# Patient Record
Sex: FEMALE | Race: WHITE | NOT HISPANIC OR LATINO | Employment: FULL TIME | URBAN - METROPOLITAN AREA
[De-identification: names, ages, dates, MRNs, and addresses within clinical notes are randomized per-mention and may not be internally consistent; named-entity substitution may affect disease eponyms.]

---

## 2017-01-04 DIAGNOSIS — J30.9 AR (ALLERGIC RHINITIS): Primary | ICD-10-CM

## 2017-01-04 RX ORDER — FEXOFENADINE HCL 180 MG/1
180 TABLET ORAL DAILY
Qty: 90 TABLET | Refills: 2 | Status: SHIPPED | OUTPATIENT
Start: 2017-01-04

## 2017-01-04 NOTE — TELEPHONE ENCOUNTER
Fexofenadine      Last Written Prescription Date: 9/29/15  Last Fill Quantity: 90,  # refills: 3   Last Office Visit with [facility] or [facility] prescribing provider: 10/14/16                                         Next 5 appointments (look out 90 days)     Feb 02, 2017  7:40 AM   Return Visit with Freddy Guerra MD   [facility]    [address] [address]   [phone]                  Prescription approved per FM Refill Protocol.  Jackelin Price, Pharm D  [facility]  [phone]

## 2017-01-15 ENCOUNTER — HOSPITAL ENCOUNTER (OUTPATIENT)
Facility: CLINIC | Age: 62
Setting detail: OBSERVATION
Discharge: HOME | End: 2017-01-17
Attending: EMERGENCY MEDICINE | Admitting: INTERNAL MEDICINE
Payer: COMMERCIAL

## 2017-01-15 DIAGNOSIS — R19.7 NAUSEA VOMITING AND DIARRHEA: ICD-10-CM

## 2017-01-15 DIAGNOSIS — R11.2 NAUSEA VOMITING AND DIARRHEA: ICD-10-CM

## 2017-01-15 DIAGNOSIS — E86.0 DEHYDRATION: ICD-10-CM

## 2017-01-15 PROBLEM — K52.9 GASTROENTERITIS: Status: ACTIVE | Noted: 2017-01-15

## 2017-01-15 LAB
ALBUMIN SERPL-MCNC: 3.4 G/DL (ref 3.4–5)
ALP SERPL-CCNC: 82 U/L (ref 40–150)
ALT SERPL W P-5'-P-CCNC: 37 U/L (ref 0–50)
ANION GAP SERPL CALCULATED.3IONS-SCNC: 8 MMOL/L (ref 3–14)
AST SERPL W P-5'-P-CCNC: 35 U/L (ref 0–45)
BASOPHILS # BLD AUTO: 0 10E9/L (ref 0–0.2)
BASOPHILS NFR BLD AUTO: 0.1 %
BILIRUB SERPL-MCNC: 0.6 MG/DL (ref 0.2–1.3)
BUN SERPL-MCNC: 18 MG/DL (ref 7–30)
CALCIUM SERPL-MCNC: 8.5 MG/DL (ref 8.5–10.1)
CHLORIDE SERPL-SCNC: 107 MMOL/L (ref 94–109)
CO2 SERPL-SCNC: 24 MMOL/L (ref 20–32)
CREAT SERPL-MCNC: 0.83 MG/DL (ref 0.52–1.04)
CRP SERPL-MCNC: 11 MG/L (ref 0–8)
DIFFERENTIAL METHOD BLD: ABNORMAL
EOSINOPHIL # BLD AUTO: 0 10E9/L (ref 0–0.7)
EOSINOPHIL NFR BLD AUTO: 0.1 %
ERYTHROCYTE [DISTWIDTH] IN BLOOD BY AUTOMATED COUNT: 14.8 % (ref 10–15)
ERYTHROCYTE [SEDIMENTATION RATE] IN BLOOD BY WESTERGREN METHOD: 15 MM/H (ref 0–30)
GFR SERPL CREATININE-BSD FRML MDRD: 70 ML/MIN/1.7M2
GLUCOSE SERPL-MCNC: 104 MG/DL (ref 70–99)
HCT VFR BLD AUTO: 38.6 % (ref 35–47)
HGB BLD-MCNC: 12.7 G/DL (ref 11.7–15.7)
IMM GRANULOCYTES # BLD: 0 10E9/L (ref 0–0.4)
IMM GRANULOCYTES NFR BLD: 0.1 %
LACTATE BLD-SCNC: 1.5 MMOL/L (ref 0.7–2.1)
LYMPHOCYTES # BLD AUTO: 0.3 10E9/L (ref 0.8–5.3)
LYMPHOCYTES NFR BLD AUTO: 3.7 %
MCH RBC QN AUTO: 30 PG (ref 26.5–33)
MCHC RBC AUTO-ENTMCNC: 32.9 G/DL (ref 31.5–36.5)
MCV RBC AUTO: 91 FL (ref 78–100)
MONOCYTES # BLD AUTO: 0.3 10E9/L (ref 0–1.3)
MONOCYTES NFR BLD AUTO: 3.6 %
NEUTROPHILS # BLD AUTO: 7.8 10E9/L (ref 1.6–8.3)
NEUTROPHILS NFR BLD AUTO: 92.4 %
NRBC # BLD AUTO: 0 10*3/UL
NRBC BLD AUTO-RTO: 0 /100
PLATELET # BLD AUTO: 310 10E9/L (ref 150–450)
POTASSIUM SERPL-SCNC: 4 MMOL/L (ref 3.4–5.3)
PROT SERPL-MCNC: 7.3 G/DL (ref 6.8–8.8)
RBC # BLD AUTO: 4.23 10E12/L (ref 3.8–5.2)
SODIUM SERPL-SCNC: 139 MMOL/L (ref 133–144)
WBC # BLD AUTO: 8.4 10E9/L (ref 4–11)

## 2017-01-15 PROCEDURE — 96374 THER/PROPH/DIAG INJ IV PUSH: CPT | Performed by: EMERGENCY MEDICINE

## 2017-01-15 PROCEDURE — 25000128 H RX IP 250 OP 636: Performed by: EMERGENCY MEDICINE

## 2017-01-15 PROCEDURE — 96375 TX/PRO/DX INJ NEW DRUG ADDON: CPT | Performed by: EMERGENCY MEDICINE

## 2017-01-15 PROCEDURE — 83605 ASSAY OF LACTIC ACID: CPT | Performed by: EMERGENCY MEDICINE

## 2017-01-15 PROCEDURE — 96361 HYDRATE IV INFUSION ADD-ON: CPT | Performed by: EMERGENCY MEDICINE

## 2017-01-15 PROCEDURE — 85025 COMPLETE CBC W/AUTO DIFF WBC: CPT | Performed by: EMERGENCY MEDICINE

## 2017-01-15 PROCEDURE — 99285 EMERGENCY DEPT VISIT HI MDM: CPT | Mod: 25 | Performed by: EMERGENCY MEDICINE

## 2017-01-15 PROCEDURE — 99285 EMERGENCY DEPT VISIT HI MDM: CPT | Mod: Z6 | Performed by: EMERGENCY MEDICINE

## 2017-01-15 PROCEDURE — 86140 C-REACTIVE PROTEIN: CPT | Performed by: EMERGENCY MEDICINE

## 2017-01-15 PROCEDURE — 87040 BLOOD CULTURE FOR BACTERIA: CPT | Performed by: EMERGENCY MEDICINE

## 2017-01-15 PROCEDURE — 80053 COMPREHEN METABOLIC PANEL: CPT | Performed by: EMERGENCY MEDICINE

## 2017-01-15 PROCEDURE — 85652 RBC SED RATE AUTOMATED: CPT | Performed by: EMERGENCY MEDICINE

## 2017-01-15 PROCEDURE — G0378 HOSPITAL OBSERVATION PER HR: HCPCS

## 2017-01-15 PROCEDURE — 96376 TX/PRO/DX INJ SAME DRUG ADON: CPT | Performed by: EMERGENCY MEDICINE

## 2017-01-15 PROCEDURE — 25000125 ZZHC RX 250: Performed by: EMERGENCY MEDICINE

## 2017-01-15 PROCEDURE — 96361 HYDRATE IV INFUSION ADD-ON: CPT

## 2017-01-15 PROCEDURE — 99220 ZZC INITIAL OBSERVATION CARE,LEVL III: CPT | Performed by: INTERNAL MEDICINE

## 2017-01-15 RX ORDER — PROCHLORPERAZINE 25 MG
25 SUPPOSITORY, RECTAL RECTAL EVERY 12 HOURS PRN
Status: DISCONTINUED | OUTPATIENT
Start: 2017-01-15 | End: 2017-01-15

## 2017-01-15 RX ORDER — CYCLOSPORINE 0.5 MG/ML
1 EMULSION OPHTHALMIC 2 TIMES DAILY
Status: DISCONTINUED | OUTPATIENT
Start: 2017-01-15 | End: 2017-01-17 | Stop reason: HOSPADM

## 2017-01-15 RX ORDER — KETOROLAC TROMETHAMINE 30 MG/ML
30 INJECTION, SOLUTION INTRAMUSCULAR; INTRAVENOUS ONCE
Status: COMPLETED | OUTPATIENT
Start: 2017-01-15 | End: 2017-01-15

## 2017-01-15 RX ORDER — NALOXONE HYDROCHLORIDE 0.4 MG/ML
.1-.4 INJECTION, SOLUTION INTRAMUSCULAR; INTRAVENOUS; SUBCUTANEOUS
Status: DISCONTINUED | OUTPATIENT
Start: 2017-01-15 | End: 2017-01-17 | Stop reason: HOSPADM

## 2017-01-15 RX ORDER — ACETAMINOPHEN 500 MG
500-1000 TABLET ORAL EVERY 6 HOURS PRN
Status: DISCONTINUED | OUTPATIENT
Start: 2017-01-15 | End: 2017-01-17 | Stop reason: HOSPADM

## 2017-01-15 RX ORDER — PROMETHAZINE HYDROCHLORIDE 25 MG/ML
12.5 INJECTION, SOLUTION INTRAMUSCULAR; INTRAVENOUS ONCE
Status: COMPLETED | OUTPATIENT
Start: 2017-01-15 | End: 2017-01-15

## 2017-01-15 RX ORDER — SODIUM CHLORIDE, SODIUM LACTATE, POTASSIUM CHLORIDE, CALCIUM CHLORIDE 600; 310; 30; 20 MG/100ML; MG/100ML; MG/100ML; MG/100ML
INJECTION, SOLUTION INTRAVENOUS CONTINUOUS
Status: DISCONTINUED | OUTPATIENT
Start: 2017-01-15 | End: 2017-01-17 | Stop reason: HOSPADM

## 2017-01-15 RX ORDER — FOLIC ACID 1 MG/1
1 TABLET ORAL DAILY
Status: DISCONTINUED | OUTPATIENT
Start: 2017-01-16 | End: 2017-01-15

## 2017-01-15 RX ORDER — PROCHLORPERAZINE MALEATE 5 MG
5-10 TABLET ORAL EVERY 6 HOURS PRN
Status: DISCONTINUED | OUTPATIENT
Start: 2017-01-15 | End: 2017-01-15

## 2017-01-15 RX ORDER — FEXOFENADINE HCL 60 MG/1
180 TABLET, FILM COATED ORAL DAILY
Status: DISCONTINUED | OUTPATIENT
Start: 2017-01-16 | End: 2017-01-15

## 2017-01-15 RX ORDER — ONDANSETRON 2 MG/ML
4 INJECTION INTRAMUSCULAR; INTRAVENOUS EVERY 6 HOURS PRN
Status: DISCONTINUED | OUTPATIENT
Start: 2017-01-15 | End: 2017-01-17 | Stop reason: HOSPADM

## 2017-01-15 RX ORDER — ONDANSETRON 4 MG/1
4 TABLET, ORALLY DISINTEGRATING ORAL EVERY 6 HOURS PRN
Status: DISCONTINUED | OUTPATIENT
Start: 2017-01-15 | End: 2017-01-15

## 2017-01-15 RX ORDER — ONDANSETRON 2 MG/ML
8 INJECTION INTRAMUSCULAR; INTRAVENOUS ONCE
Status: COMPLETED | OUTPATIENT
Start: 2017-01-15 | End: 2017-01-15

## 2017-01-15 RX ORDER — MORPHINE SULFATE 4 MG/ML
4 INJECTION, SOLUTION INTRAMUSCULAR; INTRAVENOUS
Status: DISCONTINUED | OUTPATIENT
Start: 2017-01-15 | End: 2017-01-15

## 2017-01-15 RX ORDER — LIDOCAINE 40 MG/G
CREAM TOPICAL
Status: DISCONTINUED | OUTPATIENT
Start: 2017-01-15 | End: 2017-01-15

## 2017-01-15 RX ORDER — TOPIRAMATE 50 MG/1
50 TABLET, FILM COATED ORAL AT BEDTIME
Status: DISCONTINUED | OUTPATIENT
Start: 2017-01-15 | End: 2017-01-15

## 2017-01-15 RX ORDER — MORPHINE SULFATE 2 MG/ML
2-4 INJECTION, SOLUTION INTRAMUSCULAR; INTRAVENOUS
Status: DISCONTINUED | OUTPATIENT
Start: 2017-01-15 | End: 2017-01-17 | Stop reason: HOSPADM

## 2017-01-15 RX ORDER — SODIUM CHLORIDE 9 MG/ML
1000 INJECTION, SOLUTION INTRAVENOUS CONTINUOUS
Status: DISCONTINUED | OUTPATIENT
Start: 2017-01-15 | End: 2017-01-17 | Stop reason: HOSPADM

## 2017-01-15 RX ADMIN — SODIUM CHLORIDE 1000 ML: 9 INJECTION, SOLUTION INTRAVENOUS at 18:55

## 2017-01-15 RX ADMIN — ONDANSETRON 8 MG: 2 INJECTION INTRAMUSCULAR; INTRAVENOUS at 18:59

## 2017-01-15 RX ADMIN — SODIUM CHLORIDE 1000 ML: 9 INJECTION, SOLUTION INTRAVENOUS at 20:39

## 2017-01-15 RX ADMIN — MORPHINE SULFATE 4 MG: 4 INJECTION, SOLUTION INTRAMUSCULAR; INTRAVENOUS at 21:20

## 2017-01-15 RX ADMIN — PROMETHAZINE HYDROCHLORIDE 12.5 MG: 25 INJECTION INTRAMUSCULAR; INTRAVENOUS at 19:26

## 2017-01-15 RX ADMIN — MORPHINE SULFATE 4 MG: 4 INJECTION, SOLUTION INTRAMUSCULAR; INTRAVENOUS at 19:26

## 2017-01-15 RX ADMIN — SODIUM CHLORIDE 1000 ML: 9 INJECTION, SOLUTION INTRAVENOUS at 19:52

## 2017-01-15 RX ADMIN — MORPHINE SULFATE 4 MG: 4 INJECTION, SOLUTION INTRAMUSCULAR; INTRAVENOUS at 19:59

## 2017-01-15 RX ADMIN — KETOROLAC TROMETHAMINE 30 MG: 30 INJECTION, SOLUTION INTRAMUSCULAR at 19:02

## 2017-01-15 ASSESSMENT — ENCOUNTER SYMPTOMS
VOMITING: 1
NAUSEA: 1
DIARRHEA: 1
SHORTNESS OF BREATH: 0
BLOOD IN STOOL: 0
FEVER: 0
ABDOMINAL PAIN: 1

## 2017-01-15 NOTE — IP AVS SNAPSHOT
MRN:[id number]                      After Visit Summary   1/15/2017    Sharon Fung    MRN: [id number]           Thank you!     Thank you for choosing [facility] for your care. Our goal is always to provide you with excellent care. Hearing back from our patients is one way we can continue to improve our services. Please take a few minutes to complete the written survey that you may receive in the mail after you visit with us. Thank you!        Patient Information     Date Of Birth          1955        About your hospital stay     You were admitted on:  January 15, 2017 You last received care in the:  [facility] 8[facility]    You were discharged on:  January 17, 2017       Who to Call     For medical emergencies, please call 911.  For non-urgent questions about your medical care, please call your primary care provider or clinic, [phone]          Attending Provider     Provider    Danny Deluca MD    [facility], MD Tona Zapata Saurabh, MD       Primary Care Provider Office Phone # Fax #    Reynaldo Saavedra [phone]       [address] [address]        Your next 10 appointments already scheduled     Feb 02, 2017  7:40 AM   Return Visit with Freddy Guerra MD   [facility] ([facility])    [address] [place] [id number] [phone]              Pending Results     Date and Time Order Name Status Description    1/16/2017 1116 Throat Culture Aerobic Bacterial Preliminary     1/15/2017 1840 Blood culture Preliminary             Statement of Approval     Ordered          01/17/17 1223  I have reviewed and agree with all the recommendations and orders detailed in this document.   EFFECTIVE NOW     Approved and electronically signed by:  Shaq Omer MD           01/17/17 1214  I have reviewed and agree with all the recommendations and orders detailed in this document.    EFFECTIVE NOW     Approved and electronically signed by:  Shaq Omer MD             Admission Information        Provider Department Dept Phone    1/15/2017 Tu Carbone MD, MD [facility] [phone]      Your Vitals Were     Blood Pressure Pulse Temperature Respirations Pulse Oximetry       157/93 mmHg 81 99.4  F (37.4  C) (Oral) 17 93%       MyChart Information     [employer] gives you secure access to your electronic health record. If you see a primary care provider, you can also send messages to your care team and make appointments. If you have questions, please call your primary care clinic.  If you do not have a primary care provider, please call [phone] and they will assist you.        Care EveryWhere ID     This is your Care EveryWhere ID. This could be used by other organizations to access your [place] medical records  [id number]           Review of your medicines      START taking        Dose / Directions    ondansetron 4 MG ODT tab   Commonly known as:  ZOFRAN ODT   Used for:  Nausea vomiting and diarrhea        Dose:  4 mg   Take 1 tablet (4 mg) by mouth every 6 hours as needed for nausea   Quantity:  12 tablet   Refills:  1         CONTINUE these medicines which have NOT CHANGED        Dose / Directions    acetaminophen 500 MG tablet   Commonly known as:  TYLENOL        Dose:  500-1000 mg   Take 500-1,000 mg by mouth every 6 hours as needed for mild pain   Refills:  0       cycloSPORINE 0.05 % ophthalmic emulsion   Commonly known as:  RESTASIS   Used for:  Dry eye syndrome, bilateral        Dose:  1 drop   Place 1 drop into both eyes 2 times daily   Quantity:  2 Box   Refills:  11       fexofenadine 180 MG tablet   Commonly known as:  ALLEGRA   Used for:  AR (allergic rhinitis)        Dose:  180 mg   Take 1 tablet (180 mg) by mouth daily   Quantity:  90 tablet   Refills:  2       FLONASE 50 MCG/ACT spray   Generic drug:  fluticasone        Dose:  2 spray   Spray 2 sprays into both nostrils  "as needed   Refills:  0       FLUoxetine 20 MG capsule   Commonly known as:  PROzac   Used for:  Major depressive disorder, recurrent episode, mild (H)        Dose:  20 mg   Take 1 capsule (20 mg) by mouth daily   Quantity:  90 capsule   Refills:  1       folic acid 1 MG tablet   Commonly known as:  FOLVITE   Used for:  Seropositive rheumatoid arthritis (H), High risk medication use        Dose:  1 mg   Take 1 tablet (1 mg) by mouth daily   Quantity:  100 tablet   Refills:  3       Insulin Syringe-Needle U-100 27G X 1/2\" 1 ML Misc   Commonly known as:  BD insulin syringe   Used for:  Seropositive rheumatoid arthritis (H), High risk medication use        Dose:  1 Syringe   1 Syringe every 7 days , to be used for methotrexate administration.   Quantity:  10 each   Refills:  5       methotrexate 50 MG/2ML injection CHEMO   Used for:  Seropositive rheumatoid arthritis (H)        Dose:  20 mg   Inject 0.8 mLs (20 mg) Subcutaneous every 7 days   Quantity:  2 vial   Refills:  3       omeprazole 20 MG tablet   Used for:  Gastroesophageal reflux disease without esophagitis        Dose:  20 mg   Take 1 tablet (20 mg) by mouth daily Take 30-60 minutes before a meal.   Quantity:  30 tablet   Refills:  2       order for DME        Use your CPAP device as directed by your provider.   Refills:  0       tofacitinib 11 MG 24 hr tablet   Commonly known as:  XELJANZ XR   Used for:  Seropositive rheumatoid arthritis (H), High risk medications (not anticoagulants) long-term use        Dose:  11 mg   Take 1 tablet (11 mg) by mouth daily   Quantity:  30 tablet   Refills:  6       topiramate 50 MG tablet   Commonly known as:  TOPAMAX   Used for:  Morbid drug-induced obesity        Dose:  50 mg   Take 1 tablet (50 mg) by mouth At Bedtime   Quantity:  30 tablet   Refills:  3            Where to get your medicines      These medications were sent to [facility] [place], [place] - [address]  [address], " "[facility] [address]     Phone:  [phone]    - ondansetron 4 MG ODT tab             Protect others around you: Learn how to safely use, store and throw away your medicines at www.disposemymeds.org.             Medication List: This is a list of all your medications and when to take them. Check marks below indicate your daily home schedule. Keep this list as a reference.      Medications           Morning Afternoon Evening Bedtime As Needed    acetaminophen 500 MG tablet   Commonly known as:  TYLENOL   Take 500-1,000 mg by mouth every 6 hours as needed for mild pain   Last time this was given:  500 mg on 1/16/2017  7:51 AM                                cycloSPORINE 0.05 % ophthalmic emulsion   Commonly known as:  RESTASIS   Place 1 drop into both eyes 2 times daily                                fexofenadine 180 MG tablet   Commonly known as:  ALLEGRA   Take 1 tablet (180 mg) by mouth daily                                FLONASE 50 MCG/ACT spray   Spray 2 sprays into both nostrils as needed   Generic drug:  fluticasone                                FLUoxetine 20 MG capsule   Commonly known as:  PROzac   Take 1 capsule (20 mg) by mouth daily                                folic acid 1 MG tablet   Commonly known as:  FOLVITE   Take 1 tablet (1 mg) by mouth daily                                Insulin Syringe-Needle U-100 27G X 1/2\" 1 ML Misc   Commonly known as:  BD insulin syringe   1 Syringe every 7 days , to be used for methotrexate administration.                                methotrexate 50 MG/2ML injection CHEMO   Inject 0.8 mLs (20 mg) Subcutaneous every 7 days                                omeprazole 20 MG tablet   Take 1 tablet (20 mg) by mouth daily Take 30-60 minutes before a meal.                                ondansetron 4 MG ODT tab   Commonly known as:  ZOFRAN ODT   Take 1 tablet (4 mg) by mouth every 6 hours as needed for nausea                                order for DME   Use your CPAP " device as directed by your provider.                                tofacitinib 11 MG 24 hr tablet   Commonly known as:  XELJANZ XR   Take 1 tablet (11 mg) by mouth daily                                topiramate 50 MG tablet   Commonly known as:  TOPAMAX   Take 1 tablet (50 mg) by mouth At Bedtime

## 2017-01-15 NOTE — LETTER
Transition Communication Hand-off for Care Transitions to Next Level of Care Provider    Name: Sharon Fung  MRN #: [id number]  Primary Care Provider: Reynaldo Saavedra     Primary Clinic: [address] [address]     Reason for Hospitalization:  Dehydration [E86.0]  Nausea vomiting and diarrhea [R11.2, R19.7]  Admit Date/Time: 1/15/2017  6:31 PM  Discharge Date: TBD  Payor Source: Payor: [employer] / Plan: [place] [facility] / Product Type: HMO /     Reason for Communication Hand-off Referral: Avoidable readmission within 30 days    Follow-up specialty is recommended: Yes    Follow-up plan:  Future Appointments  Date Time Provider Department Center   2/2/2017 7:40 AM Freddy Guerra MD [facility] Heidi A. Bahe RN, BSN  Care Coordinator, 8A  Phone [phone]  Pager [phone]    AVS/Discharge Summary is the source of truth; this is a helpful guide for improved communication of patient story

## 2017-01-15 NOTE — IP AVS SNAPSHOT
[facility]    [address]    [place] [address]    Phone:  [phone]                                       After Visit Summary   1/15/2017    Sharon Fung    MRN: [id number]           After Visit Summary Signature Page     I have received my discharge instructions, and my questions have been answered. I have discussed any challenges I see with this plan with the nurse or doctor.    ..........................................................................................................................................  Patient/Patient Representative Signature      ..........................................................................................................................................  Patient Representative Print Name and Relationship to Patient    ..................................................               ................................................  Date                                            Time    ..........................................................................................................................................  Reviewed by Signature/Title    ...................................................              ..............................................  Date                                                            Time

## 2017-01-16 PROCEDURE — 99225 ZZC SUBSEQUENT OBSERVATION CARE,LEVEL II: CPT | Performed by: INTERNAL MEDICINE

## 2017-01-16 PROCEDURE — G0378 HOSPITAL OBSERVATION PER HR: HCPCS

## 2017-01-16 PROCEDURE — 96376 TX/PRO/DX INJ SAME DRUG ADON: CPT

## 2017-01-16 PROCEDURE — 25000125 ZZHC RX 250: Performed by: INTERNAL MEDICINE

## 2017-01-16 PROCEDURE — 87070 CULTURE OTHR SPECIMN AEROBIC: CPT | Performed by: INTERNAL MEDICINE

## 2017-01-16 PROCEDURE — 96361 HYDRATE IV INFUSION ADD-ON: CPT

## 2017-01-16 PROCEDURE — 96375 TX/PRO/DX INJ NEW DRUG ADDON: CPT

## 2017-01-16 PROCEDURE — 25000132 ZZH RX MED GY IP 250 OP 250 PS 637: Performed by: INTERNAL MEDICINE

## 2017-01-16 PROCEDURE — 25000128 H RX IP 250 OP 636: Performed by: INTERNAL MEDICINE

## 2017-01-16 PROCEDURE — 25800025 ZZH RX 258: Performed by: INTERNAL MEDICINE

## 2017-01-16 RX ORDER — KETOROLAC TROMETHAMINE 30 MG/ML
30 INJECTION, SOLUTION INTRAMUSCULAR; INTRAVENOUS EVERY 6 HOURS PRN
Status: DISCONTINUED | OUTPATIENT
Start: 2017-01-16 | End: 2017-01-17 | Stop reason: HOSPADM

## 2017-01-16 RX ADMIN — KETOROLAC TROMETHAMINE 30 MG: 30 INJECTION, SOLUTION INTRAMUSCULAR at 23:07

## 2017-01-16 RX ADMIN — SODIUM CHLORIDE, POTASSIUM CHLORIDE, SODIUM LACTATE AND CALCIUM CHLORIDE: 600; 310; 30; 20 INJECTION, SOLUTION INTRAVENOUS at 18:01

## 2017-01-16 RX ADMIN — PANTOPRAZOLE SODIUM 40 MG: 40 INJECTION, POWDER, FOR SOLUTION INTRAVENOUS at 00:19

## 2017-01-16 RX ADMIN — KETOROLAC TROMETHAMINE 30 MG: 30 INJECTION, SOLUTION INTRAMUSCULAR at 15:19

## 2017-01-16 RX ADMIN — SODIUM CHLORIDE 500 ML: 9 INJECTION, SOLUTION INTRAVENOUS at 13:10

## 2017-01-16 RX ADMIN — PANTOPRAZOLE SODIUM 40 MG: 40 INJECTION, POWDER, FOR SOLUTION INTRAVENOUS at 07:51

## 2017-01-16 RX ADMIN — SODIUM CHLORIDE, POTASSIUM CHLORIDE, SODIUM LACTATE AND CALCIUM CHLORIDE: 600; 310; 30; 20 INJECTION, SOLUTION INTRAVENOUS at 00:19

## 2017-01-16 RX ADMIN — ONDANSETRON 4 MG: 2 INJECTION INTRAMUSCULAR; INTRAVENOUS at 07:51

## 2017-01-16 RX ADMIN — ONDANSETRON 4 MG: 2 INJECTION INTRAMUSCULAR; INTRAVENOUS at 15:18

## 2017-01-16 RX ADMIN — MORPHINE SULFATE 4 MG: 2 INJECTION, SOLUTION INTRAMUSCULAR; INTRAVENOUS at 00:18

## 2017-01-16 RX ADMIN — KETOROLAC TROMETHAMINE 30 MG: 30 INJECTION, SOLUTION INTRAMUSCULAR at 09:09

## 2017-01-16 RX ADMIN — ONDANSETRON 4 MG: 2 INJECTION INTRAMUSCULAR; INTRAVENOUS at 23:01

## 2017-01-16 RX ADMIN — ONDANSETRON 4 MG: 2 INJECTION INTRAMUSCULAR; INTRAVENOUS at 00:19

## 2017-01-16 RX ADMIN — MORPHINE SULFATE 4 MG: 2 INJECTION, SOLUTION INTRAMUSCULAR; INTRAVENOUS at 03:41

## 2017-01-16 RX ADMIN — ACETAMINOPHEN 500 MG: 500 TABLET ORAL at 07:51

## 2017-01-16 NOTE — PLAN OF CARE
"Problem: Goal Outcome Summary  Goal: Goal Outcome Summary  Outcome: Improving  5663-9235: Pt c/o uncomfortable muscle/body aches \"all over\", Denies abdominal pain. Requested PRN morphine 4 mg IV. C/o nausea, prn zofran IV given. Low grade temp 100.1, refusing tylenol d/t nausea  8763-7992: \"Pain is improving\" Able to sip on ginger ale. Receiving continuous IV fluids - LR @ 125 ml/hr  3686-2430: Struggling to sleep. Still feeling achy. Prn morphine IV administered with good relief. Still low grade temp of 100.2  2730-3141: Seems more comfortable. No further c/o nausea, drinking soda. Up to BR independently. Denies nausea and diarrhea.     Pt is a/o x4. Low grade temp during the night, did not want tylenol, \"I don't think it will stay down\". General body aches well managed by IV MS, given twice during shift. Up to BR independently. No emesis or diarrhea during night. Unable to tolerate food but sipping ginger ale throughout the night. PIV in right hand is patent, drsg CDI - running LR @ 125 ml/hr. Calls appropriately and makes needs known. Educated regarding 'Observation' status and handout given, had no questions.         "

## 2017-01-16 NOTE — H&P
History and Physical     Sharon Fung MRN# [id number]   YOB: 1955 Age: 61 year old      Date of Admission:  1/15/2017      CC:   nausea, vomiting, diarrhea since 0400 am.   Gen body aches.       HPI: Obtained from the patient, chart review.    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 diffuse pain abdomen.   Reports approximately 8 episodes of watery (non bloody) diarrhea and she throws up every time she eats or drinks anything. Unable to tolerate po.  Patient states that she has had no fevers, chills. Diffuse abd pain and Gen body aches.      No sick contact, no recent travel. No concern for food poisoning.     RA: difficult to control . On Mtx and tofacitinib.   H/O Status post 2010 laparoscopic gastric banding.10/31/15 laparoscopic removal of gastric band and drainage of intra-abdominal abscess.    In ED,     Given NSS bolus  Iv Morphine 4 mg  Iv ketorolac 30 mg  Iv Zofran 8  Iv Phenergan 12.5    Past Medical History:  Past Medical History   Diagnosis Date     AR (allergic rhinitis)  as teen     Mild major depression (H) 3 years ago     GERD (gastroesophageal reflux disease) 4-07     Morbid obesity (H) teens     Rheumatoid arthritis, adult (H)      Arthritis 12/14/2015     BRETT (obstructive sleep apnea)      uses CPAP     Depressive disorder 3 years ago       Past Surgical History:  Past Surgical History   Procedure Laterality Date     C appendectomy  1977     Tubal ligation  1988     Breast biopsy, rt/lt  1-94     Benign     Laparoscopic gastric adjustable banding  11/09/10     Lap band procedure     Esophagoscopy, gastroscopy, duodenoscopy (egd), combined N/A 10/29/2015     Procedure:  "COMBINED ESOPHAGOSCOPY, GASTROSCOPY, DUODENOSCOPY (EGD);  Surgeon: Shaq Mims MD;  Location: [facility]     Laparoscopic removal gastric adjustable band N/A 10/31/2015     Procedure: LAPAROSCOPIC REMOVAL GASTRIC ADJUSTABLE BAND;  Surgeon: Shaq Mims MD;  Location: [facility] OR     Colonoscopy       Blepharoplasty bilateral Bilateral 8/5/2016     Procedure: BLEPHAROPLASTY BILATERAL;  Surgeon: Cortez Robin MD;  Location: [facility]       Allergies:   No Known Allergies    Medications:    No current facility-administered medications on file prior to encounter.  Current Outpatient Prescriptions on File Prior to Encounter:  fexofenadine (ALLEGRA) 180 MG tablet Take 1 tablet (180 mg) by mouth daily   tofacitinib (XELJANZ XR) 11 MG XR tablet Take 1 tablet (11 mg) by mouth daily   methotrexate 50 MG/2ML injection Inject 0.8 mLs (20 mg) Subcutaneous every 7 days   Insulin Syringe-Needle U-100 (BD INSULIN SYRINGE) 27G X 1/2\" 1 ML MISC 1 Syringe every 7 days , to be used for methotrexate administration.   topiramate (TOPAMAX) 50 MG tablet Take 1 tablet (50 mg) by mouth At Bedtime   folic acid (FOLVITE) 1 MG tablet Take 1 tablet (1 mg) by mouth daily   FLUoxetine (PROZAC) 20 MG capsule Take 1 capsule (20 mg) by mouth daily   cycloSPORINE (RESTASIS) 0.05 % ophthalmic emulsion Place 1 drop into both eyes 2 times daily   acetaminophen (TYLENOL) 500 MG tablet Take 500-1,000 mg by mouth every 6 hours as needed for mild pain   omeprazole 20 MG tablet Take 1 tablet (20 mg) by mouth daily Take 30-60 minutes before a meal.   ORDER FOR DME Use your CPAP device as directed by your provider.   fluticasone (FLONASE) 50 MCG/ACT nasal spray Spray 2 sprays into both nostrils as needed       Social History:  Social History     Social History     Marital Status:      Spouse Name: N/A     Number of Children: 2     Years of Education: 18     Occupational History     [employer]     Social History Main Topics     " Smoking status: Never Smoker      Smokeless tobacco: Never Used     Alcohol Use: Yes      Comment: very rare     Drug Use: No     Sexual Activity: No     Other Topics Concern     Parent/Sibling W/ Cabg, Mi Or Angioplasty Before 65f 55m? No     Social History Narrative       Family History:   Family History   Problem Relation Age of Onset     HEART DISEASE Father      MI     Neurologic Disorder Father 79     Parkinson's     CANCER Maternal Grandmother      uterine     Neurologic Disorder Sister 16     migraine     Neurologic Disorder Mother 20     migraine     Neurologic Disorder Son 7     migraine     DIABETES Father      Hypertension Father      Depression Sister      Coronary Artery Disease Father      Depression Sister      Substance Abuse Son          ROS:  The remainder of the complete ROS was negative unless noted in the HPI.      Exam:    BP 92/41 mmHg  Pulse 100  Temp(Src) 100.4  F (38  C) (Oral)  Resp 18  SpO2 98%  Breastfeeding? No    Temp (24hrs), Av.4  F (37.4  C), Min:98.4  F (36.9  C), Max:100.4  F (38  C)      Wt Readings from Last 5 Encounters:   16 107.956 kg (238 lb)   16 107.502 kg (237 lb)   10/14/16 106.142 kg (234 lb)   16 109.181 kg (240 lb 11.2 oz)   16 109.374 kg (241 lb 2 oz)       General: Alert, interactive, NAD  HEENT: AT/NC, sclera anicteric, PERRL, moist MM  Neck: Supple, no JVD or LNpathy  Resp/chest: clear to auscultation bilaterally, no crackles or wheezes  Cardiac/Heart: s1s2 regular rate and rhythm, no murmur  GI/Abdomen: Soft, mild diffuse tenderness, nondistended. +BS.  No rebound or guarding.  MSK/Extremities: No LE edema, distal pulses 2+  Skin: Warm and dry, no jaundice or rash on exposed areas.   Neuro: Alert & oriented x 3, Cns 2-12 intact,      Labs:    BMP  Recent Labs  Lab 01/15/17  1844      POTASSIUM 4.0   CHLORIDE 107   ISH 8.5   CO2 24   BUN 18   CR 0.83   *     CBC  Recent Labs  Lab 01/15/17  1844   WBC 8.4   RBC 4.23    HGB 12.7   HCT 38.6   MCV 91   MCH 30.0   MCHC 32.9   RDW 14.8        INRNo lab results found in last 7 days.  LFTs  Recent Labs  Lab 01/15/17  1844   ALKPHOS 82   AST 35   ALT 37   BILITOTAL 0.6   PROTTOTAL 7.3   ALBUMIN 3.4      PANCNo lab results found in last 7 days.    Imaging:  No results found for this or any previous visit (from the past 24 hour(s)).      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  gen body aches since today am.     # Acute gastroenteritis: suspect Viral.   Patient with low grade T max 100.4. Vitals stable. CRP 11. Sed rate: 15. Wbc: 8.4. Lac; 1.5.   - register to observation.   - Continue IVF  cc/hour.   - iv Protonix 40 mg now & daily.   - iv Antiemetics protocol (zofran, compazine)  - iv Morphine 2-4 mg prn for pain.   - ADAT to clears.   - Monitor vitals. I/O.     *Patient has h/o intra abd abscess 2015. Clinically appears unlikely at current. However continue to monitor closely. Consider CT abd/pelvis with contrast if patient worsens eg high fever, worsening pain abdomen etc.     # RA : on Mtx and tofacitinib. Follows rheumatology as outpatient. Per patient, not really controlled at baseline.   - unlikely acute flare.   - holding her oral medicines given NV & also observation status.   - may resume her home oral medications once NV improves. Patient may take her home medications after pharm D reviews.     FEN: Adat to clears. Ivf. Fu lytes.   Prophylaxis: mechanical    Full code.     D/w HOWARD Mar MD   Hospitalist (Internal Medicine)  1/15/2017

## 2017-01-16 NOTE — PROGRESS NOTES
Pt seen, case reviewed with Dr Mar    Pt continues to feel poorly with generalized myalgias, nausea, malaise.  No emesis or diarrhea since last night  Has developed a sore throat overnight.  Denies cough, HA, dysuria    T101.1 VSS  Fatigued appearing, in NAD  Oral mucosa moist, no pharynx lesions  Lungs clear  CV rrr no M  Abd soft, non-tender  No LE edema  No rash    BC neg      Assessment    Fever, myalgias, sore throat, nausea with emesis, diarrhea.  Picture remains most c/w viral illness, less likely bacterial infection.    GERD, on IV PPI    RA on immunosuppressant    Plan  Supportive tx  IV fluids  IV toradol  Throat culture  Stool culture for enteric pathogens if recurrent diarrhea  ambulate

## 2017-01-16 NOTE — PLAN OF CARE
Problem: Goal Outcome Summary  Goal: Goal Outcome Summary  Outcome: Improving  Pt arrived from ED around 2145.  Orientated pt to room, pt dosing off while she was up clear, pt appears quite tired. Patient A/Ox4. VSS, pt has been running a slight fever.  Dr. Mar arrived to floor around 2225 to assess pt; he has been entering/reviewing orders prior to that as well. Not tolerating a diet per ED, pt tried ginger ale and vomited downstairs.  No emesis noted on 8A. Activity level has been low, pt was able to get out of cart and transfer to bed but laid down quickly. IV infusing fluids to rehydrate pt. Pain rated as tolerable thus far. Patient has demonstrated ability to call appropriately. Patient is resting with call light within reach. Will continue to monitor.

## 2017-01-16 NOTE — ED PROVIDER NOTES
[facility] [place] ([facility])    January 15, 2017    ED 19   History     Chief Complaint   Patient presents with     Nausea, Vomiting, & Diarrhea     nausea, vomiting, diarrhea that started at 0400.      Generalized Body Aches     Pt having generalized body pain. Even blankets and causing pain     The history is provided by the patient and medical records.     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  Patient states there are no other family illnesses going on and she states she s had approximately 8 episodes of watery diarrhea as well as 8 episodes of vomiting since 4 AM.  Patient states that she has had no fevers, no bloody diarrhea, but states that she has diffuse myalgias.  Patient denies any recent traveling and presents with family members to the ER for evaluation.  Patient does have a history of rheumatoid arthritis for which she takes methotrexate and she states that she normally gets nauseated when taking the methotrexate.  Patient s additional history is significant for previous bariatric surgery status post lap band with which she has had complications in the past as well.    This part of the document was transcribed by Annika Blanchard, Medical Scribe.      I have reviewed the Medications, Allergies, Past Medical and Surgical History, and Social History in the [employer] system.  PAST MEDICAL HISTORY:   Past Medical History   Diagnosis Date     AR (allergic rhinitis)  as teen     Mild major depression (H) 3 years ago     GERD (gastroesophageal reflux disease) 4-07     Morbid obesity (H) teens     Rheumatoid arthritis, adult (H)      Arthritis 12/14/2015     BRETT (obstructive sleep apnea)      uses CPAP     Depressive disorder 3 years ago       PAST SURGICAL HISTORY:   Past Surgical  History   Procedure Laterality Date     C appendectomy  1977     Tubal ligation  1988     Breast biopsy, rt/lt  1-94     Benign     Laparoscopic gastric adjustable banding  11/09/10     Lap band procedure     Esophagoscopy, gastroscopy, duodenoscopy (egd), combined N/A 10/29/2015     Procedure: COMBINED ESOPHAGOSCOPY, GASTROSCOPY, DUODENOSCOPY (EGD);  Surgeon: Shaq Mims MD;  Location: [facility]     Laparoscopic removal gastric adjustable band N/A 10/31/2015     Procedure: LAPAROSCOPIC REMOVAL GASTRIC ADJUSTABLE BAND;  Surgeon: Shaq Mims MD;  Location: [facility] OR     Colonoscopy       Blepharoplasty bilateral Bilateral 8/5/2016     Procedure: BLEPHAROPLASTY BILATERAL;  Surgeon: Cortez Robin MD;  Location: [facility]       FAMILY HISTORY:   Family History   Problem Relation Age of Onset     HEART DISEASE Father      MI     Neurologic Disorder Father 79     Parkinson's     CANCER Maternal Grandmother      uterine     Neurologic Disorder Sister 16     migraine     Neurologic Disorder Mother 20     migraine     Neurologic Disorder Son 7     migraine     DIABETES Father      Hypertension Father      Depression Sister      Coronary Artery Disease Father      Depression Sister      Substance Abuse Son        SOCIAL HISTORY:   Social History   Substance Use Topics     Smoking status: Never Smoker      Smokeless tobacco: Never Used     Alcohol Use: Yes      Comment: very rare       Previous Medications    ACETAMINOPHEN (TYLENOL) 500 MG TABLET    Take 500-1,000 mg by mouth every 6 hours as needed for mild pain    CYCLOSPORINE (RESTASIS) 0.05 % OPHTHALMIC EMULSION    Place 1 drop into both eyes 2 times daily    FEXOFENADINE (ALLEGRA) 180 MG TABLET    Take 1 tablet (180 mg) by mouth daily    FLUOXETINE (PROZAC) 20 MG CAPSULE    Take 1 capsule (20 mg) by mouth daily    FLUTICASONE (FLONASE) 50 MCG/ACT NASAL SPRAY    Spray 2 sprays into both nostrils as needed    FOLIC ACID (FOLVITE) 1 MG TABLET    Take 1 tablet (1  "mg) by mouth daily    INSULIN SYRINGE-NEEDLE U-100 (BD INSULIN SYRINGE) 27G X 1/2\" 1 ML MISC    1 Syringe every 7 days , to be used for methotrexate administration.    METHOTREXATE 50 MG/2ML INJECTION    Inject 0.8 mLs (20 mg) Subcutaneous every 7 days    OMEPRAZOLE 20 MG TABLET    Take 1 tablet (20 mg) by mouth daily Take 30-60 minutes before a meal.    ORDER FOR DME    Use your CPAP device as directed by your provider.    TOFACITINIB (XELJANZ XR) 11 MG XR TABLET    Take 1 tablet (11 mg) by mouth daily    TOPIRAMATE (TOPAMAX) 50 MG TABLET    Take 1 tablet (50 mg) by mouth At Bedtime        No Known Allergies     Review of Systems   Constitutional: Negative for fever.   Respiratory: Negative for shortness of breath.    Cardiovascular: Negative for chest pain.   Gastrointestinal: Positive for nausea, vomiting, abdominal pain and diarrhea. Negative for blood in stool.   All other systems reviewed and are negative.      Physical Exam   BP: 121/60 mmHg  Heart Rate: 102  Temp: 98.4  F (36.9  C)  Resp: 18  SpO2: 97 %  Physical Exam   Constitutional: She is oriented to person, place, and time. She appears distressed.   Alert Conversant and in some moderate distress secondary to her nausea and diffuse myalgias   HENT:   Head: Atraumatic.   Eyes: EOM are normal. Pupils are equal, round, and reactive to light.   Neck: Neck supple.   Cardiovascular: Normal heart sounds.    Pulmonary/Chest: Breath sounds normal.   Abdominal: Soft. There is no rebound and no guarding.   Obese but nontender   Musculoskeletal: She exhibits no edema or tenderness.   Neurological: She is alert and oriented to person, place, and time. No cranial nerve deficit.   Grossly intact and symmetric   Skin: Skin is warm.   Psychiatric:   Congruent with clinical presentation       ED Course   Procedures        Results for orders placed or performed during the hospital encounter of 01/15/17   CBC with platelets differential   Result Value Ref Range    WBC 8.4 " 4.0 - 11.0 10e9/L    RBC Count 4.23 3.8 - 5.2 10e12/L    Hemoglobin 12.7 11.7 - 15.7 g/dL    Hematocrit 38.6 35.0 - 47.0 %    MCV 91 78 - 100 fl    MCH 30.0 26.5 - 33.0 pg    MCHC 32.9 31.5 - 36.5 g/dL    RDW 14.8 10.0 - 15.0 %    Platelet Count 310 150 - 450 10e9/L    Diff Method Automated Method     % Neutrophils 92.4 %    % Lymphocytes 3.7 %    % Monocytes 3.6 %    % Eosinophils 0.1 %    % Basophils 0.1 %    % Immature Granulocytes 0.1 %    Nucleated RBCs 0 0 /100    Absolute Neutrophil 7.8 1.6 - 8.3 10e9/L    Absolute Lymphocytes 0.3 (L) 0.8 - 5.3 10e9/L    Absolute Monocytes 0.3 0.0 - 1.3 10e9/L    Absolute Eosinophils 0.0 0.0 - 0.7 10e9/L    Absolute Basophils 0.0 0.0 - 0.2 10e9/L    Abs Immature Granulocytes 0.0 0 - 0.4 10e9/L    Absolute Nucleated RBC 0.0    Comprehensive metabolic panel   Result Value Ref Range    Sodium 139 133 - 144 mmol/L    Potassium 4.0 3.4 - 5.3 mmol/L    Chloride 107 94 - 109 mmol/L    Carbon Dioxide 24 20 - 32 mmol/L    Anion Gap 8 3 - 14 mmol/L    Glucose 104 (H) 70 - 99 mg/dL    Urea Nitrogen 18 7 - 30 mg/dL    Creatinine 0.83 0.52 - 1.04 mg/dL    GFR Estimate 70 >60 mL/min/1.7m2    GFR Estimate If Black 85 >60 mL/min/1.7m2    Calcium 8.5 8.5 - 10.1 mg/dL    Bilirubin Total 0.6 0.2 - 1.3 mg/dL    Albumin 3.4 3.4 - 5.0 g/dL    Protein Total 7.3 6.8 - 8.8 g/dL    Alkaline Phosphatase 82 40 - 150 U/L    ALT 37 0 - 50 U/L    AST 35 0 - 45 U/L   Lactic acid whole blood   Result Value Ref Range    Lactic Acid 1.5 0.7 - 2.1 mmol/L   Erythrocyte sedimentation rate auto   Result Value Ref Range    Sed Rate 15 0 - 30 mm/h   Blood culture   Result Value Ref Range    Specimen Description Blood Left Hand     Special Requests Aerobic and anaerobic bottles received     Culture Micro Pending     Micro Report Status Pending        Labs Ordered and Resulted from Time of ED Arrival Up to the Time of Departure from the ED   CBC WITH PLATELETS DIFFERENTIAL - Abnormal; Notable for the following:      Absolute Lymphocytes 0.3 (*)     All other components within normal limits   COMPREHENSIVE METABOLIC PANEL - Abnormal; Notable for the following:     Glucose 104 (*)     All other components within normal limits   LACTIC ACID WHOLE BLOOD   ERYTHROCYTE SEDIMENTATION RATE AUTO   PERIPHERAL IV CATHETER   BLOOD CULTURE   ENTERIC BACTERIA AND VIRUS PANEL BY OMI STOOL       Assessments & Plan (with Medical Decision Making)     I have reviewed the nursing notes.    Medications   sodium chloride (PF) 0.9% PF flush 3 mL (not administered)   sodium chloride (PF) 0.9% PF flush 3 mL (not administered)   0.9% sodium chloride BOLUS (0 mLs Intravenous Stopped 1/15/17 2040)     Followed by   0.9% sodium chloride BOLUS (0 mLs Intravenous Stopped 1/15/17 1950)     Followed by   0.9% sodium chloride infusion (1,000 mLs Intravenous New Bag 1/15/17 2039)   morphine (PF) injection 4 mg (4 mg Intravenous Given 1/15/17 1959)   ondansetron (ZOFRAN) injection 8 mg (8 mg Intravenous Given 1/15/17 1859)   ketorolac (TORADOL) injection 30 mg (30 mg Intravenous Given 1/15/17 1902)   promethazine (PHENERGAN) IV injection 12.5 mg (12.5 mg Intravenous Given 1/15/17 1926)       Patient was given 2 L of normal saline along with Zofran and Toradol and then Phenergan and morphine in order to control her symptoms.  She did start to feel better but was still unable to take oral fluids and at this time a bed will be arranged for her upstairs for overnight rehydration under the care of the hospitalist service.  At this time no stool has been sent for culture as of yet but this is most likely a viral gastroenteritis with exacerbation of her rheumatoid arthritis rather than a bacterial enteritis.    I have reviewed the findings, diagnosis, and plan with the patient.    Final diagnoses:   Nausea vomiting and diarrhea   Dehydration     Danny Deluca MD    1/15/2017   [facility], EMERGENCY DEPARTMENT      Danny Deluca MD  01/15/17  [id number]

## 2017-01-16 NOTE — PLAN OF CARE
"Problem: Infection, Risk/Actual (Adult)  Goal: Identify Related Risk Factors and Signs and Symptoms  Related risk factors and signs and symptoms are identified upon initiation of Human Response Clinical Practice Guideline (CPG)   Outcome: Improving  1291-4332: Pt reports generalized pain and weakness. Nausea reported and Zofran given with partial relief. Tylenol given for fever   6574-1841: Nausea is moderately controlled. Toradol given for generalized pain. Temp decreased to 99.2  9271-5136: Pt reports dizziness when out of bed and 500ml bolus given, Pt resting comfortably   5896-1676: Resting comfortably reports nausea \"still there\" although more well controlled.     Pt A&OX4, denies SOB or difficulty breathing, Adequate pain control on PRN Toradol, Voiding small amounts in bathroom and urine is concentrated, BSAx4, Intermittent levels of nausea throughout the day, Pt has no appetite and is on clear liquid diet. Continuous fluids infusing and 500ml bolus given d/t dizziness.  Patient resting comfortably between cares. Will continue to monitor and offer pain medication as needed.                   "

## 2017-01-17 VITALS
DIASTOLIC BLOOD PRESSURE: 93 MMHG | TEMPERATURE: 99.4 F | SYSTOLIC BLOOD PRESSURE: 157 MMHG | OXYGEN SATURATION: 93 % | HEART RATE: 81 BPM | RESPIRATION RATE: 17 BRPM

## 2017-01-17 LAB
ANION GAP SERPL CALCULATED.3IONS-SCNC: 4 MMOL/L (ref 3–14)
BUN SERPL-MCNC: 9 MG/DL (ref 7–30)
CALCIUM SERPL-MCNC: 8.2 MG/DL (ref 8.5–10.1)
CHLORIDE SERPL-SCNC: 112 MMOL/L (ref 94–109)
CO2 SERPL-SCNC: 29 MMOL/L (ref 20–32)
CREAT SERPL-MCNC: 0.93 MG/DL (ref 0.52–1.04)
GFR SERPL CREATININE-BSD FRML MDRD: 61 ML/MIN/1.7M2
GLUCOSE SERPL-MCNC: 108 MG/DL (ref 70–99)
POTASSIUM SERPL-SCNC: 3.5 MMOL/L (ref 3.4–5.3)
SODIUM SERPL-SCNC: 145 MMOL/L (ref 133–144)

## 2017-01-17 PROCEDURE — 25000125 ZZHC RX 250: Performed by: INTERNAL MEDICINE

## 2017-01-17 PROCEDURE — 36415 COLL VENOUS BLD VENIPUNCTURE: CPT | Performed by: INTERNAL MEDICINE

## 2017-01-17 PROCEDURE — 99225 ZZC SUBSEQUENT OBSERVATION CARE,LEVEL II: CPT | Performed by: INTERNAL MEDICINE

## 2017-01-17 PROCEDURE — 96376 TX/PRO/DX INJ SAME DRUG ADON: CPT

## 2017-01-17 PROCEDURE — 96361 HYDRATE IV INFUSION ADD-ON: CPT

## 2017-01-17 PROCEDURE — G0378 HOSPITAL OBSERVATION PER HR: HCPCS

## 2017-01-17 PROCEDURE — 25800025 ZZH RX 258: Performed by: INTERNAL MEDICINE

## 2017-01-17 PROCEDURE — 80048 BASIC METABOLIC PNL TOTAL CA: CPT | Performed by: INTERNAL MEDICINE

## 2017-01-17 RX ORDER — ONDANSETRON 4 MG/1
4 TABLET, ORALLY DISINTEGRATING ORAL EVERY 6 HOURS PRN
Qty: 12 TABLET | Refills: 1 | Status: SHIPPED | OUTPATIENT
Start: 2017-01-17 | End: 2017-11-08

## 2017-01-17 RX ADMIN — KETOROLAC TROMETHAMINE 30 MG: 30 INJECTION, SOLUTION INTRAMUSCULAR at 06:22

## 2017-01-17 RX ADMIN — ONDANSETRON 4 MG: 2 INJECTION INTRAMUSCULAR; INTRAVENOUS at 06:22

## 2017-01-17 RX ADMIN — KETOROLAC TROMETHAMINE 30 MG: 30 INJECTION, SOLUTION INTRAMUSCULAR at 11:55

## 2017-01-17 RX ADMIN — ONDANSETRON 4 MG: 2 INJECTION INTRAMUSCULAR; INTRAVENOUS at 11:55

## 2017-01-17 RX ADMIN — SODIUM CHLORIDE, POTASSIUM CHLORIDE, SODIUM LACTATE AND CALCIUM CHLORIDE: 600; 310; 30; 20 INJECTION, SOLUTION INTRAVENOUS at 01:22

## 2017-01-17 RX ADMIN — PANTOPRAZOLE SODIUM 40 MG: 40 INJECTION, POWDER, FOR SOLUTION INTRAVENOUS at 08:33

## 2017-01-17 ASSESSMENT — ACTIVITIES OF DAILY LIVING (ADL)
DRESS: 0-->INDEPENDENT
RETIRED_COMMUNICATION: 0-->UNDERSTANDS/COMMUNICATES WITHOUT DIFFICULTY
TRANSFERRING: 0-->INDEPENDENT
COGNITION: 0 - NO COGNITION ISSUES REPORTED
BATHING: 0-->INDEPENDENT
RETIRED_EATING: 0-->INDEPENDENT
AMBULATION: 0-->INDEPENDENT
SWALLOWING: 0-->SWALLOWS FOODS/LIQUIDS WITHOUT DIFFICULTY
FALL_HISTORY_WITHIN_LAST_SIX_MONTHS: NO
TOILETING: 0-->INDEPENDENT

## 2017-01-17 NOTE — DISCHARGE SUMMARY
Pt. discharged at 1500 to home. Pt. was accompanied by her friend, and left with personal belongings. Pt. received complete discharge paperwork and medications as filled by discharge pharmacy. Pt. was given times of last dose for all discharge medications in writing on discharge medication sheets. Discharge teaching included Zofran medication, pain management, activity restrictions, dressing changes, and signs and symptoms of infection. Pt. to follow up with PCP in 2 weeks. Pt was instructed to come back to hospital ED if symptoms do not improve or worsen. Pt. had no further questions at the time of discharge and no unmet needs were identified.

## 2017-01-17 NOTE — PROGRESS NOTES
Pt feels improved today  Still feels tired, has mild nausea with poor intake  Taking in some clear liquids  No emesis or diarrhea since admission  Noted sharp L lower chest pain this am    VSS  Low grade T  Alert, fully oriented, appears more comfortable this am  Lungs clear  CV rrr  + tenderness with palp L anterolateral chest wall  Abd soft, non-distended, non-tender      BMP nl  BC NG  Throat culture, pending      Assessment    Acute GI illness with nausea, emesis and diarrhea, fever, likely secondary to viral illness, improved    L lateral chest pain, likely muscular, from retching prior to admission.    Plan  Continue supportive tx  Advance diet as tolerated  D/c IV when po intake improved  Ambulate  Possible d/c later today to home

## 2017-01-17 NOTE — PLAN OF CARE
Problem: Goal Outcome Summary  Goal: Goal Outcome Summary  Outcome: Improving  1/17/17 7962-6299 shift:    0700 - Pt received anti-emetic and pain medication before arrival. Resting quietly. VSS. Dr Omer saw pt.    0900 - Takes self to BR, voiding adequately. Resting quietly. Sipping on juice and fruit ice. Pt feeling a bit improved. No emesis since Sunday.    1100 - Pt receives anti-emetic and pain medication at 1200, resting currently. No stool available for Cdiff collect, pt has not had BM since Sunday. +gas.    1300 - Pt resting, plan to d/c when friend is able to transport ~likely around 1500. Dr. Omer ordered anti-emetic for d/c.

## 2017-01-17 NOTE — PLAN OF CARE
Problem: Individualization  Goal: Patient Preferences  1/16 15:30-23:30  15:30-17:30  Pain decreased after IV toradol dose @ 15:15, nausea decreased after 15:19 dose of zofran.   Resting quietly.  17:30-19:30  Resting quietly.  Sipping on diet abelino on ice.  19:30-21:30  Nausea and pain assessed, declined medication intervention.  Feeling chilled, warm blankets requested and provided.  Temp 99.3.  21:30-23:30  Pt reported headache. Temp 100.3 /56.  Up to bathroom, voided 75 cc.  Nausea and pain increasing after up to bathroom.  IV zofran and IV toradol given.  Cool cloth applied to forehead.  Continues to have sore throat.

## 2017-01-17 NOTE — PLAN OF CARE
Problem: Goal Outcome Summary  Goal: Goal Outcome Summary  Outcome: Improving  1385-4253: Toradol and zofran prn given shortly before NOC shift. Still feeling nauseas but has slightly improved. IV toradol has given moderate relief to body aches. Low grade temp of 100.3.  Also c/o sore throat, refusing tylenol d/t nausea.   8797-5687: Pt is up ambulating to BR independently without issue  1751-7596: Pt is sleeping on and off for a couple hours at a time.   5557-4927: PRN zofran and toradol given. Fever is no longer present - 98.9.     A/Ox4. Low grade temp dissipated through the night. IV toradol is managing body aches with some relief. IV zofran given for nausea. No emesis or diarrhea during the night. Sipping on clear liquids and soda. Ambulating independently to BR and in room without issue. Pt feels like this may be a RA flare up. This morning pt pointed out that her hands are slightly swollen. Pt calls appropriately and makes needs known

## 2017-01-18 ENCOUNTER — TELEPHONE (OUTPATIENT)
Dept: FAMILY MEDICINE | Facility: CLINIC | Age: 62
End: 2017-01-18

## 2017-01-18 LAB
BACTERIA SPEC CULT: NORMAL
Lab: NORMAL
MICRO REPORT STATUS: NORMAL
SPECIMEN SOURCE: NORMAL

## 2017-01-18 NOTE — TELEPHONE ENCOUNTER
This patient was discharged from [facility] on 1-17-17.    Discharge Diagnosis:   Nausea vomiting and diarrhea  R11.2, R19.7      Dehydration  E86.0     Follow-up instructions: See note    A follow-up visit has not been scheduled.  no    Please follow-up with patient.

## 2017-01-18 NOTE — DISCHARGE SUMMARY
DATE OF ADMISSION:  01/15/2017.      DATE OF DISCHARGE:  01/17/2017.       HISTORY OF PRESENT ILLNESS:  Sharon Fung is a 61-year-old female who was admitted through the emergency room to the hospital on observation status for the evaluation of presumed gastroenteritis.  The patient had noted a several-hour history of generalized myalgias, abdominal pain, multiple episodes of nausea, vomiting and diarrhea prior to admission.  There has been no recent travel and no contacts with any similarly ill acquaintances.  Laboratory evaluation in the ER was remarkable for normal comprehensive metabolic panel and CBC.  The patient was given intravenous fluids and was admitted given her severe discomfort and persistent nausea and inability to keep any food or fluid down.      HOSPITAL COURSE:  The patient was admitted to observation status.  She was treated symptomatically with Toradol, was given antiemetics and IV fluids.  Her symptoms did gradually improve over the next 24 hours.  She actually had no further diarrhea after being admitted to the hospital.  Her nausea gradually improved.  She had no further emesis.  She did become essentially afebrile on the day of discharge.  On the day of discharge, she felt considerably improved, though still had mild nausea and generalized malaise.  She was tolerating clear liquids, had no emesis and no diarrhea and felt comfortable with plans for discharge.  She will discharge to home.  She will be given a small supply of Zofran to be used p.r.n.        The patient did have blood cultures obtained on admission, which were no growth.  She did complain of a sore throat on the day after admission.  A throat culture was negative for strep.  Her throat symptoms were improving at the time of discharge as well.      Sharon was discharged to home.  She was instructed regarding a gradual liberalization of her diet.  She will report back to the ER should she develop worsening abdominal pain, fevers  or bloody stools.      DISCHARGE DIAGNOSES:   1.  Nausea, vomiting, abdominal pain, diarrhea consistent with viral gastroenteritis, resolving with symptomatic treatment.   2.  Gastroesophageal reflux disease.   3.  Rheumatoid arthritis.   4.  Obstructive sleep apnea.      DISCHARGE MEDICATIONS:  The same as on admission and include:   1.  Tylenol on a p.r.n. basis.   2.  Cyclosporine eyedrops twice daily.   3.  Allegra 180 mg daily.   4.  Flonase 2 sniffs each nostril daily as needed.   5.  Fluoxetine 20 mg daily.   6.  Folic acid 1 mg daily.   7.  Methotrexate 20 mg subcu every 7 days.   8.  Omeprazole 20 mg daily.   9.  Tofacitinib 11 mg p.o. daily.   10.  Topamax 50 mg.   11.  The only new medication is Zofran 4 mg ODT #12, take 1 every 6 hours p.r.n. nausea.         LUDMILA MORAES MD             D: 2017 16:00   T: 2017 22:32   MT:       Name:     ALONZO HOLMAN   MRN:      [id number]        Account:        [id number]   :      1955           Admit Date:     [id number]                                  Discharge Date: 2017      Document: [id number]

## 2017-01-18 NOTE — TELEPHONE ENCOUNTER
I don't see a written follow up plan from ED visit, see note regarding events:    Patient was given 2 L of normal saline along with Zofran and Toradol and then Phenergan and morphine in order to control her symptoms.  She did start to feel better but was still unable to take oral fluids and at this time a bed will be arranged for her upstairs for overnight rehydration under the care of the hospitalist service.  At this time no stool has been sent for culture as of yet but this is most likely a viral gastroenteritis with exacerbation of her rheumatoid arthritis rather than a bacterial enteritis.      Patient admitted 1/15-1/17.    Plan to call for hosp follow up after 9 am.  Rosangela Joseph RN  [facility]

## 2017-01-18 NOTE — TELEPHONE ENCOUNTER
"Last seen by PCP 10/14/16, see note that visit, due for physical:    She appears to have some ongoing muscular back discomfort  I recommended heat, stretching exercises, and a muscle relaxer as needed/desired       I warned of sedation from a muscle relaxer  She'll continue her same baseline meds for now  We will have her return soon for a complete physical including further discussion about her depression and other baseline health conditions    Reynaldo Saavedra MD  [facility]      I called patient    ED for acute condition Discharge Protocol    \"Hi, my name is Rosangela Joseph, a registered nurse, and I am calling from [facility].  I am calling to follow up and see how things are going for you after your recent emergency visit.\"    Tell me how you are doing now that you are home?\" better, did spike temp of 101 last evening but is down today. No more vomiting or diarrhea, is eating bland diet, urinating well, still having body aches      Discharge Instructions    \"Let's review your discharge instructions.  What is/are the follow-up recommendations?  Pt. Response: unsure, she plans to follow up PRN, is aware she needs physical and will call to schedule when she is back to work and can check her work schedule    \"Has an appointment with your primary care provider been scheduled?\"  No (not needed)    Medications    \"Tell me what changed about your medicines when you discharged?\"    PRN zofran    \"What questions do you have about your medications?\"   None        Call Summary    \"What questions or concerns do you have about your recent visit and your follow-up care?\"     none    \"If you have questions or things don't continue to improve, we encourage you contact us through the main clinic number (give number).  Even if the clinic is not open, triage nurses are available 24/7 to help you.     We would like you to know that our clinic has extended hours (provide information).  We also have " "urgent care (provide details on closest location and hours/contact info)\"    \"Thank you for your time and take care!\"    Rosangela Joseph RN  [facility]                    "

## 2017-01-19 DIAGNOSIS — F33.0 MAJOR DEPRESSIVE DISORDER, RECURRENT EPISODE, MILD (H): Primary | ICD-10-CM

## 2017-01-19 NOTE — TELEPHONE ENCOUNTER
Fluoxetine 20 mg QD  Last Written Prescription Date: 07/01/16  Last Fill Quantity: 90, # refills: 1  Last Office Visit with [facility] primary care provider:  11/22/16   Next 5 appointments (look out 90 days)     Feb 02, 2017  7:40 AM   [facility]    [address] [id number] [phone]                   Last PHQ-9 score on record=   PHQ-9 SCORE 7/1/2016   Total Score -   Total Score 4           Evie Wilks CPhT      [facility]  [address] [place] [address]    Phone [phone]  Fax [phone]

## 2017-01-20 NOTE — TELEPHONE ENCOUNTER
See note from last visit which was actually 10/14/16.    She'll continue her same baseline meds for now  We will have her return soon for a complete physical including further discussion about her depression and other baseline health conditions    Reynaldo Saavedra MD  [facility]      Medication is being filled for 1 time refill only due to:  Patient needs to be seen because advised to follow for physical at last visit.    Rosangela Joseph RN  [facility]

## 2017-01-21 LAB
BACTERIA SPEC CULT: NO GROWTH
Lab: NORMAL
MICRO REPORT STATUS: NORMAL
SPECIMEN SOURCE: NORMAL

## 2017-01-31 NOTE — PROGRESS NOTES
SUBJECTIVE:     CC: Sharon Fung is an 61 year old woman who presents for a preventive health visit and follow-up on some baseline health conditions.     Healthy Habits:    Do you get at least three servings of calcium containing foods daily (dairy, green leafy vegetables, etc.)? yes    Amount of exercise or daily activities, outside of work: None    Problems taking medications regularly No    Medication side effects: Yes Methotrexate    Have you had an eye exam in the past two years? yes    Do you see a dentist twice per year? yes    Do you have sleep apnea, excessive snoring or daytime drowsiness?yes      Other concerns:  Weight. Wonders about seeing a dietitian.    Today's PHQ-2 Score:   PHQ-2 ( 1999 [employer]) 2/2/2017 3/24/2015   Q1: Little interest or pleasure in doing things 0 0   Q2: Feeling down, depressed or hopeless 0 0   PHQ-2 Score 0 0   Little interest or pleasure in doing things - -   Feeling down, depressed or hopeless - -   PHQ-2 Score - -       Abuse: Current or Past(Physical, Sexual or Emotional)- No  Do you feel safe in your environment - Yes    Social History   Substance Use Topics     Smoking status: Never Smoker      Smokeless tobacco: Never Used     Alcohol Use: Yes      Comment: very rare     None    Recent Labs   Lab Test  12/27/16   0836  02/20/15   0750  10/20/14   0821   CHOL  184  157  194   HDL  56  63  63   LDL  97  78  92   TRIG  154*  79  193*   CHOLHDLRATIO   --   2.5  3.1   [facility]  128   --    --        Reviewed orders with patient.  Reviewed health maintenance and updated orders accordingly - Yes    Mammo Decision Support:  Patient over age 50, mutual decision to screen reflected in health maintenance.    Pertinent mammograms are reviewed under the imaging tab.  History of abnormal Pap smear: NO - age 30-65 PAP every 5 years with negative HPV co-testing recommended  All Histories reviewed and updated in Epic.      ROS:  C: NEGATIVE for fever, chills, change in weight  I:  NEGATIVE for worrisome rashes, moles or lesions  E: NEGATIVE for vision changes or irritation  ENT: NEGATIVE for ear, mouth and throat problems  R: NEGATIVE for significant cough or SOB  B: NEGATIVE for masses, tenderness or discharge  CV: NEGATIVE for chest pain, palpitations or peripheral edema  GI: had GI symptoms recently for which she was seen in the ER -- they're better now  : NEGATIVE for unusual urinary or vaginal symptoms. No vaginal bleeding.  MUSCULOSKELETAL:some arthralgias and fatigue; she saw her rheumatologist earlier today  N: NEGATIVE for weakness, dizziness or paresthesias  P: NEGATIVE for changes in mood or affect; she is on fluoxetine for depression. She doesn't feel overly depressed.    Patient Active Problem List   Diagnosis     AR (allergic rhinitis)     GERD (gastroesophageal reflux disease)     Status post bariatric surgery     Mild major depression (H)     Advanced directives, counseling/discussion     High risk medication use     Bilateral leg weakness     Left leg pain     Obstructive sleep apnea     Obesity, Class III, BMI 40-49.9 (morbid obesity) (H)     Anterior basement membrane dystrophy     Seropositive rheumatoid arthritis (H)     LORNA positive     Past Surgical History   Procedure Laterality Date     C appendectomy  1977     Tubal ligation  1988     Breast biopsy, rt/lt  1-94     Benign     Laparoscopic gastric adjustable banding  11/09/10     Lap band procedure     Esophagoscopy, gastroscopy, duodenoscopy (egd), combined N/A 10/29/2015     Procedure: COMBINED ESOPHAGOSCOPY, GASTROSCOPY, DUODENOSCOPY (EGD);  Surgeon: Shaq Mims MD;  Location: [facility]     Laparoscopic removal gastric adjustable band N/A 10/31/2015     Procedure: LAPAROSCOPIC REMOVAL GASTRIC ADJUSTABLE BAND;  Surgeon: Shaq Mims MD;  Location: [facility]     Colonoscopy       Blepharoplasty bilateral Bilateral 8/5/2016     Procedure: BLEPHAROPLASTY BILATERAL;  Surgeon: Cortez Robin MD;   "[facility]       Social History   Substance Use Topics     Smoking status: Never Smoker      Smokeless tobacco: Never Used     Alcohol Use: Yes      Comment: very rare     Family History   Problem Relation Age of Onset     HEART DISEASE Father      MI     Neurologic Disorder Father 79     Parkinson's     CANCER Maternal Grandmother      uterine     Neurologic Disorder Sister 16     migraine     Neurologic Disorder Mother 20     migraine     Neurologic Disorder Son 7     migraine     DIABETES Father      Hypertension Father      Coronary Artery Disease Father      Depression Sister      Substance Abuse Son      Depression Sister      Substance Abuse Son          Current Outpatient Prescriptions   Medication Sig Dispense Refill     methotrexate sodium 50 MG/2ML SOLN Inject 0.8 mLs (20 mg) as directed every 7 days 2 vial 3     FLUoxetine (PROZAC) 20 MG capsule Take 1 capsule (20 mg) by mouth daily 90 capsule 1     ondansetron (ZOFRAN ODT) 4 MG ODT tab Take 1 tablet (4 mg) by mouth every 6 hours as needed for nausea 12 tablet 1     fexofenadine (ALLEGRA) 180 MG tablet Take 1 tablet (180 mg) by mouth daily 90 tablet 2     tofacitinib (XELJANZ XR) 11 MG XR tablet Take 1 tablet (11 mg) by mouth daily 30 tablet 6     Insulin Syringe-Needle U-100 (BD INSULIN SYRINGE) 27G X 1/2\" 1 ML MISC 1 Syringe every 7 days , to be used for methotrexate administration. 10 each 5     topiramate (TOPAMAX) 50 MG tablet Take 1 tablet (50 mg) by mouth At Bedtime 30 tablet 3     folic acid (FOLVITE) 1 MG tablet Take 1 tablet (1 mg) by mouth daily 100 tablet 3     cycloSPORINE (RESTASIS) 0.05 % ophthalmic emulsion Place 1 drop into both eyes 2 times daily 2 Box 11     acetaminophen (TYLENOL) 500 MG tablet Take 500-1,000 mg by mouth every 6 hours as needed for mild pain       ORDER FOR DME Use your CPAP device as directed by your provider.       fluticasone (FLONASE) 50 MCG/ACT nasal spray Spray 2 sprays into both nostrils as needed       " "[DISCONTINUED] FLUoxetine (PROZAC) 20 MG capsule Take 1 capsule (20 mg) by mouth daily 1/20/17: due for annual physical 30 capsule 0     [DISCONTINUED] methotrexate 50 MG/2ML injection Inject 0.8 mLs (20 mg) Subcutaneous every 7 days 2 vial 3     No Known Allergies  OBJECTIVE:     /78 mmHg  Pulse 82  Temp(Src) 97.9  F (36.6  C) (Oral)  Ht 5' 3\" (1.6 m)  Wt 236 lb 12 oz (107.389 kg)  BMI 41.95 kg/m2  SpO2 95%  EXAM:  GENERAL: alert, no distress and obese  EYES: Eyes grossly normal to inspection, PERRL and conjunctivae and sclerae normal  HENT: grossly normal  NECK: no adenopathy, no asymmetry, masses, or scars and thyroid normal to palpation  RESP: lungs clear to auscultation - no rales, rhonchi or wheezes  CV: regular rate and rhythm, normal S1 S2, no S3 or S4, no murmur, click or rub, no peripheral edema   ABDOMEN: soft, nontender, no hepatosplenomegaly, no masses    (female): normal female external genitalia, normal urethral meatus, vaginal mucosa pink, moist, well rugated, and normal cervix/adnexa/uterus without masses or discharge  MS: no gross musculoskeletal defects noted, no edema  SKIN: no suspicious lesions or rashes  NEURO: Normal strength and tone, mentation intact and speech normal  PSYCH: mentation appears normal, affect normal/bright. She scored a 6 on her PHQ-9 and a 5 on the JUANPABLO 7    ASSESSMENT/PLAN:         ICD-10-CM    1. Routine general medical examination at a health care facility Z00.00    2. Major depressive disorder, recurrent episode, mild (H) F33.0 FLUoxetine (PROZAC) 20 MG capsule   3. Seropositive rheumatoid arthritis (H) M05.9    4. Obesity, Class III, BMI 40-49.9 (morbid obesity) (H) E66.01 NUTRITION REFERRAL   5. Gastroesophageal reflux disease, esophagitis presence not specified K21.9    6. Screening for malignant neoplasm of cervix Z12.4 Pap imaged thin layer screen with HPV - recommended age 30 - 65 years (select HPV order below)   7. Colon cancer screening Z12.11 " "GASTROENTEROLOGY ADULT REF PROCEDURE ONLY     Blood pressure and other vital signs look fine, except for her weight  Discussed diet and exercise treatment for weight loss and I will refer her to our dietitian for their input as well  Continue same fluoxetine and other baseline medications  Schedule screening mammogram in the next few months  She is also due for another screening colonoscopy this year, so a referral was made to have that done in the upcoming months  She'll continue ongoing rheumatology care and lab work with Dr. Guerra  Discussed using over-the-counter antacids and/or Zofran for nausea and acid peptic symptoms  I advised to recheck with me again in about 6 months    COUNSELING:   Reviewed preventive health counseling, as reflected in patient instructions       Regular exercise       Healthy diet/nutrition         reports that she has never smoked. She has never used smokeless tobacco.    Estimated body mass index is 41.95 kg/(m^2) as calculated from the following:    Height as of this encounter: 5' 3\" (1.6 m).    Weight as of this encounter: 236 lb 12 oz (107.389 kg).   Weight management plan: Discussed healthy diet and exercise guidelines and patient will follow up in 6 months in clinic to re-evaluate.    Counseling Resources:  ATP IV Guidelines  Pooled Cohorts Equation Calculator  Breast Cancer Risk Calculator  FRAX Risk Assessment  ICSI Preventive Guidelines  Dietary Guidelines for Americans, 2010  USDA's MyPlate  ASA Prophylaxis  Lung CA Screening    Reynaldo Saavedra MD  [facility]  "

## 2017-02-02 ENCOUNTER — OFFICE VISIT (OUTPATIENT)
Dept: RHEUMATOLOGY | Facility: CLINIC | Age: 62
End: 2017-02-02
Payer: COMMERCIAL

## 2017-02-02 ENCOUNTER — OFFICE VISIT (OUTPATIENT)
Dept: FAMILY MEDICINE | Facility: CLINIC | Age: 62
End: 2017-02-02
Payer: COMMERCIAL

## 2017-02-02 VITALS
WEIGHT: 239 LBS | SYSTOLIC BLOOD PRESSURE: 132 MMHG | OXYGEN SATURATION: 96 % | HEIGHT: 63 IN | TEMPERATURE: 98 F | HEART RATE: 88 BPM | BODY MASS INDEX: 42.35 KG/M2 | DIASTOLIC BLOOD PRESSURE: 82 MMHG

## 2017-02-02 VITALS
TEMPERATURE: 97.9 F | HEART RATE: 82 BPM | HEIGHT: 63 IN | WEIGHT: 236.75 LBS | SYSTOLIC BLOOD PRESSURE: 130 MMHG | BODY MASS INDEX: 41.95 KG/M2 | OXYGEN SATURATION: 95 % | DIASTOLIC BLOOD PRESSURE: 78 MMHG

## 2017-02-02 DIAGNOSIS — M05.9 SEROPOSITIVE RHEUMATOID ARTHRITIS (H): ICD-10-CM

## 2017-02-02 DIAGNOSIS — F33.0 MAJOR DEPRESSIVE DISORDER, RECURRENT EPISODE, MILD (H): ICD-10-CM

## 2017-02-02 DIAGNOSIS — Z12.11 COLON CANCER SCREENING: ICD-10-CM

## 2017-02-02 DIAGNOSIS — Z12.4 SCREENING FOR MALIGNANT NEOPLASM OF CERVIX: ICD-10-CM

## 2017-02-02 DIAGNOSIS — Z00.00 ROUTINE GENERAL MEDICAL EXAMINATION AT A HEALTH CARE FACILITY: Primary | ICD-10-CM

## 2017-02-02 DIAGNOSIS — K21.9 GASTROESOPHAGEAL REFLUX DISEASE, ESOPHAGITIS PRESENCE NOT SPECIFIED: ICD-10-CM

## 2017-02-02 DIAGNOSIS — Z79.899 HIGH RISK MEDICATIONS (NOT ANTICOAGULANTS) LONG-TERM USE: ICD-10-CM

## 2017-02-02 DIAGNOSIS — M05.9 SEROPOSITIVE RHEUMATOID ARTHRITIS (H): Primary | ICD-10-CM

## 2017-02-02 DIAGNOSIS — E66.01 OBESITY, CLASS III, BMI 40-49.9 (MORBID OBESITY) (H): ICD-10-CM

## 2017-02-02 PROBLEM — K52.9 GASTROENTERITIS: Status: RESOLVED | Noted: 2017-01-15 | Resolved: 2017-02-02

## 2017-02-02 LAB
BASOPHILS # BLD AUTO: 0 10E9/L (ref 0–0.2)
BASOPHILS NFR BLD AUTO: 0.5 %
DIFFERENTIAL METHOD BLD: NORMAL
EOSINOPHIL # BLD AUTO: 0.2 10E9/L (ref 0–0.7)
EOSINOPHIL NFR BLD AUTO: 3.1 %
ERYTHROCYTE [DISTWIDTH] IN BLOOD BY AUTOMATED COUNT: 14.9 % (ref 10–15)
HCT VFR BLD AUTO: 40.2 % (ref 35–47)
HGB BLD-MCNC: 12.9 G/DL (ref 11.7–15.7)
LYMPHOCYTES # BLD AUTO: 2.4 10E9/L (ref 0.8–5.3)
LYMPHOCYTES NFR BLD AUTO: 40.2 %
MCH RBC QN AUTO: 29.7 PG (ref 26.5–33)
MCHC RBC AUTO-ENTMCNC: 32.1 G/DL (ref 31.5–36.5)
MCV RBC AUTO: 92 FL (ref 78–100)
MONOCYTES # BLD AUTO: 0.5 10E9/L (ref 0–1.3)
MONOCYTES NFR BLD AUTO: 8.1 %
NEUTROPHILS # BLD AUTO: 2.8 10E9/L (ref 1.6–8.3)
NEUTROPHILS NFR BLD AUTO: 48.1 %
PLATELET # BLD AUTO: 373 10E9/L (ref 150–450)
RBC # BLD AUTO: 4.35 10E12/L (ref 3.8–5.2)
WBC # BLD AUTO: 5.9 10E9/L (ref 4–11)

## 2017-02-02 PROCEDURE — 87624 HPV HI-RISK TYP POOLED RSLT: CPT | Performed by: FAMILY MEDICINE

## 2017-02-02 PROCEDURE — G0145 SCR C/V CYTO,THINLAYER,RESCR: HCPCS | Performed by: FAMILY MEDICINE

## 2017-02-02 PROCEDURE — 86480 TB TEST CELL IMMUN MEASURE: CPT | Performed by: INTERNAL MEDICINE

## 2017-02-02 PROCEDURE — 36415 COLL VENOUS BLD VENIPUNCTURE: CPT | Performed by: INTERNAL MEDICINE

## 2017-02-02 PROCEDURE — 99213 OFFICE O/P EST LOW 20 MIN: CPT | Mod: 25 | Performed by: FAMILY MEDICINE

## 2017-02-02 PROCEDURE — 99396 PREV VISIT EST AGE 40-64: CPT | Performed by: FAMILY MEDICINE

## 2017-02-02 PROCEDURE — 99213 OFFICE O/P EST LOW 20 MIN: CPT | Performed by: INTERNAL MEDICINE

## 2017-02-02 PROCEDURE — 85025 COMPLETE CBC W/AUTO DIFF WBC: CPT | Performed by: INTERNAL MEDICINE

## 2017-02-02 RX ORDER — METHOTREXATE 25 MG/ML
20 INJECTION, SOLUTION INTRA-ARTERIAL; INTRAMUSCULAR; INTRAVENOUS
Qty: 2 VIAL | Refills: 3 | Status: CANCELLED | OUTPATIENT
Start: 2017-02-02

## 2017-02-02 ASSESSMENT — ANXIETY QUESTIONNAIRES
3. WORRYING TOO MUCH ABOUT DIFFERENT THINGS: SEVERAL DAYS
7. FEELING AFRAID AS IF SOMETHING AWFUL MIGHT HAPPEN: SEVERAL DAYS
GAD7 TOTAL SCORE: 5
1. FEELING NERVOUS, ANXIOUS, OR ON EDGE: SEVERAL DAYS
2. NOT BEING ABLE TO STOP OR CONTROL WORRYING: SEVERAL DAYS
IF YOU CHECKED OFF ANY PROBLEMS ON THIS QUESTIONNAIRE, HOW DIFFICULT HAVE THESE PROBLEMS MADE IT FOR YOU TO DO YOUR WORK, TAKE CARE OF THINGS AT HOME, OR GET ALONG WITH OTHER PEOPLE: SOMEWHAT DIFFICULT
5. BEING SO RESTLESS THAT IT IS HARD TO SIT STILL: NOT AT ALL
6. BECOMING EASILY ANNOYED OR IRRITABLE: SEVERAL DAYS

## 2017-02-02 ASSESSMENT — PAIN SCALES - GENERAL: PAINLEVEL: MILD PAIN (2)

## 2017-02-02 ASSESSMENT — PATIENT HEALTH QUESTIONNAIRE - PHQ9: 5. POOR APPETITE OR OVEREATING: NOT AT ALL

## 2017-02-02 NOTE — MR AVS SNAPSHOT
After Visit Summary   2/2/2017    Sharon Fung    MRN: [id number]           Patient Information     Date Of Birth          1955        Visit Information        Provider Department      2/2/2017 7:40 AM Freddy Guerra MD [facility]        Today's Diagnoses     Seropositive rheumatoid arthritis (H)    -  1     High risk medications (not anticoagulants) long-term use            Follow-ups after your visit        Your next 10 appointments already scheduled     Feb 02, 2017  2:40 PM   MyChart Physical Adult with Reynaldo [facility]ra MD   [facility]    [address] [address]   [phone]            May 04, 2017  7:20 AM   Return Visit with Freddy Guerra MD   [facility] ([facility])    [address] [place] [address]   [phone]              Future tests that were ordered for you today     Open Future Orders        Priority Expected Expires Ordered    CBC with platelets differential Routine 4/29/2017 5/18/2017 2/2/2017    Creatinine Routine 4/29/2017 5/18/2017 2/2/2017    CRP inflammation Routine 4/29/2017 5/18/2017 2/2/2017    Erythrocyte sedimentation rate auto Routine 4/29/2017 5/18/2017 2/2/2017    Hepatic panel Routine 4/29/2017 5/18/2017 2/2/2017    Lipid Profile Routine 4/29/2017 5/18/2017 2/2/2017            Who to contact     If you have questions or need follow up information about today's clinic visit or your schedule please contact [facility] directly at [phone].  Normal or non-critical lab and imaging results will be communicated to you by MyChart, letter or phone within 4 business days after the clinic has received the results. If you do not hear from us within 7 days, please contact the clinic through MyChart or phone. If you have a critical or abnormal lab result, we will notify you by phone as soon as  "possible.  Submit refill requests through Handshake or call your pharmacy and they will forward the refill request to us. Please allow 3 business days for your refill to be completed.          Additional Information About Your Visit        Handshake Information     Handshake gives you secure access to your electronic health record. If you see a primary care provider, you can also send messages to your care team and make appointments. If you have questions, please call your primary care clinic.  If you do not have a primary care provider, please call [phone] and they will assist you.        Care EveryWhere ID     This is your Care EveryWhere ID. This could be used by other organizations to access your [facility] medical records  [id number]        Your Vitals Were     Pulse Temperature Height BMI (Body Mass Index) Pulse Oximetry       88 98  F (36.7  C) (Oral) 5' 3.35\" (1.609 m) 41.88 kg/m2 96%        Blood Pressure from Last 3 Encounters:   02/02/17 147/81   01/17/17 157/93   11/22/16 121/56    Weight from Last 3 Encounters:   02/02/17 239 lb (108.41 kg)   11/22/16 238 lb (107.956 kg)   11/04/16 237 lb (107.502 kg)              We Performed the Following     CBC with platelets differential     M Tuberculosis by Quantiferon          Today's Medication Changes          These changes are accurate as of: 2/2/17  8:16 AM.  If you have any questions, ask your nurse or doctor.               These medicines have changed or have updated prescriptions.        Dose/Directions    methotrexate sodium 50 MG/2ML Soln   This may have changed:    - medication strength  - how to take this   Used for:  Seropositive rheumatoid arthritis (H), High risk medications (not anticoagulants) long-term use   Changed by:  Freddy Guerra MD        Dose:  20 mg   Inject 0.8 mLs (20 mg) as directed every 7 days   Quantity:  2 vial   Refills:  3         Stop taking these medicines if you haven't already. Please contact your care team if you have " "questions.     omeprazole 20 MG tablet   Stopped by:  Freddy Guerra MD                Where to get your medicines      These medications were sent to [facility] [place], [place] - [address]  [address], [facility] [address]     Phone:  [phone]    - methotrexate sodium 50 MG/2ML Soln             Primary Care Provider Office Phone # Fax #    Reynaldo Saavedra [phone]       [facility] [address]  [facility] [address]        Thank you!     Thank you for choosing [facility]  for your care. Our goal is always to provide you with excellent care. Hearing back from our patients is one way we can continue to improve our services. Please take a few minutes to complete the written survey that you may receive in the mail after your visit with us. Thank you!             Your Updated Medication List - Protect others around you: Learn how to safely use, store and throw away your medicines at www.disposemymeds.org.          This list is accurate as of: 2/2/17  8:16 AM.  Always use your most recent med list.                   Brand Name Dispense Instructions for use    acetaminophen 500 MG tablet    TYLENOL     Take 500-1,000 mg by mouth every 6 hours as needed for mild pain       cycloSPORINE 0.05 % ophthalmic emulsion    RESTASIS    2 Box    Place 1 drop into both eyes 2 times daily       fexofenadine 180 MG tablet    ALLEGRA    90 tablet    Take 1 tablet (180 mg) by mouth daily       FLONASE 50 MCG/ACT spray   Generic drug:  fluticasone      Spray 2 sprays into both nostrils as needed       FLUoxetine 20 MG capsule    PROzac    30 capsule    Take 1 capsule (20 mg) by mouth daily 1/20/17: due for annual physical       folic acid 1 MG tablet    FOLVITE    100 tablet    Take 1 tablet (1 mg) by mouth daily       Insulin Syringe-Needle U-100 27G X 1/2\" 1 ML Misc    BD insulin syringe    10 each    1 Syringe every 7 days , to be used " for methotrexate administration.       methotrexate sodium 50 MG/2ML Soln     2 vial    Inject 0.8 mLs (20 mg) as directed every 7 days       ondansetron 4 MG ODT tab    ZOFRAN ODT    12 tablet    Take 1 tablet (4 mg) by mouth every 6 hours as needed for nausea       order for DME      Use your CPAP device as directed by your provider.       tofacitinib 11 MG 24 hr tablet    XELJANZ XR    30 tablet    Take 1 tablet (11 mg) by mouth daily       topiramate 50 MG tablet    TOPAMAX    30 tablet    Take 1 tablet (50 mg) by mouth At Bedtime

## 2017-02-02 NOTE — PROGRESS NOTES
"Quick Note:    food.de message sent:  \"Ms. Fung,    Your lymphocyte count is normal, so please continue Xeljanz.     Sincerely,  Freddy Guerra MD  2/2/2017 3:37 PM\"  ______  "

## 2017-02-02 NOTE — MR AVS SNAPSHOT
After Visit Summary   2/2/2017    Sharon Fung    MRN: [id number]           Patient Information     Date Of Birth          1955        Visit Information        Provider Department      2/2/2017 2:40 PM Reynaldo Saavedra MD [facility]        Today's Diagnoses     Routine general medical examination at a health care facility    -  1     Major depressive disorder, recurrent episode, mild (H)         Seropositive rheumatoid arthritis (H)         Obesity, Class III, BMI 40-49.9 (morbid obesity) (H)         Gastroesophageal reflux disease, esophagitis presence not specified         Screening for malignant neoplasm of cervix         Colon cancer screening           Care Instructions      Preventive Health Recommendations  Female Ages 50 - 64    Yearly exam: See your health care provider every year in order to  o Review health changes.   o Discuss preventive care.    o Review your medicines if your doctor has prescribed any.      Get a Pap test every three years (unless you have an abnormal result and your provider advises testing more often).    If you get Pap tests with HPV test, you only need to test every 5 years, unless you have an abnormal result.     You do not need a Pap test if your uterus was removed (hysterectomy) and you have not had cancer.    You should be tested each year for STDs (sexually transmitted diseases) if you're at risk.     Have a mammogram every 1 to 2 years.    Have a colonoscopy at age 50, or have a yearly FIT test (stool test). These exams screen for colon cancer.      Have a cholesterol test every 5 years, or more often if advised.    Have a diabetes test (fasting glucose) every three years. If you are at risk for diabetes, you should have this test more often.     If you are at risk for osteoporosis (brittle bone disease), think about having a bone density scan (DEXA).    Shots: Get a flu shot each year. Get a tetanus shot every 10  years.    Nutrition:     Eat at least 5 servings of fruits and vegetables each day.    Eat whole-grain bread, whole-wheat pasta and brown rice instead of white grains and rice.    Talk to your provider about Calcium and Vitamin D.     Lifestyle    Exercise at least 150 minutes a week (30 minutes a day, 5 days a week). This will help you control your weight and prevent disease.    Limit alcohol to one drink per day.    No smoking.     Wear sunscreen to prevent skin cancer.     See your dentist every six months for an exam and cleaning.    See your eye doctor every 1 to 2 years.            Follow-ups after your visit        Additional Services     GASTROENTEROLOGY ADULT REF PROCEDURE ONLY       Last Lab Result: CREATININE (mg/dL)       Date                     Value                 01/17/2017               0.93             ----------  Body mass index is 41.95 kg/(m^2).     Needed:  No  Language:  English    Patient will be contacted to schedule procedure.     Please be aware that coverage of these services is subject to the terms and limitations of your health insurance plan.  Call member services at your health plan with any benefit or coverage questions.  Any procedures must be performed at a [facility] facility OR coordinated by your clinic's referral office.    Please bring the following with you to your appointment:    (1) Any X-Rays, CTs or MRIs which have been performed.  Contact the facility where they were done to arrange for  prior to your scheduled appointment.    (2) List of current medications   (3) This referral request   (4) Any documents/labs given to you for this referral            NUTRITION REFERRAL       Your provider has referred you to: [facility]: [facility] [phone]   [email or web address]    Please be aware that coverage of these services is subject to the terms and limitations of your health insurance plan.   Call member services at your health plan with any benefit or coverage questions.      Please bring the following with you to your appointment:    (1) This referral request  (2) Any documents given to you regarding this referral  (3) Any specific questions you have about diet and/or food choices                  Follow-up notes from your care team     Return in about 6 months (around 8/2/2017).      Your next 10 appointments already scheduled     May 04, 2017  7:20 AM   Return Visit with Freddy Guerra MD   [facility]    [address]  [place] [address]   [phone]              Future tests that were ordered for you today     Open Future Orders        Priority Expected Expires Ordered    CBC with platelets differential Routine 4/29/2017 5/18/2017 2/2/2017    Creatinine Routine 4/29/2017 5/18/2017 2/2/2017    CRP inflammation Routine 4/29/2017 5/18/2017 2/2/2017    Erythrocyte sedimentation rate auto Routine 4/29/2017 5/18/2017 2/2/2017    Hepatic panel Routine 4/29/2017 5/18/2017 2/2/2017    Lipid Profile Routine 4/29/2017 5/18/2017 2/2/2017            Who to contact     If you have questions or need follow up information about today's clinic visit or your schedule please contact [facility] directly at [phone].  Normal or non-critical lab and imaging results will be communicated to you by MyChart, letter or phone within 4 business days after the clinic has received the results. If you do not hear from us within 7 days, please contact the clinic through [employer] or phone. If you have a critical or abnormal lab result, we will notify you by phone as soon as possible.  Submit refill requests through [employer] or call your pharmacy and they will forward the refill request to us. Please allow 3 business days for your refill to be completed.          Additional Information About Your Visit        [employer] Information     [employer] gives you  "secure access to your electronic health record. If you see a primary care provider, you can also send messages to your care team and make appointments. If you have questions, please call your primary care clinic.  If you do not have a primary care provider, please call [phone] and they will assist you.        Care EveryWhere ID     This is your Care EveryWhere ID. This could be used by other organizations to access your [place] medical records  [id number]        Your Vitals Were     Pulse Temperature Height BMI (Body Mass Index) Pulse Oximetry       82 97.9  F (36.6  C) (Oral) 5' 3\" (1.6 m) 41.95 kg/m2 95%        Blood Pressure from Last 3 Encounters:   02/02/17 130/78   02/02/17 132/82   01/17/17 157/93    Weight from Last 3 Encounters:   02/02/17 236 lb 12 oz (107.389 kg)   02/02/17 239 lb (108.41 kg)   11/22/16 238 lb (107.956 kg)              We Performed the Following     GASTROENTEROLOGY ADULT REF PROCEDURE ONLY     NUTRITION REFERRAL     Pap imaged thin layer screen with HPV - recommended age 30 - 65 years (select HPV order below)          Today's Medication Changes          These changes are accurate as of: 2/2/17  3:35 PM.  If you have any questions, ask your nurse or doctor.               These medicines have changed or have updated prescriptions.        Dose/Directions    FLUoxetine 20 MG capsule   Commonly known as:  PROzac   This may have changed:  additional instructions   Used for:  Major depressive disorder, recurrent episode, mild (H)   Changed by:  Reynaldo Saavedra MD        Dose:  20 mg   Take 1 capsule (20 mg) by mouth daily   Quantity:  90 capsule   Refills:  1       methotrexate sodium 50 MG/2ML Soln   This may have changed:    - medication strength  - how to take this   Used for:  Seropositive rheumatoid arthritis (H), High risk medications (not anticoagulants) long-term use   Changed by:  Freddy Guerra MD        Dose:  20 mg   Inject 0.8 mLs (20 mg) as directed every 7 days "   Quantity:  2 vial   Refills:  3         Stop taking these medicines if you haven't already. Please contact your care team if you have questions.     omeprazole 20 MG tablet   Stopped by:  Freddy Guerra MD                Where to get your medicines      These medications were sent to [facility] - [place], [place] - [address]  [address], [facility] [address]     Phone:  [phone]    - FLUoxetine 20 MG capsule  - methotrexate sodium 50 MG/2ML Soln             Primary Care Provider Office Phone # Fax #    Reynaldo Saavedra [phone]       [facility] [address]  [facility] [address]        Thank you!     Thank you for choosing [facility]  for your care. Our goal is always to provide you with excellent care. Hearing back from our patients is one way we can continue to improve our services. Please take a few minutes to complete the written survey that you may receive in the mail after your visit with us. Thank you!             Your Updated Medication List - Protect others around you: Learn how to safely use, store and throw away your medicines at www.disposemymeds.org.          This list is accurate as of: 2/2/17  3:35 PM.  Always use your most recent med list.                   Brand Name Dispense Instructions for use    acetaminophen 500 MG tablet    TYLENOL     Take 500-1,000 mg by mouth every 6 hours as needed for mild pain       cycloSPORINE 0.05 % ophthalmic emulsion    RESTASIS    2 Box    Place 1 drop into both eyes 2 times daily       fexofenadine 180 MG tablet    ALLEGRA    90 tablet    Take 1 tablet (180 mg) by mouth daily       FLONASE 50 MCG/ACT spray   Generic drug:  fluticasone      Spray 2 sprays into both nostrils as needed       FLUoxetine 20 MG capsule    PROzac    90 capsule    Take 1 capsule (20 mg) by mouth daily       folic acid 1 MG tablet    FOLVITE    100 tablet    Take 1 tablet (1 mg) by  "mouth daily       Insulin Syringe-Needle U-100 27G X 1/2\" 1 ML Misc    BD insulin syringe    10 each    1 Syringe every 7 days , to be used for methotrexate administration.       methotrexate sodium 50 MG/2ML Soln     2 vial    Inject 0.8 mLs (20 mg) as directed every 7 days       ondansetron 4 MG ODT tab    ZOFRAN ODT    12 tablet    Take 1 tablet (4 mg) by mouth every 6 hours as needed for nausea       order for DME      Use your CPAP device as directed by your provider.       tofacitinib 11 MG 24 hr tablet    XELJANZ XR    30 tablet    Take 1 tablet (11 mg) by mouth daily       topiramate 50 MG tablet    TOPAMAX    30 tablet    Take 1 tablet (50 mg) by mouth At Bedtime         "

## 2017-02-02 NOTE — NURSING NOTE
"Chief Complaint   Patient presents with     Physical     Health Maintenance     Pap, Phq9       Initial /78 mmHg  Pulse 82  Temp(Src) 97.9  F (36.6  C) (Oral)  Ht 5' 3\" (1.6 m)  Wt 236 lb 12 oz (107.389 kg)  BMI 41.95 kg/m2  SpO2 95% Estimated body mass index is 41.95 kg/(m^2) as calculated from the following:    Height as of this encounter: 5' 3\" (1.6 m).    Weight as of this encounter: 236 lb 12 oz (107.389 kg).  BP completed using cuff size: regular long  Phq9 was given to the patient to be completed.    Whitney Renee CMA       "

## 2017-02-02 NOTE — PROGRESS NOTES
Rheumatology Clinic Visit      Sharon Fung MRN# [id number]   YOB: 1955 Age: 61 year old      Date of visit: 2/02/17   PCP: Dr. Reynaldo Saavedra    Chief Complaint   Patient presents with:  Arthritis: recheck RA-hand pain and stiffiness, numbness of left hand finger tip      Assessment and Plan     1. Seropositive nonerosive rheumatoid arthritis (RF negative, CCP low positive): Previously on Humira (lost efficacy), Cimzia (ineffective), Orencia (ineffective), leflunomide (ineffective), and hydroxychloroquine (ineffective). Currently on MTX 20mg SQ (GI upset with PO) with no benefit and Xeljanz XR 11 mg daily. Continue this medication regimen for the next 3 months. She is not better in 3 months, then will consider changing therapy, possibly to rituximab. Note that her lymphocyte count was low recently; recheck today was normal; the lower lymphocyte count may have been related to her gastrointestinal illness.  - Continue methotrexate 20mg SQ every 7 days  - Continue folic acid 1mg daily  - Continue Xeljanz XR 11mg daily  - Labs today and 2-3 days prior to the next rheumatology clinic visit: CBC, Creatinine, Hepatic Panel, ESR, CRP  - Additional lab today: quantiferon TB              Rapid 3, cumulative scores                      2/2/2017: 8 (MTX 20mg SQ wkly, Xeljanz XR 11mg daily)                      11/4/2016: 13 (MTX 20mg SQ weekly)                      7/15/2016: 10.8    # Rituximab (Rituxan) Risks and Benefits: The risks and benefits of rituximab were discussed in detail and the patient verbalized understanding.  The risks discussed include, but are not limited to, the risk for hypersensitivity, anaphylaxis, anaphylactoid reactions, fatal infusion reactions, an increased risk for serious infections leading to hospitalization or death, severe mucocutaneous reactions, reactivation of hepatitis B, and development of progressive multifocal leukoencephalopathy (PML) resulting in death.  The most  common adverse reactions are infections, nasopharyngitis, urinary tract infections, nausea, diarrhea, headache, muscle spasms, and anemia.  It was discussed that the medication would need to be discontinued if a serious infection develops.  It was discussed that live vaccinations should not be received while using rituximab or within 30 days prior to starting rituximab.  I encouraged reviewing the package insert and asking any questions about the medication.      2. Positive LORNA and dsDNA: Repeat dsDNA have been negative. No symptoms currently to suggest an LORNA-associated autoimmune disease except for arthritis.     3. Vaccinations: Vaccinations reviewed with Ms. Fung.  Risks and benefits of vaccinations were discussed.  - Influenza: up to date  - [id number]: up to date  - [id number]: up to date  - Zostavax: refused by patient previously, now contraindicated    Ms. Fung verbalized agreement with and understanding of the rational for the diagnosis and treatment plan.  All questions were answered to best of my ability and the patient's satisfaction. Ms. Fung was advised to contact the clinic with any questions that may arise after the clinic visit.      Thank you for involving me in the care of the patient    Return to clinic: 3 months      HPI   Sharon Fung is a 61 year old female with medical history significant for GERD, allergic rhinitis, obstructive sleep apnea, obesity, and rheumatoid arthritis who presents for follow-up of rheumatoid arthritis.    Today, Ms. Fung reports that methotrexate has been ineffective but Xeljanz is working. Morning stiffness lasts all day but is worse in the first 1-2 hours. She continues to have pain and stiffness primarily in her hands, wrists, shoulders, ankles, and feet. Pain and stiffness improves with activity and worsened with inactivity. Positive gelling phenomenon. Overall, she believes that her medication regimen is working for her but she is not well  controlled at this point.    Recent gastrointestinal illness required fluid resuscitation in the emergency department, at which point some labs were drawn with one showing a lower lymphocyte count.    Denies fevers, chills, nausea, vomiting, constipation, diarrhea. No chest pain/pressure, palpitations, or shortness of breath. No oral or nasal sores. No neck pain. No rash.      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 No oral or nasal ulcers.  CV: No chest pain, pressure, palpitations, or dyspnea on exertion.  PULM: No SOB, wheeze, cough.  GI: No nausea, vomiting, constipation, diarrhea. No blood in stool. No abdominal pain.  : No blood in urine.  MSK: See HPI.  NEURO: No numbness, tingling, or weakness.  ENDO: No heat/cold intolerance.  EXT: No LE swelling  PSYCH: Negative    Active Problem List     Patient Active Problem List   Diagnosis     AR (allergic rhinitis)     GERD (gastroesophageal reflux disease)     CARDIOVASCULAR SCREENING; LDL GOAL LESS THAN 160     Status post bariatric surgery     Mild major depression (H)     Advanced directives, counseling/discussion     High risk medication use     Bilateral leg weakness     Left leg pain     Rheumatoid arthritis (H)     Obstructive sleep apnea     Obesity, Class III, BMI 40-49.9 (morbid obesity) (H)     Anterior basement membrane dystrophy     Seropositive rheumatoid arthritis (H)     LORNA positive     Gastroenteritis     Past Medical History     Past Medical History   Diagnosis Date     AR (allergic rhinitis)  as teen     Mild major depression (H) 3 years ago     GERD (gastroesophageal reflux disease) 4-07     Morbid obesity (H) teens     Rheumatoid arthritis, adult (H)      Arthritis 12/14/2015     BRETT (obstructive sleep apnea)      uses CPAP     Depressive disorder 3 years ago     Past  "Surgical History     Past Surgical History   Procedure Laterality Date     C appendectomy  1977     Tubal ligation  1988     Breast biopsy, rt/lt  1-94     Benign     Laparoscopic gastric adjustable banding  11/09/10     Lap band procedure     Esophagoscopy, gastroscopy, duodenoscopy (egd), combined N/A 10/29/2015     Procedure: COMBINED ESOPHAGOSCOPY, GASTROSCOPY, DUODENOSCOPY (EGD);  Surgeon: Shaq Mims MD;  Location: [facility]     Laparoscopic removal gastric adjustable band N/A 10/31/2015     Procedure: LAPAROSCOPIC REMOVAL GASTRIC ADJUSTABLE BAND;  Surgeon: Shaq Mims MD;  Location: [facility]     Colonoscopy       Blepharoplasty bilateral Bilateral 8/5/2016     Procedure: BLEPHAROPLASTY BILATERAL;  Surgeon: Cortez Robin MD;  Location:  [facility]     Allergy   No Known Allergies  Current Medication List     Current Outpatient Prescriptions   Medication Sig     FLUoxetine (PROZAC) 20 MG capsule Take 1 capsule (20 mg) by mouth daily 1/20/17: due for annual physical     fexofenadine (ALLEGRA) 180 MG tablet Take 1 tablet (180 mg) by mouth daily     tofacitinib (XELJANZ XR) 11 MG XR tablet Take 1 tablet (11 mg) by mouth daily     methotrexate 50 MG/2ML injection Inject 0.8 mLs (20 mg) Subcutaneous every 7 days     Insulin Syringe-Needle U-100 (BD INSULIN SYRINGE) 27G X 1/2\" 1 ML MISC 1 Syringe every 7 days , to be used for methotrexate administration.     topiramate (TOPAMAX) 50 MG tablet Take 1 tablet (50 mg) by mouth At Bedtime     folic acid (FOLVITE) 1 MG tablet Take 1 tablet (1 mg) by mouth daily     cycloSPORINE (RESTASIS) 0.05 % ophthalmic emulsion Place 1 drop into both eyes 2 times daily     acetaminophen (TYLENOL) 500 MG tablet Take 500-1,000 mg by mouth every 6 hours as needed for mild pain     ORDER FOR DME Use your CPAP device as directed by your provider.     fluticasone (FLONASE) 50 MCG/ACT nasal spray Spray 2 sprays into both nostrils as needed     ondansetron (ZOFRAN ODT) 4 MG ODT tab " "Take 1 tablet (4 mg) by mouth every 6 hours as needed for nausea     omeprazole 20 MG tablet Take 1 tablet (20 mg) by mouth daily Take 30-60 minutes before a meal.     No current facility-administered medications for this visit.         Social History   See HPI    Family History     Family History   Problem Relation Age of Onset     HEART DISEASE Father      MI     Neurologic Disorder Father 79     Parkinson's     CANCER Maternal Grandmother      uterine     Neurologic Disorder Sister 16     migraine     Neurologic Disorder Mother 20     migraine     Neurologic Disorder Son 7     migraine     DIABETES Father      Hypertension Father      Depression Sister      Coronary Artery Disease Father      Depression Sister      Substance Abuse Son        Physical Exam     Temp Readings from Last 3 Encounters:   02/02/17 98  F (36.7  C) Oral   01/17/17 99.4  F (37.4  C) Oral   11/04/16 98.6  F (37  C) Oral     BP Readings from Last 5 Encounters:   02/02/17 147/81   01/17/17 157/93   11/22/16 121/56   11/04/16 126/77   10/14/16 114/73     Pulse Readings from Last 1 Encounters:   02/02/17 88     Resp Readings from Last 1 Encounters:   01/17/17 17     Estimated body mass index is 41.88 kg/(m^2) as calculated from the following:    Height as of this encounter: 1.609 m (5' 3.35\").    Weight as of this encounter: 108.41 kg (239 lb).    GEN: NAD, overweight  HEENT: MMM. No oral lesions. Anicteric, noninjected sclera  CV: S1, S2. RRR. No m/r/g.  PULM: CTA bilaterally. No w/c.  MSK: Synovial swelling and tenderness to palpation of the bilateral second-fourth MCPs and second-third PIPs. Wrists mildly tender to palpation but without synovial swelling. Elbows nontender to palpation and without swelling. Shoulders diffusely tender to palpation but without swelling. Hips nontender to direct palpation. Knees nontender palpation and without swelling. Ankles and MTPs mildly tender to palpation but without swelling.  SKIN: No rash. Tattoos on " the left lower extremity  EXT: No LE edema  PSYCH: Alert. Appropriate.    Labs / Imaging (select studies)   RF/CCP  Recent Labs   Lab Test  11/19/12   0900   CCPABY  53*   RHF  <7     LORNA/RNP/Sm/SSA/SSB/dsDNA  Recent Labs   Lab Test  02/03/15   1141  06/02/14   1216  04/07/14   1001  04/30/13   1316  11/19/12   0900   09/13/12   1756   NADEGE   --    --    --    --    --    --   1.7*   RNPIGG   --   <0.2  Negative     --    --    --    --    --    SMIGG   --   <0.2  Negative     --    --    --    --    --    ENASM   --    --    --    --   6   --    --    SSAIGG   --   <0.2  Negative     --    --    --    --    --    ENASSA   --    --    --    --   6   --    --    SSBIGG   --   0.3   --    --    --    --    --    ENASSB   --    --    --    --   4   --    --    ENARMP   --    --    --    --   0   --    --    SCLIGG   --   <0.2  Negative     --    --    --    --    --    M1IAGQT  162  142   --   120  140   < >   --    D4IVIVN  29  23   --   26  22   < >   --    JOIGG   --    --   <0.2 Negative   --    --    --    --     < > = values in this interval not displayed.     Recent Labs   Lab Test  02/03/15   1141  06/02/14   1216  04/30/13   1316  09/19/12   0905   DNA  <15  Interpretation:  Negative    <15  Interpretation:  Negative    29  55*     Antiphospholipid Antibodies  Recent Labs   Lab Test  11/19/12   0900   B2IGG  5   B2IGM  3   CARG  <15.0 Interpretation:  Negative   ALEX  <12.5 Interpretation:  Negative   LUPINT  Negative (Note) COMMENTS: The INR is normal. APTT is prolonged, but mixing study shows complete correction. DRVVT Screen is normal. Thrombin time is normal. NEGATIVE TEST; A LUPUS ANTICOAGULANT WAS NOT DETECTED IN THIS SPECIMEN WITHIN THE LIMITS OF THE TESTING REPERTOIRE. If the clinical picture is strongly suggestive of an antiphospholipid syndrome, recommend anticardiolipin and beta-2-glycoprotein (IgG and IgM) antibody tests. APTT mixing study is indicative of factor deficiencies. Recommend factors 8,  9, 11 and 12 (factors VIII, IX, XI, and XII) levels if clinically indicated. Ara Plasencia M.D.  [phone] 11-.  NINR =  0.89 N = 0.86-1.14  (No additional charge) NTT = 17.3 N = 13.0-19.0 sec  (No additional charge)  APTT'S:    Seconds Reagent =  Stago LS Normal  =  36 Patient  =  44 1:2 Mix  =  38 Reference =  31-43   DILUTE EDGARDO VIPER VENOM TEST: DRVVT Screen Ratio = 0.93 Normal = <1.28      ANCA  Recent Labs   Lab Test  11/19/12   0900   MPOAB  1     CBC  Recent Labs   Lab Test  01/15/17   1844  12/27/16   0836  11/04/16   0751   WBC  8.4  6.4  7.4   RBC  4.23  4.30  4.71   HGB  12.7  13.2  13.3   HCT  38.6  42.2  41.7   MCV  91  98  89   RDW  14.8  14.8  15.6*   PLT  310  334  346   MCH  30.0  30.7  28.2   MCHC  32.9  31.3*  31.9   NEUTROPHIL  92.4  52.4  53.6   LYMPH  3.7  37.6  29.6   MONOCYTE  3.6  5.0  9.5   EOSINOPHIL  0.1  3.9  6.6   BASOPHIL  0.1  0.6  0.7   ANEU  7.8  3.4  4.0   ALYM  0.3*  2.4  2.2   KIMBERLY  0.3  0.3  0.7   AEOS  0.0  0.3  0.5   ABAS  0.0  0.0  0.1     CMP  Recent Labs   Lab Test  01/17/17   0642  01/15/17   1844  12/27/16   0836  11/04/16   0751   NA  145*  139  139   --    POTASSIUM  3.5  4.0  4.7   --    CHLORIDE  112*  107  110*   --    CO2  29  24  21   --    ANIONGAP  4  8  8   --    GLC  108*  104*  95   --    BUN  9  18  22   --    CR  0.93  0.83  0.75  0.88   GFRESTIMATED  61  70  79  66   GFRESTBLACK  74  85  >90   GFR Calc    80   ISH  8.2*  8.5  8.8   --    BILITOTAL   --   0.6  0.4  0.3   ALBUMIN   --   3.4  3.6  3.8   PROTTOTAL   --   7.3  7.4  7.3   ALKPHOS   --   82  92  92   AST   --   35  21  20   ALT   --   37  33  30     HgA1c  Recent Labs   Lab Test  02/20/15   0750  10/20/14   0821   A1C  5.6  5.1     Iron Studies  Recent Labs   Lab Test  02/20/15   0750  02/11/11   0735   ABRAHAM  76  89   IRON   --   63     Calcium/VitaminD  Recent Labs   Lab Test  01/17/17   0642  01/15/17   1844  12/27/16   0836   02/20/15   0750   02/11/11   0735   ISH   8.2*  8.5  8.8   < >  9.0   < >  9.3   D3VIT   --    --    --    --    --    --   42   VITDT   --    --    --    --   39   --    --     < > = values in this interval not displayed.     ESR/CRP  Recent Labs   Lab Test  01/15/17   1844  11/04/16   0751  07/15/16   0800   SED  15  15  16   CRP  11.0*  6.6  10.6*     CK/Aldolase  Recent Labs   Lab Test  04/07/14   1001   CKT  77     TSH/T4  Recent Labs   Lab Test  09/29/15   0832  08/28/12   1525   TSH  1.20  1.48     Lipid Panel  Recent Labs   Lab Test  12/27/16   0836  02/20/15   0750  10/20/14   0821  11/22/13   0843   CHOL  184  157  194  167   TRIG  154*  79  193*  140   HDL  56  63  63  60   LDL  97  78  92  79   VLDL   --   16  39*  28   CHOLHDLRATIO   --   2.5  3.1  2.8   NHDL  128   --    --    --      Hepatitis B  Recent Labs   Lab Test  12/08/15   0855  03/06/15   1650   HBCAB  Nonreactive   --    HEPBANG  Nonreactive  Nonreactive     Hepatitis C  Recent Labs   Lab Test  12/08/15   0855  03/06/15   1650  11/19/12   0900   HCVAB  Nonreactive   Assay performance characteristics have not been established for newborns,   infants, and children    Nonreactive   Assay performance characteristics have not been established for newborns,   infants, and children    Negative     Tuberculosis Screening  Recent Labs   Lab Test  12/08/15   0855  03/06/15   1651   TBRSLT  Negative  Negative   TBAGN  0.01  0.04     UA  Recent Labs   Lab Test  07/15/16   0800  02/03/15   1144  06/02/14   1225   11/19/12   0901   COLOR  Yellow  Light Yellow  Straw   < >  Yellow   APPEARANCE  Clear  Clear  Clear   < >  Clear   URINEGLC  Negative  Negative  Negative   < >  Negative   URINEBILI  Negative  Negative  Negative   < >  Negative   SG  1.015  1.004  1.004   < >  1.020   URINEPH  5.5  5.0  5.0   < >  6.0   PROTEIN  Negative  Negative  Negative   < >  Negative   UROBILINOGEN  0.2   --    --    --   0.2   NITRITE  Negative  Negative  Negative   < >  Negative   UBLD  Negative  Negative   Negative   < >  Trace*   LEUKEST  Negative  Negative  Negative   < >  Negative   WBCU  O - 2  1  1   < >  O - 2   RBCU  O - 2  <1  <1   < >  O - 2   SQUAMOUSEPI  Few   --    --    --   Few   BACTERIA   --    --   Few*   --    --    MUCOUS   --   Present*   --    --    --     < > = values in this interval not displayed.     Urine Microscopic  Recent Labs   Lab Test  07/15/16   0800  02/03/15   1144  06/02/14   1225   11/19/12   0901   WBCU  O - 2  1  1   < >  O - 2   RBCU  O - 2  <1  <1   < >  O - 2   SQUAMOUSEPI  Few   --    --    --   Few   BACTERIA   --    --   Few*   --    --    MUCOUS   --   Present*   --    --    --     < > = values in this interval not displayed.     Urine Protein  Recent Labs   Lab Test  07/15/16   0800  11/19/12   0901   UTP  <0.05  <0.05   UTPG  Unable to calculate due to low value  <0.05   UCRR   --   111      Immunization History     Immunization History   Administered Date(s) Administered     Influenza Vaccine IM 3yrs+ 4 Valent IIV4 10/15/2013, 09/15/2014, 09/28/2015, 09/28/2016     Pneumococcal (PCV 13) 04/15/2016     Pneumococcal 23 valent 07/15/2016     TDAP (ADACEL AGES 11-64) 02/09/2011          Chart documentation done in part with Dragon Voice recognition Software. Although reviewed after completion, some word and grammatical error may remain.    Freddy Guerra MD

## 2017-02-02 NOTE — NURSING NOTE
"Chief Complaint   Patient presents with     Arthritis     recheck RA-hand pain and stiffiness, numbness of left hand finger tip       Initial /81 mmHg  Pulse 88  Temp(Src) 98  F (36.7  C) (Oral)  Ht 5' 3.35\" (1.609 m)  Wt 239 lb (108.41 kg)  BMI 41.88 kg/m2  SpO2 96% Estimated body mass index is 41.88 kg/(m^2) as calculated from the following:    Height as of this encounter: 5' 3.35\" (1.609 m).    Weight as of this encounter: 239 lb (108.41 kg).  BP completed using cuff size: large         RAPID3 (0-30) Cumulative Score  8.0          RAPID3 Weighted Score (divide #4 by 3 and that is the weighted score)  2.7             "

## 2017-02-03 ASSESSMENT — ANXIETY QUESTIONNAIRES: GAD7 TOTAL SCORE: 5

## 2017-02-03 ASSESSMENT — PATIENT HEALTH QUESTIONNAIRE - PHQ9: SUM OF ALL RESPONSES TO PHQ QUESTIONS 1-9: 6

## 2017-02-06 LAB
COPATH REPORT: NORMAL
M TB TUBERC IFN-G BLD QL: NEGATIVE
M TB TUBERC IFN-G/MITOGEN IGNF BLD: 0 IU/ML
PAP: NORMAL

## 2017-02-07 LAB
FINAL DIAGNOSIS: NORMAL
HPV HR 12 DNA CVX QL NAA+PROBE: NEGATIVE
HPV16 DNA SPEC QL NAA+PROBE: NEGATIVE
HPV18 DNA SPEC QL NAA+PROBE: NEGATIVE
SPECIMEN DESCRIPTION: NORMAL

## 2017-02-17 ENCOUNTER — PRE VISIT (OUTPATIENT)
Dept: ORTHOPEDICS | Facility: CLINIC | Age: 62
End: 2017-02-17

## 2017-02-17 NOTE — TELEPHONE ENCOUNTER
1.  Date/reason for appt: 4/6/17 -- bilat hand carpal tunnel, trigger thumb  2.  Referring provider: Freddy Guerra  3.  Call to patient (Yes / No - short description): no, referred  4.  Previous care at / records requested from: [facility] -- records and imaging in epic/pacs

## 2017-03-02 DIAGNOSIS — Z79.899 HIGH RISK MEDICATIONS (NOT ANTICOAGULANTS) LONG-TERM USE: ICD-10-CM

## 2017-03-02 DIAGNOSIS — M05.9 SEROPOSITIVE RHEUMATOID ARTHRITIS (H): ICD-10-CM

## 2017-03-02 NOTE — TELEPHONE ENCOUNTER
methotrexate sodium 50 MG/2ML SOLN      Last Written Prescription Date:  2/2/17  Last Fill Quantity: 2VIAL,   # refills: 3  Last Office Visit with [facility], [facility] or [facility] prescribing provider: 2/2/17  Future Office visit:    Next 5 appointments (look out 90 days)     May 04, 2017  7:20 AM CDT   Return Visit with Freddy Guerra MD   [facility] ([facility]    [address]  [place] [address]   [phone]                   Routing refill request to provider for review/approval because:  Drug not on the [facility], [facility] or [facility] refill protocol or controlled substance

## 2017-03-03 NOTE — TELEPHONE ENCOUNTER
Rheumatology team: Methotrexate refill request was received and refused. Please check with the patient's pharmacy and the patient; she should have a sufficient supply of methotrexate already based on the last prescription was given on 2/2/2017.      Freddy Guerra MD  3/3/2017 1:56 PM

## 2017-03-30 ENCOUNTER — SURGERY (OUTPATIENT)
Age: 62
End: 2017-03-30

## 2017-03-30 ENCOUNTER — HOSPITAL ENCOUNTER (OUTPATIENT)
Facility: AMBULATORY SURGERY CENTER | Age: 62
Discharge: HOME | End: 2017-03-30
Attending: SURGERY | Admitting: SURGERY
Payer: COMMERCIAL

## 2017-03-30 VITALS
TEMPERATURE: 97 F | WEIGHT: 225 LBS | SYSTOLIC BLOOD PRESSURE: 130 MMHG | RESPIRATION RATE: 16 BRPM | DIASTOLIC BLOOD PRESSURE: 68 MMHG | HEIGHT: 64 IN | BODY MASS INDEX: 38.41 KG/M2 | OXYGEN SATURATION: 95 %

## 2017-03-30 LAB — COLONOSCOPY: NORMAL

## 2017-03-30 PROCEDURE — G8918 PT W/O PREOP ORDER IV AB PRO: HCPCS

## 2017-03-30 PROCEDURE — G0121 COLON CA SCRN NOT HI RSK IND: HCPCS | Performed by: SURGERY

## 2017-03-30 PROCEDURE — G8907 PT DOC NO EVENTS ON DISCHARG: HCPCS

## 2017-03-30 PROCEDURE — 45378 DIAGNOSTIC COLONOSCOPY: CPT

## 2017-03-30 RX ORDER — LIDOCAINE 40 MG/G
CREAM TOPICAL
Status: DISCONTINUED | OUTPATIENT
Start: 2017-03-30 | End: 2017-03-31 | Stop reason: HOSPADM

## 2017-03-30 RX ORDER — DIPHENHYDRAMINE HYDROCHLORIDE 50 MG/ML
INJECTION INTRAMUSCULAR; INTRAVENOUS PRN
Status: DISCONTINUED | OUTPATIENT
Start: 2017-03-30 | End: 2017-03-30 | Stop reason: HOSPADM

## 2017-03-30 RX ORDER — FENTANYL CITRATE 50 UG/ML
INJECTION, SOLUTION INTRAMUSCULAR; INTRAVENOUS PRN
Status: DISCONTINUED | OUTPATIENT
Start: 2017-03-30 | End: 2017-03-30 | Stop reason: HOSPADM

## 2017-03-30 RX ADMIN — FENTANYL CITRATE 50 MCG: 50 INJECTION, SOLUTION INTRAMUSCULAR; INTRAVENOUS at 09:04

## 2017-03-30 RX ADMIN — FENTANYL CITRATE 50 MCG: 50 INJECTION, SOLUTION INTRAMUSCULAR; INTRAVENOUS at 09:08

## 2017-03-30 RX ADMIN — FENTANYL CITRATE 50 MCG: 50 INJECTION, SOLUTION INTRAMUSCULAR; INTRAVENOUS at 09:01

## 2017-03-30 RX ADMIN — FENTANYL CITRATE 50 MCG: 50 INJECTION, SOLUTION INTRAMUSCULAR; INTRAVENOUS at 09:02

## 2017-03-30 RX ADMIN — DIPHENHYDRAMINE HYDROCHLORIDE 12.5 MG: 50 INJECTION INTRAMUSCULAR; INTRAVENOUS at 09:01

## 2017-04-03 ASSESSMENT — ENCOUNTER SYMPTOMS
MUSCLE WEAKNESS: 0
STIFFNESS: 1
MUSCLE CRAMPS: 1
BACK PAIN: 0
NECK PAIN: 0
MYALGIAS: 1
ARTHRALGIAS: 1
JOINT SWELLING: 1

## 2017-04-06 ENCOUNTER — OFFICE VISIT (OUTPATIENT)
Dept: ORTHOPEDICS | Facility: CLINIC | Age: 62
End: 2017-04-06

## 2017-04-06 VITALS — WEIGHT: 245.5 LBS | HEIGHT: 64 IN | BODY MASS INDEX: 41.91 KG/M2

## 2017-04-06 DIAGNOSIS — M65.30 TRIGGER FINGER, ACQUIRED: Primary | ICD-10-CM

## 2017-04-06 DIAGNOSIS — G56.02 CARPAL TUNNEL SYNDROME OF LEFT WRIST: ICD-10-CM

## 2017-04-06 NOTE — PROGRESS NOTES
REFERRING PROVIDER:  Freddy Guerra MD      HISTORY OF PRESENT ILLNESS:  Ms. Fung is a pleasant, 61-year-old, right hand-dominant female who presents for evaluation of bilateral numbness and tingling in her hands and multiple trigger digits.  She states that her symptoms have been going on for several years.  She underwent nerve conduction studies in 2012 which showed moderate to severe carpal tunnel syndrome bilaterally.  She was initially treated with night splints which she felt improved her symptoms.  She is currently dependent on her night splints, as if she does not wear them she is awoken at night.  Over the past several years her symptoms have become progressive in both hands.  The left is more affected than the right.  She has numbness and tingling in her index through small finger the majority of the day.  She notes weakness when she is opening jars or doing fine manipulation.  She has not tried any injections or therapy.  On the right side she has some numbness and tingling in her index through small fingers at night, but this goes away for the most part during the day.  She also notes triggering of her left thumb and right index finger.  This occurs multiple times a day and often requires manual extension by her contralateral hand.  She also has a history of rheumatoid arthritis with a long history of stiffness in her bilateral hands.  She denies neck pain.  She is currently on methotrexate and tofacitinib for her rheumatoid arthritis.      PAST MEDICAL HISTORY:   1.  Rheumatoid arthritis.   2.  Depression.   3.  No history of diabetes or thyroid disorders.      PAST SURGICAL HISTORY:   1.  Eye surgery.   2.  Gastric band which became infected and was subsequently removed.      SOCIAL HISTORY:  She does not smoke.  She does not drink.  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       FAMILY HISTORY:  No bleeding or clotting problems.  No bad reaction to anesthesia.       PHYSICAL EXAMINATION:     GENERAL:  Pleasant, age-appropriate female in no acute distress.  BMI of 41.9.   RESPIRATORY:  Nonlabored.   CARDIOVASCULAR:  Regular rate and rhythm.     EXTREMITIES:  Warm and well-perfused.   NECK:  Nontender to palpation throughout with full motion and negative Spurling's bilaterally.   RIGHT UPPER EXTREMITY:  She has 2+ radial pulse.  Two-point discrimination is 5 mm except for index finger of 6 mm.  No thenar atrophy.  Negative Froment.  She has 5/5 palmar abduction.  She has 5/5 first dorsal interosseous.  Positive compression and Tinel's at the carpal tunnel with symptoms in the index through small finger.  Positive compression and Tinel's at cubital tunnel symptoms in thumb through small finger.  Tender nodule over ring finger A1 pulley with catching, but no locking.    LEFT UPPER EXTREMITY:  She has 2+ radial pulse.  She fires first dorsal interosseous 5/5.  Palmar abduction of the thumb 4+/5.  Trace thenar atrophy.  Two-point discrimination is 5 mm except for thumb 7 mm.  Positive carpal compression and Tinel's at carpal tunnel with symptoms in index through small finger.  Positive Tinel's and compression at cubital tunnel with symptoms in thumb through small finger.  Tender nodule over thumb A1 pulley with catching, but no locking.    PINCH AND :  Key pinch - right 6 pounds, left 8 pounds.    force - right pinch 6 pounds, right  40, left pinch 5, left  30, QuickDASH 40.9.      ASSESSMENT AND PLAN:     1.  Bilateral carpal tunnel syndrome.    2.  Right trigger digit ring finger.    3.  Left trigger digit thumb.       Bilateral carpal tunnel syndrome was diagnosed on EMG in 2012 with absent sensory responses and denervation of the abductor pollicis on the left side.  At that time, the ulnar nerve was spared.  We did discussed that her distribution of symptoms is a little bit atypical, but at this time based on her previous EMG it would be reasonable to pursue  carpal tunnel release without a repeat EMG with the understanding that if she does not have complete relief of her symptoms, another EMG or evaluation of the cubital tunnel may be warranted.  We also discussed options for trigger digits which include injection versus surgical release.  At this time, the patient wishes to pursue corticosteroid injections of her bilateral trigger digits.  The risks, benefits and alternatives of this procedure were discussed.  If she does not have relief on the left side, she may consider release of this at the same time she has her carpal tunnel release.  The patient was interested in having her left carpal tunnel release, as this is more symptomatic.  The risks, benefits and alternatives were discussed with the patient.  This will be scheduled.       PROCEDURE:  Written consent for injection of the right ring and left thumb A1 pulleys was obtained.  Under sterile prep, both sites were injected with 1 mL of 1% lidocaine and 40 mg of Kenalog into the A1 pulley with the fingers passively extended to maximally distribute the steroid.  The procedure was well-tolerated.  There were no complications.        The patient was seen and examined with Dr. Middleton.       Dictated by Nhan Gandhi MD, Resident     I have personally examined this patient and have reviewed the clinical presentation and progress note with the resident. I agree with the treatment plan as outlined. The plan was formulated with the resident on the day of the resident's dictation.

## 2017-04-06 NOTE — NURSING NOTE
[facility]  [address] [place] [id number]  Dept: [phone]  ______________________________________________________________________________    Patient: Sharon Fung   : 1955   MRN: [id number]   2017    INVASIVE PROCEDURE SAFETY CHECKLIST    Date: 2017   Procedure: Left thumb and right 4th finger trigger injections.   Patient Name: Sharon Fung  MRN: [id number]  YOB: 1955    Action: Complete sections as appropriate. Any discrepancy results in a HARD COPY until resolved.     PRE PROCEDURE:  Patient ID verified with 2 identifiers (name and  or MRN): Yes  Procedure and site verified with patient/designee (when able): Yes  Accurate consent documentation in medical record: Yes  H&P (or appropriate assessment) documented in medical record: Yes  H&P must be up to 30 days prior to procedure and updates within 24 hours of procedure as applicable: NA  Relevant diagnostic and radiology test results appropriately labeled and displayed as applicable: Yes  Blood products, implants, devices, and or special equipment available for the procedure as applicable: Yes   Procedure site(s) marked with provider initials: NA  Marking not required: No    TIMEOUT:  Time-Out performed immediately prior to starting procedure, including verbal and active participation of all team members addressing the following:Yes  * Correct patient identify  * Confirmed that the correct side and site are marked  * An accurate procedure consent form  * Agreement on the procedure to be done  * Correct patient position  * Relevant images and results are properly labeled and appropriately displayed  * The need to administer antibiotics or fluids for irrigation purposes during the procedure as applicable   * Safety precautions based on patient history or medication use    DURING PROCEDURE: Verification of correct person, site, and procedures any time the responsibility  for care of the patient is transferred to another member of the care team.

## 2017-04-06 NOTE — MR AVS SNAPSHOT
After Visit Summary   4/6/2017    Sharon Fung    MRN: [id number]           Patient Information     Date Of Birth          1955        Visit Information        Provider Department      4/6/2017 8:20 AM Ciara Middleton MD [facility]        Today's Diagnoses     Trigger finger, acquired    -  1    Carpal tunnel syndrome of left wrist           Follow-ups after your visit        Your next 10 appointments already scheduled     Apr 27, 2017   Procedure with Ciara Middleton MD   [facility] ([facility])    [address] [place] [address]  [address] [address]   [phone]           Located in the Clinics and Surgery Center at [address] [place] [address].   parking is very convenient and highly recommended.  is a $6 flat rate fee.  Both  and self parkers should enter the main arrival plaza from [facility]; parking attendants will direct you based on your parking preference.            May 04, 2017  7:20 AM CDT   Return Visit with Freddy Guerra MD   [facility]    [address] [place] [id number]   [phone]            May 05, 2017  7:30 AM CDT   (Arrive by 7:15 AM)   Post-Op with JOSE MARQUEZ ORTHO HAND POP   [facility] ([facility])    [address] [place] [address]   [phone]            May 05, 2017  8:00 AM CDT   (Arrive by 7:45 AM)   AZEB Hand with Purnima Mcguire OT   [facility] ([facility])    [address] [place] [address]   [phone]            Jun 01, 2017 11:50 AM CDT   (Arrive by 11:35 AM)   POST-OP HAND with Ciara Middleton MD   [facility] ([facility])    [address] [place] [address]   [phone]  "             Who to contact     Please call your clinic at [phone] to:    Ask questions about your health    Make or cancel appointments    Discuss your medicines    Learn about your test results    Speak to your doctor   If you have compliments or concerns about an experience at your clinic, or if you wish to file a complaint, please contact [facility] Patient Relations at [phone] or email us at [email or web address]         Additional Information About Your Visit        [employer] Information     [employer] gives you secure access to your electronic health record. If you see a primary care provider, you can also send messages to your care team and make appointments. If you have questions, please call your primary care clinic.  If you do not have a primary care provider, please call [phone] and they will assist you.      [employer] is an electronic gateway that provides easy, online access to your medical records. With [employer], you can request a clinic appointment, read your test results, renew a prescription or communicate with your care team.     To access your existing account, please contact your [facility] or call [phone] for assistance.        Care EveryWhere ID     This is your Care EveryWhere ID. This could be used by other organizations to access your [facility] medical records  [id number]        Your Vitals Were     Height BMI (Body Mass Index)                1.63 m (5' 4.17\") 41.91 kg/m2           Blood Pressure from Last 3 Encounters:   03/30/17 130/68   02/02/17 130/78   02/02/17 132/82    Weight from Last 3 Encounters:   04/06/17 111.4 kg (245 lb 8 oz)   03/23/17 102.1 kg (225 lb)   02/02/17 107.4 kg (236 lb 12 oz)              We Performed the Following     Rossy-Operative Worksheet (Hand)     Small Joint/Bursa injection and/or drainage (Finger) [20600]          Today's Medication Changes          These changes are accurate " as of: 4/6/17 11:59 PM.  If you have any questions, ask your nurse or doctor.               Start taking these medicines.        Dose/Directions    lidocaine 1 % injection   Used for:  Trigger finger, acquired, Carpal tunnel syndrome of left wrist   Started by:  Ciara Middleton MD        Dose:  2 mL   2 mLs by INTRA-ARTICULAR route once for 1 dose   Quantity:  2 mL   Refills:  0       triamcinolone acetonide 40 MG/ML injection   Commonly known as:  KENALOG   Used for:  Trigger finger, acquired, Carpal tunnel syndrome of left wrist   Started by:  Ciara Middleton MD        Dose:  40 mg   1 mL (40 mg) by INTRA-ARTICULAR route once for 1 dose   Quantity:  2 mL   Refills:  0            Where to get your medicines      Some of these will need a paper prescription and others can be bought over the counter.  Ask your nurse if you have questions.     You don't need a prescription for these medications     lidocaine 1 % injection    triamcinolone acetonide 40 MG/ML injection                Primary Care Provider Office Phone # Fax #    Reynaldo Saavedra [phone]       [address] [address]        Thank you!     Thank you for choosing [facility]  for your care. Our goal is always to provide you with excellent care. Hearing back from our patients is one way we can continue to improve our services. Please take a few minutes to complete the written survey that you may receive in the mail after your visit with us. Thank you!             Your Updated Medication List - Protect others around you: Learn how to safely use, store and throw away your medicines at www.disposemymeds.org.          This list is accurate as of: 4/6/17 11:59 PM.  Always use your most recent med list.                   Brand Name Dispense Instructions for use    acetaminophen 500 MG tablet    TYLENOL     Take 500-1,000 mg by mouth every 6 hours as needed for  "mild pain       cycloSPORINE 0.05 % ophthalmic emulsion    RESTASIS    2 Box    Place 1 drop into both eyes 2 times daily       fexofenadine 180 MG tablet    ALLEGRA    90 tablet    Take 1 tablet (180 mg) by mouth daily       FLONASE 50 MCG/ACT spray   Generic drug:  fluticasone      Spray 2 sprays into both nostrils as needed       FLUoxetine 20 MG capsule    PROzac    90 capsule    Take 1 capsule (20 mg) by mouth daily       folic acid 1 MG tablet    FOLVITE    100 tablet    Take 1 tablet (1 mg) by mouth daily       Insulin Syringe-Needle U-100 27G X 1/2\" 1 ML Misc    BD insulin syringe    10 each    1 Syringe every 7 days , to be used for methotrexate administration.       lidocaine 1 % injection     2 mL    2 mLs by INTRA-ARTICULAR route once for 1 dose       methotrexate sodium 50 MG/2ML Soln     2 vial    Inject 0.8 mLs (20 mg) as directed every 7 days       ondansetron 4 MG ODT tab    ZOFRAN ODT    12 tablet    Take 1 tablet (4 mg) by mouth every 6 hours as needed for nausea       order for DME      Use your CPAP device as directed by your provider.       tofacitinib 11 MG 24 hr tablet    XELJANZ XR    30 tablet    Take 1 tablet (11 mg) by mouth daily       topiramate 50 MG tablet    TOPAMAX    30 tablet    Take 1 tablet (50 mg) by mouth At Bedtime       triamcinolone acetonide 40 MG/ML injection    KENALOG    2 mL    1 mL (40 mg) by INTRA-ARTICULAR route once for 1 dose         "

## 2017-04-06 NOTE — NURSING NOTE
Teaching Flowsheet   Relevant Diagnosis: Left carpal tunnel syndrome   Teaching Topic: Pre-op left open carpal tunnel release      Person(s) involved in teaching:   Patient     Motivation Level:  Asks Questions: Yes  Eager to Learn: Yes  Cooperative: Yes  Receptive (willing/able to accept information): Yes  Any cultural factors/Jewish beliefs that may influence understanding or compliance? No       Patient demonstrates understanding of the following:  Reason for the appointment, diagnosis and treatment plan: Yes  Knowledge of proper use of medications and conditions for which they are ordered (with special attention to potential side effects or drug interactions): Yes  Which situations necessitate calling provider and whom to contact: Yes       Teaching Concerns Addressed:   Comments: negative significant medical health history      Proper use and care of  (medical equip, care aids, etc.): NA  Nutritional needs and diet plan: Yes  Pain management techniques: Yes  Wound Care: Yes  How and/when to access community resources: NA     Instructional Materials Used/Given: surgery packet, antiseptic soap      Time spent with patient: 15 minutes.

## 2017-04-06 NOTE — NURSING NOTE
"Reason For Visit:   Chief Complaint   Patient presents with     Consult     Bilateral carpal tunnel. Trigger finger, Left thumb and right ring finger.        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   Currently working: Yes, Pt works full time.     ?  No      Ht 1.63 m (5' 4.17\")  Wt 111.4 kg (245 lb 8 oz)  BMI 41.91 kg/m2      Pain Assessment  Patient Currently in Pain: Yes  Primary Pain Location: Hand  Pain Orientation: Right, Left  Other Pain Locations: Fingertips in left hand are always numb.   Pain Descriptors: Constant, Sharp (Stiffness)  Alleviating Factors:  (methotrexate, ibuprofen, RA meds. )    Hand Dominance Evaluation  Hand Dominance: Right    Pinch force  R hand key force: 2.722 kg (6 lb)  L hand key force: 3.629 kg (8 lb)     force  R hand pincher force: 2.722 kg (6 lb)  R hand  level 2 force: 18.1 kg (40 lb)    L hand pincher force: 2.268 kg (5 lb)  L hand  level  2 force: 13.6 kg (30 lb)    QuickDASH Assessment  QuickDASH Main 4/3/2017   1.Open a tight or new jar. Moderate difficulty   2. Do heavy household chores (e.g., wash walls, floors) Moderate difficulty   3. Carry a shopping bag or briefcase. Mild difficulty   4. Wash your back. Mild difficulty   5. Use a knife to cut food. Mild difficulty   6. Recreational activities in which you take some force or impact through your arm, shoulder or hand (e.g., golf, hammering, tennis, etc.). Moderate difficulty   7. During the past week, to what extent has your arm, shoulder or hand problem interfered with your normal social activities with family, friends, neighbours or groups? Moderately   8. During the past week, were you limited in your work or other regular daily activities as a result of your arm, shoulder or hand problem? Slightly limited   9. Arm, shoulder or hand pain. Moderate   10.Tingling (pins and needles) in your arm,shoulder or hand. " "Moderate   11. During the past week, how much difficulty have you had sleeping because of the pain in your arm, shoulder or hand? ([place] number) Moderate difficulty   Quickdash Ability Score 40.9          Current Outpatient Prescriptions   Medication Sig Dispense Refill     methotrexate sodium 50 MG/2ML SOLN Inject 0.8 mLs (20 mg) as directed every 7 days 2 vial 3     FLUoxetine (PROZAC) 20 MG capsule Take 1 capsule (20 mg) by mouth daily 90 capsule 1     ondansetron (ZOFRAN ODT) 4 MG ODT tab Take 1 tablet (4 mg) by mouth every 6 hours as needed for nausea 12 tablet 1     fexofenadine (ALLEGRA) 180 MG tablet Take 1 tablet (180 mg) by mouth daily 90 tablet 2     tofacitinib (XELJANZ XR) 11 MG XR tablet Take 1 tablet (11 mg) by mouth daily 30 tablet 6     Insulin Syringe-Needle U-100 (BD INSULIN SYRINGE) 27G X 1/2\" 1 ML MISC 1 Syringe every 7 days , to be used for methotrexate administration. 10 each 5     topiramate (TOPAMAX) 50 MG tablet Take 1 tablet (50 mg) by mouth At Bedtime 30 tablet 3     folic acid (FOLVITE) 1 MG tablet Take 1 tablet (1 mg) by mouth daily 100 tablet 3     cycloSPORINE (RESTASIS) 0.05 % ophthalmic emulsion Place 1 drop into both eyes 2 times daily 2 Box 11     acetaminophen (TYLENOL) 500 MG tablet Take 500-1,000 mg by mouth every 6 hours as needed for mild pain       ORDER FOR DME Use your CPAP device as directed by your provider.       fluticasone (FLONASE) 50 MCG/ACT nasal spray Spray 2 sprays into both nostrils as needed         No Known Allergies    "

## 2017-04-06 NOTE — LETTER
4/6/2017       RE: Sharon Fung  [address] [place] [place] [address]     Dear Colleague,    Thank you for referring your patient, Sharon Fung, to the [facility] at [facility]. Please see a copy of my visit note below.    REFERRING PROVIDER:  Freddy Guerra MD      HISTORY OF PRESENT ILLNESS:  Ms. Fung is a pleasant, 61-year-old, right hand-dominant female who presents for evaluation of bilateral numbness and tingling in her hands and multiple trigger digits.  She states that her symptoms have been going on for several years.  She underwent nerve conduction studies in 2012 which showed moderate to severe carpal tunnel syndrome bilaterally.  She was initially treated with night splints which she felt improved her symptoms.  She is currently dependent on her night splints, as if she does not wear them she is awoken at night.  Over the past several years her symptoms have become progressive in both hands.  The left is more affected than the right.  She has numbness and tingling in her index through small finger the majority of the day.  She notes weakness when she is opening jars or doing fine manipulation.  She has not tried any injections or therapy.  On the right side she has some numbness and tingling in her index through small fingers at night, but this goes away for the most part during the day.  She also notes triggering of her left thumb and right index finger.  This occurs multiple times a day and often requires manual extension by her contralateral hand.  She also has a history of rheumatoid arthritis with a long history of stiffness in her bilateral hands.  She denies neck pain.  She is currently on methotrexate and tofacitinib for her rheumatoid arthritis.      PAST MEDICAL HISTORY:   1.  Rheumatoid arthritis.   2.  Depression.   3.  No history of diabetes or thyroid disorders.      PAST SURGICAL HISTORY:   1.  Eye surgery.   2.   Gastric band which became infected and was subsequently removed.      SOCIAL HISTORY:  She does not smoke.  She does not drink.  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       FAMILY HISTORY:  No bleeding or clotting problems.  No bad reaction to anesthesia.      PHYSICAL EXAMINATION:     GENERAL:  Pleasant, age-appropriate female in no acute distress.  BMI of 41.9.   RESPIRATORY:  Nonlabored.   CARDIOVASCULAR:  Regular rate and rhythm.     EXTREMITIES:  Warm and well-perfused.   NECK:  Nontender to palpation throughout with full motion and negative Spurling's bilaterally.   RIGHT UPPER EXTREMITY:  She has 2+ radial pulse.  Two-point discrimination is 5 mm except for index finger of 6 mm.  No thenar atrophy.  Negative Froment.  She has 5/5 palmar abduction.  She has 5/5 first dorsal interosseous.  Positive compression and Tinel's at the carpal tunnel with symptoms in the index through small finger.  Positive compression and Tinel's at cubital tunnel symptoms in thumb through small finger.  Tender nodule over ring finger A1 pulley with catching, but no locking.    LEFT UPPER EXTREMITY:  She has 2+ radial pulse.  She fires first dorsal interosseous 5/5.  Palmar abduction of the thumb 4+/5.  Trace thenar atrophy.  Two-point discrimination is 5 mm except for thumb 7 mm.  Positive carpal compression and Tinel's at carpal tunnel with symptoms in index through small finger.  Positive Tinel's and compression at cubital tunnel with symptoms in thumb through small finger.  Tender nodule over thumb A1 pulley with catching, but no locking.    PINCH AND :  Key pinch - right 6 pounds, left 8 pounds.    force - right pinch 6 pounds, right  40, left pinch 5, left  30, QuickDASH 40.9.      ASSESSMENT AND PLAN:     1.  Bilateral carpal tunnel syndrome.    2.  Right trigger digit ring finger.    3.  Left trigger digit thumb.       Bilateral carpal tunnel syndrome was diagnosed on EMG in  2012 with absent sensory responses and denervation of the abductor pollicis on the left side.  At that time, the ulnar nerve was spared.  We did discussed that her distribution of symptoms is a little bit atypical, but at this time based on her previous EMG it would be reasonable to pursue carpal tunnel release without a repeat EMG with the understanding that if she does not have complete relief of her symptoms, another EMG or evaluation of the cubital tunnel may be warranted.  We also discussed options for trigger digits which include injection versus surgical release.  At this time, the patient wishes to pursue corticosteroid injections of her bilateral trigger digits.  The risks, benefits and alternatives of this procedure were discussed.  If she does not have relief on the left side, she may consider release of this at the same time she has her carpal tunnel release.  The patient was interested in having her left carpal tunnel release, as this is more symptomatic.  The risks, benefits and alternatives were discussed with the patient.  This will be scheduled.       PROCEDURE:  Written consent for injection of the right ring and left thumb A1 pulleys was obtained.  Under sterile prep, both sites were injected with 1 mL of 1% lidocaine and 40 mg of Kenalog into the A1 pulley with the fingers passively extended to maximally distribute the steroid.  The procedure was well-tolerated.  There were no complications.        The patient was seen and examined with Dr. Middleton.       Dictated by Nhan Gandhi MD, Resident     I have personally examined this patient and have reviewed the clinical presentation and progress note with the resident. I agree with the treatment plan as outlined. The plan was formulated with the resident on the day of the resident's dictation.       Again, thank you for allowing me to participate in the care of your patient.      Sincerely,    Ciara Middleton MD

## 2017-04-09 RX ORDER — TRIAMCINOLONE ACETONIDE 40 MG/ML
40 INJECTION, SUSPENSION INTRA-ARTICULAR; INTRAMUSCULAR ONCE
Qty: 2 ML | Refills: 0 | OUTPATIENT
Start: 2017-04-09 | End: 2017-04-09

## 2017-04-09 RX ORDER — LIDOCAINE HYDROCHLORIDE 10 MG/ML
2 INJECTION, SOLUTION INFILTRATION; PERINEURAL ONCE
Qty: 2 ML | Refills: 0 | OUTPATIENT
Start: 2017-04-09 | End: 2017-04-09

## 2017-04-27 ENCOUNTER — HOSPITAL ENCOUNTER (OUTPATIENT)
Facility: AMBULATORY SURGERY CENTER | Age: 62
End: 2017-04-27
Attending: ORTHOPAEDIC SURGERY

## 2017-04-27 VITALS
RESPIRATION RATE: 16 BRPM | OXYGEN SATURATION: 96 % | WEIGHT: 245.5 LBS | DIASTOLIC BLOOD PRESSURE: 73 MMHG | TEMPERATURE: 97.4 F | HEIGHT: 64 IN | SYSTOLIC BLOOD PRESSURE: 123 MMHG | BODY MASS INDEX: 41.91 KG/M2

## 2017-04-27 DIAGNOSIS — G56.00 CARPAL TUNNEL SYNDROME, UNSPECIFIED LATERALITY: Primary | ICD-10-CM

## 2017-04-27 DIAGNOSIS — Z79.899 HIGH RISK MEDICATIONS (NOT ANTICOAGULANTS) LONG-TERM USE: ICD-10-CM

## 2017-04-27 DIAGNOSIS — M05.9 SEROPOSITIVE RHEUMATOID ARTHRITIS (H): ICD-10-CM

## 2017-04-27 LAB
ALBUMIN SERPL-MCNC: 3.8 G/DL (ref 3.4–5)
ALP SERPL-CCNC: 99 U/L (ref 40–150)
ALT SERPL W P-5'-P-CCNC: 35 U/L (ref 0–50)
AST SERPL W P-5'-P-CCNC: 18 U/L (ref 0–45)
BASOPHILS # BLD AUTO: 0 10E9/L (ref 0–0.2)
BASOPHILS NFR BLD AUTO: 0.4 %
BILIRUB DIRECT SERPL-MCNC: <0.1 MG/DL (ref 0–0.2)
BILIRUB SERPL-MCNC: 0.5 MG/DL (ref 0.2–1.3)
CHOLEST SERPL-MCNC: 204 MG/DL
CREAT SERPL-MCNC: 0.81 MG/DL (ref 0.52–1.04)
CRP SERPL-MCNC: 4.9 MG/L (ref 0–8)
DIFFERENTIAL METHOD BLD: NORMAL
EOSINOPHIL # BLD AUTO: 0.1 10E9/L (ref 0–0.7)
EOSINOPHIL NFR BLD AUTO: 1.8 %
ERYTHROCYTE [DISTWIDTH] IN BLOOD BY AUTOMATED COUNT: 14.3 % (ref 10–15)
ERYTHROCYTE [SEDIMENTATION RATE] IN BLOOD BY WESTERGREN METHOD: 18 MM/H (ref 0–30)
GFR SERPL CREATININE-BSD FRML MDRD: 72 ML/MIN/1.7M2
HCT VFR BLD AUTO: 41.3 % (ref 35–47)
HDLC SERPL-MCNC: 82 MG/DL
HGB BLD-MCNC: 13.6 G/DL (ref 11.7–15.7)
IMM GRANULOCYTES # BLD: 0 10E9/L (ref 0–0.4)
IMM GRANULOCYTES NFR BLD: 0.4 %
LDLC SERPL CALC-MCNC: 92 MG/DL
LYMPHOCYTES # BLD AUTO: 1.8 10E9/L (ref 0.8–5.3)
LYMPHOCYTES NFR BLD AUTO: 26.1 %
MCH RBC QN AUTO: 31.3 PG (ref 26.5–33)
MCHC RBC AUTO-ENTMCNC: 32.9 G/DL (ref 31.5–36.5)
MCV RBC AUTO: 95 FL (ref 78–100)
MONOCYTES # BLD AUTO: 0.4 10E9/L (ref 0–1.3)
MONOCYTES NFR BLD AUTO: 5.9 %
NEUTROPHILS # BLD AUTO: 4.4 10E9/L (ref 1.6–8.3)
NEUTROPHILS NFR BLD AUTO: 65.4 %
NONHDLC SERPL-MCNC: 122 MG/DL
NRBC # BLD AUTO: 0 10*3/UL
NRBC BLD AUTO-RTO: 0 /100
PLATELET # BLD AUTO: 329 10E9/L (ref 150–450)
PROT SERPL-MCNC: 7.8 G/DL (ref 6.8–8.8)
RBC # BLD AUTO: 4.34 10E12/L (ref 3.8–5.2)
TRIGL SERPL-MCNC: 148 MG/DL
WBC # BLD AUTO: 6.8 10E9/L (ref 4–11)

## 2017-04-27 PROCEDURE — 85652 RBC SED RATE AUTOMATED: CPT | Performed by: INTERNAL MEDICINE

## 2017-04-27 PROCEDURE — 82565 ASSAY OF CREATININE: CPT | Performed by: INTERNAL MEDICINE

## 2017-04-27 PROCEDURE — 80061 LIPID PANEL: CPT | Performed by: INTERNAL MEDICINE

## 2017-04-27 PROCEDURE — 80076 HEPATIC FUNCTION PANEL: CPT | Performed by: INTERNAL MEDICINE

## 2017-04-27 PROCEDURE — 85025 COMPLETE CBC W/AUTO DIFF WBC: CPT | Performed by: INTERNAL MEDICINE

## 2017-04-27 PROCEDURE — 86140 C-REACTIVE PROTEIN: CPT | Performed by: INTERNAL MEDICINE

## 2017-04-27 PROCEDURE — 36415 COLL VENOUS BLD VENIPUNCTURE: CPT | Performed by: INTERNAL MEDICINE

## 2017-04-27 RX ORDER — ACETAMINOPHEN 325 MG/1
650 TABLET ORAL
Status: DISCONTINUED | OUTPATIENT
Start: 2017-04-27 | End: 2017-04-28 | Stop reason: HOSPADM

## 2017-04-27 RX ORDER — AMOXICILLIN 250 MG
1-2 CAPSULE ORAL 2 TIMES DAILY
Qty: 30 TABLET | Refills: 0 | Status: SHIPPED | OUTPATIENT
Start: 2017-04-27 | End: 2017-06-01

## 2017-04-27 RX ORDER — HYDROCODONE BITARTRATE AND ACETAMINOPHEN 5; 325 MG/1; MG/1
1-2 TABLET ORAL EVERY 4 HOURS PRN
Qty: 20 TABLET | Refills: 0 | Status: SHIPPED | OUTPATIENT
Start: 2017-04-27 | End: 2017-06-01

## 2017-04-27 RX ORDER — HYDROCODONE BITARTRATE AND ACETAMINOPHEN 5; 325 MG/1; MG/1
1-2 TABLET ORAL
Status: DISCONTINUED | OUTPATIENT
Start: 2017-04-27 | End: 2017-04-28 | Stop reason: HOSPADM

## 2017-04-27 NOTE — BRIEF OP NOTE
[facility] Brief Operative Note    Pre-operative diagnosis: Left Carpal Tunnel Syndrome   Post-operative diagnosis * No post-op diagnosis entered *   Procedure: Procedure(s):  Left Open Carpal Tunnel Release - Wound Class: I-Clean   Surgeon: Louie Sheldon   Assistants(s): Hiram   Estimated blood loss: minimal    Specimens: None   Findings: See operative report

## 2017-04-27 NOTE — IP AVS SNAPSHOT
MRN:[id number]                      After Visit Summary   4/27/2017    Sharon Fung    MRN: [id number]           Thank you!     Thank you for choosing [facility] for your care. Our goal is always to provide you with excellent care. Hearing back from our patients is one way we can continue to improve our services. Please take a few minutes to complete the written survey that you may receive in the mail after you visit with us. Thank you!        Patient Information     Date Of Birth          1955        About your hospital stay     You were admitted on:  April 27, 2017 [facility]    You were discharged on:  April 27, 2017       Who to Call     For medical emergencies, please call 911.  For non-urgent questions about your medical care, please call your primary care provider or clinic, [phone]  For questions related to your surgery, please call your surgery clinic        Attending Provider     Provider Specialty    Ciara Middleton MD Orthopedics       Primary Care Provider Office Phone # Fax #    Reynaldo Saavedra [phone]       [address] [address]        After Care Instructions     Discharge Instructions       Review outpatient procedure discharge instructions with patient as directed by Provider            Ice to affected area       Ice pack to surgical site every 15 minutes per hour for 24 hours            No Dressing Change       No dressing change until follow up appointment. Keep clean and dry.            Notify Provider       For signs and symptoms of infection: Fever greater than 101, redness, swelling, heat at site, drainage, pus.                  Follow-up Appointments     Follow Up ([facility])       Follow up in 1 week for wound check                  Your next 10 appointments already scheduled     May 04, 2017  7:20 AM CDT   [facility]  Freddy [facility]erra MD   [facility]    [address]  [place] [address]   [phone]            May 05, 2017  7:30 AM CDT   (Arrive by 7:15 AM)   Post-Op with JOSE U ORTHO HAND POP   [facility] ([facility] [place])    [address] [place] [id number] [phone]            May 05, 2017  8:00 AM CDT   (Arrive by 7:45 AM)   AZEB Hand with Purnima Mcguire OT   [facility] ([facility] [place] [facility])    [address] [place] [id number] [phone]            Jun 01, 2017 11:50 AM CDT   (Arrive by 11:35 AM)   POST-OP HAND with Ciara Middleton MD   [facility] ([facility] [place] [facility])    [address] [place] [id number] [phone]              Further instructions from your care team       [facility]  Home Care Following Your Procedure  Call a doctor if you have signs of infection (fever, growing tenderness at the surgery site, a large amount of drainage or bleeding, severe pain, foul-smelling drainage, redness, swelling).  Your doctor is:  Dr. Ciara Middleton, Orthopaedics: [phone]                                    Or dial [phone] and ask for the resident on call for:  Orthopaedics  For emergency care, call the:  [facility]: [phone] (TTY for hearing impaired: [phone])    Discharge Instructions: Following Surgery on your Arm or Hand    Comfort:    Keep the affected arm or hand elevated to reduce pain and swelling. Use pillows at night and a sling (if ordered) during the day.    Take the pain medication as ordered.     Care:    Keep the dressing clean, dry and intact.    Watch for drainage    Check color, motion, and sensation of the fingers/hand on a regular basis.     Activity:    Spend a quiet afternoon and evening at home on the day of  "your surgery.    No tub baths or showers until your dressing/cast is removed by your doctor.     Report to your doctor at once if:    Your fingers below the dressing/cast are numb, difficult or painful to move, and this is not relieved after elevation.    There is a change in the color of your fingers below the dressing/cast that does not go away when elevated.     The affected arm/hand is much cooler or warmer than the other side.    Your temperature is elevated.    There is an odor or unusual drainage on the dressing/cast.    Your dressing/cast is uncomfortably snug or tight.     Follow up:    Call your doctor s office to schedule a follow-up appointment               Pending Results     No orders found from 4/25/2017 to 4/28/2017.            Admission Information     Date & Time Provider Department Dept. Phone    4/27/2017 Ciara Middleton MD [facility] [phone]      Your Vitals Were     Blood Pressure Temperature Respirations Height Weight Pulse Oximetry    177/92 98.4  F (36.9  C) (Oral) 17 1.63 m (5' 4.17\") 111.4 kg (245 lb 8 oz) 97%    BMI (Body Mass Index)                   41.91 kg/m2           [employer] Information     [employer] gives you secure access to your electronic health record. If you see a primary care provider, you can also send messages to your care team and make appointments. If you have questions, please call your primary care clinic.  If you do not have a primary care provider, please call [phone] and they will assist you.      [employer] is an electronic gateway that provides easy, online access to your medical records. With [employer], you can request a clinic appointment, read your test results, renew a prescription or communicate with your care team.     To access your existing account, please contact your [facility] or call [phone] for assistance.        Care EveryWhere ID     This is your Care EveryWhere ID. This " could be used by other organizations to access your [facility] medical records  [id number]           Review of your medicines      START taking        Dose / Directions    HYDROcodone-acetaminophen 5-325 MG per tablet   Commonly known as:  NORCO   Used for:  Carpal tunnel syndrome, unspecified laterality        Dose:  1-2 tablet   Take 1-2 tablets by mouth every 4 hours as needed for other (Moderate to Severe Pain)   Quantity:  20 tablet   Refills:  0       senna-docusate 8.6-50 MG per tablet   Commonly known as:  SENOKOT-S;PERICOLACE   Used for:  Carpal tunnel syndrome, unspecified laterality        Dose:  1-2 tablet   Take 1-2 tablets by mouth 2 times daily Take while on oral narcotics to prevent or treat constipation.   Quantity:  30 tablet   Refills:  0         CONTINUE these medicines which have NOT CHANGED        Dose / Directions    acetaminophen 500 MG tablet   Commonly known as:  TYLENOL        Dose:  500-1000 mg   Take 500-1,000 mg by mouth every 6 hours as needed for mild pain   Refills:  0       cycloSPORINE 0.05 % ophthalmic emulsion   Commonly known as:  RESTASIS   Used for:  Dry eye syndrome, bilateral        Dose:  1 drop   Place 1 drop into both eyes 2 times daily   Quantity:  2 Box   Refills:  11       fexofenadine 180 MG tablet   Commonly known as:  ALLEGRA   Used for:  AR (allergic rhinitis)        Dose:  180 mg   Take 1 tablet (180 mg) by mouth daily   Quantity:  90 tablet   Refills:  2       FLONASE 50 MCG/ACT spray   Generic drug:  fluticasone        Dose:  2 spray   Spray 2 sprays into both nostrils as needed   Refills:  0       FLUoxetine 20 MG capsule   Commonly known as:  PROzac   Used for:  Major depressive disorder, recurrent episode, mild (H)        Dose:  20 mg   Take 1 capsule (20 mg) by mouth daily   Quantity:  90 capsule   Refills:  1       folic acid 1 MG tablet   Commonly known as:  FOLVITE   Used for:  Seropositive rheumatoid arthritis (H), High risk medication use        Dose:   "1 mg   Take 1 tablet (1 mg) by mouth daily   Quantity:  100 tablet   Refills:  3       Insulin Syringe-Needle U-100 27G X 1/2\" 1 ML Misc   Commonly known as:  BD insulin syringe   Used for:  Seropositive rheumatoid arthritis (H), High risk medication use        Dose:  1 Syringe   1 Syringe every 7 days , to be used for methotrexate administration.   Quantity:  10 each   Refills:  5       methotrexate sodium 50 MG/2ML Soln   Used for:  Seropositive rheumatoid arthritis (H), High risk medications (not anticoagulants) long-term use        Dose:  20 mg   Inject 0.8 mLs (20 mg) as directed every 7 days   Quantity:  2 vial   Refills:  3       ondansetron 4 MG ODT tab   Commonly known as:  ZOFRAN ODT   Used for:  Nausea vomiting and diarrhea        Dose:  4 mg   Take 1 tablet (4 mg) by mouth every 6 hours as needed for nausea   Quantity:  12 tablet   Refills:  1       order for DME        Use your CPAP device as directed by your provider.   Refills:  0       tofacitinib 11 MG 24 hr tablet   Commonly known as:  XELJANZ XR   Used for:  Seropositive rheumatoid arthritis (H), High risk medications (not anticoagulants) long-term use        Dose:  11 mg   Take 1 tablet (11 mg) by mouth daily   Quantity:  30 tablet   Refills:  6       topiramate 50 MG tablet   Commonly known as:  TOPAMAX   Used for:  Morbid drug-induced obesity        Dose:  50 mg   Take 1 tablet (50 mg) by mouth At Bedtime   Quantity:  30 tablet   Refills:  3            Where to get your medicines      These medications were sent to [address] [place] [address] [address] [address]    Hours:  TRANSPLANT PHONE NUMBER [phone] Phone:  [phone]     senna-docusate 8.6-50 MG per tablet         Some of these will need a paper prescription and others can be bought over the counter. Ask your nurse if you have questions.     Bring a paper prescription for each of these medications " "    HYDROcodone-acetaminophen 5-325 MG per tablet                Protect others around you: Learn how to safely use, store and throw away your medicines at www.disposemymeds.org.             Medication List: This is a list of all your medications and when to take them. Check marks below indicate your daily home schedule. Keep this list as a reference.      Medications           Morning Afternoon Evening Bedtime As Needed    acetaminophen 500 MG tablet   Commonly known as:  TYLENOL   Take 500-1,000 mg by mouth every 6 hours as needed for mild pain                                cycloSPORINE 0.05 % ophthalmic emulsion   Commonly known as:  RESTASIS   Place 1 drop into both eyes 2 times daily                                fexofenadine 180 MG tablet   Commonly known as:  ALLEGRA   Take 1 tablet (180 mg) by mouth daily                                FLONASE 50 MCG/ACT spray   Spray 2 sprays into both nostrils as needed   Generic drug:  fluticasone                                FLUoxetine 20 MG capsule   Commonly known as:  PROzac   Take 1 capsule (20 mg) by mouth daily                                folic acid 1 MG tablet   Commonly known as:  FOLVITE   Take 1 tablet (1 mg) by mouth daily                                HYDROcodone-acetaminophen 5-325 MG per tablet   Commonly known as:  NORCO   Take 1-2 tablets by mouth every 4 hours as needed for other (Moderate to Severe Pain)                                Insulin Syringe-Needle U-100 27G X 1/2\" 1 ML Misc   Commonly known as:  BD insulin syringe   1 Syringe every 7 days , to be used for methotrexate administration.                                methotrexate sodium 50 MG/2ML Soln   Inject 0.8 mLs (20 mg) as directed every 7 days                                ondansetron 4 MG ODT tab   Commonly known as:  ZOFRAN ODT   Take 1 tablet (4 mg) by mouth every 6 hours as needed for nausea                                order for DME   Use your CPAP device as directed by " your provider.                                senna-docusate 8.6-50 MG per tablet   Commonly known as:  SENOKOT-S;PERICOLACE   Take 1-2 tablets by mouth 2 times daily Take while on oral narcotics to prevent or treat constipation.                                tofacitinib 11 MG 24 hr tablet   Commonly known as:  XELJANZ XR   Take 1 tablet (11 mg) by mouth daily                                topiramate 50 MG tablet   Commonly known as:  TOPAMAX   Take 1 tablet (50 mg) by mouth At Bedtime

## 2017-04-27 NOTE — DISCHARGE INSTRUCTIONS
[facility]  Home Care Following Your Procedure  Call a doctor if you have signs of infection (fever, growing tenderness at the surgery site, a large amount of drainage or bleeding, severe pain, foul-smelling drainage, redness, swelling).  Your doctor is:  Dr. Ciara Middleton, Orthopaedics: [phone]                                    Or dial [phone] and ask for the resident on call for:  Orthopaedics  For emergency care, call the:  [facility]: [phone] (TTY for hearing impaired: [phone])    Discharge Instructions: Following Surgery on your Arm or Hand    Comfort:    Keep the affected arm or hand elevated to reduce pain and swelling. Use pillows at night and a sling (if ordered) during the day.    Take the pain medication as ordered.     Care:    Keep the dressing clean, dry and intact.    Watch for drainage    Check color, motion, and sensation of the fingers/hand on a regular basis.     Activity:    Spend a quiet afternoon and evening at home on the day of your surgery.    No tub baths or showers until your dressing/cast is removed by your doctor.     Report to your doctor at once if:    Your fingers below the dressing/cast are numb, difficult or painful to move, and this is not relieved after elevation.    There is a change in the color of your fingers below the dressing/cast that does not go away when elevated.     The affected arm/hand is much cooler or warmer than the other side.    Your temperature is elevated.    There is an odor or unusual drainage on the dressing/cast.    Your dressing/cast is uncomfortably snug or tight.     Follow up:    Call your doctor s office to schedule a follow-up appointment

## 2017-04-27 NOTE — IP AVS SNAPSHOT
[facility]    [address] [place] [address]    Phone:  [phone]    Fax:  [phone]                                       After Visit Summary   4/27/2017    Sharon Fung    MRN: [id number]           After Visit Summary Signature Page     I have received my discharge instructions, and my questions have been answered. I have discussed any challenges I see with this plan with the nurse or doctor.    ..........................................................................................................................................  Patient/Patient Representative Signature      ..........................................................................................................................................  Patient Representative Print Name and Relationship to Patient    ..................................................               ................................................  Date                                            Time    ..........................................................................................................................................  Reviewed by Signature/Title    ...................................................              ..............................................  Date                                                            Time

## 2017-04-28 NOTE — OP NOTE
PREOPERATIVE DIAGNOSIS:  Left carpal tunnel syndrome.      POSTOPERATIVE DIAGNOSIS:  Left carpal tunnel syndrome.      PROCEDURE PERFORMED:  Open release of the left carpal tunnel.      ESTIMATED BLOOD LOSS:  1 mL      TOURNIQUET:  7 minutes at 200 mmHg      COMPLICATIONS:  None.      FINDINGS:  Compression of the median nerve through the carpal tunnel.      INDICATIONS FOR PROCEDURE:  A pleasant, 61-year-old female, right-hand dominant.  History of rheumatoid arthritis.  Bilateral carpal tunnel syndrome.  Left worse than right.  Previously affirmed on neurodiagnostic studies.  She was indicated for open carpal tunnel release, to which she agreed.      DESCRIPTION OF PROCEDURE:  The patient was met in the preoperative holding area, where the operative extremity was marked.  Written consent for the above-stated procedure was obtained.  Then 10 mL of local anesthetic was injected over the incision site in the preoperative holding area after a multidisciplinary time-out.  The patient was then transported to the operating theater.  She was positioned with the arm on the OR table.  The arm was prepped and draped in the usual sterile fashion, and a tourniquet was applied and inflated for the duration of the procedure.  A multidisciplinary time-out was conducted in accordance with hospital policy.  A 4 cm incision was made over the transverse carpal ligament.  This was carried sharply through skin and subcutaneous tissue.  The palmar fascia was dissected off the transverse carpal ligament, which was incised.  The palmar fat was noted distally with opening of the transverse carpal ligament and decompression of the median nerve.  Release of the transverse carpal ligament was extended proximally.  A [place] tenotomy was used to release the antebrachial fascia and palmar fascia proximally.  There was complete decompression of the median nerve, which was probed and found to be free of adhesions at the sites of compression.  The  tourniquet was deflated.  The wound was copiously irrigated with normal saline.  Hemostasis was obtained with bipolar cautery.  The wound was then closed with 4-0 nylon suture, and a sterile dressing was applied.  The patient was transported to the Postoperative Care Unit in stable condition.  Follow up in 1 week for wound check.  Follow up with Dr. Middleton in 6 weeks for clinical recheck and to discuss surgery on the contralateral side.      Dr. Middleton was present and scrubbed for the entirety of the procedure.      Dictated by Nhan Gandhi MD   Resident         ODALIS MIDDLETON MD       As dictated by NHAN GANDHI MD       Physician Attestation   I was present for the entire procedure between opening and closing.    Odalis Middleton  Date of Service (when I saw the patient): 2017       D: 2017 13:52   T: 2017 08:29   MT: mateo      Name:     ALONZO HOLMAN   MRN:      [id number]        Account:        [id number]   :      1955           Procedure Date: 2017      Document: [id number]

## 2017-05-04 ENCOUNTER — OFFICE VISIT (OUTPATIENT)
Dept: RHEUMATOLOGY | Facility: CLINIC | Age: 62
End: 2017-05-04
Payer: COMMERCIAL

## 2017-05-04 VITALS
HEART RATE: 88 BPM | TEMPERATURE: 98 F | SYSTOLIC BLOOD PRESSURE: 127 MMHG | DIASTOLIC BLOOD PRESSURE: 79 MMHG | HEIGHT: 64 IN | BODY MASS INDEX: 41.59 KG/M2 | OXYGEN SATURATION: 96 % | WEIGHT: 243.6 LBS

## 2017-05-04 DIAGNOSIS — Z79.899 HIGH RISK MEDICATIONS (NOT ANTICOAGULANTS) LONG-TERM USE: ICD-10-CM

## 2017-05-04 DIAGNOSIS — G56.02 LEFT CARPAL TUNNEL SYNDROME: Primary | ICD-10-CM

## 2017-05-04 DIAGNOSIS — M05.9 SEROPOSITIVE RHEUMATOID ARTHRITIS (H): Primary | ICD-10-CM

## 2017-05-04 PROCEDURE — 99213 OFFICE O/P EST LOW 20 MIN: CPT | Performed by: INTERNAL MEDICINE

## 2017-05-04 RX ORDER — FOLIC ACID 1 MG/1
1 TABLET ORAL DAILY
Qty: 100 TABLET | Refills: 3 | Status: SHIPPED | OUTPATIENT
Start: 2017-05-04 | End: 2018-01-31

## 2017-05-04 NOTE — MR AVS SNAPSHOT
After Visit Summary   2017    Sharon Fung    MRN: [id number]           Patient Information     Date Of Birth          1955        Visit Information        Provider Department      2017 7:20 AM Freddy Guerra MD [facility] [place]        Today's Diagnoses     Seropositive rheumatoid arthritis (H)    -  1    High risk medications (not anticoagulants) long-term use          Care Instructions      Dr. Guerra s Rheumatology Clinics  Locations Clinic Hours Telephone Number   [facility] [place]  [address]  [place] [address]     Wednesday: 7:20AM - 4:00PM  Thursday:     7:20AM - 4:00PM     Friday:          7:20AM - 11:00AM       To schedule an appointment with  Dr. Guerra,  please contact  Specialty [phone]       [facility] [place]  [address]  [place] [address]       Monday:       7:20AM - 4:00PM      [facility] [place]  [address]  [place] [address]       Tuesday:      7:20AM - 4:00PM          Thank you!    Evie Rosas CMA            Follow-ups after your visit        Your next 10 appointments already scheduled     May 05, 2017  7:30 AM CDT   (Arrive by 7:15 AM)   Post-Op with JOSE MARQUEZ ORTHO HAND POP   [facility] ([facility])    [address] [place] [id number] [phone]            May 05, 2017  8:00 AM CDT   (Arrive by 7:45 AM)   AZEB Hand with Purnima Mcguire OT   [facility] ([facility] [place])    [address] [place] [id number]   [phone]            2017 11:50 AM CDT   (Arrive by 11:35 AM)   POST-OP HAND with Ciara Middleton MD   [facility] ([facility] [place])    [address] [place] [id number] [phone]            Aug 02, 2017  7:20 AM CDT   Return Visit with Freddy Guerra MD   [facility]  [place] ([facility])    [address]  [place] [address]   [phone]              Future tests that were ordered for you today     Open Future Orders        Priority Expected Expires Ordered    CBC with platelets differential Routine 7/29/2017 8/17/2017 5/4/2017    Creatinine Routine 7/29/2017 8/17/2017 5/4/2017    CRP inflammation Routine 7/29/2017 8/17/2017 5/4/2017    Erythrocyte sedimentation rate auto Routine 7/29/2017 8/17/2017 5/4/2017    Hepatic panel Routine 7/29/2017 8/17/2017 5/4/2017            Who to contact     If you have questions or need follow up information about today's clinic visit or your schedule please contact [facility] directly at [phone].  Normal or non-critical lab and imaging results will be communicated to you by MyChart, letter or phone within 4 business days after the clinic has received the results. If you do not hear from us within 7 days, please contact the clinic through [employer] or phone. If you have a critical or abnormal lab result, we will notify you by phone as soon as possible.  Submit refill requests through [employer] or call your pharmacy and they will forward the refill request to us. Please allow 3 business days for your refill to be completed.          Additional Information About Your Visit        [employer] Information     [employer] gives you secure access to your electronic health record. If you see a primary care provider, you can also send messages to your care team and make appointments. If you have questions, please call your primary care clinic.  If you do not have a primary care provider, please call [phone] and they will assist you.        Care EveryWhere ID     This is your Care EveryWhere ID. This could be used by other organizations to access your [place] medical records  [id number]        Your Vitals Were     Pulse Temperature Height Pulse Oximetry BMI (Body Mass Index)       88 98  F (36.7  C) (Oral) 1.63 m  "(5' 4.17\") 96% 41.59 kg/m2        Blood Pressure from Last 3 Encounters:   05/04/17 127/79   04/27/17 123/73   03/30/17 130/68    Weight from Last 3 Encounters:   05/04/17 110.5 kg (243 lb 9.6 oz)   04/27/17 111.4 kg (245 lb 8 oz)   04/06/17 111.4 kg (245 lb 8 oz)                 Where to get your medicines      These medications were sent to [facility] - [place], [place] - [address]  [address] [address]    Hours:  Mon-Fri 8:30am-5:00pm Toll Free [phone] Phone:  [phone]     tofacitinib 11 MG 24 hr tablet         These medications were sent to [facility] [place], [place] - [address]  [address] [place] [address] [address]     Phone:  [phone]     folic acid 1 MG tablet    methotrexate sodium 50 MG/2ML Sol          Primary Care Provider Office Phone # Fax #    Reynaldo Saavedra [phone]       [facility] [address] [facility] [address]        Thank you!     Thank you for choosing [facility]  for your care. Our goal is always to provide you with excellent care. Hearing back from our patients is one way we can continue to improve our services. Please take a few minutes to complete the written survey that you may receive in the mail after your visit with us. Thank you!             Your Updated Medication List - Protect others around you: Learn how to safely use, store and throw away your medicines at www.disposemymeds.org.          This list is accurate as of: 5/4/17  7:46 AM.  Always use your most recent med list.                   Brand Name Dispense Instructions for use    acetaminophen 500 MG tablet    TYLENOL     Take 500-1,000 mg by mouth every 6 hours as needed for mild pain       cycloSPORINE 0.05 % ophthalmic emulsion    RESTASIS    2 Box    Place 1 drop into both eyes 2 times daily       fexofenadine 180 MG tablet    ALLEGRA    90 tablet    Take 1 " "tablet (180 mg) by mouth daily       FLONASE 50 MCG/ACT spray   Generic drug:  fluticasone      Spray 2 sprays into both nostrils as needed       FLUoxetine 20 MG capsule    PROzac    90 capsule    Take 1 capsule (20 mg) by mouth daily       folic acid 1 MG tablet    FOLVITE    100 tablet    Take 1 tablet (1 mg) by mouth daily       HYDROcodone-acetaminophen 5-325 MG per tablet    NORCO    20 tablet    Take 1-2 tablets by mouth every 4 hours as needed for other (Moderate to Severe Pain)       Insulin Syringe-Needle U-100 27G X 1/2\" 1 ML Misc    BD insulin syringe    10 each    1 Syringe every 7 days , to be used for methotrexate administration.       methotrexate sodium 50 MG/2ML Soln     2 vial    Inject 0.8 mLs (20 mg) as directed every 7 days       ondansetron 4 MG ODT tab    ZOFRAN ODT    12 tablet    Take 1 tablet (4 mg) by mouth every 6 hours as needed for nausea       order for DME      Use your CPAP device as directed by your provider.       senna-docusate 8.6-50 MG per tablet    SENOKOT-S;PERICOLACE    30 tablet    Take 1-2 tablets by mouth 2 times daily Take while on oral narcotics to prevent or treat constipation.       tofacitinib 11 MG 24 hr tablet    XELJANZ XR    30 tablet    Take 1 tablet (11 mg) by mouth daily       topiramate 50 MG tablet    TOPAMAX    30 tablet    Take 1 tablet (50 mg) by mouth At Bedtime         "

## 2017-05-04 NOTE — NURSING NOTE
"Chief Complaint   Patient presents with     Arthritis     RA, patient states she is feeling good       Initial /79 (BP Location: Left arm, Patient Position: Chair, Cuff Size: Adult Large)  Pulse 88  Temp 98  F (36.7  C) (Oral)  Ht 1.63 m (5' 4.17\")  Wt 110.5 kg (243 lb 9.6 oz)  SpO2 96%  BMI 41.59 kg/m2 Estimated body mass index is 41.59 kg/(m^2) as calculated from the following:    Height as of this encounter: 1.63 m (5' 4.17\").    Weight as of this encounter: 110.5 kg (243 lb 9.6 oz).  BP completed using cuff size: large         RAPID3 (0-30) Cumulative Score  7.0          RAPID3 Weighted Score (divide #4 by 3 and that is the weighted score)  2.33         "

## 2017-05-04 NOTE — PROGRESS NOTES
Rheumatology Clinic Visit      Sharon Fung MRN# [id number]   YOB: 1955 Age: 61 year old      Date of visit: 5/04/17   PCP: Dr. Reynaldo Saavedra    Chief Complaint   Patient presents with:  Arthritis: RA, patient states she is feeling good      Assessment and Plan     1. Seropositive nonerosive rheumatoid arthritis (RF negative, CCP low positive): Previously on Humira (lost efficacy), Cimzia (ineffective), Orencia (ineffective), leflunomide (ineffective), and hydroxychloroquine (ineffective). Currently on MTX 20mg SQ (GI upset with PO) and Xeljanz XR 11 mg daily. Doing well currently and will continue the current regimen. Depending on how she is doing at f/u, may consider reducing MTX.  - Continue methotrexate 20mg SQ every 7 days  - Continue folic acid 1mg daily  - Continue Xeljanz XR 11mg daily  - Labs 2-3 days prior to the next rheumatology clinic visit: CBC, Creatinine, Hepatic Panel, ESR, CRP              Rapid 3, cumulative scores                      05/04/2017: 7      (MTX 20mg SQ wkly, Xeljanz XR 11mg daily)  Says that she is doing well, score doesn't correlate                      02/02/2017: 8      (MTX 20mg SQ wkly, Xeljanz XR 11mg daily)                      11/04/2016: 13    (MTX 20mg SQ weekly)                      07/15/2016: 10.8    2. Positive LORNA and dsDNA: Repeat dsDNA have been negative. No symptoms currently to suggest an LORNA-associated autoimmune disease except for arthritis.     3. Recently s/p successful left carpal tunnel syndrome and trigger finger injections by hand surgery    4. Vaccinations:  - Influenza: encouraged yearly vaccination  - [id number]: up to date  - [id number]: up to date  - Zostavax: refused by patient previously, now contraindicated    Ms. Fung verbalized agreement with and understanding of the rational for the diagnosis and treatment plan.  All questions were answered to best of my ability and the patient's satisfaction. Ms. Fung was advised to  contact the clinic with any questions that may arise after the clinic visit.      Thank you for involving me in the care of the patient    Return to clinic: 3 months      HPI   Sharon Fung is a 61 year old female with medical history significant for GERD, allergic rhinitis, obstructive sleep apnea, obesity, and rheumatoid arthritis who presents for follow-up of rheumatoid arthritis.    Today, Ms. Fung reports that her current med regimen is doing well.  Morning stiffness for <1 hour now; previously reported 1-2 hours.  No joint pain. Positive gelling phenomenon that resolves quickly. Recently had trigger finger injections and left carpal tunnel release surgery that were both successfully.    Denies fevers, chills, nausea, vomiting, constipation, diarrhea. No chest pain/pressure, palpitations, or shortness of breath. No oral or nasal sores. No neck pain. No rash.      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 No oral or nasal ulcers.  CV: No chest pain, pressure, palpitations, or dyspnea on exertion.  PULM: No SOB, wheeze, cough.  GI: No nausea, vomiting, constipation, diarrhea. No blood in stool. No abdominal pain.  : No blood in urine.  MSK: See HPI.  NEURO: No numbness, tingling, or weakness.  ENDO: No heat/cold intolerance.  EXT: No LE swelling  PSYCH: Negative    Active Problem List     Patient Active Problem List   Diagnosis     AR (allergic rhinitis)     GERD (gastroesophageal reflux disease)     Status post bariatric surgery     Mild major depression (H)     Advanced directives, counseling/discussion     High risk medication use     Bilateral leg weakness     Left leg pain     Obstructive sleep apnea     Obesity, Class III, BMI 40-49.9 (morbid obesity) (H)     Anterior basement membrane dystrophy     Seropositive rheumatoid  arthritis (H)     LORNA positive     Past Medical History     Past Medical History:   Diagnosis Date     AR (allergic rhinitis)  as teen     Arthritis 12/14/2015     Depressive disorder 3 years ago     GERD (gastroesophageal reflux disease) 4-07     Mild major depression (H) 3 years ago     Morbid obesity (H) teens     BRETT (obstructive sleep apnea)     uses CPAP     Rheumatoid arthritis, adult (H)      Past Surgical History     Past Surgical History:   Procedure Laterality Date     BLEPHAROPLASTY BILATERAL Bilateral 8/5/2016    Procedure: BLEPHAROPLASTY BILATERAL;  Surgeon: Cortez Robin MD;  Location: [facility]     BREAST BIOPSY, RT/LT  1-94    Benign     C APPENDECTOMY  1977     COLONOSCOPY       COLONOSCOPY WITH CO2 INSUFFLATION N/A 3/30/2017    Procedure: COLONOSCOPY WITH CO2 INSUFFLATION;  Surgeon: Issa Weeks MD;  Location: [facility]     ESOPHAGOSCOPY, GASTROSCOPY, DUODENOSCOPY (EGD), COMBINED N/A 10/29/2015    Procedure: COMBINED ESOPHAGOSCOPY, GASTROSCOPY, DUODENOSCOPY (EGD);  Surgeon: Shaq Mims MD;  Location: [facility]     LAPAROSCOPIC GASTRIC ADJUSTABLE BANDING  11/09/10    Lap band procedure     LAPAROSCOPIC REMOVAL GASTRIC ADJUSTABLE BAND N/A 10/31/2015    Procedure: LAPAROSCOPIC REMOVAL GASTRIC ADJUSTABLE BAND;  Surgeon: Shaq Mims MD;  Location: [facility]     RELEASE CARPAL TUNNEL Left 4/27/2017    Procedure: RELEASE CARPAL TUNNEL;  Left Open Carpal Tunnel Release;  Surgeon: Ciara Middleton MD;  Location: [facility]     TUBAL LIGATION  1988     Allergy   No Known Allergies  Current Medication List     Current Outpatient Prescriptions   Medication Sig     methotrexate sodium 50 MG/2ML SOLN Inject 0.8 mLs (20 mg) as directed every 7 days     FLUoxetine (PROZAC) 20 MG capsule Take 1 capsule (20 mg) by mouth daily     ondansetron (ZOFRAN ODT) 4 MG ODT tab Take 1 tablet (4 mg) by mouth every 6 hours as needed for nausea     fexofenadine (ALLEGRA) 180 MG tablet Take 1 tablet (180  "mg) by mouth daily     tofacitinib (XELJANZ XR) 11 MG XR tablet Take 1 tablet (11 mg) by mouth daily     folic acid (FOLVITE) 1 MG tablet Take 1 tablet (1 mg) by mouth daily     cycloSPORINE (RESTASIS) 0.05 % ophthalmic emulsion Place 1 drop into both eyes 2 times daily     acetaminophen (TYLENOL) 500 MG tablet Take 500-1,000 mg by mouth every 6 hours as needed for mild pain     fluticasone (FLONASE) 50 MCG/ACT nasal spray Spray 2 sprays into both nostrils as needed     HYDROcodone-acetaminophen (NORCO) 5-325 MG per tablet Take 1-2 tablets by mouth every 4 hours as needed for other (Moderate to Severe Pain) (Patient not taking: Reported on 5/4/2017)     senna-docusate (SENOKOT-S;PERICOLACE) 8.6-50 MG per tablet Take 1-2 tablets by mouth 2 times daily Take while on oral narcotics to prevent or treat constipation. (Patient not taking: Reported on 5/4/2017)     Insulin Syringe-Needle U-100 (BD INSULIN SYRINGE) 27G X 1/2\" 1 ML MISC 1 Syringe every 7 days , to be used for methotrexate administration.     topiramate (TOPAMAX) 50 MG tablet Take 1 tablet (50 mg) by mouth At Bedtime (Patient not taking: Reported on 5/4/2017)     ORDER FOR DME Use your CPAP device as directed by your provider.     No current facility-administered medications for this visit.          Social History   See HPI    Family History     Family History   Problem Relation Age of Onset     HEART DISEASE Father      MI     Neurologic Disorder Father 79     Parkinson's     DIABETES Father      Hypertension Father      Coronary Artery Disease Father      CANCER Maternal Grandmother      uterine     Neurologic Disorder Sister 16     migraine     Depression Sister      Neurologic Disorder Mother 20     migraine     Neurologic Disorder Son 7     migraine     Substance Abuse Son        Physical Exam     Temp Readings from Last 3 Encounters:   05/04/17 98  F (36.7  C) (Oral)   04/27/17 97.4  F (36.3  C) (Temporal)   03/30/17 97  F (36.1  C) (Temporal)     BP " "Readings from Last 5 Encounters:   05/04/17 127/79   04/27/17 123/73   03/30/17 130/68   02/02/17 130/78   02/02/17 132/82     Pulse Readings from Last 1 Encounters:   05/04/17 88     Resp Readings from Last 1 Encounters:   04/27/17 16     Estimated body mass index is 41.59 kg/(m^2) as calculated from the following:    Height as of this encounter: 1.63 m (5' 4.17\").    Weight as of this encounter: 110.5 kg (243 lb 9.6 oz).    GEN: NAD, overweight  HEENT: MMM. No oral lesions. Anicteric, noninjected sclera  CV: S1, S2. RRR. No m/r/g.  PULM: CTA bilaterally. No w/c.  MSK: MCPs, PIPs, DIPs, elbows, and shoulders without swelling or tenderness to palpation. Right wrist without swelling or tenderness to palpation. Left wrist with bandage over it and therefore not examined; recently had carpal tunnel surgery. Hips nontender to direct palpation. Knees, ankles, and feet without swelling or tenderness to palpation.   SKIN: No rash. Tattoos on the left lower extremity  EXT: No LE edema  PSYCH: Alert. Appropriate.    Labs / Imaging (select studies)   RF/CCP  Recent Labs   Lab Test  11/19/12   0900   CCPABY  53*   RHF  <7     LORNA/RNP/Sm/SSA/SSB/dsDNA  Recent Labs   Lab Test  02/03/15   1141  06/02/14   1216  04/07/14   1001  04/30/13   1316  11/19/12   0900   09/13/12   1756   NADEGE   --    --    --    --    --    --   1.7*   RNPIGG   --   <0.2  Negative     --    --    --    --    --    SMIGG   --   <0.2  Negative     --    --    --    --    --    ENASM   --    --    --    --   6   --    --    SSAIGG   --   <0.2  Negative     --    --    --    --    --    ENASSA   --    --    --    --   6   --    --    SSBIGG   --   0.3   --    --    --    --    --    ENASSB   --    --    --    --   4   --    --    ENARMP   --    --    --    --   0   --    --    SCLIGG   --   <0.2  Negative     --    --    --    --    --    H4PFURK  162  142   --   120  140   < >   --    [id number]  29  23   --   26  22   < >   --    JOIGG   --    --   <0.2 " Negative   --    --    --    --     < > = values in this interval not displayed.     Recent Labs   Lab Test  02/03/15   1141  06/02/14   1216  04/30/13   1316  09/19/12   0905   DNA  <15  Interpretation:  Negative    <15  Interpretation:  Negative    29  55*     Antiphospholipid Antibodies  Recent Labs   Lab Test  11/19/12   0900   B2IGG  5   B2IGM  3   CARG  <15.0 Interpretation:  Negative   ALEX  <12.5 Interpretation:  Negative   LUPINT  Negative (Note) COMMENTS: The INR is normal. APTT is prolonged, but mixing study shows complete correction. DRVVT Screen is normal. Thrombin time is normal. NEGATIVE TEST; A LUPUS ANTICOAGULANT WAS NOT DETECTED IN THIS SPECIMEN WITHIN THE LIMITS OF THE TESTING REPERTOIRE. If the clinical picture is strongly suggestive of an antiphospholipid syndrome, recommend anticardiolipin and beta-2-glycoprotein (IgG and IgM) antibody tests. APTT mixing study is indicative of factor deficiencies. Recommend factors 8, 9, 11 and 12 (factors VIII, IX, XI, and XII) levels if clinically indicated. Ara Plasencia M.D.  [phone] 11-.  NINR =  0.89 N = 0.86-1.14  (No additional charge) NTT = 17.3 N = 13.0-19.0 sec  (No additional charge)  APTT'S:    Seconds Reagent =  Stago LS Normal  =  36 Patient  =  44 1:2 Mix  =  38 Reference =  31-43   DILUTE EDGARDO VIPER VENOM TEST: DRVVT Screen Ratio = 0.93 Normal = <1.28      ANCA  Recent Labs   Lab Test  11/19/12   0900   MPOAB  1     CBC  Recent Labs   Lab Test  04/27/17   0732  02/02/17   0821  01/15/17   1844   WBC  6.8  5.9  8.4   RBC  4.34  4.35  4.23   HGB  13.6  12.9  12.7   HCT  41.3  40.2  38.6   MCV  95  92  91   RDW  14.3  14.9  14.8   PLT  329  373  310   MCH  31.3  29.7  30.0   MCHC  32.9  32.1  32.9   NEUTROPHIL  65.4  48.1  92.4   LYMPH  26.1  40.2  3.7   MONOCYTE  5.9  8.1  3.6   EOSINOPHIL  1.8  3.1  0.1   BASOPHIL  0.4  0.5  0.1   ANEU  4.4  2.8  7.8   ALYM  1.8  2.4  0.3*   KIMBERLY  0.4  0.5  0.3   AEOS  0.1  0.2  0.0   ABAS  0.0   0.0  0.0     CMP  Recent Labs   Lab Test  04/27/17   0732  01/17/17   0642  01/15/17   1844  12/27/16   0836   NA   --   145*  139  139   POTASSIUM   --   3.5  4.0  4.7   CHLORIDE   --   112*  107  110*   CO2   --   29  24  21   ANIONGAP   --   4  8  8   GLC   --   108*  104*  95   BUN   --   9  18  22   CR  0.81  0.93  0.83  0.75   GFRESTIMATED  72  61  70  79   GFRESTBLACK  87  74  85  >90   GFR Calc     ISH   --   8.2*  8.5  8.8   BILITOTAL  0.5   --   0.6  0.4   ALBUMIN  3.8   --   3.4  3.6   PROTTOTAL  7.8   --   7.3  7.4   ALKPHOS  99   --   82  92   AST  18   --   35  21   ALT  35   --   37  33     HgA1c  Recent Labs   Lab Test  02/20/15   0750  10/20/14   0821   A1C  5.6  5.1     Iron Studies  Recent Labs   Lab Test  02/20/15   0750  02/11/11   0735   ABRAHAM  76  89   IRON   --   63     Calcium/VitaminD  Recent Labs   Lab Test  01/17/17   0642  01/15/17   1844  12/27/16   0836   02/20/15   0750   02/11/11   0735   ISH  8.2*  8.5  8.8   < >  9.0   < >  9.3   D3VIT   --    --    --    --    --    --   42   VITDT   --    --    --    --   39   --    --     < > = values in this interval not displayed.     ESR/CRP  Recent Labs   Lab Test  04/27/17   0732  01/15/17   1844  11/04/16   0751   SED  18  15  15   CRP  4.9  11.0*  6.6     CK/Aldolase  Recent Labs   Lab Test  04/07/14   1001   CKT  77     TSH/T4  Recent Labs   Lab Test  09/29/15   0832  08/28/12   1525   TSH  1.20  1.48     Lipid Panel  Recent Labs   Lab Test  04/27/17   0732  12/27/16   0836  02/20/15   0750  10/20/14   0821  11/22/13   0843   CHOL  204*  184  157  194  167   TRIG  148  154*  79  193*  140   HDL  82  56  63  63  60   LDL  92  97  78  92  79   VLDL   --    --   16  39*  28   CHOLHDLRATIO   --    --   2.5  3.1  2.8   NHDL  122  128   --    --    --      Hepatitis B  Recent Labs   Lab Test  12/08/15   0855  03/06/15   1650   HBCAB  Nonreactive   --    HEPBANG  Nonreactive  Nonreactive     Hepatitis C  Recent Labs   Lab Test   12/08/15   0855  03/06/15   1650  11/19/12   0900   HCVAB  Nonreactive   Assay performance characteristics have not been established for newborns,   infants, and children    Nonreactive   Assay performance characteristics have not been established for newborns,   infants, and children    Negative     Tuberculosis Screening  Recent Labs   Lab Test  02/02/17   0822  12/08/15   0855  03/06/15   1651   TBRSLT  Negative  Negative  Negative   TBAGN  0.00  0.01  0.04     Immunization History     Immunization History   Administered Date(s) Administered     Influenza Vaccine IM 3yrs+ 4 Valent IIV4 10/15/2013, 09/15/2014, 09/28/2015, 09/28/2016     Pneumococcal (PCV 13) 04/15/2016     Pneumococcal 23 valent 07/15/2016     TDAP Vaccine (Adacel) 02/09/2011          Chart documentation done in part with Dragon Voice recognition Software. Although reviewed after completion, some word and grammatical error may remain.    Freddy Guerra MD

## 2017-05-04 NOTE — PATIENT INSTRUCTIONS
Dr. Guerra [facility]  Locations Clinic Hours Telephone Number   [facility] [place]  [address]  [place] [address]     Wednesday: 7:20AM - 4:00PM  Thursday:     7:20AM - 4:00PM     Friday:          7:20AM - 11:00AM       To schedule an appointment with  Dr. Guerra,  please contact  Specialty [phone]       [facility] [place]  [address]  [place] [address]       Monday:       7:20AM - 4:00PM      [facility]  [address]  [place] [address]       Tuesday:      7:20AM - 4:00PM          Thank you!    Evie Rosas CMA

## 2017-05-05 ENCOUNTER — OFFICE VISIT (OUTPATIENT)
Dept: ORTHOPEDICS | Facility: CLINIC | Age: 62
End: 2017-05-05

## 2017-05-05 ENCOUNTER — THERAPY VISIT (OUTPATIENT)
Dept: OCCUPATIONAL THERAPY | Facility: CLINIC | Age: 62
End: 2017-05-05
Payer: COMMERCIAL

## 2017-05-05 DIAGNOSIS — G89.18 ACUTE POST-OPERATIVE PAIN: Primary | ICD-10-CM

## 2017-05-05 DIAGNOSIS — Z47.89 AFTERCARE FOLLOWING SURGERY OF THE MUSCULOSKELETAL SYSTEM: Primary | ICD-10-CM

## 2017-05-05 DIAGNOSIS — G56.02 CARPAL TUNNEL SYNDROME OF LEFT WRIST: ICD-10-CM

## 2017-05-05 PROCEDURE — 29125 APPL SHORT ARM SPLINT STATIC: CPT | Mod: GO | Performed by: OCCUPATIONAL THERAPIST

## 2017-05-05 PROCEDURE — 97165 OT EVAL LOW COMPLEX 30 MIN: CPT | Mod: GO | Performed by: OCCUPATIONAL THERAPIST

## 2017-05-05 PROCEDURE — 97110 THERAPEUTIC EXERCISES: CPT | Mod: GO | Performed by: OCCUPATIONAL THERAPIST

## 2017-05-05 NOTE — PROGRESS NOTES
Hand Therapy Initial Evaluation    Current Date:  5/5/2017    Diagnosis: L CTR  DOS: 04/27/17  Procedure:  CTR  Post:  1w 1d    Precautions: NA    Subjective:  Sharon Fung is a 61 year old right hand dominant female.    Patient reports symptoms of pain, weakness/loss of strength, edema, numbness and tingling  of the left wrist which occurred due to CTR. Since onset symptoms are Gradually getting better.  Special tests:  EMG.  Previous treatment: surgery, post-op dressing.    General health as reported by patient is good.  Pertinent medical history includes:rheumatoid arthritis, overweight, depression , sleep disorder/apnea, numbness/tingling  Medical allergies:none.  Surgical history: orthopedic: current.  Medication history: anti-inflammatory, anti-depressants.    Current occupation is RN   Currently working in normal job with restrictions  Job Tasks: prolonged sitting, computer work  Barriers include:none  Prior functional level:  no limitations    Additional Occupational Profile Information (patterns of daily living, interests, values and needs): NA    Functional Outcome Measure:  Upper Extremity Functional Index Score:  SCORE:   Column Totals: /80: 67   (A lower score indicates greater disability.)    Objective:  Pain Level Report  VAS(0-10) 5/5/2017   At Rest: 0/10   With Use: 0/10     ROM:  Pain Report:  - none    + mild    ++ moderate    +++ severe   Wrist 5/5/2017   AROM (PROM) left   Extension 75   Flexion 32   RD 12   UD 28   Supination 70   Pronation 90       Strength:   Contraindicated    Edema  Circumference:  (Measured in cm)  [facility] 5/5/2017   Right 16.8   Left 17.2     Scar:  Sensitivity: none Quality:  Steri-strips on    Sensation:  Slightly decreased median nerve.    Assessment:  Patient presents with symptoms consistent with diagnosis of Carpal Tunnel Syndrome, with surgical  intervention, DOS:  4/27/17.     Patient's limitations or Problem List includes:  Decreased ROM/motion, Increased edema,  Weakness and Sensory disturbance of the left wrist which interferes with the patient's ability to perform Self Care Tasks (dressing, eating, bathing, hygiene/toileting), Work Tasks, Sleep Patterns, Recreational Activities, Household Chores and Driving  as compared to previous level of function.    Rehab Potential:  Excellent - Return to full activity, no limitations    Patient will benefit from skilled Occupational Therapy to increase ROM, overall strength and sensation and decrease pain, edema and adherence of scarring to return to previous activity level and resume normal daily tasks and to reach their rehab potential.    Barriers to Learning:  No barrier    Communication Issues:  Patient appears to be able to clearly communicate and understand verbal and written communication and follow directions correctly.    Assessment of Occupational Performance:  5 or more Performance Deficits  Identified Performance Deficits: bathing/showering, toileting, dressing, feeding and work      Clinical Decision Making (Complexity): Low complexity    Treatment Explanation:  The following has been discussed with the patient:    RX ordered/plan of care  Anticipated outcomes  Possible risks and side effects    Plan:  Frequency:  2 X a month, once daily  Duration:  for 2 months    Treatment Plan:  Modalities:  US, Fluidotherapy or Paraffin  Therapeutic Exercise:  AROM, Tendon Gliding and /Pinch Strengthening  Neuromuscular re-education:  Nerve Gliding, Desensitization and Kinesiotaping  Manual Techniques:  Scar mobilization  Splinting:  Static forearm based wrist orthosis  Self Care: Diagnostic education    Avoid activities that exacerbate median nerve symptoms    Avoid prolonged flexion or extension of the wrist    Discharge Plan:    Achieve all LTG.  Independent in home treatment program.  Reach maximal therapeutic benefit.    Home Exercise Program:  AROM fingers and wrist  Tendon gliding:  3 fists and FDS  Distal median nerve  gliding  Proximal median nerve glide  Scar mobs. with lotion after steri-strips come off  Wrist orthosis for sleeping    Next Visit:  PRN

## 2017-05-05 NOTE — PROGRESS NOTES
Reason for visit:    Sharon Fung came in to the clinic for a one week post op check.    Her surgery was done 4/27/2017 by Dr Palafox.  She had left open carpal tunnel release.     Assessment:    Sharon came into the clinic, dressing clean, dry and intact.    The Surgical wounds were exposed and found to be well-healed and sutures were removed.  Steri strips applied.  Patient is currently only taking ibuprofen as needed which she normally does for her arthritis pain.    Plan:  She has a hand therapy appointment following this appointment.  She will follow up with Dr. Middleton on 6/1/2017.      She has our phone number for further questions or concerns.

## 2017-05-05 NOTE — MR AVS SNAPSHOT
After Visit Summary   5/5/2017    Sharon Fung    MRN: [id number]           Patient Information     Date Of Birth          1955        Visit Information        Provider Department      5/5/2017 8:00 AM Purnima Mcguire OT [facility]        Today's Diagnoses     Aftercare following surgery of the musculoskeletal system    -  1    Carpal tunnel syndrome of left wrist           Follow-ups after your visit        Your next 10 appointments already scheduled     Jun 01, 2017 11:50 AM CDT   (Arrive by 11:35 AM)   POST-OP HAND with Ciara Middleton MD   [facility] ([facility])    [address]  4th Floor  [facility] [address]   [phone]            Aug 02, 2017  7:20 AM CDT   Return Visit with Freddy Guerra MD   [facility] ([facility])    [address] [place] [address]   [phone]              Future tests that were ordered for you today     Open Future Orders        Priority Expected Expires Ordered    CBC with platelets differential Routine 7/29/2017 8/17/2017 5/4/2017    Creatinine Routine 7/29/2017 8/17/2017 5/4/2017    CRP inflammation Routine 7/29/2017 8/17/2017 5/4/2017    Erythrocyte sedimentation rate auto Routine 7/29/2017 8/17/2017 5/4/2017    Hepatic panel Routine 7/29/2017 8/17/2017 5/4/2017            Who to contact     If you have questions or need follow up information about today's clinic visit or your schedule please contact [facility] directly at [phone].  Normal or non-critical lab and imaging results will be communicated to you by MyChart, letter or phone within 4 business days after the clinic has received the results. If you do not hear from us within 7 days, please contact the clinic through MyChart or phone. If you have a critical or abnormal lab result, we will notify you by phone as soon as possible.  Submit refill requests through  [employer] or call your pharmacy and they will forward the refill request to us. Please allow 3 business days for your refill to be completed.          Additional Information About Your Visit        rumrhart Information     [employer] gives you secure access to your electronic health record. If you see a primary care provider, you can also send messages to your care team and make appointments. If you have questions, please call your primary care clinic.  If you do not have a primary care provider, please call [phone] and they will assist you.        Care EveryWhere ID     This is your Care EveryWhere ID. This could be used by other organizations to access your [place] medical records  [id number]         Blood Pressure from Last 3 Encounters:   05/04/17 127/79   04/27/17 123/73   03/30/17 130/68    Weight from Last 3 Encounters:   05/04/17 110.5 kg (243 lb 9.6 oz)   04/27/17 111.4 kg (245 lb 8 oz)   04/06/17 111.4 kg (245 lb 8 oz)              We Performed the Following     APPLY SHORT ARM SPLINT STATIC     HC OT EVAL, LOW COMPLEXITY     THERAPEUTIC EXERCISES        Primary Care Provider Office Phone # Fax #    Reynaldo Saavedra [phone]       [address] [address]        Thank you!     Thank you for choosing [facility]  for your care. Our goal is always to provide you with excellent care. Hearing back from our patients is one way we can continue to improve our services. Please take a few minutes to complete the written survey that you may receive in the mail after your visit with us. Thank you!             Your Updated Medication List - Protect others around you: Learn how to safely use, store and throw away your medicines at www.disposemymeds.org.          This list is accurate as of: 5/5/17  8:44 AM.  Always use your most recent med list.                   Brand Name Dispense Instructions for use    acetaminophen 500 MG tablet     "TYLENOL     Take 500-1,000 mg by mouth every 6 hours as needed for mild pain       cycloSPORINE 0.05 % ophthalmic emulsion    RESTASIS    2 Box    Place 1 drop into both eyes 2 times daily       fexofenadine 180 MG tablet    ALLEGRA    90 tablet    Take 1 tablet (180 mg) by mouth daily       FLONASE 50 MCG/ACT spray   Generic drug:  fluticasone      Spray 2 sprays into both nostrils as needed       FLUoxetine 20 MG capsule    PROzac    90 capsule    Take 1 capsule (20 mg) by mouth daily       folic acid 1 MG tablet    FOLVITE    100 tablet    Take 1 tablet (1 mg) by mouth daily       HYDROcodone-acetaminophen 5-325 MG per tablet    NORCO    20 tablet    Take 1-2 tablets by mouth every 4 hours as needed for other (Moderate to Severe Pain)       Insulin Syringe-Needle U-100 27G X 1/2\" 1 ML Misc    BD insulin syringe    10 each    1 Syringe every 7 days , to be used for methotrexate administration.       methotrexate sodium 50 MG/2ML Soln     2 vial    Inject 0.8 mLs (20 mg) as directed every 7 days       ondansetron 4 MG ODT tab    ZOFRAN ODT    12 tablet    Take 1 tablet (4 mg) by mouth every 6 hours as needed for nausea       order for DME      Use your CPAP device as directed by your provider.       senna-docusate 8.6-50 MG per tablet    SENOKOT-S;PERICOLACE    30 tablet    Take 1-2 tablets by mouth 2 times daily Take while on oral narcotics to prevent or treat constipation.       tofacitinib 11 MG 24 hr tablet    XELJANZ XR    30 tablet    Take 1 tablet (11 mg) by mouth daily       topiramate 50 MG tablet    TOPAMAX    30 tablet    Take 1 tablet (50 mg) by mouth At Bedtime         "

## 2017-05-05 NOTE — MR AVS SNAPSHOT
After Visit Summary   5/5/2017    Sharon Fung    MRN: [id number]           Patient Information     Date Of Birth          1955        Visit Information        Provider Department      5/5/2017 7:30 AM [facility] HAND [facility]        Today's Diagnoses     Acute post-operative pain    -  1       Follow-ups after your visit        Your next 10 appointments already scheduled     Jun 01, 2017 11:50 AM CDT   (Arrive by 11:35 AM)   POST-OP HAND with Ciara Middleton MD   [facility] ([facility])    [address]  [address] [facility] [address] [phone]            Aug 02, 2017  7:20 AM CDT   Return Visit with Freddy Guerra MD   [facility] ([address] [id number] [phone]              Future tests that were ordered for you today     Open Future Orders        Priority Expected Expires Ordered    CBC with platelets differential Routine 7/29/2017 8/17/2017 5/4/2017    Creatinine Routine 7/29/2017 8/17/2017 5/4/2017    CRP inflammation Routine 7/29/2017 8/17/2017 5/4/2017    Erythrocyte sedimentation rate auto Routine 7/29/2017 8/17/2017 5/4/2017    Hepatic panel Routine 7/29/2017 8/17/2017 5/4/2017            Who to contact     Please call your clinic at [phone] to:    Ask questions about your health    Make or cancel appointments    Discuss your medicines    Learn about your test results    Speak to your doctor   If you have compliments or concerns about an experience at your clinic, or if you wish to file a complaint, please contact [facility] Patient Relations at [phone] or email us at [email or web address]         Additional Information About Your Visit        MyChart Information     MyChart gives you secure access to your electronic health record. If you see a primary care provider, you can  also send messages to your care team and make appointments. If you have questions, please call your primary care clinic.  If you do not have a primary care provider, please call [phone] and they will assist you.      [employer] is an electronic gateway that provides easy, online access to your medical records. With [employer], you can request a clinic appointment, read your test results, renew a prescription or communicate with your care team.     To access your existing account, please contact your [facility] or call [phone] for assistance.        Care EveryWhere ID     This is your Care EveryWhere ID. This could be used by other organizations to access your [place] medical records  [id number]         Blood Pressure from Last 3 Encounters:   05/04/17 127/79   04/27/17 123/73   03/30/17 130/68    Weight from Last 3 Encounters:   05/04/17 110.5 kg (243 lb 9.6 oz)   04/27/17 111.4 kg (245 lb 8 oz)   04/06/17 111.4 kg (245 lb 8 oz)              Today, you had the following     No orders found for display       Primary Care Provider Office Phone # Fax #    Reynaldo Saavedra [phone]       [address] [address]        Thank you!     Thank you for choosing [facility]  for your care. Our goal is always to provide you with excellent care. Hearing back from our patients is one way we can continue to improve our services. Please take a few minutes to complete the written survey that you may receive in the mail after your visit with us. Thank you!             Your Updated Medication List - Protect others around you: Learn how to safely use, store and throw away your medicines at www.disposemymeds.org.          This list is accurate as of: 5/5/17  8:48 AM.  Always use your most recent med list.                   Brand Name Dispense Instructions for use    acetaminophen 500 MG tablet    TYLENOL     Take  "500-1,000 mg by mouth every 6 hours as needed for mild pain       cycloSPORINE 0.05 % ophthalmic emulsion    RESTASIS    2 Box    Place 1 drop into both eyes 2 times daily       fexofenadine 180 MG tablet    ALLEGRA    90 tablet    Take 1 tablet (180 mg) by mouth daily       FLONASE 50 MCG/ACT spray   Generic drug:  fluticasone      Spray 2 sprays into both nostrils as needed       FLUoxetine 20 MG capsule    PROzac    90 capsule    Take 1 capsule (20 mg) by mouth daily       folic acid 1 MG tablet    FOLVITE    100 tablet    Take 1 tablet (1 mg) by mouth daily       HYDROcodone-acetaminophen 5-325 MG per tablet    NORCO    20 tablet    Take 1-2 tablets by mouth every 4 hours as needed for other (Moderate to Severe Pain)       Insulin Syringe-Needle U-100 27G X 1/2\" 1 ML Misc    BD insulin syringe    10 each    1 Syringe every 7 days , to be used for methotrexate administration.       methotrexate sodium 50 MG/2ML Soln     2 vial    Inject 0.8 mLs (20 mg) as directed every 7 days       ondansetron 4 MG ODT tab    ZOFRAN ODT    12 tablet    Take 1 tablet (4 mg) by mouth every 6 hours as needed for nausea       order for DME      Use your CPAP device as directed by your provider.       senna-docusate 8.6-50 MG per tablet    SENOKOT-S;PERICOLACE    30 tablet    Take 1-2 tablets by mouth 2 times daily Take while on oral narcotics to prevent or treat constipation.       tofacitinib 11 MG 24 hr tablet    XELJANZ XR    30 tablet    Take 1 tablet (11 mg) by mouth daily       topiramate 50 MG tablet    TOPAMAX    30 tablet    Take 1 tablet (50 mg) by mouth At Bedtime         "

## 2017-05-09 PROBLEM — G56.02 CARPAL TUNNEL SYNDROME OF LEFT WRIST: Status: RESOLVED | Noted: 2017-05-05 | Resolved: 2017-05-09

## 2017-05-09 PROBLEM — Z47.89 AFTERCARE FOLLOWING SURGERY OF THE MUSCULOSKELETAL SYSTEM: Status: RESOLVED | Noted: 2017-05-05 | Resolved: 2017-05-09

## 2017-06-01 ENCOUNTER — OFFICE VISIT (OUTPATIENT)
Dept: ORTHOPEDICS | Facility: CLINIC | Age: 62
End: 2017-06-01

## 2017-06-01 DIAGNOSIS — G56.01 CARPAL TUNNEL SYNDROME OF RIGHT WRIST: Primary | ICD-10-CM

## 2017-06-01 NOTE — MR AVS SNAPSHOT
After Visit Summary   6/1/2017    Sharon Fung    MRN: [id number]           Patient Information     Date Of Birth          1955        Visit Information        Provider Department      6/1/2017 11:50 AM Ciara Middleton MD [facility]        Today's Diagnoses     Carpal tunnel syndrome of right wrist    -  1       Follow-ups after your visit        Your next 10 appointments already scheduled     Marcus 15, 2017   Procedure with Ciara Middleton MD   [facility] ([facility])    [address] [place] [address]  [address]th [facility] [address]   [phone]           Located in the Clinics and Surgery Center at [address] [place] [address].   parking is very convenient and highly recommended.  is a $6 flat rate fee.  Both  and self parkers should enter the main arrival plaza from [facility]; parking attendants will direct you based on your parking preference.            Jun 22, 2017  2:30 PM CDT   (Arrive by 2:15 PM)   Post-Op with JOSE MARQUEZ ORTHO HAND POP   [facility] ([facility])    [address] [place] [address]  [address] [facility] [address]   [phone]            Jun 22, 2017  3:00 PM CDT   (Arrive by 2:45 PM)   AZEB Hand with Purnima Mcguire OT   [facility] ([facility])    [address] [place] [address]   [phone]            Jul 20, 2017 11:50 AM CDT   (Arrive by 11:35 AM)   POST-OP HAND with Ciara Middleton MD   [facility] ([facility])    [address] [place] [address]   [phone]            Aug 02, 2017  7:20 AM CDT   Return Visit with Freddy Guerra MD   [facility]    [address] [place] [address]  [place] [address] [phone]              Who to contact      Please call your clinic at [phone] to:    Ask questions about your health    Make or cancel appointments    Discuss your medicines    Learn about your test results    Speak to your doctor   If you have compliments or concerns about an experience at your clinic, or if you wish to file a complaint, please contact [facility] Patient Relations at [phone] or email us at [email or web address]         Additional Information About Your Visit        MyChart Information     [employer] gives you secure access to your electronic health record. If you see a primary care provider, you can also send messages to your care team and make appointments. If you have questions, please call your primary care clinic.  If you do not have a primary care provider, please call [phone] and they will assist you.      [employer] is an electronic gateway that provides easy, online access to your medical records. With [employer], you can request a clinic appointment, read your test results, renew a prescription or communicate with your care team.     To access your existing account, please contact your [facility] or call [phone] for assistance.        Care EveryWhere ID     This is your Care EveryWhere ID. This could be used by other organizations to access your [place] medical records  [id number]         Blood Pressure from Last 3 Encounters:   05/04/17 127/79   04/27/17 123/73   03/30/17 130/68    Weight from Last 3 Encounters:   05/04/17 110.5 kg (243 lb 9.6 oz)   04/27/17 111.4 kg (245 lb 8 oz)   04/06/17 111.4 kg (245 lb 8 oz)              We Performed the Following     Rossy-Operative Worksheet (Hand)        Primary Care Provider Office Phone # Fax #    Reynaldo Saavedra [phone]       [address] [facility] [address]        Thank you!     Thank you for choosing [facility]  for your care.  "Our goal is always to provide you with excellent care. Hearing back from our patients is one way we can continue to improve our services. Please take a few minutes to complete the written survey that you may receive in the mail after your visit with us. Thank you!             Your Updated Medication List - Protect others around you: Learn how to safely use, store and throw away your medicines at www.disposemymeds.org.          This list is accurate as of: 6/1/17 11:59 PM.  Always use your most recent med list.                   Brand Name Dispense Instructions for use    acetaminophen 500 MG tablet    TYLENOL     Take 500-1,000 mg by mouth every 6 hours as needed for mild pain       cycloSPORINE 0.05 % ophthalmic emulsion    RESTASIS    2 Box    Place 1 drop into both eyes 2 times daily       fexofenadine 180 MG tablet    ALLEGRA    90 tablet    Take 1 tablet (180 mg) by mouth daily       FLONASE 50 MCG/ACT spray   Generic drug:  fluticasone      Spray 2 sprays into both nostrils as needed       FLUoxetine 20 MG capsule    PROzac    90 capsule    Take 1 capsule (20 mg) by mouth daily       folic acid 1 MG tablet    FOLVITE    100 tablet    Take 1 tablet (1 mg) by mouth daily       Insulin Syringe-Needle U-100 27G X 1/2\" 1 ML Misc    BD insulin syringe    10 each    1 Syringe every 7 days , to be used for methotrexate administration.       methotrexate sodium 50 MG/2ML Soln     2 vial    Inject 0.8 mLs (20 mg) as directed every 7 days       ondansetron 4 MG ODT tab    ZOFRAN ODT    12 tablet    Take 1 tablet (4 mg) by mouth every 6 hours as needed for nausea       order for DME      Use your CPAP device as directed by your provider.       tofacitinib 11 MG 24 hr tablet    XELJANZ XR    30 tablet    Take 1 tablet (11 mg) by mouth daily       topiramate 50 MG tablet    TOPAMAX    30 tablet    Take 1 tablet (50 mg) by mouth At Bedtime         "

## 2017-06-01 NOTE — NURSING NOTE
Reason For Visit:   Chief Complaint   Patient presents with     Surgical Followup     Post-op, DOS 4/27/17, left open CTR     Age: 62 year old    Date of surgery: 4/27/17    Pain Assessment  Patient Currently in Pain: Yes  Primary Pain Location: Wrist  Pain Orientation: Left  Pain Descriptors: Aching  Alleviating Factors: Pain medication, Ice  Aggravating Factors: Bending (Bumping)    Wearing splint at nighttime

## 2017-06-01 NOTE — NURSING NOTE
Teaching Flowsheet   Relevant Diagnosis: Right carpal tunnel syndrome  Teaching Topic: Pre-op Right carpal tunnel release open      Person(s) involved in teaching:   Patient     Motivation Level:  Asks Questions: Yes  Eager to Learn: Yes  Cooperative: Yes  Receptive (willing/able to accept information): Yes  Any cultural factors/Christianity beliefs that may influence understanding or compliance? No       Patient demonstrates understanding of the following:  Reason for the appointment, diagnosis and treatment plan: Yes  Knowledge of proper use of medications and conditions for which they are ordered (with special attention to potential side effects or drug interactions): Yes  Which situations necessitate calling provider and whom to contact: Yes       Teaching Concerns Addressed:   Comments: local only. Negative significant medical health history      Proper use and care of  (medical equip, care aids, etc.): NA  Nutritional needs and diet plan: Yes  Pain management techniques: Yes  Wound Care: Yes  How and/when to access community resources: NA     Instructional Materials Used/Given: surgery packet, antiseptic soap      Time spent with patient: 10 minutes.

## 2017-06-01 NOTE — LETTER
6/1/2017       RE: Sharon Fung  [address] [place] [address]     Dear Colleague,    Thank you for referring your patient, Sharon Fung, to the [facility] at [facility]. Please see a copy of my visit note below.    CHIEF COMPLAINT:  Scheduled followup from left open carpal tunnel release on 04/27/2017.      SUMMARY OF CLINICAL ENCOUNTER:  Sharon returns following the above procedure.  In the interim, she has had significant improvement in the numbness and pain in her radial 3 digits.  She no longer has nocturnal symptoms and only wears her nighttime brace every now and then.  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  Her only question today pertains to when she can schedule her right carpal tunnel release.      PHYSICAL EXAMINATION:  Left upper extremity shows a well-healed surgical scar on the palm, nontender.  Sensation is intact to light touch.  Immediate cap refill.  100% active fist.  Supple wrist.      ASSESSMENT:     1.  Bilateral carpal tunnel syndrome status post left open carpal tunnel release on 04/27/2017, doing very well.   2.  Right carpal tunnel syndrome.  She will talk with our surgery scheduler to find a mutually convenient time to schedule right CTR.      Dictated by Bernardo Aguilera MD, Fellow     I have personally examined this patient and have reviewed the clinical presentation and progress note with the resident. I agree with the treatment plan as outlined. The plan was formulated with the resident on the day of the resident's dictation.     Ciara Middleton MD

## 2017-06-15 ENCOUNTER — SURGERY (OUTPATIENT)
Age: 62
End: 2017-06-15

## 2017-06-15 ENCOUNTER — HOSPITAL ENCOUNTER (OUTPATIENT)
Facility: AMBULATORY SURGERY CENTER | Age: 62
End: 2017-06-15
Attending: ORTHOPAEDIC SURGERY

## 2017-06-15 VITALS
DIASTOLIC BLOOD PRESSURE: 70 MMHG | HEART RATE: 82 BPM | TEMPERATURE: 97.5 F | RESPIRATION RATE: 16 BRPM | SYSTOLIC BLOOD PRESSURE: 130 MMHG | OXYGEN SATURATION: 96 % | WEIGHT: 245 LBS | BODY MASS INDEX: 41.83 KG/M2 | HEIGHT: 64 IN

## 2017-06-15 DIAGNOSIS — G56.01 CARPAL TUNNEL SYNDROME OF RIGHT WRIST: Primary | ICD-10-CM

## 2017-06-15 RX ORDER — ACETAMINOPHEN 325 MG/1
650 TABLET ORAL
Status: DISCONTINUED | OUTPATIENT
Start: 2017-06-15 | End: 2017-06-16 | Stop reason: HOSPADM

## 2017-06-15 RX ORDER — HYDROCODONE BITARTRATE AND ACETAMINOPHEN 5; 325 MG/1; MG/1
1-2 TABLET ORAL EVERY 4 HOURS PRN
Qty: 10 TABLET | Refills: 0 | Status: ON HOLD | OUTPATIENT
Start: 2017-06-15 | End: 2017-07-11

## 2017-06-15 RX ORDER — OXYCODONE HYDROCHLORIDE 5 MG/1
5-10 TABLET ORAL
Status: DISCONTINUED | OUTPATIENT
Start: 2017-06-15 | End: 2017-06-16 | Stop reason: HOSPADM

## 2017-06-15 NOTE — IP AVS SNAPSHOT
[facility]    [address] [place] [address]    Phone:  [phone]    Fax:  [phone]                                       After Visit Summary   6/15/2017    Sharon Fung    MRN: [id number]           After Visit Summary Signature Page     I have received my discharge instructions, and my questions have been answered. I have discussed any challenges I see with this plan with the nurse or doctor.    ..........................................................................................................................................  Patient/Patient Representative Signature      ..........................................................................................................................................  Patient Representative Print Name and Relationship to Patient    ..................................................               ................................................  Date                                            Time    ..........................................................................................................................................  Reviewed by Signature/Title    ...................................................              ..............................................  Date                                                            Time

## 2017-06-15 NOTE — IP AVS SNAPSHOT
MRN:[id number]                      After Visit Summary   6/15/2017    Sharon Fung    MRN: [id number]           Thank you!     Thank you for choosing [place] for your care. Our goal is always to provide you with excellent care. Hearing back from our patients is one way we can continue to improve our services. Please take a few minutes to complete the written survey that you may receive in the mail after you visit with us. Thank you!        Patient Information     Date Of Birth          1955        About your hospital stay     You were admitted on:  Magdalena 15, 2017 [facility]    You were discharged on:  Magdalena 15, 2017       Who to Call     For medical emergencies, please call 911.  For non-urgent questions about your medical care, please call your primary care provider or clinic, [phone]  For questions related to your surgery, please call your surgery clinic        Attending Provider     Provider Specialty    Ciara Middleton MD Orthopedics       Primary Care Provider Office Phone # Fax #    Reynaldo Saavedra [phone]      After Care Instructions     Discharge Instructions       For 24 hours after surgery  1. Get plenty of rest. A responsible adult must stay with you for at least 24 hours after you leave the hospital.  2. Do not drive or use heavy equipment. If you have weakness or tingling, don't drive or use heavy equipment until this feeling goes away.  3. Do not drink alcohol.  4. Avoid strnuois or risky activities. Ask for help when climbing stairs.  5. You may feel lightheaded. If so, sit for a few minutes before standing. Have someone help you get up.   6. If you have nausea (feel sick to your stomach), drink only clear liquids such as apple juice, ginger ale, broth or 7-Up. Rest may also help. Be sure to drink enough fluids. Move to a regular diet as you feel able.   7. You may have a slight  fever. Call your doctor if your fever is over 100 F (37.7 C) (taken under the tongue) or lasts longer than 24 hours.   8. You may have a dry mouth, a sore throat, muscle aches or trouble sleeping. These should go away after 24 hours.   9. Do not make important or or legal decisions.     Call your doctor for any of the followin. Signs of infection such as fever, growing tenderness at the surgery site, a large amount of drainage or bleeding, severe pain, foul-smelling drainage, redness, swelling.  2. It has been over 8 hours to 10 hours since surgery, and you are still not able to urinate (or pass water).   3. Headache for over 24 hours.   4. Numbness, tingling or weakness the day after surgery (if you had spinal anesthesia).   To contact your doctor call [phone] and ask for resident on call for Orthopedics or call Emergency department [facility] [phone] [facility] [phone].            Discharge Instructions - Lifting Limit (specify)       Lifting limit  5 pounds until seen at Post-op follow up appointment.            Dressing Change        IF leaking, remove and redress. Wash hands before and after and use gloves.            Ice to affected area       Ice pack to surgical site every 15 minutes per hour for 24 hours            No Alcohol       For 24 hours post procedure            No driving or operating machinery        until the day after procedure            Notify Provider       For signs and symptoms of infection: Fever greater than 101, redness, swelling, heat at site, drainage, pus.            Remove dressing - at 72 hours            Return to clinic       Return to clinic in 2 weeks            Shower        Cover dressing if dressing is not going to be changed today            Wound care       Do not immerse wound in water until sutures removed                  Your next 10 appointments already scheduled     2017  2:30 PM CDT   (Arrive by 2:15 PM)   Post-Op with JOSE MARQUEZ  ORTHO HAND POP   [facility] ([facility] [place])    [address] [address]   [phone]            Jun 22, 2017  3:00 PM CDT   (Arrive by 2:45 PM)   AZEB Hand with Purnima Mcguire OT   [facility] ([facility] [place])    [address] [place] [address]   [phone]            Jul 20, 2017 11:50 AM CDT   (Arrive by 11:35 AM)   POST-OP HAND with Ciara Middleton MD   [facility] ([facility] [place])    [address] [address]   [phone]            Aug 02, 2017  7:20 AM CDT   Return Visit with Freddy Guerra MD   [facility]    [address] [place] [id number] [phone]              Further instructions from your care team       [facility]  Home Care Following Your Procedure  Call a doctor if you have signs of infection (fever, growing tenderness at the surgery site, a large amount of drainage or bleeding, severe pain, foul-smelling drainage, redness, swelling).  Your doctor is:  Dr. Ciara Middleton, Orthopaedics: [phone]                                    Or dial [phone] and ask for the resident on call for:  Orthopaedics  For emergency care, call the:  [facility]: [phone] (TTY for hearing impaired: [phone])    Same-Day Surgery Discharge Instructions - Adult  Dr Middleton      Keep the dressing clean and dry.    Elevate your hand at the level of your for 48 hours.    Move and use fingers for light activity. Try to make a full fist. Do not push against resistance.    Resume all pre-op medications.    Use analgesic medication only as prescribed.    Use sling as needed.    Schedule an appointment to see Dr Middleton in 7- 10 days.    Call if you have any questions.                      Pending Results   "   No orders found from 6/13/2017 to 6/16/2017.            Admission Information     Date & Time Provider Department Dept. Phone    6/15/2017 Ciara Middleton MD [facility] [phone]      Your Vitals Were     Blood Pressure Pulse Temperature Respirations Height Weight    136/71 82 98.3  F (36.8  C) (Oral) 16 1.626 m (5' 4\") 111.1 kg (245 lb)    Pulse Oximetry BMI (Body Mass Index)                94% 42.05 kg/m2          Pangohar[employer]Call Information     [facility] gives you secure access to your electronic health record. If you see a primary care provider, you can also send messages to your care team and make appointments. If you have questions, please call your primary care clinic.  If you do not have a primary care provider, please call [phone] and they will assist you.      [facility] is an electronic gateway that provides easy, online access to your medical records. With [facility], you can request a clinic appointment, read your test results, renew a prescription or communicate with your care team.     To access your existing account, please contact your [facility] or call [phone] for assistance.        Care EveryWhere ID     This is your Care EveryWhere ID. This could be used by other organizations to access your [place] medical records  [id number]           Review of your medicines      START taking        Dose / Directions    HYDROcodone-acetaminophen 5-325 MG per tablet   Commonly known as:  NORCO   Used for:  Carpal tunnel syndrome of right wrist        Dose:  1-2 tablet   Take 1-2 tablets by mouth every 4 hours as needed for other (Moderate to Severe Pain)   Quantity:  10 tablet   Refills:  0         CONTINUE these medicines which have NOT CHANGED        Dose / Directions    acetaminophen 500 MG tablet   Commonly known as:  TYLENOL        Dose:  500-1000 mg   Take 500-1,000 mg by mouth every 6 hours as needed for mild pain   Refills:  0       " "cycloSPORINE 0.05 % ophthalmic emulsion   Commonly known as:  RESTASIS   Used for:  Dry eye syndrome, bilateral        Dose:  1 drop   Place 1 drop into both eyes 2 times daily   Quantity:  2 Box   Refills:  11       fexofenadine 180 MG tablet   Commonly known as:  ALLEGRA   Used for:  AR (allergic rhinitis)        Dose:  180 mg   Take 1 tablet (180 mg) by mouth daily   Quantity:  90 tablet   Refills:  2       FLONASE 50 MCG/ACT spray   Generic drug:  fluticasone        Dose:  2 spray   Spray 2 sprays into both nostrils as needed   Refills:  0       FLUoxetine 20 MG capsule   Commonly known as:  PROzac   Used for:  Major depressive disorder, recurrent episode, mild (H)        Dose:  20 mg   Take 1 capsule (20 mg) by mouth daily   Quantity:  90 capsule   Refills:  1       folic acid 1 MG tablet   Commonly known as:  FOLVITE   Used for:  Seropositive rheumatoid arthritis (H)        Dose:  1 mg   Take 1 tablet (1 mg) by mouth daily   Quantity:  100 tablet   Refills:  3       Insulin Syringe-Needle U-100 27G X 1/2\" 1 ML Misc   Commonly known as:  BD insulin syringe   Used for:  Seropositive rheumatoid arthritis (H), High risk medication use        Dose:  1 Syringe   1 Syringe every 7 days , to be used for methotrexate administration.   Quantity:  10 each   Refills:  5       methotrexate sodium 50 MG/2ML Soln   Used for:  Seropositive rheumatoid arthritis (H)        Dose:  20 mg   Inject 0.8 mLs (20 mg) as directed every 7 days   Quantity:  2 vial   Refills:  3       ondansetron 4 MG ODT tab   Commonly known as:  ZOFRAN ODT   Used for:  Nausea vomiting and diarrhea        Dose:  4 mg   Take 1 tablet (4 mg) by mouth every 6 hours as needed for nausea   Quantity:  12 tablet   Refills:  1       order for DME        Use your CPAP device as directed by your provider.   Refills:  0       tofacitinib 11 MG 24 hr tablet   Commonly known as:  XELJANZ XR   Used for:  Seropositive rheumatoid arthritis (H), High risk medications " (not anticoagulants) long-term use        Dose:  11 mg   Take 1 tablet (11 mg) by mouth daily   Quantity:  30 tablet   Refills:  6       topiramate 50 MG tablet   Commonly known as:  TOPAMAX   Used for:  Morbid drug-induced obesity        Dose:  50 mg   Take 1 tablet (50 mg) by mouth At Bedtime   Quantity:  30 tablet   Refills:  3            Where to get your medicines      Some of these will need a paper prescription and others can be bought over the counter. Ask your nurse if you have questions.     Bring a paper prescription for each of these medications     HYDROcodone-acetaminophen 5-325 MG per tablet                Protect others around you: Learn how to safely use, store and throw away your medicines at www.disposemymeds.org.             Medication List: This is a list of all your medications and when to take them. Check marks below indicate your daily home schedule. Keep this list as a reference.      Medications           Morning Afternoon Evening Bedtime As Needed    acetaminophen 500 MG tablet   Commonly known as:  TYLENOL   Take 500-1,000 mg by mouth every 6 hours as needed for mild pain                                cycloSPORINE 0.05 % ophthalmic emulsion   Commonly known as:  RESTASIS   Place 1 drop into both eyes 2 times daily                                fexofenadine 180 MG tablet   Commonly known as:  ALLEGRA   Take 1 tablet (180 mg) by mouth daily                                FLONASE 50 MCG/ACT spray   Spray 2 sprays into both nostrils as needed   Generic drug:  fluticasone                                FLUoxetine 20 MG capsule   Commonly known as:  PROzac   Take 1 capsule (20 mg) by mouth daily                                folic acid 1 MG tablet   Commonly known as:  FOLVITE   Take 1 tablet (1 mg) by mouth daily                                HYDROcodone-acetaminophen 5-325 MG per tablet   Commonly known as:  NORCO   Take 1-2 tablets by mouth every 4 hours as needed for other (Moderate to  "Severe Pain)                                Insulin Syringe-Needle U-100 27G X 1/2\" 1 ML Misc   Commonly known as:  BD insulin syringe   1 Syringe every 7 days , to be used for methotrexate administration.                                methotrexate sodium 50 MG/2ML Soln   Inject 0.8 mLs (20 mg) as directed every 7 days                                ondansetron 4 MG ODT tab   Commonly known as:  ZOFRAN ODT   Take 1 tablet (4 mg) by mouth every 6 hours as needed for nausea                                order for DME   Use your CPAP device as directed by your provider.                                tofacitinib 11 MG 24 hr tablet   Commonly known as:  XELJANZ XR   Take 1 tablet (11 mg) by mouth daily                                topiramate 50 MG tablet   Commonly known as:  TOPAMAX   Take 1 tablet (50 mg) by mouth At Bedtime                                  "

## 2017-06-15 NOTE — DISCHARGE INSTRUCTIONS
[facility]  Home Care Following Your Procedure  Call a doctor if you have signs of infection (fever, growing tenderness at the surgery site, a large amount of drainage or bleeding, severe pain, foul-smelling drainage, redness, swelling).  Your doctor is:  Dr. Ciara Middleton, Orthopaedics: [phone]                                    Or dial [phone] and ask for the resident on call for:  Orthopaedics  For emergency care, call the:  [facility]: [phone] (TT for hearing impaired: [phone])    Same-Day Surgery Discharge Instructions - Adult  Dr Middleton      Keep the dressing clean and dry.    Elevate your hand at the level of your for 48 hours.    Move and use fingers for light activity. Try to make a full fist. Do not push against resistance.    Resume all pre-op medications.    Use analgesic medication only as prescribed.    Use sling as needed.    Schedule an appointment to see Dr Middleton in 7- 10 days.    Call if you have any questions.

## 2017-06-22 ENCOUNTER — OFFICE VISIT (OUTPATIENT)
Dept: ORTHOPEDICS | Facility: CLINIC | Age: 62
End: 2017-06-22

## 2017-06-22 ENCOUNTER — THERAPY VISIT (OUTPATIENT)
Dept: OCCUPATIONAL THERAPY | Facility: CLINIC | Age: 62
End: 2017-06-22
Payer: COMMERCIAL

## 2017-06-22 DIAGNOSIS — Z09 SURGICAL FOLLOW-UP CARE: Primary | ICD-10-CM

## 2017-06-22 DIAGNOSIS — Z47.89 AFTERCARE FOLLOWING SURGERY OF THE MUSCULOSKELETAL SYSTEM: ICD-10-CM

## 2017-06-22 DIAGNOSIS — G56.01 CARPAL TUNNEL SYNDROME OF RIGHT WRIST: Primary | ICD-10-CM

## 2017-06-22 PROCEDURE — 29125 APPL SHORT ARM SPLINT STATIC: CPT | Mod: GO | Performed by: OCCUPATIONAL THERAPIST

## 2017-06-22 PROCEDURE — 97110 THERAPEUTIC EXERCISES: CPT | Mod: GO | Performed by: OCCUPATIONAL THERAPIST

## 2017-06-22 PROCEDURE — 97165 OT EVAL LOW COMPLEX 30 MIN: CPT | Mod: GO | Performed by: OCCUPATIONAL THERAPIST

## 2017-06-22 NOTE — MR AVS SNAPSHOT
After Visit Summary   6/22/2017    Sharon Fung    MRN: [id number]           Patient Information     Date Of Birth          1955        Visit Information        Provider Department      6/22/2017 2:30 PM [facility] ORTHO HAND [facility]        Today's Diagnoses     Surgical follow-up care    -  1       Follow-ups after your visit        Additional Services     HAND THERAPY       Hand Therapy Referral                  Your next 10 appointments already scheduled     Jun 22, 2017  3:00 PM CDT   (Arrive by 2:45 PM)   AZEB Hand with Purnima Mcguire OT   [facility] ([facility] [place])    [address] [place] [address]   [phone]            Jul 20, 2017 11:50 AM CDT   (Arrive by 11:35 AM)   POST-OP HAND with Ciara Middleton MD   [facility] ([facility] [place])    [address] [place] [id number] [phone]            Aug 02, 2017  7:20 AM CDT   Return Visit with Freddy Guerra MD   [facility]    [address] [place] [id number] [phone]              Who to contact     Please call your clinic at [phone] to:    Ask questions about your health    Make or cancel appointments    Discuss your medicines    Learn about your test results    Speak to your doctor   If you have compliments or concerns about an experience at your clinic, or if you wish to file a complaint, please contact [facility] Patient Relations at [phone] or email us at [email or web address]         Additional Information About Your Visit        MyChart Information     [employer] gives you secure access to your electronic health record. If you see a primary care provider, you can also send messages to your care team and make appointments. If you have questions, please call your primary care  clinic.  If you do not have a primary care provider, please call [phone] and they will assist you.      [facility] is an electronic gateway that provides easy, online access to your medical records. With [facility], you can request a clinic appointment, read your test results, renew a prescription or communicate with your care team.     To access your existing account, please contact your [facility] or call [phone] for assistance.        Care EveryWhere ID     This is your Care EveryWhere ID. This could be used by other organizations to access your [place] medical records  [id number]         Blood Pressure from Last 3 Encounters:   06/15/17 130/70   05/04/17 127/79   04/27/17 123/73    Weight from Last 3 Encounters:   06/15/17 111.1 kg (245 lb)   05/04/17 110.5 kg (243 lb 9.6 oz)   04/27/17 111.4 kg (245 lb 8 oz)              We Performed the Following     HAND THERAPY        Primary Care Provider Office Phone # Fax #    Reynaldo Saavedra [phone]       [facility] [address]  [facility] [place] [address]        Equal Access to Services     SVITLANA SERVIN : Hadii aad ku hadasho Soomaali, waaxda luqadaha, qaybta kaalmada adeegyada, waxay masoodin haykelsin louis day lamarni ah. So [facility] [phone].    ATENCIÓN: Si habla español, tiene a ferrer disposición servicios gratuitos de asistencia lingüística. Llame al [phone].    We comply with applicable federal civil rights laws and [place] laws. We do not discriminate on the basis of race, color, national origin, age, disability sex, sexual orientation or gender identity.            Thank you!     Thank you for choosing [facility]  for your care. Our goal is always to provide you with excellent care. Hearing back from our patients is one way we can continue to improve our services. Please take a few minutes to complete the written survey that you may receive in the mail after your  "visit with us. Thank you!             Your Updated Medication List - Protect others around you: Learn how to safely use, store and throw away your medicines at www.disposemymeds.org.          This list is accurate as of: 6/22/17  2:47 PM.  Always use your most recent med list.                   Brand Name Dispense Instructions for use Diagnosis    acetaminophen 500 MG tablet    TYLENOL     Take 500-1,000 mg by mouth every 6 hours as needed for mild pain        fexofenadine 180 MG tablet    ALLEGRA    90 tablet    Take 1 tablet (180 mg) by mouth daily    AR (allergic rhinitis)       FLONASE 50 MCG/ACT spray   Generic drug:  fluticasone      Spray 2 sprays into both nostrils as needed        FLUoxetine 20 MG capsule    PROzac    90 capsule    Take 1 capsule (20 mg) by mouth daily    Major depressive disorder, recurrent episode, mild (H)       folic acid 1 MG tablet    FOLVITE    100 tablet    Take 1 tablet (1 mg) by mouth daily    Seropositive rheumatoid arthritis (H)       HYDROcodone-acetaminophen 5-325 MG per tablet    NORCO    10 tablet    Take 1-2 tablets by mouth every 4 hours as needed for other (Moderate to Severe Pain)    Carpal tunnel syndrome of right wrist       Insulin Syringe-Needle U-100 27G X 1/2\" 1 ML Misc    BD insulin syringe    10 each    1 Syringe every 7 days , to be used for methotrexate administration.    Seropositive rheumatoid arthritis (H), High risk medication use       methotrexate sodium 50 MG/2ML Soln     2 vial    Inject 0.8 mLs (20 mg) as directed every 7 days    Seropositive rheumatoid arthritis (H)       ondansetron 4 MG ODT tab    ZOFRAN ODT    12 tablet    Take 1 tablet (4 mg) by mouth every 6 hours as needed for nausea    Nausea vomiting and diarrhea       order for DME      Use your CPAP device as directed by your provider.        tofacitinib 11 MG 24 hr tablet    XELJANZ XR    30 tablet    Take 1 tablet (11 mg) by mouth daily    Seropositive rheumatoid arthritis (H), High risk " medications (not anticoagulants) long-term use       topiramate 50 MG tablet    TOPAMAX    30 tablet    Take 1 tablet (50 mg) by mouth At Bedtime    Morbid drug-induced obesity

## 2017-06-22 NOTE — PROGRESS NOTES
Reason for visit:    Sharon Fung came in to the clinic for a one week post op check.    Her surgery was done 6/15/17 by Dr Middleton.  She had a right open CTR.     Assessment:    Sharon came into the clinic with her incision covered.      The Surgical wounds were exposed and found to be well-healed; so the sutures were removed.    Plan:     Steri-strips were placed over incision. She was instructed to keep her hand clean and dry as much as possible. She will see OT following this appointment for nighttime splint.    She has an appointment to see Dr. Middleton at that time Dr. Middleton will determine further restrictions.    She has our phone number and will call with questions or problems.

## 2017-06-22 NOTE — MR AVS SNAPSHOT
After Visit Summary   6/22/2017    Sharon Fung    MRN: [id number]           Patient Information     Date Of Birth          1955        Visit Information        Provider Department      6/22/2017 3:00 PM Purnima Mcguire OT [facility]        Today's Diagnoses     Carpal tunnel syndrome of right wrist    -  1    Aftercare following surgery of the musculoskeletal system           Follow-ups after your visit        Your next 10 appointments already scheduled     Jul 20, 2017 11:50 AM CDT   (Arrive by 11:35 AM)   POST-OP HAND with Ciara Middleton MD   [facility] ([facility])    [address]  4th Floor  [facility] [address] [phone]            Aug 02, 2017  7:20 AM CDT   Return Visit with Freddy Guerra MD   [facility] ([facility])    [address] [place] [id number] [phone]              Who to contact     If you have questions or need follow up information about today's clinic visit or your schedule please contact [facility] directly at [phone].  Normal or non-critical lab and imaging results will be communicated to you by MyChart, letter or phone within 4 business days after the clinic has received the results. If you do not hear from us within 7 days, please contact the clinic through [facility] or phone. If you have a critical or abnormal lab result, we will notify you by phone as soon as possible.  Submit refill requests through [facility] or call your pharmacy and they will forward the refill request to us. Please allow 3 business days for your refill to be completed.          Additional Information About Your Visit        MyChart Information     [facility] gives you secure access to your electronic health record. If you see a primary care provider, you can also send messages to your care team and make appointments. If you have questions, please call your  primary care clinic.  If you do not have a primary care provider, please call [phone] and they will assist you.        Care EveryWhere ID     This is your Care EveryWhere ID. This could be used by other organizations to access your [place] medical records  [id number]         Blood Pressure from Last 3 Encounters:   06/15/17 130/70   05/04/17 127/79   04/27/17 123/73    Weight from Last 3 Encounters:   06/15/17 111.1 kg (245 lb)   05/04/17 110.5 kg (243 lb 9.6 oz)   04/27/17 111.4 kg (245 lb 8 oz)              We Performed the Following     APPLY SHORT ARM SPLINT STATIC     HC OT EVAL, LOW COMPLEXITY     THERAPEUTIC EXERCISES        Primary Care Provider Office Phone # Fax #    Reynaldo Saavedra [phone]       [facility] [address]  [facility] [place] [address]        Equal Access to Services     SVITLANA SERVIN : Hadii aad ku hadasho Soomaali, waaxda luqadaha, qaybta kaalmada adeegyada, waxay idiin haykelsin louis terry . So [facility] [phone].    ATENCIÓN: Si habla español, tiene a ferrer disposición servicios gratuitos de asistencia lingüística. Llame al [phone].    We comply with applicable federal civil rights laws and [place] laws. We do not discriminate on the basis of race, color, national origin, age, disability sex, sexual orientation or gender identity.            Thank you!     Thank you for choosing [facility]  for your care. Our goal is always to provide you with excellent care. Hearing back from our patients is one way we can continue to improve our services. Please take a few minutes to complete the written survey that you may receive in the mail after your visit with us. Thank you!             Your Updated Medication List - Protect others around you: Learn how to safely use, store and throw away your medicines at www.disposemymeds.org.          This list is accurate as of: 6/22/17  6:30 PM.  Always use your most recent med list.           "         Brand Name Dispense Instructions for use Diagnosis    acetaminophen 500 MG tablet    TYLENOL     Take 500-1,000 mg by mouth every 6 hours as needed for mild pain        fexofenadine 180 MG tablet    ALLEGRA    90 tablet    Take 1 tablet (180 mg) by mouth daily    AR (allergic rhinitis)       FLONASE 50 MCG/ACT spray   Generic drug:  fluticasone      Spray 2 sprays into both nostrils as needed        FLUoxetine 20 MG capsule    PROzac    90 capsule    Take 1 capsule (20 mg) by mouth daily    Major depressive disorder, recurrent episode, mild (H)       folic acid 1 MG tablet    FOLVITE    100 tablet    Take 1 tablet (1 mg) by mouth daily    Seropositive rheumatoid arthritis (H)       HYDROcodone-acetaminophen 5-325 MG per tablet    NORCO    10 tablet    Take 1-2 tablets by mouth every 4 hours as needed for other (Moderate to Severe Pain)    Carpal tunnel syndrome of right wrist       Insulin Syringe-Needle U-100 27G X 1/2\" 1 ML Misc    BD insulin syringe    10 each    1 Syringe every 7 days , to be used for methotrexate administration.    Seropositive rheumatoid arthritis (H), High risk medication use       methotrexate sodium 50 MG/2ML Soln     2 vial    Inject 0.8 mLs (20 mg) as directed every 7 days    Seropositive rheumatoid arthritis (H)       ondansetron 4 MG ODT tab    ZOFRAN ODT    12 tablet    Take 1 tablet (4 mg) by mouth every 6 hours as needed for nausea    Nausea vomiting and diarrhea       order for DME      Use your CPAP device as directed by your provider.        tofacitinib 11 MG 24 hr tablet    XELJANZ XR    30 tablet    Take 1 tablet (11 mg) by mouth daily    Seropositive rheumatoid arthritis (H), High risk medications (not anticoagulants) long-term use       topiramate 50 MG tablet    TOPAMAX    30 tablet    Take 1 tablet (50 mg) by mouth At Bedtime    Morbid drug-induced obesity         "

## 2017-06-22 NOTE — PROGRESS NOTES
Hand Therapy Initial Evaluation    Current Date:  6/22/2017    Diagnosis: R CTR  DOS: 06/15/17  Procedure:  CTR  Post:  1w     Precautions: NA    Subjective:  Sharon Fung is a 62 year old right hand dominant female.    Patient reports symptoms of pain, stiffness/loss of motion, weakness/loss of strength, edema, numbness and tingling  of the right hand which occurred due to CTR. Since onset symptoms are Gradually getting better.  Special tests:  EMG.  Previous treatment: surgery, post-op dressing.    General health as reported by patient is fair.  Pertinent medical history includes:rheumatoid arthritis, overweight, depression   Medical allergies:none.  Surgical history: none.  Medication history: anti-depressants, RA meds.    Current occupation is nurse   Currently working in normal job without restrictions  Job Tasks: prolonged sitting, computer work  Barriers include:none  Prior functional level:  no limitations    Additional Occupational Profile Information (patterns of daily living, interests, values and needs): NA    Functional Outcome Measure:  Upper Extremity Functional Index Score:  SCORE:   Column Totals: /80: 58   (A lower score indicates greater disability.)    Objective:  Pain Level Report  VAS(0-10) 6/22/2017   At Rest: 0/10   At Worst: 5/10     Report of Pain:  Location:  incision  Pain Quality:  Burning and Tingling  Frequency: intermittent    Pain is worst:  daytime  Exacerbated by:   Movement, use  Relieved by:  cold and rest  Progression:  improving  ROM:  Pain Report:  - none    + mild    ++ moderate    +++ severe   Wrist 6/22/2017   AROM (PROM) right   Extension 75   Flexion 46   RD 15   UD 36   Supination 73   Pronation 90     Strength: Contraindicated    Edema:  mild    Scar:  Sensitivity: none Quality:  Sutures out today, steri-strips    Sensation:  Tingling in dorsal hand    Assessment:  Patient presents with symptoms consistent with diagnosis of Carpal Tunnel Syndrome, with surgical   intervention, DOS:  6/15/17.     Patient's limitations or Problem List includes:  Pain, Decreased ROM/motion, Increased edema and Sensory disturbance of the right wrist and hand which interferes with the patient's ability to perform Self Care Tasks (dressing, eating, bathing, hygiene/toileting), Work Tasks, Sleep Patterns, Recreational Activities, Household Chores and Driving  as compared to previous level of function.    Rehab Potential:  Excellent - Return to full activity, no limitations    Patient will benefit from skilled Occupational Therapy to increase ROM, overall strength and sensation and decrease pain, edema and adherence of scarring to return to previous activity level and resume normal daily tasks and to reach their rehab potential.    Barriers to Learning:  No barrier    Communication Issues:  Patient appears to be able to clearly communicate and understand verbal and written communication and follow directions correctly.    Assessment of Occupational Performance:  5 or more Performance Deficits  Identified Performance Deficits: bathing/showering, toileting, dressing, feeding, hygiene and grooming, driving and community mobility and work      Clinical Decision Making (Complexity): Low complexity    Treatment Explanation:  The following has been discussed with the patient:    RX ordered/plan of care  Anticipated outcomes  Possible risks and side effects    Plan:  Frequency:  2 X a month, once daily  Duration:  for 2 months    Treatment Plan:  Modalities:  US, Fluidotherapy or Paraffin  Therapeutic Exercise:  AROM, Tendon Gliding and /Pinch Strengthening  Neuromuscular re-education:  Nerve Gliding, Desensitization and Kinesiotaping  Manual Techniques:  Scar mobilization  Splinting:  Static forearm based wrist orthosis  Self Care: Diagnostic education    Avoid activities that exacerbate median nerve symptoms    Avoid prolonged flexion or extension of the wrist    Discharge Plan:    Achieve all  LTG.  Independent in home treatment program.  Reach maximal therapeutic benefit.    Home Exercise Program:  Warm soaks  AROM fingers and wrist  Tendon gliding:  3 fists and FDS  Distal median nerve gliding  Scar mobs. with lotion  Wrist orthosis for sleeping    Next Visit:  Follow-up, PRN, if no return within 30 days, this will be DC.

## 2017-06-26 ENCOUNTER — TELEPHONE (OUTPATIENT)
Dept: SURGERY | Facility: CLINIC | Age: 62
End: 2017-06-26

## 2017-06-26 NOTE — TELEPHONE ENCOUNTER
"Client interested in the gastric balloon.  Ht 5'4\", wt 245 lbs, BMI 42.00. Not a candidate due to  BMI > 40.0. Farrahn asking if she would be a candidate if she lost weight for BMI< 40.0.  Informed her that I would need to review her case with Dr Mims and get back to her.  Current plan for her to call the Call Center and have a return weight management visit to re-establish care with [facility], last seen by Dr Panchal in August 2016.    Pre-Intragastric Balloon Screening    Are you interested in the self pay option for $8,250.00? Yes (her insurance will not pay for another weight loss surgery).  Insurance confirmation:  Are you willing to have us submit insurance claims for medications and visits? yes  Do you have any of the following (no or unknown to meet with surgeon):    y=yes, n=no, u=unknown, na=not applicable  _y_ prior gastrointestinal surgery  - appendectomy  _y_ prior weight loss surgery - gastric band in for 4 years and then removed October 2015 due to infection.  _n_ ulcers, irritation, inflammation or inflammatory disease of the GI tract  _n_ problems with bleeding or blood clots  _n_ hepatic insufficiency, liver cirrhosis or problems with your liver  _n_ any structural abnormalities or congenital anomalies of the GI tract  _n_ a hiatal hernia > 5 cm or < 5 cm with severe reflux  _n_ problems swallowing  _nn_ severe motility disorder (or gastroparesis)  _n_ a stomach mass  _n_ allergic to silicone  _n_ any medical condition that would not permit an elective endoscopy  _n_ breastfeeding, pregnant or planning to become pregnant within 6 months of having the balloon  _n_ serious or uncontrolled psychiatric disorder that could compromise patient understanding or compliance with follow-up visits and removal of device after 6 months  _n_ alcoholism or drug addiction  Are you taking any of the following:  _n_ aspirin         ___per provider  ___not under medical supervision  _yes_ anti-inflammatory agents (ie., " "NSAIDS) - takes ibuprofen at bedtime every day for rheumatoid arthritis.   _x__per provider  ___not under medical supervision  _n_ anticoagulants (blood thinners)   ___per provider  ___not under medical supervision  _yes_ other gastric irritants on methotrexate for RA, was switched from po to SQ so it no longer irritates stomach.   _x__per provider  ___not under medical supervision  _yes_ medication to treat gastric reflux or heart burn   _x__per provider  ___not under medical supervision - TUMS  _yes__Are you willing to take medication (called a proton pump inhibitor) during the time the balloon is in place that will decrease stomach acid?    Next Steps:  ___Appt with Dr Mims. (NEW appt, write \"Interested in IntraGastric Balloon\" in the comments.  Strang in Epic and MyChart.   ___Appt with Dietician.  Do pre-visit questionnaire for NEW visit.  ___Toni to email Socorro Augustine at [email or web address] with name, MRN and appt info of patient interested in the gastric balloon.    ___Patient to Call Socorro Augustine (Financial): [facility] in the morning at [phone] or [address] in the afternoon at [phone].  ___Call Brannon to schedule, # [phone]  "

## 2017-06-26 NOTE — TELEPHONE ENCOUNTER
----- Message from Carey Urena sent at 6/23/2017  3:40 PM CDT -----  Regarding: Pt requesting to make appt with Dr. Mims, he had removed her band, pt is now..  Contact: [phone]  Interested in an Intra-Gastric Balloon surgery.  Please call pt at   [phone].    Thank you, Jennifer!    Pedro S  Please DO NOT send this message and/or reply back to sender.  Call Center Representatives DO NOT respond to messages.

## 2017-06-28 ENCOUNTER — TELEPHONE (OUTPATIENT)
Dept: SURGERY | Facility: CLINIC | Age: 62
End: 2017-06-28

## 2017-06-28 NOTE — TELEPHONE ENCOUNTER
Patient not a candidate for the gastric balloon due to history of gastric band.  Contraindication reviewed with Dr Mims.  Plan:  To schedule for medical weight management.  Patient agreeable to plan.

## 2017-06-28 NOTE — TELEPHONE ENCOUNTER
----- Message from Carey Urena sent at 6/23/2017  3:40 PM CDT -----  Regarding: Pt requesting to make appt with Dr. Mims, he had removed her band, pt is now..  Contact: [phone]  Interested in an Intra-Gastric Balloon surgery.  Please call pt at   [phone].    Thank you, Jennifer!    Pedro S  Please DO NOT send this message and/or reply back to sender.  Call Center Representatives DO NOT respond to messages.

## 2017-07-03 NOTE — OP NOTE
PREOPERATIVE DIAGNOSIS: Right carpal tunnel syndrome.       POSTOPERATIVE DIAGNOSIS: Right carpal tunnel syndrome.       PROCEDURE PERFORMED:  Open release of the right carpal tunnel.       ESTIMATED BLOOD LOSS:  1 mL       TOURNIQUET:  8 minutes at 200 mmHg       COMPLICATIONS:  None.       FINDINGS:  Compression of the median nerve through the carpal tunnel.       INDICATIONS FOR PROCEDURE:  A pleasant, 61-year-old female, right-hand dominant.  History of rheumatoid arthritis.  Bilateral carpal tunnel syndrome.  Left worse than right.  Previously affirmed on neurodiagnostic studies. Previous release on the left.  Now presents for release on the right.   She was indicated for open carpal tunnel release, to which she agreed.       DESCRIPTION OF PROCEDURE:  The patient was met in the preoperative holding area, where the operative extremity was marked.  Written consent for the above-stated procedure was obtained.  Then 10 mL of local anesthetic was injected over the incision site in the preoperative holding area after a multidisciplinary time-out.  The patient was then transported to the operating theater.  She was positioned with the arm on the OR table.  The arm was prepped and draped in the usual sterile fashion, and a tourniquet was applied and inflated for the duration of the procedure.  A multidisciplinary time-out was conducted in accordance with hospital policy.  A 4 cm incision was made over the transverse carpal ligament.  This was carried sharply through skin and subcutaneous tissue.  The palmar fascia was dissected off the transverse carpal ligament, which was incised.  The palmar fat was noted distally with opening of the transverse carpal ligament and decompression of the median nerve.  Release of the transverse carpal ligament was extended proximally.  A [place] tenotomy was used to release the antebrachial fascia and palmar fascia proximally.  There was complete decompression of the median nerve,  which was probed and found to be free of adhesions at the sites of compression.  The tourniquet was deflated.  The wound was copiously irrigated with normal saline.  Hemostasis was obtained with bipolar cautery.  The wound was then closed with 4-0 nylon suture, and a sterile dressing was applied.  The patient was transported to the Postoperative Care Unit in stable condition.  Follow up in 1 week for wound check.

## 2017-07-11 ENCOUNTER — APPOINTMENT (OUTPATIENT)
Dept: MRI IMAGING | Facility: CLINIC | Age: 62
DRG: 103 | End: 2017-07-11
Attending: FAMILY MEDICINE
Payer: COMMERCIAL

## 2017-07-11 ENCOUNTER — HOSPITAL ENCOUNTER (INPATIENT)
Facility: CLINIC | Age: 62
LOS: 1 days | Discharge: HOME | DRG: 103 | End: 2017-07-15
Attending: FAMILY MEDICINE | Admitting: PSYCHIATRY & NEUROLOGY
Payer: COMMERCIAL

## 2017-07-11 ENCOUNTER — APPOINTMENT (OUTPATIENT)
Dept: CT IMAGING | Facility: CLINIC | Age: 62
DRG: 103 | End: 2017-07-11
Attending: FAMILY MEDICINE
Payer: COMMERCIAL

## 2017-07-11 DIAGNOSIS — R51.9 ACUTE INTRACTABLE HEADACHE, UNSPECIFIED HEADACHE TYPE: ICD-10-CM

## 2017-07-11 DIAGNOSIS — M05.9 SEROPOSITIVE RHEUMATOID ARTHRITIS (H): ICD-10-CM

## 2017-07-11 DIAGNOSIS — R11.2 NAUSEA AND VOMITING, INTRACTABILITY OF VOMITING NOT SPECIFIED, UNSPECIFIED VOMITING TYPE: ICD-10-CM

## 2017-07-11 LAB
ABO + RH BLD: NORMAL
ABO + RH BLD: NORMAL
ALBUMIN SERPL-MCNC: 4 G/DL (ref 3.4–5)
ALP SERPL-CCNC: 90 U/L (ref 40–150)
ALT SERPL W P-5'-P-CCNC: 47 U/L (ref 0–50)
ANION GAP SERPL CALCULATED.3IONS-SCNC: 12 MMOL/L (ref 3–14)
APTT PPP: 30 SEC (ref 22–37)
AST SERPL W P-5'-P-CCNC: 24 U/L (ref 0–45)
BASOPHILS # BLD AUTO: 0.1 10E9/L (ref 0–0.2)
BASOPHILS NFR BLD AUTO: 0.5 %
BILIRUB SERPL-MCNC: 0.3 MG/DL (ref 0.2–1.3)
BLD GP AB SCN SERPL QL: NORMAL
BLOOD BANK CMNT PATIENT-IMP: NORMAL
BUN SERPL-MCNC: 13 MG/DL (ref 7–30)
CALCIUM SERPL-MCNC: 9.9 MG/DL (ref 8.5–10.1)
CHLORIDE SERPL-SCNC: 107 MMOL/L (ref 94–109)
CO2 SERPL-SCNC: 21 MMOL/L (ref 20–32)
CREAT BLD-MCNC: 0.7 MG/DL (ref 0.52–1.04)
CREAT SERPL-MCNC: 0.73 MG/DL (ref 0.52–1.04)
CRP SERPL-MCNC: 3.7 MG/L (ref 0–8)
DIFFERENTIAL METHOD BLD: ABNORMAL
EOSINOPHIL # BLD AUTO: 0.3 10E9/L (ref 0–0.7)
EOSINOPHIL NFR BLD AUTO: 2.2 %
ERYTHROCYTE [DISTWIDTH] IN BLOOD BY AUTOMATED COUNT: 14.3 % (ref 10–15)
ERYTHROCYTE [SEDIMENTATION RATE] IN BLOOD BY WESTERGREN METHOD: 17 MM/H (ref 0–30)
GFR SERPL CREATININE-BSD FRML MDRD: 82 ML/MIN/1.7M2
GFR SERPL CREATININE-BSD FRML MDRD: 85 ML/MIN/1.7M2
GLUCOSE BLDC GLUCOMTR-MCNC: 98 MG/DL (ref 70–99)
GLUCOSE SERPL-MCNC: 90 MG/DL (ref 70–99)
HCT VFR BLD AUTO: 41.7 % (ref 35–47)
HGB BLD-MCNC: 13.9 G/DL (ref 11.7–15.7)
IMM GRANULOCYTES # BLD: 0.2 10E9/L (ref 0–0.4)
IMM GRANULOCYTES NFR BLD: 1.5 %
INR BLD: 0.9 (ref 0.86–1.14)
INR PPP: 0.89 (ref 0.86–1.14)
INTERPRETATION ECG - MUSE: NORMAL
LYMPHOCYTES # BLD AUTO: 5.1 10E9/L (ref 0.8–5.3)
LYMPHOCYTES NFR BLD AUTO: 44.3 %
MCH RBC QN AUTO: 31.6 PG (ref 26.5–33)
MCHC RBC AUTO-ENTMCNC: 33.3 G/DL (ref 31.5–36.5)
MCV RBC AUTO: 95 FL (ref 78–100)
MONOCYTES # BLD AUTO: 0.7 10E9/L (ref 0–1.3)
MONOCYTES NFR BLD AUTO: 6.2 %
NEUTROPHILS # BLD AUTO: 5.2 10E9/L (ref 1.6–8.3)
NEUTROPHILS NFR BLD AUTO: 45.3 %
NRBC # BLD AUTO: 0 10*3/UL
NRBC BLD AUTO-RTO: 0 /100
PLATELET # BLD AUTO: 430 10E9/L (ref 150–450)
PLATELET # BLD EST: ABNORMAL 10*3/UL
POTASSIUM SERPL-SCNC: 3.2 MMOL/L (ref 3.4–5.3)
PROT SERPL-MCNC: 8.1 G/DL (ref 6.8–8.8)
RBC # BLD AUTO: 4.4 10E12/L (ref 3.8–5.2)
SODIUM SERPL-SCNC: 140 MMOL/L (ref 133–144)
SPECIMEN EXP DATE BLD: NORMAL
WBC # BLD AUTO: 11.5 10E9/L (ref 4–11)

## 2017-07-11 PROCEDURE — 25000132 ZZH RX MED GY IP 250 OP 250 PS 637: Performed by: STUDENT IN AN ORGANIZED HEALTH CARE EDUCATION/TRAINING PROGRAM

## 2017-07-11 PROCEDURE — 85610 PROTHROMBIN TIME: CPT | Mod: QW

## 2017-07-11 PROCEDURE — 25000128 H RX IP 250 OP 636: Performed by: STUDENT IN AN ORGANIZED HEALTH CARE EDUCATION/TRAINING PROGRAM

## 2017-07-11 PROCEDURE — 25000125 ZZHC RX 250: Performed by: RADIOLOGY

## 2017-07-11 PROCEDURE — 80053 COMPREHEN METABOLIC PANEL: CPT | Performed by: FAMILY MEDICINE

## 2017-07-11 PROCEDURE — 25000128 H RX IP 250 OP 636: Performed by: RADIOLOGY

## 2017-07-11 PROCEDURE — 85025 COMPLETE CBC W/AUTO DIFF WBC: CPT | Performed by: FAMILY MEDICINE

## 2017-07-11 PROCEDURE — 25000128 H RX IP 250 OP 636

## 2017-07-11 PROCEDURE — 25000125 ZZHC RX 250: Performed by: STUDENT IN AN ORGANIZED HEALTH CARE EDUCATION/TRAINING PROGRAM

## 2017-07-11 PROCEDURE — 96375 TX/PRO/DX INJ NEW DRUG ADDON: CPT | Performed by: FAMILY MEDICINE

## 2017-07-11 PROCEDURE — 93010 ELECTROCARDIOGRAM REPORT: CPT | Mod: Z6 | Performed by: FAMILY MEDICINE

## 2017-07-11 PROCEDURE — 25000128 H RX IP 250 OP 636: Performed by: FAMILY MEDICINE

## 2017-07-11 PROCEDURE — 86900 BLOOD TYPING SEROLOGIC ABO: CPT | Performed by: FAMILY MEDICINE

## 2017-07-11 PROCEDURE — 25000125 ZZHC RX 250

## 2017-07-11 PROCEDURE — 20600000 ZZH R&B BMT

## 2017-07-11 PROCEDURE — 70551 MRI BRAIN STEM W/O DYE: CPT

## 2017-07-11 PROCEDURE — 85652 RBC SED RATE AUTOMATED: CPT | Performed by: FAMILY MEDICINE

## 2017-07-11 PROCEDURE — 70498 CT ANGIOGRAPHY NECK: CPT

## 2017-07-11 PROCEDURE — 86140 C-REACTIVE PROTEIN: CPT | Performed by: FAMILY MEDICINE

## 2017-07-11 PROCEDURE — 00000146 ZZHCL STATISTIC GLUCOSE BY METER IP

## 2017-07-11 PROCEDURE — 86901 BLOOD TYPING SEROLOGIC RH(D): CPT | Performed by: FAMILY MEDICINE

## 2017-07-11 PROCEDURE — 85730 THROMBOPLASTIN TIME PARTIAL: CPT | Performed by: FAMILY MEDICINE

## 2017-07-11 PROCEDURE — 96365 THER/PROPH/DIAG IV INF INIT: CPT | Performed by: FAMILY MEDICINE

## 2017-07-11 PROCEDURE — 82565 ASSAY OF CREATININE: CPT

## 2017-07-11 PROCEDURE — 99285 EMERGENCY DEPT VISIT HI MDM: CPT | Mod: 25 | Performed by: FAMILY MEDICINE

## 2017-07-11 PROCEDURE — 93005 ELECTROCARDIOGRAM TRACING: CPT | Performed by: FAMILY MEDICINE

## 2017-07-11 PROCEDURE — 86850 RBC ANTIBODY SCREEN: CPT | Performed by: FAMILY MEDICINE

## 2017-07-11 PROCEDURE — 96376 TX/PRO/DX INJ SAME DRUG ADON: CPT | Performed by: FAMILY MEDICINE

## 2017-07-11 PROCEDURE — 96366 THER/PROPH/DIAG IV INF ADDON: CPT | Performed by: FAMILY MEDICINE

## 2017-07-11 PROCEDURE — 85610 PROTHROMBIN TIME: CPT | Performed by: FAMILY MEDICINE

## 2017-07-11 PROCEDURE — 25000125 ZZHC RX 250: Performed by: FAMILY MEDICINE

## 2017-07-11 PROCEDURE — 70450 CT HEAD/BRAIN W/O DYE: CPT | Mod: XS

## 2017-07-11 RX ORDER — METOCLOPRAMIDE HYDROCHLORIDE 5 MG/ML
5 INJECTION INTRAMUSCULAR; INTRAVENOUS ONCE
Status: COMPLETED | OUTPATIENT
Start: 2017-07-11 | End: 2017-07-11

## 2017-07-11 RX ORDER — ONDANSETRON 2 MG/ML
INJECTION INTRAMUSCULAR; INTRAVENOUS
Status: DISCONTINUED
Start: 2017-07-11 | End: 2017-07-11 | Stop reason: HOSPADM

## 2017-07-11 RX ORDER — OMEGA-3 FATTY ACIDS/FISH OIL 300-1000MG
600-800 CAPSULE ORAL AT BEDTIME
COMMUNITY

## 2017-07-11 RX ORDER — KETOROLAC TROMETHAMINE 15 MG/ML
15 INJECTION, SOLUTION INTRAMUSCULAR; INTRAVENOUS ONCE
Status: COMPLETED | OUTPATIENT
Start: 2017-07-11 | End: 2017-07-11

## 2017-07-11 RX ORDER — TOPIRAMATE 50 MG/1
50 TABLET, FILM COATED ORAL AT BEDTIME
Status: DISCONTINUED | OUTPATIENT
Start: 2017-07-11 | End: 2017-07-11

## 2017-07-11 RX ORDER — HYDROCODONE BITARTRATE AND ACETAMINOPHEN 5; 325 MG/1; MG/1
1-2 TABLET ORAL EVERY 4 HOURS PRN
Status: DISCONTINUED | OUTPATIENT
Start: 2017-07-11 | End: 2017-07-12

## 2017-07-11 RX ORDER — HYDROMORPHONE HYDROCHLORIDE 1 MG/ML
0.5 INJECTION, SOLUTION INTRAMUSCULAR; INTRAVENOUS; SUBCUTANEOUS
Status: COMPLETED | OUTPATIENT
Start: 2017-07-11 | End: 2017-07-11

## 2017-07-11 RX ORDER — METOCLOPRAMIDE HYDROCHLORIDE 5 MG/ML
INJECTION INTRAMUSCULAR; INTRAVENOUS
Status: COMPLETED
Start: 2017-07-11 | End: 2017-07-11

## 2017-07-11 RX ORDER — SODIUM CHLORIDE 9 MG/ML
INJECTION, SOLUTION INTRAVENOUS ONCE
Status: DISCONTINUED | OUTPATIENT
Start: 2017-07-11 | End: 2017-07-11

## 2017-07-11 RX ORDER — PROMETHAZINE HYDROCHLORIDE 25 MG/ML
12.5 INJECTION, SOLUTION INTRAMUSCULAR; INTRAVENOUS ONCE
Status: COMPLETED | OUTPATIENT
Start: 2017-07-11 | End: 2017-07-11

## 2017-07-11 RX ORDER — ONDANSETRON 2 MG/ML
4 INJECTION INTRAMUSCULAR; INTRAVENOUS EVERY 30 MIN PRN
Status: COMPLETED | OUTPATIENT
Start: 2017-07-11 | End: 2017-07-12

## 2017-07-11 RX ORDER — KETOROLAC TROMETHAMINE 30 MG/ML
30 INJECTION, SOLUTION INTRAMUSCULAR; INTRAVENOUS EVERY 6 HOURS PRN
Status: DISCONTINUED | OUTPATIENT
Start: 2017-07-11 | End: 2017-07-14

## 2017-07-11 RX ORDER — IOPAMIDOL 755 MG/ML
75 INJECTION, SOLUTION INTRAVASCULAR ONCE
Status: COMPLETED | OUTPATIENT
Start: 2017-07-11 | End: 2017-07-11

## 2017-07-11 RX ORDER — DIPHENHYDRAMINE HYDROCHLORIDE 50 MG/ML
25 INJECTION INTRAMUSCULAR; INTRAVENOUS ONCE
Status: COMPLETED | OUTPATIENT
Start: 2017-07-11 | End: 2017-07-11

## 2017-07-11 RX ORDER — ONDANSETRON 2 MG/ML
4 INJECTION INTRAMUSCULAR; INTRAVENOUS EVERY 6 HOURS PRN
Status: DISCONTINUED | OUTPATIENT
Start: 2017-07-11 | End: 2017-07-15 | Stop reason: HOSPADM

## 2017-07-11 RX ORDER — LIDOCAINE 40 MG/G
CREAM TOPICAL
Status: DISCONTINUED | OUTPATIENT
Start: 2017-07-11 | End: 2017-07-15 | Stop reason: HOSPADM

## 2017-07-11 RX ORDER — CYCLOSPORINE 0.5 MG/ML
1 EMULSION OPHTHALMIC 2 TIMES DAILY
COMMUNITY
End: 2020-10-14

## 2017-07-11 RX ORDER — ONDANSETRON 4 MG/1
4 TABLET, ORALLY DISINTEGRATING ORAL EVERY 6 HOURS PRN
Status: DISCONTINUED | OUTPATIENT
Start: 2017-07-11 | End: 2017-07-11

## 2017-07-11 RX ORDER — ACETAMINOPHEN 500 MG
500-1000 TABLET ORAL EVERY 6 HOURS PRN
Status: DISCONTINUED | OUTPATIENT
Start: 2017-07-11 | End: 2017-07-15 | Stop reason: HOSPADM

## 2017-07-11 RX ORDER — AMOXICILLIN 250 MG
1-2 CAPSULE ORAL 2 TIMES DAILY PRN
Status: DISCONTINUED | OUTPATIENT
Start: 2017-07-11 | End: 2017-07-15 | Stop reason: HOSPADM

## 2017-07-11 RX ORDER — FEXOFENADINE HCL 180 MG/1
180 TABLET ORAL DAILY
Status: DISCONTINUED | OUTPATIENT
Start: 2017-07-11 | End: 2017-07-15 | Stop reason: HOSPADM

## 2017-07-11 RX ORDER — NALOXONE HYDROCHLORIDE 0.4 MG/ML
.1-.4 INJECTION, SOLUTION INTRAMUSCULAR; INTRAVENOUS; SUBCUTANEOUS
Status: DISCONTINUED | OUTPATIENT
Start: 2017-07-11 | End: 2017-07-15 | Stop reason: HOSPADM

## 2017-07-11 RX ORDER — SODIUM CHLORIDE 9 MG/ML
INJECTION, SOLUTION INTRAVENOUS CONTINUOUS
Status: DISCONTINUED | OUTPATIENT
Start: 2017-07-11 | End: 2017-07-15 | Stop reason: HOSPADM

## 2017-07-11 RX ORDER — BISACODYL 10 MG
10 SUPPOSITORY, RECTAL RECTAL DAILY PRN
Status: DISCONTINUED | OUTPATIENT
Start: 2017-07-11 | End: 2017-07-15 | Stop reason: HOSPADM

## 2017-07-11 RX ORDER — SODIUM CHLORIDE 9 MG/ML
INJECTION, SOLUTION INTRAVENOUS CONTINUOUS
Status: DISCONTINUED | OUTPATIENT
Start: 2017-07-11 | End: 2017-07-11

## 2017-07-11 RX ORDER — FOLIC ACID 1 MG/1
1 TABLET ORAL AT BEDTIME
Status: DISCONTINUED | OUTPATIENT
Start: 2017-07-11 | End: 2017-07-15 | Stop reason: HOSPADM

## 2017-07-11 RX ORDER — AMOXICILLIN 250 MG
1-2 CAPSULE ORAL 2 TIMES DAILY
Status: DISCONTINUED | OUTPATIENT
Start: 2017-07-11 | End: 2017-07-15 | Stop reason: HOSPADM

## 2017-07-11 RX ORDER — FLUTICASONE PROPIONATE 50 MCG
2 SPRAY, SUSPENSION (ML) NASAL DAILY
Status: DISCONTINUED | OUTPATIENT
Start: 2017-07-11 | End: 2017-07-11

## 2017-07-11 RX ORDER — LIDOCAINE HYDROCHLORIDE 10 MG/ML
10 INJECTION, SOLUTION INFILTRATION; PERINEURAL ONCE
Status: COMPLETED | OUTPATIENT
Start: 2017-07-11 | End: 2017-07-11

## 2017-07-11 RX ORDER — ONDANSETRON 4 MG/1
4 TABLET, ORALLY DISINTEGRATING ORAL EVERY 6 HOURS PRN
Status: DISCONTINUED | OUTPATIENT
Start: 2017-07-11 | End: 2017-07-15 | Stop reason: HOSPADM

## 2017-07-11 RX ORDER — LIDOCAINE HYDROCHLORIDE 10 MG/ML
INJECTION, SOLUTION EPIDURAL; INFILTRATION; INTRACAUDAL; PERINEURAL
Status: COMPLETED
Start: 2017-07-11 | End: 2017-07-11

## 2017-07-11 RX ADMIN — LIDOCAINE HYDROCHLORIDE 10 ML: 10 INJECTION, SOLUTION EPIDURAL; INFILTRATION; INTRACAUDAL; PERINEURAL at 09:34

## 2017-07-11 RX ADMIN — HYDROMORPHONE HYDROCHLORIDE 0.5 MG: 1 INJECTION, SOLUTION INTRAMUSCULAR; INTRAVENOUS; SUBCUTANEOUS at 08:57

## 2017-07-11 RX ADMIN — METOCLOPRAMIDE HYDROCHLORIDE 5 MG: 5 INJECTION INTRAMUSCULAR; INTRAVENOUS at 09:11

## 2017-07-11 RX ADMIN — ONDANSETRON 4 MG: 2 INJECTION INTRAMUSCULAR; INTRAVENOUS at 20:16

## 2017-07-11 RX ADMIN — LIDOCAINE HYDROCHLORIDE 10 ML: 10 INJECTION, SOLUTION INFILTRATION; PERINEURAL at 09:34

## 2017-07-11 RX ADMIN — Medication 2 G: at 20:16

## 2017-07-11 RX ADMIN — SODIUM CHLORIDE 1000 MG: 9 INJECTION, SOLUTION INTRAVENOUS at 10:44

## 2017-07-11 RX ADMIN — HYDROMORPHONE HYDROCHLORIDE 1 MG: 1 INJECTION, SOLUTION INTRAMUSCULAR; INTRAVENOUS; SUBCUTANEOUS at 13:01

## 2017-07-11 RX ADMIN — FLUOXETINE 20 MG: 20 CAPSULE ORAL at 21:16

## 2017-07-11 RX ADMIN — PROMETHAZINE HYDROCHLORIDE 12.5 MG: 25 INJECTION INTRAMUSCULAR; INTRAVENOUS at 10:03

## 2017-07-11 RX ADMIN — HYDROMORPHONE HYDROCHLORIDE 0.5 MG: 1 INJECTION, SOLUTION INTRAMUSCULAR; INTRAVENOUS; SUBCUTANEOUS at 20:16

## 2017-07-11 RX ADMIN — Medication 2.5 MG: at 22:19

## 2017-07-11 RX ADMIN — SODIUM CHLORIDE: 9 INJECTION, SOLUTION INTRAVENOUS at 20:06

## 2017-07-11 RX ADMIN — KETOROLAC TROMETHAMINE 30 MG: 30 INJECTION, SOLUTION INTRAMUSCULAR at 17:20

## 2017-07-11 RX ADMIN — SODIUM CHLORIDE, PRESERVATIVE FREE 90 ML: 5 INJECTION INTRAVENOUS at 11:00

## 2017-07-11 RX ADMIN — METOCLOPRAMIDE 5 MG: 5 INJECTION, SOLUTION INTRAMUSCULAR; INTRAVENOUS at 09:44

## 2017-07-11 RX ADMIN — ONDANSETRON 4 MG: 2 INJECTION INTRAMUSCULAR; INTRAVENOUS at 15:21

## 2017-07-11 RX ADMIN — DIPHENHYDRAMINE HYDROCHLORIDE 25 MG: 50 INJECTION, SOLUTION INTRAMUSCULAR; INTRAVENOUS at 09:09

## 2017-07-11 RX ADMIN — FOLIC ACID 1 MG: 1 TABLET ORAL at 21:15

## 2017-07-11 RX ADMIN — METOCLOPRAMIDE 5 MG: 5 INJECTION, SOLUTION INTRAMUSCULAR; INTRAVENOUS at 09:11

## 2017-07-11 RX ADMIN — IOPAMIDOL 75 ML: 755 INJECTION, SOLUTION INTRAVENOUS at 11:00

## 2017-07-11 RX ADMIN — KETOROLAC TROMETHAMINE 15 MG: 15 INJECTION, SOLUTION INTRAMUSCULAR; INTRAVENOUS at 09:35

## 2017-07-11 RX ADMIN — ONDANSETRON 4 MG: 2 INJECTION INTRAMUSCULAR; INTRAVENOUS at 08:56

## 2017-07-11 RX ADMIN — SODIUM CHLORIDE: 9 INJECTION, SOLUTION INTRAVENOUS at 09:41

## 2017-07-11 RX ADMIN — HYDROMORPHONE HYDROCHLORIDE 0.5 MG: 1 INJECTION, SOLUTION INTRAMUSCULAR; INTRAVENOUS; SUBCUTANEOUS at 15:20

## 2017-07-11 ASSESSMENT — ENCOUNTER SYMPTOMS
VOMITING: 1
FACIAL SWELLING: 1
NUMBNESS: 0
ABDOMINAL PAIN: 0
SHORTNESS OF BREATH: 0
DYSPHORIC MOOD: 1
APPETITE CHANGE: 1
HEADACHES: 1
WEAKNESS: 1
SINUS PRESSURE: 0
SEIZURES: 0
DIAPHORESIS: 1
FEVER: 0
PHOTOPHOBIA: 1
COLOR CHANGE: 0
WOUND: 0
ACTIVITY CHANGE: 1
NECK STIFFNESS: 0
CONFUSION: 0
WHEEZING: 0
DIFFICULTY URINATING: 0
DECREASED CONCENTRATION: 1
NAUSEA: 1
ARTHRALGIAS: 0
EYE REDNESS: 0
NECK PAIN: 0

## 2017-07-11 ASSESSMENT — PAIN DESCRIPTION - DESCRIPTORS: DESCRIPTORS: HEADACHE

## 2017-07-11 NOTE — PROGRESS NOTES
Admitted from ED, monitor and evaluate foe headache, VS baseline, continue to have headache, administrated PRN pain medication per ordered to monitor and evaluate

## 2017-07-11 NOTE — IP AVS SNAPSHOT
[address] [address]    Phone:  [phone]    Fax:  [phone]                                       After Visit Summary   7/11/2017    Sharon Fung    MRN: [id number]           After Visit Summary Signature Page     I have received my discharge instructions, and my questions have been answered. I have discussed any challenges I see with this plan with the nurse or doctor.    ..........................................................................................................................................  Patient/Patient Representative Signature      ..........................................................................................................................................  Patient Representative Print Name and Relationship to Patient    ..................................................               ................................................  Date                                            Time    ..........................................................................................................................................  Reviewed by Signature/Title    ...................................................              ..............................................  Date                                                            Time

## 2017-07-11 NOTE — PHARMACY-ADMISSION MEDICATION HISTORY
Admission medication history interview status for the 7/11/2017 admission is complete. See Epic admission navigator for allergy information, pharmacy, prior to admission medications and immunization status.     Medication history interview sources:  Patient     Changes made to PTA medication list (reason)  Added:   ibuprofen 200 MG capsule Take 600-800 mg by mouth At Bedtime   cycloSPORINE (RESTASIS) 0.05 % ophthalmic emulsion Place 1 drop into both eyes 2 times daily     Deleted:   -topiramate 50 mg by mouth at bedtime (stopped by PCP per pt)     Changed: None     Additional medication history information (including reliability of information, actions taken by pharmacist):  -Takes methotrexate on Fridays       Prior to Admission medications    Medication Sig Last Dose Taking? Auth Provider   ibuprofen 200 MG capsule Take 600-800 mg by mouth At Bedtime 7/10/2017 at 2000 Yes Unknown, Entered By History   cycloSPORINE (RESTASIS) 0.05 % ophthalmic emulsion Place 1 drop into both eyes 2 times daily 7/11/2017 at 0600 Yes Unknown, Entered By History   HYDROcodone-acetaminophen (NORCO) 5-325 MG per tablet Take 1-2 tablets by mouth every 4 hours as needed for other (Moderate to Severe Pain) Past Month at Unknown time Yes Ciara Middleton MD   folic acid (FOLVITE) 1 MG tablet Take 1 tablet (1 mg) by mouth daily 7/10/2017 at 2000 Yes Freddy Guerra MD   tofacitinib (XELJANZ XR) 11 MG 24 hr tablet Take 1 tablet (11 mg) by mouth daily 7/10/2017 at 2000 Yes Freddy Guerra MD   FLUoxetine (PROZAC) 20 MG capsule Take 1 capsule (20 mg) by mouth daily 7/10/2017 at 2000 Yes Reynaldo Saavedra MD   ondansetron (ZOFRAN ODT) 4 MG ODT tab Take 1 tablet (4 mg) by mouth every 6 hours as needed for nausea Past Month at Unknown time Yes Shaq Omer MD   fexofenadine (ALLEGRA) 180 MG tablet Take 1 tablet (180 mg) by mouth daily 7/10/2017 at 2000 Yes Reynaldo Saavedra MD   acetaminophen (TYLENOL) 500 MG tablet Take 500-1,000  "mg by mouth every 6 hours as needed for mild pain Past Week at Unknown time Yes Reported, Patient   fluticasone (FLONASE) 50 MCG/ACT nasal spray Spray 2 sprays into both nostrils as needed Past Month at Unknown time Yes Reported, Patient   methotrexate sodium 50 MG/2ML SOLN Inject 0.8 mLs (20 mg) as directed every 7 days 7/7/2017 at Unknown time  Freddy Guerra MD   Insulin Syringe-Needle U-100 (BD INSULIN SYRINGE) 27G X 1/2\" 1 ML MISC 1 Syringe every 7 days , to be used for methotrexate administration. Unknown at Unknown time  Freddy Guerra MD   ORDER FOR DME Use your CPAP device as directed by your provider. Unknown at Unknown time  Reported, Patient         Medication history completed by: Luz Vides, Pharm D Student       "

## 2017-07-11 NOTE — ED PROVIDER NOTES
"  History     Chief Complaint   Patient presents with     Headache     HPI  Sharon Fung is a 62 year old female with a history of RA, GERD, and BRETT (on CPAP) who presents for evaluation of a headache. Patient complains of an acute onset headache described as \"my head is going to pop\" that began after walking up the hill from the employee shuttle to the hospital for a meeting here at the [place]. A coworker who was with the patient at the time reports that they walked up the hill without issue, but when they got to the top of [place], the patient complained of an acute onset headache with associated diaphoresis, weakness, and nausea. Patient denies shortness of breath or wheezing. She reports she has had a similar headache in the past, approximately fiver years ago, for which she was hospitalized and diagnosed with a cluster headache. She denies any similar headaches since that incident. No known allergies.     I have reviewed the Medications, Allergies, Past Medical and Surgical History, and Social History in the [employer] system.  Past Medical History:   Diagnosis Date     AR (allergic rhinitis)  as teen     Arthritis 12/14/2015     Depressive disorder 3 years ago     GERD (gastroesophageal reflux disease) 4-07     Headache 7/11/2017     Mild major depression (H) 3 years ago     Morbid obesity (H) teens     BRETT (obstructive sleep apnea)     uses CPAP     Rheumatoid arthritis, adult (H)        Past Surgical History:   Procedure Laterality Date     BLEPHAROPLASTY BILATERAL Bilateral 8/5/2016    Procedure: BLEPHAROPLASTY BILATERAL;  Surgeon: Cortez Robin MD;  Location: [facility]     BREAST BIOPSY, RT/LT  1-94    Benign     C APPENDECTOMY  1977     COLONOSCOPY       COLONOSCOPY WITH CO2 INSUFFLATION N/A 3/30/2017    Procedure: COLONOSCOPY WITH CO2 INSUFFLATION;  Surgeon: Issa Weeks MD;  Location:  OR     ESOPHAGOSCOPY, GASTROSCOPY, DUODENOSCOPY (EGD), COMBINED N/A 10/29/2015    Procedure: COMBINED " ESOPHAGOSCOPY, GASTROSCOPY, DUODENOSCOPY (EGD);  Surgeon: Shaq Mims MD;  Location: [facility]     LAPAROSCOPIC GASTRIC ADJUSTABLE BANDING  11/09/10    Lap band procedure     LAPAROSCOPIC REMOVAL GASTRIC ADJUSTABLE BAND N/A 10/31/2015    Procedure: LAPAROSCOPIC REMOVAL GASTRIC ADJUSTABLE BAND;  Surgeon: Shaq Mims MD;  Location: [facility] OR     RELEASE CARPAL TUNNEL Left 4/27/2017    Procedure: RELEASE CARPAL TUNNEL;  Left Open Carpal Tunnel Release;  Surgeon: Ciara Middleton MD;  Location: [facility]     RELEASE CARPAL TUNNEL Right 6/15/2017    Procedure: RELEASE CARPAL TUNNEL;  Right Carpal Tunnel Release Open;  Surgeon: Ciara Middleton MD;  Location: [facility]     TUBAL LIGATION  1988       Family History   Problem Relation Age of Onset     HEART DISEASE Father      MI     Neurologic Disorder Father 79     Parkinson's     DIABETES Father      Hypertension Father      Coronary Artery Disease Father      CANCER Maternal Grandmother      uterine     Neurologic Disorder Sister 16     migraine     Depression Sister      Neurologic Disorder Mother 20     migraine     Neurologic Disorder Son 7     migraine     Substance Abuse Son        Social History   Substance Use Topics     Smoking status: Never Smoker     Smokeless tobacco: Never Used     Alcohol use Yes      Comment: very rare       Current Facility-Administered Medications   Medication     lidocaine 1 % 1 mL     lidocaine (LMX4) kit     ondansetron (ZOFRAN) injection 4 mg     HYDROmorphone (PF) (DILAUDID) injection 0.5 mg     0.9% sodium chloride infusion     acetaminophen (TYLENOL) tablet 500-1,000 mg     fexofenadine (ALLEGRA) tablet 180 mg     FLUoxetine (PROzac) capsule 20 mg     folic acid (FOLVITE) tablet 1 mg     HYDROcodone-acetaminophen (NORCO) 5-325 MG per tablet 1-2 tablet     tofacitinib (XELJANZ) 24 hr tablet 11 mg     naloxone (NARCAN) injection 0.1-0.4 mg     senna-docusate (SENOKOT-S;PERICOLACE) 8.6-50 MG per tablet 1-2  tablet     senna-docusate (SENOKOT-S;PERICOLACE) 8.6-50 MG per tablet 1-2 tablet     magnesium hydroxide (MILK OF MAGNESIA) suspension 30 mL     bisacodyl (DULCOLAX) Suppository 10 mg     ondansetron (ZOFRAN-ODT) ODT tab 4 mg    Or     ondansetron (ZOFRAN) injection 4 mg      No Known Allergies    Review of Systems   Constitutional: Positive for activity change, appetite change (nauseated) and diaphoresis. Negative for fever.   HENT: Positive for facial swelling. Negative for congestion and sinus pressure.    Eyes: Positive for photophobia. Negative for redness and visual disturbance.   Respiratory: Negative for shortness of breath and wheezing.    Cardiovascular: Negative for chest pain.   Gastrointestinal: Positive for nausea and vomiting. Negative for abdominal pain.   Genitourinary: Negative for difficulty urinating.   Musculoskeletal: Negative for arthralgias, gait problem, neck pain and neck stiffness.   Skin: Negative for color change, rash and wound.   Allergic/Immunologic: Positive for immunocompromised state (rheumatoid arthritis).   Neurological: Positive for weakness and headaches. Negative for seizures and numbness.   Psychiatric/Behavioral: Positive for decreased concentration and dysphoric mood. Negative for confusion.   All other systems reviewed and are negative.      Physical Exam    vitals    Physical Exam   Constitutional: She is oriented to person, place, and time. She appears well-developed and well-nourished. She appears distressed.   Patient moderate distress here though alert and oriented but feeling his of her head is going to explode.   HENT:   Head: Normocephalic and atraumatic.   Mouth/Throat: Oropharynx is clear and moist. No oropharyngeal exudate.   Eyes: Pupils are equal, round, and reactive to light. No scleral icterus.   Neck: Normal range of motion. Neck supple. No JVD present.   No meningeal signs   Cardiovascular: Regular rhythm, normal heart sounds and intact distal pulses.     Pulmonary/Chest: Breath sounds normal. No stridor. No respiratory distress.   Abdominal: Soft. Bowel sounds are normal. There is no tenderness.   Musculoskeletal: She exhibits no edema or tenderness.   Neurological: She is alert and oriented to person, place, and time. She has normal reflexes. No cranial nerve deficit. Coordination normal.   Skin: Skin is warm and dry. No rash noted. She is not diaphoretic. No erythema. No pallor.   Psychiatric:   Flat affect and comfortable.   Nursing note and vitals reviewed.      ED Course     ED Course     Procedures       8:45 AM  The patient was seen and examined by Dr. Reyes in Room 1.   Patient seen quickly upon arrival in ER.  2 IVs were established.  Patient feeling monitor noted be somewhat hypertensive    Neurologic intact.    Patient given Dilaudid and Zofran IV.    Patient sent to CT scan noncontrast head CT with Reglan 5 mg and Benadryl 25 mg IV given.    Neurology consult and see the patient immediately ER.    Upon return neurology requested 1% lidocaine nasal which was given one and half cc.  Without any relief    Patient return to CT scan still having a terrible headache still nauseated.  Patient head CT did not show any sign of intracranial hemorrhage.  Patient did receive 50 mg of Toradol IV    5 mg of Reglan given IV for continued nausea.  Liter normal saline bolus given IV Phenergan 12 and a pill grams IV along with Depacon 1000 mg IV given.    Medical somewhat improving.  Patient further evaluated by neurology.    CTA head and neck along with CTV of the head was done.  No acute findings reported.    Patient to be admitted to neurology service headache is somewhat improved.  Vitals are stable and improving.  Neurologically intact patient seems more baseline now.    MRI brain was ordered.  Patient admitted to neurology service 6 a for intractable thunderclap headache.         EKG Interpretation:      Interpreted by Niels Reyes  Time reviewed:  852  Symptoms at time of EKG: severe headache and n/v   Rhythm: normal sinus   Rate: normal  Axis: normal  Ectopy: none  Conduction: normal  ST Segments/ T Waves: No ST-T wave changes  Q Waves: none  Comparison to prior: No old EKG available    Clinical Impression: nsr with wandering baseline          Critical Care time:  none               Labs Ordered and Resulted from Time of ED Arrival Up to the Time of Departure from the ED   CBC WITH PLATELETS DIFFERENTIAL - Abnormal; Notable for the following:        Result Value    WBC 11.5 (*)     All other components within normal limits   COMPREHENSIVE METABOLIC PANEL - Abnormal; Notable for the following:     Potassium 3.2 (*)     All other components within normal limits   INR   PARTIAL THROMBOPLASTIN TIME   ERYTHROCYTE SEDIMENTATION RATE AUTO   CRP INFLAMMATION   GLUCOSE BY METER   INR POINT OF CARE   CREATININE POCT   PULSE OXIMETRY NURSING   CARDIAC CONTINUOUS MONITORING   PERIPHERAL IV CATHETER   ABO/RH TYPE AND SCREEN     Results for orders placed or performed during the hospital encounter of 07/11/17   CT Head w/o Contrast    Narrative    TEMPORARY 7/11/2017 9:21 AM    Provided History: Headache    Comparison: CT 10/29/2016    Technique: Using multidetector thin collimation helical acquisition  technique, axial, coronal and sagittal CT images from the skull base  to the vertex were obtained without intravenous contrast.     Findings:    No intracranial hemorrhage, mass effect, or midline shift. The  ventricles are proportionate to the cerebral sulci. The gray to white  matter differentiation of the cerebral hemispheres is preserved. The  basal cisterns are patent. There is moderate leukoaraiosis and  moderate generalized parenchyma volume loss.     The visualized paranasal sinuses are clear. The mastoid air cells are  clear.       Impression    Impression:   1. No acute intracranial pathology.  2. Moderate leukoaraiosis and generalized parenchyma volume loss.      I have personally reviewed the examination and initial interpretation  and I agree with the findings.    NASIM MARIE MD   CT Head Neck Angio w/o & w Contrast    Narrative     CTA head and neck, CT head venogram 7/11/2017 11:15 AM    CT venogram of the head  CT angiogram of the neck   CT angiogram of the base of the brain with contrast  Reconstruction by the Radiologist on the 3D workstation    Provided History:  Severe headaches    Comparison:  Same day noncontrast head CT, CT 10/29/2015.      Technique:     HEAD and NECK CTA: During rapid bolus intravenous injection of  nonionic contrast material, axial images were obtained using thin  collimation multidetector helical technique from the base of the skull  through the [place]. This CT angiogram data was reconstructed  at thin intervals with mild overlap. Images were sent to the [employer]  workstation, and 3D reconstructions were obtained. The axial source  images, multiplanar reformations, 3D reconstructions in both maximum  intensity projection display and volume rendered models were reviewed,  with reconstructions performed by the technologist and the  radiologist.  CT venogram: Helically acquired thin section axial CT images were  obtained with 1 mm collimation through the brain after intravenous  bolus injection of iodinated contrast medium with an approximately  20-25 second delay between administration of contrast and scanning.  Image data were sent to the [employer] 3D workstation and postprocessed by  the radiologist using maximum intensity pixel (MIP), multiplanar and  volume rendered 3D reconstruction programs.     Contrast:    Findings:  Head CTA demonstrates no definite aneurysm or stenosis of the major  intracranial arteries. The anterior communicating artery is patent.  The posterior communicating arteries are patent bilaterally.     Neck CTA demonstrates no evident stenosis of the major cervical  arteries. The origins of the great vessels  from the aortic arch are  patent. The normal distal right internal carotid artery measures 5 mm.  The normal distal left internal carotid artery measures 5 mm.  Retropharyngeal course of the internal carotid arteries.    CT venogram: The major dural veins and sinuses appear patent without  filling defect to suggest thrombus. Multiple arachnoid granulations  are noted.    No mass is noted within the visualized portions of the cervical soft  tissues or lung apices. 6 x 5 mm enhancing nodule in the left parotid  gland (series 6 image 424), partially obscured by amalgam artifact.  1.1 cm heterogeneous nodule in the left thyroid gland, given patient's  age this is statistically benign and no further follow-up is  warranted.      Impression    Impression:   1. Head CTA demonstrates no aneurysm or stenosis of the major  intracranial arteries.   2. Neck CTA demonstrates no stenosis of the major cervical arteries.   3. Head CT venogram demonstrates widely patent major dural venous  sinuses and veins.  4. Enhancing nodule in the left parotid gland, consider further  evaluation with MRI.    I have personally reviewed the examination and initial interpretation  and I agree with the findings.    NASIM MARIE MD   MR Brain w/o Contrast    Narrative    MR BRAIN W/O CONTRAST 7/11/2017 2:29 PM    Provided History: severe thunderclap headache  please eval for flare  or sign of sah per neuro request.    Comparison:  Same day     Technique: Sagittal T1-weighted and axial T2-weighted, turboFLAIR and  diffusion-weighted with ADC map images of the brain were obtained  without intravenous contrast.    Findings: These images reveal no intracranial mass lesion, mass  effect, midline shift or abnormal extraaxial fluid collection. The  ventricles and sulci are normal for age. Diffusion-weighted images  demonstrate no restricted diffusion.  Normal intravascular flow voids  are identified. Small right and trace left mastoid effusions.       Impression    Impression: Normal brain MRI. No evidence of subarachnoid hemorrhage  or stroke.    I have personally reviewed the examination and initial interpretation  and I agree with the findings.    NASIM MARIE MD   CBC with platelets differential   Result Value Ref Range    WBC 11.5 (H) 4.0 - 11.0 10e9/L    RBC Count 4.40 3.8 - 5.2 10e12/L    Hemoglobin 13.9 11.7 - 15.7 g/dL    Hematocrit 41.7 35.0 - 47.0 %    MCV 95 78 - 100 fl    MCH 31.6 26.5 - 33.0 pg    MCHC 33.3 31.5 - 36.5 g/dL    RDW 14.3 10.0 - 15.0 %    Platelet Count 430 150 - 450 10e9/L    Diff Method Automated Method     % Neutrophils 45.3 %    % Lymphocytes 44.3 %    % Monocytes 6.2 %    % Eosinophils 2.2 %    % Basophils 0.5 %    % Immature Granulocytes 1.5 %    Nucleated RBCs 0 0 /100    Absolute Neutrophil 5.2 1.6 - 8.3 10e9/L    Absolute Lymphocytes 5.1 0.8 - 5.3 10e9/L    Absolute Monocytes 0.7 0.0 - 1.3 10e9/L    Absolute Eosinophils 0.3 0.0 - 0.7 10e9/L    Absolute Basophils 0.1 0.0 - 0.2 10e9/L    Abs Immature Granulocytes 0.2 0 - 0.4 10e9/L    Absolute Nucleated RBC 0.0     Platelet Estimate Confirming automated cell count    INR   Result Value Ref Range    INR 0.89 0.86 - 1.14   Partial thromboplastin time   Result Value Ref Range    PTT 30 22 - 37 sec   Erythrocyte sedimentation rate auto   Result Value Ref Range    Sed Rate 17 0 - 30 mm/h   CRP inflammation   Result Value Ref Range    CRP Inflammation 3.7 0.0 - 8.0 mg/L   Comprehensive metabolic panel   Result Value Ref Range    Sodium 140 133 - 144 mmol/L    Potassium 3.2 (L) 3.4 - 5.3 mmol/L    Chloride 107 94 - 109 mmol/L    Carbon Dioxide 21 20 - 32 mmol/L    Anion Gap 12 3 - 14 mmol/L    Glucose 90 70 - 99 mg/dL    Urea Nitrogen 13 7 - 30 mg/dL    Creatinine 0.73 0.52 - 1.04 mg/dL    GFR Estimate 82 >60 mL/min/1.7m2    GFR Estimate If Black >90   GFR Calc   >60 mL/min/1.7m2    Calcium 9.9 8.5 - 10.1 mg/dL    Bilirubin Total 0.3 0.2 - 1.3 mg/dL    Albumin 4.0 3.4  - 5.0 g/dL    Protein Total 8.1 6.8 - 8.8 g/dL    Alkaline Phosphatase 90 40 - 150 U/L    ALT 47 0 - 50 U/L    AST 24 0 - 45 U/L   Glucose by meter   Result Value Ref Range    Glucose 98 70 - 99 mg/dL   INR point of care   Result Value Ref Range    INR Point of Care 0.9 0.86 - 1.14   Creatinine POCT   Result Value Ref Range    Creatinine 0.7 0.52 - 1.04 mg/dL    GFR Estimate 85 >60 mL/min/1.7m2    GFR Estimate If Black >90 >60 mL/min/1.7m2   EKG 12 lead   Result Value Ref Range    Interpretation ECG Click View Image link to view waveform and result    ABO/Rh type and screen   Result Value Ref Range    ABO O     RH(D)  Pos     Antibody Screen Neg     Test Valid Only At       [facility]    Specimen Expires 07/14/2017        Consults  Neurology: Responded (07/11/17 [id number])    Assessments & Plan (with Medical Decision Making)  62-year-old female with rheumatoid arthritis presents with severe thunderclap headache.  Nausea vomiting mildly hypertensive neurologically intact patient quickly evaluated given IV Dilaudid and Zofran and then Reglan and Benadryl IV patient sent to CT scan with neurology present CT scan head negative for bleeding patient given Toradol along with Reglan and Phenergan and Depacon IV some improvement now noted CTA head and neck along with CTV done negative findings.  MRI scan ordered patient admitted to neurology as headache is improved but will need further evaluation.  As noted patient declined lumbar puncture in the ER.           I have reviewed the nursing notes.    I have reviewed the findings, diagnosis, plan and need for follow up with the patient.    Current Discharge Medication List          Final diagnoses:   Acute intractable headache, unspecified headache type   Seropositive rheumatoid arthritis (H)   Nausea and vomiting, intractability of vomiting not specified, unspecified vomiting type   I, Elizabeth Geiger, am serving as a trained medical  scribe to document services personally performed by Niels Reyes MD, based on the provider's statements to me.   I, Niels Reyes MD, was physically present and have reviewed and verified the accuracy of this note documented by Elizabeth Geiger.      7/11/2017   [facility], [place], EMERGENCY DEPARTMENT      This note was created at least in part by the use of dragon voice dictation system. Inadvertent typographical errors may still exist.  Niels Reyes MD.         Niels Reyes MD  07/11/17 [id number]

## 2017-07-11 NOTE — IP AVS SNAPSHOT
MRN:[id number]                      After Visit Summary   7/11/2017    Sharon Fung    MRN: [id number]           Thank you!     Thank you for choosing [place] for your care. Our goal is always to provide you with excellent care. Hearing back from our patients is one way we can continue to improve our services. Please take a few minutes to complete the written survey that you may receive in the mail after you visit with us. Thank you!        Patient Information     Date Of Birth          1955        Designated Caregiver       Most Recent Value    Caregiver    Will someone help with your care after discharge? yes    Name of designated caregiver Danika    Phone number of caregiver [phone]    Caregiver address home on facesheet      About your hospital stay     You were admitted on:  July 11, 2017 You last received care in the:  [address] [facility]    You were discharged on:  July 15, 2017        Reason for your hospital stay       Headache                  Who to Call     For medical emergencies, please call 911.  For non-urgent questions about your medical care, please call your primary care provider or clinic, [phone]          Attending Provider     Provider Specialty    Niels Reyes MD Emergency Medicine    [place], Mercedes Arboleda MD Neurology       Primary Care Provider Office Phone # Fax #    Reynaldo Saavedra [phone]      After Care Instructions     Activity       Your activity upon discharge: activity as tolerated            Diet       Follow this diet upon discharge: Orders Placed This Encounter      Combination Diet Regular Diet Adult                  Follow-up Appointments     Adult [facility] Follow-up and recommended labs and tests       Follow up with primary care provider, Reynaldo Saavedra, within 7 days to evaluate medication change, for hospital follow- up and to follow up on results.  The following labs/tests are recommended: CBC, BMP.       Appointments on [place] and/or [facility] (with [facility] or [facility] provider or service). Call [phone] if you haven't heard regarding these appointments within 7 days of discharge.                  Your next 10 appointments already scheduled     Jul 20, 2017 11:50 AM CDT   (Arrive by 11:35 AM)   POST-OP HAND with Ciara Middleton MD   [facility] ([facility])    [address]  4th Floor  [facility] [place] [id number] [phone]            Aug 02, 2017  7:20 AM CDT   [facility] Visit with Freddy Guerra MD   [facility]    [address] [place] [id number] [phone]              Additional Services     Neurology Adult Referral       Please schedule follow up with neurology in 4 weeks.                  Pending Results     Date and Time Order Name Status Description    7/14/2017 1227 CSF Culture Aerobic Bacterial Tube 2 Preliminary     7/12/2017 1712 CSF Culture Aerobic Bacterial Preliminary             Statement of Approval     Ordered          07/15/17 7767  I have reviewed and agree with all the recommendations and orders detailed in this document.  EFFECTIVE NOW     Approved and electronically signed by:  Dk Lea MD             Admission Information     Date & Time Provider Department Dept. Phone    7/11/2017 Mercedes Youngblood MD [address] BMT [facility] [employer] [phone]      Your Vitals Were     Blood Pressure Pulse Temperature Respirations Weight Pulse Oximetry    133/81 76 98.8  F (37.1  C) (Axillary) 16 112.4 kg (247 lb 14.4 oz) 94%    BMI (Body Mass Index)                   42.55 kg/m2           MyChart Information     [employer] gives you secure access to your electronic health record. If you see a primary care provider, you can also send messages to your care team and make appointments. If you have questions, please call your primary care clinic.  If you do not have a primary  care provider, please call [phone] and they will assist you.        Care EveryWhere ID     This is your Care EveryWhere ID. This could be used by other organizations to access your [place] medical records  [id number]        Equal Access to Services     SVITLANA SERVIN : Hadii aad ku hadmargretjacki Soalexandro, waaxda luqadaha, qaybta kaalmada ademargarito, susan tarun manjitwesly myers barb harrison monahan. So [facility] [phone].    ATENCIÓN: Si habla español, tiene a ferrer disposición servicios gratuitos de asistencia lingüística. Llame al [phone].    We comply with applicable federal civil rights laws and [place] laws. We do not discriminate on the basis of race, color, national origin, age, disability sex, sexual orientation or gender identity.               Review of your medicines      START taking        Dose / Directions    ketorolac 10 MG tablet   Commonly known as:  TORADOL   Used for:  Acute intractable headache, unspecified headache type        Dose:  10 mg   Take 1 tablet (10 mg) by mouth every 6 hours as needed for moderate pain   Quantity:  20 tablet   Refills:  0       niMODipine 30 MG capsule   Commonly known as:  NIMOTOP   Used for:  Acute intractable headache, unspecified headache type        Dose:  30 mg   Take 1 capsule (30 mg) by mouth 3 times daily (before meals)   Quantity:  84 capsule   Refills:  0         CONTINUE these medicines which have NOT CHANGED        Dose / Directions    acetaminophen 500 MG tablet   Commonly known as:  TYLENOL        Dose:  500-1000 mg   Take 500-1,000 mg by mouth every 6 hours as needed for mild pain   Refills:  0       fexofenadine 180 MG tablet   Commonly known as:  ALLEGRA   Used for:  AR (allergic rhinitis)        Dose:  180 mg   Take 1 tablet (180 mg) by mouth daily   Quantity:  90 tablet   Refills:  2       FLONASE 50 MCG/ACT spray   Generic drug:  fluticasone        Dose:  2 spray   Spray 2 sprays into both nostrils as needed   Refills:  0       FLUoxetine 20 MG capsule  "  Commonly known as:  PROzac   Used for:  Major depressive disorder, recurrent episode, mild (H)        Dose:  20 mg   Take 1 capsule (20 mg) by mouth daily   Quantity:  90 capsule   Refills:  1       folic acid 1 MG tablet   Commonly known as:  FOLVITE   Used for:  Seropositive rheumatoid arthritis (H)        Dose:  1 mg   Take 1 tablet (1 mg) by mouth daily   Quantity:  100 tablet   Refills:  3       ibuprofen 200 MG capsule   Indication:  Rheumatoid Arthritis        Dose:  600-800 mg   Take 600-800 mg by mouth At Bedtime   Refills:  0       Insulin Syringe-Needle U-100 27G X 1/2\" 1 ML Misc   Commonly known as:  BD insulin syringe   Used for:  Seropositive rheumatoid arthritis (H), High risk medication use        Dose:  1 Syringe   1 Syringe every 7 days , to be used for methotrexate administration.   Quantity:  10 each   Refills:  5       methotrexate sodium 50 MG/2ML Soln   Used for:  Seropositive rheumatoid arthritis (H)        Dose:  20 mg   Inject 0.8 mLs (20 mg) as directed every 7 days   Quantity:  2 vial   Refills:  3       ondansetron 4 MG ODT tab   Commonly known as:  ZOFRAN ODT   Used for:  Nausea vomiting and diarrhea        Dose:  4 mg   Take 1 tablet (4 mg) by mouth every 6 hours as needed for nausea   Quantity:  12 tablet   Refills:  1       order for DME        Use your CPAP device as directed by your provider.   Refills:  0       RESTASIS 0.05 % ophthalmic emulsion   Generic drug:  cycloSPORINE        Dose:  1 drop   Place 1 drop into both eyes 2 times daily   Refills:  0       tofacitinib 11 MG 24 hr tablet   Commonly known as:  XELJANZ XR   Used for:  Seropositive rheumatoid arthritis (H), High risk medications (not anticoagulants) long-term use        Dose:  11 mg   Take 1 tablet (11 mg) by mouth daily   Quantity:  30 tablet   Refills:  6            Where to get your medicines      These medications were sent to [place], [place] - [address]  [address], [facility] [address]     Phone:  [phone]     ketorolac 10 MG tablet    niMODipine 30 MG capsule                Protect others around you: Learn how to safely use, store and throw away your medicines at www.disposemymeds.org.             Medication List: This is a list of all your medications and when to take them. Check marks below indicate your daily home schedule. Keep this list as a reference.      Medications           Morning Afternoon Evening Bedtime As Needed    acetaminophen 500 MG tablet   Commonly known as:  TYLENOL   Take 500-1,000 mg by mouth every 6 hours as needed for mild pain   Last time this was given:  1,000 mg on 7/15/2017  5:41 PM                                fexofenadine 180 MG tablet   Commonly known as:  ALLEGRA   Take 1 tablet (180 mg) by mouth daily   Last time this was given:  180 mg on 7/13/2017  8:53 PM                                FLONASE 50 MCG/ACT spray   Spray 2 sprays into both nostrils as needed   Generic drug:  fluticasone                                FLUoxetine 20 MG capsule   Commonly known as:  PROzac   Take 1 capsule (20 mg) by mouth daily   Last time this was given:  20 mg on 7/14/2017  9:36 PM                                folic acid 1 MG tablet   Commonly known as:  FOLVITE   Take 1 tablet (1 mg) by mouth daily   Last time this was given:  1 mg on 7/14/2017  9:36 PM                                ibuprofen 200 MG capsule   Take 600-800 mg by mouth At Bedtime                                Insulin Syringe-Needle U-100 27G X 1/2\" 1 ML Misc   Commonly known as:  BD insulin syringe   1 Syringe every 7 days , to be used for methotrexate administration.                                ketorolac 10 MG tablet   Commonly known as:  TORADOL   Take 1 tablet (10 mg) by mouth every 6 hours as needed for moderate pain                                methotrexate sodium 50 MG/2ML Soln   Inject 0.8 mLs (20 mg) as directed every 7 days                                niMODipine " 30 MG capsule   Commonly known as:  NIMOTOP   Take 1 capsule (30 mg) by mouth 3 times daily (before meals)   Last time this was given:  30 mg on 7/15/2017  2:46 PM                                ondansetron 4 MG ODT tab   Commonly known as:  ZOFRAN ODT   Take 1 tablet (4 mg) by mouth every 6 hours as needed for nausea   Last time this was given:  4 mg on 7/12/2017  5:03 AM                                order for DME   Use your CPAP device as directed by your provider.                                RESTASIS 0.05 % ophthalmic emulsion   Place 1 drop into both eyes 2 times daily   Generic drug:  cycloSPORINE                                tofacitinib 11 MG 24 hr tablet   Commonly known as:  XELJANZ XR   Take 1 tablet (11 mg) by mouth daily   Last time this was given:  11 mg on 7/14/2017  7:40 PM

## 2017-07-12 PROCEDURE — G0378 HOSPITAL OBSERVATION PER HR: HCPCS

## 2017-07-12 PROCEDURE — 96376 TX/PRO/DX INJ SAME DRUG ADON: CPT

## 2017-07-12 PROCEDURE — 25000128 H RX IP 250 OP 636: Performed by: FAMILY MEDICINE

## 2017-07-12 PROCEDURE — 25000128 H RX IP 250 OP 636: Performed by: STUDENT IN AN ORGANIZED HEALTH CARE EDUCATION/TRAINING PROGRAM

## 2017-07-12 PROCEDURE — 25000125 ZZHC RX 250: Performed by: STUDENT IN AN ORGANIZED HEALTH CARE EDUCATION/TRAINING PROGRAM

## 2017-07-12 PROCEDURE — 25000132 ZZH RX MED GY IP 250 OP 250 PS 637: Performed by: STUDENT IN AN ORGANIZED HEALTH CARE EDUCATION/TRAINING PROGRAM

## 2017-07-12 PROCEDURE — 96375 TX/PRO/DX INJ NEW DRUG ADDON: CPT

## 2017-07-12 RX ORDER — DIPHENHYDRAMINE HYDROCHLORIDE 50 MG/ML
50 INJECTION INTRAMUSCULAR; INTRAVENOUS ONCE
Status: COMPLETED | OUTPATIENT
Start: 2017-07-12 | End: 2017-07-12

## 2017-07-12 RX ORDER — HYDROMORPHONE HYDROCHLORIDE 1 MG/ML
0.5 INJECTION, SOLUTION INTRAMUSCULAR; INTRAVENOUS; SUBCUTANEOUS
Status: COMPLETED | OUTPATIENT
Start: 2017-07-12 | End: 2017-07-12

## 2017-07-12 RX ORDER — DIPHENHYDRAMINE HYDROCHLORIDE 50 MG/ML
25 INJECTION INTRAMUSCULAR; INTRAVENOUS EVERY 6 HOURS PRN
Status: DISCONTINUED | OUTPATIENT
Start: 2017-07-12 | End: 2017-07-15 | Stop reason: HOSPADM

## 2017-07-12 RX ORDER — DIPHENHYDRAMINE HCL 25 MG
25 CAPSULE ORAL EVERY 6 HOURS PRN
Status: DISCONTINUED | OUTPATIENT
Start: 2017-07-12 | End: 2017-07-15 | Stop reason: HOSPADM

## 2017-07-12 RX ORDER — LORAZEPAM 2 MG/ML
0.5 INJECTION INTRAMUSCULAR ONCE
Status: COMPLETED | OUTPATIENT
Start: 2017-07-12 | End: 2017-07-12

## 2017-07-12 RX ORDER — KETOROLAC TROMETHAMINE 30 MG/ML
30 INJECTION, SOLUTION INTRAMUSCULAR; INTRAVENOUS ONCE
Status: COMPLETED | OUTPATIENT
Start: 2017-07-12 | End: 2017-07-12

## 2017-07-12 RX ADMIN — KETOROLAC TROMETHAMINE 30 MG: 30 INJECTION, SOLUTION INTRAMUSCULAR at 14:10

## 2017-07-12 RX ADMIN — HYDROCODONE BITARTRATE AND ACETAMINOPHEN 2 TABLET: 5; 325 TABLET ORAL at 06:52

## 2017-07-12 RX ADMIN — SODIUM CHLORIDE: 9 INJECTION, SOLUTION INTRAVENOUS at 16:21

## 2017-07-12 RX ADMIN — ONDANSETRON 4 MG: 4 TABLET, ORALLY DISINTEGRATING ORAL at 05:03

## 2017-07-12 RX ADMIN — HYDROMORPHONE HYDROCHLORIDE 0.5 MG: 1 INJECTION, SOLUTION INTRAMUSCULAR; INTRAVENOUS; SUBCUTANEOUS at 15:55

## 2017-07-12 RX ADMIN — PROCHLORPERAZINE EDISYLATE 10 MG: 5 INJECTION INTRAMUSCULAR; INTRAVENOUS at 23:15

## 2017-07-12 RX ADMIN — DIPHENHYDRAMINE HYDROCHLORIDE 25 MG: 50 INJECTION, SOLUTION INTRAMUSCULAR; INTRAVENOUS at 19:32

## 2017-07-12 RX ADMIN — KETOROLAC TROMETHAMINE 30 MG: 30 INJECTION, SOLUTION INTRAMUSCULAR at 08:13

## 2017-07-12 RX ADMIN — KETOROLAC TROMETHAMINE 30 MG: 30 INJECTION, SOLUTION INTRAMUSCULAR at 02:02

## 2017-07-12 RX ADMIN — Medication 2.5 MG: at 05:03

## 2017-07-12 RX ADMIN — LORAZEPAM 0.5 MG: 2 INJECTION INTRAMUSCULAR; INTRAVENOUS at 15:32

## 2017-07-12 RX ADMIN — DIPHENHYDRAMINE HYDROCHLORIDE 50 MG: 50 INJECTION, SOLUTION INTRAMUSCULAR; INTRAVENOUS at 23:15

## 2017-07-12 RX ADMIN — SODIUM CHLORIDE: 9 INJECTION, SOLUTION INTRAVENOUS at 06:48

## 2017-07-12 RX ADMIN — KETOROLAC TROMETHAMINE 30 MG: 30 INJECTION, SOLUTION INTRAMUSCULAR at 23:14

## 2017-07-12 RX ADMIN — KETOROLAC TROMETHAMINE 30 MG: 30 INJECTION, SOLUTION INTRAMUSCULAR at 19:32

## 2017-07-12 RX ADMIN — ONDANSETRON 4 MG: 2 INJECTION INTRAMUSCULAR; INTRAVENOUS at 19:40

## 2017-07-12 RX ADMIN — ONDANSETRON 4 MG: 2 INJECTION INTRAMUSCULAR; INTRAVENOUS at 16:30

## 2017-07-12 RX ADMIN — HYDROCODONE BITARTRATE AND ACETAMINOPHEN 2 TABLET: 5; 325 TABLET ORAL at 11:05

## 2017-07-12 RX ADMIN — HYDROCODONE BITARTRATE AND ACETAMINOPHEN 1 TABLET: 5; 325 TABLET ORAL at 00:40

## 2017-07-12 ASSESSMENT — PAIN DESCRIPTION - DESCRIPTORS
DESCRIPTORS: THROBBING
DESCRIPTORS: HEADACHE;THROBBING
DESCRIPTORS: THROBBING
DESCRIPTORS: HEADACHE

## 2017-07-12 NOTE — PLAN OF CARE
Problem: Discharge Planning  Goal: Discharge Planning (Adult, OB, Behavioral, Peds)  PRIMARY DIAGNOSIS: ACUTE PAIN  OUTPATIENT/OBSERVATION GOALS TO BE MET BEFORE DISCHARGE:  1. Pain Status: No improvement noted. Consider adjustment in pain regimen.  Pain meds, alternative therapies- cold, eye patch, aromatherapy, sea bands, environment changes (dark and quiet room)     2. Return to near baseline physical activity: No     3. Cleared for discharge by consultants (if involved): No     Discharge Planner Nurse   Safe discharge environment identified: No  Barriers to discharge: Yes- pain       Entered by: AMBER PERKINS 07/12/2017 5:16 PM     Please review provider order for any additional goals.   Nurse to notify provider when observation goals have been met and patient is ready for discharge.

## 2017-07-12 NOTE — PLAN OF CARE
"Problem: Goal Outcome Summary  Goal: Goal Outcome Summary  Outcome: No Change  AVSS. Pt continues to complain of headache. Describes it as \"throbbing\" and constant. Norco given x 1, toradol given x 1, and zyprexa x 1. Pt reports pain at a 5. Zofran given for nausea. IVMF running at 100 ml/hr. Continue to monitor.       "

## 2017-07-12 NOTE — UTILIZATION REVIEW
"    Admission Status; Secondary Review Determination         Under the authority of the Utilization Management Committee, the utilization review process indicated a secondary review on the above patient.  The review outcome is based on review of the medical records, discussions with staff, and applying clinical experience noted on the date of the review.        ()      Inpatient Status Appropriate - This patient's medical care is consistent with medical management for inpatient care and reasonable inpatient medical practice.      (x) Observation Status Appropriate - This patient does not meet hospital inpatient criteria and is placed in observation status. If this patient's primary payer is Medicare and was admitted as an inpatient, Condition Code 44 should be used and patient status changed to \"observation\".   () Admission Status NOT Appropriate - This patient's medical care is not consistent with medical management for Inpatient or Observation Status.          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 Those symptoms started minutes before. His headache is on the Right side and she also refers photophobia.\"    Pt has had work-up here to include CT head, CTA head, MRI brain which have not showed any acute process. She is getting IV toradol. I discussed her care with the attending physician, Dr Youngblood and it seems patient likely will discharge tomorrow and hence, observation status is appropriate.    The severity of illness, intensity of service provided make it  appropriate for hospital observation.        The information on this document is developed by the utilization review team in order for the business office to ensure compliance.  This only denotes the appropriateness of proper admission status and does not reflect the quality of care rendered.         The definitions of Inpatient Status and " Observation Status used in making the determination above are those provided in the CMS Coverage Manual, Chapter 1 and Chapter 6, section 70.4.      Sincerely,     TY CHRISTENSEN MD    Physician Advisor  Utilization Review/ Case Management  [facility].

## 2017-07-12 NOTE — PROGRESS NOTES
[facility] [place]   Neurology Daily Note        Summary:   Ms. Fung is a 62 year old female with h/o HTN and RA who was admitted 7/11 for sudden onset, intense headache with nausea.  She is currently being treated with toradol.        Overnight:   No acute events overnight.  Patient received additional doses of toradol and zyprexa.  She states she had a similar episode in 2010 that was difficult to break.  She says the pain is tolerable if she doesn't talk or move.  It is currently 5/10 in severity.  We discussed risks and benefits of LP today and she is agreeable.                 Medications:     Current Facility-Administered Medications   Medication     lidocaine 1 % 1 mL     lidocaine (LMX4) kit     ondansetron (ZOFRAN) injection 4 mg     acetaminophen (TYLENOL) tablet 500-1,000 mg     fexofenadine (ALLEGRA) tablet 180 mg     FLUoxetine (PROzac) capsule 20 mg     folic acid (FOLVITE) tablet 1 mg     HYDROcodone-acetaminophen (NORCO) 5-325 MG per tablet 1-2 tablet     tofacitinib (XELJANZ) 24 hr tablet 11 mg     naloxone (NARCAN) injection 0.1-0.4 mg     senna-docusate (SENOKOT-S;PERICOLACE) 8.6-50 MG per tablet 1-2 tablet     senna-docusate (SENOKOT-S;PERICOLACE) 8.6-50 MG per tablet 1-2 tablet     magnesium hydroxide (MILK OF MAGNESIA) suspension 30 mL     bisacodyl (DULCOLAX) Suppository 10 mg     ondansetron (ZOFRAN-ODT) ODT tab 4 mg    Or     ondansetron (ZOFRAN) injection 4 mg     ketorolac (TORADOL) injection 30 mg     0.9% sodium chloride infusion     OLANZapine zydis (zyPREXA) ODT half-tab 2.5 mg            Exam     /66 (BP Location: Right arm)  Pulse 79  Temp 99.1  F (37.3  C) (Axillary)  Resp 16  SpO2 94%BMI    Constitutional : well-built  Head: atraumatic, anicteric. Tender to palpation over left frontal and temporal area  Eyes: see neuroexam  Psychiatry: in good mood. Collaborative  Neuroexam  Alert and interactive.  Follow commands  EOM normal, no  nystagmus noted  Facial sensation symmetric  Facial movements full and symmetric  Palatal elevation symmetric  Tongue centered  Strength 5/5 in upper and lower extremities bilaterally  Reflexes:   Unable to obtain biceps or brachioradialis   Patellar 2+ right, 1+ left   Unable to obtain achilles  No tremors  No aphasia  No dysarthria            Data:     Results for orders placed or performed during the hospital encounter of 07/11/17 (from the past 24 hour(s))   CT Head w/o Contrast    Narrative    TEMPORARY 7/11/2017 9:21 AM    Provided History: Headache    Comparison: CT 10/29/2016    Technique: Using multidetector thin collimation helical acquisition  technique, axial, coronal and sagittal CT images from the skull base  to the vertex were obtained without intravenous contrast.     Findings:    No intracranial hemorrhage, mass effect, or midline shift. The  ventricles are proportionate to the cerebral sulci. The gray to white  matter differentiation of the cerebral hemispheres is preserved. The  basal cisterns are patent. There is moderate leukoaraiosis and  moderate generalized parenchyma volume loss.     The visualized paranasal sinuses are clear. The mastoid air cells are  clear.       Impression    Impression:   1. No acute intracranial pathology.  2. Moderate leukoaraiosis and generalized parenchyma volume loss.     I have personally reviewed the examination and initial interpretation  and I agree with the findings.    NASIM MARIE MD   CT Head Neck Angio w/o & w Contrast    Narrative     CTA head and neck, CT head venogram 7/11/2017 11:15 AM    CT venogram of the head  CT angiogram of the neck   CT angiogram of the base of the brain with contrast  Reconstruction by the Radiologist on the 3D workstation    Provided History:  Severe headaches    Comparison:  Same day noncontrast head CT, CT 10/29/2015.      Technique:     HEAD and NECK CTA: During rapid bolus intravenous injection of  nonionic contrast  material, axial images were obtained using thin  collimation multidetector helical technique from the base of the skull  through the [place]. This CT angiogram data was reconstructed  at thin intervals with mild overlap. Images were sent to the [employer]  workstation, and 3D reconstructions were obtained. The axial source  images, multiplanar reformations, 3D reconstructions in both maximum  intensity projection display and volume rendered models were reviewed,  with reconstructions performed by the technologist and the  radiologist.  CT venogram: Helically acquired thin section axial CT images were  obtained with 1 mm collimation through the brain after intravenous  bolus injection of iodinated contrast medium with an approximately  20-25 second delay between administration of contrast and scanning.  Image data were sent to the [employer] 3D workstation and postprocessed by  the radiologist using maximum intensity pixel (MIP), multiplanar and  volume rendered 3D reconstruction programs.     Contrast:    Findings:  Head CTA demonstrates no definite aneurysm or stenosis of the major  intracranial arteries. The anterior communicating artery is patent.  The posterior communicating arteries are patent bilaterally.     Neck CTA demonstrates no evident stenosis of the major cervical  arteries. The origins of the great vessels from the aortic arch are  patent. The normal distal right internal carotid artery measures 5 mm.  The normal distal left internal carotid artery measures 5 mm.  Retropharyngeal course of the internal carotid arteries.    CT venogram: The major dural veins and sinuses appear patent without  filling defect to suggest thrombus. Multiple arachnoid granulations  are noted.    No mass is noted within the visualized portions of the cervical soft  tissues or lung apices. 6 x 5 mm enhancing nodule in the left parotid  gland (series 6 image 424), partially obscured by amalgam artifact.  1.1 cm heterogeneous  nodule in the left thyroid gland, given patient's  age this is statistically benign and no further follow-up is  warranted.      Impression    Impression:   1. Head CTA demonstrates no aneurysm or stenosis of the major  intracranial arteries.   2. Neck CTA demonstrates no stenosis of the major cervical arteries.   3. Head CT venogram demonstrates widely patent major dural venous  sinuses and veins.  4. Enhancing nodule in the left parotid gland, consider further  evaluation with MRI.    I have personally reviewed the examination and initial interpretation  and I agree with the findings.    NASIM MARIE MD   MR Brain w/o Contrast    Narrative    MR BRAIN W/O CONTRAST 7/11/2017 2:29 PM    Provided History: severe thunderclap headache  please eval for flare  or sign of sah per neuro request.    Comparison:  Same day     Technique: Sagittal T1-weighted and axial T2-weighted, turboFLAIR and  diffusion-weighted with ADC map images of the brain were obtained  without intravenous contrast.    Findings: These images reveal no intracranial mass lesion, mass  effect, midline shift or abnormal extraaxial fluid collection. The  ventricles and sulci are normal for age. Diffusion-weighted images  demonstrate no restricted diffusion.  Normal intravascular flow voids  are identified. Small right and trace left mastoid effusions.      Impression    Impression: Normal brain MRI. No evidence of subarachnoid hemorrhage  or stroke.    I have personally reviewed the examination and initial interpretation  and I agree with the findings.    NASIM MARIE MD              Assessment and Plan:   #Thunderclap headache:   CT BRain negative for SAH, but done in less than one hour from beginning of pain so this has not completely been ruled out. Patient refused LP multiple times, even when explained that this exam could ruled out  A life- threatining condition. CTA head and neck was done to investigate possible existence of aneurysm or RCVS and  returned negative. Venogram ruled out sinus venous thrombosis. MRI HEad did not show any Flair changes suggestive of SAH or any other intraparenchymal lesion.   -Toradol 30 mg IV Q6H  -Zofran 4 mg Q6H PRN  -Benadryl 25 mg Q6H  -LP via IR  -Avoid ergots and triptans  -Avoid narcotics  - ml/hr    #HTN:  She has a history of hypertension.  She had an isolated elevated pressure yesterday which was likely secondary to pain.         # Ppx: Ambulate  # FEN: Regular diet  # Code: Full    Dk Lea    PGY2 NEurology  [id number]

## 2017-07-12 NOTE — PLAN OF CARE
Problem: Goal Outcome Summary  Goal: Goal Outcome Summary  Pt continues to have HA without any relief. HA described as throbbing, only on right side, behind eye. Toradol and norco given. Ice packs applied over forehead. Intermittent nausea noted, yet declines medication. Pt ambulating to bathroom independently. Voiding, not saving. Writer requested zyprexa. Pt to have LP doday. Unsure of time. Continue with poc.    Pt changed to observation status. D/c'ed norco. Team would like to try prn Toradol and benadryl. Asked team to place observation goals.

## 2017-07-12 NOTE — PLAN OF CARE
Problem: Goal Outcome Summary  Goal: Goal Outcome Summary  Outcome: No Change  Vitals:     07/12/17 0459 07/12/17 0745 07/12/17 1503 07/12/17 1549   BP: 114/67 129/66   119/64   BP Location: Right arm Right arm   Right arm   Pulse:       75   Resp: 16 16 16   Temp: 98.8  F (37.1  C) 99.1  F (37.3  C)   98.5  F (36.9  C)   TempSrc: Axillary Axillary   Axillary   SpO2: 95% 94%   95%   Weight:     114.2 kg (251 lb 11.2 oz)       Pt on observation status. Pt A/O, VSS on RA. Headache and photophobia persistent, pain meds mildly effective. Neurology attempted LP this afternoon but unable, order for LP to be done in IR. Pt received IV Ativan and IV Dilaudid x1 prior to LP attempt. IV Zofran given x1. Continue w/goal assessment.

## 2017-07-13 ENCOUNTER — APPOINTMENT (OUTPATIENT)
Dept: GENERAL RADIOLOGY | Facility: CLINIC | Age: 62
DRG: 103 | End: 2017-07-13
Attending: STUDENT IN AN ORGANIZED HEALTH CARE EDUCATION/TRAINING PROGRAM
Payer: COMMERCIAL

## 2017-07-13 LAB
APPEARANCE CSF: ABNORMAL
APPEARANCE CSF: ABNORMAL
BASOPHILS NFR CSF MANUAL: 1 %
COLOR CSF: ABNORMAL
COLOR CSF: ABNORMAL
EOSINOPHIL NFR CSF MANUAL: 2 %
GLUCOSE CSF-MCNC: 50 MG/DL (ref 40–70)
GRAM STN SPEC: NORMAL
LYMPH ABN NFR CSF MANUAL: 52 %
LYMPH ABN NFR CSF MANUAL: 67 %
MICRO REPORT STATUS: NORMAL
MONOS+MACROS NFR CSF MANUAL: 5 %
MONOS+MACROS NFR CSF MANUAL: 9 %
NEUTROPHILS NFR CSF MANUAL: 24 %
NEUTROPHILS NFR CSF MANUAL: 40 %
PROT CSF-MCNC: 55 MG/DL (ref 15–60)
RBC # CSF MANUAL: 3712 /UL (ref 0–2)
RBC # CSF MANUAL: 9000 /UL (ref 0–2)
SPECIMEN SOURCE: NORMAL
TUBE # CSF: 1 #
TUBE # CSF: 4 #
WBC # CSF MANUAL: 6 /UL (ref 0–5)
WBC # CSF MANUAL: 6 /UL (ref 0–5)

## 2017-07-13 PROCEDURE — 96376 TX/PRO/DX INJ SAME DRUG ADON: CPT

## 2017-07-13 PROCEDURE — 87205 SMEAR GRAM STAIN: CPT | Performed by: STUDENT IN AN ORGANIZED HEALTH CARE EDUCATION/TRAINING PROGRAM

## 2017-07-13 PROCEDURE — 89051 BODY FLUID CELL COUNT: CPT | Performed by: STUDENT IN AN ORGANIZED HEALTH CARE EDUCATION/TRAINING PROGRAM

## 2017-07-13 PROCEDURE — 009U3ZX DRAINAGE OF SPINAL CANAL, PERCUTANEOUS APPROACH, DIAGNOSTIC: ICD-10-PCS | Performed by: RADIOLOGY

## 2017-07-13 PROCEDURE — 25000132 ZZH RX MED GY IP 250 OP 250 PS 637: Performed by: STUDENT IN AN ORGANIZED HEALTH CARE EDUCATION/TRAINING PROGRAM

## 2017-07-13 PROCEDURE — 96375 TX/PRO/DX INJ NEW DRUG ADDON: CPT

## 2017-07-13 PROCEDURE — 40000556 ZZH STATISTIC PERIPHERAL IV START W US GUIDANCE

## 2017-07-13 PROCEDURE — 25000125 ZZHC RX 250: Performed by: RADIOLOGY

## 2017-07-13 PROCEDURE — 82945 GLUCOSE OTHER FLUID: CPT | Performed by: STUDENT IN AN ORGANIZED HEALTH CARE EDUCATION/TRAINING PROGRAM

## 2017-07-13 PROCEDURE — G0378 HOSPITAL OBSERVATION PER HR: HCPCS

## 2017-07-13 PROCEDURE — 62270 DX LMBR SPI PNXR: CPT

## 2017-07-13 PROCEDURE — 25000125 ZZHC RX 250: Performed by: STUDENT IN AN ORGANIZED HEALTH CARE EDUCATION/TRAINING PROGRAM

## 2017-07-13 PROCEDURE — 25000128 H RX IP 250 OP 636: Performed by: STUDENT IN AN ORGANIZED HEALTH CARE EDUCATION/TRAINING PROGRAM

## 2017-07-13 PROCEDURE — 84157 ASSAY OF PROTEIN OTHER: CPT | Performed by: STUDENT IN AN ORGANIZED HEALTH CARE EDUCATION/TRAINING PROGRAM

## 2017-07-13 PROCEDURE — 87070 CULTURE OTHR SPECIMN AEROBIC: CPT | Performed by: STUDENT IN AN ORGANIZED HEALTH CARE EDUCATION/TRAINING PROGRAM

## 2017-07-13 RX ORDER — NIMODIPINE 30 MG/1
30 CAPSULE, LIQUID FILLED ORAL
Status: DISCONTINUED | OUTPATIENT
Start: 2017-07-13 | End: 2017-07-15 | Stop reason: HOSPADM

## 2017-07-13 RX ORDER — OLANZAPINE 2.5 MG/1
2.5 TABLET, FILM COATED ORAL ONCE
Status: COMPLETED | OUTPATIENT
Start: 2017-07-13 | End: 2017-07-13

## 2017-07-13 RX ADMIN — KETOROLAC TROMETHAMINE 30 MG: 30 INJECTION, SOLUTION INTRAMUSCULAR at 12:00

## 2017-07-13 RX ADMIN — ACETAMINOPHEN 1000 MG: 500 TABLET, FILM COATED ORAL at 19:11

## 2017-07-13 RX ADMIN — ONDANSETRON 4 MG: 2 INJECTION INTRAMUSCULAR; INTRAVENOUS at 08:21

## 2017-07-13 RX ADMIN — DIPHENHYDRAMINE HYDROCHLORIDE 25 MG: 50 INJECTION, SOLUTION INTRAMUSCULAR; INTRAVENOUS at 18:06

## 2017-07-13 RX ADMIN — SODIUM CHLORIDE: 9 INJECTION, SOLUTION INTRAVENOUS at 13:49

## 2017-07-13 RX ADMIN — KETOROLAC TROMETHAMINE 30 MG: 30 INJECTION, SOLUTION INTRAMUSCULAR at 18:07

## 2017-07-13 RX ADMIN — FOLIC ACID 1 MG: 1 TABLET ORAL at 20:53

## 2017-07-13 RX ADMIN — FEXOFENADINE HYDROCHLORIDE 180 MG: 180 TABLET, FILM COATED ORAL at 20:53

## 2017-07-13 RX ADMIN — ONDANSETRON 4 MG: 2 INJECTION INTRAMUSCULAR; INTRAVENOUS at 15:51

## 2017-07-13 RX ADMIN — LIDOCAINE HYDROCHLORIDE 10 ML: 10 INJECTION, SOLUTION EPIDURAL; INFILTRATION; INTRACAUDAL; PERINEURAL at 14:46

## 2017-07-13 RX ADMIN — DIPHENHYDRAMINE HYDROCHLORIDE 25 MG: 50 INJECTION, SOLUTION INTRAMUSCULAR; INTRAVENOUS at 11:59

## 2017-07-13 RX ADMIN — Medication 2.5 MG: at 19:12

## 2017-07-13 RX ADMIN — NIMODIPINE 30 MG: 30 CAPSULE, LIQUID FILLED ORAL at 17:13

## 2017-07-13 RX ADMIN — OLANZAPINE 2.5 MG: 2.5 TABLET, FILM COATED ORAL at 16:35

## 2017-07-13 RX ADMIN — Medication 2 G: at 17:13

## 2017-07-13 RX ADMIN — FLUOXETINE 20 MG: 20 CAPSULE ORAL at 20:53

## 2017-07-13 RX ADMIN — DIPHENHYDRAMINE HYDROCHLORIDE 25 MG: 50 INJECTION, SOLUTION INTRAMUSCULAR; INTRAVENOUS at 05:23

## 2017-07-13 RX ADMIN — Medication 2.5 MG: at 08:21

## 2017-07-13 RX ADMIN — NIMODIPINE 30 MG: 30 CAPSULE, LIQUID FILLED ORAL at 13:41

## 2017-07-13 RX ADMIN — KETOROLAC TROMETHAMINE 30 MG: 30 INJECTION, SOLUTION INTRAMUSCULAR at 05:23

## 2017-07-13 ASSESSMENT — PAIN DESCRIPTION - DESCRIPTORS
DESCRIPTORS: CONSTANT;THROBBING
DESCRIPTORS: HEADACHE;THROBBING

## 2017-07-13 NOTE — PLAN OF CARE
Problem: Discharge Planning  Goal: Discharge Planning (Adult, OB, Behavioral, Peds)  Outcome: No Change        OUTPATIENT/OBSERVATION GOALS TO BE MET BEFORE DISCHARGE:  1. Pain Status: No improvement noted. Consider adjustment in pain regimen.  Pain meds, alternative therapies- cold, eye patch, aromatherapy, sea bands, environment changes (dark and quiet room)      2. Return to near baseline physical activity: No      3. Cleared for discharge by consultants (if involved): No      Discharge Planner Nurse   Safe discharge environment identified: No  Barriers to discharge: Yes- pain            Please review provider order for any additional goals.   Nurse to notify provider when observation goals have been met and patient is ready for discharge

## 2017-07-13 NOTE — PLAN OF CARE
Problem: Discharge Planning  Goal: Discharge Planning (Adult, OB, Behavioral, Peds)  Outcome: No Change  OUTPATIENT/OBSERVATION GOALS TO BE MET BEFORE DISCHARGE:  1. Pain Status: Slight improvement noted. Pt able to sleep after toradol/compazine/benadryl combination. Consider adjustment in pain regimen.  Pain meds, alternative therapies- cold, eye patch, aromatherapy, sea bands, environment changes (dark and quiet room)      2. Return to near baseline physical activity: No      3. Cleared for discharge by consultants (if involved): No      Discharge Planner Nurse   Safe discharge environment identified: No  Barriers to discharge: Yes- pain          Please review provider order for any additional goals.   Nurse to notify provider when observation goals have been met and patient is ready for discharge

## 2017-07-13 NOTE — PROGRESS NOTES
[facility], [place]   Neurology Daily Note        Summary:   Ms. Fung is a 62 year old female with h/o HTN and RA who was admitted 7/11 for sudden onset, intense headache with nausea.  She is currently being treated with toradol.        Overnight:   No acute events overnight.  She continued to have 5/10 headache overnight but felt somewhat improved at midday.  Just before the LP her pain increased to 8/10 in severity.  No other concerns.                 Medications:     Current Facility-Administered Medications   Medication     niMODipine (NIMOTOP) capsule 30 mg     diphenhydrAMINE (BENADRYL) capsule 25 mg    Or     diphenhydrAMINE (BENADRYL) injection 25 mg     lidocaine 1 % 1 mL     lidocaine (LMX4) kit     acetaminophen (TYLENOL) tablet 500-1,000 mg     fexofenadine (ALLEGRA) tablet 180 mg     FLUoxetine (PROzac) capsule 20 mg     folic acid (FOLVITE) tablet 1 mg     tofacitinib (XELJANZ) 24 hr tablet 11 mg     naloxone (NARCAN) injection 0.1-0.4 mg     senna-docusate (SENOKOT-S;PERICOLACE) 8.6-50 MG per tablet 1-2 tablet     senna-docusate (SENOKOT-S;PERICOLACE) 8.6-50 MG per tablet 1-2 tablet     magnesium hydroxide (MILK OF MAGNESIA) suspension 30 mL     bisacodyl (DULCOLAX) Suppository 10 mg     ondansetron (ZOFRAN-ODT) ODT tab 4 mg    Or     ondansetron (ZOFRAN) injection 4 mg     ketorolac (TORADOL) injection 30 mg     0.9% sodium chloride infusion     OLANZapine zydis (zyPREXA) ODT half-tab 2.5 mg         Exam     /76 (BP Location: Right arm)  Pulse 76  Temp 98.6  F (37  C) (Axillary)  Resp 16  Wt 114.2 kg (251 lb 11.2 oz)  SpO2 97%  BMI 43.2 kg/m2BMI    Constitutional : well-built  Head: atraumatic, anicteric. Tender to palpation over left frontal and temporal area  Eyes: see neuroexam  Psychiatry: in good mood. Collaborative  Neuroexam  Alert and interactive.  Follow commands  EOM normal, no nystagmus noted  Facial sensation symmetric  Facial movements full  and symmetric  Palatal elevation symmetric  Tongue centered  Strength 5/5 in upper and lower extremities bilaterally  Reflexes:   Unable to obtain biceps or brachioradialis   Patellar 1+ right, 1+ left   Unable to obtain achilles  No tremors  No aphasia  No dysarthria            Data:     Results for orders placed or performed during the hospital encounter of 07/11/17 (from the past 24 hour(s))   Glucose CSF: Tube 1   Result Value Ref Range    Glucose CSF 50 40 - 70 mg/dL   Protein total CSF: Tube 1   Result Value Ref Range    Protein Total CSF 55 15 - 60 mg/dL   XR Lumbar Puncture Spinal Tap Diag    Narrative    Lumbar Puncture using Fluoroscopy    History:  evaluate for aneurysmal bleed.    Procedure note: Verbal and written consent for lumbar puncture was  obtained from the patient, and benefits and risk of the procedure were  explained, including but not limited to worsening headache,  hemorrhage, infection, lower extremity pain, or nerve root injury. The  patient was sterilely prepped and draped with the patient in the prone  position, over the lower back. Under fluoroscopic guidance, the  interlaminar spaces were noted. 1% lidocaine was administered for  local anesthetic over the L2-3 interlaminar space, and a 22 gauge 5  inch needle was advanced into the thecal sac under fluoroscopic  guidance.  There was initial show of blood-tinged CSF. CSF fluid did  not clear during collection. Approximately 10 cc of CSF were  collected.    The needle was removed with the stylet in place. There was no  immediate complication associated with the procedure. Samples were  sent for the requested laboratory testing.    Estimated blood loss: Less than 1 cc.    Fluoroscopic time: 21.5 seconds.      Impression    Impression: Successful lumbar puncture without immediate complication.  Blood-tinged CSF throughout collection.    I, YARELY VARNER MD, attest that I was present for all critical  portions of the procedure and was  immediately available to provide  guidance and assistance during the remainder of the procedure.    I have personally reviewed the examination and initial interpretation  and I agree with the findings.    YARELY VARNER MD              Assessment and Plan:   #Thunderclap headache:   CT BRain negative for SAH, but done in less than one hour from beginning of pain so this has not completely been ruled out. Patient refused LP multiple times, even when explained that this exam could ruled out  A life- threatining condition. CTA head and neck was done to investigate possible existence of aneurysm or RCVS and returned negative. Venogram ruled out sinus venous thrombosis. MRI Head did not show any Flair changes suggestive of SAH or any other intraparenchymal lesion.  She went for LP today.  Because reversible cerebral vasoconstriction syndrome is a strong consideration, we will start a trial of nimodipine for four weeks.  -Nimodipine 30 mg TID  -One time MgSO4 2 g  -One time zyprexa 2.5 mg  -Toradol 30 mg IV Q6H  -Zofran 4 mg Q6H PRN  -Benadryl 25 mg Q6H  -f/u LP results  -Avoid ergots and triptans  -Avoid narcotics  - ml/hr    #HTN:  She has a history of hypertension.  She had an isolated elevated pressure yesterday which was likely secondary to pain.         # Ppx: Ambulate  # FEN: Regular diet  # Code: Full    Dk Lea    PGY2 Neurology  [id number]

## 2017-07-13 NOTE — PLAN OF CARE
Problem: Goal Outcome Summary  Goal: Goal Outcome Summary  AVSS. Headache continues. Pt describes it as throbbing/right sided and worse with activity. Zofran given x 1 for nausea. Benadryl and Toradol given every 6 hours. Nimodipine ordered and given this afternoon (watch for hypotension). Pt currently have LP done. Tolerating regular diet. Voiding spont, not saving. Ambulating to bathroom independently. Pt resting most of day. Unable to sleep. Ice pack over forehead. Continue with poc.

## 2017-07-13 NOTE — PROGRESS NOTES
Problem: Discharge Planning  Goal: Discharge Planning (Adult, OB, Behavioral, Peds)  PRIMARY DIAGNOSIS: ACUTE PAIN  OUTPATIENT/OBSERVATION GOALS TO BE MET BEFORE DISCHARGE:  1. Pain Status: No improvement noted. Consider adjustment in pain regimen.  Pain meds, alternative therapies- cold, eye patch, aromatherapy, sea bands, environment changes (dark and quiet room)      2. Return to near baseline physical activity: No      3. Cleared for discharge by consultants (if involved): No      Discharge Planner Nurse   Safe discharge environment identified: No  Barriers to discharge: Yes- pain       Entered by: Brannon Leigh 7/12/17 [id number]     Please review provider order for any additional goals.   Nurse to notify provider when observation goals have been met and patient is ready for discharge

## 2017-07-14 ENCOUNTER — APPOINTMENT (OUTPATIENT)
Dept: GENERAL RADIOLOGY | Facility: CLINIC | Age: 62
DRG: 103 | End: 2017-07-14
Attending: STUDENT IN AN ORGANIZED HEALTH CARE EDUCATION/TRAINING PROGRAM
Payer: COMMERCIAL

## 2017-07-14 LAB
APPEARANCE CSF: CLEAR
COLOR CSF: COLORLESS
GLUCOSE CSF-MCNC: 54 MG/DL (ref 40–70)
GRAM STN SPEC: NORMAL
MICRO REPORT STATUS: NORMAL
PROT CSF-MCNC: 43 MG/DL (ref 15–60)
RBC # CSF MANUAL: 300 /UL (ref 0–2)
RBC # CSF MANUAL: NORMAL /UL (ref 0–2)
SPECIMEN SOURCE: NORMAL
TUBE # CSF: 4 #
WBC # CSF MANUAL: 3 /UL (ref 0–5)
WBC # CSF MANUAL: NORMAL /UL (ref 0–5)

## 2017-07-14 PROCEDURE — G0378 HOSPITAL OBSERVATION PER HR: HCPCS

## 2017-07-14 PROCEDURE — 82945 GLUCOSE OTHER FLUID: CPT | Performed by: STUDENT IN AN ORGANIZED HEALTH CARE EDUCATION/TRAINING PROGRAM

## 2017-07-14 PROCEDURE — 77003 FLUOROGUIDE FOR SPINE INJECT: CPT

## 2017-07-14 PROCEDURE — 25000128 H RX IP 250 OP 636: Performed by: STUDENT IN AN ORGANIZED HEALTH CARE EDUCATION/TRAINING PROGRAM

## 2017-07-14 PROCEDURE — 89050 BODY FLUID CELL COUNT: CPT | Performed by: PSYCHIATRY & NEUROLOGY

## 2017-07-14 PROCEDURE — 25000125 ZZHC RX 250: Performed by: RADIOLOGY

## 2017-07-14 PROCEDURE — 89050 BODY FLUID CELL COUNT: CPT | Performed by: STUDENT IN AN ORGANIZED HEALTH CARE EDUCATION/TRAINING PROGRAM

## 2017-07-14 PROCEDURE — 009U3ZX DRAINAGE OF SPINAL CANAL, PERCUTANEOUS APPROACH, DIAGNOSTIC: ICD-10-PCS | Performed by: RADIOLOGY

## 2017-07-14 PROCEDURE — 96376 TX/PRO/DX INJ SAME DRUG ADON: CPT

## 2017-07-14 PROCEDURE — 87205 SMEAR GRAM STAIN: CPT | Performed by: STUDENT IN AN ORGANIZED HEALTH CARE EDUCATION/TRAINING PROGRAM

## 2017-07-14 PROCEDURE — 84157 ASSAY OF PROTEIN OTHER: CPT | Performed by: STUDENT IN AN ORGANIZED HEALTH CARE EDUCATION/TRAINING PROGRAM

## 2017-07-14 PROCEDURE — 87070 CULTURE OTHR SPECIMN AEROBIC: CPT | Performed by: STUDENT IN AN ORGANIZED HEALTH CARE EDUCATION/TRAINING PROGRAM

## 2017-07-14 PROCEDURE — 20000002 ZZH R&B BMT INTERMEDIATE

## 2017-07-14 PROCEDURE — 25000132 ZZH RX MED GY IP 250 OP 250 PS 637: Performed by: STUDENT IN AN ORGANIZED HEALTH CARE EDUCATION/TRAINING PROGRAM

## 2017-07-14 RX ORDER — LIDOCAINE HYDROCHLORIDE 10 MG/ML
30 INJECTION, SOLUTION EPIDURAL; INFILTRATION; INTRACAUDAL; PERINEURAL ONCE
Status: COMPLETED | OUTPATIENT
Start: 2017-07-14 | End: 2017-07-14

## 2017-07-14 RX ADMIN — NIMODIPINE 30 MG: 30 CAPSULE, LIQUID FILLED ORAL at 17:16

## 2017-07-14 RX ADMIN — SODIUM CHLORIDE: 9 INJECTION, SOLUTION INTRAVENOUS at 19:45

## 2017-07-14 RX ADMIN — DIPHENHYDRAMINE HYDROCHLORIDE 25 MG: 50 INJECTION, SOLUTION INTRAMUSCULAR; INTRAVENOUS at 00:40

## 2017-07-14 RX ADMIN — LIDOCAINE HYDROCHLORIDE 4 ML: 10 INJECTION, SOLUTION EPIDURAL; INFILTRATION; INTRACAUDAL; PERINEURAL at 14:36

## 2017-07-14 RX ADMIN — ACETAMINOPHEN 1000 MG: 500 TABLET, FILM COATED ORAL at 18:45

## 2017-07-14 RX ADMIN — FLUOXETINE 20 MG: 20 CAPSULE ORAL at 21:36

## 2017-07-14 RX ADMIN — ACETAMINOPHEN 1000 MG: 500 TABLET, FILM COATED ORAL at 08:53

## 2017-07-14 RX ADMIN — NIMODIPINE 30 MG: 30 CAPSULE, LIQUID FILLED ORAL at 13:17

## 2017-07-14 RX ADMIN — NIMODIPINE 30 MG: 30 CAPSULE, LIQUID FILLED ORAL at 08:53

## 2017-07-14 RX ADMIN — SODIUM CHLORIDE: 9 INJECTION, SOLUTION INTRAVENOUS at 10:17

## 2017-07-14 RX ADMIN — KETOROLAC TROMETHAMINE 30 MG: 30 INJECTION, SOLUTION INTRAMUSCULAR at 00:40

## 2017-07-14 RX ADMIN — FOLIC ACID 1 MG: 1 TABLET ORAL at 21:36

## 2017-07-14 RX ADMIN — SENNOSIDES AND DOCUSATE SODIUM 1 TABLET: 8.6; 5 TABLET ORAL at 08:53

## 2017-07-14 RX ADMIN — KETOROLAC TROMETHAMINE 30 MG: 30 INJECTION, SOLUTION INTRAMUSCULAR at 13:03

## 2017-07-14 RX ADMIN — SODIUM CHLORIDE: 9 INJECTION, SOLUTION INTRAVENOUS at 00:50

## 2017-07-14 RX ADMIN — SENNOSIDES AND DOCUSATE SODIUM 2 TABLET: 8.6; 5 TABLET ORAL at 19:39

## 2017-07-14 ASSESSMENT — PAIN DESCRIPTION - DESCRIPTORS
DESCRIPTORS: DULL
DESCRIPTORS: DULL;CONSTANT
DESCRIPTORS: HEADACHE;CONSTANT;THROBBING
DESCRIPTORS: CONSTANT;DULL
DESCRIPTORS: CONSTANT;HEADACHE

## 2017-07-14 NOTE — PLAN OF CARE
Problem: Goal Outcome Summary  Goal: Goal Outcome Summary  Outcome: No Change  PRIMARY DIAGNOSIS: ACUTE PAIN  OUTPATIENT/OBSERVATION GOALS TO BE MET BEFORE DISCHARGE:  1. Pain Status: No improvement noted. Consider adjustment in pain regimen. Head pain unrelieved by Toradol x 1, Zyprexa, cold therapy, aromatherapy.     2. Return to near baseline physical activity: No     3. Cleared for discharge by consultants (if involved): No     Discharge Planner Nurse   Safe discharge environment identified: Yes  Barriers to discharge: Yes- continued pain       Entered by: Beth Sarmiento 07/13/2017 7:16 PM     Please review provider order for any additional goals.   Nurse to notify provider when observation goals have been met and patient is ready for discharge.

## 2017-07-14 NOTE — PLAN OF CARE
Problem: Goal Outcome Summary  Goal: Goal Outcome Summary  Outcome: No Change  PRIMARY DIAGNOSIS: ACUTE PAIN  OUTPATIENT/OBSERVATION GOALS TO BE MET BEFORE DISCHARGE:  1. Pain Status: No improvement noted. Consider adjustment in pain regimen.     2. Return to near baseline physical activity: No     3. Cleared for discharge by consultants (if involved): No     Discharge Planner Nurse   Safe discharge environment identified: Yes  Barriers to discharge: Yes: Pain       Entered by: Beth Sarmiento 07/13/2017 11:25 PM     Please review provider order for any additional goals.   Nurse to notify provider when observation goals have been met and patient is ready for discharge.

## 2017-07-14 NOTE — PLAN OF CARE
"Problem: Goal Outcome Summary  Goal: Goal Outcome Summary  Outcome: Improving  Patient states pain is improving; rates pain a \"5\" on a 0-10 scale.  Patient calm and cooperative with cares; IV infusing Normal Saline at 100ml/hr.  Denies any other symptoms.  Patient sleeping intermittently between cares.  Continue to monitor and intervene as necessary    Problem: Pain, Acute (Adult)  Goal: Identify Related Risk Factors and Signs and Symptoms  Related risk factors and signs and symptoms are identified upon initiation of Human Response Clinical Practice Guideline (CPG)   P      "

## 2017-07-14 NOTE — PLAN OF CARE
Problem: Goal Outcome Summary  Goal: Goal Outcome Summary  Outcome: Improving  Patient states that headache is gone as of 0630.  Denied need for further pain medication at this time.  Is planning to sit up for a while to make sure headache does not return.  Continue to monitor and follow observation plan of care.

## 2017-07-14 NOTE — PROGRESS NOTES
[facility], [place]   Neurology Daily Note        Summary:   Ms. Fung is a 62 year old female with h/o HTN and RA who was admitted 7/11 for sudden onset, intense headache with nausea.  She is currently being treated with toradol.        Overnight:   No acute events overnight.  Headache has reduced to less than 1/10 in severity.  LP was inconclusive due to traumatic tap.  She would like to go home but expressed concern about diagnosis, and is agreeable to another attempt at LP.                   Medications:     Current Facility-Administered Medications   Medication     niMODipine (NIMOTOP) capsule 30 mg     diphenhydrAMINE (BENADRYL) capsule 25 mg    Or     diphenhydrAMINE (BENADRYL) injection 25 mg     lidocaine 1 % 1 mL     lidocaine (LMX4) kit     acetaminophen (TYLENOL) tablet 500-1,000 mg     fexofenadine (ALLEGRA) tablet 180 mg     FLUoxetine (PROzac) capsule 20 mg     folic acid (FOLVITE) tablet 1 mg     tofacitinib (XELJANZ) 24 hr tablet 11 mg     naloxone (NARCAN) injection 0.1-0.4 mg     senna-docusate (SENOKOT-S;PERICOLACE) 8.6-50 MG per tablet 1-2 tablet     senna-docusate (SENOKOT-S;PERICOLACE) 8.6-50 MG per tablet 1-2 tablet     magnesium hydroxide (MILK OF MAGNESIA) suspension 30 mL     bisacodyl (DULCOLAX) Suppository 10 mg     ondansetron (ZOFRAN-ODT) ODT tab 4 mg    Or     ondansetron (ZOFRAN) injection 4 mg     ketorolac (TORADOL) injection 30 mg     0.9% sodium chloride infusion     OLANZapine zydis (zyPREXA) ODT half-tab 2.5 mg         Exam     /76 (BP Location: Right arm)  Pulse 76  Temp 97.7  F (36.5  C) (Axillary)  Resp 16  Wt 112.4 kg (247 lb 14.4 oz)  SpO2 95%  BMI 42.55 kg/m2BMI    Constitutional : well-built  Head: atraumatic, anicteric.   Eyes: see neuroexam  Psychiatry: in good mood. Collaborative  Neuroexam  Alert and interactive.  Follow commands  EOM normal, no nystagmus noted  Facial sensation symmetric  Facial movements full and  symmetric  Palatal elevation symmetric  Tongue centered  Strength 5/5 in upper and lower extremities bilaterally  Reflexes:   Unable to obtain biceps or brachioradialis   Patellar 1+ right, 1+ left   Unable to obtain achilles  No tremors  No aphasia  No dysarthria            Data:     No results found for this or any previous visit (from the past 24 hour(s)).           Assessment and Plan:   #Thunderclap headache:   CT BRain negative for SAH, but done in less than one hour from beginning of pain so this has not completely been ruled out. Patient refused LP multiple times, even when explained that this exam could ruled out  A life- threatining condition. CTA head and neck was done to investigate possible existence of aneurysm or RCVS and returned negative. Venogram ruled out sinus venous thrombosis. MRI Head did not show any Flair changes suggestive of SAH or any other intraparenchymal lesion.  She went for LP today.  Because reversible cerebral vasoconstriction syndrome is a strong consideration, we will start a trial of nimodipine for four weeks.  -Nimodipine 30 mg TID  -Toradol 30 mg IV Q6H  -Zofran 4 mg Q6H PRN  -Benadryl 25 mg Q6H  -f/u LP results  -Avoid ergots and triptans  -Avoid narcotics  - ml/hr    #HTN:  She has a history of hypertension.  She had an isolated elevated pressure yesterday which was likely secondary to pain.         # Ppx: Ambulate  # FEN: Regular diet  # Code: Full    Dk Lea    PGY2 Neurology  [id number]

## 2017-07-14 NOTE — PROGRESS NOTES
Curbsided about possible angiogram for negative CT, CTA, and MRI in setting of 'thunderclap' headache with traumatic tap.  Pt seemingly feeling better.  After discussion with ESN team, we feel that repeating the LP is reasonable and following up with angiogram if a 'clean' LP is positive for xanthochromia.     Goldie Andujar, PGY5  Neurointerventional fellow  Neurosurgery resident    Please, for questions or concerns, do not hesitate to call Neurointerventional on call pager.    * * [phone] then job code [phone].

## 2017-07-14 NOTE — PLAN OF CARE
Problem: Pain, Acute (Adult)  Goal: Acceptable Pain Control/Comfort Level  Patient will demonstrate the desired outcomes by discharge/transition of care.   Outcome: Improving    07/14/17 0116   Pain, Acute (Adult)   Acceptable Pain Control/Comfort Level making progress toward outcome         Comments:   Patient requesting IV pain medication; when assessing prior to administration patient stated that headache pain was improving and she was able to at least sleep.  Patient vital signs are stable. Continue to monitor and intervene on causes of pain as able.

## 2017-07-14 NOTE — PLAN OF CARE
Problem: Goal Outcome Summary  Goal: Goal Outcome Summary  Outcome: No Change  Sharon continues to report unchanged constant, intense throbbing pain in the right head/face; unrelieved by tylenol, toradol, zyprexa x 2. States became worse (8/10) before LP and has reportedly remained at that level throughout evening. Currently sleeping soundly. Was given benadryl x 1 to facilitate sleep/comfort. Some N w/ HA, given zofran w/ minor relief. Denies emesis. No appetite tonight, may try eating shake/smoothie brought in by friend. Reports normal bowels, declines scheduled senna. LP site w/ band-aid C/D/I. Offers no new complaints. Will continue to monitor per POC.        Problem: Pain, Acute (Adult)  Goal: Identify Related Risk Factors and Signs and Symptoms  Related risk factors and signs and symptoms are identified upon initiation of Human Response Clinical Practice Guideline (CPG)   Outcome: No Change    07/13/17 2326   Pain, Acute   Signs and Symptoms (Acute Pain) facial mask of pain/grimace;nausea/vomiting/anorexia;verbalization of pain descriptors       Goal: Acceptable Pain Control/Comfort Level  Patient will demonstrate the desired outcomes by discharge/transition of care.   Outcome: No Change    07/13/17 2326   Pain, Acute (Adult)   Acceptable Pain Control/Comfort Level unable to achieve outcome

## 2017-07-14 NOTE — PLAN OF CARE
"Problem: Pain, Acute (Adult)  Goal: Acceptable Pain Control/Comfort Level  Patient will demonstrate the desired outcomes by discharge/transition of care.   Outcome: Improving    07/14/17 0639   Pain, Acute (Adult)   Acceptable Pain Control/Comfort Level achieves outcome         Comments:   0630 : Patient states \"I think the headache is gone\" when RN went to room for morning assessment of needs.  Patient standing up and stated that her head feels fine but she was going to sit up for a while to \"make sure it doesn't come back.\"  Patient denied need for medication at this time.  Continue to monitor and follow observation plan of care.  Patient slept the entire shift.  "

## 2017-07-14 NOTE — PLAN OF CARE
Problem: Goal Outcome Summary  Goal: Goal Outcome Summary  Outcome: Improving  Patient has been sleeping since administration of Toradol and Benadryl IV.  No complaints of changes in level of pain related to headache.  Continue to monitor.

## 2017-07-14 NOTE — PROGRESS NOTES
Has slight constant headache and was given Toradol per request.  Will be having an LP done today.  Up and around in room.

## 2017-07-14 NOTE — PLAN OF CARE
Problem: Pain, Acute (Adult)  Goal: Acceptable Pain Control/Comfort Level  Patient will demonstrate the desired outcomes by discharge/transition of care.   Outcome: Improving    07/14/17 0431   Pain, Acute (Adult)   Acceptable Pain Control/Comfort Level making progress toward outcome         Comments:   Patient has been sleeping since last documented intervention.  No complaints of changes in level of pain.

## 2017-07-14 NOTE — PROCEDURES
[facility], [place]     Neuroradiology Procedure Note     Lumbar Puncture Under Fluoroscopic Guidance:      7/14/2017 3:20 PM    Performed by: Dave Garduno MD  Authorized by: MD Dave Ramos MD    Indications: meningismus    Consent given by: Patient who states understanding of the procedure being performed after discussing the risks, benefits and alternatives.    Prior to the start of the procedure and with procedural staff participation, I verbally confirmed the patient s identity using two indicators, relevant allergies, that the procedure was appropriate and matched the consent or emergent situation, and that the correct equipment/implants were available. Immediately prior to starting the procedure I conducted the Time Out with the procedural staff and re-confirmed the patient s name, procedure, and site/side. (The Joint Commission universal protocol was followed.) Yes    Procedure:    Under sterile conditions the patient was positioned  Left lateral decubitis. Using fluoroscopy, needle entrance side was defined.  Betadine solution and sterile drapes were utilized.  Local anesthetic at the site: 6 ml of lidocaine 1% without epinephrine.    A 22 gauge 5 inch spinal needle was inserted at the L3-L4 interspace.  Opening Pressurewas not checked.  A total of 10mL of clear and colorless spinal fluid was obtained and sent to the laboratory.   After the needle was removed, a bandaid and pressure were applied and the patient was instructed to stay horizontal until the results were back.    Complications:  None  Patient tolerance: Patient tolerated the procedure well with no immediate complications.    Condition: Stable Patient is transferred to Inpatient unit for post procedure observation.    Can Ozutemiz 7/14/2017 3:20 PM

## 2017-07-14 NOTE — PROGRESS NOTES
Has slight headache this am in mid forehead.  Asked for Tylenol; headache no worse after Tylenol and slightly better.  No photophobia, did not want stronger pain medication.  Continues on IV fluids.

## 2017-07-15 VITALS
BODY MASS INDEX: 42.55 KG/M2 | TEMPERATURE: 98.8 F | OXYGEN SATURATION: 94 % | DIASTOLIC BLOOD PRESSURE: 81 MMHG | RESPIRATION RATE: 16 BRPM | SYSTOLIC BLOOD PRESSURE: 133 MMHG | WEIGHT: 247.9 LBS | HEART RATE: 76 BPM

## 2017-07-15 LAB
APPEARANCE CSF: CLEAR
APTT PPP: 33 SEC (ref 22–37)
COLOR CSF: COLORLESS
INR PPP: 0.96 (ref 0.86–1.14)
PLATELET # BLD AUTO: 310 10E9/L (ref 150–450)
RBC # CSF MANUAL: 583 /UL (ref 0–2)
TUBE # CSF: 1 #
WBC # CSF MANUAL: 4 /UL (ref 0–5)

## 2017-07-15 PROCEDURE — 25000132 ZZH RX MED GY IP 250 OP 250 PS 637: Performed by: STUDENT IN AN ORGANIZED HEALTH CARE EDUCATION/TRAINING PROGRAM

## 2017-07-15 PROCEDURE — 25000128 H RX IP 250 OP 636: Performed by: STUDENT IN AN ORGANIZED HEALTH CARE EDUCATION/TRAINING PROGRAM

## 2017-07-15 PROCEDURE — 85730 THROMBOPLASTIN TIME PARTIAL: CPT | Performed by: STUDENT IN AN ORGANIZED HEALTH CARE EDUCATION/TRAINING PROGRAM

## 2017-07-15 PROCEDURE — 85049 AUTOMATED PLATELET COUNT: CPT | Performed by: STUDENT IN AN ORGANIZED HEALTH CARE EDUCATION/TRAINING PROGRAM

## 2017-07-15 PROCEDURE — 85610 PROTHROMBIN TIME: CPT | Performed by: STUDENT IN AN ORGANIZED HEALTH CARE EDUCATION/TRAINING PROGRAM

## 2017-07-15 PROCEDURE — 36415 COLL VENOUS BLD VENIPUNCTURE: CPT | Performed by: STUDENT IN AN ORGANIZED HEALTH CARE EDUCATION/TRAINING PROGRAM

## 2017-07-15 RX ORDER — PROCHLORPERAZINE 25 MG
25 SUPPOSITORY, RECTAL RECTAL EVERY 12 HOURS PRN
Status: DISCONTINUED | OUTPATIENT
Start: 2017-07-15 | End: 2017-07-15 | Stop reason: HOSPADM

## 2017-07-15 RX ORDER — KETOROLAC TROMETHAMINE 10 MG/1
10 TABLET, FILM COATED ORAL EVERY 6 HOURS PRN
Qty: 20 TABLET | Refills: 0 | Status: SHIPPED | OUTPATIENT
Start: 2017-07-15 | End: 2017-08-09

## 2017-07-15 RX ORDER — PROCHLORPERAZINE MALEATE 5 MG
5-10 TABLET ORAL EVERY 6 HOURS PRN
Status: DISCONTINUED | OUTPATIENT
Start: 2017-07-15 | End: 2017-07-15 | Stop reason: HOSPADM

## 2017-07-15 RX ORDER — KETOROLAC TROMETHAMINE 30 MG/ML
30 INJECTION, SOLUTION INTRAMUSCULAR; INTRAVENOUS EVERY 6 HOURS PRN
Status: DISCONTINUED | OUTPATIENT
Start: 2017-07-15 | End: 2017-07-15 | Stop reason: HOSPADM

## 2017-07-15 RX ORDER — NIMODIPINE 30 MG/1
30 CAPSULE, LIQUID FILLED ORAL
Qty: 84 CAPSULE | Refills: 0 | Status: SHIPPED | OUTPATIENT
Start: 2017-07-15 | End: 2017-08-09

## 2017-07-15 RX ORDER — DIPHENHYDRAMINE HYDROCHLORIDE 50 MG/ML
25 INJECTION INTRAMUSCULAR; INTRAVENOUS EVERY 6 HOURS PRN
Status: DISCONTINUED | OUTPATIENT
Start: 2017-07-15 | End: 2017-07-15 | Stop reason: HOSPADM

## 2017-07-15 RX ORDER — KETOROLAC TROMETHAMINE 30 MG/ML
30 INJECTION, SOLUTION INTRAMUSCULAR; INTRAVENOUS ONCE
Status: COMPLETED | OUTPATIENT
Start: 2017-07-15 | End: 2017-07-15

## 2017-07-15 RX ADMIN — NIMODIPINE 30 MG: 30 CAPSULE, LIQUID FILLED ORAL at 14:46

## 2017-07-15 RX ADMIN — ACETAMINOPHEN 1000 MG: 500 TABLET, FILM COATED ORAL at 10:34

## 2017-07-15 RX ADMIN — ACETAMINOPHEN 1000 MG: 500 TABLET, FILM COATED ORAL at 03:01

## 2017-07-15 RX ADMIN — ONDANSETRON 4 MG: 2 INJECTION INTRAMUSCULAR; INTRAVENOUS at 03:48

## 2017-07-15 RX ADMIN — Medication 2.5 MG: at 03:59

## 2017-07-15 RX ADMIN — NIMODIPINE 30 MG: 30 CAPSULE, LIQUID FILLED ORAL at 19:00

## 2017-07-15 RX ADMIN — PROCHLORPERAZINE EDISYLATE 10 MG: 5 INJECTION INTRAMUSCULAR; INTRAVENOUS at 11:49

## 2017-07-15 RX ADMIN — DIPHENHYDRAMINE HYDROCHLORIDE 25 MG: 50 INJECTION, SOLUTION INTRAMUSCULAR; INTRAVENOUS at 05:00

## 2017-07-15 RX ADMIN — ONDANSETRON 4 MG: 2 INJECTION INTRAMUSCULAR; INTRAVENOUS at 10:30

## 2017-07-15 RX ADMIN — ACETAMINOPHEN 1000 MG: 500 TABLET, FILM COATED ORAL at 17:41

## 2017-07-15 RX ADMIN — KETOROLAC TROMETHAMINE 30 MG: 30 INJECTION, SOLUTION INTRAMUSCULAR; INTRAVENOUS at 05:43

## 2017-07-15 RX ADMIN — DIPHENHYDRAMINE HYDROCHLORIDE 25 MG: 50 INJECTION, SOLUTION INTRAMUSCULAR; INTRAVENOUS at 10:30

## 2017-07-15 RX ADMIN — KETOROLAC TROMETHAMINE 30 MG: 30 INJECTION, SOLUTION INTRAMUSCULAR; INTRAVENOUS at 11:49

## 2017-07-15 RX ADMIN — NIMODIPINE 30 MG: 30 CAPSULE, LIQUID FILLED ORAL at 09:09

## 2017-07-15 RX ADMIN — SODIUM CHLORIDE: 9 INJECTION, SOLUTION INTRAVENOUS at 05:48

## 2017-07-15 RX ADMIN — PROCHLORPERAZINE EDISYLATE 10 MG: 5 INJECTION INTRAMUSCULAR; INTRAVENOUS at 05:43

## 2017-07-15 ASSESSMENT — PAIN DESCRIPTION - DESCRIPTORS
DESCRIPTORS: CONSTANT;HEADACHE
DESCRIPTORS: CONSTANT;HEADACHE
DESCRIPTORS: HEADACHE;DULL
DESCRIPTORS: CONSTANT;HEADACHE

## 2017-07-15 NOTE — PLAN OF CARE
Problem: Goal Outcome Summary  Goal: Goal Outcome Summary  Outcome: Improving  Has dull slight headache at this time  Tylenol given.  Continues on IV fluids.  Will stay the night as results of LP are pending.  Has subqu MTX due for RA; MD ordered.  Will continue to monitor pain and treat accordingly.

## 2017-07-15 NOTE — PLAN OF CARE
"Problem: Pain, Acute (Adult)  Goal: Acceptable Pain Control/Comfort Level  Patient will demonstrate the desired outcomes by discharge/transition of care.   Outcome: Declining  [phone] : Patient was doing very well when RN arrived for shift and performed assessment.  Patient ws watching TV and stated that her headache was now \"just a dull ache.  Like its there but not really there.\"  Declined any medication.  At approx 0330 Patient called out stating that her headache was back to what it was when she came in; was having nausea, pain 10/10, photophobia.  Given Benadryl, Zofran and Zyprexa per MAR.  Ice packs to face/eyes and room darkened.  At 0500 patient stating that pain was still 10/10 and not improving at all.  Moonlighter paged again for orders.  Orders received for IV Toradol and Compazine x1 which was given.  Patient quite tearful and anxious;  Blood pressure was elevated; likely due to pain and anxiety.  Continue to monitor and notify physician of any changes in status.  Awaiting results from LP from yesterday.  Continue current plan of care.      "

## 2017-07-15 NOTE — PLAN OF CARE
Problem: Goal Outcome Summary  Goal: Goal Outcome Summary  Outcome: Improving  Had headache this am rating as a 5 on the right side of her head.  Received toradol IV, Compazine, Benadryl and slept for over two hours.  When awake stated the headache was gone and wants to go home.  After visit summary is printed and awaiting for discharge order from neurology who had told her if her pain was gone this afternoon will be able to discharge to home.  IV access removed.  Slight headache this evening and Tylenol given.

## 2017-07-15 NOTE — PLAN OF CARE
Problem: Goal Outcome Summary  Goal: Goal Outcome Summary  Outcome: No Change  Patient reports that she still has a mild headache and nausea. Denies photophobia. She is due to get her weekly subcutaneous methotrexate tomorrow. VSS. Up independently. Possible d/c tomorrow.

## 2017-07-16 NOTE — DISCHARGE INSTRUCTIONS
Discharge instructions explained and given to Sharon.  Belongings sent with Sharon (cell phone, clothing, purse, Xeljanz home med).  Walked to car accompanied by daughter.

## 2017-07-16 NOTE — DISCHARGE SUMMARY
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    Please see H&P dated for further details about presentation.    Brief Hospital Course:   #Thunderclap Headache:  Ms. Fung was placed on toradol, benadryl, and compazine which did not provide significant relief the first day of her hospitalization.  On day two she continued to have significant pain.  Lumbar puncture was performed via IR which returned as traumatic.  The supernatant was pink and we were unable to evaluate for xanthrochromia.  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  A second LP yielded clear CSF, allowing the team to rule out a sentinel bleed.  Her headache was improved on 7/15.  She was discharged with 4 weeks of nimodipine, a short term supply of toradol, and follow up in neurology clinic.    #Depression  She was continued on fluoxetine inpatient    #RA  She did not receive her weekly methotrexate shot while inpatient        PERTINENT INVESTIGATIONS    Labs  Lab Results   Component Value Date    WBC 11.5 07/11/2017     Lab Results   Component Value Date    RBC 4.40 07/11/2017     Lab Results   Component Value Date    HGB 13.9 07/11/2017     Lab Results   Component Value Date    HCT 41.7 07/11/2017     No components found for: MCT  Lab Results   Component Value Date    MCV 95 07/11/2017     Lab Results   Component Value Date    MCH 31.6 07/11/2017     Lab Results   Component Value Date    MCHC 33.3 07/11/2017     Lab Results   Component Value Date    RDW 14.3  07/11/2017     Lab Results   Component Value Date     07/15/2017     Last Basic Metabolic Panel:  Lab Results   Component Value Date     07/11/2017      Lab Results   Component Value Date    POTASSIUM 3.2 07/11/2017     Lab Results   Component Value Date    CHLORIDE 107 07/11/2017     Lab Results   Component Value Date    ISH 9.9 07/11/2017     Lab Results   Component Value Date    CO2 21 07/11/2017     Lab Results   Component Value Date    BUN 13 07/11/2017     Lab Results   Component Value Date    CR 0.73 07/11/2017     Lab Results   Component Value Date    GLC 90 07/11/2017         Imaging:   Brain MRI 7/11: Impression: Normal brain MRI. No evidence of subarachnoid hemorrhage  or stroke.      PHYSICAL EXAMINATION  Constitutional : well-built  Head: atraumatic, anicteric. See neuroexam  Eyes: see neuroexam  CVC: S1/S2 sinusal no murmurs  LUng: Clear. On room air. No respiratory distress.   Adomen: soft, non tender, bowel movements heard  Extremities: Pulses preserved in four extremities. No edema. Well perfused  Psychiatry: in good mood. Collaborative  Neuroexam  Alert and oriented to place, date, self and reasons of hospitalization.  Follow commands  Face symmetric  Eyes: motility preserved, no nystagmus  Tongue centered  No tremors  No dysmetria (arms and legs tested)  Strength preserved on arms and legs , proximally and distally  Reflexes preserved  Sensory exam to light touch: preserved   No aphasia  No dysarthria    Additional recommendations and follow up:       Review of your medicines      START taking       Dose / Directions    ketorolac 10 MG tablet   Commonly known as:  TORADOL   Used for:  Acute intractable headache, unspecified headache type        Dose:  10 mg   Take 1 tablet (10 mg) by mouth every 6 hours as needed for moderate pain   Quantity:  20 tablet   Refills:  0       niMODipine 30 MG capsule   Commonly known as:  NIMOTOP   Used for:  Acute intractable headache, unspecified  "headache type        Dose:  30 mg   Take 1 capsule (30 mg) by mouth 3 times daily (before meals)   Quantity:  84 capsule   Refills:  0         CONTINUE these medicines which have NOT CHANGED       Dose / Directions    acetaminophen 500 MG tablet   Commonly known as:  TYLENOL        Dose:  500-1000 mg   Take 500-1,000 mg by mouth every 6 hours as needed for mild pain   Refills:  0       fexofenadine 180 MG tablet   Commonly known as:  ALLEGRA   Used for:  AR (allergic rhinitis)        Dose:  180 mg   Take 1 tablet (180 mg) by mouth daily   Quantity:  90 tablet   Refills:  2       FLONASE 50 MCG/ACT spray   Generic drug:  fluticasone        Dose:  2 spray   Spray 2 sprays into both nostrils as needed   Refills:  0       FLUoxetine 20 MG capsule   Commonly known as:  PROzac   Used for:  Major depressive disorder, recurrent episode, mild (H)        Dose:  20 mg   Take 1 capsule (20 mg) by mouth daily   Quantity:  90 capsule   Refills:  1       folic acid 1 MG tablet   Commonly known as:  FOLVITE   Used for:  Seropositive rheumatoid arthritis (H)        Dose:  1 mg   Take 1 tablet (1 mg) by mouth daily   Quantity:  100 tablet   Refills:  3       ibuprofen 200 MG capsule   Indication:  Rheumatoid Arthritis        Dose:  600-800 mg   Take 600-800 mg by mouth At Bedtime   Refills:  0       Insulin Syringe-Needle U-100 27G X 1/2\" 1 ML Misc   Commonly known as:  BD insulin syringe   Used for:  Seropositive rheumatoid arthritis (H), High risk medication use        Dose:  1 Syringe   1 Syringe every 7 days , to be used for methotrexate administration.   Quantity:  10 each   Refills:  5       methotrexate sodium 50 MG/2ML Soln   Used for:  Seropositive rheumatoid arthritis (H)        Dose:  20 mg   Inject 0.8 mLs (20 mg) as directed every 7 days   Quantity:  2 vial   Refills:  3       ondansetron 4 MG ODT tab   Commonly known as:  ZOFRAN ODT   Used for:  Nausea vomiting and diarrhea        Dose:  4 mg   Take 1 tablet (4 mg) by " mouth every 6 hours as needed for nausea   Quantity:  12 tablet   Refills:  1       order for DME        Use your CPAP device as directed by your provider.   Refills:  0       RESTASIS 0.05 % ophthalmic emulsion   Generic drug:  cycloSPORINE        Dose:  1 drop   Place 1 drop into both eyes 2 times daily   Refills:  0       tofacitinib 11 MG 24 hr tablet   Commonly known as:  XELJANZ XR   Used for:  Seropositive rheumatoid arthritis (H), High risk medications (not anticoagulants) long-term use        Dose:  11 mg   Take 1 tablet (11 mg) by mouth daily   Quantity:  30 tablet   Refills:  6            Where to get your medicines      These medications were sent to [place], [place] - [address]  [address] [place] [address], [facility] [address]     Phone:  [phone]      ketorolac 10 MG tablet     niMODipine 30 MG capsule             Neurology Adult Referral     Reason for your hospital stay   Headache     Adult [facility] Follow-up and recommended labs and tests   Follow up with primary care provider, Reynaldo Saavedra, within 7 days to evaluate medication change, for hospital follow- up and to follow up on results.  The following labs/tests are recommended: CBC, BMP.      Appointments on [place] and/or [facility] (with [facility] or [facility] provider or service). Call [phone] if you haven't heard regarding these appointments within 7 days of discharge.     Activity   Your activity upon discharge: activity as tolerated     Full Code     Diet   Follow this diet upon discharge: Orders Placed This Encounter     Combination Diet Regular Diet Adult         PAtient was seen and discussed with Dr. Ford Lea  PGY2 Neurology  PAger

## 2017-07-16 NOTE — PLAN OF CARE
Problem: Pain, Acute (Adult)  Goal: Identify Related Risk Factors and Signs and Symptoms  Related risk factors and signs and symptoms are identified upon initiation of Human [employer] Clinical Practice Guideline (CPG)   Outcome: Adequate for Discharge Date Met:  07/15/17  Discharged home accompanied by daughter.

## 2017-07-16 NOTE — PLAN OF CARE
Problem: Goal Outcome Summary  Goal: Goal Outcome Summary  Outcome: Adequate for Discharge Date Met:  07/15/17  Discharged home accompanied by daughter.  Belongings sent (cell phone, clothing, purse, plant).  Discharge instructions discussed with Sharon and her daughter.

## 2017-07-17 ENCOUNTER — TELEPHONE (OUTPATIENT)
Dept: FAMILY MEDICINE | Facility: CLINIC | Age: 62
End: 2017-07-17

## 2017-07-17 ENCOUNTER — TELEPHONE (OUTPATIENT)
Dept: ORTHOPEDICS | Facility: CLINIC | Age: 62
End: 2017-07-17

## 2017-07-17 NOTE — TELEPHONE ENCOUNTER
This patient was discharged from [facility] on 07/16/2017.    Discharge Diagnosis: Acute Intractable Headache, Unspecified Headache Type    Follow-up instructions: Follow up with primary care provider, Reynaldo Saavedra, within 7 days to evaluate medication change, for hospital follow- up and to follow up on results.  The following labs/tests are recommended: CBC, BMP.      A follow-up visit has not been scheduled.      Please follow-up with patient.

## 2017-07-17 NOTE — TELEPHONE ENCOUNTER
[facility]:  Nursing Note  SITUATION                                                      Sharon Fung is a 62 year old female whose call was returned in regards to her appointment with Dr. Middleton. She stated that she was recently discharged from the hospital and is supposed to stay home this entire week.  She canceled her appointment for 7/20 and would like to reschedule.    BACKGROUND                                                        Patient is s/p Right carpal tunnel release on 6/15/17.    Date of last clinic appointment: June / 22 / 2017    Does patient have a future appointment scheduled?  Yes -  next appointment is on: August / 10 / 2017          ASSESSMENT      Assessment not needed, Patient states everything is going well with her healing.     PLAN                                                      Nursing Interventions: Appointment rescheduled.   RECOMMENDED DISPOSITION: To be seen in clinic - appointment on 8/10  Will comply with recommendation: Yes    If further questions/concerns or if symptoms do not improve, worsen or new symptoms develop, patient/family are advised to call [phone], option #3 to speak with a triage nurse, as soon as possible.    Stella Castano

## 2017-07-17 NOTE — TELEPHONE ENCOUNTER
"  ED for acute condition Discharge Protocol    \"Hi, my name is Nolvia Christiansononey, a registered nurse, and I am calling from [facility].  I am calling to follow up and see how things are going for you after your recent emergency visit.\"    Tell me how you are doing now that you are home?\" Still having the headache.  Scheduled for 7:40am on Friday.        Discharge Instructions    \"Let's review your discharge instructions.  What is/are the follow-up recommendations?  Pt. Response: See PCP within 7 days    \"Has an appointment with your primary care provider been scheduled?\"  Yes. (confirm and remind to bring meds)    Medications    \"Tell me what changed about your medicines when you discharged?\"    Amlodipine & Toradol    \"What questions do you have about your medications?\"   None        Call Summary    \"What questions or concerns do you have about your recent visit and your follow-up care?\"     none    \"If you have questions or things don't continue to improve, we encourage you contact us through the main clinic number (give number).  Even if the clinic is not open, triage nurses are available 24/7 to help you.     We would like you to know that our clinic has extended hours (provide information).  We also have urgent care (provide details on closest location and hours/contact info)\"    \"Thank you for your time and take care!\"                "

## 2017-07-18 LAB
BACTERIA SPEC CULT: NO GROWTH
MICRO REPORT STATUS: NORMAL
SPECIMEN SOURCE: NORMAL

## 2017-07-19 LAB
BACTERIA SPEC CULT: NO GROWTH
MICRO REPORT STATUS: NORMAL
SPECIMEN SOURCE: NORMAL

## 2017-07-21 ENCOUNTER — OFFICE VISIT (OUTPATIENT)
Dept: FAMILY MEDICINE | Facility: CLINIC | Age: 62
End: 2017-07-21
Payer: COMMERCIAL

## 2017-07-21 VITALS
BODY MASS INDEX: 42.74 KG/M2 | HEART RATE: 74 BPM | TEMPERATURE: 97.7 F | DIASTOLIC BLOOD PRESSURE: 73 MMHG | SYSTOLIC BLOOD PRESSURE: 124 MMHG | OXYGEN SATURATION: 95 % | WEIGHT: 249 LBS

## 2017-07-21 DIAGNOSIS — E66.01 OBESITY, CLASS III, BMI 40-49.9 (MORBID OBESITY) (H): ICD-10-CM

## 2017-07-21 DIAGNOSIS — M05.9 SEROPOSITIVE RHEUMATOID ARTHRITIS (H): ICD-10-CM

## 2017-07-21 DIAGNOSIS — F32.0 MILD MAJOR DEPRESSION (H): ICD-10-CM

## 2017-07-21 DIAGNOSIS — R51.9 ACUTE INTRACTABLE HEADACHE, UNSPECIFIED HEADACHE TYPE: Primary | ICD-10-CM

## 2017-07-21 PROBLEM — Z47.89 AFTERCARE FOLLOWING SURGERY OF THE MUSCULOSKELETAL SYSTEM: Status: RESOLVED | Noted: 2017-06-22 | Resolved: 2017-07-21

## 2017-07-21 PROCEDURE — 99496 TRANSJ CARE MGMT HIGH F2F 7D: CPT | Performed by: FAMILY MEDICINE

## 2017-07-21 RX ORDER — CITALOPRAM HYDROBROMIDE 20 MG/1
20 TABLET ORAL DAILY
Qty: 90 TABLET | Refills: 1 | Status: SHIPPED | OUTPATIENT
Start: 2017-07-21 | End: 2018-01-16

## 2017-07-21 ASSESSMENT — ANXIETY QUESTIONNAIRES
1. FEELING NERVOUS, ANXIOUS, OR ON EDGE: SEVERAL DAYS
7. FEELING AFRAID AS IF SOMETHING AWFUL MIGHT HAPPEN: SEVERAL DAYS
5. BEING SO RESTLESS THAT IT IS HARD TO SIT STILL: NOT AT ALL
GAD7 TOTAL SCORE: 3
3. WORRYING TOO MUCH ABOUT DIFFERENT THINGS: NOT AT ALL
IF YOU CHECKED OFF ANY PROBLEMS ON THIS QUESTIONNAIRE, HOW DIFFICULT HAVE THESE PROBLEMS MADE IT FOR YOU TO DO YOUR WORK, TAKE CARE OF THINGS AT HOME, OR GET ALONG WITH OTHER PEOPLE: NOT DIFFICULT AT ALL
2. NOT BEING ABLE TO STOP OR CONTROL WORRYING: NOT AT ALL
6. BECOMING EASILY ANNOYED OR IRRITABLE: NOT AT ALL

## 2017-07-21 ASSESSMENT — PATIENT HEALTH QUESTIONNAIRE - PHQ9: 5. POOR APPETITE OR OVEREATING: SEVERAL DAYS

## 2017-07-21 NOTE — MR AVS SNAPSHOT
After Visit Summary   7/21/2017    Sharon Fung    MRN: [id number]           Patient Information     Date Of Birth          1955        Visit Information        Provider Department      7/21/2017 7:40 AM Reynaldo Saavedra MD [facility]        Today's Diagnoses     Acute intractable headache, unspecified headache type    -  1    Mild major depression (H)           Follow-ups after your visit        Follow-up notes from your care team     Return in about 3 months (around 10/21/2017).      Your next 10 appointments already scheduled     Aug 02, 2017  7:20 AM CDT   [facility] Visit with Freddy Guerra MD   [facility]    [address] [address]   [phone]            Aug 09, 2017  3:30 PM CDT   (Arrive by 3:15 PM)   Return Visit with Jessa Rvias MD   [facility] ([facility])    [address] [place] [address]  3rd [facility] [id number] [phone]            Aug 10, 2017 11:40 AM CDT   (Arrive by 11:25 AM)   RETURN HAND with Ciara Middleton MD   [facility] ([facility])    [address] [place] [address]  4th [facility] [id number] [phone]              Who to contact     If you have questions or need follow up information about today's clinic visit or your schedule please contact [facility] directly at [phone].  Normal or non-critical lab and imaging results will be communicated to you by MyChart, letter or phone within 4 business days after the clinic has received the results. If you do not hear from us within 7 days, please contact the clinic through MyChart or phone. If you have a critical or abnormal lab result, we will notify you by phone as soon as possible.  Submit refill requests through [employer] or call your pharmacy and they will forward the refill request to us. Please allow 3  business days for your refill to be completed.          Additional Information About Your Visit        [employer] Information     [email or web address] gives you secure access to your electronic health record. If you see a primary care provider, you can also send messages to your care team and make appointments. If you have questions, please call your primary care clinic.  If you do not have a primary care provider, please call [phone] and they will assist you.        Care EveryWhere ID     This is your Care EveryWhere ID. This could be used by other organizations to access your [place] medical records  [id number]        Your Vitals Were     Pulse Temperature Pulse Oximetry BMI (Body Mass Index)          74 97.7  F (36.5  C) (Oral) 95% 42.74 kg/m2         Blood Pressure from Last 3 Encounters:   07/21/17 124/73   07/15/17 133/81   06/15/17 130/70    Weight from Last 3 Encounters:   07/21/17 249 lb (112.9 kg)   07/14/17 247 lb 14.4 oz (112.4 kg)   06/15/17 245 lb (111.1 kg)              Today, you had the following     No orders found for display         Today's Medication Changes          These changes are accurate as of: 7/21/17  8:04 AM.  If you have any questions, ask your nurse or doctor.               Start taking these medicines.        Dose/Directions    citalopram 20 MG tablet   Commonly known as:  celeXA   Used for:  Mild major depression (H)   Started by:  Reynaldo Saavedra MD        Dose:  20 mg   Take 1 tablet (20 mg) by mouth daily   Quantity:  90 tablet   Refills:  1         Stop taking these medicines if you haven't already. Please contact your care team if you have questions.     FLUoxetine 20 MG capsule   Commonly known as:  PROzac   Stopped by:  Reynaldo Saavedra MD                Where to get your medicines      These medications were sent to [facility] [place], [place] - [address]  [address] [address]     Phone:  [phone]     citalopram 20 MG tablet                 Primary Care Provider Office Phone # Fax #    Reynaldo Saavedra [phone]       [facility] [address]  [facility] [address]        Equal Access to Services     SVITLANA SERVIN : Hadii aad ku hadmargreto Soronelali, waaxda luqadaha, qaybta kaalmada adejaneyada, susan day laScarletasif monahan. So [facility] [phone].    ATENCIÓN: Si habla español, tiene a ferrer disposición servicios gratuitos de asistencia lingüística. Llame al [phone].    We comply with applicable federal civil rights laws and [place] laws. We do not discriminate on the basis of race, color, national origin, age, disability sex, sexual orientation or gender identity.            Thank you!     Thank you for choosing [facility]  for your care. Our goal is always to provide you with excellent care. Hearing back from our patients is one way we can continue to improve our services. Please take a few minutes to complete the written survey that you may receive in the mail after your visit with us. Thank you!             Your Updated Medication List - Protect others around you: Learn how to safely use, store and throw away your medicines at www.disposemymeds.org.          This list is accurate as of: 7/21/17  8:04 AM.  Always use your most recent med list.                   Brand Name Dispense Instructions for use Diagnosis    acetaminophen 500 MG tablet    TYLENOL     Take 500-1,000 mg by mouth every 6 hours as needed for mild pain        citalopram 20 MG tablet    celeXA    90 tablet    Take 1 tablet (20 mg) by mouth daily    Mild major depression (H)       fexofenadine 180 MG tablet    ALLEGRA    90 tablet    Take 1 tablet (180 mg) by mouth daily    AR (allergic rhinitis)       FLONASE 50 MCG/ACT spray   Generic drug:  fluticasone      Spray 2 sprays into both nostrils as needed        folic acid 1 MG tablet    FOLVITE    100 tablet    Take 1 tablet (1 mg) by mouth daily     "Seropositive rheumatoid arthritis (H)       ibuprofen 200 MG capsule      Take 600-800 mg by mouth At Bedtime        Insulin Syringe-Needle U-100 27G X 1/2\" 1 ML Misc    BD insulin syringe    10 each    1 Syringe every 7 days , to be used for methotrexate administration.    Seropositive rheumatoid arthritis (H), High risk medication use       ketorolac 10 MG tablet    TORADOL    20 tablet    Take 1 tablet (10 mg) by mouth every 6 hours as needed for moderate pain    Acute intractable headache, unspecified headache type       methotrexate sodium 50 MG/2ML Soln     2 vial    Inject 0.8 mLs (20 mg) as directed every 7 days    Seropositive rheumatoid arthritis (H)       niMODipine 30 MG capsule    NIMOTOP    84 capsule    Take 1 capsule (30 mg) by mouth 3 times daily (before meals)    Acute intractable headache, unspecified headache type       ondansetron 4 MG ODT tab    ZOFRAN ODT    12 tablet    Take 1 tablet (4 mg) by mouth every 6 hours as needed for nausea    Nausea vomiting and diarrhea       order for DME      Use your CPAP device as directed by your provider.        RESTASIS 0.05 % ophthalmic emulsion   Generic drug:  cycloSPORINE      Place 1 drop into both eyes 2 times daily        tofacitinib 11 MG 24 hr tablet    XELJANZ XR    30 tablet    Take 1 tablet (11 mg) by mouth daily    Seropositive rheumatoid arthritis (H), High risk medications (not anticoagulants) long-term use         "

## 2017-07-21 NOTE — PROGRESS NOTES
SUBJECTIVE:                                                    Sharon Fung is a 62 year old female who presents to clinic today for the following health issues:          Hospital Follow-up Visit:    Hospital/Nursing Home/IP Rehab Facility: [facility]  Date of Admission: 7/11/17  Date of Discharge: 7/15/17  Reason(s) for Admission: Headache            Problems taking medications regularly:  None       Medication changes since discharge: None       Problems adhering to non-medication therapy:  None    Summary of hospitalization:  She presented to the hospital with the worst headache of her life. They were concerned about a possible subarachnoid hemorrhage. She had a CT scan and MRI scan which her negative. She eventually had 2 lumbar punctures. She ruled out for a subarachnoid hemorrhage. Her headache eventually got better.  Diagnostic Tests/Treatments reviewed.  Follow up needed: none  Other Healthcare Providers Involved in Patient s Care:         Specialist appointment - Rheumatology and neurology  Update since discharge: improved.     Post Discharge Medication Reconciliation: discharge medications reconciled and changed, per note/orders (see AVS).  Plan of care communicated with patient     Coding guidelines for this visit:  Type of Medical   Decision Making Face-to-Face Visit       within 7 Days of discharge Face-to-Face Visit        within 14 days of discharge   Moderate Complexity [id number]   High Complexity [id number]              She is feeling significantly better now. It was mentioned that on rare occasions Prozac could contribute to cerebral vasospasm. They suggested having a discussion with me about having her go back to [facility]. She would like to do this.  She'll be having a routine rheumatology labs next week.  She is on nimodipine for a month or so to try to help prevent recurrence of the headache. She'll be seeing neurology in a month and will likely have that medicine  discontinued.    She does feel like the headache situation and hospitalization were a wakeup call for her. She plans to try to get serious about losing weight and getting exercise. She plans to start Nutrisystem soon.    Problem list and histories reviewed & adjusted, as indicated.  Additional history: as documented    Patient Active Problem List   Diagnosis     AR (allergic rhinitis)     GERD (gastroesophageal reflux disease)     Status post bariatric surgery     Mild major depression (H)     Advanced directives, counseling/discussion     High risk medication use     Bilateral leg weakness     Left leg pain     Obstructive sleep apnea     Obesity, Class III, BMI 40-49.9 (morbid obesity) (H)     Anterior basement membrane dystrophy     Seropositive rheumatoid arthritis (H)     LORNA positive     Carpal tunnel syndrome of right wrist     Headache     Past Surgical History:   Procedure Laterality Date     BLEPHAROPLASTY BILATERAL Bilateral 8/5/2016    Procedure: BLEPHAROPLASTY BILATERAL;  Surgeon: Cortez Robin MD;  [facility]     BREAST BIOPSY, RT/LT  1-94    Benign     C APPENDECTOMY  1977     COLONOSCOPY       COLONOSCOPY WITH CO2 INSUFFLATION N/A 3/30/2017    Procedure: COLONOSCOPY WITH CO2 INSUFFLATION;  Surgeon: Issa Weeks MD;  Location:  OR     ESOPHAGOSCOPY, GASTROSCOPY, DUODENOSCOPY (EGD), COMBINED N/A 10/29/2015    Procedure: COMBINED ESOPHAGOSCOPY, GASTROSCOPY, DUODENOSCOPY (EGD);  Surgeon: Shaq Mims MD;  [facility]     LAPAROSCOPIC GASTRIC ADJUSTABLE BANDING  11/09/10    Lap band procedure     LAPAROSCOPIC REMOVAL GASTRIC ADJUSTABLE BAND N/A 10/31/2015    Procedure: LAPAROSCOPIC REMOVAL GASTRIC ADJUSTABLE BAND;  Surgeon: Shaq Mims MD;  [facility]     RELEASE CARPAL TUNNEL Left 4/27/2017    Procedure: RELEASE CARPAL TUNNEL;  Left Open Carpal Tunnel Release;  Surgeon: Ciara Middleton MD;  Location:  OR     RELEASE CARPAL TUNNEL Right  "6/15/2017    Procedure: RELEASE CARPAL TUNNEL;  Right Carpal Tunnel Release Open;  Surgeon: Ciara Middleton MD;  [facility]     TUBAL LIGATION  1988       Social History   Substance Use Topics     Smoking status: Never Smoker     Smokeless tobacco: Never Used     Alcohol use Yes      Comment: very rare     Family History   Problem Relation Age of Onset     HEART DISEASE Father      MI     Neurologic Disorder Father 79     Parkinson's     DIABETES Father      Hypertension Father      Coronary Artery Disease Father      CANCER Maternal Grandmother      uterine     Neurologic Disorder Sister 16     migraine     Depression Sister      Neurologic Disorder Mother 20     migraine     Neurologic Disorder Son 7     migraine     Substance Abuse Son          Current Outpatient Prescriptions   Medication Sig Dispense Refill     niMODipine (NIMOTOP) 30 MG capsule Take 1 capsule (30 mg) by mouth 3 times daily (before meals) 84 capsule 0     ketorolac (TORADOL) 10 MG tablet Take 1 tablet (10 mg) by mouth every 6 hours as needed for moderate pain 20 tablet 0     ibuprofen 200 MG capsule Take 600-800 mg by mouth At Bedtime       cycloSPORINE (RESTASIS) 0.05 % ophthalmic emulsion Place 1 drop into both eyes 2 times daily       methotrexate sodium 50 MG/2ML SOLN Inject 0.8 mLs (20 mg) as directed every 7 days 2 vial 3     folic acid (FOLVITE) 1 MG tablet Take 1 tablet (1 mg) by mouth daily 100 tablet 3     tofacitinib (XELJANZ XR) 11 MG 24 hr tablet Take 1 tablet (11 mg) by mouth daily 30 tablet 6     FLUoxetine (PROZAC) 20 MG capsule Take 1 capsule (20 mg) by mouth daily 90 capsule 1     ondansetron (ZOFRAN ODT) 4 MG ODT tab Take 1 tablet (4 mg) by mouth every 6 hours as needed for nausea 12 tablet 1     fexofenadine (ALLEGRA) 180 MG tablet Take 1 tablet (180 mg) by mouth daily 90 tablet 2     Insulin Syringe-Needle U-100 (BD INSULIN SYRINGE) 27G X 1/2\" 1 ML MISC 1 Syringe every 7 days , to be used for methotrexate " "administration. 10 each 5     acetaminophen (TYLENOL) 500 MG tablet Take 500-1,000 mg by mouth every 6 hours as needed for mild pain       ORDER FOR DME Use your CPAP device as directed by your provider.       fluticasone (FLONASE) 50 MCG/ACT nasal spray Spray 2 sprays into both nostrils as needed       No Known Allergies      Reviewed and updated as needed this visit by clinical staffTobacco  Allergies  Med Hx  Surg Hx  Fam Hx  Soc Hx      Reviewed and updated as needed this visit by Provider         ROS:  Noncontributory except as above.    OBJECTIVE:     /73 (BP Location: Right arm, Patient Position: Chair, Cuff Size: Adult Large)  Pulse 74  Temp 97.7  F (36.5  C) (Oral)  Wt 249 lb (112.9 kg)  SpO2 95%  BMI 42.74 kg/m2  Body mass index is 42.74 kg/(m^2).  GENERAL: alert, no distress and obese  HENT: Grossly normal  NECK: Supple  NEURO: Normal strength and tone, mentation intact and speech normal  PSYCH: affect is pleasant and appropriate. No evidence of thought disorder. She scored a 5 on the PHQ-9 and 3 on the JUANPABLO 7    Diagnostic Test Results:  Hospital information was reviewed in her chart    ASSESSMENT/PLAN:       ICD-10-CM    1. Acute intractable headache, unspecified headache type R51    2. Mild major depression (H) F32.0 citalopram (CELEXA) 20 MG tablet   3. Obesity, Class III, BMI 40-49.9 (morbid obesity) (H) E66.01    4. Seropositive rheumatoid arthritis (H) M05.9      Her headache has significantly improved  Given the input she had about the small risk of Prozac contributing to a severe headache, we will have her stop the Prozac and have it \"auto wean\" out of her system  We will have her restart Celexa 20 mg a half tablet daily for one week and then 1 tablet daily thereafter  We'll continue her other same medicines for now  Follow-up with rheumatology and neurology as planned  She was encouraged on diet and exercise treatment for weight loss  Plan a recheck in 3 months -- she'll be due " for a repeat mammogram by then as well, which we discussed    Reynaldo Saavedra MD  [facility]

## 2017-07-21 NOTE — NURSING NOTE
"Chief Complaint   Patient presents with     [facility] F/U     [facility]     Phq9       Initial /73 (BP Location: Right arm, Patient Position: Chair, Cuff Size: Adult Large)  Pulse 74  Temp 97.7  F (36.5  C) (Oral)  Wt 249 lb (112.9 kg)  SpO2 95%  BMI 42.74 kg/m2 Estimated body mass index is 42.74 kg/(m^2) as calculated from the following:    Height as of 6/15/17: 5' 4\" (1.626 m).    Weight as of this encounter: 249 lb (112.9 kg).  Medication Reconciliation: complete   Whitney Renee CMA       "

## 2017-07-22 ASSESSMENT — PATIENT HEALTH QUESTIONNAIRE - PHQ9: SUM OF ALL RESPONSES TO PHQ QUESTIONS 1-9: 5

## 2017-07-22 ASSESSMENT — ANXIETY QUESTIONNAIRES: GAD7 TOTAL SCORE: 3

## 2017-07-26 DIAGNOSIS — M05.9 SEROPOSITIVE RHEUMATOID ARTHRITIS (H): ICD-10-CM

## 2017-07-26 DIAGNOSIS — Z79.899 HIGH RISK MEDICATIONS (NOT ANTICOAGULANTS) LONG-TERM USE: ICD-10-CM

## 2017-07-26 LAB
ALBUMIN SERPL-MCNC: 3.7 G/DL (ref 3.4–5)
ALP SERPL-CCNC: 84 U/L (ref 40–150)
ALT SERPL W P-5'-P-CCNC: 41 U/L (ref 0–50)
AST SERPL W P-5'-P-CCNC: 23 U/L (ref 0–45)
BASOPHILS # BLD AUTO: 0 10E9/L (ref 0–0.2)
BASOPHILS NFR BLD AUTO: 0.6 %
BILIRUB DIRECT SERPL-MCNC: <0.1 MG/DL (ref 0–0.2)
BILIRUB SERPL-MCNC: 0.3 MG/DL (ref 0.2–1.3)
CREAT SERPL-MCNC: 0.67 MG/DL (ref 0.52–1.04)
CRP SERPL-MCNC: 6.6 MG/L (ref 0–8)
DIFFERENTIAL METHOD BLD: NORMAL
EOSINOPHIL # BLD AUTO: 0.2 10E9/L (ref 0–0.7)
EOSINOPHIL NFR BLD AUTO: 3 %
ERYTHROCYTE [DISTWIDTH] IN BLOOD BY AUTOMATED COUNT: 14 % (ref 10–15)
ERYTHROCYTE [SEDIMENTATION RATE] IN BLOOD BY WESTERGREN METHOD: 17 MM/H (ref 0–30)
GFR SERPL CREATININE-BSD FRML MDRD: 90 ML/MIN/1.7M2
HCT VFR BLD AUTO: 38.6 % (ref 35–47)
HGB BLD-MCNC: 13 G/DL (ref 11.7–15.7)
IMM GRANULOCYTES # BLD: 0 10E9/L (ref 0–0.4)
IMM GRANULOCYTES NFR BLD: 0.6 %
LYMPHOCYTES # BLD AUTO: 2.2 10E9/L (ref 0.8–5.3)
LYMPHOCYTES NFR BLD AUTO: 34.4 %
MCH RBC QN AUTO: 31.8 PG (ref 26.5–33)
MCHC RBC AUTO-ENTMCNC: 33.7 G/DL (ref 31.5–36.5)
MCV RBC AUTO: 94 FL (ref 78–100)
MONOCYTES # BLD AUTO: 0.3 10E9/L (ref 0–1.3)
MONOCYTES NFR BLD AUTO: 4.5 %
NEUTROPHILS # BLD AUTO: 3.7 10E9/L (ref 1.6–8.3)
NEUTROPHILS NFR BLD AUTO: 56.9 %
NRBC # BLD AUTO: 0 10*3/UL
NRBC BLD AUTO-RTO: 0 /100
PLATELET # BLD AUTO: 367 10E9/L (ref 150–450)
PROT SERPL-MCNC: 7.7 G/DL (ref 6.8–8.8)
RBC # BLD AUTO: 4.09 10E12/L (ref 3.8–5.2)
WBC # BLD AUTO: 6.4 10E9/L (ref 4–11)

## 2017-07-26 PROCEDURE — 36415 COLL VENOUS BLD VENIPUNCTURE: CPT | Performed by: INTERNAL MEDICINE

## 2017-07-26 PROCEDURE — 85025 COMPLETE CBC W/AUTO DIFF WBC: CPT | Performed by: INTERNAL MEDICINE

## 2017-07-26 PROCEDURE — 85652 RBC SED RATE AUTOMATED: CPT | Performed by: INTERNAL MEDICINE

## 2017-07-26 PROCEDURE — 82565 ASSAY OF CREATININE: CPT | Performed by: INTERNAL MEDICINE

## 2017-07-26 PROCEDURE — 86140 C-REACTIVE PROTEIN: CPT | Performed by: INTERNAL MEDICINE

## 2017-07-26 PROCEDURE — 80076 HEPATIC FUNCTION PANEL: CPT | Performed by: INTERNAL MEDICINE

## 2017-08-02 ENCOUNTER — OFFICE VISIT (OUTPATIENT)
Dept: RHEUMATOLOGY | Facility: CLINIC | Age: 62
End: 2017-08-02
Payer: COMMERCIAL

## 2017-08-02 VITALS
HEART RATE: 80 BPM | HEIGHT: 64 IN | WEIGHT: 253.6 LBS | OXYGEN SATURATION: 97 % | DIASTOLIC BLOOD PRESSURE: 78 MMHG | BODY MASS INDEX: 43.29 KG/M2 | SYSTOLIC BLOOD PRESSURE: 133 MMHG | TEMPERATURE: 98.8 F

## 2017-08-02 DIAGNOSIS — M05.9 SEROPOSITIVE RHEUMATOID ARTHRITIS (H): Primary | ICD-10-CM

## 2017-08-02 DIAGNOSIS — Z79.899 HIGH RISK MEDICATION USE: ICD-10-CM

## 2017-08-02 PROCEDURE — 99213 OFFICE O/P EST LOW 20 MIN: CPT | Performed by: INTERNAL MEDICINE

## 2017-08-02 RX ORDER — SYRINGE-NEEDLE,INSULIN,0.5 ML 27GX1/2"
1 SYRINGE, EMPTY DISPOSABLE MISCELLANEOUS
Qty: 10 EACH | Refills: 5 | Status: SHIPPED | OUTPATIENT
Start: 2017-08-02 | End: 2017-11-01

## 2017-08-02 RX ORDER — PREDNISONE 5 MG/1
TABLET ORAL
Qty: 50 TABLET | Refills: 0 | Status: SHIPPED | OUTPATIENT
Start: 2017-08-02 | End: 2017-08-28

## 2017-08-02 NOTE — PROGRESS NOTES
Rheumatology Clinic Visit      Sharon Fung MRN# [id number]   YOB: 1955 Age: 62 year old      Date of visit: 8/02/17   PCP: Dr. Reynaldo Saavedra    Chief Complaint   Patient presents with:  Arthritis: RA, patient states she does not have pain she has stiffness.      Assessment and Plan     1. Seropositive nonerosive rheumatoid arthritis (RF negative, CCP low positive): Previously on Humira (lost efficacy), Cimzia (ineffective), Orencia (ineffective), leflunomide (ineffective), and hydroxychloroquine (ineffective). Currently on MTX 20mg SQ (GI upset with PO) and Xeljanz XR 11 mg daily. Doing well prior to hospitalization for a thunderclap headache; worsening morning stiffness for approximately 2 weeks. Worsening stiffness, without joint pain worsening, could be due to rheumatoid arthritis and therefore will provide a prednisone taper. Maintain other medications the same. Could consider increasing methotrexate if symptoms persist.   - Continue methotrexate 20mg SQ every 7 days  - Continue folic acid 1mg daily  - Continue Xeljanz XR 11mg daily  - Start prednisone 20mg daily x5days, then 15mg daily x5days, then 10mg daily x5days, then 5mg daily x5days, then stop.    - Labs 2-3 days prior to the next rheumatology clinic visit: CBC, Creatinine, Hepatic Panel, ESR, CRP              Rapid 3, cumulative scores                      08/02/2017: 4.2   (MTX 20mg SQ wkly, Xeljanz XR 11mg daily)                         05/04/2017: 7      (MTX 20mg SQ wkly, Xeljanz XR 11mg daily)                        02/02/2017: 8      (MTX 20mg SQ wkly, Xeljanz XR 11mg daily)                      11/04/2016: 13    (MTX 20mg SQ weekly)                      07/15/2016: 10.8    2. Positive LORNA and dsDNA / Secondary Sjogren's Syndrome: Repeat dsDNA have been negative. Has dry eyes but no dry mouth.  Dry eyes are treated by ophthalmology with Restasis.  Likely Secondary Sjogren's Syndrome.     3. Vaccinations:  - Influenza: encouraged  yearly vaccination  - [id number]: up to date  - [id number]: up to date  - Zostavax: refused by patient previously, now contraindicated    Ms. Fung verbalized agreement with and understanding of the rational for the diagnosis and treatment plan.  All questions were answered to best of my ability and the patient's satisfaction. Ms. Fung was advised to contact the clinic with any questions that may arise after the clinic visit.      Thank you for involving me in the care of the patient    Return to clinic: 3 months      HPI   Sharon Fung is a 62 year old female with medical history significant for GERD, allergic rhinitis, obstructive sleep apnea, obesity, and rheumatoid arthritis who presents for follow-up of rheumatoid arthritis.    Today, Ms. Fung reports that approximately 2 weeks ago she had a thunderclap headache requiring hospitalization for about 5 days, and since then she has had worsening morning stiffness and gelling phenomenon. Morning stiffness lasts for about 2 hours now. She was doing well prior to the hospitalization. No joint pain. No recurrence of trigger fingers or numbness and tingling in her hands. Note that she had a previous carpal tunnel release surgery and trigger finger injections.    Denies fevers, chills, nausea, vomiting, constipation, diarrhea. No chest pain/pressure, palpitations, or shortness of breath. No oral or nasal sores. No neck pain. No rash.      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 No oral or nasal ulcers.  CV: No chest pain, pressure, palpitations, or dyspnea on exertion.  PULM: No SOB, wheeze, cough.  GI: No nausea, vomiting, constipation, diarrhea. No blood in stool. No abdominal pain.  : No blood in urine.  MSK: See HPI.  NEURO: No numbness, tingling, or weakness.  ENDO: No heat/cold  intolerance.  EXT: No LE swelling  PSYCH: Negative    Active Problem List     Patient Active Problem List   Diagnosis     AR (allergic rhinitis)     GERD (gastroesophageal reflux disease)     Status post bariatric surgery     Mild major depression (H)     Advanced directives, counseling/discussion     High risk medication use     Bilateral leg weakness     Left leg pain     Obstructive sleep apnea     Obesity, Class III, BMI 40-49.9 (morbid obesity) (H)     Anterior basement membrane dystrophy     Seropositive rheumatoid arthritis (H)     LORNA positive     Carpal tunnel syndrome of right wrist     Headache     Past Medical History     Past Medical History:   Diagnosis Date     AR (allergic rhinitis)  as teen     Arthritis 12/14/2015     Depressive disorder 3 years ago     GERD (gastroesophageal reflux disease) 4-07     Headache 7/11/2017     Mild major depression (H) 3 years ago     Morbid obesity (H) teens     BRETT (obstructive sleep apnea)     uses CPAP     Rheumatoid arthritis, adult (H)      Past Surgical History     Past Surgical History:   Procedure Laterality Date     BLEPHAROPLASTY BILATERAL Bilateral 8/5/2016    Procedure: BLEPHAROPLASTY BILATERAL;  Surgeon: Cortez Robin MD;  [facility]     BREAST BIOPSY, RT/LT  1-94    Benign     C APPENDECTOMY  1977     COLONOSCOPY       COLONOSCOPY WITH CO2 INSUFFLATION N/A 3/30/2017    Procedure: COLONOSCOPY WITH CO2 INSUFFLATION;  Surgeon: Issa Weeks MD;  Location:  OR     ESOPHAGOSCOPY, GASTROSCOPY, DUODENOSCOPY (EGD), COMBINED N/A 10/29/2015    Procedure: COMBINED ESOPHAGOSCOPY, GASTROSCOPY, DUODENOSCOPY (EGD);  Surgeon: Shaq Mims MD;  [facility]     LAPAROSCOPIC GASTRIC ADJUSTABLE BANDING  11/09/10    Lap band procedure     LAPAROSCOPIC REMOVAL GASTRIC ADJUSTABLE BAND N/A 10/31/2015    Procedure: LAPAROSCOPIC REMOVAL GASTRIC ADJUSTABLE BAND;  Surgeon: Shaq Mims MD;  [facility]     RELEASE CARPAL TUNNEL Left  "4/27/2017    Procedure: RELEASE CARPAL TUNNEL;  Left Open Carpal Tunnel Release;  Surgeon: Ciara Middleton MD;  [facility]     RELEASE CARPAL TUNNEL Right 6/15/2017    Procedure: RELEASE CARPAL TUNNEL;  Right Carpal Tunnel Release Open;  Surgeon: Ciara Middleton MD;  [facility]     TUBAL LIGATION  1988     Allergy   No Known Allergies  Current Medication List     Current Outpatient Prescriptions   Medication Sig     citalopram (CELEXA) 20 MG tablet Take 1 tablet (20 mg) by mouth daily     niMODipine (NIMOTOP) 30 MG capsule Take 1 capsule (30 mg) by mouth 3 times daily (before meals)     ibuprofen 200 MG capsule Take 600-800 mg by mouth At Bedtime     cycloSPORINE (RESTASIS) 0.05 % ophthalmic emulsion Place 1 drop into both eyes 2 times daily     methotrexate sodium 50 MG/2ML SOLN Inject 0.8 mLs (20 mg) as directed every 7 days     folic acid (FOLVITE) 1 MG tablet Take 1 tablet (1 mg) by mouth daily     tofacitinib (XELJANZ XR) 11 MG 24 hr tablet Take 1 tablet (11 mg) by mouth daily     fexofenadine (ALLEGRA) 180 MG tablet Take 1 tablet (180 mg) by mouth daily     Insulin Syringe-Needle U-100 (BD INSULIN SYRINGE) 27G X 1/2\" 1 ML MISC 1 Syringe every 7 days , to be used for methotrexate administration.     acetaminophen (TYLENOL) 500 MG tablet Take 500-1,000 mg by mouth every 6 hours as needed for mild pain     ORDER FOR DME Use your CPAP device as directed by your provider.     fluticasone (FLONASE) 50 MCG/ACT nasal spray Spray 2 sprays into both nostrils as needed     ketorolac (TORADOL) 10 MG tablet Take 1 tablet (10 mg) by mouth every 6 hours as needed for moderate pain (Patient not taking: Reported on 8/2/2017)     ondansetron (ZOFRAN ODT) 4 MG ODT tab Take 1 tablet (4 mg) by mouth every 6 hours as needed for nausea (Patient not taking: Reported on 8/2/2017)     No current facility-administered medications for this visit.          Social History   See HPI    Family History     Family " "History   Problem Relation Age of Onset     HEART DISEASE Father      MI     Neurologic Disorder Father 79     Parkinson's     DIABETES Father      Hypertension Father      Coronary Artery Disease Father      CANCER Maternal Grandmother      uterine     Neurologic Disorder Sister 16     migraine     Depression Sister      Neurologic Disorder Mother 20     migraine     Neurologic Disorder Son 7     migraine     Substance Abuse Son        Physical Exam     Temp Readings from Last 3 Encounters:   08/02/17 98.8  F (37.1  C) (Oral)   07/21/17 97.7  F (36.5  C) (Oral)   07/15/17 98.8  F (37.1  C) (Axillary)     BP Readings from Last 5 Encounters:   08/02/17 133/78   07/21/17 124/73   07/15/17 133/81   06/15/17 130/70   05/04/17 127/79     Pulse Readings from Last 1 Encounters:   08/02/17 80     Resp Readings from Last 1 Encounters:   07/15/17 16     Estimated body mass index is 43.53 kg/(m^2) as calculated from the following:    Height as of this encounter: 1.626 m (5' 4\").    Weight as of this encounter: 115 kg (253 lb 9.6 oz).    GEN: NAD, overweight  HEENT: MMM. No oral lesions. Anicteric, noninjected sclera  CV: S1, S2. RRR. No m/r/g.  PULM: CTA bilaterally. No w/c.  MSK: MCPs, PIPs, DIPs, wrists, elbows, and shoulders without swelling or tenderness to palpation.. Hips nontender to direct palpation. Knees, ankles, and feet without swelling or tenderness to palpation.   SKIN: No rash. Tattoos on the left lower extremity  EXT: No LE edema  PSYCH: Alert. Appropriate.    Labs / Imaging (select studies)   RF/CCP  Recent Labs   Lab Test  11/19/12   0900   CCPABY  53*   RHF  <7     CBC  Recent Labs   Lab Test  07/26/17   0827  07/15/17   0417  07/13/17   1515  07/11/17   0906  04/27/17   0732   WBC  6.4   --    --   11.5*  6.8   RBC  4.09   --    --   4.40  4.34   HGB  13.0   --    --   13.9  13.6   HCT  38.6   --    --   41.7  41.3   MCV  94   --    --   95  95   RDW  14.0   --    --   14.3  14.3   PLT  367  310   --   430  " 329   MCH  31.8   --    --   31.6  31.3   MCHC  33.7   --    --   33.3  32.9   NEUTROPHIL  56.9   --    --   45.3  65.4   LYMPH  34.4   --    --   44.3  26.1   MONOCYTE  4.5   --    --   6.2  5.9   EOSINOPHIL  3.0   --    --   2.2  1.8   BASOPHIL  0.6   --   1  0.5  0.4   ANEU  3.7   --    --   5.2  4.4   ALYM  2.2   --    --   5.1  1.8   KIMBERLY  0.3   --    --   0.7  0.4   AEOS  0.2   --    --   0.3  0.1   ABAS  0.0   --    --   0.1  0.0     CMP  Recent Labs   Lab Test  07/26/17   0827  07/11/17   0906  07/11/17   0858  04/27/17   0732  01/17/17   0642  01/15/17   1844   NA   --   140   --    --   145*  139   POTASSIUM   --   3.2*   --    --   3.5  4.0   CHLORIDE   --   107   --    --   112*  107   CO2   --   21   --    --   29  24   ANIONGAP   --   12   --    --   4  8   GLC   --   90   --    --   108*  104*   BUN   --   13   --    --   9  18   CR  0.67  0.73   --   0.81  0.93  0.83   GFRESTIMATED  90  82  85  72  61  70   GFRESTBLACK  >90   GFR Calc    >90   GFR Calc    >90  87  74  85   ISH   --   9.9   --    --   8.2*  8.5   BILITOTAL  0.3  0.3   --   0.5   --   0.6   ALBUMIN  3.7  4.0   --   3.8   --   3.4   PROTTOTAL  7.7  8.1   --   7.8   --   7.3   ALKPHOS  84  90   --   99   --   82   AST  23  24   --   18   --   35   ALT  41  47   --   35   --   37     Calcium/VitaminD  Recent Labs   Lab Test  07/11/17   0906  01/17/17   0642  01/15/17   1844   02/20/15   0750   02/11/11   0735   ISH  9.9  8.2*  8.5   < >  9.0   < >  9.3   D3VIT   --    --    --    --    --    --   42   VITDT   --    --    --    --   39   --    --     < > = values in this interval not displayed.     ESR/CRP  Recent Labs   Lab Test  07/26/17   0827  07/11/17   0906  04/27/17   0732   SED  17  17  18   CRP  6.6  3.7  4.9     Lipid Panel  Recent Labs   Lab Test  04/27/17   0732  12/27/16   0836  02/20/15   0750  10/20/14   0821  11/22/13   0843   CHOL  204*  184  157  194  167   TRIG  148  154*  79  193*  140    HDL  82  56  63  63  60   LDL  92  97  78  92  79   VLDL   --    --   16  39*  28   CHOLHDLRATIO   --    --   2.5  3.1  2.8   NHDL  122  128   --    --    --      Hepatitis B  Recent Labs   Lab Test  12/08/15   0855  03/06/15   1650   HBCAB  Nonreactive   --    HEPBANG  Nonreactive  Nonreactive     Hepatitis C  Recent Labs   Lab Test  12/08/15   0855  03/06/15   1650  11/19/12   0900   HCVAB  Nonreactive   Assay performance characteristics have not been established for newborns,   infants, and children    Nonreactive   Assay performance characteristics have not been established for newborns,   infants, and children    Negative     Tuberculosis Screening  Recent Labs   Lab Test  02/02/17   0822  12/08/15   0855  03/06/15   1651   TBRSLT  Negative  Negative  Negative   TBAGN  0.00  0.01  0.04       Immunization History     Immunization History   Administered Date(s) Administered     Influenza Vaccine IM 3yrs+ 4 Valent IIV4 10/15/2013, 09/15/2014, 09/28/2015, 09/28/2016     Pneumococcal (PCV 13) 04/15/2016     Pneumococcal 23 valent 07/15/2016     TDAP Vaccine (Adacel) 02/09/2011          Chart documentation done in part with Dragon Voice recognition Software. Although reviewed after completion, some word and grammatical error may remain.    Freddy Guerra MD

## 2017-08-02 NOTE — MR AVS SNAPSHOT
After Visit Summary   2017    Sharon Fung    MRN: [id number]           Patient Information     Date Of Birth          1955        Visit Information        Provider Department      2017 7:20 AM Freddy Guerra MD [facility]        Today's Diagnoses     Seropositive rheumatoid arthritis (H)    -  1    High risk medication use          Care Instructions      Dr. Guerra s Rheumatology Clinics  Locations Clinic Hours Telephone Number   [facility]  [address] [facility] [address] [facility] [address]  [place] [address]     Wednesday: 7:20AM - 4:00PM  Thursday:     7:20AM - 4:00PM     Friday:          7:20AM - 11:00AM       To schedule an appointment with  Dr. Guerra,  please contact  Specialty [phone]       Babs Chaparro  [address]  [place] [address]       Monday:       7:20AM - 4:00PM      B[facility] Quinonez  [address]  [place] [address]       Tuesday:      7:20AM - 4:00PM          Thank you!    Evie Rosas CMA              Follow-ups after your visit        Your next 10 appointments already scheduled     Aug 09, 2017  3:30 PM CDT   (Arrive by 3:15 PM)   Return Visit with Jessa Rivas MD   [facility] ([facility])    [address] [place] [id number] [phone]            Aug 10, 2017 11:40 AM CDT   (Arrive by 11:25 AM)   RETURN HAND with Ciara Middleton MD   [facility] ([facility])    [address] [place] [id number] [phone]            2017  7:20 AM CDT   Return Visit with Freddy Guerra MD   [facility] ([facility])    [address] [facility]  [place] [address] [phone]              Future tests that were ordered for you today     Open Future Orders        Priority Expected Expires Ordered    CBC with platelets differential Routine 10/27/2017  "11/15/2017 8/2/2017    Creatinine Routine 10/27/2017 11/15/2017 8/2/2017    CRP inflammation Routine 10/27/2017 11/15/2017 8/2/2017    Erythrocyte sedimentation rate auto Routine 10/27/2017 11/15/2017 8/2/2017    Hepatic panel Routine 10/27/2017 11/15/2017 8/2/2017            Who to contact     If you have questions or need follow up information about today's clinic visit or your schedule please contact [facility] directly at [phone].  Normal or non-critical lab and imaging results will be communicated to you by BizBraghart, letter or phone within 4 business days after the clinic has received the results. If you do not hear from us within 7 days, please contact the clinic through Lendinerot or phone. If you have a critical or abnormal lab result, we will notify you by phone as soon as possible.  Submit refill requests through [employer] & other things you bake or call your pharmacy and they will forward the refill request to us. Please allow 3 business days for your refill to be completed.          Additional Information About Your Visit        MyChart Information     [employer] other things you bake gives you secure access to your electronic health record. If you see a primary care provider, you can also send messages to your care team and make appointments. If you have questions, please call your primary care clinic.  If you do not have a primary care provider, please call [phone] and they will assist you.        Care EveryWhere ID     This is your Care EveryWhere ID. This could be used by other organizations to access your [place] medical records  [id number]        Your Vitals Were     Pulse Temperature Height Pulse Oximetry BMI (Body Mass Index)       80 98.8  F (37.1  C) (Oral) 1.626 m (5' 4\") 97% 43.53 kg/m2        Blood Pressure from Last 3 Encounters:   08/02/17 133/78   07/21/17 124/73   07/15/17 133/81    Weight from Last 3 Encounters:   08/02/17 115 kg (253 lb 9.6 oz)   07/21/17 112.9 kg (249 lb)   07/14/17 112.4 kg (247 lb 14.4 oz)    " "             Today's Medication Changes          These changes are accurate as of: 8/2/17  7:49 AM.  If you have any questions, ask your nurse or doctor.               Start taking these medicines.        Dose/Directions    predniSONE 5 MG tablet   Commonly known as:  DELTASONE   Used for:  Seropositive rheumatoid arthritis (H)   Started by:  Freddy Guerra MD        Prednisone 20mg daily x5days, then 15mg daily x5days, then 10mg daily x5days, then 5mg daily x5days, then stop.   Quantity:  50 tablet   Refills:  0            Where to get your medicines      These medications were sent to [facility] [place], [place] - [address]  [address] [facility] [facility] [address]     Phone:  [phone]     Insulin Syringe-Needle U-100 27G X 1/2\" 1 ML Misc    methotrexate sodium 50 MG/2ML Soln    predniSONE 5 MG tablet                Primary Care Provider Office Phone # Fax #    Reynaldo Saavedra [phone]       [facility] [address]  [facility] [address]        Equal Access to Services     LARISSA [facility] HospitalEMMETT AH: Hadii aad ku hadasho Soomaali, waaxda luqadaha, qaybta kaalmada adeegyada, waxay tarun hayasif terry . So [facility] [phone].    ATENCIÓN: Si habla español, tiene a ferrer disposición servicios gratuitos de asistencia lingüística. Llame al [phone].    We comply with applicable federal civil rights laws and [place] laws. We do not discriminate on the basis of race, color, national origin, age, disability sex, sexual orientation or gender identity.            Thank you!     Thank you for choosing [facility]  for your care. Our goal is always to provide you with excellent care. Hearing back from our patients is one way we can continue to improve our services. Please take a few minutes to complete the written survey that you may receive in the mail after your visit with us. Thank you!             Your Updated Medication List - " "Protect others around you: Learn how to safely use, store and throw away your medicines at www.disposemymeds.org.          This list is accurate as of: 8/2/17  7:49 AM.  Always use your most recent med list.                   Brand Name Dispense Instructions for use Diagnosis    acetaminophen 500 MG tablet    TYLENOL     Take 500-1,000 mg by mouth every 6 hours as needed for mild pain        citalopram 20 MG tablet    celeXA    90 tablet    Take 1 tablet (20 mg) by mouth daily    Mild major depression (H)       fexofenadine 180 MG tablet    ALLEGRA    90 tablet    Take 1 tablet (180 mg) by mouth daily    AR (allergic rhinitis)       FLONASE 50 MCG/ACT spray   Generic drug:  fluticasone      Spray 2 sprays into both nostrils as needed        folic acid 1 MG tablet    FOLVITE    100 tablet    Take 1 tablet (1 mg) by mouth daily    Seropositive rheumatoid arthritis (H)       ibuprofen 200 MG capsule      Take 600-800 mg by mouth At Bedtime        Insulin Syringe-Needle U-100 27G X 1/2\" 1 ML Misc    BD insulin syringe    10 each    1 Syringe every 7 days , to be used for methotrexate administration.    Seropositive rheumatoid arthritis (H)       ketorolac 10 MG tablet    TORADOL    20 tablet    Take 1 tablet (10 mg) by mouth every 6 hours as needed for moderate pain    Acute intractable headache, unspecified headache type       methotrexate sodium 50 MG/2ML Soln     2 vial    Inject 0.8 mLs (20 mg) as directed every 7 days    Seropositive rheumatoid arthritis (H)       niMODipine 30 MG capsule    NIMOTOP    84 capsule    Take 1 capsule (30 mg) by mouth 3 times daily (before meals)    Acute intractable headache, unspecified headache type       ondansetron 4 MG ODT tab    ZOFRAN ODT    12 tablet    Take 1 tablet (4 mg) by mouth every 6 hours as needed for nausea    Nausea vomiting and diarrhea       order for DME      Use your CPAP device as directed by your provider.        predniSONE 5 MG tablet    DELTASONE    50 " tablet    Prednisone 20mg daily x5days, then 15mg daily x5days, then 10mg daily x5days, then 5mg daily x5days, then stop.    Seropositive rheumatoid arthritis (H)       RESTASIS 0.05 % ophthalmic emulsion   Generic drug:  cycloSPORINE      Place 1 drop into both eyes 2 times daily        tofacitinib 11 MG 24 hr tablet    XELJANZ XR    30 tablet    Take 1 tablet (11 mg) by mouth daily    Seropositive rheumatoid arthritis (H), High risk medications (not anticoagulants) long-term use

## 2017-08-02 NOTE — PATIENT INSTRUCTIONS
Dr. Guerra s Rheumatology Clinics  Locations Clinic Hours Telephone Number   [facility]  [address]  [place] [address]     Wednesday: 7:20AM - 4:00PM  Thursday:     7:20AM - 4:00PM     Friday:          7:20AM - 11:00AM       To schedule an appointment with  Dr. Guerra,  please contact  Specialty [phone]       Babs [facility]  [address]  [place] [address]       Monday:       7:20AM - 4:00PM      Babs Quinonez  [address]  [place] [address]       Tuesday:      7:20AM - 4:00PM          Thank you!    Evie Rosas CMA

## 2017-08-09 ENCOUNTER — OFFICE VISIT (OUTPATIENT)
Dept: NEUROLOGY | Facility: CLINIC | Age: 62
End: 2017-08-09

## 2017-08-09 VITALS
OXYGEN SATURATION: 96 % | WEIGHT: 254.5 LBS | HEART RATE: 84 BPM | SYSTOLIC BLOOD PRESSURE: 131 MMHG | DIASTOLIC BLOOD PRESSURE: 67 MMHG | BODY MASS INDEX: 43.45 KG/M2 | TEMPERATURE: 97.6 F | HEIGHT: 64 IN | RESPIRATION RATE: 28 BRPM

## 2017-08-09 DIAGNOSIS — R51.9 ACUTE INTRACTABLE HEADACHE, UNSPECIFIED HEADACHE TYPE: ICD-10-CM

## 2017-08-09 RX ORDER — NIMODIPINE 30 MG/1
CAPSULE, LIQUID FILLED ORAL
Qty: 42 CAPSULE | Refills: 0 | Status: SHIPPED | OUTPATIENT
Start: 2017-08-09 | End: 2017-10-29

## 2017-08-09 ASSESSMENT — ENCOUNTER SYMPTOMS
STIFFNESS: 1
DOUBLE VISION: 0
ARTHRALGIAS: 1
EYE REDNESS: 0
MUSCLE CRAMPS: 0
ALTERED TEMPERATURE REGULATION: 0
BACK PAIN: 0
INCREASED ENERGY: 0
POLYPHAGIA: 0
EYE WATERING: 0
MYALGIAS: 1
WEIGHT GAIN: 1
HALLUCINATIONS: 0
NIGHT SWEATS: 0
FATIGUE: 1
MUSCLE WEAKNESS: 0
EYE PAIN: 0
WEIGHT LOSS: 0
EYE IRRITATION: 0
POLYDIPSIA: 0
DECREASED APPETITE: 0
FEVER: 0
CHILLS: 0
NECK PAIN: 0
JOINT SWELLING: 1

## 2017-08-09 ASSESSMENT — PAIN SCALES - GENERAL: PAINLEVEL: MILD PAIN (3)

## 2017-08-09 NOTE — MR AVS SNAPSHOT
After Visit Summary   8/9/2017    Sharon Fung    MRN: [id number]           Patient Information     Date Of Birth          1955        Visit Information        Provider Department      8/9/2017 3:30 PM Jessa Rivas MD [facility]        Today's Diagnoses     Acute intractable headache, unspecified headache type          Care Instructions    Take one more nimodipine dose today, then go to twice daily for two weeks, then go to once daily for two weeks, then stop.    If this happens again in the future, it is likely to be the same process (RCVS) however, it is still important that you be seen urgently to rule out a more dangerous process, like bleeding or dissection.    No need to follow up with Neurology, we are here if you need us.          Follow-ups after your visit        Your next 10 appointments already scheduled     Aug 10, 2017 11:40 AM CDT   (Arrive by 11:25 AM)   RETURN HAND with Ciara Middleton MD   [facility] ([facility])    [address] [place] [address]  [address] [facility] [address] [phone]            Nov 01, 2017  7:20 AM CDT   Return Visit with Freddy Guerra MD   [facility] ([facility])    [address] [place] [address] [phone]              Who to contact     Please call your clinic at [phone] to:    Ask questions about your health    Make or cancel appointments    Discuss your medicines    Learn about your test results    Speak to your doctor   If you have compliments or concerns about an experience at your clinic, or if you wish to file a complaint, please contact [facility] Patient Relations at [phone] or email us at [email or web address]         Additional Information About Your Visit        MyChart Information     [employer] gives you secure access to your electronic health record. If you see a primary care  "provider, you can also send messages to your care team and make appointments. If you have questions, please call your primary care clinic.  If you do not have a primary care provider, please call [phone] and they will assist you.      [employer] is an electronic gateway that provides easy, online access to your medical records. With [employer], you can request a clinic appointment, read your test results, renew a prescription or communicate with your care team.     To access your existing account, please contact your [facility] or call [phone] for assistance.        Care EveryWhere ID     This is your Care EveryWhere ID. This could be used by other organizations to access your [place] medical records  [id number]        Your Vitals Were     Pulse Temperature Respirations Height Pulse Oximetry Breastfeeding?    84 97.6  F (36.4  C) (Oral) 28 1.626 m (5' 4\") 96% No    BMI (Body Mass Index)                   43.68 kg/m2            Blood Pressure from Last 3 Encounters:   08/09/17 131/67   08/02/17 133/78   07/21/17 124/73    Weight from Last 3 Encounters:   08/09/17 115.4 kg (254 lb 8 oz)   08/02/17 115 kg (253 lb 9.6 oz)   07/21/17 112.9 kg (249 lb)              Today, you had the following     No orders found for display         Today's Medication Changes          These changes are accurate as of: 8/9/17  5:12 PM.  If you have any questions, ask your nurse or doctor.               These medicines have changed or have updated prescriptions.        Dose/Directions    niMODipine 30 MG capsule   Commonly known as:  NIMOTOP   This may have changed:    - how much to take  - how to take this  - when to take this  - additional instructions   Used for:  Acute intractable headache, unspecified headache type   Changed by:  Jessa Rivas MD        Take 1 tab twice daily for 2 weeks, then take 1 tab once daily for 2 weeks, then stop.   Quantity:  42 capsule   Refills:  0          "   Where to get your medicines      These medications were sent to [facility] [place], [place] - [address]  [address] [facility] [address]     Phone:  [phone]     niMODipine 30 MG capsule                Primary Care Provider Office Phone # Fax #    Reynaldo Saavedra [phone]       [address] [facility] [address]        Equal Access to Services     SVITLANA SERVIN : Hadii aad ku hadasho Soomaali, waaxda luqadaha, qaybta kaalmada adeegyada, waxay idiin hayaan adeeg kharash la'asif . So [facility] [phone].    ATENCIÓN: Si habla español, tiene a ferrer disposición servicios gratuitos de asistencia lingüística. Felipaame al [phone].    We comply with applicable federal civil rights laws and [place] laws. We do not discriminate on the basis of race, color, national origin, age, disability sex, sexual orientation or gender identity.            Thank you!     Thank you for choosing [facility]  for your care. Our goal is always to provide you with excellent care. Hearing back from our patients is one way we can continue to improve our services. Please take a few minutes to complete the written survey that you may receive in the mail after your visit with us. Thank you!             Your Updated Medication List - Protect others around you: Learn how to safely use, store and throw away your medicines at www.disposemymeds.org.          This list is accurate as of: 8/9/17  5:12 PM.  Always use your most recent med list.                   Brand Name Dispense Instructions for use Diagnosis    acetaminophen 500 MG tablet    TYLENOL     Take 500-1,000 mg by mouth every 6 hours as needed for mild pain        citalopram 20 MG tablet    celeXA    90 tablet    Take 1 tablet (20 mg) by mouth daily    Mild major depression (H)       fexofenadine 180 MG tablet    ALLEGRA    90 tablet    Take 1 tablet (180 mg) by mouth daily    AR (allergic rhinitis)       FLONASE  "50 MCG/ACT spray   Generic drug:  fluticasone      Spray 2 sprays into both nostrils as needed        FLUoxetine 20 MG capsule    PROzac     Take 1 capsule by mouth as needed        folic acid 1 MG tablet    FOLVITE    100 tablet    Take 1 tablet (1 mg) by mouth daily    Seropositive rheumatoid arthritis (H)       ibuprofen 200 MG capsule      Take 600-800 mg by mouth At Bedtime        Insulin Syringe-Needle U-100 27G X 1/2\" 1 ML Misc    BD insulin syringe    10 each    1 Syringe every 7 days , to be used for methotrexate administration.    Seropositive rheumatoid arthritis (H)       methotrexate sodium 50 MG/2ML Soln     2 vial    Inject 0.8 mLs (20 mg) as directed every 7 days    Seropositive rheumatoid arthritis (H)       niMODipine 30 MG capsule    NIMOTOP    42 capsule    Take 1 tab twice daily for 2 weeks, then take 1 tab once daily for 2 weeks, then stop.    Acute intractable headache, unspecified headache type       ondansetron 4 MG ODT tab    ZOFRAN ODT    12 tablet    Take 1 tablet (4 mg) by mouth every 6 hours as needed for nausea    Nausea vomiting and diarrhea       order for DME      Use your CPAP device as directed by your provider.        predniSONE 5 MG tablet    DELTASONE    50 tablet    Prednisone 20mg daily x5days, then 15mg daily x5days, then 10mg daily x5days, then 5mg daily x5days, then stop.    Seropositive rheumatoid arthritis (H)       RESTASIS 0.05 % ophthalmic emulsion   Generic drug:  cycloSPORINE      Place 1 drop into both eyes 2 times daily        tofacitinib 11 MG 24 hr tablet    XELJANZ XR    30 tablet    Take 1 tablet (11 mg) by mouth daily    Seropositive rheumatoid arthritis (H), High risk medications (not anticoagulants) long-term use         "

## 2017-08-09 NOTE — PATIENT INSTRUCTIONS
Take one more nimodipine dose today, then go to twice daily for two weeks, then go to once daily for two weeks, then stop.    If this happens again in the future, it is likely to be the same process (RCVS) however, it is still important that you be seen urgently to rule out a more dangerous process, like bleeding or dissection.    No need to follow up with Neurology, we are here if you need us.

## 2017-08-09 NOTE — PROGRESS NOTES
DEPARTMENT OF NEUROLOGY    Patient Name:  Sharon Fung  MRN:  [id number]    :  1955  Date of Clinic Visit:  2017  Primary Care Provider:  Reynaldo Saavedra        FOLLOW UP APPOINTMENT       HPI: #Thunderclap Headache:  Ms. Fung was placed on toradol, benadryl, and compazine which did not provide significant relief the first day of her hospitalization.  On day two she continued to have significant pain.  Lumbar puncture was performed via IR which returned as traumatic.  The supernatant was pink and we were unable to evaluate for xanthrochromia.  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  A second LP yielded clear CSF, allowing the team to rule out a sentinel bleed.  Her headache was improved on 7/15.  She was discharged with 4 weeks of nimodipine, a short term supply of toradol, and follow up in neurology clinic.    Former patient of 's, 2 episodes ever of sudden onset, above right eye, worst pain in life, throbbing, associated with nausea, light and sound sensitivity, and no neurologic deficits. Dangerous etiologies ruled out as inpatient and headache resolved within days of discharge.    INTERVAL HISTORY: Did not need to take oral toradol prescribed, and has been taking nimodipine as prescribed. Wants to know if she can discontinue this because she does not want to take a preventive medication for 7 years for one headache she now better understands.    We reviewed a list of prescription, over the counter, and illegal drugs associated with RCVS, and she has switched from prozac to celexa, takes pseudoephedrine maybe three times yearly (but not prior to either), and has not been exposed to either immunomodulators implicated in the literature (tacro or cyclophosphamide).    Both episodes did occur with some exertion, but she was working out regularly prior to the first and walks the hill she was climbing during the second,  "daily.    Vital signs:  Temp: 97.6  F (36.4  C) Temp src: Oral BP: 131/67 Pulse: 84   Resp: 28 SpO2: 96 %     Height: 162.6 cm (5' 4\") Weight: 115.4 kg (254 lb 8 oz)  Estimated body mass index is 43.68 kg/(m^2) as calculated from the following:    Height as of this encounter: 1.626 m (5' 4\").    Weight as of this encounter: 115.4 kg (254 lb 8 oz).    Examination:   Physical exam is unchanged since last visit.     -General: Adult female patient, sitting comfortably on the chair. No acute distress    -HEENT: No skin discolorations noted, no carotid bruits     -Chest: RRR without murmurs or bruits     -Abdomen: Positive bowel sounds, soft, non-tender, no organomegaly    -Musculoskeletal: No abnormalities noted     -Neurological:     --MS: Patient is alert, attentive, and oriented. Speech is clear and fluid. Names normally. Registration, recall and remote memory intact. Mental math normal.     --CNs: Visual fields are full to confrontation. Normal fundoscopic exam with no visualized vascular changes. Pupils are briskly reactive to light. Visual fields full. Ocular motility full without nystagmus, facial sensation intact, muscles of mastication and facial expression normal, hearing intact, gag and palate elevation normal, sternomastoid and trapezius function normal, tongue motions normal     --Motor: Normal muscle tone and bulk. 5/5 muscle strength bilaterally     --Reflexes: Brisk reflexes at knees and biceps and ankles. Plantar responses extensor bilaterally    --Sensory: Light touch, vibration and PP intact bilaterally in upper and lower extremities     --Coordination: Rapidly alternating movements of fingers intact. Heel-shin and finger-nose-finger is intact. Negative Romberg.     --Gait: Stands with feet normally spaced. Gait is steady.    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 She was agreeable to this plan.    Jessa Rivas MD  Neurology PGY-2  [facility]  Pager [phone]    Patient has been seen with Dr. Del Rosario.          Neurology Attending Note:    I have seen and examined the patient with the resident.  I have reviewed the labs and imaging results available.  I agree with the assessment and plan. Consistent with thunderclap headaches. No need for intervention at present time.      Freya Del Rosario MD

## 2017-08-09 NOTE — LETTER
2017       RE: Sharon Fung  [address] [place] [address]     Dear Colleague,    Thank you for referring your patient, Sharon Fung, to the [facility] NEUROLOGY at [facility]. Please see a copy of my visit note below.    DEPARTMENT OF NEUROLOGY    Patient Name:  Sharon Fung  MRN:  [id number]    :  1955  Date of Clinic Visit:  2017  Primary Care Provider:  Reynaldo Saavedra        FOLLOW UP APPOINTMENT     HPI: #Thunderclap Headache:  Ms. Fung was placed on toradol, benadryl, and compazine which did not provide significant relief the first day of her hospitalization.  On day two she continued to have significant pain.  Lumbar puncture was performed via IR which returned as traumatic.  The supernatant was pink and we were unable to evaluate for xanthrochromia.  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  A second LP yielded clear CSF, allowing the team to rule out a sentinel bleed.  Her headache was improved on 7/15.  She was discharged with 4 weeks of nimodipine, a short term supply of toradol, and follow up in neurology clinic.    Former patient of 's, 2 episodes ever of sudden onset, above right eye, worst pain in life, throbbing, associated with nausea, light and sound sensitivity, and no neurologic deficits. Dangerous etiologies ruled out as inpatient and headache resolved within days of discharge.    INTERVAL HISTORY: Did not need to take oral toradol prescribed, and has been taking nimodipine as prescribed. Wants to know if she can discontinue this because she does not want to take a preventive medication for 7 years for one headache she now better understands.    We reviewed a list of prescription, over the counter, and illegal drugs associated with RCVS, and she has switched from prozac to celexa, takes pseudoephedrine maybe three times yearly (but not prior to  "either), and has not been exposed to either immunomodulators implicated in the literature (tacro or cyclophosphamide).    Both episodes did occur with some exertion, but she was working out regularly prior to the first and walks the hill she was climbing during the second, daily.    Vital signs:  Temp: 97.6  F (36.4  C) Temp src: Oral BP: 131/67 Pulse: 84   Resp: 28 SpO2: 96 %     Height: 162.6 cm (5' 4\") Weight: 115.4 kg (254 lb 8 oz)  Estimated body mass index is 43.68 kg/(m^2) as calculated from the following:    Height as of this encounter: 1.626 m (5' 4\").    Weight as of this encounter: 115.4 kg (254 lb 8 oz).    Examination:   Physical exam is unchanged since last visit.     -General: Adult female patient, sitting comfortably on the chair. No acute distress    -HEENT: No skin discolorations noted, no carotid bruits     -Chest: RRR without murmurs or bruits     -Abdomen: Positive bowel sounds, soft, non-tender, no organomegaly    -Musculoskeletal: No abnormalities noted     -Neurological:     --MS: Patient is alert, attentive, and oriented. Speech is clear and fluid. Names normally. Registration, recall and remote memory intact. Mental math normal.     --CNs: Visual fields are full to confrontation. Normal fundoscopic exam with no visualized vascular changes. Pupils are briskly reactive to light. Visual fields full. Ocular motility full without nystagmus, facial sensation intact, muscles of mastication and facial expression normal, hearing intact, gag and palate elevation normal, sternomastoid and trapezius function normal, tongue motions normal     --Motor: Normal muscle tone and bulk. 5/5 muscle strength bilaterally     --Reflexes: Brisk reflexes at knees and biceps and ankles. Plantar responses extensor bilaterally    --Sensory: Light touch, vibration and PP intact bilaterally in upper and lower extremities     --Coordination: Rapidly alternating movements of fingers intact. Heel-shin and finger-nose-finger " is intact. Negative Romberg.     --Gait: Stands with feet normally spaced. Gait is steady.    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 She was agreeable to this plan.  Jessa Rivas MD  Neurology PGY-2  [facility]  Pager [phone]    Patient has been seen with Dr. Del Rosario.      Neurology Attending Note:  I have seen and examined the patient with the resident.  I have reviewed the labs and imaging results available.  I agree with the assessment and plan. Consistent with thunderclap headaches. No need for intervention at present time.  Freya Del Rosario MD      Again, thank you for allowing me to participate in the care of your patient.      Sincerely,  Jessa Rivas MD

## 2017-08-10 ENCOUNTER — OFFICE VISIT (OUTPATIENT)
Dept: ORTHOPEDICS | Facility: CLINIC | Age: 62
End: 2017-08-10

## 2017-08-10 DIAGNOSIS — Z09 SURGICAL FOLLOW-UP CARE: Primary | ICD-10-CM

## 2017-08-10 NOTE — LETTER
8/10/2017       RE: Sharon Fung  [address] [place] [address]     Dear Colleague,    Thank you for referring your patient, Sharon Fung, to the [facility] ORTHOPAEDIC CLINIC at [facility]. Please see a copy of my visit note below.    Chief Complaint   Patient presents with     Surgical Followup     Post-op, Right CTR, DOS 6/15/17     Procedure:  Right carpal tunnel release. 6 - 15 - 17    Summary of Clinic Encounter:    Ms. Fung  is status post the above.  She notes significant clinical improvement in her numbness and tingling in her hand. However there is some numbness and tingling in her thumb index and long finger persists. None in the small ring finger. She also notes on tingling in her posterior in dorsal arm which was present prior to surgery and still persists. Overall this has improved as well. She denies any neck pain. She has full neck range of motion. No electric like shock feelings. Feels that her scar is less tender. No difficulties with wound issues.    No fevers, chills, N/V, change in bowel or bladder function, rashes, chest pain or trouble breathing    Objective:  There were no vitals taken for this visit.  General - well developed and groomed. No acute distress  CV- regular rate  Pulm- No audible stridor or wheazing, nonlabored  Skin - intact, healed incisions  MSK -   no swelling or erythema. Mild reactive skin about the incision. Decision is well-heeled. No thenar atrophy. Symmetric abductor pulses brevis strength. 2 point discrimination is 9 mm in ulnar and median nerve distribution. Radial pulse palpable. Refill less than 3 seconds.    Imaging:   Reviewed.    Patient Active Problem List   Diagnosis     AR (allergic rhinitis)     GERD (gastroesophageal reflux disease)     Status post bariatric surgery     Mild major depression (H)     Advanced directives, counseling/discussion     High risk medication use     Bilateral leg  weakness     Left leg pain     Obstructive sleep apnea     Obesity, Class III, BMI 40-49.9 (morbid obesity) (H)     Anterior basement membrane dystrophy     Seropositive rheumatoid arthritis (H)     LORNA positive     Carpal tunnel syndrome of right wrist     Headache     Assessment:  62, female, right-hand dominant status post right carpal tunnel release 2 months ago. Notable improvement in symptoms.    Plan:  - continue scar management  - follow-up PROSPER Cruz  8/10/2017  12:12 PM    I have personally examined this patient and have reviewed the clinical presentation and progress note with the resident. I agree with the treatment plan as outlined. The plan was formulated with the resident on the day of the resident's dictation.   Again, thank you for allowing me to participate in the care of your patient.      Sincerely,    Ciara Middleton MD

## 2017-08-10 NOTE — MR AVS SNAPSHOT
After Visit Summary   8/10/2017    Sharon Fung    MRN: [id number]           Patient Information     Date Of Birth          1955        Visit Information        Provider Department      8/10/2017 11:40 AM Ciara Middleton MD [facility]        Today's Diagnoses     Surgical follow-up care    -  1       Follow-ups after your visit        Your next 10 appointments already scheduled     Nov 01, 2017  7:20 AM CDT   Return Visit with Freddy Guerra MD   [facility] ([facility])    [address] [facility] [id number] [phone]              Who to contact     Please call your clinic at [phone] to:    Ask questions about your health    Make or cancel appointments    Discuss your medicines    Learn about your test results    Speak to your doctor   If you have compliments or concerns about an experience at your clinic, or if you wish to file a complaint, please contact [facility] Patient Relations at [phone] or email us at [email or web address]         Additional Information About Your Visit        MyChart Information     [email or web address] gives you secure access to your electronic health record. If you see a primary care provider, you can also send messages to your care team and make appointments. If you have questions, please call your primary care clinic.  If you do not have a primary care provider, please call [phone] and they will assist you.      [employer] is an electronic gateway that provides easy, online access to your medical records. With [employer], you can request a clinic appointment, read your test results, renew a prescription or communicate with your care team.     To access your existing account, please contact your [facility] or call [phone] for assistance.        Care EveryWhere ID     This is your Care EveryWhere ID. This could be used by other  organizations to access your [facility] medical records  [id number]         Blood Pressure from Last 3 Encounters:   08/09/17 131/67   08/02/17 133/78   07/21/17 124/73    Weight from Last 3 Encounters:   08/09/17 115.4 kg (254 lb 8 oz)   08/02/17 115 kg (253 lb 9.6 oz)   07/21/17 112.9 kg (249 lb)              Today, you had the following     No orders found for display       Primary Care Provider Office Phone # Fax #    Reynaldo Saavedra [phone]       [address] [facility] [address]        Equal Access to Services     [facility]: Hadii sheryl sarabiao Soalexandro, waaxda luqadaha, qaybta kaalmada lorneda, susan terry . So [facility] [phone].    ATENCIÓN: Si habla español, tiene a ferrer disposición servicios gratuitos de asistencia lingüística. [facility] [phone].    We comply with applicable federal civil rights laws and [place] laws. We do not discriminate on the basis of race, color, national origin, age, disability sex, sexual orientation or gender identity.            Thank you!     Thank you for choosing [facility] ORTH[facility]IN[facility]  for your care. Our goal is always to provide you with excellent care. Hearing back from our patients is one way we can continue to improve our services. Please take a few minutes to complete the written survey that you may receive in the mail after your visit with us. Thank you!             Your Updated Medication List - Protect others around you: Learn how to safely use, store and throw away your medicines at www.disposemymeds.org.          This list is accurate as of: 8/10/17 11:59 PM.  Always use your most recent med list.                   Brand Name Dispense Instructions for use Diagnosis    acetaminophen 500 MG tablet    TYLENOL     Take 500-1,000 mg by mouth every 6 hours as needed for mild pain        citalopram 20 MG tablet    celeXA    90 tablet    Take 1 tablet (20 mg) by mouth daily    Mild major  "depression (H)       fexofenadine 180 MG tablet    ALLEGRA    90 tablet    Take 1 tablet (180 mg) by mouth daily    AR (allergic rhinitis)       FLONASE 50 MCG/ACT spray   Generic drug:  fluticasone      Spray 2 sprays into both nostrils as needed        FLUoxetine 20 MG capsule    PROzac     Take 1 capsule by mouth as needed        folic acid 1 MG tablet    FOLVITE    100 tablet    Take 1 tablet (1 mg) by mouth daily    Seropositive rheumatoid arthritis (H)       ibuprofen 200 MG capsule      Take 600-800 mg by mouth At Bedtime        Insulin Syringe-Needle U-100 27G X 1/2\" 1 ML Misc    BD insulin syringe    10 each    1 Syringe every 7 days , to be used for methotrexate administration.    Seropositive rheumatoid arthritis (H)       methotrexate sodium 50 MG/2ML Soln     2 vial    Inject 0.8 mLs (20 mg) as directed every 7 days    Seropositive rheumatoid arthritis (H)       niMODipine 30 MG capsule    NIMOTOP    42 capsule    Take 1 tab twice daily for 2 weeks, then take 1 tab once daily for 2 weeks, then stop.    Acute intractable headache, unspecified headache type       ondansetron 4 MG ODT tab    ZOFRAN ODT    12 tablet    Take 1 tablet (4 mg) by mouth every 6 hours as needed for nausea    Nausea vomiting and diarrhea       order for DME      Use your CPAP device as directed by your provider.        predniSONE 5 MG tablet    DELTASONE    50 tablet    Prednisone 20mg daily x5days, then 15mg daily x5days, then 10mg daily x5days, then 5mg daily x5days, then stop.    Seropositive rheumatoid arthritis (H)       RESTASIS 0.05 % ophthalmic emulsion   Generic drug:  cycloSPORINE      Place 1 drop into both eyes 2 times daily        tofacitinib 11 MG 24 hr tablet    XELJANZ XR    30 tablet    Take 1 tablet (11 mg) by mouth daily    Seropositive rheumatoid arthritis (H), High risk medications (not anticoagulants) long-term use         "

## 2017-08-10 NOTE — NURSING NOTE
Reason For Visit:   Chief Complaint   Patient presents with     Surgical Followup     Post-op, Right CTR, DOS 6/15/17     Age: 62 year old    Date of surgery: 6/15/17, right CTR    Pain Assessment  Patient Currently in Pain: Yes  Primary Pain Location: Arm  Pain Orientation: Right  Pain Descriptors: Tingling    Hand Dominance Evaluation  Hand Dominance: Right

## 2017-08-10 NOTE — PROGRESS NOTES
Chief Complaint   Patient presents with     Surgical Followup     Post-op, Right CTR, DOS 6/15/17       Procedure:  Right carpal tunnel release. 6 - 15 - 17    Summary of Clinic Encounter:       Ms. Fung  is status post the above.  She notes significant clinical improvement in her numbness and tingling in her hand. However there is some numbness and tingling in her thumb index and long finger persists. None in the small ring finger. She also notes on tingling in her posterior in dorsal arm which was present prior to surgery and still persists. Overall this has improved as well. She denies any neck pain. She has full neck range of motion. No electric like shock feelings. Feels that her scar is less tender. No difficulties with wound issues.      No fevers, chills, N/V, change in bowel or bladder function, rashes, chest pain or trouble breathing      Objective:  There were no vitals taken for this visit.  General - well developed and groomed. No acute distress  CV- regular rate  Pulm- No audible stridor or wheazing, nonlabored  Skin - intact, healed incisions  MSK -   no swelling or erythema. Mild reactive skin about the incision. Decision is well-heeled. No thenar atrophy. Symmetric abductor pulses brevis strength. 2 point discrimination is 9 mm in ulnar and median nerve distribution. Radial pulse palpable. Refill less than 3 seconds.    Imaging:     Reviewed.    Patient Active Problem List   Diagnosis     AR (allergic rhinitis)     GERD (gastroesophageal reflux disease)     Status post bariatric surgery     Mild major depression (H)     Advanced directives, counseling/discussion     High risk medication use     Bilateral leg weakness     Left leg pain     Obstructive sleep apnea     Obesity, Class III, BMI 40-49.9 (morbid obesity) (H)     Anterior basement membrane dystrophy     Seropositive rheumatoid arthritis (H)     LORNA positive     Carpal tunnel syndrome of right wrist     Headache         Assessment:  62,  female, right-hand dominant status post right carpal tunnel release 2 months ago. Notable improvement in symptoms.    Plan:  - continue scar management  - follow-up PROSPER Cruz  8/10/2017  12:12 PM    I have personally examined this patient and have reviewed the clinical presentation and progress note with the resident. I agree with the treatment plan as outlined. The plan was formulated with the resident on the day of the resident's dictation.

## 2017-08-28 ENCOUNTER — OFFICE VISIT (OUTPATIENT)
Dept: FAMILY MEDICINE | Facility: CLINIC | Age: 62
End: 2017-08-28
Payer: COMMERCIAL

## 2017-08-28 VITALS
TEMPERATURE: 98.6 F | DIASTOLIC BLOOD PRESSURE: 76 MMHG | WEIGHT: 250 LBS | HEART RATE: 104 BPM | RESPIRATION RATE: 22 BRPM | OXYGEN SATURATION: 96 % | BODY MASS INDEX: 42.68 KG/M2 | HEIGHT: 64 IN | SYSTOLIC BLOOD PRESSURE: 138 MMHG

## 2017-08-28 DIAGNOSIS — J20.9 ACUTE BRONCHITIS WITH SYMPTOMS > 10 DAYS: Primary | ICD-10-CM

## 2017-08-28 PROCEDURE — 99213 OFFICE O/P EST LOW 20 MIN: CPT | Performed by: PHYSICIAN ASSISTANT

## 2017-08-28 RX ORDER — DOXYCYCLINE 100 MG/1
100 TABLET ORAL 2 TIMES DAILY
Qty: 20 TABLET | Refills: 0 | Status: SHIPPED | OUTPATIENT
Start: 2017-08-28 | End: 2017-09-07

## 2017-08-28 RX ORDER — CODEINE PHOSPHATE AND GUAIFENESIN 10; 100 MG/5ML; MG/5ML
1 SOLUTION ORAL EVERY 4 HOURS PRN
Qty: 236 ML | Refills: 0 | Status: SHIPPED | OUTPATIENT
Start: 2017-08-28 | End: 2017-11-08

## 2017-08-28 RX ORDER — PREDNISONE 20 MG/1
40 TABLET ORAL DAILY
Qty: 10 TABLET | Refills: 0 | Status: SHIPPED | OUTPATIENT
Start: 2017-08-28 | End: 2017-09-02

## 2017-08-28 ASSESSMENT — PAIN SCALES - GENERAL: PAINLEVEL: MODERATE PAIN (5)

## 2017-08-28 NOTE — MR AVS SNAPSHOT
After Visit Summary   8/28/2017    Sharon Fung    MRN: [id number]           Patient Information     Date Of Birth          1955        Visit Information        Provider Department      8/28/2017 11:40 AM Lisa Rocha PA-C [facility]        Today's Diagnoses     Acute bronchitis with symptoms > 10 days    -  1      Care Instructions    Doxycycline 100 mg daily for 5 days  Prednisone 40 mg daily for 5 days  Cheratussin syrup 2 teaspoon every 4-6 hours as needed for cough  Rest  Increase fluids  Follow up in 3-5 days or sooner as needed           Follow-ups after your visit        Your next 10 appointments already scheduled     Nov 01, 2017  7:20 AM CDT   Return Visit with Freddy Guerra MD   [facility] ([facility]    [address]  [place] [address] [phone]              Who to contact     If you have questions or need follow up information about today's clinic visit or your schedule please contact [facility] directly at [phone].  Normal or non-critical lab and imaging results will be communicated to you by [employer], letter or phone within 4 business days after the clinic has received the results. If you do not hear from us within 7 days, please contact the clinic through [employer] or phone. If you have a critical or abnormal lab result, we will notify you by phone as soon as possible.  Submit refill requests through [employer] or call your pharmacy and they will forward the refill request to us. Please allow 3 business days for your refill to be completed.          Additional Information About Your Visit        [employer] Information     [employer] gives you secure access to your electronic health record. If you see a primary care provider, you can also send messages to your care team and make appointments. If you have questions, please call your primary care clinic.  If you do not have a primary  "care provider, please call [phone] and they will assist you.        Care EveryWhere ID     This is your Care EveryWhere ID. This could be used by other organizations to access your [facility] medical records  [id number]        Your Vitals Were     Pulse Temperature Respirations Height Pulse Oximetry Breastfeeding?    104 98.6  F (37  C) (Oral) 22 1.626 m (5' 4\") 96% No    BMI (Body Mass Index)                   42.91 kg/m2            Blood Pressure from Last 3 Encounters:   08/28/17 138/76   08/09/17 131/67   08/02/17 133/78    Weight from Last 3 Encounters:   08/28/17 113.4 kg (250 lb)   08/09/17 115.4 kg (254 lb 8 oz)   08/02/17 115 kg (253 lb 9.6 oz)              Today, you had the following     No orders found for display         Today's Medication Changes          These changes are accurate as of: 8/28/17 12:21 PM.  If you have any questions, ask your nurse or doctor.               Start taking these medicines.        Dose/Directions    doxycycline Monohydrate 100 MG Tabs   Used for:  Acute bronchitis with symptoms > 10 days   Started by:  Lisa Rocha PA-C        Dose:  100 mg   Take 100 mg by mouth 2 times daily for 10 days   Quantity:  20 tablet   Refills:  0       guaiFENesin-codeine 100-10 MG/5ML Soln solution   Commonly known as:  ROBITUSSIN AC   Used for:  Acute bronchitis with symptoms > 10 days   Started by:  Lisa Rocha PA-C        Dose:  1 tsp.   Take 5 mLs by mouth every 4 hours as needed for cough   Quantity:  236 mL   Refills:  0       predniSONE 20 MG tablet   Commonly known as:  DELTASONE   Used for:  Acute bronchitis with symptoms > 10 days   Started by:  Lisa Rocha PA-C        Dose:  40 mg   Take 2 tablets (40 mg) by mouth daily for 5 days   Quantity:  10 tablet   Refills:  0            Where to get your medicines      These medications were sent to [facility] - [place], [place] - [address]  [address], [facility] [address]     Phone:  [phone]     doxycycline Monohydrate 100 MG Tabs    predniSONE 20 MG tablet         Some of these will need a paper prescription and others can be bought over the counter.  Ask your nurse if you have questions.     Bring a paper prescription for each of these medications     guaiFENesin-codeine 100-10 MG/5ML Soln solution                Primary Care Provider Office Phone # Fax #    Reynaldo Saavedra [phone] [address] [facility] [address]        Equal Access to Services     [facility]: Hadii aad ku hadasho Soomaali, waaxda luqadaha, qaybta kaalmada adeegyada, ssuan terry . So [facility] [phone].    ATENCIÓN: Si habla español, tiene a ferrer disposición servicios gratuitos de asistencia lingüística. Llame al [phone].    We comply with applicable federal civil rights laws and [place] laws. We do not discriminate on the basis of race, color, national origin, age, disability sex, sexual orientation or gender identity.            Thank you!     Thank you for choosing [facility]  for your care. Our goal is always to provide you with excellent care. Hearing back from our patients is one way we can continue to improve our services. Please take a few minutes to complete the written survey that you may receive in the mail after your visit with us. Thank you!             Your Updated Medication List - Protect others around you: Learn how to safely use, store and throw away your medicines at www.disposemymeds.org.          This list is accurate as of: 8/28/17 12:21 PM.  Always use your most recent med list.                   Brand Name Dispense Instructions for use Diagnosis    acetaminophen 500 MG tablet    TYLENOL     Take 500-1,000 mg by mouth every 6 hours as needed for mild pain        citalopram 20 MG tablet    celeXA    90 tablet    Take 1 tablet (20 mg) by mouth daily    Mild major  "depression (H)       doxycycline Monohydrate 100 MG Tabs     20 tablet    Take 100 mg by mouth 2 times daily for 10 days    Acute bronchitis with symptoms > 10 days       fexofenadine 180 MG tablet    ALLEGRA    90 tablet    Take 1 tablet (180 mg) by mouth daily    AR (allergic rhinitis)       FLONASE 50 MCG/ACT spray   Generic drug:  fluticasone      Spray 2 sprays into both nostrils as needed        FLUoxetine 20 MG capsule    PROzac     Take 1 capsule by mouth as needed        folic acid 1 MG tablet    FOLVITE    100 tablet    Take 1 tablet (1 mg) by mouth daily    Seropositive rheumatoid arthritis (H)       guaiFENesin-codeine 100-10 MG/5ML Soln solution    ROBITUSSIN AC    236 mL    Take 5 mLs by mouth every 4 hours as needed for cough    Acute bronchitis with symptoms > 10 days       ibuprofen 200 MG capsule      Take 600-800 mg by mouth At Bedtime        Insulin Syringe-Needle U-100 27G X 1/2\" 1 ML Misc    BD insulin syringe    10 each    1 Syringe every 7 days , to be used for methotrexate administration.    Seropositive rheumatoid arthritis (H)       methotrexate sodium 50 MG/2ML Soln     2 vial    Inject 0.8 mLs (20 mg) as directed every 7 days    Seropositive rheumatoid arthritis (H)       niMODipine 30 MG capsule    NIMOTOP    42 capsule    Take 1 tab twice daily for 2 weeks, then take 1 tab once daily for 2 weeks, then stop.    Acute intractable headache, unspecified headache type       ondansetron 4 MG ODT tab    ZOFRAN ODT    12 tablet    Take 1 tablet (4 mg) by mouth every 6 hours as needed for nausea    Nausea vomiting and diarrhea       order for DME      Use your CPAP device as directed by your provider.        predniSONE 20 MG tablet    DELTASONE    10 tablet    Take 2 tablets (40 mg) by mouth daily for 5 days    Acute bronchitis with symptoms > 10 days       RESTASIS 0.05 % ophthalmic emulsion   Generic drug:  cycloSPORINE      Place 1 drop into both eyes 2 times daily        tofacitinib 11 MG " 24 hr tablet    XELJANZ XR    30 tablet    Take 1 tablet (11 mg) by mouth daily    Seropositive rheumatoid arthritis (H), High risk medications (not anticoagulants) long-term use

## 2017-08-28 NOTE — PROGRESS NOTES
SUBJECTIVE:   Sharon Fung is a 62 year old female who presents to clinic today for the following health issues:      Acute Illness   Acute illness concerns: cough  Onset: Friday    Fever: YES    Chills/Sweats: YES    Headache (location?): no    Sinus Pressure:no    Conjunctivitis:  no    Ear Pain: no    Rhinorrhea: no    Congestion: no    Sore Throat: from coughing     Cough: YES-non-productive    Wheeze: YES    Decreased Appetite: YES    Nausea: no    Vomiting: no    Diarrhea:  no    Dysuria/Freq.: no    Fatigue/Achiness: YES    Sick/Strep Exposure: YES- was just in [place]     Therapies Tried and outcome: ibuprofen      Problem list and histories reviewed & adjusted, as indicated.  Additional history: as documented    Patient Active Problem List   Diagnosis     AR (allergic rhinitis)     GERD (gastroesophageal reflux disease)     Status post bariatric surgery     Mild major depression (H)     Advanced directives, counseling/discussion     High risk medication use     Bilateral leg weakness     Left leg pain     Obstructive sleep apnea     Obesity, Class III, BMI 40-49.9 (morbid obesity) (H)     Anterior basement membrane dystrophy     Seropositive rheumatoid arthritis (H)     LORNA positive     Carpal tunnel syndrome of right wrist     Headache     Past Surgical History:   Procedure Laterality Date     BLEPHAROPLASTY BILATERAL Bilateral 8/5/2016    Procedure: BLEPHAROPLASTY BILATERAL;  Surgeon: Cortez Robin MD;  [facility]     BREAST BIOPSY, RT/LT  1-94    Benign     C APPENDECTOMY  1977     COLONOSCOPY       COLONOSCOPY WITH CO2 INSUFFLATION N/A 3/30/2017    Procedure: COLONOSCOPY WITH CO2 INSUFFLATION;  Surgeon: Issa Weeks MD;  Location:  OR     ESOPHAGOSCOPY, GASTROSCOPY, DUODENOSCOPY (EGD), COMBINED N/A 10/29/2015    Procedure: COMBINED ESOPHAGOSCOPY, GASTROSCOPY, DUODENOSCOPY (EGD);  Surgeon: Shaq Mims MD;  Location:  GI     LAPAROSCOPIC GASTRIC ADJUSTABLE BANDING   11/09/10    Lap band procedure     LAPAROSCOPIC REMOVAL GASTRIC ADJUSTABLE BAND N/A 10/31/2015    Procedure: LAPAROSCOPIC REMOVAL GASTRIC ADJUSTABLE BAND;  Surgeon: Shaq Mims MD;  Location: [facility]     RELEASE CARPAL TUNNEL Left 4/27/2017    Procedure: RELEASE CARPAL TUNNEL;  Left Open Carpal Tunnel Release;  Surgeon: Ciara Middleton MD;  [facility]     RELEASE CARPAL TUNNEL Right 6/15/2017    Procedure: RELEASE CARPAL TUNNEL;  Right Carpal Tunnel Release Open;  Surgeon: Ciara Middleton MD;  [facility]     TUBAL LIGATION  1988       Social History   Substance Use Topics     Smoking status: Never Smoker     Smokeless tobacco: Never Used     Alcohol use 0.6 - 1.2 oz/week     1 - 2 Standard drinks or equivalent per week      Comment: ONCE EVERY OTHER MONTH     Family History   Problem Relation Age of Onset     HEART DISEASE Father      MI     Neurologic Disorder Father 79     Parkinson's     DIABETES Father      Hypertension Father      Coronary Artery Disease Father      CANCER Maternal Grandmother      uterine     Neurologic Disorder Sister 16     migraine     Depression Sister      Neurologic Disorder Mother 20     migraine     Neurologic Disorder Son 7     migraine     Substance Abuse Son          Current Outpatient Prescriptions   Medication Sig Dispense Refill     predniSONE (DELTASONE) 20 MG tablet Take 2 tablets (40 mg) by mouth daily for 5 days 10 tablet 0     doxycycline Monohydrate 100 MG TABS Take 100 mg by mouth 2 times daily for 10 days 20 tablet 0     guaiFENesin-codeine (ROBITUSSIN AC) 100-10 MG/5ML SOLN solution Take 5 mLs by mouth every 4 hours as needed for cough 236 mL 0     FLUoxetine (PROZAC) 20 MG capsule Take 1 capsule by mouth as needed       niMODipine (NIMOTOP) 30 MG capsule Take 1 tab twice daily for 2 weeks, then take 1 tab once daily for 2 weeks, then stop. 42 capsule 0     methotrexate sodium 50 MG/2ML SOLN Inject 0.8 mLs (20 mg) as directed every 7  "days 2 vial 3     Insulin Syringe-Needle U-100 (BD INSULIN SYRINGE) 27G X 1/2\" 1 ML MISC 1 Syringe every 7 days , to be used for methotrexate administration. 10 each 5     citalopram (CELEXA) 20 MG tablet Take 1 tablet (20 mg) by mouth daily 90 tablet 1     ibuprofen 200 MG capsule Take 600-800 mg by mouth At Bedtime       cycloSPORINE (RESTASIS) 0.05 % ophthalmic emulsion Place 1 drop into both eyes 2 times daily       folic acid (FOLVITE) 1 MG tablet Take 1 tablet (1 mg) by mouth daily 100 tablet 3     tofacitinib (XELJANZ XR) 11 MG 24 hr tablet Take 1 tablet (11 mg) by mouth daily 30 tablet 6     ondansetron (ZOFRAN ODT) 4 MG ODT tab Take 1 tablet (4 mg) by mouth every 6 hours as needed for nausea 12 tablet 1     fexofenadine (ALLEGRA) 180 MG tablet Take 1 tablet (180 mg) by mouth daily 90 tablet 2     acetaminophen (TYLENOL) 500 MG tablet Take 500-1,000 mg by mouth every 6 hours as needed for mild pain       ORDER FOR DME Use your CPAP device as directed by your provider.       fluticasone (FLONASE) 50 MCG/ACT nasal spray Spray 2 sprays into both nostrils as needed       No Known Allergies      Reviewed and updated as needed this visit by clinical staff     Reviewed and updated as needed this visit by Provider         ROS:  Constitutional, HEENT, cardiovascular, pulmonary, gi and gu systems are negative, except as otherwise noted.      OBJECTIVE:   /76 (BP Location: Left arm, Patient Position: Chair, Cuff Size: Adult Large)  Pulse 104  Temp 98.6  F (37  C) (Oral)  Resp 22  Ht 5' 4\" (1.626 m)  Wt 250 lb (113.4 kg)  SpO2 96%  Breastfeeding? No  BMI 42.91 kg/m2  Body mass index is 42.91 kg/(m^2).  GENERAL: healthy, alert and no distress  NECK: no adenopathy, no asymmetry, masses, or scars and thyroid normal to palpation  RESP: no rales , no rhonchi and inspiratory wheezes throughout  CV: regular rate and rhythm, normal S1 S2, no S3 or S4, no murmur, click or rub, no peripheral edema and peripheral " pulses strong  ABDOMEN: soft, nontender, no hepatosplenomegaly, no masses and bowel sounds normal  MS: no gross musculoskeletal defects noted, no edema    Diagnostic Test Results:  none     ASSESSMENT/PLAN:       ICD-10-CM    1. Acute bronchitis with symptoms > 10 days J20.9 predniSONE (DELTASONE) 20 MG tablet     doxycycline Monohydrate 100 MG TABS     guaiFENesin-codeine (ROBITUSSIN AC) 100-10 MG/5ML SOLN solution     Doxycycline 100 mg daily for 5 days  Prednisone 40 mg daily for 5 days  Cheratussin syrup 2 teaspoon every 4-6 hours as needed for cough  Rest  Increase fluids  Follow up in 3-5 days or sooner as needed       Lisa Rocha PA-C  [facility]

## 2017-08-28 NOTE — PATIENT INSTRUCTIONS
Doxycycline 100 mg daily for 5 days  Prednisone 40 mg daily for 5 days  Cheratussin syrup 2 teaspoon every 4-6 hours as needed for cough  Rest  Increase fluids  Follow up in 3-5 days or sooner as needed

## 2017-08-28 NOTE — NURSING NOTE
"Chief Complaint   Patient presents with     Cough       Initial /76 (BP Location: Left arm, Patient Position: Chair, Cuff Size: Adult Large)  Pulse 104  Temp 98.6  F (37  C) (Oral)  Resp 22  Ht 1.626 m (5' 4\")  Wt 113.4 kg (250 lb)  SpO2 96%  Breastfeeding? No  BMI 42.91 kg/m2 Estimated body mass index is 42.91 kg/(m^2) as calculated from the following:    Height as of this encounter: 1.626 m (5' 4\").    Weight as of this encounter: 113.4 kg (250 lb).  Medication Reconciliation: complete     Nicole Pineda MA       "

## 2017-09-01 ENCOUNTER — OFFICE VISIT (OUTPATIENT)
Dept: URGENT CARE | Facility: URGENT CARE | Age: 62
End: 2017-09-01
Payer: COMMERCIAL

## 2017-09-01 VITALS
SYSTOLIC BLOOD PRESSURE: 132 MMHG | HEART RATE: 98 BPM | BODY MASS INDEX: 43.5 KG/M2 | OXYGEN SATURATION: 96 % | RESPIRATION RATE: 22 BRPM | DIASTOLIC BLOOD PRESSURE: 78 MMHG | TEMPERATURE: 98.9 F | WEIGHT: 253.4 LBS

## 2017-09-01 DIAGNOSIS — J20.9 ACUTE BRONCHITIS, UNSPECIFIED ORGANISM: Primary | ICD-10-CM

## 2017-09-01 PROCEDURE — 99213 OFFICE O/P EST LOW 20 MIN: CPT | Performed by: FAMILY MEDICINE

## 2017-09-01 RX ORDER — ALBUTEROL SULFATE 90 UG/1
2-4 AEROSOL, METERED RESPIRATORY (INHALATION) EVERY 4 HOURS PRN
Qty: 1 INHALER | Refills: 1 | Status: SHIPPED | OUTPATIENT
Start: 2017-09-01 | End: 2018-06-07

## 2017-09-01 NOTE — PROGRESS NOTES
Some of this note was populated by a medical assistant.      SUBJECTIVE:   Sharon Fung is a 62 year old female who presents to clinic today for the following health issues:    One week of dry cough, harsh, clear rhinorrhea, fever today of 101.   Hx of RA. Not worse then normal, decreased sleep as cant lay flat.     follow up cough times 1 week,finished Prednisone today, coughing a lot still, hard to breath when lying down.      Problem list and histories reviewed & adjusted, as indicated.  Additional history: as documented    Patient Active Problem List   Diagnosis     AR (allergic rhinitis)     GERD (gastroesophageal reflux disease)     Status post bariatric surgery     Mild major depression (H)     Advanced directives, counseling/discussion     High risk medication use     Bilateral leg weakness     Left leg pain     Obstructive sleep apnea     Obesity, Class III, BMI 40-49.9 (morbid obesity) (H)     Anterior basement membrane dystrophy     Seropositive rheumatoid arthritis (H)     LORNA positive     Carpal tunnel syndrome of right wrist     Headache     Past Surgical History:   Procedure Laterality Date     BLEPHAROPLASTY BILATERAL Bilateral 8/5/2016    Procedure: BLEPHAROPLASTY BILATERAL;  Surgeon: Cortez Robin MD;  [facility]     BREAST BIOPSY, RT/LT  1-94    Benign     C APPENDECTOMY  1977     COLONOSCOPY       COLONOSCOPY WITH CO2 INSUFFLATION N/A 3/30/2017    Procedure: COLONOSCOPY WITH CO2 INSUFFLATION;  Surgeon: Issa Weeks MD;  Location:  OR     ESOPHAGOSCOPY, GASTROSCOPY, DUODENOSCOPY (EGD), COMBINED N/A 10/29/2015    Procedure: COMBINED ESOPHAGOSCOPY, GASTROSCOPY, DUODENOSCOPY (EGD);  Surgeon: Shaq Mims MD;  Location:  GI     LAPAROSCOPIC GASTRIC ADJUSTABLE BANDING  11/09/10    Lap band procedure     LAPAROSCOPIC REMOVAL GASTRIC ADJUSTABLE BAND N/A 10/31/2015    Procedure: LAPAROSCOPIC REMOVAL GASTRIC ADJUSTABLE BAND;  Surgeon: Shaq Mims MD;   "Location: [facility] OR     RELEASE CARPAL TUNNEL Left 4/27/2017    Procedure: RELEASE CARPAL TUNNEL;  Left Open Carpal Tunnel Release;  Surgeon: Ciara Middleton MD;  [facility]     RELEASE CARPAL TUNNEL Right 6/15/2017    Procedure: RELEASE CARPAL TUNNEL;  Right Carpal Tunnel Release Open;  Surgeon: Ciara Middleton MD;  [facility]     TUBAL LIGATION  1988       Social History   Substance Use Topics     Smoking status: Never Smoker     Smokeless tobacco: Never Used     Alcohol use 0.6 - 1.2 oz/week     1 - 2 Standard drinks or equivalent per week      Comment: ONCE EVERY OTHER MONTH     Family History   Problem Relation Age of Onset     HEART DISEASE Father      MI     Neurologic Disorder Father 79     Parkinson's     DIABETES Father      Hypertension Father      Coronary Artery Disease Father      CANCER Maternal Grandmother      uterine     Neurologic Disorder Sister 16     migraine     Depression Sister      Neurologic Disorder Mother 20     migraine     Neurologic Disorder Son 7     migraine     Substance Abuse Son          Current Outpatient Prescriptions   Medication Sig Dispense Refill     doxycycline Monohydrate 100 MG TABS Take 100 mg by mouth 2 times daily for 10 days 20 tablet 0     guaiFENesin-codeine (ROBITUSSIN AC) 100-10 MG/5ML SOLN solution Take 5 mLs by mouth every 4 hours as needed for cough 236 mL 0     niMODipine (NIMOTOP) 30 MG capsule Take 1 tab twice daily for 2 weeks, then take 1 tab once daily for 2 weeks, then stop. 42 capsule 0     methotrexate sodium 50 MG/2ML SOLN Inject 0.8 mLs (20 mg) as directed every 7 days 2 vial 3     Insulin Syringe-Needle U-100 (BD INSULIN SYRINGE) 27G X 1/2\" 1 ML MISC 1 Syringe every 7 days , to be used for methotrexate administration. 10 each 5     citalopram (CELEXA) 20 MG tablet Take 1 tablet (20 mg) by mouth daily 90 tablet 1     ibuprofen 200 MG capsule Take 600-800 mg by mouth At Bedtime       cycloSPORINE (RESTASIS) 0.05 % ophthalmic " emulsion Place 1 drop into both eyes 2 times daily       folic acid (FOLVITE) 1 MG tablet Take 1 tablet (1 mg) by mouth daily 100 tablet 3     tofacitinib (XELJANZ XR) 11 MG 24 hr tablet Take 1 tablet (11 mg) by mouth daily 30 tablet 6     ondansetron (ZOFRAN ODT) 4 MG ODT tab Take 1 tablet (4 mg) by mouth every 6 hours as needed for nausea 12 tablet 1     fexofenadine (ALLEGRA) 180 MG tablet Take 1 tablet (180 mg) by mouth daily 90 tablet 2     acetaminophen (TYLENOL) 500 MG tablet Take 500-1,000 mg by mouth every 6 hours as needed for mild pain       ORDER FOR DME Use your CPAP device as directed by your provider.       fluticasone (FLONASE) 50 MCG/ACT nasal spray Spray 2 sprays into both nostrils as needed       predniSONE (DELTASONE) 20 MG tablet Take 2 tablets (40 mg) by mouth daily for 5 days (Patient not taking: Reported on 9/1/2017) 10 tablet 0     No Known Allergies      Reviewed and updated as needed this visit by clinical staffTobacco  Allergies  Meds       Reviewed and updated as needed this visit by Provider         ROS:  Constitutional, HEENT, cardiovascular, pulmonary, gi and gu systems are negative, except as otherwise noted.      OBJECTIVE:   /78 (BP Location: Right arm, Patient Position: Chair, Cuff Size: Adult Large)  Pulse 98  Temp 98.9  F (37.2  C) (Oral)  Resp 22  Wt 253 lb 6.4 oz (114.9 kg)  SpO2 96%  BMI 43.5 kg/m2  Body mass index is 43.5 kg/(m^2).  GENERAL: healthy, alert and no distress  NECK: no adenopathy, no asymmetry, masses, or scars and thyroid normal to palpation  RESP: lungs clear to auscultation - no rales, rhonchi or wheezes  CV: regular rate and rhythm, normal S1 S2, no S3 or S4, no murmur, click or rub, no peripheral edema and peripheral pulses strong  ABDOMEN: soft, nontender, no hepatosplenomegaly, no masses and bowel sounds normal  MS: no gross musculoskeletal defects noted, no edema    Diagnostic Test Results:  none     ASSESSMENT/PLAN:       ICD-10-CM    1.  Acute bronchitis, unspecified organism J20.9 albuterol (PROAIR HFA/PROVENTIL HFA/VENTOLIN HFA) 108 (90 BASE) MCG/ACT Inhaler      Likely viral in nature   Trial of albuterol per patient request prn cough q 4 hours.   Symptom cares explained.   Monitor closely for worsening. Finish doxycycline as prescribed last week.   The patient indicates understanding of these issues and agrees with the plan.    Cecil Garcia MD  [facility]

## 2017-09-01 NOTE — MR AVS SNAPSHOT
After Visit Summary   9/1/2017    Sharon Fung    MRN: [id number]           Patient Information     Date Of Birth          1955        Visit Information        Provider Department      9/1/2017 1:30 PM Cecil Garcia MD [facility]        Today's Diagnoses     Acute bronchitis, unspecified organism    -  1       Follow-ups after your visit        Your next 10 appointments already scheduled     Nov 01, 2017  7:20 AM CDT   Return Visit with Freddy Guerra MD   [facility] ([place] [id number] [facility]  [place] [address] [phone]              Who to contact     If you have questions or need follow up information about today's clinic visit or your schedule please contact [facility] directly at [phone].  Normal or non-critical lab and imaging results will be communicated to you by MyChart, letter or phone within 4 business days after the clinic has received the results. If you do not hear from us within 7 days, please contact the clinic through MyChart or phone. If you have a critical or abnormal lab result, we will notify you by phone as soon as possible.  Submit refill requests through [employer] or call your pharmacy and they will forward the refill request to us. Please allow 3 business days for your refill to be completed.          Additional Information About Your Visit        MyChart Information     [employer] gives you secure access to your electronic health record. If you see a primary care provider, you can also send messages to your care team and make appointments. If you have questions, please call your primary care clinic.  If you do not have a primary care provider, please call [phone] and they will assist you.        Care EveryWhere ID     This is your Care EveryWhere ID. This could be used by other organizations to access your [facility] medical records  [id number]        Your Vitals Were      Pulse Temperature Respirations Pulse Oximetry BMI (Body Mass Index)       98 98.9  F (37.2  C) (Oral) 22 96% 43.5 kg/m2        Blood Pressure from Last 3 Encounters:   09/01/17 132/78   08/28/17 138/76   08/09/17 131/67    Weight from Last 3 Encounters:   09/01/17 253 lb 6.4 oz (114.9 kg)   08/28/17 250 lb (113.4 kg)   08/09/17 254 lb 8 oz (115.4 kg)              Today, you had the following     No orders found for display         Today's Medication Changes          These changes are accurate as of: 9/1/17  2:20 PM.  If you have any questions, ask your nurse or doctor.               Start taking these medicines.        Dose/Directions    albuterol 108 (90 BASE) MCG/ACT Inhaler   Commonly known as:  PROAIR HFA/PROVENTIL HFA/VENTOLIN HFA   Used for:  Acute bronchitis, unspecified organism   Started by:  Cecil Garcia MD        Dose:  2-4 puff   Inhale 2-4 puffs into the lungs every 4 hours as needed for shortness of breath / dyspnea or wheezing   Quantity:  1 Inhaler   Refills:  1            Where to get your medicines      [place] [facility] [place] [address]  [address], [facility] [address]     Phone:  [phone]     albuterol 108 (90 BASE) MCG/ACT Inhaler                Primary Care Provider Office Phone # Fax #    Reynaldo Saavedra [phone]       [address] [facility] [place] [place] [address]        Equal Access to Services     [facility]: Hadii sheryl jeronimo hadasho Soomaali, waaxda luqadaha, qaybta kaalmada ademargarito, susan monahan. So [facility] [phone].    ATENCIÓN: Si habla español, tiene a ferrer disposición servicios gratuitos de asistencia lingüística. Llame al [phone].    We comply with applicable federal civil rights laws and [place] laws. We do not discriminate on the basis of race, color, national origin, age, disability sex, sexual orientation or gender identity.             Thank you!     Thank you for choosing [facility]  for your care. Our goal is always to provide you with excellent care. Hearing back from our patients is one way we can continue to improve our services. Please take a few minutes to complete the written survey that you may receive in the mail after your visit with us. Thank you!             Your Updated Medication List - Protect others around you: Learn how to safely use, store and throw away your medicines at www.disposemymeds.org.          This list is accurate as of: 9/1/17  2:20 PM.  Always use your most recent med list.                   Brand Name Dispense Instructions for use Diagnosis    acetaminophen 500 MG tablet    TYLENOL     Take 500-1,000 mg by mouth every 6 hours as needed for mild pain        albuterol 108 (90 BASE) MCG/ACT Inhaler    PROAIR HFA/PROVENTIL HFA/VENTOLIN HFA    1 Inhaler    Inhale 2-4 puffs into the lungs every 4 hours as needed for shortness of breath / dyspnea or wheezing    Acute bronchitis, unspecified organism       citalopram 20 MG tablet    celeXA    90 tablet    Take 1 tablet (20 mg) by mouth daily    Mild major depression (H)       doxycycline Monohydrate 100 MG Tabs     20 tablet    Take 100 mg by mouth 2 times daily for 10 days    Acute bronchitis with symptoms > 10 days       fexofenadine 180 MG tablet    ALLEGRA    90 tablet    Take 1 tablet (180 mg) by mouth daily    AR (allergic rhinitis)       FLONASE 50 MCG/ACT spray   Generic drug:  fluticasone      Spray 2 sprays into both nostrils as needed        folic acid 1 MG tablet    FOLVITE    100 tablet    Take 1 tablet (1 mg) by mouth daily    Seropositive rheumatoid arthritis (H)       guaiFENesin-codeine 100-10 MG/5ML Soln solution    ROBITUSSIN AC    236 mL    Take 5 mLs by mouth every 4 hours as needed for cough    Acute bronchitis with symptoms > 10 days       ibuprofen 200 MG capsule      Take 600-800 mg by mouth At Bedtime        Insulin  "Syringe-Needle U-100 27G X 1/2\" 1 ML Misc    BD insulin syringe    10 each    1 Syringe every 7 days , to be used for methotrexate administration.    Seropositive rheumatoid arthritis (H)       methotrexate sodium 50 MG/2ML Soln     2 vial    Inject 0.8 mLs (20 mg) as directed every 7 days    Seropositive rheumatoid arthritis (H)       niMODipine 30 MG capsule    NIMOTOP    42 capsule    Take 1 tab twice daily for 2 weeks, then take 1 tab once daily for 2 weeks, then stop.    Acute intractable headache, unspecified headache type       ondansetron 4 MG ODT tab    ZOFRAN ODT    12 tablet    Take 1 tablet (4 mg) by mouth every 6 hours as needed for nausea    Nausea vomiting and diarrhea       order for DME      Use your CPAP device as directed by your provider.        predniSONE 20 MG tablet    DELTASONE    10 tablet    Take 2 tablets (40 mg) by mouth daily for 5 days    Acute bronchitis with symptoms > 10 days       RESTASIS 0.05 % ophthalmic emulsion   Generic drug:  cycloSPORINE      Place 1 drop into both eyes 2 times daily        tofacitinib 11 MG 24 hr tablet    XELJANZ XR    30 tablet    Take 1 tablet (11 mg) by mouth daily    Seropositive rheumatoid arthritis (H), High risk medications (not anticoagulants) long-term use         "

## 2017-09-01 NOTE — NURSING NOTE
"Chief Complaint   Patient presents with     Cough     1 wk now - seen 8/29/2017       Initial /78 (BP Location: Right arm, Patient Position: Chair, Cuff Size: Adult Large)  Pulse 98  Temp 98.9  F (37.2  C) (Oral)  Resp 22  Wt 253 lb 6.4 oz (114.9 kg)  SpO2 96%  BMI 43.5 kg/m2 Estimated body mass index is 43.5 kg/(m^2) as calculated from the following:    Height as of 8/28/17: 5' 4\" (1.626 m).    Weight as of this encounter: 253 lb 6.4 oz (114.9 kg).  Medication Reconciliation: complete   Katie Garner CMA      "

## 2017-10-25 DIAGNOSIS — Z79.899 HIGH RISK MEDICATION USE: ICD-10-CM

## 2017-10-25 DIAGNOSIS — M05.9 SEROPOSITIVE RHEUMATOID ARTHRITIS (H): ICD-10-CM

## 2017-10-25 LAB
ALBUMIN SERPL-MCNC: 3.7 G/DL (ref 3.4–5)
ALP SERPL-CCNC: 72 U/L (ref 40–150)
ALT SERPL W P-5'-P-CCNC: 69 U/L (ref 0–50)
AST SERPL W P-5'-P-CCNC: 43 U/L (ref 0–45)
BASOPHILS # BLD AUTO: 0 10E9/L (ref 0–0.2)
BASOPHILS NFR BLD AUTO: 1 %
BILIRUB DIRECT SERPL-MCNC: <0.1 MG/DL (ref 0–0.2)
BILIRUB SERPL-MCNC: 0.7 MG/DL (ref 0.2–1.3)
CREAT SERPL-MCNC: 0.77 MG/DL (ref 0.52–1.04)
CRP SERPL-MCNC: <2.9 MG/L (ref 0–8)
DIFFERENTIAL METHOD BLD: NORMAL
EOSINOPHIL # BLD AUTO: 0.2 10E9/L (ref 0–0.7)
EOSINOPHIL NFR BLD AUTO: 5.3 %
ERYTHROCYTE [DISTWIDTH] IN BLOOD BY AUTOMATED COUNT: 13.7 % (ref 10–15)
ERYTHROCYTE [SEDIMENTATION RATE] IN BLOOD BY WESTERGREN METHOD: 11 MM/H (ref 0–30)
GFR SERPL CREATININE-BSD FRML MDRD: 76 ML/MIN/1.7M2
HCT VFR BLD AUTO: 40.2 % (ref 35–47)
HGB BLD-MCNC: 13.2 G/DL (ref 11.7–15.7)
IMM GRANULOCYTES # BLD: 0 10E9/L (ref 0–0.4)
IMM GRANULOCYTES NFR BLD: 0.2 %
LYMPHOCYTES # BLD AUTO: 1.7 10E9/L (ref 0.8–5.3)
LYMPHOCYTES NFR BLD AUTO: 40 %
MCH RBC QN AUTO: 31.1 PG (ref 26.5–33)
MCHC RBC AUTO-ENTMCNC: 32.8 G/DL (ref 31.5–36.5)
MCV RBC AUTO: 95 FL (ref 78–100)
MONOCYTES # BLD AUTO: 0.4 10E9/L (ref 0–1.3)
MONOCYTES NFR BLD AUTO: 9.5 %
NEUTROPHILS # BLD AUTO: 1.8 10E9/L (ref 1.6–8.3)
NEUTROPHILS NFR BLD AUTO: 44 %
NRBC # BLD AUTO: 0 10*3/UL
NRBC BLD AUTO-RTO: 0 /100
PLATELET # BLD AUTO: 322 10E9/L (ref 150–450)
PROT SERPL-MCNC: 7.4 G/DL (ref 6.8–8.8)
RBC # BLD AUTO: 4.24 10E12/L (ref 3.8–5.2)
WBC # BLD AUTO: 4.1 10E9/L (ref 4–11)

## 2017-10-25 PROCEDURE — 85652 RBC SED RATE AUTOMATED: CPT | Performed by: INTERNAL MEDICINE

## 2017-10-25 PROCEDURE — 85025 COMPLETE CBC W/AUTO DIFF WBC: CPT | Performed by: INTERNAL MEDICINE

## 2017-10-25 PROCEDURE — 80076 HEPATIC FUNCTION PANEL: CPT | Performed by: INTERNAL MEDICINE

## 2017-10-25 PROCEDURE — 86140 C-REACTIVE PROTEIN: CPT | Performed by: INTERNAL MEDICINE

## 2017-10-25 PROCEDURE — 82565 ASSAY OF CREATININE: CPT | Performed by: INTERNAL MEDICINE

## 2017-10-25 PROCEDURE — 36415 COLL VENOUS BLD VENIPUNCTURE: CPT | Performed by: INTERNAL MEDICINE

## 2017-10-29 ENCOUNTER — OFFICE VISIT (OUTPATIENT)
Dept: URGENT CARE | Facility: URGENT CARE | Age: 62
End: 2017-10-29
Payer: COMMERCIAL

## 2017-10-29 VITALS
OXYGEN SATURATION: 95 % | DIASTOLIC BLOOD PRESSURE: 80 MMHG | HEART RATE: 83 BPM | SYSTOLIC BLOOD PRESSURE: 126 MMHG | BODY MASS INDEX: 41.88 KG/M2 | TEMPERATURE: 98.9 F | RESPIRATION RATE: 15 BRPM | WEIGHT: 244 LBS

## 2017-10-29 DIAGNOSIS — R07.0 THROAT PAIN: Primary | ICD-10-CM

## 2017-10-29 DIAGNOSIS — J01.90 ACUTE SINUSITIS WITH COEXISTING CONDITION REQUIRING PROPHYLACTIC TREATMENT: ICD-10-CM

## 2017-10-29 LAB
DEPRECATED S PYO AG THROAT QL EIA: NORMAL
SPECIMEN SOURCE: NORMAL

## 2017-10-29 PROCEDURE — 87081 CULTURE SCREEN ONLY: CPT | Performed by: FAMILY MEDICINE

## 2017-10-29 PROCEDURE — 87880 STREP A ASSAY W/OPTIC: CPT | Performed by: FAMILY MEDICINE

## 2017-10-29 PROCEDURE — 99213 OFFICE O/P EST LOW 20 MIN: CPT | Performed by: FAMILY MEDICINE

## 2017-10-29 RX ORDER — DOXYCYCLINE HYCLATE 100 MG
100 TABLET ORAL 2 TIMES DAILY
Qty: 14 TABLET | Refills: 0 | Status: SHIPPED | OUTPATIENT
Start: 2017-10-29 | End: 2017-11-05

## 2017-10-29 NOTE — MR AVS SNAPSHOT
After Visit Summary   10/29/2017    Sharon Fung    MRN: [id number]           Patient Information     Date Of Birth          1955        Visit Information        Provider Department      10/29/2017 3:45 PM Cecil Garcia MD [facility]        Today's Diagnoses     Throat pain    -  1    Acute sinusitis with coexisting condition requiring prophylactic treatment           Follow-ups after your visit        Your next 10 appointments already scheduled     Nov 01, 2017  7:20 AM CDT   Return Visit with Freddy Guerra MD   [facility] ([facility])    [address] [facility] [address] [phone]              Who to contact     If you have questions or need follow up information about today's clinic visit or your schedule please contact [facility] directly at [phone].  Normal or non-critical lab and imaging results will be communicated to you by MyChart, letter or phone within 4 business days after the clinic has received the results. If you do not hear from us within 7 days, please contact the clinic through MyChart or phone. If you have a critical or abnormal lab result, we will notify you by phone as soon as possible.  Submit refill requests through [employer] or call your pharmacy and they will forward the refill request to us. Please allow 3 business days for your refill to be completed.          Additional Information About Your Visit        MyChart Information     [employer] gives you secure access to your electronic health record. If you see a primary care provider, you can also send messages to your care team and make appointments. If you have questions, please call your primary care clinic.  If you do not have a primary care provider, please call [phone] and they will assist you.        Care EveryWhere ID     This is your Care EveryWhere ID. This could be used by other organizations to access your  [facility] medical records  [id number]        Your Vitals Were     Pulse Temperature Respirations Pulse Oximetry BMI (Body Mass Index)       83 98.9  F (37.2  C) 15 95% 41.88 kg/m2        Blood Pressure from Last 3 Encounters:   10/29/17 126/80   09/01/17 132/78   08/28/17 138/76    Weight from Last 3 Encounters:   10/29/17 244 lb (110.7 kg)   09/01/17 253 lb 6.4 oz (114.9 kg)   08/28/17 250 lb (113.4 kg)              We Performed the Following     Beta strep group A culture     Strep, Rapid Screen          Today's Medication Changes          These changes are accurate as of: 10/29/17  4:15 PM.  If you have any questions, ask your nurse or doctor.               Start taking these medicines.        Dose/Directions    doxycycline 100 MG tablet   Commonly known as:  VIBRA-TABS   Used for:  Acute sinusitis with coexisting condition requiring prophylactic treatment   Started by:  Cecil Garcia MD        Dose:  100 mg   Take 1 tablet (100 mg) by mouth 2 times daily for 7 days   Quantity:  14 tablet   Refills:  0            Where to get your medicines      [place] [facility] [place] [address]  [address], [facility] [address]     Phone:  [phone]     doxycycline 100 MG tablet                Primary Care Provider Office Phone # Fax #    Reynaldo Saavedra [phone]       [address] [facility] [address]        Equal Access to Services     [facility] GARETH : Hadii aad ku hadasho Soomaali, waaxda luqadaha, qaybta kaalmada adeegyada, waxay masoodin estrella monahan. So [facility] [phone].    ATENCIÓN: Si habla español, tiene a ferrer disposición servicios gratuitos de asistencia lingüística. Llame al [phone].    We comply with applicable federal civil rights laws and [place] laws. We do not discriminate on the basis of race, color, national origin, age, disability, sex, sexual orientation, or gender  identity.            Thank you!     Thank you for choosing [facility]  for your care. Our goal is always to provide you with excellent care. Hearing back from our patients is one way we can continue to improve our services. Please take a few minutes to complete the written survey that you may receive in the mail after your visit with us. Thank you!             Your Updated Medication List - Protect others around you: Learn how to safely use, store and throw away your medicines at www.disposemymeds.org.          This list is accurate as of: 10/29/17  4:15 PM.  Always use your most recent med list.                   Brand Name Dispense Instructions for use Diagnosis    acetaminophen 500 MG tablet    TYLENOL     Take 500-1,000 mg by mouth every 6 hours as needed for mild pain        albuterol 108 (90 BASE) MCG/ACT Inhaler    PROAIR HFA/PROVENTIL HFA/VENTOLIN HFA    1 Inhaler    Inhale 2-4 puffs into the lungs every 4 hours as needed for shortness of breath / dyspnea or wheezing    Acute bronchitis, unspecified organism       citalopram 20 MG tablet    celeXA    90 tablet    Take 1 tablet (20 mg) by mouth daily    Mild major depression (H)       doxycycline 100 MG tablet    VIBRA-TABS    14 tablet    Take 1 tablet (100 mg) by mouth 2 times daily for 7 days    Acute sinusitis with coexisting condition requiring prophylactic treatment       fexofenadine 180 MG tablet    ALLEGRA    90 tablet    Take 1 tablet (180 mg) by mouth daily    AR (allergic rhinitis)       FLONASE 50 MCG/ACT spray   Generic drug:  fluticasone      Spray 2 sprays into both nostrils as needed        folic acid 1 MG tablet    FOLVITE    100 tablet    Take 1 tablet (1 mg) by mouth daily    Seropositive rheumatoid arthritis (H)       guaiFENesin-codeine 100-10 MG/5ML Soln solution    ROBITUSSIN AC    236 mL    Take 5 mLs by mouth every 4 hours as needed for cough    Acute bronchitis with symptoms > 10 days       ibuprofen 200 MG  "capsule      Take 600-800 mg by mouth At Bedtime        Insulin Syringe-Needle U-100 27G X 1/2\" 1 ML Misc    BD insulin syringe    10 each    1 Syringe every 7 days , to be used for methotrexate administration.    Seropositive rheumatoid arthritis (H)       methotrexate sodium 50 MG/2ML Soln     2 vial    Inject 0.8 mLs (20 mg) as directed every 7 days    Seropositive rheumatoid arthritis (H)       ondansetron 4 MG ODT tab    ZOFRAN ODT    12 tablet    Take 1 tablet (4 mg) by mouth every 6 hours as needed for nausea    Nausea vomiting and diarrhea       order for DME      Use your CPAP device as directed by your provider.        RESTASIS 0.05 % ophthalmic emulsion   Generic drug:  cycloSPORINE      Place 1 drop into both eyes 2 times daily        tofacitinib 11 MG 24 hr tablet    XELJANZ XR    30 tablet    Take 1 tablet (11 mg) by mouth daily    Seropositive rheumatoid arthritis (H), High risk medications (not anticoagulants) long-term use         "

## 2017-10-29 NOTE — PROGRESS NOTES
Some of this note was populated by a medical assistant.     SUBJECTIVE:                                                    Sharon Fung is a 62 year old female who presents to clinic today for the following health issues:    RESPIRATORY SYMPTOMS      Duration: wednesday    Description  nasal congestion, rhinorrhea, sore throat, headache, fatigue/malaise and body ache    Severity: moderate    Accompanying signs and symptoms: None    History (predisposing factors):  none    Precipitating or alleviating factors: None    Therapies tried and outcome:  Tylenol and iburprofen    Taking methotrexate and xeljanz over the past year.  Generalized joint pain.   Problem list and histories reviewed & adjusted, as indicated.  Additional history: as documented    Patient Active Problem List   Diagnosis     AR (allergic rhinitis)     GERD (gastroesophageal reflux disease)     Status post bariatric surgery     Mild major depression (H)     Advanced directives, counseling/discussion     High risk medication use     Bilateral leg weakness     Left leg pain     Obstructive sleep apnea     Obesity, Class III, BMI 40-49.9 (morbid obesity) (H)     Anterior basement membrane dystrophy     Seropositive rheumatoid arthritis (H)     LORNA positive     Carpal tunnel syndrome of right wrist     Headache     Past Surgical History:   Procedure Laterality Date     BLEPHAROPLASTY BILATERAL Bilateral 8/5/2016    Procedure: BLEPHAROPLASTY BILATERAL;  Surgeon: Cortez Robin MD;  Location:  [facility]     BREAST BIOPSY, RT/LT  1-94    Benign     C APPENDECTOMY  1977     COLONOSCOPY       COLONOSCOPY WITH CO2 INSUFFLATION N/A 3/30/2017    Procedure: COLONOSCOPY WITH CO2 INSUFFLATION;  Surgeon: Issa Weeks MD;  Location:  OR     ESOPHAGOSCOPY, GASTROSCOPY, DUODENOSCOPY (EGD), COMBINED N/A 10/29/2015    Procedure: COMBINED ESOPHAGOSCOPY, GASTROSCOPY, DUODENOSCOPY (EGD);  Surgeon: Shaq Mims MD;  Location:  GI     LAPAROSCOPIC  "GASTRIC ADJUSTABLE BANDING  11/09/10    Lap band procedure     LAPAROSCOPIC REMOVAL GASTRIC ADJUSTABLE BAND N/A 10/31/2015    Procedure: LAPAROSCOPIC REMOVAL GASTRIC ADJUSTABLE BAND;  Surgeon: Shaq Mims MD;  Location: [facility] OR     RELEASE CARPAL TUNNEL Left 4/27/2017    Procedure: RELEASE CARPAL TUNNEL;  Left Open Carpal Tunnel Release;  Surgeon: Ciara Middleton MD;  Location: [facility]     RELEASE CARPAL TUNNEL Right 6/15/2017    Procedure: RELEASE CARPAL TUNNEL;  Right Carpal Tunnel Release Open;  Surgeon: Ciara Middleton MD;  Location: [facility] OR     TUBAL LIGATION  1988       Social History   Substance Use Topics     Smoking status: Never Smoker     Smokeless tobacco: Never Used     Alcohol use 0.6 - 1.2 oz/week     1 - 2 Standard drinks or equivalent per week      Comment: ONCE EVERY OTHER MONTH     Family History   Problem Relation Age of Onset     HEART DISEASE Father      MI     Neurologic Disorder Father 79     Parkinson's     DIABETES Father      Hypertension Father      Coronary Artery Disease Father      CANCER Maternal Grandmother      uterine     Neurologic Disorder Sister 16     migraine     Depression Sister      Neurologic Disorder Mother 20     migraine     Neurologic Disorder Son 7     migraine     Substance Abuse Son          Current Outpatient Prescriptions   Medication Sig Dispense Refill     albuterol (PROAIR HFA/PROVENTIL HFA/VENTOLIN HFA) 108 (90 BASE) MCG/ACT Inhaler Inhale 2-4 puffs into the lungs every 4 hours as needed for shortness of breath / dyspnea or wheezing 1 Inhaler 1     guaiFENesin-codeine (ROBITUSSIN AC) 100-10 MG/5ML SOLN solution Take 5 mLs by mouth every 4 hours as needed for cough 236 mL 0     methotrexate sodium 50 MG/2ML SOLN Inject 0.8 mLs (20 mg) as directed every 7 days 2 vial 3     Insulin Syringe-Needle U-100 (BD INSULIN SYRINGE) 27G X 1/2\" 1 ML MISC 1 Syringe every 7 days , to be used for methotrexate administration. 10 each 5     citalopram " (CELEXA) 20 MG tablet Take 1 tablet (20 mg) by mouth daily 90 tablet 1     ibuprofen 200 MG capsule Take 600-800 mg by mouth At Bedtime       cycloSPORINE (RESTASIS) 0.05 % ophthalmic emulsion Place 1 drop into both eyes 2 times daily       folic acid (FOLVITE) 1 MG tablet Take 1 tablet (1 mg) by mouth daily 100 tablet 3     tofacitinib (XELJANZ XR) 11 MG 24 hr tablet Take 1 tablet (11 mg) by mouth daily 30 tablet 6     ondansetron (ZOFRAN ODT) 4 MG ODT tab Take 1 tablet (4 mg) by mouth every 6 hours as needed for nausea 12 tablet 1     fexofenadine (ALLEGRA) 180 MG tablet Take 1 tablet (180 mg) by mouth daily 90 tablet 2     acetaminophen (TYLENOL) 500 MG tablet Take 500-1,000 mg by mouth every 6 hours as needed for mild pain       ORDER FOR DME Use your CPAP device as directed by your provider.       fluticasone (FLONASE) 50 MCG/ACT nasal spray Spray 2 sprays into both nostrils as needed       No Known Allergies      ROS:  Constitutional, HEENT, cardiovascular, pulmonary, gi and gu systems are negative, except as otherwise noted.      OBJECTIVE:   /80  Pulse 83  Temp 98.9  F (37.2  C)  Resp 15  Wt 244 lb (110.7 kg)  SpO2 95%  BMI 41.88 kg/m2  Body mass index is 41.88 kg/(m^2).  GENERAL: healthy, alert and no distress  NECK: no adenopathy, no asymmetry, masses, or scars and thyroid normal to palpation  Sinus congestion and mucosal erythema   RESP: lungs clear to auscultation - no rales, rhonchi or wheezes  CV: regular rate and rhythm, normal S1 S2, no S3 or S4, no murmur, click or rub, no peripheral edema and peripheral pulses strong  ABDOMEN: soft, nontender, no hepatosplenomegaly, no masses and bowel sounds normal  MS: no gross musculoskeletal defects noted, no edema    Diagnostic Test Results:  none     ASSESSMENT/PLAN:       ICD-10-CM    1. Throat pain R07.0 Strep, Rapid Screen     Beta strep group A culture   2. Acute sinusitis with coexisting condition requiring prophylactic treatment J01.90  doxycycline (VIBRA-TABS) 100 MG tablet    --with use of immunosuppressive medicine decided to treat with the above abx for ear sinus symptoms possibly early bacterial sinusitis       PLAN  Symptom cares explained.   Patient educational/instructional material provided including reasons for follow-up   The patient indicates understanding of these issues and agrees with the plan.  Cecil Garcia MD        [facility]

## 2017-10-29 NOTE — NURSING NOTE
"No chief complaint on file.      Initial /80  Pulse 83  Temp 98.9  F (37.2  C)  Resp 15  Wt 244 lb (110.7 kg)  SpO2 95%  BMI 41.88 kg/m2 Estimated body mass index is 41.88 kg/(m^2) as calculated from the following:    Height as of 8/28/17: 5' 4\" (1.626 m).    Weight as of this encounter: 244 lb (110.7 kg).  Medication Reconciliation: complete     Jaky Sosa. MA      "

## 2017-10-30 LAB
BACTERIA SPEC CULT: NORMAL
SPECIMEN SOURCE: NORMAL

## 2017-10-31 ENCOUNTER — TELEPHONE (OUTPATIENT)
Dept: RHEUMATOLOGY | Facility: CLINIC | Age: 62
End: 2017-10-31

## 2017-10-31 ENCOUNTER — RADIANT APPOINTMENT (OUTPATIENT)
Dept: GENERAL RADIOLOGY | Facility: CLINIC | Age: 62
End: 2017-10-31
Attending: NURSE PRACTITIONER
Payer: COMMERCIAL

## 2017-10-31 ENCOUNTER — OFFICE VISIT (OUTPATIENT)
Dept: FAMILY MEDICINE | Facility: CLINIC | Age: 62
End: 2017-10-31
Payer: COMMERCIAL

## 2017-10-31 ENCOUNTER — TELEPHONE (OUTPATIENT)
Dept: FAMILY MEDICINE | Facility: CLINIC | Age: 62
End: 2017-10-31

## 2017-10-31 VITALS
BODY MASS INDEX: 42.05 KG/M2 | WEIGHT: 245 LBS | HEART RATE: 83 BPM | OXYGEN SATURATION: 96 % | TEMPERATURE: 98.4 F | DIASTOLIC BLOOD PRESSURE: 66 MMHG | SYSTOLIC BLOOD PRESSURE: 104 MMHG

## 2017-10-31 DIAGNOSIS — R50.9 FEVER AND CHILLS: ICD-10-CM

## 2017-10-31 DIAGNOSIS — R05.9 COUGH: ICD-10-CM

## 2017-10-31 DIAGNOSIS — D84.9 IMMUNOSUPPRESSION (H): ICD-10-CM

## 2017-10-31 DIAGNOSIS — R05.9 COUGH: Primary | ICD-10-CM

## 2017-10-31 LAB
ERYTHROCYTE [DISTWIDTH] IN BLOOD BY AUTOMATED COUNT: 13.5 % (ref 10–15)
HCT VFR BLD AUTO: 38.9 % (ref 35–47)
HGB BLD-MCNC: 12.9 G/DL (ref 11.7–15.7)
MCH RBC QN AUTO: 31.7 PG (ref 26.5–33)
MCHC RBC AUTO-ENTMCNC: 33.2 G/DL (ref 31.5–36.5)
MCV RBC AUTO: 96 FL (ref 78–100)
PLATELET # BLD AUTO: 339 10E9/L (ref 150–450)
RBC # BLD AUTO: 4.07 10E12/L (ref 3.8–5.2)
WBC # BLD AUTO: 5.9 10E9/L (ref 4–11)

## 2017-10-31 PROCEDURE — 85027 COMPLETE CBC AUTOMATED: CPT | Performed by: NURSE PRACTITIONER

## 2017-10-31 PROCEDURE — 36415 COLL VENOUS BLD VENIPUNCTURE: CPT | Performed by: NURSE PRACTITIONER

## 2017-10-31 PROCEDURE — 99214 OFFICE O/P EST MOD 30 MIN: CPT | Performed by: NURSE PRACTITIONER

## 2017-10-31 PROCEDURE — 86480 TB TEST CELL IMMUN MEASURE: CPT | Performed by: NURSE PRACTITIONER

## 2017-10-31 PROCEDURE — 71020 XR CHEST 2 VW: CPT

## 2017-10-31 RX ORDER — PREDNISONE 20 MG/1
20 TABLET ORAL 2 TIMES DAILY
Qty: 10 TABLET | Refills: 0 | Status: SHIPPED | OUTPATIENT
Start: 2017-10-31 | End: 2017-10-31

## 2017-10-31 RX ORDER — PREDNISONE 20 MG/1
20 TABLET ORAL 2 TIMES DAILY
Qty: 10 TABLET | Refills: 0 | Status: SHIPPED | OUTPATIENT
Start: 2017-10-31 | End: 2017-11-08

## 2017-10-31 RX ORDER — CODEINE PHOSPHATE AND GUAIFENESIN 10; 100 MG/5ML; MG/5ML
1 SOLUTION ORAL EVERY 4 HOURS PRN
Qty: 120 ML | Refills: 0 | Status: SHIPPED | OUTPATIENT
Start: 2017-10-31 | End: 2018-03-09

## 2017-10-31 ASSESSMENT — PAIN SCALES - GENERAL: PAINLEVEL: EXTREME PAIN (8)

## 2017-10-31 NOTE — NURSING NOTE
"Chief Complaint   Patient presents with     Urgent Care     follow up       Initial /66 (BP Location: Left arm, Patient Position: Sitting, Cuff Size: Adult Large)  Pulse 83  Temp 98.4  F (36.9  C) (Tympanic)  Wt 245 lb (111.1 kg)  SpO2 96%  Breastfeeding? No  BMI 42.05 kg/m2 Estimated body mass index is 42.05 kg/(m^2) as calculated from the following:    Height as of 8/28/17: 5' 4\" (1.626 m).    Weight as of this encounter: 245 lb (111.1 kg).  Medication Reconciliation: complete   Ernst YOUSSEF      "

## 2017-10-31 NOTE — TELEPHONE ENCOUNTER
This writer attempted to contact Sharon on 10/31/17      Reason for call results and left message to return call.       If patient calls back:   Patient contacted by 2nd floor [facility] Team (MA/TC). Inform patient that someone from the team will contact them, document that pt called and route to care team. .        Ernst Pruitt, CMA

## 2017-10-31 NOTE — PROGRESS NOTES
SUBJECTIVE:   Sharon Fung is a 62 year old female who presents to clinic today for the following health issues:        ED/UC Followup:    Facility:  [facility]  Date of visit: 10/29/2017  Reason for visit: Throat pain, Acute Sinusitis  Current Status: Wheezing, Coughing non stop making her like shortness of breath     Patient presents for worsening cough.  She has been taking Doxycycline for 2 days now with no improvement.  She is now wheezing.  Short of breath with activity.      Of note, travelled to [place] in August and after returning developed a cough.  Improved with abx and prednisone at beginning of September but cough never totally resolved.  On her trip, she denies infectious exposures.  [place]  She became febrile 3 days ago (10/29) which prompted her trip to the urgent care.  No fevers prior to that.  Took ibuprofen last 1.5 hours ago and now currently not febrile.  Unsure if she was febrile before taking today but felt chills.      With her RA, she does have body aches but no fevers.  States it is difficulty to differentiate her current body aches from what she normally has due to her RA.  Recent RA labs were normal, including CBC on 10/25.    She does have seasonal allergies for which she takes flonase.  Has been consistent with taking this.      Problem list and histories reviewed & adjusted, as indicated.  Additional history: as documented    Patient Active Problem List   Diagnosis     AR (allergic rhinitis)     GERD (gastroesophageal reflux disease)     Status post bariatric surgery     Mild major depression (H)     Advanced directives, counseling/discussion     High risk medication use     Bilateral leg weakness     Left leg pain     Obstructive sleep apnea     Obesity, Class III, BMI 40-49.9 (morbid obesity) (H)     Anterior basement membrane dystrophy     Seropositive rheumatoid arthritis (H)     LORNA positive     Carpal tunnel syndrome of  right wrist     Headache     Past Surgical History:   Procedure Laterality Date     BLEPHAROPLASTY BILATERAL Bilateral 8/5/2016    Procedure: BLEPHAROPLASTY BILATERAL;  Surgeon: Cortez Robin MD;  Location:  [facility]     BREAST BIOPSY, RT/LT  1-94    Benign     C APPENDECTOMY  1977     COLONOSCOPY       COLONOSCOPY WITH CO2 INSUFFLATION N/A 3/30/2017    Procedure: COLONOSCOPY WITH CO2 INSUFFLATION;  Surgeon: Issa Weeks MD;  Location: [facility] OR     ESOPHAGOSCOPY, GASTROSCOPY, DUODENOSCOPY (EGD), COMBINED N/A 10/29/2015    Procedure: COMBINED ESOPHAGOSCOPY, GASTROSCOPY, DUODENOSCOPY (EGD);  Surgeon: Shaq Mims MD;  Location: [facility]     LAPAROSCOPIC GASTRIC ADJUSTABLE BANDING  11/09/10    Lap band procedure     LAPAROSCOPIC REMOVAL GASTRIC ADJUSTABLE BAND N/A 10/31/2015    Procedure: LAPAROSCOPIC REMOVAL GASTRIC ADJUSTABLE BAND;  Surgeon: Shaq Mims MD;  Location: [facility]     RELEASE CARPAL TUNNEL Left 4/27/2017    Procedure: RELEASE CARPAL TUNNEL;  Left Open Carpal Tunnel Release;  Surgeon: Ciara Middleton MD;  Location[facility]     RELEASE CARPAL TUNNEL Right 6/15/2017    Procedure: RELEASE CARPAL TUNNEL;  Right Carpal Tunnel Release Open;  Surgeon: Ciara Middleton MD;  Location: [facility]     TUBAL LIGATION  1988       Social History   Substance Use Topics     Smoking status: Never Smoker     Smokeless tobacco: Never Used     Alcohol use 0.6 - 1.2 oz/week     1 - 2 Standard drinks or equivalent per week      Comment: ONCE EVERY OTHER MONTH     Family History   Problem Relation Age of Onset     HEART DISEASE Father      MI     Neurologic Disorder Father 79     Parkinson's     DIABETES Father      Hypertension Father      Coronary Artery Disease Father      CANCER Maternal Grandmother      uterine     Neurologic Disorder Sister 16     migraine     Depression Sister      Neurologic Disorder Mother 20     migraine     Neurologic Disorder Son 7     migraine     Substance Abuse  "Son          Current Outpatient Prescriptions   Medication Sig Dispense Refill     guaiFENesin-codeine (ROBITUSSIN AC) 100-10 MG/5ML SOLN solution Take 5 mLs by mouth every 4 hours as needed for cough 120 mL 0     doxycycline (VIBRA-TABS) 100 MG tablet Take 1 tablet (100 mg) by mouth 2 times daily for 7 days 14 tablet 0     albuterol (PROAIR HFA/PROVENTIL HFA/VENTOLIN HFA) 108 (90 BASE) MCG/ACT Inhaler Inhale 2-4 puffs into the lungs every 4 hours as needed for shortness of breath / dyspnea or wheezing 1 Inhaler 1     guaiFENesin-codeine (ROBITUSSIN AC) 100-10 MG/5ML SOLN solution Take 5 mLs by mouth every 4 hours as needed for cough 236 mL 0     methotrexate sodium 50 MG/2ML SOLN Inject 0.8 mLs (20 mg) as directed every 7 days 2 vial 3     Insulin Syringe-Needle U-100 (BD INSULIN SYRINGE) 27G X 1/2\" 1 ML MISC 1 Syringe every 7 days , to be used for methotrexate administration. 10 each 5     citalopram (CELEXA) 20 MG tablet Take 1 tablet (20 mg) by mouth daily 90 tablet 1     ibuprofen 200 MG capsule Take 600-800 mg by mouth At Bedtime       cycloSPORINE (RESTASIS) 0.05 % ophthalmic emulsion Place 1 drop into both eyes 2 times daily       folic acid (FOLVITE) 1 MG tablet Take 1 tablet (1 mg) by mouth daily 100 tablet 3     tofacitinib (XELJANZ XR) 11 MG 24 hr tablet Take 1 tablet (11 mg) by mouth daily 30 tablet 6     ondansetron (ZOFRAN ODT) 4 MG ODT tab Take 1 tablet (4 mg) by mouth every 6 hours as needed for nausea 12 tablet 1     fexofenadine (ALLEGRA) 180 MG tablet Take 1 tablet (180 mg) by mouth daily 90 tablet 2     acetaminophen (TYLENOL) 500 MG tablet Take 500-1,000 mg by mouth every 6 hours as needed for mild pain       fluticasone (FLONASE) 50 MCG/ACT nasal spray Spray 2 sprays into both nostrils as needed       ORDER FOR DME Use your CPAP device as directed by your provider.       No Known Allergies  Recent Labs   Lab Test  10/25/17   0840  07/26/17   0827  07/11/17   0906   04/27/17   0732  01/17/17   " 0642   12/27/16   0836   09/29/15   0832   02/20/15   0750   10/20/14   0821   08/28/12   1525   A1C   --    --    --    --    --    --    --    --    --    --    --   5.6   --   5.1   --    --    LDL   --    --    --    --   92   --    --   97   --    --    --   78   --   92   < >   --    HDL   --    --    --    --   82   --    --   56   --    --    --   63   --   63   < >   --    TRIG   --    --    --    --   148   --    --   154*   --    --    --   79   --   193*   < >   --    ALT  69*  41  47   --   35   --    < >  33   < >   --    < >  18   < >   --    < >   --    CR  0.77  0.67  0.73   --   0.81  0.93   < >  0.75   < >   --    --   0.60   < >  0.75   < >   --    GFRESTIMATED  76  90  82   < >  72  61   < >  79   < >   --    --   >90  Non  GFR Calc     < >  79   < >   --    GFRESTBLACK  >90  >90  African American GFR Calc    >90   GFR Calc     < >  87  74   < >  >90   GFR Calc     < >   --    --   >90   GFR Calc     < >  >90   GFR Calc     < >   --    POTASSIUM   --    --   3.2*   --    --   3.5   < >  4.7   < >   --    --   3.7   < >  3.8   < >   --    TSH   --    --    --    --    --    --    --    --    --   1.20   --    --    --    --    --   1.48    < > = values in this interval not displayed.      BP Readings from Last 3 Encounters:   10/31/17 104/66   10/29/17 126/80   09/01/17 132/78    Wt Readings from Last 3 Encounters:   10/31/17 245 lb (111.1 kg)   10/29/17 244 lb (110.7 kg)   09/01/17 253 lb 6.4 oz (114.9 kg)           Reviewed and updated as needed this visit by clinical staff       Reviewed and updated as needed this visit by Provider         ROS:  CONSTITUTIONAL:POSITIVE  for chills, fatigue, fever (last recorded was 3 days ago at 103F) and myalgias and NEGATIVE  for sweats, weight gain and weight loss  INTEGUMENTARY/SKIN: NEGATIVE for worrisome rashes, moles or lesions  E/M: NEGATIVE for ear, mouth and throat  problems  RESP:POSITIVE for cough-non productive, dyspnea on exertion and wheezing and NEGATIVE for tuberculosis exposure that she knows of  CV: NEGATIVE for chest pain, palpitations or peripheral edema  GI: NEGATIVE for nausea, abdominal pain, heartburn, or change in bowel habits  MUSCULOSKELETAL: POSITIVE  for arthralgias   NEURO: NEGATIVE for weakness, dizziness or paresthesias  ENDOCRINE: NEGATIVE for temperature intolerance, skin/hair changes  HEME/ALLERGY/IMMUNE: POSITIVE  for allergies  PSYCHIATRIC: NEGATIVE for changes in mood or affect    OBJECTIVE:     /66 (BP Location: Left arm, Patient Position: Sitting, Cuff Size: Adult Large)  Pulse 83  Temp 98.4  F (36.9  C) (Tympanic)  Wt 245 lb (111.1 kg)  SpO2 96%  Breastfeeding? No  BMI 42.05 kg/m2  Body mass index is 42.05 kg/(m^2).  GENERAL: healthy, alert and no distress  EYES: Eyes grossly normal to inspection, PERRL and conjunctivae and sclerae normal  HENT: normal cephalic/atraumatic, ear canals and TM's normal, nose and mouth without ulcers or lesions, nasal mucosa edematous , rhinorrhea clear, tonsillar hypertrophy and sinuses: not tender  NECK: bilateral anterior cervical adenopathy (mild, nontender), no asymmetry, masses, or scars and thyroid normal to palpation  RESP: lungs clear to auscultation - no rales, rhonchi or wheezes  CV: regular rate and rhythm, normal S1 S2, no S3 or S4, no murmur, click or rub, no peripheral edema and peripheral pulses strong  ABDOMEN: soft, nontender, no hepatosplenomegaly, no masses and bowel sounds normal  MS: no gross musculoskeletal defects noted, no edema  SKIN: no suspicious lesions or rashes  NEURO: Normal strength and tone, mentation intact and speech normal    Diagnostic Test Results:  No results found for this or any previous visit (from the past 24 hour(s)).    ASSESSMENT/PLAN:     1. Cough  Ongoing as per HPI.  Worsening despite starting doxycycline 2 days ago.  Concerned that patient has had  continued cough (with some improvement then worsening) since trip to [place] in late August.  Given immunosuppression, will check for TB, do chest xray.  If any abnormalities on xray, send to infectious disease for further workup.  If no abnormalities and quant gold neg, consider short course of steroids and diagnosis of bronchitis.  - XR Chest 2 Views; Future  - M Tuberculosis by Quantiferon  - CBC with platelets  - guaiFENesin-codeine (ROBITUSSIN AC) 100-10 MG/5ML SOLN solution; Take 5 mLs by mouth every 4 hours as needed for cough  Dispense: 120 mL; Refill: 0    2. Fever and chills  As above.  - XR Chest 2 Views; Future  - M Tuberculosis by Quantiferon  - CBC with platelets  - guaiFENesin-codeine (ROBITUSSIN AC) 100-10 MG/5ML SOLN solution; Take 5 mLs by mouth every 4 hours as needed for cough  Dispense: 120 mL; Refill: 0    3. Immunosuppression (H)  As above.      FURTHER TESTING:       - chest xray, CBC, and quant gold    CHELSEY Dill [facility]

## 2017-10-31 NOTE — TELEPHONE ENCOUNTER
Hi There,  Can you please call patient and let her know that her chest xray and CBC are normal.  No signs of TB or pneumonia.  Therefore, I feel comfortable starting her on prednisone (short course of 40 mg per day for 5 days).  If she continues to have this cough after finishing steroids and antibiotic, I would like her to see pulmonology (if her rheumatologist agrees).  Thanks!  Barb

## 2017-10-31 NOTE — TELEPHONE ENCOUNTER
Pt called back and receive information on X-Ray and CBC both are normal and a Rx was sent in [facility] to . Sharon was also instructed if cough continue after a course of abx and prednisone for 5 days to follow up with pulmology if its ok with her rheumatologist.  Ernst YOUSSEF

## 2017-10-31 NOTE — MR AVS SNAPSHOT
After Visit Summary   10/31/2017    Sharon Fung    MRN: [id number]           Patient Information     Date Of Birth          1955        Visit Information        Provider Department      10/31/2017 1:20 PM Barb Lopez APRN CNP [facility]        Today's Diagnoses     Cough    -  1    Fever and chills        Immunosuppression (H)          Care Instructions    Based on your medical history and these are the current health maintenance or preventive care services that you are due for (some may have been done at this visit)  Health Maintenance Due   Topic Date Due     EYE EXAM Q1 YEAR  06/21/2017     INFLUENZA VACCINE (SYSTEM ASSIGNED)  09/01/2017     DEPRESSION ACTION PLAN Q1 YR  10/14/2017     MAMMO SCREEN Q2 YR (SYSTEM ASSIGNED)  10/26/2017         At [facility], we strive to deliver an exceptional experience to you, every time we see you.    If you receive a survey in the mail, please send us back your thoughts. We really do value your feedback.    Your care team's suggested websites for health information:  Www.Frenzoo.org : Up to date and easily searchable information on multiple topics.  Www.medlineplus.gov : medication info, interactive tutorials, watch real surgeries online  Www.familydoctor.org : good info from the Academy of Family Physicians  Www.cdc.gov : public health info, travel advisories, epidemics (H1N1)  Www.aap.org : children's health info, normal development, vaccinations  Www.health.state.[place].us : [place] dept of health, public health issues in [place], N1N1    How to contact your care team:   Team [facility] [phone]         Pharmacy [phone]    Dr. Mensah, Mel Rocha PA-C, Dr. Allen, Destiny Paz APRN CNP, Nisreen Gil PA-C, Dr. Longo, and CHELSEY Rivas CNP    Team RN: Marimar      Clinic hours  M-Th 7 am-7 pm   Fri 7 am-5 pm.   Urgent care M-F 11 am-9 pm,   Sat/Sun 9 am-5 pm.  Pharmacy M-Th 8  am-8 pm Fri 8 am-6 pm  Sat/Sun 9 am-5 pm.     All password changes, disabled accounts, or ID changes in [email or web address] will be done by our Access Services Department.    If you need help with your account or password, call: [phone]. Clinic staff no longer has the ability to change passwords.             Follow-ups after your visit        Your next 10 appointments already scheduled     Nov 01, 2017  7:20 AM CDT   [facility] Visit [facility] Freddy Guerra MD   [facility]    [address] [address] [phone]              Who to contact     If you have questions or need follow up information about today's clinic visit or your schedule please contact [facility] directly at [phone].  Normal or non-critical lab and imaging results will be communicated to you by [employer], letter or phone within 4 business days after the clinic has received the results. If you do not hear from us within 7 days, please contact the clinic through [employer] or phone. If you have a critical or abnormal lab result, we will notify you by phone as soon as possible.  Submit refill requests through [email or web address] or call your pharmacy and they will forward the refill request to us. Please allow 3 business days for your refill to be completed.          Additional Information About Your Visit        [employer] Information     [email or web address] gives you secure access to your electronic health record. If you see a primary care provider, you can also send messages to your care team and make appointments. If you have questions, please call your primary care clinic.  If you do not have a primary care provider, please call [phone] and they will assist you.        Care EveryWhere ID     This is your Care EveryWhere ID. This could be used by other organizations to access your [facility] medical records  [id number]        Your Vitals Were     Pulse Temperature Pulse Oximetry  Breastfeeding? BMI (Body Mass Index)       83 98.4  F (36.9  C) (Tympanic) 96% No 42.05 kg/m2        Blood Pressure from Last 3 Encounters:   10/31/17 104/66   10/29/17 126/80   09/01/17 132/78    Weight from Last 3 Encounters:   10/31/17 245 lb (111.1 kg)   10/29/17 244 lb (110.7 kg)   09/01/17 253 lb 6.4 oz (114.9 kg)              We Performed the Following     CBC with platelets     M Tuberculosis by Quantiferon          Today's Medication Changes          These changes are accurate as of: 10/31/17  1:59 PM.  If you have any questions, ask your nurse or doctor.               These medicines have changed or have updated prescriptions.        Dose/Directions    * guaiFENesin-codeine 100-10 MG/5ML Soln solution   Commonly known as:  ROBITUSSIN AC   This may have changed:  Another medication with the same name was added. Make sure you understand how and when to take each.   Used for:  Acute bronchitis with symptoms > 10 days   Changed by:  Lisa Rocha PA-C        Dose:  1 tsp.   Take 5 mLs by mouth every 4 hours as needed for cough   Quantity:  236 mL   Refills:  0       * guaiFENesin-codeine 100-10 MG/5ML Soln solution   Commonly known as:  ROBITUSSIN AC   This may have changed:  You were already taking a medication with the same name, and this prescription was added. Make sure you understand how and when to take each.   Used for:  Cough, Fever and chills   Changed by:  Barb Lopez APRN CNP        Dose:  1 tsp.   Take 5 mLs by mouth every 4 hours as needed for cough   Quantity:  120 mL   Refills:  0       * Notice:  This list has 2 medication(s) that are the same as other medications prescribed for you. Read the directions carefully, and ask your doctor or other care provider to review them with you.         Where to get your medicines      Some of these will need a paper prescription and others can be bought over the counter.  Ask your nurse if you have questions.     Bring a paper  prescription for each of these medications     guaiFENesin-codeine 100-10 MG/5ML Soln solution                Primary Care Provider Office Phone # Fax #    Reynaldo Saavedra [phone] [address] [facility] [address]        Equal Access to Services     SVITLANA SERVIN : Hadii aad ku hadasho Soomaali, waaxda luqadaha, qaybta kaalmada adeegyada, waxay idiin haykelsin louis williszeuskatherine terry . So [facility] [phone].    ATENCIÓN: Si habla español, tiene a ferrer disposición servicios gratuitos de asistencia lingüística. [facility] [phone].    We comply with applicable federal civil rights laws and [place] laws. We do not discriminate on the basis of race, color, national origin, age, disability, sex, sexual orientation, or gender identity.            Thank you!     Thank you for choosing [facility]  for your care. Our goal is always to provide you with excellent care. Hearing back from our patients is one way we can continue to improve our services. Please take a few minutes to complete the written survey that you may receive in the mail after your visit with us. Thank you!             Your Updated Medication List - Protect others around you: Learn how to safely use, store and throw away your medicines at www.disposemymeds.org.          This list is accurate as of: 10/31/17  1:59 PM.  Always use your most recent med list.                   Brand Name Dispense Instructions for use Diagnosis    acetaminophen 500 MG tablet    TYLENOL     Take 500-1,000 mg by mouth every 6 hours as needed for mild pain        albuterol 108 (90 BASE) MCG/ACT Inhaler    PROAIR HFA/PROVENTIL HFA/VENTOLIN HFA    1 Inhaler    Inhale 2-4 puffs into the lungs every 4 hours as needed for shortness of breath / dyspnea or wheezing    Acute bronchitis, unspecified organism       citalopram 20 MG tablet    celeXA    90 tablet    Take 1 tablet (20 mg) by mouth daily    Mild major depression (H)        "doxycycline 100 MG tablet    VIBRA-TABS    14 tablet    Take 1 tablet (100 mg) by mouth 2 times daily for 7 days    Acute sinusitis with coexisting condition requiring prophylactic treatment       fexofenadine 180 MG tablet    ALLEGRA    90 tablet    Take 1 tablet (180 mg) by mouth daily    AR (allergic rhinitis)       FLONASE 50 MCG/ACT spray   Generic drug:  fluticasone      Spray 2 sprays into both nostrils as needed        folic acid 1 MG tablet    FOLVITE    100 tablet    Take 1 tablet (1 mg) by mouth daily    Seropositive rheumatoid arthritis (H)       * guaiFENesin-codeine 100-10 MG/5ML Soln solution    ROBITUSSIN AC    236 mL    Take 5 mLs by mouth every 4 hours as needed for cough    Acute bronchitis with symptoms > 10 days       * guaiFENesin-codeine 100-10 MG/5ML Soln solution    ROBITUSSIN AC    120 mL    Take 5 mLs by mouth every 4 hours as needed for cough    Cough, Fever and chills       ibuprofen 200 MG capsule      Take 600-800 mg by mouth At Bedtime        Insulin Syringe-Needle U-100 27G X 1/2\" 1 ML Misc    BD insulin syringe    10 each    1 Syringe every 7 days , to be used for methotrexate administration.    Seropositive rheumatoid arthritis (H)       methotrexate sodium 50 MG/2ML Soln     2 vial    Inject 0.8 mLs (20 mg) as directed every 7 days    Seropositive rheumatoid arthritis (H)       ondansetron 4 MG ODT tab    ZOFRAN ODT    12 tablet    Take 1 tablet (4 mg) by mouth every 6 hours as needed for nausea    Nausea vomiting and diarrhea       order for DME      Use your CPAP device as directed by your provider.        RESTASIS 0.05 % ophthalmic emulsion   Generic drug:  cycloSPORINE      Place 1 drop into both eyes 2 times daily        tofacitinib 11 MG 24 hr tablet    XELJANZ XR    30 tablet    Take 1 tablet (11 mg) by mouth daily    Seropositive rheumatoid arthritis (H), High risk medications (not anticoagulants) long-term use       * Notice:  This list has 2 medication(s) that are the " same as other medications prescribed for you. Read the directions carefully, and ask your doctor or other care provider to review them with you.

## 2017-10-31 NOTE — TELEPHONE ENCOUNTER
[facility] Prior Authorization Team   Phone: [phone]  Fax: [phone]    PA Initiation    Medication: Xeljanz - PENDING   Insurance Company: [employer] - Phone [phone] Fax [phone]  Pharmacy Filling the Rx: [facility] - [place], [place] - [address]  Filling Pharmacy Phone: [phone]  Filling Pharmacy Fax: [phone]  Start Date: 10/31/2017    Also sent recent chart notes.

## 2017-10-31 NOTE — PATIENT INSTRUCTIONS
Based on your medical history and these are the current health maintenance or preventive care services that you are due for (some may have been done at this visit)  Health Maintenance Due   Topic Date Due     EYE EXAM Q1 YEAR  06/21/2017     INFLUENZA VACCINE (SYSTEM ASSIGNED)  09/01/2017     DEPRESSION ACTION PLAN Q1 YR  10/14/2017     MAMMO SCREEN Q2 YR (SYSTEM ASSIGNED)  10/26/2017         At [facility], we strive to deliver an exceptional experience to you, every time we see you.    If you receive a survey in the mail, please send us back your thoughts. We really do value your feedback.    Your care team's suggested websites for health information:  Www.[facility]CH[facility]IES.org : Up to date and easily searchable information on multiple topics.  Www.medlineplus.gov : medication info, interactive tutorials, watch real surgeries online  Www.familydoctor.org : good info from the Academy of Family Physicians  Www.cdc.gov : public health info, travel advisories, epidemics (H1N1)  Www.aap.org : children's health info, normal development, vaccinations  Www.health.[facility].[place].us : [place] dept of health, public health issues in [place], N1N1    How to contact your care team:   Team [facility] [phone]         Pharmacy [phone]    Dr. Mensah, Mel Rocha PA-C, Dr. Allen, Destiny BARBOSA CNP, Nisreen Gil PA-C, Dr. Longo, and CHELSEY Rivas CNP    Team RN: Marimar      Clinic hours  M-Th 7 am-7 pm   Fri 7 am-5 pm.   Urgent care M-F 11 am-9 pm,   Sat/Sun 9 am-5 pm.  Pharmacy M-Th 8 am-8 pm Fri 8 am-6 pm  Sat/Sun 9 am-5 pm.     All password changes, disabled accounts, or ID changes in [email or web address] will be done by our Access Services Department.    If you need help with your account or password, call: [phone]. Clinic staff no longer has the ability to change passwords.

## 2017-11-01 ENCOUNTER — OFFICE VISIT (OUTPATIENT)
Dept: RHEUMATOLOGY | Facility: CLINIC | Age: 62
End: 2017-11-01
Payer: COMMERCIAL

## 2017-11-01 VITALS
HEIGHT: 64 IN | WEIGHT: 245 LBS | BODY MASS INDEX: 41.83 KG/M2 | TEMPERATURE: 98.2 F | OXYGEN SATURATION: 94 % | SYSTOLIC BLOOD PRESSURE: 104 MMHG | HEART RATE: 90 BPM | DIASTOLIC BLOOD PRESSURE: 52 MMHG

## 2017-11-01 DIAGNOSIS — Z79.899 HIGH RISK MEDICATION USE: ICD-10-CM

## 2017-11-01 DIAGNOSIS — M05.9 SEROPOSITIVE RHEUMATOID ARTHRITIS (H): Primary | ICD-10-CM

## 2017-11-01 PROCEDURE — 99214 OFFICE O/P EST MOD 30 MIN: CPT | Performed by: INTERNAL MEDICINE

## 2017-11-01 RX ORDER — SYRINGE-NEEDLE,INSULIN,0.5 ML 27GX1/2"
1 SYRINGE, EMPTY DISPOSABLE MISCELLANEOUS
Qty: 10 EACH | Refills: 5 | Status: SHIPPED | OUTPATIENT
Start: 2017-11-01 | End: 2018-11-12

## 2017-11-01 NOTE — TELEPHONE ENCOUNTER
Prior Authorization Approval    Authorization Effective Date: 11/9/2016  Authorization Expiration Date: 11/9/2018  Medication: Xeljanz - APPROVED  Approved Dose/Quantity: N/A  Reference #:     Insurance Company: [employer] - Phone [phone] Fax [phone]  Expected CoPay:       CoPay Card Available:      Foundation Assistance Needed:    Which Pharmacy is filling the prescription (Not needed for infusion/clinic administered): [facility] - [place] [address]  Pharmacy Notified:    Patient Notified:

## 2017-11-01 NOTE — Clinical Note
Dr. Saavedra, Ms. Fung has persistent URI symptoms since returning from [place] in August.  TB test was ordered in urgent care yesterday and is pending results.  If infectious, then Xeljanz could be suppressive enough to prevent resolution so it is being held now.  She says that she will see you in about 1 week.   Freddy

## 2017-11-01 NOTE — MR AVS SNAPSHOT
After Visit Summary   2017    Sharon Fung    MRN: [id number]           Patient Information     Date Of Birth          1955        Visit Information        Provider Department      2017 7:20 AM Freddy Guerra MD [facility] [place]        Today's Diagnoses     Seropositive rheumatoid arthritis (H)    -  1    High risk medication use          Care Instructions    Dr. Guerra s Rheumatology Clinics  Locations Clinic Hours Telephone Number     [facility]  [address]  [place] [address]     Wednesday: 7:20AM - 4:00PM  Thursday:     7:20AM - 4:00PM     Friday:          7:20AM - 11:00AM       To schedule an appointment with  Dr. Guerra,  please contact  Specialty [phone]       [facility]  [address]  [place] [address]       Monday:       7:20AM - 4:00PM        [facility]  [address]  [place] [address]       Tuesday:      7:20AM - 4:00PM          Thank you!    Evie Rosas CMA              Follow-ups after your visit        Your next 10 appointments already scheduled     2017  3:40 PM CST   Return Visit with Freddy Guerra MD   [facility] [place] [facility] [place]    [address]  [place] [id number] [phone]              Future tests that were ordered for you today     Open Future Orders        Priority Expected Expires Ordered    Hepatic panel Routine 2017            Who to contact     If you have questions or need follow up information about today's clinic visit or your schedule please contact [facility] [place] directly at [phone].  Normal or non-critical lab and imaging results will be communicated to you by MyChart, letter or phone within 4 business days after the clinic has received the results. If you do not hear from us within 7 days, please contact the clinic through MyChart or phone. If you have a critical or  "abnormal lab result, we will notify you by phone as soon as possible.  Submit refill requests through [employer] or call your pharmacy and they will forward the refill request to us. Please allow 3 business days for your refill to be completed.          Additional Information About Your Visit        [employer] Information     [employer] gives you secure access to your electronic health record. If you see a primary care provider, you can also send messages to your care team and make appointments. If you have questions, please call your primary care clinic.  If you do not have a primary care provider, please call [phone] and they will assist you.        Care EveryWhere ID     This is your Care EveryWhere ID. This could be used by other organizations to access your [facility] medical records  [id number]        Your Vitals Were     Pulse Temperature Height Pulse Oximetry BMI (Body Mass Index)       90 98.2  F (36.8  C) (Oral) 1.626 m (5' 4\") 94% 42.05 kg/m2        Blood Pressure from Last 3 Encounters:   11/01/17 104/52   10/31/17 104/66   10/29/17 126/80    Weight from Last 3 Encounters:   11/01/17 111.1 kg (245 lb)   10/31/17 111.1 kg (245 lb)   10/29/17 110.7 kg (244 lb)                 Today's Medication Changes          These changes are accurate as of: 11/1/17  7:52 AM.  If you have any questions, ask your nurse or doctor.               Stop taking these medicines if you haven't already. Please contact your care team if you have questions.     tofacitinib 11 MG 24 hr tablet   Commonly known as:  XELJANZ XR   Stopped by:  Freddy Guerra MD                Where to get your medicines      These medications were sent to [facility] [place], [place] - [address]  [address] [place] [address], [facility] [address]     Phone:  [phone]     Insulin Syringe-Needle U-100 27G X 1/2\" 1 ML Misc    methotrexate sodium 50 MG/2ML Soln                Primary Care Provider Office Phone # Fax #    Reynaldo Saavedra MD " [phone]       [address] [facility] [address]        Equal Access to Services     SVITLANA SERVIN : Hadii aad ku hadshun Christopher, wajosé manuelda lumartha, georgetteta karadhada stevo, susan day laScarletasif monahan. So [facility] [phone].    ATENCIÓN: Si habla español, tiene a ferrer disposición servicios gratuitos de asistencia lingüística. Llame al [phone].    We comply with applicable federal civil rights laws and [place] laws. We do not discriminate on the basis of race, color, national origin, age, disability, sex, sexual orientation, or gender identity.            Thank you!     Thank you for choosing [facility]  for your care. Our goal is always to provide you with excellent care. Hearing back from our patients is one way we can continue to improve our services. Please take a few minutes to complete the written survey that you may receive in the mail after your visit with us. Thank you!             Your Updated Medication List - Protect others around you: Learn how to safely use, store and throw away your medicines at www.disposemymeds.org.          This list is accurate as of: 11/1/17  7:52 AM.  Always use your most recent med list.                   Brand Name Dispense Instructions for use Diagnosis    acetaminophen 500 MG tablet    TYLENOL     Take 500-1,000 mg by mouth every 6 hours as needed for mild pain        albuterol 108 (90 BASE) MCG/ACT Inhaler    PROAIR HFA/PROVENTIL HFA/VENTOLIN HFA    1 Inhaler    Inhale 2-4 puffs into the lungs every 4 hours as needed for shortness of breath / dyspnea or wheezing    Acute bronchitis, unspecified organism       citalopram 20 MG tablet    celeXA    90 tablet    Take 1 tablet (20 mg) by mouth daily    Mild major depression (H)       doxycycline 100 MG tablet    VIBRA-TABS    14 tablet    Take 1 tablet (100 mg) by mouth 2 times daily for 7 days    Acute sinusitis with coexisting condition requiring prophylactic  "treatment       fexofenadine 180 MG tablet    ALLEGRA    90 tablet    Take 1 tablet (180 mg) by mouth daily    AR (allergic rhinitis)       FLONASE 50 MCG/ACT spray   Generic drug:  fluticasone      Spray 2 sprays into both nostrils as needed        folic acid 1 MG tablet    FOLVITE    100 tablet    Take 1 tablet (1 mg) by mouth daily    Seropositive rheumatoid arthritis (H)       * guaiFENesin-codeine 100-10 MG/5ML Soln solution    ROBITUSSIN AC    236 mL    Take 5 mLs by mouth every 4 hours as needed for cough    Acute bronchitis with symptoms > 10 days       * guaiFENesin-codeine 100-10 MG/5ML Soln solution    ROBITUSSIN AC    120 mL    Take 5 mLs by mouth every 4 hours as needed for cough    Cough, Fever and chills       ibuprofen 200 MG capsule      Take 600-800 mg by mouth At Bedtime        Insulin Syringe-Needle U-100 27G X 1/2\" 1 ML Misc    BD insulin syringe    10 each    1 Syringe every 7 days , to be used for methotrexate administration.    Seropositive rheumatoid arthritis (H)       methotrexate sodium 50 MG/2ML Soln     2 vial    Inject 0.8 mLs (20 mg) as directed every 7 days    Seropositive rheumatoid arthritis (H)       ondansetron 4 MG ODT tab    ZOFRAN ODT    12 tablet    Take 1 tablet (4 mg) by mouth every 6 hours as needed for nausea    Nausea vomiting and diarrhea       order for DME      Use your CPAP device as directed by your provider.        predniSONE 20 MG tablet    DELTASONE    10 tablet    Take 1 tablet (20 mg) by mouth 2 times daily    Cough       RESTASIS 0.05 % ophthalmic emulsion   Generic drug:  cycloSPORINE      Place 1 drop into both eyes 2 times daily        * Notice:  This list has 2 medication(s) that are the same as other medications prescribed for you. Read the directions carefully, and ask your doctor or other care provider to review them with you.      "

## 2017-11-01 NOTE — PATIENT INSTRUCTIONS
Dr. Guerra s Rheumatology Clinics  Locations Clinic Hours Telephone Number     [facility] [place]  [address]  [place] [address]     Wednesday: 7:20AM - 4:00PM  Thursday:     7:20AM - 4:00PM     Friday:          7:20AM - 11:00AM       To schedule an appointment with  Dr. Guerra,  please contact  Specialty [phone]       [facility]  [address]  [place] [address]       Monday:       7:20AM - 4:00PM        [facility]  [address]  [place] [address]       Tuesday:      7:20AM - 4:00PM          Thank you!    Evie Rosas CMA

## 2017-11-01 NOTE — PROGRESS NOTES
Rheumatology Clinic Visit      Sharon Fung MRN# [id number]   YOB: 1955 Age: 62 year old      Date of visit: 11/01/17   PCP: Dr. Reynaldo Saavedra    Chief Complaint   Patient presents with:  Arthritis: RA, patient states she is not doing good, having more pain and aches.      Assessment and Plan     1. Seropositive nonerosive rheumatoid arthritis (RF negative, CCP low positive): Previously on Humira (lost efficacy), Cimzia (ineffective), Orencia (ineffective), leflunomide (ineffective), and hydroxychloroquine (ineffective). Currently on MTX 20mg SQ (GI upset with PO) and Xeljanz XR 11 mg daily.  Previously doing well with this combination therapy but has had bronchitis since August after returning from a trip from [place]. Currently on prednisone 40 mg daily and she says that her arthritis is better. Given that she was previously doing well with this combination, I believe that she would again do well MTX + Xeljanz, but because of the persistent infection will stop Xeljanz.  Other options to consider next include SSZ and/or RTX.  Will stop Xeljanz for now because of the infection and reassess in 4 weeks.  She is going to f/u with her PCP for the chronic URI.   - Continue methotrexate 20mg SQ every 7 days  - Continue folic acid 1mg daily  - Stop Xeljanz XR 11mg daily  - Labs 2-3 days prior to the next rheumatology clinic visit: Hepatic Panel              Rapid 3, cumulative scores                      08/02/2017: 4.2   (MTX 20mg SQ wkly, Xeljanz XR 11mg daily)                         05/04/2017: 7      (MTX 20mg SQ wkly, Xeljanz XR 11mg daily)                        02/02/2017: 8      (MTX 20mg SQ wkly, Xeljanz XR 11mg daily)                      11/04/2016: 13    (MTX 20mg SQ weekly)                      07/15/2016: 10.8    2. Positive LORNA and dsDNA / Secondary Sjogren's Syndrome: Repeat dsDNA have been negative. Has dry eyes but no dry mouth.  Dry eyes are treated by ophthalmology with Restasis.   Likely Secondary Sjogren's Syndrome.     3. Elevated ALT: unclear etiology.  Possibly MTX and Xeljanz related but she had been doing well with these for a while.  Will recheck hepatic panel prior to f/u in 4 weeks.    4. Upper respiratory infection since returning from [place] in August: had been seen in urgent care and plans to be seen by her PCP.  Given chronicity of this, discontinue Xeljanz and she will f/u with her PCP.  Xeljanz may have been providing enough immunosuppression to prevent resolution of the infection.  TB test is pending, ordered by Barb Lopez.     Ms. Fung verbalized agreement with and understanding of the rational for the diagnosis and treatment plan.  All questions were answered to best of my ability and the patient's satisfaction. Ms. Fung was advised to contact the clinic with any questions that may arise after the clinic visit.      Thank you for involving me in the care of the patient    Return to clinic: 4 weeks      HPI   Sharon Fugn is a 62 year old female with medical history significant for GERD, allergic rhinitis, obstructive sleep apnea, obesity, hx of trigger fingers, hx of carpal tunnel surgery, and rheumatoid arthritis who presents for follow-up of rheumatoid arthritis.    Today, Ms. Fung reports having bronchitis since she returned from [place] in August. Arthritis was doing well with methotrexate and Xeljanz but everything has been worse since she developed bronchitis. She was recently put on prednisone 40 mg daily and she says that her arthritis symptoms improved, but she still feels poorly. She was seen in urgent care yesterday and a tuberculosis test is pending.    Occasional fevers and chills. No nausea, vomiting, constipation, diarrhea. No chest pain/pressure, palpitations, or shortness of breath. No oral or nasal sores. No neck pain. No rash.      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 No oral or nasal ulcers.  CV: No chest pain, pressure, palpitations, or dyspnea on exertion.  PULM: no shortness of breath. Persistent cough.  GI: No nausea, vomiting, constipation, diarrhea. No blood in stool. No abdominal pain.  : No blood in urine.  MSK: See HPI.  NEURO: No numbness, tingling, or weakness.  EXT: No LE swelling  PSYCH: Negative    Active Problem List     Patient Active Problem List   Diagnosis     AR (allergic rhinitis)     GERD (gastroesophageal reflux disease)     Status post bariatric surgery     Mild major depression (H)     Advanced directives, counseling/discussion     High risk medication use     Bilateral leg weakness     Left leg pain     Obstructive sleep apnea     Obesity, Class III, BMI 40-49.9 (morbid obesity) (H)     Anterior basement membrane dystrophy     Seropositive rheumatoid arthritis (H)     LORNA positive     Carpal tunnel syndrome of right wrist     Headache     Past Medical History     Past Medical History:   Diagnosis Date     AR (allergic rhinitis)  as teen     Arthritis 12/14/2015     Depressive disorder 3 years ago     GERD (gastroesophageal reflux disease) 4-07     Headache 7/11/2017     Mild major depression (H) 3 years ago     Morbid obesity (H) teens     BRETT (obstructive sleep apnea)     uses CPAP     Rheumatoid arthritis, adult (H)      Past Surgical History     Past Surgical History:   Procedure Laterality Date     BLEPHAROPLASTY BILATERAL Bilateral 8/5/2016    Procedure: BLEPHAROPLASTY BILATERAL;  Surgeon: Cortez Robin MD;  Location:  [facility]     BREAST BIOPSY, RT/LT  1-94    Benign     C APPENDECTOMY  1977     COLONOSCOPY       COLONOSCOPY WITH CO2 INSUFFLATION N/A 3/30/2017    Procedure: COLONOSCOPY WITH CO2 INSUFFLATION;  Surgeon: Issa Weeks MD;  Location:  OR     ESOPHAGOSCOPY, GASTROSCOPY, DUODENOSCOPY (EGD), COMBINED N/A 10/29/2015     "Procedure: COMBINED ESOPHAGOSCOPY, GASTROSCOPY, DUODENOSCOPY (EGD);  Surgeon: Shaq Mims MD;  Location: [facility]     LAPAROSCOPIC GASTRIC ADJUSTABLE BANDING  11/09/10    Lap band procedure     LAPAROSCOPIC REMOVAL GASTRIC ADJUSTABLE BAND N/A 10/31/2015    Procedure: LAPAROSCOPIC REMOVAL GASTRIC ADJUSTABLE BAND;  Surgeon: Shaq Mims MD;  Location: [facility] OR     RELEASE CARPAL TUNNEL Left 4/27/2017    Procedure: RELEASE CARPAL TUNNEL;  Left Open Carpal Tunnel Release;  Surgeon: Ciara Middleton MD;  [facility]     RELEASE CARPAL TUNNEL Right 6/15/2017    Procedure: RELEASE CARPAL TUNNEL;  Right Carpal Tunnel Release Open;  Surgeon: Ciara Middleton MD;  Location: [facility]     TUBAL LIGATION  1988     Allergy   No Known Allergies  Current Medication List     Current Outpatient Prescriptions   Medication Sig     guaiFENesin-codeine (ROBITUSSIN AC) 100-10 MG/5ML SOLN solution Take 5 mLs by mouth every 4 hours as needed for cough     predniSONE (DELTASONE) 20 MG tablet Take 1 tablet (20 mg) by mouth 2 times daily     doxycycline (VIBRA-TABS) 100 MG tablet Take 1 tablet (100 mg) by mouth 2 times daily for 7 days     albuterol (PROAIR HFA/PROVENTIL HFA/VENTOLIN HFA) 108 (90 BASE) MCG/ACT Inhaler Inhale 2-4 puffs into the lungs every 4 hours as needed for shortness of breath / dyspnea or wheezing     guaiFENesin-codeine (ROBITUSSIN AC) 100-10 MG/5ML SOLN solution Take 5 mLs by mouth every 4 hours as needed for cough     methotrexate sodium 50 MG/2ML SOLN Inject 0.8 mLs (20 mg) as directed every 7 days     Insulin Syringe-Needle U-100 (BD INSULIN SYRINGE) 27G X 1/2\" 1 ML MISC 1 Syringe every 7 days , to be used for methotrexate administration.     citalopram (CELEXA) 20 MG tablet Take 1 tablet (20 mg) by mouth daily     ibuprofen 200 MG capsule Take 600-800 mg by mouth At Bedtime     cycloSPORINE (RESTASIS) 0.05 % ophthalmic emulsion Place 1 drop into both eyes 2 times daily     folic acid " "(FOLVITE) 1 MG tablet Take 1 tablet (1 mg) by mouth daily     tofacitinib (XELJANZ XR) 11 MG 24 hr tablet Take 1 tablet (11 mg) by mouth daily     ondansetron (ZOFRAN ODT) 4 MG ODT tab Take 1 tablet (4 mg) by mouth every 6 hours as needed for nausea     fexofenadine (ALLEGRA) 180 MG tablet Take 1 tablet (180 mg) by mouth daily     acetaminophen (TYLENOL) 500 MG tablet Take 500-1,000 mg by mouth every 6 hours as needed for mild pain     fluticasone (FLONASE) 50 MCG/ACT nasal spray Spray 2 sprays into both nostrils as needed     ORDER FOR DME Use your CPAP device as directed by your provider.     No current facility-administered medications for this visit.          Social History   See HPI    Family History     Family History   Problem Relation Age of Onset     HEART DISEASE Father      MI     Neurologic Disorder Father 79     Parkinson's     DIABETES Father      Hypertension Father      Coronary Artery Disease Father      CANCER Maternal Grandmother      uterine     Neurologic Disorder Sister 16     migraine     Depression Sister      Neurologic Disorder Mother 20     migraine     Neurologic Disorder Son 7     migraine     Substance Abuse Son        Physical Exam     Temp Readings from Last 3 Encounters:   11/01/17 98.2  F (36.8  C) (Oral)   10/31/17 98.4  F (36.9  C) (Tympanic)   10/29/17 98.9  F (37.2  C)     BP Readings from Last 5 Encounters:   11/01/17 104/52   10/31/17 104/66   10/29/17 126/80   09/01/17 132/78   08/28/17 138/76     Pulse Readings from Last 1 Encounters:   11/01/17 90     Resp Readings from Last 1 Encounters:   10/29/17 15     Estimated body mass index is 42.05 kg/(m^2) as calculated from the following:    Height as of this encounter: 1.626 m (5' 4\").    Weight as of this encounter: 111.1 kg (245 lb).    GEN: NAD, overweight  HEENT: MMM. No oral lesions. Anicteric, noninjected sclera  CV: S1, S2. RRR. No m/r/g.  PULM: CTA bilaterally. No w/c.  MSK: MCPs, PIPs, DIPs, wrists, elbows, and " shoulders without swelling or tenderness to palpation.. Hips nontender to direct palpation. Knees, ankles, and feet without swelling or tenderness to palpation.   SKIN: No rash. Tattoos on the left lower extremity  EXT: No LE edema  PSYCH: Alert. Appropriate.    Labs / Imaging (select studies)   RF/CCP  Recent Labs   Lab Test  11/19/12   0900   CCPABY  53*   RHF  <7     CBC  Recent Labs   Lab Test  10/31/17   1359  10/25/17   0840  07/26/17   0827 07/13/17   1515  07/11/17   0906   WBC  5.9  4.1  6.4   --    --   11.5*   RBC  4.07  4.24  4.09   --    --   4.40   HGB  12.9  13.2  13.0   --    --   13.9   HCT  38.9  40.2  38.6   --    --   41.7   MCV  96  95  94   --    --   95   RDW  13.5  13.7  14.0   --    --   14.3   PLT  339  322  367   < >   --   430   MCH  31.7  31.1  31.8   --    --   31.6   MCHC  33.2  32.8  33.7   --    --   33.3   NEUTROPHIL   --   44.0  56.9   --    --   45.3   LYMPH   --   40.0  34.4   --    --   44.3   MONOCYTE   --   9.5  4.5   --    --   6.2   EOSINOPHIL   --   5.3  3.0   --    --   2.2   BASOPHIL   --   1.0  0.6   --   1  0.5   ANEU   --   1.8  3.7   --    --   5.2   ALYM   --   1.7  2.2   --    --   5.1   KIMBERLY   --   0.4  0.3   --    --   0.7   AEOS   --   0.2  0.2   --    --   0.3   ABAS   --   0.0  0.0   --    --   0.1    < > = values in this interval not displayed.     CMP  Recent Labs   Lab Test  10/25/17   0840  07/26/17   0827  07/11/17   0906   01/17/17   0642  01/15/17   1844   NA   --    --   140   --   145*  139   POTASSIUM   --    --   3.2*   --   3.5  4.0   CHLORIDE   --    --   107   --   112*  107   CO2   --    --   21   --   29  24   ANIONGAP   --    --   12   --   4  8   GLC   --    --   90   --   108*  104*   BUN   --    --   13   --   9  18   CR  0.77  0.67  0.73   < >  0.93  0.83   GFRESTIMATED  76  90  82   < >  61  70   GFRESTBLACK  >90  >90  African American GFR Calc    >90   GFR Calc     < >  74  85   ISH   --    --   9.9   --   8.2*  8.5    BILITOTAL  0.7  0.3  0.3   < >   --   0.6   ALBUMIN  3.7  3.7  4.0   < >   --   3.4   PROTTOTAL  7.4  7.7  8.1   < >   --   7.3   ALKPHOS  72  84  90   < >   --   82   AST  43  23  24   < >   --   35   ALT  69*  41  47   < >   --   37    < > = values in this interval not displayed.     Calcium/VitaminD  Recent Labs   Lab Test  07/11/17   0906  01/17/17   0642  01/15/17   1844   02/20/15   0750   02/11/11   0735   ISH  9.9  8.2*  8.5   < >  9.0   < >  9.3   D3VIT   --    --    --    --    --    --   42   VITDT   --    --    --    --   39   --    --     < > = values in this interval not displayed.     ESR/CRP  Recent Labs   Lab Test  10/25/17   0840  07/26/17   0827  07/11/17   0906   SED  11 17 17   CRP  <2.9  6.6  3.7     Hepatitis B  Recent Labs   Lab Test  12/08/15   0855  03/06/15   1650   HBCAB  Nonreactive   --    HEPBANG  Nonreactive  Nonreactive     Hepatitis C  Recent Labs   Lab Test  12/08/15   0855  03/06/15   1650  11/19/12   0900   HCVAB  Nonreactive   Assay performance characteristics have not been established for newborns,   infants, and children    Nonreactive   Assay performance characteristics have not been established for newborns,   infants, and children    Negative     Tuberculosis Screening  Recent Labs   Lab Test  02/02/17   0822  12/08/15   0855  03/06/15   1651   TBRSLT  Negative  Negative  Negative   TBAGN  0.00  0.01  0.04     Immunization History     Immunization History   Administered Date(s) Administered     Influenza Vaccine IM 3yrs+ 4 Valent IIV4 10/15/2013, 09/15/2014, 09/28/2015, 09/28/2016     Pneumococcal (PCV 13) 04/15/2016     Pneumococcal 23 valent 07/15/2016     TDAP Vaccine (Adacel) 02/09/2011          Chart documentation done in part with Dragon Voice recognition Software. Although reviewed after completion, some word and grammatical error may remain.    Freddy Guerra MD

## 2017-11-01 NOTE — NURSING NOTE
"Chief Complaint   Patient presents with     Arthritis     RA, patient states she is not doing good, having more pain and aches.       Initial /52 (BP Location: Left arm, Patient Position: Chair, Cuff Size: Adult Large)  Pulse 90  Temp 98.2  F (36.8  C) (Oral)  Ht 1.626 m (5' 4\")  Wt 111.1 kg (245 lb)  SpO2 94%  BMI 42.05 kg/m2 Estimated body mass index is 42.05 kg/(m^2) as calculated from the following:    Height as of this encounter: 1.626 m (5' 4\").    Weight as of this encounter: 111.1 kg (245 lb).  BP completed using cuff size: large         RAPID3 (0-30) Cumulative Score  18.8          RAPID3 Weighted Score (divide #4 by 3 and that is the weighted score)  6.27         "

## 2017-11-02 LAB
M TB TUBERC IFN-G BLD QL: NEGATIVE
M TB TUBERC IFN-G/MITOGEN IGNF BLD: 0.05 IU/ML

## 2017-11-08 ENCOUNTER — OFFICE VISIT (OUTPATIENT)
Dept: FAMILY MEDICINE | Facility: CLINIC | Age: 62
End: 2017-11-08
Payer: COMMERCIAL

## 2017-11-08 VITALS
HEART RATE: 71 BPM | WEIGHT: 245 LBS | DIASTOLIC BLOOD PRESSURE: 79 MMHG | BODY MASS INDEX: 42.05 KG/M2 | SYSTOLIC BLOOD PRESSURE: 126 MMHG | OXYGEN SATURATION: 95 % | TEMPERATURE: 97.2 F

## 2017-11-08 DIAGNOSIS — Z79.899 HIGH RISK MEDICATION USE: ICD-10-CM

## 2017-11-08 DIAGNOSIS — M05.9 SEROPOSITIVE RHEUMATOID ARTHRITIS (H): ICD-10-CM

## 2017-11-08 DIAGNOSIS — F32.0 MILD MAJOR DEPRESSION (H): ICD-10-CM

## 2017-11-08 DIAGNOSIS — Z12.31 VISIT FOR SCREENING MAMMOGRAM: ICD-10-CM

## 2017-11-08 DIAGNOSIS — J20.9 ACUTE BRONCHITIS WITH SYMPTOMS > 10 DAYS: Primary | ICD-10-CM

## 2017-11-08 DIAGNOSIS — D84.9 IMMUNOSUPPRESSION (H): ICD-10-CM

## 2017-11-08 PROCEDURE — 99214 OFFICE O/P EST MOD 30 MIN: CPT | Performed by: FAMILY MEDICINE

## 2017-11-08 RX ORDER — AZITHROMYCIN 250 MG/1
TABLET, FILM COATED ORAL
Qty: 6 TABLET | Refills: 0 | Status: SHIPPED | OUTPATIENT
Start: 2017-11-08 | End: 2017-11-22

## 2017-11-08 NOTE — PROGRESS NOTES
SUBJECTIVE:   Sharon Fung is a 62 year old female who presents to clinic today for the following health issues:      Acute Illness   Acute illness concerns: URI  Onset: End of August and off and on, again 2 weeks ago    Fever: YES    Chills/Sweats: YES- at night    Headache (location?): no    Sinus Pressure:no    Conjunctivitis:  no    Ear Pain: no    Rhinorrhea: no    Congestion: YES    Sore Throat: YES- In the mornings     Cough: YES-non-productive    Wheeze: no    Decreased Appetite: no    Nausea: no    Vomiting: no    Diarrhea:  no    Dysuria/Freq.: no    Fatigue/Achiness: YES    Sick/Strep Exposure: no     Therapies Tried and outcome: Antibiotics and steroids       She had a trip to [place] in August. She developed URI symptoms.   She was seen at the end of August initially for bronchitis. She was prescribed doxycycline.  Her symptoms did not clear. She was seen again at the end of October and had another course of doxycycline. She's had prednisone as well.  She is still had a persistent cough. She had a normal CBC and chest x-ray last week. She had a negative TB test as well.  She does have rheumatoid arthritis and has been on immunosuppressive medications.  She is now holding her Xeljanz for the time being.  She's had some temperature readings up to 101 at various times.  She has completed her most recent course of doxycycline and prednisone.    Problem list and histories reviewed & adjusted, as indicated.  Additional history: as documented    Patient Active Problem List   Diagnosis     AR (allergic rhinitis)     GERD (gastroesophageal reflux disease)     Status post bariatric surgery     Mild major depression (H)     Advanced directives, counseling/discussion     High risk medication use     Bilateral leg weakness     Left leg pain     Obstructive sleep apnea     Obesity, Class III, BMI 40-49.9 (morbid obesity) (H)     Anterior basement membrane dystrophy     Seropositive rheumatoid arthritis (H)      LORNA positive     Carpal tunnel syndrome of right wrist     Headache     Past Surgical History:   Procedure Laterality Date     BLEPHAROPLASTY BILATERAL Bilateral 8/5/2016    Procedure: BLEPHAROPLASTY BILATERAL;  Surgeon: Cortez Robin MD;  Location:  [facility]     BREAST BIOPSY, RT/LT  1-94    Benign     C APPENDECTOMY  1977     COLONOSCOPY       COLONOSCOPY WITH CO2 INSUFFLATION N/A 3/30/2017    Procedure: COLONOSCOPY WITH CO2 INSUFFLATION;  Surgeon: Issa Weeks MD;  Location: [facility]     ESOPHAGOSCOPY, GASTROSCOPY, DUODENOSCOPY (EGD), COMBINED N/A 10/29/2015    Procedure: COMBINED ESOPHAGOSCOPY, GASTROSCOPY, DUODENOSCOPY (EGD);  Surgeon: Shaq Mims MD;  Location: [facility]     LAPAROSCOPIC GASTRIC ADJUSTABLE BANDING  11/09/10    Lap band procedure     LAPAROSCOPIC REMOVAL GASTRIC ADJUSTABLE BAND N/A 10/31/2015    Procedure: LAPAROSCOPIC REMOVAL GASTRIC ADJUSTABLE BAND;  Surgeon: Shaq Mims MD;  Location: [facility]     RELEASE CARPAL TUNNEL Left 4/27/2017    Procedure: RELEASE CARPAL TUNNEL;  Left Open Carpal Tunnel Release;  Surgeon: Ciara Middleton MD;  Location[facility]     RELEASE CARPAL TUNNEL Right 6/15/2017    Procedure: RELEASE CARPAL TUNNEL;  Right Carpal Tunnel Release Open;  Surgeon: Ciara Middleton MD;  Location: [facility]     TUBAL LIGATION  1988       Social History   Substance Use Topics     Smoking status: Never Smoker     Smokeless tobacco: Never Used     Alcohol use 0.6 - 1.2 oz/week     1 - 2 Standard drinks or equivalent per week      Comment: ONCE EVERY OTHER MONTH     Family History   Problem Relation Age of Onset     HEART DISEASE Father      MI     Neurologic Disorder Father 79     Parkinson's     DIABETES Father      Hypertension Father      Coronary Artery Disease Father      CANCER Maternal Grandmother      uterine     Neurologic Disorder Sister 16     migraine     Depression Sister      Neurologic Disorder Mother 20     migraine     Neurologic  "Disorder Son 7     migraine     Substance Abuse Son          Current Outpatient Prescriptions   Medication Sig Dispense Refill            Insulin Syringe-Needle U-100 (BD INSULIN SYRINGE) 27G X 1/2\" 1 ML MISC 1 Syringe every 7 days , to be used for methotrexate administration. 10 each 5     methotrexate sodium 50 MG/2ML SOLN Inject 0.8 mLs (20 mg) as directed every 7 days 2 vial 3     guaiFENesin-codeine (ROBITUSSIN AC) 100-10 MG/5ML SOLN solution Take 5 mLs by mouth every 4 hours as needed for cough 120 mL 0     albuterol (PROAIR HFA/PROVENTIL HFA/VENTOLIN HFA) 108 (90 BASE) MCG/ACT Inhaler Inhale 2-4 puffs into the lungs every 4 hours as needed for shortness of breath / dyspnea or wheezing 1 Inhaler 1     citalopram (CELEXA) 20 MG tablet Take 1 tablet (20 mg) by mouth daily 90 tablet 1     ibuprofen 200 MG capsule Take 600-800 mg by mouth At Bedtime       cycloSPORINE (RESTASIS) 0.05 % ophthalmic emulsion Place 1 drop into both eyes 2 times daily       folic acid (FOLVITE) 1 MG tablet Take 1 tablet (1 mg) by mouth daily 100 tablet 3     fexofenadine (ALLEGRA) 180 MG tablet Take 1 tablet (180 mg) by mouth daily 90 tablet 2     acetaminophen (TYLENOL) 500 MG tablet Take 500-1,000 mg by mouth every 6 hours as needed for mild pain       ORDER FOR DME Use your CPAP device as directed by your provider.       fluticasone (FLONASE) 50 MCG/ACT nasal spray Spray 2 sprays into both nostrils as needed       No Known Allergies      Reviewed and updated as needed this visit by clinical staffTobacco  Allergies  Med Hx  Surg Hx  Fam Hx  Soc Hx      Reviewed and updated as needed this visit by Provider         ROS:  Otherwise unremarkable. I note that she is overdue for a mammogram. She is also due for a depression action plan.    OBJECTIVE:     /79 (BP Location: Right arm, Patient Position: Chair, Cuff Size: Adult Large)  Pulse 71  Temp 97.2  F (36.2  C) (Oral)  Wt 245 lb (111.1 kg)  SpO2 95%  BMI 42.05 " kg/m2  Body mass index is 42.05 kg/(m^2).  GENERAL: alert, no distress and over weight  EYES: Eyes grossly normal to inspection, PERRL and conjunctivae and sclerae normal  HENT: ear canals and TM's normal, nose and mouth without ulcers or lesions  NECK: no adenopathy, no asymmetry, masses, or scars and thyroid normal to palpation  RESP: lungs clear to auscultation - no rales, rhonchi or wheezes    Diagnostic Test Results:  Office Visit on 10/31/2017   Component Date Value Ref Range Status     M Tuberculosis Result 10/31/2017 Negative  NEG^Negative Final     M Tuberculosis Antigen Value 10/31/2017 0.05  IU/mL Final    Comment: This is a qualitative test.  The TB antigen IU/mL value is required for   documentation on certain government reporting forms but this value should not   be used to monitor disease progression or response to therapy.  Diagnosing or excluding tuberculosis disease, and assessing the probability of   LTBI, require a combination of epidemiological, historical, medical and   diagnostic findings that should be taken into account when interpreting   QuantiFERON TB results.       WBC 10/31/2017 5.9  4.0 - 11.0 10e9/L Final     RBC Count 10/31/2017 4.07  3.8 - 5.2 10e12/L Final     Hemoglobin 10/31/2017 12.9  11.7 - 15.7 g/dL Final     Hematocrit 10/31/2017 38.9  35.0 - 47.0 % Final     MCV 10/31/2017 96  78 - 100 fl Final     MCH 10/31/2017 31.7  26.5 - 33.0 pg Final     MCHC 10/31/2017 33.2  31.5 - 36.5 g/dL Final     RDW 10/31/2017 13.5  10.0 - 15.0 % Final     Platelet Count 10/31/2017 339  150 - 450 10e9/L Final     Results for orders placed or performed in visit on 10/31/17   XR Chest 2 Views    Narrative    XR CHEST 2 VW 10/31/2017 1:56 PM    HISTORY: Cough since travel to [place] in late August; immunosuppressed  with MTX.    COMPARISON: 4/15/2016    FINDINGS: No airspace consolidation, pleural effusion or pneumothorax.  Stable heart size.      Impression    IMPRESSION: No acute cardiopulmonary  abnormality.    JOAN WATERS MD         ASSESSMENT/PLAN:         ICD-10-CM    1. Acute bronchitis with symptoms > 10 days J20.9 azithromycin (ZITHROMAX) 250 MG tablet   2. Seropositive rheumatoid arthritis (H) M05.9    3. Immunosuppression (H) D89.9    4. High risk medication use Z79.899    5. Mild major depression (H) F32.0    6. Visit for screening mammogram Z12.31 MA SCREENING DIGITAL BILAT - Future  (s+30)     I suspect her respiratory infection has been difficult to clear because of her immunosuppression  She has had 2 rounds of doxycycline thus far  We will change up her antibiotics and use azithromycin instead  I don't think she needs any further lab work or chest x-ray currently since she had those done 8 days ago and they were normal  Her vital signs look fine today  We gave her depression action plan  We will get her set up for a screening mammogram    If new, worsening or persistent symptoms, the patient is to call or return for a recheck.      Reynaldo Saavedra MD  [facility]

## 2017-11-08 NOTE — MR AVS SNAPSHOT
After Visit Summary   11/8/2017    Sharon Fung    MRN: [id number]           Patient Information     Date Of Birth          1955        Visit Information        Provider Department      11/8/2017 4:40 PM Reynaldo Saavedra MD [facility]        Today's Diagnoses     Acute bronchitis with symptoms > 10 days    -  1    Seropositive rheumatoid arthritis (H)        High risk medication use        Visit for screening mammogram           Follow-ups after your visit        Follow-up notes from your care team     Return if symptoms worsen or fail to improve.      Your next 10 appointments already scheduled     Nov 27, 2017 12:00 PM CST   [facility]    [address] [facility] [address] [phone]            Nov 29, 2017  3:40 PM CST   [facility]    [address] [place] [id number] [phone]              Future tests that were ordered for you today     Open Future Orders        Priority Expected Expires Ordered    MA SCREENING DIGITAL BILAT - Future  (s+30) Routine  11/2/2018 11/8/2017            Who to contact     If you have questions or need follow up information about today's clinic visit or your schedule please contact [facility] directly at [phone].  Normal or non-critical lab and imaging results will be communicated to you by MyChart, letter or phone within 4 business days after the clinic has received the results. If you do not hear from us within 7 days, please contact the clinic through MyChart or phone. If you have a critical or abnormal lab result, we will notify you by phone as soon as possible.  Submit refill requests through [employer] or call your pharmacy and they will forward the refill request to us. Please allow 3 business days for your  refill to be completed.          Additional Information About Your Visit        [employer] Information     [employer] gives you secure access to your electronic health record. If you see a primary care provider, you can also send messages to your care team and make appointments. If you have questions, please call your primary care clinic.  If you do not have a primary care provider, please call [phone] and they will assist you.        Care EveryWhere ID     This is your Care EveryWhere ID. This could be used by other organizations to access your [facility] medical records  [id number]        Your Vitals Were     Pulse Temperature Pulse Oximetry BMI (Body Mass Index)          71 97.2  F (36.2  C) (Oral) 95% 42.05 kg/m2         Blood Pressure from Last 3 Encounters:   11/08/17 126/79   11/01/17 104/52   10/31/17 104/66    Weight from Last 3 Encounters:   11/08/17 245 lb (111.1 kg)   11/01/17 245 lb (111.1 kg)   10/31/17 245 lb (111.1 kg)              We Performed the Following     DEPRESSION ACTION PLAN (DAP)          Today's Medication Changes          These changes are accurate as of: 11/8/17  5:05 PM.  If you have any questions, ask your nurse or doctor.               Start taking these medicines.        Dose/Directions    azithromycin 250 MG tablet   Commonly known as:  ZITHROMAX   Used for:  Acute bronchitis with symptoms > 10 days   Started by:  Reynaldo Saavedra MD        Two tablets first day, then one tablet daily for four days.   Quantity:  6 tablet   Refills:  0            Where to get your medicines      These medications were sent to [facility] [place], [place] - [address]  [address], [place]. \[facility]"" [address]     Phone:  [phone]     azithromycin 250 MG tablet                Primary Care Provider Office Phone # Fax #    Reynaldo Saavedra [phone]       [address] [facility] [address]        Equal Access to Services      SVITLANA [facility]: Hadii aad ku laurence Christopher, waaxda luqadaha, qaybta kaalmada stevo, susan masoodin hayaawesly gardunojane day harrison . So [facility] [phone].    ATENCIÓN: Si habla roseann, tiene a ferrer disposición servicios gratuitos de asistencia lingüística. Llame al [phone].    We comply with applicable federal civil rights laws and [place] laws. We do not discriminate on the basis of race, color, national origin, age, disability, sex, sexual orientation, or gender identity.            Thank you!     Thank you for choosing [facility]  for your care. Our goal is always to provide you with excellent care. Hearing back from our patients is one way we can continue to improve our services. Please take a few minutes to complete the written survey that you may receive in the mail after your visit with us. Thank you!             Your Updated Medication List - Protect others around you: Learn how to safely use, store and throw away your medicines at www.disposemymeds.org.          This list is accurate as of: 11/8/17  5:05 PM.  Always use your most recent med list.                   Brand Name Dispense Instructions for use Diagnosis    acetaminophen 500 MG tablet    TYLENOL     Take 500-1,000 mg by mouth every 6 hours as needed for mild pain        albuterol 108 (90 BASE) MCG/ACT Inhaler    PROAIR HFA/PROVENTIL HFA/VENTOLIN HFA    1 Inhaler    Inhale 2-4 puffs into the lungs every 4 hours as needed for shortness of breath / dyspnea or wheezing    Acute bronchitis, unspecified organism       azithromycin 250 MG tablet    ZITHROMAX    6 tablet    Two tablets first day, then one tablet daily for four days.    Acute bronchitis with symptoms > 10 days       citalopram 20 MG tablet    celeXA    90 tablet    Take 1 tablet (20 mg) by mouth daily    Mild major depression (H)       fexofenadine 180 MG tablet    ALLEGRA    90 tablet    Take 1 tablet (180 mg) by mouth daily    AR (allergic rhinitis)        "FLONASE 50 MCG/ACT spray   Generic drug:  fluticasone      Spray 2 sprays into both nostrils as needed        folic acid 1 MG tablet    FOLVITE    100 tablet    Take 1 tablet (1 mg) by mouth daily    Seropositive rheumatoid arthritis (H)       guaiFENesin-codeine 100-10 MG/5ML Soln solution    ROBITUSSIN AC    120 mL    Take 5 mLs by mouth every 4 hours as needed for cough    Cough, Fever and chills       ibuprofen 200 MG capsule      Take 600-800 mg by mouth At Bedtime        Insulin Syringe-Needle U-100 27G X 1/2\" 1 ML Misc    BD insulin syringe    10 each    1 Syringe every 7 days , to be used for methotrexate administration.    Seropositive rheumatoid arthritis (H)       methotrexate sodium 50 MG/2ML Soln     2 vial    Inject 0.8 mLs (20 mg) as directed every 7 days    Seropositive rheumatoid arthritis (H)       order for DME      Use your CPAP device as directed by your provider.        RESTASIS 0.05 % ophthalmic emulsion   Generic drug:  cycloSPORINE      Place 1 drop into both eyes 2 times daily          "

## 2017-11-08 NOTE — LETTER
My Depression Action Plan  Name: Sharon Fung   Date of Birth 1955  Date: 11/8/2017    My doctor: Reynaldo Saavedra   My clinic: [address] [id number] [phone]          GREEN    ZONE   Good Control    What it looks like:     Things are going generally well. You have normal up s and down s. You may even feel depressed from time to time, but bad moods usually last less than a day.   What you need to do:  1. Continue to care for yourself (see self care plan)  2. Check your depression survival kit and update it as needed  3. Follow your physician s recommendations including any medication.  4. Do not stop taking medication unless you consult with your physician first.           YELLOW         ZONE Getting Worse    What it looks like:     Depression is starting to interfere with your life.     It may be hard to get out of bed; you may be starting to isolate yourself from others.    Symptoms of depression are starting to last most all day and this has happened for several days.     You may have suicidal thoughts but they are not constant.   What you need to do:     1. Call your care team, your response to treatment will improve if you keep your care team informed of your progress. Yellow periods are signs an adjustment may need to be made.     2. Continue your self-care, even if you have to fake it!    3. Talk to someone in your support network    4. Open up your depression survival kit           RED    ZONE Medical Alert - Get Help    What it looks like:     Depression is seriously interfering with your life.     You may experience these or other symptoms: You can t get out of bed most days, can t work or engage in other necessary activities, you have trouble taking care of basic hygiene, or basic responsibilities, thoughts of suicide or death that will not go away, self-injurious behavior.     What you need to  do:  1. Call your care team and request a same-day appointment. If they are not available (weekends or after hours) call your local crisis line, emergency room or 911.      Electronically signed by: Reynaldo Saavedra, November 8, 2017    Depression Self Care Plan / Survival Kit    Self-Care for Depression  Here s the deal. Your body and mind are really not as separate as most people think.  What you do and think affects how you feel and how you feel influences what you do and think. This means if you do things that people who feel good do, it will help you feel better.  Sometimes this is all it takes.  There is also a place for medication and therapy depending on how severe your depression is, so be sure to consult with your medical provider and/ or Behavioral Health Consultant if your symptoms are worsening or not improving.     In order to better manage my stress, I will:    Exercise  Get some form of exercise, every day. This will help reduce pain and release endorphins, the  feel good  chemicals in your brain. This is almost as good as taking antidepressants!  This is not the same as joining a gym and then never going! (they count on that by the way ) It can be as simple as just going for a walk or doing some gardening, anything that will get you moving.      Hygiene   Maintain good hygiene (Get out of bed in the morning, Make your bed, Brush your teeth, Take a shower, and Get dressed like you were going to work, even if you are unemployed).  If your clothes don't fit try to get ones that do.    Diet  I will strive to eat foods that are good for me, drink plenty of water, and avoid excessive sugar, caffeine, alcohol, and other mood-altering substances.  Some foods that are helpful in depression are: complex carbohydrates, B vitamins, flaxseed, fish or fish oil, fresh fruits and vegetables.    Psychotherapy  I agree to participate in Individual Therapy (if recommended).    Medication  If prescribed medications, I  agree to take them.  Missing doses can result in serious side effects.  I understand that drinking alcohol, or other illicit drug use, may cause potential side effects.  I will not stop my medication abruptly without first discussing it with my provider.    Staying Connected With Others  I will stay in touch with my friends, family members, and my primary care provider/team.    Use your imagination  Be creative.  We all have a creative side; it doesn t matter if it s oil painting, sand castles, or mud pies! This will also kick up the endorphins.    Witness Beauty  (AKA stop and smell the roses) Take a look outside, even in mid-winter. Notice colors, textures. Watch the squirrels and birds.     Service to others  Be of service to others.  There is always someone else in need.  By helping others we can  get out of ourselves  and remember the really important things.  This also provides opportunities for practicing all the other parts of the program.    Humor  Laugh and be silly!  Adjust your TV habits for less news and crime-drama and more comedy.    Control your stress  Try breathing deep, massage therapy, biofeedback, and meditation. Find time to relax each day.     My support system    Clinic Contact:  Phone number:    Contact 1:  Phone number:    Contact 2:  Phone number:    [facility]/:  Phone number:    Therapist:  Phone number:    Local crisis center:    Phone number:    Other community support:  Phone number:

## 2017-11-08 NOTE — NURSING NOTE
"Chief Complaint   Patient presents with     RECHECK     URI per Dr. Guerra. Started in August off and on. Finished antibiotics     Health Maintenance     Mammogram, DAP       Initial /79 (BP Location: Right arm, Patient Position: Chair, Cuff Size: Adult Large)  Pulse 71  Temp 97.2  F (36.2  C) (Oral)  Wt 245 lb (111.1 kg)  SpO2 95%  BMI 42.05 kg/m2 Estimated body mass index is 42.05 kg/(m^2) as calculated from the following:    Height as of 11/1/17: 5' 4\" (1.626 m).    Weight as of this encounter: 245 lb (111.1 kg).  Medication Reconciliation: complete   Whitney Renee CMA       "

## 2017-11-16 ENCOUNTER — RADIANT APPOINTMENT (OUTPATIENT)
Dept: MAMMOGRAPHY | Facility: CLINIC | Age: 62
End: 2017-11-16
Attending: FAMILY MEDICINE
Payer: COMMERCIAL

## 2017-11-16 DIAGNOSIS — Z12.31 VISIT FOR SCREENING MAMMOGRAM: ICD-10-CM

## 2017-11-16 PROCEDURE — G0202 SCR MAMMO BI INCL CAD: HCPCS | Mod: TC

## 2017-11-21 ENCOUNTER — MYC MEDICAL ADVICE (OUTPATIENT)
Dept: FAMILY MEDICINE | Facility: CLINIC | Age: 62
End: 2017-11-21

## 2017-11-21 DIAGNOSIS — M05.9 SEROPOSITIVE RHEUMATOID ARTHRITIS (H): ICD-10-CM

## 2017-11-21 DIAGNOSIS — J20.9 ACUTE BRONCHITIS WITH SYMPTOMS > 10 DAYS: ICD-10-CM

## 2017-11-21 LAB
ALBUMIN SERPL-MCNC: 3.4 G/DL (ref 3.4–5)
ALP SERPL-CCNC: 71 U/L (ref 40–150)
ALT SERPL W P-5'-P-CCNC: 39 U/L (ref 0–50)
AST SERPL W P-5'-P-CCNC: 28 U/L (ref 0–45)
BILIRUB DIRECT SERPL-MCNC: <0.1 MG/DL (ref 0–0.2)
BILIRUB SERPL-MCNC: 0.5 MG/DL (ref 0.2–1.3)
PROT SERPL-MCNC: 7.3 G/DL (ref 6.8–8.8)

## 2017-11-21 PROCEDURE — 80076 HEPATIC FUNCTION PANEL: CPT | Performed by: INTERNAL MEDICINE

## 2017-11-21 PROCEDURE — 36415 COLL VENOUS BLD VENIPUNCTURE: CPT | Performed by: INTERNAL MEDICINE

## 2017-11-21 NOTE — TELEPHONE ENCOUNTER
Please see Voy[employer] messages below, routed to PCP to advise further.    Nolvia Wick RN  [facility]

## 2017-11-21 NOTE — TELEPHONE ENCOUNTER
Replied to MyChart to investigate further.    Nolvia Wick RN  [facility]

## 2017-11-22 RX ORDER — AZITHROMYCIN 250 MG/1
TABLET, FILM COATED ORAL
Qty: 6 TABLET | Refills: 0 | Status: SHIPPED | OUTPATIENT
Start: 2017-11-22 | End: 2018-01-16

## 2017-11-27 ENCOUNTER — OFFICE VISIT (OUTPATIENT)
Dept: OPTOMETRY | Facility: CLINIC | Age: 62
End: 2017-11-27
Payer: COMMERCIAL

## 2017-11-27 ENCOUNTER — TELEPHONE (OUTPATIENT)
Dept: PHARMACY | Facility: CLINIC | Age: 62
End: 2017-11-27

## 2017-11-27 DIAGNOSIS — M35.00 SICCA SYNDROME (H): ICD-10-CM

## 2017-11-27 DIAGNOSIS — H52.223 REGULAR ASTIGMATISM OF BOTH EYES: ICD-10-CM

## 2017-11-27 DIAGNOSIS — H52.4 PRESBYOPIA: ICD-10-CM

## 2017-11-27 DIAGNOSIS — H52.13 MYOPIA OF BOTH EYES: Primary | ICD-10-CM

## 2017-11-27 DIAGNOSIS — H10.13 ALLERGIC CONJUNCTIVITIS OF BOTH EYES: ICD-10-CM

## 2017-11-27 DIAGNOSIS — H18.529 ANTERIOR BASEMENT MEMBRANE DYSTROPHY: ICD-10-CM

## 2017-11-27 PROCEDURE — 92015 DETERMINE REFRACTIVE STATE: CPT | Performed by: OPTOMETRIST

## 2017-11-27 PROCEDURE — 92014 COMPRE OPH EXAM EST PT 1/>: CPT | Performed by: OPTOMETRIST

## 2017-11-27 RX ORDER — CYCLOSPORINE 0.5 MG/ML
1 EMULSION OPHTHALMIC 2 TIMES DAILY
Qty: 2 BOX | Refills: 11 | Status: SHIPPED | OUTPATIENT
Start: 2017-11-27 | End: 2019-02-27

## 2017-11-27 RX ORDER — AZELASTINE HYDROCHLORIDE 0.5 MG/ML
1 SOLUTION/ DROPS OPHTHALMIC 2 TIMES DAILY
Qty: 1 BOTTLE | Refills: 6 | Status: SHIPPED | OUTPATIENT
Start: 2017-11-27 | End: 2019-06-14

## 2017-11-27 ASSESSMENT — VISUAL ACUITY
OS_SC: 20/80
OS_CC: 20/30
METHOD: SNELLEN - LINEAR
OS_CC: 20/40
CORRECTION_TYPE: GLASSES
OD_SC: 20/60
OD_CC: 20/40
OD_CC: 20/30
OD_CC+: -2

## 2017-11-27 ASSESSMENT — EXTERNAL EXAM - RIGHT EYE: OD_EXAM: NORMAL

## 2017-11-27 ASSESSMENT — REFRACTION_WEARINGRX
OS_SPHERE: -2.00
OS_AXIS: 020
OD_SPHERE: -2.50
OD_CYLINDER: +2.00
OS_CYLINDER: +1.00
OD_AXIS: 160
OD_ADD: +2.75
SPECS_TYPE: PAL
OS_ADD: +2.75

## 2017-11-27 ASSESSMENT — REFRACTION_MANIFEST
OS_ADD: +2.75
OS_AXIS: 020
OD_ADD: +2.75
OD_AXIS: 160
OD_SPHERE: -2.50
OS_SPHERE: -2.00
OD_CYLINDER: +2.25
OS_CYLINDER: +0.75

## 2017-11-27 ASSESSMENT — SLIT LAMP EXAM - LIDS
COMMENTS: NORMAL
COMMENTS: NORMAL

## 2017-11-27 ASSESSMENT — TONOMETRY
OS_IOP_MMHG: 18
OD_IOP_MMHG: 18
IOP_METHOD: TONOPEN

## 2017-11-27 ASSESSMENT — CUP TO DISC RATIO
OS_RATIO: 0.3
OD_RATIO: 0.3

## 2017-11-27 ASSESSMENT — CONF VISUAL FIELD
OD_NORMAL: 1
OS_NORMAL: 1

## 2017-11-27 ASSESSMENT — EXTERNAL EXAM - LEFT EYE: OS_EXAM: NORMAL

## 2017-11-27 NOTE — PROGRESS NOTES
Chief Complaint   Patient presents with     COMPREHENSIVE EYE EXAM         Last Eye Exam: 6-  Dilated Previously: Yes    What are you currently using to see?  glasses       Distance Vision Acuity: Satisfied with vision    Near Vision Acuity: Satisfied with vision while reading  with glasses    Eye Comfort: dry and itchy  Do you use eye drops? : Yes: retasis patient needs refill  Occupation or Hobbies: RN at [employer]    Tanja Do [employer] Assistant, A[employer]          Medical, surgical and family histories reviewed and updated 11/27/2017.       OBJECTIVE: See Ophthalmology exam    ASSESSMENT:    ICD-10-CM    1. Myopia of both eyes H52.13 REFRACTION   2. Regular astigmatism of both eyes H52.223 REFRACTION   3. Presbyopia H52.4 REFRACTION   4. Sicca syndrome (H) M35.00 cycloSPORINE (RESTASIS) 0.05 % ophthalmic emulsion     EYE EXAM (SIMPLE-NONBILLABLE)   5. Allergic conjunctivitis of both eyes H10.13 azelastine (OPTIVAR) 0.05 % SOLN ophthalmic solution     EYE EXAM (SIMPLE-NONBILLABLE)   6. Anterior basement membrane dystrophy H18.52 EYE EXAM (SIMPLE-NONBILLABLE)      PLAN:     Patient Instructions   Eyeglass prescription given.  Optional change in eyeglass prescription.    Restasis- 1 drop both eyes 2 x day.  Systane- 1 drop both eyes 2 x day-4 x day.    Optivar- 1 drop both eyes 2 x day as needed for itchy eyes.    Return in 1 year for a complete eye exam or sooner if needed.    Robles Valderrama, OD

## 2017-11-27 NOTE — TELEPHONE ENCOUNTER
Prior Authorization is needed for Restasis  Insurance: [employer]se Argueta Phone number: [phone]  Patient ID: [id number]  Rx # [id number]    Please call the patient's insurance to submit a prior authorization.   DO NOT DELETE THIS ENCOUNTER UNTIL IT HAS BEEN COMPLETELY ADDRESSED.    Please update the pharmacy with the results of the prior auth or send a new replacement prescription.    Thanks.  [facility]  [phone]

## 2017-11-27 NOTE — MR AVS SNAPSHOT
After Visit Summary   11/27/2017    Sharon Fung    MRN: [id number]           Patient Information     Date Of Birth          1955        Visit Information        Provider Department      11/27/2017 12:00 PM Robles Valderrama, FRANCOISE [facility]        Today's Diagnoses     Myopia of both eyes    -  1    Regular astigmatism of both eyes        Presbyopia        Sicca syndrome (H)        Allergic conjunctivitis of both eyes        Anterior basement membrane dystrophy          Care Instructions    Eyeglass prescription given.  Optional change in eyeglass prescription.    Restasis- 1 drop both eyes 2 x day.  Systane- 1 drop both eyes 2 x day-4 x day.    Optivar- 1 drop both eyes 2 x day as needed for itchy eyes.    Return in 1 year for a complete eye exam or sooner if needed.    Robles Valderrama OD    The affects of the dilating drops last for 4- 6 hours.  You will be more sensitive to light and vision will be blurry up close.  Mydriatic sunglasses were given if needed.      Optometry Providers       Clinic Locations                                 Telephone Number   Dr. Laureen Valderrama   [facility]  [phone]     [facility] Hours:                [facility] Hours:       [facility] Hours:  [address]   [address]     [address] [place] [address]   [place] [address]    [place] [address]  Phone: [phone]                    Phone [phone]                      Phone: [phone]                          Monday 8:00-7:00                          Monday 8:00-7:00                          Monday 8:00-7:00              Tuesday 8:00-6:00                          Tuesday 8:00-7:00                          Tuesday 8:00-7:00              Wednesday 8:00-6:00                  Wednesday 8:00-7:00                   Wednesday 8:00-7:00       Thursday 8:00-6:00                        Thursday 8:00-7:00                         Thursday 8:00-7:00            Friday 8:00-5:00                              Friday 8:00-5:00                              Friday 8:00-5:00    Please log on to [email or web address] to order your contact lenses.  The link is found on the Eye Care and Vision Services page.  As always, Thank you for trusting us with your health care needs!              Follow-ups after your visit        Follow-up notes from your care team     Return in about 1 year (around 11/27/2018), or if symptoms worsen or fail to improve, for Annual Visit.      Your next 10 appointments already scheduled     Nov 29, 2017  3:40 PM CST   Return Visit with Freddy Guerra MD   [facility]    [address]  [place] [address] [phone]              Who to contact     If you have questions or need follow up information about today's clinic visit or your schedule please contact [facility] [place] directly at [phone].  Normal or non-critical lab and imaging results will be communicated to you by [employer], letter or phone within 4 business days after the clinic has received the results. If you do not hear from us within 7 days, please contact the clinic through [employer] or phone. If you have a critical or abnormal lab result, we will notify you by phone as soon as possible.  Submit refill requests through [employer] or call your pharmacy and they will forward the refill request to us. Please allow 3 business days for your refill to be completed.          Additional Information About Your Visit        [employer] Information     [employer] gives you secure access to your electronic health record. If you see a primary care provider, you can also send messages to your care team and make appointments. If you have questions, please call your primary care clinic.  If you do not have a primary care provider, please call  [phone] and they will assist you.        Care EveryWhere ID     This is your Care EveryWhere ID. This could be used by other organizations to access your [place] medical records  [id number]         Blood Pressure from Last 3 Encounters:   11/08/17 126/79   11/01/17 104/52   10/31/17 104/66    Weight from Last 3 Encounters:   11/08/17 111.1 kg (245 lb)   11/01/17 111.1 kg (245 lb)   10/31/17 111.1 kg (245 lb)              We Performed the Following     EYE EXAM (SIMPLE-NONBILLABLE)     REFRACTION          Today's Medication Changes          These changes are accurate as of: 11/27/17  1:28 PM.  If you have any questions, ask your nurse or doctor.               Start taking these medicines.        Dose/Directions    azelastine 0.05 % Soln ophthalmic solution   Commonly known as:  OPTIVAR   Used for:  Allergic conjunctivitis of both eyes   Started by:  Robles Valderrama, OD        Dose:  1 drop   Apply 1 drop to eye 2 times daily   Quantity:  1 Bottle   Refills:  6         These medicines have changed or have updated prescriptions.        Dose/Directions    * RESTASIS 0.05 % ophthalmic emulsion   This may have changed:  Another medication with the same name was added. Make sure you understand how and when to take each.   Generic drug:  cycloSPORINE        Dose:  1 drop   Place 1 drop into both eyes 2 times daily   Refills:  0       * cycloSPORINE 0.05 % ophthalmic emulsion   Commonly known as:  RESTASIS   This may have changed:  You were already taking a medication with the same name, and this prescription was added. Make sure you understand how and when to take each.   Used for:  Sicca syndrome (H)   Changed by:  Robles Valderrama, OD        Dose:  1 drop   Place 1 drop into both eyes 2 times daily   Quantity:  2 Box   Refills:  11       * Notice:  This list has 2 medication(s) that are the same as other medications prescribed for you. Read the directions carefully, and ask your doctor or other care provider to review  them with you.         Where to get your medicines      These medications were sent to [facility] [place], [place] - [address]  [address], [facility] [address]     Phone:  [phone]     azelastine 0.05 % Soln ophthalmic solution    cycloSPORINE 0.05 % ophthalmic emulsion                Primary Care Provider Office Phone # Fax #    Reynaldo Saavedra [phone]       [address] [facility] [place] [address]        Equal Access to Services     [facility]NA [facility] : Hadii aad ku hadasho Soomaali, waaxda luqadaha, qaybta kaalmada adeegyada, waxay idiin hayaan adeeg kharash lamarni . So [facility] [phone].    ATENCIÓN: Si habla español, tiene a ferrer disposición servicios gratuitos de asistencia lingüística. Llame al [phone].    We comply with applicable federal civil rights laws and [place] laws. We do not discriminate on the basis of race, color, national origin, age, disability, sex, sexual orientation, or gender identity.            Thank you!     Thank you for choosing [facility]  for your care. Our goal is always to provide you with excellent care. Hearing back from our patients is one way we can continue to improve our services. Please take a few minutes to complete the written survey that you may receive in the mail after your visit with us. Thank you!             Your Updated Medication List - Protect others around you: Learn how to safely use, store and throw away your medicines at www.disposemymeds.org.          This list is accurate as of: 11/27/17  1:28 PM.  Always use your most recent med list.                   Brand Name Dispense Instructions for use Diagnosis    acetaminophen 500 MG tablet    TYLENOL     Take 500-1,000 mg by mouth every 6 hours as needed for mild pain        albuterol 108 (90 BASE) MCG/ACT Inhaler    PROAIR HFA/PROVENTIL HFA/VENTOLIN HFA    1 Inhaler    Inhale 2-4 puffs into the lungs every 4 hours as needed for  "shortness of breath / dyspnea or wheezing    Acute bronchitis, unspecified organism       azelastine 0.05 % Soln ophthalmic solution    OPTIVAR    1 Bottle    Apply 1 drop to eye 2 times daily    Allergic conjunctivitis of both eyes       azithromycin 250 MG tablet    ZITHROMAX    6 tablet    Two tablets first day, then one tablet daily for four days.    Acute bronchitis with symptoms > 10 days       citalopram 20 MG tablet    celeXA    90 tablet    Take 1 tablet (20 mg) by mouth daily    Mild major depression (H)       fexofenadine 180 MG tablet    ALLEGRA    90 tablet    Take 1 tablet (180 mg) by mouth daily    AR (allergic rhinitis)       FLONASE 50 MCG/ACT spray   Generic drug:  fluticasone      Spray 2 sprays into both nostrils as needed        folic acid 1 MG tablet    FOLVITE    100 tablet    Take 1 tablet (1 mg) by mouth daily    Seropositive rheumatoid arthritis (H)       guaiFENesin-codeine 100-10 MG/5ML Soln solution    ROBITUSSIN AC    120 mL    Take 5 mLs by mouth every 4 hours as needed for cough    Cough, Fever and chills       ibuprofen 200 MG capsule      Take 600-800 mg by mouth At Bedtime        Insulin Syringe-Needle U-100 27G X 1/2\" 1 ML Misc    BD insulin syringe    10 each    1 Syringe every 7 days , to be used for methotrexate administration.    Seropositive rheumatoid arthritis (H)       methotrexate sodium 50 MG/2ML Soln     2 vial    Inject 0.8 mLs (20 mg) as directed every 7 days    Seropositive rheumatoid arthritis (H)       order for DME      Use your CPAP device as directed by your provider.        * RESTASIS 0.05 % ophthalmic emulsion   Generic drug:  cycloSPORINE      Place 1 drop into both eyes 2 times daily        * cycloSPORINE 0.05 % ophthalmic emulsion    RESTASIS    2 Box    Place 1 drop into both eyes 2 times daily    Sicca syndrome (H)       * Notice:  This list has 2 medication(s) that are the same as other medications prescribed for you. Read the directions carefully, and " ask your doctor or other care provider to review them with you.

## 2017-11-27 NOTE — PATIENT INSTRUCTIONS
Eyeglass prescription given.  Optional change in eyeglass prescription.    Restasis- 1 drop both eyes 2 x day.  Systane- 1 drop both eyes 2 x day-4 x day.    Optivar- 1 drop both eyes 2 x day as needed for itchy eyes.    Return in 1 year for a complete eye exam or sooner if needed.    Robles Valderrama, FRANCOISE    The affects of the dilating drops last for 4- 6 hours.  You will be more sensitive to light and vision will be blurry up close.  Mydriatic sunglasses were given if needed.      Optometry Providers       Clinic Locations                                 Telephone Number   Dr. Laureen Valderrama   [facility]  [phone]     [facility] Hours:                [facility] Hours:       [facility] Hours:  [address]   [address] [facility]     [address] [place] [address]   [place] [address]    [place] [address]  Phone: [phone]                    Phone [phone]                      Phone: [phone]                          Monday 8:00-7:00                          Monday 8:00-7:00                          Monday 8:00-7:00              Tuesday 8:00-6:00                          Tuesday 8:00-7:00                          Tuesday 8:00-7:00              Wednesday 8:00-6:00                  Wednesday 8:00-7:00                   Wednesday 8:00-7:00      Thursday 8:00-6:00                        Thursday 8:00-7:00                         Thursday 8:00-7:00            Friday 8:00-5:00                              Friday 8:00-5:00                              Friday 8:00-5:00    Please log on to [email or web address] to order your contact lenses.  The link is found on the Eye Care and Vision Services page.  As always, Thank you for trusting us with your health care needs!

## 2017-11-29 ENCOUNTER — OFFICE VISIT (OUTPATIENT)
Dept: RHEUMATOLOGY | Facility: CLINIC | Age: 62
End: 2017-11-29
Payer: COMMERCIAL

## 2017-11-29 VITALS
WEIGHT: 245.6 LBS | OXYGEN SATURATION: 97 % | HEIGHT: 64 IN | BODY MASS INDEX: 41.93 KG/M2 | SYSTOLIC BLOOD PRESSURE: 122 MMHG | DIASTOLIC BLOOD PRESSURE: 72 MMHG | HEART RATE: 94 BPM

## 2017-11-29 DIAGNOSIS — M05.9 SEROPOSITIVE RHEUMATOID ARTHRITIS (H): Primary | ICD-10-CM

## 2017-11-29 PROCEDURE — 99213 OFFICE O/P EST LOW 20 MIN: CPT | Performed by: INTERNAL MEDICINE

## 2017-11-29 RX ORDER — PREDNISONE 5 MG/1
TABLET ORAL
Qty: 120 TABLET | Refills: 0 | Status: SHIPPED | OUTPATIENT
Start: 2017-11-29 | End: 2018-01-31

## 2017-11-29 NOTE — NURSING NOTE
"Chief Complaint   Patient presents with     Arthritis     patient is doing awful since she had to stop xeljanz       Initial Pulse 94  Ht 1.626 m (5' 4\")  Wt 111.4 kg (245 lb 9.6 oz)  SpO2 97%  BMI 42.16 kg/m2 Estimated body mass index is 42.16 kg/(m^2) as calculated from the following:    Height as of this encounter: 1.626 m (5' 4\").    Weight as of this encounter: 111.4 kg (245 lb 9.6 oz).  BP completed using cuff size: large         RAPID3 (0-30) Cumulative Score  16.8          RAPID3 Weighted Score (divide #4 by 3 and that is the weighted score)  5.6         "

## 2017-11-29 NOTE — MR AVS SNAPSHOT
After Visit Summary   2017    Sharon Fung    MRN: [id number]           Patient Information     Date Of Birth          1955        Visit Information        Provider Department      2017 3:40 PM Freddy Guerra MD [facility] [place]        Today's Diagnoses     Seropositive rheumatoid arthritis (H)    -  1      Care Instructions    Dr. Guerra s Rheumatology Clinics  Locations Clinic Hours Telephone Number     [facility] [place]  [address]  [place] [address]     Wednesday: 7:20AM - 4:00PM  Thursday:     7:20AM - 4:00PM     Friday:          7:20AM - 11:00AM       To schedule an appointment with  Dr. Guerra,  please contact  Specialty [phone]       [facility]  [address]  [place] [address]       Monday:       7:20AM - 4:00PM        [facility]  [address]  [place] [address]       Tuesday:      7:20AM - 4:00PM          Thank you!    Evie Rosas CMA              Follow-ups after your visit        Your next 10 appointments already scheduled     2018  7:20 AM CST   Return Visit with Freddy Guerra MD   [facility] [place] ([facility] [place])    [address]  [place] [address] [phone]              Future tests that were ordered for you today     Open Future Orders        Priority Expected Expires Ordered    CBC with platelets differential Routine 2017    Creatinine Routine 2017    CRP inflammation Routine 2017    Erythrocyte sedimentation rate auto Routine 2017    Hepatic panel Routine 2017            Who to contact     If you have questions or need follow up information about today's clinic visit or your schedule please contact [place] [facility] [place] directly at [phone].  Normal or non-critical lab and imaging results will be  "communicated to you by [employer], letter or phone within 4 business days after the clinic has received the results. If you do not hear from us within 7 days, please contact the clinic through [employer] or phone. If you have a critical or abnormal lab result, we will notify you by phone as soon as possible.  Submit refill requests through [employer] or call your pharmacy and they will forward the refill request to us. Please allow 3 business days for your refill to be completed.          Additional Information About Your Visit        [employer] Information     [employer] gives you secure access to your electronic health record. If you see a primary care provider, you can also send messages to your care team and make appointments. If you have questions, please call your primary care clinic.  If you do not have a primary care provider, please call [phone] and they will assist you.        Care EveryWhere ID     This is your Care EveryWhere ID. This could be used by other organizations to access your [place] medical records  [id number]        Your Vitals Were     Pulse Height Pulse Oximetry BMI (Body Mass Index)          94 1.626 m (5' 4\") 97% 42.16 kg/m2         Blood Pressure from Last 3 Encounters:   11/08/17 126/79   11/01/17 104/52   10/31/17 104/66    Weight from Last 3 Encounters:   11/29/17 111.4 kg (245 lb 9.6 oz)   11/08/17 111.1 kg (245 lb)   11/01/17 111.1 kg (245 lb)                 Today's Medication Changes          These changes are accurate as of: 11/29/17  4:08 PM.  If you have any questions, ask your nurse or doctor.               Start taking these medicines.        Dose/Directions    predniSONE 5 MG tablet   Commonly known as:  DELTASONE   Used for:  Seropositive rheumatoid arthritis (H)   Started by:  Freddy Guerra MD        Prednisone 20mg daily x5days, then 15mg daily x5days, then 10mg daily x5days, then 5mg daily thereafter.   Quantity:  120 tablet   Refills:  0            Where to get your " medicines      These medications were sent to [facility] [place], [place] - [address]  [address], [facility] [address]     Phone:  [phone]     predniSONE 5 MG tablet                Primary Care Provider Office Phone # Fax #    Reynaldo Saavedra [phone]       [address]  [facility] [address]        Equal Access to Services     [facility]NA [facility] : Hadii aad ku hadasho Soomaali, waaxda luqadaha, qaybta kaalmada adeegyada, waxay idiin hayaan adeeg kharash la'kelsin . So [facility] [phone].    ATENCIÓN: Si habla español, tiene a ferrer disposición servicios gratuitos de asistencia lingüística. Llame al [phone].    We comply with applicable federal civil rights laws and [place] laws. We do not discriminate on the basis of race, color, national origin, age, disability, sex, sexual orientation, or gender identity.            Thank you!     Thank you for choosing [facility]  for your care. Our goal is always to provide you with excellent care. Hearing back from our patients is one way we can continue to improve our services. Please take a few minutes to complete the written survey that you may receive in the mail after your visit with us. Thank you!             Your Updated Medication List - Protect others around you: Learn how to safely use, store and throw away your medicines at www.disposemymeds.org.          This list is accurate as of: 11/29/17  4:08 PM.  Always use your most recent med list.                   Brand Name Dispense Instructions for use Diagnosis    acetaminophen 500 MG tablet    TYLENOL     Take 500-1,000 mg by mouth every 6 hours as needed for mild pain        albuterol 108 (90 BASE) MCG/ACT Inhaler    PROAIR HFA/PROVENTIL HFA/VENTOLIN HFA    1 Inhaler    Inhale 2-4 puffs into the lungs every 4 hours as needed for shortness of breath / dyspnea or wheezing    Acute bronchitis, unspecified organism       azelastine 0.05 %  "Soln ophthalmic solution    OPTIVAR    1 Bottle    Apply 1 drop to eye 2 times daily    Allergic conjunctivitis of both eyes       azithromycin 250 MG tablet    ZITHROMAX    6 tablet    Two tablets first day, then one tablet daily for four days.    Acute bronchitis with symptoms > 10 days       citalopram 20 MG tablet    celeXA    90 tablet    Take 1 tablet (20 mg) by mouth daily    Mild major depression (H)       fexofenadine 180 MG tablet    ALLEGRA    90 tablet    Take 1 tablet (180 mg) by mouth daily    AR (allergic rhinitis)       FLONASE 50 MCG/ACT spray   Generic drug:  fluticasone      Spray 2 sprays into both nostrils as needed        folic acid 1 MG tablet    FOLVITE    100 tablet    Take 1 tablet (1 mg) by mouth daily    Seropositive rheumatoid arthritis (H)       guaiFENesin-codeine 100-10 MG/5ML Soln solution    ROBITUSSIN AC    120 mL    Take 5 mLs by mouth every 4 hours as needed for cough    Cough, Fever and chills       ibuprofen 200 MG capsule      Take 600-800 mg by mouth At Bedtime        Insulin Syringe-Needle U-100 27G X 1/2\" 1 ML Misc    BD insulin syringe    10 each    1 Syringe every 7 days , to be used for methotrexate administration.    Seropositive rheumatoid arthritis (H)       methotrexate sodium 50 MG/2ML Soln     2 vial    Inject 0.8 mLs (20 mg) as directed every 7 days    Seropositive rheumatoid arthritis (H)       order for DME      Use your CPAP device as directed by your provider.        predniSONE 5 MG tablet    DELTASONE    120 tablet    Prednisone 20mg daily x5days, then 15mg daily x5days, then 10mg daily x5days, then 5mg daily thereafter.    Seropositive rheumatoid arthritis (H)       * RESTASIS 0.05 % ophthalmic emulsion   Generic drug:  cycloSPORINE      Place 1 drop into both eyes 2 times daily        * cycloSPORINE 0.05 % ophthalmic emulsion    RESTASIS    2 Box    Place 1 drop into both eyes 2 times daily    Sicca syndrome (H)       * Notice:  This list has 2 " medication(s) that are the same as other medications prescribed for you. Read the directions carefully, and ask your doctor or other care provider to review them with you.

## 2017-11-29 NOTE — PROGRESS NOTES
Rheumatology Clinic Visit      Sharon Fung MRN# [id number]   YOB: 1955 Age: 62 year old      Date of visit: 11/29/17   PCP: Dr. Reynaldo Saavedra    Chief Complaint   Patient presents with:  Arthritis: patient is doing awful since she had to stop xeljanz    Assessment and Plan     1. Seropositive nonerosive rheumatoid arthritis (RF negative, CCP low positive): Previously on Humira (lost efficacy), Cimzia (ineffective), Orencia (ineffective), leflunomide (ineffective), and hydroxychloroquine (ineffective). Currently on MTX 20mg SQ (GI upset with PO); Xeljanz XR 11 mg daily is on hold.  Previously doing well with this combination therapy but has had bronchitis since August after returning from a trip from [place] that is being managed by her PCP.  Plan to restart Xeljanz after bronchitis symptoms resolved.  Prednisone for arthritis symptoms.   - Continue methotrexate 20mg SQ every 7 days  - Continue folic acid 1mg daily  - Start Prednisone 20mg daily x5days, then 15mg daily x5days, then 10mg daily x5days, then 5mg daily thereafter  - Labs 2-3 days prior to the next rheumatology clinic visit: CBC, Creatinine, Hepatic Panel, ESR, CRP                Rapid 3, cumulative scores                      11/29/2017: 16.8 (MTX 20mg SQ wkly)                      08/02/2017: 4.2   (MTX 20mg SQ wkly, Xeljanz XR 11mg daily)                         05/04/2017: 7      (MTX 20mg SQ wkly, Xeljanz XR 11mg daily)                        02/02/2017: 8      (MTX 20mg SQ wkly, Xeljanz XR 11mg daily)                      11/04/2016: 13    (MTX 20mg SQ weekly)                      07/15/2016: 10.8    2. Positive LORNA and dsDNA / Secondary Sjogren's Syndrome: Repeat dsDNA have been negative. Has dry eyes but no dry mouth.  Dry eyes are treated by ophthalmology with Restasis.  Likely Secondary Sjogren's Syndrome.     3. Upper respiratory infection since returning from [place] in August: holding Xeljanz.  Infection tx per PCP.     Ms.  Antonieta verbalized agreement with and understanding of the rational for the diagnosis and treatment plan.  All questions were answered to best of my ability and the patient's satisfaction. Ms. Fung was advised to contact the clinic with any questions that may arise after the clinic visit.      Thank you for involving me in the care of the patient    Return to clinic: 2 months    HPI   Sharon Fung is a 62 year old female with medical history significant for GERD, allergic rhinitis, obstructive sleep apnea, obesity, hx of trigger fingers, hx of carpal tunnel surgery, and rheumatoid arthritis who presents for follow-up of rheumatoid arthritis.    Today, Ms. Fung reports that she is having much more significant arthritis symptoms since stopping Xeljanz. Not on prednisone. Still with bronchitis symptoms and is planning to follow-up with her PCP if symptoms persist after completing antibiotics.     Occasional fevers and chills. No nausea, vomiting, constipation, diarrhea. No chest pain/pressure, palpitations, or shortness of breath. No oral or nasal sores. No neck pain. No rash.      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 No oral or nasal ulcers.  CV: No chest pain, pressure, palpitations, or dyspnea on exertion.  PULM: no shortness of breath. Persistent cough.  GI: No nausea, vomiting, constipation, diarrhea. No blood in stool. No abdominal pain.  : No blood in urine.  MSK: See HPI.  NEURO: No numbness, tingling, or weakness.  EXT: No LE swelling  PSYCH: Negative    Active Problem List     Patient Active Problem List   Diagnosis     AR (allergic rhinitis)     GERD (gastroesophageal reflux disease)     Status post bariatric surgery     Mild major depression (H)     Advanced directives, counseling/discussion     High risk medication use      Bilateral leg weakness     Left leg pain     Obstructive sleep apnea     Obesity, Class III, BMI 40-49.9 (morbid obesity) (H)     Anterior basement membrane dystrophy     Seropositive rheumatoid arthritis (H)     LORNA positive     Carpal tunnel syndrome of right wrist     Headache     Past Medical History     Past Medical History:   Diagnosis Date     AR (allergic rhinitis)  as teen     Arthritis 12/14/2015     Depressive disorder 3 years ago     GERD (gastroesophageal reflux disease) 4-07     Headache 7/11/2017     Mild major depression (H) 3 years ago     Morbid obesity (H) teens     BRETT (obstructive sleep apnea)     uses CPAP     Rheumatoid arthritis, adult (H)      Past Surgical History     Past Surgical History:   Procedure Laterality Date     BLEPHAROPLASTY BILATERAL Bilateral 8/5/2016    Procedure: BLEPHAROPLASTY BILATERAL;  Surgeon: Cortez Robin MD;  Location:  [facility]     BREAST BIOPSY, RT/LT  1-94    Benign     C APPENDECTOMY  1977     COLONOSCOPY       COLONOSCOPY WITH CO2 INSUFFLATION N/A 3/30/2017    Procedure: COLONOSCOPY WITH CO2 INSUFFLATION;  Surgeon: Issa Weeks MD;  Location: [facility]     ESOPHAGOSCOPY, GASTROSCOPY, DUODENOSCOPY (EGD), COMBINED N/A 10/29/2015    Procedure: COMBINED ESOPHAGOSCOPY, GASTROSCOPY, DUODENOSCOPY (EGD);  Surgeon: Shaq Mims MD;  Location: [facility]     LAPAROSCOPIC GASTRIC ADJUSTABLE BANDING  11/09/10    Lap band procedure     LAPAROSCOPIC REMOVAL GASTRIC ADJUSTABLE BAND N/A 10/31/2015    Procedure: LAPAROSCOPIC REMOVAL GASTRIC ADJUSTABLE BAND;  Surgeon: Shaq Mims MD;  Location: [facility]     RELEASE CARPAL TUNNEL Left 4/27/2017    Procedure: RELEASE CARPAL TUNNEL;  Left Open Carpal Tunnel Release;  Surgeon: Ciara Middleton MD;  Location[facility]     RELEASE CARPAL TUNNEL Right 6/15/2017    Procedure: RELEASE CARPAL TUNNEL;  Right Carpal Tunnel Release Open;  Surgeon: Ciara Middleton MD;  Location: [facility]     REPAIR PTOSIS        "TUBAL LIGATION  1988     Allergy   No Known Allergies  Current Medication List     Current Outpatient Prescriptions   Medication Sig     azelastine (OPTIVAR) 0.05 % SOLN ophthalmic solution Apply 1 drop to eye 2 times daily     azithromycin (ZITHROMAX) 250 MG tablet Two tablets first day, then one tablet daily for four days.     Insulin Syringe-Needle U-100 (BD INSULIN SYRINGE) 27G X 1/2\" 1 ML MISC 1 Syringe every 7 days , to be used for methotrexate administration.     methotrexate sodium 50 MG/2ML SOLN Inject 0.8 mLs (20 mg) as directed every 7 days     albuterol (PROAIR HFA/PROVENTIL HFA/VENTOLIN HFA) 108 (90 BASE) MCG/ACT Inhaler Inhale 2-4 puffs into the lungs every 4 hours as needed for shortness of breath / dyspnea or wheezing     citalopram (CELEXA) 20 MG tablet Take 1 tablet (20 mg) by mouth daily     ibuprofen 200 MG capsule Take 600-800 mg by mouth At Bedtime     cycloSPORINE (RESTASIS) 0.05 % ophthalmic emulsion Place 1 drop into both eyes 2 times daily     folic acid (FOLVITE) 1 MG tablet Take 1 tablet (1 mg) by mouth daily     fexofenadine (ALLEGRA) 180 MG tablet Take 1 tablet (180 mg) by mouth daily     acetaminophen (TYLENOL) 500 MG tablet Take 500-1,000 mg by mouth every 6 hours as needed for mild pain     ORDER FOR DME Use your CPAP device as directed by your provider.     fluticasone (FLONASE) 50 MCG/ACT nasal spray Spray 2 sprays into both nostrils as needed     cycloSPORINE (RESTASIS) 0.05 % ophthalmic emulsion Place 1 drop into both eyes 2 times daily (Patient not taking: Reported on 11/29/2017)     guaiFENesin-codeine (ROBITUSSIN AC) 100-10 MG/5ML SOLN solution Take 5 mLs by mouth every 4 hours as needed for cough (Patient not taking: Reported on 11/29/2017)     No current facility-administered medications for this visit.          Social History   See HPI    Family History     Family History   Problem Relation Age of Onset     HEART DISEASE Father      MI     Neurologic Disorder Father 79     " "Parkinson's     DIABETES Father      Hypertension Father      Coronary Artery Disease Father      CANCER Maternal Grandmother      uterine     Neurologic Disorder Sister 16     migraine     Depression Sister      Neurologic Disorder Mother 20     migraine     Neurologic Disorder Son 7     migraine     Substance Abuse Son        Physical Exam     Temp Readings from Last 3 Encounters:   11/08/17 97.2  F (36.2  C) (Oral)   11/01/17 98.2  F (36.8  C) (Oral)   10/31/17 98.4  F (36.9  C) (Tympanic)     BP Readings from Last 5 Encounters:   11/08/17 126/79   11/01/17 104/52   10/31/17 104/66   10/29/17 126/80   09/01/17 132/78     Pulse Readings from Last 1 Encounters:   11/29/17 94     Resp Readings from Last 1 Encounters:   10/29/17 15     Estimated body mass index is 42.16 kg/(m^2) as calculated from the following:    Height as of this encounter: 1.626 m (5' 4\").    Weight as of this encounter: 111.4 kg (245 lb 9.6 oz).    GEN: NAD, overweight  HEENT: MMM. No oral lesions. Anicteric, noninjected sclera  CV: S1, S2. RRR. No m/r/g.  PULM: CTA bilaterally. No w/c.  MSK: Tenderness palpation with swelling of the bilateral second and fifth MCPs and PIPs. Wrists tender to palpation but without swelling. Elbows and shoulders tender to palpation without swelling. Knees tender to palpation without swelling. Ankles and MTPs tender to palpation without swelling.    SKIN: No rash. Tattoos on the left lower extremity  EXT: No LE edema  PSYCH: Alert. Appropriate.    Labs / Imaging (select studies)   RF/CCP  Recent Labs   Lab Test  11/19/12   0900   CCPABY  53*   RHF  <7     LORNA  Recent Labs   Lab Test  09/13/12   1756   NADEGE  1.7*     RNP/Sm/SSA/SSB  Recent Labs   Lab Test  06/02/14   1216  04/07/14   1001  11/19/12   0900   RNPIGG  <0.2  Negative     --    --    SMIGG  <0.2  Negative     --    --    ENASM   --    --   6   SSAIGG  <0.2  Negative     --    --    ENASSA   --    --   6   SSBIGG  0.3   --    --    ENASSB   --    --   4 "   ENARMP   --    --   0   SCLIGG  <0.2  Negative     --    --    JOIGG   --   <0.2 Negative   --      dsDNA  Recent Labs   Lab Test  02/03/15   1141  06/02/14   1216  04/30/13   1316  09/19/12   0905   DNA  <15  Interpretation:  Negative    <15  Interpretation:  Negative    29  55*     C3/C4  Recent Labs   Lab Test  02/03/15   1141  06/02/14   1216  04/30/13   1316  11/19/12   0900   N1WOEUM  162  142  120  140   E9EKDPI  29  23  26  22     Antiphospholipid Antibodies  Recent Labs   Lab Test  11/19/12   0900   B2IGG  5   B2IGM  3   CARG  <15.0 Interpretation:  Negative   ALEX  <12.5 Interpretation:  Negative   LUPINT  Negative (Note) COMMENTS: The INR is normal. APTT is prolonged, but mixing study shows complete correction. DRVVT Screen is normal. Thrombin time is normal. NEGATIVE TEST; A LUPUS ANTICOAGULANT WAS NOT DETECTED IN THIS SPECIMEN WITHIN THE LIMITS OF THE TESTING REPERTOIRE. If the clinical picture is strongly suggestive of an antiphospholipid syndrome, recommend anticardiolipin and beta-2-glycoprotein (IgG and IgM) antibody tests. APTT mixing study is indicative of factor deficiencies. Recommend factors 8, 9, 11 and 12 (factors VIII, IX, XI, and XII) levels if clinically indicated. Ara Plasencia M.D.  [phone] 11-.  NINR =  0.89 N = 0.86-1.14  (No additional charge) NTT = 17.3 N = 13.0-19.0 sec  (No additional charge)  APTT'S:    Seconds Reagent =  Stago LS Normal  =  36 Patient  =  44 1:2 Mix  =  38 Reference =  31-43   DILUTE EDGARDO VIPER VENOM TEST: DRVVT Screen Ratio = 0.93 Normal = <1.28      ANCA  Recent Labs   Lab Test  11/19/12   0900   MPOAB  1     CBC  Recent Labs   Lab Test  10/31/17   1359  10/25/17   0840  07/26/17   0827   07/13/17   1515  07/11/17   0906   WBC  5.9  4.1  6.4   --    --   11.5*   RBC  4.07  4.24  4.09   --    --   4.40   HGB  12.9  13.2  13.0   --    --   13.9   HCT  38.9  40.2  38.6   --    --   41.7   MCV  96  95  94   --    --   95   RDW  13.5  13.7  14.0    --    --   14.3   PLT  339  322  367   < >   --   430   MCH  31.7  31.1  31.8   --    --   31.6   MCHC  33.2  32.8  33.7   --    --   33.3   NEUTROPHIL   --   44.0  56.9   --    --   45.3   LYMPH   --   40.0  34.4   --    --   44.3   MONOCYTE   --   9.5  4.5   --    --   6.2   EOSINOPHIL   --   5.3  3.0   --    --   2.2   BASOPHIL   --   1.0  0.6   --   1  0.5   ANEU   --   1.8  3.7   --    --   5.2   ALYM   --   1.7  2.2   --    --   5.1   KIMBERLY   --   0.4  0.3   --    --   0.7   AEOS   --   0.2  0.2   --    --   0.3   ABAS   --   0.0  0.0   --    --   0.1    < > = values in this interval not displayed.     CMP  Recent Labs   Lab Test  11/21/17   0945  10/25/17   0840  07/26/17   0827  07/11/17   0906  07/11/17   0858  04/27/17   0732  01/17/17   0642  01/15/17   1844  12/27/16   0836   10/30/15   1557  10/28/15   2233   NA   --    --    --   140   --    --   145*  139  139   --   139  136   POTASSIUM   --    --    --   3.2*   --    --   3.5  4.0  4.7   --   3.6  3.9   CHLORIDE   --    --    --   107   --    --   112*  107  110*   --   106  104   CO2   --    --    --   21   --    --   29  24  21   --   29  28   ANIONGAP   --    --    --   12   --    --   4  8  8   --   5  4   GLC   --    --    --   90   --    --   108*  104*  95   --   99  117*   BUN   --    --    --   13   --    --   9  18  22   --   13  11   CR   --   0.77  0.67  0.73   --   0.81  0.93  0.83  0.75   < >  0.67  0.67   GFRESTIMATED   --   76  90  82  85  72  61  70  79   < >  90  >90  Non  GFR Calc     GFRESTBLACK   --   >90  >90  African American GFR Calc    >90   GFR Calc    >90  87  74  85  >90   GFR Calc     < >  >90   GFR Calc    >90   GFR Calc     ISH   --    --    --   9.9   --    --   8.2*  8.5  8.8   --   8.5  8.9   BILITOTAL  0.5  0.7  0.3  0.3   --   0.5   --   0.6  0.4   < >   --    --    ALBUMIN  3.4  3.7  3.7  4.0   --   3.8   --   3.4  3.6   < >   --    --     PROTTOTAL  7.3  7.4  7.7  8.1   --   7.8   --   7.3  7.4   < >   --    --    ALKPHOS  71  72  84  90   --   99   --   82  92   < >   --    --    AST  28  43  23  24   --   18   --   35  21   < >   --    --    ALT  39  69*  41  47   --   35   --   37  33   < >   --    --     < > = values in this interval not displayed.     HgA1c  Recent Labs   Lab Test  02/20/15   0750  10/20/14   0821   A1C  5.6  5.1     Calcium/VitaminD  Recent Labs   Lab Test  07/11/17   0906  01/17/17   0642  01/15/17   1844   02/20/15   0750   02/11/11   0735   ISH  9.9  8.2*  8.5   < >  9.0   < >  9.3   D3VIT   --    --    --    --    --    --   42   VITDT   --    --    --    --   39   --    --     < > = values in this interval not displayed.     ESR/CRP  Recent Labs   Lab Test  10/25/17   0840  07/26/17   0827  07/11/17   0906   SED  11 17  17   CRP  <2.9  6.6  3.7     CK/Aldolase  Recent Labs   Lab Test  04/07/14   1001   CKT  77     TSH/T4  Recent Labs   Lab Test  09/29/15   0832  08/28/12   1525   TSH  1.20  1.48     Lipid Panel  Recent Labs   Lab Test  04/27/17   0732  12/27/16   0836  02/20/15   0750  10/20/14   0821  11/22/13   0843   CHOL  204*  184  157  194  167   TRIG  148  154*  79  193*  140   HDL  82  56  63  63  60   LDL  92  97  78  92  79   VLDL   --    --   16  39*  28   CHOLHDLRATIO   --    --   2.5  3.1  2.8   NHDL  122  128   --    --    --      Hepatitis B  Recent Labs   Lab Test  12/08/15   0855  03/06/15   1650   HBCAB  Nonreactive   --    HEPBANG  Nonreactive  Nonreactive     Hepatitis C  Recent Labs   Lab Test  12/08/15   0855  03/06/15   1650  11/19/12   0900   HCVAB  Nonreactive   Assay performance characteristics have not been established for newborns,   infants, and children    Nonreactive   Assay performance characteristics have not been established for newborns,   infants, and children    Negative     Tuberculosis Screening  Recent Labs   Lab Test  10/31/17   1400  02/02/17   0822  12/08/15   0855   TBRSLT   Negative  Negative  Negative   TBAGN  0.05  0.00  0.01     Immunization History     Immunization History   Administered Date(s) Administered     Influenza Vaccine IM 3yrs+ 4 Valent IIV4 10/15/2013, 09/15/2014, 09/28/2015, 09/28/2016, 10/16/2017     Pneumococcal (PCV 13) 04/15/2016     Pneumococcal 23 valent 07/15/2016     TDAP Vaccine (Adacel) 02/09/2011          Chart documentation done in part with Dragon Voice recognition Software. Although reviewed after completion, some word and grammatical error may remain.     Freddy Guerra MD

## 2017-11-29 NOTE — PATIENT INSTRUCTIONS
Dr[facility]erra [facility]  Locations Clinic Hours Telephone Number     [facility] [place]  [address]  [place] [address]     Wednesday: 7:20AM - 4:00PM  Thursday:     7:20AM - 4:00PM     Friday:          7:20AM - 11:00AM       To schedule an appointment with  Dr. Guerra,  please contact  Specialty [phone]       [facility] [place]  [address]  [place] [address]       Monday:       7:20AM - 4:00PM        [facility]  [address]  [place] [address]       Tuesday:      7:20AM - 4:00PM          Thank you!    Evie Rosas CMA

## 2018-01-16 DIAGNOSIS — F32.0 MILD MAJOR DEPRESSION (H): ICD-10-CM

## 2018-01-16 RX ORDER — CITALOPRAM HYDROBROMIDE 20 MG/1
20 TABLET ORAL DAILY
Qty: 90 TABLET | Refills: 1 | Status: SHIPPED | OUTPATIENT
Start: 2018-01-16 | End: 2018-07-14

## 2018-01-16 ASSESSMENT — PATIENT HEALTH QUESTIONNAIRE - PHQ9: SUM OF ALL RESPONSES TO PHQ QUESTIONS 1-9: 1

## 2018-01-16 NOTE — TELEPHONE ENCOUNTER
"Requested Prescriptions   Pending Prescriptions Disp Refills     citalopram (CELEXA) 20 MG tablet 90 tablet 1    Last Written Prescription Date:  7/21/17  Last Fill Quantity: 90,  # refills: 1   Last Office Visit with [facility], P or [facility] prescribing provider:  3/21/17   Future Office Visit:    Next 5 appointments (look out 90 days)     Jan 31, 2018  7:20 AM CST   [facility]    [address] [address]   [phone]                  Sig: Take 1 tablet (20 mg) by mouth daily    SSRIs Protocol Failed    1/16/2018  8:53 AM       Failed - PHQ-9 score less than 5 in past 6 months    The PHQ-9 criteria is meant to fail. It requires a PHQ-9 score review         Passed - Patient is age 18 or older       Passed - No active pregnancy on record       Passed - No positive pregnancy test in last 12 months       Passed - Recent (6 mo) or future visit with authorizing provider's specialty    Patient had office visit in the last 6 months or has a visit in the next 30 days with authorizing provider.  See \"Patient Info\" tab in inbasket, or \"Choose Columns\" in Meds & Orders section of the refill encounter.             "

## 2018-01-16 NOTE — TELEPHONE ENCOUNTER
PRN follow up advised at 11/8/17 visit and depression was addressed (depression action plan).    PHQ-9 score:    PHQ-9 SCORE 7/21/2017   Total Score -   Total Score 5       Due for PHQ9 now.      I called patient and did phone PHQ9, score is now 1.    She plans to have physical again in the fall.    Prescription approved per [facility] Refill Protocol.    Rosangela Joseph RN  [facility]

## 2018-01-29 DIAGNOSIS — M05.9 SEROPOSITIVE RHEUMATOID ARTHRITIS (H): ICD-10-CM

## 2018-01-29 LAB
ALBUMIN SERPL-MCNC: 3.6 G/DL (ref 3.4–5)
ALP SERPL-CCNC: 87 U/L (ref 40–150)
ALT SERPL W P-5'-P-CCNC: 36 U/L (ref 0–50)
AST SERPL W P-5'-P-CCNC: 26 U/L (ref 0–45)
BASOPHILS # BLD AUTO: 0 10E9/L (ref 0–0.2)
BASOPHILS NFR BLD AUTO: 0.4 %
BILIRUB DIRECT SERPL-MCNC: <0.1 MG/DL (ref 0–0.2)
BILIRUB SERPL-MCNC: 0.4 MG/DL (ref 0.2–1.3)
CREAT SERPL-MCNC: 0.76 MG/DL (ref 0.52–1.04)
CRP SERPL-MCNC: 4.2 MG/L (ref 0–8)
DIFFERENTIAL METHOD BLD: NORMAL
EOSINOPHIL # BLD AUTO: 0.2 10E9/L (ref 0–0.7)
EOSINOPHIL NFR BLD AUTO: 3.8 %
ERYTHROCYTE [DISTWIDTH] IN BLOOD BY AUTOMATED COUNT: 13.9 % (ref 10–15)
ERYTHROCYTE [SEDIMENTATION RATE] IN BLOOD BY WESTERGREN METHOD: 13 MM/H (ref 0–30)
GFR SERPL CREATININE-BSD FRML MDRD: 77 ML/MIN/1.7M2
HCT VFR BLD AUTO: 36.6 % (ref 35–47)
HGB BLD-MCNC: 12.1 G/DL (ref 11.7–15.7)
IMM GRANULOCYTES # BLD: 0 10E9/L (ref 0–0.4)
IMM GRANULOCYTES NFR BLD: 0 %
LYMPHOCYTES # BLD AUTO: 2.4 10E9/L (ref 0.8–5.3)
LYMPHOCYTES NFR BLD AUTO: 50.6 %
MCH RBC QN AUTO: 31.2 PG (ref 26.5–33)
MCHC RBC AUTO-ENTMCNC: 33.1 G/DL (ref 31.5–36.5)
MCV RBC AUTO: 94 FL (ref 78–100)
MONOCYTES # BLD AUTO: 0.3 10E9/L (ref 0–1.3)
MONOCYTES NFR BLD AUTO: 6.8 %
NEUTROPHILS # BLD AUTO: 1.8 10E9/L (ref 1.6–8.3)
NEUTROPHILS NFR BLD AUTO: 38.4 %
NRBC # BLD AUTO: 0 10*3/UL
NRBC BLD AUTO-RTO: 0 /100
PLATELET # BLD AUTO: 335 10E9/L (ref 150–450)
PROT SERPL-MCNC: 7 G/DL (ref 6.8–8.8)
RBC # BLD AUTO: 3.88 10E12/L (ref 3.8–5.2)
WBC # BLD AUTO: 4.7 10E9/L (ref 4–11)

## 2018-01-29 PROCEDURE — 85652 RBC SED RATE AUTOMATED: CPT | Performed by: INTERNAL MEDICINE

## 2018-01-29 PROCEDURE — 82565 ASSAY OF CREATININE: CPT | Performed by: INTERNAL MEDICINE

## 2018-01-29 PROCEDURE — 86140 C-REACTIVE PROTEIN: CPT | Performed by: INTERNAL MEDICINE

## 2018-01-29 PROCEDURE — 36415 COLL VENOUS BLD VENIPUNCTURE: CPT | Performed by: INTERNAL MEDICINE

## 2018-01-29 PROCEDURE — 80076 HEPATIC FUNCTION PANEL: CPT | Performed by: INTERNAL MEDICINE

## 2018-01-29 PROCEDURE — 85025 COMPLETE CBC W/AUTO DIFF WBC: CPT | Performed by: INTERNAL MEDICINE

## 2018-01-31 ENCOUNTER — OFFICE VISIT (OUTPATIENT)
Dept: RHEUMATOLOGY | Facility: CLINIC | Age: 63
End: 2018-01-31
Payer: COMMERCIAL

## 2018-01-31 VITALS
BODY MASS INDEX: 43.64 KG/M2 | TEMPERATURE: 98.7 F | DIASTOLIC BLOOD PRESSURE: 70 MMHG | RESPIRATION RATE: 18 BRPM | HEART RATE: 92 BPM | OXYGEN SATURATION: 95 % | HEIGHT: 64 IN | WEIGHT: 255.6 LBS | SYSTOLIC BLOOD PRESSURE: 144 MMHG

## 2018-01-31 DIAGNOSIS — M05.9 SEROPOSITIVE RHEUMATOID ARTHRITIS (H): Primary | ICD-10-CM

## 2018-01-31 DIAGNOSIS — Z79.899 HIGH RISK MEDICATIONS (NOT ANTICOAGULANTS) LONG-TERM USE: ICD-10-CM

## 2018-01-31 PROCEDURE — 99214 OFFICE O/P EST MOD 30 MIN: CPT | Performed by: INTERNAL MEDICINE

## 2018-01-31 RX ORDER — PREDNISONE 5 MG/1
TABLET ORAL
Qty: 50 TABLET | Refills: 0 | Status: SHIPPED | OUTPATIENT
Start: 2018-01-31 | End: 2018-03-09

## 2018-01-31 RX ORDER — FOLIC ACID 1 MG/1
1 TABLET ORAL DAILY
Qty: 100 TABLET | Refills: 3 | Status: SHIPPED | OUTPATIENT
Start: 2018-01-31 | End: 2018-08-08

## 2018-01-31 RX ORDER — SULFASALAZINE 500 MG/1
TABLET, DELAYED RELEASE ORAL
Qty: 120 TABLET | Refills: 3 | Status: SHIPPED | OUTPATIENT
Start: 2018-01-31 | End: 2018-05-02

## 2018-01-31 ASSESSMENT — PAIN SCALES - GENERAL: PAINLEVEL: EXTREME PAIN (9)

## 2018-01-31 NOTE — MR AVS SNAPSHOT
After Visit Summary   1/31/2018    Sharon Fung    MRN: [id number]           Patient Information     Date Of Birth          1955        Visit Information        Provider Department      1/31/2018 7:20 AM Freddy Guerra MD [facility]        Today's Diagnoses     Seropositive rheumatoid arthritis (H)           Follow-ups after your visit        Your next 10 appointments already scheduled     May 02, 2018  8:00 AM CDT   Return Visit with Freddy Guerra MD   [facility] ([facility])    [address] [place] [id number]   [phone]              Future tests that were ordered for you today     Open Standing Orders        Priority Remaining Interval Expires Ordered    CBC with platelets differential Routine 2/2 Every 4 Weeks 7/30/2018 1/31/2018    Creatinine Routine 2/2 Every 4 Weeks 7/30/2018 1/31/2018    Hepatic panel Routine 2/2 Every 4 Weeks 7/30/2018 1/31/2018          Open Future Orders        Priority Expected Expires Ordered    CBC with platelets differential Routine 4/27/2018 5/16/2018 1/31/2018    Creatinine Routine 4/27/2018 5/16/2018 1/31/2018    Erythrocyte sedimentation rate auto Routine 4/27/2018 5/16/2018 1/31/2018    CRP inflammation Routine 4/27/2018 5/16/2018 1/31/2018    Hepatic panel Routine 4/27/2018 5/16/2018 1/31/2018            Who to contact     If you have questions or need follow up information about today's clinic visit or your schedule please contact [facility] directly at [phone].  Normal or non-critical lab and imaging results will be communicated to you by MyChart, letter or phone within 4 business days after the clinic has received the results. If you do not hear from us within 7 days, please contact the clinic through MyChart or phone. If you have a critical or abnormal lab result, we will notify you by phone as soon as possible.  Submit refill requests through [employer] or call your pharmacy  "and they will forward the refill request to us. Please allow 3 business days for your refill to be completed.          Additional Information About Your Visit        [facility] Information     [employer] gives you secure access to your electronic health record. If you see a primary care provider, you can also send messages to your care team and make appointments. If you have questions, please call your primary care clinic.  If you do not have a primary care provider, please call [phone] and they will assist you.        Care EveryWhere ID     This is your Care EveryWhere ID. This could be used by other organizations to access your [facility] medical records  [id number]        Your Vitals Were     Pulse Temperature Respirations Height Pulse Oximetry BMI (Body Mass Index)    92 98.7  F (37.1  C) (Oral) 18 1.63 m (5' 4.17\") 95% 43.64 kg/m2       Blood Pressure from Last 3 Encounters:   01/31/18 144/70   11/29/17 122/72   11/08/17 126/79    Weight from Last 3 Encounters:   01/31/18 115.9 kg (255 lb 9.6 oz)   11/29/17 111.4 kg (245 lb 9.6 oz)   11/08/17 111.1 kg (245 lb)                 Today's Medication Changes          These changes are accurate as of 1/31/18  7:51 AM.  If you have any questions, ask your nurse or doctor.               Start taking these medicines.        Dose/Directions    sulfaSALAzine  MG EC tablet   Commonly known as:  AZULFIDINE EN   Used for:  Seropositive rheumatoid arthritis (H)   Started by:  Freddy Guerra MD        500mg BID for 7 days, then increase to 1000mg BID and continue 1000mg BID thereafter.   Quantity:  120 tablet   Refills:  3       tofacitinib 11 MG 24 hr tablet   Commonly known as:  XELJANZ   Used for:  Seropositive rheumatoid arthritis (H)   Started by:  Freddy Guerra MD        Dose:  11 mg   Take 1 tablet (11 mg) by mouth daily   Quantity:  30 tablet   Refills:  3         These medicines have changed or have updated prescriptions.        Dose/Directions    predniSONE 5 " MG tablet   Commonly known as:  DELTASONE   This may have changed:  additional instructions   Used for:  Seropositive rheumatoid arthritis (H)   Changed by:  Freddy Guerra MD        Prednisone 20mg daily x5days, then 15mg daily x5days, then 10mg daily x5days, then 5mg daily x5days, then stop.   Quantity:  50 tablet   Refills:  0            Where to get your medicines      These medications were sent to [facility] - [place], [place] - [address]  [address] [facility] [address]    Hours:  Mon-Fri 8:30am-5:00pm Toll Free [phone] Phone:  [phone]     tofacitinib 11 MG 24 hr tablet         These medications were sent to [facility] [place], [place] - [address]  [address] [address]     Phone:  [phone]     folic acid 1 MG tablet    methotrexate sodium 50 MG/2ML Soln    predniSONE 5 MG tablet    sulfaSALAzine  MG EC tablet                Primary Care Provider Office Phone # Fax #    Reynaldo Saavedra [phone]       [address] [facility] [place] [address]        Equal Access to Services     SVITLANA SERVIN AH: Hadii sheryl ku hadasho Soomaali, waaxda luqadaha, qaybta kaalmada adeegyada, waxay idiin hayaan louis [place]. So [facility] [phone].    ATENCIÓN: Si habla español, tiene a ferrer disposición servicios gratuitos de asistencia lingüística. Llame al [phone].    We comply with applicable federal civil rights laws and [place] laws. We do not discriminate on the basis of race, color, national origin, age, disability, sex, sexual orientation, or gender identity.            Thank you!     Thank you for choosing [facility]  for your care. Our goal is always to provide you with excellent care. Hearing back from our patients is one way we can continue to improve our services. Please take a few minutes to complete the written survey that you may receive in the mail after  "your visit with us. Thank you!             Your Updated Medication List - Protect others around you: Learn how to safely use, store and throw away your medicines at www.disposemymeds.org.          This list is accurate as of 1/31/18  7:51 AM.  Always use your most recent med list.                   Brand Name Dispense Instructions for use Diagnosis    acetaminophen 500 MG tablet    TYLENOL     Take 500-1,000 mg by mouth every 6 hours as needed for mild pain        albuterol 108 (90 BASE) MCG/ACT Inhaler    PROAIR HFA/PROVENTIL HFA/VENTOLIN HFA    1 Inhaler    Inhale 2-4 puffs into the lungs every 4 hours as needed for shortness of breath / dyspnea or wheezing    Acute bronchitis, unspecified organism       azelastine 0.05 % Soln ophthalmic solution    OPTIVAR    1 Bottle    Apply 1 drop to eye 2 times daily    Allergic conjunctivitis of both eyes       citalopram 20 MG tablet    celeXA    90 tablet    Take 1 tablet (20 mg) by mouth daily    Mild major depression (H)       fexofenadine 180 MG tablet    ALLEGRA    90 tablet    Take 1 tablet (180 mg) by mouth daily    AR (allergic rhinitis)       FLONASE 50 MCG/ACT spray   Generic drug:  fluticasone      Spray 2 sprays into both nostrils as needed        folic acid 1 MG tablet    FOLVITE    100 tablet    Take 1 tablet (1 mg) by mouth daily    Seropositive rheumatoid arthritis (H)       guaiFENesin-codeine 100-10 MG/5ML Soln solution    ROBITUSSIN AC    120 mL    Take 5 mLs by mouth every 4 hours as needed for cough    Cough, Fever and chills       ibuprofen 200 MG capsule      Take 600-800 mg by mouth At Bedtime        Insulin Syringe-Needle U-100 27G X 1/2\" 1 ML Misc    BD insulin syringe    10 each    1 Syringe every 7 days , to be used for methotrexate administration.    Seropositive rheumatoid arthritis (H)       methotrexate sodium 50 MG/2ML Soln     2 vial    Inject 0.8 mLs (20 mg) as directed every 7 days    Seropositive rheumatoid arthritis (H)       order for " DME      Use your CPAP device as directed by your provider.        predniSONE 5 MG tablet    DELTASONE    50 tablet    Prednisone 20mg daily x5days, then 15mg daily x5days, then 10mg daily x5days, then 5mg daily x5days, then stop.    Seropositive rheumatoid arthritis (H)       * RESTASIS 0.05 % ophthalmic emulsion   Generic drug:  cycloSPORINE      Place 1 drop into both eyes 2 times daily        * cycloSPORINE 0.05 % ophthalmic emulsion    RESTASIS    2 Box    Place 1 drop into both eyes 2 times daily    Sicca syndrome (H)       sulfaSALAzine  MG EC tablet    AZULFIDINE EN    120 tablet    500mg BID for 7 days, then increase to 1000mg BID and continue 1000mg BID thereafter.    Seropositive rheumatoid arthritis (H)       tofacitinib 11 MG 24 hr tablet    XELJANZ    30 tablet    Take 1 tablet (11 mg) by mouth daily    Seropositive rheumatoid arthritis (H)       * Notice:  This list has 2 medication(s) that are the same as other medications prescribed for you. Read the directions carefully, and ask your doctor or other care provider to review them with you.

## 2018-01-31 NOTE — PROGRESS NOTES
Rheumatology Clinic Visit      Sharon Fung MRN# [id number]   YOB: 1955 Age: 62 year old      Date of visit: 1/31/18   PCP: Dr. Reynaldo Saavedra    Chief Complaint   Patient presents with:  RECHECK: 3 month recheck    Assessment and Plan     1. Seropositive nonerosive rheumatoid arthritis (RF negative, CCP low positive): Previously on Humira (lost efficacy), Cimzia (ineffective), Orencia (ineffective), leflunomide (ineffective), and hydroxychloroquine (ineffective). Currently on MTX 20mg SQ (GI upset with PO) and Xeljanz XR 11 mg daily.  Ever since restarting Xeljanz, and has not regained the efficacy that had previously.  We discussed options of changing to a different biologic DMARD versus adding sulfasalazine.  She prefers to add sulfasalazine.  - Continue methotrexate 20mg SQ every 7 days  - Continue folic acid 1mg daily  - Continue Xeljanz XR 11 mg daily  - Start sulfasalazine 500 mg twice daily ×7 days, then 1000 mg twice daily thereafter  - Labs monthly ×2 months: CBC, creatinine, hepatic panel  - Labs 2-3 days prior to the next rheumatology clinic visit: CBC, Creatinine, Hepatic Panel, ESR, CRP              Rapid 3, cumulative scores                      1/31/2018:   22.3 (MTX 20mg SQ wkly, Xeljanz XR 11mg daily)                          11/29/2017: 16.8 (MTX 20mg SQ wkly)                      08/02/2017: 4.2   (MTX 20mg SQ wkly, Xeljanz XR 11mg daily)                         05/04/2017: 7      (MTX 20mg SQ wkly, Xeljanz XR 11mg daily)                        02/02/2017: 8      (MTX 20mg SQ wkly, Xeljanz XR 11mg daily)                      11/04/2016: 13    (MTX 20mg SQ weekly)                      07/15/2016: 10.8    # Sulfasalazine Risks and Benefits: The risks and benefits of sulfasalazine were discussed in detail and the patient verbalized understanding.  The risks discussed include, but are not limited to, the risk for hypersensitivity, anaphylaxis, anaphylactoid reactions, infections,  bone marrow suppression,  hepatotoxicity, nausea, vomiting, and GI upset.  Oligospermia may occur in males.  I encouraged reviewing the package insert and asking any questions about the medication.      2. Positive LORNA and dsDNA / Secondary Sjogren's Syndrome: Repeat dsDNA have been negative. Has dry eyes but no dry mouth.  Dry eyes are treated by ophthalmology with Restasis.  Likely Secondary Sjogren's Syndrome.     Ms. Fung verbalized agreement with and understanding of the rational for the diagnosis and treatment plan.  All questions were answered to best of my ability and the patient's satisfaction. Ms. Fung was advised to contact the clinic with any questions that may arise after the clinic visit.      Thank you for involving me in the care of the patient    Return to clinic: 3 months    HPI   Sharon Fung is a 62 year old female with medical history significant for GERD, allergic rhinitis, obstructive sleep apnea, obesity, hx of trigger fingers, hx of carpal tunnel surgery, and rheumatoid arthritis who presents for follow-up of rheumatoid arthritis.    Today, Ms. Fung reports that she recently went to a [employer] talk that was interesting but it was able to be about lifestyle management changes and they do not discuss that.  Ever since she stopped Xeljanz for a period of time she says that she has had worsening symptoms.  Restarting Xeljanz has not helped a whole lot.  Still with pain in her hands primarily.  Morning stiffness for most of the day.  Pain and stiffness improved with activity.  Positive gelling phenomenon.     Occasional fevers and chills. No nausea, vomiting, constipation, diarrhea. No chest pain/pressure, palpitations, or shortness of breath. No oral or nasal sores. No neck pain. No rash.      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 No oral or nasal ulcers.  CV: No chest pain, pressure, palpitations, or dyspnea on exertion.  PULM: no shortness of breath. Persistent cough.  GI: No nausea, vomiting, constipation, diarrhea. No blood in stool. No abdominal pain.  : No blood in urine.  MSK: See HPI.  NEURO: No numbness, tingling, or weakness.  EXT: No LE swelling  PSYCH: Negative    Active Problem List     Patient Active Problem List   Diagnosis     AR (allergic rhinitis)     GERD (gastroesophageal reflux disease)     Status post bariatric surgery     Mild major depression (H)     Advanced directives, counseling/discussion     High risk medication use     Bilateral leg weakness     Left leg pain     Obstructive sleep apnea     Obesity, Class III, BMI 40-49.9 (morbid obesity) (H)     Anterior basement membrane dystrophy     Seropositive rheumatoid arthritis (H)     LORNA positive     Carpal tunnel syndrome of right wrist     Headache     Past Medical History     Past Medical History:   Diagnosis Date     AR (allergic rhinitis)  as teen     Arthritis 12/14/2015     Depressive disorder 3 years ago     GERD (gastroesophageal reflux disease) 4-07     Headache 7/11/2017     Mild major depression (H) 3 years ago     Morbid obesity (H) teens     BRETT (obstructive sleep apnea)     uses CPAP     Rheumatoid arthritis, adult (H)      Past Surgical History     Past Surgical History:   Procedure Laterality Date     BLEPHAROPLASTY BILATERAL Bilateral 8/5/2016    Procedure: BLEPHAROPLASTY BILATERAL;  Surgeon: Cortez Robin MD;  Location:  [facility]     BREAST BIOPSY, RT/LT  1-94    Benign     C APPENDECTOMY  1977     COLONOSCOPY       COLONOSCOPY WITH CO2 INSUFFLATION N/A 3/30/2017    Procedure: COLONOSCOPY WITH CO2 INSUFFLATION;  Surgeon: Issa Weeks MD;  Location:  OR     ESOPHAGOSCOPY, GASTROSCOPY, DUODENOSCOPY (EGD), COMBINED N/A 10/29/2015    Procedure: COMBINED ESOPHAGOSCOPY, GASTROSCOPY, DUODENOSCOPY (EGD);  Surgeon:  "Shaq Mims MD;  Location: [facility]     LAPAROSCOPIC GASTRIC ADJUSTABLE BANDING  11/09/10    Lap band procedure     LAPAROSCOPIC REMOVAL GASTRIC ADJUSTABLE BAND N/A 10/31/2015    Procedure: LAPAROSCOPIC REMOVAL GASTRIC ADJUSTABLE BAND;  Surgeon: Shaq Mims MD;  Location: [facility]     RELEASE CARPAL TUNNEL Left 4/27/2017    Procedure: RELEASE CARPAL TUNNEL;  Left Open Carpal Tunnel Release;  Surgeon: Ciara Middleton MD;  [facility]: [facility]     RELEASE CARPAL TUNNEL Right 6/15/2017    Procedure: RELEASE CARPAL TUNNEL;  Right Carpal Tunnel Release Open;  Surgeon: Ciara Middleton MD;  Location: [facility]     REPAIR PTOSIS       TUBAL LIGATION  1988     Allergy   No Known Allergies  Current Medication List     Current Outpatient Prescriptions   Medication Sig     citalopram (CELEXA) 20 MG tablet Take 1 tablet (20 mg) by mouth daily     predniSONE (DELTASONE) 5 MG tablet Prednisone 20mg daily x5days, then 15mg daily x5days, then 10mg daily x5days, then 5mg daily thereafter.     cycloSPORINE (RESTASIS) 0.05 % ophthalmic emulsion Place 1 drop into both eyes 2 times daily     azelastine (OPTIVAR) 0.05 % SOLN ophthalmic solution Apply 1 drop to eye 2 times daily     Insulin Syringe-Needle U-100 (BD INSULIN SYRINGE) 27G X 1/2\" 1 ML MISC 1 Syringe every 7 days , to be used for methotrexate administration.     methotrexate sodium 50 MG/2ML SOLN Inject 0.8 mLs (20 mg) as directed every 7 days     guaiFENesin-codeine (ROBITUSSIN AC) 100-10 MG/5ML SOLN solution Take 5 mLs by mouth every 4 hours as needed for cough     albuterol (PROAIR HFA/PROVENTIL HFA/VENTOLIN HFA) 108 (90 BASE) MCG/ACT Inhaler Inhale 2-4 puffs into the lungs every 4 hours as needed for shortness of breath / dyspnea or wheezing     ibuprofen 200 MG capsule Take 600-800 mg by mouth At Bedtime     cycloSPORINE (RESTASIS) 0.05 % ophthalmic emulsion Place 1 drop into both eyes 2 times daily     folic acid (FOLVITE) 1 MG tablet Take 1 " "tablet (1 mg) by mouth daily     fexofenadine (ALLEGRA) 180 MG tablet Take 1 tablet (180 mg) by mouth daily     acetaminophen (TYLENOL) 500 MG tablet Take 500-1,000 mg by mouth every 6 hours as needed for mild pain     ORDER FOR DME Use your CPAP device as directed by your provider.     fluticasone (FLONASE) 50 MCG/ACT nasal spray Spray 2 sprays into both nostrils as needed     No current facility-administered medications for this visit.          Social History   See HPI    Family History     Family History   Problem Relation Age of Onset     HEART DISEASE Father      MI     Neurologic Disorder Father 79     Parkinson's     DIABETES Father      Hypertension Father      Coronary Artery Disease Father      CANCER Maternal Grandmother      uterine     Neurologic Disorder Sister 16     migraine     Depression Sister      Neurologic Disorder Mother 20     migraine     Neurologic Disorder Son 7     migraine     Substance Abuse Son        Physical Exam     Temp Readings from Last 3 Encounters:   01/31/18 98.7  F (37.1  C) (Oral)   11/08/17 97.2  F (36.2  C) (Oral)   11/01/17 98.2  F (36.8  C) (Oral)     BP Readings from Last 5 Encounters:   01/31/18 144/70   11/29/17 122/72   11/08/17 126/79   11/01/17 104/52   10/31/17 104/66     Pulse Readings from Last 1 Encounters:   01/31/18 92     Resp Readings from Last 1 Encounters:   01/31/18 18     Estimated body mass index is 43.64 kg/(m^2) as calculated from the following:    Height as of this encounter: 1.63 m (5' 4.17\").    Weight as of this encounter: 115.9 kg (255 lb 9.6 oz).    GEN: NAD, overweight  HEENT: MMM. Anicteric, noninjected sclera  CV: S1, S2. RRR. No m/r/g.  PULM: CTA bilaterally. No w/c.  MSK: Bilateral second-fifth MCPs and PIPs tender to palpation and with mild swelling.  Wrists without swelling or tenderness to palpation.  Elbows and shoulders without swelling or tenderness to palpation.  Hips nontender to direct palpation.  Knees with medial joint line " tenderness but no effusion or increased warmth.  Ankles without swelling or tenderness to palpation.  Negative MTP squeeze bilaterally.      SKIN: No rash.   EXT: No LE edema  PSYCH: Alert. Appropriate.    Labs / Imaging (select studies)   RF/CCP  Recent Labs   Lab Test  11/19/12   0900   CCPABY  53*   RHF  <7     LORNA  Recent Labs   Lab Test  09/13/12   1756   NADEGE  1.7*     RNP/Sm/SSA/SSB  Recent Labs   Lab Test  06/02/14   1216  04/07/14   1001  11/19/12   0900   RNPIGG  <0.2  Negative     --    --    SMIGG  <0.2  Negative     --    --    ENASM   --    --   6   SSAIGG  <0.2  Negative     --    --    ENASSA   --    --   6   SSBIGG  0.3   --    --    ENASSB   --    --   4   ENARMP   --    --   0   SCLIGG  <0.2  Negative     --    --    JOIGG   --   <0.2 Negative   --      dsDNA  Recent Labs   Lab Test  02/03/15   1141  06/02/14   1216  04/30/13   1316  09/19/12   0905   DNA  <15  Interpretation:  Negative    <15  Interpretation:  Negative    29  55*     C3/C4  Recent Labs   Lab Test  02/03/15   1141  06/02/14   1216  04/30/13   1316  11/19/12   0900   S9EMYFT  162  142  120  140   Y9LSPXG  29  23  26  22     Antiphospholipid Antibodies  Recent Labs   Lab Test  11/19/12   0900   B2IGG  5   B2IGM  3   CARG  <15.0 Interpretation:  Negative   ALEX  <12.5 Interpretation:  Negative   LUPINT  Negative (Note) COMMENTS: The INR is normal. APTT is prolonged, but mixing study shows complete correction. DRVVT Screen is normal. Thrombin time is normal. NEGATIVE TEST; A LUPUS ANTICOAGULANT WAS NOT DETECTED IN THIS SPECIMEN WITHIN THE LIMITS OF THE TESTING REPERTOIRE. If the clinical picture is strongly suggestive of an antiphospholipid syndrome, recommend anticardiolipin and beta-2-glycoprotein (IgG and IgM) antibody tests. APTT mixing study is indicative of factor deficiencies. Recommend factors 8, 9, 11 and 12 (factors VIII, IX, XI, and XII) levels if clinically indicated. Ara Plasencia M.D.  [phone] 11-.  NINR =   0.89 N = 0.86-1.14  (No additional charge) NTT = 17.3 N = 13.0-19.0 sec  (No additional charge)  APTT'S:    Seconds Reagent =  Stago LS Normal  =  36 Patient  =  44 1:2 Mix  =  38 Reference =  31-43   DILUTE EDGARDO VIPER VENOM TEST: DRVVT Screen Ratio = 0.93 Normal = <1.28      ANCA  Recent Labs   Lab Test  11/19/12   0900   MPOAB  1     CBC  Recent Labs   Lab Test  01/29/18   1244  10/31/17   1359  10/25/17   0840  07/26/17   0827   WBC  4.7  5.9  4.1  6.4   RBC  3.88  4.07  4.24  4.09   HGB  12.1  12.9  13.2  13.0   HCT  36.6  38.9  40.2  38.6   MCV  94  96  95  94   RDW  13.9  13.5  13.7  14.0   PLT  335  339  322  367   MCH  31.2  31.7  31.1  31.8   MCHC  33.1  33.2  32.8  33.7   NEUTROPHIL  38.4   --   44.0  56.9   LYMPH  50.6   --   40.0  34.4   MONOCYTE  6.8   --   9.5  4.5   EOSINOPHIL  3.8   --   5.3  3.0   BASOPHIL  0.4   --   1.0  0.6   ANEU  1.8   --   1.8  3.7   ALYM  2.4   --   1.7  2.2   KIMBERLY  0.3   --   0.4  0.3   AEOS  0.2   --   0.2  0.2   ABAS  0.0   --   0.0  0.0     CMP  Recent Labs   Lab Test  01/29/18   1244  11/21/17   0945  10/25/17   0840  07/26/17   0827  07/11/17   0906   01/17/17   0642  01/15/17   1844  12/27/16   0836   10/30/15   1557  10/28/15   2233   NA   --    --    --    --   140   --   145*  139  139   --   139  136   POTASSIUM   --    --    --    --   3.2*   --   3.5  4.0  4.7   --   3.6  3.9   CHLORIDE   --    --    --    --   107   --   112*  107  110*   --   106  104   CO2   --    --    --    --   21   --   29  24  21   --   29  28   ANIONGAP   --    --    --    --   12   --   4  8  8   --   5  4   GLC   --    --    --    --   90   --   108*  104*  95   --   99  117*   BUN   --    --    --    --   13   --   9  18  22   --   13  11   CR  0.76   --   0.77  0.67  0.73   < >  0.93  0.83  0.75   < >  0.67  0.67   GFRESTIMATED  77   --   76  90  82   < >  61  70  79   < >  90  >90  Non  GFR Calc     GFRESTBLACK  >90   --   >90  >90  African American GFR Calc     >90   GFR Calc     < >  74  85  >90   GFR Calc     < >  >90   GFR Calc    >90   GFR Calc     ISH   --    --    --    --   9.9   --   8.2*  8.5  8.8   --   8.5  8.9   BILITOTAL  0.4  0.5  0.7  0.3  0.3   < >   --   0.6  0.4   < >   --    --    ALBUMIN  3.6  3.4  3.7  3.7  4.0   < >   --   3.4  3.6   < >   --    --    PROTTOTAL  7.0  7.3  7.4  7.7  8.1   < >   --   7.3  7.4   < >   --    --    ALKPHOS  87  71  72  84  90   < >   --   82  92   < >   --    --    AST  26  28  43  23  24   < >   --   35  21   < >   --    --    ALT  36  39  69*  41  47   < >   --   37  33   < >   --    --     < > = values in this interval not displayed.     HgA1c  Recent Labs   Lab Test  02/20/15   0750  10/20/14   0821   A1C  5.6  5.1       Calcium/VitaminD  Recent Labs   Lab Test  07/11/17   0906  01/17/17   0642  01/15/17   1844   02/20/15   0750   02/11/11   0735   SIH  9.9  8.2*  8.5   < >  9.0   < >  9.3   D3VIT   --    --    --    --    --    --   42   VITDT   --    --    --    --   39   --    --     < > = values in this interval not displayed.     ESR/CRP  Recent Labs   Lab Test  01/29/18   1244  10/25/17   0840  07/26/17   0827   SED  13  11  17   CRP  4.2  <2.9  6.6     CK/Aldolase  Recent Labs   Lab Test  04/07/14   1001   CKT  77     TSH/T4  Recent Labs   Lab Test  09/29/15   0832  08/28/12   1525   TSH  1.20  1.48     Hepatitis B  Recent Labs   Lab Test  12/08/15   0855  03/06/15   1650   HBCAB  Nonreactive   --    HEPBANG  Nonreactive  Nonreactive     Hepatitis C  Recent Labs   Lab Test  12/08/15   0855  03/06/15   1650  11/19/12   0900   HCVAB  Nonreactive   Assay performance characteristics have not been established for newborns,   infants, and children    Nonreactive   Assay performance characteristics have not been established for newborns,   infants, and children    Negative     Tuberculosis Screening  Recent Labs   Lab Test  10/31/17   1400  02/02/17   0822   12/08/15   0855   TBRSLT  Negative  Negative  Negative   TBAGN  0.05  0.00  0.01     Immunization History     Immunization History   Administered Date(s) Administered     Influenza Vaccine IM 3yrs+ 4 Valent IIV4 10/15/2013, 09/15/2014, 09/28/2015, 09/28/2016, 10/16/2017     Pneumo Conj 13-V (2010&after) 04/15/2016     Pneumococcal 23 valent 07/15/2016     TDAP Vaccine (Adacel) 02/09/2011          Chart documentation done in part with Dragon Voice recognition Software. Although reviewed after completion, some word and grammatical error may remain.     Freddy Guerra MD

## 2018-01-31 NOTE — NURSING NOTE
"Chief Complaint   Patient presents with     RECHECK     3 month recheck       Initial /70  Pulse 92  Temp 98.7  F (37.1  C) (Oral)  Resp 18  Ht 1.63 m (5' 4.17\")  Wt 115.9 kg (255 lb 9.6 oz)  SpO2 95%  BMI 43.64 kg/m2 Estimated body mass index is 43.64 kg/(m^2) as calculated from the following:    Height as of this encounter: 1.63 m (5' 4.17\").    Weight as of this encounter: 115.9 kg (255 lb 9.6 oz).  BP completed using cuff size: X-large         RAPID3 (0-30) Cumulative Score  22.3          RAPID3 Weighted Score (divide #4 by 3 and that is the weighted score)  7.43         "

## 2018-03-06 DIAGNOSIS — M05.9 SEROPOSITIVE RHEUMATOID ARTHRITIS (H): ICD-10-CM

## 2018-03-06 LAB
ALBUMIN SERPL-MCNC: 3.6 G/DL (ref 3.4–5)
ALP SERPL-CCNC: 74 U/L (ref 40–150)
ALT SERPL W P-5'-P-CCNC: 36 U/L (ref 0–50)
AST SERPL W P-5'-P-CCNC: 27 U/L (ref 0–45)
BASOPHILS # BLD AUTO: 0 10E9/L (ref 0–0.2)
BASOPHILS NFR BLD AUTO: 0.3 %
BILIRUB DIRECT SERPL-MCNC: <0.1 MG/DL (ref 0–0.2)
BILIRUB SERPL-MCNC: 0.4 MG/DL (ref 0.2–1.3)
CREAT SERPL-MCNC: 0.68 MG/DL (ref 0.52–1.04)
DIFFERENTIAL METHOD BLD: NORMAL
EOSINOPHIL # BLD AUTO: 0.1 10E9/L (ref 0–0.7)
EOSINOPHIL NFR BLD AUTO: 2.3 %
ERYTHROCYTE [DISTWIDTH] IN BLOOD BY AUTOMATED COUNT: 14.3 % (ref 10–15)
GFR SERPL CREATININE-BSD FRML MDRD: 87 ML/MIN/1.7M2
HCT VFR BLD AUTO: 38.3 % (ref 35–47)
HGB BLD-MCNC: 12.5 G/DL (ref 11.7–15.7)
IMM GRANULOCYTES # BLD: 0 10E9/L (ref 0–0.4)
IMM GRANULOCYTES NFR BLD: 0.3 %
LYMPHOCYTES # BLD AUTO: 1.7 10E9/L (ref 0.8–5.3)
LYMPHOCYTES NFR BLD AUTO: 42.1 %
MCH RBC QN AUTO: 31 PG (ref 26.5–33)
MCHC RBC AUTO-ENTMCNC: 32.6 G/DL (ref 31.5–36.5)
MCV RBC AUTO: 95 FL (ref 78–100)
MONOCYTES # BLD AUTO: 0.4 10E9/L (ref 0–1.3)
MONOCYTES NFR BLD AUTO: 9.3 %
NEUTROPHILS # BLD AUTO: 1.8 10E9/L (ref 1.6–8.3)
NEUTROPHILS NFR BLD AUTO: 45.7 %
NRBC # BLD AUTO: 0 10*3/UL
NRBC BLD AUTO-RTO: 0 /100
PLATELET # BLD AUTO: 268 10E9/L (ref 150–450)
PROT SERPL-MCNC: 7.2 G/DL (ref 6.8–8.8)
RBC # BLD AUTO: 4.03 10E12/L (ref 3.8–5.2)
WBC # BLD AUTO: 4 10E9/L (ref 4–11)

## 2018-03-06 PROCEDURE — 36415 COLL VENOUS BLD VENIPUNCTURE: CPT | Performed by: INTERNAL MEDICINE

## 2018-03-06 PROCEDURE — 80076 HEPATIC FUNCTION PANEL: CPT | Performed by: INTERNAL MEDICINE

## 2018-03-06 PROCEDURE — 82565 ASSAY OF CREATININE: CPT | Performed by: INTERNAL MEDICINE

## 2018-03-06 PROCEDURE — 85025 COMPLETE CBC W/AUTO DIFF WBC: CPT | Performed by: INTERNAL MEDICINE

## 2018-03-06 ASSESSMENT — ENCOUNTER SYMPTOMS
ALTERED TEMPERATURE REGULATION: 1
NIGHT SWEATS: 0
SLEEP DISTURBANCES DUE TO BREATHING: 0
POLYDIPSIA: 0
INCREASED ENERGY: 0
SPUTUM PRODUCTION: 0
SHORTNESS OF BREATH: 1
LIGHT-HEADEDNESS: 0
MUSCLE CRAMPS: 1
MYALGIAS: 1
DYSPNEA ON EXERTION: 1
HEMOPTYSIS: 0
FEVER: 0
SYNCOPE: 0
HALLUCINATIONS: 0
MUSCLE WEAKNESS: 1
ARTHRALGIAS: 1
DECREASED APPETITE: 0
WEIGHT LOSS: 0
PALPITATIONS: 1
WHEEZING: 0
FATIGUE: 1
STIFFNESS: 1
COUGH DISTURBING SLEEP: 0
LEG PAIN: 0
POSTURAL DYSPNEA: 1
POLYPHAGIA: 0
ORTHOPNEA: 1
SNORES LOUDLY: 1
EXERCISE INTOLERANCE: 1
JOINT SWELLING: 1
NECK PAIN: 1
CHILLS: 0
WEIGHT GAIN: 1
HYPOTENSION: 0
BACK PAIN: 1
COUGH: 0
HYPERTENSION: 0

## 2018-03-09 ENCOUNTER — CARE COORDINATION (OUTPATIENT)
Dept: SURGERY | Facility: CLINIC | Age: 63
End: 2018-03-09

## 2018-03-09 ENCOUNTER — OFFICE VISIT (OUTPATIENT)
Dept: ENDOCRINOLOGY | Facility: CLINIC | Age: 63
End: 2018-03-09
Payer: COMMERCIAL

## 2018-03-09 VITALS
BODY MASS INDEX: 43.58 KG/M2 | SYSTOLIC BLOOD PRESSURE: 102 MMHG | DIASTOLIC BLOOD PRESSURE: 63 MMHG | HEART RATE: 81 BPM | HEIGHT: 64 IN | WEIGHT: 255.3 LBS

## 2018-03-09 DIAGNOSIS — F50.810 MILD BINGE-EATING DISORDER: ICD-10-CM

## 2018-03-09 DIAGNOSIS — R06.09 DYSPNEA ON EXERTION: Primary | ICD-10-CM

## 2018-03-09 DIAGNOSIS — E66.01 MORBID OBESITY (H): ICD-10-CM

## 2018-03-09 RX ORDER — ATOMOXETINE 25 MG/1
CAPSULE ORAL
Qty: 37 CAPSULE | Refills: 3 | Status: SHIPPED | OUTPATIENT
Start: 2018-03-09 | End: 2018-04-30

## 2018-03-09 NOTE — PATIENT INSTRUCTIONS
Food goal: increase protein intake, decrease carbs using food journal    Activity goal: start walking a couple of blocks and to increase up to a mile daily    Follow up with primary MD re: shortness of breath soon    EKG within the week to be considered for Atomoxetine

## 2018-03-09 NOTE — NURSING NOTE
"Chief Complaint   Patient presents with     Weight Problem     RMWM       Vitals:    03/09/18 0753   BP: 102/63   Pulse: 81   Weight: 255 lb 4.8 oz   Height: 5' 4\"       Body mass index is 43.82 kg/(m^2).  Jacob Prater CMA                     "

## 2018-03-09 NOTE — MR AVS SNAPSHOT
After Visit Summary   3/9/2018    Sharon Fung    MRN: [id number]           Patient Information     Date Of Birth          1955        Visit Information        Provider Department      3/9/2018 8:00 AM Ethel Panchal MD [facility]        Today's Diagnoses     Dyspnea on exertion    -  1    Morbid obesity (H)        Mild binge-eating disorder          Care Instructions    Food goal: increase protein intake, decrease carbs using food journal    Activity goal: start walking a couple of blocks and to increase up to a mile daily    Follow up with primary MD re: shortness of breath soon    EKG within the week to be considered for Atomoxetine                  Follow-ups after your visit        Follow-up notes from your care team     Return in about 1 month (around 4/9/2018).      Your next 10 appointments already scheduled     May 02, 2018  8:00 AM CDT   Return Visit with Freddy Guerra MD   [facility]    [address] [facility] [id number] [phone]              Future tests that were ordered for you today     Open Future Orders        Priority Expected Expires Ordered    EKG 12-lead complete with read (future) Routine  3/9/2019 3/9/2018            Who to contact     Please call your clinic at [phone] to:    Ask questions about your health    Make or cancel appointments    Discuss your medicines    Learn about your test results    Speak to your doctor            Additional Information About Your Visit        MyChart Information     [employer] gives you secure access to your electronic health record. If you see a primary care provider, you can also send messages to your care team and make appointments. If you have questions, please call your primary care clinic.  If you do not have a primary care provider, please call [phone] and they will assist you.      [employer] is an electronic gateway that provides easy,  "online access to your medical records. With [employer], you can request a clinic appointment, read your test results, renew a prescription or communicate with your care team.     To access your existing account, please contact your [facility] or call [phone] for assistance.        Care EveryWhere ID     This is your Care EveryWhere ID. This could be used by other organizations to access your [place] medical records  [id number]        Your Vitals Were     Pulse Height BMI (Body Mass Index)             81 1.626 m (5' 4\") 43.82 kg/m2          Blood Pressure from Last 3 Encounters:   03/09/18 102/63   01/31/18 144/70   11/29/17 122/72    Weight from Last 3 Encounters:   03/09/18 115.8 kg (255 lb 4.8 oz)   01/31/18 115.9 kg (255 lb 9.6 oz)   11/29/17 111.4 kg (245 lb 9.6 oz)                 Today's Medication Changes          These changes are accurate as of 3/9/18  9:03 AM.  If you have any questions, ask your nurse or doctor.               Start taking these medicines.        Dose/Directions    atomoxetine 25 MG capsule   Commonly known as:  STRATTERA   Used for:  Dyspnea on exertion, Morbid obesity (H)   Started by:  Ethel Panchal MD        Start 25 mg x 2 days, then incr to 50 mg x 2 days, then incr to 75 mg x 2 days, then stop at 100 mg qd.   Quantity:  37 capsule   Refills:  3         Stop taking these medicines if you haven't already. Please contact your care team if you have questions.     guaiFENesin-codeine 100-10 MG/5ML Soln solution   Commonly known as:  ROBITUSSIN AC   Stopped by:  Ethel Panchal MD           predniSONE 5 MG tablet   Commonly known as:  DELTASONE   Stopped by:  Ethel Panchal MD                Where to get your medicines      These medications were sent to [facility] [place], [place] - [address]  [address] [address]     Phone:  [phone]     atomoxetine 25 MG capsule                " Primary Care Provider Office Phone # Fax #    Reynaldo Saavedra [phone]       [address] [facility] [address]        Equal Access to Services     SVITLANA SERVIN : Rory sheryl jeronimo laurence Christopher, wajosé manuelda luqadaha, qaybta kaalmada stevo, susan lambertwesly hero. So [facility] [phone].    ATENCIÓN: Si habla español, tiene a ferrer disposición servicios gratuitos de asistencia lingüística. Llame al [phone].    We comply with applicable federal civil rights laws and [place] laws. We do not discriminate on the basis of race, color, national origin, age, disability, sex, sexual orientation, or gender identity.            Thank you!     Thank you for choosing [facility]  for your care. Our goal is always to provide you with excellent care. Hearing back from our patients is one way we can continue to improve our services. Please take a few minutes to complete the written survey that you may receive in the mail after your visit with us. Thank you!             Your Updated Medication List - Protect others around you: Learn how to safely use, store and throw away your medicines at www.disposemymeds.org.          This list is accurate as of 3/9/18  9:03 AM.  Always use your most recent med list.                   Brand Name Dispense Instructions for use Diagnosis    acetaminophen 500 MG tablet    TYLENOL     Take 500-1,000 mg by mouth every 6 hours as needed for mild pain        albuterol 108 (90 BASE) MCG/ACT Inhaler    PROAIR HFA/PROVENTIL HFA/VENTOLIN HFA    1 Inhaler    Inhale 2-4 puffs into the lungs every 4 hours as needed for shortness of breath / dyspnea or wheezing    Acute bronchitis, unspecified organism       atomoxetine 25 MG capsule    STRATTERA    37 capsule    Start 25 mg x 2 days, then incr to 50 mg x 2 days, then incr to 75 mg x 2 days, then stop at 100 mg qd.    Dyspnea on exertion, Morbid obesity (H)       azelastine 0.05 % Soln  "ophthalmic solution    OPTIVAR    1 Bottle    Apply 1 drop to eye 2 times daily    Allergic conjunctivitis of both eyes       citalopram 20 MG tablet    celeXA    90 tablet    Take 1 tablet (20 mg) by mouth daily    Mild major depression (H)       fexofenadine 180 MG tablet    ALLEGRA    90 tablet    Take 1 tablet (180 mg) by mouth daily    AR (allergic rhinitis)       FLONASE 50 MCG/ACT spray   Generic drug:  fluticasone      Spray 2 sprays into both nostrils as needed        folic acid 1 MG tablet    FOLVITE    100 tablet    Take 1 tablet (1 mg) by mouth daily    Seropositive rheumatoid arthritis (H)       ibuprofen 200 MG capsule      Take 600-800 mg by mouth At Bedtime        Insulin Syringe-Needle U-100 27G X 1/2\" 1 ML Misc    BD insulin syringe    10 each    1 Syringe every 7 days , to be used for methotrexate administration.    Seropositive rheumatoid arthritis (H)       methotrexate sodium 50 MG/2ML Soln     2 vial    Inject 0.8 mLs (20 mg) as directed every 7 days    Seropositive rheumatoid arthritis (H)       order for DME      Use your CPAP device as directed by your provider.        * RESTASIS 0.05 % ophthalmic emulsion   Generic drug:  cycloSPORINE      Place 1 drop into both eyes 2 times daily        * cycloSPORINE 0.05 % ophthalmic emulsion    RESTASIS    2 Box    Place 1 drop into both eyes 2 times daily    Sicca syndrome (H)       sulfaSALAzine  MG EC tablet    AZULFIDINE EN    120 tablet    500mg BID for 7 days, then increase to 1000mg BID and continue 1000mg BID thereafter.    Seropositive rheumatoid arthritis (H)       tofacitinib 11 MG 24 hr tablet    XELJANZ    30 tablet    Take 1 tablet (11 mg) by mouth daily    Seropositive rheumatoid arthritis (H)       * Notice:  This list has 2 medication(s) that are the same as other medications prescribed for you. Read the directions carefully, and ask your doctor or other care provider to review them with you.      "

## 2018-03-09 NOTE — LETTER
"3/9/2018     RE: Sharon Fung  [address] [place] [address]     Dear Colleague,    Thank you for referring your patient, Sharon Fung, to the [facility] at [facility]. Please see a copy of my visit note below.    Return Medical Weight Management Note     Sharon Fung  MRN:  [id number]  :  1955  JENIFER:  3/9/2018    Dear Reynaldo Saavedra,    I had the pleasure of seeing your patient Sharon Fung.  She is a 62 year old female who I am continuing to see for treatment of obesity related to:       2016   I have the following co-morbidities associated with obesity: DJD (Degenerative Joint Disease), Back Pain     RA  Depression    INTERVAL HISTORY:  She has to take steroids a lot due to her RA and that contributes to weight gain. She wants something to help her with her appetite.    CURRENT WEIGHT:   255 lbs 4.8 oz    Wt Readings from Last 4 Encounters:   18 115.8 kg (255 lb 4.8 oz)   18 115.9 kg (255 lb 9.6 oz)   17 111.4 kg (245 lb 9.6 oz)   17 111.1 kg (245 lb)     Height:  5' 4\"  Body Mass Index:  Body mass index is 43.82 kg/(m^2).  Vitals:  B/P: 102/63, P: 80    Initial consult weight was 240 on 16.  Weight change since last seen on 16 is up 15 pounds.   Total gain is 15 pounds.    Diet and Activity Changes Since Last Visit Reviewed With Patient 3/6/2018   I have made the following changes to my diet since my last visit: have tried nutrisystem diet   With regards to my diet, I am still struggling with: over eating and not eating the right foods   For breakfast, I typically eat: cereal   For lunch, I typically eat: sandwich   For supper, I typically eat: cereal   For snack(s), I typically eat: chips or candy   I have made the following changes to my activity/exercise since my last visit: none   With regards to my activity/exercise, I am still struggling with: just walking as I am " "begining to get SOB     ROS has HAYS x about 6 weeks, denies chest pain or palpitations    MEDICATIONS:   Current Outpatient Prescriptions   Medication     methotrexate sodium 50 MG/2ML SOLN     folic acid (FOLVITE) 1 MG tablet     sulfaSALAzine ER (AZULFIDINE EN) 500 MG EC tablet     tofacitinib (XELJANZ) 11 MG 24 hr tablet     citalopram (CELEXA) 20 MG tablet     cycloSPORINE (RESTASIS) 0.05 % ophthalmic emulsion     azelastine (OPTIVAR) 0.05 % SOLN ophthalmic solution     Insulin Syringe-Needle U-100 (BD INSULIN SYRINGE) 27G X 1/2\" 1 ML MISC     albuterol (PROAIR HFA/PROVENTIL HFA/VENTOLIN HFA) 108 (90 BASE) MCG/ACT Inhaler     ibuprofen 200 MG capsule     cycloSPORINE (RESTASIS) 0.05 % ophthalmic emulsion     fexofenadine (ALLEGRA) 180 MG tablet     acetaminophen (TYLENOL) 500 MG tablet     ORDER FOR DME     fluticasone (FLONASE) 50 MCG/ACT nasal spray     No current facility-administered medications for this visit.        Weight Loss Medication History Reviewed With Patient 3/6/2018   Which weight loss medications are you currently taking on a regular basis?  None   If you are not taking a weight loss medication that was prescribed to you, please indicate why: Other     EKG done today: NSR without any significant findings    ASSESSMENT:   Morbid obesity    PLAN:   Patient Instructions:  Food goal: increase protein intake, decrease carbs using food journal    Activity goal: start walking a couple of blocks and to increase up to a mile daily    Follow up with primary MD re: shortness of breath soon    EKG within the week to be considered for Atomoxetine -done    She will start atomoxetine/strattera tomorrow: 25 mg daily x 2 days, 50 mg x 3 days, then 100 mg after that. Her insurance company will not cover >2 pills daily, so after done with 5 days of the 25 mg tabs, she will change to 100 mg tabs.    Patient was counseled about risks and side effects. Patient wants to start atomoxetine/straterra. She is educated on " dosage regimen and possible side effects. She was told that atomoxetine/straterra is not FDA approved for treatment of obesity or binge eating disorder.    FOLLOW-UP:    4 weeks.    Time: 25 min spent on evaluation, management, counseling, education, & motivational interviewing with greater than 50% of the total time was spent on counseling and coordinating care    Sincerely,    Ethel Panchal MD

## 2018-03-09 NOTE — PROGRESS NOTES
"    Return Medical Weight Management Note     Sharon Fung  MRN:  [id number]  :  1955  JENIFER:  3/9/2018    Dear Reynaldo Saavedra,    I had the pleasure of seeing your patient Sharon Fung.  She is a 62 year old female who I am continuing to see for treatment of obesity related to:       2016   I have the following co-morbidities associated with obesity: DJD (Degenerative Joint Disease), Back Pain     RA  Depression    INTERVAL HISTORY:  She has to take steroids a lot due to her RA and that contributes to weight gain. She wants something to help her with her appetite.    CURRENT WEIGHT:   255 lbs 4.8 oz    Wt Readings from Last 4 Encounters:   18 115.8 kg (255 lb 4.8 oz)   18 115.9 kg (255 lb 9.6 oz)   17 111.4 kg (245 lb 9.6 oz)   17 111.1 kg (245 lb)     Height:  5' 4\"  Body Mass Index:  Body mass index is 43.82 kg/(m^2).  Vitals:  B/P: 102/63, P: 80    Initial consult weight was 240 on 16.  Weight change since last seen on 16 is up 15 pounds.   Total gain is 15 pounds.    Diet and Activity Changes Since Last Visit Reviewed With Patient 3/6/2018   I have made the following changes to my diet since my last visit: have tried nutrisystem diet   With regards to my diet, I am still struggling with: over eating and not eating the right foods   For breakfast, I typically eat: cereal   For lunch, I typically eat: sandwich   For supper, I typically eat: cereal   For snack(s), I typically eat: chips or candy   I have made the following changes to my activity/exercise since my last visit: none   With regards to my activity/exercise, I am still struggling with: just walking as I am begining to get SOB     ROS has HAYS x about 6 weeks, denies chest pain or palpitations    MEDICATIONS:   Current Outpatient Prescriptions   Medication     methotrexate sodium 50 MG/2ML SOLN     folic acid (FOLVITE) 1 MG tablet     sulfaSALAzine ER (AZULFIDINE EN) 500 MG EC tablet     tofacitinib " "(XELJANZ) 11 MG 24 hr tablet     citalopram (CELEXA) 20 MG tablet     cycloSPORINE (RESTASIS) 0.05 % ophthalmic emulsion     azelastine (OPTIVAR) 0.05 % SOLN ophthalmic solution     Insulin Syringe-Needle U-100 (BD INSULIN SYRINGE) 27G X 1/2\" 1 ML MISC     albuterol (PROAIR HFA/PROVENTIL HFA/VENTOLIN HFA) 108 (90 BASE) MCG/ACT Inhaler     ibuprofen 200 MG capsule     cycloSPORINE (RESTASIS) 0.05 % ophthalmic emulsion     fexofenadine (ALLEGRA) 180 MG tablet     acetaminophen (TYLENOL) 500 MG tablet     ORDER FOR DME     fluticasone (FLONASE) 50 MCG/ACT nasal spray     No current facility-administered medications for this visit.        Weight Loss Medication History Reviewed With Patient 3/6/2018   Which weight loss medications are you currently taking on a regular basis?  None   If you are not taking a weight loss medication that was prescribed to you, please indicate why: Other     EKG done today: NSR without any significant findings    ASSESSMENT:   Morbid obesity    PLAN:   Patient Instructions:  Food goal: increase protein intake, decrease carbs using food journal    Activity goal: start walking a couple of blocks and to increase up to a mile daily    Follow up with primary MD re: shortness of breath soon    EKG within the week to be considered for Atomoxetine -done    She will start atomoxetine/strattera tomorrow: 25 mg daily x 2 days, 50 mg x 3 days, then 100 mg after that. Her insurance company will not cover >2 pills daily, so after done with 5 days of the 25 mg tabs, she will change to 100 mg tabs.    Patient was counseled about risks and side effects. Patient wants to start atomoxetine/straterra. She is educated on dosage regimen and possible side effects. She was told that atomoxetine/straterra is not FDA approved for treatment of obesity or binge eating disorder.    FOLLOW-UP:    4 weeks.    Time: 25 min spent on evaluation, management, counseling, education, & motivational interviewing with greater than " 50% of the total time was spent on counseling and coordinating care    Sincerely,    Ethel Panchal MD

## 2018-03-09 NOTE — PROGRESS NOTES
Due to patients insurance needed to change taper of Strattera prescription - Patient will due 25 mg x2 days and 50 mg x3 days and then go to 100 mg tablets this was approved per Dr. Panchal.

## 2018-03-22 ENCOUNTER — TELEPHONE (OUTPATIENT)
Dept: FAMILY MEDICINE | Facility: CLINIC | Age: 63
End: 2018-03-22

## 2018-03-22 NOTE — TELEPHONE ENCOUNTER
Patient scheduled an appointment with PCP on 03/27 via [employer] for the following reason:    Have noticed that I am more SOB recently and just need to follow up to see what is causing the SOB. There is no chest pain     Routing to review symptoms.

## 2018-03-22 NOTE — TELEPHONE ENCOUNTER
Sent my chart message to patient with 911 triage questions.  Will attempt to call home number today.    Connie Matias RN  [facility]

## 2018-03-22 NOTE — TELEPHONE ENCOUNTER
Patient returned call - nurse picked up.    Sharon Fung is a 62 year old female who scheduled a my chart appointment with SOB.    NURSING ASSESSMENT: patient alert and oriented on the phone, states is not sure if the SOB is from her weight or something else, speaking in full sentences, nurse does not hear noisy respiration on phone,     DENIES: chest pain , blue lips or tongue, clammy skin, feeling of suffocation, frothy pink or copious white sputum, altered mental status, sever SOB with sudden onset, history of pulmonary embolus, blood clots or lung collapse, severe wheezing and history of asthma, inability to speak, drooling and inability to swallow, dyspnea after inhaling smoke, flame or fumes,  Pain, Sever SOB, wheezing or noisy breathing, recent trauma, surgery or childbirth, inhalation of a foreign body, exposure to allergen, speaking in short words, inability to breathe lying down or need to situp to breathe, immunosuppressed, temperature, progressively worsening shortness of breath, speaking in partial sentences, tight cough, audible wheezes at rest, upper respiratory infection and prior hospitalizations for same symptoms, inability to sleep > 1 to 2 hours due to coughing, history of diabetes or heart disease, fever, productive cough, numbness or tingling in the fingers or face, recent exposure to a stressful event or situation, nasal congestion productive cough with clear sputum   Description:  SOB when walking  Onset/duration:  A few weeks  Precip. factors:  activity  Associated symptoms:  none  Improves/worsens symptoms:  Activity worsens SOB  Pain scale (0-10)   0/10  LMP/preg/breast feeding:  an  Last exam/Treatment:  11/8/2017 acute bronchitis  Allergies: No Known Allergies    MEDICATIONS:   Taking medication(s) as prescribed? Yes  Taking over the counter medication(s?) No  Any medication side effects? No significant side effects    Any barriers to taking medication(s) as prescribed?   No  Medication(s) improving/managing symptoms?  No  Medication reconciliation completed: No       NURSING PLAN: Nursing advice to patient home care measures page 103 is scheduled Tuesday    RECOMMENDED DISPOSITION:  Home care advice - is scheduled Tuesday with PCP - if develops a fever > 101 call clinic. If develops chest pain, blue lips or tongue, pale or amaya face, clammy skin, feeling of suffocation, frothy pink or copious white sputum, decreased level of consciousness, inability to speak, drooling, unable to swallow saliva call 911. Patient verbalized understanding and agreement with plan and will keep appointment on Tuesday.    Will comply with recommendation: Yes  If further questions/concerns or if symptoms do not improve, worsen or new symptoms develop, call your PCP or [facility] as soon as possible.      Guideline used: Breathing Problems page 101 to 103  Telephone Triage Protocols for Nurses, Fifth Edition, Zuly Matias RN

## 2018-03-27 ENCOUNTER — OFFICE VISIT (OUTPATIENT)
Dept: FAMILY MEDICINE | Facility: CLINIC | Age: 63
End: 2018-03-27
Payer: COMMERCIAL

## 2018-03-27 VITALS
DIASTOLIC BLOOD PRESSURE: 71 MMHG | SYSTOLIC BLOOD PRESSURE: 130 MMHG | HEART RATE: 107 BPM | BODY MASS INDEX: 43.26 KG/M2 | OXYGEN SATURATION: 93 % | TEMPERATURE: 97 F | WEIGHT: 252 LBS

## 2018-03-27 DIAGNOSIS — E66.01 OBESITY, CLASS III, BMI 40-49.9 (MORBID OBESITY) (H): ICD-10-CM

## 2018-03-27 DIAGNOSIS — J44.9 CHRONIC OBSTRUCTIVE PULMONARY DISEASE, UNSPECIFIED COPD TYPE (H): ICD-10-CM

## 2018-03-27 DIAGNOSIS — R06.09 DOE (DYSPNEA ON EXERTION): Primary | ICD-10-CM

## 2018-03-27 DIAGNOSIS — M05.9 SEROPOSITIVE RHEUMATOID ARTHRITIS (H): ICD-10-CM

## 2018-03-27 PROCEDURE — 99214 OFFICE O/P EST MOD 30 MIN: CPT | Performed by: FAMILY MEDICINE

## 2018-03-27 RX ORDER — FLUTICASONE PROPIONATE 110 UG/1
2 AEROSOL, METERED RESPIRATORY (INHALATION) 2 TIMES DAILY
Qty: 1 INHALER | Refills: 5 | Status: SHIPPED | OUTPATIENT
Start: 2018-03-27 | End: 2018-12-07

## 2018-03-27 NOTE — MR AVS SNAPSHOT
After Visit Summary   3/27/2018    Sharon Fung    MRN: [id number]           Patient Information     Date Of Birth          1955        Visit Information        Provider Department      3/27/2018 7:40 AM Reynaldo Saavedra MD [facility]        Today's Diagnoses     HAYS (dyspnea on exertion)    -  1    Chronic obstructive pulmonary disease, unspecified COPD type (H)        Seropositive rheumatoid arthritis (H)        Obesity, Class III, BMI 40-49.9 (morbid obesity) (H)           Follow-ups after your visit        Follow-up notes from your care team     Return in about 2 months (around 5/27/2018).      Your next 10 appointments already scheduled     Mar 30, 2018  4:00 PM CDT   Ech Complete with [id number]   [facility] ([facility])    [address] [place] [id number] [phone]           1. Please bring or wear a comfortable two-piece outfit. 2. You may eat, drink and take your normal medicines. 3. For any questions that cannot be answered, please contact the ordering physician            Apr 20, 2018  9:20 AM CDT   (Arrive by 9:05 AM)   Return Visit with Ethel Panchal MD   [facility] ([facility])    [address] [facility]  [address] [facility] [address] [phone]            May 02, 2018  8:00 AM CDT   Return Visit with Freddy Guerra MD   [facility]    [address] [place] [id number] [phone]              Future tests that were ordered for you today     Open Future Orders        Priority Expected Expires Ordered    Echocardiogram Complete Routine  3/27/2019 3/27/2018    Spirometry, Breathing Capacity Routine  5/11/2018 3/27/2018            Who to contact     If you have questions or need follow up information about today's clinic visit or your schedule please contact [facility] directly at [phone].  Normal or non-critical lab and imaging results will be communicated to you by [employer], letter or phone within 4 business days after the clinic has received the results. If you do not hear from us within 7 days, please contact the clinic through [employer] or phone. If you have a critical or abnormal lab result, we will notify you by phone as soon as possible.  Submit refill requests through [employer] or call your pharmacy and they will forward the refill request to us. Please allow 3 business days for your refill to be completed.          Additional Information About Your Visit        [employer] Information     [employer] gives you secure access to your electronic health record. If you see a primary care provider, you can also send messages to your care team and make appointments. If you have questions, please call your primary care clinic.  If you do not have a primary care provider, please call [phone] and they will assist you.        Care EveryWhere ID     This is your Care EveryWhere ID. This could be used by other organizations to access your [place] medical records  [id number]        Your Vitals Were     Pulse Temperature Pulse Oximetry BMI (Body Mass Index)          107 97  F (36.1  C) (Oral) 93% 43.26 kg/m2         Blood Pressure from Last 3 Encounters:   03/27/18 130/71   03/09/18 102/63   01/31/18 144/70    Weight from Last 3 Encounters:   03/27/18 252 lb (114.3 kg)   03/09/18 255 lb 4.8 oz (115.8 kg)   01/31/18 255 lb 9.6 oz (115.9 kg)                 Today's Medication Changes          These changes are accurate as of 3/27/18  8:21 AM.  If you have any questions, ask your nurse or doctor.               Start taking these medicines.        Dose/Directions    fluticasone 110 MCG/ACT Inhaler   Commonly known as:  FLOVENT HFA   Used for:  Chronic obstructive pulmonary disease, unspecified COPD type (H)   Started by:  Reynaldo Saavedra MD        Dose:  2 puff    Inhale 2 puffs into the lungs 2 times daily   Quantity:  1 Inhaler   Refills:  5            Where to get your medicines      These medications were sent to [facility] [place], [place] - [address]  [address] [facility], [facility] [address]     Phone:  [phone]     fluticasone 110 MCG/ACT Inhaler                Primary Care Provider Office Phone # Fax #    Reynalod Saavedra [phone]       [address] [facility] [address]        Equal Access to Services     [facility]: Hadii aad ku hadasho Soomaali, waaxda luqadaha, qaybta kaalmada adeegyada, waxay idiin hayaan adeeg barb terry . So [facility] [phone].    ATENCIÓN: Si habla español, tiene a ferrer disposición servicios gratuitos de asistencia lingüística. [facility] [phone].    We comply with applicable federal civil rights laws and [place] laws. We do not discriminate on the basis of race, color, national origin, age, disability, sex, sexual orientation, or gender identity.            Thank you!     Thank you for choosing [facility]  for your care. Our goal is always to provide you with excellent care. Hearing back from our patients is one way we can continue to improve our services. Please take a few minutes to complete the written survey that you may receive in the mail after your visit with us. Thank you!             Your Updated Medication List - Protect others around you: Learn how to safely use, store and throw away your medicines at www.disposemymeds.org.          This list is accurate as of 3/27/18  8:21 AM.  Always use your most recent med list.                   Brand Name Dispense Instructions for use Diagnosis    acetaminophen 500 MG tablet    TYLENOL     Take 500-1,000 mg by mouth every 6 hours as needed for mild pain        albuterol 108 (90 BASE) MCG/ACT Inhaler    PROAIR HFA/PROVENTIL HFA/VENTOLIN HFA    1 Inhaler    Inhale 2-4 puffs into the lungs every 4  "hours as needed for shortness of breath / dyspnea or wheezing    Acute bronchitis, unspecified organism       atomoxetine 25 MG capsule    STRATTERA    37 capsule    Start 25 mg x 2 days, then incr to 50 mg x 2 days, then incr to 75 mg x 2 days, then stop at 100 mg qd.    Dyspnea on exertion, Morbid obesity (H)       azelastine 0.05 % Soln ophthalmic solution    OPTIVAR    1 Bottle    Apply 1 drop to eye 2 times daily    Allergic conjunctivitis of both eyes       citalopram 20 MG tablet    celeXA    90 tablet    Take 1 tablet (20 mg) by mouth daily    Mild major depression (H)       fexofenadine 180 MG tablet    ALLEGRA    90 tablet    Take 1 tablet (180 mg) by mouth daily    AR (allergic rhinitis)       FLONASE 50 MCG/ACT spray   Generic drug:  fluticasone      Spray 2 sprays into both nostrils as needed        fluticasone 110 MCG/ACT Inhaler    FLOVENT HFA    1 Inhaler    Inhale 2 puffs into the lungs 2 times daily    Chronic obstructive pulmonary disease, unspecified COPD type (H)       folic acid 1 MG tablet    FOLVITE    100 tablet    Take 1 tablet (1 mg) by mouth daily    Seropositive rheumatoid arthritis (H)       ibuprofen 200 MG capsule      Take 600-800 mg by mouth At Bedtime        Insulin Syringe-Needle U-100 27G X 1/2\" 1 ML Misc    BD insulin syringe    10 each    1 Syringe every 7 days , to be used for methotrexate administration.    Seropositive rheumatoid arthritis (H)       methotrexate sodium 50 MG/2ML Soln     2 vial    Inject 0.8 mLs (20 mg) as directed every 7 days    Seropositive rheumatoid arthritis (H)       order for DME      Use your CPAP device as directed by your provider.        * RESTASIS 0.05 % ophthalmic emulsion   Generic drug:  cycloSPORINE      Place 1 drop into both eyes 2 times daily        * cycloSPORINE 0.05 % ophthalmic emulsion    RESTASIS    2 Box    Place 1 drop into both eyes 2 times daily    Sicca syndrome (H)       sulfaSALAzine  MG EC tablet    AZULFIDINE EN    " 120 tablet    500mg BID for 7 days, then increase to 1000mg BID and continue 1000mg BID thereafter.    Seropositive rheumatoid arthritis (H)       tofacitinib 11 MG 24 hr tablet    XELJANZ    30 tablet    Take 1 tablet (11 mg) by mouth daily    Seropositive rheumatoid arthritis (H)       * Notice:  This list has 2 medication(s) that are the same as other medications prescribed for you. Read the directions carefully, and ask your doctor or other care provider to review them with you.

## 2018-03-27 NOTE — PROGRESS NOTES
SUBJECTIVE:   Sharon Fung is a 62 year old female who presents to clinic today for the following health issues:      Concern - SOB since August but worse since January  Onset: January    Description:   SOB     Intensity: moderate    Progression of Symptoms:  same    Accompanying Signs & Symptoms:  none    Previous history of similar problem:   no    Precipitating factors:   Worsened by: Activity     Alleviating factors:  Improved by: nothing     Therapies Tried and outcome: none     She had a chest x-ray done in the fall which looked okay.  She does not have any unusual cough now.  She did have persistent infection symptoms late last summer which eventually cleared.  She has never been a smoker.  She is not having any chest discomfort with her dyspnea on exertion.  She has been taking a fair amount of prednisone off and on for her rheumatoid arthritis.  She has had weight gain.  She was recently started on Strattera to help with weight loss.  She has noticed a little bit of ankle swelling, mainly on the right side.  Questionable orthopnea, but she does have sleep apnea and does use CPAP at night.    Problem list and histories reviewed & adjusted, as indicated.  Additional history: as documented    Patient Active Problem List   Diagnosis     AR (allergic rhinitis)     GERD (gastroesophageal reflux disease)     Status post bariatric surgery     Mild major depression (H)     Advanced directives, counseling/discussion     High risk medication use     Obstructive sleep apnea     Obesity, Class III, BMI 40-49.9 (morbid obesity) (H)     Anterior basement membrane dystrophy     Seropositive rheumatoid arthritis (H)     LORNA positive     Carpal tunnel syndrome of right wrist     Headache     Chronic obstructive pulmonary disease, unspecified COPD type (H)     Past Surgical History:   Procedure Laterality Date     BLEPHAROPLASTY BILATERAL Bilateral 8/5/2016    Procedure: BLEPHAROPLASTY BILATERAL;  Surgeon: Sharda  MD Cortez;  Location:  [facility]     BREAST BIOPSY, RT/LT  1-94    Benign     C APPENDECTOMY  1977     COLONOSCOPY       COLONOSCOPY WITH CO2 INSUFFLATION N/A 3/30/2017    Procedure: COLONOSCOPY WITH CO2 INSUFFLATION;  Surgeon: Issa Weeks MD;  Location: [facility] OR     ESOPHAGOSCOPY, GASTROSCOPY, DUODENOSCOPY (EGD), COMBINED N/A 10/29/2015    Procedure: COMBINED ESOPHAGOSCOPY, GASTROSCOPY, DUODENOSCOPY (EGD);  Surgeon: Shaq Mims MD;  Location: [facility]     LAPAROSCOPIC GASTRIC ADJUSTABLE BANDING  11/09/10    Lap band procedure     LAPAROSCOPIC REMOVAL GASTRIC ADJUSTABLE BAND N/A 10/31/2015    Procedure: LAPAROSCOPIC REMOVAL GASTRIC ADJUSTABLE BAND;  Surgeon: Shaq Mims MD;  Location: [facility] OR     RELEASE CARPAL TUNNEL Left 4/27/2017    Procedure: RELEASE CARPAL TUNNEL;  Left Open Carpal Tunnel Release;  Surgeon: Ciara Middleton MD;  Location: [facility]     RELEASE CARPAL TUNNEL Right 6/15/2017    Procedure: RELEASE CARPAL TUNNEL;  Right Carpal Tunnel Release Open;  Surgeon: Ciara Middleton MD;  Location: [facility] OR     REPAIR PTOSIS       TUBAL LIGATION  1988       Social History   Substance Use Topics     Smoking status: Never Smoker     Smokeless tobacco: Never Used     Alcohol use No     Family History   Problem Relation Age of Onset     HEART DISEASE Father      MI     Neurologic Disorder Father 79     Parkinson's     DIABETES Father      Hypertension Father      Coronary Artery Disease Father      CANCER Maternal Grandmother      uterine     Neurologic Disorder Sister 16     migraine     Depression Sister      Neurologic Disorder Mother 20     migraine     Depression Mother      Neurologic Disorder Son 7     migraine     Substance Abuse Son      Depression Sister      Substance Abuse Sister          Current Outpatient Prescriptions   Medication Sig Dispense Refill            atomoxetine (STRATTERA) 25 MG capsule Start 25 mg x 2 days, then incr to 50 mg x 2 days, then incr to 75 mg x  "2 days, then stop at 100 mg qd. 37 capsule 3     methotrexate sodium 50 MG/2ML SOLN Inject 0.8 mLs (20 mg) as directed every 7 days 2 vial 3     folic acid (FOLVITE) 1 MG tablet Take 1 tablet (1 mg) by mouth daily 100 tablet 3     sulfaSALAzine ER (AZULFIDINE EN) 500 MG EC tablet 500mg BID for 7 days, then increase to 1000mg BID and continue 1000mg BID thereafter. 120 tablet 3     tofacitinib (XELJANZ) 11 MG 24 hr tablet Take 1 tablet (11 mg) by mouth daily 30 tablet 3     citalopram (CELEXA) 20 MG tablet Take 1 tablet (20 mg) by mouth daily 90 tablet 1     cycloSPORINE (RESTASIS) 0.05 % ophthalmic emulsion Place 1 drop into both eyes 2 times daily 2 Box 11     azelastine (OPTIVAR) 0.05 % SOLN ophthalmic solution Apply 1 drop to eye 2 times daily 1 Bottle 6     Insulin Syringe-Needle U-100 (BD INSULIN SYRINGE) 27G X 1/2\" 1 ML MISC 1 Syringe every 7 days , to be used for methotrexate administration. 10 each 5     albuterol (PROAIR HFA/PROVENTIL HFA/VENTOLIN HFA) 108 (90 BASE) MCG/ACT Inhaler Inhale 2-4 puffs into the lungs every 4 hours as needed for shortness of breath / dyspnea or wheezing 1 Inhaler 1     ibuprofen 200 MG capsule Take 600-800 mg by mouth At Bedtime       cycloSPORINE (RESTASIS) 0.05 % ophthalmic emulsion Place 1 drop into both eyes 2 times daily       fexofenadine (ALLEGRA) 180 MG tablet Take 1 tablet (180 mg) by mouth daily 90 tablet 2     acetaminophen (TYLENOL) 500 MG tablet Take 500-1,000 mg by mouth every 6 hours as needed for mild pain       ORDER FOR DME Use your CPAP device as directed by your provider.       fluticasone (FLONASE) 50 MCG/ACT nasal spray Spray 2 sprays into both nostrils as needed       No Known Allergies    Reviewed and updated as needed this visit by clinical staff  Tobacco  Allergies  Meds  Med Hx  Surg Hx  Fam Hx  Soc Hx      Reviewed and updated as needed this visit by Provider         ROS:  Mainly significant for the above    OBJECTIVE:     /71 (BP " Location: Other (Comment), Patient Position: Sitting, Cuff Size: Adult Regular)  Pulse 107  Temp 97  F (36.1  C) (Oral)  Wt 252 lb (114.3 kg)  SpO2 93%  BMI 43.26 kg/m2  Body mass index is 43.26 kg/(m^2).  GENERAL: alert, no distress and obese  RESP: lungs clear to auscultation - no rales, rhonchi or wheezes  CV: regular rate and rhythm, normal S1 S2, no S3 or S4, no murmur, click or rub  MS: Trace edema of the right lower extremity and no edema of the left lower extremity    Diagnostic Test Results:  Spirometry was done and she does show evidence of mild obstruction.  FEV1 is 70% of predicted.    ASSESSMENT/PLAN:       ICD-10-CM    1. HAYS (dyspnea on exertion) R06.09 Spirometry, Breathing Capacity     Echocardiogram Complete   2. Chronic obstructive pulmonary disease, unspecified COPD type (H) J44.9 fluticasone (FLOVENT HFA) 110 MCG/ACT Inhaler   3. Seropositive rheumatoid arthritis (H) M05.9    4. Obesity, Class III, BMI 40-49.9 (morbid obesity) (H) E66.01      She does appear to have some obstructive airway disease, likely from her obesity  Her underlying rheumatoid arthritis could be affecting her pulmonary function as well  We will start her on Flovent  We will check an echocardiogram to help rule out cardiac etiology for her dyspnea on exertion  She will continue to work on weight loss  Plan a recheck in about 2 months    Reynaldo Saavedra MD  [facility]

## 2018-03-27 NOTE — NURSING NOTE
"Chief Complaint   Patient presents with     Shortness of Breath     Health Maintenance       Initial /71 (BP Location: Other (Comment), Patient Position: Sitting, Cuff Size: Adult Regular)  Pulse 107  Temp 97  F (36.1  C) (Oral)  Wt 252 lb (114.3 kg)  SpO2 93%  BMI 43.26 kg/m2 Estimated body mass index is 43.26 kg/(m^2) as calculated from the following:    Height as of 3/9/18: 5' 4\" (1.626 m).    Weight as of this encounter: 252 lb (114.3 kg).  Medication Reconciliation: complete   Shea Farooq ma      "

## 2018-03-30 ENCOUNTER — RADIANT APPOINTMENT (OUTPATIENT)
Dept: CARDIOLOGY | Facility: CLINIC | Age: 63
End: 2018-03-30
Attending: FAMILY MEDICINE
Payer: COMMERCIAL

## 2018-03-30 DIAGNOSIS — R06.09 DOE (DYSPNEA ON EXERTION): ICD-10-CM

## 2018-03-30 PROCEDURE — 40000264 ZZHC STATISTIC IV PUSH SINGLE INITIAL SUBSTANCE: Performed by: INTERNAL MEDICINE

## 2018-03-30 PROCEDURE — 93306 TTE W/DOPPLER COMPLETE: CPT | Performed by: INTERNAL MEDICINE

## 2018-03-30 RX ADMIN — Medication 3 ML: at 16:30

## 2018-04-02 NOTE — PROGRESS NOTES
Sharon,  Your echocardiogram shows that your cardiac function is good.  You do not appear to have any heart problem to account for your shortness of breath with activity.  I think using the Flovent inhaler and working on weight loss, as you are planning to do, is still an appropriate treatment plan for this.    Reynaldo Saavedra MD

## 2018-04-16 DIAGNOSIS — M05.9 SEROPOSITIVE RHEUMATOID ARTHRITIS (H): ICD-10-CM

## 2018-04-16 LAB
BASOPHILS # BLD AUTO: 0 10E9/L (ref 0–0.2)
BASOPHILS NFR BLD AUTO: 0.4 %
CREAT SERPL-MCNC: 0.7 MG/DL (ref 0.52–1.04)
CRP SERPL-MCNC: 7 MG/L (ref 0–8)
DIFFERENTIAL METHOD BLD: NORMAL
EOSINOPHIL # BLD AUTO: 0.1 10E9/L (ref 0–0.7)
EOSINOPHIL NFR BLD AUTO: 1.4 %
ERYTHROCYTE [DISTWIDTH] IN BLOOD BY AUTOMATED COUNT: 14 % (ref 10–15)
ERYTHROCYTE [SEDIMENTATION RATE] IN BLOOD BY WESTERGREN METHOD: 18 MM/H (ref 0–30)
GFR SERPL CREATININE-BSD FRML MDRD: 85 ML/MIN/1.7M2
HCT VFR BLD AUTO: 39.2 % (ref 35–47)
HGB BLD-MCNC: 12.6 G/DL (ref 11.7–15.7)
IMM GRANULOCYTES # BLD: 0 10E9/L (ref 0–0.4)
IMM GRANULOCYTES NFR BLD: 0.4 %
LYMPHOCYTES # BLD AUTO: 1.8 10E9/L (ref 0.8–5.3)
LYMPHOCYTES NFR BLD AUTO: 31.8 %
MCH RBC QN AUTO: 30.7 PG (ref 26.5–33)
MCHC RBC AUTO-ENTMCNC: 32.1 G/DL (ref 31.5–36.5)
MCV RBC AUTO: 95 FL (ref 78–100)
MONOCYTES # BLD AUTO: 0.4 10E9/L (ref 0–1.3)
MONOCYTES NFR BLD AUTO: 7.4 %
NEUTROPHILS # BLD AUTO: 3.2 10E9/L (ref 1.6–8.3)
NEUTROPHILS NFR BLD AUTO: 58.6 %
NRBC # BLD AUTO: 0 10*3/UL
NRBC BLD AUTO-RTO: 0 /100
PLATELET # BLD AUTO: 325 10E9/L (ref 150–450)
RBC # BLD AUTO: 4.11 10E12/L (ref 3.8–5.2)
WBC # BLD AUTO: 5.5 10E9/L (ref 4–11)

## 2018-04-16 PROCEDURE — 36415 COLL VENOUS BLD VENIPUNCTURE: CPT | Performed by: INTERNAL MEDICINE

## 2018-04-16 PROCEDURE — 82565 ASSAY OF CREATININE: CPT | Performed by: INTERNAL MEDICINE

## 2018-04-16 PROCEDURE — 85652 RBC SED RATE AUTOMATED: CPT | Performed by: INTERNAL MEDICINE

## 2018-04-16 PROCEDURE — 85025 COMPLETE CBC W/AUTO DIFF WBC: CPT | Performed by: INTERNAL MEDICINE

## 2018-04-16 PROCEDURE — 80076 HEPATIC FUNCTION PANEL: CPT | Performed by: INTERNAL MEDICINE

## 2018-04-16 PROCEDURE — 86140 C-REACTIVE PROTEIN: CPT | Performed by: INTERNAL MEDICINE

## 2018-04-18 LAB
ALBUMIN SERPL-MCNC: 3.8 G/DL (ref 3.4–5)
ALP SERPL-CCNC: 80 U/L (ref 40–150)
ALT SERPL W P-5'-P-CCNC: 29 U/L (ref 0–50)
AST SERPL W P-5'-P-CCNC: 29 U/L (ref 0–45)
BILIRUB DIRECT SERPL-MCNC: <0.1 MG/DL (ref 0–0.2)
BILIRUB SERPL-MCNC: 0.2 MG/DL (ref 0.2–1.3)
PROT SERPL-MCNC: 7.3 G/DL (ref 6.8–8.8)

## 2018-04-18 NOTE — PROGRESS NOTES
Rheumatology team: please call the lab to see if the hepatic panel can be added to the labs recently done.  Not sure why it wasn't drawn.  If not sufficiency blood for the lab, then please ask the patient to return for another blood draw for a hepatic panel.  Other labs were okay.

## 2018-04-18 NOTE — PROGRESS NOTES
Contacted [facility] lab and spoke to technician who added hepatic panel to recently drawn labs.    Mukul Rodriguez RN....4/18/2018 2:21 PM

## 2018-04-19 ENCOUNTER — TELEPHONE (OUTPATIENT)
Dept: RHEUMATOLOGY | Facility: CLINIC | Age: 63
End: 2018-04-19

## 2018-04-19 NOTE — TELEPHONE ENCOUNTER
Left voicemail for patient to return call to notify her that labs were normal and that a hepatic panel will be added to her recently done labs.    Mukul Rodriguez RN....4/19/2018 8:18 AM

## 2018-04-19 NOTE — LETTER
[address] [place] [address]      April 23, 2018    Sharon Fung  [address] [place] [address]          Erlinda Mercedes is a copy of your results. Your labs are all within normal limits    Results for orders placed or performed in visit on 04/16/18   CBC with platelets differential   Result Value Ref Range    WBC 5.5 4.0 - 11.0 10e9/L    RBC Count 4.11 3.8 - 5.2 10e12/L    Hemoglobin 12.6 11.7 - 15.7 g/dL    Hematocrit 39.2 35.0 - 47.0 %    MCV 95 78 - 100 fl    MCH 30.7 26.5 - 33.0 pg    MCHC 32.1 31.5 - 36.5 g/dL    RDW 14.0 10.0 - 15.0 %    Platelet Count 325 150 - 450 10e9/L    Diff Method Automated Method     % Neutrophils 58.6 %    % Lymphocytes 31.8 %    % Monocytes 7.4 %    % Eosinophils 1.4 %    % Basophils 0.4 %    % Immature Granulocytes 0.4 %    Nucleated RBCs 0 0 /100    Absolute Neutrophil 3.2 1.6 - 8.3 10e9/L    Absolute Lymphocytes 1.8 0.8 - 5.3 10e9/L    Absolute Monocytes 0.4 0.0 - 1.3 10e9/L    Absolute Eosinophils 0.1 0.0 - 0.7 10e9/L    Absolute Basophils 0.0 0.0 - 0.2 10e9/L    Abs Immature Granulocytes 0.0 0 - 0.4 10e9/L    Absolute Nucleated RBC 0.0    Creatinine   Result Value Ref Range    Creatinine 0.70 0.52 - 1.04 mg/dL    GFR Estimate 85 >60 mL/min/1.7m2    GFR Estimate If Black >90 >60 mL/min/1.7m2   Erythrocyte sedimentation rate auto   Result Value Ref Range    Sed Rate 18 0 - 30 mm/h   CRP inflammation   Result Value Ref Range    CRP Inflammation 7.0 0.0 - 8.0 mg/L   Hepatic panel   Result Value Ref Range    Bilirubin Direct <0.1 0.0 - 0.2 mg/dL    Bilirubin Total 0.2 0.2 - 1.3 mg/dL    Albumin 3.8 3.4 - 5.0 g/dL    Protein Total 7.3 6.8 - 8.8 g/dL    Alkaline Phosphatase 80 40 - 150 U/L    ALT 29 0 - 50 U/L    AST 29 0 - 45 U/L       If you have any questions or concerns, please call myself or my nurse at [phone].      Sincerely,  Freddy  Charlie GARCIA

## 2018-04-27 ASSESSMENT — ENCOUNTER SYMPTOMS
COUGH: 0
DECREASED APPETITE: 0
NIGHT SWEATS: 0
FEVER: 0
FATIGUE: 1
SNORES LOUDLY: 0
WEIGHT LOSS: 1
NECK PAIN: 0
JOINT SWELLING: 1
MUSCLE CRAMPS: 0
ALTERED TEMPERATURE REGULATION: 0
WEIGHT GAIN: 0
MYALGIAS: 0
MUSCLE WEAKNESS: 0
COUGH DISTURBING SLEEP: 0
WHEEZING: 0
SHORTNESS OF BREATH: 0
ARTHRALGIAS: 1
STIFFNESS: 1
DYSPNEA ON EXERTION: 1
INCREASED ENERGY: 0
HALLUCINATIONS: 0
POLYDIPSIA: 0
CHILLS: 0
POLYPHAGIA: 0
BACK PAIN: 0
HEMOPTYSIS: 0
POSTURAL DYSPNEA: 0

## 2018-04-30 ENCOUNTER — OFFICE VISIT (OUTPATIENT)
Dept: ENDOCRINOLOGY | Facility: CLINIC | Age: 63
End: 2018-04-30
Payer: COMMERCIAL

## 2018-04-30 VITALS
SYSTOLIC BLOOD PRESSURE: 126 MMHG | WEIGHT: 250.6 LBS | HEIGHT: 64 IN | DIASTOLIC BLOOD PRESSURE: 70 MMHG | OXYGEN SATURATION: 96 % | BODY MASS INDEX: 42.78 KG/M2 | HEART RATE: 98 BPM

## 2018-04-30 DIAGNOSIS — E66.01 MORBID OBESITY (H): Primary | ICD-10-CM

## 2018-04-30 ASSESSMENT — ENCOUNTER SYMPTOMS
EXERCISE INTOLERANCE: 0
HEMATURIA: 0
ABDOMINAL PAIN: 0
INCREASED ENERGY: 0
TROUBLE SWALLOWING: 0
SMELL DISTURBANCE: 0
PANIC: 0
INSOMNIA: 0
WEAKNESS: 0
DECREASED APPETITE: 0
BREAST PAIN: 0
COUGH DISTURBING SLEEP: 0
EYE WATERING: 0
HEARTBURN: 0
BACK PAIN: 0
TACHYCARDIA: 0
HALLUCINATIONS: 0
WHEEZING: 0
POOR WOUND HEALING: 0
EXTREMITY NUMBNESS: 0
PALPITATIONS: 0
DIZZINESS: 0
SEIZURES: 0
SINUS CONGESTION: 0
RECTAL BLEEDING: 0
DECREASED LIBIDO: 0
MUSCLE CRAMPS: 0
HEADACHES: 0
TREMORS: 0
EYE PAIN: 0
EYE IRRITATION: 0
BRUISES/BLEEDS EASILY: 0
CONSTIPATION: 0
DISTURBANCES IN COORDINATION: 0
NECK PAIN: 0
POSTURAL DYSPNEA: 0
SLEEP DISTURBANCES DUE TO BREATHING: 0
NERVOUS/ANXIOUS: 0
BLOATING: 0
MUSCLE WEAKNESS: 0
SORE THROAT: 0
TASTE DISTURBANCE: 0
NAUSEA: 0
NIGHT SWEATS: 0
LEG SWELLING: 0
CHILLS: 0
JOINT SWELLING: 1
RECTAL PAIN: 0
FATIGUE: 1
DECREASED CONCENTRATION: 0
ARTHRALGIAS: 1
POLYPHAGIA: 0
NUMBNESS: 0
MEMORY LOSS: 0
BOWEL INCONTINENCE: 0
HEMOPTYSIS: 0
HYPERTENSION: 0
PARALYSIS: 0
POLYDIPSIA: 0
NECK MASS: 0
HOT FLASHES: 0
SNORES LOUDLY: 0
ORTHOPNEA: 0
DYSURIA: 0
DEPRESSION: 0
DYSPNEA ON EXERTION: 1
COUGH: 0
SYNCOPE: 0
SPEECH CHANGE: 0
ALTERED TEMPERATURE REGULATION: 0
TINGLING: 0
WEIGHT GAIN: 0
BLOOD IN STOOL: 0
LIGHT-HEADEDNESS: 0
JAUNDICE: 0
WEIGHT LOSS: 1
STIFFNESS: 1
EYE REDNESS: 0
DOUBLE VISION: 0
NAIL CHANGES: 0
LEG PAIN: 0
SINUS PAIN: 0
HOARSE VOICE: 0
DIARRHEA: 0
DIFFICULTY URINATING: 0
FLANK PAIN: 0
VOMITING: 0
SHORTNESS OF BREATH: 0
CLAUDICATION: 0
SKIN CHANGES: 0
BREAST MASS: 0
FEVER: 0
MYALGIAS: 0
LOSS OF CONSCIOUSNESS: 0
HYPOTENSION: 0
SWOLLEN GLANDS: 0

## 2018-04-30 NOTE — PATIENT INSTRUCTIONS
MEDICATION STARTED AT THIS APPOINTMENT    We are starting Qsymia. This is a specific obesity medication and is a combination of Phentermine and Topiramate, formulated as a sustained release, taken one time a day. There are a few different doses of the medication. Doses come in 3.75/23 mg (usually skipped), 7.5/46 mg, 11.25/69 mg, and 15/92 mg. Call the nurse at [phone] if you have any questions or concerns. (Do not stop taking it if you don't think it's working. For some people it works even though they do not feel much different.)    For some of our patients, the pills work right away. They feel and think quite differently about food. Other patients don't feel much of a change but find in fact they have lost weight! Like all weight loss medications, Qsymia works best when you help it work.  This means:    1) Have less tempting high calorie (fattening) food around the house or office    2) Have lower calorie food (fruits, vegetables,low fat meats and dairy) for snacks    3) Eat out only one time or less each week.   4) Eat your meals at a table with the TV or computer off.    Side-effects (generally well tolerated because it is a sustained release medication)    Topiramate:   -Tingling in hands,feet, or face (usually not very troublesome)   -Mental confusion and word finding trouble (about 10% of patients have this.)     -Feeling sleepy or a bit dopey- this goes away very soon after starting.      Phentermine:    -Feelings of racing pulse or rapid heart beat.    -Increased anxiety.   -Some people can get an elevated blood pressure. Because of this we may have  you  come back within a week or so of starting the medication for a blood pressure check.    One of the dangers of topiramate is the possibility of birth defects--if you get pregnant when you are on it, there is the risk that your baby will be born with a cleft lip or palate.  If you are on topiramate and of child bearing age, you need to be on a  reliable form of birth control or refrain from sexual intercourse.     It is very rare for insurance to pay for this and it typically costs around $200 per month. Please check out the website www.qsymia.com and sign up for the Pharmacy savings program to save $75 a month for 12 months if eligible. The medication can also be paid for out of pocket. It may require a prior authorization which could take up to 1-2 weeks.      In order to get refills of this or any medication we prescribe you must be seen in the medical weight mgmt clinic every 2-4 months. Please have your pharmacy fax a refill request to [phone].

## 2018-04-30 NOTE — NURSING NOTE
"  Chief Complaint   Patient presents with     Weight Problem     RMWM     Vitals:    04/30/18 1540   BP: 126/70   Pulse: 98   SpO2: 96%   Weight: 250 lb 9.6 oz   Height: 5' 4\"     Body mass index is 43.02 kg/(m^2).  Jacob Prater CMA    "

## 2018-04-30 NOTE — LETTER
"2018       RE: Sharon Fung  [address] [place] [address]     Dear Colleague,    Thank you for referring your patient, Sharon Fung, to the [facility] MEDICAL WEIGHT MANAGEMENT at [facility]. Please see a copy of my visit note below.        Return Medical Weight Management Note     Sharon Fung  MRN:  [id number]  :  1955  JENIFER:  18    Dear Reynaldo Saavedra,    I had the pleasure of seeing your patient Sharon Fung.  She is a 62 year old female who I am continuing to see for treatment of obesity related to:       2016   I have the following co-morbidities associated with obesity: DJD (Degenerative Joint Disease), Back Pain     RA  Depression    INTERVAL HISTORY:  She saw Dr. Panchal 1 month ago and was started on Strattera, off label, for weight loss.  Today patient reports that she did not notice any change in her appetite with Strattera. No side effects either.     CURRENT WEIGHT:   250 lbs 9.6 oz    Wt Readings from Last 4 Encounters:   18 113.7 kg (250 lb 9.6 oz)   18 114.3 kg (252 lb)   18 115.8 kg (255 lb 4.8 oz)   18 115.9 kg (255 lb 9.6 oz)     Height:  5' 4\"  Body Mass Index:  Body mass index is 43.02 kg/(m^2).  Vitals:  B/P: 102/63, P: 80    Initial consult weight was 240 on 16.  Weight change since last seen on 16 is down 5 pounds.   Total gain is 10 pounds.    Diet and Activity Changes Since Last Visit Reviewed With Patient 2018   I have made the following changes to my diet since my last visit: eating smaller portions   With regards to my diet, I am still struggling with: eating sweets   For breakfast, I typically eat: -   For lunch, I typically eat: -   For supper, I typically eat: -   For snack(s), I typically eat: -   I have made the following changes to my activity/exercise since my last visit: trying to walk at least 3-4 days a week   With regards to my activity/exercise, " I am still struggling with: dealing with the joints hurting and being tired     Review of Systems     Constitutional:  Positive for weight loss and fatigue. Negative for fever, chills, weight gain, decreased appetite, night sweats, recent stressors, height loss, post-operative complications, incisional pain, hallucinations, increased energy, hyperactivity and confused.   HENT:  Negative for ear pain, hearing loss, tinnitus, nosebleeds, trouble swallowing, hoarse voice, mouth sores, sore throat, ear discharge, tooth pain, gum tenderness, taste disturbance, smell disturbance, hearing aid, bleeding gums, dry mouth, sinus pain, sinus congestion and neck mass.    Eyes:  Negative for double vision, pain, redness, eye pain, decreased vision, eye watering, eye bulging, eye dryness, flashing lights, spots, floaters, strabismus, tunnel vision, jaundice and eye irritation.   Respiratory:   Positive for dyspnea on exertion. Negative for cough, hemoptysis, shortness of breath, wheezing, sleep disturbances due to breathing, snores loudly, cough disturbing sleep and postural dyspnea.    Cardiovascular:  Positive for dyspnea on exertion. Negative for chest pain, palpitations, orthopnea, claudication, leg swelling, fingers/toes turn blue, hypertension, hypotension, syncope, history of heart murmur, chest pain on exertion, chest pain at rest, pacemaker, few scattered varicosities, leg pain, sleep disturbances due to breathing, tachycardia, light-headedness, exercise intolerance and edema.   Gastrointestinal:  Negative for heartburn, nausea, vomiting, abdominal pain, diarrhea, constipation, blood in stool, melena, rectal pain, bloating, hemorrhoids, bowel incontinence, jaundice, rectal bleeding, coffee ground emesis and change in stool.   Genitourinary:  Negative for bladder incontinence, dysuria, urgency, hematuria, flank pain, vaginal discharge, difficulty urinating, genital sores, dyspareunia, decreased libido, nocturia, voiding  less frequently, arousal difficulty, abnormal vaginal bleeding, excessive menstruation, menstrual changes, hot flashes, vaginal dryness and postmenopausal bleeding.   Musculoskeletal:  Positive for joint swelling, arthralgias and stiffness. Negative for myalgias, back pain, muscle cramps, neck pain, bone pain, muscle weakness and fracture.   Skin:  Negative for nail changes, itching, poor wound healing, rash, hair changes, skin changes, acne, warts, poor wound healing, scarring, flaky skin, Raynaud's phenomenon, sensitivity to sunlight and skin thickening.   Neurological:  Negative for dizziness, tingling, tremors, speech change, seizures, loss of consciousness, weakness, light-headedness, numbness, headaches, disturbances in coordination, extremity numbness, memory loss, difficulty walking and paralysis.   Endo/Heme:  Negative for anemia, swollen glands and bruises/bleeds easily.   Psychiatric/Behavioral:  Negative for depression, hallucinations, memory loss, decreased concentration, mood swings and panic attacks.    Breast:  Negative for breast discharge, breast mass, breast pain and nipple retraction.   Endocrine:  Negative for altered temperature regulation, polyphagia, polydipsia, unwanted hair growth and change in facial hair.   has HAYS x about 6 weeks, denies chest pain or palpitations    MEDICATIONS:   Current Outpatient Prescriptions   Medication     acetaminophen (TYLENOL) 500 MG tablet     albuterol (PROAIR HFA/PROVENTIL HFA/VENTOLIN HFA) 108 (90 BASE) MCG/ACT Inhaler     atomoxetine (STRATTERA) 25 MG capsule     azelastine (OPTIVAR) 0.05 % SOLN ophthalmic solution     citalopram (CELEXA) 20 MG tablet     cycloSPORINE (RESTASIS) 0.05 % ophthalmic emulsion     cycloSPORINE (RESTASIS) 0.05 % ophthalmic emulsion     fexofenadine (ALLEGRA) 180 MG tablet     fluticasone (FLONASE) 50 MCG/ACT nasal spray     fluticasone (FLOVENT HFA) 110 MCG/ACT Inhaler     folic acid (FOLVITE) 1 MG tablet     ibuprofen 200 MG  "capsule     Insulin Syringe-Needle U-100 (BD INSULIN SYRINGE) 27G X 1/2\" 1 ML MISC     methotrexate sodium 50 MG/2ML SOLN     ORDER FOR DME     sulfaSALAzine ER (AZULFIDINE EN) 500 MG EC tablet     tofacitinib (XELJANZ) 11 MG 24 hr tablet     No current facility-administered medications for this visit.        Weight Loss Medication History Reviewed With Patient 4/27/2018   Which weight loss medications are you currently taking on a regular basis?  None   If you are not taking a weight loss medication that was prescribed to you, please indicate why: Other   Are you having any side effects from the weight loss medication that we have prescribed you? No   If you are having side effects please describe: dry mouth     EKG done today: NSR without any significant findings    ASSESSMENT:   Morbid obesity  Minimal response to Strattera  Discussed FDA-approved options  Patient elected QSYMIA    PLAN:   - Food: small portions, no snacking  - Activity: increase in spring/summer  - Meds: Patient will start QSYMIA. If insurance does not cover, will prescribe phentermine and topiramate.     FOLLOW-UP:    12 weeks.    Time: 20 min spent on evaluation, management, counseling, education, & motivational interviewing with greater than 50% of the total time was spent on counseling and coordinating care    Sincerely,    Litzy Vallejo MD    "

## 2018-04-30 NOTE — MR AVS SNAPSHOT
After Visit Summary   4/30/2018    Sharon Fung    MRN: [id number]           Patient Information     Date Of Birth          1955        Visit Information        Provider Department      4/30/2018 3:20 PM Litzy Vallejo MD [facility] Medical Weight Management        Today's Diagnoses     Morbid obesity (H)    -  1      Care Instructions      MEDICATION STARTED AT THIS APPOINTMENT    We are starting Qsymia. This is a specific obesity medication and is a combination of Phentermine and Topiramate, formulated as a sustained release, taken one time a day. There are a few different doses of the medication. Doses come in 3.75/23 mg (usually skipped), 7.5/46 mg, 11.25/69 mg, and 15/92 mg. Call the nurse at [phone] if you have any questions or concerns. (Do not stop taking it if you don't think it's working. For some people it works even though they do not feel much different.)    For some of our patients, the pills work right away. They feel and think quite differently about food. Other patients don't feel much of a change but find in fact they have lost weight! Like all weight loss medications, Qsymia works best when you help it work.  This means:    1) Have less tempting high calorie (fattening) food around the house or office    2) Have lower calorie food (fruits, vegetables,low fat meats and dairy) for snacks    3) Eat out only one time or less each week.   4) Eat your meals at a table with the TV or computer off.    Side-effects (generally well tolerated because it is a sustained release medication)    Topiramate:   -Tingling in hands,feet, or face (usually not very troublesome)   -Mental confusion and word finding trouble (about 10% of patients have this.)     -Feeling sleepy or a bit dopey- this goes away very soon after starting.      Phentermine:    -Feelings of racing pulse or rapid heart beat.    -Increased anxiety.   -Some people can get an elevated blood pressure. Because of this  we may have  you  come back within a week or so of starting the medication for a blood pressure check.    One of the dangers of topiramate is the possibility of birth defects--if you get pregnant when you are on it, there is the risk that your baby will be born with a cleft lip or palate.  If you are on topiramate and of child bearing age, you need to be on a reliable form of birth control or refrain from sexual intercourse.     It is very rare for insurance to pay for this and it typically costs around $200 per month. Please check out the website www.qsymia.com and sign up for the Pharmacy savings program to save $75 a month for 12 months if eligible. The medication can also be paid for out of pocket. It may require a prior authorization which could take up to 1-2 weeks.      In order to get refills of this or any medication we prescribe you must be seen in the medical weight mgmt clinic every 2-4 months. Please have your pharmacy fax a refill request to [phone].                    Follow-ups after your visit        Your next 10 appointments already scheduled     May 02, 2018  8:00 AM CDT   Return Visit with Freddy Guerra MD   [facility] ([facility])    [address] [place] [id number] [phone]              Who to contact     Please call your clinic at [phone] to:    Ask questions about your health    Make or cancel appointments    Discuss your medicines    Learn about your test results    Speak to your doctor            Additional Information About Your Visit        [email or web address] Information     [email or web address] gives you secure access to your electronic health record. If you see a primary care provider, you can also send messages to your care team and make appointments. If you have questions, please call your primary care clinic.  If you do not have a primary care provider, please call [phone] and they will assist you.      [email or web address] is an electronic gateway that  "provides easy, online access to your medical records. With [employer], you can request a clinic appointment, read your test results, renew a prescription or communicate with your care team.     To access your existing account, please contact your [facility] or call [phone] for assistance.        Care EveryWhere ID     This is your Care EveryWhere ID. This could be used by other organizations to access your [place] medical records  [id number]        Your Vitals Were     Pulse Height Pulse Oximetry BMI (Body Mass Index)          98 1.626 m (5' 4\") 96% 43.02 kg/m2         Blood Pressure from Last 3 Encounters:   04/30/18 126/70   03/27/18 130/71   03/09/18 102/63    Weight from Last 3 Encounters:   04/30/18 113.7 kg (250 lb 9.6 oz)   03/27/18 114.3 kg (252 lb)   03/09/18 115.8 kg (255 lb 4.8 oz)              Today, you had the following     No orders found for display         Today's Medication Changes          These changes are accurate as of 4/30/18  4:00 PM.  If you have any questions, ask your nurse or doctor.               Start taking these medicines.        Dose/Directions    Phentermine-Topiramate 15-92 MG Cp24   Used for:  Morbid obesity (H)   Started by:  Litzy Vallejo MD        Dose:  1 capsule   Take 1 capsule by mouth daily   Quantity:  30 capsule   Refills:  3         Stop taking these medicines if you haven't already. Please contact your care team if you have questions.     atomoxetine 25 MG capsule   Commonly known as:  STRATTERA   Stopped by:  Litzy Vallejo MD                Where to get your medicines      Some of these will need a paper prescription and others can be bought over the counter.  Ask your nurse if you have questions.     Bring a paper prescription for each of these medications     Phentermine-Topiramate 15-92 MG Cp24                Primary Care Provider Office Phone # Fax #    Reynaldo Saavedra [phone]       [address] [place] [address] " FERMIN LUCAS  Levine, Susan. \[facility] Has a New Name and Outlook.\"" [id number]        Equal Access to Services     SVITLANA SERVIN : Hadii sheryl jeronimo hadshun Soalexandro, waaxda luqadaha, qaybta kakt shaanlouiseel, waxbarbara tarun manjitwesly williszeuskatherine terry . So [facility] [phone].    ATENCIÓN: Si habla español, tiene a ferrer disposición servicios gratuitos de asistencia lingüística. Llame al [phone].    We comply with applicable federal civil rights laws and [place] laws. We do not discriminate on the basis of race, color, national origin, age, disability, sex, sexual orientation, or gender identity.            Thank you!     Thank you for choosing [facility] MEDICAL WEIGHT MANAGEMENT  for your care. Our goal is always to provide you with excellent care. Hearing back from our patients is one way we can continue to improve our services. Please take a few minutes to complete the written survey that you may receive in the mail after your visit with us. Thank you!             Your Updated Medication List - Protect others around you: Learn how to safely use, store and throw away your medicines at www.disposemymeds.org.          This list is accurate as of 4/30/18  4:00 PM.  Always use your most recent med list.                   Brand Name Dispense Instructions for use Diagnosis    acetaminophen 500 MG tablet    TYLENOL     Take 500-1,000 mg by mouth every 6 hours as needed for mild pain        albuterol 108 (90 Base) MCG/ACT Inhaler    PROAIR HFA/PROVENTIL HFA/VENTOLIN HFA    1 Inhaler    Inhale 2-4 puffs into the lungs every 4 hours as needed for shortness of breath / dyspnea or wheezing    Acute bronchitis, unspecified organism       azelastine 0.05 % Soln ophthalmic solution    OPTIVAR    1 Bottle    Apply 1 drop to eye 2 times daily    Allergic conjunctivitis of both eyes       citalopram 20 MG tablet    celeXA    90 tablet    Take 1 tablet (20 mg) by mouth daily    Mild major depression (H)       fexofenadine 180 MG tablet    ALLEGRA    90 tablet    Take 1 tablet (180  "mg) by mouth daily    AR (allergic rhinitis)       FLONASE 50 MCG/ACT spray   Generic drug:  fluticasone      Spray 2 sprays into both nostrils as needed        fluticasone 110 MCG/ACT Inhaler    FLOVENT HFA    1 Inhaler    Inhale 2 puffs into the lungs 2 times daily    Chronic obstructive pulmonary disease, unspecified COPD type (H)       folic acid 1 MG tablet    FOLVITE    100 tablet    Take 1 tablet (1 mg) by mouth daily    Seropositive rheumatoid arthritis (H)       ibuprofen 200 MG capsule      Take 600-800 mg by mouth At Bedtime        Insulin Syringe-Needle U-100 27G X 1/2\" 1 ML Misc    BD insulin syringe    10 each    1 Syringe every 7 days , to be used for methotrexate administration.    Seropositive rheumatoid arthritis (H)       methotrexate sodium 50 MG/2ML Soln     2 vial    Inject 0.8 mLs (20 mg) as directed every 7 days    Seropositive rheumatoid arthritis (H)       order for DME      Use your CPAP device as directed by your provider.        Phentermine-Topiramate 15-92 MG Cp24     30 capsule    Take 1 capsule by mouth daily    Morbid obesity (H)       * RESTASIS 0.05 % ophthalmic emulsion   Generic drug:  cycloSPORINE      Place 1 drop into both eyes 2 times daily        * cycloSPORINE 0.05 % ophthalmic emulsion    RESTASIS    2 Box    Place 1 drop into both eyes 2 times daily    Sicca syndrome (H)       sulfaSALAzine  MG EC tablet    AZULFIDINE EN    120 tablet    500mg BID for 7 days, then increase to 1000mg BID and continue 1000mg BID thereafter.    Seropositive rheumatoid arthritis (H)       tofacitinib 11 MG 24 hr tablet    XELJANZ    30 tablet    Take 1 tablet (11 mg) by mouth daily    Seropositive rheumatoid arthritis (H)       * Notice:  This list has 2 medication(s) that are the same as other medications prescribed for you. Read the directions carefully, and ask your doctor or other care provider to review them with you.      "

## 2018-04-30 NOTE — PROGRESS NOTES
"    Return Medical Weight Management Note     Sharon Fung  MRN:  [id number]  :  1955  JENIFER:  18    Dear Reynaldo Saavedra,    I had the pleasure of seeing your patient Sharon Fung.  She is a 62 year old female who I am continuing to see for treatment of obesity related to:       2016   I have the following co-morbidities associated with obesity: DJD (Degenerative Joint Disease), Back Pain     RA  Depression    INTERVAL HISTORY:  She saw Dr. Panchal 1 month ago and was started on Strattera, off label, for weight loss.  Today patient reports that she did not notice any change in her appetite with Strattera. No side effects either.     CURRENT WEIGHT:   250 lbs 9.6 oz    Wt Readings from Last 4 Encounters:   18 113.7 kg (250 lb 9.6 oz)   18 114.3 kg (252 lb)   18 115.8 kg (255 lb 4.8 oz)   18 115.9 kg (255 lb 9.6 oz)     Height:  5' 4\"  Body Mass Index:  Body mass index is 43.02 kg/(m^2).  Vitals:  B/P: 102/63, P: 80    Initial consult weight was 240 on 16.  Weight change since last seen on 16 is down 5 pounds.   Total gain is 10 pounds.    Diet and Activity Changes Since Last Visit Reviewed With Patient 2018   I have made the following changes to my diet since my last visit: eating smaller portions   With regards to my diet, I am still struggling with: eating sweets   For breakfast, I typically eat: -   For lunch, I typically eat: -   For supper, I typically eat: -   For snack(s), I typically eat: -   I have made the following changes to my activity/exercise since my last visit: trying to walk at least 3-4 days a week   With regards to my activity/exercise, I am still struggling with: dealing with the joints hurting and being tired     Review of Systems     Constitutional:  Positive for weight loss and fatigue. Negative for fever, chills, weight gain, decreased appetite, night sweats, recent stressors, height loss, post-operative complications, incisional " pain, hallucinations, increased energy, hyperactivity and confused.   HENT:  Negative for ear pain, hearing loss, tinnitus, nosebleeds, trouble swallowing, hoarse voice, mouth sores, sore throat, ear discharge, tooth pain, gum tenderness, taste disturbance, smell disturbance, hearing aid, bleeding gums, dry mouth, sinus pain, sinus congestion and neck mass.    Eyes:  Negative for double vision, pain, redness, eye pain, decreased vision, eye watering, eye bulging, eye dryness, flashing lights, spots, floaters, strabismus, tunnel vision, jaundice and eye irritation.   Respiratory:   Positive for dyspnea on exertion. Negative for cough, hemoptysis, shortness of breath, wheezing, sleep disturbances due to breathing, snores loudly, cough disturbing sleep and postural dyspnea.    Cardiovascular:  Positive for dyspnea on exertion. Negative for chest pain, palpitations, orthopnea, claudication, leg swelling, fingers/toes turn blue, hypertension, hypotension, syncope, history of heart murmur, chest pain on exertion, chest pain at rest, pacemaker, few scattered varicosities, leg pain, sleep disturbances due to breathing, tachycardia, light-headedness, exercise intolerance and edema.   Gastrointestinal:  Negative for heartburn, nausea, vomiting, abdominal pain, diarrhea, constipation, blood in stool, melena, rectal pain, bloating, hemorrhoids, bowel incontinence, jaundice, rectal bleeding, coffee ground emesis and change in stool.   Genitourinary:  Negative for bladder incontinence, dysuria, urgency, hematuria, flank pain, vaginal discharge, difficulty urinating, genital sores, dyspareunia, decreased libido, nocturia, voiding less frequently, arousal difficulty, abnormal vaginal bleeding, excessive menstruation, menstrual changes, hot flashes, vaginal dryness and postmenopausal bleeding.   Musculoskeletal:  Positive for joint swelling, arthralgias and stiffness. Negative for myalgias, back pain, muscle cramps, neck pain, bone  "pain, muscle weakness and fracture.   Skin:  Negative for nail changes, itching, poor wound healing, rash, hair changes, skin changes, acne, warts, poor wound healing, scarring, flaky skin, Raynaud's phenomenon, sensitivity to sunlight and skin thickening.   Neurological:  Negative for dizziness, tingling, tremors, speech change, seizures, loss of consciousness, weakness, light-headedness, numbness, headaches, disturbances in coordination, extremity numbness, memory loss, difficulty walking and paralysis.   Endo/Heme:  Negative for anemia, swollen glands and bruises/bleeds easily.   Psychiatric/Behavioral:  Negative for depression, hallucinations, memory loss, decreased concentration, mood swings and panic attacks.    Breast:  Negative for breast discharge, breast mass, breast pain and nipple retraction.   Endocrine:  Negative for altered temperature regulation, polyphagia, polydipsia, unwanted hair growth and change in facial hair.   has HAYS x about 6 weeks, denies chest pain or palpitations    MEDICATIONS:   Current Outpatient Prescriptions   Medication     acetaminophen (TYLENOL) 500 MG tablet     albuterol (PROAIR HFA/PROVENTIL HFA/VENTOLIN HFA) 108 (90 BASE) MCG/ACT Inhaler     atomoxetine (STRATTERA) 25 MG capsule     azelastine (OPTIVAR) 0.05 % SOLN ophthalmic solution     citalopram (CELEXA) 20 MG tablet     cycloSPORINE (RESTASIS) 0.05 % ophthalmic emulsion     cycloSPORINE (RESTASIS) 0.05 % ophthalmic emulsion     fexofenadine (ALLEGRA) 180 MG tablet     fluticasone (FLONASE) 50 MCG/ACT nasal spray     fluticasone (FLOVENT HFA) 110 MCG/ACT Inhaler     folic acid (FOLVITE) 1 MG tablet     ibuprofen 200 MG capsule     Insulin Syringe-Needle U-100 (BD INSULIN SYRINGE) 27G X 1/2\" 1 ML MISC     methotrexate sodium 50 MG/2ML SOLN     ORDER FOR DME     sulfaSALAzine ER (AZULFIDINE EN) 500 MG EC tablet     tofacitinib (XELJANZ) 11 MG 24 hr tablet     No current facility-administered medications for this visit.  "       Weight Loss Medication History Reviewed With Patient 4/27/2018   Which weight loss medications are you currently taking on a regular basis?  None   If you are not taking a weight loss medication that was prescribed to you, please indicate why: Other   Are you having any side effects from the weight loss medication that we have prescribed you? No   If you are having side effects please describe: dry mouth     EKG done today: NSR without any significant findings    ASSESSMENT:   Morbid obesity  Minimal response to Strattera  Discussed FDA-approved options  Patient elected QSYMIA    PLAN:   - Food: small portions, no snacking  - Activity: increase in spring/summer  - Meds: Patient will start QSYMIA. If insurance does not cover, will prescribe phentermine and topiramate.     FOLLOW-UP:    12 weeks.    Time: 20 min spent on evaluation, management, counseling, education, & motivational interviewing with greater than 50% of the total time was spent on counseling and coordinating care    Sincerely,    Litzy Vallejo MD

## 2018-05-02 ENCOUNTER — OFFICE VISIT (OUTPATIENT)
Dept: RHEUMATOLOGY | Facility: CLINIC | Age: 63
End: 2018-05-02
Payer: COMMERCIAL

## 2018-05-02 ENCOUNTER — TELEPHONE (OUTPATIENT)
Dept: ENDOCRINOLOGY | Facility: CLINIC | Age: 63
End: 2018-05-02

## 2018-05-02 VITALS
RESPIRATION RATE: 14 BRPM | HEART RATE: 95 BPM | BODY MASS INDEX: 42.68 KG/M2 | TEMPERATURE: 97.1 F | SYSTOLIC BLOOD PRESSURE: 134 MMHG | DIASTOLIC BLOOD PRESSURE: 82 MMHG | HEIGHT: 64 IN | OXYGEN SATURATION: 97 % | WEIGHT: 250 LBS

## 2018-05-02 DIAGNOSIS — Z79.899 HIGH RISK MEDICATIONS (NOT ANTICOAGULANTS) LONG-TERM USE: ICD-10-CM

## 2018-05-02 DIAGNOSIS — M05.9 SEROPOSITIVE RHEUMATOID ARTHRITIS (H): Primary | ICD-10-CM

## 2018-05-02 PROCEDURE — 99213 OFFICE O/P EST LOW 20 MIN: CPT | Performed by: INTERNAL MEDICINE

## 2018-05-02 RX ORDER — SULFASALAZINE 500 MG/1
1000 TABLET, DELAYED RELEASE ORAL 2 TIMES DAILY
Qty: 120 TABLET | Refills: 3 | Status: SHIPPED | OUTPATIENT
Start: 2018-05-02 | End: 2018-08-08

## 2018-05-02 NOTE — PROGRESS NOTES
Rheumatology Clinic Visit      Sharon Fung MRN# [id number]   YOB: 1955 Age: 62 year old      Date of visit: 5/02/18   PCP: Dr. Reynaldo Saavedra    Chief Complaint   Patient presents with:  Arthritis: RA, patient states she is doing better, thinks medication is working.    Assessment and Plan     1. Seropositive nonerosive rheumatoid arthritis (RF negative, CCP low positive): Previously on Humira (lost efficacy), Cimzia (ineffective), Orencia (ineffective), leflunomide (ineffective), and hydroxychloroquine (ineffective). Currently on MTX 20mg SQ (GI upset with PO), SSZ 1000mg BID, and Xeljanz XR 11 mg daily.  Doing better since starting sulfasalazine; she has been on it for approximately 3 months.  If needed in the future, could increase methotrexate and/or sulfasalazine doses.  - Continue methotrexate 20mg SQ every 7 days  - Continue folic acid 1mg daily  - Continue Xeljanz XR 11 mg daily  - Continue sulfasalazine 1000 mg twice daily   - Labs in 3 months: CBC, Creatinine, Hepatic Panel, ESR, CRP              Rapid 3, cumulative scores                      05/02/2018:  9     (MTX 20mg SQ wkly, Xeljanz XR 11mg daily, SSZ 1000mg BID)                          01/31/2018: 22.3 (MTX 20mg SQ wkly, Xeljanz XR 11mg daily)                          11/29/2017: 16.8 (MTX 20mg SQ wkly)                      08/02/2017: 4.2   (MTX 20mg SQ wkly, Xeljanz XR 11mg daily)                         05/04/2017: 7      (MTX 20mg SQ wkly, Xeljanz XR 11mg daily)                        02/02/2017: 8      (MTX 20mg SQ wkly, Xeljanz XR 11mg daily)                      11/04/2016: 13    (MTX 20mg SQ weekly)                      07/15/2016: 10.8    2. Positive LORNA and dsDNA / Secondary Sjogren's Syndrome: Repeat dsDNAs have been negative. Has dry eyes but no dry mouth.  Dry eyes are treated by ophthalmology with Restasis.  Likely Secondary Sjogren's Syndrome.     Ms. Fung verbalized agreement with and understanding of the  rational for the diagnosis and treatment plan.  All questions were answered to best of my ability and the patient's satisfaction. Ms. Fung was advised to contact the clinic with any questions that may arise after the clinic visit.      Thank you for involving me in the care of the patient    Return to clinic: 3 months    HPI   Sharon Fung is a 62 year old female with medical history significant for GERD, allergic rhinitis, obstructive sleep apnea, obesity, hx of trigger fingers, hx of carpal tunnel surgery, and rheumatoid arthritis who presents for follow-up of rheumatoid arthritis.    Today, Ms. Fung reports that she is doing much better since starting sulfasalazine.  Morning stiffness for about 1-2 hours; previously reported to be all day.  Pain and stiffness improved with activity and worsened with inactivity.  Positive gelling phenomenon.  Tolerating medications well.       Occasional fevers and chills. No nausea, vomiting, constipation, diarrhea. No chest pain/pressure, palpitations, or shortness of breath. No oral or nasal sores. No neck pain. No rash.      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 No oral or nasal ulcers.  CV: No chest pain, pressure, palpitations, or dyspnea on exertion.  PULM: no shortness of breath. Persistent cough.  GI: No nausea, vomiting, constipation, diarrhea. No blood in stool. No abdominal pain.  : No blood in urine.  MSK: See HPI.  NEURO: No numbness, tingling, or weakness.  EXT: No LE swelling  PSYCH: Negative    Active Problem List     Patient Active Problem List   Diagnosis     AR (allergic rhinitis)     GERD (gastroesophageal reflux disease)     Status post bariatric surgery     Mild major depression (H)     Advanced directives, counseling/discussion     High risk medication use     Obstructive  sleep apnea     Obesity, Class III, BMI 40-49.9 (morbid obesity) (H)     Anterior basement membrane dystrophy     Seropositive rheumatoid arthritis (H)     LORNA positive     Carpal tunnel syndrome of right wrist     Headache     Chronic obstructive pulmonary disease, unspecified COPD type (H)     Past Medical History     Past Medical History:   Diagnosis Date     AR (allergic rhinitis)  as teen     Arthritis 12/14/2015     Chronic obstructive pulmonary disease, unspecified COPD type (H) 3/27/2018     Depressive disorder 3 years ago     GERD (gastroesophageal reflux disease) 4-07     Headache 7/11/2017     Mild major depression (H) 3 years ago     Morbid obesity (H) teens     BRETT (obstructive sleep apnea)     uses CPAP     Rheumatoid arthritis, adult (H)      Past Surgical History     Past Surgical History:   Procedure Laterality Date     BLEPHAROPLASTY BILATERAL Bilateral 8/5/2016    Procedure: BLEPHAROPLASTY BILATERAL;  Surgeon: Cortez Robin MD;  Location:  [facility]     BREAST BIOPSY, RT/LT  1-94    Benign     C APPENDECTOMY  1977     COLONOSCOPY       COLONOSCOPY WITH CO2 INSUFFLATION N/A 3/30/2017    Procedure: COLONOSCOPY WITH CO2 INSUFFLATION;  Surgeon: Issa Weeks MD;  Location: [facility]     ESOPHAGOSCOPY, GASTROSCOPY, DUODENOSCOPY (EGD), COMBINED N/A 10/29/2015    Procedure: COMBINED ESOPHAGOSCOPY, GASTROSCOPY, DUODENOSCOPY (EGD);  Surgeon: Shaq Mims MD;  Location: [facility]     LAPAROSCOPIC GASTRIC ADJUSTABLE BANDING  11/09/10    Lap band procedure     LAPAROSCOPIC REMOVAL GASTRIC ADJUSTABLE BAND N/A 10/31/2015    Procedure: LAPAROSCOPIC REMOVAL GASTRIC ADJUSTABLE BAND;  Surgeon: Shaq Mims MD;  Location: [facility]     RELEASE CARPAL TUNNEL Left 4/27/2017    Procedure: RELEASE CARPAL TUNNEL;  Left Open Carpal Tunnel Release;  Surgeon: Ciara Middleton MD;  Location:  OR     RELEASE CARPAL TUNNEL Right 6/15/2017    Procedure: RELEASE CARPAL TUNNEL;  Right Carpal Tunnel  "Release Open;  Surgeon: Ciara Middleton MD;  Location: [facility]     REPAIR PTOSIS       TUBAL LIGATION  1988     Allergy   No Known Allergies  Current Medication List     Current Outpatient Prescriptions   Medication Sig     acetaminophen (TYLENOL) 500 MG tablet Take 500-1,000 mg by mouth every 6 hours as needed for mild pain     albuterol (PROAIR HFA/PROVENTIL HFA/VENTOLIN HFA) 108 (90 BASE) MCG/ACT Inhaler Inhale 2-4 puffs into the lungs every 4 hours as needed for shortness of breath / dyspnea or wheezing     azelastine (OPTIVAR) 0.05 % SOLN ophthalmic solution Apply 1 drop to eye 2 times daily     citalopram (CELEXA) 20 MG tablet Take 1 tablet (20 mg) by mouth daily     cycloSPORINE (RESTASIS) 0.05 % ophthalmic emulsion Place 1 drop into both eyes 2 times daily     cycloSPORINE (RESTASIS) 0.05 % ophthalmic emulsion Place 1 drop into both eyes 2 times daily     fexofenadine (ALLEGRA) 180 MG tablet Take 1 tablet (180 mg) by mouth daily     fluticasone (FLONASE) 50 MCG/ACT nasal spray Spray 2 sprays into both nostrils as needed     fluticasone (FLOVENT HFA) 110 MCG/ACT Inhaler Inhale 2 puffs into the lungs 2 times daily     folic acid (FOLVITE) 1 MG tablet Take 1 tablet (1 mg) by mouth daily     ibuprofen 200 MG capsule Take 600-800 mg by mouth At Bedtime     Insulin Syringe-Needle U-100 (BD INSULIN SYRINGE) 27G X 1/2\" 1 ML MISC 1 Syringe every 7 days , to be used for methotrexate administration.     methotrexate sodium 50 MG/2ML SOLN Inject 0.8 mLs (20 mg) as directed every 7 days     ORDER FOR DME Use your CPAP device as directed by your provider.     Phentermine-Topiramate 15-92 MG CP24 Take 1 capsule by mouth daily     sulfaSALAzine ER (AZULFIDINE EN) 500 MG EC tablet 500mg BID for 7 days, then increase to 1000mg BID and continue 1000mg BID thereafter.     tofacitinib (XELJANZ) 11 MG 24 hr tablet Take 1 tablet (11 mg) by mouth daily     No current facility-administered medications for this visit.  " "        Social History   See HPI    Family History     Family History   Problem Relation Age of Onset     HEART DISEASE Father      MI     Neurologic Disorder Father 79     Parkinson's     DIABETES Father      Hypertension Father      Coronary Artery Disease Father      CANCER Maternal Grandmother      uterine     Neurologic Disorder Sister 16     migraine     Depression Sister      Neurologic Disorder Mother 20     migraine     Depression Mother      Neurologic Disorder Son 7     migraine     Substance Abuse Son      Depression Sister      Substance Abuse Sister        Physical Exam     Temp Readings from Last 3 Encounters:   05/02/18 97.1  F (36.2  C) (Oral)   03/27/18 97  F (36.1  C) (Oral)   01/31/18 98.7  F (37.1  C) (Oral)     BP Readings from Last 5 Encounters:   04/30/18 126/70   03/27/18 130/71   03/09/18 102/63   01/31/18 144/70   11/29/17 122/72     Pulse Readings from Last 1 Encounters:   05/02/18 95     Resp Readings from Last 1 Encounters:   05/02/18 14     Estimated body mass index is 42.91 kg/(m^2) as calculated from the following:    Height as of this encounter: 1.626 m (5' 4\").    Weight as of this encounter: 113.4 kg (250 lb).    GEN: NAD, overweight  HEENT: MMM. Anicteric, noninjected sclera  CV: S1, S2. RRR. No m/r/g.  PULM: CTA bilaterally. No w/c.  MSK: MCPs, PIPs, wrists, elbows, shoulders, knees, ankles, and MTPs without swelling or tenderness to palpation.  Hips nontender to palpation.       SKIN: No rash.   EXT: No LE edema  PSYCH: Alert. Appropriate.    Labs / Imaging (select studies)   RF/CCP  Recent Labs   Lab Test  11/19/12   0900   CCPABY  53*   RHF  <7     LORNA  Recent Labs   Lab Test  09/13/12   1756   NADEGE  1.7*     RNP/Sm/SSA/SSB  Recent Labs   Lab Test  06/02/14   1216  04/07/14   1001  11/19/12   0900   RNPIGG  <0.2  Negative     --    --    SMIGG  <0.2  Negative     --    --    ENASM   --    --   6   SSAIGG  <0.2  Negative     --    --    ENASSA   --    --   6   SSBIGG  0.3   -- "    --    ENASSB   --    --   4   ENARMP   --    --   0   SCLIGG  <0.2  Negative     --    --    JOIGG   --   <0.2 Negative   --      dsDNA  Recent Labs   Lab Test  02/03/15   1141  06/02/14   1216  04/30/13   1316  09/19/12   0905   DNA  <15  Interpretation:  Negative    <15  Interpretation:  Negative    29  55*     C3/C4  Recent Labs   Lab Test  02/03/15   1141  06/02/14   1216  04/30/13   1316  11/19/12   0900   M3XSDTH  162  142  120  140   F0INSEA  29  23  26  22     Antiphospholipid Antibodies  Recent Labs   Lab Test  11/19/12   0900   B2IGG  5   B2IGM  3   CARG  <15.0 Interpretation:  Negative   ALEX  <12.5 Interpretation:  Negative   LUPINT  Negative (Note) COMMENTS: The INR is normal. APTT is prolonged, but mixing study shows complete correction. DRVVT Screen is normal. Thrombin time is normal. NEGATIVE TEST; A LUPUS ANTICOAGULANT WAS NOT DETECTED IN THIS SPECIMEN WITHIN THE LIMITS OF THE TESTING REPERTOIRE. If the clinical picture is strongly suggestive of an antiphospholipid syndrome, recommend anticardiolipin and beta-2-glycoprotein (IgG and IgM) antibody tests. APTT mixing study is indicative of factor deficiencies. Recommend factors 8, 9, 11 and 12 (factors VIII, IX, XI, and XII) levels if clinically indicated. Ara Plasencia M.D.  [phone] 11-.  NINR =  0.89 N = 0.86-1.14  (No additional charge) NTT = 17.3 N = 13.0-19.0 sec  (No additional charge)  APTT'S:    Seconds Reagent =  Stago LS Normal  =  36 Patient  =  44 1:2 Mix  =  38 Reference =  31-43   DILUTE EDGARDO VIPER VENOM TEST: DRVVT Screen Ratio = 0.93 Normal = <1.28      ANCA  Recent Labs   Lab Test  11/19/12   0900   MPOAB  1     CBC  Recent Labs   Lab Test  04/16/18   0810  03/06/18   0801  01/29/18   1244   WBC  5.5  4.0  4.7   RBC  4.11  4.03  3.88   HGB  12.6  12.5  12.1   HCT  39.2  38.3  36.6   MCV  95  95  94   RDW  14.0  14.3  13.9   PLT  325  268  335   MCH  30.7  31.0  31.2   MCHC  32.1  32.6  33.1   NEUTROPHIL  58.6  45.7   38.4   LYMPH  31.8  42.1  50.6   MONOCYTE  7.4  9.3  6.8   EOSINOPHIL  1.4  2.3  3.8   BASOPHIL  0.4  0.3  0.4   ANEU  3.2  1.8  1.8   ALYM  1.8  1.7  2.4   KIMBERLY  0.4  0.4  0.3   AEOS  0.1  0.1  0.2   ABAS  0.0  0.0  0.0     CMP  Recent Labs   Lab Test  04/16/18   0810  03/06/18   0801  01/29/18   1244  11/21/17   0945  10/25/17   0840   07/11/17   0906   01/17/17   0642  01/15/17   1844  12/27/16   0836   10/30/15   1557  10/28/15   2233   NA   --    --    --    --    --    --   140   --   145*  139  139   --   139  136   POTASSIUM   --    --    --    --    --    --   3.2*   --   3.5  4.0  4.7   --   3.6  3.9   CHLORIDE   --    --    --    --    --    --   107   --   112*  107  110*   --   106  104   CO2   --    --    --    --    --    --   21   --   29  24  21   --   29  28   ANIONGAP   --    --    --    --    --    --   12   --   4  8  8   --   5  4   GLC   --    --    --    --    --    --   90   --   108*  104*  95   --   99  117*   BUN   --    --    --    --    --    --   13   --   9  18  22   --   13  11   CR  0.70  0.68  0.76   --   0.77   < >  0.73   < >  0.93  0.83  0.75   < >  0.67  0.67   GFRESTIMATED  85  87  77   --   76   < >  82   < >  61  70  79   < >  90  >90  Non  GFR Calc     GFRESTBLACK  >90  >90  >90   --   >90   < >  >90   GFR Calc     < >  74  85  >90   GFR Calc     < >  >90   GFR Calc    >90   GFR Calc     ISH   --    --    --    --    --    --   9.9   --   8.2*  8.5  8.8   --   8.5  8.9   BILITOTAL  0.2  0.4  0.4  0.5  0.7   < >  0.3   < >   --   0.6  0.4   < >   --    --    ALBUMIN  3.8  3.6  3.6  3.4  3.7   < >  4.0   < >   --   3.4  3.6   < >   --    --    PROTTOTAL  7.3  7.2  7.0  7.3  7.4   < >  8.1   < >   --   7.3  7.4   < >   --    --    ALKPHOS  80  74  87  71  72   < >  90   < >   --   82  92   < >   --    --    AST  29  27  26  28  43   < >  24   < >   --   35  21   < >   --    --    ALT  29  36  36   39  69*   < >  47   < >   --   37  33   < >   --    --     < > = values in this interval not displayed.     HgA1c  Recent Labs   Lab Test  02/20/15   0750  10/20/14   0821   A1C  5.6  5.1     Iron Studies  Recent Labs   Lab Test  02/20/15   0750  02/11/11   0735   ABRAHAM  76  89   IRON   --   63     Calcium/VitaminD  Recent Labs   Lab Test  07/11/17   0906  01/17/17   0642  01/15/17   1844   02/20/15   0750   02/11/11   0735   ISH  9.9  8.2*  8.5   < >  9.0   < >  9.3   D3VIT   --    --    --    --    --    --   42   VITDT   --    --    --    --   39   --    --     < > = values in this interval not displayed.     ESR/CRP  Recent Labs   Lab Test  04/16/18   0810  01/29/18   1244  10/25/17   0840   SED  18  13  11   CRP  7.0  4.2  <2.9     CK/Aldolase  Recent Labs   Lab Test  04/07/14   1001   CKT  77     TSH/T4  Recent Labs   Lab Test  09/29/15   0832  08/28/12   1525   TSH  1.20  1.48     Hepatitis B  Recent Labs   Lab Test  12/08/15   0855  03/06/15   1650   HBCAB  Nonreactive   --    HEPBANG  Nonreactive  Nonreactive     Hepatitis C  Recent Labs   Lab Test  12/08/15   0855  03/06/15   1650  11/19/12   0900   HCVAB  Nonreactive   Assay performance characteristics have not been established for newborns,   infants, and children    Nonreactive   Assay performance characteristics have not been established for newborns,   infants, and children    Negative     Tuberculosis Screening  Recent Labs   Lab Test  10/31/17   1400  02/02/17   0822  12/08/15   0855   TBRSLT  Negative  Negative  Negative   TBAGN  0.05  0.00  0.01     UA  Recent Labs   Lab Test  07/15/16   0800  02/03/15   1144  06/02/14   1225   11/19/12   0901   COLOR  Yellow  Light Yellow  Straw   < >  Yellow   APPEARANCE  Clear  Clear  Clear   < >  Clear   URINEGLC  Negative  Negative  Negative   < >  Negative   URINEBILI  Negative  Negative  Negative   < >  Negative   SG  1.015  1.004  1.004   < >  1.020   URINEPH  5.5  5.0  5.0   < >  6.0   PROTEIN  Negative   Negative  Negative   < >  Negative   UROBILINOGEN  0.2   --    --    --   0.2   NITRITE  Negative  Negative  Negative   < >  Negative   UBLD  Negative  Negative  Negative   < >  Trace*   LEUKEST  Negative  Negative  Negative   < >  Negative   WBCU  O - 2  1  1   < >  O - 2   RBCU  O - 2  <1  <1   < >  O - 2   SQUAMOUSEPI  Few   --    --    --   Few   BACTERIA   --    --   Few*   --    --    MUCOUS   --   Present*   --    --    --     < > = values in this interval not displayed.     Urine Microscopic  Recent Labs   Lab Test  07/15/16   0800  02/03/15   1144  06/02/14   1225   11/19/12   0901   WBCU  O - 2  1  1   < >  O - 2   RBCU  O - 2  <1  <1   < >  O - 2   SQUAMOUSEPI  Few   --    --    --   Few   BACTERIA   --    --   Few*   --    --    MUCOUS   --   Present*   --    --    --     < > = values in this interval not displayed.     Urine Protein  Recent Labs   Lab Test  07/15/16   0800  11/19/12   0901   UTP  <0.05  <0.05   UTPG  Unable to calculate due to low value  <0.05   UCRR   --   111     Immunization History     Immunization History   Administered Date(s) Administered     Influenza Vaccine IM 3yrs+ 4 Valent IIV4 10/15/2013, 09/15/2014, 09/28/2015, 09/28/2016, 10/16/2017     Pneumo Conj 13-V (2010&after) 04/15/2016     Pneumococcal 23 valent 07/15/2016     TDAP Vaccine (Adacel) 02/09/2011          Chart documentation done in part with Dragon Voice recognition Software. Although reviewed after completion, some word and grammatical error may remain.     Freddy Guerra MD

## 2018-05-02 NOTE — PATIENT INSTRUCTIONS
Rheumatology    Dr. Freddy Guerra         [facility]   (Monday)  [address]  [place] [address]       [facility]   (Tuesday)  [address]  [place] [address]    [facility]   (Wed., Thurs., and Friday)  [address] [place] [address]    Phone number: [phone]  Thank you for choosing [facility].  Evie Rosas CMA

## 2018-05-02 NOTE — TELEPHONE ENCOUNTER
Central Prior Authorization Team   Phone: [phone]      PA Initiation    Medication: Qsymia  Insurance Company: [employer] - Phone [phone] Fax [phone]  Pharmacy Filling the Rx: [place], [place] - [address]  Filling Pharmacy Phone: [phone]  Filling Pharmacy Fax: [phone]  Start Date: 5/2/2018

## 2018-05-02 NOTE — MR AVS SNAPSHOT
After Visit Summary   5/2/2018    Sharon Fung    MRN: [id number]           Patient Information     Date Of Birth          1955        Visit Information        Provider Department      5/2/2018 8:00 AM Freddy Guerra MD [facility]        Today's Diagnoses     Seropositive rheumatoid arthritis (H)          Care Instructions    Rheumatology    Dr. Freddy Chaparro [facility]   (Monday)  [address]  [place] [address]       [facility]   (Tuesday)  [address] [place] [address]    [facility]   (Wed., Thurs., and Friday)  [address] [facility] [place] [address]    Phone number: [phone]  Thank you for choosing [facility].  Evie Rosas CMA            Follow-ups after your visit        Your next 10 appointments already scheduled     Aug 08, 2018  8:00 AM CDT   Return Visit with Freddy Guerra MD   [facility] ([facility]    [address] [facility] [id number] [phone]              Future tests that were ordered for you today     Open Future Orders        Priority Expected Expires Ordered    Erythrocyte sedimentation rate auto Routine 7/27/2018 8/30/2018 5/2/2018    CRP inflammation Routine 7/27/2018 8/30/2018 5/2/2018    Hepatic panel Routine 7/27/2018 8/30/2018 5/2/2018    CBC with platelets differential Routine 7/27/2018 8/30/2018 5/2/2018    Creatinine Routine 7/27/2018 8/30/2018 5/2/2018            Who to contact     If you have questions or need follow up information about today's clinic visit or your schedule please contact [facility] directly at [phone].  Normal or non-critical lab and imaging results will be communicated to you by MyChart, letter or phone within 4 business days after the clinic has received the results. If you do not hear from us within 7 days, please contact the clinic through MyChart or phone. If you have a critical or abnormal lab result, we  "will notify you by phone as soon as possible.  Submit refill requests through LiveRamp or call your pharmacy and they will forward the refill request to us. Please allow 3 business days for your refill to be completed.          Additional Information About Your Visit        Violet Greyhart Information     LiveRamp gives you secure access to your electronic health record. If you see a primary care provider, you can also send messages to your care team and make appointments. If you have questions, please call your primary care clinic.  If you do not have a primary care provider, please call [phone] and they will assist you.        Care EveryWhere ID     This is your Care EveryWhere ID. This could be used by other organizations to access your [facility] medical records  [id number]        Your Vitals Were     Pulse Temperature Respirations Height Pulse Oximetry BMI (Body Mass Index)    95 97.1  F (36.2  C) (Oral) 14 1.626 m (5' 4\") 97% 42.91 kg/m2       Blood Pressure from Last 3 Encounters:   04/30/18 126/70   03/27/18 130/71   03/09/18 102/63    Weight from Last 3 Encounters:   05/02/18 113.4 kg (250 lb)   04/30/18 113.7 kg (250 lb 9.6 oz)   03/27/18 114.3 kg (252 lb)                 Today's Medication Changes          These changes are accurate as of 5/2/18  8:25 AM.  If you have any questions, ask your nurse or doctor.               These medicines have changed or have updated prescriptions.        Dose/Directions    sulfaSALAzine  MG EC tablet   Commonly known as:  AZULFIDINE EN   This may have changed:    - how much to take  - how to take this  - when to take this  - additional instructions   Used for:  Seropositive rheumatoid arthritis (H)   Changed by:  Freddy Guerra MD        Dose:  1000 mg   Take 2 tablets (1,000 mg) by mouth 2 times daily   Quantity:  120 tablet   Refills:  3            Where to get your medicines      These medications were sent to [facility] [place], [place] - [address]  [address] [facility] [address], [facility] [address]     Phone:  [phone]     methotrexate sodium 50 MG/2ML Soln    sulfaSALAzine  MG EC tablet         Call your pharmacy to confirm that your medication is ready for pickup. It may take up to 24 hours for them to receive the prescription. If the prescription is not ready within 3 business days, please contact your clinic or your provider.     We will let you know when these medications are ready. If you don't hear back within 3 business days, please contact us.     tofacitinib 11 MG 24 hr tablet                Primary Care Provider Office Phone # Fax #    Reynaldo Saavedra [phone]       [address] [facility] [address]        Equal Access to Services     SVITLANA SERVIN : Hadii aad ku hadasho Soalexandro, waaxda luqadaha, qaybta kaalmada adeegyada, susan terry . So [facility] [phone].    ATENCIÓN: Si habla español, tiene a ferrer disposición servicios gratuitos de asistencia lingüística. [facility] [phone].    We comply with applicable federal civil rights laws and [place] laws. We do not discriminate on the basis of race, color, national origin, age, disability, sex, sexual orientation, or gender identity.            Thank you!     Thank you for choosing [facility]  for your care. Our goal is always to provide you with excellent care. Hearing back from our patients is one way we can continue to improve our services. Please take a few minutes to complete the written survey that you may receive in the mail after your visit with us. Thank you!             Your Updated Medication List - Protect others around you: Learn how to safely use, store and throw away your medicines at www.disposemymeds.org.          This list is accurate as of 5/2/18  8:25 AM.  Always use your most recent med list.                   Brand Name Dispense Instructions for use Diagnosis    acetaminophen 500 MG tablet     "TYLENOL     Take 500-1,000 mg by mouth every 6 hours as needed for mild pain        albuterol 108 (90 Base) MCG/ACT Inhaler    PROAIR HFA/PROVENTIL HFA/VENTOLIN HFA    1 Inhaler    Inhale 2-4 puffs into the lungs every 4 hours as needed for shortness of breath / dyspnea or wheezing    Acute bronchitis, unspecified organism       azelastine 0.05 % Soln ophthalmic solution    OPTIVAR    1 Bottle    Apply 1 drop to eye 2 times daily    Allergic conjunctivitis of both eyes       citalopram 20 MG tablet    celeXA    90 tablet    Take 1 tablet (20 mg) by mouth daily    Mild major depression (H)       fexofenadine 180 MG tablet    ALLEGRA    90 tablet    Take 1 tablet (180 mg) by mouth daily    AR (allergic rhinitis)       FLONASE 50 MCG/ACT spray   Generic drug:  fluticasone      Spray 2 sprays into both nostrils as needed        fluticasone 110 MCG/ACT Inhaler    FLOVENT HFA    1 Inhaler    Inhale 2 puffs into the lungs 2 times daily    Chronic obstructive pulmonary disease, unspecified COPD type (H)       folic acid 1 MG tablet    FOLVITE    100 tablet    Take 1 tablet (1 mg) by mouth daily    Seropositive rheumatoid arthritis (H)       ibuprofen 200 MG capsule      Take 600-800 mg by mouth At Bedtime        Insulin Syringe-Needle U-100 27G X 1/2\" 1 ML Misc    BD insulin syringe    10 each    1 Syringe every 7 days , to be used for methotrexate administration.    Seropositive rheumatoid arthritis (H)       methotrexate sodium 50 MG/2ML Soln     2 vial    Inject 0.8 mLs (20 mg) as directed every 7 days    Seropositive rheumatoid arthritis (H)       order for DME      Use your CPAP device as directed by your provider.        Phentermine-Topiramate 15-92 MG Cp24     30 capsule    Take 1 capsule by mouth daily    Morbid obesity (H)       * RESTASIS 0.05 % ophthalmic emulsion   Generic drug:  cycloSPORINE      Place 1 drop into both eyes 2 times daily        * cycloSPORINE 0.05 % ophthalmic emulsion    RESTASIS    2 Box    " Place 1 drop into both eyes 2 times daily    Sicca syndrome (H)       sulfaSALAzine  MG EC tablet    AZULFIDINE EN    120 tablet    Take 2 tablets (1,000 mg) by mouth 2 times daily    Seropositive rheumatoid arthritis (H)       tofacitinib 11 MG 24 hr tablet    XELJANZ    30 tablet    Take 1 tablet (11 mg) by mouth daily    Seropositive rheumatoid arthritis (H)       * Notice:  This list has 2 medication(s) that are the same as other medications prescribed for you. Read the directions carefully, and ask your doctor or other care provider to review them with you.

## 2018-05-02 NOTE — TELEPHONE ENCOUNTER
Prior Authorization Retail Medication Request    Medication/Dose: Qsymia  ICD code (if different than what is on RX):  Morbid obesity  Previously Tried and Failed:  Strattera, diet and exercise  Rationale:  She has to take steroids a lot due to her RA and that contributes to weight gain. She wants something to help her with her appetite.        Insurance Name:    Insurance ID:        Pharmacy Information (if different than what is on RX)  Name:  [facility]  Phone:  [phone]

## 2018-05-02 NOTE — NURSING NOTE
"Chief Complaint   Patient presents with     Arthritis     RA, patient states she is doing better, thinks medication is working.       Initial Pulse 95  Temp 97.1  F (36.2  C) (Oral)  Resp 14  Ht 1.626 m (5' 4\")  Wt 113.4 kg (250 lb)  SpO2 97%  BMI 42.91 kg/m2 Estimated body mass index is 42.91 kg/(m^2) as calculated from the following:    Height as of this encounter: 1.626 m (5' 4\").    Weight as of this encounter: 113.4 kg (250 lb).  BP completed using cuff size: large         RAPID3 (0-30) Cumulative Score  9.0          RAPID3 Weighted Score (divide #4 by 3 and that is the weighted score)  3.0         "

## 2018-05-09 NOTE — TELEPHONE ENCOUNTER
Central Prior Authorization Team   Phone: [phone]      PRIOR AUTHORIZATION DENIED    Medication: Qsymia- PA DENIED    Denial Date: 5/8/2018    Denial Rational:               Appeal Information:

## 2018-05-10 DIAGNOSIS — E66.01 MORBID OBESITY (H): Primary | ICD-10-CM

## 2018-05-10 RX ORDER — PHENTERMINE HYDROCHLORIDE 15 MG/1
15 CAPSULE ORAL EVERY MORNING
Qty: 30 CAPSULE | Refills: 3
Start: 2018-05-10 | End: 2018-06-09

## 2018-05-10 RX ORDER — TOPIRAMATE 25 MG/1
50 TABLET, FILM COATED ORAL AT BEDTIME
Qty: 60 TABLET | Refills: 3 | Status: SHIPPED | OUTPATIENT
Start: 2018-05-10 | End: 2018-12-07

## 2018-05-10 NOTE — TELEPHONE ENCOUNTER
PA for Qsymia denied. Per Dr. Vallejo's note, will split into Phentermine 15mg and Topiramate ramp to 50mg. Will route for sign off.

## 2018-05-21 ENCOUNTER — TELEPHONE (OUTPATIENT)
Dept: UROLOGY | Facility: CLINIC | Age: 63
End: 2018-05-21

## 2018-05-21 NOTE — TELEPHONE ENCOUNTER
Prior Authorization Retail Medication Request    Medication/Dose: Phentermine 15mg capsules  ICD code (if different than what is on RX):  n/a  Previously Tried and Failed:  unk  Rationale:  unk    Insurance Name:  Sandy Diane  Insurance ID:  [id number]      Jennifer Alexis   II    [facility]  [address] [place] [address]  [address]  [place] [address]  [email or web address]  Phone: [phone]  Fax: [phone]

## 2018-05-22 NOTE — TELEPHONE ENCOUNTER
PA Initiation    Medication: Phentermine 15mg capsules - pending  Insurance Company: [employer] - Phone [phone] Fax [phone]  Pharmacy Filling the Rx: [place], [place] - [address]  Filling Pharmacy Phone:  [phone]  Filling Pharmacy Fax:    Start Date: 5/22/2018

## 2018-05-24 NOTE — TELEPHONE ENCOUNTER
Prior Authorization Approval    Authorization Effective Date: 5/24/2018  Authorization Expiration Date: 11/20/2018  Medication: Phentermine 15mg capsules - approved   Approved Dose/Quantity:   Reference #: [id number]   Insurance Company: [facility] - Phone [phone] Fax [phone]  Expected CoPay:       CoPay Card Available:      Foundation Assistance Needed:    Which Pharmacy is filling the prescription (Not needed for infusion/clinic administered): [facility] [place], [place] - [address]  Pharmacy Notified: Yes  Patient Notified: Yes

## 2018-06-07 ENCOUNTER — OFFICE VISIT (OUTPATIENT)
Dept: FAMILY MEDICINE | Facility: CLINIC | Age: 63
End: 2018-06-07
Payer: COMMERCIAL

## 2018-06-07 VITALS
BODY MASS INDEX: 43.08 KG/M2 | TEMPERATURE: 98.3 F | HEART RATE: 97 BPM | OXYGEN SATURATION: 96 % | DIASTOLIC BLOOD PRESSURE: 73 MMHG | SYSTOLIC BLOOD PRESSURE: 131 MMHG | WEIGHT: 251 LBS

## 2018-06-07 DIAGNOSIS — J06.9 UPPER RESPIRATORY TRACT INFECTION, UNSPECIFIED TYPE: Primary | ICD-10-CM

## 2018-06-07 DIAGNOSIS — J44.9 CHRONIC OBSTRUCTIVE PULMONARY DISEASE, UNSPECIFIED COPD TYPE (H): ICD-10-CM

## 2018-06-07 DIAGNOSIS — M05.9 SEROPOSITIVE RHEUMATOID ARTHRITIS (H): ICD-10-CM

## 2018-06-07 DIAGNOSIS — Z79.899 HIGH RISK MEDICATION USE: ICD-10-CM

## 2018-06-07 DIAGNOSIS — J20.9 ACUTE BRONCHITIS, UNSPECIFIED ORGANISM: ICD-10-CM

## 2018-06-07 DIAGNOSIS — Z11.4 SCREENING FOR HIV (HUMAN IMMUNODEFICIENCY VIRUS): ICD-10-CM

## 2018-06-07 PROCEDURE — 99214 OFFICE O/P EST MOD 30 MIN: CPT | Performed by: FAMILY MEDICINE

## 2018-06-07 RX ORDER — TIOTROPIUM BROMIDE 18 UG/1
CAPSULE ORAL; RESPIRATORY (INHALATION)
Qty: 30 CAPSULE | Refills: 1 | Status: SHIPPED | OUTPATIENT
Start: 2018-06-07 | End: 2018-12-07

## 2018-06-07 RX ORDER — ALBUTEROL SULFATE 90 UG/1
2-4 AEROSOL, METERED RESPIRATORY (INHALATION) EVERY 4 HOURS PRN
Qty: 1 INHALER | Refills: 1 | Status: SHIPPED | OUTPATIENT
Start: 2018-06-07 | End: 2018-12-07

## 2018-06-07 RX ORDER — CODEINE PHOSPHATE AND GUAIFENESIN 10; 100 MG/5ML; MG/5ML
1-2 SOLUTION ORAL EVERY 6 HOURS PRN
Qty: 120 ML | Refills: 0 | Status: SHIPPED | OUTPATIENT
Start: 2018-06-07 | End: 2018-07-03

## 2018-06-07 NOTE — MR AVS SNAPSHOT
After Visit Summary   6/7/2018    Sharon Fung    MRN: [id number]           Patient Information     Date Of Birth          1955        Visit Information        Provider Department      6/7/2018 11:40 AM Reynaldo Saavedra MD [facility]        Today's Diagnoses     Upper respiratory tract infection, unspecified type    -  1    Acute bronchitis, unspecified organism        Chronic obstructive pulmonary disease, unspecified COPD type (H)        Screening for HIV (human immunodeficiency virus)           Follow-ups after your visit        Follow-up notes from your care team     Return if symptoms worsen or fail to improve.      Your next 10 appointments already scheduled     Jul 03, 2018  9:30 AM CDT   Return Sleep Patient with CHELSEY Freedman [facility] [place] Sleep Study ([facility])    [address] [place] [address] [phone]            Aug 08, 2018  8:00 AM CDT   Return Visit with Freddy Guerra MD   [facility]    [address] [place] [address] [phone]              Future tests that were ordered for you today     Open Future Orders        Priority Expected Expires Ordered    HIV Screening Routine 7/27/2018 8/31/2018 6/7/2018            Who to contact     If you have questions or need follow up information about today's clinic visit or your schedule please contact [facility] directly at [phone].  Normal or non-critical lab and imaging results will be communicated to you by MyChart, letter or phone within 4 business days after the clinic has received the results. If you do not hear from us within 7 days, please contact the clinic through MyChart or phone. If you have a critical or abnormal lab result, we will notify you by phone as soon as possible.  Submit refill requests through [employer] or call your pharmacy  and they will forward the refill request to us. Please allow 3 business days for your refill to be completed.          Additional Information About Your Visit        OneClasshart Information     [employer] gives you secure access to your electronic health record. If you see a primary care provider, you can also send messages to your care team and make appointments. If you have questions, please call your primary care clinic.  If you do not have a primary care provider, please call [phone] and they will assist you.        Care EveryWhere ID     This is your Care EveryWhere ID. This could be used by other organizations to access your [facility] medical records  [id number]        Your Vitals Were     Pulse Temperature Pulse Oximetry BMI (Body Mass Index)          97 98.3  F (36.8  C) (Oral) 96% 43.08 kg/m2         Blood Pressure from Last 3 Encounters:   06/07/18 131/73   05/02/18 134/82   04/30/18 126/70    Weight from Last 3 Encounters:   06/07/18 251 lb (113.9 kg)   05/02/18 250 lb (113.4 kg)   04/30/18 250 lb 9.6 oz (113.7 kg)                 Today's Medication Changes          These changes are accurate as of 6/7/18 12:12 PM.  If you have any questions, ask your nurse or doctor.               Start taking these medicines.        Dose/Directions    tiotropium 18 MCG capsule   Commonly known as:  SPIRIVA HANDIHALER   Used for:  Chronic obstructive pulmonary disease, unspecified COPD type (H)   Started by:  Reynaldo Saavedra MD        Inhale contents of one capsule daily.   Quantity:  30 capsule   Refills:  1            Where to get your medicines      These medications were sent to [facility] [place], [place] - [address]  [address] [address]     Phone:  [phone]     albuterol 108 (90 Base) MCG/ACT Inhaler    tiotropium 18 MCG capsule                Primary Care Provider Office Phone # Fax #    Reynaldo Saavedra [phone] [address]  [facility] [address]        Equal Access to Services     SVITLANA SERVIN : Hadii sheryl ku hadmargreto Soronelali, waaxda luqadaha, qaybta kaalmada shaanlouiseel, waxbarbara tarun hayasif williszeuskatheirne monahan. So [facility] [phone].    ATENCIÓN: Si habla español, tiene a ferrer disposición servicios gratuitos de asistencia lingüística. Elvira al [phone].    We comply with applicable federal civil rights laws and [place] laws. We do not discriminate on the basis of race, color, national origin, age, disability, sex, sexual orientation, or gender identity.            Thank you!     Thank you for choosing [facility]  for your care. Our goal is always to provide you with excellent care. Hearing back from our patients is one way we can continue to improve our services. Please take a few minutes to complete the written survey that you may receive in the mail after your visit with us. Thank you!             Your Updated Medication List - Protect others around you: Learn how to safely use, store and throw away your medicines at www.disposemymeds.org.          This list is accurate as of 6/7/18 12:12 PM.  Always use your most recent med list.                   Brand Name Dispense Instructions for use Diagnosis    acetaminophen 500 MG tablet    TYLENOL     Take 500-1,000 mg by mouth every 6 hours as needed for mild pain        albuterol 108 (90 Base) MCG/ACT Inhaler    PROAIR HFA/PROVENTIL HFA/VENTOLIN HFA    1 Inhaler    Inhale 2-4 puffs into the lungs every 4 hours as needed for shortness of breath / dyspnea or wheezing    Acute bronchitis, unspecified organism       azelastine 0.05 % Soln ophthalmic solution    OPTIVAR    1 Bottle    Apply 1 drop to eye 2 times daily    Allergic conjunctivitis of both eyes       citalopram 20 MG tablet    celeXA    90 tablet    Take 1 tablet (20 mg) by mouth daily    Mild major depression (H)       fexofenadine 180 MG tablet    ALLEGRA    90 tablet    Take 1 tablet (180 mg) by  "mouth daily    AR (allergic rhinitis)       FLONASE 50 MCG/ACT spray   Generic drug:  fluticasone      Spray 2 sprays into both nostrils as needed        fluticasone 110 MCG/ACT Inhaler    FLOVENT HFA    1 Inhaler    Inhale 2 puffs into the lungs 2 times daily    Chronic obstructive pulmonary disease, unspecified COPD type (H)       folic acid 1 MG tablet    FOLVITE    100 tablet    Take 1 tablet (1 mg) by mouth daily    Seropositive rheumatoid arthritis (H)       ibuprofen 200 MG capsule      Take 600-800 mg by mouth At Bedtime        Insulin Syringe-Needle U-100 27G X 1/2\" 1 ML Misc    BD insulin syringe    10 each    1 Syringe every 7 days , to be used for methotrexate administration.    Seropositive rheumatoid arthritis (H)       methotrexate sodium 50 MG/2ML Soln     2 vial    Inject 0.8 mLs (20 mg) as directed every 7 days    Seropositive rheumatoid arthritis (H)       order for DME      Use your CPAP device as directed by your provider.        phentermine 15 MG capsule     30 capsule    Take 1 capsule (15 mg) by mouth every morning    Morbid obesity (H)       predniSONE 5 MG tablet    DELTASONE    50 tablet    Prednisone 20mg daily x5days, then 15mg daily x5days, then 10mg daily x5days, then 5mg daily x5days, then stop.    Seropositive rheumatoid arthritis (H)       * RESTASIS 0.05 % ophthalmic emulsion   Generic drug:  cycloSPORINE      Place 1 drop into both eyes 2 times daily        * cycloSPORINE 0.05 % ophthalmic emulsion    RESTASIS    2 Box    Place 1 drop into both eyes 2 times daily    Sicca syndrome (H)       sulfaSALAzine  MG EC tablet    AZULFIDINE EN    120 tablet    Take 2 tablets (1,000 mg) by mouth 2 times daily    Seropositive rheumatoid arthritis (H)       tiotropium 18 MCG capsule    SPIRIVA HANDIHALER    30 capsule    Inhale contents of one capsule daily.    Chronic obstructive pulmonary disease, unspecified COPD type (H)       tofacitinib 11 MG 24 hr tablet    XELJANZ    30 tablet "    Take 1 tablet (11 mg) by mouth daily    Seropositive rheumatoid arthritis (H)       topiramate 25 MG tablet    TOPAMAX    60 tablet    Take 2 tablets (50 mg) by mouth At Bedtime Week 1: Take 25mg PO QHS Week 2 and thereafter: Take 50mg PO QHS    Morbid obesity (H)       ZITHROMAX Z-NAVEEN PO       Screening for HIV (human immunodeficiency virus)       * Notice:  This list has 2 medication(s) that are the same as other medications prescribed for you. Read the directions carefully, and ask your doctor or other care provider to review them with you.

## 2018-06-07 NOTE — PROGRESS NOTES
"  SUBJECTIVE:   Sharon Fung is a 63 year old female who presents to clinic today for the following health issues:      Acute Illness   Acute illness concerns: Shortness of breath, coughing spells  Onset: 4 days ago    Fever: no    Chills/Sweats: YES- at night    Headache (location?): YES- from coughing    Sinus Pressure:YES- Sometimes    Conjunctivitis:  no    Ear Pain: no    Rhinorrhea: YES    Congestion: YES    Sore Throat: no     Cough: YES-productive of yellow sputum    Wheeze: YES    Decreased Appetite: YES    Nausea: YES    Vomiting: no    Diarrhea:  no    Dysuria/Freq.: no    Fatigue/Achiness: YES    Sick/Strep Exposure: no     Therapies Tried and outcome: Azithromycin started on Monday, Nyquil    She started coming down with URI/cold symptoms on Sunday,  4 days ago.  She was in the ER at work the following day and a provider prescribed a azithromycin for her because \"it sounded like she had bronchitis\".  She does have rheumatoid arthritis and is on methotrexate, sulfasalazine, and prednisone for that.  She is on a prednisone taper and is currently taking 50 mg a day.  She also has some mild COPD.  She is a non-smoker.  She is not on any specific medication for that, but does have an albuterol inhaler she could use.  She has not been running any known fever.  She has had some chills and sweats at night at times.    Problem list and histories reviewed & adjusted, as indicated.  Additional history: as documented    Patient Active Problem List   Diagnosis     AR (allergic rhinitis)     GERD (gastroesophageal reflux disease)     Status post bariatric surgery     Mild major depression (H)     Advanced directives, counseling/discussion     High risk medication use     Obstructive sleep apnea     Obesity, Class III, BMI 40-49.9 (morbid obesity) (H)     Anterior basement membrane dystrophy     Seropositive rheumatoid arthritis (H)     LORNA positive     Carpal tunnel syndrome of right wrist     Headache     " Chronic obstructive pulmonary disease, unspecified COPD type (H)     Past Surgical History:   Procedure Laterality Date     BLEPHAROPLASTY BILATERAL Bilateral 8/5/2016    Procedure: BLEPHAROPLASTY BILATERAL;  Surgeon: Cortez Robin MD;  Location:  [facility]     BREAST BIOPSY, RT/LT  1-94    Benign     C APPENDECTOMY  1977     COLONOSCOPY       COLONOSCOPY WITH CO2 INSUFFLATION N/A 3/30/2017    Procedure: COLONOSCOPY WITH CO2 INSUFFLATION;  Surgeon: Issa Weeks MD;  Location: [facility]     ESOPHAGOSCOPY, GASTROSCOPY, DUODENOSCOPY (EGD), COMBINED N/A 10/29/2015    Procedure: COMBINED ESOPHAGOSCOPY, GASTROSCOPY, DUODENOSCOPY (EGD);  Surgeon: Shaq Mims MD;  Location: [facility]     LAPAROSCOPIC GASTRIC ADJUSTABLE BANDING  11/09/10    Lap band procedure     LAPAROSCOPIC REMOVAL GASTRIC ADJUSTABLE BAND N/A 10/31/2015    Procedure: LAPAROSCOPIC REMOVAL GASTRIC ADJUSTABLE BAND;  Surgeon: Shaq Mims MD;  Location: [facility]     RELEASE CARPAL TUNNEL Left 4/27/2017    Procedure: RELEASE CARPAL TUNNEL;  Left Open Carpal Tunnel Release;  Surgeon: Ciara Middleton MD;  Location: [facility]     RELEASE CARPAL TUNNEL Right 6/15/2017    Procedure: RELEASE CARPAL TUNNEL;  Right Carpal Tunnel Release Open;  Surgeon: Ciara Middleton MD;  Location: [facility]     REPAIR PTOSIS       TUBAL LIGATION  1988       Social History   Substance Use Topics     Smoking status: Never Smoker     Smokeless tobacco: Never Used     Alcohol use No     Family History   Problem Relation Age of Onset     HEART DISEASE Father      MI     Neurologic Disorder Father 79     Parkinson's     DIABETES Father      Hypertension Father      Coronary Artery Disease Father      CANCER Maternal Grandmother      uterine     Neurologic Disorder Sister 16     migraine     Depression Sister      Neurologic Disorder Mother 20     migraine     Depression Mother      Neurologic Disorder Son 7     migraine     Substance Abuse Son      Depression  "Sister      Substance Abuse Sister          Current Outpatient Prescriptions   Medication Sig Dispense Refill     acetaminophen (TYLENOL) 500 MG tablet Take 500-1,000 mg by mouth every 6 hours as needed for mild pain       albuterol (PROAIR HFA/PROVENTIL HFA/VENTOLIN HFA) 108 (90 Base) MCG/ACT Inhaler Inhale 2-4 puffs into the lungs every 4 hours as needed for shortness of breath / dyspnea or wheezing 1 Inhaler 1     azelastine (OPTIVAR) 0.05 % SOLN ophthalmic solution Apply 1 drop to eye 2 times daily 1 Bottle 6     Azithromycin (ZITHROMAX Z-NAVEEN PO)        citalopram (CELEXA) 20 MG tablet Take 1 tablet (20 mg) by mouth daily 90 tablet 1     cycloSPORINE (RESTASIS) 0.05 % ophthalmic emulsion Place 1 drop into both eyes 2 times daily 2 Box 11     cycloSPORINE (RESTASIS) 0.05 % ophthalmic emulsion Place 1 drop into both eyes 2 times daily       fexofenadine (ALLEGRA) 180 MG tablet Take 1 tablet (180 mg) by mouth daily 90 tablet 2     fluticasone (FLONASE) 50 MCG/ACT nasal spray Spray 2 sprays into both nostrils as needed       fluticasone (FLOVENT HFA) 110 MCG/ACT Inhaler Inhale 2 puffs into the lungs 2 times daily 1 Inhaler 5     folic acid (FOLVITE) 1 MG tablet Take 1 tablet (1 mg) by mouth daily 100 tablet 3       0     ibuprofen 200 MG capsule Take 600-800 mg by mouth At Bedtime       Insulin Syringe-Needle U-100 (BD INSULIN SYRINGE) 27G X 1/2\" 1 ML MISC 1 Syringe every 7 days , to be used for methotrexate administration. 10 each 5     methotrexate sodium 50 MG/2ML SOLN Inject 0.8 mLs (20 mg) as directed every 7 days 2 vial 3     ORDER FOR DME Use your CPAP device as directed by your provider.       phentermine 15 MG capsule Take 1 capsule (15 mg) by mouth every morning 30 capsule 3     predniSONE (DELTASONE) 5 MG tablet Prednisone 20mg daily x5days, then 15mg daily x5days, then 10mg daily x5days, then 5mg daily x5days, then stop. 50 tablet 0     sulfaSALAzine ER (AZULFIDINE EN) 500 MG EC tablet Take 2 tablets " (1,000 mg) by mouth 2 times daily 120 tablet 3       1     tofacitinib (XELJANZ) 11 MG 24 hr tablet Take 1 tablet (11 mg) by mouth daily 30 tablet 3     topiramate (TOPAMAX) 25 MG tablet Take 2 tablets (50 mg) by mouth At Bedtime Week 1: Take 25mg PO QHS Week 2 and thereafter: Take 50mg PO QHS 60 tablet 3     [DISCONTINUED] albuterol (PROAIR HFA/PROVENTIL HFA/VENTOLIN HFA) 108 (90 BASE) MCG/ACT Inhaler Inhale 2-4 puffs into the lungs every 4 hours as needed for shortness of breath / dyspnea or wheezing 1 Inhaler 1     No Known Allergies    Reviewed and updated as needed this visit by clinical staff  Tobacco  Allergies  Meds  Med Hx  Surg Hx  Fam Hx  Soc Hx      Reviewed and updated as needed this visit by Provider         ROS:  Mainly significant for the above    OBJECTIVE:     /73 (BP Location: Right arm, Patient Position: Chair, Cuff Size: Adult Large)  Pulse 97  Temp 98.3  F (36.8  C) (Oral)  Wt 251 lb (113.9 kg)  SpO2 96%  BMI 43.08 kg/m2  Body mass index is 43.08 kg/(m^2).  GENERAL: alert, no distress and over weight  EYES: Eyes grossly normal to inspection, PERRL and conjunctivae and sclerae normal  HENT: ear canals and TM's normal, nose and mouth without ulcers or lesions.  Nares are congested.  NECK: no adenopathy, no asymmetry, masses, or scars and thyroid normal to palpation  RESP: She has a wet sounding cough, but her lungs are clear to auscultation - no rales, rhonchi or wheezes    Diagnostic Test Results:  none     ASSESSMENT/PLAN:         ICD-10-CM    1. Upper respiratory tract infection, unspecified type J06.9 guaiFENesin-codeine (ROBITUSSIN AC) 100-10 MG/5ML SOLN solution   2. Acute bronchitis, unspecified organism J20.9 albuterol (PROAIR HFA/PROVENTIL HFA/VENTOLIN HFA) 108 (90 Base) MCG/ACT Inhaler   3. Chronic obstructive pulmonary disease, unspecified COPD type (H) J44.9 tiotropium (SPIRIVA HANDIHALER) 18 MCG capsule   4. Seropositive rheumatoid arthritis (H) M05.9    5. High risk  medication use Z79.899    6. Screening for HIV (human immunodeficiency virus) Z11.4 HIV Screening     Azithromycin (ZITHROMAX Z-NAVEEN PO)     She likely has a viral upper respiratory infection and that is why she is not responding any better to the antibiotics  Her lungs sound clear, her temperature is normal, and her oxygen saturation is within normal limits, so that is all reassuring  She does have high risk medications, however, which make her somewhat immunosuppressed  I suggested she finish out the azithromycin  Continue on prednisone and other meds  She could use the albuterol inhaler as needed  I will also add Spiriva HandiHaler once daily to be used for a few weeks or so  She also requested a prescription for Robitussin with codeine so that was provided to be used mainly at night    If new, worsening or persistent symptoms, the patient is to call or return for a recheck.      Reynaldo Saavedra MD  [facility]

## 2018-07-03 ENCOUNTER — OFFICE VISIT (OUTPATIENT)
Dept: SLEEP MEDICINE | Facility: CLINIC | Age: 63
End: 2018-07-03
Payer: COMMERCIAL

## 2018-07-03 VITALS
WEIGHT: 254 LBS | BODY MASS INDEX: 43.36 KG/M2 | OXYGEN SATURATION: 96 % | HEART RATE: 79 BPM | SYSTOLIC BLOOD PRESSURE: 133 MMHG | RESPIRATION RATE: 18 BRPM | HEIGHT: 64 IN | DIASTOLIC BLOOD PRESSURE: 83 MMHG

## 2018-07-03 DIAGNOSIS — G47.33 OSA (OBSTRUCTIVE SLEEP APNEA): Primary | ICD-10-CM

## 2018-07-03 DIAGNOSIS — G25.81 RESTLESS LEGS SYNDROME (RLS): ICD-10-CM

## 2018-07-03 DIAGNOSIS — G47.20 DISRUPTED SLEEP-WAKE CYCLE: ICD-10-CM

## 2018-07-03 DIAGNOSIS — F51.8 DISRUPTED SLEEP-WAKE CYCLE: ICD-10-CM

## 2018-07-03 DIAGNOSIS — E66.2 OBESITY HYPOVENTILATION SYNDROME (H): ICD-10-CM

## 2018-07-03 PROCEDURE — 99214 OFFICE O/P EST MOD 30 MIN: CPT | Performed by: NURSE PRACTITIONER

## 2018-07-03 NOTE — MR AVS SNAPSHOT
After Visit Summary   7/3/2018    Sharon Fung    MRN: [id number]           Patient Information     Date Of Birth          1955        Visit Information        Provider Department      7/3/2018 9:30 AM Padmini Sheikh APRN [facility], Sleep Study        Today's Diagnoses     Restless legs syndrome (RLS)    -  1    Obesity hypoventilation syndrome (H)          Care Instructions    Ferritin and TIBC-will my chart you with results    Overnight ox with follow up with  Me in approx 2-3 wks    Sleep diaries till I see you again            Follow-ups after your visit        Follow-up notes from your care team     Return for temitope with follow up with me in 2-3 wks (sleep diaries).      Your next 10 appointments already scheduled     Jul 26, 2018  3:00 PM CDT   Oximetry  with SLEEP STUDY  7   [facility], Sleep Study ([facility])    [address] [place] [id number]   [phone]            Jul 27, 2018  7:30 AM CDT   Oximetry Drop Off with DME SCHEDULE   [facility], Sleep Study ([facility])    [address] [place] [id number]   [phone]            Jul 27, 2018  8:00 AM CDT   Return Sleep Patient with CHELSEY Freedman [facility], Sleep Study ([facility])    [address] [place] [address]   [phone]            Aug 08, 2018  8:00 AM CDT   Return Visit with Freddy Guerra MD   [facility] [place] ([facility] [place])    [address] [place] [address]  [place] [address]   [phone]              Future tests that were ordered for you today     Open Future Orders        Priority Expected Expires Ordered    Ferritin Routine  9/1/2018 7/3/2018    Iron and Iron Binding Capacity Routine  9/1/2018 7/3/2018    Overnight oximetry study Routine  12/30/2018 7/3/2018            Who to  "contact     If you have questions or need follow up information about today's clinic visit or your schedule please [facility] directly at [phone].  Normal or non-critical lab and imaging results will be communicated to you by MyChart, letter or phone within 4 business days after the clinic has received the results. If you do not hear from us within 7 days, please contact the clinic through [employer] or phone. If you have a critical or abnormal lab result, we will notify you by phone as soon as possible.  Submit refill requests through [employer] or call your pharmacy and they will forward the refill request to us. Please allow 3 business days for your refill to be completed.          Additional Information About Your Visit        [employer] Information     [employer] gives you secure access to your electronic health record. If you see a primary care provider, you can also send messages to your care team and make appointments. If you have questions, please call your primary care clinic.  If you do not have a primary care provider, please call [phone] and they will assist you.        Care EveryWhere ID     This is your Care EveryWhere ID. This could be used by other organizations to access your [facility] medical records  [id number]        Your Vitals Were     Pulse Respirations Height Pulse Oximetry BMI (Body Mass Index)       79 18 1.626 m (5' 4\") 96% 43.6 kg/m2        Blood Pressure from Last 3 Encounters:   07/03/18 133/83   06/07/18 131/73   05/02/18 134/82    Weight from Last 3 Encounters:   07/03/18 115.2 kg (254 lb)   06/07/18 113.9 kg (251 lb)   05/02/18 113.4 kg (250 lb)               Primary Care Provider Office Phone # Fax #    Reynaldo Saavedra [phone]       [address] [facility] [address]        Equal Access to Services     SVITLANA SERVIN AH: Hadii sheryl sarabiao Soalexandro, waaxda luqadaha, qaybta kaalmada ademargarito, susan day " lamarni monahan. So [facility] [phone].    ATENCIÓN: Si kyrala roseann, tiene a ferrer disposición servicios gratuitos de asistencia lingüística. Elvira keller [phone].    We comply with applicable federal civil rights laws and [place] laws. We do not discriminate on the basis of race, color, national origin, age, disability, sex, sexual orientation, or gender identity.            Thank you!     Thank you for choosing [facility], SLEEP STUDY  for your care. Our goal is always to provide you with excellent care. Hearing back from our patients is one way we can continue to improve our services. Please take a few minutes to complete the written survey that you may receive in the mail after your visit with us. Thank you!             Your Updated Medication List - Protect others around you: Learn how to safely use, store and throw away your medicines at www.disposemymeds.org.          This list is accurate as of 7/3/18 10:12 AM.  Always use your most recent med list.                   Brand Name Dispense Instructions for use Diagnosis    acetaminophen 500 MG tablet    TYLENOL     Take 500-1,000 mg by mouth every 6 hours as needed for mild pain        albuterol 108 (90 Base) MCG/ACT Inhaler    PROAIR HFA/PROVENTIL HFA/VENTOLIN HFA    1 Inhaler    Inhale 2-4 puffs into the lungs every 4 hours as needed for shortness of breath / dyspnea or wheezing    Acute bronchitis, unspecified organism       azelastine 0.05 % Soln ophthalmic solution    OPTIVAR    1 Bottle    Apply 1 drop to eye 2 times daily    Allergic conjunctivitis of both eyes       citalopram 20 MG tablet    celeXA    90 tablet    Take 1 tablet (20 mg) by mouth daily    Mild major depression (H)       fexofenadine 180 MG tablet    ALLEGRA    90 tablet    Take 1 tablet (180 mg) by mouth daily    AR (allergic rhinitis)       FLONASE 50 MCG/ACT spray   Generic drug:  fluticasone      Spray 2 sprays into both nostrils as needed        fluticasone 110 MCG/ACT Inhaler     "FLOVENT HFA    1 Inhaler    Inhale 2 puffs into the lungs 2 times daily    Chronic obstructive pulmonary disease, unspecified COPD type (H)       folic acid 1 MG tablet    FOLVITE    100 tablet    Take 1 tablet (1 mg) by mouth daily    Seropositive rheumatoid arthritis (H)       ibuprofen 200 MG capsule      Take 600-800 mg by mouth At Bedtime        Insulin Syringe-Needle U-100 27G X 1/2\" 1 ML Misc    BD insulin syringe    10 each    1 Syringe every 7 days , to be used for methotrexate administration.    Seropositive rheumatoid arthritis (H)       methotrexate sodium 50 MG/2ML Soln     2 vial    Inject 0.8 mLs (20 mg) as directed every 7 days    Seropositive rheumatoid arthritis (H)       order for DME      Use your CPAP device as directed by your provider.        predniSONE 5 MG tablet    DELTASONE    50 tablet    Prednisone 20mg daily x5days, then 15mg daily x5days, then 10mg daily x5days, then 5mg daily x5days, then stop.    Seropositive rheumatoid arthritis (H)       * RESTASIS 0.05 % ophthalmic emulsion   Generic drug:  cycloSPORINE      Place 1 drop into both eyes 2 times daily        * cycloSPORINE 0.05 % ophthalmic emulsion    RESTASIS    2 Box    Place 1 drop into both eyes 2 times daily    Sicca syndrome (H)       sulfaSALAzine  MG EC tablet    AZULFIDINE EN    120 tablet    Take 2 tablets (1,000 mg) by mouth 2 times daily    Seropositive rheumatoid arthritis (H)       tiotropium 18 MCG capsule    SPIRIVA HANDIHALER    30 capsule    Inhale contents of one capsule daily.    Chronic obstructive pulmonary disease, unspecified COPD type (H)       tofacitinib 11 MG 24 hr tablet    XELJANZ    30 tablet    Take 1 tablet (11 mg) by mouth daily    Seropositive rheumatoid arthritis (H)       topiramate 25 MG tablet    TOPAMAX    60 tablet    Take 2 tablets (50 mg) by mouth At Bedtime Week 1: Take 25mg PO QHS Week 2 and thereafter: Take 50mg PO QHS    Morbid obesity (H)       * Notice:  This list has 2 " medication(s) that are the same as other medications prescribed for you. Read the directions carefully, and ask your doctor or other care provider to review them with you.

## 2018-07-03 NOTE — PROGRESS NOTES
CHIEF COMPLAINT:  Sharon Fung returns to evaluate compliance and response to therapy for her mild obstructive sleep apnea with evidence of hypoventilation with hypoxemia.     HISTORY OF PRESENT ILLNESS:  Two polysomnograms were completed with Ms. Fung.  A diagnostic polysomnogram on 03/27/2015 (wt 234#)  noting an AHI of 11.5 without REM sleep and excessive amount of N3 sleep and RDI of 13.8.  TCM values greater than 50 for more than 10 minutes were observed.  She was provided with auto-titrating CPAP and had difficulty with inadvertent removal.  Did have a titration study on 06/04/2015 with TCM and ABGs.  She was titrated to a pressure of 12 cm and a residual AHI of 4.2 was seen.  A REM supine was seen at the final pressure.  She returns here today with no new concerns with regard to her CPAP.  She has intermittent RLS.  No need for naps and seems to be pleased with her therapy.       New concerns:tiredness and wakeup at 3A    Sleep Review of Systems:     Snoring, snort arousals, gasping while on therapy: no     Bedpartner c/o's of cpap: n/a     Heated humidity problems with rainout/excessive dryness, sore throat: no     Opening of mouth while on therapy/excessive leak full nose: no     Swallowing air: no     Skin problems: no     Insufficient sleep, devoting <7 or more hours to sleep: no     Problems falling or staying asleep with cpap: staying asleep     RLS: rare: takes calcium at bedtime  2-3/7 when enter bed, fall asleep and unaware of when it stops    SLEEP SCHEDULE:  Bedtime: 9P 9:15 fall, wkend 9, 9:15 fall                       Range:                  Sleep Latency: 15  Wakeups: 2-3:30 with  30 minute wakeup  Look at clock, wkend   Refall 30 mins.  Not leave bed-but should very with next visit  Final wakeup: 5:30 work week:  7:30    TST: 7:45 to 9:45    Naps: rare 11A    Vacation rise time: 7:30-8, beach by 8:    Response to therapy:can't sleep without it  Willingness to continue:  "yes    Equipment issues: new supplies-encouraged to call [facility]    Download today:(unavailable), synopsis apap 9-15 cm h20, residual AHI 1-2, hrs mask on past 14 days 9010hr/day, leak 9th % 15.5 L/m, 14 day target epap 12-13cm h20    Allergies:    No Known Allergies    Medications:    Current Outpatient Prescriptions   Medication Sig Dispense Refill     acetaminophen (TYLENOL) 500 MG tablet Take 500-1,000 mg by mouth every 6 hours as needed for mild pain       albuterol (PROAIR HFA/PROVENTIL HFA/VENTOLIN HFA) 108 (90 Base) MCG/ACT Inhaler Inhale 2-4 puffs into the lungs every 4 hours as needed for shortness of breath / dyspnea or wheezing 1 Inhaler 1     azelastine (OPTIVAR) 0.05 % SOLN ophthalmic solution Apply 1 drop to eye 2 times daily 1 Bottle 6     citalopram (CELEXA) 20 MG tablet Take 1 tablet (20 mg) by mouth daily 90 tablet 1     cycloSPORINE (RESTASIS) 0.05 % ophthalmic emulsion Place 1 drop into both eyes 2 times daily 2 Box 11     cycloSPORINE (RESTASIS) 0.05 % ophthalmic emulsion Place 1 drop into both eyes 2 times daily       fexofenadine (ALLEGRA) 180 MG tablet Take 1 tablet (180 mg) by mouth daily 90 tablet 2     fluticasone (FLONASE) 50 MCG/ACT nasal spray Spray 2 sprays into both nostrils as needed       fluticasone (FLOVENT HFA) 110 MCG/ACT Inhaler Inhale 2 puffs into the lungs 2 times daily 1 Inhaler 5     folic acid (FOLVITE) 1 MG tablet Take 1 tablet (1 mg) by mouth daily 100 tablet 3     ibuprofen 200 MG capsule Take 600-800 mg by mouth At Bedtime       Insulin Syringe-Needle U-100 (BD INSULIN SYRINGE) 27G X 1/2\" 1 ML MISC 1 Syringe every 7 days , to be used for methotrexate administration. 10 each 5     methotrexate sodium 50 MG/2ML SOLN Inject 0.8 mLs (20 mg) as directed every 7 days 2 vial 3     ORDER FOR DME Use your CPAP device as directed by your provider.       predniSONE (DELTASONE) 5 MG tablet Prednisone 20mg daily x5days, then 15mg daily x5days, then 10mg daily x5days, then 5mg daily " x5days, then stop. 50 tablet 0     sulfaSALAzine ER (AZULFIDINE EN) 500 MG EC tablet Take 2 tablets (1,000 mg) by mouth 2 times daily 120 tablet 3     tiotropium (SPIRIVA HANDIHALER) 18 MCG capsule Inhale contents of one capsule daily. 30 capsule 1     tofacitinib (XELJANZ) 11 MG 24 hr tablet Take 1 tablet (11 mg) by mouth daily 30 tablet 3     topiramate (TOPAMAX) 25 MG tablet Take 2 tablets (50 mg) by mouth At Bedtime Week 1: Take 25mg PO QHS Week 2 and thereafter: Take 50mg PO QHS 60 tablet 3       Problem List:  Patient Active Problem List    Diagnosis Date Noted     High risk medication use 06/14/2013     Priority: High     Chronic obstructive pulmonary disease, unspecified COPD type (H) 03/27/2018     Priority: Medium     Headache 07/11/2017     Priority: Medium     Carpal tunnel syndrome of right wrist 06/22/2017     Priority: Medium     Seropositive rheumatoid arthritis (H) 07/15/2016     Priority: Medium     LORNA positive 07/15/2016     Priority: Medium     Anterior basement membrane dystrophy 06/21/2016     Priority: Medium     Obesity, Class III, BMI 40-49.9 (morbid obesity) (H) 05/19/2016     Priority: Medium     Obstructive sleep apnea 05/12/2015     Priority: Medium     Problem list name updated by automated process. Provider to review       Advanced directives, counseling/discussion 06/07/2012     Priority: Medium     Patient states has Advance Directive and will bring in a copy to clinic. 6/7/2012        Mild major depression (H)      Priority: Medium     Status post bariatric surgery      Priority: Medium     lap band       AR (allergic rhinitis)      Priority: Medium     GERD (gastroesophageal reflux disease)      Priority: Medium        Past Medical/Surgical History:  Past Medical History:   Diagnosis Date     AR (allergic rhinitis)  as teen     Arthritis 12/14/2015     Chronic obstructive pulmonary disease, unspecified COPD type (H) 3/27/2018     Depressive disorder 3 years ago     GERD  "(gastroesophageal reflux disease) 4-07     Headache 7/11/2017     Mild major depression (H) 3 years ago     Morbid obesity (H) teens     BRETT (obstructive sleep apnea)     uses CPAP     Rheumatoid arthritis, adult (H)      Past Surgical History:   Procedure Laterality Date     BLEPHAROPLASTY BILATERAL Bilateral 8/5/2016    Procedure: BLEPHAROPLASTY BILATERAL;  Surgeon: Cortez Robin MD;  Location:  [facility]     BREAST BIOPSY, RT/LT  1-94    Benign     C APPENDECTOMY  1977     COLONOSCOPY       COLONOSCOPY WITH CO2 INSUFFLATION N/A 3/30/2017    Procedure: COLONOSCOPY WITH CO2 INSUFFLATION;  Surgeon: Issa Weeks MD;  Location: [facility] OR     ESOPHAGOSCOPY, GASTROSCOPY, DUODENOSCOPY (EGD), COMBINED N/A 10/29/2015    Procedure: COMBINED ESOPHAGOSCOPY, GASTROSCOPY, DUODENOSCOPY (EGD);  Surgeon: Shaq Mims MD;  Location: [facility]     LAPAROSCOPIC GASTRIC ADJUSTABLE BANDING  11/09/10    Lap band procedure     LAPAROSCOPIC REMOVAL GASTRIC ADJUSTABLE BAND N/A 10/31/2015    Procedure: LAPAROSCOPIC REMOVAL GASTRIC ADJUSTABLE BAND;  Surgeon: Shaq Mims MD;  Location: [facility]     RELEASE CARPAL TUNNEL Left 4/27/2017    Procedure: RELEASE CARPAL TUNNEL;  Left Open Carpal Tunnel Release;  Surgeon: Ciara Middleton MD;  Location[facility]     RELEASE CARPAL TUNNEL Right 6/15/2017    Procedure: RELEASE CARPAL TUNNEL;  Right Carpal Tunnel Release Open;  Surgeon: Ciara Middleton MD;  Location: [facility]     REPAIR PTOSIS       TUBAL LIGATION  1988           Physical Examination:  Vitals: /83  Pulse 79  Resp 18  Ht 1.626 m (5' 4\")  Wt 115.2 kg (254 lb)  SpO2 96%  BMI 43.6 kg/m2  BMI= Body mass index is 43.6 kg/(m^2).         Hilliard Total Score 7/3/2018   Total score - Hilliard 8   MIGUEL:14/24    GENERAL APPEARANCE: alert and mild distress    A/P:worse quality of sleep     1. BRETT: excellent coverage, but increase in fatigue  2. Obesity: wt up by 20 #, will check overnight oximetry and follow " up with me in clinic.  3. RLS: recheck ferritin-although infrequent-RLS may start up after sleep.  Low interest in   4. Sleep disruption in setting of early AM start time at work of 7A.  wakeups of at least 30 minutes q night with frustration,weekday/end rise time variance by 2 hrs, along with TST.  Sleep diaries to be provided to evaluate differential: possible circadian misallignment, long sleep syndrome (8 hrs or more) or nonrestorative sleep (insomnia).  Follow up per # 2.    Time spent with patient is 25 minutes, of which >50% was spent in counseling, education and coordination of care.

## 2018-07-03 NOTE — PATIENT INSTRUCTIONS
Ferritin and TIBC-will my chart you with results    Overnight ox with follow up with  Me in approx 2-3 wks    Sleep diaries till I see you again

## 2018-07-14 DIAGNOSIS — F32.0 MILD MAJOR DEPRESSION (H): ICD-10-CM

## 2018-07-16 RX ORDER — CITALOPRAM HYDROBROMIDE 20 MG/1
20 TABLET ORAL DAILY
Qty: 90 TABLET | Refills: 1 | Status: SHIPPED | OUTPATIENT
Start: 2018-07-16 | End: 2018-12-07

## 2018-07-24 DIAGNOSIS — M05.9 SEROPOSITIVE RHEUMATOID ARTHRITIS (H): ICD-10-CM

## 2018-07-24 DIAGNOSIS — Z11.4 SCREENING FOR HIV (HUMAN IMMUNODEFICIENCY VIRUS): ICD-10-CM

## 2018-07-24 DIAGNOSIS — G25.81 RESTLESS LEGS SYNDROME (RLS): ICD-10-CM

## 2018-07-24 LAB
ALBUMIN SERPL-MCNC: 3.6 G/DL (ref 3.4–5)
ALP SERPL-CCNC: 91 U/L (ref 40–150)
ALT SERPL W P-5'-P-CCNC: 34 U/L (ref 0–50)
AST SERPL W P-5'-P-CCNC: 24 U/L (ref 0–45)
BASOPHILS # BLD AUTO: 0 10E9/L (ref 0–0.2)
BASOPHILS NFR BLD AUTO: 0.2 %
BILIRUB DIRECT SERPL-MCNC: <0.1 MG/DL (ref 0–0.2)
BILIRUB SERPL-MCNC: 0.3 MG/DL (ref 0.2–1.3)
CREAT SERPL-MCNC: 0.71 MG/DL (ref 0.52–1.04)
CRP SERPL-MCNC: 8.2 MG/L (ref 0–8)
DIFFERENTIAL METHOD BLD: NORMAL
EOSINOPHIL # BLD AUTO: 0.1 10E9/L (ref 0–0.7)
EOSINOPHIL NFR BLD AUTO: 2.5 %
ERYTHROCYTE [DISTWIDTH] IN BLOOD BY AUTOMATED COUNT: 13.8 % (ref 10–15)
ERYTHROCYTE [SEDIMENTATION RATE] IN BLOOD BY WESTERGREN METHOD: 16 MM/H (ref 0–30)
FERRITIN SERPL-MCNC: 146 NG/ML (ref 8–252)
GFR SERPL CREATININE-BSD FRML MDRD: 84 ML/MIN/1.7M2
HCT VFR BLD AUTO: 38.4 % (ref 35–47)
HGB BLD-MCNC: 12.4 G/DL (ref 11.7–15.7)
HIV 1+2 AB+HIV1 P24 AG SERPL QL IA: NONREACTIVE
IMM GRANULOCYTES # BLD: 0 10E9/L (ref 0–0.4)
IMM GRANULOCYTES NFR BLD: 0.2 %
IRON SATN MFR SERPL: 37 % (ref 15–46)
IRON SERPL-MCNC: 101 UG/DL (ref 35–180)
LYMPHOCYTES # BLD AUTO: 1.7 10E9/L (ref 0.8–5.3)
LYMPHOCYTES NFR BLD AUTO: 38.6 %
MCH RBC QN AUTO: 31.2 PG (ref 26.5–33)
MCHC RBC AUTO-ENTMCNC: 32.3 G/DL (ref 31.5–36.5)
MCV RBC AUTO: 97 FL (ref 78–100)
MONOCYTES # BLD AUTO: 0.4 10E9/L (ref 0–1.3)
MONOCYTES NFR BLD AUTO: 8.9 %
NEUTROPHILS # BLD AUTO: 2.2 10E9/L (ref 1.6–8.3)
NEUTROPHILS NFR BLD AUTO: 49.6 %
NRBC # BLD AUTO: 0 10*3/UL
NRBC BLD AUTO-RTO: 0 /100
PLATELET # BLD AUTO: 286 10E9/L (ref 150–450)
PROT SERPL-MCNC: 7.3 G/DL (ref 6.8–8.8)
RBC # BLD AUTO: 3.98 10E12/L (ref 3.8–5.2)
TIBC SERPL-MCNC: 275 UG/DL (ref 240–430)
WBC # BLD AUTO: 4.4 10E9/L (ref 4–11)

## 2018-07-24 PROCEDURE — 85652 RBC SED RATE AUTOMATED: CPT | Performed by: INTERNAL MEDICINE

## 2018-07-24 PROCEDURE — 36415 COLL VENOUS BLD VENIPUNCTURE: CPT | Performed by: INTERNAL MEDICINE

## 2018-07-24 PROCEDURE — 87389 HIV-1 AG W/HIV-1&-2 AB AG IA: CPT | Performed by: INTERNAL MEDICINE

## 2018-07-24 PROCEDURE — 85025 COMPLETE CBC W/AUTO DIFF WBC: CPT | Performed by: INTERNAL MEDICINE

## 2018-07-24 PROCEDURE — 82565 ASSAY OF CREATININE: CPT | Performed by: INTERNAL MEDICINE

## 2018-07-24 PROCEDURE — 83540 ASSAY OF IRON: CPT | Performed by: INTERNAL MEDICINE

## 2018-07-24 PROCEDURE — 83550 IRON BINDING TEST: CPT | Performed by: INTERNAL MEDICINE

## 2018-07-24 PROCEDURE — 82728 ASSAY OF FERRITIN: CPT | Performed by: INTERNAL MEDICINE

## 2018-07-24 PROCEDURE — 80076 HEPATIC FUNCTION PANEL: CPT | Performed by: INTERNAL MEDICINE

## 2018-07-24 PROCEDURE — 86140 C-REACTIVE PROTEIN: CPT | Performed by: INTERNAL MEDICINE

## 2018-07-26 DIAGNOSIS — G47.33 OBSTRUCTIVE SLEEP APNEA: Primary | ICD-10-CM

## 2018-07-30 NOTE — PROGRESS NOTES
Orders sent to [facility] for cpap supplies.    Jayshree Tejeda [facility] ~[place]

## 2018-08-08 ENCOUNTER — OFFICE VISIT (OUTPATIENT)
Dept: RHEUMATOLOGY | Facility: CLINIC | Age: 63
End: 2018-08-08
Payer: COMMERCIAL

## 2018-08-08 VITALS
RESPIRATION RATE: 16 BRPM | DIASTOLIC BLOOD PRESSURE: 80 MMHG | HEART RATE: 98 BPM | WEIGHT: 256.8 LBS | OXYGEN SATURATION: 93 % | BODY MASS INDEX: 43.84 KG/M2 | HEIGHT: 64 IN | SYSTOLIC BLOOD PRESSURE: 140 MMHG

## 2018-08-08 DIAGNOSIS — Z79.899 HIGH RISK MEDICATIONS (NOT ANTICOAGULANTS) LONG-TERM USE: ICD-10-CM

## 2018-08-08 DIAGNOSIS — M05.9 SEROPOSITIVE RHEUMATOID ARTHRITIS (H): Primary | ICD-10-CM

## 2018-08-08 PROCEDURE — 99213 OFFICE O/P EST LOW 20 MIN: CPT | Performed by: INTERNAL MEDICINE

## 2018-08-08 RX ORDER — SULFASALAZINE 500 MG/1
1500 TABLET, DELAYED RELEASE ORAL 2 TIMES DAILY
Qty: 180 TABLET | Refills: 6 | Status: SHIPPED | OUTPATIENT
Start: 2018-08-08 | End: 2018-12-19

## 2018-08-08 RX ORDER — FOLIC ACID 1 MG/1
1 TABLET ORAL DAILY
Qty: 100 TABLET | Refills: 3 | Status: SHIPPED | OUTPATIENT
Start: 2018-08-08 | End: 2018-12-19

## 2018-08-08 NOTE — MR AVS SNAPSHOT
After Visit Summary   8/8/2018    Sharon Fung    MRN: [id number]           Patient Information     Date Of Birth          1955        Visit Information        Provider Department      8/8/2018 8:00 AM Freddy Guerra MD [facility]        Today's Diagnoses     Seropositive rheumatoid arthritis (H)          Care Instructions    Rheumatology    Dr. Freddy [place] [facility]   (Monday)  [address]  [place] [address]       [facility]   (Tuesday)  [address] [place] [address]    [facility]   (Wed., Thurs., and Friday)  [address] [facility] [place] [address]    Phone number: [phone]  Thank you for choosing [facility].  Evie Rosas CMA            Follow-ups after your visit        Your next 10 appointments already scheduled     Aug 08, 2018  9:00 AM CDT   Oximetry Drop Off with DME SCHEDULE   [facility], Sleep Study ([facility])    [address] [place] [id number] [phone]            Aug 09, 2018  4:00 PM CDT   Telephone Visit with CHELSEY Freedman [facility], Sleep Study ([facility])    [address] [place] [id number] [phone]           Note: this is not an onsite visit; there is no need to come to the facility.            Dec 19, 2018  7:20 AM CST   [facility]    [address] [facility] [id number] [phone]              Future tests that were ordered for you today     Open Standing Orders        Priority Remaining Interval Expires Ordered    Creatinine Routine 2/2 Every 4 Weeks 2/4/2019 8/8/2018    Hepatic panel Routine 2/2 Every 4 Weeks 2/4/2019 8/8/2018    CBC with platelets differential Routine 2/2 Every 4 Weeks 2/4/2019 8/8/2018          Open Future Orders        Priority Expected  "Expires Ordered    CBC with platelets differential Routine 11/2/2018 12/6/2018 8/8/2018    Creatinine Routine 11/2/2018 12/6/2018 8/8/2018    Erythrocyte sedimentation rate auto Routine 11/2/2018 12/6/2018 8/8/2018    CRP inflammation Routine 11/2/2018 12/6/2018 8/8/2018    Hepatic panel Routine 11/2/2018 12/6/2018 8/8/2018            Who to contact     If you have questions or need follow up information about today's clinic visit or your schedule please contact [facility] directly at [phone].  Normal or non-critical lab and imaging results will be communicated to you by [employer], letter or phone within 4 business days after the clinic has received the results. If you do not hear from us within 7 days, please contact the clinic through [employer] or phone. If you have a critical or abnormal lab result, we will notify you by phone as soon as possible.  Submit refill requests through [employer] or call your pharmacy and they will forward the refill request to us. Please allow 3 business days for your refill to be completed.          Additional Information About Your Visit        [employer] Information     [employer] gives you secure access to your electronic health record. If you see a primary care provider, you can also send messages to your care team and make appointments. If you have questions, please call your primary care clinic.  If you do not have a primary care provider, please call [phone] and they will assist you.        Care EveryWhere ID     This is your Care EveryWhere ID. This could be used by other organizations to access your [facility] medical records  [id number]        Your Vitals Were     Pulse Respirations Height Pulse Oximetry BMI (Body Mass Index)       98 16 1.626 m (5' 4\") 93% 44.08 kg/m2        Blood Pressure from Last 3 Encounters:   08/08/18 140/79   07/03/18 133/83   06/07/18 131/73    Weight from Last 3 Encounters:   08/08/18 116.5 kg (256 lb 12.8 oz)   07/03/18 115.2 kg (254 " lb)   06/07/18 113.9 kg (251 lb)                 Today's Medication Changes          These changes are accurate as of 8/8/18  8:35 AM.  If you have any questions, ask your nurse or doctor.               These medicines have changed or have updated prescriptions.        Dose/Directions    sulfaSALAzine  MG EC tablet   Commonly known as:  AZULFIDINE EN   This may have changed:  how much to take   Used for:  Seropositive rheumatoid arthritis (H)   Changed by:  Freddy Guerra MD        Dose:  1500 mg   Take 3 tablets (1,500 mg) by mouth 2 times daily   Quantity:  180 tablet   Refills:  6            Where to get your medicines      These medications were sent to [facility] [place], [place] - [address]  [address] [address]     Phone:  [phone]     folic acid 1 MG tablet    methotrexate sodium 50 MG/2ML Soln    sulfaSALAzine  MG EC tablet         Call your pharmacy to confirm that your medication is ready for pickup. It may take up to 24 hours for them to receive the prescription. If the prescription is not ready within 3 business days, please contact your clinic or your provider.     We will let you know when these medications are ready. If you don't hear back within 3 business days, please contact us.     tofacitinib 11 MG 24 hr tablet                Primary Care Provider Office Phone # Fax #    Reynaldo Saavedra [phone]       [address] [facility] [address]        Equal Access to Services     [facility]: Hadii sheryl ku hadasho Soronelali, waaxda luqadaha, qaybta kaalmada adeegyada, susan mcneil hayasif terry . So [facility] [phone].    ATENCIÓN: Si habla español, tiene a ferrer disposición servicios gratuitos de asistencia lingüística. Elvira al [phone].    We comply with applicable federal civil rights laws and [place] laws. We do not discriminate on the basis of race, color, national origin, age,  disability, sex, sexual orientation, or gender identity.            Thank you!     Thank you for choosing [facility]  for your care. Our goal is always to provide you with excellent care. Hearing back from our patients is one way we can continue to improve our services. Please take a few minutes to complete the written survey that you may receive in the mail after your visit with us. Thank you!             Your Updated Medication List - Protect others around you: Learn how to safely use, store and throw away your medicines at www.disposemymeds.org.          This list is accurate as of 8/8/18  8:35 AM.  Always use your most recent med list.                   Brand Name Dispense Instructions for use Diagnosis    acetaminophen 500 MG tablet    TYLENOL     Take 500-1,000 mg by mouth every 6 hours as needed for mild pain        albuterol 108 (90 Base) MCG/ACT Inhaler    PROAIR HFA/PROVENTIL HFA/VENTOLIN HFA    1 Inhaler    Inhale 2-4 puffs into the lungs every 4 hours as needed for shortness of breath / dyspnea or wheezing    Acute bronchitis, unspecified organism       azelastine 0.05 % Soln ophthalmic solution    OPTIVAR    1 Bottle    Apply 1 drop to eye 2 times daily    Allergic conjunctivitis of both eyes       citalopram 20 MG tablet    celeXA    90 tablet    Take 1 tablet (20 mg) by mouth daily    Mild major depression (H)       fexofenadine 180 MG tablet    ALLEGRA    90 tablet    Take 1 tablet (180 mg) by mouth daily    AR (allergic rhinitis)       FLONASE 50 MCG/ACT spray   Generic drug:  fluticasone      Spray 2 sprays into both nostrils as needed        fluticasone 110 MCG/ACT Inhaler    FLOVENT HFA    1 Inhaler    Inhale 2 puffs into the lungs 2 times daily    Chronic obstructive pulmonary disease, unspecified COPD type (H)       folic acid 1 MG tablet    FOLVITE    100 tablet    Take 1 tablet (1 mg) by mouth daily    Seropositive rheumatoid arthritis (H)       ibuprofen 200 MG capsule       "Take 600-800 mg by mouth At Bedtime        Insulin Syringe-Needle U-100 27G X 1/2\" 1 ML Misc    BD insulin syringe    10 each    1 Syringe every 7 days , to be used for methotrexate administration.    Seropositive rheumatoid arthritis (H)       methotrexate sodium 50 MG/2ML Soln     2 vial    Inject 0.8 mLs (20 mg) as directed every 7 days    Seropositive rheumatoid arthritis (H)       order for DME      Use your CPAP device as directed by your provider.        predniSONE 5 MG tablet    DELTASONE    50 tablet    Prednisone 20mg daily x5days, then 15mg daily x5days, then 10mg daily x5days, then 5mg daily x5days, then stop.    Seropositive rheumatoid arthritis (H)       * RESTASIS 0.05 % ophthalmic emulsion   Generic drug:  cycloSPORINE      Place 1 drop into both eyes 2 times daily        * cycloSPORINE 0.05 % ophthalmic emulsion    RESTASIS    2 Box    Place 1 drop into both eyes 2 times daily    Sicca syndrome (H)       sulfaSALAzine  MG EC tablet    AZULFIDINE EN    180 tablet    Take 3 tablets (1,500 mg) by mouth 2 times daily    Seropositive rheumatoid arthritis (H)       tiotropium 18 MCG capsule    SPIRIVA HANDIHALER    30 capsule    Inhale contents of one capsule daily.    Chronic obstructive pulmonary disease, unspecified COPD type (H)       tofacitinib 11 MG 24 hr tablet    XELJANZ    30 tablet    Take 1 tablet (11 mg) by mouth daily    Seropositive rheumatoid arthritis (H)       topiramate 25 MG tablet    TOPAMAX    60 tablet    Take 2 tablets (50 mg) by mouth At Bedtime Week 1: Take 25mg PO QHS Week 2 and thereafter: Take 50mg PO QHS    Morbid obesity (H)       * Notice:  This list has 2 medication(s) that are the same as other medications prescribed for you. Read the directions carefully, and ask your doctor or other care provider to review them with you.      "

## 2018-08-08 NOTE — PATIENT INSTRUCTIONS
Rheumatology    Dr. Freddy Guerra         [facility]   (Monday)  [address]  [place] [address]       [facility]   (Tuesday)  [address]  [place] [address]    [facility]   (Wed., Thurs., and Friday)  [address] [place] [address]    Phone number: [phone]  Thank you for choosing [facility].  Evie Rosas CMA

## 2018-08-08 NOTE — NURSING NOTE
Patient to follow up with Primary Care provider regarding elevated blood pressure.  Blood pressure rechecked after visit     140/80  Evie Rosas CMA Rheumatology  8/8/2018 8:49 AM

## 2018-08-08 NOTE — PROGRESS NOTES
Rheumatology Clinic Visit      Sharon Fung MRN# [id number]   YOB: 1955 Age: 63 year old      Date of visit: 8/08/18   PCP: Dr. Reynaldo Saavedra    Chief Complaint   Patient presents with:  Arthritis: RA, patient states she is the same    Assessment and Plan     1. Seropositive nonerosive rheumatoid arthritis (RF negative, CCP low positive): Previously on Humira (lost efficacy), Cimzia (ineffective), Orencia (ineffective), leflunomide (ineffective), and hydroxychloroquine (ineffective). Currently on MTX 20mg SQ (GI upset with PO), SSZ 1000mg BID, and Xeljanz XR 11 mg daily.  On and off joint symptoms primarily involving her MCPs and PIPs, and morning stiffness for 1-2 hours that improves with activity; no clear synovitis on exam today but has inflammatory symptoms and therefore will increase sulfasalazine because sulfasalazine was very beneficial when it was first added so a higher dose may provide more benefit.    - Continue methotrexate 20mg SQ every 7 days  - Continue folic acid 1mg daily  - Continue Xeljanz XR 11 mg daily  - Increase sulfasalazine to 1500 mg twice daily   - Labs monthly [id number]: CBC, Cr, Hepatic Panel  - Labs in 3 months: CBC, Creatinine, Hepatic Panel, ESR, CRP              Rapid 3, cumulative scores                      05/02/2018:  9     (MTX 20mg SQ wkly, Xeljanz XR 11mg daily, SSZ 1000mg BID)                          01/31/2018: 22.3 (MTX 20mg SQ wkly, Xeljanz XR 11mg daily)                          11/29/2017: 16.8 (MTX 20mg SQ wkly)                      08/02/2017: 4.2   (MTX 20mg SQ wkly, Xeljanz XR 11mg daily)                         05/04/2017: 7      (MTX 20mg SQ wkly, Xeljanz XR 11mg daily)                        02/02/2017: 8      (MTX 20mg SQ wkly, Xeljanz XR 11mg daily)                      11/04/2016: 13    (MTX 20mg SQ weekly)                      07/15/2016: 10.8    2. Positive LORNA and dsDNA / Secondary Sjogren's Syndrome: Repeat dsDNAs have been negative. Has  dry eyes but no dry mouth.  Dry eyes are treated by ophthalmology with Restasis.  Likely Secondary Sjogren's Syndrome.     3. Elevated blood pressure: Patient to follow up with Primary Care provider regarding elevated blood pressure.     Ms. Fung verbalized agreement with and understanding of the rational for the diagnosis and treatment plan.  All questions were answered to best of my ability and the patient's satisfaction. Ms. Fung was advised to contact the clinic with any questions that may arise after the clinic visit.      Thank you for involving me in the care of the patient    Return to clinic: 3-4 months    HPI   Sharon Fung is a 63 year old female with medical history significant for GERD, allergic rhinitis, obstructive sleep apnea, obesity, hx of trigger fingers, hx of carpal tunnel surgery, and rheumatoid arthritis who presents for follow-up of rheumatoid arthritis.    Today, Ms. Fung reports that she has had on and off joint pain primarily involving her MCPs and PIPs, worse in the right hand.  Morning stiffness for 1-2 hours.  Positive gelling phenomenon.  Tolerating medications well.      No fevers or chills. No nausea, vomiting, constipation, diarrhea. No chest pain/pressure, palpitations, or shortness of breath. No oral or nasal sores. No neck pain. No rash.      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 No oral or nasal ulcers.  CV: No chest pain, pressure, palpitations, or dyspnea on exertion.  PULM: no shortness of breath.   GI: No nausea, vomiting, constipation, diarrhea. No blood in stool. No abdominal pain.  : No blood in urine.  MSK: See HPI.  NEURO: No numbness or tingling  EXT: No LE swelling  PSYCH: Negative    Active Problem List     Patient Active Problem List   Diagnosis     AR (allergic rhinitis)      GERD (gastroesophageal reflux disease)     Status post bariatric surgery     Mild major depression (H)     Advanced directives, counseling/discussion     High risk medication use     Obstructive sleep apnea     Obesity, Class III, BMI 40-49.9 (morbid obesity) (H)     Anterior basement membrane dystrophy     Seropositive rheumatoid arthritis (H)     LORNA positive     Carpal tunnel syndrome of right wrist     Headache     Chronic obstructive pulmonary disease, unspecified COPD type (H)     Past Medical History     Past Medical History:   Diagnosis Date     AR (allergic rhinitis)  as teen     Arthritis 12/14/2015     Chronic obstructive pulmonary disease, unspecified COPD type (H) 3/27/2018     Depressive disorder 3 years ago     GERD (gastroesophageal reflux disease) 4-07     Headache 7/11/2017     Mild major depression (H) 3 years ago     Morbid obesity (H) teens     BRETT (obstructive sleep apnea)     uses CPAP     Rheumatoid arthritis, adult (H)      Past Surgical History     Past Surgical History:   Procedure Laterality Date     BLEPHAROPLASTY BILATERAL Bilateral 8/5/2016    Procedure: BLEPHAROPLASTY BILATERAL;  Surgeon: Cortez Robin MD;  Location:  [facility]     BREAST BIOPSY, RT/LT  1-94    Benign     C APPENDECTOMY  1977     COLONOSCOPY       COLONOSCOPY WITH CO2 INSUFFLATION N/A 3/30/2017    Procedure: COLONOSCOPY WITH CO2 INSUFFLATION;  Surgeon: Issa Weeks MD;  Location:  OR     ESOPHAGOSCOPY, GASTROSCOPY, DUODENOSCOPY (EGD), COMBINED N/A 10/29/2015    Procedure: COMBINED ESOPHAGOSCOPY, GASTROSCOPY, DUODENOSCOPY (EGD);  Surgeon: Shaq Mims MD;  Location:  GI     LAPAROSCOPIC GASTRIC ADJUSTABLE BANDING  11/09/10    Lap band procedure     LAPAROSCOPIC REMOVAL GASTRIC ADJUSTABLE BAND N/A 10/31/2015    Procedure: LAPAROSCOPIC REMOVAL GASTRIC ADJUSTABLE BAND;  Surgeon: Shaq Mims MD;  Location:  OR     RELEASE CARPAL TUNNEL Left 4/27/2017    Procedure: RELEASE CARPAL TUNNEL;   Left Open Carpal Tunnel Release;  Surgeon: Ciara Middleton MD;  [facility]     RELEASE CARPAL TUNNEL Right 6/15/2017    Procedure: RELEASE CARPAL TUNNEL;  Right Carpal Tunnel Release Open;  Surgeon: Ciara Middleton MD;  Location: [facility] OR     REPAIR PTOSIS       TUBAL LIGATION  1988     Allergy   No Known Allergies  Current Medication List     Current Outpatient Prescriptions   Medication Sig     acetaminophen (TYLENOL) 500 MG tablet Take 500-1,000 mg by mouth every 6 hours as needed for mild pain     azelastine (OPTIVAR) 0.05 % SOLN ophthalmic solution Apply 1 drop to eye 2 times daily     citalopram (CELEXA) 20 MG tablet Take 1 tablet (20 mg) by mouth daily     cycloSPORINE (RESTASIS) 0.05 % ophthalmic emulsion Place 1 drop into both eyes 2 times daily     fexofenadine (ALLEGRA) 180 MG tablet Take 1 tablet (180 mg) by mouth daily     folic acid (FOLVITE) 1 MG tablet Take 1 tablet (1 mg) by mouth daily     ibuprofen 200 MG capsule Take 600-800 mg by mouth At Bedtime     methotrexate sodium 50 MG/2ML SOLN Inject 0.8 mLs (20 mg) as directed every 7 days     sulfaSALAzine ER (AZULFIDINE EN) 500 MG EC tablet Take 3 tablets (1,500 mg) by mouth 2 times daily     tofacitinib (XELJANZ) 11 MG 24 hr tablet Take 1 tablet (11 mg) by mouth daily     topiramate (TOPAMAX) 25 MG tablet Take 2 tablets (50 mg) by mouth At Bedtime Week 1: Take 25mg PO QHS Week 2 and thereafter: Take 50mg PO QHS     albuterol (PROAIR HFA/PROVENTIL HFA/VENTOLIN HFA) 108 (90 Base) MCG/ACT Inhaler Inhale 2-4 puffs into the lungs every 4 hours as needed for shortness of breath / dyspnea or wheezing (Patient not taking: Reported on 8/8/2018)     cycloSPORINE (RESTASIS) 0.05 % ophthalmic emulsion Place 1 drop into both eyes 2 times daily (Patient not taking: Reported on 8/8/2018)     fluticasone (FLONASE) 50 MCG/ACT nasal spray Spray 2 sprays into both nostrils as needed     fluticasone (FLOVENT HFA) 110 MCG/ACT Inhaler Inhale 2 puffs  "into the lungs 2 times daily (Patient not taking: Reported on 8/8/2018)     Insulin Syringe-Needle U-100 (BD INSULIN SYRINGE) 27G X 1/2\" 1 ML MISC 1 Syringe every 7 days , to be used for methotrexate administration.     ORDER FOR DME Use your CPAP device as directed by your provider.     predniSONE (DELTASONE) 5 MG tablet Prednisone 20mg daily x5days, then 15mg daily x5days, then 10mg daily x5days, then 5mg daily x5days, then stop. (Patient not taking: Reported on 8/8/2018)     tiotropium (SPIRIVA HANDIHALER) 18 MCG capsule Inhale contents of one capsule daily. (Patient not taking: Reported on 8/8/2018)     [DISCONTINUED] methotrexate sodium 50 MG/2ML SOLN Inject 0.8 mLs (20 mg) as directed every 7 days     [DISCONTINUED] tofacitinib (XELJANZ) 11 MG 24 hr tablet Take 1 tablet (11 mg) by mouth daily     No current facility-administered medications for this visit.          Social History   See HPI    Family History     Family History   Problem Relation Age of Onset     HEART DISEASE Father      MI     Neurologic Disorder Father 79     Parkinson's     Diabetes Father      Hypertension Father      Coronary Artery Disease Father      Cancer Maternal Grandmother      uterine     Neurologic Disorder Sister 16     migraine     Depression Sister      Neurologic Disorder Mother 20     migraine     Depression Mother      Neurologic Disorder Son 7     migraine     Substance Abuse Son      Depression Sister      Substance Abuse Sister        Physical Exam     Temp Readings from Last 3 Encounters:   06/07/18 98.3  F (36.8  C) (Oral)   05/02/18 97.1  F (36.2  C) (Oral)   03/27/18 97  F (36.1  C) (Oral)     BP Readings from Last 5 Encounters:   08/08/18 140/80   07/03/18 133/83   06/07/18 131/73   05/02/18 134/82   04/30/18 126/70     Pulse Readings from Last 1 Encounters:   08/08/18 98     Resp Readings from Last 1 Encounters:   08/08/18 16     Estimated body mass index is 44.08 kg/(m^2) as calculated from the following:    Height " "as of this encounter: 1.626 m (5' 4\").    Weight as of this encounter: 116.5 kg (256 lb 12.8 oz).    GEN: NAD   HEENT: MMM. Anicteric, noninjected sclera  CV: S1, S2. RRR. No m/r/g.  PULM: CTA bilaterally. No w/c.  MSK: Tenderness palpation of the bilateral MCPs and PIPs, worse on the right; no synovial swelling.  Wrists, elbows, shoulders, knees, ankles, and MTPs without swelling or tenderness to palpation.  Hips nontender to palpation.    SKIN: No rash.   EXT: No LE edema  PSYCH: Alert. Appropriate.    Labs / Imaging (select studies)   RF/CCP  Recent Labs   Lab Test  11/19/12   0900   CCPABY  53*   RHF  <7     LORNA  Recent Labs   Lab Test  09/13/12   1756   NADEGE  1.7*     RNP/Sm/SSA/SSB  Recent Labs   Lab Test  06/02/14   1216  04/07/14   1001  11/19/12   0900   RNPIGG  <0.2  Negative     --    --    SMIGG  <0.2  Negative     --    --    ENASM   --    --   6   SSAIGG  <0.2  Negative     --    --    ENASSA   --    --   6   SSBIGG  0.3   --    --    ENASSB   --    --   4   ENARMP   --    --   0   SCLIGG  <0.2  Negative     --    --    JOIGG   --   <0.2 Negative   --      dsDNA  Recent Labs   Lab Test  02/03/15   1141  06/02/14   1216  04/30/13   1316  09/19/12   0905   DNA  <15  Interpretation:  Negative    <15  Interpretation:  Negative    29  55*     C3/C4  Recent Labs   Lab Test  02/03/15   1141  06/02/14   1216  04/30/13   1316  11/19/12   0900   M9WCROP  162  142  120  140   P8HXQHL  29  23  26  22     Antiphospholipid Antibodies  Recent Labs   Lab Test  11/19/12   0900   B2IGG  5   B2IGM  3   CARG  <15.0 Interpretation:  Negative   ALEX  <12.5 Interpretation:  Negative   LUPINT  Negative (Note) COMMENTS: The INR is normal. APTT is prolonged, but mixing study shows complete correction. DRVVT Screen is normal. Thrombin time is normal. NEGATIVE TEST; A LUPUS ANTICOAGULANT WAS NOT DETECTED IN THIS SPECIMEN WITHIN THE LIMITS OF THE TESTING REPERTOIRE. If the clinical picture is strongly suggestive of an " antiphospholipid syndrome, recommend anticardiolipin and beta-2-glycoprotein (IgG and IgM) antibody tests. APTT mixing study is indicative of factor deficiencies. Recommend factors 8, 9, 11 and 12 (factors VIII, IX, XI, and XII) levels if clinically indicated. Ara Plasencia M.D.  [phone] 11-.  NINR =  0.89 N = 0.86-1.14  (No additional charge) NTT = 17.3 N = 13.0-19.0 sec  (No additional charge)  APTT'S:    Seconds Reagent =  Stago LS Normal  =  36 Patient  =  44 1:2 Mix  =  38 Reference =  31-43   DILUTE EDGARDO VIPER VENOM TEST: DRVVT Screen Ratio = 0.93 Normal = <1.28      ANCA  Recent Labs   Lab Test  11/19/12   0900   MPOAB  1     IgG  Recent Labs   Lab Test  09/13/12   1756   IGG  1340   IGA  240   IGM  138     CBC  Recent Labs   Lab Test  07/24/18   0738  04/16/18   0810  03/06/18   0801   WBC  4.4  5.5  4.0   RBC  3.98  4.11  4.03   HGB  12.4  12.6  12.5   HCT  38.4  39.2  38.3   MCV  97  95  95   RDW  13.8  14.0  14.3   PLT  286  325  268   MCH  31.2  30.7  31.0   MCHC  32.3  32.1  32.6   NEUTROPHIL  49.6  58.6  45.7   LYMPH  38.6  31.8  42.1   MONOCYTE  8.9  7.4  9.3   EOSINOPHIL  2.5  1.4  2.3   BASOPHIL  0.2  0.4  0.3   ANEU  2.2  3.2  1.8   ALYM  1.7  1.8  1.7   KIMBERLY  0.4  0.4  0.4   AEOS  0.1  0.1  0.1   ABAS  0.0  0.0  0.0     CMP  Recent Labs   Lab Test  07/24/18   0738  04/16/18   0810  03/06/18   0801  01/29/18   1244   07/11/17   0906   01/17/17   0642  01/15/17   1844  12/27/16   0836   10/30/15   1557  10/28/15   2233   NA   --    --    --    --    --   140   --   145*  139  139   --   139  136   POTASSIUM   --    --    --    --    --   3.2*   --   3.5  4.0  4.7   --   3.6  3.9   CHLORIDE   --    --    --    --    --   107   --   112*  107  110*   --   106  104   CO2   --    --    --    --    --   21   --   29  24  21   --   29  28   ANIONGAP   --    --    --    --    --   12   --   4  8  8   --   5  4   GLC   --    --    --    --    --   90   --   108*  104*  95   --   99  117*    BUN   --    --    --    --    --   13   --   9  18  22   --   13  11   CR  0.71  0.70  0.68  0.76   < >  0.73   < >  0.93  0.83  0.75   < >  0.67  0.67   GFRESTIMATED  84  85  87  77   < >  82   < >  61  70  79   < >  90  >90  Non  GFR Calc     GFRESTBLACK  >90  >90  >90  >90   < >  >90   GFR Calc     < >  74  85  >90   GFR Calc     < >  >90   GFR Calc    >90   GFR Calc     ISH   --    --    --    --    --   9.9   --   8.2*  8.5  8.8   --   8.5  8.9   BILITOTAL  0.3  0.2  0.4  0.4   < >  0.3   < >   --   0.6  0.4   < >   --    --    ALBUMIN  3.6  3.8  3.6  3.6   < >  4.0   < >   --   3.4  3.6   < >   --    --    PROTTOTAL  7.3  7.3  7.2  7.0   < >  8.1   < >   --   7.3  7.4   < >   --    --    ALKPHOS  91  80  74  87   < >  90   < >   --   82  92   < >   --    --    AST  24  29  27  26   < >  24   < >   --   35  21   < >   --    --    ALT  34  29  36  36   < >  47   < >   --   37  33   < >   --    --     < > = values in this interval not displayed.     HgA1c  Recent Labs   Lab Test  02/20/15   0750  10/20/14   0821   A1C  5.6  5.1     Calcium/VitaminD  Recent Labs   Lab Test  07/11/17   0906  01/17/17   0642  01/15/17   1844   02/20/15   0750   02/11/11   0735   ISH  9.9  8.2*  8.5   < >  9.0   < >  9.3   D3VIT   --    --    --    --    --    --   42   VITDT   --    --    --    --   39   --    --     < > = values in this interval not displayed.     ESR/CRP  Recent Labs   Lab Test  07/24/18   0738  04/16/18   0810  01/29/18   1244   SED  16  18  13   CRP  8.2*  7.0  4.2     CK/Aldolase  Recent Labs   Lab Test  04/07/14   1001   CKT  77     TSH/T4  Recent Labs   Lab Test  09/29/15   0832  08/28/12   1525   TSH  1.20  1.48     Lipid Panel  Recent Labs   Lab Test  04/27/17   0732  12/27/16   0836  02/20/15   0750  10/20/14   0821  11/22/13   0843   CHOL  204*  184  157  194  167   TRIG  148  154*  79  193*  140   HDL  82  56  63  63  60    LDL  92  97  78  92  79   VLDL   --    --   16  39*  28   CHOLHDLRATIO   --    --   2.5  3.1  2.8   NHDL  122  128   --    --    --      Hepatitis B  Recent Labs   Lab Test  12/08/15   0855  03/06/15   1650   HBCAB  Nonreactive   --    HEPBANG  Nonreactive  Nonreactive     Hepatitis C  Recent Labs   Lab Test  12/08/15   0855  03/06/15   1650  11/19/12   0900   HCVAB  Nonreactive   Assay performance characteristics have not been established for newborns,   infants, and children    Nonreactive   Assay performance characteristics have not been established for newborns,   infants, and children    Negative     Tuberculosis Screening  Recent Labs   Lab Test  10/31/17   1400  02/02/17   0822  12/08/15   0855   TBRSLT  Negative  Negative  Negative   TBAGN  0.05  0.00  0.01     HIV Screening  Recent Labs   Lab Test  07/24/18   0738   HIAGAB  Nonreactive     Immunization History     Immunization History   Administered Date(s) Administered     Influenza Vaccine IM 3yrs+ 4 Valent IIV4 10/15/2013, 09/15/2014, 09/28/2015, 09/28/2016, 10/16/2017     Pneumo Conj 13-V (2010&after) 04/15/2016     Pneumococcal 23 valent 07/15/2016     TDAP Vaccine (Adacel) 02/09/2011          Chart documentation done in part with Dragon Voice recognition Software. Although reviewed after completion, some word and grammatical error may remain.     Freddy Guerra MD

## 2018-08-13 ENCOUNTER — OFFICE VISIT (OUTPATIENT)
Dept: SLEEP MEDICINE | Facility: CLINIC | Age: 63
End: 2018-08-13
Payer: COMMERCIAL

## 2018-08-13 DIAGNOSIS — G47.33 OBSTRUCTIVE SLEEP APNEA: Primary | ICD-10-CM

## 2018-08-13 NOTE — MR AVS SNAPSHOT
After Visit Summary   8/13/2018    Sharon Fung    MRN: [id number]           Patient Information     Date Of Birth          1955        Visit Information        Provider Department      8/13/2018 9:00 AM SLEEP STUDY  [facility]        Today's Diagnoses     Obstructive sleep apnea    -  1       Follow-ups after your visit        Your next 10 appointments already scheduled     Aug 13, 2018  9:00 AM CDT   Oximetry  with SLEEP STUDY  7   [facility] ([facility])    [address] [place] [id number] [phone]            Aug 14, 2018  7:30 AM CDT   Oximetry Drop Off with SLEEP STUDY  7   [facility] ([facility])    [address] [place] [id number] [phone]            Aug 16, 2018  4:00 PM CDT   [facility]    [address] [place] [id number] [phone]           Note: this is not an onsite visit; there is no need to come to the facility.            Dec 19, 2018  7:20 AM CST   Return Visit with Freddy Guerra MD   [facility] ([facility])    [address] [place] [address]  [place] [id number] [phone]              Who to contact     If you have questions or need follow up information about today's clinic visit or your schedule please contact [facility] directly at [phone].  Normal or non-critical lab and imaging results will be communicated to you by MyChart, letter or phone within 4 business days after the clinic has received the results. If you do not hear from us within 7 days, please contact the clinic through MyChart or phone. If you have a critical or abnormal lab result, we will notify you by phone as soon as  possible.  Submit refill requests through [employer] or call your pharmacy and they will forward the refill request to us. Please allow 3 business days for your refill to be completed.          Additional Information About Your Visit        [employer] Social Information     [employer] gives you secure access to your electronic health record. If you see a primary care provider, you can also send messages to your care team and make appointments. If you have questions, please call your primary care clinic.  If you do not have a primary care provider, please call [phone] and they will assist you.        Care EveryWhere ID     This is your Care EveryWhere ID. This could be used by other organizations to access your [place] medical records  [id number]         Blood Pressure from Last 3 Encounters:   08/08/18 140/80   07/03/18 133/83   06/07/18 131/73    Weight from Last 3 Encounters:   08/08/18 116.5 kg (256 lb 12.8 oz)   07/03/18 115.2 kg (254 lb)   06/07/18 113.9 kg (251 lb)              Today, you had the following     No orders found for display       Primary Care Provider Office Phone # Fax #    Reynaldo Saavedra [phone]       [address] [facility] [address]        Equal Access to Services     SVITLANA SERVIN : Hadii aad ku hadasho Soomaali, waaxda luqadaha, qaybta kaalmada adeegyada, waxay tarun monahan. So [facility] [phone].    ATENCIÓN: Si habla español, tiene a ferrer disposición servicios gratuitos de asistencia lingüística. Llame al [phone].    We comply with applicable federal civil rights laws and [place] laws. We do not discriminate on the basis of race, color, national origin, age, disability, sex, sexual orientation, or gender identity.            Thank you!     Thank you for choosing [facility], SLEEP STUDY  for your care. Our goal is always to provide you with excellent care. Hearing back from our patients is one way we can continue to improve our  "services. Please take a few minutes to complete the written survey that you may receive in the mail after your visit with us. Thank you!             Your Updated Medication List - Protect others around you: Learn how to safely use, store and throw away your medicines at www.disposemymeds.org.          This list is accurate as of 8/13/18  8:25 AM.  Always use your most recent med list.                   Brand Name Dispense Instructions for use Diagnosis    acetaminophen 500 MG tablet    TYLENOL     Take 500-1,000 mg by mouth every 6 hours as needed for mild pain        albuterol 108 (90 Base) MCG/ACT inhaler    PROAIR HFA/PROVENTIL HFA/VENTOLIN HFA    1 Inhaler    Inhale 2-4 puffs into the lungs every 4 hours as needed for shortness of breath / dyspnea or wheezing    Acute bronchitis, unspecified organism       azelastine 0.05 % ophthalmic solution    OPTIVAR    1 Bottle    Apply 1 drop to eye 2 times daily    Allergic conjunctivitis of both eyes       citalopram 20 MG tablet    celeXA    90 tablet    Take 1 tablet (20 mg) by mouth daily    Mild major depression (H)       fexofenadine 180 MG tablet    ALLEGRA    90 tablet    Take 1 tablet (180 mg) by mouth daily    AR (allergic rhinitis)       FLONASE 50 MCG/ACT spray   Generic drug:  fluticasone      Spray 2 sprays into both nostrils as needed        fluticasone 110 MCG/ACT Inhaler    FLOVENT HFA    1 Inhaler    Inhale 2 puffs into the lungs 2 times daily    Chronic obstructive pulmonary disease, unspecified COPD type (H)       folic acid 1 MG tablet    FOLVITE    100 tablet    Take 1 tablet (1 mg) by mouth daily    Seropositive rheumatoid arthritis (H)       ibuprofen 200 MG capsule      Take 600-800 mg by mouth At Bedtime        Insulin Syringe-Needle U-100 27G X 1/2\" 1 ML Misc    BD insulin syringe    10 each    1 Syringe every 7 days , to be used for methotrexate administration.    Seropositive rheumatoid arthritis (H)       methotrexate sodium 50 MG/2ML Soln    "  2 vial    Inject 0.8 mLs (20 mg) as directed every 7 days    Seropositive rheumatoid arthritis (H)       order for DME      Use your CPAP device as directed by your provider.        predniSONE 5 MG tablet    DELTASONE    50 tablet    Prednisone 20mg daily x5days, then 15mg daily x5days, then 10mg daily x5days, then 5mg daily x5days, then stop.    Seropositive rheumatoid arthritis (H)       * RESTASIS 0.05 % ophthalmic emulsion   Generic drug:  cycloSPORINE      Place 1 drop into both eyes 2 times daily        * cycloSPORINE 0.05 % ophthalmic emulsion    RESTASIS    2 Box    Place 1 drop into both eyes 2 times daily    Sicca syndrome (H)       sulfaSALAzine  MG EC tablet    AZULFIDINE EN    180 tablet    Take 3 tablets (1,500 mg) by mouth 2 times daily    Seropositive rheumatoid arthritis (H)       tiotropium 18 MCG capsule    SPIRIVA HANDIHALER    30 capsule    Inhale contents of one capsule daily.    Chronic obstructive pulmonary disease, unspecified COPD type (H)       tofacitinib 11 MG 24 hr tablet    XELJANZ    30 tablet    Take 1 tablet (11 mg) by mouth daily    Seropositive rheumatoid arthritis (H)       topiramate 25 MG tablet    TOPAMAX    60 tablet    Take 2 tablets (50 mg) by mouth At Bedtime Week 1: Take 25mg PO QHS Week 2 and thereafter: Take 50mg PO QHS    Morbid obesity (H)       * Notice:  This list has 2 medication(s) that are the same as other medications prescribed for you. Read the directions carefully, and ask your doctor or other care provider to review them with you.

## 2018-08-13 NOTE — PROGRESS NOTES
Patient presented to clinic for  and demonstration of the overnight oximetry . Patient was set up and instructed use. Patient verbalized understanding and demonstrated set up back to me. Patient will be returning device by 10am tomorrow.    Patient was given sleep logs and written instructions for use.    MIKAEL Salcido  Sleep Clinic-Specialist,    Registered Medical Assistant   [facility]

## 2018-08-14 ENCOUNTER — DOCUMENTATION ONLY (OUTPATIENT)
Dept: SLEEP MEDICINE | Facility: CLINIC | Age: 63
End: 2018-08-14
Payer: COMMERCIAL

## 2018-08-14 DIAGNOSIS — E66.2 OBESITY HYPOVENTILATION SYNDROME (H): ICD-10-CM

## 2018-08-14 NOTE — PROGRESS NOTES
Results received from [facility] for overnight oximetry that was picked up on 08/13/2018.     Results labeled and scanned into chart.     Copy given to provider for review. Encounter also forwarded to provider.     Recording Date: 08/13/2018    Duration: 8:41:00    Time (min) Spent below 88%: 0.1    Completed on: Completed on Room Air

## 2018-08-16 ENCOUNTER — VIRTUAL VISIT (OUTPATIENT)
Dept: SLEEP MEDICINE | Facility: CLINIC | Age: 63
End: 2018-08-16
Payer: COMMERCIAL

## 2018-08-16 DIAGNOSIS — G47.33 OSA (OBSTRUCTIVE SLEEP APNEA): Primary | ICD-10-CM

## 2018-08-16 DIAGNOSIS — E66.01 OBESITY, CLASS III, BMI 40-49.9 (MORBID OBESITY) (H): ICD-10-CM

## 2018-08-16 DIAGNOSIS — F51.04 PSYCHOPHYSIOLOGICAL INSOMNIA: ICD-10-CM

## 2018-08-16 PROCEDURE — 98967 PH1 ASSMT&MGMT NQHP 11-20: CPT | Performed by: NURSE PRACTITIONER

## 2018-08-16 NOTE — MR AVS SNAPSHOT
After Visit Summary   8/16/2018    Sharon Fung    MRN: [id number]           Patient Information     Date Of Birth          1955        Visit Information        Provider Department      8/16/2018 4:00 PM Padmini Sheikh APRN CNP [facility] [place], Sleep Study        Today's Diagnoses     BRETT (obstructive sleep apnea)    -  1    Psychophysiological insomnia        Obesity, Class III, BMI 40-49.9 (morbid obesity) (H)          Care Instructions    Pt to get back to me on cbti          Follow-ups after your visit        Your next 10 appointments already scheduled     Dec 19, 2018  7:20 AM CST   [facility]   [facility]    [address] [facility] [address] [phone]              Who to contact     If you have questions or need follow up information about today's clinic visit or your schedule please contact [facility], [facility]EP [facility]UD[facility] directly at [phone].  Normal or non-critical lab and imaging results will be communicated to you by [employer], letter or phone within 4 business days after the clinic has received the results. If you do not hear from us within 7 days, please contact the clinic through [employer] or phone. If you have a critical or abnormal lab result, we will notify you by phone as soon as possible.  Submit refill requests through [employer] or call your pharmacy and they will forward the refill request to us. Please allow 3 business days for your refill to be completed.          Additional Information About Your Visit        [employer] Information     [employer] gives you secure access to your electronic health record. If you see a primary care provider, you can also send messages to your care team and make appointments. If you have questions, please call your primary care clinic.  If you do not have a primary care provider, please call [phone] and they will assist you.        Care EveryWhere ID      This is your Care EveryWhere ID. This could be used by other organizations to access your [place] medical records  [id number]         Blood Pressure from Last 3 Encounters:   08/08/18 140/80   07/03/18 133/83   06/07/18 131/73    Weight from Last 3 Encounters:   08/08/18 116.5 kg (256 lb 12.8 oz)   07/03/18 115.2 kg (254 lb)   06/07/18 113.9 kg (251 lb)              Today, you had the following     No orders found for display       Primary Care Provider Office Phone # Fax #    Reynaldo Saavedra [phone]       [address] [facility] [address]        Equal Access to Services     SVITLANA SERVIN : Hadii sheryl sarabiao Soalexandro, waaxda luqadaha, qaybta kaalmada adeegyada, susan terry . So [facility] [phone].    ATENCIÓN: Si habla español, tiene a ferrer disposición servicios gratuitos de asistencia lingüística. [facility] [phone].    We comply with applicable federal civil rights laws and [place] laws. We do not discriminate on the basis of race, color, national origin, age, disability, sex, sexual orientation, or gender identity.            Thank you!     Thank you for choosing [facility], SLEEP STUDY  for your care. Our goal is always to provide you with excellent care. Hearing back from our patients is one way we can continue to improve our services. Please take a few minutes to complete the written survey that you may receive in the mail after your visit with us. Thank you!             Your Updated Medication List - Protect others around you: Learn how to safely use, store and throw away your medicines at www.disposemymeds.org.          This list is accurate as of 8/16/18 11:59 PM.  Always use your most recent med list.                   Brand Name Dispense Instructions for use Diagnosis    acetaminophen 500 MG tablet    TYLENOL     Take 500-1,000 mg by mouth every 6 hours as needed for mild pain        albuterol 108 (90 Base) MCG/ACT inhaler    PROAIR  "HFA/PROVENTIL HFA/VENTOLIN HFA    1 Inhaler    Inhale 2-4 puffs into the lungs every 4 hours as needed for shortness of breath / dyspnea or wheezing    Acute bronchitis, unspecified organism       azelastine 0.05 % ophthalmic solution    OPTIVAR    1 Bottle    Apply 1 drop to eye 2 times daily    Allergic conjunctivitis of both eyes       citalopram 20 MG tablet    celeXA    90 tablet    Take 1 tablet (20 mg) by mouth daily    Mild major depression (H)       fexofenadine 180 MG tablet    ALLEGRA    90 tablet    Take 1 tablet (180 mg) by mouth daily    AR (allergic rhinitis)       FLONASE 50 MCG/ACT spray   Generic drug:  fluticasone      Spray 2 sprays into both nostrils as needed        fluticasone 110 MCG/ACT Inhaler    FLOVENT HFA    1 Inhaler    Inhale 2 puffs into the lungs 2 times daily    Chronic obstructive pulmonary disease, unspecified COPD type (H)       folic acid 1 MG tablet    FOLVITE    100 tablet    Take 1 tablet (1 mg) by mouth daily    Seropositive rheumatoid arthritis (H)       ibuprofen 200 MG capsule      Take 600-800 mg by mouth At Bedtime        Insulin Syringe-Needle U-100 27G X 1/2\" 1 ML Misc    BD insulin syringe    10 each    1 Syringe every 7 days , to be used for methotrexate administration.    Seropositive rheumatoid arthritis (H)       methotrexate sodium 50 MG/2ML Soln     2 vial    Inject 0.8 mLs (20 mg) as directed every 7 days    Seropositive rheumatoid arthritis (H)       order for DME      Use your CPAP device as directed by your provider.        predniSONE 5 MG tablet    DELTASONE    50 tablet    Prednisone 20mg daily x5days, then 15mg daily x5days, then 10mg daily x5days, then 5mg daily x5days, then stop.    Seropositive rheumatoid arthritis (H)       * RESTASIS 0.05 % ophthalmic emulsion   Generic drug:  cycloSPORINE      Place 1 drop into both eyes 2 times daily        * cycloSPORINE 0.05 % ophthalmic emulsion    RESTASIS    2 Box    Place 1 drop into both eyes 2 times daily "    Sicca syndrome (H)       sulfaSALAzine  MG EC tablet    AZULFIDINE EN    180 tablet    Take 3 tablets (1,500 mg) by mouth 2 times daily    Seropositive rheumatoid arthritis (H)       tiotropium 18 MCG capsule    SPIRIVA HANDIHALER    30 capsule    Inhale contents of one capsule daily.    Chronic obstructive pulmonary disease, unspecified COPD type (H)       tofacitinib 11 MG 24 hr tablet    XELJANZ    30 tablet    Take 1 tablet (11 mg) by mouth daily    Seropositive rheumatoid arthritis (H)       topiramate 25 MG tablet    TOPAMAX    60 tablet    Take 2 tablets (50 mg) by mouth At Bedtime Week 1: Take 25mg PO QHS Week 2 and thereafter: Take 50mg PO QHS    Morbid obesity (H)       * Notice:  This list has 2 medication(s) that are the same as other medications prescribed for you. Read the directions carefully, and ask your doctor or other care provider to review them with you.

## 2018-08-16 NOTE — Clinical Note
Please mail out 2 wks of sleep diaries, (mine with arrow down for entering bed, and arrow up for exit)  ThankdsWM

## 2018-08-17 NOTE — PROGRESS NOTES
CC:vv with temitope due to increase in fatigue     HPI:Sharon Fung returns to evaluate compliance and response to therapy for her mild obstructive sleep apnea with evidence of hypoventilation with hypoxemia.      HISTORY OF PRESENT ILLNESS:  Two polysomnograms were completed with Ms. Fung.  A diagnostic polysomnogram on 03/27/2015 (wt 234#)  noting an AHI of 11.5 without REM sleep and excessive amount of N3 sleep and RDI of 13.8.  TCM values greater than 50 for more than 10 minutes were observed.  She was provided with auto-titrating CPAP and had difficulty with inadvertent removal.  Did have a titration study on 06/04/2015 with TCM and ABGs.  She was titrated to a pressure of 12 cm and a residual AHI of 4.2 was seen.  A REM supine was seen at the final pressure, study was without hypoxemia and hypoventilation. Wt was 234#.   She has intermittent RLS.    7/3/18 visit with increase in fatigue increase in wt by 20#, new sleep disruption with worsening of fatigue.    8/16/18 phone visit: on work days hs at 8:15 to 8:30, wakeup for 1 hrs at 3A, 3A-which maybe the terminal wakeup.  On weekends: HS is 10P,  still may wakeup at 5:30 (usual wakeups with workdays but can fall back to sleep to 7A.  With wakeups: Worry about staff ED violence, try to clear mind, lay there, try to sleep.  Is on call but rarely gets a call -maybe 1 call /week.       Overnight oximetry: Time with 02 sat <=88% 0.1 mins. Synopsis: excellent coverage of apnea, no hypoxemia, unlikely hypoventilation syndrome with results of past study showing no hypoventilation syndrome with pressure of 12 cm h20, and recent pressures of 12.7cm h20 (wt gain only 20 #)  Allergies:    No Known Allergies    Medications:    Current Outpatient Prescriptions   Medication Sig Dispense Refill     acetaminophen (TYLENOL) 500 MG tablet Take 500-1,000 mg by mouth every 6 hours as needed for mild pain       albuterol (PROAIR HFA/PROVENTIL HFA/VENTOLIN HFA) 108 (90 Base)  "MCG/ACT Inhaler Inhale 2-4 puffs into the lungs every 4 hours as needed for shortness of breath / dyspnea or wheezing (Patient not taking: Reported on 8/8/2018) 1 Inhaler 1     azelastine (OPTIVAR) 0.05 % SOLN ophthalmic solution Apply 1 drop to eye 2 times daily 1 Bottle 6     citalopram (CELEXA) 20 MG tablet Take 1 tablet (20 mg) by mouth daily 90 tablet 1     cycloSPORINE (RESTASIS) 0.05 % ophthalmic emulsion Place 1 drop into both eyes 2 times daily (Patient not taking: Reported on 8/8/2018) 2 Box 11     cycloSPORINE (RESTASIS) 0.05 % ophthalmic emulsion Place 1 drop into both eyes 2 times daily       fexofenadine (ALLEGRA) 180 MG tablet Take 1 tablet (180 mg) by mouth daily 90 tablet 2     fluticasone (FLONASE) 50 MCG/ACT nasal spray Spray 2 sprays into both nostrils as needed       fluticasone (FLOVENT HFA) 110 MCG/ACT Inhaler Inhale 2 puffs into the lungs 2 times daily (Patient not taking: Reported on 8/8/2018) 1 Inhaler 5     folic acid (FOLVITE) 1 MG tablet Take 1 tablet (1 mg) by mouth daily 100 tablet 3     ibuprofen 200 MG capsule Take 600-800 mg by mouth At Bedtime       Insulin Syringe-Needle U-100 (BD INSULIN SYRINGE) 27G X 1/2\" 1 ML MISC 1 Syringe every 7 days , to be used for methotrexate administration. 10 each 5     methotrexate sodium 50 MG/2ML SOLN Inject 0.8 mLs (20 mg) as directed every 7 days 2 vial 6     ORDER FOR DME Use your CPAP device as directed by your provider.       predniSONE (DELTASONE) 5 MG tablet Prednisone 20mg daily x5days, then 15mg daily x5days, then 10mg daily x5days, then 5mg daily x5days, then stop. (Patient not taking: Reported on 8/8/2018) 50 tablet 0     sulfaSALAzine ER (AZULFIDINE EN) 500 MG EC tablet Take 3 tablets (1,500 mg) by mouth 2 times daily 180 tablet 6     tiotropium (SPIRIVA HANDIHALER) 18 MCG capsule Inhale contents of one capsule daily. (Patient not taking: Reported on 8/8/2018) 30 capsule 1     tofacitinib (XELJANZ) 11 MG 24 hr tablet Take 1 tablet (11 " mg) by mouth daily 30 tablet 6     topiramate (TOPAMAX) 25 MG tablet Take 2 tablets (50 mg) by mouth At Bedtime Week 1: Take 25mg PO QHS Week 2 and thereafter: Take 50mg PO QHS 60 tablet 3       Problem List:  Patient Active Problem List    Diagnosis Date Noted     High risk medication use 06/14/2013     Priority: High     Chronic obstructive pulmonary disease, unspecified COPD type (H) 03/27/2018     Priority: Medium     Headache 07/11/2017     Priority: Medium     Carpal tunnel syndrome of right wrist 06/22/2017     Priority: Medium     Seropositive rheumatoid arthritis (H) 07/15/2016     Priority: Medium     LORNA positive 07/15/2016     Priority: Medium     Anterior basement membrane dystrophy 06/21/2016     Priority: Medium     Obesity, Class III, BMI 40-49.9 (morbid obesity) (H) 05/19/2016     Priority: Medium     Obstructive sleep apnea 05/12/2015     Priority: Medium     Problem list name updated by automated process. Provider to review       Advanced directives, counseling/discussion 06/07/2012     Priority: Medium     Patient states has Advance Directive and will bring in a copy to clinic. 6/7/2012        Mild major depression (H)      Priority: Medium     Status post bariatric surgery      Priority: Medium     lap band       AR (allergic rhinitis)      Priority: Medium     GERD (gastroesophageal reflux disease)      Priority: Medium        Past Medical/Surgical History:  Past Medical History:   Diagnosis Date     AR (allergic rhinitis)  as teen     Arthritis 12/14/2015     Chronic obstructive pulmonary disease, unspecified COPD type (H) 3/27/2018     Depressive disorder 3 years ago     GERD (gastroesophageal reflux disease) 4-07     Headache 7/11/2017     Mild major depression (H) 3 years ago     Morbid obesity (H) teens     BRETT (obstructive sleep apnea)     uses CPAP     Rheumatoid arthritis, adult (H)      Past Surgical History:   Procedure Laterality Date     BLEPHAROPLASTY BILATERAL Bilateral 8/5/2016     Procedure: BLEPHAROPLASTY BILATERAL;  Surgeon: Cortez Robin MD;  Location:  [facility]     BREAST BIOPSY, RT/LT  1-94    Benign     C APPENDECTOMY  1977     COLONOSCOPY       COLONOSCOPY WITH CO2 INSUFFLATION N/A 3/30/2017    Procedure: COLONOSCOPY WITH CO2 INSUFFLATION;  Surgeon: Issa Weeks MD;  Location: [facility] OR     ESOPHAGOSCOPY, GASTROSCOPY, DUODENOSCOPY (EGD), COMBINED N/A 10/29/2015    Procedure: COMBINED ESOPHAGOSCOPY, GASTROSCOPY, DUODENOSCOPY (EGD);  Surgeon: Shaq Mims MD;  Location: [facility]     LAPAROSCOPIC GASTRIC ADJUSTABLE BANDING  11/09/10    Lap band procedure     LAPAROSCOPIC REMOVAL GASTRIC ADJUSTABLE BAND N/A 10/31/2015    Procedure: LAPAROSCOPIC REMOVAL GASTRIC ADJUSTABLE BAND;  Surgeon: Shaq Mims MD;  Location: [facility] OR     RELEASE CARPAL TUNNEL Left 4/27/2017    Procedure: RELEASE CARPAL TUNNEL;  Left Open Carpal Tunnel Release;  Surgeon: Ciara Middleton MD;  Location: [facility]     RELEASE CARPAL TUNNEL Right 6/15/2017    Procedure: RELEASE CARPAL TUNNEL;  Right Carpal Tunnel Release Open;  Surgeon: Ciara Middleton MD;  Location: [facility] OR     REPAIR PTOSIS       TUBAL LIGATION  1988           Physical Examination:  Vitals: There were no vitals taken for this visit.  BMI= There is no height or weight on file to calculate BMI.         [place] Total Score 7/3/2018   Total score - [place] 8     Recommendations: medication or CBTI, with review likely EAP counciling      A/P: likely insomnia 3 or more nights /wk with worry, prolonged time spent in bed, worse on workdays than days off. No worsening of underlying pulmonary or RA symptoms.   1. [place]: well covered, hypoxemia and likely hypoventilation syndrome not a cause of this worsening fatigue since 6/18 or before.  2. Probable psychophysiologic insomnia: suggested meds, or cbti.Explaination of cbti.  Will mail out sleep diaries.  3. Obesity:encourage wt loss.    Pt will update me on her choice    This was  a scheduled 15 minute phone call

## 2018-08-19 NOTE — PROCEDURES
PHYSICIAN INTERPRETATION   HOME OXIMETRY   Patient: Sharon Fung  MRN: [id number]  YOB: 1955  Study Date: 8/13/18   Referring Physician: Reynaldo Saavedra  Ordering Physician: Padmini Sheikh MD     Conditions:  CPAP ,Room air  Quality: artifact noted 0.1     Measure [threshold]                 Time less than or equal to SpO2 88% [5 min]:  0.1min  Duration monitoring [> 2 hours artifact free]:  8:41 hours                    4% O2 desat index [ > 15/ hour]:    1.3/ hour                    Pattern:       normal                                  Assessment:   Normal study  RecommendationsCBTI      Diagnosis Code(s):  Normal study  CHELSEY Freedman CNP, August 19, 2018

## 2018-09-18 DIAGNOSIS — M05.9 SEROPOSITIVE RHEUMATOID ARTHRITIS (H): ICD-10-CM

## 2018-09-18 LAB
ALBUMIN SERPL-MCNC: 3.7 G/DL (ref 3.4–5)
ALP SERPL-CCNC: 96 U/L (ref 40–150)
ALT SERPL W P-5'-P-CCNC: 34 U/L (ref 0–50)
AST SERPL W P-5'-P-CCNC: 25 U/L (ref 0–45)
BASOPHILS # BLD AUTO: 0 10E9/L (ref 0–0.2)
BASOPHILS NFR BLD AUTO: 0.2 %
BILIRUB DIRECT SERPL-MCNC: <0.1 MG/DL (ref 0–0.2)
BILIRUB SERPL-MCNC: 0.3 MG/DL (ref 0.2–1.3)
CREAT SERPL-MCNC: 0.73 MG/DL (ref 0.52–1.04)
DIFFERENTIAL METHOD BLD: ABNORMAL
EOSINOPHIL # BLD AUTO: 0.1 10E9/L (ref 0–0.7)
EOSINOPHIL NFR BLD AUTO: 2.1 %
ERYTHROCYTE [DISTWIDTH] IN BLOOD BY AUTOMATED COUNT: 13.8 % (ref 10–15)
GFR SERPL CREATININE-BSD FRML MDRD: 81 ML/MIN/1.7M2
HCT VFR BLD AUTO: 35.7 % (ref 35–47)
HGB BLD-MCNC: 11.6 G/DL (ref 11.7–15.7)
IMM GRANULOCYTES # BLD: 0 10E9/L (ref 0–0.4)
IMM GRANULOCYTES NFR BLD: 0.2 %
LYMPHOCYTES # BLD AUTO: 1 10E9/L (ref 0.8–5.3)
LYMPHOCYTES NFR BLD AUTO: 19.6 %
MCH RBC QN AUTO: 30.8 PG (ref 26.5–33)
MCHC RBC AUTO-ENTMCNC: 32.5 G/DL (ref 31.5–36.5)
MCV RBC AUTO: 95 FL (ref 78–100)
MONOCYTES # BLD AUTO: 0.6 10E9/L (ref 0–1.3)
MONOCYTES NFR BLD AUTO: 12.1 %
NEUTROPHILS # BLD AUTO: 3.4 10E9/L (ref 1.6–8.3)
NEUTROPHILS NFR BLD AUTO: 65.8 %
NRBC # BLD AUTO: 0 10*3/UL
NRBC BLD AUTO-RTO: 0 /100
PLATELET # BLD AUTO: 281 10E9/L (ref 150–450)
PROT SERPL-MCNC: 7.4 G/DL (ref 6.8–8.8)
RBC # BLD AUTO: 3.77 10E12/L (ref 3.8–5.2)
WBC # BLD AUTO: 5.2 10E9/L (ref 4–11)

## 2018-09-18 PROCEDURE — 80076 HEPATIC FUNCTION PANEL: CPT | Performed by: INTERNAL MEDICINE

## 2018-09-18 PROCEDURE — 85025 COMPLETE CBC W/AUTO DIFF WBC: CPT | Performed by: INTERNAL MEDICINE

## 2018-09-18 PROCEDURE — 36415 COLL VENOUS BLD VENIPUNCTURE: CPT | Performed by: INTERNAL MEDICINE

## 2018-09-18 PROCEDURE — 82565 ASSAY OF CREATININE: CPT | Performed by: INTERNAL MEDICINE

## 2018-09-26 NOTE — PROGRESS NOTES
"[employer] message sent:  \"Ms. Fung,    Hemoglobin is reduced on the most recent labs.  This will be re-evaluated with your next labs.  However, if you are experiencing any bleeding (such as blood in your stool) then please be evaluated by your primary care provider.    Sincerely,  Freddy Guerra MD  9/26/2018 5:21 AM\""

## 2018-09-27 DIAGNOSIS — R06.02 SOB (SHORTNESS OF BREATH): ICD-10-CM

## 2018-10-25 DIAGNOSIS — M05.9 SEROPOSITIVE RHEUMATOID ARTHRITIS (H): ICD-10-CM

## 2018-10-25 LAB
ALBUMIN SERPL-MCNC: 3.9 G/DL (ref 3.4–5)
ALP SERPL-CCNC: 98 U/L (ref 40–150)
ALT SERPL W P-5'-P-CCNC: 39 U/L (ref 0–50)
AST SERPL W P-5'-P-CCNC: 29 U/L (ref 0–45)
BASOPHILS # BLD AUTO: 0 10E9/L (ref 0–0.2)
BASOPHILS NFR BLD AUTO: 0.5 %
BILIRUB DIRECT SERPL-MCNC: <0.1 MG/DL (ref 0–0.2)
BILIRUB SERPL-MCNC: 0.3 MG/DL (ref 0.2–1.3)
CREAT SERPL-MCNC: 0.74 MG/DL (ref 0.52–1.04)
CRP SERPL-MCNC: 8.8 MG/L (ref 0–8)
DIFFERENTIAL METHOD BLD: NORMAL
EOSINOPHIL # BLD AUTO: 0.2 10E9/L (ref 0–0.7)
EOSINOPHIL NFR BLD AUTO: 2.5 %
ERYTHROCYTE [DISTWIDTH] IN BLOOD BY AUTOMATED COUNT: 13.7 % (ref 10–15)
ERYTHROCYTE [SEDIMENTATION RATE] IN BLOOD BY WESTERGREN METHOD: 17 MM/H (ref 0–30)
GFR SERPL CREATININE-BSD FRML MDRD: 79 ML/MIN/1.7M2
HCT VFR BLD AUTO: 37.2 % (ref 35–47)
HGB BLD-MCNC: 12 G/DL (ref 11.7–15.7)
IMM GRANULOCYTES # BLD: 0 10E9/L (ref 0–0.4)
IMM GRANULOCYTES NFR BLD: 0 %
LYMPHOCYTES # BLD AUTO: 2 10E9/L (ref 0.8–5.3)
LYMPHOCYTES NFR BLD AUTO: 31.1 %
MCH RBC QN AUTO: 30.8 PG (ref 26.5–33)
MCHC RBC AUTO-ENTMCNC: 32.3 G/DL (ref 31.5–36.5)
MCV RBC AUTO: 95 FL (ref 78–100)
MONOCYTES # BLD AUTO: 0.6 10E9/L (ref 0–1.3)
MONOCYTES NFR BLD AUTO: 8.4 %
NEUTROPHILS # BLD AUTO: 3.8 10E9/L (ref 1.6–8.3)
NEUTROPHILS NFR BLD AUTO: 57.5 %
NRBC # BLD AUTO: 0 10*3/UL
NRBC BLD AUTO-RTO: 0 /100
PLATELET # BLD AUTO: 337 10E9/L (ref 150–450)
PROT SERPL-MCNC: 7.7 G/DL (ref 6.8–8.8)
RBC # BLD AUTO: 3.9 10E12/L (ref 3.8–5.2)
WBC # BLD AUTO: 6.5 10E9/L (ref 4–11)

## 2018-10-25 PROCEDURE — 82565 ASSAY OF CREATININE: CPT | Performed by: INTERNAL MEDICINE

## 2018-10-25 PROCEDURE — 86140 C-REACTIVE PROTEIN: CPT | Performed by: INTERNAL MEDICINE

## 2018-10-25 PROCEDURE — 36415 COLL VENOUS BLD VENIPUNCTURE: CPT | Performed by: INTERNAL MEDICINE

## 2018-10-25 PROCEDURE — 80076 HEPATIC FUNCTION PANEL: CPT | Performed by: INTERNAL MEDICINE

## 2018-10-25 PROCEDURE — 85652 RBC SED RATE AUTOMATED: CPT | Performed by: INTERNAL MEDICINE

## 2018-10-25 PROCEDURE — 85025 COMPLETE CBC W/AUTO DIFF WBC: CPT | Performed by: INTERNAL MEDICINE

## 2018-10-25 NOTE — PROGRESS NOTES
"[employer] message sent:  \"Ms. Fung,    Your labs did not show evidence for medication toxicity.  Inflammatory maker CRP remains elevated, while the other inflammatory marker ESR is normal.    Sincerely,  Freddy Guerra MD  10/25/2018 4:47 PM\""

## 2018-11-02 ENCOUNTER — TELEPHONE (OUTPATIENT)
Dept: DERMATOLOGY | Facility: CLINIC | Age: 63
End: 2018-11-02

## 2018-11-02 NOTE — TELEPHONE ENCOUNTER
PA Initiation    Medication: Xeljanz  Insurance Company: [employer] - Phone [phone] Fax [phone]  Pharmacy Filling the Rx: [facility] - [place] - [facility] [address]  Filling Pharmacy Phone: [phone]  Filling Pharmacy Fax: [phone]  Start Date: 11/2/2018

## 2018-11-06 NOTE — TELEPHONE ENCOUNTER
Prior Authorization Approval    Authorization Effective Date: 11/5/2018  Authorization Expiration Date: 11/5/2019  Medication: Xeljanz-approval  Approved Dose/Quantity: 30  Reference #: [id number]   Insurance Company: [employer] - Phone [phone] Fax [phone]  Expected CoPay:       CoPay Card Available: Yes   Foundation Assistance Needed: n/a  Which Pharmacy is filling the prescription (Not needed for infusion/clinic administered): [facility] - [place] [address] [address]  Pharmacy Notified: Yes  Patient Notified: No

## 2018-11-12 DIAGNOSIS — M05.9 SEROPOSITIVE RHEUMATOID ARTHRITIS (H): ICD-10-CM

## 2018-11-12 NOTE — TELEPHONE ENCOUNTER
"Requested Prescriptions   Pending Prescriptions Disp Refills     Insulin Syringe-Needle U-100 (BD INSULIN SYRINGE) 27G X 1/2\" 1 ML MISC 10 each 5     Si Syringe every 7 days , to be used for methotrexate administration.    There is no refill protocol information for this order        Last Written Prescription Date:  2017  Last Fill Quantity: 10,  # refills: 5   Last office visit: 2018 with prescribing provider:  Keri   Future Office Visit:   Next 5 appointments (look out 90 days)     Dec 19, 2018  7:20 AM CST   [facility]   [facility]    [address] [id number] [phone]                   "

## 2018-11-13 RX ORDER — SYRINGE-NEEDLE,INSULIN,0.5 ML 27GX1/2"
1 SYRINGE, EMPTY DISPOSABLE MISCELLANEOUS
Qty: 10 EACH | Refills: 0 | Status: SHIPPED | OUTPATIENT
Start: 2018-11-13 | End: 2018-12-19

## 2018-11-13 NOTE — TELEPHONE ENCOUNTER
Signed Prescriptions:                        Disp   Refills    Insulin Syringe-Needle U-100 (BD INSULIN S*10 each0        Si Syringe every 7 days , to be used for methotrexate           administration.  Authorizing Provider: MEG ZAPATA  Ordering User: CINDY GUTIÉRREZ RN refilled medication per [facility] Refill Protocol.     Cindy Gutiérrez RN

## 2018-11-19 NOTE — TELEPHONE ENCOUNTER
FUTURE VISIT INFORMATION      FUTURE VISIT INFORMATION:    Date: 11.28.18    Time: 3:40 Pm    Location: [facility]  REFERRAL INFORMATION:    Referring provider:  Self-referred    Referring providers clinic: Self-referred    Reason for visit/diagnosis  SOB    RECORDS REQUESTED FROM:       Clinic name Comments Records Status Imaging Status   KEVEN Duglas Alvarenga  [facility]                                      RECORDS RECEIVED FROM: KEVEN Duglas Alvarenga Kristin Quinonez   DATE RECEIVED: 11.28.18   NOTES STATUS DETAILS   OFFICE NOTE from referring provider N/A Self-referred   OFFICE NOTE from other specialist Internal 6.7.18 Note mentioned history of COPD  3.27.18 SOB  3.9.18 SOB  11.18.17 bronchitis  10.31.17 Cough, sinus  10.29.17 cough, sinus  9.1.17 dry cough  8.28.17   DISCHARGE SUMMARY from hospital N/A    DISCHARGE REPORT from the ER N/A    OPERATIVE REPORT N/A    MEDICATION LIST Internal    IMAGING  (NEED IMAGES AND REPORTS)     CT SCAN N/A    CHEST XRAY (CXR) In process 10.31.17  Scheduled for 11.28.18   TESTS     PULMONARY FUNCTION TESTING (PFT) In process Scheduled for 11.28.18   FLOW LOOP VOLUME (FVL) Internal 3.27.18   CYSTIC FIBROSIS     CF SPUTUM CULTURE N/A

## 2018-11-26 ASSESSMENT — ENCOUNTER SYMPTOMS
SPUTUM PRODUCTION: 0
LIGHT-HEADEDNESS: 0
WEIGHT LOSS: 0
WHEEZING: 0
ORTHOPNEA: 1
EXERCISE INTOLERANCE: 1
POLYDIPSIA: 0
HALLUCINATIONS: 0
NIGHT SWEATS: 0
LEG PAIN: 0
HYPERTENSION: 0
FATIGUE: 1
FEVER: 0
COUGH: 1
PALPITATIONS: 0
ALTERED TEMPERATURE REGULATION: 1
DYSPNEA ON EXERTION: 1
MUSCLE WEAKNESS: 0
INCREASED ENERGY: 0
JOINT SWELLING: 1
SHORTNESS OF BREATH: 1
CHILLS: 0
STIFFNESS: 1
SYNCOPE: 0
WEIGHT GAIN: 1
BACK PAIN: 1
HEMOPTYSIS: 0
MYALGIAS: 1
SLEEP DISTURBANCES DUE TO BREATHING: 0
COUGH DISTURBING SLEEP: 1
POLYPHAGIA: 1
DECREASED APPETITE: 0
POSTURAL DYSPNEA: 1
MUSCLE CRAMPS: 1
HYPOTENSION: 0
ARTHRALGIAS: 1
NECK PAIN: 0
SNORES LOUDLY: 1

## 2018-11-27 ENCOUNTER — PATIENT OUTREACH (OUTPATIENT)
Dept: CARE COORDINATION | Facility: CLINIC | Age: 63
End: 2018-11-27

## 2018-11-28 ENCOUNTER — RADIANT APPOINTMENT (OUTPATIENT)
Dept: GENERAL RADIOLOGY | Facility: CLINIC | Age: 63
End: 2018-11-28
Payer: COMMERCIAL

## 2018-11-28 ENCOUNTER — OFFICE VISIT (OUTPATIENT)
Dept: PULMONOLOGY | Facility: CLINIC | Age: 63
End: 2018-11-28
Attending: INTERNAL MEDICINE
Payer: COMMERCIAL

## 2018-11-28 ENCOUNTER — PRE VISIT (OUTPATIENT)
Dept: PULMONOLOGY | Facility: CLINIC | Age: 63
End: 2018-11-28

## 2018-11-28 VITALS
HEIGHT: 64 IN | OXYGEN SATURATION: 92 % | BODY MASS INDEX: 44.39 KG/M2 | SYSTOLIC BLOOD PRESSURE: 151 MMHG | DIASTOLIC BLOOD PRESSURE: 81 MMHG | RESPIRATION RATE: 18 BRPM | HEART RATE: 91 BPM | WEIGHT: 260 LBS

## 2018-11-28 DIAGNOSIS — R06.02 SOB (SHORTNESS OF BREATH): ICD-10-CM

## 2018-11-28 DIAGNOSIS — R06.09 DYSPNEA ON EXERTION: Primary | ICD-10-CM

## 2018-11-28 PROCEDURE — G0463 HOSPITAL OUTPT CLINIC VISIT: HCPCS | Mod: ZF

## 2018-11-28 RX ORDER — ALBUTEROL SULFATE 90 UG/1
2 AEROSOL, METERED RESPIRATORY (INHALATION) EVERY 6 HOURS PRN
Qty: 1 INHALER | Refills: 1 | Status: SHIPPED | OUTPATIENT
Start: 2018-11-28 | End: 2024-09-12

## 2018-11-28 ASSESSMENT — PAIN SCALES - GENERAL: PAINLEVEL: NO PAIN (0)

## 2018-11-28 NOTE — NURSING NOTE
Chief Complaint   Patient presents with     Consult     New consult on Sharon and her SOB     Ethan Montaño CMA at 3:44 PM on 11/28/2018

## 2018-11-28 NOTE — MR AVS SNAPSHOT
After Visit Summary   11/28/2018    Sharon Fung    MRN: [id number]           Patient Information     Date Of Birth          1955        Visit Information        Provider Department      11/28/2018 3:40 PM Jo Downs MD [facility]        Today's Diagnoses     Dyspnea on exertion    -  1       Follow-ups after your visit        Follow-up notes from your care team     Return in about 3 months (around 2/28/2019).      Your next 10 appointments already scheduled     Dec 19, 2018  7:20 AM CST   Return Visit with Freddy Guerra MD   [facility] ([facility]    [address] [address]   [phone]            Feb 22, 2019  2:30 PM CST   SIX MINUTE WALK with UC PFL C, UC PFL 6 MINUTE WALK 1   [facility] Function [facility] ([facility])    [address]  3rd Floor  [facility] [address] [phone]            Feb 22, 2019  3:40 PM CST   (Arrive by 3:25 PM)   Return Visit with Jo Downs MD   [facility] ([facility])    [address]  [address] [place] [id number] [phone]              Future tests that were ordered for you today     Open Future Orders        Priority Expected Expires Ordered    CT Chest w/o Contrast Routine  11/28/2019 11/28/2018    General PFT Lab (Please always keep checked) Routine  11/28/2019 11/28/2018    Pulmonary Function Test Routine  11/28/2019 11/28/2018    6 minute walk test Routine  11/28/2019 11/28/2018            Who to contact     If you have questions or need follow up information about today's clinic visit or your schedule please contact [facility] directly at [phone].  Normal or non-critical lab and imaging results will be communicated to you by MyChart, letter or phone within 4 business days  "after the clinic has received the results. If you do not hear from us within 7 days, please contact the clinic through [employer] or phone. If you have a critical or abnormal lab result, we will notify you by phone as soon as possible.  Submit refill requests through [employer] or call your pharmacy and they will forward the refill request to us. Please allow 3 business days for your refill to be completed.          Additional Information About Your Visit        [employer] Information     [employer] gives you secure access to your electronic health record. If you see a primary care provider, you can also send messages to your care team and make appointments. If you have questions, please call your primary care clinic.  If you do not have a primary care provider, please call [phone] and they will assist you.        Care EveryWhere ID     This is your Care EveryWhere ID. This could be used by other organizations to access your [place] medical records  [id number]        Your Vitals Were     Pulse Respirations Height Pulse Oximetry BMI (Body Mass Index)       91 18 1.626 m (5' 4\") 92% 44.63 kg/m2        Blood Pressure from Last 3 Encounters:   11/28/18 151/81   08/08/18 140/80   07/03/18 133/83    Weight from Last 3 Encounters:   11/28/18 117.9 kg (260 lb)   08/08/18 116.5 kg (256 lb 12.8 oz)   07/03/18 115.2 kg (254 lb)                 Today's Medication Changes          These changes are accurate as of 11/28/18  5:22 PM.  If you have any questions, ask your nurse or doctor.               These medicines have changed or have updated prescriptions.        Dose/Directions    * albuterol 108 (90 Base) MCG/ACT inhaler   Commonly known as:  PROAIR HFA/PROVENTIL HFA/VENTOLIN HFA   This may have changed:  Another medication with the same name was added. Make sure you understand how and when to take each.   Used for:  Acute bronchitis, unspecified organism   Changed by:  Jo Downs MD        Dose:  2-4 puff "   Inhale 2-4 puffs into the lungs every 4 hours as needed for shortness of breath / dyspnea or wheezing   Quantity:  1 Inhaler   Refills:  1       * albuterol 108 (90 Base) MCG/ACT inhaler   Commonly known as:  PROAIR HFA/PROVENTIL HFA/VENTOLIN HFA   This may have changed:  You were already taking a medication with the same name, and this prescription was added. Make sure you understand how and when to take each.   Used for:  Dyspnea on exertion   Changed by:  Jo Downs MD        Dose:  2 puff   Inhale 2 puffs into the lungs every 6 hours as needed for shortness of breath / dyspnea or wheezing   Quantity:  1 Inhaler   Refills:  1       * Notice:  This list has 2 medication(s) that are the same as other medications prescribed for you. Read the directions carefully, and ask your doctor or other care provider to review them with you.         Where to get your medicines      These medications were sent to [facility] [address]  [address] [address]     Phone:  [phone]     albuterol 108 (90 Base) MCG/ACT inhaler                Primary Care Provider Office Phone # Fax #    Reynaldo Saavedra [phone]       [address] [address]        Equal Access to Services     SVITLANA SERVIN AH: Hadii sheryl sraabiao Soalexandro, waaxda luqadaha, qaybta kaalmada adeegyada, susan terry . So [facility] [phone].    ATENCIÓN: Si habla español, tiene a ferrer disposición servicios gratuitos de asistencia lingüística. Llame al [phone].    We comply with applicable federal civil rights laws and [place] laws. We do not discriminate on the basis of race, color, national origin, age, disability, sex, sexual orientation, or gender identity.            Thank you!     Thank you for choosing [facility]  for your care. Our goal is always to provide you with excellent  care. Hearing back from our patients is one way we can continue to improve our services. Please take a few minutes to complete the written survey that you may receive in the mail after your visit with us. Thank you!             Your Updated Medication List - Protect others around you: Learn how to safely use, store and throw away your medicines at www.disposemymeds.org.          This list is accurate as of 11/28/18  5:22 PM.  Always use your most recent med list.                   Brand Name Dispense Instructions for use Diagnosis    acetaminophen 500 MG tablet    TYLENOL     Take 500-1,000 mg by mouth every 6 hours as needed for mild pain        * albuterol 108 (90 Base) MCG/ACT inhaler    PROAIR HFA/PROVENTIL HFA/VENTOLIN HFA    1 Inhaler    Inhale 2-4 puffs into the lungs every 4 hours as needed for shortness of breath / dyspnea or wheezing    Acute bronchitis, unspecified organism       * albuterol 108 (90 Base) MCG/ACT inhaler    PROAIR HFA/PROVENTIL HFA/VENTOLIN HFA    1 Inhaler    Inhale 2 puffs into the lungs every 6 hours as needed for shortness of breath / dyspnea or wheezing    Dyspnea on exertion       azelastine 0.05 % ophthalmic solution    OPTIVAR    1 Bottle    Apply 1 drop to eye 2 times daily    Allergic conjunctivitis of both eyes       citalopram 20 MG tablet    celeXA    90 tablet    Take 1 tablet (20 mg) by mouth daily    Mild major depression (H)       fexofenadine 180 MG tablet    ALLEGRA    90 tablet    Take 1 tablet (180 mg) by mouth daily    AR (allergic rhinitis)       FLONASE 50 MCG/ACT nasal spray   Generic drug:  fluticasone      Spray 2 sprays into both nostrils as needed        fluticasone 110 MCG/ACT inhaler    FLOVENT HFA    1 Inhaler    Inhale 2 puffs into the lungs 2 times daily    Chronic obstructive pulmonary disease, unspecified COPD type (H)       folic acid 1 MG tablet    FOLVITE    100 tablet    Take 1 tablet (1 mg) by mouth daily    Seropositive rheumatoid arthritis (H)     "   ibuprofen 200 MG capsule    ADVIL/MOTRIN     Take 600-800 mg by mouth At Bedtime        Insulin Syringe-Needle U-100 27G X 1/2\" 1 ML Misc    BD insulin syringe    10 each    1 Syringe every 7 days , to be used for methotrexate administration.    Seropositive rheumatoid arthritis (H)       methotrexate sodium 50 MG/2ML Soln     2 vial    Inject 0.8 mLs (20 mg) as directed every 7 days    Seropositive rheumatoid arthritis (H)       order for DME      Use your CPAP device as directed by your provider.        predniSONE 5 MG tablet    DELTASONE    50 tablet    Prednisone 20mg daily x5days, then 15mg daily x5days, then 10mg daily x5days, then 5mg daily x5days, then stop.    Seropositive rheumatoid arthritis (H)       * RESTASIS 0.05 % ophthalmic emulsion   Generic drug:  cycloSPORINE      Place 1 drop into both eyes 2 times daily        * cycloSPORINE 0.05 % ophthalmic emulsion    RESTASIS    2 Box    Place 1 drop into both eyes 2 times daily    Sicca syndrome (H)       sulfaSALAzine  MG EC tablet    AZULFIDINE EN    180 tablet    Take 3 tablets (1,500 mg) by mouth 2 times daily    Seropositive rheumatoid arthritis (H)       tiotropium 18 MCG inhaled capsule    SPIRIVA HANDIHALER    30 capsule    Inhale contents of one capsule daily.    Chronic obstructive pulmonary disease, unspecified COPD type (H)       tofacitinib 11 MG 24 hr tablet    XELJANZ    30 tablet    Take 1 tablet (11 mg) by mouth daily    Seropositive rheumatoid arthritis (H)       topiramate 25 MG tablet    TOPAMAX    60 tablet    Take 2 tablets (50 mg) by mouth At Bedtime Week 1: Take 25mg PO QHS Week 2 and thereafter: Take 50mg PO QHS    Morbid obesity (H)       * Notice:  This list has 4 medication(s) that are the same as other medications prescribed for you. Read the directions carefully, and ask your doctor or other care provider to review them with you.      "

## 2018-11-28 NOTE — PROGRESS NOTES
Pulmonary Medicine  Initial Clinic Note  November 28, 2018      Reason for visit: Chronic Dyspnea with h/o RA.   --------------------------  Impression & recommendations:  63-year-old female with past medical history of rheumatoid arthritis, obesity, sleep apnea, allergic rhinitis and depression comes in for evaluation of her progressive dyspnea at rest and on exertion going on for about 1 year.  Patient developed a bronchitis after she returned from [place] last year in 2017 September and had a cough for 2-3 months which resolved but then she developed shortness of breath.  The shortness of breath has progressed especially in the last 3 months.  Patient has also started on Xeljanz in the last 1 year for her rheumatoid arthritis.  Patient is also on methotrexate and sulfasalazine for her rheumatoid arthritis.  Patient says that she gets burst of steroids 20 mg 3-4 times every year for her RA.  She has also noticed weight gain since her gastric band was removed.  Her PFTs showed decreased FEV1 and FVC but no obstruction.  Given her rheumatoid arthritis and being on disease modifying medication RA associated ILD, rheumatoid arthritis drug toxicity are 1 of the considerations.  Will get CT scan to further evaluate.  If CT scan shows findings will get ILD panel.  Will recommend weight loss and albuterol prior to exercise to see if that helps. Other possibilities included but not limited to post bronchitis asthma (unlikely given the negative bronchodilator response), CTEPH. This is unlikely given the normal right ventricular function on echocardiogram..     Diagnoses:  1. Chronic Dyspnea cause: ? Obesity vs deconditioning.   2. Morbid Obesity BMI 44   3. Abnormal PFT with reduced FEV1 and FVC of unknown significance. No obstruction, Normal DLCO and lung volume. No significant bronchodilator response.     Recommendations:  1. Will give albuterol prn for possible exercise induced asthma. If that doesn't help her will  discontinue it.   2. Weight loss.   3. Will get HRCT to r/o RA associated ILD vs RA drug toxicity.   4. Flu shot received this season.     ---------------------------------------------  Epic generated plan: {Sharon was seen today for consult.    Diagnoses and all orders for this visit:    Dyspnea on exertion  -     albuterol (PROAIR HFA/PROVENTIL HFA/VENTOLIN HFA) 108 (90 Base) MCG/ACT inhaler; Inhale 2 puffs into the lungs every 6 hours as needed for shortness of breath / dyspnea or wheezing  -     CT Chest w/o Contrast; Future  -     General PFT Lab (Please always keep checked); Future  -     Pulmonary Function Test; Future  -     6 minute walk test; Future       --------------------------------------------  RTC in 3M with PFT and 6MWT.   Patient was seen & discussed w/ Dr. Arturo MD, who is in agreement.    Jo Downs   Pulmonary Critical Care Fellow   [phone]  -----------------------------------------------------------------    Pulmonary HPI:   Sharon Fung is a 63 year old F with RA seronegative for 4 yrs, s/p gastric band and removal of band due to abdominal abscess, Depression, BRETT on CPAP comes here for evaluation for her dyspnea.     Pt went to [place] in Aug 2017 and got a cold and lasted about 3M. She was treated as out pt with doxycycline. Pt was having cough non productive, rhinorrhea with fevers and abx help with fever but cough remained. In Oct 2nd course of Doxy and prednisone due to persistent cough. Her CXR was clear. In Nov her FP Dr. Saavedra gave her Azithromycin due to cough and dyspnea started in Nov-Dec. This summer her dyspnea got worse. Now she has dyspnea even at sitting and she on exertion. Couple of steps would make her short of breath and this has been going on since august. Pt feels that every 3-4 months she is on high dose steroids 20 mg and that has led to her gain weight johan with the gastric band being off. Also she is concerned if the RA meds have caused any  damage?   RA meds she is on Xeljanz/tofacitinab (1yr), methotrexate (2 yrs) and sulfasalazine. Last pred was 20 mg in summer for RA.  Pt feels her RA is not controlled. She has pain in her MP and PIP, shoulder and feet.   Pt weight last yr was 253 lbs and today is 260 lbs.      Pt was given Spiriva in Dec 2017 for 1M and she thinks it helped because it helped her bring up the secretions.     Patient is a life time non smoker.   Occasional EtOH use.   Denies recreational drug use.     Patient has cats as pet. No birds.   Patient lives in a town house, sub-urban. No Molds.   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   Travel h[place]o: [place], [place], [place] was 2017, [place] in 2016.   Denies any h/o exposure to TB, Asbestos or silica. No harmful inhalational exposure at work or home as far as he can recollect.     Review of systems: a complete 12-point ROS conducted, & found to be negative w/ exceptions as noted in the HPI.    Past medical history:  Past Medical History:   Diagnosis Date     AR (allergic rhinitis)  as teen     Arthritis 12/14/2015     Chronic obstructive pulmonary disease, unspecified COPD type (H) 3/27/2018     Depressive disorder 3 years ago     GERD (gastroesophageal reflux disease) 4-07     Headache 7/11/2017     Mild major depression (H) 3 years ago     Morbid obesity (H) teens     BRETT (obstructive sleep apnea)     uses CPAP     Rheumatoid arthritis, adult (H)        Past Surgical History:   Procedure Laterality Date     BLEPHAROPLASTY BILATERAL Bilateral 8/5/2016    Procedure: BLEPHAROPLASTY BILATERAL;  Surgeon: Cortez Robin MD;  Location:  [facility]     BREAST BIOPSY, RT/LT  1-94    Benign     C APPENDECTOMY  1977     COLONOSCOPY       COLONOSCOPY WITH CO2 INSUFFLATION N/A 3/30/2017    Procedure: COLONOSCOPY WITH CO2 INSUFFLATION;  Surgeon: Issa Weeks MD;  Location:  OR     ESOPHAGOSCOPY, GASTROSCOPY, DUODENOSCOPY (EGD), COMBINED N/A 10/29/2015     Procedure: COMBINED ESOPHAGOSCOPY, GASTROSCOPY, DUODENOSCOPY (EGD);  Surgeon: Shaq Mims MD;  Location: [facility]     LAPAROSCOPIC GASTRIC ADJUSTABLE BANDING  11/09/10    Lap band procedure     LAPAROSCOPIC REMOVAL GASTRIC ADJUSTABLE BAND N/A 10/31/2015    Procedure: LAPAROSCOPIC REMOVAL GASTRIC ADJUSTABLE BAND;  Surgeon: Shaq Mims MD;  Location: [facility]     RELEASE CARPAL TUNNEL Left 4/27/2017    Procedure: RELEASE CARPAL TUNNEL;  Left Open Carpal Tunnel Release;  Surgeon: Ciara Middleton MD;  [facility]     RELEASE CARPAL TUNNEL Right 6/15/2017    Procedure: RELEASE CARPAL TUNNEL;  Right Carpal Tunnel Release Open;  Surgeon: Ciara Middleton MD;  Location: [facility]     REPAIR PTOSIS       TUBAL LIGATION  1988       Social history:  Social History   Substance Use Topics     Smoking status: Never Smoker     Smokeless tobacco: Never Used     Alcohol use No       Family history:  Family History   Problem Relation Age of Onset     Heart Disease Father      MI     Neurologic Disorder Father 79     Parkinson's     Diabetes Father      Hypertension Father      Coronary Artery Disease Father      Cancer Maternal Grandmother      uterine     Neurologic Disorder Sister 16     migraine     Depression Sister      Neurologic Disorder Mother 20     migraine     Depression Mother      Neurologic Disorder Son 7     migraine     Substance Abuse Son      Depression Sister      Substance Abuse Sister    Mother had COPD/smoker and alpha 1 trypsin def. PT was tested and was negative.   Denies any other FH of lung disease including but not limited to ILD, lung cancer, sarcoidosis, asthma, copd, RA, Lupus.     Medications:  Current Outpatient Prescriptions   Medication     acetaminophen (TYLENOL) 500 MG tablet     albuterol (PROAIR HFA/PROVENTIL HFA/VENTOLIN HFA) 108 (90 Base) MCG/ACT inhaler     azelastine (OPTIVAR) 0.05 % SOLN ophthalmic solution     citalopram (CELEXA) 20 MG tablet      "cycloSPORINE (RESTASIS) 0.05 % ophthalmic emulsion     cycloSPORINE (RESTASIS) 0.05 % ophthalmic emulsion     fexofenadine (ALLEGRA) 180 MG tablet     fluticasone (FLONASE) 50 MCG/ACT nasal spray     folic acid (FOLVITE) 1 MG tablet     ibuprofen 200 MG capsule     Insulin Syringe-Needle U-100 (BD INSULIN SYRINGE) 27G X 1/2\" 1 ML MISC     methotrexate sodium 50 MG/2ML SOLN     ORDER FOR DME     predniSONE (DELTASONE) 5 MG tablet     sulfaSALAzine ER (AZULFIDINE EN) 500 MG EC tablet     tiotropium (SPIRIVA HANDIHALER) 18 MCG capsule     tofacitinib (XELJANZ) 11 MG 24 hr tablet     topiramate (TOPAMAX) 25 MG tablet     albuterol (PROAIR HFA/PROVENTIL HFA/VENTOLIN HFA) 108 (90 Base) MCG/ACT Inhaler     fluticasone (FLOVENT HFA) 110 MCG/ACT Inhaler     No current facility-administered medications for this visit.        Allergies:  No Known Allergies    Physical examination:  /81  Pulse 91  Resp 18  Ht 1.626 m (5' 4\")  Wt 117.9 kg (260 lb)  SpO2 92%  BMI 44.63 kg/m2    General: NAD  Eyes: Anicteric sclera, PERRL, EOMI  Nose: Nasal mucosa w/o edema or hyperemia; no nasal polyps  Ears: EAC clear b/l w/ pearly gray TMs  Mouth: MMM w/o lesions; no tonsillar enlargement; no oropharyngeal exudate, or erythema, Mallampati 4   Neck: No masses, no thyromegaly  Lymphatics: No significant cervical or supraclavicular LNs  CV: RRR, no m/c/r;   Lungs: NVB no added sounds.   Abd: Soft, NT, ND  Ext: WWP, no BLE edema. no clubbing  Skin: No rashes, cyanosis, or jaundice  Neuro: Intact mentation w/ normal speech. Normal strength & muscle tone w/ normal gait & stance  Psych: Euthymic, normal affect, good eye contact    Labs: reviewed in [facility] & personally interpreted.   Eosinophils 0.2 WNL  Oct 2018     Lab Results   Component Value Date    PH 7.43 06/04/2015    PO2 81 06/04/2015    PCO2 44 06/04/2015    HCO3 29 (H) 06/04/2015    JAYDA 4.4 06/04/2015          Lab Results   Component Value Date     07/11/2017    Lab Results "   Component Value Date    CHLORIDE 107 07/11/2017    Lab Results   Component Value Date    BUN 13 07/11/2017      Lab Results   Component Value Date    POTASSIUM 3.2 07/11/2017    Lab Results   Component Value Date    CO2 21 07/11/2017    Lab Results   Component Value Date    CR 0.74 10/25/2018      No results found for: NTBNPI, NTBNP  Lab Results   Component Value Date    WBC 6.5 10/25/2018    HGB 12.0 10/25/2018    HCT 37.2 10/25/2018    MCV 95 10/25/2018     10/25/2018     Lab Results   Component Value Date    CR 0.74 10/25/2018     Lab Results   Component Value Date    SED 17 10/25/2018     Lab Results   Component Value Date    HGB 12.0 10/25/2018     Lab Results   Component Value Date    AST 29 10/25/2018    ALT 39 10/25/2018    ALKPHOS 98 10/25/2018    BILITOTAL 0.3 10/25/2018    BILICONJ 0.0 07/14/2014     Imaging: reviewed in [facility] & personally interpreted. Below are the interpretations from the formal Radiology review.  CXr today looks clear.     Echo: 3/2018   Global and regional left ventricular function is normal with an EF of 60-  65%.Left ventricular diastolic function is indeterminate.  Global right ventricular function is normal.  The inferior vena cava is normal. Estimated mean right atrial pressure is 3  mmHg.  No pericardial effusion is present.  No significant valve disease by Doppler interrogation. Valves are not seen  well.  Pulmonary artery systolic pressure cannot be assessed.  Previous study not available for comparison.      PFT, 11/28/18: on personal review -- reduced FEV1 and FVC no obstruction. Normal TLc and DLCO. No significant bronchodilator response.      PFT Latest Ref Rng & Units 11/28/2018   FVC L 2.17   FEV1 L 1.59   FVC% % 69   FEV1% % 64           Jo Downs   Pulmonary Critical Care Fellow   [phone]  --------------------------------------------------------------------------------------  Physician Attestation   I, Maikol Morris, saw this patient and  agree with the findings and plan of care as documented in the note.      Items personally reviewed/procedural attestation: vitals, labs, imaging and agree with the interpretation documented in the note, spirometry report and agree with the interpretation documented in the note and I was present for key portions of the examination and wrap-up procedure.    Maikol Morris MD 11/28/18

## 2018-11-28 NOTE — LETTER
11/28/2018       RE: Sharon Fung  [address] [place] [address]     Dear Colleague,    Thank you for referring your patient, Sharon Fung, to the [facility] at [facility]. Please see a copy of my visit note below.    Pulmonary Medicine  Initial Clinic Note  November 28, 2018      Reason for visit: Chronic Dyspnea with h/o RA.   --------------------------  Impression & recommendations:  63-year-old female with past medical history of rheumatoid arthritis, obesity, sleep apnea, allergic rhinitis and depression comes in for evaluation of her progressive dyspnea at rest and on exertion going on for about 1 year.  Patient developed a bronchitis after she returned from [place] last year in 2017 September and had a cough for 2-3 months which resolved but then she developed shortness of breath.  The shortness of breath has progressed especially in the last 3 months.  Patient has also started on Xeljanz in the last 1 year for her rheumatoid arthritis.  Patient is also on methotrexate and sulfasalazine for her rheumatoid arthritis.  Patient says that she gets burst of steroids 20 mg 3-4 times every year for her RA.  She has also noticed weight gain since her gastric band was removed.  Her PFTs showed decreased FEV1 and FVC but no obstruction.  Given her rheumatoid arthritis and being on disease modifying medication RA associated ILD, rheumatoid arthritis drug toxicity are 1 of the considerations.  Will get CT scan to further evaluate.  If CT scan shows findings will get ILD panel.  Will recommend weight loss and albuterol prior to exercise to see if that helps. Other possibilities included but not limited to post bronchitis asthma (unlikely given the negative bronchodilator response), CTEPH. This is unlikely given the normal right ventricular function on echocardiogram..     Diagnoses:  1. Chronic Dyspnea cause: ? Obesity vs  deconditioning.   2. Morbid Obesity BMI 44   3. Abnormal PFT with reduced FEV1 and FVC of unknown significance. No obstruction, Normal DLCO and lung volume. No significant bronchodilator response.     Recommendations:  1. Will give albuterol prn for possible exercise induced asthma. If that doesn't help her will discontinue it.   2. Weight loss.   3. Will get HRCT to r/o RA associated ILD vs RA drug toxicity.   4. Flu shot received this season.     ---------------------------------------------  Epic generated plan: {Sharon was seen today for consult.    Diagnoses and all orders for this visit:    Dyspnea on exertion  -     albuterol (PROAIR HFA/PROVENTIL HFA/VENTOLIN HFA) 108 (90 Base) MCG/ACT inhaler; Inhale 2 puffs into the lungs every 6 hours as needed for shortness of breath / dyspnea or wheezing  -     CT Chest w/o Contrast; Future  -     General PFT Lab (Please always keep checked); Future  -     Pulmonary Function Test; Future  -     6 minute walk test; Future       --------------------------------------------  RTC in 3M with PFT and 6MWT.   Patient was seen & discussed w/ Dr. Arturo MD, who is in agreement.    Jo Downs   Pulmonary Critical Care Fellow   [phone]  -----------------------------------------------------------------    Pulmonary HPI:   Sharon Fung is a 63 year old F with RA seronegative for 4 yrs, s/p gastric band and removal of band due to abdominal abscess, Depression, BRETT on CPAP comes here for evaluation for her dyspnea.     Pt went to [place] in Aug 2017 and got a cold and lasted about 3M. She was treated as out pt with doxycycline. Pt was having cough non productive, rhinorrhea with fevers and abx help with fever but cough remained. In Oct 2nd course of Doxy and prednisone due to persistent cough. Her CXR was clear. In Nov her FP Dr. Saavedra gave her Azithromycin due to cough and dyspnea started in Nov-Dec. This summer her dyspnea got worse. Now she has dyspnea even at  sitting and she on exertion. Couple of steps would make her short of breath and this has been going on since august. Pt feels that every 3-4 months she is on high dose steroids 20 mg and that has led to her gain weight johan with the gastric band being off. Also she is concerned if the RA meds have caused any damage?   RA meds she is on Xeljanz/tofacitinab (1yr), methotrexate (2 yrs) and sulfasalazine. Last pred was 20 mg in summer for RA.  Pt feels her RA is not controlled. She has pain in her MP and PIP, shoulder and feet.   Pt weight last yr was 253 lbs and today is 260 lbs.      Pt was given Spiriva in Dec 2017 for 1M and she thinks it helped because it helped her bring up the secretions.     Patient is a life time non smoker.   Occasional EtOH use.   Denies recreational drug use.     Patient has cats as pet. No birds.   Patient lives in a town house, sub-urban. No Molds.   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   Travel h[place]o: [place], [place], [place] 2015, [place] was 2017, [place] in 2016.   Denies any h/o exposure to TB, Asbestos or silica. No harmful inhalational exposure at work or home as far as he can recollect.     Past medical history:  Past Medical History:   Diagnosis Date     AR (allergic rhinitis)  as teen     Arthritis 12/14/2015     Chronic obstructive pulmonary disease, unspecified COPD type (H) 3/27/2018     Depressive disorder 3 years ago     GERD (gastroesophageal reflux disease) 4-07     Headache 7/11/2017     Mild major depression (H) 3 years ago     Morbid obesity (H) teens     BRETT (obstructive sleep apnea)     uses CPAP     Rheumatoid arthritis, adult (H)        Past Surgical History:   Procedure Laterality Date     BLEPHAROPLASTY BILATERAL Bilateral 8/5/2016    Procedure: BLEPHAROPLASTY BILATERAL;  Surgeon: Cortez Robin MD;  Location:  [facility]     BREAST BIOPSY, RT/LT  1-94    Benign     C APPENDECTOMY  1977     COLONOSCOPY       COLONOSCOPY WITH CO2 INSUFFLATION N/A  3/30/2017    Procedure: COLONOSCOPY WITH CO2 INSUFFLATION;  Surgeon: Issa Weeks MD;  Location: [facility] OR     ESOPHAGOSCOPY, GASTROSCOPY, DUODENOSCOPY (EGD), COMBINED N/A 10/29/2015    Procedure: COMBINED ESOPHAGOSCOPY, GASTROSCOPY, DUODENOSCOPY (EGD);  Surgeon: Shaq Mims MD;  Location: [facility]     LAPAROSCOPIC GASTRIC ADJUSTABLE BANDING  11/09/10    Lap band procedure     LAPAROSCOPIC REMOVAL GASTRIC ADJUSTABLE BAND N/A 10/31/2015    Procedure: LAPAROSCOPIC REMOVAL GASTRIC ADJUSTABLE BAND;  Surgeon: Shaq Mims MD;  Location: [facility] OR     RELEASE CARPAL TUNNEL Left 4/27/2017    Procedure: RELEASE CARPAL TUNNEL;  Left Open Carpal Tunnel Release;  Surgeon: Ciara Middleton MD;  [facility]     RELEASE CARPAL TUNNEL Right 6/15/2017    Procedure: RELEASE CARPAL TUNNEL;  Right Carpal Tunnel Release Open;  Surgeon: Ciara Middleton MD;  [facility]: [facility]     REPAIR PTOSIS       TUBAL LIGATION  1988       Social history:  Social History   Substance Use Topics     Smoking status: Never Smoker     Smokeless tobacco: Never Used     Alcohol use No       Family history:  Family History   Problem Relation Age of Onset     Heart Disease Father      MI     Neurologic Disorder Father 79     Parkinson's     Diabetes Father      Hypertension Father      Coronary Artery Disease Father      Cancer Maternal Grandmother      uterine     Neurologic Disorder Sister 16     migraine     Depression Sister      Neurologic Disorder Mother 20     migraine     Depression Mother      Neurologic Disorder Son 7     migraine     Substance Abuse Son      Depression Sister      Substance Abuse Sister    Mother had COPD/smoker and alpha 1 trypsin def. PT was tested and was negative.   Denies any other FH of lung disease including but not limited to ILD, lung cancer, sarcoidosis, asthma, copd, RA, Lupus.     Medications:  Current Outpatient Prescriptions   Medication     acetaminophen (TYLENOL) 500 MG tablet  "    albuterol (PROAIR HFA/PROVENTIL HFA/VENTOLIN HFA) 108 (90 Base) MCG/ACT inhaler     azelastine (OPTIVAR) 0.05 % SOLN ophthalmic solution     citalopram (CELEXA) 20 MG tablet     cycloSPORINE (RESTASIS) 0.05 % ophthalmic emulsion     cycloSPORINE (RESTASIS) 0.05 % ophthalmic emulsion     fexofenadine (ALLEGRA) 180 MG tablet     fluticasone (FLONASE) 50 MCG/ACT nasal spray     folic acid (FOLVITE) 1 MG tablet     ibuprofen 200 MG capsule     Insulin Syringe-Needle U-100 (BD INSULIN SYRINGE) 27G X 1/2\" 1 ML MISC     methotrexate sodium 50 MG/2ML SOLN     ORDER FOR DME     predniSONE (DELTASONE) 5 MG tablet     sulfaSALAzine ER (AZULFIDINE EN) 500 MG EC tablet     tiotropium (SPIRIVA HANDIHALER) 18 MCG capsule     tofacitinib (XELJANZ) 11 MG 24 hr tablet     topiramate (TOPAMAX) 25 MG tablet     albuterol (PROAIR HFA/PROVENTIL HFA/VENTOLIN HFA) 108 (90 Base) MCG/ACT Inhaler     fluticasone (FLOVENT HFA) 110 MCG/ACT Inhaler     No current facility-administered medications for this visit.        Allergies:  No Known Allergies    Physical examination:  /81  Pulse 91  Resp 18  Ht 1.626 m (5' 4\")  Wt 117.9 kg (260 lb)  SpO2 92%  BMI 44.63 kg/m2    General: NAD  Eyes: Anicteric sclera, PERRL, EOMI  Nose: Nasal mucosa w/o edema or hyperemia; no nasal polyps  Ears: EAC clear b/l w/ pearly gray TMs  Mouth: MMM w/o lesions; no tonsillar enlargement; no oropharyngeal exudate, or erythema, Mallampati 4   Neck: No masses, no thyromegaly  Lymphatics: No significant cervical or supraclavicular LNs  CV: RRR, no m/c/r;   Lungs: NVB no added sounds.   Abd: Soft, NT, ND  Ext: WWP, no BLE edema. no clubbing  Skin: No rashes, cyanosis, or jaundice  Neuro: Intact mentation w/ normal speech. Normal strength & muscle tone w/ normal gait & stance  Psych: Euthymic, normal affect, good eye contact    Labs: reviewed in EPIC & personally interpreted.   Eosinophils 0.2 WNL  Oct 2018     Lab Results   Component Value Date    PH 7.43 " 06/04/2015    PO2 81 06/04/2015    PCO2 44 06/04/2015    HCO3 29 (H) 06/04/2015    JAYDA 4.4 06/04/2015          Lab Results   Component Value Date     07/11/2017    Lab Results   Component Value Date    CHLORIDE 107 07/11/2017    Lab Results   Component Value Date    BUN 13 07/11/2017      Lab Results   Component Value Date    POTASSIUM 3.2 07/11/2017    Lab Results   Component Value Date    CO2 21 07/11/2017    Lab Results   Component Value Date    CR 0.74 10/25/2018      No results found for: NTBNPI, NTBNP  Lab Results   Component Value Date    WBC 6.5 10/25/2018    HGB 12.0 10/25/2018    HCT 37.2 10/25/2018    MCV 95 10/25/2018     10/25/2018     Lab Results   Component Value Date    CR 0.74 10/25/2018     Lab Results   Component Value Date    SED 17 10/25/2018     Lab Results   Component Value Date    HGB 12.0 10/25/2018     Lab Results   Component Value Date    AST 29 10/25/2018    ALT 39 10/25/2018    ALKPHOS 98 10/25/2018    BILITOTAL 0.3 10/25/2018    BILICONJ 0.0 07/14/2014     Imaging: reviewed in [facility] & personally interpreted. Below are the interpretations from the formal Radiology review.  CXr today looks clear.     Echo: 3/2018   Global and regional left ventricular function is normal with an EF of 60-  65%.Left ventricular diastolic function is indeterminate.  Global right ventricular function is normal.  The inferior vena cava is normal. Estimated mean right atrial pressure is 3  mmHg.  No pericardial effusion is present.  No significant valve disease by Doppler interrogation. Valves are not seen  well.  Pulmonary artery systolic pressure cannot be assessed.  Previous study not available for comparison.      PFT, 11/28/18: on personal review -- reduced FEV1 and FVC no obstruction. Normal TLc and DLCO. No significant bronchodilator response.      PFT Latest Ref Rng & Units 11/28/2018   FVC L 2.17   FEV1 L 1.59   FVC% % 69   FEV1% % 64           Jo Downs   Pulmonary Critical  Care Fellow   [phone]  --------------------------------------------------------------------------------------  Physician Attestation   I, Maikol Morris, saw this patient and agree with the findings and plan of care as documented in the note.      Items personally reviewed/procedural attestation: vitals, labs, imaging and agree with the interpretation documented in the note, spirometry report and agree with the interpretation documented in the note and I was present for key portions of the examination and wrap-up procedure.    Maikol Morris MD 11/28/18    Again, thank you for allowing me to participate in the care of your patient.      Sincerely,    Jo Downs MD

## 2018-12-03 ENCOUNTER — HOSPITAL ENCOUNTER (OUTPATIENT)
Dept: CT IMAGING | Facility: CLINIC | Age: 63
Discharge: HOME | End: 2018-12-03
Attending: INTERNAL MEDICINE | Admitting: INTERNAL MEDICINE
Payer: COMMERCIAL

## 2018-12-03 DIAGNOSIS — R06.09 DYSPNEA ON EXERTION: ICD-10-CM

## 2018-12-03 PROCEDURE — 71250 CT THORAX DX C-: CPT

## 2018-12-05 LAB
DLCOUNC-%PRED-PRE: 103 %
DLCOUNC-PRE: 22.12 ML/MIN/MMHG
DLCOUNC-PRED: 21.46 ML/MIN/MMHG
ERV-%PRED-PRE: 73 %
ERV-PRE: 0.12 L
ERV-PRED: 0.16 L
EXPTIME-PRE: 7.35 SEC
FEF2575-%PRED-POST: 65 %
FEF2575-%PRED-PRE: 50 %
FEF2575-POST: 1.46 L/SEC
FEF2575-PRE: 1.12 L/SEC
FEF2575-PRED: 2.21 L/SEC
FEFMAX-%PRED-PRE: 70 %
FEFMAX-PRE: 4.38 L/SEC
FEFMAX-PRED: 6.21 L/SEC
FEV1-%PRED-PRE: 64 %
FEV1-PRE: 1.59 L
FEV1FEV6-PRE: 74 %
FEV1FEV6-PRED: 80 %
FEV1FVC-PRE: 73 %
FEV1FVC-PRED: 78 %
FEV1SVC-PRE: 71 %
FEV1SVC-PRED: 76 %
FIFMAX-PRE: 3.75 L/SEC
FRCPLETH-%PRED-PRE: 79 %
FRCPLETH-PRE: 2.14 L
FRCPLETH-PRED: 2.7 L
FVC-%PRED-PRE: 69 %
FVC-PRE: 2.17 L
FVC-PRED: 3.12 L
IC-%PRED-PRE: 68 %
IC-PRE: 2.11 L
IC-PRED: 3.08 L
RVPLETH-%PRED-PRE: 104 %
RVPLETH-PRE: 2.02 L
RVPLETH-PRED: 1.93 L
TLCPLETH-%PRED-PRE: 85 %
TLCPLETH-PRE: 4.24 L
TLCPLETH-PRED: 4.94 L
VA-%PRED-PRE: 73 %
VA-PRE: 3.71 L
VC-%PRED-PRE: 68 %
VC-PRE: 2.22 L
VC-PRED: 3.24 L

## 2018-12-07 ENCOUNTER — OFFICE VISIT (OUTPATIENT)
Dept: FAMILY MEDICINE | Facility: CLINIC | Age: 63
End: 2018-12-07
Payer: COMMERCIAL

## 2018-12-07 VITALS
WEIGHT: 256 LBS | HEART RATE: 80 BPM | SYSTOLIC BLOOD PRESSURE: 127 MMHG | OXYGEN SATURATION: 95 % | DIASTOLIC BLOOD PRESSURE: 75 MMHG | BODY MASS INDEX: 43.94 KG/M2 | TEMPERATURE: 97.9 F

## 2018-12-07 DIAGNOSIS — F32.0 MILD MAJOR DEPRESSION (H): ICD-10-CM

## 2018-12-07 DIAGNOSIS — Z98.84 STATUS POST BARIATRIC SURGERY: ICD-10-CM

## 2018-12-07 DIAGNOSIS — Z79.899 HIGH RISK MEDICATION USE: ICD-10-CM

## 2018-12-07 DIAGNOSIS — D84.9 IMMUNOSUPPRESSION (H): ICD-10-CM

## 2018-12-07 DIAGNOSIS — E66.01 OBESITY, CLASS III, BMI 40-49.9 (MORBID OBESITY) (H): Primary | ICD-10-CM

## 2018-12-07 DIAGNOSIS — M05.9 SEROPOSITIVE RHEUMATOID ARTHRITIS (H): ICD-10-CM

## 2018-12-07 PROCEDURE — 99214 OFFICE O/P EST MOD 30 MIN: CPT | Performed by: FAMILY MEDICINE

## 2018-12-07 RX ORDER — CITALOPRAM HYDROBROMIDE 20 MG/1
20 TABLET ORAL DAILY
Qty: 90 TABLET | Refills: 1 | Status: SHIPPED | OUTPATIENT
Start: 2018-12-07 | End: 2019-07-08

## 2018-12-07 ASSESSMENT — PATIENT HEALTH QUESTIONNAIRE - PHQ9: SUM OF ALL RESPONSES TO PHQ QUESTIONS 1-9: 8

## 2018-12-07 NOTE — MR AVS SNAPSHOT
After Visit Summary   12/7/2018    Sharon Fung    MRN: [id number]           Patient Information     Date Of Birth          1955        Visit Information        Provider Department      12/7/2018 8:20 AM Reynaldo Saavedra MD [facility]        Today's Diagnoses     Obesity, Class III, BMI 40-49.9 (morbid obesity) (H)    -  1    Mild major depression (H)        High risk medication use        Status post bariatric surgery        Seropositive rheumatoid arthritis (H)           Follow-ups after your visit        Follow-up notes from your care team     Return in about 6 months (around 6/7/2019).      Your next 10 appointments already scheduled     Dec 19, 2018  7:20 AM CST   [facility] Freddy [facility]erra MD   [facility]    [address] [place] [id number] [phone]            Feb 22, 2019  2:30 PM CST   SIX MINUTE WALK with [facility] P[facility] C, [facility] P[facility] 6 MINUTE WALK 1   [facility] Function [facility] ([facility])    [address] [place] [id number] [phone]            Feb 22, 2019  3:40 PM CST   (Arrive by 3:25 PM)   Return Visit with Jo Downs MD   [facility] ([facility])    [address] [place] [id number] [phone]              Who to contact     If you have questions or need follow up information about today's clinic visit or your schedule please contact [facility] directly at [phone].  Normal or non-critical lab and imaging results will be communicated to you by MyChart, letter or phone within 4 business days after the clinic has received the results. If you do not hear from us within 7 days, please contact the clinic through MyChart or phone. If you have a critical or abnormal lab result, we will notify you by phone as soon as  possible.  Submit refill requests through [employer] or call your pharmacy and they will forward the refill request to us. Please allow 3 business days for your refill to be completed.          Additional Information About Your Visit        [facility] Information     [employer] gives you secure access to your electronic health record. If you see a primary care provider, you can also send messages to your care team and make appointments. If you have questions, please call your primary care clinic.  If you do not have a primary care provider, please call [phone] and they will assist you.        Care EveryWhere ID     This is your Care EveryWhere ID. This could be used by other organizations to access your [facility] medical records  [id number]        Your Vitals Were     Pulse Temperature Pulse Oximetry BMI (Body Mass Index)          80 97.9  F (36.6  C) (Oral) 95% 43.94 kg/m2         Blood Pressure from Last 3 Encounters:   12/07/18 127/75   11/28/18 151/81   08/08/18 140/80    Weight from Last 3 Encounters:   12/07/18 256 lb (116.1 kg)   11/28/18 260 lb (117.9 kg)   08/08/18 256 lb 12.8 oz (116.5 kg)              Today, you had the following     No orders found for display         Where to get your medicines      These medications were sent to [facility] [place], [place] - [address]  [address] [address]     Phone:  [phone]     citalopram 20 MG tablet          Primary Care Provider Office Phone # Fax #    Reynaldo Saavedra [phone]       [address] [facility] [address]        Equal Access to Services     LARISSA SERVIN : Hadii aad ku hadasho Soalexandro, waaxda luqadaha, qaybta kaalmada stevo, susan monahan. So [facility] [phone].    ATENCIÓN: Si habla español, tiene a ferrer disposición servicios gratuitos de asistencia lingüística. Llame al [phone].    We comply with applicable federal civil rights laws and  "[place] laws. We do not discriminate on the basis of race, color, national origin, age, disability, sex, sexual orientation, or gender identity.            Thank you!     Thank you for choosing [facility]  for your care. Our goal is always to provide you with excellent care. Hearing back from our patients is one way we can continue to improve our services. Please take a few minutes to complete the written survey that you may receive in the mail after your visit with us. Thank you!             Your Updated Medication List - Protect others around you: Learn how to safely use, store and throw away your medicines at www.disposemymeds.org.          This list is accurate as of 12/7/18  8:59 AM.  Always use your most recent med list.                   Brand Name Dispense Instructions for use Diagnosis    acetaminophen 500 MG tablet    TYLENOL     Take 500-1,000 mg by mouth every 6 hours as needed for mild pain        albuterol 108 (90 Base) MCG/ACT inhaler    PROAIR HFA/PROVENTIL HFA/VENTOLIN HFA    1 Inhaler    Inhale 2 puffs into the lungs every 6 hours as needed for shortness of breath / dyspnea or wheezing    Dyspnea on exertion       azelastine 0.05 % ophthalmic solution    OPTIVAR    1 Bottle    Apply 1 drop to eye 2 times daily    Allergic conjunctivitis of both eyes       citalopram 20 MG tablet    celeXA    90 tablet    Take 1 tablet (20 mg) by mouth daily    Mild major depression (H)       fexofenadine 180 MG tablet    ALLEGRA    90 tablet    Take 1 tablet (180 mg) by mouth daily    AR (allergic rhinitis)       FLONASE 50 MCG/ACT nasal spray   Generic drug:  fluticasone      Spray 2 sprays into both nostrils as needed        folic acid 1 MG tablet    FOLVITE    100 tablet    Take 1 tablet (1 mg) by mouth daily    Seropositive rheumatoid arthritis (H)       ibuprofen 200 MG capsule    ADVIL/MOTRIN     Take 600-800 mg by mouth At Bedtime        Insulin Syringe-Needle U-100 27G X 1/2\" 1 ML " Misc    BD insulin syringe    10 each    1 Syringe every 7 days , to be used for methotrexate administration.    Seropositive rheumatoid arthritis (H)       methotrexate sodium 50 MG/2ML Soln     2 vial    Inject 0.8 mLs (20 mg) as directed every 7 days    Seropositive rheumatoid arthritis (H)       order for DME      Use your CPAP device as directed by your provider.        RESTASIS 0.05 % ophthalmic emulsion   Generic drug:  cycloSPORINE      Place 1 drop into both eyes 2 times daily        sulfaSALAzine  MG EC tablet    AZULFIDINE EN    180 tablet    Take 3 tablets (1,500 mg) by mouth 2 times daily    Seropositive rheumatoid arthritis (H)       tofacitinib 11 MG 24 hr tablet    XELJANZ    30 tablet    Take 1 tablet (11 mg) by mouth daily    Seropositive rheumatoid arthritis (H)

## 2018-12-07 NOTE — PROGRESS NOTES
SUBJECTIVE:   Sharon Fung is a 63 year old female who presents to clinic today for the following health issues:      Would like to discuss weight gain and her options.    She has struggled with her weight for many years.  She had a lap band procedure done previously but had to be removed because of complications related to her rheumatoid arthritis.  She has been on prednisone at times for her RA and that has contributed to weight gain.  She has seen endocrinology at the [facility] and she has tried weight loss medication, but they have not been very helpful for her.  She has dyspnea on exertion now and is feeling depressed about her weight.  She would really like to have a gastric sleeve procedure done, but apparently her insurance plan has stated that they only allow one bariatric surgery per lifetime and she has already had that done.    Problem list and histories reviewed & adjusted, as indicated.  Additional history: as documented    Patient Active Problem List   Diagnosis     AR (allergic rhinitis)     GERD (gastroesophageal reflux disease)     Status post bariatric surgery     Mild major depression (H)     Advanced directives, counseling/discussion     High risk medication use     Obstructive sleep apnea     Obesity, Class III, BMI 40-49.9 (morbid obesity) (H)     Anterior basement membrane dystrophy     Seropositive rheumatoid arthritis (H)     LORNA positive     Carpal tunnel syndrome of right wrist     Headache     Chronic obstructive pulmonary disease, unspecified COPD type (H)     Past Surgical History:   Procedure Laterality Date     BLEPHAROPLASTY BILATERAL Bilateral 8/5/2016    Procedure: BLEPHAROPLASTY BILATERAL;  Surgeon: Cortez Robin MD;  Location: [facility]     BREAST BIOPSY, RT/LT  1-94    Benign     C APPENDECTOMY  1977     COLONOSCOPY       COLONOSCOPY WITH CO2 INSUFFLATION N/A 3/30/2017    Procedure: COLONOSCOPY WITH CO2 INSUFFLATION;  Surgeon: Issa Weeks MD;   [facility]     ESOPHAGOSCOPY, GASTROSCOPY, DUODENOSCOPY (EGD), COMBINED N/A 10/29/2015    Procedure: COMBINED ESOPHAGOSCOPY, GASTROSCOPY, DUODENOSCOPY (EGD);  Surgeon: Shaq Mims MD;  Location: [facility]     LAPAROSCOPIC GASTRIC ADJUSTABLE BANDING  11/09/10    Lap band procedure     LAPAROSCOPIC REMOVAL GASTRIC ADJUSTABLE BAND N/A 10/31/2015    Procedure: LAPAROSCOPIC REMOVAL GASTRIC ADJUSTABLE BAND;  Surgeon: Shaq Mims MD;  Location: [facility]     RELEASE CARPAL TUNNEL Left 4/27/2017    Procedure: RELEASE CARPAL TUNNEL;  Left Open Carpal Tunnel Release;  Surgeon: Ciara Middleton MD;  [facility]     RELEASE CARPAL TUNNEL Right 6/15/2017    Procedure: RELEASE CARPAL TUNNEL;  Right Carpal Tunnel Release Open;  Surgeon: Ciara Middleton MD;  [facility]     REPAIR PTOSIS       TUBAL LIGATION  1988       Social History   Substance Use Topics     Smoking status: Never Smoker     Smokeless tobacco: Never Used     Alcohol use No     Family History   Problem Relation Age of Onset     Heart Disease Father      MI     Neurologic Disorder Father 79     Parkinson's     Diabetes Father      Hypertension Father      Coronary Artery Disease Father      Cancer Maternal Grandmother      uterine     Neurologic Disorder Sister 16     migraine     Depression Sister      Neurologic Disorder Mother 20     migraine     Depression Mother      Neurologic Disorder Son 7     migraine     Substance Abuse Son      Depression Sister      Substance Abuse Sister          Current Outpatient Prescriptions   Medication Sig Dispense Refill     acetaminophen (TYLENOL) 500 MG tablet Take 500-1,000 mg by mouth every 6 hours as needed for mild pain       albuterol (PROAIR HFA/PROVENTIL HFA/VENTOLIN HFA) 108 (90 Base) MCG/ACT inhaler Inhale 2 puffs into the lungs every 6 hours as needed for shortness of breath / dyspnea or wheezing 1 Inhaler 1     azelastine (OPTIVAR) 0.05 % SOLN ophthalmic solution Apply 1  "drop to eye 2 times daily 1 Bottle 6     citalopram (CELEXA) 20 MG tablet Take 1 tablet (20 mg) by mouth daily 90 tablet 1     cycloSPORINE (RESTASIS) 0.05 % ophthalmic emulsion Place 1 drop into both eyes 2 times daily       fexofenadine (ALLEGRA) 180 MG tablet Take 1 tablet (180 mg) by mouth daily 90 tablet 2     fluticasone (FLONASE) 50 MCG/ACT nasal spray Spray 2 sprays into both nostrils as needed       folic acid (FOLVITE) 1 MG tablet Take 1 tablet (1 mg) by mouth daily 100 tablet 3     ibuprofen 200 MG capsule Take 600-800 mg by mouth At Bedtime       Insulin Syringe-Needle U-100 (BD INSULIN SYRINGE) 27G X 1/2\" 1 ML MISC 1 Syringe every 7 days , to be used for methotrexate administration. 10 each 0     methotrexate sodium 50 MG/2ML SOLN Inject 0.8 mLs (20 mg) as directed every 7 days 2 vial 6     ORDER FOR DME Use your CPAP device as directed by your provider.       sulfaSALAzine ER (AZULFIDINE EN) 500 MG EC tablet Take 3 tablets (1,500 mg) by mouth 2 times daily 180 tablet 6     tofacitinib (XELJANZ) 11 MG 24 hr tablet Take 1 tablet (11 mg) by mouth daily 30 tablet 6     [DISCONTINUED] albuterol (PROAIR HFA/PROVENTIL HFA/VENTOLIN HFA) 108 (90 Base) MCG/ACT Inhaler Inhale 2-4 puffs into the lungs every 4 hours as needed for shortness of breath / dyspnea or wheezing (Patient not taking: Reported on 8/8/2018) 1 Inhaler 1     [DISCONTINUED] citalopram (CELEXA) 20 MG tablet Take 1 tablet (20 mg) by mouth daily 90 tablet 1     No Known Allergies    Reviewed and updated as needed this visit by clinical staff  Tobacco  Allergies  Meds  Med Hx  Surg Hx  Fam Hx  Soc Hx      Reviewed and updated as needed this visit by Provider         ROS:  Constitutional, HEENT, cardiovascular, pulmonary, gi and gu systems are negative, except as otherwise noted.    OBJECTIVE:     /75 (BP Location: Right arm, Patient Position: Right side, Cuff Size: Adult Large)  Pulse 80  Temp 97.9  F (36.6  C) (Oral)  Wt 256 lb " (116.1 kg)  SpO2 95%  BMI 43.94 kg/m2  Body mass index is 43.94 kg/(m^2).  GENERAL: alert, no distress and obese  PSYCH: mentation appears normal, affect somewhat sad and tearful as she describes her frustration with her weight.  She scored an 8 on her PHQ 9 today.    Diagnostic Test Results:  Her recent pulmonary note and chest CT scan result are reviewed    ASSESSMENT/PLAN:       ICD-10-CM    1. Obesity, Class III, BMI 40-49.9 (morbid obesity) (H) E66.01    2. Mild major depression (H) F32.0 citalopram (CELEXA) 20 MG tablet   3. High risk medication use Z79.899    4. Status post bariatric surgery Z98.84    5. Seropositive rheumatoid arthritis (H) M05.9    6. Immunosuppression (H) D89.9      We discussed her obesity and the difficulty she has had in losing weight over the years  I emphasized with her frustration of living with her obesity and how that impacts her health  I suggested that she talk to the providers that she has already seen, including Dr. Guerra of rheumatology, Dr. Mmis of bariatric surgery, Dr. Panchal of endocrinology to make sure they are on board with a potential gastric sleeve procedure and then possibly petition her insurance plan to try to get that covered for her  She may be a candidate for other medical weight loss plans in the meantime and that could be further addressed through the [facility] weight loss program  We will continue with her same citalopram in the meantime        Reynaldo Saavedra MD  [facility]

## 2018-12-12 ENCOUNTER — CARE COORDINATION (OUTPATIENT)
Dept: SURGERY | Facility: CLINIC | Age: 63
End: 2018-12-12

## 2018-12-14 DIAGNOSIS — M05.9 SEROPOSITIVE RHEUMATOID ARTHRITIS (H): ICD-10-CM

## 2018-12-14 LAB
ALBUMIN SERPL-MCNC: 3.7 G/DL (ref 3.4–5)
ALP SERPL-CCNC: 97 U/L (ref 40–150)
ALT SERPL W P-5'-P-CCNC: 27 U/L (ref 0–50)
AST SERPL W P-5'-P-CCNC: 22 U/L (ref 0–45)
BASOPHILS # BLD AUTO: 0 10E9/L (ref 0–0.2)
BASOPHILS NFR BLD AUTO: 0.2 %
BILIRUB DIRECT SERPL-MCNC: <0.1 MG/DL (ref 0–0.2)
BILIRUB SERPL-MCNC: 0.3 MG/DL (ref 0.2–1.3)
CREAT SERPL-MCNC: 0.71 MG/DL (ref 0.52–1.04)
DIFFERENTIAL METHOD BLD: NORMAL
EOSINOPHIL # BLD AUTO: 0.2 10E9/L (ref 0–0.7)
EOSINOPHIL NFR BLD AUTO: 3.1 %
ERYTHROCYTE [DISTWIDTH] IN BLOOD BY AUTOMATED COUNT: 13.8 % (ref 10–15)
GFR SERPL CREATININE-BSD FRML MDRD: 84 ML/MIN/1.7M2
HCT VFR BLD AUTO: 37.5 % (ref 35–47)
HGB BLD-MCNC: 11.9 G/DL (ref 11.7–15.7)
IMM GRANULOCYTES # BLD: 0 10E9/L (ref 0–0.4)
IMM GRANULOCYTES NFR BLD: 0.5 %
LYMPHOCYTES # BLD AUTO: 1.3 10E9/L (ref 0.8–5.3)
LYMPHOCYTES NFR BLD AUTO: 22.4 %
MCH RBC QN AUTO: 29.9 PG (ref 26.5–33)
MCHC RBC AUTO-ENTMCNC: 31.7 G/DL (ref 31.5–36.5)
MCV RBC AUTO: 94 FL (ref 78–100)
MONOCYTES # BLD AUTO: 0.4 10E9/L (ref 0–1.3)
MONOCYTES NFR BLD AUTO: 7.3 %
NEUTROPHILS # BLD AUTO: 3.9 10E9/L (ref 1.6–8.3)
NEUTROPHILS NFR BLD AUTO: 66.5 %
NRBC # BLD AUTO: 0 10*3/UL
NRBC BLD AUTO-RTO: 0 /100
PLATELET # BLD AUTO: 309 10E9/L (ref 150–450)
PROT SERPL-MCNC: 7.5 G/DL (ref 6.8–8.8)
RBC # BLD AUTO: 3.98 10E12/L (ref 3.8–5.2)
WBC # BLD AUTO: 5.9 10E9/L (ref 4–11)

## 2018-12-14 PROCEDURE — 36415 COLL VENOUS BLD VENIPUNCTURE: CPT | Performed by: INTERNAL MEDICINE

## 2018-12-14 PROCEDURE — 80076 HEPATIC FUNCTION PANEL: CPT | Performed by: INTERNAL MEDICINE

## 2018-12-14 PROCEDURE — 85025 COMPLETE CBC W/AUTO DIFF WBC: CPT | Performed by: INTERNAL MEDICINE

## 2018-12-14 PROCEDURE — 82565 ASSAY OF CREATININE: CPT | Performed by: INTERNAL MEDICINE

## 2018-12-19 ENCOUNTER — OFFICE VISIT (OUTPATIENT)
Dept: RHEUMATOLOGY | Facility: CLINIC | Age: 63
End: 2018-12-19
Payer: COMMERCIAL

## 2018-12-19 VITALS
WEIGHT: 260.8 LBS | SYSTOLIC BLOOD PRESSURE: 135 MMHG | HEART RATE: 95 BPM | TEMPERATURE: 97.5 F | BODY MASS INDEX: 44.77 KG/M2 | OXYGEN SATURATION: 94 % | DIASTOLIC BLOOD PRESSURE: 72 MMHG

## 2018-12-19 DIAGNOSIS — M05.9 SEROPOSITIVE RHEUMATOID ARTHRITIS (H): Primary | ICD-10-CM

## 2018-12-19 DIAGNOSIS — Z79.899 HIGH RISK MEDICATIONS (NOT ANTICOAGULANTS) LONG-TERM USE: ICD-10-CM

## 2018-12-19 PROCEDURE — 99214 OFFICE O/P EST MOD 30 MIN: CPT | Performed by: INTERNAL MEDICINE

## 2018-12-19 RX ORDER — SULFASALAZINE 500 MG/1
1500 TABLET, DELAYED RELEASE ORAL 2 TIMES DAILY
Qty: 180 TABLET | Refills: 6 | Status: SHIPPED | OUTPATIENT
Start: 2018-12-19 | End: 2019-04-17

## 2018-12-19 ASSESSMENT — PAIN SCALES - GENERAL: PAINLEVEL: MODERATE PAIN (5)

## 2018-12-19 NOTE — NURSING NOTE
"Chief Complaint   Patient presents with     RECHECK     RA-bilateral hand pain. no concerns       Initial /72   Pulse 95   Temp 97.5  F (36.4  C) (Oral)   Wt 118.3 kg (260 lb 12.8 oz)   SpO2 94%   BMI 44.77 kg/m   Estimated body mass index is 44.77 kg/m  as calculated from the following:    Height as of 11/28/18: 1.626 m (5' 4\").    Weight as of this encounter: 118.3 kg (260 lb 12.8 oz).  BP completed using cuff size: large         RAPID3 (0-30) Cumulative Score  16.8          RAPID3 Weighted Score (divide #4 by 3 and that is the weighted score)  5.6         "

## 2018-12-19 NOTE — PROGRESS NOTES
Rheumatology Clinic Visit      Sharon Fung MRN# [id number]   YOB: 1955 Age: 63 year old      Date of visit: 12/19/18   PCP: Dr. Reynaldo Saavedra    Chief Complaint   Patient presents with:  RECHECK: RA-bilateral hand pain. no concerns    Assessment and Plan     1. Seropositive nonerosive rheumatoid arthritis (RF negative, CCP low positive): Previously on Humira (lost efficacy), Cimzia (ineffective), Orencia (ineffective), leflunomide (ineffective as monotherapy), and hydroxychloroquine (ineffective). Currently on MTX 20mg SQ (GI upset with PO), SSZ 1500mg BID, and Xeljanz XR 11 mg daily.  Worsening inflammatory joint symptoms over time and active synovitis on exam today.  We discussed other treatment options; stop Xeljanz and start Enbrel.  Could consider Kevzara or Actemra next if needed; both discussed in detail.   - Continue methotrexate 20mg SQ every 7 days  - Continue folic acid 1mg daily  - Discontinue Xeljanz XR 11 mg daily  - Continue sulfasalazine 1500 mg twice daily   - Start Enbrel 50mg SQ q7days  - Labs every 3 months: CBC, Creatinine, Hepatic Panel, ESR, CRP              Rapid 3, cumulative scores                      12/19/2018:         (MTX 20mg SQ wkly, Xeljanz XR 11mg daily, SSZ 1500mg BID)                          05/02/2018:  9     (MTX 20mg SQ wkly, Xeljanz XR 11mg daily, SSZ 1000mg BID)                          01/31/2018: 22.3 (MTX 20mg SQ wkly, Xeljanz XR 11mg daily)                          11/29/2017: 16.8 (MTX 20mg SQ wkly)                      08/02/2017: 4.2   (MTX 20mg SQ wkly, Xeljanz XR 11mg daily)                         05/04/2017: 7      (MTX 20mg SQ wkly, Xeljanz XR 11mg daily)                        02/02/2017: 8      (MTX 20mg SQ wkly, Xeljanz XR 11mg daily)                      11/04/2016: 13    (MTX 20mg SQ weekly)                      07/15/2016: 10.8    # Etanercept (Enbrel) Risks and Benefits: The risks and benefits of etanercept were discussed in detail and  the patient verbalized understanding.  The risks discussed include, but are not limited to, the risk for hypersensitivity, anaphylaxis, anaphylactoid reactions, an increased risk for serious infections leading to hospitalization or death, a possible increased risk for lymphoma and other malignancies, a possible worsening of demyelinating diseases, a possible worsening of heart failure, possible reactivation of hepatitis B, and possible reactivation of latent tuberculosis.  Subcutaneous injections may result in injection site reactions and/or pain at the site of injection.  It was discussed that the medication would need to be discontinued if a serious infection develops.  It was discussed that live vaccinations should not be received while using etanercept or within 30 days prior to starting etanercept.  I encouraged reviewing the package insert and asking any questions about the medication.      2. Positive LORNA and dsDNA / Secondary Sjogren's Syndrome: Repeat dsDNAs have been negative. Has dry eyes but no dry mouth.  Dry eyes are treated by ophthalmology with Restasis.  Likely Secondary Sjogren's Syndrome.     Ms. Fung verbalized agreement with and understanding of the rational for the diagnosis and treatment plan.  All questions were answered to best of my ability and the patient's satisfaction. Ms. Fung was advised to contact the clinic with any questions that may arise after the clinic visit.      Thank you for involving me in the care of the patient    Return to clinic: 3-4 months    HPI   Sharon Fung is a 63 year old female with medical history significant for GERD, allergic rhinitis, obstructive sleep apnea, obesity, hx of trigger fingers, hx of carpal tunnel surgery, and rheumatoid arthritis who presents for follow-up of rheumatoid arthritis.    Today, Ms. Fung reports that she has not done as well recently.  More pain in her hands that is worse in the morning and improves with activity.   Swelling in the MCPs and PIPs.  Some difficulty with making a complete fist.   Morning stiffness for at least 2-3 hours that improves with activity and worsens with inactivity.    No fevers or chills. No nausea, vomiting, constipation, diarrhea. No chest pain/pressure, palpitations, or shortness of breath. No oral or nasal sores. No neck pain. No rash.      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[employer]    ROS   GEN: See history of present illness  SKIN: No itching, rashes, sores  HEENT: No epistaxis. No oral or nasal ulcers.  CV: No chest pain, pressure, palpitations, or dyspnea on exertion.  PULM: no shortness of breath.   GI: No nausea, vomiting, constipation, diarrhea. No blood in stool. No abdominal pain.  : No blood in urine.  MSK: See HPI.  NEURO: No numbness or tingling  EXT: No LE swelling  PSYCH: Negative    Active Problem List     Patient Active Problem List   Diagnosis     AR (allergic rhinitis)     GERD (gastroesophageal reflux disease)     Status post bariatric surgery     Mild major depression (H)     Advanced directives, counseling/discussion     High risk medication use     Obstructive sleep apnea     Obesity, Class III, BMI 40-49.9 (morbid obesity) (H)     Anterior basement membrane dystrophy     Seropositive rheumatoid arthritis (H)     LORNA positive     Carpal tunnel syndrome of right wrist     Headache     Chronic obstructive pulmonary disease, unspecified COPD type (H)     Immunosuppression (H)     Past Medical History     Past Medical History:   Diagnosis Date     AR (allergic rhinitis)  as teen     Arthritis 12/14/2015     Chronic obstructive pulmonary disease, unspecified COPD type (H) 3/27/2018     Depressive disorder 3 years ago     GERD (gastroesophageal reflux disease) 4-07     Headache 7/11/2017     Mild major depression (H) 3 years ago     Morbid obesity (H) teens     BRETT (obstructive sleep apnea)     uses CPAP      Rheumatoid arthritis, adult (H)      Past Surgical History     Past Surgical History:   Procedure Laterality Date     BLEPHAROPLASTY BILATERAL Bilateral 8/5/2016    Procedure: BLEPHAROPLASTY BILATERAL;  Surgeon: Cortez Robin MD;  [facility]     BREAST BIOPSY, RT/LT  1-94    Benign     C APPENDECTOMY  1977     COLONOSCOPY       COLONOSCOPY WITH CO2 INSUFFLATION N/A 3/30/2017    Procedure: COLONOSCOPY WITH CO2 INSUFFLATION;  Surgeon: Issa Weeks MD;  [facility]: [facility]     ESOPHAGOSCOPY, GASTROSCOPY, DUODENOSCOPY (EGD), COMBINED N/A 10/29/2015    Procedure: COMBINED ESOPHAGOSCOPY, GASTROSCOPY, DUODENOSCOPY (EGD);  Surgeon: Shaq Mims MD;  [facility]     LAPAROSCOPIC GASTRIC ADJUSTABLE BANDING  11/09/10    Lap band procedure     LAPAROSCOPIC REMOVAL GASTRIC ADJUSTABLE BAND N/A 10/31/2015    Procedure: LAPAROSCOPIC REMOVAL GASTRIC ADJUSTABLE BAND;  Surgeon: Shaq Mims MD;  [facility]     RELEASE CARPAL TUNNEL Left 4/27/2017    Procedure: RELEASE CARPAL TUNNEL;  Left Open Carpal Tunnel Release;  Surgeon: Ciara Middleton MD;  [facility]     RELEASE CARPAL TUNNEL Right 6/15/2017    Procedure: RELEASE CARPAL TUNNEL;  Right Carpal Tunnel Release Open;  Surgeon: Ciara Middleton MD;  [facility]     REPAIR PTOSIS       TUBAL LIGATION  1988     Allergy   No Known Allergies  Current Medication List     Current Outpatient Medications   Medication Sig     acetaminophen (TYLENOL) 500 MG tablet Take 500-1,000 mg by mouth every 6 hours as needed for mild pain     albuterol (PROAIR HFA/PROVENTIL HFA/VENTOLIN HFA) 108 (90 Base) MCG/ACT inhaler Inhale 2 puffs into the lungs every 6 hours as needed for shortness of breath / dyspnea or wheezing     azelastine (OPTIVAR) 0.05 % SOLN ophthalmic solution Apply 1 drop to eye 2 times daily     citalopram (CELEXA) 20 MG tablet Take 1 tablet (20 mg) by mouth daily     cycloSPORINE (RESTASIS) 0.05 % ophthalmic  "emulsion Place 1 drop into both eyes 2 times daily     fexofenadine (ALLEGRA) 180 MG tablet Take 1 tablet (180 mg) by mouth daily     fluticasone (FLONASE) 50 MCG/ACT nasal spray Spray 2 sprays into both nostrils as needed     folic acid (FOLVITE) 1 MG tablet Take 1 tablet (1 mg) by mouth daily     ibuprofen 200 MG capsule Take 600-800 mg by mouth At Bedtime     Insulin Syringe-Needle U-100 (BD INSULIN SYRINGE) 27G X 1/2\" 1 ML MISC 1 Syringe every 7 days , to be used for methotrexate administration.     methotrexate sodium 50 MG/2ML SOLN Inject 0.8 mLs (20 mg) as directed every 7 days     ORDER FOR DME Use your CPAP device as directed by your provider.     sulfaSALAzine ER (AZULFIDINE EN) 500 MG EC tablet Take 3 tablets (1,500 mg) by mouth 2 times daily     tofacitinib (XELJANZ) 11 MG 24 hr tablet Take 1 tablet (11 mg) by mouth daily     No current facility-administered medications for this visit.          Social History   See HPI    Family History     Family History   Problem Relation Age of Onset     Heart Disease Father         MI     Neurologic Disorder Father 79        Parkinson's     Diabetes Father      Hypertension Father      Coronary Artery Disease Father      Cancer Maternal Grandmother         uterine     Neurologic Disorder Sister 16        migraine     Depression Sister      Neurologic Disorder Mother 20        migraine     Depression Mother      Neurologic Disorder Son 7        migraine     Substance Abuse Son      Depression Sister      Substance Abuse Sister        Physical Exam     Temp Readings from Last 3 Encounters:   12/19/18 97.5  F (36.4  C) (Oral)   12/07/18 97.9  F (36.6  C) (Oral)   06/07/18 98.3  F (36.8  C) (Oral)     BP Readings from Last 5 Encounters:   12/19/18 135/72   12/07/18 127/75   11/28/18 151/81   08/08/18 140/80   07/03/18 133/83     Pulse Readings from Last 1 Encounters:   12/19/18 95     Resp Readings from Last 1 Encounters:   11/28/18 18     Estimated body mass index is " "44.77 kg/m  as calculated from the following:    Height as of 11/28/18: 1.626 m (5' 4\").    Weight as of this encounter: 118.3 kg (260 lb 12.8 oz).    GEN: NAD   HEENT: MMM. Anicteric, noninjected sclera  CV: S1, S2. RRR. No m/r/g.  PULM: CTA bilaterally. No w/c.  MSK: Synovial swelling and tenderness palpation of the bilateral second-fourth MCPs and second-third PIPs.  Bilateral fifth MCPs tender to palpation but without swelling.  Wrist with mild swelling and tenderness to palpation.  Elbows, shoulders, knees, ankles, and MTPs without swelling or tenderness to palpation.  Hips nontender to palpation.      SKIN: No rash.   EXT: No LE edema  PSYCH: Alert. Appropriate.    Labs / Imaging (select studies)   RF/CCP  Recent Labs   Lab Test 11/19/12  0900   CCPABY 53*   RHF <7     CBC  Recent Labs   Lab Test 12/14/18  0739 10/25/18  1227 09/18/18  0740   WBC 5.9 6.5 5.2   RBC 3.98 3.90 3.77*   HGB 11.9 12.0 11.6*   HCT 37.5 37.2 35.7   MCV 94 95 95   RDW 13.8 13.7 13.8    337 281   MCH 29.9 30.8 30.8   MCHC 31.7 32.3 32.5   NEUTROPHIL 66.5 57.5 65.8   LYMPH 22.4 31.1 19.6   MONOCYTE 7.3 8.4 12.1   EOSINOPHIL 3.1 2.5 2.1   BASOPHIL 0.2 0.5 0.2   ANEU 3.9 3.8 3.4   ALYM 1.3 2.0 1.0   KIMBERLY 0.4 0.6 0.6   AEOS 0.2 0.2 0.1   ABAS 0.0 0.0 0.0     CMP  Recent Labs   Lab Test 12/14/18  0739 10/25/18  1227 09/18/18  0740  07/11/17  0906  01/17/17  0642 01/15/17  1844   NA  --   --   --   --  140  --  145* 139   POTASSIUM  --   --   --   --  3.2*  --  3.5 4.0   CHLORIDE  --   --   --   --  107  --  112* 107   CO2  --   --   --   --  21  --  29 24   ANIONGAP  --   --   --   --  12  --  4 8   GLC  --   --   --   --  90  --  108* 104*   BUN  --   --   --   --  13  --  9 18   CR 0.71 0.74 0.73   < > 0.73   < > 0.93 0.83   GFRESTIMATED 84 79 81   < > 82   < > 61 70   GFRESTBLACK >90 >90 >90   < > >90  African American GFR Calc     < > 74 85   ISH  --   --   --   --  9.9  --  8.2* 8.5   BILITOTAL 0.3 0.3 0.3   < > 0.3   < >  --  " 0.6   ALBUMIN 3.7 3.9 3.7   < > 4.0   < >  --  3.4   PROTTOTAL 7.5 7.7 7.4   < > 8.1   < >  --  7.3   ALKPHOS 97 98 96   < > 90   < >  --  82   AST 22 29 25   < > 24   < >  --  35   ALT 27 39 34   < > 47   < >  --  37    < > = values in this interval not displayed.     Calcium/VitaminD  Recent Labs   Lab Test 07/11/17  0906 01/17/17  0642 01/15/17  1844  02/20/15  0750  02/11/11  0735   ISH 9.9 8.2* 8.5   < > 9.0   < > 9.3   D3VIT  --   --   --   --   --   --  42   VITDT  --   --   --   --  39  --   --     < > = values in this interval not displayed.     ESR/CRP  Recent Labs   Lab Test 10/25/18  1227 07/24/18  0738 04/16/18  0810   SED 17 16 18   CRP 8.8* 8.2* 7.0     Lipid Panel  Recent Labs   Lab Test 04/27/17  0732 12/27/16  0836 02/20/15  0750 10/20/14  0821 11/22/13  0843   CHOL 204* 184 157 194 167   TRIG 148 154* 79 193* 140   HDL 82 56 63 63 60   LDL 92 97 78 92 79   VLDL  --   --  16 39* 28   CHOLHDLRATIO  --   --  2.5 3.1 2.8   NHDL 122 128  --   --   --      Hepatitis B  Recent Labs   Lab Test 12/08/15  0855 03/06/15  1650   HBCAB Nonreactive  --    HEPBANG Nonreactive Nonreactive     Hepatitis C  Recent Labs   Lab Test 12/08/15  0855 03/06/15  1650 11/19/12  0900   HCVAB Nonreactive   Assay performance characteristics have not been established for newborns,   infants, and children   Nonreactive   Assay performance characteristics have not been established for newborns,   infants, and children   Negative     Tuberculosis Screening  Recent Labs   Lab Test 10/31/17  1400 02/02/17  0822 12/08/15  0855   TBRSLT Negative Negative Negative   TBAGN 0.05 0.00 0.01     HIV Screening  Recent Labs   Lab Test 07/24/18  0738   HIAGAB Nonreactive     Immunization History     Immunization History   Administered Date(s) Administered     Influenza Vaccine IM 3yrs+ 4 Valent IIV4 10/15/2013, 09/15/2014, 09/28/2015, 09/28/2016, 10/16/2017, 10/01/2018     Pneumo Conj 13-V (2010&after) 04/15/2016     Pneumococcal 23 valent  07/15/2016     TDAP Vaccine (Adacel) 02/09/2011          Chart documentation done in part with Dragon Voice recognition Software. Although reviewed after completion, some word and grammatical error may remain.     Freddy Guerra MD

## 2018-12-20 ENCOUNTER — TELEPHONE (OUTPATIENT)
Dept: RHEUMATOLOGY | Facility: CLINIC | Age: 63
End: 2018-12-20

## 2018-12-20 DIAGNOSIS — M05.9 SEROPOSITIVE RHEUMATOID ARTHRITIS (H): Primary | ICD-10-CM

## 2018-12-20 NOTE — TELEPHONE ENCOUNTER
PA Initiation    Medication: Enbrel - PA Initiated  Insurance Company: [employer] - Phone [phone] Fax [phone]  [facility] [place], [place] - [address]  Filling Pharmacy Phone:    Filling Pharmacy Fax:    Start Date: 12/20/2018

## 2018-12-26 NOTE — TELEPHONE ENCOUNTER
PRIOR AUTHORIZATION DENIED    Medication: Enbrel - PA Denied    Denial Date: 12/26/2018    Denial Rational: Pt must try and fail ONE of methotrexate(yes), leflunomide(yes), sulfasalazine(yes) AND try and fail TWO of Humira(Yes), Cimzia (yes), simponi (no) AND try and fail BOTH xeljanz(yes) and Kevzara(NO)    Appeal Information:

## 2019-01-07 NOTE — TELEPHONE ENCOUNTER
PA Initiation    Medication: Enbrel - PA Denied, Appeal Initiated  Insurance Company: [employer] - Phone [phone] Fax [phone]  [facility] - [place], [place] - [address]  Filling Pharmacy Phone:    Filling Pharmacy Fax:    Start Date: 12/20/2018

## 2019-01-09 NOTE — TELEPHONE ENCOUNTER
MEDICATION APPEAL DENIED    Medication: Enbrel - PA Denied, Appeal Denied    Denial Date: 1/9/2019    Denial Rational: Pt must try and fail Kevzara    Second Level Appeal Information:     Second level appeals will be managed by the clinic staff and provider. Please contact the [facility] Prior Authorization Team if additional information about the denial is needed.

## 2019-01-11 NOTE — TELEPHONE ENCOUNTER
Ms. Fung,    [place] denied.  Discussed kevzara including risks and side effects.  She is okay with the change.    - Stop Enbrel  - Start Vandana     Sincerely,  Freddy Guerra MD  1/11/2019 11:03 AM

## 2019-01-14 ENCOUNTER — OFFICE VISIT (OUTPATIENT)
Dept: ENDOCRINOLOGY | Facility: CLINIC | Age: 64
End: 2019-01-14
Payer: COMMERCIAL

## 2019-01-14 ENCOUNTER — TELEPHONE (OUTPATIENT)
Dept: ENDOCRINOLOGY | Facility: CLINIC | Age: 64
End: 2019-01-14

## 2019-01-14 ENCOUNTER — TELEPHONE (OUTPATIENT)
Dept: RHEUMATOLOGY | Facility: CLINIC | Age: 64
End: 2019-01-14

## 2019-01-14 VITALS
HEIGHT: 64 IN | OXYGEN SATURATION: 95 % | SYSTOLIC BLOOD PRESSURE: 141 MMHG | BODY MASS INDEX: 44.08 KG/M2 | WEIGHT: 258.2 LBS | HEART RATE: 80 BPM | DIASTOLIC BLOOD PRESSURE: 75 MMHG

## 2019-01-14 DIAGNOSIS — E66.01 MORBID OBESITY (H): Primary | ICD-10-CM

## 2019-01-14 ASSESSMENT — ENCOUNTER SYMPTOMS
INSOMNIA: 0
COUGH DISTURBING SLEEP: 0
TACHYCARDIA: 0
EXERCISE INTOLERANCE: 0
SINUS CONGESTION: 0
WHEEZING: 0
LEG SWELLING: 0
POOR WOUND HEALING: 0
HEMOPTYSIS: 0
WEIGHT GAIN: 0
SORE THROAT: 0
DECREASED CONCENTRATION: 0
EYE IRRITATION: 0
SMELL DISTURBANCE: 0
SKIN CHANGES: 0
MEMORY LOSS: 0
NAIL CHANGES: 0
FATIGUE: 1
SYNCOPE: 0
MUSCLE WEAKNESS: 0
SNORES LOUDLY: 0
PARALYSIS: 0
DECREASED LIBIDO: 0
JAUNDICE: 0
FEVER: 0
NAUSEA: 0
SHORTNESS OF BREATH: 0
INCREASED ENERGY: 0
RECTAL PAIN: 0
RECTAL BLEEDING: 0
TINGLING: 0
POLYDIPSIA: 0
CLAUDICATION: 0
BOWEL INCONTINENCE: 0
HEARTBURN: 0
JOINT SWELLING: 1
BLOOD IN STOOL: 0
HYPERTENSION: 0
WEIGHT LOSS: 1
DIARRHEA: 0
SEIZURES: 0
NERVOUS/ANXIOUS: 0
DYSURIA: 0
ARTHRALGIAS: 1
LIGHT-HEADEDNESS: 0
SPEECH CHANGE: 0
HEMATURIA: 0
BLOATING: 0
ORTHOPNEA: 0
POSTURAL DYSPNEA: 0
LOSS OF CONSCIOUSNESS: 0
NIGHT SWEATS: 0
TREMORS: 0
COUGH: 0
STIFFNESS: 1
EYE REDNESS: 0
DEPRESSION: 0
NECK MASS: 0
CONSTIPATION: 0
BRUISES/BLEEDS EASILY: 0
BREAST PAIN: 0
HOARSE VOICE: 0
FLANK PAIN: 0
DIZZINESS: 0
TROUBLE SWALLOWING: 0
ABDOMINAL PAIN: 0
DIFFICULTY URINATING: 0
EYE WATERING: 0
ALTERED TEMPERATURE REGULATION: 0
HYPOTENSION: 0
HEADACHES: 0
SWOLLEN GLANDS: 0
SINUS PAIN: 0
DYSPNEA ON EXERTION: 1
PALPITATIONS: 0
HOT FLASHES: 0
HALLUCINATIONS: 0
MYALGIAS: 0
SLEEP DISTURBANCES DUE TO BREATHING: 0
LEG PAIN: 0
MUSCLE CRAMPS: 0
EYE PAIN: 0
DECREASED APPETITE: 0
EXTREMITY NUMBNESS: 0
DOUBLE VISION: 0
TASTE DISTURBANCE: 0
BREAST MASS: 0
PANIC: 0
BACK PAIN: 0
NUMBNESS: 0
DISTURBANCES IN COORDINATION: 0
CHILLS: 0
POLYPHAGIA: 0
VOMITING: 0
WEAKNESS: 0
NECK PAIN: 0

## 2019-01-14 ASSESSMENT — MIFFLIN-ST. JEOR: SCORE: 1721.19

## 2019-01-14 NOTE — PROGRESS NOTES
"Return Medical Weight Management Note     Sharon Fung  MRN:  [id number]  :  1955  JENIFER:  19    Dear Reynaldo Saavedra,    I had the pleasure of seeing your patient Sharon Fung.  She is a 62 year old female who I am continuing to see for treatment of obesity related to:       2016   I have the following co-morbidities associated with obesity: DJD (Degenerative Joint Disease), Back Pain     RA  Depression    INTERVAL HISTORY:  Last visit 2018 I started her on phentermine/topiramate (QSYMIA). It was rejected by insurance on the basis of having to try and fail Contrave first. She ended up trying generic forms of phentermine and topiramate but did not get any weight loss results so she stopped them.   She is under a lot of stress at work  Poor meal planning  Snacking a lot.     CURRENT WEIGHT:   258 lbs 3.2 oz    Wt Readings from Last 4 Encounters:   19 117.1 kg (258 lb 3.2 oz)   18 118.3 kg (260 lb 12.8 oz)   18 116.1 kg (256 lb)   18 117.9 kg (260 lb)     Height:  5' 4\"  Body Mass Index:  Body mass index is 44.32 kg/m .  Vitals:  B/P: 102/63, P: 80    Initial consult weight was 240 on 16.  Weight change since last seen on 18 is up 8 pounds.   Total gain is 18 pounds.    Diet and Activity Changes Since Last Visit Reviewed With Patient 2019   I have made the following changes to my diet since my last visit: triwd to more fruits a d veg   With regards to my diet, I am still struggling with: eating heathy   For breakfast, I typically eat: -   For lunch, I typically eat: -   For supper, I typically eat: -   For snack(s), I typically eat: -   I have made the following changes to my activity/exercise since my last visit: to short of breathe   With regards to my activity/exercise, I am still struggling with: exercise     Review of Systems     Constitutional:  Positive for weight loss and fatigue. Negative for fever, chills, weight gain, decreased appetite, " night sweats, recent stressors, height loss, post-operative complications, incisional pain, hallucinations, increased energy, hyperactivity and confused.   HENT:  Negative for ear pain, hearing loss, tinnitus, nosebleeds, trouble swallowing, hoarse voice, mouth sores, sore throat, ear discharge, tooth pain, gum tenderness, taste disturbance, smell disturbance, hearing aid, bleeding gums, dry mouth, sinus pain, sinus congestion and neck mass.    Eyes:  Negative for double vision, pain, redness, eye pain, decreased vision, eye watering, eye bulging, eye dryness, flashing lights, spots, floaters, strabismus, tunnel vision, jaundice and eye irritation.   Respiratory:   Positive for dyspnea on exertion. Negative for cough, hemoptysis, shortness of breath, wheezing, sleep disturbances due to breathing, snores loudly, cough disturbing sleep and postural dyspnea.    Cardiovascular:  Positive for dyspnea on exertion. Negative for chest pain, palpitations, orthopnea, claudication, leg swelling, fingers/toes turn blue, hypertension, hypotension, syncope, history of heart murmur, chest pain on exertion, chest pain at rest, pacemaker, few scattered varicosities, leg pain, sleep disturbances due to breathing, tachycardia, light-headedness, exercise intolerance and edema.   Gastrointestinal:  Negative for heartburn, nausea, vomiting, abdominal pain, diarrhea, constipation, blood in stool, melena, rectal pain, bloating, hemorrhoids, bowel incontinence, jaundice, rectal bleeding, coffee ground emesis and change in stool.   Genitourinary:  Negative for bladder incontinence, dysuria, urgency, hematuria, flank pain, vaginal discharge, difficulty urinating, genital sores, dyspareunia, decreased libido, nocturia, voiding less frequently, arousal difficulty, abnormal vaginal bleeding, excessive menstruation, menstrual changes, hot flashes, vaginal dryness and postmenopausal bleeding.   Musculoskeletal:  Positive for joint swelling,  arthralgias and stiffness. Negative for myalgias, back pain, muscle cramps, neck pain, bone pain, muscle weakness and fracture.   Skin:  Negative for nail changes, itching, poor wound healing, rash, hair changes, skin changes, acne, warts, poor wound healing, scarring, flaky skin, Raynaud's phenomenon, sensitivity to sunlight and skin thickening.   Neurological:  Negative for dizziness, tingling, tremors, speech change, seizures, loss of consciousness, weakness, light-headedness, numbness, headaches, disturbances in coordination, extremity numbness, memory loss, difficulty walking and paralysis.   Endo/Heme:  Negative for anemia, swollen glands and bruises/bleeds easily.   Psychiatric/Behavioral:  Negative for depression, hallucinations, memory loss, decreased concentration, mood swings and panic attacks.    Breast:  Negative for breast discharge, breast mass, breast pain and nipple retraction.   Endocrine:  Negative for altered temperature regulation, polyphagia, polydipsia, unwanted hair growth and change in facial hair.   has HAYS x about 6 weeks, denies chest pain or palpitations    MEDICATIONS:   Current Outpatient Medications   Medication     acetaminophen (TYLENOL) 500 MG tablet     albuterol (PROAIR HFA/PROVENTIL HFA/VENTOLIN HFA) 108 (90 Base) MCG/ACT inhaler     azelastine (OPTIVAR) 0.05 % SOLN ophthalmic solution     citalopram (CELEXA) 20 MG tablet     cycloSPORINE (RESTASIS) 0.05 % ophthalmic emulsion     fexofenadine (ALLEGRA) 180 MG tablet     ibuprofen 200 MG capsule     ORDER FOR DME     sulfaSALAzine ER (AZULFIDINE EN) 500 MG EC tablet     fluticasone (FLONASE) 50 MCG/ACT nasal spray     Sarilumab 200 MG/1.14ML SOAJ     No current facility-administered medications for this visit.        Weight Loss Medication History Reviewed With Patient 1/14/2019   Which weight loss medications are you currently taking on a regular basis?  None   If you are not taking a weight loss medication that was prescribed  to you, please indicate why: It did not seem to be helping me   Are you having any side effects from the weight loss medication that we have prescribed you? No   If you are having side effects please describe: -       ASSESSMENT:   Morbid obesity  Minimal response to Strattera  No response to phentermine.topiramate    PLAN:   Trial of Contrave  If fails, then trial of Saxenda    Diet: meal prep, use meal replacements when unable to prep or pack food    FOLLOW-UP:    12 weeks.    Time: 25 min spent on evaluation, management, counseling, education, & motivational interviewing with greater than 50% of the total time was spent on counseling and coordinating care    Sincerely,    Litzy Vallejo MD

## 2019-01-14 NOTE — NURSING NOTE
"  Chief Complaint   Patient presents with     Weight Problem     RMWM     Vitals:    01/14/19 1535   BP: 141/75   Pulse: 80   SpO2: 95%   Weight: 117.1 kg (258 lb 3.2 oz)   Height: 1.626 m (5' 4\")     Body mass index is 44.32 kg/m .  Jacob Prater CMA    "

## 2019-01-14 NOTE — TELEPHONE ENCOUNTER
Prior Authorization Retail Medication Request    Medication/Dose: Contrave  ICD code (if different than what is on RX):  Morbid obesity  Previously Tried and Failed:  Strattera, Phentermine and Topiramate, diet and exercise  Rationale:      Insurance Name:    Insurance ID:        Pharmacy Information (if different than what is on RX)  Name:  [facility] [place], [place] - [address]  Phone:  [phone]

## 2019-01-14 NOTE — PATIENT INSTRUCTIONS
MEDICATION STARTED AT THIS APPOINTMENT    We are starting Contrave. Contrave is specifically prescribed for obesity. It is a combination of two medications, Naltrexone and Bupropione (Wellbutrin). The Bupropion helps lessen appetite and the Naltrexone works by blocking certain receptors in the brain and curbing cravings.      For some of our patients the medication works right away. Other patients don't feel much of a change but find they've lost weight. Like all weight loss medications, Contrave works best when you help it work. This means:  1. Having less tempting high calorie (fattening) food around the house or office. (For people with strong cravings this is very important.)   2. Staying away from situations or people that may trigger your cravings .   3. Eating out only one time or less each week.  4. Eating your meals at a table with the TV or computer off.    WARNING: This medication blocks the action of opioid type pain medications. If you routinely take any medication like Codeine, Oxycontin, Percocet, Morphine, Dilaudid or Methodone, do not take this until you have talked with weight management staff. If you are planning surgery you should stop Naltrexone 4 days prior to the surgery, and should not restart it until 4 days after your last dose of narcotics. If you have an injury that requires pain medication, make sure the health care staff knows you take Naltrexone.     Dosing as follows:  Week 1- 1 tablet in the morning  Week 2- 1 tablet in the morning and 1 tablet at bedtime  Week 3- 2 tablets in the morning and 1 tablet at bedtime  Week 4 and thereafter- 2 tablets in the morning and 2 tablets at bedtime    Side-effects: The most common side effect include: nausea; constipation; headache; vomiting; dizziness; diarrhea; trouble sleeping; and dry mouth.     This medication typically isn t covered by insurance and will require a prior authorization, which can take 1-2 weeks to review. Patients can visit  [email or web address] and sign up for the Pharmacy savings program and receive the medication for $60-70 per month for 12 months if eligible.    Call the nurse at [phone] if you have any questions or concerns. (Do not stop taking it if you don't think it's working. For some people it works without them knowing it.)       In order to get refills of this or any medication we prescribe you must be seen in the medical weight mgmt clinic every 2-4 months. Please have your pharmacy fax a refill request to [phone].

## 2019-01-14 NOTE — LETTER
"2019       RE: Sharon Fung  [address] [place] [address]     Dear Colleague,    Thank you for referring your patient, Sharon Fung, to the [facility] MEDICAL WEIGHT MANAGEMENT at [facility]. Please see a copy of my visit note below.    Return Medical Weight Management Note     Sharon Fung  MRN:  [id number]  :  1955  JENIFER:  19    Dear Reynaldo Saavedra,    I had the pleasure of seeing your patient Sharon Fung.  She is a 62 year old female who I am continuing to see for treatment of obesity related to:       2016   I have the following co-morbidities associated with obesity: DJD (Degenerative Joint Disease), Back Pain     RA  Depression    INTERVAL HISTORY:  Last visit 2018 I started her on phentermine/topiramate (QSYMIA). It was rejected by insurance on the basis of having to try and fail Contrave first. She ended up trying generic forms of phentermine and topiramate but did not get any weight loss results so she stopped them.   She is under a lot of stress at work  Poor meal planning  Snacking a lot.     CURRENT WEIGHT:   258 lbs 3.2 oz    Wt Readings from Last 4 Encounters:   19 117.1 kg (258 lb 3.2 oz)   18 118.3 kg (260 lb 12.8 oz)   18 116.1 kg (256 lb)   18 117.9 kg (260 lb)     Height:  5' 4\"  Body Mass Index:  Body mass index is 44.32 kg/m .  Vitals:  B/P: 102/63, P: 80    Initial consult weight was 240 on 16.  Weight change since last seen on 18 is up 8 pounds.   Total gain is 18 pounds.    Diet and Activity Changes Since Last Visit Reviewed With Patient 2019   I have made the following changes to my diet since my last visit: triwd to more fruits a d veg   With regards to my diet, I am still struggling with: eating heathy   For breakfast, I typically eat: -   For lunch, I typically eat: -   For supper, I typically eat: -   For snack(s), I typically eat: -   I have made the " following changes to my activity/exercise since my last visit: to short of breathe   With regards to my activity/exercise, I am still struggling with: exercise     Review of Systems     Constitutional:  Positive for weight loss and fatigue. Negative for fever, chills, weight gain, decreased appetite, night sweats, recent stressors, height loss, post-operative complications, incisional pain, hallucinations, increased energy, hyperactivity and confused.   HENT:  Negative for ear pain, hearing loss, tinnitus, nosebleeds, trouble swallowing, hoarse voice, mouth sores, sore throat, ear discharge, tooth pain, gum tenderness, taste disturbance, smell disturbance, hearing aid, bleeding gums, dry mouth, sinus pain, sinus congestion and neck mass.    Eyes:  Negative for double vision, pain, redness, eye pain, decreased vision, eye watering, eye bulging, eye dryness, flashing lights, spots, floaters, strabismus, tunnel vision, jaundice and eye irritation.   Respiratory:   Positive for dyspnea on exertion. Negative for cough, hemoptysis, shortness of breath, wheezing, sleep disturbances due to breathing, snores loudly, cough disturbing sleep and postural dyspnea.    Cardiovascular:  Positive for dyspnea on exertion. Negative for chest pain, palpitations, orthopnea, claudication, leg swelling, fingers/toes turn blue, hypertension, hypotension, syncope, history of heart murmur, chest pain on exertion, chest pain at rest, pacemaker, few scattered varicosities, leg pain, sleep disturbances due to breathing, tachycardia, light-headedness, exercise intolerance and edema.   Gastrointestinal:  Negative for heartburn, nausea, vomiting, abdominal pain, diarrhea, constipation, blood in stool, melena, rectal pain, bloating, hemorrhoids, bowel incontinence, jaundice, rectal bleeding, coffee ground emesis and change in stool.   Genitourinary:  Negative for bladder incontinence, dysuria, urgency, hematuria, flank pain, vaginal discharge,  difficulty urinating, genital sores, dyspareunia, decreased libido, nocturia, voiding less frequently, arousal difficulty, abnormal vaginal bleeding, excessive menstruation, menstrual changes, hot flashes, vaginal dryness and postmenopausal bleeding.   Musculoskeletal:  Positive for joint swelling, arthralgias and stiffness. Negative for myalgias, back pain, muscle cramps, neck pain, bone pain, muscle weakness and fracture.   Skin:  Negative for nail changes, itching, poor wound healing, rash, hair changes, skin changes, acne, warts, poor wound healing, scarring, flaky skin, Raynaud's phenomenon, sensitivity to sunlight and skin thickening.   Neurological:  Negative for dizziness, tingling, tremors, speech change, seizures, loss of consciousness, weakness, light-headedness, numbness, headaches, disturbances in coordination, extremity numbness, memory loss, difficulty walking and paralysis.   Endo/Heme:  Negative for anemia, swollen glands and bruises/bleeds easily.   Psychiatric/Behavioral:  Negative for depression, hallucinations, memory loss, decreased concentration, mood swings and panic attacks.    Breast:  Negative for breast discharge, breast mass, breast pain and nipple retraction.   Endocrine:  Negative for altered temperature regulation, polyphagia, polydipsia, unwanted hair growth and change in facial hair.   has HAYS x about 6 weeks, denies chest pain or palpitations    MEDICATIONS:   Current Outpatient Medications   Medication     acetaminophen (TYLENOL) 500 MG tablet     albuterol (PROAIR HFA/PROVENTIL HFA/VENTOLIN HFA) 108 (90 Base) MCG/ACT inhaler     azelastine (OPTIVAR) 0.05 % SOLN ophthalmic solution     citalopram (CELEXA) 20 MG tablet     cycloSPORINE (RESTASIS) 0.05 % ophthalmic emulsion     fexofenadine (ALLEGRA) 180 MG tablet     ibuprofen 200 MG capsule     ORDER FOR DME     sulfaSALAzine ER (AZULFIDINE EN) 500 MG EC tablet     fluticasone (FLONASE) 50 MCG/ACT nasal spray     Sarilumab 200  MG/1.14ML SOAJ     No current facility-administered medications for this visit.        Weight Loss Medication History Reviewed With Patient 1/14/2019   Which weight loss medications are you currently taking on a regular basis?  None   If you are not taking a weight loss medication that was prescribed to you, please indicate why: It did not seem to be helping me   Are you having any side effects from the weight loss medication that we have prescribed you? No   If you are having side effects please describe: -       ASSESSMENT:   Morbid obesity  Minimal response to Strattera  No response to phentermine.topiramate    PLAN:   Trial of Contrave  If fails, then trial of Saxenda    Diet: meal prep, use meal replacements when unable to prep or pack food    FOLLOW-UP:    12 weeks.    Time: 25 min spent on evaluation, management, counseling, education, & motivational interviewing with greater than 50% of the total time was spent on counseling and coordinating care    Sincerely,    Litzy Vallejo MD

## 2019-01-14 NOTE — TELEPHONE ENCOUNTER
PA Initiation    Medication: Kevzara - PA Initiated  Insurance Company: [employer] - Phone [phone] Fax [phone]  Pharmacy Filling the Rx: [facility] - [place], [place] - [facility] [address]  Filling Pharmacy Phone:    Filling Pharmacy Fax:    Start Date: 1/14/2019

## 2019-01-16 NOTE — TELEPHONE ENCOUNTER
Prior Authorization Approval    Authorization Effective Date: 1/16/2019  Authorization Expiration Date: 1/16/2020  Medication: Vandana MCNAMARA Approved  Approved Dose/Quantity:  Reference #: [id number]   Insurance Company: [employer] - Phone [phone] Fax [phone]  Expected CoPay: 1509.74     CoPay Card Available: Yes    Foundation Assistance Needed:    Which Pharmacy is filling the prescription (Not needed for infusion/clinic administered): [facility] - [place], [place] - [address]  Pharmacy Notified: Yes  Patient Notified: Yes

## 2019-01-30 NOTE — TELEPHONE ENCOUNTER
CENTRAL PRIOR AUTHORIZATION  [phone]    PA Initiation    Medication: Contrave  Insurance Company: [employer] - Phone [phone] Fax [phone]  Pharmacy Filling the Rx: [place], [place] - [address]  Filling Pharmacy Phone: [phone]  Filling Pharmacy Fax: [phone]  Start Date: 1/30/2019

## 2019-02-01 NOTE — TELEPHONE ENCOUNTER
Prior Authorization Approval    Authorization Effective Date: 1/31/2019  Authorization Expiration Date: 7/30/2019  Medication: Contrave - APPROVED 01/31/2019  Approved Dose/Quantity:   Reference #: CASE ID: [id number]   Insurance Company: [employer] - Phone [phone] Fax [phone]  Expected CoPay:       CoPay Card Available:      Foundation Assistance Needed:    Which Pharmacy is filling the prescription (Not needed for infusion/clinic administered): [facility] [place], [place] - [facility] [address]  Pharmacy Notified: Yes  Patient Notified: Yes

## 2019-02-22 ENCOUNTER — OFFICE VISIT (OUTPATIENT)
Dept: PULMONOLOGY | Facility: CLINIC | Age: 64
End: 2019-02-22
Attending: INTERNAL MEDICINE
Payer: COMMERCIAL

## 2019-02-22 VITALS
SYSTOLIC BLOOD PRESSURE: 142 MMHG | HEIGHT: 64 IN | OXYGEN SATURATION: 94 % | WEIGHT: 258 LBS | BODY MASS INDEX: 44.05 KG/M2 | HEART RATE: 96 BPM | DIASTOLIC BLOOD PRESSURE: 78 MMHG | RESPIRATION RATE: 16 BRPM

## 2019-02-22 DIAGNOSIS — R06.09 DYSPNEA ON EXERTION: ICD-10-CM

## 2019-02-22 DIAGNOSIS — R06.09 DYSPNEA ON EXERTION: Primary | ICD-10-CM

## 2019-02-22 LAB
6 MIN WALK (FT): 1235 FT
6 MIN WALK (M): 376 M
NT-PROBNP SERPL-MCNC: 10 PG/ML (ref 0–125)

## 2019-02-22 PROCEDURE — 83880 ASSAY OF NATRIURETIC PEPTIDE: CPT | Performed by: INTERNAL MEDICINE

## 2019-02-22 PROCEDURE — G0463 HOSPITAL OUTPT CLINIC VISIT: HCPCS | Mod: ZF

## 2019-02-22 PROCEDURE — 36415 COLL VENOUS BLD VENIPUNCTURE: CPT | Performed by: INTERNAL MEDICINE

## 2019-02-22 ASSESSMENT — PAIN SCALES - GENERAL: PAINLEVEL: NO PAIN (0)

## 2019-02-22 ASSESSMENT — MIFFLIN-ST. JEOR: SCORE: 1720.53

## 2019-02-22 NOTE — PROGRESS NOTES
Pulmonary Medicine  Follow up Clinic Note  02/22/2019      Reason for visit: Chronic Dyspnea    --------------------------  Impression & recommendations:  63-year-old female with past medical history of rheumatoid arthritis, obesity, sleep apnea, allergic rhinitis and depression was seen in Nov 2018 for evaluation of her progressive dyspnea at rest and on exertion since 2017. She is on Sarilumab (previously on Xeljanz/tofacitinab 2016), methotrexate ([id number]) and sulfasalazine but her CT chest Dec 2018 did not show any parenchymal disease. Her PFt showed non specific decreased FEV1 and FVC with normal lung volume and ratio no BD response which can be early small airway obstructive disease. She was given albuterol prn and weight loss.     Diagnoses:  1. Chronic Dyspnea cause: mulitfactorial possible dHCf vs pul HTN vs post bronchitis airway hypereactivity and Obesity vs deconditioning.   RA related ILD ruled out with normal CT chest.   2. Morbid Obesity BMI 44   3. Abnormal PFT with reduced FEV1 and FVC of unknown significance. No obstruction, Normal DLCO and lung volume. No significant bronchodilator response. Slight improvement in her [id number] with BD.     Recommendations:  1. Will repeat Echo and get BNP. If these are abnormal will consider CTA vs RHC.   2. Consider Diuresis for possible fluid overload. Given the leg swelling and LV diastolic function was indeterminate on Echo. Will have PCP titrate and monitor diuresis.   3. Continue weight loss.   4. Continue albuterol prn for possible post bronchitis airway hyper reactivity.    5. Can offer CPET for evaluation of her dyspnea workup but might and help much given her limited EC and would she would need cardiac stress testing prior to that.   6. Immunization:    1. Flu 2018  2. Prevnar 2016   3. Pneumovax 2016   4. Tdap 2011     RTC in  with spirometry and Cooper, Echocardiogram.  Patient was seen & discussed w/ Dr. Andrzej MD, who is in agreement.    Jo  MARIO Downs   Pulmonary Critical Care Fellow   -----------------------------------------------------------------    Pulmonary HPI:   Sharon Fung is a 63 year old F with RA seronegative for 4 yrs, s/p gastric band and removal of band due to abdominal abscess, Depression, BRETT on CPAP who is being seen for ffup of her chronic dyspnea. On previous visit RA-ILD was ruled out.     She still feels that her dyspnea is similar to before at rest and on exertions. Does not report any wheeze.  PT says that she gets sob if she has to get out of her house. She does get sob with ADL. She also has arthritis of her knee and limits her EC. She can manage 1 FOS limited by dyspnea and pain in joints.   Pt has started working on her weight loss clinic and has been started on med (phentermine Since Feb 2019).   Pt is also struggling with her RA with joint pain and her Rheum has changed her Xeljanz to Sarilumab. She was last on pred in Nov 2019. She does feel leg swelling.   --  Dyspnea H/o:   Patient developed a bronchitis after she returned from [place] last year in 2017 September and had a cough for 2-3 months which resolved but then she developed shortness of breath.  The shortness of breath has progressed especially in the last 3 months.  Patient has also started on Xeljanz in the last 1 year for her rheumatoid arthritis.  Patient is also on methotrexate and sulfasalazine for her rheumatoid arthritis.  Patient says that she gets burst of steroids 20 mg 3-4 times every year for her RA.  She has also noticed weight gain since her gastric band was removed.  Her PFTs showed decreased FEV1 and FVC but no obstruction.   Pt was given Spiriva in Dec 2017 for 1M and she thinks it helped because it helped her bring up the secretions.   ------    Patient is a life time non smoker.   Occasional EtOH use.   Denies recreational drug use.     Patient has cats as pet. No birds.   Patient lives in a town house, sub-urban. No Molds.   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   Travel h/o: [place], [place], [place] 2015, [place] was 2017, [place] in 2016.   Denies any h/o exposure to TB, Asbestos or silica. No harmful inhalational exposure at work or home as far as he can recollect.     Review of systems: a complete 12-point ROS conducted, & found to be negative w/ exceptions as noted in the HPI.    Social history:  Social History     Tobacco Use     Smoking status: Never Smoker     Smokeless tobacco: Never Used   Substance Use Topics     Alcohol use: No     Alcohol/week: 0.6 - 1.2 oz     Types: 1 - 2 Standard drinks or equivalent per week     Drug use: No       Family history:  Family History   Problem Relation Age of Onset     Heart Disease Father         MI     Neurologic Disorder Father 79        Parkinson's     Diabetes Father      Hypertension Father      Coronary Artery Disease Father      Cancer Maternal Grandmother         uterine     Neurologic Disorder Sister 16        migraine     Depression Sister      Neurologic Disorder Mother 20        migraine     Depression Mother      Neurologic Disorder Son 7        migraine     Substance Abuse Son      Depression Sister      Substance Abuse Sister    Mother had COPD/smoker and alpha 1 trypsin def. PT was tested and was negative.   Denies any other FH of lung disease including but not limited to ILD, lung cancer, sarcoidosis, asthma, copd, RA, Lupus.     Medications:  Current Outpatient Medications   Medication     acetaminophen (TYLENOL) 500 MG tablet     albuterol (PROAIR HFA/PROVENTIL HFA/VENTOLIN HFA) 108 (90 Base) MCG/ACT inhaler     azelastine (OPTIVAR) 0.05 % SOLN ophthalmic solution     citalopram (CELEXA) 20 MG tablet     cycloSPORINE (RESTASIS) 0.05 % ophthalmic emulsion     fexofenadine (ALLEGRA) 180 MG tablet     ibuprofen 200 MG capsule     naltrexone-bupropion (CONTRAVE) 8-90 MG per 12 hr tablet     ORDER FOR DME     Sarilumab 200 MG/1.14ML SOAJ     sulfaSALAzine ER  "(AZULFIDINE EN) 500 MG EC tablet     fluticasone (FLONASE) 50 MCG/ACT nasal spray     No current facility-administered medications for this visit.        Allergies:  No Known Allergies    Physical examination:  /78 (BP Location: Right arm, Patient Position: Chair, Cuff Size: Adult Large)   Pulse 96   Resp 16   Ht 1.626 m (5' 4.02\")   Wt 117 kg (258 lb)   SpO2 94%   BMI 44.26 kg/m      General: NAD  Eyes: Puffiness of eyes Anicteric sclera, PERRL, EOMI  Nose: Nasal mucosa w/o edema or hyperemia; no nasal polyps  Ears: EAC clear b/l w/ pearly gray TMs  Mouth: MMM w/o lesions; no tonsillar enlargement; no oropharyngeal exudate, or erythema, Mallampati 3   Neck: No masses, no thyromegaly  Lymphatics: No significant cervical or supraclavicular LNs  CV: RRR, no m/c/r;   Lungs: distance but NVB no added sounds.   Abd: Soft, NT, ND  Ext: swelling of joints of hands and knees, 2+ BLE edema. no clubbing  Skin: No rashes, cyanosis, or jaundice  Neuro: Intact mentation w/ normal speech. Normal strength & muscle tone w/ normal gait & stance  Psych: Euthymic, normal affect, good eye contact    Labs: reviewed in [facility] & personally interpreted.   Eosinophils 0.2 WNL  Oct 2018     Lab Results   Component Value Date    PH 7.43 06/04/2015    PO2 81 06/04/2015    PCO2 44 06/04/2015    HCO3 29 (H) 06/04/2015    JAYDA 4.4 06/04/2015          Lab Results   Component Value Date     07/11/2017    Lab Results   Component Value Date    CHLORIDE 107 07/11/2017    Lab Results   Component Value Date    BUN 13 07/11/2017      Lab Results   Component Value Date    POTASSIUM 3.2 07/11/2017    Lab Results   Component Value Date    CO2 21 07/11/2017    Lab Results   Component Value Date    CR 0.74 10/25/2018      No results found for: NTBNPI, NTBNP  Lab Results   Component Value Date    WBC 5.9 12/14/2018    HGB 11.9 12/14/2018    HCT 37.5 12/14/2018    MCV 94 12/14/2018     12/14/2018     Lab Results   Component Value Date    " CR 0.71 12/14/2018     Lab Results   Component Value Date    SED 17 10/25/2018     Lab Results   Component Value Date    HGB 11.9 12/14/2018     Lab Results   Component Value Date    AST 22 12/14/2018    ALT 27 12/14/2018    ALKPHOS 97 12/14/2018    BILITOTAL 0.3 12/14/2018    BILICONJ 0.0 07/14/2014     Imaging: reviewed in [facility] & personally interpreted. Below are the interpretations from the formal Radiology review.  CXr today looks clear.     Echo: 3/2018   Global and regional left ventricular function is normal with an EF of 60-  65%.Left ventricular diastolic function is indeterminate.  Global right ventricular function is normal.  The inferior vena cava is normal. Estimated mean right atrial pressure is 3  mmHg.  No pericardial effusion is present.  No significant valve disease by Doppler interrogation. Valves are not seen  well.  Pulmonary artery systolic pressure cannot be assessed.  Previous study not available for comparison.      PFT,: on personal review --02/22/19 reduced FEV1 and FVC with no significant change from before.   Previously Normal TLc and DLCO. No significant bronchodilator response.   6MWT 02/22/19 Walked normal distance with desaturation to 90% but no hypoxia on room air.            Jo Downs   Pulmonary Critical Care Fellow       Faculty Addendum:  This patient has been seen and evaluated by me.  I have discussed care with Dr. Downs and agree with the findings and plan in this note.    Juan Carlos Donnelly  Pulmonary/Critical Care  February 22, 2019 4:29 PM

## 2019-02-22 NOTE — LETTER
RE: Sharon Fung  [address] [place] [address]     Dear Colleague,    Thank you for referring your patient, Sharon Fung, to the [facility] at [facility]. Please see a copy of my visit note below.        Pulmonary Medicine  Follow up Clinic Note  02/22/2019      Reason for visit: Chronic Dyspnea    --------------------------  Impression & recommendations:  63-year-old female with past medical history of rheumatoid arthritis, obesity, sleep apnea, allergic rhinitis and depression was seen in Nov 2018 for evaluation of her progressive dyspnea at rest and on exertion since 2017. She is on Sarilumab (previously on Xeljanz/tofacitinab 2016), methotrexate (5965-0723) and sulfasalazine but her CT chest Dec 2018 did not show any parenchymal disease. Her PFt showed non specific decreased FEV1 and FVC with normal lung volume and ratio no BD response which can be early small airway obstructive disease. She was given albuterol prn and weight loss.     Diagnoses:  1. Chronic Dyspnea cause: mulitfactorial possible dHCf vs pul HTN vs post bronchitis airway hypereactivity and Obesity vs deconditioning.   RA related ILD ruled out with normal CT chest.   2. Morbid Obesity BMI 44   3. Abnormal PFT with reduced FEV1 and FVC of unknown significance. No obstruction, Normal DLCO and lung volume. No significant bronchodilator response. Slight improvement in her [id number] with BD.     Recommendations:  1. Will repeat Echo and get BNP. If these are abnormal will consider CTA vs RHC.   2. Consider Diuresis for possible fluid overload. Given the leg swelling and LV diastolic function was indeterminate on Echo. Will have PCP titrate and monitor diuresis.   3. Continue weight loss.   4. Continue albuterol prn for possible post bronchitis airway hyper reactivity.    5. Can offer CPET for evaluation of her dyspnea workup but might and help much given  her limited EC and would she would need cardiac stress testing prior to that.   6. Immunization:    1. Flu 2018  2. Prevnar 2016   3. Pneumovax 2016   4. Tdap 2011     RTC in  with spirometry and Cooper, Echocardiogram.  Patient was seen & discussed w/ Dr. Andrzej MD, who is in agreement.    Jo Downs   Pulmonary Critical Care Fellow   -----------------------------------------------------------------    Pulmonary HPI:   Sharon Fung is a 63 year old F with RA seronegative for 4 yrs, s/p gastric band and removal of band due to abdominal abscess, Depression, BRETT on CPAP who is being seen for ffup of her chronic dyspnea. On previous visit RA-ILD was ruled out.     She still feels that her dyspnea is similar to before at rest and on exertions. Does not report any wheeze.  PT says that she gets sob if she has to get out of her house. She does get sob with ADL. She also has arthritis of her knee and limits her EC. She can manage 1 FOS limited by dyspnea and pain in joints.   Pt has started working on her weight loss clinic and has been started on med (phentermine Since Feb 2019).   Pt is also struggling with her RA with joint pain and her Rheum has changed her Xeljanz to Sarilumab. She was last on pred in Nov 2019. She does feel leg swelling.   --  Dyspnea H/o:   Patient developed a bronchitis after she returned from [place] last year in 2017 September and had a cough for 2-3 months which resolved but then she developed shortness of breath.  The shortness of breath has progressed especially in the last 3 months.  Patient has also started on Xeljanz in the last 1 year for her rheumatoid arthritis.  Patient is also on methotrexate and sulfasalazine for her rheumatoid arthritis.  Patient says that she gets burst of steroids 20 mg 3-4 times every year for her RA.  She has also noticed weight gain since her gastric band was removed.  Her PFTs showed decreased FEV1 and FVC but no obstruction.   Pt was given Spiriva  in Dec 2017 for 1M and she thinks it helped because it helped her bring up the secretions.   ------    Patient is a life time non smoker.   Occasional EtOH use.   Denies recreational drug use.     Patient has cats as pet. No birds.   Patient lives in a town house, sub-urban. No Molds.   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   Travel h/o: [place], [place], [place] 2015, [place] was 2017, [place] in 2016.   Denies any h/o exposure to TB, Asbestos or silica. No harmful inhalational exposure at work or home as far as he can recollect.     Social history:  Social History     Tobacco Use     Smoking status: Never Smoker     Smokeless tobacco: Never Used   Substance Use Topics     Alcohol use: No     Alcohol/week: 0.6 - 1.2 oz     Types: 1 - 2 Standard drinks or equivalent per week     Drug use: No       Family history:  Family History   Problem Relation Age of Onset     Heart Disease Father         MI     Neurologic Disorder Father 79        Parkinson's     Diabetes Father      Hypertension Father      Coronary Artery Disease Father      Cancer Maternal Grandmother         uterine     Neurologic Disorder Sister 16        migraine     Depression Sister      Neurologic Disorder Mother 20        migraine     Depression Mother      Neurologic Disorder Son 7        migraine     Substance Abuse Son      Depression Sister      Substance Abuse Sister    Mother had COPD/smoker and alpha 1 trypsin def. PT was tested and was negative.   Denies any other FH of lung disease including but not limited to ILD, lung cancer, sarcoidosis, asthma, copd, RA, Lupus.     Medications:  Current Outpatient Medications   Medication     acetaminophen (TYLENOL) 500 MG tablet     albuterol (PROAIR HFA/PROVENTIL HFA/VENTOLIN HFA) 108 (90 Base) MCG/ACT inhaler     azelastine (OPTIVAR) 0.05 % SOLN ophthalmic solution     citalopram (CELEXA) 20 MG tablet     cycloSPORINE (RESTASIS) 0.05 % ophthalmic emulsion     fexofenadine  "(ALLEGRA) 180 MG tablet     ibuprofen 200 MG capsule     naltrexone-bupropion (CONTRAVE) 8-90 MG per 12 hr tablet     ORDER FOR DME     Sarilumab 200 MG/1.14ML SOAJ     sulfaSALAzine ER (AZULFIDINE EN) 500 MG EC tablet     fluticasone (FLONASE) 50 MCG/ACT nasal spray     No current facility-administered medications for this visit.        Allergies:  No Known Allergies    Physical examination:  /78 (BP Location: Right arm, Patient Position: Chair, Cuff Size: Adult Large)   Pulse 96   Resp 16   Ht 1.626 m (5' 4.02\")   Wt 117 kg (258 lb)   SpO2 94%   BMI 44.26 kg/m       General: NAD  Eyes: Puffiness of eyes Anicteric sclera, PERRL, EOMI  Nose: Nasal mucosa w/o edema or hyperemia; no nasal polyps  Ears: EAC clear b/l w/ pearly gray TMs  Mouth: MMM w/o lesions; no tonsillar enlargement; no oropharyngeal exudate, or erythema, Mallampati 3   Neck: No masses, no thyromegaly  Lymphatics: No significant cervical or supraclavicular LNs  CV: RRR, no m/c/r;   Lungs: distance but NVB no added sounds.   Abd: Soft, NT, ND  Ext: swelling of joints of hands and knees, 2+ BLE edema. no clubbing  Skin: No rashes, cyanosis, or jaundice  Neuro: Intact mentation w/ normal speech. Normal strength & muscle tone w/ normal gait & stance  Psych: Euthymic, normal affect, good eye contact    Labs: reviewed in [facility] & personally interpreted.   Eosinophils 0.2 WNL  Oct 2018     Lab Results   Component Value Date    PH 7.43 06/04/2015    PO2 81 06/04/2015    PCO2 44 06/04/2015    HCO3 29 (H) 06/04/2015    JAYDA 4.4 06/04/2015          Lab Results   Component Value Date     07/11/2017    Lab Results   Component Value Date    CHLORIDE 107 07/11/2017    Lab Results   Component Value Date    BUN 13 07/11/2017      Lab Results   Component Value Date    POTASSIUM 3.2 07/11/2017    Lab Results   Component Value Date    CO2 21 07/11/2017    Lab Results   Component Value Date    CR 0.74 10/25/2018      No results found for: NTBNCHILANGO, " NTBNP  Lab Results   Component Value Date    WBC 5.9 12/14/2018    HGB 11.9 12/14/2018    HCT 37.5 12/14/2018    MCV 94 12/14/2018     12/14/2018     Lab Results   Component Value Date    CR 0.71 12/14/2018     Lab Results   Component Value Date    SED 17 10/25/2018     Lab Results   Component Value Date    HGB 11.9 12/14/2018     Lab Results   Component Value Date    AST 22 12/14/2018    ALT 27 12/14/2018    ALKPHOS 97 12/14/2018    BILITOTAL 0.3 12/14/2018    BILICONJ 0.0 07/14/2014     Imaging: reviewed in [facility] & personally interpreted. Below are the interpretations from the formal Radiology review.  CXr today looks clear.     Echo: 3/2018   Global and regional left ventricular function is normal with an EF of 60-  65%.Left ventricular diastolic function is indeterminate.  Global right ventricular function is normal.  The inferior vena cava is normal. Estimated mean right atrial pressure is 3  mmHg.  No pericardial effusion is present.  No significant valve disease by Doppler interrogation. Valves are not seen  well.  Pulmonary artery systolic pressure cannot be assessed.  Previous study not available for comparison.      PFT,: on personal review --02/22/19 reduced FEV1 and FVC with no significant change from before.   Previously Normal TLc and DLCO. No significant bronchodilator response.   6MWT 02/22/19 Walked normal distance with desaturation to 90% but no hypoxia on room air.            Jo Downs   Pulmonary Critical Care Fellow       Faculty Addendum:  This patient has been seen and evaluated by me.  I have discussed care with Dr. Downs and agree with the findings and plan in this note.    Juan Carlos Donnelly  Pulmonary/Critical Care  February 22, 2019 4:29 PM    Again, thank you for allowing me to participate in the care of your patient.      Sincerely,    Jo Downs MD

## 2019-02-26 ENCOUNTER — ANCILLARY PROCEDURE (OUTPATIENT)
Dept: CARDIOLOGY | Facility: CLINIC | Age: 64
End: 2019-02-26
Attending: INTERNAL MEDICINE
Payer: COMMERCIAL

## 2019-02-26 DIAGNOSIS — R06.09 DYSPNEA ON EXERTION: ICD-10-CM

## 2019-02-26 LAB
DLCOUNC-%PRED-PRE: 106 %
DLCOUNC-PRE: 21.53 ML/MIN/MMHG
DLCOUNC-PRED: 20.15 ML/MIN/MMHG
ERV-%PRED-PRE: 156 %
ERV-PRE: 0.27 L
ERV-PRED: 0.17 L
EXPTIME-PRE: 7.77 SEC
FEF2575-%PRED-PRE: 60 %
FEF2575-PRE: 1.34 L/SEC
FEF2575-PRED: 2.23 L/SEC
FEFMAX-%PRED-PRE: 69 %
FEFMAX-PRE: 4.36 L/SEC
FEFMAX-PRED: 6.24 L/SEC
FEV1-%PRED-PRE: 68 %
FEV1-PRE: 1.7 L
FEV1FEV6-PRE: 76 %
FEV1FEV6-PRED: 81 %
FEV1FVC-PRE: 76 %
FEV1FVC-PRED: 79 %
FEV1SVC-PRE: 74 %
FEV1SVC-PRED: 76 %
FIFMAX-PRE: 4.94 L/SEC
FIO2-PRE: 21 %
FRCPLETH-%PRED-PRE: 74 %
FRCPLETH-PRE: 2.02 L
FRCPLETH-PRED: 2.7 L
FVC-%PRED-PRE: 71 %
FVC-PRE: 2.24 L
FVC-PRED: 3.14 L
IC-%PRED-PRE: 65 %
IC-PRE: 2.01 L
IC-PRED: 3.08 L
RVPLETH-%PRED-PRE: 91 %
RVPLETH-PRE: 1.75 L
RVPLETH-PRED: 1.92 L
TLCPLETH-%PRED-PRE: 81 %
TLCPLETH-PRE: 4.03 L
TLCPLETH-PRED: 4.94 L
VA-%PRED-PRE: 80 %
VA-PRE: 3.88 L
VC-%PRED-PRE: 70 %
VC-PRE: 2.28 L
VC-PRED: 3.25 L

## 2019-02-26 RX ORDER — ACYCLOVIR 200 MG/1
12 CAPSULE ORAL ONCE
Status: ACTIVE | OUTPATIENT
Start: 2019-02-26

## 2019-02-26 RX ADMIN — Medication 4 ML: at 18:30

## 2019-02-27 ENCOUNTER — OFFICE VISIT (OUTPATIENT)
Dept: FAMILY MEDICINE | Facility: CLINIC | Age: 64
End: 2019-02-27
Payer: COMMERCIAL

## 2019-02-27 VITALS
TEMPERATURE: 98.4 F | SYSTOLIC BLOOD PRESSURE: 124 MMHG | DIASTOLIC BLOOD PRESSURE: 77 MMHG | BODY MASS INDEX: 44.09 KG/M2 | OXYGEN SATURATION: 95 % | WEIGHT: 257 LBS | HEART RATE: 85 BPM

## 2019-02-27 DIAGNOSIS — E66.01 OBESITY, CLASS III, BMI 40-49.9 (MORBID OBESITY) (H): ICD-10-CM

## 2019-02-27 DIAGNOSIS — M05.9 SEROPOSITIVE RHEUMATOID ARTHRITIS (H): ICD-10-CM

## 2019-02-27 DIAGNOSIS — D84.9 IMMUNOSUPPRESSION (H): ICD-10-CM

## 2019-02-27 DIAGNOSIS — Z79.899 HIGH RISK MEDICATION USE: ICD-10-CM

## 2019-02-27 DIAGNOSIS — F32.0 MILD MAJOR DEPRESSION (H): ICD-10-CM

## 2019-02-27 DIAGNOSIS — R06.02 SOB (SHORTNESS OF BREATH): ICD-10-CM

## 2019-02-27 DIAGNOSIS — R60.0 BILATERAL LEG EDEMA: Primary | ICD-10-CM

## 2019-02-27 DIAGNOSIS — J44.9 CHRONIC OBSTRUCTIVE PULMONARY DISEASE, UNSPECIFIED COPD TYPE (H): ICD-10-CM

## 2019-02-27 PROCEDURE — 99214 OFFICE O/P EST MOD 30 MIN: CPT | Performed by: FAMILY MEDICINE

## 2019-02-27 RX ORDER — FUROSEMIDE 20 MG
20 TABLET ORAL DAILY
Qty: 30 TABLET | Refills: 2 | Status: SHIPPED | OUTPATIENT
Start: 2019-02-27 | End: 2019-10-08

## 2019-02-27 ASSESSMENT — PAIN SCALES - GENERAL: PAINLEVEL: NO PAIN (0)

## 2019-02-27 NOTE — PROGRESS NOTES
SUBJECTIVE:   Sharon Fung is a 63 year old female who presents to clinic today for the following health issues:      Medication request: Lasix per pulmonologist.  Musculoskeletal problem/pain      Duration: Couple of weeks    Description  Location: Left hip    Intensity:  0/10 right now    Accompanying signs and symptoms: radiation of pain to the back of her thigh    History  Previous similar problem: no   Previous evaluation:  none    Precipitating or alleviating factors:  Trauma or overuse: no   Aggravating factors include: standing, walking and going down the stairs    Therapies tried and outcome: heat, ice and Ibuprofen      She is on medication as listed from Dr. Guerra of rheumatology for her rheumatoid arthritis.  She has been having some left hip pain in the last couple of weeks.    She has also been experiencing some shortness of breath.  She saw pulmonary medicine last week for that and she was advised to see me for initiation of a diuretic and ongoing monitoring/titration of that.  I note that she did have a BNP done which was normal and also an echocardiogram done which showed normal ejection fraction.  The patient has struggled with obesity for years and she is seeing weight loss providers for that.  She is on medication to try to help with weight loss.    Problem list and histories reviewed & adjusted, as indicated.  Additional history: as documented    Patient Active Problem List   Diagnosis     AR (allergic rhinitis)     GERD (gastroesophageal reflux disease)     Status post bariatric surgery     Mild major depression (H)     Advanced directives, counseling/discussion     High risk medication use     Obstructive sleep apnea     Obesity, Class III, BMI 40-49.9 (morbid obesity) (H)     Anterior basement membrane dystrophy     Seropositive rheumatoid arthritis (H)     LORNA positive     Carpal tunnel syndrome of right wrist     Headache     Chronic obstructive pulmonary disease, unspecified COPD  type (H)     Immunosuppression (H)     Past Surgical History:   Procedure Laterality Date     BLEPHAROPLASTY BILATERAL Bilateral 8/5/2016    Procedure: BLEPHAROPLASTY BILATERAL;  Surgeon: Cortez Robin MD;  Location: [facility]     BREAST BIOPSY, RT/LT  1-94    Benign     C APPENDECTOMY  1977     COLONOSCOPY       COLONOSCOPY WITH CO2 INSUFFLATION N/A 3/30/2017    Procedure: COLONOSCOPY WITH CO2 INSUFFLATION;  Surgeon: Issa Weeks MD;  Location: [facility]     ESOPHAGOSCOPY, GASTROSCOPY, DUODENOSCOPY (EGD), COMBINED N/A 10/29/2015    Procedure: COMBINED ESOPHAGOSCOPY, GASTROSCOPY, DUODENOSCOPY (EGD);  Surgeon: Shaq Mims MD;  [facility]     LAPAROSCOPIC GASTRIC ADJUSTABLE BANDING  11/09/10    Lap band procedure     LAPAROSCOPIC REMOVAL GASTRIC ADJUSTABLE BAND N/A 10/31/2015    Procedure: LAPAROSCOPIC REMOVAL GASTRIC ADJUSTABLE BAND;  Surgeon: Shaq Mims MD;  [facility]     RELEASE CARPAL TUNNEL Left 4/27/2017    Procedure: RELEASE CARPAL TUNNEL;  Left Open Carpal Tunnel Release;  Surgeon: Ciara Middleton MD;  [facility]     RELEASE CARPAL TUNNEL Right 6/15/2017    Procedure: RELEASE CARPAL TUNNEL;  Right Carpal Tunnel Release Open;  Surgeon: Ciara Middleton MD;  [facility]     REPAIR PTOSIS       TUBAL LIGATION  1988       Social History     Tobacco Use     Smoking status: Never Smoker     Smokeless tobacco: Never Used   Substance Use Topics     Alcohol use: No     Alcohol/week: 0.6 - 1.2 oz     Types: 1 - 2 Standard drinks or equivalent per week     Family History   Problem Relation Age of Onset     Heart Disease Father         MI     Neurologic Disorder Father 79        Parkinson's     Diabetes Father      Hypertension Father      Coronary Artery Disease Father      Cancer Maternal Grandmother         uterine     Neurologic Disorder Sister 16        migraine     Depression Sister      Neurologic Disorder Mother 20        migraine     Depression  Mother      Neurologic Disorder Son 7        migraine     Substance Abuse Son      Depression Sister      Substance Abuse Sister          Current Outpatient Medications   Medication Sig Dispense Refill     acetaminophen (TYLENOL) 500 MG tablet Take 500-1,000 mg by mouth every 6 hours as needed for mild pain       albuterol (PROAIR HFA/PROVENTIL HFA/VENTOLIN HFA) 108 (90 Base) MCG/ACT inhaler Inhale 2 puffs into the lungs every 6 hours as needed for shortness of breath / dyspnea or wheezing 1 Inhaler 1     azelastine (OPTIVAR) 0.05 % SOLN ophthalmic solution Apply 1 drop to eye 2 times daily 1 Bottle 6     citalopram (CELEXA) 20 MG tablet Take 1 tablet (20 mg) by mouth daily 90 tablet 1     cycloSPORINE (RESTASIS) 0.05 % ophthalmic emulsion Place 1 drop into both eyes 2 times daily       fexofenadine (ALLEGRA) 180 MG tablet Take 1 tablet (180 mg) by mouth daily 90 tablet 2     fluticasone (FLONASE) 50 MCG/ACT nasal spray Spray 2 sprays into both nostrils as needed              ibuprofen 200 MG capsule Take 600-800 mg by mouth At Bedtime       naltrexone-bupropion (CONTRAVE) 8-90 MG per 12 hr tablet Take 2 tablets by mouth 2 times daily 120 tablet 6     ORDER FOR DME Use your CPAP device as directed by your provider.       Sarilumab 200 MG/1.14ML SOAJ Inject 200 mg Subcutaneous every 14 days . Hold for signs of infection and seek medical attention. 2 pen 4     sulfaSALAzine ER (AZULFIDINE EN) 500 MG EC tablet Take 3 tablets (1,500 mg) by mouth 2 times daily 180 tablet 6     No Known Allergies    Reviewed and updated as needed this visit by clinical staff  Tobacco  Allergies  Meds  Med Hx  Surg Hx  Fam Hx  Soc Hx      Reviewed and updated as needed this visit by Provider         ROS:  Constitutional, HEENT, cardiovascular, pulmonary, gi and gu systems are negative, except as otherwise noted.    OBJECTIVE:     /77 (BP Location: Right arm, Patient Position: Chair, Cuff Size: Adult Large)   Pulse 85   Temp  98.4  F (36.9  C) (Oral)   Wt 116.6 kg (257 lb)   SpO2 95%   BMI 44.09 kg/m    Body mass index is 44.09 kg/m .  GENERAL: alert, no distress and obese  RESP: lungs clear to auscultation - no rales, rhonchi or wheezes  CV: regular rate and rhythm, normal S1 S2, no S3 or S4, no murmur, click or rub, trace lower extremity edema  MS: No warmth or erythema of the lower extremities, she does have trace bilateral pedal edema    Diagnostic Test Results:  Recent lab and imaging studies were reviewed    ASSESSMENT/PLAN:         ICD-10-CM    1. Bilateral leg edema R60.0 furosemide (LASIX) 20 MG tablet     Basic metabolic panel   2. SOB (shortness of breath) R06.02    3. Obesity, Class III, BMI 40-49.9 (morbid obesity) (H) E66.01    4. Seropositive rheumatoid arthritis (H) M05.9    5. Chronic obstructive pulmonary disease, unspecified COPD type (H) J44.9    6. Mild major depression (H) F32.0    7. Immunosuppression (H) D89.9    8. High risk medication use Z79.899      She is having lower extremity edema which is probably primarily related to her obesity  She is working on that with the weight loss clinic  We will try her on low-dose furosemide for now  Her renal function has been normal in the past, but we will continue to monitor that and her electrolytes  She will be having some lab work done in the next week or 2 with Dr. Guerra  I asked her to return to see me in about 4 weeks so we can see how her breathing is doing along with her weight and edema  We will plan to check a BMP at that time  She will continue with her other same baseline medications in the meantime    Reynaldo Saavedra MD  [facility]

## 2019-03-20 DIAGNOSIS — E66.01 OBESITY, CLASS III, BMI 40-49.9 (MORBID OBESITY) (H): Primary | ICD-10-CM

## 2019-03-20 RX ORDER — TOPIRAMATE 25 MG/1
TABLET, FILM COATED ORAL
Qty: 90 TABLET | Refills: 3 | Status: SHIPPED | OUTPATIENT
Start: 2019-03-20 | End: 2019-05-13

## 2019-03-26 DIAGNOSIS — R60.0 BILATERAL LEG EDEMA: ICD-10-CM

## 2019-03-26 DIAGNOSIS — M05.9 SEROPOSITIVE RHEUMATOID ARTHRITIS (H): ICD-10-CM

## 2019-03-26 LAB
ALBUMIN SERPL-MCNC: 3.9 G/DL (ref 3.4–5)
ALP SERPL-CCNC: 113 U/L (ref 40–150)
ALT SERPL W P-5'-P-CCNC: 50 U/L (ref 0–50)
ANION GAP SERPL CALCULATED.3IONS-SCNC: 8 MMOL/L (ref 3–14)
AST SERPL W P-5'-P-CCNC: 28 U/L (ref 0–45)
BASOPHILS # BLD AUTO: 0 10E9/L (ref 0–0.2)
BASOPHILS NFR BLD AUTO: 0.6 %
BILIRUB DIRECT SERPL-MCNC: <0.1 MG/DL (ref 0–0.2)
BILIRUB SERPL-MCNC: 0.3 MG/DL (ref 0.2–1.3)
BUN SERPL-MCNC: 18 MG/DL (ref 7–30)
CALCIUM SERPL-MCNC: 8.9 MG/DL (ref 8.5–10.1)
CHLORIDE SERPL-SCNC: 107 MMOL/L (ref 94–109)
CO2 SERPL-SCNC: 24 MMOL/L (ref 20–32)
CREAT SERPL-MCNC: 0.76 MG/DL (ref 0.52–1.04)
CRP SERPL-MCNC: <2.9 MG/L (ref 0–8)
DIFFERENTIAL METHOD BLD: NORMAL
EOSINOPHIL # BLD AUTO: 0.4 10E9/L (ref 0–0.7)
EOSINOPHIL NFR BLD AUTO: 6.2 %
ERYTHROCYTE [DISTWIDTH] IN BLOOD BY AUTOMATED COUNT: 13.4 % (ref 10–15)
ERYTHROCYTE [SEDIMENTATION RATE] IN BLOOD BY WESTERGREN METHOD: 6 MM/H (ref 0–30)
GFR SERPL CREATININE-BSD FRML MDRD: 85 ML/MIN/{1.73_M2}
GLUCOSE SERPL-MCNC: 71 MG/DL (ref 70–99)
HCT VFR BLD AUTO: 41.8 % (ref 35–47)
HGB BLD-MCNC: 13.5 G/DL (ref 11.7–15.7)
IMM GRANULOCYTES # BLD: 0 10E9/L (ref 0–0.4)
IMM GRANULOCYTES NFR BLD: 0.3 %
LYMPHOCYTES # BLD AUTO: 2.4 10E9/L (ref 0.8–5.3)
LYMPHOCYTES NFR BLD AUTO: 35.8 %
MCH RBC QN AUTO: 29.3 PG (ref 26.5–33)
MCHC RBC AUTO-ENTMCNC: 32.3 G/DL (ref 31.5–36.5)
MCV RBC AUTO: 91 FL (ref 78–100)
MONOCYTES # BLD AUTO: 0.6 10E9/L (ref 0–1.3)
MONOCYTES NFR BLD AUTO: 8.5 %
NEUTROPHILS # BLD AUTO: 3.2 10E9/L (ref 1.6–8.3)
NEUTROPHILS NFR BLD AUTO: 48.6 %
NRBC # BLD AUTO: 0 10*3/UL
NRBC BLD AUTO-RTO: 0 /100
PLATELET # BLD AUTO: 251 10E9/L (ref 150–450)
POTASSIUM SERPL-SCNC: 4 MMOL/L (ref 3.4–5.3)
PROT SERPL-MCNC: 7.5 G/DL (ref 6.8–8.8)
RBC # BLD AUTO: 4.61 10E12/L (ref 3.8–5.2)
SODIUM SERPL-SCNC: 139 MMOL/L (ref 133–144)
WBC # BLD AUTO: 6.6 10E9/L (ref 4–11)

## 2019-03-26 PROCEDURE — 85025 COMPLETE CBC W/AUTO DIFF WBC: CPT | Performed by: FAMILY MEDICINE

## 2019-03-26 PROCEDURE — 86140 C-REACTIVE PROTEIN: CPT | Performed by: FAMILY MEDICINE

## 2019-03-26 PROCEDURE — 36415 COLL VENOUS BLD VENIPUNCTURE: CPT | Performed by: FAMILY MEDICINE

## 2019-03-26 PROCEDURE — 85652 RBC SED RATE AUTOMATED: CPT | Performed by: FAMILY MEDICINE

## 2019-03-26 PROCEDURE — 80048 BASIC METABOLIC PNL TOTAL CA: CPT | Performed by: FAMILY MEDICINE

## 2019-03-26 PROCEDURE — 80076 HEPATIC FUNCTION PANEL: CPT | Performed by: FAMILY MEDICINE

## 2019-04-17 ENCOUNTER — TELEPHONE (OUTPATIENT)
Dept: PALLIATIVE MEDICINE | Facility: CLINIC | Age: 64
End: 2019-04-17

## 2019-04-17 ENCOUNTER — OFFICE VISIT (OUTPATIENT)
Dept: RHEUMATOLOGY | Facility: CLINIC | Age: 64
End: 2019-04-17
Payer: COMMERCIAL

## 2019-04-17 VITALS
HEIGHT: 64 IN | BODY MASS INDEX: 44.15 KG/M2 | WEIGHT: 258.6 LBS | OXYGEN SATURATION: 95 % | HEART RATE: 107 BPM | SYSTOLIC BLOOD PRESSURE: 131 MMHG | DIASTOLIC BLOOD PRESSURE: 71 MMHG

## 2019-04-17 DIAGNOSIS — E66.01 MORBID OBESITY WITH BODY MASS INDEX OF 40.0-49.9 (H): ICD-10-CM

## 2019-04-17 DIAGNOSIS — Z79.899 HIGH RISK MEDICATION USE: ICD-10-CM

## 2019-04-17 DIAGNOSIS — M05.9 SEROPOSITIVE RHEUMATOID ARTHRITIS (H): Primary | ICD-10-CM

## 2019-04-17 DIAGNOSIS — M22.2X2 PATELLOFEMORAL PAIN SYNDROME OF BOTH KNEES: ICD-10-CM

## 2019-04-17 DIAGNOSIS — M79.7 FIBROMYALGIA: ICD-10-CM

## 2019-04-17 DIAGNOSIS — M22.2X1 PATELLOFEMORAL PAIN SYNDROME OF BOTH KNEES: ICD-10-CM

## 2019-04-17 PROCEDURE — 99214 OFFICE O/P EST MOD 30 MIN: CPT | Performed by: INTERNAL MEDICINE

## 2019-04-17 RX ORDER — SULFASALAZINE 500 MG/1
1500 TABLET, DELAYED RELEASE ORAL 2 TIMES DAILY
Qty: 180 TABLET | Refills: 6 | Status: SHIPPED | OUTPATIENT
Start: 2019-04-17 | End: 2019-08-28

## 2019-04-17 ASSESSMENT — MIFFLIN-ST. JEOR: SCORE: 1723.32

## 2019-04-17 NOTE — PROGRESS NOTES
Rheumatology Clinic Visit      Sharon Fung MRN# [id number]   YOB: 1955 Age: 63 year old      Date of visit: 4/17/19   PCP: Dr. Reynaldo Saavedra    Chief Complaint   Patient presents with:  Arthritis: RA, patient has her ups and downs still. this week has been bad    Assessment and Plan     1. Seropositive nonerosive rheumatoid arthritis (RF negative, CCP low positive): Previously on Humira (lost efficacy), Cimzia (ineffective), Orencia (ineffective), leflunomide (ineffective as monotherapy), hydroxychloroquine (ineffective), Xeljanz (ineffective), MTX (GI upset). Currently on SSZ 1500mg BID and Kevzara 200mg SQ every 14 days (started 1/2019).   Note that Enbrel was denied by insurance who preferred Kevzara.   - Continue sulfasalazine 1500 mg twice daily   - Continue Kevzara 200mg SQ every 14 days  - Labs in 3 months: CBC, Creatinine, Hepatic Panel, ESR, CRP              Rapid 3, cumulative scores                      04/17/2019: 15    (SSZ 1500mg BID, Kevzara)                          12/19/2018:         (MTX 20mg SQ wkly, Xeljanz XR 11mg daily, SSZ 1500mg BID)                          05/02/2018:  9     (MTX 20mg SQ wkly, Xeljanz XR 11mg daily, SSZ 1000mg BID)                          01/31/2018: 22.3 (MTX 20mg SQ wkly, Xeljanz XR 11mg daily)                          11/29/2017: 16.8 (MTX 20mg SQ wkly)                      08/02/2017: 4.2   (MTX 20mg SQ wkly, Xeljanz XR 11mg daily)                         05/04/2017: 7      (MTX 20mg SQ wkly, Xeljanz XR 11mg daily)                        02/02/2017: 8      (MTX 20mg SQ wkly, Xeljanz XR 11mg daily)                      11/04/2016: 13    (MTX 20mg SQ weekly)                      07/15/2016: 10.8    2. Positive LORNA and dsDNA / Secondary Sjogren's Syndrome: Repeat dsDNAs have been negative. Has dry eyes but no dry mouth.  Dry eyes are treated by ophthalmology with Restasis.  Likely Secondary Sjogren's Syndrome.     3. BMI 44.36, morbid obesity: stressed  importance of weight loss.  She says that she is working on getting weight loss medications approved by her insurance.     4. Bilateral patellofemoral syndrome: mild medial joint line tenderness.  Recommended physical therapy but she refused.  She plans to try self-taught exercises and will learn from her friend who is a physical therapist.  Weight loss will also help.    5. Fibromyalgia: Exam is also consistent with fibromyalgia.  We reviewed the dx and tx options today.  She does note want to take more medications.  We discussed the benefit of the multidisciplinary approach to treatment at the pain clinic and she would like to try this.  - Pain management referral    6.  Vaccinations: Vaccinations reviewed with Ms. Fung.  Risks and benefits of vaccinations were discussed.    - Influenza: encouraged yearly vaccination  - [id number]: up to date  - [id number]: up to date  - Shingrix: has already received 1 of 2 of the shingrix shots; is not yet due for the 2nd shot      Ms. Fung verbalized agreement with and understanding of the rational for the diagnosis and treatment plan.  All questions were answered to best of my ability and the patient's satisfaction. Ms. Fung was advised to contact the clinic with any questions that may arise after the clinic visit.      Thank you for involving me in the care of the patient    Return to clinic: 3-4 months    HPI   Sharon Fung is a 63 year old female with medical history significant for GERD, allergic rhinitis, obstructive sleep apnea, obesity, hx of trigger fingers, hx of carpal tunnel surgery, and rheumatoid arthritis who presents for follow-up of rheumatoid arthritis.    Today, Ms. Fung reports that she has good days and bad days.  On good days she has no pain or stiffness.  On bad days she has knee pain and stiffness; worse with activity; worse with going up and down stairs.  She also has hand stiffness on bad days that affects her MCPs and PIPs.   Tolerating medications well.  She is on Kevzara and sulfasalazine.  Trying to lose weight; she says that her insurance has been denying weight loss medications.    No fevers or chills. No nausea, vomiting, constipation, diarrhea. No chest pain/pressure, palpitations, or shortness of breath. No oral or nasal sores. No neck pain. No rash.      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 No oral or nasal ulcers.  CV: No chest pain, pressure, palpitations, or dyspnea on exertion.  PULM: no shortness of breath.   GI: No nausea, vomiting, constipation, diarrhea. No blood in stool. No abdominal pain.  : No blood in urine.  MSK: See HPI.  NEURO: No numbness or tingling  EXT: No LE swelling  PSYCH: Negative    Active Problem List     Patient Active Problem List   Diagnosis     AR (allergic rhinitis)     GERD (gastroesophageal reflux disease)     Status post bariatric surgery     Mild major depression (H)     Advanced directives, counseling/discussion     High risk medication use     Obstructive sleep apnea     Obesity, Class III, BMI 40-49.9 (morbid obesity) (H)     Anterior basement membrane dystrophy     Seropositive rheumatoid arthritis (H)     LORNA positive     Carpal tunnel syndrome of right wrist     Headache     Chronic obstructive pulmonary disease, unspecified COPD type (H)     Immunosuppression (H)     Past Medical History     Past Medical History:   Diagnosis Date     AR (allergic rhinitis)  as teen     Arthritis 12/14/2015     Chronic obstructive pulmonary disease, unspecified COPD type (H) 3/27/2018     Depressive disorder 3 years ago     GERD (gastroesophageal reflux disease) 4-07     Headache 7/11/2017     Mild major depression (H) 3 years ago     Morbid obesity (H) teens     BRETT (obstructive sleep apnea)     uses CPAP     Rheumatoid arthritis,  adult (H)      Past Surgical History     Past Surgical History:   Procedure Laterality Date     BLEPHAROPLASTY BILATERAL Bilateral 8/5/2016    Procedure: BLEPHAROPLASTY BILATERAL;  Surgeon: Cortez Robin MD;  Location:  [facility]     BREAST BIOPSY, RT/LT  1-94    Benign     C APPENDECTOMY  1977     COLONOSCOPY       COLONOSCOPY WITH CO2 INSUFFLATION N/A 3/30/2017    Procedure: COLONOSCOPY WITH CO2 INSUFFLATION;  Surgeon: Issa Weeks MD;  [facility]: [facility]     ESOPHAGOSCOPY, GASTROSCOPY, DUODENOSCOPY (EGD), COMBINED N/A 10/29/2015    Procedure: COMBINED ESOPHAGOSCOPY, GASTROSCOPY, DUODENOSCOPY (EGD);  Surgeon: Shaq Mims MD;  [facility]     LAPAROSCOPIC GASTRIC ADJUSTABLE BANDING  11/09/10    Lap band procedure     LAPAROSCOPIC REMOVAL GASTRIC ADJUSTABLE BAND N/A 10/31/2015    Procedure: LAPAROSCOPIC REMOVAL GASTRIC ADJUSTABLE BAND;  Surgeon: Shaq Mims MD;  [facility]     RELEASE CARPAL TUNNEL Left 4/27/2017    Procedure: RELEASE CARPAL TUNNEL;  Left Open Carpal Tunnel Release;  Surgeon: Ciara Middleton MD;  [facility]     RELEASE CARPAL TUNNEL Right 6/15/2017    Procedure: RELEASE CARPAL TUNNEL;  Right Carpal Tunnel Release Open;  Surgeon: Ciara Middleton MD;  [facility]     REPAIR PTOSIS       TUBAL LIGATION  1988     Allergy   No Known Allergies  Current Medication List     Current Outpatient Medications   Medication Sig     acetaminophen (TYLENOL) 500 MG tablet Take 500-1,000 mg by mouth every 6 hours as needed for mild pain     albuterol (PROAIR HFA/PROVENTIL HFA/VENTOLIN HFA) 108 (90 Base) MCG/ACT inhaler Inhale 2 puffs into the lungs every 6 hours as needed for shortness of breath / dyspnea or wheezing     azelastine (OPTIVAR) 0.05 % SOLN ophthalmic solution Apply 1 drop to eye 2 times daily     citalopram (CELEXA) 20 MG tablet Take 1 tablet (20 mg) by mouth daily     cycloSPORINE (RESTASIS) 0.05 % ophthalmic emulsion Place 1 drop into  both eyes 2 times daily     fexofenadine (ALLEGRA) 180 MG tablet Take 1 tablet (180 mg) by mouth daily     fluticasone (FLONASE) 50 MCG/ACT nasal spray Spray 2 sprays into both nostrils as needed     furosemide (LASIX) 20 MG tablet Take 1 tablet (20 mg) by mouth daily     ibuprofen 200 MG capsule Take 600-800 mg by mouth At Bedtime     naltrexone-bupropion (CONTRAVE) 8-90 MG per 12 hr tablet Take 2 tablets by mouth 2 times daily     Sarilumab 200 MG/1.14ML SOAJ Inject 200 mg Subcutaneous every 14 days . Hold for signs of infection and seek medical attention.     sulfaSALAzine ER (AZULFIDINE EN) 500 MG EC tablet Take 3 tablets (1,500 mg) by mouth 2 times daily     topiramate (TOPAMAX) 25 MG tablet 25 mg at bedtime for 1 week, 50 mg at bedtime for 1 week and 75 mg daily at bedtime thereafter     ORDER FOR DME Use your CPAP device as directed by your provider.     No current facility-administered medications for this visit.      Facility-Administered Medications Ordered in Other Visits   Medication     sodium chloride (PF) 0.9% PF flush 10 mL     sodium chloride bacteriostatic 0.9 % flush 12 mL         Social History   See HPI    Family History     Family History   Problem Relation Age of Onset     Heart Disease Father         MI     Neurologic Disorder Father 79        Parkinson's     Diabetes Father      Hypertension Father      Coronary Artery Disease Father      Cancer Maternal Grandmother         uterine     Neurologic Disorder Sister 16        migraine     Depression Sister      Neurologic Disorder Mother 20        migraine     Depression Mother      Neurologic Disorder Son 7        migraine     Substance Abuse Son      Depression Sister      Substance Abuse Sister        Physical Exam     Temp Readings from Last 3 Encounters:   02/27/19 98.4  F (36.9  C) (Oral)   12/19/18 97.5  F (36.4  C) (Oral)   12/07/18 97.9  F (36.6  C) (Oral)     BP Readings from Last 5 Encounters:   04/17/19 131/71   02/27/19 124/77  "  02/22/19 142/78   01/14/19 141/75   12/19/18 135/72     Pulse Readings from Last 1 Encounters:   04/17/19 107     Resp Readings from Last 1 Encounters:   02/22/19 16     Estimated body mass index is 44.36 kg/m  as calculated from the following:    Height as of this encounter: 1.626 m (5' 4.02\").    Weight as of this encounter: 117.3 kg (258 lb 9.6 oz).    GEN: NAD; morbidly obese  HEENT: MMM. Anicteric, noninjected sclera  CV: S1, S2. RRR. No m/r/g.  PULM: CTA bilaterally. No w/c.  MSK: MCPs, PIPs, and DIPs are tender to palpation; no synovial swelling, increased warmth, or overlying erythema.  Wrists without swelling or tenderness to palpation.  Elbows and shoulders without swelling or tenderness to palpation. Hips mildly tender to palpation .  Knees with mild medial joint line tenderness and crepitation; but no effusion or increased warmth.  Ankles and MTPs without swelling or tenderness to palpation.  14 fibromyalgia tender points positive.   SKIN: No rash. No nail pitting.  EXT: No LE edema  PSYCH: Alert. Appropriate.    Labs / Imaging (select studies)   RF/CCP  Recent Labs   Lab Test 11/19/12  0900   CCPABY 53*   RHF <7     CBC  Recent Labs   Lab Test 03/26/19  0925 12/14/18  0739 10/25/18  1227   WBC 6.6 5.9 6.5   RBC 4.61 3.98 3.90   HGB 13.5 11.9 12.0   HCT 41.8 37.5 37.2   MCV 91 94 95   RDW 13.4 13.8 13.7    309 337   MCH 29.3 29.9 30.8   MCHC 32.3 31.7 32.3   NEUTROPHIL 48.6 66.5 57.5   LYMPH 35.8 22.4 31.1   MONOCYTE 8.5 7.3 8.4   EOSINOPHIL 6.2 3.1 2.5   BASOPHIL 0.6 0.2 0.5   ANEU 3.2 3.9 3.8   ALYM 2.4 1.3 2.0   KIMBERLY 0.6 0.4 0.6   AEOS 0.4 0.2 0.2   ABAS 0.0 0.0 0.0     CMP  Recent Labs   Lab Test 03/26/19  0925 12/14/18  0739 10/25/18  1227  07/11/17  0906  01/17/17  0642     --   --   --  140  --  145*   POTASSIUM 4.0  --   --   --  3.2*  --  3.5   CHLORIDE 107  --   --   --  107  --  112*   CO2 24  --   --   --  21  --  29   ANIONGAP 8  --   --   --  12  --  4   GLC 71  --   --   --  " 90  --  108*   BUN 18  --   --   --  13  --  9   CR 0.76 0.71 0.74   < > 0.73   < > 0.93   GFRESTIMATED 85 84 79   < > 82   < > 61   GFRESTBLACK >90 >90 >90   < > >90  African American GFR Calc     < > 74   ISH 8.9  --   --   --  9.9  --  8.2*   BILITOTAL 0.3 0.3 0.3   < > 0.3   < >  --    ALBUMIN 3.9 3.7 3.9   < > 4.0   < >  --    PROTTOTAL 7.5 7.5 7.7   < > 8.1   < >  --    ALKPHOS 113 97 98   < > 90   < >  --    AST 28 22 29   < > 24   < >  --    ALT 50 27 39   < > 47   < >  --     < > = values in this interval not displayed.     Calcium/VitaminD  Recent Labs   Lab Test 03/26/19  0925 07/11/17  0906 01/17/17  0642  02/20/15  0750  02/11/11  0735   ISH 8.9 9.9 8.2*   < > 9.0   < > 9.3   D3VIT  --   --   --   --   --   --  42   VITDT  --   --   --   --  39  --   --     < > = values in this interval not displayed.     ESR/CRP  Recent Labs   Lab Test 03/26/19  0925 10/25/18  1227 07/24/18  0738   SED 6 17 16   CRP <2.9 8.8* 8.2*     Lipid Panel  Recent Labs   Lab Test 04/27/17  0732 12/27/16  0836 02/20/15  0750 10/20/14  0821 11/22/13  0843   CHOL 204* 184 157 194 167   TRIG 148 154* 79 193* 140   HDL 82 56 63 63 60   LDL 92 97 78 92 79   VLDL  --   --  16 39* 28   CHOLHDLRATIO  --   --  2.5 3.1 2.8   NHDL 122 128  --   --   --      Hepatitis B  Recent Labs   Lab Test 12/08/15  0855 03/06/15  1650   HBCAB Nonreactive  --    HEPBANG Nonreactive Nonreactive     Hepatitis C  Recent Labs   Lab Test 12/08/15  0855 03/06/15  1650 11/19/12  0900   HCVAB Nonreactive   Assay performance characteristics have not been established for newborns,   infants, and children   Nonreactive   Assay performance characteristics have not been established for newborns,   infants, and children   Negative     Tuberculosis Screening  Recent Labs   Lab Test 10/31/17  1400 02/02/17  0822 12/08/15  0855   TBRSLT Negative Negative Negative   TBAGN 0.05 0.00 0.01     HIV Screening  Recent Labs   Lab Test 07/24/18  0738   HIAGAB Nonreactive      Immunization History     Immunization History   Administered Date(s) Administered     Influenza Vaccine IM 3yrs+ 4 Valent IIV4 10/15/2013, 09/15/2014, 09/28/2015, 09/28/2016, 10/16/2017, 10/01/2018     Pneumo Conj 13-V (2010&after) 04/15/2016     Pneumococcal 23 valent 07/15/2016     TDAP Vaccine (Adacel) 02/09/2011          Chart documentation done in part with Dragon Voice recognition Software. Although reviewed after completion, some word and grammatical error may remain.     Freddy Guerra MD

## 2019-04-17 NOTE — NURSING NOTE
RAPID3 (0-30) Cumulative Score  15.0          RAPID3 Weighted Score (divide #4 by 3 and that is the weighted score)  5.0     Evie Rosas [facility] Rheumatology  4/17/2019 7:46 AM

## 2019-04-17 NOTE — PATIENT INSTRUCTIONS
Rheumatology    Dr. Freddy Guerra         [facility]   (Monday)  [address]  [place] [address]       [facility]   (Tuesday)  [address]  [place] [address]    [facility]   (Wed., Thurs., and Friday)  [address] [place] [address]    Phone number: [phone]  Thank you for choosing [facility].  Evie Rosas CMA

## 2019-04-17 NOTE — TELEPHONE ENCOUNTER
Pain Management Center Referral      1. Confirmed address with patient? Yes  2. Confirmed phone number with patient? Yes  3. Confirmed referring provider? Yes  4. Is the PCP the same as the referring provider? Yes  5. Has the patient been to any previous pain clinics? No  (If yes, send HAY with welcome letter)  6. Which insurance are we to bill for this appointment?  [employer]     7. Informed pt of cancellation (48 hour) policy? Yes    REGARDING OPIOID MEDICATIONS: We will always address appropriateness of opioid pain medications, but we generally will not automatically take on a prescribing role. When we do take on prescribing of opioids for chronic pain, it is in collaboration with the referring physician for an intermediate period of time (months), with an expectation that the primary physician or provider will assume the prescribing role if medications are effective at stable doses with demonstrated compliance. Therefore, please do not assume that your prescribing responsibilities end on the day of pain clinic consultation.  8. Informed pt of prescribing policy? Yes    9.Please be aware that once you are established with a pain provider and location, you will need to continue have all future visits with that provider and location. It is best to determine what location is the most convenient for you and schedule with that one.    ** PATIENT INFORMED OF THIS POLICY Yes      9. Referring Provider: Reynaldo Saavedra     10. Criteria for Triage Eval:   -Missed/Failed 1st DUAL appointment? N/A   -Medication Focused? N/A   -Mental Health Concerns? (e.g. Recent psych hospitalization/snap shot)? N/A   -Active substance abuse? N/A   -Patient behaviors (e.g. Offensive language/raised voice)? N/A

## 2019-04-25 ENCOUNTER — OFFICE VISIT (OUTPATIENT)
Dept: PALLIATIVE MEDICINE | Facility: CLINIC | Age: 64
End: 2019-04-25
Attending: INTERNAL MEDICINE
Payer: COMMERCIAL

## 2019-04-25 VITALS
WEIGHT: 258.6 LBS | OXYGEN SATURATION: 95 % | SYSTOLIC BLOOD PRESSURE: 146 MMHG | HEART RATE: 88 BPM | DIASTOLIC BLOOD PRESSURE: 84 MMHG | BODY MASS INDEX: 44.36 KG/M2

## 2019-04-25 DIAGNOSIS — M51.369 DDD (DEGENERATIVE DISC DISEASE), LUMBAR: ICD-10-CM

## 2019-04-25 DIAGNOSIS — M47.816 LUMBAR FACET ARTHROPATHY: ICD-10-CM

## 2019-04-25 DIAGNOSIS — M25.50 MULTIPLE JOINT PAIN: ICD-10-CM

## 2019-04-25 DIAGNOSIS — M05.9 SEROPOSITIVE RHEUMATOID ARTHRITIS (H): Primary | ICD-10-CM

## 2019-04-25 DIAGNOSIS — M54.16 LUMBAR RADICULOPATHY: ICD-10-CM

## 2019-04-25 DIAGNOSIS — M79.7 FIBROMYALGIA: ICD-10-CM

## 2019-04-25 PROCEDURE — 99205 OFFICE O/P NEW HI 60 MIN: CPT | Performed by: NURSE PRACTITIONER

## 2019-04-25 RX ORDER — GABAPENTIN 100 MG/1
CAPSULE ORAL
Qty: 150 CAPSULE | Refills: 1 | Status: SHIPPED | OUTPATIENT
Start: 2019-04-25 | End: 2019-06-06

## 2019-04-25 ASSESSMENT — PAIN SCALES - GENERAL: PAINLEVEL: MODERATE PAIN (5)

## 2019-04-25 NOTE — PROGRESS NOTES
Sharon Fung is a 63 year old female     This patient is being seen in consultation at the request of her Rheumatologist Dr. Cavazos, for evaluation of her pain issues and recommendations for management, with specific emphasis on:     Reason for Referral: Evaluation for comprehensive services-  Do you have any specific questions for the pain specialist? No  Are there any red flags that may impact the assessment or management of the patient? None  What is your diagnosis for the patient's pain? fibromyalgia    Primary Care Provider is Reynaldo Saavedra    The current opiate pain medications are being prescribed by: N/A    Please see the [facility] health questionnaire which the patient completed and reviewed with me in detail    CHIEF COMPLAINT:  Pain in multiple joints, generalized myofacial pain     HISTORY OF PRESENT ILLNESS:  Sharon Fung is a 63 year old female with history of widespread pain and fibromyalgia     Pain Information:   Onset/Progression:  Pain started a few years ago, Was dx with RA. Had medication management only. No PT, etc.    Pain quality: Aching    Pain timing: Constant, better in the morning      Pain rating: intensity ranges from 3/10 to 9/10, and averages 4/10 on a 0-10 scale.   Aggravating factors include: Walking, bike riding   Relieving factors include: Tylenol/ibuprofen, heating pad    Past Pain Treatments:    Pain Clinic:   No     PT: No     Psychologist: No   Relaxation techniques/biofeedback: No   Chiropractor: No   Acupuncture: No   Pharmacotherapy:     Opioids: No      Non-opioids:  Yes    TENs Unit:No   Injections: Yes: for right bunion    Self-care:   Yes    Surgeries related to pain: No     Current Pain Relevant Medications:    Motrin 800 mg BID   Tylenol 500 mg BID     Previous Pain Relevant Medications: (H--helped; HI--Helped initially; [facility]--Somewhat helpful; NH--No help; W--worse; SE--side effects; ?--Unsure if helpful)   NOTE: This medication  information taken from patient's intake form, not medical records.    Opiates: None    NSAIDS: Ibuprofen: H, ketorolac: H, naproxen: H    Muscle Relaxants: None noted   Anti-migraine mediations: None noted   Anti-depressants: None   Sleep aids: None   Anxiolytics: None   Neuropathics: None    Topicals: None   Other medications not covered above: Tylenol: H, prednisone: H    Any illicit drug use: none  EtOH use: rare   Caffeine use: 3-4 per day   Nicotine use: none   Any use of prescriptions other than how they were prescribed:none       THE 4 As OF OPIOID MAINTENANCE ANALGESIA    Analgesia: Is pain relief clinically significant? N/A   Activity: Is patient functional and able to perform Activities of Daily Living? N/A   Adverse effects: Is patient free from adverse side effects from opiates? N/A   Adherence to Rx protocol: Is patient adhering to Controlled Substance Agreement and taking medications ONLY as ordered? N/A    Is Narcan prescribed for opiate use >50 MME daily? N/A    [place] Board of Pharmacy Data Base Reviewed:    YES; No concern for abuse or misuse of controlled medications based on this report.       PAST MEDICAL HISTORY:   Past Medical History:   Diagnosis Date     AR (allergic rhinitis)  as teen     Arthritis 12/14/2015     Chronic obstructive pulmonary disease, unspecified COPD type (H) 3/27/2018     Depressive disorder 3 years ago     GERD (gastroesophageal reflux disease) 4-07     Headache 7/11/2017     Mild major depression (H) 3 years ago     Morbid obesity (H) teens     BRETT (obstructive sleep apnea)     uses CPAP     Rheumatoid arthritis, adult (H)          CURRENT FAMILY/SOCIAL SITUATION:  Living situation: lives alone in single family home   Support system: kids, son and daughter in their 30s   Occupation: RN at [employer], Manager of the [employer]    Current stressors: pain, work, long hours   Safety concerns: denies     FAMILY MEDICAL HISTORY:  Chronic pain: Yes Dad has back issues    Family history of headaches:  Yes  Sisters and son     HEALTH & LIFESTYLE PRACTICES:  Social History     Socioeconomic History     Marital status:      Spouse name: Not on file     Number of children: 2     Years of education: 18     Highest education level: Not on file   Occupational History     Occupation: RN     Employer: [employer]   Social Needs     Financial resource strain: Not on file     Food insecurity:     Worry: Not on file     Inability: Not on file     Transportation needs:     Medical: Not on file     Non-medical: Not on file   Tobacco Use     Smoking status: Never Smoker     Smokeless tobacco: Never Used   Substance and Sexual Activity     Alcohol use: No     Alcohol/week: 0.6 - 1.2 oz     Types: 1 - 2 Standard drinks or equivalent per week     Drug use: No     Sexual activity: Not Currently     Partners: Male     Birth control/protection: Abstinence, Female Surgical   Lifestyle     Physical activity:     Days per week: Not on file     Minutes per session: Not on file     Stress: Not on file   Relationships     Social connections:     Talks on phone: Not on file     Gets together: Not on file     Attends Muslim service: Not on file     Active member of club or organization: Not on file     Attends meetings of clubs or organizations: Not on file     Relationship status: Not on file     Intimate partner violence:     Fear of current or ex partner: Not on file     Emotionally abused: Not on file     Physically abused: Not on file     Forced sexual activity: Not on file   Other Topics Concern     Parent/sibling w/ CABG, MI or angioplasty before 65F 55M? No   Social History Narrative     Not on file       ALLERGIES:  No Known Allergies    MEDICATIONS:  Current Outpatient Medications   Medication Sig Dispense Refill     acetaminophen (TYLENOL) 500 MG tablet Take 500-1,000 mg by mouth every 6 hours as needed for mild pain       albuterol (PROAIR HFA/PROVENTIL HFA/VENTOLIN HFA) 108 (90  Base) MCG/ACT inhaler Inhale 2 puffs into the lungs every 6 hours as needed for shortness of breath / dyspnea or wheezing 1 Inhaler 1     azelastine (OPTIVAR) 0.05 % SOLN ophthalmic solution Apply 1 drop to eye 2 times daily 1 Bottle 6     citalopram (CELEXA) 20 MG tablet Take 1 tablet (20 mg) by mouth daily 90 tablet 1     cycloSPORINE (RESTASIS) 0.05 % ophthalmic emulsion Place 1 drop into both eyes 2 times daily       fexofenadine (ALLEGRA) 180 MG tablet Take 1 tablet (180 mg) by mouth daily 90 tablet 2     fluticasone (FLONASE) 50 MCG/ACT nasal spray Spray 2 sprays into both nostrils as needed       furosemide (LASIX) 20 MG tablet Take 1 tablet (20 mg) by mouth daily 30 tablet 2     ibuprofen 200 MG capsule Take 600-800 mg by mouth At Bedtime       naltrexone-bupropion (CONTRAVE) 8-90 MG per 12 hr tablet Take 2 tablets by mouth 2 times daily 120 tablet 6     ORDER FOR DME Use your CPAP device as directed by your provider.       Sarilumab 200 MG/1.14ML SOAJ Inject 200 mg Subcutaneous every 14 days . Hold for signs of infection and seek medical attention. 2 pen 4     sulfaSALAzine ER (AZULFIDINE EN) 500 MG EC tablet Take 3 tablets (1,500 mg) by mouth 2 times daily 180 tablet 6     topiramate (TOPAMAX) 25 MG tablet 25 mg at bedtime for 1 week, 50 mg at bedtime for 1 week and 75 mg daily at bedtime thereafter 90 tablet 3     REVIEW OF SYSTEMS:   Constitutional:  Fatigue and Weight Gain  Eyes/Head: Negative  Ears/Nose/Throat: Negative  Allergy/Immune: Allergies, seasonal   Skin:Negative  Hematologic/Lymphatic/Immunologic:Negative  Respiratory: Shortness of Breath, with exertion, unsure of etiology, possibly r/t weight gain   Cardiovascular: Swelling in feet, on lasix   Gastrointestinal: Negative  Endocrine: Negative  Musculoskeletal: Arthritis, Back pain, Joint pain and Stiffness  Urinary:  Negative   Any bowel or bladder incontinence: Denies   Neurologic: Negative  Psychiatric: Negative    PHYSICAL EXAM    /84    Pulse 88   Wt 117.3 kg (258 lb 9.6 oz)   SpO2 95%   BMI 44.36 kg/m       Appearance:     A&O. Patient is appropriate.   Patient is in NAD.     HEENT:   Normocephalic, atraumatic, sclera, conjunctiva and pharynx normal. Pupils are equal, round. Hearing is adequate for exam .  Neck: No deformities or adenopathy  Cardiovascular:  No JVD appreciated. No edema on bilateral lower extremities.   Skin:  No rashes, erythema, breakdowns, lesions to exposed skin.   Hematologic:  No bruises, petechiae or ecchymosis to exposed areas.  Musculoskeletal:  Posture upright, shoulders and pelvis are leveled.   Deltoid: R: 4/5 L: 4/5  Biceps: R: 4/5 L: 4/5  Triceps: R: 4/5 L: 4/5  Intrinsic hand:R: 4/5 L: 4/5  Hip flexion: R: 3/5 L: 3/5  Knee ext: R: 3/5 L: 3/5  Knee flex: R: 3/5 L: 3/5  Dorsiflexion: R: 3/5 L: 3/5  Plantarflexion:R: 3/5 L: 3/5    Gait pattern:  Able to walk on the heels and toes. Patient has antalgic gait favoring the left side.     Neurological:   Deep Tendon Reflex exam:   Biceps:     R:  2/4   L: 2/4   Brachioradialis   R:  2/4   L: 2/4:   Triceps:  R:  2/4   L: 2/4   Patella:  R:  2/4   L: 2/4   Achilles:  R:  2/4   L: 2/4    Sensory exam:   Light touch: normal bilateral upper and lower extremities    Vibration: normal in LE   No allodynia, dysesthesia, or hyperalgesia.    Cervical spine:   Flex:  20 degrees, painful    Ext: 20 degrees, painful    Rotation to right: 20 degrees, painful    Rotation to left: 20 degrees, painful    Tenderness in the cervical spine at midline. Yes   Tenderness in the cervical paraspinal muscles. Yes  Thoracic spine:    Kyphosis. No   Tenderness in the thoracic spine at midline. Yes   Tenderness in the thoracic paraspinal muscles. Yes  Lumbar/Sacral spine:   Forward Flexion:  80 degrees, painful    Ext: 10 degrees, painful    Rotation/ext to right: painful    Rotation/ext to left: painful    Lordosis. Yes   Tenderness in the lumbar spine at midline. Yes   Tenderness in the lumbar  paraspinal muscles.Yes   Straight leg exam:    Right: negative     Left:  positive   Bill/Colton's test:      Right: negative     Left:  negative   Passive internal rotation:     Right: positive     Left: negative    Active external rotation:      Right: negative     Left: negative   Tenderness over SI joint:      Right: negative     Left:  negative   Tenderness over piriformis:     Right: negative     Left:  negative   Tenderness over Trochanteric Bursa:     Right: negative     Left: positive     DIRE Score for ongoing opioid management is calculated as follows:    Diagnosis = 2 pts (slowly progressive; moderate pain/objective findings)    Intractability = 1 pt (few therapies tried; passive patient role)    Risk        Psych = 3 pts (no significant personality dysfunction/mental illness; good communication with clinic)         Chem Hlth = 3 pts (no history of chemical dependency; not drug-focused)       Reliability = 3 pts (highly reliable with meds, appointments, treatments)       Social = 3 pts (supportive family/close relationships; involved in work/school; no isolation)       (Psych + Chem hlth + Reliability + Social) = 15    Efficacy = 2 pts (too early/not tried meds)    DIRE Score = 17        7-13: likely NOT suitable candidate for long-term opioid analgesia       14-21: may be a suitable candidate for long-term opioid analgesia      Hypertension: Sharon to follow up with Primary Care provider regarding elevated blood pressure.    Previous Diagnostic Tests:   Imaging Studies:     MRI of the Cervical Spine without contrast 7/23/2014  History: episodic leg weakness,Other musculoskeletal symptoms  referable to limbs(729.89)  Comparison: None.   Technique: Sagittal T1-weighted, T2-weighted and sagittal and axial  T2* gradient echo images were obtained of the cervical spine without  intravenous contrast.  Reconstructed MR myelographic images were also  obtained.  Findings:  There is straightening of the normal  cervical lordosis. Minimal  retrolisthesis of C5 on C6. There is no evidence for fracture or  hemorrhage in the cervical spine. Normal signal within the bone  marrow. Mild narrowing of the intervertebral disc space at C5-C6.  Multilevel disc desiccation. There is normal signal within the  cervical spinal cord which also demonstrates a normal contour. No  evidence of definite restricted diffusion within the cervical spinal  cord. Dominant right vertebral artery.  The findings on a level by level basis are as follows:  C2-3: No spinal canal or neural foraminal narrowing.  C3-4:  No spinal canal or neural foraminal narrowing.  C4-5:  Small posterior disc bulge. No significant spinal canal or  neural foraminal narrowing. No spinal canal or neural foraminal  narrowing.  C5-6:  Small posterior annular disc bulge which indents the ventral  spinal cord resulting in mild to moderate spinal canal narrowing. No  significant neural foraminal narrowing.  C6-7:  Small posterior annular disc bulge with mild spinal canal  narrowing. No significant neural foraminal narrowing.  C7-T1:  No spinal canal or neural foraminal narrowing.  No abnormality of the paraspinal soft tissues.  IMPRESSION:   Multilevel degenerative changes throughout the cervical spine,  greatest at C5-C6 with a posterior disc bulge indenting the ventral  spinal cord resulting in mild to moderate spinal canal narrowing.      MRI of the Lumbar Spine without contrast 5/13/2014  History: Thoracic or lumbosacral neuritis or radiculitis, unspecified  Comparison: None  Technique: Sagittal T1-weighted and T2-weighted and axial T2-weighted  images of the lumbar spine were obtained without intravenous contrast.   Reconstructed MR myelographic images were also obtained.  Findings: There are 5 lumbar-type vertebrae assumed for the purposes  of this dictation.  The tip of the conus medullaris is at followup of  L1-2. There is straightening of the normal lumbar lordosis. The  lumbar  vertebral column appears otherwise normally aligned.  There is mild  disc height narrowing and loss of the normal T2 signal within the disc  at L2-3. Mild Modic type II changes are visualized along the inferior  endplate of L2.  Within the lumbar vertebrae, bone marrow signal is  otherwise normal.  On a level by level basis:   L1-2: No significant spinal canal or foraminal stenosis  L2-3: Tiny broad-based bulge and bilateral facet arthropathy and  thickening of ligamentum flavum. No significant spinal canal or  neuroforaminal stenosis.  L3-4: Bilateral facet arthropathy and thickening of ligamentum flavum  with tiny broad-based bulge resulting in minor spinal canal stenosis.  No significant neuroforaminal stenosis.  L4-5: Tiny broad-based bulge, bilateral facet arthropathy, and  thickening of ligamentum flavum resulting in mild spinal canal  stenosis and mild bilateral neuroforaminal stenosis.  L5-S1: Tiny central disc herniation with small annular tear and  bilateral facet arthropathy resulting in very mild bilateral  neuroforaminal stenosis. The disc abuts the right S1 nerve root in the  lateral recess without evidence of compression. No significant spinal  stenosis.  The paravertebral soft tissues are unremarkable in appearance.  IMPRESSION:  1. Mild multilevel degenerative disc disease and facet arthropathy, as  detailed above.  2. Tiny annular tear and central disc herniation at L5-S1, which abuts  the right S1 nerve root in the lateral recess. Correlate with history  and examination for evidence of right S1 radiculitis.     ASSESSMENT:   1.  Rheumatoid Arthritis affecting multidisciplinary joint   1. Bilateral knee pain, OA vs RA   2. Bilateral hand pain and swelling   3. Bilateral hip pain    4. Bilateral ankle pain   2.  Morbid obesity s/p bariatric surgery   3.  Fibromyalgia   4.  Depression  5.  Cervical spine: DDD, mild stenosis  6.  Lumbar spine: DDD, facet arthropathy, right S1 nerve  involvement, stenosis     Sharon is a pleasant woman who presents for evaluation of pain in her joints related to rheumatoid arthritis.  Additionally she suffers from fibromyalgia.  She is currently taking Biologics.  Has gone through several biologics in the past which lost efficacy.  Complicating her pain issues is morbid obesity.  She previously had weight loss surgery, banding procedure.  After a few years she had a complication band having to be removed.  Since that time she has had a very difficult time controlling her eating.  She is interested in attending pool therapy as it is difficult for her to walk any length.  Imaging is several years old so we will repeat MRIs of her spine.  I have given her a titration of gabapentin.  I will see her 1 week after the MRI.    PLAN:    Diagnosis reviewed, treatment option addressed, and risk/benefits discussed.  Self-care instructions given.  I am recommending a multidisciplinary treatment plan to help this patient better manage pain.       1. Schedule physical therapy assessment. Order for Pool Therapy provided  2. Schedule follow-up with CHELSEY Alex, NPWilyC in 1 week after MRI   3. Imaging: MRI of lumbar spine  [facility] Please call to schedule: [phone] or [phone]  [facility]:   Please call to schedule: [phone]  4. Procedures recommended: We will discuss this after MRI    5. Medication recommendations:   1. Gabapentin 100 mg , titration to 300 mg TID provided.       TIME SPENT:   A total of 60  minutes was spent on the patient today, greater than 50% of that time was spent on face to face counseling and care coordination regarding diagnoses and treatment options as mentioned above.    I would like to thank Dr. Guerra for allowing me to participate in the management of this patient.     CHELSEY Gamez, NP-C  [facility]    Disclaimer: This note consists of symbols derived from  keyboarding, dictation and/or voice recognition software. As a result, there may be errors in the script that have gone undetected. Please consider this when interpreting information found in this chart.

## 2019-04-25 NOTE — PATIENT INSTRUCTIONS
After Visit Instructions:     Thank you for coming to [facility] for your care. It is my goal to partner with you to help you reach your optimal state of health.     Continue daily self-care, identifying contributing factors, and monitoring variations in pain level. Continue to integrate self-care into your life.      1. Schedule physical therapy assessment. Order for Pool Therapy provided  2. Schedule follow-up with CHELSEY Alex NP-C in 1 week after MRI   3. Imaging: MRI of lumbar spine  [facility] Please call to schedule: [phone] or [phone]  [facility]:   Please call to schedule: [phone]  4. Procedures recommended: We will discuss this after MRI    5. Medication recommendations:   1. Gabapentin 100 mg   Here is how you should take your Neurontin  (gabapentin):  Day 1: _______________Take one 100 mg capsule at bedtime for three days.  Day 4: _______________Take one 100 mg capsule in the morning and one 100 mg capsule  at bedtime. Do this for three days.  Day 7:_______________ Take one 100 mg capsule three times a day.  Day 10: ______________Take one 100 mg capsule in the morning, one 100 mg capsule midday and two 100 mg capsules at bedtime.  Day 13:______________ Take two 100 mg capsules in the morning, one 100 mg capsule  midday and two 100mg capsules at bedtime.  Day 16:______________Take two 100 mg capsules three times a day.  Day 19:______________Take two 100mg capsules in the morning, two 100mg capsules  midday and three 100mg capsules at bedtime.  Day 22:______________Take three 100mg capsules in the morning, two 100mg capsules  midday and three 100mg tablets at bedtime.  Day 25: ______________Take three 100 mg tablets three times a day. Continue at this dose.      Additional Instructions  Caution for sedation.    Do not drive until you know how the medication affects you.   You can increase more slowly if you need to.  Do not stop  abruptly once at higher doses.  This medication must be tapered off.        CHELSEY Gamez, NP-C  [facility] Number:  [phone]

## 2019-05-07 ENCOUNTER — HOSPITAL ENCOUNTER (OUTPATIENT)
Dept: MRI IMAGING | Facility: CLINIC | Age: 64
Discharge: HOME | End: 2019-05-07
Attending: NURSE PRACTITIONER | Admitting: NURSE PRACTITIONER
Payer: COMMERCIAL

## 2019-05-07 DIAGNOSIS — M47.816 LUMBAR FACET ARTHROPATHY: ICD-10-CM

## 2019-05-07 DIAGNOSIS — M54.16 LUMBAR RADICULOPATHY: ICD-10-CM

## 2019-05-07 DIAGNOSIS — M51.369 DDD (DEGENERATIVE DISC DISEASE), LUMBAR: ICD-10-CM

## 2019-05-07 PROCEDURE — 72148 MRI LUMBAR SPINE W/O DYE: CPT

## 2019-05-13 ENCOUNTER — OFFICE VISIT (OUTPATIENT)
Dept: ENDOCRINOLOGY | Facility: CLINIC | Age: 64
End: 2019-05-13
Payer: COMMERCIAL

## 2019-05-13 ENCOUNTER — TELEPHONE (OUTPATIENT)
Dept: ENDOCRINOLOGY | Facility: CLINIC | Age: 64
End: 2019-05-13

## 2019-05-13 VITALS
HEIGHT: 64 IN | HEART RATE: 90 BPM | SYSTOLIC BLOOD PRESSURE: 125 MMHG | WEIGHT: 266.8 LBS | OXYGEN SATURATION: 94 % | BODY MASS INDEX: 45.55 KG/M2 | DIASTOLIC BLOOD PRESSURE: 60 MMHG

## 2019-05-13 DIAGNOSIS — E66.01 OBESITY, CLASS III, BMI 40-49.9 (MORBID OBESITY) (H): Primary | ICD-10-CM

## 2019-05-13 RX ORDER — PHENTERMINE HYDROCHLORIDE 30 MG/1
30 CAPSULE ORAL EVERY MORNING
Qty: 30 CAPSULE | Refills: 3 | Status: SHIPPED | OUTPATIENT
Start: 2019-05-13 | End: 2019-06-14 | Stop reason: ALTCHOICE

## 2019-05-13 RX ORDER — TOPIRAMATE 25 MG/1
75 TABLET, FILM COATED ORAL 2 TIMES DAILY
Qty: 540 TABLET | Refills: 3 | Status: SHIPPED | OUTPATIENT
Start: 2019-05-13 | End: 2019-05-14

## 2019-05-13 ASSESSMENT — ENCOUNTER SYMPTOMS
RECTAL PAIN: 0
CLAUDICATION: 0
PANIC: 0
SPEECH CHANGE: 0
JOINT SWELLING: 1
SKIN CHANGES: 0
SORE THROAT: 0
BOWEL INCONTINENCE: 0
SHORTNESS OF BREATH: 0
MYALGIAS: 0
VOMITING: 0
MEMORY LOSS: 0
FLANK PAIN: 0
POLYDIPSIA: 0
HALLUCINATIONS: 0
CHILLS: 0
NERVOUS/ANXIOUS: 0
WEAKNESS: 0
ALTERED TEMPERATURE REGULATION: 0
WHEEZING: 0
COUGH: 0
HYPOTENSION: 0
MUSCLE WEAKNESS: 0
DEPRESSION: 0
EYE IRRITATION: 0
POLYPHAGIA: 0
NAUSEA: 0
ARTHRALGIAS: 1
WEIGHT GAIN: 0
FEVER: 0
DIZZINESS: 0
EYE PAIN: 0
EXTREMITY NUMBNESS: 0
HEARTBURN: 0
DIARRHEA: 0
INSOMNIA: 0
HEMOPTYSIS: 0
POOR WOUND HEALING: 0
TROUBLE SWALLOWING: 0
HOARSE VOICE: 0
LOSS OF CONSCIOUSNESS: 0
TINGLING: 0
DECREASED APPETITE: 0
ORTHOPNEA: 0
SINUS PAIN: 0
COUGH DISTURBING SLEEP: 0
LEG PAIN: 0
PALPITATIONS: 0
RECTAL BLEEDING: 0
SLEEP DISTURBANCES DUE TO BREATHING: 0
DECREASED LIBIDO: 0
SYNCOPE: 0
SNORES LOUDLY: 0
DECREASED CONCENTRATION: 0
NECK MASS: 0
BLOOD IN STOOL: 0
NAIL CHANGES: 0
LIGHT-HEADEDNESS: 0
HEADACHES: 0
PARALYSIS: 0
EXERCISE INTOLERANCE: 0
INCREASED ENERGY: 0
TASTE DISTURBANCE: 0
NIGHT SWEATS: 0
CONSTIPATION: 0
HEMATURIA: 0
SEIZURES: 0
STIFFNESS: 1
WEIGHT LOSS: 1
SWOLLEN GLANDS: 0
EYE REDNESS: 0
DYSPNEA ON EXERTION: 1
MUSCLE CRAMPS: 0
DISTURBANCES IN COORDINATION: 0
HYPERTENSION: 0
DYSURIA: 0
TREMORS: 0
BREAST MASS: 0
NECK PAIN: 0
TACHYCARDIA: 0
DIFFICULTY URINATING: 0
EYE WATERING: 0
POSTURAL DYSPNEA: 0
BLOATING: 0
ABDOMINAL PAIN: 0
SINUS CONGESTION: 0
HOT FLASHES: 0
BACK PAIN: 0
DOUBLE VISION: 0
SMELL DISTURBANCE: 0
BREAST PAIN: 0
NUMBNESS: 0
FATIGUE: 1
LEG SWELLING: 0
JAUNDICE: 0
BRUISES/BLEEDS EASILY: 0

## 2019-05-13 ASSESSMENT — MIFFLIN-ST. JEOR: SCORE: 1760.52

## 2019-05-13 NOTE — LETTER
"2019     RE: Sharon Fung  [address] [facility] [id number]     Dear Colleague,    Thank you for referring your patient, Sharon Fung, to the [facility] MEDICAL WEIGHT MANAGEMENT at [facility]. Please see a copy of my visit note below.    Return Medical Weight Management Note     Sharon Fung  MRN:  [id number]  :  1955  JENIFER:  19    Dear Reynaldo Saavedra,    I had the pleasure of seeing your patient Sharon Fung.  She is a 62 year old female who I am continuing to see for treatment of obesity related to:       2016   I have the following co-morbidities associated with obesity: DJD (Degenerative Joint Disease), Back Pain     RA  Depression    INTERVAL HISTORY:  2018 I started her on phentermine/topiramate (QSYMIA). It was rejected by insurance on the basis of having to try and fail Contrave first. She ended up trying generic forms of phentermine and topiramate (low doses) but did not get any weight loss results so she stopped them.     Last visit 19 I started her on Contrave.  She did not have any response. I then restarted her onTopiramate 75 mg at bedtime.     Exercise: will start going to the [facility] for water exercises    Diet:   Avoiding snacks at work  More vegetables and fruits  BF: bowl of special K  Lunch: cafeteria: protein and veg  Dinner: makes something quick. Hungry when she gets home. Does not cook. May get drive through food ([employer])    Feels hungry all the time. Like she does not get full.  Addiction to sugar    CURRENT WEIGHT:   266 lbs 12.8 oz    Wt Readings from Last 4 Encounters:   19 121 kg (266 lb 12.8 oz)   19 117.3 kg (258 lb 9.6 oz)   19 117.3 kg (258 lb 9.6 oz)   19 116.6 kg (257 lb)     Height:  5' 4.02\"  Body Mass Index:  Body mass index is 45.77 kg/m .  Vitals:  B/P: 102/63, P: 80    Initial consult weight was 240 on 16.  Weight change since last seen on " 1/14/18 is up 8 pounds.   Total gain is 26 pounds.    Diet and Activity Changes Since Last Visit Reviewed With Patient 5/9/2019   I have made the following changes to my diet since my last visit: eating more fruit and veg   With regards to my diet, I am still struggling with: over eating and feeling hungry all of the time   For breakfast, I typically eat: -   For lunch, I typically eat: -   For supper, I typically eat: -   For snack(s), I typically eat: -   I have made the following changes to my activity/exercise since my last visit: trying to walk more   With regards to my activity/exercise, I am still struggling with: doing it     Review of Systems     Constitutional:  Positive for weight loss and fatigue. Negative for fever, chills, weight gain, decreased appetite, night sweats, recent stressors, height loss, post-operative complications, incisional pain, hallucinations, increased energy, hyperactivity and confused.   HENT:  Negative for ear pain, hearing loss, tinnitus, nosebleeds, trouble swallowing, hoarse voice, mouth sores, sore throat, ear discharge, tooth pain, gum tenderness, taste disturbance, smell disturbance, hearing aid, bleeding gums, dry mouth, sinus pain, sinus congestion and neck mass.    Eyes:  Negative for double vision, pain, redness, eye pain, decreased vision, eye watering, eye bulging, eye dryness, flashing lights, spots, floaters, strabismus, tunnel vision, jaundice and eye irritation.   Respiratory:   Positive for dyspnea on exertion. Negative for cough, hemoptysis, shortness of breath, wheezing, sleep disturbances due to breathing, snores loudly, cough disturbing sleep and postural dyspnea.    Cardiovascular:  Positive for dyspnea on exertion. Negative for chest pain, palpitations, orthopnea, claudication, leg swelling, fingers/toes turn blue, hypertension, hypotension, syncope, history of heart murmur, chest pain on exertion, chest pain at rest, pacemaker, few scattered varicosities,  leg pain, sleep disturbances due to breathing, tachycardia, light-headedness, exercise intolerance and edema.   Gastrointestinal:  Negative for heartburn, nausea, vomiting, abdominal pain, diarrhea, constipation, blood in stool, melena, rectal pain, bloating, hemorrhoids, bowel incontinence, jaundice, rectal bleeding, coffee ground emesis and change in stool.   Genitourinary:  Negative for bladder incontinence, dysuria, urgency, hematuria, flank pain, vaginal discharge, difficulty urinating, genital sores, dyspareunia, decreased libido, nocturia, voiding less frequently, arousal difficulty, abnormal vaginal bleeding, excessive menstruation, menstrual changes, hot flashes, vaginal dryness and postmenopausal bleeding.   Musculoskeletal:  Positive for joint swelling, arthralgias and stiffness. Negative for myalgias, back pain, muscle cramps, neck pain, bone pain, muscle weakness and fracture.   Skin:  Negative for nail changes, itching, poor wound healing, rash, hair changes, skin changes, acne, warts, poor wound healing, scarring, flaky skin, Raynaud's phenomenon, sensitivity to sunlight and skin thickening.   Neurological:  Negative for dizziness, tingling, tremors, speech change, seizures, loss of consciousness, weakness, light-headedness, numbness, headaches, disturbances in coordination, extremity numbness, memory loss, difficulty walking and paralysis.   Endo/Heme:  Negative for anemia, swollen glands and bruises/bleeds easily.   Psychiatric/Behavioral:  Negative for depression, hallucinations, memory loss, decreased concentration, mood swings and panic attacks.    Breast:  Negative for breast discharge, breast mass, breast pain and nipple retraction.   Endocrine:  Negative for altered temperature regulation, polyphagia, polydipsia, unwanted hair growth and change in facial hair.   has HAYS x about 6 weeks, denies chest pain or palpitations    MEDICATIONS:   Current Outpatient Medications   Medication      acetaminophen (TYLENOL) 500 MG tablet     albuterol (PROAIR HFA/PROVENTIL HFA/VENTOLIN HFA) 108 (90 Base) MCG/ACT inhaler     azelastine (OPTIVAR) 0.05 % SOLN ophthalmic solution     citalopram (CELEXA) 20 MG tablet     cycloSPORINE (RESTASIS) 0.05 % ophthalmic emulsion     fexofenadine (ALLEGRA) 180 MG tablet     fluticasone (FLONASE) 50 MCG/ACT nasal spray     furosemide (LASIX) 20 MG tablet     gabapentin (NEURONTIN) 100 MG capsule     ibuprofen 200 MG capsule     naltrexone-bupropion (CONTRAVE) 8-90 MG per 12 hr tablet     ORDER FOR DME     Sarilumab 200 MG/1.14ML SOAJ     sulfaSALAzine ER (AZULFIDINE EN) 500 MG EC tablet     topiramate (TOPAMAX) 25 MG tablet     No current facility-administered medications for this visit.      Facility-Administered Medications Ordered in Other Visits   Medication     sodium chloride (PF) 0.9% PF flush 10 mL     sodium chloride bacteriostatic 0.9 % flush 12 mL       Weight Loss Medication History Reviewed With Patient 5/9/2019   Which weight loss medications are you currently taking on a regular basis?  Contrave (naltrexone/bupropion), Topamax (topiramate)   If you are not taking a weight loss medication that was prescribed to you, please indicate why: -   Are you having any side effects from the weight loss medication that we have prescribed you? No   If you are having side effects please describe: -       ASSESSMENT:   Morbid obesity  Minimal response to Strattera  No response to low dose phentermine/topiramate  No response to Contrave    PLAN:   Stop Contrave    Will tyt to get SAxenda covered on basis of above    In the meantime, I will prescribe phentermine 30 mg daily and double her topiramate to 75 mg BID.     Diet: meal prep, use meal replacements when unable to prep or pack food    FOLLOW-UP:    6 weeks.    Time: 25 min spent on evaluation, management, counseling, education, & motivational interviewing with greater than 50% of the total time was spent on counseling and  coordinating care    Sincerely,    Litzy Vallejo MD

## 2019-05-13 NOTE — PROGRESS NOTES
"Return Medical Weight Management Note     Sharon Fung  MRN:  [id number]  :  1955  JENIFER:  19    Dear Reynaldo Saavedra,    I had the pleasure of seeing your patient Sharon Fung.  She is a 62 year old female who I am continuing to see for treatment of obesity related to:       2016   I have the following co-morbidities associated with obesity: DJD (Degenerative Joint Disease), Back Pain     RA  Depression    INTERVAL HISTORY:  2018 I started her on phentermine/topiramate (QSYMIA). It was rejected by insurance on the basis of having to try and fail Contrave first. She ended up trying generic forms of phentermine and topiramate (low doses) but did not get any weight loss results so she stopped them.     Last visit 19 I started her on Contrave.  She did not have any response. I then restarted her onTopiramate 75 mg at bedtime.     Exercise: will start going to the [facility] for water exercises    Diet:   Avoiding snacks at work  More vegetables and fruits  BF: bowl of special K  Lunch: cafeteria: protein and veg  Dinner: makes something quick. Hungry when she gets home. Does not cook. May get drive through food ([employer])    Feels hungry all the time. Like she does not get full.  Addiction to sugar    CURRENT WEIGHT:   266 lbs 12.8 oz    Wt Readings from Last 4 Encounters:   19 121 kg (266 lb 12.8 oz)   19 117.3 kg (258 lb 9.6 oz)   19 117.3 kg (258 lb 9.6 oz)   19 116.6 kg (257 lb)     Height:  5' 4.02\"  Body Mass Index:  Body mass index is 45.77 kg/m .  Vitals:  B/P: 102/63, P: 80    Initial consult weight was 240 on 16.  Weight change since last seen on 18 is up 8 pounds.   Total gain is 26 pounds.    Diet and Activity Changes Since Last Visit Reviewed With Patient 2019   I have made the following changes to my diet since my last visit: eating more fruit and veg   With regards to my diet, I am still struggling with: over eating and feeling hungry " all of the time   For breakfast, I typically eat: -   For lunch, I typically eat: -   For supper, I typically eat: -   For snack(s), I typically eat: -   I have made the following changes to my activity/exercise since my last visit: trying to walk more   With regards to my activity/exercise, I am still struggling with: doing it     Review of Systems     Constitutional:  Positive for weight loss and fatigue. Negative for fever, chills, weight gain, decreased appetite, night sweats, recent stressors, height loss, post-operative complications, incisional pain, hallucinations, increased energy, hyperactivity and confused.   HENT:  Negative for ear pain, hearing loss, tinnitus, nosebleeds, trouble swallowing, hoarse voice, mouth sores, sore throat, ear discharge, tooth pain, gum tenderness, taste disturbance, smell disturbance, hearing aid, bleeding gums, dry mouth, sinus pain, sinus congestion and neck mass.    Eyes:  Negative for double vision, pain, redness, eye pain, decreased vision, eye watering, eye bulging, eye dryness, flashing lights, spots, floaters, strabismus, tunnel vision, jaundice and eye irritation.   Respiratory:   Positive for dyspnea on exertion. Negative for cough, hemoptysis, shortness of breath, wheezing, sleep disturbances due to breathing, snores loudly, cough disturbing sleep and postural dyspnea.    Cardiovascular:  Positive for dyspnea on exertion. Negative for chest pain, palpitations, orthopnea, claudication, leg swelling, fingers/toes turn blue, hypertension, hypotension, syncope, history of heart murmur, chest pain on exertion, chest pain at rest, pacemaker, few scattered varicosities, leg pain, sleep disturbances due to breathing, tachycardia, light-headedness, exercise intolerance and edema.   Gastrointestinal:  Negative for heartburn, nausea, vomiting, abdominal pain, diarrhea, constipation, blood in stool, melena, rectal pain, bloating, hemorrhoids, bowel incontinence, jaundice, rectal  bleeding, coffee ground emesis and change in stool.   Genitourinary:  Negative for bladder incontinence, dysuria, urgency, hematuria, flank pain, vaginal discharge, difficulty urinating, genital sores, dyspareunia, decreased libido, nocturia, voiding less frequently, arousal difficulty, abnormal vaginal bleeding, excessive menstruation, menstrual changes, hot flashes, vaginal dryness and postmenopausal bleeding.   Musculoskeletal:  Positive for joint swelling, arthralgias and stiffness. Negative for myalgias, back pain, muscle cramps, neck pain, bone pain, muscle weakness and fracture.   Skin:  Negative for nail changes, itching, poor wound healing, rash, hair changes, skin changes, acne, warts, poor wound healing, scarring, flaky skin, Raynaud's phenomenon, sensitivity to sunlight and skin thickening.   Neurological:  Negative for dizziness, tingling, tremors, speech change, seizures, loss of consciousness, weakness, light-headedness, numbness, headaches, disturbances in coordination, extremity numbness, memory loss, difficulty walking and paralysis.   Endo/Heme:  Negative for anemia, swollen glands and bruises/bleeds easily.   Psychiatric/Behavioral:  Negative for depression, hallucinations, memory loss, decreased concentration, mood swings and panic attacks.    Breast:  Negative for breast discharge, breast mass, breast pain and nipple retraction.   Endocrine:  Negative for altered temperature regulation, polyphagia, polydipsia, unwanted hair growth and change in facial hair.   has HAYS x about 6 weeks, denies chest pain or palpitations    MEDICATIONS:   Current Outpatient Medications   Medication     acetaminophen (TYLENOL) 500 MG tablet     albuterol (PROAIR HFA/PROVENTIL HFA/VENTOLIN HFA) 108 (90 Base) MCG/ACT inhaler     azelastine (OPTIVAR) 0.05 % SOLN ophthalmic solution     citalopram (CELEXA) 20 MG tablet     cycloSPORINE (RESTASIS) 0.05 % ophthalmic emulsion     fexofenadine (ALLEGRA) 180 MG tablet      fluticasone (FLONASE) 50 MCG/ACT nasal spray     furosemide (LASIX) 20 MG tablet     gabapentin (NEURONTIN) 100 MG capsule     ibuprofen 200 MG capsule     naltrexone-bupropion (CONTRAVE) 8-90 MG per 12 hr tablet     ORDER FOR DME     Sarilumab 200 MG/1.14ML SOAJ     sulfaSALAzine ER (AZULFIDINE EN) 500 MG EC tablet     topiramate (TOPAMAX) 25 MG tablet     No current facility-administered medications for this visit.      Facility-Administered Medications Ordered in Other Visits   Medication     sodium chloride (PF) 0.9% PF flush 10 mL     sodium chloride bacteriostatic 0.9 % flush 12 mL       Weight Loss Medication History Reviewed With Patient 5/9/2019   Which weight loss medications are you currently taking on a regular basis?  Contrave (naltrexone/bupropion), Topamax (topiramate)   If you are not taking a weight loss medication that was prescribed to you, please indicate why: -   Are you having any side effects from the weight loss medication that we have prescribed you? No   If you are having side effects please describe: -       ASSESSMENT:   Morbid obesity  Minimal response to Strattera  No response to low dose phentermine/topiramate  No response to Contrave    PLAN:   Stop Contrave    Will tyt to get SAxenda covered on basis of above    In the meantime, I will prescribe phentermine 30 mg daily and double her topiramate to 75 mg BID.     Diet: meal prep, use meal replacements when unable to prep or pack food    FOLLOW-UP:    6 weeks.    Time: 25 min spent on evaluation, management, counseling, education, & motivational interviewing with greater than 50% of the total time was spent on counseling and coordinating care    Sincerely,    Litzy Vallejo MD

## 2019-05-13 NOTE — TELEPHONE ENCOUNTER
Prior Authorization Retail Medication Request    Medication/Dose: Saxenda  ICD code (if different than what is on RX):  Obesity, Class III, BMI 40-49.9 (morbid obesity) (H) [E66.01]  - Primary   Previously Tried and Failed:  Low dose phentermine, low dose topiramate, Contrave, Strattera, history of diet and exercise  Rationale:  She is a 62 year old female who I am continuing to see for treatment of obesity related to: DJD (Degenerative Joint Disease), Back Pain. April 2018 I started her on phentermine/topiramate (QSYMIA). It was rejected by insurance on the basis of having to try and fail Contrave first. She ended up trying generic forms of phentermine and topiramate but did not get any weight loss results so she stopped them.     Last visit 1/14/19 I started her on Contrave.  Topiramate 75 mg at bedtime.   Feels hungry all the time. Like she does not get full.  Addiction to sugar  Minimal response to Strattera    Insurance Name:    Insurance ID:        Pharmacy Information (if different than what is on RX)  Name:  [facility] [place], [place] - [address]  Phone:  [phone]

## 2019-05-14 ENCOUNTER — TELEPHONE (OUTPATIENT)
Dept: ENDOCRINOLOGY | Facility: CLINIC | Age: 64
End: 2019-05-14

## 2019-05-14 DIAGNOSIS — E66.01 OBESITY, CLASS III, BMI 40-49.9 (MORBID OBESITY) (H): ICD-10-CM

## 2019-05-14 RX ORDER — TOPIRAMATE 25 MG/1
75 TABLET, FILM COATED ORAL 2 TIMES DAILY
Qty: 540 TABLET | Refills: 3 | Status: SHIPPED | OUTPATIENT
Start: 2019-05-14 | End: 2019-06-14 | Stop reason: ALTCHOICE

## 2019-05-14 NOTE — TELEPHONE ENCOUNTER
Prior Authorization Retail Medication Request    Medication/Dose: Phentermine 30mg Capsule  ICD code (if different than what is on RX):  n/a  Previously Tried and Failed:  n/a  Rationale:  n/a    Insurance Name:  [employer]  Insurance ID:  [id number]      Pharmacy Information (if different than what is on RX)  Name:  [facility]  Phone:  [phone]

## 2019-05-14 NOTE — TELEPHONE ENCOUNTER
Central Prior Authorization Team   Phone: [phone]      PA Initiation    Medication: Saxenda-PA initiated  Insurance Company: for; to (do) - Phone [phone] Fax [phone]  Pharmacy Filling the Rx: [place], [place] - [address]  Filling Pharmacy Phone: [phone]  Filling Pharmacy Fax:    Start Date: 5/14/2019

## 2019-05-14 NOTE — TELEPHONE ENCOUNTER
Reason for call:  Medication, ALDO FROM [facility] CALLED WITH QUESTION ON DOSAGE ON TOPIRIMATE.   If this is a refill request, has the caller requested the refill from the pharmacy already? N/A  Will the patient be using a [facility]? N/A  Name of the pharmacy and phone number for the current request: [facility]    Name of the medication requested: TOPIRIMATE    Other request: NONE    Phone number to reach patient:  Other phone number:  [phone] / PHARMACIST ALDO    Best Time:  NONE    Can we leave a detailed message on this number?  Not Applicable

## 2019-05-15 NOTE — TELEPHONE ENCOUNTER
Prior Authorization Approval    Authorization Effective Date: 5/14/2019  Authorization Expiration Date: 11/10/2019  Medication: Saxenda-PA APPROVED  Approved Dose/Quantity:   Reference #: [id number]   Insurance Company: [employer] - Phone [phone] Fax [phone]  Expected CoPay:       CoPay Card Available:      Foundation Assistance Needed:    Which Pharmacy is filling the prescription (Not needed for infusion/clinic administered): [facility] [place], [place] - [facility] [address]  Pharmacy Notified: Yes  Patient Notified: No-Pharmacy will contact

## 2019-05-15 NOTE — TELEPHONE ENCOUNTER
Central Prior Authorization Team   Phone: [phone]      PA Initiation    Medication: Phentermine 30mg Capsule-PA initiated  Insurance Company: [employer] - Phone [phone] Fax [phone]  Pharmacy Filling the Rx: [place], [place] - [address]  Filling Pharmacy Phone: [phone]  Filling Pharmacy Fax:    Start Date: 5/15/2019

## 2019-05-29 ENCOUNTER — OFFICE VISIT (OUTPATIENT)
Dept: PALLIATIVE MEDICINE | Facility: CLINIC | Age: 64
End: 2019-05-29
Payer: COMMERCIAL

## 2019-05-29 VITALS
BODY MASS INDEX: 44.26 KG/M2 | DIASTOLIC BLOOD PRESSURE: 73 MMHG | WEIGHT: 258 LBS | SYSTOLIC BLOOD PRESSURE: 124 MMHG | HEART RATE: 87 BPM | OXYGEN SATURATION: 93 %

## 2019-05-29 DIAGNOSIS — M79.7 FIBROMYALGIA: Primary | ICD-10-CM

## 2019-05-29 DIAGNOSIS — M05.9 SEROPOSITIVE RHEUMATOID ARTHRITIS (H): ICD-10-CM

## 2019-05-29 DIAGNOSIS — M54.16 LUMBAR RADICULOPATHY: ICD-10-CM

## 2019-05-29 PROCEDURE — 99213 OFFICE O/P EST LOW 20 MIN: CPT | Performed by: NURSE PRACTITIONER

## 2019-05-29 ASSESSMENT — PAIN SCALES - GENERAL: PAINLEVEL: MILD PAIN (2)

## 2019-05-29 NOTE — PATIENT INSTRUCTIONS
After Visit Instructions:     Thank you for coming to [facility] for your care. It is my goal to partner with you to help you reach your optimal state of health.     Continue daily self-care, identifying contributing factors, and monitoring variations in pain level. Continue to integrate self-care into your life.      1. Schedule follow-up with CHELSEY Alex NP-C in 1-2 months if needed   2. Medication recommendations:   1. Gabapentin: increase as tolerated to 300 mg in the morning and 600 mg at bedtime. Okay to split daytime doses. Okay to stop increasing dose if too sleepy.       CHELSEY Gamez NP-C  [facility]    Clinic Number:  [phone]   Call this number with any questions about your care and for scheduling assistance. Calls are returned Monday through Friday between 8 AM and 4:30 PM. We usually get back to you within 2 business days depending on the issue/request.       Medication refills:    For non-opioid medications, call your pharmacy directly to request a refill. The pharmacy will contact the [facility] for authorization. Please allow 3-4 days for these refills to be processed.     For opioid refills, call the clinic number or send a [employer] message. Please contact us 7-10 days before your refill is due. The message MUST include the name of the specific medication(s) requested and how you would like to receive the prescription(s). The options are as follows:    Pain Clinic staff can mail the prescription to your pharmacy. Please tell us the name of the pharmacy.    You may pick the prescription up at the Pain Clinic (tell us the location) or during a clinic visit with your pain provider    Pain Clinic staff can deliver the prescription to the [facility] in the clinic building. Please tell us the location.      We believe regular attendance is key to your success in our program.    Any time you  are unable to keep your appointment we ask that you call us at least 24 hours in advance to let us know. This will allow us to offer the appointment time to another patient.

## 2019-05-29 NOTE — PROGRESS NOTES
[facility]    CHIEF COMPLAINT:  Joint pain    INTERVAL HISTORY:  Last seen on 4/25/19.        Recommendations/plan at the last visit included:               1. Schedule physical therapy assessment. Order for Pool Therapy provided  2. Schedule follow-up with CHELSEY Alex NP-C in 1 week after MRI   3. Imaging: MRI of lumbar spine  1. [facility] Please call to schedule: [phone] or [phone]  2. [facility] Imaging:   Please call to schedule: [phone]  4. Procedures recommended: We will discuss this after MRI    5. Medication recommendations:        1. Gabapentin 100 mg , titration to 300 mg TID provided.       Since last visit:   - Hasn't been going to pool PT because it would be out of pocket. Has a friend who does teaches pool PT and will help her with exercises at the [facility].   - Gabapentin is helpful. Up to 200 mg and 300 mg at HS. Has improved all pain related issues.     Pain Information:   Pain quality: Aching    Pain rating: intensity ranges from 0/10 to 4/10, and averages 3/10 on a 0-10 scale.      Annual Controlled Substance Agreement/UDS due date: N/A    CURRENT RELEVANT PAIN MEDICATIONS:   Motrin 800 mg BID   Tylenol 500 mg BID   Gabapentin 200 mg in am & 300 mg at HS    Patient is using the medication as prescribed:  YES  Is your medication helpful? NO   Medication side effects? denies any problems    Previous Pain Relevant Medications: (H--helped; HI--Helped initially; SWH--Somewhat helpful; NH--No help; W--worse; SE--side effects; ?--Unsure if helpful)   NOTE: This medication information taken from patient's intake form, not medical records.               Opiates: None               NSAIDS: Ibuprofen: H, ketorolac: H, naproxen: H              Muscle Relaxants: None noted              Anti-migraine mediations: None noted              Anti-depressants: None              Sleep aids: None              Anxiolytics: None              Neuropathics:  None                  Topicals: None              Other medications not covered above: Tylenol: H, prednisone: H     Any illicit drug use: none  EtOH use: rare   Caffeine use: 3-4 per day   Nicotine use: none   Any use of prescriptions other than how they were prescribed:none         THE 4 As OF OPIOID MAINTENANCE ANALGESIA    Analgesia: Is pain relief clinically significant? N/A   Activity: Is patient functional and able to perform Activities of Daily Living? N/A   Adverse effects: Is patient free from adverse side effects from opiates? N/A   Adherence to Rx protocol: Is patient adhering to Controlled Substance Agreement and taking medications ONLY as ordered? N/A     Is Narcan prescribed for opiate use >50 MME daily? N/A     [place] Board of Pharmacy Data Base Reviewed:    YES; No concern for abuse or misuse of controlled medications based on this report.     Past Pain Treatments:              Pain Clinic:   No               PT: No               Psychologist: No             Relaxation techniques/biofeedback: No             Chiropractor: No             Acupuncture: No             Pharmacotherapy:                         Opioids: No                          Non-opioids:  Yes              TENs Unit:No             Injections: Yes: for right bunion              Self-care:   Yes              Surgeries related to pain: No    Medications:  Current Outpatient Medications   Medication Sig Dispense Refill     acetaminophen (TYLENOL) 500 MG tablet Take 500-1,000 mg by mouth every 6 hours as needed for mild pain       albuterol (PROAIR HFA/PROVENTIL HFA/VENTOLIN HFA) 108 (90 Base) MCG/ACT inhaler Inhale 2 puffs into the lungs every 6 hours as needed for shortness of breath / dyspnea or wheezing 1 Inhaler 1     azelastine (OPTIVAR) 0.05 % SOLN ophthalmic solution Apply 1 drop to eye 2 times daily 1 Bottle 6     citalopram (CELEXA) 20 MG tablet Take 1 tablet (20 mg) by mouth daily 90 tablet 1     cycloSPORINE (RESTASIS) 0.05 %  ophthalmic emulsion Place 1 drop into both eyes 2 times daily       fexofenadine (ALLEGRA) 180 MG tablet Take 1 tablet (180 mg) by mouth daily 90 tablet 2     fluticasone (FLONASE) 50 MCG/ACT nasal spray Spray 2 sprays into both nostrils as needed       furosemide (LASIX) 20 MG tablet Take 1 tablet (20 mg) by mouth daily 30 tablet 2     gabapentin (NEURONTIN) 100 MG capsule Take 1 cap (100 mg at HS for 4 days, increase as directed every 4 days to max of 3 caps (300 mg) TID. 150 capsule 1     ibuprofen 200 MG capsule Take 600-800 mg by mouth At Bedtime       insulin pen needle (31G X 5 MM) 31G X 5 MM miscellaneous Use 1 pen needles daily or as directed. 100 each 3     liraglutide - Weight Management (SAXENDA) 18 MG/3ML pen Inject 3 mg Subcutaneous daily 15 mL 3     ORDER FOR DME Use your CPAP device as directed by your provider.       phentermine (ADIPEX-P) 30 MG capsule Take 1 capsule (30 mg) by mouth every morning 30 capsule 3     Sarilumab 200 MG/1.14ML SOAJ Inject 200 mg Subcutaneous every 14 days . Hold for signs of infection and seek medical attention. 2 pen 4     sulfaSALAzine ER (AZULFIDINE EN) 500 MG EC tablet Take 3 tablets (1,500 mg) by mouth 2 times daily 180 tablet 6     topiramate (TOPAMAX) 25 MG tablet Take 3 tablets (75 mg) by mouth 2 times daily 540 tablet 3       Review of Systems: A 10-point review of systems was negative, with the exception of chronic pain issues.      Social History: Reviewed; unchanged from previous consultation.      Family history: Reviewed; unchanged from previous consultation.     PHYSICAL EXAM    Vitals:    05/29/19 1600   BP: 124/73   Pulse: 87   SpO2: 93%   Weight: 117 kg (258 lb)       Constitutional: healthy, alert and no distress  HEENT: Head atraumatic, normocephalic. Eyes without conjunctival injection or jaundice. Neck supple. No obvious neck masses.  Cardiovascular: No edema or JVD appreciated.   Skin: No rash, lesions, or petechiae of exposed skin.   Extremities:  No clubbing, cyanosis, or edema to exposed extremities  Psychiatric/mental status: Alert, without lethargy or stupor. Appropriate affect. Mood normal.  Neurologic exam:  CN:  Cranial nerves 2-12 are grossly normal.  Motor:  4/5 UE and LE strength    Musculoskeletal exam:  Lumbar/Sacral spine:             Forward Flexion:  80 degrees, painful              Ext: 10 degrees, painful              Rotation/ext to right: painful              Rotation/ext to left: painful              Lordosis. Yes             Tenderness in the lumbar spine at midline. Yes             Tenderness in the lumbar paraspinal muscles.Yes     DIRE Score for ongoing opioid management is calculated as follows:    Diagnosis = 2 pts (slowly progressive; moderate pain/objective findings)    Intractability = 1 pt (few therapies tried; passive patient role)    Risk        Psych = 3 pts (no significant personality dysfunction/mental illness; good communication with clinic)         Chem Hlth = 3 pts (no history of chemical dependency; not drug-focused)       Reliability = 3 pts (highly reliable with meds, appointments, treatments)       Social = 3 pts (supportive family/close relationships; involved in work/school; no isolation)       (Psych + Chem hlth + Reliability + Social) = 15    Efficacy = 2 pts (too early/not tried meds)    DIRE Score = 17        7-13: likely NOT suitable candidate for long-term opioid analgesia       14-21: may be a suitable candidate for long-term opioid analgesia       DIAGNOSTIC TESTS:  Imaging Studies:     MRI of the Cervical Spine without contrast 7/23/2014  History: episodic leg weakness,Other musculoskeletal symptoms  referable to limbs(729.89)  Comparison: None.   Technique: Sagittal T1-weighted, T2-weighted and sagittal and axial  T2* gradient echo images were obtained of the cervical spine without  intravenous contrast.  Reconstructed MR myelographic images were also  obtained.  Findings:  There is straightening of the  normal cervical lordosis. Minimal  retrolisthesis of C5 on C6. There is no evidence for fracture or  hemorrhage in the cervical spine. Normal signal within the bone  marrow. Mild narrowing of the intervertebral disc space at C5-C6.  Multilevel disc desiccation. There is normal signal within the  cervical spinal cord which also demonstrates a normal contour. No  evidence of definite restricted diffusion within the cervical spinal  cord. Dominant right vertebral artery.  The findings on a level by level basis are as follows:  C2-3: No spinal canal or neural foraminal narrowing.  C3-4:  No spinal canal or neural foraminal narrowing.  C4-5:  Small posterior disc bulge. No significant spinal canal or  neural foraminal narrowing. No spinal canal or neural foraminal  narrowing.  C5-6:  Small posterior annular disc bulge which indents the ventral  spinal cord resulting in mild to moderate spinal canal narrowing. No  significant neural foraminal narrowing.  C6-7:  Small posterior annular disc bulge with mild spinal canal  narrowing. No significant neural foraminal narrowing.  C7-T1:  No spinal canal or neural foraminal narrowing.  No abnormality of the paraspinal soft tissues.  IMPRESSION:   Multilevel degenerative changes throughout the cervical spine,  greatest at C5-C6 with a posterior disc bulge indenting the ventral  spinal cord resulting in mild to moderate spinal canal narrowing.         MR LUMBAR SPINE W/O CONTRAST 5/7/2019 3:43 PM  Provided History: Back pain, > 6wks conservative tx, persistent sx;  See MRI from 2014, facet arth, DDD; Lumbar radiculopathy; Lumbar facet  arthropathy; DDD (degenerative disc disease), lumbar  ICD-10: Lumbar radiculopathy; Lumbar facet arthropathy; DDD  (degenerative disc disease), lumbar  Comparison: MRI lumbar spine 5/13/2014  Technique: Sagittal T1-weighted, sagittal STIR, 3D volumetric axial  and sagittal reconstructed T2-weighted images of the lumbar spine were  obtained without  intravenous contrast.   Findings: Regarding numbering convention, there are 5 lumbar-type  vertebrae assumed for the purposes of this dictation. There is  straightening of the normal lumbar spondylosis. The tip of the conus  medullaris is at L1.  Regarding alignment, the lumbar vertebral column  appears normally aligned.  There is multilevel disc height narrowing ,  most pronounced at L5-S1 with mild disc height loss.  Regarding bone  marrow signal intensity, no abnormality is visualized on STIR images.  On a level by level basis:  T12-L1: Facet arthropathy and ligamentum flavum hypertrophy. Mild  right neural foraminal stenosis. Left neural foramen is patent. Spinal  canal is patent.  L1-2: No spinal canal or neural foraminal stenosis  L2-3: Facet arthropathy bilaterally. Ligamentum flavum hypertrophy.  Small posterior disc bulge. Mild spinal canal stenosis. No neural  foraminal stenosis.  L3-4: Facet arthropathy bilaterally. Ligamentum flavum hypertrophy.  Mild spinal canal stenosis. Mild left neural foraminal stenosis. Right  neural foramen is patent.   L4-5: Facet arthropathy bilaterally. Posterior disc bulge. Ligamentum  flavum hypertrophy. Mild neural foraminal stenosis bilaterally. Mild  spinal canal stenosis..  L5-S1: Facet arthropathy bilaterally. Central disc protrusion  approaches without abutting the left traversing S1 nerve root.. Mild  neural foraminal stenosis bilaterally. Spinal canal is patent.  Paraspinous tissues are within normal limits.  Impression:  1. Multilevel lumbar spondylosis, most pronounced at L4-5 with mild  neural foraminal stenosis bilaterally. There is minimal spinal canal  stenosis at L2-3 and L3-4.  2. Central disc region at L5-S1 approaches the traversing left S1  nerve root without abutment.  3. No significant change since 5/13/2014.        ASSESSMENT:   1.  Rheumatoid Arthritis affecting multidisciplinary joint             1. Bilateral knee pain, OA vs RA             2.  Bilateral hand pain and swelling             3. Bilateral hip pain              4. Bilateral ankle pain   2.  Morbid obesity s/p bariatric surgery   3.  Fibromyalgia   4.  Depression  5.  Cervical spine: DDD, mild stenosis  6.  Lumbar spine: DDD, facet arthropathy, right S1 nerve involvement, stenosis        Sharon presents for follow-up after a trial of gabapentin which has worked quite well for her.  We reviewed recent MRI.  At this time we will continue gabapentin and [facility]  Follow-up as needed.  Reviewed that if she does not need any of the other services of the pain management center, she can request further refills of gabapentin from her primary care provider Dr. Saavedra.        Plan:    Diagnosis reviewed, treatment option addressed, and risk/benifits discussed.  Self-care instructions given.  I am recommending a multidisciplinary treatment plan to help this patient better manage pain.      1. Schedule follow-up with CHELSEY Alex NP-C in 1-2 months if needed   2. Medication recommendations:   1. Gabapentin: increase as tolerated to 300 mg in the morning and 600 mg at bedtime. Okay to split daytime doses. Okay to stop increasing dose if too sleepy.       Total time spent face to face was 15 minutes and more than 50% of face to face time was spent in counseling and/or coordination of care regarding the diagnosis and recommendations above.      CHELSEY Gamez, NP-C   [facility]    Disclaimer: This note consists of symbols derived from keyboarding, dictation and/or voice recognition software. As a result, there may be errors in the script that have gone undetected. Please consider this when interpreting information found in this chart.

## 2019-06-05 ENCOUNTER — MYC MEDICAL ADVICE (OUTPATIENT)
Dept: PALLIATIVE MEDICINE | Facility: CLINIC | Age: 64
End: 2019-06-05

## 2019-06-05 DIAGNOSIS — M54.16 LUMBAR RADICULOPATHY: ICD-10-CM

## 2019-06-06 RX ORDER — GABAPENTIN 100 MG/1
CAPSULE ORAL
Qty: 180 CAPSULE | Refills: 3 | Status: SHIPPED | OUTPATIENT
Start: 2019-06-06 | End: 2019-10-07

## 2019-06-06 NOTE — TELEPHONE ENCOUNTER
Received request from patient requesting refill(s) for Gabapentin 100 MG. Sig and quantity adjusted to reflect patients reported use. Please review.    Last refilled on 4/26/19    Pt last seen on 5/29/19  Next appt scheduled for none    Will facilitate refill.

## 2019-06-11 DIAGNOSIS — M05.9 SEROPOSITIVE RHEUMATOID ARTHRITIS (H): ICD-10-CM

## 2019-06-12 NOTE — TELEPHONE ENCOUNTER
Routing refill request to provider for review/approval because:  Drug not on the [facility] refill protocol       Maryjane Blanchard RN

## 2019-06-13 NOTE — TELEPHONE ENCOUNTER
Rheumatology team: Please call to notify MsVioleta Antonieta that Vandana was send to the [facility] pharmacy as requested.    Freddy Guerra MD  6/12/2019 9:58 PM

## 2019-06-14 ENCOUNTER — OFFICE VISIT (OUTPATIENT)
Dept: OPTOMETRY | Facility: CLINIC | Age: 64
End: 2019-06-14
Payer: COMMERCIAL

## 2019-06-14 DIAGNOSIS — Z01.00 EXAMINATION OF EYES AND VISION: Primary | ICD-10-CM

## 2019-06-14 DIAGNOSIS — H10.13 ALLERGIC CONJUNCTIVITIS OF BOTH EYES: ICD-10-CM

## 2019-06-14 DIAGNOSIS — H18.529 ANTERIOR BASEMENT MEMBRANE DYSTROPHY: ICD-10-CM

## 2019-06-14 DIAGNOSIS — M35.00 SICCA SYNDROME (H): ICD-10-CM

## 2019-06-14 DIAGNOSIS — H52.4 PRESBYOPIA: ICD-10-CM

## 2019-06-14 DIAGNOSIS — H04.123 DRY EYE SYNDROME, BILATERAL: ICD-10-CM

## 2019-06-14 DIAGNOSIS — H52.13 MYOPIA OF BOTH EYES: ICD-10-CM

## 2019-06-14 DIAGNOSIS — H52.223 REGULAR ASTIGMATISM OF BOTH EYES: ICD-10-CM

## 2019-06-14 PROCEDURE — 92014 COMPRE OPH EXAM EST PT 1/>: CPT | Performed by: OPTOMETRIST

## 2019-06-14 RX ORDER — CYCLOSPORINE 0.5 MG/ML
1 EMULSION OPHTHALMIC 2 TIMES DAILY
Qty: 2 BOX | Refills: 11 | Status: SHIPPED | OUTPATIENT
Start: 2019-06-14 | End: 2020-10-30

## 2019-06-14 RX ORDER — AZELASTINE HYDROCHLORIDE 0.5 MG/ML
1 SOLUTION/ DROPS OPHTHALMIC 2 TIMES DAILY
Qty: 1 BOTTLE | Refills: 11 | Status: SHIPPED | OUTPATIENT
Start: 2019-06-14 | End: 2020-12-21

## 2019-06-14 ASSESSMENT — REFRACTION_MANIFEST
OD_ADD: +2.75
OS_AXIS: 016
OS_CYLINDER: +0.50
OD_CYLINDER: +2.00
OD_SPHERE: -2.25
OD_AXIS: 160
OS_SPHERE: -1.75
OS_ADD: +2.75

## 2019-06-14 ASSESSMENT — REFRACTION_WEARINGRX
OD_SPHERE: -2.50
OS_ADD: +2.75
OD_ADD: +2.75
OD_SPHERE: -2.50
OS_SPHERE: -2.00
OS_AXIS: 020
SPECS_TYPE: EXAM
OD_AXIS: 158
OS_SPHERE: -2.00
OD_CYLINDER: +2.25
OD_CYLINDER: +2.00
OS_CYLINDER: +0.75
OS_ADD: +2.75
OS_CYLINDER: +1.00
OD_AXIS: 160
OD_ADD: +2.75
SPECS_TYPE: AUTO/PAL
OS_AXIS: 017

## 2019-06-14 ASSESSMENT — VISUAL ACUITY
OD_CC: 20/200
OS_SC: 20/60
OD_CC: 20/25
OS_CC: 20/100
OS_CC: 20/25
METHOD: SNELLEN - LINEAR
OS_CC+: -1
OS_SC+: -1
OD_CC+: -2
CORRECTION_TYPE: GLASSES
OD_SC: 20/60
OD_SC+: -1

## 2019-06-14 ASSESSMENT — CUP TO DISC RATIO
OS_RATIO: 0.3
OD_RATIO: 0.3

## 2019-06-14 ASSESSMENT — SLIT LAMP EXAM - LIDS
COMMENTS: MEIBOMIAN GLAND DYSFUNCTION, COLLARETTES
COMMENTS: MEIBOMIAN GLAND DYSFUNCTION, COLLARETTES

## 2019-06-14 ASSESSMENT — CONF VISUAL FIELD
OD_NORMAL: 1
OS_NORMAL: 1

## 2019-06-14 ASSESSMENT — TONOMETRY
OS_IOP_MMHG: 14
OD_IOP_MMHG: 13
IOP_METHOD: TONOPEN

## 2019-06-14 ASSESSMENT — REFRACTION_FINALRX
OD_HBASE: OUT
OD_HPRISM: 1.0

## 2019-06-14 ASSESSMENT — EXTERNAL EXAM - LEFT EYE: OS_EXAM: NORMAL

## 2019-06-14 ASSESSMENT — EXTERNAL EXAM - RIGHT EYE: OD_EXAM: NORMAL

## 2019-06-14 NOTE — PROGRESS NOTES
Chief Complaint   Patient presents with     Annual Eye Exam         Last Eye Exam: 11-  Dilated Previously: Yes    What are you currently using to see?  glasses       Distance Vision Acuity: Noticed gradual change in both eyes,sometimes sees double when in traffic- side by side images- closes 1 eye and goes away    Near Vision Acuity: Satisfied with vision while reading  with glasses    Eye Comfort: dry and tired  Do you use eye drops? : Yes: restasis and optivar needs refills  Occupation or Hobbies: [employer]          Medical, surgical and family histories reviewed and updated 6/14/2019.       OBJECTIVE: See Ophthalmology exam    ASSESSMENT:    ICD-10-CM    1. Examination of eyes and vision Z01.00    2. Myopia of both eyes H52.13    3. Regular astigmatism of both eyes H52.223    4. Presbyopia H52.4    5. Allergic conjunctivitis of both eyes H10.13 azelastine (OPTIVAR) 0.05 % ophthalmic solution   6. Dry eye syndrome, bilateral H04.123 cycloSPORINE (RESTASIS) 0.05 % ophthalmic emulsion   7. Sicca syndrome (H) M35.00    8. 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  Prism in glasses may help with double vision.  Give the glasses 1-2 weeks to adjust to the new prescription.  You may get headaches or eyestrain as your eyes get used to the new prescription.  Sometimes the symptoms get worse before it gets better.  If any problems after 1-2 weeks schedule an appointment for a recheck.      Optivar- 1 drop both eyes 2 x day as needed for itchy eyes.    Restasis- 1 drop both eyes 2 x day.    Non preserved Systane or Refresh- 1 drop both eyes every 2 hours.    Blephadex eyelid wipes- cleanse eyelids 2 x day for 6 weeks then nightly.  These can be purchased on Amazon.[email or web address]    Shayan heat masks can be purchased at Amazon to be used daily for 10-15 minutes.    Return in 1 year for a complete eye exam  or sooner if needed.    Robles Valderrama, OD

## 2019-06-14 NOTE — PATIENT INSTRUCTIONS
Eyeglass prescription given.  Prism in glasses may help with double vision.  Give the glasses 1-2 weeks to adjust to the new prescription.  You may get headaches or eyestrain as your eyes get used to the new prescription.  Sometimes the symptoms get worse before it gets better.  If any problems after 1-2 weeks schedule an appointment for a recheck.      Optivar- 1 drop both eyes 2 x day as needed for itchy eyes.    Restasis- 1 drop both eyes 2 x day.    Non preserved Systane or Refresh- 1 drop both eyes every 2 hours.    Blephadex eyelid wipes- cleanse eyelids 2 x day for 6 weeks then nightly.  These can be purchased on Amazon.[email or web address]    Shayan heat masks can be purchased at Amazon to be used daily for 10-15 minutes.    Return in 1 year for a complete eye exam or sooner if needed.    Robles Valderrama, OD    The affects of the dilating drops last for 4- 6 hours.  You will be more sensitive to light and vision will be blurry up close.  Mydriatic sunglasses were given if needed.      Use one drop of artificial tears both eyes 3-4 x daily.  Continue to use the drops regardless if your eyes are comfortable.  Artificial tears work best as a preventative and not as well after your eyes are starting to bother you.  It may take 4- 6 weeks of using the drops before you notice improvement.  If after that time you are still having problems schedule an appointment for an evaluation and discussion of different treatments such as Restasis or Xiidra.  Dry eyes are a chronic condition and you may have more symptoms at certain times of the year.    Excess tearing can be due to the right tears not working properly or a blockage in the tear drainage system.  You can try using artificial tears 1 drop right eye 3-4 x day.  If the excess tearing is bothersome after 3-4 weeks and that doesn't help we can send you for further testing on the puncta which allows the tears to drain.  This would entail a referral to our oculo plastic specialist at the   [facility].    Brands of drops that are recommended are:    Systane Complete  Systane Ultra  Systane Balance  Refresh Advanced Optive  Blink    If you are using drops more than 4 x day or have sensitivities to preservatives I recommend non preserved artificial tears.  These come in 1 use vials.  They can be used every 1-2 hours.    Brands of drops that are recommended are:    Systane- preservative free  Refresh-  preservative free  Blink- preservative free    Gels or ointment can be used at night.    Brands recommended are:    Systane Gel  Refresh Gel  Blink Gel  Genteal Gel    Systane night time (ointment)  Refresh Celluvisc  Refresh PM (ointment)      Visine, Clear Eyes or Murine (drops that get the red out) can irritate the eyes and cause a rebound effect where the eyes become more red and you end up using more drops.  Avoid drops containing tetrahydrozoline, naphazoline, phenylephrine, oxymetazoline.      OTC Lumify is a newer product that gives immediate redness relief with out the rebound effect.  Use as needed to take the redness out.    Artificial tears may be used with other drops (such as allergy, glaucoma, antibiotics) around the same time.  Be sure to wait 5 minutes in between drops.    Heat to the eyelids can also improve your symptoms of dry eyes.  Shayan heat masks can be purchased at Amazon to be used daily for 10-15 minutes.  Other options are gel masks that can be put in the microwave and purchased at most pharmacies.      Optometry Providers       Clinic Locations                                 Telephone Number   Dr. Theresa Whiting   [facility]   [place] [phone]     [facility] Hours:                [facility] Hours:       [facility] Hours:   [address]   [address]     [address] [facility]  [place] [address] [address]   [place] [address]    [place] Whiting  [address]  Phone: [phone]                    Phone: [phone]     Phone: [phone]                      Monday 8:00-7:00                          Monday 8:00-7:00                          Monday 8:00-7:00              Tuesday 8:00-6:00                          Tuesday 8:00-7:00                          Tuesday 8:00-7:00              Wednesday 8:00-6:00                  Wednesday 8:00-7:00                   Wednesday 8:00-7:00      Thursday 8:00-6:00                        Thursday 8:00-7:00                         Thursday 8:00-7:00            Friday 8:00-5:00                              Friday 8:00-5:00                              Friday 8:00-5:00    [facility] Hours:   [address] [place] [address]  [phone]    Monday 8:00-7:00  Tuesday 8:00-7:00  Wednesday 8:00-7:00  Thursday 8:00-7:00  Friday 8:00-5:00  Please log on to [email or web address] to order your contact lenses.  The link is found on the [facility] page.  As always, Thank you for trusting us with your health care needs!

## 2019-07-08 DIAGNOSIS — F32.0 MILD MAJOR DEPRESSION (H): ICD-10-CM

## 2019-07-08 NOTE — TELEPHONE ENCOUNTER
"Requested Prescriptions   Pending Prescriptions Disp Refills     citalopram (CELEXA) 20 MG tablet [Pharmacy Med Name: CITALOPRAM HYDROBROMIDE 20MG TABS] 90 tablet 1     Sig: TAKE ONE TABLET BY MOUTH ONCE DAILY   Last Written Prescription Date:  12/7/18  Last Fill Quantity: 90,  # refills: 1   Last office visit: 2/27/2019 with prescribing provider:     Future Office Visit:   Next 5 appointments (look out 90 days)    Aug 28, 2019  7:40 AM CDT  [facility]dy [facility]  [facility] [address] [address]  [phone]             SSRIs Protocol Failed - 7/8/2019  6:22 AM        Failed - PHQ-9 score less than 5 in past 6 months     Please review last PHQ-9 score.           Passed - Medication is active on med list        Passed - Patient is age 18 or older        Passed - No active pregnancy on record        Passed - No positive pregnancy test in last 12 months        Passed - Recent (6 mo) or future (30 days) visit within the authorizing provider's specialty     Patient had office visit in the last 6 months or has a visit in the next 30 days with authorizing provider or within the authorizing provider's specialty.  See \"Patient Info\" tab in inbasket, or \"Choose Columns\" in Meds & Orders section of the refill encounter.              "

## 2019-07-10 RX ORDER — CITALOPRAM HYDROBROMIDE 20 MG/1
TABLET ORAL
Qty: 90 TABLET | Refills: 1 | Status: SHIPPED | OUTPATIENT
Start: 2019-07-10 | End: 2019-11-12

## 2019-07-10 NOTE — TELEPHONE ENCOUNTER
TC,    Please assist patient in scheduling an appointment with PCP.  Then route to RN to consider medina refill x 1.      HOWARD Daniels MD  [facility]      Instructions         Return in about 6 months (around 6/7/2019).

## 2019-08-28 ENCOUNTER — OFFICE VISIT (OUTPATIENT)
Dept: RHEUMATOLOGY | Facility: CLINIC | Age: 64
End: 2019-08-28
Payer: COMMERCIAL

## 2019-08-28 VITALS
HEIGHT: 64 IN | OXYGEN SATURATION: 95 % | WEIGHT: 254 LBS | HEART RATE: 94 BPM | BODY MASS INDEX: 43.36 KG/M2 | SYSTOLIC BLOOD PRESSURE: 128 MMHG | DIASTOLIC BLOOD PRESSURE: 72 MMHG

## 2019-08-28 DIAGNOSIS — Z79.899 HIGH RISK MEDICATION USE: ICD-10-CM

## 2019-08-28 DIAGNOSIS — M05.9 SEROPOSITIVE RHEUMATOID ARTHRITIS (H): Primary | ICD-10-CM

## 2019-08-28 DIAGNOSIS — M05.9 SEROPOSITIVE RHEUMATOID ARTHRITIS (H): ICD-10-CM

## 2019-08-28 LAB
ALBUMIN SERPL-MCNC: 4 G/DL (ref 3.4–5)
ALP SERPL-CCNC: 95 U/L (ref 40–150)
ALT SERPL W P-5'-P-CCNC: 46 U/L (ref 0–50)
AST SERPL W P-5'-P-CCNC: 31 U/L (ref 0–45)
BASOPHILS # BLD AUTO: 0 10E9/L (ref 0–0.2)
BASOPHILS NFR BLD AUTO: 0.6 %
BILIRUB DIRECT SERPL-MCNC: <0.1 MG/DL (ref 0–0.2)
BILIRUB SERPL-MCNC: 0.5 MG/DL (ref 0.2–1.3)
CREAT SERPL-MCNC: 0.72 MG/DL (ref 0.52–1.04)
CRP SERPL-MCNC: <2.9 MG/L (ref 0–8)
DIFFERENTIAL METHOD BLD: NORMAL
EOSINOPHIL # BLD AUTO: 0.2 10E9/L (ref 0–0.7)
EOSINOPHIL NFR BLD AUTO: 4.7 %
ERYTHROCYTE [DISTWIDTH] IN BLOOD BY AUTOMATED COUNT: 11.9 % (ref 10–15)
ERYTHROCYTE [SEDIMENTATION RATE] IN BLOOD BY WESTERGREN METHOD: 4 MM/H (ref 0–30)
GFR SERPL CREATININE-BSD FRML MDRD: 89 ML/MIN/{1.73_M2}
HCT VFR BLD AUTO: 41.5 % (ref 35–47)
HGB BLD-MCNC: 13.3 G/DL (ref 11.7–15.7)
IMM GRANULOCYTES # BLD: 0 10E9/L (ref 0–0.4)
IMM GRANULOCYTES NFR BLD: 0.4 %
LYMPHOCYTES # BLD AUTO: 1.6 10E9/L (ref 0.8–5.3)
LYMPHOCYTES NFR BLD AUTO: 33.1 %
MCH RBC QN AUTO: 30.2 PG (ref 26.5–33)
MCHC RBC AUTO-ENTMCNC: 32 G/DL (ref 31.5–36.5)
MCV RBC AUTO: 94 FL (ref 78–100)
MONOCYTES # BLD AUTO: 0.4 10E9/L (ref 0–1.3)
MONOCYTES NFR BLD AUTO: 7.8 %
NEUTROPHILS # BLD AUTO: 2.6 10E9/L (ref 1.6–8.3)
NEUTROPHILS NFR BLD AUTO: 53.4 %
NRBC # BLD AUTO: 0 10*3/UL
NRBC BLD AUTO-RTO: 0 /100
PLATELET # BLD AUTO: 246 10E9/L (ref 150–450)
PROT SERPL-MCNC: 7.1 G/DL (ref 6.8–8.8)
RBC # BLD AUTO: 4.4 10E12/L (ref 3.8–5.2)
WBC # BLD AUTO: 4.9 10E9/L (ref 4–11)

## 2019-08-28 PROCEDURE — 86140 C-REACTIVE PROTEIN: CPT | Performed by: INTERNAL MEDICINE

## 2019-08-28 PROCEDURE — 82565 ASSAY OF CREATININE: CPT | Performed by: INTERNAL MEDICINE

## 2019-08-28 PROCEDURE — 80076 HEPATIC FUNCTION PANEL: CPT | Performed by: INTERNAL MEDICINE

## 2019-08-28 PROCEDURE — 85652 RBC SED RATE AUTOMATED: CPT | Performed by: INTERNAL MEDICINE

## 2019-08-28 PROCEDURE — 36415 COLL VENOUS BLD VENIPUNCTURE: CPT | Performed by: INTERNAL MEDICINE

## 2019-08-28 PROCEDURE — 99213 OFFICE O/P EST LOW 20 MIN: CPT | Performed by: INTERNAL MEDICINE

## 2019-08-28 PROCEDURE — 85025 COMPLETE CBC W/AUTO DIFF WBC: CPT | Performed by: INTERNAL MEDICINE

## 2019-08-28 RX ORDER — SULFASALAZINE 500 MG/1
1500 TABLET, DELAYED RELEASE ORAL 2 TIMES DAILY
Qty: 180 TABLET | Refills: 4 | Status: SHIPPED | OUTPATIENT
Start: 2019-08-28 | End: 2019-12-11

## 2019-08-28 ASSESSMENT — MIFFLIN-ST. JEOR: SCORE: 1697.46

## 2019-08-28 NOTE — PATIENT INSTRUCTIONS
Rheumatology    Dr. Freddy Guerra         [facility]   (Monday)  [address]  [place] [address]       [facility]   (Tuesday)  [address]  [place] [address]    [facility]   (Wed., Thurs., and Friday)  [address] [place] [address]    Phone number: [phone]  Thank you for choosing [facility].  Evie Rosas CMA

## 2019-08-28 NOTE — NURSING NOTE
RAPID3 (0-30) Cumulative Score  3.2          RAPID3 Weighted Score (divide #4 by 3 and that is the weighted score)  1.06     Evie Rosas [facility]  8/28/2019 7:56 AM

## 2019-08-28 NOTE — PROGRESS NOTES
Rheumatology Clinic Visit      Sharon Fung MRN# [id number]   YOB: 1955 Age: 64 year old      Date of visit: 8/28/19   PCP: Dr. Reynaldo Saavedra    Chief Complaint   Patient presents with:  Arthritis: RA.  Doing better since starting gabapentin.    Assessment and Plan     1. Seropositive nonerosive rheumatoid arthritis (RF negative, CCP low positive): Previously on Humira (lost efficacy), Cimzia (ineffective), Orencia (ineffective), leflunomide (ineffective as monotherapy), hydroxychloroquine (ineffective), Xeljanz (ineffective), MTX (GI upset). Currently on SSZ 1500mg BID and Kevzara 200mg SQ every 14 days (started 1/2019).   Note that Enbrel was denied by insurance who preferred Kevzara.   - Continue sulfasalazine 1500 mg twice daily   - Continue Kevzara 200mg SQ every 14 days  - Labs today and in 3 months: CBC, Creatinine, Hepatic Panel, ESR, CRP              Rapid 3, cumulative scores                      08/28/2019: 3.2   (SSZ 1500mg BID, Kevzara)  Now on gabapentin                      04/17/2019: 15    (SSZ 1500mg BID, Kevzara)  Mostly fibromyalgia symptoms                      12/19/2018:         (MTX 20mg SQ wkly, Xeljanz XR 11mg daily, SSZ 1500mg BID)                          05/02/2018:  9     (MTX 20mg SQ wkly, Xeljanz XR 11mg daily, SSZ 1000mg BID)                          01/31/2018: 22.3 (MTX 20mg SQ wkly, Xeljanz XR 11mg daily)                          11/29/2017: 16.8 (MTX 20mg SQ wkly)                      08/02/2017: 4.2   (MTX 20mg SQ wkly, Xeljanz XR 11mg daily)                         05/04/2017: 7      (MTX 20mg SQ wkly, Xeljanz XR 11mg daily)                        02/02/2017: 8      (MTX 20mg SQ wkly, Xeljanz XR 11mg daily)                      11/04/2016: 13    (MTX 20mg SQ weekly)                      07/15/2016: 10.8    2. Positive LRONA and dsDNA / Secondary Sjogren's Syndrome: Repeat dsDNAs have been negative. Has dry eyes but no dry mouth.  Dry eyes are treated by  ophthalmology with Restasis.      3. Morbid obesity: stressed importance of weight loss.  She says that she is working on getting weight loss medications approved by her insurance.     4. Bilateral patellofemoral syndrome: mild medial joint line tenderness.  Recommended physical therapy but she refused previously.  She is doing self-taught exercises and will learn from her friend who is a physical therapist.  Weight loss will also help.    5. Fibromyalgia: improved on gabapentin and followed in the pain management clinic for this issue.     6.  Vaccinations: Vaccinations reviewed with Ms. Fung.  Risks and benefits of vaccinations were discussed.    - Influenza: encouraged yearly vaccination  - [id number]: up to date  - [id number]: up to date  - Shingrix: 2nd dose today; Rx for 2nd dose called to the [facility] pharmacy at 8:08AM    Ms. Fung verbalized agreement with and understanding of the rational for the diagnosis and treatment plan.  All questions were answered to best of my ability and the patient's satisfaction. Ms. Fung was advised to contact the clinic with any questions that may arise after the clinic visit.      Thank you for involving me in the care of the patient    Return to clinic: 3-4 months    HPI   Sharon RUCHI Fung is a 64 year old female with medical history significant for GERD, allergic rhinitis, obstructive sleep apnea, obesity, hx of trigger fingers, hx of carpal tunnel surgery, and rheumatoid arthritis who presents for follow-up of rheumatoid arthritis.    Today, Ms. Fung reports that she is doing much better since starting gabapentin and she is following in the pain management clinic.  Also on weight loss medication now.  Overall says that she feels pretty good.  Tolerating Kevzara and sulfasalazine. No new issues.  Had a good time in [place] for her daughter's wedding.    No fevers or chills. No nausea, vomiting, constipation, diarrhea. No chest pain/pressure,  palpitations, or shortness of breath. No oral or nasal sores. No neck pain. No rash.      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 No oral or nasal ulcers.  CV: No chest pain, pressure, palpitations, or dyspnea on exertion.  PULM: no shortness of breath.   GI: No nausea, vomiting, constipation, diarrhea. No blood in stool. No abdominal pain.  : No blood in urine.  MSK: See HPI.  NEURO: No numbness or tingling  EXT: No LE swelling  PSYCH: Negative    Active Problem List     Patient Active Problem List   Diagnosis     AR (allergic rhinitis)     GERD (gastroesophageal reflux disease)     Status post bariatric surgery     Mild major depression (H)     Advanced directives, counseling/discussion     High risk medication use     Obstructive sleep apnea     Obesity, Class III, BMI 40-49.9 (morbid obesity) (H)     Anterior basement membrane dystrophy     Seropositive rheumatoid arthritis (H)     LORNA positive     Carpal tunnel syndrome of right wrist     Headache     Chronic obstructive pulmonary disease, unspecified COPD type (H)     Immunosuppression (H)     Fibromyalgia     Past Medical History     Past Medical History:   Diagnosis Date     AR (allergic rhinitis)  as teen     Arthritis 12/14/2015     Chronic obstructive pulmonary disease, unspecified COPD type (H) 3/27/2018     Depressive disorder 3 years ago     GERD (gastroesophageal reflux disease) 4-07     Headache 7/11/2017     Mild major depression (H) 3 years ago     Morbid obesity (H) teens     BRETT (obstructive sleep apnea)     uses CPAP     Rheumatoid arthritis, adult (H)      Past Surgical History     Past Surgical History:   Procedure Laterality Date     BLEPHAROPLASTY BILATERAL Bilateral 8/5/2016    Procedure: BLEPHAROPLASTY BILATERAL;  Surgeon: Cortez Robin MD;  Location: [facility]      BREAST BIOPSY, RT/LT  1-94    Benign     C APPENDECTOMY  1977     COLONOSCOPY       COLONOSCOPY WITH CO2 INSUFFLATION N/A 3/30/2017    Procedure: COLONOSCOPY WITH CO2 INSUFFLATION;  Surgeon: Issa Weeks MD;  Location: [facility] OR     ESOPHAGOSCOPY, GASTROSCOPY, DUODENOSCOPY (EGD), COMBINED N/A 10/29/2015    Procedure: COMBINED ESOPHAGOSCOPY, GASTROSCOPY, DUODENOSCOPY (EGD);  Surgeon: Shaq Mims MD;  Location: [facility]     LAPAROSCOPIC GASTRIC ADJUSTABLE BANDING  11/09/10    Lap band procedure     LAPAROSCOPIC REMOVAL GASTRIC ADJUSTABLE BAND N/A 10/31/2015    Procedure: LAPAROSCOPIC REMOVAL GASTRIC ADJUSTABLE BAND;  Surgeon: Shaq Mims MD;  Location: [facility] OR     RELEASE CARPAL TUNNEL Left 4/27/2017    Procedure: RELEASE CARPAL TUNNEL;  Left Open Carpal Tunnel Release;  Surgeon: Ciara Middleton MD;  Location: [facility] OR     RELEASE CARPAL TUNNEL Right 6/15/2017    Procedure: RELEASE CARPAL TUNNEL;  Right Carpal Tunnel Release Open;  Surgeon: Ciara Middleton MD;  Location: [facility] OR     REPAIR PTOSIS       TUBAL LIGATION  1988     Allergy   No Known Allergies  Current Medication List     Current Outpatient Medications   Medication Sig     acetaminophen (TYLENOL) 500 MG tablet Take 500-1,000 mg by mouth every 6 hours as needed for mild pain     albuterol (PROAIR HFA/PROVENTIL HFA/VENTOLIN HFA) 108 (90 Base) MCG/ACT inhaler Inhale 2 puffs into the lungs every 6 hours as needed for shortness of breath / dyspnea or wheezing     azelastine (OPTIVAR) 0.05 % ophthalmic solution Apply 1 drop to eye 2 times daily     citalopram (CELEXA) 20 MG tablet TAKE ONE TABLET BY MOUTH ONCE DAILY     cycloSPORINE (RESTASIS) 0.05 % ophthalmic emulsion Place 1 drop into both eyes 2 times daily     fexofenadine (ALLEGRA) 180 MG tablet Take 1 tablet (180 mg) by mouth daily     fluticasone (FLONASE) 50 MCG/ACT nasal spray Spray 2 sprays into both nostrils as needed     gabapentin (NEURONTIN) 100 MG capsule Take  1 cap (100 MG) in the AM, 1 cap (100 MG) at midday, and 4 caps (400 MG) at bedtime.     ibuprofen 200 MG capsule Take 600-800 mg by mouth At Bedtime     liraglutide - Weight Management (SAXENDA) 18 MG/3ML pen Inject 3 mg Subcutaneous daily     Sarilumab 200 MG/1.14ML SOAJ Inject 200 mg Subcutaneous every 14 days . Hold for signs of infection and seek medical attention.     sulfaSALAzine ER (AZULFIDINE EN) 500 MG EC tablet Take 3 tablets (1,500 mg) by mouth 2 times daily     cycloSPORINE (RESTASIS) 0.05 % ophthalmic emulsion Place 1 drop into both eyes 2 times daily (Patient not taking: Reported on 8/28/2019)     furosemide (LASIX) 20 MG tablet Take 1 tablet (20 mg) by mouth daily (Patient not taking: Reported on 8/28/2019)     insulin pen needle (31G X 5 MM) 31G X 5 MM miscellaneous Use 1 pen needles daily or as directed.     ORDER FOR DME Use your CPAP device as directed by your provider.     No current facility-administered medications for this visit.      Facility-Administered Medications Ordered in Other Visits   Medication     sodium chloride (PF) 0.9% PF flush 10 mL     sodium chloride bacteriostatic 0.9 % flush 12 mL         Social History   See HPI    Family History     Family History   Problem Relation Age of Onset     Heart Disease Father         MI     Neurologic Disorder Father 79        Parkinson's     Diabetes Father      Hypertension Father      Coronary Artery Disease Father      Cancer Maternal Grandmother         uterine     Neurologic Disorder Sister 16        migraine     Depression Sister      Neurologic Disorder Mother 20        migraine     Depression Mother      Neurologic Disorder Son 7        migraine     Substance Abuse Son      Depression Sister      Substance Abuse Sister        Physical Exam     Temp Readings from Last 3 Encounters:   02/27/19 98.4  F (36.9  C) (Oral)   12/19/18 97.5  F (36.4  C) (Oral)   12/07/18 97.9  F (36.6  C) (Oral)     BP Readings from Last 5 Encounters:  "  08/28/19 128/72   05/29/19 124/73   05/13/19 125/60   04/25/19 146/84   04/17/19 131/71     Pulse Readings from Last 1 Encounters:   08/28/19 94     Resp Readings from Last 1 Encounters:   02/22/19 16     Estimated body mass index is 43.57 kg/m  as calculated from the following:    Height as of this encounter: 1.626 m (5' 4.02\").    Weight as of this encounter: 115.2 kg (254 lb).    GEN: NAD  HEENT: MMM. Anicteric, noninjected sclera  CV: S1, S2. RRR. No m/r/g.  PULM: CTA bilaterally. No w/c.  MSK: MCPs, PIPs, wrists, elbows, shoulders, knees, and ankles without swelling or tenderness to palpation.  Negative MCP and MTP squeeze.     SKIN: No rash. No nail pitting.  EXT: No LE edema  PSYCH: Alert. Appropriate.    Labs / Imaging (select studies)   RF/CCP  Recent Labs   Lab Test 11/19/12  0900   CCPABY 53*   RHF <7     CBC  Recent Labs   Lab Test 03/26/19  0925 12/14/18  0739 10/25/18  1227   WBC 6.6 5.9 6.5   RBC 4.61 3.98 3.90   HGB 13.5 11.9 12.0   HCT 41.8 37.5 37.2   MCV 91 94 95   RDW 13.4 13.8 13.7    309 337   MCH 29.3 29.9 30.8   MCHC 32.3 31.7 32.3   NEUTROPHIL 48.6 66.5 57.5   LYMPH 35.8 22.4 31.1   MONOCYTE 8.5 7.3 8.4   EOSINOPHIL 6.2 3.1 2.5   BASOPHIL 0.6 0.2 0.5   ANEU 3.2 3.9 3.8   ALYM 2.4 1.3 2.0   KIMBERLY 0.6 0.4 0.6   AEOS 0.4 0.2 0.2   ABAS 0.0 0.0 0.0     CMP  Recent Labs   Lab Test 03/26/19  0925 12/14/18  0739 10/25/18  1227  07/11/17  0906  01/17/17  0642     --   --   --  140  --  145*   POTASSIUM 4.0  --   --   --  3.2*  --  3.5   CHLORIDE 107  --   --   --  107  --  112*   CO2 24  --   --   --  21  --  29   ANIONGAP 8  --   --   --  12  --  4   GLC 71  --   --   --  90  --  108*   BUN 18  --   --   --  13  --  9   CR 0.76 0.71 0.74   < > 0.73   < > 0.93   GFRESTIMATED 85 84 79   < > 82   < > 61   GFRESTBLACK >90 >90 >90   < > >90  African American GFR Calc     < > 74   ISH 8.9  --   --   --  9.9  --  8.2*   BILITOTAL 0.3 0.3 0.3   < > 0.3   < >  --    ALBUMIN 3.9 3.7 3.9   < > " 4.0   < >  --    PROTTOTAL 7.5 7.5 7.7   < > 8.1   < >  --    ALKPHOS 113 97 98   < > 90   < >  --    AST 28 22 29   < > 24   < >  --    ALT 50 27 39   < > 47   < >  --     < > = values in this interval not displayed.     Calcium/VitaminD  Recent Labs   Lab Test 03/26/19  0925 07/11/17  0906 01/17/17  0642  02/20/15  0750   ISH 8.9 9.9 8.2*   < > 9.0   VITDT  --   --   --   --  39    < > = values in this interval not displayed.     ESR/CRP  Recent Labs   Lab Test 03/26/19  0925 10/25/18  1227 07/24/18  0738   SED 6 17 16   CRP <2.9 8.8* 8.2*     Hepatitis B  Recent Labs   Lab Test 12/08/15  0855 03/06/15  1650   HBCAB Nonreactive  --    HEPBANG Nonreactive Nonreactive     Hepatitis C  Recent Labs   Lab Test 12/08/15  0855 03/06/15  1650 11/19/12  0900   HCVAB Nonreactive   Assay performance characteristics have not been established for newborns,   infants, and children   Nonreactive   Assay performance characteristics have not been established for newborns,   infants, and children   Negative     Tuberculosis Screening  Recent Labs   Lab Test 10/31/17  1400 02/02/17  0822 12/08/15  0855   TBRSLT Negative Negative Negative   TBAGN 0.05 0.00 0.01     HIV Screening  Recent Labs   Lab Test 07/24/18  0738   HIAGAB Nonreactive     Immunization History     Immunization History   Administered Date(s) Administered     Influenza Vaccine IM > 6 months Valent IIV4 10/15/2013, 09/15/2014, 09/28/2015, 09/28/2016, 10/16/2017, 10/01/2018     Pneumo Conj 13-V (2010&after) 04/15/2016     Pneumococcal 23 valent 07/15/2016     TDAP Vaccine (Adacel) 02/09/2011     Zoster vaccine recombinant adjuvanted (SHINGRIX) 04/17/2019, 08/28/2019          Chart documentation done in part with Dragon Voice recognition Software. Although reviewed after completion, some word and grammatical error may remain.     Freddy Guerra MD

## 2019-09-30 ENCOUNTER — HEALTH MAINTENANCE LETTER (OUTPATIENT)
Age: 64
End: 2019-09-30

## 2019-10-07 DIAGNOSIS — M54.16 LUMBAR RADICULOPATHY: ICD-10-CM

## 2019-10-07 RX ORDER — GABAPENTIN 100 MG/1
CAPSULE ORAL
Qty: 180 CAPSULE | Refills: 3 | Status: SHIPPED | OUTPATIENT
Start: 2019-10-07 | End: 2020-02-06

## 2019-10-07 NOTE — TELEPHONE ENCOUNTER
Received fax request from [facility] pharmacy requesting refill(s) for gabapentin (NEURONTIN) 100 MG capsule    Last refilled on 09/05/19    Pt last seen on 05/29/19  Next appt scheduled for NONE    Will facilitate refill.      Danika Curiel    [facility]

## 2019-10-08 ENCOUNTER — HOSPITAL ENCOUNTER (EMERGENCY)
Facility: CLINIC | Age: 64
Discharge: HOME | End: 2019-10-08
Attending: EMERGENCY MEDICINE | Admitting: EMERGENCY MEDICINE
Payer: COMMERCIAL

## 2019-10-08 ENCOUNTER — APPOINTMENT (OUTPATIENT)
Dept: CT IMAGING | Facility: CLINIC | Age: 64
End: 2019-10-08
Attending: EMERGENCY MEDICINE
Payer: COMMERCIAL

## 2019-10-08 VITALS
SYSTOLIC BLOOD PRESSURE: 132 MMHG | HEART RATE: 92 BPM | TEMPERATURE: 98 F | OXYGEN SATURATION: 99 % | DIASTOLIC BLOOD PRESSURE: 66 MMHG | RESPIRATION RATE: 16 BRPM

## 2019-10-08 DIAGNOSIS — R51.9 RIGHT-SIDED HEADACHE: ICD-10-CM

## 2019-10-08 LAB
ALBUMIN SERPL-MCNC: 4 G/DL (ref 3.4–5)
ALP SERPL-CCNC: 96 U/L (ref 40–150)
ALT SERPL W P-5'-P-CCNC: 41 U/L (ref 0–50)
ANION GAP SERPL CALCULATED.3IONS-SCNC: 8 MMOL/L (ref 3–14)
AST SERPL W P-5'-P-CCNC: 28 U/L (ref 0–45)
BASOPHILS # BLD AUTO: 0 10E9/L (ref 0–0.2)
BASOPHILS NFR BLD AUTO: 0.5 %
BILIRUB SERPL-MCNC: 0.4 MG/DL (ref 0.2–1.3)
BUN SERPL-MCNC: 14 MG/DL (ref 7–30)
CALCIUM SERPL-MCNC: 9 MG/DL (ref 8.5–10.1)
CHLORIDE SERPL-SCNC: 105 MMOL/L (ref 94–109)
CO2 SERPL-SCNC: 28 MMOL/L (ref 20–32)
CREAT SERPL-MCNC: 0.66 MG/DL (ref 0.52–1.04)
DIFFERENTIAL METHOD BLD: NORMAL
EOSINOPHIL # BLD AUTO: 0.1 10E9/L (ref 0–0.7)
EOSINOPHIL NFR BLD AUTO: 1.2 %
ERYTHROCYTE [DISTWIDTH] IN BLOOD BY AUTOMATED COUNT: 12.5 % (ref 10–15)
GFR SERPL CREATININE-BSD FRML MDRD: >90 ML/MIN/{1.73_M2}
GLUCOSE SERPL-MCNC: 86 MG/DL (ref 70–99)
HCT VFR BLD AUTO: 41.2 % (ref 35–47)
HGB BLD-MCNC: 13.5 G/DL (ref 11.7–15.7)
IMM GRANULOCYTES # BLD: 0 10E9/L (ref 0–0.4)
IMM GRANULOCYTES NFR BLD: 0.5 %
LYMPHOCYTES # BLD AUTO: 1.3 10E9/L (ref 0.8–5.3)
LYMPHOCYTES NFR BLD AUTO: 19.3 %
MCH RBC QN AUTO: 30.5 PG (ref 26.5–33)
MCHC RBC AUTO-ENTMCNC: 32.8 G/DL (ref 31.5–36.5)
MCV RBC AUTO: 93 FL (ref 78–100)
MONOCYTES # BLD AUTO: 0.4 10E9/L (ref 0–1.3)
MONOCYTES NFR BLD AUTO: 5.8 %
NEUTROPHILS # BLD AUTO: 4.7 10E9/L (ref 1.6–8.3)
NEUTROPHILS NFR BLD AUTO: 72.7 %
NRBC # BLD AUTO: 0 10*3/UL
NRBC BLD AUTO-RTO: 0 /100
PLATELET # BLD AUTO: 245 10E9/L (ref 150–450)
POTASSIUM SERPL-SCNC: 4.2 MMOL/L (ref 3.4–5.3)
PROT SERPL-MCNC: 7.1 G/DL (ref 6.8–8.8)
RBC # BLD AUTO: 4.42 10E12/L (ref 3.8–5.2)
SODIUM SERPL-SCNC: 141 MMOL/L (ref 133–144)
WBC # BLD AUTO: 6.5 10E9/L (ref 4–11)

## 2019-10-08 PROCEDURE — 96375 TX/PRO/DX INJ NEW DRUG ADDON: CPT | Performed by: EMERGENCY MEDICINE

## 2019-10-08 PROCEDURE — 96372 THER/PROPH/DIAG INJ SC/IM: CPT | Mod: 59 | Performed by: EMERGENCY MEDICINE

## 2019-10-08 PROCEDURE — 96376 TX/PRO/DX INJ SAME DRUG ADON: CPT | Performed by: EMERGENCY MEDICINE

## 2019-10-08 PROCEDURE — 99284 EMERGENCY DEPT VISIT MOD MDM: CPT | Mod: Z6 | Performed by: EMERGENCY MEDICINE

## 2019-10-08 PROCEDURE — 96361 HYDRATE IV INFUSION ADD-ON: CPT | Performed by: EMERGENCY MEDICINE

## 2019-10-08 PROCEDURE — 85025 COMPLETE CBC W/AUTO DIFF WBC: CPT | Performed by: EMERGENCY MEDICINE

## 2019-10-08 PROCEDURE — 96374 THER/PROPH/DIAG INJ IV PUSH: CPT | Performed by: EMERGENCY MEDICINE

## 2019-10-08 PROCEDURE — 99284 EMERGENCY DEPT VISIT MOD MDM: CPT | Mod: 25 | Performed by: EMERGENCY MEDICINE

## 2019-10-08 PROCEDURE — 25000128 H RX IP 250 OP 636: Performed by: EMERGENCY MEDICINE

## 2019-10-08 PROCEDURE — 80053 COMPREHEN METABOLIC PANEL: CPT | Performed by: EMERGENCY MEDICINE

## 2019-10-08 PROCEDURE — 70450 CT HEAD/BRAIN W/O DYE: CPT

## 2019-10-08 RX ORDER — HYDROMORPHONE HYDROCHLORIDE 1 MG/ML
0.5 INJECTION, SOLUTION INTRAMUSCULAR; INTRAVENOUS; SUBCUTANEOUS ONCE
Status: COMPLETED | OUTPATIENT
Start: 2019-10-08 | End: 2019-10-08

## 2019-10-08 RX ORDER — KETOROLAC TROMETHAMINE 30 MG/ML
30 INJECTION, SOLUTION INTRAMUSCULAR; INTRAVENOUS ONCE
Status: COMPLETED | OUTPATIENT
Start: 2019-10-08 | End: 2019-10-08

## 2019-10-08 RX ORDER — ONDANSETRON 2 MG/ML
4 INJECTION INTRAMUSCULAR; INTRAVENOUS ONCE
Status: COMPLETED | OUTPATIENT
Start: 2019-10-08 | End: 2019-10-08

## 2019-10-08 RX ORDER — KETOROLAC TROMETHAMINE 30 MG/ML
30 INJECTION, SOLUTION INTRAMUSCULAR; INTRAVENOUS ONCE
Status: DISCONTINUED | OUTPATIENT
Start: 2019-10-08 | End: 2019-10-08

## 2019-10-08 RX ORDER — KETOROLAC TROMETHAMINE 10 MG/1
10 TABLET, FILM COATED ORAL EVERY 6 HOURS PRN
Qty: 10 TABLET | Refills: 0 | Status: SHIPPED | OUTPATIENT
Start: 2019-10-08 | End: 2021-06-11

## 2019-10-08 RX ORDER — SUMATRIPTAN 6 MG/.5ML
6 INJECTION, SOLUTION SUBCUTANEOUS ONCE
Status: COMPLETED | OUTPATIENT
Start: 2019-10-08 | End: 2019-10-08

## 2019-10-08 RX ADMIN — HYDROMORPHONE HYDROCHLORIDE 0.5 MG: 1 INJECTION, SOLUTION INTRAMUSCULAR; INTRAVENOUS; SUBCUTANEOUS at 10:28

## 2019-10-08 RX ADMIN — ONDANSETRON 4 MG: 2 INJECTION INTRAMUSCULAR; INTRAVENOUS at 09:05

## 2019-10-08 RX ADMIN — KETOROLAC TROMETHAMINE 30 MG: 30 INJECTION, SOLUTION INTRAMUSCULAR at 09:44

## 2019-10-08 RX ADMIN — SUMATRIPTAN 6 MG: 6 INJECTION, SOLUTION SUBCUTANEOUS at 10:55

## 2019-10-08 RX ADMIN — HYDROMORPHONE HYDROCHLORIDE 0.5 MG: 1 INJECTION, SOLUTION INTRAMUSCULAR; INTRAVENOUS; SUBCUTANEOUS at 09:15

## 2019-10-08 RX ADMIN — SODIUM CHLORIDE 1000 ML: 9 INJECTION, SOLUTION INTRAVENOUS at 09:02

## 2019-10-08 ASSESSMENT — ENCOUNTER SYMPTOMS
NECK PAIN: 0
VOMITING: 1
SHORTNESS OF BREATH: 0
ABDOMINAL PAIN: 0
FLANK PAIN: 0
PSYCHIATRIC NEGATIVE: 1
HEADACHES: 1
NAUSEA: 1
FEVER: 0
MUSCULOSKELETAL NEGATIVE: 1
EYES NEGATIVE: 1

## 2019-10-08 NOTE — ED PROVIDER NOTES
History     Chief Complaint   Patient presents with     Headache     right sided headache started yesterday, increaded vomiting     HPI  Sharon Fung is a 64 year old female who presents with a severe right-sided headache.  She states she gets bad headaches like this every 3 years.  Her last was in July 2017 and she was apparently admitted for a week for this. Her headache started yesterday.  It is right-sided behind her eye and on the right side of her forehead.  She has been using Tylenol and ibuprofen without relief.  She has had 7 episodes of vomiting.  She does have Zofran at home but it has not been working.  She denies fevers.  She denies abdominal pain her last bowel movement was this morning and was normal.  She states narcotics do not help with her headache.  She has used Toradol successfully in the past.  Social: Patient is a nurse manager here at [employer], she presents with her daughter.    I have reviewed the Medications, Allergies, Past Medical and Surgical History, and Social History in the Epic system.    Review of Systems   Constitutional: Negative for fever.   Eyes: Negative.    Respiratory: Negative for shortness of breath.    Cardiovascular: Negative for chest pain.   Gastrointestinal: Positive for nausea and vomiting. Negative for abdominal pain.   Genitourinary: Negative for flank pain.   Musculoskeletal: Negative.  Negative for neck pain.   Skin: Negative for rash.   Neurological: Positive for headaches.   Psychiatric/Behavioral: Negative.    All other systems reviewed and are negative.      Physical Exam   BP: 139/85  Pulse: 115  Temp: 98  F (36.7  C)  Resp: 18  SpO2: 93 %  /66   Pulse 92   Temp 98  F (36.7  C) (Oral)   Resp 16   SpO2 99%      Physical Exam  Physical Exam   Constitutional:   well nourished, well developed, appears uncomfortable, holding an ice pack to her head.  HENT:   Head: Normocephalic and atraumatic.   Eyes: Conjunctivae are normal. Pupils are  equal, round, and reactive to light. Fundi: Optic disks are sharp.   pharynx has no erythema or exudate, mucous membranes are moist  Neck:   no adenopathy, no bony tenderness  Cardiovascular: tachycardia without murmurs or gallops  Pulmonary/Chest: Clear to auscultation bilaterally, with no wheezes or retractions. No respiratory distress.  GI: Soft with good bowel sounds.  Non-tender, non-distended, with no guarding, no rebound  Back:  No bony or CVA tenderness   Musculoskeletal:  no edema   Skin: Skin is warm and dry. No rash noted.   Neurological: alert and oriented to person, place, and time. Nonfocal exam, GCS is 15, SOLIMAN  Psychiatric:  normal mood and affect.   ED Course        Procedures             Critical Care time:  none           Results for orders placed or performed during the hospital encounter of 10/08/19   Head CT w/o contrast    Narrative    CT OF THE HEAD WITHOUT CONTRAST 10/8/2019 9:31 AM     COMPARISON: Brain MR 7/11/2017    HISTORY: Severe right-sided headache.    TECHNIQUE: Axial CT images of the head from the skull base to the  vertex were acquired without IV contrast.    FINDINGS: The ventricles and basal cisterns are within normal limits  in configuration. There is no midline shift. There are no extra-axial  fluid collections. Gray-white differentiation is well maintained.    No intracranial hemorrhage, mass or recent infarct.    The visualized paranasal sinuses are well-aerated. There is no  mastoiditis. There are no fractures of the visualized bones.      Impression    IMPRESSION: Normal head CT.      Radiation dose for this scan was reduced using automated exposure  control, adjustment of the mA and/or kV according to patient size, or  iterative reconstruction technique.   CBC with platelets differential   Result Value Ref Range    WBC 6.5 4.0 - 11.0 10e9/L    RBC Count 4.42 3.8 - 5.2 10e12/L    Hemoglobin 13.5 11.7 - 15.7 g/dL    Hematocrit 41.2 35.0 - 47.0 %    MCV 93 78 - 100 fl    MCH  30.5 26.5 - 33.0 pg    MCHC 32.8 31.5 - 36.5 g/dL    RDW 12.5 10.0 - 15.0 %    Platelet Count 245 150 - 450 10e9/L    Diff Method Automated Method     % Neutrophils 72.7 %    % Lymphocytes 19.3 %    % Monocytes 5.8 %    % Eosinophils 1.2 %    % Basophils 0.5 %    % Immature Granulocytes 0.5 %    Nucleated RBCs 0 0 /100    Absolute Neutrophil 4.7 1.6 - 8.3 10e9/L    Absolute Lymphocytes 1.3 0.8 - 5.3 10e9/L    Absolute Monocytes 0.4 0.0 - 1.3 10e9/L    Absolute Eosinophils 0.1 0.0 - 0.7 10e9/L    Absolute Basophils 0.0 0.0 - 0.2 10e9/L    Abs Immature Granulocytes 0.0 0 - 0.4 10e9/L    Absolute Nucleated RBC 0.0    Comprehensive metabolic panel   Result Value Ref Range    Sodium 141 133 - 144 mmol/L    Potassium 4.2 3.4 - 5.3 mmol/L    Chloride 105 94 - 109 mmol/L    Carbon Dioxide 28 20 - 32 mmol/L    Anion Gap 8 3 - 14 mmol/L    Glucose 86 70 - 99 mg/dL    Urea Nitrogen 14 7 - 30 mg/dL    Creatinine 0.66 0.52 - 1.04 mg/dL    GFR Estimate >90 >60 mL/min/[1.73_m2]    GFR Estimate If Black >90 >60 mL/min/[1.73_m2]    Calcium 9.0 8.5 - 10.1 mg/dL    Bilirubin Total 0.4 0.2 - 1.3 mg/dL    Albumin 4.0 3.4 - 5.0 g/dL    Protein Total 7.1 6.8 - 8.8 g/dL    Alkaline Phosphatase 96 40 - 150 U/L    ALT 41 0 - 50 U/L    AST 28 0 - 45 U/L      Labs Ordered and Resulted from Time of ED Arrival Up to the Time of Departure from the ED   CBC WITH PLATELETS DIFFERENTIAL   COMPREHENSIVE METABOLIC PANEL            Assessments & Plan (with Medical Decision Making)       I have reviewed the nursing notes.  Emergency Department course:  The patient was seen and examined at 0828 am.    I treated her with normal saline bolus IV,  Zofran IV and dilaudid IV.  When her head CT returned negative, I treated her with Toradol IV.  Chart review shows that the patient was admitted on July 11 for acute intractable headache.  The etiology of her headache was not clear.  she had a CT scan of the head on July 11, 2017 which showed no acute intracranial  pathology.  She had Moderate leukoaraiosis and generalized parenchyma volume loss.   CT aneurysm showed no aneurysm or stenosis of the major intracranial arteries.  MRI of the brain on 7/11/2017 was normal.    Head CT today is normal.  Laboratory studies show an unremarkable CBC with a WBC of 6.5.  Comprehensive metabolic panel is unremarkable.  I reassessed the patient several times.  She continued to complain of headache pain and I treated her with Dilaudid IV.  She states her headache had eased somewhat at 10:45 AM.  I tried Imitrex subcu. which she also stated helped her headache.    I offered the patient an admission to the observation unit on the [facility].  She would prefer to be discharged home.    The patient is a 64-year-old female who presents with right-sided headache.  She has had similar headaches and last had one in 2017.  No clear etiology for her headaches is available. The differential diagnosis of headache is broad and includes tension headache, migraine headache and cluster headache.   The etiology of her headache is unclear at this time.  I doubt meningitis, encephalitis, intracranial bleed or mass, pseudotumor cerebri or hypertensive emergency.     The patient would prefer to be discharged home.  She asked for a prescription for Toradol.  I prescribed 10 tablets.  She is on Celexa and there is some GI bleed.  I discussed this with the patient and she is aware of this.  I recommend taking it easy for the next couple of days.  She was given the clinic number to neurology for follow-up.  Patient was discharged at approximately 11:40 AM.           I have reviewed the findings, diagnosis, plan and need for follow up with the patient.    Discharge Medication List as of 10/8/2019 11:45 AM      START taking these medications    Details   ketorolac (TORADOL) 10 MG tablet Take 1 tablet (10 mg) by mouth every 6 hours as needed for moderate pain or pain, Disp-10 tablet, R-0, Local Print              Final diagnoses:   Right-sided headache     This note was created in part by the use of Dragon voice recognition dictation system. Inadvertent grammatical errors and typographical errors may still exist.  Hoda Latham MD    10/8/2019   [facility], [place], EMERGENCY DEPARTMENT     Hoda Latham MD  10/08/19 [id number]

## 2019-10-08 NOTE — ED AVS SNAPSHOT
[facility], [place], Emergency Department  [address]  [facility] [address]  Phone:  [phone]  Fax:  [phone]                                    Sharon Fung   MRN: [id number]    Department:  [facility], Emergency Department   Date of Visit:  10/8/2019           After Visit Summary Signature Page    I have received my discharge instructions, and my questions have been answered. I have discussed any challenges I see with this plan with the nurse or doctor.    ..........................................................................................................................................  Patient/Patient Representative Signature      ..........................................................................................................................................  Patient Representative Print Name and Relationship to Patient    ..................................................               ................................................  Date                                   Time    ..........................................................................................................................................  Reviewed by Signature/Title    ...................................................              ..............................................  Date                                               Time          22EPIC Rev 08/18

## 2019-10-08 NOTE — DISCHARGE INSTRUCTIONS
Please make an appointment to follow up with Neurology Clinic (phone: [phone]) in 7 days.   Take it easy for the next couple of days.   Take Toradol with food as needed for pain--there is an increased risk of GI bleeding with this medication  Return for concerns

## 2019-11-05 ENCOUNTER — HOSPITAL ENCOUNTER (EMERGENCY)
Facility: CLINIC | Age: 64
Discharge: HOME | End: 2019-11-05
Attending: FAMILY MEDICINE | Admitting: FAMILY MEDICINE
Payer: COMMERCIAL

## 2019-11-05 VITALS
OXYGEN SATURATION: 92 % | RESPIRATION RATE: 16 BRPM | TEMPERATURE: 97.7 F | HEART RATE: 113 BPM | DIASTOLIC BLOOD PRESSURE: 67 MMHG | SYSTOLIC BLOOD PRESSURE: 159 MMHG

## 2019-11-05 DIAGNOSIS — R11.2 NON-INTRACTABLE VOMITING WITH NAUSEA, UNSPECIFIED VOMITING TYPE: ICD-10-CM

## 2019-11-05 LAB
ALBUMIN SERPL-MCNC: 4.1 G/DL (ref 3.4–5)
ALBUMIN UR-MCNC: NEGATIVE MG/DL
ALP SERPL-CCNC: 96 U/L (ref 40–150)
ALT SERPL W P-5'-P-CCNC: 45 U/L (ref 0–50)
ANION GAP SERPL CALCULATED.3IONS-SCNC: 7 MMOL/L (ref 3–14)
APPEARANCE UR: CLEAR
AST SERPL W P-5'-P-CCNC: 23 U/L (ref 0–45)
BACTERIA #/AREA URNS HPF: ABNORMAL /HPF
BASOPHILS # BLD AUTO: 0 10E9/L (ref 0–0.2)
BASOPHILS NFR BLD AUTO: 0.3 %
BILIRUB SERPL-MCNC: 0.5 MG/DL (ref 0.2–1.3)
BILIRUB UR QL STRIP: NEGATIVE
BUN SERPL-MCNC: 13 MG/DL (ref 7–30)
CALCIUM SERPL-MCNC: 9.1 MG/DL (ref 8.5–10.1)
CHLORIDE SERPL-SCNC: 103 MMOL/L (ref 94–109)
CO2 BLDCOV-SCNC: 23 MMOL/L (ref 21–28)
CO2 SERPL-SCNC: 28 MMOL/L (ref 20–32)
COLOR UR AUTO: YELLOW
CREAT SERPL-MCNC: 0.74 MG/DL (ref 0.52–1.04)
DIFFERENTIAL METHOD BLD: NORMAL
EOSINOPHIL # BLD AUTO: 0 10E9/L (ref 0–0.7)
EOSINOPHIL NFR BLD AUTO: 0.3 %
ERYTHROCYTE [DISTWIDTH] IN BLOOD BY AUTOMATED COUNT: 12.1 % (ref 10–15)
GFR SERPL CREATININE-BSD FRML MDRD: 86 ML/MIN/{1.73_M2}
GLUCOSE SERPL-MCNC: 87 MG/DL (ref 70–99)
GLUCOSE UR STRIP-MCNC: NEGATIVE MG/DL
HCT VFR BLD AUTO: 41.2 % (ref 35–47)
HGB BLD-MCNC: 13.5 G/DL (ref 11.7–15.7)
HGB UR QL STRIP: NEGATIVE
IMM GRANULOCYTES # BLD: 0 10E9/L (ref 0–0.4)
IMM GRANULOCYTES NFR BLD: 0.7 %
KETONES UR STRIP-MCNC: NEGATIVE MG/DL
LACTATE BLD-SCNC: 2.4 MMOL/L (ref 0.7–2.1)
LACTATE BLD-SCNC: 3.3 MMOL/L (ref 0.7–2)
LEUKOCYTE ESTERASE UR QL STRIP: NEGATIVE
LYMPHOCYTES # BLD AUTO: 1.6 10E9/L (ref 0.8–5.3)
LYMPHOCYTES NFR BLD AUTO: 27.3 %
MCH RBC QN AUTO: 30.4 PG (ref 26.5–33)
MCHC RBC AUTO-ENTMCNC: 32.8 G/DL (ref 31.5–36.5)
MCV RBC AUTO: 93 FL (ref 78–100)
MONOCYTES # BLD AUTO: 0.4 10E9/L (ref 0–1.3)
MONOCYTES NFR BLD AUTO: 6.9 %
MUCOUS THREADS #/AREA URNS LPF: PRESENT /LPF
NEUTROPHILS # BLD AUTO: 3.9 10E9/L (ref 1.6–8.3)
NEUTROPHILS NFR BLD AUTO: 64.5 %
NITRATE UR QL: NEGATIVE
NRBC # BLD AUTO: 0 10*3/UL
NRBC BLD AUTO-RTO: 0 /100
PCO2 BLDV: 37 MM HG (ref 40–50)
PH BLDV: 7.41 PH (ref 7.32–7.43)
PH UR STRIP: 6 PH (ref 5–7)
PLATELET # BLD AUTO: 257 10E9/L (ref 150–450)
PO2 BLDV: 46 MM HG (ref 25–47)
POTASSIUM SERPL-SCNC: 3.8 MMOL/L (ref 3.4–5.3)
PROT SERPL-MCNC: 7.5 G/DL (ref 6.8–8.8)
RBC # BLD AUTO: 4.44 10E12/L (ref 3.8–5.2)
RBC #/AREA URNS AUTO: 1 /HPF (ref 0–2)
SAO2 % BLDV FROM PO2: 82 %
SODIUM SERPL-SCNC: 138 MMOL/L (ref 133–144)
SOURCE: ABNORMAL
SP GR UR STRIP: 1.01 (ref 1–1.03)
SQUAMOUS #/AREA URNS AUTO: 1 /HPF (ref 0–1)
TROPONIN I SERPL-MCNC: <0.015 UG/L (ref 0–0.04)
UROBILINOGEN UR STRIP-MCNC: NORMAL MG/DL (ref 0–2)
WBC # BLD AUTO: 6 10E9/L (ref 4–11)
WBC #/AREA URNS AUTO: 1 /HPF (ref 0–5)

## 2019-11-05 PROCEDURE — 83605 ASSAY OF LACTIC ACID: CPT

## 2019-11-05 PROCEDURE — 25800030 ZZH RX IP 258 OP 636: Performed by: FAMILY MEDICINE

## 2019-11-05 PROCEDURE — 96361 HYDRATE IV INFUSION ADD-ON: CPT | Performed by: FAMILY MEDICINE

## 2019-11-05 PROCEDURE — 85025 COMPLETE CBC W/AUTO DIFF WBC: CPT | Performed by: FAMILY MEDICINE

## 2019-11-05 PROCEDURE — 99284 EMERGENCY DEPT VISIT MOD MDM: CPT | Mod: 25 | Performed by: FAMILY MEDICINE

## 2019-11-05 PROCEDURE — 84484 ASSAY OF TROPONIN QUANT: CPT | Performed by: FAMILY MEDICINE

## 2019-11-05 PROCEDURE — 80053 COMPREHEN METABOLIC PANEL: CPT | Performed by: FAMILY MEDICINE

## 2019-11-05 PROCEDURE — 96376 TX/PRO/DX INJ SAME DRUG ADON: CPT | Performed by: FAMILY MEDICINE

## 2019-11-05 PROCEDURE — 25000128 H RX IP 250 OP 636: Performed by: FAMILY MEDICINE

## 2019-11-05 PROCEDURE — 83605 ASSAY OF LACTIC ACID: CPT | Performed by: FAMILY MEDICINE

## 2019-11-05 PROCEDURE — 93010 ELECTROCARDIOGRAM REPORT: CPT | Mod: Z6 | Performed by: FAMILY MEDICINE

## 2019-11-05 PROCEDURE — 81001 URINALYSIS AUTO W/SCOPE: CPT | Performed by: FAMILY MEDICINE

## 2019-11-05 PROCEDURE — 96375 TX/PRO/DX INJ NEW DRUG ADDON: CPT | Performed by: FAMILY MEDICINE

## 2019-11-05 PROCEDURE — 93005 ELECTROCARDIOGRAM TRACING: CPT | Performed by: FAMILY MEDICINE

## 2019-11-05 PROCEDURE — 82803 BLOOD GASES ANY COMBINATION: CPT

## 2019-11-05 PROCEDURE — 96374 THER/PROPH/DIAG INJ IV PUSH: CPT | Performed by: FAMILY MEDICINE

## 2019-11-05 RX ORDER — PROCHLORPERAZINE MALEATE 10 MG
TABLET ORAL
Qty: 10 TABLET | Refills: 0 | Status: SHIPPED | OUTPATIENT
Start: 2019-11-05 | End: 2020-10-12

## 2019-11-05 RX ORDER — SODIUM CHLORIDE 9 MG/ML
INJECTION, SOLUTION INTRAVENOUS ONCE
Status: COMPLETED | OUTPATIENT
Start: 2019-11-05 | End: 2019-11-05

## 2019-11-05 RX ORDER — ONDANSETRON 2 MG/ML
4 INJECTION INTRAMUSCULAR; INTRAVENOUS ONCE
Status: COMPLETED | OUTPATIENT
Start: 2019-11-05 | End: 2019-11-05

## 2019-11-05 RX ADMIN — ONDANSETRON 4 MG: 2 INJECTION INTRAMUSCULAR; INTRAVENOUS at 18:21

## 2019-11-05 RX ADMIN — ONDANSETRON 4 MG: 2 INJECTION INTRAMUSCULAR; INTRAVENOUS at 17:43

## 2019-11-05 RX ADMIN — PROCHLORPERAZINE EDISYLATE 10 MG: 5 INJECTION INTRAMUSCULAR; INTRAVENOUS at 18:00

## 2019-11-05 RX ADMIN — SODIUM CHLORIDE: 9 INJECTION, SOLUTION INTRAVENOUS at 17:43

## 2019-11-05 NOTE — ED AVS SNAPSHOT
[facility], [place], Emergency Department  [address]  [facility] [address]  Phone:  [phone]  Fax:  [phone]                                    Sharon Fung   MRN: [id number]    Department:  [facility], Emergency Department   Date of Visit:  11/5/2019           After Visit Summary Signature Page    I have received my discharge instructions, and my questions have been answered. I have discussed any challenges I see with this plan with the nurse or doctor.    ..........................................................................................................................................  Patient/Patient Representative Signature      ..........................................................................................................................................  Patient Representative Print Name and Relationship to Patient    ..................................................               ................................................  Date                                   Time    ..........................................................................................................................................  Reviewed by Signature/Title    ...................................................              ..............................................  Date                                               Time          22EPIC Rev 08/18

## 2019-11-06 ENCOUNTER — PATIENT OUTREACH (OUTPATIENT)
Dept: CARE COORDINATION | Facility: CLINIC | Age: 64
End: 2019-11-06

## 2019-11-06 DIAGNOSIS — Z71.89 OTHER SPECIFIED COUNSELING: ICD-10-CM

## 2019-11-06 NOTE — PROGRESS NOTES
Scheduled Follow Up Call: Attempt 1 Community Health Worker called and left a message for the patient. If the patient is returning my call, please transfer the patient to [facility] at [phone]      Referral- off of ED report

## 2019-11-06 NOTE — NURSING NOTE
"Chief Complaint   Patient presents with     Arthritis     RA, patient states she does not have pain she has stiffness.       Initial /78 (BP Location: Left arm, Patient Position: Chair, Cuff Size: Adult Large)  Pulse 80  Temp 98.8  F (37.1  C) (Oral)  Ht 1.626 m (5' 4\")  Wt 115 kg (253 lb 9.6 oz)  SpO2 97%  BMI 43.53 kg/m2 Estimated body mass index is 43.53 kg/(m^2) as calculated from the following:    Height as of this encounter: 1.626 m (5' 4\").    Weight as of this encounter: 115 kg (253 lb 9.6 oz).  BP completed using cuff size: large         RAPID3 (0-30) Cumulative Score  4.2          RAPID3 Weighted Score (divide #4 by 3 and that is the weighted score)  1.4         " PT IS COMING FOR DIABETES, PT DOES NOT HAVE PUMP, PT RECENTLY DIAGNOSE

## 2019-11-06 NOTE — DISCHARGE INSTRUCTIONS
Return asap in return of symptoms  Rest  Suggest not working tomorrow  Zofran every 4 hours   If needed oral compazine  Clear liquids- small amounts frequently- 1 to 2 oz every 10 to 15 minutes  If hungry BRAT diet

## 2019-11-07 ENCOUNTER — PATIENT OUTREACH (OUTPATIENT)
Dept: CARE COORDINATION | Facility: CLINIC | Age: 64
End: 2019-11-07

## 2019-11-07 ASSESSMENT — ENCOUNTER SYMPTOMS
NAUSEA: 1
DYSURIA: 0
FATIGUE: 1
COUGH: 0
FLANK PAIN: 0
ABDOMINAL PAIN: 0
SORE THROAT: 0
ROS GI COMMENTS: NO BLOOD IN EMESIS
HEMATURIA: 0
CHILLS: 0
SHORTNESS OF BREATH: 0
HEMATOLOGIC/LYMPHATIC NEGATIVE: 1
MYALGIAS: 0
FREQUENCY: 0
DIARRHEA: 0
ABDOMINAL DISTENTION: 0
FEVER: 0
PALPITATIONS: 1
VOMITING: 1

## 2019-11-07 NOTE — PROGRESS NOTES
The clinic Community Health Worker spoke with the patient today due to ED/Hospital Discharge to discuss possible Clinic Care Coordination enrollment.  The service was described to the patient and immediate needs were discussed.  The patient declined enrollement at this time.  The PCP is encouraged to refer in the future if the patient's needs change.    Patient is going to the doctor today due to still not feeling well.

## 2019-11-07 NOTE — ED PROVIDER NOTES
"  History     Chief Complaint   Patient presents with     Vomiting     non-stop vomiting since this am; can't keep anything down.      Palpitations     HPI  Sharon Fung is a 64 year old female who has severe RA. See med list. She presents today for \"non stop vomiting for the last 6 hours\". She has NO pain anywhere or fevers or chills. No associated diarrhea. No cold sx and no cough. She has had similar in the past - several years ago she had an \"infected\" gastric band that caused vomiting and abd pain- found to have band infection and abscesses- 2015. Also another time with vomiting in 2017 felt to be a gastroenteritis.    Along with the above she feels her \"heart pounding\"- no cp, no soa.    Past hx is + for severe RA, obesity with hx of weight loss surgery and mild depression.  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    I have reviewed the Medications, Allergies, Past Medical and Surgical History, and Social History in the Epic system.    Review of Systems   Constitutional: Positive for fatigue. Negative for chills and fever.   HENT: Negative for congestion and sore throat.    Respiratory: Negative for cough and shortness of breath.    Cardiovascular: Positive for palpitations. Negative for chest pain and leg swelling.   Gastrointestinal: Positive for nausea and vomiting. Negative for abdominal distention, abdominal pain and diarrhea.        No blood in emesis   Genitourinary: Negative for dysuria, flank pain, frequency and hematuria.   Musculoskeletal: Negative for myalgias.   Skin: Negative.    Allergic/Immunologic: Positive for immunocompromised state (immunotherapy for RA.).   Hematological: Negative.        Physical Exam   BP: (!) 178/94  Pulse: 116  Temp: 97.7  F (36.5  C)  Resp: 16  SpO2: 95 %      Physical Exam  Vitals signs and nursing note reviewed.   Constitutional:       General: She is in acute distress.      " "Appearance: She is obese. She is ill-appearing. She is not toxic-appearing.      Comments: Vomiting   She has a mild tachycardia sinus at 110  sats are normal. BP elevated but pt is vomiting   HENT:      Head: Normocephalic and atraumatic.   Eyes:      Extraocular Movements: Extraocular movements intact.      Pupils: Pupils are equal, round, and reactive to light.   Neck:      Musculoskeletal: Neck supple. No neck rigidity.   Cardiovascular:      Rate and Rhythm: Regular rhythm. Tachycardia present.      Pulses: Normal pulses.      Heart sounds: Normal heart sounds.   Pulmonary:      Effort: Pulmonary effort is normal.      Breath sounds: Normal breath sounds.   Abdominal:      General: Abdomen is flat. There is no distension.      Palpations: Abdomen is soft.      Tenderness: There is no tenderness.      Comments: No hs otto   Musculoskeletal: Normal range of motion.   Skin:     General: Skin is warm and dry.   Neurological:      General: No focal deficit present.      Mental Status: She is alert and oriented to person, place, and time.         ED Course        Procedures           The pt is middle age obese female with RA- on immunlogicals for RA- with severe nausea and vomiting without blood and NO pain for 6 hours but feeling \" heart pounding\".  In the ed- monitor showed only mild sinus tachy with rare PVC.  EKG discussed below. VSS not worrisome and appropriate for someone vomiting.  EKG done- showed mild sinus tachy at 112. The intervals are normal. There is nonspecific st/t changes similar to previous ekgs.   Beside tropopin is normal.    IV access obtained and pt given liter of NS with 4 mg of zofran followed by 10 mg of compazine with another 4 mg of zofran- after the last zofran- \" much better\". The initial lactate with mildly high at 3.3.   The pt and I had long discussion- I suggested (even though) she was improved- to get 2 liters of ns and observe and then repeat lactate. The pt wanted discharge " immediately- I was able to coax her to get an liter of IV fluid. After the first liter she strongly requested a repeat lactate- she did not want the second liter as she was much better. I attempted to explain the rationale for the second liter but she was adamant. Repeat lactate dropped to 2.4- better but still not normal. She would not wait for the second liter and asked for immediate discharge. The pt fully understands leaving the ed and the risks. I suspect the elevated lactate due to the vomiting. At this time I find no acute cardiac issues and I do not believe infection. The pt did give ua which appears neg for infection    I did give home rx for compazine and discussed use of zofran and compazine concurrently. I also told her to return ASAP if worse or return of sx.      Labs Ordered and Resulted from Time of ED Arrival Up to the Time of Departure from the ED   LACTIC ACID WHOLE BLOOD - Abnormal; Notable for the following components:       Result Value    Lactic Acid 3.3 (*)     All other components within normal limits   ROUTINE UA WITH MICROSCOPIC REFLEX TO CULTURE - Abnormal; Notable for the following components:    Bacteria Urine Few (*)     Mucous Urine Present (*)     All other components within normal limits   ISTAT  GASES LACTATE KAITLYN POCT - Abnormal; Notable for the following components:    PCO2 Venous 37 (*)     Lactic Acid 2.4 (*)     All other components within normal limits   CBC WITH PLATELETS DIFFERENTIAL   TROPONIN I   COMPREHENSIVE METABOLIC PANEL   ISTAT CG4 GASES LACTATE KAITLYN NURSING POCT            Assessments & Plan (with Medical Decision Making)       I have reviewed the nursing notes.    I have reviewed the findings, diagnosis, plan and need for follow up with the patient.    Discharge Medication List as of 11/5/2019  7:48 PM      START taking these medications    Details   prochlorperazine (COMPAZINE) 10 MG tablet One every 6 to 8 hours for nausea, Disp-10 tablet, R-0, Local Print              Final diagnoses:   Non-intractable vomiting with nausea, unspecified vomiting type       11/5/2019   [facility], [place], EMERGENCY DEPARTMENT     Gordo Burgos MD  11/07/19 1247

## 2019-11-12 ENCOUNTER — OFFICE VISIT (OUTPATIENT)
Dept: FAMILY MEDICINE | Facility: CLINIC | Age: 64
End: 2019-11-12
Payer: COMMERCIAL

## 2019-11-12 VITALS
WEIGHT: 260 LBS | TEMPERATURE: 98.5 F | BODY MASS INDEX: 44.6 KG/M2 | HEART RATE: 70 BPM | OXYGEN SATURATION: 96 % | DIASTOLIC BLOOD PRESSURE: 82 MMHG | SYSTOLIC BLOOD PRESSURE: 132 MMHG

## 2019-11-12 DIAGNOSIS — R11.0 NAUSEA: Primary | ICD-10-CM

## 2019-11-12 DIAGNOSIS — M05.9 SEROPOSITIVE RHEUMATOID ARTHRITIS (H): ICD-10-CM

## 2019-11-12 DIAGNOSIS — D84.9 IMMUNOSUPPRESSION (H): ICD-10-CM

## 2019-11-12 DIAGNOSIS — Z79.899 HIGH RISK MEDICATION USE: ICD-10-CM

## 2019-11-12 DIAGNOSIS — Z12.39 BREAST CANCER SCREENING: ICD-10-CM

## 2019-11-12 DIAGNOSIS — F32.0 MILD MAJOR DEPRESSION (H): ICD-10-CM

## 2019-11-12 PROCEDURE — 99214 OFFICE O/P EST MOD 30 MIN: CPT | Performed by: FAMILY MEDICINE

## 2019-11-12 RX ORDER — ONDANSETRON 8 MG/1
8 TABLET, ORALLY DISINTEGRATING ORAL EVERY 8 HOURS PRN
Qty: 40 TABLET | Refills: 2 | Status: SHIPPED | OUTPATIENT
Start: 2019-11-12 | End: 2023-07-20

## 2019-11-12 RX ORDER — CITALOPRAM HYDROBROMIDE 20 MG/1
20 TABLET ORAL DAILY
Qty: 90 TABLET | Refills: 1 | Status: SHIPPED | OUTPATIENT
Start: 2019-11-12 | End: 2020-07-31

## 2019-11-12 ASSESSMENT — ANXIETY QUESTIONNAIRES
1. FEELING NERVOUS, ANXIOUS, OR ON EDGE: MORE THAN HALF THE DAYS
7. FEELING AFRAID AS IF SOMETHING AWFUL MIGHT HAPPEN: NOT AT ALL
3. WORRYING TOO MUCH ABOUT DIFFERENT THINGS: NOT AT ALL
6. BECOMING EASILY ANNOYED OR IRRITABLE: NOT AT ALL
GAD7 TOTAL SCORE: 2
IF YOU CHECKED OFF ANY PROBLEMS ON THIS QUESTIONNAIRE, HOW DIFFICULT HAVE THESE PROBLEMS MADE IT FOR YOU TO DO YOUR WORK, TAKE CARE OF THINGS AT HOME, OR GET ALONG WITH OTHER PEOPLE: NOT DIFFICULT AT ALL
2. NOT BEING ABLE TO STOP OR CONTROL WORRYING: NOT AT ALL
5. BEING SO RESTLESS THAT IT IS HARD TO SIT STILL: NOT AT ALL

## 2019-11-12 ASSESSMENT — PATIENT HEALTH QUESTIONNAIRE - PHQ9
SUM OF ALL RESPONSES TO PHQ QUESTIONS 1-9: 7
5. POOR APPETITE OR OVEREATING: NOT AT ALL

## 2019-11-12 NOTE — NURSING NOTE
Phq9 was given to the patient to be completed.  Informed that she is due this month for mammogram.  Whitney Renee CMA

## 2019-11-12 NOTE — PROGRESS NOTES
Subjective     Sharon Fung is a 64 year old female who presents to clinic today for the following health issues:    HPI   ED/UC Followup:    Facility:  [facility]  Date of visit: 11/5/19   Reason for visit: Non-intractable vomiting with nausea  Current Status: Constantly nauseated     She was seen in the ER a week ago because of nausea and vomiting.  She was given IV fluids.  She feels like she has fairly constant nausea.  She thinks it may be due to her sulfasalazine that she takes for rheumatoid arthritis.  She had previously been on Humira, methotrexate, and other meds for her rheumatoid arthritis, but had various side effects from them.  There was some discussion about her trying Enbrel.  She is on citalopram for depression.  She feels like it is working okay.  She does feel tired quite often.  Mildly depressed at times.  She has used ondansetron for nausea in the past but is about out of it.  She requests a new prescription for that.  She is also on gabapentin now for chronic pain.  She feels like it has been helpful.    Patient Active Problem List   Diagnosis     AR (allergic rhinitis)     GERD (gastroesophageal reflux disease)     Status post bariatric surgery     Mild major depression (H)     Advanced directives, counseling/discussion     High risk medication use     Obstructive sleep apnea     Obesity, Class III, BMI 40-49.9 (morbid obesity) (H)     Anterior basement membrane dystrophy     Seropositive rheumatoid arthritis (H)     LORNA positive     Carpal tunnel syndrome of right wrist     Headache     Chronic obstructive pulmonary disease, unspecified COPD type (H)     Immunosuppression (H)     Fibromyalgia     Past Surgical History:   Procedure Laterality Date     BLEPHAROPLASTY BILATERAL Bilateral 8/5/2016    Procedure: BLEPHAROPLASTY BILATERAL;  Surgeon: Cortez Robin MD;  Location:  [facility]     BREAST BIOPSY, RT/LT  1-94    Benign     C APPENDECTOMY  1977     COLONOSCOPY       COLONOSCOPY WITH CO2  INSUFFLATION N/A 3/30/2017    Procedure: COLONOSCOPY WITH CO2 INSUFFLATION;  Surgeon: Issa Weeks MD;  Location: [facility] OR     ESOPHAGOSCOPY, GASTROSCOPY, DUODENOSCOPY (EGD), COMBINED N/A 10/29/2015    Procedure: COMBINED ESOPHAGOSCOPY, GASTROSCOPY, DUODENOSCOPY (EGD);  Surgeon: Shaq Mims MD;  Location: [facility] GI     LAPAROSCOPIC GASTRIC ADJUSTABLE BANDING  11/09/10    Lap band procedure     LAPAROSCOPIC REMOVAL GASTRIC ADJUSTABLE BAND N/A 10/31/2015    Procedure: LAPAROSCOPIC REMOVAL GASTRIC ADJUSTABLE BAND;  Surgeon: Shaq Mims MD;  Location: [facility] OR     RELEASE CARPAL TUNNEL Left 4/27/2017    Procedure: RELEASE CARPAL TUNNEL;  Left Open Carpal Tunnel Release;  Surgeon: Ciara Middleton MD;  Location: [facility] OR     RELEASE CARPAL TUNNEL Right 6/15/2017    Procedure: RELEASE CARPAL TUNNEL;  Right Carpal Tunnel Release Open;  Surgeon: Ciara Middleton MD;  Location: [facility] OR     REPAIR PTOSIS       TUBAL LIGATION  1988       Social History     Tobacco Use     Smoking status: Never Smoker     Smokeless tobacco: Never Used   Substance Use Topics     Alcohol use: No     Alcohol/week: 1.0 - 2.0 standard drinks     Types: 1 - 2 Standard drinks or equivalent per week     Family History   Problem Relation Age of Onset     Heart Disease Father         MI     Neurologic Disorder Father 79        Parkinson's     Diabetes Father      Hypertension Father      Coronary Artery Disease Father      Cancer Maternal Grandmother         uterine     Neurologic Disorder Sister 16        migraine     Depression Sister      Neurologic Disorder Mother 20        migraine     Depression Mother      Neurologic Disorder Son 7        migraine     Substance Abuse Son      Depression Sister      Substance Abuse Sister          Current Outpatient Medications   Medication Sig Dispense Refill     acetaminophen (TYLENOL) 500 MG tablet Take 500-1,000 mg by mouth every 6 hours as needed for mild pain       albuterol  (PROAIR HFA/PROVENTIL HFA/VENTOLIN HFA) 108 (90 Base) MCG/ACT inhaler Inhale 2 puffs into the lungs every 6 hours as needed for shortness of breath / dyspnea or wheezing 1 Inhaler 1     azelastine (OPTIVAR) 0.05 % ophthalmic solution Apply 1 drop to eye 2 times daily 1 Bottle 11     citalopram (CELEXA) 20 MG tablet Take 1 tablet (20 mg) by mouth daily 90 tablet 1     cycloSPORINE (RESTASIS) 0.05 % ophthalmic emulsion Place 1 drop into both eyes 2 times daily 2 Box 11     cycloSPORINE (RESTASIS) 0.05 % ophthalmic emulsion Place 1 drop into both eyes 2 times daily       fexofenadine (ALLEGRA) 180 MG tablet Take 1 tablet (180 mg) by mouth daily 90 tablet 2     fluticasone (FLONASE) 50 MCG/ACT nasal spray Spray 2 sprays into both nostrils as needed       gabapentin (NEURONTIN) 100 MG capsule Take 1 cap (100 MG) in the AM, 1 cap (100 MG) at midday, and 4 caps (400 MG) at bedtime. 180 capsule 3     ibuprofen 200 MG capsule Take 600-800 mg by mouth At Bedtime       ketorolac (TORADOL) 10 MG tablet Take 1 tablet (10 mg) by mouth every 6 hours as needed for moderate pain or pain 10 tablet 0     liraglutide - Weight Management (SAXENDA) 18 MG/3ML pen Inject 3 mg Subcutaneous daily 15 mL 3     ondansetron (ZOFRAN-ODT) 8 MG ODT tab Take 1 tablet (8 mg) by mouth every 8 hours as needed for nausea 40 tablet 2     ORDER FOR DME Use your CPAP device as directed by your provider.       prochlorperazine (COMPAZINE) 10 MG tablet One every 6 to 8 hours for nausea 10 tablet 0     Sarilumab 200 MG/1.14ML SOAJ Inject 200 mg Subcutaneous every 14 days . Hold for signs of infection and seek medical attention. 2 pen 4     sulfaSALAzine ER (AZULFIDINE EN) 500 MG EC tablet Take 3 tablets (1,500 mg) by mouth 2 times daily 180 tablet 4     No Known Allergies      Reviewed and updated as needed this visit by Provider         Review of Systems   ROS COMP: Constitutional, HEENT, cardiovascular, pulmonary, gi and gu systems are negative, except as  "otherwise noted.      Objective    /82 (BP Location: Right arm, Patient Position: Sitting, Cuff Size: Adult Large)   Pulse 70   Temp 98.5  F (36.9  C) (Oral)   Wt 117.9 kg (260 lb)   SpO2 96%   BMI 44.60 kg/m    Body mass index is 44.6 kg/m .  Physical Exam   GENERAL: alert, no distress and over weight  ABDOMEN: soft, nontender, no hepatosplenomegaly, no masses   PSYCH: Affect is pleasant and appropriate.  She scored a 7 on her PHQ 9 and a 2 on the JUANPABLO 7.    Diagnostic Test Results:  Labs reviewed in Epic        Assessment & Plan       ICD-10-CM    1. Nausea R11.0 ondansetron (ZOFRAN-ODT) 8 MG ODT tab   2. Seropositive rheumatoid arthritis (H) M05.9    3. Immunosuppression (H) D89.9    4. Mild major depression (H) F32.0 citalopram (CELEXA) 20 MG tablet   5. High risk medication use Z79.899    6. Breast cancer screening Z12.39 MA SCREENING DIGITAL BILAT - Future  (s+30)        BMI:   Estimated body mass index is 44.6 kg/m  as calculated from the following:    Height as of 8/28/19: 1.626 m (5' 4.02\").    Weight as of this encounter: 117.9 kg (260 lb).   Weight management plan: Discussed healthy diet and exercise guidelines    I suspect her ongoing nausea is related to the use of sulfasalazine  Discussed some of the risks and benefits of various RA drugs  She will be seeing her rheumatologist in the next couple of weeks and she will take that discussion in greater detail with Dr. Guerra  In the meantime, we will refill her ondansetron to symptomatically treat her nausea  She will continue with her citalopram and other baseline medications  She is done with the pain clinic now, so I will refill her gabapentin for her when that is required  She was reminded to schedule a screening mammogram    Return for a recheck if symptoms worsen or fail to improve.    Reynaldo Saavedra MD  [facility]"

## 2019-11-13 ASSESSMENT — ANXIETY QUESTIONNAIRES: GAD7 TOTAL SCORE: 2

## 2019-11-20 DIAGNOSIS — G47.33 OBSTRUCTIVE SLEEP APNEA (ADULT) (PEDIATRIC): Primary | ICD-10-CM

## 2019-12-10 DIAGNOSIS — M05.741 RHEUMATOID ARTHRITIS INVOLVING BOTH HANDS WITH POSITIVE RHEUMATOID FACTOR (H): Primary | ICD-10-CM

## 2019-12-10 DIAGNOSIS — M05.742 RHEUMATOID ARTHRITIS INVOLVING BOTH HANDS WITH POSITIVE RHEUMATOID FACTOR (H): Primary | ICD-10-CM

## 2019-12-10 LAB
ALBUMIN SERPL-MCNC: 3.8 G/DL (ref 3.4–5)
ALP SERPL-CCNC: 105 U/L (ref 40–150)
ALT SERPL W P-5'-P-CCNC: 43 U/L (ref 0–50)
AST SERPL W P-5'-P-CCNC: 25 U/L (ref 0–45)
BASOPHILS # BLD AUTO: 0 10E9/L (ref 0–0.2)
BASOPHILS NFR BLD AUTO: 0.5 %
BILIRUB DIRECT SERPL-MCNC: <0.1 MG/DL (ref 0–0.2)
BILIRUB SERPL-MCNC: 0.4 MG/DL (ref 0.2–1.3)
CREAT SERPL-MCNC: 0.87 MG/DL (ref 0.52–1.04)
CRP SERPL-MCNC: <2.9 MG/L (ref 0–8)
DIFFERENTIAL METHOD BLD: NORMAL
EOSINOPHIL # BLD AUTO: 0.3 10E9/L (ref 0–0.7)
EOSINOPHIL NFR BLD AUTO: 4.5 %
ERYTHROCYTE [DISTWIDTH] IN BLOOD BY AUTOMATED COUNT: 12.6 % (ref 10–15)
ERYTHROCYTE [SEDIMENTATION RATE] IN BLOOD BY WESTERGREN METHOD: 3 MM/H (ref 0–30)
GFR SERPL CREATININE-BSD FRML MDRD: 71 ML/MIN/{1.73_M2}
HCT VFR BLD AUTO: 39.9 % (ref 35–47)
HGB BLD-MCNC: 12.7 G/DL (ref 11.7–15.7)
IMM GRANULOCYTES # BLD: 0 10E9/L (ref 0–0.4)
IMM GRANULOCYTES NFR BLD: 0.3 %
LYMPHOCYTES # BLD AUTO: 2.2 10E9/L (ref 0.8–5.3)
LYMPHOCYTES NFR BLD AUTO: 37.9 %
MCH RBC QN AUTO: 30.4 PG (ref 26.5–33)
MCHC RBC AUTO-ENTMCNC: 31.8 G/DL (ref 31.5–36.5)
MCV RBC AUTO: 96 FL (ref 78–100)
MONOCYTES # BLD AUTO: 0.5 10E9/L (ref 0–1.3)
MONOCYTES NFR BLD AUTO: 7.8 %
NEUTROPHILS # BLD AUTO: 2.8 10E9/L (ref 1.6–8.3)
NEUTROPHILS NFR BLD AUTO: 49 %
NRBC # BLD AUTO: 0 10*3/UL
NRBC BLD AUTO-RTO: 0 /100
PLATELET # BLD AUTO: 235 10E9/L (ref 150–450)
PROT SERPL-MCNC: 7 G/DL (ref 6.8–8.8)
RBC # BLD AUTO: 4.18 10E12/L (ref 3.8–5.2)
WBC # BLD AUTO: 5.8 10E9/L (ref 4–11)

## 2019-12-10 PROCEDURE — 36415 COLL VENOUS BLD VENIPUNCTURE: CPT | Performed by: INTERNAL MEDICINE

## 2019-12-10 PROCEDURE — 85652 RBC SED RATE AUTOMATED: CPT | Performed by: INTERNAL MEDICINE

## 2019-12-10 PROCEDURE — 85025 COMPLETE CBC W/AUTO DIFF WBC: CPT | Performed by: INTERNAL MEDICINE

## 2019-12-10 PROCEDURE — 86140 C-REACTIVE PROTEIN: CPT | Performed by: INTERNAL MEDICINE

## 2019-12-10 PROCEDURE — 82565 ASSAY OF CREATININE: CPT | Performed by: INTERNAL MEDICINE

## 2019-12-10 PROCEDURE — 80076 HEPATIC FUNCTION PANEL: CPT | Performed by: INTERNAL MEDICINE

## 2019-12-11 ENCOUNTER — OFFICE VISIT (OUTPATIENT)
Dept: RHEUMATOLOGY | Facility: CLINIC | Age: 64
End: 2019-12-11
Payer: COMMERCIAL

## 2019-12-11 VITALS
HEART RATE: 92 BPM | OXYGEN SATURATION: 96 % | DIASTOLIC BLOOD PRESSURE: 70 MMHG | WEIGHT: 260.8 LBS | SYSTOLIC BLOOD PRESSURE: 132 MMHG | HEIGHT: 64 IN | BODY MASS INDEX: 44.53 KG/M2

## 2019-12-11 DIAGNOSIS — Z13.820 OSTEOPOROSIS SCREENING: ICD-10-CM

## 2019-12-11 DIAGNOSIS — Z78.0 POST-MENOPAUSAL: ICD-10-CM

## 2019-12-11 DIAGNOSIS — Z79.899 HIGH RISK MEDICATION USE: ICD-10-CM

## 2019-12-11 DIAGNOSIS — M05.9 SEROPOSITIVE RHEUMATOID ARTHRITIS (H): Primary | ICD-10-CM

## 2019-12-11 PROCEDURE — 99213 OFFICE O/P EST LOW 20 MIN: CPT | Performed by: INTERNAL MEDICINE

## 2019-12-11 RX ORDER — SULFASALAZINE 500 MG/1
1500 TABLET, DELAYED RELEASE ORAL 2 TIMES DAILY
Qty: 540 TABLET | Refills: 2 | Status: SHIPPED | OUTPATIENT
Start: 2019-12-11 | End: 2020-03-19

## 2019-12-11 ASSESSMENT — MIFFLIN-ST. JEOR: SCORE: 1728.3

## 2019-12-11 NOTE — PROGRESS NOTES
Rheumatology Clinic Visit      Sharon Fung MRN# [id number]   YOB: 1955 Age: 64 year old      Date of visit: 12/11/19   PCP: Dr. Reynaldo Saavedra    Chief Complaint   Patient presents with:  Arthritis: RA, patient is stiff a lot    Assessment and Plan     1. Seropositive nonerosive rheumatoid arthritis (RF negative, CCP low positive): Previously on Humira (lost efficacy), Cimzia (ineffective), Orencia (ineffective), leflunomide (ineffective as monotherapy), hydroxychloroquine (ineffective), Xeljanz (ineffective), MTX (GI upset). Currently on SSZ 1500mg BID and Kevzara 200mg SQ every 14 days (started 1/2019).   Note that Enbrel was denied by insurance who preferred Kevzara. Doing well today.  - Continue sulfasalazine 1500 mg twice daily   - Continue Kevzara 200mg SQ every 14 days  - Labs in 3months (fasting): CBC, Creatinine, Hepatic Panel, ESR, CRP, fasting              Rapid 3, cumulative scores                      08/28/2019: 3.2   (SSZ 1500mg BID, Kevzara)  Now on gabapentin                      04/17/2019: 15    (SSZ 1500mg BID, Kevzara)  Mostly fibromyalgia symptoms                      12/19/2018:         (MTX 20mg SQ wkly, Xeljanz XR 11mg daily, SSZ 1500mg BID)                          05/02/2018:  9     (MTX 20mg SQ wkly, Xeljanz XR 11mg daily, SSZ 1000mg BID)                          01/31/2018: 22.3 (MTX 20mg SQ wkly, Xeljanz XR 11mg daily)                          11/29/2017: 16.8 (MTX 20mg SQ wkly)                      08/02/2017: 4.2   (MTX 20mg SQ wkly, Xeljanz XR 11mg daily)                         05/04/2017: 7      (MTX 20mg SQ wkly, Xeljanz XR 11mg daily)                        02/02/2017: 8      (MTX 20mg SQ wkly, Xeljanz XR 11mg daily)                      11/04/2016: 13    (MTX 20mg SQ weekly)                      07/15/2016: 10.8    2. Positive LORNA and dsDNA / Secondary Sjogren's Syndrome: Repeat dsDNAs have been negative. Has dry eyes but no dry mouth.  Dry eyes are treated by  ophthalmology with Restasis.      3. Morbid obesity: stressed importance of weight loss.  She says that she is working on getting weight loss medications approved by her insurance.     4. Bilateral patellofemoral syndrome: mild medial joint line tenderness.  Recommended physical therapy but she refused previously.  She is doing self-taught exercises and will learn from her friend who is a physical therapist.  Weight loss will also help.    5. Fibromyalgia: improved on gabapentin and followed in the pain management clinic for this issue.     6.  Vaccinations:   - Influenza: up to date  - [id number]: up to date  - [id number]: up to date  - Shingrix: up to date    7.  Osteoporosis screening: RA, post-menopausal.   - DEXA ordered; advised that she call to schedule  - Labs in 3 months: vitamin D    Ms. Fung verbalized agreement with and understanding of the rational for the diagnosis and treatment plan.  All questions were answered to best of my ability and the patient's satisfaction. Ms. Fung was advised to contact the clinic with any questions that may arise after the clinic visit.      Thank you for involving me in the care of the patient    Return to clinic: 3-4 months    HPI   Sharon Fung is a 64 year old female with medical history significant for GERD, allergic rhinitis, obstructive sleep apnea, obesity, hx of trigger fingers, hx of carpal tunnel surgery, and rheumatoid arthritis who presents for follow-up of rheumatoid arthritis.    Today, Ms. Fung reports that she is doing well.  Tolerating medications.  Morning stiffness for about 15-60 minutes, typically resolves with a warm shower.  Positive gelling phenomenon.  No joint pain.     No fevers or chills. No nausea, vomiting, constipation, diarrhea. No chest pain/pressure, palpitations, or shortness of breath. No oral or nasal sores. No neck pain. No rash.      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 [facility]    ROS   GEN: See history of present illness  SKIN: No itching, rashes, sores  HEENT: No epistaxis. No oral or nasal ulcers.  CV: No chest pain, pressure, palpitations, or dyspnea on exertion.  PULM: no shortness of breath.   GI: No nausea, vomiting, constipation, diarrhea. No blood in stool. No abdominal pain.  : No blood in urine.  MSK: See HPI.  NEURO: No numbness or tingling  EXT: No LE swelling  PSYCH: Negative    Active Problem List     Patient Active Problem List   Diagnosis     AR (allergic rhinitis)     GERD (gastroesophageal reflux disease)     Status post bariatric surgery     Mild major depression (H)     Advanced directives, counseling/discussion     High risk medication use     Obstructive sleep apnea     Obesity, Class III, BMI 40-49.9 (morbid obesity) (H)     Anterior basement membrane dystrophy     Seropositive rheumatoid arthritis (H)     LORNA positive     Carpal tunnel syndrome of right wrist     Headache     Chronic obstructive pulmonary disease, unspecified COPD type (H)     Immunosuppression (H)     Fibromyalgia     Past Medical History     Past Medical History:   Diagnosis Date     AR (allergic rhinitis)  as teen     Arthritis 12/14/2015     Chronic obstructive pulmonary disease, unspecified COPD type (H) 3/27/2018     Depressive disorder 3 years ago     GERD (gastroesophageal reflux disease) 4-07     Headache 7/11/2017     Mild major depression (H) 3 years ago     Morbid obesity (H) teens     BRETT (obstructive sleep apnea)     uses CPAP     Rheumatoid arthritis, adult (H)      Past Surgical History     Past Surgical History:   Procedure Laterality Date     BLEPHAROPLASTY BILATERAL Bilateral 8/5/2016    Procedure: BLEPHAROPLASTY BILATERAL;  Surgeon: Cortez Robin MD;  Location:  [facility]     BREAST BIOPSY, RT/LT  1-94    Benign     C APPENDECTOMY  1977     COLONOSCOPY       COLONOSCOPY WITH CO2 INSUFFLATION N/A 3/30/2017    Procedure: COLONOSCOPY WITH CO2  INSUFFLATION;  Surgeon: Issa Weeks MD;  Location: [facility]     ESOPHAGOSCOPY, GASTROSCOPY, DUODENOSCOPY (EGD), COMBINED N/A 10/29/2015    Procedure: COMBINED ESOPHAGOSCOPY, GASTROSCOPY, DUODENOSCOPY (EGD);  Surgeon: Shaq Mims MD;  Location: [facility]     LAPAROSCOPIC GASTRIC ADJUSTABLE BANDING  11/09/10    Lap band procedure     LAPAROSCOPIC REMOVAL GASTRIC ADJUSTABLE BAND N/A 10/31/2015    Procedure: LAPAROSCOPIC REMOVAL GASTRIC ADJUSTABLE BAND;  Surgeon: Shaq Mims MD;  Location: [facility]     RELEASE CARPAL TUNNEL Left 4/27/2017    Procedure: RELEASE CARPAL TUNNEL;  Left Open Carpal Tunnel Release;  Surgeon: Ciara Middleton MD;  Location: [facility]     RELEASE CARPAL TUNNEL Right 6/15/2017    Procedure: RELEASE CARPAL TUNNEL;  Right Carpal Tunnel Release Open;  Surgeon: Ciara Middleton MD;  Location: [facility]     REPAIR PTOSIS       TUBAL LIGATION  1988     Allergy   No Known Allergies  Current Medication List     Current Outpatient Medications   Medication Sig     acetaminophen (TYLENOL) 500 MG tablet Take 500-1,000 mg by mouth every 6 hours as needed for mild pain     albuterol (PROAIR HFA/PROVENTIL HFA/VENTOLIN HFA) 108 (90 Base) MCG/ACT inhaler Inhale 2 puffs into the lungs every 6 hours as needed for shortness of breath / dyspnea or wheezing     azelastine (OPTIVAR) 0.05 % ophthalmic solution Apply 1 drop to eye 2 times daily     citalopram (CELEXA) 20 MG tablet Take 1 tablet (20 mg) by mouth daily     cycloSPORINE (RESTASIS) 0.05 % ophthalmic emulsion Place 1 drop into both eyes 2 times daily     fexofenadine (ALLEGRA) 180 MG tablet Take 1 tablet (180 mg) by mouth daily     gabapentin (NEURONTIN) 100 MG capsule Take 1 cap (100 MG) in the AM, 1 cap (100 MG) at midday, and 4 caps (400 MG) at bedtime.     ibuprofen 200 MG capsule Take 600-800 mg by mouth At Bedtime     Sarilumab 200 MG/1.14ML SOAJ Inject 200 mg Subcutaneous every 14 days . Hold for signs of infection and  seek medical attention.     sulfaSALAzine ER (AZULFIDINE EN) 500 MG EC tablet Take 3 tablets (1,500 mg) by mouth 2 times daily     cycloSPORINE (RESTASIS) 0.05 % ophthalmic emulsion Place 1 drop into both eyes 2 times daily (Patient not taking: Reported on 12/11/2019)     fluticasone (FLONASE) 50 MCG/ACT nasal spray Spray 2 sprays into both nostrils as needed     ketorolac (TORADOL) 10 MG tablet Take 1 tablet (10 mg) by mouth every 6 hours as needed for moderate pain or pain     liraglutide - Weight Management (SAXENDA) 18 MG/3ML pen Inject 3 mg Subcutaneous daily (Patient not taking: Reported on 12/11/2019)     ondansetron (ZOFRAN-ODT) 8 MG ODT tab Take 1 tablet (8 mg) by mouth every 8 hours as needed for nausea (Patient not taking: Reported on 12/11/2019)     ORDER FOR DME Use your CPAP device as directed by your provider.     prochlorperazine (COMPAZINE) 10 MG tablet One every 6 to 8 hours for nausea     No current facility-administered medications for this visit.      Facility-Administered Medications Ordered in Other Visits   Medication     sodium chloride (PF) 0.9% PF flush 10 mL     sodium chloride bacteriostatic 0.9 % flush 12 mL         Social History   See HPI    Family History     Family History   Problem Relation Age of Onset     Heart Disease Father         MI     Neurologic Disorder Father 79        Parkinson's     Diabetes Father      Hypertension Father      Coronary Artery Disease Father      Cancer Maternal Grandmother         uterine     Neurologic Disorder Sister 16        migraine     Depression Sister      Neurologic Disorder Mother 20        migraine     Depression Mother      Neurologic Disorder Son 7        migraine     Substance Abuse Son      Depression Sister      Substance Abuse Sister        Physical Exam     Temp Readings from Last 3 Encounters:   11/12/19 98.5  F (36.9  C) (Oral)   11/05/19 97.7  F (36.5  C) (Oral)   10/08/19 98  F (36.7  C) (Oral)     BP Readings from Last 5  "Encounters:   12/11/19 132/70   11/12/19 132/82   11/05/19 (!) 159/67   10/08/19 132/66   08/28/19 128/72     Pulse Readings from Last 1 Encounters:   12/11/19 92     Resp Readings from Last 1 Encounters:   11/05/19 16     Estimated body mass index is 44.74 kg/m  as calculated from the following:    Height as of this encounter: 1.626 m (5' 4.02\").    Weight as of this encounter: 118.3 kg (260 lb 12.8 oz).    GEN: NAD  HEENT: MMM. Anicteric, noninjected sclera  CV: S1, S2. RRR. No m/r/g.  PULM: CTA bilaterally. No w/c.  MSK: MCPs, PIPs, DIPs, wrists, elbows, shoulders, knees, and ankles without swelling or tenderness to palpation.  Negative MCP and MTP squeeze.     SKIN: No rash. No nail pitting.  EXT: No LE edema  PSYCH: Alert. Appropriate.    Labs / Imaging (select studies)   RF/CCP  Recent Labs   Lab Test 11/19/12  0900   CCPABY 53*   RHF <7     CBC  Recent Labs   Lab Test 12/10/19  1452 11/05/19  1745 10/08/19  0906   WBC 5.8 6.0 6.5   RBC 4.18 4.44 4.42   HGB 12.7 13.5 13.5   HCT 39.9 41.2 41.2   MCV 96 93 93   RDW 12.6 12.1 12.5    257 245   MCH 30.4 30.4 30.5   MCHC 31.8 32.8 32.8   NEUTROPHIL 49.0 64.5 72.7   LYMPH 37.9 27.3 19.3   MONOCYTE 7.8 6.9 5.8   EOSINOPHIL 4.5 0.3 1.2   BASOPHIL 0.5 0.3 0.5   ANEU 2.8 3.9 4.7   ALYM 2.2 1.6 1.3   KIMBERLY 0.5 0.4 0.4   AEOS 0.3 0.0 0.1   ABAS 0.0 0.0 0.0     CMP  Recent Labs   Lab Test 12/10/19  1452 11/05/19  1745 10/08/19  0906  03/26/19  0925   NA  --  138 141  --  139   POTASSIUM  --  3.8 4.2  --  4.0   CHLORIDE  --  103 105  --  107   CO2  --  28 28  --  24   ANIONGAP  --  7 8  --  8   GLC  --  87 86  --  71   BUN  --  13 14  --  18   CR 0.87 0.74 0.66   < > 0.76   GFRESTIMATED 71 86 >90   < > 85   GFRESTBLACK 83 >90 >90   < > >90   ISH  --  9.1 9.0  --  8.9   BILITOTAL 0.4 0.5 0.4   < > 0.3   ALBUMIN 3.8 4.1 4.0   < > 3.9   PROTTOTAL 7.0 7.5 7.1   < > 7.5   ALKPHOS 105 96 96   < > 113   AST 25 23 28   < > 28   ALT 43 45 41   < > 50    < > = values in this " interval not displayed.     Calcium/VitaminD  Recent Labs   Lab Test 11/05/19  1745 10/08/19  0906 03/26/19  0925  02/20/15  0750   ISH 9.1 9.0 8.9   < > 9.0   VITDT  --   --   --   --  39    < > = values in this interval not displayed.     ESR/CRP  Recent Labs   Lab Test 12/10/19  1452 08/28/19  0940 03/26/19  0925   SED 3 4 6   CRP <2.9 <2.9 <2.9     Lipid Panel  Recent Labs   Lab Test 04/27/17  0732 12/27/16  0836 02/20/15  0750 10/20/14  0821 11/22/13  0843   CHOL 204* 184 157 194 167   TRIG 148 154* 79 193* 140   HDL 82 56 63 63 60   LDL 92 97 78 92 79   VLDL  --   --  16 39* 28   CHOLHDLRATIO  --   --  2.5 3.1 2.8   NHDL 122 128  --   --   --      Hepatitis B  Recent Labs   Lab Test 12/08/15  0855 03/06/15  1650   HBCAB Nonreactive  --    HEPBANG Nonreactive Nonreactive     Hepatitis C  Recent Labs   Lab Test 12/08/15  0855 03/06/15  1650 11/19/12  0900   HCVAB Nonreactive   Assay performance characteristics have not been established for newborns,   infants, and children   Nonreactive   Assay performance characteristics have not been established for newborns,   infants, and children   Negative     Tuberculosis Screening  Recent Labs   Lab Test 10/31/17  1400 02/02/17  0822 12/08/15  0855   TBRSLT Negative Negative Negative   TBAGN 0.05 0.00 0.01     HIV Screening  Recent Labs   Lab Test 07/24/18  0738   HIAGAB Nonreactive       Immunization History     Immunization History   Administered Date(s) Administered     Influenza Vaccine IM > 6 months Valent IIV4 10/15/2013, 09/15/2014, 09/28/2015, 09/28/2016, 10/16/2017, 10/01/2018     Influenza Vaccine Im 4yrs+ 4 Valent CCIIV4 10/15/2019     Pneumo Conj 13-V (2010&after) 04/15/2016     Pneumococcal 23 valent 07/15/2016     TDAP Vaccine (Adacel) 02/09/2011     Zoster vaccine recombinant adjuvanted (SHINGRIX) 04/17/2019, 08/28/2019          Chart documentation done in part with Dragon Voice recognition Software. Although reviewed after completion, some word and  grammatical error may remain.     Freddy Guerra MD

## 2019-12-11 NOTE — PATIENT INSTRUCTIONS
Rheumatology    Dr. Freddy Guerra       [facility] in [place]   (Monday)  [address]  [place] [address]       [facility] in [place]   (Tuesday)  [address] [place] [address]    [facility] in [place]   (Wed., Thurs., and Friday)  [address] [place] [address]    Phone number: [phone]  Thank you for choosing [facility].  Evie Rosas CMA

## 2019-12-11 NOTE — NURSING NOTE
Sharon to follow up with Primary Care provider regarding elevated blood pressure.  Blood pressure rechecked after visit    132/70  Evie Rosas CMA Rheumatology  12/11/2019 8:25 AM                                RAPID3 (0-30) Cumulative Score  10.8          RAPID3 Weighted Score (divide #4 by 3 and that is the weighted score)  3.6     Evie Rosas CMA Rheumatology  12/11/2019 7:57 AM

## 2019-12-20 ENCOUNTER — ANCILLARY PROCEDURE (OUTPATIENT)
Dept: MAMMOGRAPHY | Facility: CLINIC | Age: 64
End: 2019-12-20
Attending: FAMILY MEDICINE
Payer: COMMERCIAL

## 2019-12-20 ENCOUNTER — ANCILLARY PROCEDURE (OUTPATIENT)
Dept: BONE DENSITY | Facility: CLINIC | Age: 64
End: 2019-12-20
Attending: INTERNAL MEDICINE
Payer: COMMERCIAL

## 2019-12-20 DIAGNOSIS — Z78.0 POST-MENOPAUSAL: ICD-10-CM

## 2019-12-20 DIAGNOSIS — Z12.39 BREAST CANCER SCREENING: ICD-10-CM

## 2019-12-20 DIAGNOSIS — M05.9 SEROPOSITIVE RHEUMATOID ARTHRITIS (H): ICD-10-CM

## 2019-12-20 DIAGNOSIS — Z13.820 OSTEOPOROSIS SCREENING: ICD-10-CM

## 2019-12-20 PROCEDURE — 77080 DXA BONE DENSITY AXIAL: CPT | Performed by: RADIOLOGY

## 2019-12-20 PROCEDURE — 77067 SCR MAMMO BI INCL CAD: CPT

## 2020-01-09 ENCOUNTER — TELEPHONE (OUTPATIENT)
Dept: RHEUMATOLOGY | Facility: CLINIC | Age: 65
End: 2020-01-09

## 2020-01-09 NOTE — TELEPHONE ENCOUNTER
Reason for Call:  Other prescription    Detailed comments: [facility] calling in regards to sulfaSLAzine ER (AZULFIDINE EN) 500 MG EC tablet. It was ordered for the patient, but it will be on long term back order. The regular 500 MG tablets are available. Pharmacy is requesting 500 MG as a substitute. Please call back to advise.    Phone [phone]    Best Time: any    Can we leave a detailed message on this number? YES    Call taken on 1/9/2020 at 11:08 AM by Michael Jerez

## 2020-01-09 NOTE — TELEPHONE ENCOUNTER
Contacted [facility] pharmacy and advised that it is okay to prescribe the regular SSZ tablets per Dr. Guerra.    Mukul Rodriguez RN....1/9/2020 11:38 AM

## 2020-02-06 ENCOUNTER — MYC MEDICAL ADVICE (OUTPATIENT)
Dept: FAMILY MEDICINE | Facility: CLINIC | Age: 65
End: 2020-02-06

## 2020-02-06 DIAGNOSIS — M54.16 LUMBAR RADICULOPATHY: ICD-10-CM

## 2020-02-06 RX ORDER — GABAPENTIN 100 MG/1
CAPSULE ORAL
Qty: 180 CAPSULE | Refills: 5 | Status: SHIPPED | OUTPATIENT
Start: 2020-02-06 | End: 2020-08-12

## 2020-02-06 NOTE — TELEPHONE ENCOUNTER
Patient was last seen 11/12/19, appears was seen by rheumatology 12/11/19.    See her [employer] message, requests gabapentin from PCP.      Cued up.    Routing refill request to provider for review/approval because:  Drug not on the [facility] refill protocol     Rosangela Joseph RN  [facility]

## 2020-03-31 DIAGNOSIS — M05.9 SEROPOSITIVE RHEUMATOID ARTHRITIS (H): ICD-10-CM

## 2020-03-31 DIAGNOSIS — Z79.899 HIGH RISK MEDICATION USE: ICD-10-CM

## 2020-03-31 LAB
ALBUMIN SERPL-MCNC: 4.2 G/DL (ref 3.4–5)
ALP SERPL-CCNC: 112 U/L (ref 40–150)
ALT SERPL W P-5'-P-CCNC: 38 U/L (ref 0–50)
AST SERPL W P-5'-P-CCNC: 28 U/L (ref 0–45)
BASOPHILS # BLD AUTO: 0.1 10E9/L (ref 0–0.2)
BASOPHILS NFR BLD AUTO: 0.8 %
BILIRUB DIRECT SERPL-MCNC: <0.1 MG/DL (ref 0–0.2)
BILIRUB SERPL-MCNC: 0.4 MG/DL (ref 0.2–1.3)
CHOLEST SERPL-MCNC: 224 MG/DL
CREAT SERPL-MCNC: 0.84 MG/DL (ref 0.52–1.04)
CRP SERPL-MCNC: <2.9 MG/L (ref 0–8)
DEPRECATED CALCIDIOL+CALCIFEROL SERPL-MC: 15 UG/L (ref 20–75)
DIFFERENTIAL METHOD BLD: NORMAL
EOSINOPHIL # BLD AUTO: 0.3 10E9/L (ref 0–0.7)
EOSINOPHIL NFR BLD AUTO: 4.7 %
ERYTHROCYTE [DISTWIDTH] IN BLOOD BY AUTOMATED COUNT: 12.6 % (ref 10–15)
ERYTHROCYTE [SEDIMENTATION RATE] IN BLOOD BY WESTERGREN METHOD: 5 MM/H (ref 0–30)
GFR SERPL CREATININE-BSD FRML MDRD: 74 ML/MIN/{1.73_M2}
HCT VFR BLD AUTO: 42.5 % (ref 35–47)
HDLC SERPL-MCNC: 73 MG/DL
HGB BLD-MCNC: 14.2 G/DL (ref 11.7–15.7)
IMM GRANULOCYTES # BLD: 0 10E9/L (ref 0–0.4)
IMM GRANULOCYTES NFR BLD: 0.2 %
LDLC SERPL CALC-MCNC: 117 MG/DL
LYMPHOCYTES # BLD AUTO: 2.1 10E9/L (ref 0.8–5.3)
LYMPHOCYTES NFR BLD AUTO: 32.8 %
MCH RBC QN AUTO: 31.3 PG (ref 26.5–33)
MCHC RBC AUTO-ENTMCNC: 33.4 G/DL (ref 31.5–36.5)
MCV RBC AUTO: 94 FL (ref 78–100)
MONOCYTES # BLD AUTO: 0.5 10E9/L (ref 0–1.3)
MONOCYTES NFR BLD AUTO: 7.7 %
NEUTROPHILS # BLD AUTO: 3.4 10E9/L (ref 1.6–8.3)
NEUTROPHILS NFR BLD AUTO: 53.8 %
NONHDLC SERPL-MCNC: 151 MG/DL
NRBC # BLD AUTO: 0 10*3/UL
NRBC BLD AUTO-RTO: 0 /100
PLATELET # BLD AUTO: 259 10E9/L (ref 150–450)
PROT SERPL-MCNC: 7.5 G/DL (ref 6.8–8.8)
RBC # BLD AUTO: 4.53 10E12/L (ref 3.8–5.2)
TRIGL SERPL-MCNC: 171 MG/DL
WBC # BLD AUTO: 6.4 10E9/L (ref 4–11)

## 2020-03-31 PROCEDURE — 85025 COMPLETE CBC W/AUTO DIFF WBC: CPT | Performed by: INTERNAL MEDICINE

## 2020-03-31 PROCEDURE — 80061 LIPID PANEL: CPT | Performed by: INTERNAL MEDICINE

## 2020-03-31 PROCEDURE — 85652 RBC SED RATE AUTOMATED: CPT | Performed by: INTERNAL MEDICINE

## 2020-03-31 PROCEDURE — 82306 VITAMIN D 25 HYDROXY: CPT | Performed by: INTERNAL MEDICINE

## 2020-03-31 PROCEDURE — 82565 ASSAY OF CREATININE: CPT | Performed by: INTERNAL MEDICINE

## 2020-03-31 PROCEDURE — 36415 COLL VENOUS BLD VENIPUNCTURE: CPT | Performed by: INTERNAL MEDICINE

## 2020-03-31 PROCEDURE — 86140 C-REACTIVE PROTEIN: CPT | Performed by: INTERNAL MEDICINE

## 2020-03-31 PROCEDURE — 80076 HEPATIC FUNCTION PANEL: CPT | Performed by: INTERNAL MEDICINE

## 2020-04-01 ENCOUNTER — TELEPHONE (OUTPATIENT)
Dept: RHEUMATOLOGY | Facility: CLINIC | Age: 65
End: 2020-04-01

## 2020-04-01 NOTE — TELEPHONE ENCOUNTER
Reason for call:  Other   Patient called regarding (reason for call): call back  Additional comments: Patient is calling because she wants to discuss the video chat. She works for [employer] and wants to know if she can do a face to face that way. Please call back     Phone number to reach patient:  Home number on file [phone] (home)    Best Time:  any    Can we leave a detailed message on this number?  YES    Travel screening: Not Applicable

## 2020-04-01 NOTE — TELEPHONE ENCOUNTER
Called patient however she received another call and had to hang up.  Patient was okay with me sending her a [employer] message with video visit instructions.    Mukul Rodriguez RN....4/1/2020 1:08 PM

## 2020-04-02 ENCOUNTER — VIRTUAL VISIT (OUTPATIENT)
Dept: RHEUMATOLOGY | Facility: CLINIC | Age: 65
End: 2020-04-02
Payer: COMMERCIAL

## 2020-04-02 DIAGNOSIS — E55.9 VITAMIN D DEFICIENCY: ICD-10-CM

## 2020-04-02 DIAGNOSIS — M05.9 SEROPOSITIVE RHEUMATOID ARTHRITIS (H): Primary | ICD-10-CM

## 2020-04-02 DIAGNOSIS — Z79.899 HIGH RISK MEDICATION USE: ICD-10-CM

## 2020-04-02 PROCEDURE — 99214 OFFICE O/P EST MOD 30 MIN: CPT | Mod: GT | Performed by: INTERNAL MEDICINE

## 2020-04-02 RX ORDER — LEFLUNOMIDE 20 MG/1
20 TABLET ORAL DAILY
Qty: 90 TABLET | Refills: 0 | Status: SHIPPED | OUTPATIENT
Start: 2020-04-02 | End: 2020-07-02

## 2020-04-02 RX ORDER — CHOLECALCIFEROL (VITAMIN D3) 50 MCG
1 TABLET ORAL DAILY
Qty: 90 TABLET | Refills: 1 | Status: SHIPPED | OUTPATIENT
Start: 2020-04-02 | End: 2020-10-01

## 2020-04-02 RX ORDER — PREDNISONE 2.5 MG/1
5 TABLET ORAL DAILY
Qty: 180 TABLET | Refills: 1 | Status: SHIPPED | OUTPATIENT
Start: 2020-04-02 | End: 2020-07-02

## 2020-04-02 NOTE — PROGRESS NOTES
"Sharon Fung is a 64 year old female who is being evaluated via a billable video visit.      The patient has been notified of following:     \"This video visit will be conducted via a call between you and your physician/provider. We have found that certain health care needs can be provided without the need for an in-person physical exam.  This service lets us provide the care you need with a video conversation.  If a prescription is necessary we can send it directly to your pharmacy.  If lab work is needed we can place an order for that and you can then stop by our lab to have the test done at a later time.    If during the course of the call the physician/provider feels a video visit is not appropriate, you will not be charged for this service.\"     Patient has given verbal consent for Video visit? Yes    Patient would like the video invitation sent by: Text to cell phone: [phone]        Sharon Fung complains of    Chief Complaint   Patient presents with     Arthritis     RA. Joints hurt really bad       Rheumatology Video Visit      Sharon Fung MRN# [id number]   YOB: 1955 Age: 64 year old      Date of visit: 4/02/20   PCP: Dr. Reynaldo Saavedra    Chief Complaint   Patient presents with:  Arthritis: RA. Joints hurt really bad    Assessment and Plan     1. Seropositive nonerosive rheumatoid arthritis (RF negative, CCP low positive): Previously on Humira (lost efficacy), Cimzia (ineffective), Orencia (ineffective), leflunomide (ineffective as monotherapy), hydroxychloroquine (ineffective), Xeljanz (ineffective), MTX (GI upset). Currently on SSZ 1500mg BID and Kevzara 200mg SQ every 14 days (started 1/2019).   Note that Enbrel was denied by insurance who preferred Kevzara. Doing well in December 2019.  Today, 4/2/2020: met via video visit because of coronavirus pandemic.  More symptomatic since changing sulfasalazine from the ER to the IR formulation.  She will continue on her current " medications, reducing prednisone as her symptoms improve over time, hopefully with the addition of leflunomide that she has tolerated in the past but stopped because it did not work as monotherapy.  Ultimately adding leflunomide to her current regimen will help to control her arthritis symptoms.  - Continue sulfasalazine 1500 mg twice daily   - Start leflunomide 20mg daily  - Continue prednisone 5mg daily (2.5mg tablets; reduce as symptoms tolerate)  - Continue Kevzara 200mg SQ every 14 days  - Labs monthly [id number]: CBC, Cr, Hepatic Panel  - Labs in 3 months: CBC, Creatinine, Hepatic Panel, ESR, CRP              Rapid 3, cumulative scores                      08/28/2019: 3.2   (SSZ 1500mg BID, Kevzara)  Now on gabapentin                      04/17/2019: 15    (SSZ 1500mg BID, Kevzara)  Mostly fibromyalgia symptoms                      12/19/2018:         (MTX 20mg SQ wkly, Xeljanz XR 11mg daily, SSZ 1500mg BID)                          05/02/2018:  9     (MTX 20mg SQ wkly, Xeljanz XR 11mg daily, SSZ 1000mg BID)                          01/31/2018: 22.3 (MTX 20mg SQ wkly, Xeljanz XR 11mg daily)                          11/29/2017: 16.8 (MTX 20mg SQ wkly)                      08/02/2017: 4.2   (MTX 20mg SQ wkly, Xeljanz XR 11mg daily)                         05/04/2017: 7      (MTX 20mg SQ wkly, Xeljanz XR 11mg daily)                        02/02/2017: 8      (MTX 20mg SQ wkly, Xeljanz XR 11mg daily)                      11/04/2016: 13    (MTX 20mg SQ weekly)                      07/15/2016: 10.8    # Leflunomide (Arava) Risks and Benefits: The risks and benefits of leflunomide were discussed in detail and the patient verbalized understanding.  The risks discussed include, but are not limited to, the risk for hypersensitivity, anaphylaxis, anaphylactoid reactions, hepatotoxicity, infections, interstitial lung disease, alopecia, rash, nausea, and diarrhea.  The impact on malignancies is not fully defined.   Alcohol  should be avoided while taking leflunomide.  Pregnancy prevention and planning was discussed; it is recommended that women of childbearing potential use reliable contraception before, during, and for a period of 2 years after treatment with leflunomide; if pregnancy is planned within 2 years of discontinuing medication then women should undergo drug elimination procedures (i.e. cholestyramine). Routine laboratory monitoring is required during leflunomide therapy. I encouraged reviewing the package insert and asking any questions about the medication.      2. Positive LORNA and dsDNA / Secondary Sjogren's Syndrome: Repeat dsDNAs have been negative. Has dry eyes but no dry mouth.  Dry eyes are treated by ophthalmology with Restasis.      3. Morbid obesity: weight loss would be beneficial    4. Bilateral patellofemoral syndrome: home exercises that she says are taught by her physical therapist friend, and weight loss encouragement    5. Fibromyalgia: improved on gabapentin and followed in the pain management clinic for this issue.     6.  Bone Health: normal 12/20/2019 DEXA; plan to repeat in 3-5 years but this may change depending on prednisone exposure.  VItamin D is low so will start vitamin D 2000 IU daily  - Start vitamin D 2000 IU daily    Ms. Fung verbalized agreement with and understanding of the rational for the diagnosis and treatment plan.  All questions were answered to best of my ability and the patient's satisfaction. Ms. Fung was advised to contact the clinic with any questions that may arise after the clinic visit.      Thank you for involving me in the care of the patient    Return to clinic: 3-4 months (scheduled in July)    HPI   Sharon Fung is a 64 year old female with medical history significant for GERD, allergic rhinitis, obstructive sleep apnea, obesity, hx of trigger fingers, hx of carpal tunnel surgery, and rheumatoid arthritis who presents for follow-up of rheumatoid  "arthritis.    Today, Ms. Fung reports that she is doing poorly.  More joint pain that is worse in the morning and improves with time and activity well.  Morning stiffness for 30-60 minutes.  Taking a warm shower helps her joint pain each morning.  Positive gelling phenomenon.  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  She reports that she is trying to stay as safe as she can; she works in the office but is on be \"clean\" area of the emergency department, she has a filter in her office, and she wears hospital clothing which she gets to work    No fevers or chills. No nausea, vomiting, constipation, diarrhea. No chest pain/pressure, palpitations, or shortness of breath. No oral or nasal sores. No neck pain. No rash.      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 No oral or nasal ulcers.  CV: No chest pain, pressure, palpitations, or dyspnea on exertion.  PULM: no shortness of breath.   GI: No nausea, vomiting, constipation, diarrhea. No blood in stool. No abdominal pain.  : No blood in urine.  MSK: See HPI.  NEURO: No numbness or tingling  EXT: No LE swelling  PSYCH: Negative    Active Problem List     Patient Active Problem List   Diagnosis     AR (allergic rhinitis)     GERD (gastroesophageal reflux disease)     Status post bariatric surgery     Mild major depression (H)     Advanced directives, counseling/discussion     High risk medication use     Obstructive sleep apnea     Obesity, Class III, BMI 40-49.9 (morbid " obesity) (H)     Anterior basement membrane dystrophy     Seropositive rheumatoid arthritis (H)     LORNA positive     Carpal tunnel syndrome of right wrist     Headache     Chronic obstructive pulmonary disease, unspecified COPD type (H)     Immunosuppression (H)     Fibromyalgia     Past Medical History     Past Medical History:   Diagnosis Date     AR (allergic rhinitis)  as teen     Arthritis 12/14/2015     Chronic obstructive pulmonary disease, unspecified COPD type (H) 3/27/2018     Depressive disorder 3 years ago     GERD (gastroesophageal reflux disease) 4-07     Headache 7/11/2017     Mild major depression (H) 3 years ago     Morbid obesity (H) teens     BRETT (obstructive sleep apnea)     uses CPAP     Rheumatoid arthritis, adult (H)      Past Surgical History     Past Surgical History:   Procedure Laterality Date     BLEPHAROPLASTY BILATERAL Bilateral 8/5/2016    Procedure: BLEPHAROPLASTY BILATERAL;  Surgeon: Cortez Robin MD;  Location:  [facility]     BREAST BIOPSY, RT/LT  1-94    Benign     C APPENDECTOMY  1977     COLONOSCOPY       COLONOSCOPY WITH CO2 INSUFFLATION N/A 3/30/2017    Procedure: COLONOSCOPY WITH CO2 INSUFFLATION;  Surgeon: Issa Weeks MD;  Location: [facility]     ESOPHAGOSCOPY, GASTROSCOPY, DUODENOSCOPY (EGD), COMBINED N/A 10/29/2015    Procedure: COMBINED ESOPHAGOSCOPY, GASTROSCOPY, DUODENOSCOPY (EGD);  Surgeon: Shaq Mims MD;  Location: [facility]     LAPAROSCOPIC GASTRIC ADJUSTABLE BANDING  11/09/10    Lap band procedure     LAPAROSCOPIC REMOVAL GASTRIC ADJUSTABLE BAND N/A 10/31/2015    Procedure: LAPAROSCOPIC REMOVAL GASTRIC ADJUSTABLE BAND;  Surgeon: Shaq Mims MD;  Location: [facility]     RELEASE CARPAL TUNNEL Left 4/27/2017    Procedure: RELEASE CARPAL TUNNEL;  Left Open Carpal Tunnel Release;  Surgeon: Ciara Middleton MD;  Location:  OR     RELEASE CARPAL TUNNEL Right 6/15/2017    Procedure: RELEASE CARPAL TUNNEL;  Right Carpal Tunnel Release Open;   Surgeon: Ciara Middleton MD;  Location: [facility]     REPAIR PTOSIS       TUBAL LIGATION  1988     Allergy   No Known Allergies  Current Medication List     Current Outpatient Medications   Medication Sig     acetaminophen (TYLENOL) 500 MG tablet Take 500-1,000 mg by mouth every 6 hours as needed for mild pain     albuterol (PROAIR HFA/PROVENTIL HFA/VENTOLIN HFA) 108 (90 Base) MCG/ACT inhaler Inhale 2 puffs into the lungs every 6 hours as needed for shortness of breath / dyspnea or wheezing     azelastine (OPTIVAR) 0.05 % ophthalmic solution Apply 1 drop to eye 2 times daily     citalopram (CELEXA) 20 MG tablet Take 1 tablet (20 mg) by mouth daily     cycloSPORINE (RESTASIS) 0.05 % ophthalmic emulsion Place 1 drop into both eyes 2 times daily     fexofenadine (ALLEGRA) 180 MG tablet Take 1 tablet (180 mg) by mouth daily     fluticasone (FLONASE) 50 MCG/ACT nasal spray Spray 2 sprays into both nostrils as needed     gabapentin (NEURONTIN) 100 MG capsule Take 1 cap (100 MG) in the AM, 1 cap (100 MG) at midday, and 4 caps (400 MG) at bedtime.     ibuprofen 200 MG capsule Take 600-800 mg by mouth At Bedtime     ketorolac (TORADOL) 10 MG tablet Take 1 tablet (10 mg) by mouth every 6 hours as needed for moderate pain or pain     ORDER FOR DME Use your CPAP device as directed by your provider.     predniSONE (DELTASONE) 5 MG tablet Take 1 tablet (5 mg) by mouth daily     Sarilumab 200 MG/1.14ML SOAJ Inject 200 mg Subcutaneous every 14 days . Hold for signs of infection and seek medical attention.     sulfaSALAzine (AZULFIDINE) 500 MG tablet Take 3 tablets (1,500 mg) by mouth 2 times daily     cycloSPORINE (RESTASIS) 0.05 % ophthalmic emulsion Place 1 drop into both eyes 2 times daily (Patient not taking: Reported on 12/11/2019)     liraglutide - Weight Management (SAXENDA) 18 MG/3ML pen Inject 3 mg Subcutaneous daily (Patient not taking: Reported on 12/11/2019)     ondansetron (ZOFRAN-ODT) 8 MG ODT tab Take 1 tablet  (8 mg) by mouth every 8 hours as needed for nausea (Patient not taking: Reported on 12/11/2019)     prochlorperazine (COMPAZINE) 10 MG tablet One every 6 to 8 hours for nausea     No current facility-administered medications for this visit.      Facility-Administered Medications Ordered in Other Visits   Medication     sodium chloride (PF) 0.9% PF flush 10 mL     sodium chloride bacteriostatic 0.9 % flush 12 mL         Social History   See HPI    Family History     Family History   Problem Relation Age of Onset     Heart Disease Father         MI     Neurologic Disorder Father 79        Parkinson's     Diabetes Father      Hypertension Father      Coronary Artery Disease Father      Cancer Maternal Grandmother         uterine     Neurologic Disorder Sister 16        migraine     Depression Sister      Neurologic Disorder Mother 20        migraine     Depression Mother      Neurologic Disorder Son 7        migraine     Substance Abuse Son      Depression Sister      Substance Abuse Sister        Physical Exam       GEN: NAD  HEENT: MMM.  Anicteric, noninjected sclera  PULM: No increased work of breathing  MSK:  hands without swelling.   PSYCH: Alert. Appropriate.     Labs / Imaging (select studies)   RF/CCP  Recent Labs   Lab Test 11/19/12  0900   CCPABY 53*   RHF <7     CBC  Recent Labs   Lab Test 03/31/20  0755 12/10/19  1452 11/05/19  1745   WBC 6.4 5.8 6.0   RBC 4.53 4.18 4.44   HGB 14.2 12.7 13.5   HCT 42.5 39.9 41.2   MCV 94 96 93   RDW 12.6 12.6 12.1    235 257   MCH 31.3 30.4 30.4   MCHC 33.4 31.8 32.8   NEUTROPHIL 53.8 49.0 64.5   LYMPH 32.8 37.9 27.3   MONOCYTE 7.7 7.8 6.9   EOSINOPHIL 4.7 4.5 0.3   BASOPHIL 0.8 0.5 0.3   ANEU 3.4 2.8 3.9   ALYM 2.1 2.2 1.6   KIMBERLY 0.5 0.5 0.4   AEOS 0.3 0.3 0.0   ABAS 0.1 0.0 0.0     CMP  Recent Labs   Lab Test 03/31/20  0755 12/10/19  1452 11/05/19  1745 10/08/19  0906  03/26/19  0925   NA  --   --  138 141  --  139   POTASSIUM  --   --  3.8 4.2  --  4.0   CHLORIDE   --   --  103 105  --  107   CO2  --   --  28 28  --  24   ANIONGAP  --   --  7 8  --  8   GLC  --   --  87 86  --  71   BUN  --   --  13 14  --  18   CR 0.84 0.87 0.74 0.66   < > 0.76   GFRESTIMATED 74 71 86 >90   < > 85   GFRESTBLACK 86 83 >90 >90   < > >90   ISH  --   --  9.1 9.0  --  8.9   BILITOTAL 0.4 0.4 0.5 0.4   < > 0.3   ALBUMIN 4.2 3.8 4.1 4.0   < > 3.9   PROTTOTAL 7.5 7.0 7.5 7.1   < > 7.5   ALKPHOS 112 105 96 96   < > 113   AST 28 25 23 28   < > 28   ALT 38 43 45 41   < > 50    < > = values in this interval not displayed.     Calcium/VitaminD  Recent Labs   Lab Test 03/31/20  0755 11/05/19  1745 10/08/19  0906 03/26/19  0925  02/20/15  0750   ISH  --  9.1 9.0 8.9   < > 9.0   VITDT 15*  --   --   --   --  39    < > = values in this interval not displayed.     ESR/CRP  Recent Labs   Lab Test 03/31/20  0755 12/10/19  1452 08/28/19  0940   SED 5 3 4   CRP <2.9 <2.9 <2.9     Lipid Panel  Recent Labs   Lab Test 03/31/20  0755 04/27/17  0732 12/27/16  0836 02/20/15  0750 10/20/14  0821 11/22/13  0843   CHOL 224* 204* 184 157 194 167   TRIG 171* 148 154* 79 193* 140   HDL 73 82 56 63 63 60   * 92 97 78 92 79   VLDL  --   --   --  16 39* 28   CHOLHDLRATIO  --   --   --  2.5 3.1 2.8   NHDL 151* 122 128  --   --   --      Hepatitis B  Recent Labs   Lab Test 12/08/15  0855 03/06/15  1650   HBCAB Nonreactive  --    HEPBANG Nonreactive Nonreactive     Hepatitis C  Recent Labs   Lab Test 12/08/15  0855 03/06/15  1650 11/19/12  0900   HCVAB Nonreactive   Assay performance characteristics have not been established for newborns,   infants, and children   Nonreactive   Assay performance characteristics have not been established for newborns,   infants, and children   Negative     Tuberculosis Screening  Recent Labs   Lab Test 10/31/17  1400 02/02/17  0822 12/08/15  0855   TBRSLT Negative Negative Negative   TBAGN 0.05 0.00 0.01     HIV Screening  Recent Labs   Lab Test 07/24/18  0738   HIAGAB Nonreactive        Immunization History     Immunization History   Administered Date(s) Administered     Influenza Vaccine IM > 6 months Valent IIV4 10/15/2013, 09/15/2014, 09/28/2015, 09/28/2016, 10/16/2017, 10/01/2018     Influenza Vaccine Im 4yrs+ 4 Valent CCIIV4 10/15/2019     Pneumo Conj 13-V (2010&after) 04/15/2016     Pneumococcal 23 valent 07/15/2016     TDAP Vaccine (Adacel) 02/09/2011     Zoster vaccine recombinant adjuvanted (SHINGRIX) 04/17/2019, 08/28/2019          Chart documentation done in part with Dragon Voice recognition Software. Although reviewed after completion, some word and grammatical error may remain.     Video-Visit Details    Type of service:  Video Visit    Video Start Time: 7:32 AM    Video End Time (time video stopped): 7:48 AM     Originating Location (pt. Location): Home    Distant Location (provider location):  Home    Mode of Communication:  Video Conference via [facility]      Freddy Guerra MD

## 2020-06-29 DIAGNOSIS — M05.9 SEROPOSITIVE RHEUMATOID ARTHRITIS (H): ICD-10-CM

## 2020-06-29 DIAGNOSIS — Z79.899 HIGH RISK MEDICATION USE: ICD-10-CM

## 2020-06-29 LAB
ALBUMIN SERPL-MCNC: 3.7 G/DL (ref 3.4–5)
ALP SERPL-CCNC: 142 U/L (ref 40–150)
ALT SERPL W P-5'-P-CCNC: 45 U/L (ref 0–50)
AST SERPL W P-5'-P-CCNC: 25 U/L (ref 0–45)
BASOPHILS # BLD AUTO: 0.1 10E9/L (ref 0–0.2)
BASOPHILS NFR BLD AUTO: 0.8 %
BILIRUB DIRECT SERPL-MCNC: 0.1 MG/DL (ref 0–0.2)
BILIRUB SERPL-MCNC: 0.4 MG/DL (ref 0.2–1.3)
CREAT SERPL-MCNC: 0.79 MG/DL (ref 0.52–1.04)
CRP SERPL-MCNC: <2.9 MG/L (ref 0–8)
DIFFERENTIAL METHOD BLD: ABNORMAL
EOSINOPHIL # BLD AUTO: 1.2 10E9/L (ref 0–0.7)
EOSINOPHIL NFR BLD AUTO: 19.2 %
ERYTHROCYTE [DISTWIDTH] IN BLOOD BY AUTOMATED COUNT: 12.9 % (ref 10–15)
ERYTHROCYTE [SEDIMENTATION RATE] IN BLOOD BY WESTERGREN METHOD: 4 MM/H (ref 0–30)
GFR SERPL CREATININE-BSD FRML MDRD: 80 ML/MIN/{1.73_M2}
HCT VFR BLD AUTO: 40.4 % (ref 35–47)
HGB BLD-MCNC: 12.9 G/DL (ref 11.7–15.7)
IMM GRANULOCYTES # BLD: 0 10E9/L (ref 0–0.4)
IMM GRANULOCYTES NFR BLD: 0.3 %
LYMPHOCYTES # BLD AUTO: 1.7 10E9/L (ref 0.8–5.3)
LYMPHOCYTES NFR BLD AUTO: 28.1 %
MCH RBC QN AUTO: 30.7 PG (ref 26.5–33)
MCHC RBC AUTO-ENTMCNC: 31.9 G/DL (ref 31.5–36.5)
MCV RBC AUTO: 96 FL (ref 78–100)
MONOCYTES # BLD AUTO: 0.5 10E9/L (ref 0–1.3)
MONOCYTES NFR BLD AUTO: 8.7 %
NEUTROPHILS # BLD AUTO: 2.7 10E9/L (ref 1.6–8.3)
NEUTROPHILS NFR BLD AUTO: 42.9 %
NRBC # BLD AUTO: 0 10*3/UL
NRBC BLD AUTO-RTO: 0 /100
PLATELET # BLD AUTO: 203 10E9/L (ref 150–450)
PROT SERPL-MCNC: 6.8 G/DL (ref 6.8–8.8)
RBC # BLD AUTO: 4.2 10E12/L (ref 3.8–5.2)
WBC # BLD AUTO: 6.2 10E9/L (ref 4–11)

## 2020-06-29 PROCEDURE — 85652 RBC SED RATE AUTOMATED: CPT | Performed by: INTERNAL MEDICINE

## 2020-06-29 PROCEDURE — 85025 COMPLETE CBC W/AUTO DIFF WBC: CPT | Performed by: INTERNAL MEDICINE

## 2020-06-29 PROCEDURE — 86140 C-REACTIVE PROTEIN: CPT | Performed by: INTERNAL MEDICINE

## 2020-06-29 PROCEDURE — 82565 ASSAY OF CREATININE: CPT | Performed by: INTERNAL MEDICINE

## 2020-06-29 PROCEDURE — 80076 HEPATIC FUNCTION PANEL: CPT | Performed by: INTERNAL MEDICINE

## 2020-06-29 PROCEDURE — 36415 COLL VENOUS BLD VENIPUNCTURE: CPT | Performed by: INTERNAL MEDICINE

## 2020-07-02 ENCOUNTER — VIRTUAL VISIT (OUTPATIENT)
Dept: RHEUMATOLOGY | Facility: CLINIC | Age: 65
End: 2020-07-02
Payer: COMMERCIAL

## 2020-07-02 DIAGNOSIS — E55.9 VITAMIN D DEFICIENCY: ICD-10-CM

## 2020-07-02 DIAGNOSIS — M54.42 CHRONIC BILATERAL LOW BACK PAIN WITH LEFT-SIDED SCIATICA: ICD-10-CM

## 2020-07-02 DIAGNOSIS — M05.9 SEROPOSITIVE RHEUMATOID ARTHRITIS (H): Primary | ICD-10-CM

## 2020-07-02 DIAGNOSIS — Z79.899 HIGH RISK MEDICATION USE: ICD-10-CM

## 2020-07-02 DIAGNOSIS — G89.29 CHRONIC BILATERAL LOW BACK PAIN WITH LEFT-SIDED SCIATICA: ICD-10-CM

## 2020-07-02 PROCEDURE — 99213 OFFICE O/P EST LOW 20 MIN: CPT | Mod: 95 | Performed by: INTERNAL MEDICINE

## 2020-07-02 RX ORDER — PREDNISONE 2.5 MG/1
5 TABLET ORAL DAILY
Qty: 180 TABLET | Refills: 1 | Status: SHIPPED | OUTPATIENT
Start: 2020-07-02 | End: 2020-10-30

## 2020-07-02 RX ORDER — LEFLUNOMIDE 20 MG/1
20 TABLET ORAL DAILY
Qty: 90 TABLET | Refills: 1 | Status: SHIPPED | OUTPATIENT
Start: 2020-07-02 | End: 2020-10-14

## 2020-07-02 RX ORDER — SULFASALAZINE 500 MG/1
1500 TABLET ORAL 2 TIMES DAILY
Qty: 540 TABLET | Refills: 1 | Status: SHIPPED | OUTPATIENT
Start: 2020-07-02 | End: 2020-10-14

## 2020-07-02 NOTE — PROGRESS NOTES
"Sharon Fung is a 65 year old female who is being evaluated via a billable video visit.      The patient has been notified of following:     \"This video visit will be conducted via a call between you and your physician/provider. We have found that certain health care needs can be provided without the need for an in-person physical exam.  This service lets us provide the care you need with a video conversation.  If a prescription is necessary we can send it directly to your pharmacy.  If lab work is needed we can place an order for that and you can then stop by our lab to have the test done at a later time.    Video visits are billed at different rates depending on your insurance coverage.  Please reach out to your insurance provider with any questions.    If during the course of the call the physician/provider feels a video visit is not appropriate, you will not be charged for this service.\"    Patient has given verbal consent for Video visit? Yes  How would you like to obtain your AVS? MyChart  Patient would like the video invitation sent by: Text to cell phone: [phone]  Will anyone else be joining your video visit? No      Rheumatology Video Visit      Sharon Fung MRN# [id number]   YOB: 1955 Age: 65 year old      Date of visit: 7/02/20   PCP: Dr. Reynaldo Saavedra    Chief Complaint   Patient presents with:  Arthritis: RA.    Assessment and Plan     1. Seropositive nonerosive rheumatoid arthritis (RF negative, CCP low positive): Previously on Humira (lost efficacy), Cimzia (ineffective), Orencia (ineffective), leflunomide (ineffective as monotherapy), hydroxychloroquine (ineffective), Xeljanz (ineffective), MTX (GI upset). Currently on SSZ 1500mg BID, leflunomide 20mg dailyand Kevzara 200mg SQ every 14 days (started 1/2019).   Note that Enbrel was denied by insurance who preferred Kevzara. Doing well in December 2019.  Today, 4/2/2020: met via video visit because of coronavirus pandemic.  " Plan to taper prednisone at next visit.   - Continue sulfasalazine 1500 mg twice daily   - Continue leflunomide 20mg daily  - Continue prednisone 5mg daily (2.5mg tablets; reduce as symptoms tolerate)  - Continue Kevzara 200mg SQ every 14 days  - Labs monthly [id number]: CBC, Cr, Hepatic Panel  - Labs in 3 months: CBC, Creatinine, Hepatic Panel, ESR, CRP              Rapid 3, cumulative scores                      08/28/2019: 3.2   (SSZ 1500mg BID, Kevzara)  Now on gabapentin                      04/17/2019: 15    (SSZ 1500mg BID, Kevzara)  Mostly fibromyalgia symptoms                      12/19/2018:         (MTX 20mg SQ wkly, Xeljanz XR 11mg daily, SSZ 1500mg BID)                          05/02/2018:  9     (MTX 20mg SQ wkly, Xeljanz XR 11mg daily, SSZ 1000mg BID)                          01/31/2018: 22.3 (MTX 20mg SQ wkly, Xeljanz XR 11mg daily)                          11/29/2017: 16.8 (MTX 20mg SQ wkly)                      08/02/2017: 4.2   (MTX 20mg SQ wkly, Xeljanz XR 11mg daily)                         05/04/2017: 7      (MTX 20mg SQ wkly, Xeljanz XR 11mg daily)                        02/02/2017: 8      (MTX 20mg SQ wkly, Xeljanz XR 11mg daily)                      11/04/2016: 13    (MTX 20mg SQ weekly)                      07/15/2016: 10.8    2. Positive LORNA and dsDNA / Secondary Sjogren's Syndrome: Repeat dsDNAs have been negative. Has dry eyes but no dry mouth.  Dry eyes are treated by ophthalmology with Restasis.  Not discussed today.    3. Morbid obesity: weight loss would be beneficial. Not discussed today.    4. Bilateral patellofemoral syndrome: home exercises that she says are taught by her physical therapist friend, and weight loss encouragement. Not discussed today.    5. Fibromyalgia: improved on gabapentin and followed in the pain management clinic for this issue.     6.  Bone Health, vitamin D deficiency: normal 12/20/2019 DEXA; plan to repeat in 3-5 years but this may change depending on  prednisone exposure.  VItamin D was low so now on vitamin D 2000 IU daily  - Continue vitamin D 2000 IU daily  - Labs in 3 months: Vitamin D    7. Chronic lower back pain with left sided sciatica: suspect related to degenerative changes seen on L-spine MRI. Already following in the pain management clinic and advised her to follow-up there because she is interested in having an injection.    # Relevant labs and imaging were reviewed with the patient    # High risk medication toxicity monitoring: discussion and labs reviewed; appropriate labs ordered. See above.  Instructed that if confirmed to have COVID-19 or exposure to someone with confirmed COVID-19 to call this clinic for directions on DMARD management.    # Note that this is a virtual visit to reduce the risk of COVID-19 exposure during this current pandemic.      # Considered to be at high risk of complications from the COVID-19 virus.  It is recommended to limit contact with other people and if possible to work remotely or provide a leave of absence to reduce the risk for COVID-19.      Ms. Fung verbalized agreement with and understanding of the rational for the diagnosis and treatment plan.  All questions were answered to best of my ability and the patient's satisfaction. Ms. Fung was advised to contact the clinic with any questions that may arise after the clinic visit.      Thank you for involving me in the care of the patient    Return to clinic: 3-4 months (scheduled in July)    HPI   Sharon Fung is a 65 year old female with medical history significant for GERD, allergic rhinitis, obstructive sleep apnea, obesity, hx of trigger fingers, hx of carpal tunnel surgery, and rheumatoid arthritis who presents for follow-up of rheumatoid arthritis.    Today, 7/2/2020: Significant provement since adding leflunomide.  No pain or swelling in her hands.  Morning stiffness for less than 30 minutes.  Mild positive gelling phenomenon, severity improved  since being on leflunomide.  Tolerating medications well.  Has back pain that radiates to her left buttock and left leg and she says that she thinks it is her SI joint.  She says that she is going to consider getting a steroid injection.    No fevers or chills. No nausea, vomiting, constipation, diarrhea. No chest pain/pressure, palpitations, or shortness of breath. No oral or nasal sores. No neck pain. No rash.      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 No oral or nasal ulcers.  CV: No chest pain, pressure, palpitations, or dyspnea on exertion.  PULM: no shortness of breath.   GI: No nausea, vomiting, constipation, diarrhea. No blood in stool. No abdominal pain.  : No blood in urine.  MSK: See HPI.  NEURO: No numbness or tingling  EXT: No LE swelling  PSYCH: Negative    Active Problem List     Patient Active Problem List   Diagnosis     AR (allergic rhinitis)     GERD (gastroesophageal reflux disease)     Status post bariatric surgery     Mild major depression (H)     Advanced directives, counseling/discussion     High risk medication use     Obstructive sleep apnea     Obesity, Class III, BMI 40-49.9 (morbid obesity) (H)     Anterior basement membrane dystrophy     Seropositive rheumatoid arthritis (H)     LORNA positive     Carpal tunnel syndrome of right wrist     Headache     Chronic obstructive pulmonary disease, unspecified COPD type (H)     Immunosuppression (H)     Fibromyalgia     Past Medical History     Past Medical History:   Diagnosis Date     AR (allergic rhinitis)  as teen     Arthritis 12/14/2015     Chronic obstructive pulmonary disease, unspecified COPD type (H) 3/27/2018     Depressive disorder 3 years ago     GERD (gastroesophageal reflux disease) 4-07     Headache 7/11/2017     Mild major depression (H) 3 years ago     Morbid  obesity (H) teens     BRETT (obstructive sleep apnea)     uses CPAP     Rheumatoid arthritis, adult (H)      Past Surgical History     Past Surgical History:   Procedure Laterality Date     BLEPHAROPLASTY BILATERAL Bilateral 8/5/2016    Procedure: BLEPHAROPLASTY BILATERAL;  Surgeon: Cortez Robin MD;  Location:  [facility]     BREAST BIOPSY, RT/LT  1-94    Benign     C APPENDECTOMY  1977     COLONOSCOPY       COLONOSCOPY WITH CO2 INSUFFLATION N/A 3/30/2017    Procedure: COLONOSCOPY WITH CO2 INSUFFLATION;  Surgeon: Issa Weeks MD;  Location: [facility]     ESOPHAGOSCOPY, GASTROSCOPY, DUODENOSCOPY (EGD), COMBINED N/A 10/29/2015    Procedure: COMBINED ESOPHAGOSCOPY, GASTROSCOPY, DUODENOSCOPY (EGD);  Surgeon: Shaq Mims MD;  Location: [facility]     LAPAROSCOPIC GASTRIC ADJUSTABLE BANDING  11/09/10    Lap band procedure     LAPAROSCOPIC REMOVAL GASTRIC ADJUSTABLE BAND N/A 10/31/2015    Procedure: LAPAROSCOPIC REMOVAL GASTRIC ADJUSTABLE BAND;  Surgeon: Shaq Mims MD;  Location: [facility]     RELEASE CARPAL TUNNEL Left 4/27/2017    Procedure: RELEASE CARPAL TUNNEL;  Left Open Carpal Tunnel Release;  Surgeon: Ciara Middleton MD;  Location: [facility]     RELEASE CARPAL TUNNEL Right 6/15/2017    Procedure: RELEASE CARPAL TUNNEL;  Right Carpal Tunnel Release Open;  Surgeon: Ciara Middleton MD;  Location: [facility]     REPAIR PTOSIS       TUBAL LIGATION  1988     Allergy   No Known Allergies  Current Medication List     Current Outpatient Medications   Medication Sig     acetaminophen (TYLENOL) 500 MG tablet Take 500-1,000 mg by mouth every 6 hours as needed for mild pain     albuterol (PROAIR HFA/PROVENTIL HFA/VENTOLIN HFA) 108 (90 Base) MCG/ACT inhaler Inhale 2 puffs into the lungs every 6 hours as needed for shortness of breath / dyspnea or wheezing     azelastine (OPTIVAR) 0.05 % ophthalmic solution Apply 1 drop to eye 2 times daily     citalopram (CELEXA) 20 MG tablet Take 1 tablet (20 mg) by  mouth daily     cycloSPORINE (RESTASIS) 0.05 % ophthalmic emulsion Place 1 drop into both eyes 2 times daily     fexofenadine (ALLEGRA) 180 MG tablet Take 1 tablet (180 mg) by mouth daily     fluticasone (FLONASE) 50 MCG/ACT nasal spray Spray 2 sprays into both nostrils as needed     gabapentin (NEURONTIN) 100 MG capsule Take 1 cap (100 MG) in the AM, 1 cap (100 MG) at midday, and 4 caps (400 MG) at bedtime.     ibuprofen 200 MG capsule Take 600-800 mg by mouth At Bedtime     ketorolac (TORADOL) 10 MG tablet Take 1 tablet (10 mg) by mouth every 6 hours as needed for moderate pain or pain     leflunomide (ARAVA) 20 MG tablet Take 1 tablet (20 mg) by mouth daily     ORDER FOR DME Use your CPAP device as directed by your provider.     predniSONE (DELTASONE) 2.5 MG tablet Take 2 tablets (5 mg) by mouth daily     Sarilumab 200 MG/1.14ML SOAJ Inject 200 mg Subcutaneous every 14 days . Hold for signs of infection and seek medical attention.     sulfaSALAzine (AZULFIDINE) 500 MG tablet Take 3 tablets (1,500 mg) by mouth 2 times daily     vitamin D3 (CHOLECALCIFEROL) 2000 units (50 mcg) tablet Take 1 tablet (2,000 Units) by mouth daily     cycloSPORINE (RESTASIS) 0.05 % ophthalmic emulsion Place 1 drop into both eyes 2 times daily (Patient not taking: Reported on 12/11/2019)     liraglutide - Weight Management (SAXENDA) 18 MG/3ML pen Inject 3 mg Subcutaneous daily (Patient not taking: Reported on 12/11/2019)     ondansetron (ZOFRAN-ODT) 8 MG ODT tab Take 1 tablet (8 mg) by mouth every 8 hours as needed for nausea (Patient not taking: Reported on 12/11/2019)     prochlorperazine (COMPAZINE) 10 MG tablet One every 6 to 8 hours for nausea     No current facility-administered medications for this visit.      Facility-Administered Medications Ordered in Other Visits   Medication     sodium chloride (PF) 0.9% PF flush 10 mL     sodium chloride bacteriostatic 0.9 % flush 12 mL         Social History   See HPI    Family History      Family History   Problem Relation Age of Onset     Heart Disease Father         MI     Neurologic Disorder Father 79        Parkinson's     Diabetes Father      Hypertension Father      Coronary Artery Disease Father      Cancer Maternal Grandmother         uterine     Neurologic Disorder Sister 16        migraine     Depression Sister      Neurologic Disorder Mother 20        migraine     Depression Mother      Neurologic Disorder Son 7        migraine     Substance Abuse Son      Depression Sister      Substance Abuse Sister        Physical Exam       GEN: NAD  HEENT: MMM.  Anicteric, noninjected sclera  PULM: No increased work of breathing  MSK:  hands and wrists without swelling.   PSYCH: Alert. Appropriate.     Labs / Imaging (select studies)   RF/CCP  Recent Labs   Lab Test 11/19/12  0900   CCPABY 53*   RHF <7     CBC  Recent Labs   Lab Test 06/29/20  0742 03/31/20  0755 12/10/19  1452   WBC 6.2 6.4 5.8   RBC 4.20 4.53 4.18   HGB 12.9 14.2 12.7   HCT 40.4 42.5 39.9   MCV 96 94 96   RDW 12.9 12.6 12.6    259 235   MCH 30.7 31.3 30.4   MCHC 31.9 33.4 31.8   NEUTROPHIL 42.9 53.8 49.0   LYMPH 28.1 32.8 37.9   MONOCYTE 8.7 7.7 7.8   EOSINOPHIL 19.2 4.7 4.5   BASOPHIL 0.8 0.8 0.5   ANEU 2.7 3.4 2.8   ALYM 1.7 2.1 2.2   KIMBERLY 0.5 0.5 0.5   AEOS 1.2* 0.3 0.3   ABAS 0.1 0.1 0.0     CMP  Recent Labs   Lab Test 06/29/20  0742 03/31/20  0755 12/10/19  1452 11/05/19  1745 10/08/19  0906  03/26/19  0925   NA  --   --   --  138 141  --  139   POTASSIUM  --   --   --  3.8 4.2  --  4.0   CHLORIDE  --   --   --  103 105  --  107   CO2  --   --   --  28 28  --  24   ANIONGAP  --   --   --  7 8  --  8   GLC  --   --   --  87 86  --  71   BUN  --   --   --  13 14  --  18   CR 0.79 0.84 0.87 0.74 0.66   < > 0.76   GFRESTIMATED 80 74 71 86 >90   < > 85   GFRESTBLACK >90 86 83 >90 >90   < > >90   ISH  --   --   --  9.1 9.0  --  8.9   BILITOTAL 0.4 0.4 0.4 0.5 0.4   < > 0.3   ALBUMIN 3.7 4.2 3.8 4.1 4.0   < > 3.9   PROTTOTAL  6.8 7.5 7.0 7.5 7.1   < > 7.5   ALKPHOS 142 112 105 96 96   < > 113   AST 25 28 25 23 28   < > 28   ALT 45 38 43 45 41   < > 50    < > = values in this interval not displayed.     Calcium/VitaminD  Recent Labs   Lab Test 03/31/20  0755 11/05/19  1745 10/08/19  0906 03/26/19  0925  02/20/15  0750   ISH  --  9.1 9.0 8.9   < > 9.0   VITDT 15*  --   --   --   --  39    < > = values in this interval not displayed.     ESR/CRP  Recent Labs   Lab Test 06/29/20  0742 03/31/20  0755 12/10/19  1452   SED 4 5 3   CRP <2.9 <2.9 <2.9     Hepatitis B  Recent Labs   Lab Test 12/08/15  0855 03/06/15  1650   HBCAB Nonreactive  --    HEPBANG Nonreactive Nonreactive     Hepatitis C  Recent Labs   Lab Test 12/08/15  0855 03/06/15  1650 11/19/12  0900   HCVAB Nonreactive   Assay performance characteristics have not been established for newborns,   infants, and children   Nonreactive   Assay performance characteristics have not been established for newborns,   infants, and children   Negative     Tuberculosis Screening  Recent Labs   Lab Test 10/31/17  1400 02/02/17  0822 12/08/15  0855   TBRSLT Negative Negative Negative   TBAGN 0.05 0.00 0.01     HIV Screening  Recent Labs   Lab Test 07/24/18  0738   HIAGAB Nonreactive       Immunization History     Immunization History   Administered Date(s) Administered     Influenza Vaccine IM > 6 months Valent IIV4 10/15/2013, 09/15/2014, 09/28/2015, 09/28/2016, 10/16/2017, 10/01/2018     Influenza Vaccine Im 4yrs+ 4 Valent CCIIV4 10/15/2019     Pneumo Conj 13-V (2010&after) 04/15/2016     Pneumococcal 23 valent 07/15/2016     TDAP Vaccine (Adacel) 02/09/2011     Zoster vaccine recombinant adjuvanted (SHINGRIX) 04/17/2019, 08/28/2019          Chart documentation done in part with Dragon Voice recognition Software. Although reviewed after completion, some word and grammatical error may remain.       Video-Visit Details    Type of service:  Video Visit    Video Start Time: 8:00 AM  Video End Time:  8:15 AM    Originating Location (pt. Location): work, in [place]    Distant Location (provider location):  Home    Platform used for Video Visit: Pao Guerra MD

## 2020-07-13 ENCOUNTER — TELEPHONE (OUTPATIENT)
Dept: PALLIATIVE MEDICINE | Facility: CLINIC | Age: 65
End: 2020-07-13

## 2020-07-13 NOTE — LETTER
July 13, 2020    Sharon Fung  [address] [id number]    Dear Sharon                                                                 Welcome to the [facility] at the [facility]. We are located on the 6th floor, [address], of the [facility] located at [address] [place] [address]. For general parking, the [facility] [address] is the closest to our building.     Your appointment at the [facility] has been scheduled on July 22nd at 10:00 AM with Jennifer Ness NP. This will be a video visit so you DO NOT need to come to the clinic.    At your first visit, you will meet your team of caregivers who will help you to develop pain management strategies that will last a lifetime. You will meet with our support staff to validate parking at a reduced rate, review your insurance information, and collect your co-payment if required by your insurance company. You will also meet with a medical pain specialist and care coordinator who will assess your pain and develop a plan of care for your successful pain rehabilitation. You should expect to spend approximately 1 hour at your first visit with us. Usually, patients work with us for a period of 6-12 months, and eventually return to their primary doctor once their pain management has stabilized.      To help us make your visit go as smoothly as possible, please mail back the following items with you on your visit:     Completed Pain Questionnaire enclosed in this packet.  If you do not mail back the completed questionnaire, we may have to reschedule your appointment.      Due to the demand for new patient evaluations, you must notify the scheduling department 48 hours (2 days) in advance if you are not able to keep this appointment.  Failure to do so could affect your ability to reschedule with our clinic. Please do not assume  that you will  receive any prescription medications at your first visit.    Please call [phone] with any questions regarding your appointment.  We look forward to meeting you and working to address your health care needs.       Sincerely,    [facility]

## 2020-07-13 NOTE — TELEPHONE ENCOUNTER
Pain Management Center Referral      1. Confirmed address with patient? Yes  2. Confirmed phone number with patient? Yes  3. Confirmed referring provider? Yes  4. Is the PCP the same as the referring provider? Yes  5. Has the patient been to any previous pain clinics? Yes  (If yes, send HAY with welcome letter)  6. Which insurance are we to bill for this appointment?  [employer]    7. Informed pt of cancellation (48 hour) policy? Yes    REGARDING OPIOID MEDICATIONS: We will always address appropriateness of opioid pain medications, but we generally will not automatically take on a prescribing role. When we do take on prescribing of opioids for chronic pain, it is in collaboration with the referring physician for an intermediate period of time (months), with an expectation that the primary physician or provider will assume the prescribing role if medications are effective at stable doses with demonstrated compliance. Therefore, please do not assume that your prescribing responsibilities end on the day of pain clinic consultation.  8. Informed pt of prescribing policy? Yes    9.Please be aware that once you are established with a pain provider and location, you will need to continue have all future visits with that provider and location. It is best to determine what location is the most convenient for you and schedule with that one.    ** PATIENT INFORMED OF THIS POLICY Yes      9. Referring Provider: Freddy Guerra MD     10. Criteria for Triage Eval:   -Missed/Failed 1st appointment? N/A     -Medication Focused? N/A     -Mental Health Concerns? (e.g. Recent psych hospitalization/snap shot)? N/A     -Active substance abuse? N/A     -Patient behaviors (e.g. Offensive language/raised voice)? N/A    Danika ZAMARRIPA    [facility]

## 2020-07-13 NOTE — TELEPHONE ENCOUNTER
LVM to schedule New Eval; fibromyalgia with Jennifer Ness NP.    aDnika ZAMARRIPA    [facility]

## 2020-07-13 NOTE — TELEPHONE ENCOUNTER
Pt scheduled 7/22 for a video visit.    Danika ZAMARRIPA    [facility]

## 2020-07-22 ENCOUNTER — VIRTUAL VISIT (OUTPATIENT)
Dept: PALLIATIVE MEDICINE | Facility: CLINIC | Age: 65
End: 2020-07-22
Payer: COMMERCIAL

## 2020-07-22 DIAGNOSIS — M54.16 LUMBAR RADICULOPATHY: Primary | ICD-10-CM

## 2020-07-22 DIAGNOSIS — M51.369 DDD (DEGENERATIVE DISC DISEASE), LUMBAR: ICD-10-CM

## 2020-07-22 PROCEDURE — 99214 OFFICE O/P EST MOD 30 MIN: CPT | Mod: 95 | Performed by: NURSE PRACTITIONER

## 2020-07-22 RX ORDER — COVID-19 ANTIGEN TEST
220 KIT MISCELLANEOUS 2 TIMES DAILY WITH MEALS
COMMUNITY
End: 2021-05-11

## 2020-07-22 ASSESSMENT — PAIN SCALES - GENERAL: PAINLEVEL: MODERATE PAIN (5)

## 2020-07-22 NOTE — PROGRESS NOTES
"Sharon Fung is a 65 year old female who is being evaluated via a billable video visit.      The patient has been notified of following:     \"This video visit will be conducted via a call between you and your physician/provider. We have found that certain health care needs can be provided without the need for an in-person physical exam.  This service lets us provide the care you need with a video conversation.  If a prescription is necessary we can send it directly to your pharmacy.  If lab work is needed we can place an order for that and you can then stop by our lab to have the test done at a later time.    Video visits are billed at different rates depending on your insurance coverage.  Please reach out to your insurance provider with any questions.    If during the course of the call the physician/provider feels a video visit is not appropriate, you will not be charged for this service.\"    Patient has given verbal consent for Video visit? Yes  How would you like to obtain your AVS? MyChart  If you are dropped from the video visit, the video invite should be resent to: Text to cell phone: [phone]  Will anyone else be joining your video visit? No    Genesis Martinez CMA ([facility])    Video-Visit Details    Type of service:  Video Visit    Video Start Time: 1000  Video End Time: 1015    Originating Location (pt. Location): Home    Distant Location (provider location):  [facility]     Platform used for Video Visit: [facility]      This patient is being seen in consultation at the request of her rheumatologist, Dr Guerra  for evaluation of her pain issues and recommendations for management, with specific emphasis on:     Reason for Referral: Evaluation for comprehensive services-  Do you have any specific questions for the pain specialist? No   Are there any red flags that may impact the assessment or management of the patient? None   What is your diagnosis for the patient's pain? fibromyalgia "     Primary Care Provider is Reynaldo Saavedra    Current controlled substance medications are being prescribed by: n/a      [facility]    CHIEF COMPLAINT: Lumbar radiculopathy    INTERVAL HISTORY:  Last seen on 5/29/2019.        Recommendations/plan at the last visit included:  1. Schedule follow-up with CHELSEY Alex NP-C in 1-2 months if needed   2. Medication recommendations:             1. Gabapentin: increase as tolerated to 300 mg in the morning and 600 mg at bedtime. Okay to split daytime doses. Okay to stop increasing dose if too sleepy.        Since last visit:   Previous injection worked well.  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  She has been very busy with the COVID outbreak.  She is slowly over the last few months noticed that her low back pain and radiculopathy is worsening.    Pain Information:             Pain quality: Aching, shooting,             Pain rating: Pain score not provided      Annual Controlled Substance Agreement/UDS due date: N/A     CURRENT RELEVANT PAIN MEDICATIONS:   OTC pain medications per package instructions PRN  Gabapentin 100 mg in am/midday & 400 mg at HS     Patient is using the medication as prescribed:  YES  Is your medication helpful?     NO   Medication side effects? denies any problems     Previous Pain Relevant Medications: (H--helped; HI--Helped initially; SWH--Somewhat helpful; NH--No help; W--worse; SE--side effects; ?--Unsure if helpful)   NOTE: This medication information taken from patient's intake form, not medical records.               Opiates: None               NSAIDS: Ibuprofen: H, ketorolac: H, naproxen: H              Muscle Relaxants: None noted              Anti-migraine mediations: None noted              Anti-depressants: None              Sleep aids: None              Anxiolytics: None              Neuropathics: None                  Topicals: None              Other  medications not covered above: Tylenol: H, prednisone: H     Any illicit drug use: none  EtOH use: rare   Caffeine use: 3-4 per day   Nicotine use: none   Any use of prescriptions other than how they were prescribed:none         THE 4 As OF OPIOID MAINTENANCE ANALGESIA    Analgesia: Is pain relief clinically significant? N/A   Activity: Is patient functional and able to perform Activities of Daily Living? N/A   Adverse effects: Is patient free from adverse side effects from opiates? N/A   Adherence to Rx protocol: Is patient adhering to Controlled Substance Agreement and taking medications ONLY as ordered? N/A     Is Narcan prescribed for opiate use >50 MME daily? N/A     [place] Board of Pharmacy Data Base Reviewed:    YES; No concern for abuse or misuse of controlled medications based on this report.      Past Pain Treatments:              Pain Clinic:   No               PT: No               Psychologist: No             Relaxation techniques/biofeedback: No             Chiropractor: No             Acupuncture: No             Pharmacotherapy:                         Opioids: No                          Non-opioids:  Yes              TENs Unit:No             Injections: Yes: for right bunion              Self-care:   Yes              Surgeries related to pain: No    Medications:  Current Outpatient Medications   Medication Sig Dispense Refill     acetaminophen (TYLENOL) 500 MG tablet Take 500-1,000 mg by mouth every 6 hours as needed for mild pain       albuterol (PROAIR HFA/PROVENTIL HFA/VENTOLIN HFA) 108 (90 Base) MCG/ACT inhaler Inhale 2 puffs into the lungs every 6 hours as needed for shortness of breath / dyspnea or wheezing 1 Inhaler 1     azelastine (OPTIVAR) 0.05 % ophthalmic solution Apply 1 drop to eye 2 times daily 1 Bottle 11     citalopram (CELEXA) 20 MG tablet Take 1 tablet (20 mg) by mouth daily 90 tablet 1     cycloSPORINE (RESTASIS) 0.05 % ophthalmic emulsion Place 1 drop into both eyes 2 times  daily       fexofenadine (ALLEGRA) 180 MG tablet Take 1 tablet (180 mg) by mouth daily 90 tablet 2     fluticasone (FLONASE) 50 MCG/ACT nasal spray Spray 2 sprays into both nostrils as needed       gabapentin (NEURONTIN) 100 MG capsule Take 1 cap (100 MG) in the AM, 1 cap (100 MG) at midday, and 4 caps (400 MG) at bedtime. 180 capsule 5     ibuprofen 200 MG capsule Take 600-800 mg by mouth At Bedtime       ketorolac (TORADOL) 10 MG tablet Take 1 tablet (10 mg) by mouth every 6 hours as needed for moderate pain or pain 10 tablet 0     leflunomide (ARAVA) 20 MG tablet Take 1 tablet (20 mg) by mouth daily 90 tablet 1     naproxen sodium 220 MG capsule Take 220 mg by mouth 2 times daily (with meals)       Sarilumab 200 MG/1.14ML SOAJ Inject 200 mg Subcutaneous every 14 days . Hold for signs of infection and seek medical attention. 2 pen 12     sulfaSALAzine (AZULFIDINE) 500 MG tablet Take 3 tablets (1,500 mg) by mouth 2 times daily 540 tablet 1     vitamin D3 (CHOLECALCIFEROL) 2000 units (50 mcg) tablet Take 1 tablet (2,000 Units) by mouth daily 90 tablet 1     cycloSPORINE (RESTASIS) 0.05 % ophthalmic emulsion Place 1 drop into both eyes 2 times daily (Patient not taking: Reported on 12/11/2019) 2 Box 11     liraglutide - Weight Management (SAXENDA) 18 MG/3ML pen Inject 3 mg Subcutaneous daily (Patient not taking: Reported on 12/11/2019) 15 mL 3     ondansetron (ZOFRAN-ODT) 8 MG ODT tab Take 1 tablet (8 mg) by mouth every 8 hours as needed for nausea (Patient not taking: Reported on 12/11/2019) 40 tablet 2     ORDER FOR DME Use your CPAP device as directed by your provider.       predniSONE (DELTASONE) 2.5 MG tablet Take 2 tablets (5 mg) by mouth daily (Patient not taking: Reported on 7/22/2020) 180 tablet 1     prochlorperazine (COMPAZINE) 10 MG tablet One every 6 to 8 hours for nausea 10 tablet 0         DIRE Score for ongoing opioid management is calculated as follows:    Diagnosis = 2 pts (slowly progressive;  moderate pain/objective findings)    Intractability = 1 pt (few therapies tried; passive patient role)    Risk        Psych = 3 pts (no significant personality dysfunction/mental illness; good communication with clinic)         Chem Hlth = 3 pts (no history of chemical dependency; not drug-focused)       Reliability = 3 pts (highly reliable with meds, appointments, treatments)       Social = 3 pts (supportive family/close relationships; involved in work/school; no isolation)       (Psych + Chem hlth + Reliability + Social) = 15    Efficacy = 2 pts (too early/not tried meds)    DIRE Score = 17        7-13: likely NOT suitable candidate for long-term opioid analgesia       14-21: may be a suitable candidate for long-term opioid analgesia        DIAGNOSTIC TESTS:  Imaging Studies:      MRI of the Cervical Spine without contrast 7/23/2014  History: episodic leg weakness,Other musculoskeletal symptoms  referable to limbs(729.89)  Comparison: None.   Technique: Sagittal T1-weighted, T2-weighted and sagittal and axial  T2* gradient echo images were obtained of the cervical spine without  intravenous contrast.  Reconstructed MR myelographic images were also  obtained.  Findings:  There is straightening of the normal cervical lordosis. Minimal  retrolisthesis of C5 on C6. There is no evidence for fracture or  hemorrhage in the cervical spine. Normal signal within the bone  marrow. Mild narrowing of the intervertebral disc space at C5-C6.  Multilevel disc desiccation. There is normal signal within the  cervical spinal cord which also demonstrates a normal contour. No  evidence of definite restricted diffusion within the cervical spinal  cord. Dominant right vertebral artery.  The findings on a level by level basis are as follows:  C2-3: No spinal canal or neural foraminal narrowing.  C3-4:  No spinal canal or neural foraminal narrowing.  C4-5:  Small posterior disc bulge. No significant spinal canal or  neural foraminal  narrowing. No spinal canal or neural foraminal  narrowing.  C5-6:  Small posterior annular disc bulge which indents the ventral  spinal cord resulting in mild to moderate spinal canal narrowing. No  significant neural foraminal narrowing.  C6-7:  Small posterior annular disc bulge with mild spinal canal  narrowing. No significant neural foraminal narrowing.  C7-T1:  No spinal canal or neural foraminal narrowing.  No abnormality of the paraspinal soft tissues.  IMPRESSION:   Multilevel degenerative changes throughout the cervical spine,  greatest at C5-C6 with a posterior disc bulge indenting the ventral  spinal cord resulting in mild to moderate spinal canal narrowing.           MR LUMBAR SPINE W/O CONTRAST 5/7/2019 3:43 PM  Provided History: Back pain, > 6wks conservative tx, persistent sx;  See MRI from 2014, facet arth, DDD; Lumbar radiculopathy; Lumbar facet  arthropathy; DDD (degenerative disc disease), lumbar  ICD-10: Lumbar radiculopathy; Lumbar facet arthropathy; DDD  (degenerative disc disease), lumbar  Comparison: MRI lumbar spine 5/13/2014  Technique: Sagittal T1-weighted, sagittal STIR, 3D volumetric axial  and sagittal reconstructed T2-weighted images of the lumbar spine were  obtained without intravenous contrast.   Findings: Regarding numbering convention, there are 5 lumbar-type  vertebrae assumed for the purposes of this dictation. There is  straightening of the normal lumbar spondylosis. The tip of the conus  medullaris is at L1.  Regarding alignment, the lumbar vertebral column  appears normally aligned.  There is multilevel disc height narrowing ,  most pronounced at L5-S1 with mild disc height loss.  Regarding bone  marrow signal intensity, no abnormality is visualized on STIR images.  On a level by level basis:  T12-L1: Facet arthropathy and ligamentum flavum hypertrophy. Mild  right neural foraminal stenosis. Left neural foramen is patent. Spinal  canal is patent.  L1-2: No spinal canal or  neural foraminal stenosis  L2-3: Facet arthropathy bilaterally. Ligamentum flavum hypertrophy.  Small posterior disc bulge. Mild spinal canal stenosis. No neural  foraminal stenosis.  L3-4: Facet arthropathy bilaterally. Ligamentum flavum hypertrophy.  Mild spinal canal stenosis. Mild left neural foraminal stenosis. Right  neural foramen is patent.   L4-5: Facet arthropathy bilaterally. Posterior disc bulge. Ligamentum  flavum hypertrophy. Mild neural foraminal stenosis bilaterally. Mild  spinal canal stenosis..  L5-S1: Facet arthropathy bilaterally. Central disc protrusion  approaches without abutting the left traversing S1 nerve root.. Mild  neural foraminal stenosis bilaterally. Spinal canal is patent.  Paraspinous tissues are within normal limits.  Impression:  1. Multilevel lumbar spondylosis, most pronounced at L4-5 with mild  neural foraminal stenosis bilaterally. There is minimal spinal canal  stenosis at L2-3 and L3-4.  2. Central disc region at L5-S1 approaches the traversing left S1  nerve root without abutment.  3. No significant change since 5/13/2014.           ASSESSMENT:   1.  Rheumatoid Arthritis affecting multidisciplinary joint             1. Bilateral knee pain, OA vs RA             2. Bilateral hand pain and swelling             3. Bilateral hip pain              4. Bilateral ankle pain   2.  Morbid obesity s/p bariatric surgery   3.  Fibromyalgia   4.  Depression  5.  Cervical spine: DDD, mild stenosis  6.  Lumbar spine: DDD, facet arthropathy, right S1 nerve involvement, stenosis     Sharon returns after 14-month absence from the pain center requesting an interventional injection.  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  She is well-known to me.  Reviewed most recent imaging and the degenerative disease at the L5-S1 level correlates with her increased pain and moderate right radiculopathy.  Gabapentin is somewhat helpful but she cannot take a  therapeutic dose during the day due to side effects.  We will try a lumbar epidural steroid injection.  She can follow-up with me as needed or 2-3 weeks after the injection if it is not effective.    Plan:    Diagnosis reviewed, treatment option addressed, and risk/benifits discussed.  Self-care instructions given.     1. Schedule follow-up with CHELSEY Alex NP-C in 2-3 weeks after injection. Ok to cancel if doing well.   2. Imaging: If injection is not effective, we will get an updated lumbar MRI  3. Procedures recommended: Lumbar epidural steroid injection   4. Medication recommendations: No changes     I have reviewed the note as documented above.  This accurately captures the substance of my conversation with the patient.    CHELSEY Gamez, NP-C  [facility] [place]

## 2020-07-22 NOTE — PATIENT INSTRUCTIONS
After Visit Instructions:     Thank you for coming to [facility] for your care. It is my goal to partner with you to help you reach your optimal state of health.     Continue daily self-care, identifying contributing factors, and monitoring variations in pain level. Continue to integrate self-care into your life.      1. Schedule follow-up with CHELSEY Alex NP-C in 2 weeks after injection. Ok to cancel if doing well.   2. Imaging: If injection is not effective, we will get an updated lumbar MRI  3. Procedures recommended: Lumbar epidural steroid injection   4. Medication recommendations: No changes       CHELSEY Gamez NP-C  [facility]  [facility]    Clinic Number:  [phone]     Call with any questions about your care and for scheduling assistance.     Calls are returned Monday through Friday between 8 AM and 4:30 PM. We usually get back to you within 2 business days depending on the issue/request.    If we are prescribing your medications:    For opioid medication refills, call the clinic or send a [employer] message 7 days in advance.  Please include:    Name of requested medication    Name of the pharmacy.    For non-opioid medications, call your pharmacy directly to request a refill. Please allow 3-4 days to be processed.     Per [place] State Law:    All controlled substance prescriptions must be filled within 30 days of being written.      For those controlled substances allowing refills, pickup must occur within 30 days of last fill.      We believe regular attendance is key to your success in our program!      Any time you are unable to keep your appointment we ask that you call us at least 24 hours in advance to cancel.This will allow us to offer the appointment time to another patient.   Multiple missed appointments may lead to dismissal from the clinic.

## 2020-07-23 ENCOUNTER — TELEPHONE (OUTPATIENT)
Dept: PALLIATIVE MEDICINE | Facility: CLINIC | Age: 65
End: 2020-07-23

## 2020-07-23 NOTE — TELEPHONE ENCOUNTER
"Screening Questions for Radiology Injections:    Injection to be done at which interventional clinic site? [facility]    Instruct patient to arrive as directed prior to the scheduled appointment time:    [place] minutes before      [place]: 30 minutes before; if IV needed 1 hour before     Dr. Hunt-no IV needed for Cervical DAGO; please instruct to arrive 30\" early    Procedure ordered by Jennifer V    Procedure ordered? LESI L5- S1     As a reminder, receiving steroids can decrease your body's ability to fight infection.   Would you still like to move forward with scheduling the injection?  Yes      Transforaminal Cervical DAGO - no pain provider currently performing    What insurance would patient like us to bill for this procedure? Pref>one       Worker's comp or MVA (motor vehicle accident) -Any injection DO NOT SCHEDULE and route to Jackelin Selvin.      [employer] insurance - For SI joint injections, DO NOT SCHEDULE and route Jackelin Selvin.       [employer] - Any injection besides hip/shoulder/knee joint DO NOT SCHEDULE and route to Jackelin Montalvo. She will obtain PA and call pt back to schedule procedure or notify pt of denial.       [employer]-Route to Jackelin for review      **BCBS- ALL need to be routed to [place] for review if a PA is needed**      IF SCHEDULING IN [place] AND NEEDS A PA, IT IS OKAY TO SCHEDULE. [place] HANDLES THEIR OWN PA'S AFTER THE PATIENT IS SCHEDULED. PLEASE SCHEDULE AT LEAST 1 WEEK OUT SO A PA CAN BE OBTAINED.    Any chance of pregnancy? NO   If YES, do NOT schedule and route to RN pool    Is an  needed? No     Patient has a drive home? (mandatory) YES: informed     Is patient taking any blood thinners (i.e. plavix, coumadin, jantoven, warfarin, heparin, pradaxa or dabigatran, etc)? No   If hold needed, do NOT schedule, route to RN pool     Is patient taking any aspirin products (includes Excedrin and Fiorinal)? No     If more than 325mg/day, OK to " schedule; Instruct pt to decrease to less than 325 mg for 7 days AND route to RN pool    For CERVICAL procedures, hold all aspirin products for 6 days.     Tell pt that if aspirin product is not held for 6 days, the procedure WILL BE cancelled.      Does the patient have a bleeding or clotting disorder? No     If YES, okay to schedule AND route to RN nurse pool    For any patients with platelet count <100, must be forwarded to provider    Is patient diabetic?  No  If YES, instruct them to bring their glucometer.    Does patient have an active infection or treated for one within the past week? No     Is patient currently taking any antibiotics?  No     For patients on chronic, preventative, or prophylactic antibiotics, procedures may be scheduled.     For patients on antibiotics for active or recent infection:antibiotic course must have been completed for 4 days    Is patient currently taking any steroid medications? (i.e. Prednisone, Medrol)  No     For patients on steroid medications, course must have been completed for 4 days    Is patient actively being treated for cancer or immunocompromised? No  If YES, do NOT schedule and route to RN pool     Are you able to get on and off an exam table with minimal or no assistance? Yes  If NO, do NOT schedule and route to RN pool    Are you able to roll over and lay on your stomach with minimal or no assistance? Yes  If NO, do NOT schedule and route to RN pool     Any allergies to contrast dye, iodine, shellfish, or numbing and steroid medications? No  If YES, route to RN pool AND add allergy information to appointment notes    Allergies: Patient has no known allergies.      Has the patient had a flu shot or any other vaccinations within 7 days before or after the procedure.  No     Does patient have an MRI/CT?  YES: 2019  Check Procedure Scheduling Grid to see if required.      Was the MRI done within the last 3 years?  Yes    If yes, where was the MRI done i.e.[facility], [facility], [place], [facility] etc?       If no, do not schedule and route to RN pool    If MRI was not done at [place], [facility] or [facility] do NOT schedule and route to RN pool.      If pt has an imaging disc, the injection MAY be scheduled but pt has to bring disc to appt.     If they show up without the disc the injection cannot be done    Procedure Specific Instructions:      If celiac plexus block, informed patient NPO for 6 hours and that it is okay to take medications with sips of water, especially blood pressure medications  Not Applicable         If this is for a cervical procedure, informed patient that aspirin needs to be held for 6 days.   Not Applicable      If IV needed:    Do not schedule procedures requiring IV placement in the first appointment of the day or first appointment after lunch. Do NOT schedule at 0745, 0815 or 1245.     Instructed pt to arrive 30 minutes early for IV start if required. (Check Procedure Scheduling Grid)  Not Applicable    Reminders:      If you are started on any steroids or antibiotics between now and your appointment, you must contact us because the procedure may need to be cancelled.  No      For all procedures except radiofrequency ablations (RFAs) and spinal cord stimulator (SCS) trials, informed patient:    IV sedation is not provided for this procedure.  If you feel that an oral anti-anxiety medication is needed, you can discuss this further with your referring provider or primary care provider.  The Pain Clinic provider will discuss specifics of what the procedure includes at your appointment.  Most procedures last 10-20 minutes.  We use numbing medications to help with any discomfort during the procedure.  Not Applicable      For patients 85 or older we recommend having an adult stay w/ them for the remainder of the day.       Does the patient have any questions?  NO  Gely Carroll  [facility]

## 2020-07-28 DIAGNOSIS — F32.0 MILD MAJOR DEPRESSION (H): ICD-10-CM

## 2020-07-31 RX ORDER — CITALOPRAM HYDROBROMIDE 20 MG/1
TABLET ORAL
Qty: 90 TABLET | Refills: 0 | Status: SHIPPED | OUTPATIENT
Start: 2020-07-31 | End: 2020-10-30

## 2020-07-31 NOTE — TELEPHONE ENCOUNTER
Medication is being filled for 1 time refill only due to:  Patient needs to be seen because depression.    Manisha Gimenez, PharmD  Medication Therapy Management Pharmacist  [phone]

## 2020-08-12 DIAGNOSIS — M54.16 LUMBAR RADICULOPATHY: ICD-10-CM

## 2020-08-12 RX ORDER — GABAPENTIN 100 MG/1
CAPSULE ORAL
Qty: 180 CAPSULE | Refills: 5 | Status: SHIPPED | OUTPATIENT
Start: 2020-08-12 | End: 2020-10-30

## 2020-08-12 NOTE — TELEPHONE ENCOUNTER
Requested Prescriptions   Pending Prescriptions Disp Refills    gabapentin (NEURONTIN) 100 MG capsule [Pharmacy Med Name: GABAPENTIN 100MG CAPS] 180 capsule 5     Sig: TAKE ONE CAPSULE BY MOUTH EVERY MORNING, ONE CAPSULE BY MOUTH AT MIDDAY, AND TAKE FOUR CAPSULES BY MOUTH EVERY NIGHT AT BEDTIME       There is no refill protocol information for this order        Routing refill request to provider for review/approval because:  Drug not on the [facility] refill protocol   Kita Rehman RN,BSN  [facility]

## 2020-08-16 NOTE — PROGRESS NOTES
Pre procedure Diagnosis: lumbar radiculopathy    Post procedure Diagnosis: Same  Procedure performed: L5/S1 interlaminar epidural steroid injection   Anesthesia: none  Complications: none   Operators: Za Walker MD and Geovany Richardson MD     Indications:   Sharon Fung is a 65 year old female was sent by Jennifer Ness NP for epidural steroid injection.  They have a history of LBP, more pronounced on the left side which extends down in to the buttocks and then wraps around the anterior aspect in to the foot. The right side appears to stay midline without much radiation  Exam shows Bilateral paraspinal point tenderness, with mild antalgic gait. They have tried conservative treatment including medications, home stretching exercises.    MRI was done on 5/7/19 which showed   1. Multilevel lumbar spondylosis, most pronounced at L4-5 with mild neural foraminal stenosis bilaterally. There is minimal spinal canal stenosis at L2-3 and L3-4.  2. Central disc region at L5-S1 approaches the traversing left S1 nerve root without abutment.  3. No significant change since 5/13/2014.    Options/alternatives, benefits and risks were discussed with the patient including bleeding, infection, no pain relief, tissue trauma, exposure to radiation, reaction to medications, spinal cord injury, dural puncture, weakness, numbness and headache.  Questions were answered to her satisfaction and she agrees to proceed. Voluntary informed consent was obtained and signed.     Vitals were reviewed: Yes  Allergies were reviewed:  Yes   Medications were reviewed:  Yes   Pre-procedure pain score: 8/10    Procedure:  After getting informed consent, patient was brought into the procedure suite and was placed in a prone position on the procedure table.   A Pause for the Cause was performed.  Patient was prepped and draped in sterile fashion.     The L5-S1 interspace was identified with AP fluoroscopy.  A total of 4 ml of 1% lidocaine was used to  anesthetize the skin for a Right paramedian approach.  A 22 gauge 4.5inch Touhy needle was advanced under intermittent fluoroscopy.  A BENOIT syringe was used to advance the needle into the epidural space.   After loss of resistance, there was no evidence of blood or CSF on aspiration. Location was verified with both AP and lateral fluoroscopic imaging.    A total of 2 ml of Omnipaque 300 was injected demonstrating appropriate epidural spread and was checked in both the AP and lateral views. 8ml was wasted. There was no evidence of intravascular spread.    2 ml of 0.5% Bupivicaine with 10mg of dexamethasone and 2ml of preservative free saline was injected.  The needle was removed from the epidural space, flushed with 1% lidocaine and removed.       Hemostasis was achieved, the area was cleaned, and bandaids were placed when appropriate.  The patient tolerated the procedure well, and was taken to the recovery room.    Images were saved to PACS.    Post-procedure pain score: 5/10  Follow-up includes:   -f/u phone call in one week  -f/u with referring provider    Za Walker MD  [facility]

## 2020-08-17 ENCOUNTER — ANCILLARY PROCEDURE (OUTPATIENT)
Dept: RADIOLOGY | Facility: CLINIC | Age: 65
End: 2020-08-17
Attending: PSYCHIATRY & NEUROLOGY
Payer: COMMERCIAL

## 2020-08-17 ENCOUNTER — RADIOLOGY INJECTION OFFICE VISIT (OUTPATIENT)
Dept: PALLIATIVE MEDICINE | Facility: CLINIC | Age: 65
End: 2020-08-17
Payer: COMMERCIAL

## 2020-08-17 VITALS
HEART RATE: 82 BPM | DIASTOLIC BLOOD PRESSURE: 82 MMHG | SYSTOLIC BLOOD PRESSURE: 162 MMHG | RESPIRATION RATE: 16 BRPM | OXYGEN SATURATION: 95 %

## 2020-08-17 DIAGNOSIS — M54.16 LUMBAR RADICULOPATHY: ICD-10-CM

## 2020-08-17 DIAGNOSIS — M54.16 LUMBAR RADICULOPATHY: Primary | ICD-10-CM

## 2020-08-17 PROCEDURE — 62323 NJX INTERLAMINAR LMBR/SAC: CPT | Performed by: PSYCHIATRY & NEUROLOGY

## 2020-08-17 ASSESSMENT — PAIN SCALES - GENERAL: PAINLEVEL: EXTREME PAIN (8)

## 2020-08-17 NOTE — PATIENT INSTRUCTIONS
[facility]   Procedure Discharge Instructions    Today you saw:    Dr. Kavitha Walker      You had an:  Lumbar Epidural steroid injection       Medications used:  Lidocaine   Bupivacaine   Dexamethasone Omnipaque              If you were holding your blood thinning medication, please restart taking it: N/A    Be cautious when walking. Numbness and/or weakness in the lower extremities may occur for up to 6-8 hours after the procedure due to effect of the local anesthetic    Do not drive for 6 hours. The effect of the local anesthetic could slow your reflexes.     You may resume your regular activities after 24 hours    Avoid strenuous activity for the first 24 hours    You may shower, however avoid swimming, tub baths or hot tubs for 24 hours following your procedure    You may have a mild to moderate increase in pain for several days following the injection.    It may take up to 14 days for the steroid medication to start working although you may feel the effect as early as a few days after the procedure.       You may use ice packs for 10-15 minutes, 3 to 4 times a day at the injection site for comfort    Do not use heat to painful areas for 6 to 8 hours. This will give the local anesthetic time to wear off and prevent you from accidentally burning your skin.     Unless you have been directed to avoid the use of anti-inflammatory medications (NSAIDS), you may use medications such as ibuprofen, Aleve or Tylenol for pain control if needed.     If you were fasting, you may resume your normal diet and medications after the procedure    If you have diabetes, check your blood sugar more frequently than usual as your blood sugar may be higher than normal for 10-14 days following a steroid injection. Contact your doctor who manages your diabetes if your blood sugar is higher than usual    Possible side effects of steroids that you may experience include flushing, elevated blood pressure,  increased appetite, mild headaches and restlessness.  All of these symptoms will get better with time.    If you experience any of the following, call the Pain Clinic during work hours (Mon-Friday 8-4:30 pm) at [phone] or the Provider Line after hours at [phone]:  -Fever over 100 degree F  -Swelling, bleeding, redness, drainage, warmth at the injection site  -Progressive weakness or numbness in your legs   -Loss of bowel or bladder function  -Unusual new onset of pain that is not improving

## 2020-08-17 NOTE — NURSING NOTE
Pre-procedure Intake    Have you been fasting? NA    If yes, for how long? NA    Are you taking a prescribed blood thinner such as coumadin, Plavix, Xarelto?    No    If yes, when did you take your last dose? NA    Do you take aspirin?  No    If cervical procedure, have you held aspirin for 6 days?   NA    Do you have any allergies to contrast dye, iodine, steroid and/or numbing medications?  NO    Are you currently taking antibiotics or have an active infection?  NO    Have you had a fever/elevated temperature within the past week? NO    Are you currently taking oral steroids? NO    Do you have a ? Yes       Are you pregnant or breastfeeding?  NO    Are the vital signs normal?  Yes    Genesis Martinez CMA ([facility])      `

## 2020-08-17 NOTE — NURSING NOTE
Discharge Information    IV Discontiued Time:  NA    Amount of Fluid Infused:  NA    Discharge Criteria = When patient returns to baseline or as per MD order    Consciousness:  Pt is fully awake    Circulation:  BP +/- 20% of pre-procedure level    Respiration:  Patient is able to breathe deeply    O2 Sat:  Patient is able to maintain O2 Sat >92% on room air    Activity:  Moves 4 extremities on command    Ambulation:  Patient is able to stand and walk or stand and pivot into wheelchair    Dressing:  Clean/dry or No Dressing    Notes:   Discharge instructions and AVS given to patient    Patient meets criteria for discharge?  YES    Admitted to PCU?  No    Responsible adult present to accompany patient home?  Yes    Signature/Title:    Bill Wray RN  RN Care Coordinator  [facility]

## 2020-08-26 ENCOUNTER — TELEPHONE (OUTPATIENT)
Dept: PALLIATIVE MEDICINE | Facility: CLINIC | Age: 65
End: 2020-08-26

## 2020-08-26 NOTE — TELEPHONE ENCOUNTER
Patient had a L5/S1 interlaminar epidural steroid injection on 8/17.  Called patient for an update.      Left message that we were calling for an update about how s/he was doing after the injection.  LM that if s/he has any problems or questions to call the clinic at [phone].

## 2020-10-01 ENCOUNTER — VIRTUAL VISIT (OUTPATIENT)
Dept: PALLIATIVE MEDICINE | Facility: CLINIC | Age: 65
End: 2020-10-01
Payer: COMMERCIAL

## 2020-10-01 DIAGNOSIS — M53.3 SACROILIAC JOINT PAIN: Primary | ICD-10-CM

## 2020-10-01 DIAGNOSIS — Z98.84 HX OF LAPAROSCOPIC GASTRIC BANDING: ICD-10-CM

## 2020-10-01 DIAGNOSIS — E66.01 MORBID OBESITY (H): ICD-10-CM

## 2020-10-01 DIAGNOSIS — M54.16 LUMBAR RADICULOPATHY: ICD-10-CM

## 2020-10-01 DIAGNOSIS — E55.9 VITAMIN D DEFICIENCY: ICD-10-CM

## 2020-10-01 PROCEDURE — 99214 OFFICE O/P EST MOD 30 MIN: CPT | Mod: 95 | Performed by: NURSE PRACTITIONER

## 2020-10-01 RX ORDER — TOPIRAMATE 25 MG/1
TABLET, FILM COATED ORAL
Qty: 120 TABLET | Refills: 1 | Status: SHIPPED | OUTPATIENT
Start: 2020-10-01 | End: 2021-02-12

## 2020-10-01 RX ORDER — CHOLECALCIFEROL (VITAMIN D3) 50 MCG
1 TABLET ORAL DAILY
Qty: 90 TABLET | Refills: 1 | Status: SHIPPED | OUTPATIENT
Start: 2020-10-01 | End: 2021-02-03

## 2020-10-01 ASSESSMENT — PAIN SCALES - GENERAL: PAINLEVEL: SEVERE PAIN (7)

## 2020-10-01 NOTE — TELEPHONE ENCOUNTER
Rheumatology team: Please call to notify Ms. Fung that vitamin D has been refilled.  Freddy Guerra MD  10/1/2020 5:00 PM

## 2020-10-01 NOTE — PROGRESS NOTES
"Sharon Fung is a 65 year old female who is being evaluated via a billable video visit.      The patient has been notified of following:     \"This video visit will be conducted via a call between you and your physician/provider. We have found that certain health care needs can be provided without the need for an in-person physical exam.  This service lets us provide the care you need with a video conversation.  If a prescription is necessary we can send it directly to your pharmacy.  If lab work is needed we can place an order for that and you can then stop by our lab to have the test done at a later time.    Video visits are billed at different rates depending on your insurance coverage.  Please reach out to your insurance provider with any questions.    If during the course of the call the physician/provider feels a video visit is not appropriate, you will not be charged for this service.\"    Patient has given verbal consent for Video visit? Yes  How would you like to obtain your AVS? MyChart  If you are dropped from the video visit, the video invite should be resent to: Text to cell phone: [phone]  Will anyone else be joining your video visit? No    Genesis Martinez CMA ([facility])      Video-Visit Details    Type of service:  Video Visit  Video Start Time: 3:55 PM  Video End Time: 4:17 PM  Originating Location (pt. Location): Other Work  Distant Location (provider location):  [facility]   Platform used for Video Visit: Doximity    CHELSEY Gamez NP-C  [facility]      Sharon Fung is a 65 year old female who is being evaluated via a billable telephone visit.         CHIEF COMPLAINT: back pain     INTERVAL HISTORY:  Last seen on 7/22/20.        Recommendations/plan at the last visit included:  1. Schedule follow-up with CHELSEY Alex NP-C in 2-3 weeks after injection. Ok to cancel if doing well.   2. Imaging: If injection is not effective, " we will get an updated lumbar MRI  3. Procedures recommended: Lumbar epidural steroid injection   4. Medication recommendations:           No changes    Since last visit:   - 8/17/20: L5/S1 interlaminar epidural steroid injection with Dr Walker. Feels like injection was not in the right spot. She thinks that an SI joint would provide more help.     Pain Information:   Pain quality: Sharp and Shooting    Pain rating: pain rating not provided today     Annual Controlled Substance Agreement/UDS due date: N/A     CURRENT RELEVANT PAIN MEDICATIONS:   OTC pain medications per package instructions PRN  Gabapentin 100 mg in am/midday & 400 mg at HS     Patient is using the medication as prescribed:  YES  Is your medication helpful?     NO   Medication side effects? denies any problems     Previous Pain Relevant Medications: (H--helped; HI--Helped initially; SWH--Somewhat helpful; NH--No help; W--worse; SE--side effects; ?--Unsure if helpful)   NOTE: This medication information taken from patient's intake form, not medical records.               Opiates: None               NSAIDS: Ibuprofen: H, ketorolac: H, naproxen: H              Muscle Relaxants: None noted              Anti-migraine mediations: None noted              Anti-depressants: None              Sleep aids: None              Anxiolytics: None              Neuropathics: Topiramate: H                  Topicals: None              Other medications not covered above: Tylenol: H, prednisone: H     Any illicit drug use: none  EtOH use: rare   Caffeine use: 3-4 per day   Nicotine use: none   Any use of prescriptions other than how they were prescribed:none         THE 4 As OF OPIOID MAINTENANCE ANALGESIA    Analgesia: Is pain relief clinically significant? N/A   Activity: Is patient functional and able to perform Activities of Daily Living? N/A   Adverse effects: Is patient free from adverse side effects from opiates? N/A   Adherence to Rx protocol: Is patient  adhering to Controlled Substance Agreement and taking medications ONLY as ordered? N/A     Is Narcan prescribed for opiate use >50 MME daily? N/A     [place] Board of Pharmacy Data Base Reviewed:    YES; No concern for abuse or misuse of controlled medications based on this report.      Past Pain Treatments:              Pain Clinic:   No               PT: No               Psychologist: No             Relaxation techniques/biofeedback: No             Chiropractor: No             Acupuncture: No             Pharmacotherapy:                         Opioids: No                          Non-opioids:  Yes              TENs Unit:No             Injections: Yes:    Date?  For right bunion: H    Aug 2020:  L5-S1 interlaminar steroid injection: NH             Self-care:   Yes              Surgeries related to pain: No    Medications:  Current Outpatient Medications   Medication Sig Dispense Refill     acetaminophen (TYLENOL) 500 MG tablet Take 500-1,000 mg by mouth every 6 hours as needed for mild pain       albuterol (PROAIR HFA/PROVENTIL HFA/VENTOLIN HFA) 108 (90 Base) MCG/ACT inhaler Inhale 2 puffs into the lungs every 6 hours as needed for shortness of breath / dyspnea or wheezing (Patient not taking: Reported on 8/17/2020) 1 Inhaler 1     azelastine (OPTIVAR) 0.05 % ophthalmic solution Apply 1 drop to eye 2 times daily 1 Bottle 11     citalopram (CELEXA) 20 MG tablet TAKE ONE TABLET BY MOUTH ONCE DAILY 90 tablet 0     cycloSPORINE (RESTASIS) 0.05 % ophthalmic emulsion Place 1 drop into both eyes 2 times daily (Patient not taking: Reported on 12/11/2019) 2 Box 11     cycloSPORINE (RESTASIS) 0.05 % ophthalmic emulsion Place 1 drop into both eyes 2 times daily       fexofenadine (ALLEGRA) 180 MG tablet Take 1 tablet (180 mg) by mouth daily 90 tablet 2     fluticasone (FLONASE) 50 MCG/ACT nasal spray Spray 2 sprays into both nostrils as needed       gabapentin (NEURONTIN) 100 MG capsule TAKE ONE CAPSULE BY MOUTH EVERY  MORNING, ONE CAPSULE BY MOUTH AT MIDDAY, AND TAKE FOUR CAPSULES BY MOUTH EVERY NIGHT AT BEDTIME 180 capsule 5     ibuprofen 200 MG capsule Take 600-800 mg by mouth At Bedtime       ketorolac (TORADOL) 10 MG tablet Take 1 tablet (10 mg) by mouth every 6 hours as needed for moderate pain or pain (Patient not taking: Reported on 8/17/2020) 10 tablet 0     leflunomide (ARAVA) 20 MG tablet Take 1 tablet (20 mg) by mouth daily 90 tablet 1     liraglutide - Weight Management (SAXENDA) 18 MG/3ML pen Inject 3 mg Subcutaneous daily (Patient not taking: Reported on 12/11/2019) 15 mL 3     naproxen sodium 220 MG capsule Take 220 mg by mouth 2 times daily (with meals)       ondansetron (ZOFRAN-ODT) 8 MG ODT tab Take 1 tablet (8 mg) by mouth every 8 hours as needed for nausea (Patient not taking: Reported on 12/11/2019) 40 tablet 2     ORDER FOR DME Use your CPAP device as directed by your provider.       predniSONE (DELTASONE) 2.5 MG tablet Take 2 tablets (5 mg) by mouth daily (Patient not taking: Reported on 7/22/2020) 180 tablet 1     prochlorperazine (COMPAZINE) 10 MG tablet One every 6 to 8 hours for nausea 10 tablet 0     Sarilumab 200 MG/1.14ML SOAJ Inject 200 mg Subcutaneous every 14 days . Hold for signs of infection and seek medical attention. 2 pen 12     sulfaSALAzine (AZULFIDINE) 500 MG tablet Take 3 tablets (1,500 mg) by mouth 2 times daily 540 tablet 1     vitamin D3 (CHOLECALCIFEROL) 2000 units (50 mcg) tablet Take 1 tablet (2,000 Units) by mouth daily 90 tablet 1         DIRE Score for ongoing opioid management is calculated as follows:    Diagnosis = 2 pts (slowly progressive; moderate pain/objective findings)    Intractability = 1 pt (few therapies tried; passive patient role)    Risk        Psych = 3 pts (no significant personality dysfunction/mental illness; good communication with clinic)         Chem Hlth = 3 pts (no history of chemical dependency; not drug-focused)       Reliability = 3 pts (highly  reliable with meds, appointments, treatments)       Social = 3 pts (supportive family/close relationships; involved in work/school; no isolation)       (Psych + Chem hlth + Reliability + Social) = 15    Efficacy = 2 pts (too early/not tried meds)    DIRE Score = 17        7-13: likely NOT suitable candidate for long-term opioid analgesia       14-21: may be a suitable candidate for long-term opioid analgesia        DIAGNOSTIC TESTS:  Imaging Studies:      MRI of the Cervical Spine without contrast 7/23/2014  History: episodic leg weakness,Other musculoskeletal symptoms  referable to limbs(729.89)  Comparison: None.   Technique: Sagittal T1-weighted, T2-weighted and sagittal and axial  T2* gradient echo images were obtained of the cervical spine without  intravenous contrast.  Reconstructed MR myelographic images were also  obtained.  Findings:  There is straightening of the normal cervical lordosis. Minimal  retrolisthesis of C5 on C6. There is no evidence for fracture or  hemorrhage in the cervical spine. Normal signal within the bone  marrow. Mild narrowing of the intervertebral disc space at C5-C6.  Multilevel disc desiccation. There is normal signal within the  cervical spinal cord which also demonstrates a normal contour. No  evidence of definite restricted diffusion within the cervical spinal  cord. Dominant right vertebral artery.  The findings on a level by level basis are as follows:  C2-3: No spinal canal or neural foraminal narrowing.  C3-4:  No spinal canal or neural foraminal narrowing.  C4-5:  Small posterior disc bulge. No significant spinal canal or  neural foraminal narrowing. No spinal canal or neural foraminal  narrowing.  C5-6:  Small posterior annular disc bulge which indents the ventral  spinal cord resulting in mild to moderate spinal canal narrowing. No  significant neural foraminal narrowing.  C6-7:  Small posterior annular disc bulge with mild spinal canal  narrowing. No significant neural  foraminal narrowing.  C7-T1:  No spinal canal or neural foraminal narrowing.  No abnormality of the paraspinal soft tissues.  IMPRESSION:   Multilevel degenerative changes throughout the cervical spine,  greatest at C5-C6 with a posterior disc bulge indenting the ventral  spinal cord resulting in mild to moderate spinal canal narrowing.           MR LUMBAR SPINE W/O CONTRAST 5/7/2019 3:43 PM  Provided History: Back pain, > 6wks conservative tx, persistent sx;  See MRI from 2014, facet arth, DDD; Lumbar radiculopathy; Lumbar facet  arthropathy; DDD (degenerative disc disease), lumbar  ICD-10: Lumbar radiculopathy; Lumbar facet arthropathy; DDD  (degenerative disc disease), lumbar  Comparison: MRI lumbar spine 5/13/2014  Technique: Sagittal T1-weighted, sagittal STIR, 3D volumetric axial  and sagittal reconstructed T2-weighted images of the lumbar spine were  obtained without intravenous contrast.   Findings: Regarding numbering convention, there are 5 lumbar-type  vertebrae assumed for the purposes of this dictation. There is  straightening of the normal lumbar spondylosis. The tip of the conus  medullaris is at L1.  Regarding alignment, the lumbar vertebral column  appears normally aligned.  There is multilevel disc height narrowing ,  most pronounced at L5-S1 with mild disc height loss.  Regarding bone  marrow signal intensity, no abnormality is visualized on STIR images.  On a level by level basis:  T12-L1: Facet arthropathy and ligamentum flavum hypertrophy. Mild  right neural foraminal stenosis. Left neural foramen is patent. Spinal  canal is patent.  L1-2: No spinal canal or neural foraminal stenosis  L2-3: Facet arthropathy bilaterally. Ligamentum flavum hypertrophy.  Small posterior disc bulge. Mild spinal canal stenosis. No neural  foraminal stenosis.  L3-4: Facet arthropathy bilaterally. Ligamentum flavum hypertrophy.  Mild spinal canal stenosis. Mild left neural foraminal stenosis. Right  neural foramen is  patent.   L4-5: Facet arthropathy bilaterally. Posterior disc bulge. Ligamentum  flavum hypertrophy. Mild neural foraminal stenosis bilaterally. Mild  spinal canal stenosis..  L5-S1: Facet arthropathy bilaterally. Central disc protrusion  approaches without abutting the left traversing S1 nerve root.. Mild  neural foraminal stenosis bilaterally. Spinal canal is patent.  Paraspinous tissues are within normal limits.  Impression:  1. Multilevel lumbar spondylosis, most pronounced at L4-5 with mild  neural foraminal stenosis bilaterally. There is minimal spinal canal  stenosis at L2-3 and L3-4.  2. Central disc region at L5-S1 approaches the traversing left S1  nerve root without abutment.  3. No significant change since 5/13/2014.           ASSESSMENT:   1.  Rheumatoid Arthritis affecting multidisciplinary joint             1. Bilateral knee pain, OA vs RA             2. Bilateral hand pain and swelling             3. Bilateral hip pain              4. Bilateral ankle pain   2.  Morbid obesity s/p bariatric surgery   3.  Fibromyalgia   4.  Depression  5.  Cervical spine: DDD, mild stenosis  6.  Lumbar spine: DDD, facet arthropathy, right S1 nerve involvement, stenosis     Sharon presents following recent interventional injection.  She does not feel that it was beneficial and wonders if it was in the right location.  Examination limited as this is a video visit all though she is able to show me where her pain is.  She believes that her pain may be in the left SI joint and we will try an SI joint injection.  She expresses concern about her back pain getting worse and not knowing how she will manage in the future.  Discussed that weight management is a jones part of helping reduce her back pain and degenerative joint disease.  Referral made to nutritionist.  We will also start topiramate for neuropathic pain and appetite suppression.  Reviewed potential side effects.  We will follow-up with a clinic visit at the end of  October to do a physical exam.      Plan:    Diagnosis reviewed, treatment option addressed, and risk/benifits discussed.  Self-care instructions given.  I am recommending a multidisciplinary treatment plan to help this patient better manage pain.      1. Schedule CLINIC follow-up with CHELSEY Alex, NPWilyC in 2 weeks after injection   2. Procedures recommended: Left SI joint injection    3. Referrals: Nutrition   4. Medication recommendations:   1. Topiramate 25 mg: take 1 tab at HS for 7 days. Increase to 1 tab BID for 7 days. Continue to increase by 1 tab daily to max dose of 2 tabs BID. We will discuss dose options at clinic appt.     I have reviewed the note as documented above.  This accurately captures the substance of my conversation with the patient.    CHELSEY Gamez, NP-C  [facility] [place]

## 2020-10-01 NOTE — TELEPHONE ENCOUNTER
Medication:   Vitamin D  Last written on:   4/2/2020  Quantity:   90    Refills:   1    Last office visit:   7/2/2020  Next office visit:   10/14/2020  Last labs:   3/31/2020 for Vitamin D    Evie Rosas CMA Rheumatology  10/1/2020 8:39 AM

## 2020-10-01 NOTE — PATIENT INSTRUCTIONS
After Visit Instructions:     Thank you for coming to [facility] for your care. It is my goal to partner with you to help you reach your optimal state of health.     Continue daily self-care, identifying contributing factors, and monitoring variations in pain level. Continue to integrate self-care into your life.      1. Schedule CLINIC follow-up with CHELSEY Alex NP-C in 2 weeks after injection   2. Procedures recommended: Left SI joint injection    3. Referrals: Nutrition   4. Medication recommendations:   1. Topiramate 25 mg: take 1 tab at HS for 7 days. Increase to 1 tab BID for 7 days. Continue to increase by 1 tab daily to max dose of 2 tabs BID. We will discuss dose options at clinic appt.     CHELSEY Gamez NP-C  [facility]    Clinic Number:  [phone]     Call with any questions about your care and for scheduling assistance.     Calls are returned Monday through Friday between 8 AM and 4:30 PM. We usually get back to you within 2 business days depending on the issue/request.    If we are prescribing your medications:    For opioid medication refills, call the clinic or send a [employer] message 7 days in advance.  Please include:    Name of requested medication    Name of the pharmacy.    For non-opioid medications, call your pharmacy directly to request a refill. Please allow 3-4 days to be processed.     Per [place] State Law:    All controlled substance prescriptions must be filled within 30 days of being written.      For those controlled substances allowing refills, pickup must occur within 30 days of last fill.      We believe regular attendance is key to your success in our program!      Any time you are unable to keep your appointment we ask that you call us at least 24 hours in advance to cancel.This will allow us to offer the appointment time to another patient.   Multiple missed appointments may lead to dismissal  from the clinic.           ----------------------------------------------------------------  [facility] Number:  [phone]     Call with any questions about your care and for scheduling assistance.     Calls are returned Monday through Friday between 8 AM and 4:30 PM. We usually get back to you within 2 business days depending on the issue/request.    If we are prescribing your medications:    For opioid medication refills, call the clinic or send a [facility] message 7 days in advance.  Please include:    Name of requested medication    Name of the pharmacy.    For non-opioid medications, call your pharmacy directly to request a refill. Please allow 3-4 days to be processed.     Per [place] State Law:    All controlled substance prescriptions must be filled within 30 days of being written.      For those controlled substances allowing refills, pickup must occur within 30 days of last fill.      We believe regular attendance is key to your success in our program!      Any time you are unable to keep your appointment we ask that you call us at least 24 hours in advance to cancel.This will allow us to offer the appointment time to another patient.     Multiple missed appointments may lead to dismissal from the clinic.

## 2020-10-02 ENCOUNTER — TELEPHONE (OUTPATIENT)
Dept: PALLIATIVE MEDICINE | Facility: CLINIC | Age: 65
End: 2020-10-02

## 2020-10-02 NOTE — TELEPHONE ENCOUNTER
Pt scheduled 10/13 and pre-screened.    Danika ZAMARRIPA    [facility]

## 2020-10-02 NOTE — TELEPHONE ENCOUNTER
"Screening Questions for Radiology Injections:    Injection to be done at which interventional clinic site? [facility]    If [facility], tell patient that this procedure requires a COVID-19 lab test be done within 4 days of the procedure. Would you still like to move forward with scheduling the procedure?  Not Applicable   If YES, let patient know that someone will call them to schedule the COVID-19 test. Route to nursing to enter order.     Instruct patient to arrive as directed prior to the scheduled appointment time:    [place] minutes before      [place]: 30 minutes before; if IV needed 1 hour before     Dr. Hunt-no IV needed for Cervical DAGO; please instruct to arrive 30\" early    Procedure ordered by Thi    Procedure ordered? Left SI joint injection     As a reminder, receiving steroids can decrease your body's ability to fight infection.   Would you still like to move forward with scheduling the injection?  Yes      Transforaminal Cervical DAGO - no pain provider currently performing    What insurance would patient like us to bill for this procedure? [employer]      Worker's comp or MVA (motor vehicle accident) -Any injection DO NOT SCHEDULE and route to Jackelin Selvin.      [employer] insurance - For SI joint injections, DO NOT SCHEDULE and route Jackelin Selvin.       [employer] - Any injection besides hip/shoulder/knee joint DO NOT SCHEDULE and route to Jackelin Montalvo. She will obtain PA and call pt back to schedule procedure or notify pt of denial.       [employer]-Route to Jackelin for review      **BCBS- ALL need to be routed to [place] for review if a PA is needed**      IF SCHEDULING IN [place] AND NEEDS A PA, IT IS OKAY TO SCHEDULE. [place] HANDLES THEIR OWN PA'S AFTER THE PATIENT IS SCHEDULED. PLEASE SCHEDULE AT LEAST 1 WEEK OUT SO A PA CAN BE OBTAINED.    Any chance of pregnancy? NO   If YES, do NOT schedule and route to RN pool    Is an  needed? " No     Patient has a drive home? (mandatory) YES: Informed    Is patient taking any blood thinners (i.e. plavix, coumadin, jantoven, warfarin, heparin, pradaxa or dabigatran, etc)? No   If hold needed, do NOT schedule, route to RN pool     Is patient taking any aspirin products (includes Excedrin and Fiorinal)? No     If more than 325mg/day, OK to schedule; Instruct pt to decrease to less than 325 mg for 7 days AND route to RN pool    For CERVICAL procedures, hold all aspirin products for 6 days.     Tell pt that if aspirin product is not held for 6 days, the procedure WILL BE cancelled.      Does the patient have a bleeding or clotting disorder? No     If YES, okay to schedule AND route to RN nurse pool    For any patients with platelet count <100, must be forwarded to provider    Is patient diabetic?  No  If YES, instruct them to bring their glucometer.    Does patient have an active infection or treated for one within the past week? No     Is patient currently taking any antibiotics?  No     For patients on chronic, preventative, or prophylactic antibiotics, procedures may be scheduled.     For patients on antibiotics for active or recent infection:antibiotic course must have been completed for 4 days    Is patient currently taking any steroid medications? (i.e. Prednisone, Medrol)  No     For patients on steroid medications, course must have been completed for 4 days    Is patient actively being treated for cancer or immunocompromised? No  If YES, do NOT schedule and route to RN pool     Are you able to get on and off an exam table with minimal or no assistance? Yes  If NO, do NOT schedule and route to RN pool    Are you able to roll over and lay on your stomach with minimal or no assistance? Yes  If NO, do NOT schedule and route to RN pool     Any allergies to contrast dye, iodine, shellfish, or numbing and steroid medications? No  If YES, route to RN pool AND add allergy information to appointment  notes    Allergies: Patient has no known allergies.      Has the patient had a flu shot or any other vaccinations within 7 days before or after the procedure.  No     Does patient have an MRI/CT?  Not Applicable  Check Procedure Scheduling Grid to see if required.      Was the MRI done within the last 3 years?  NA    If yes, where was the MRI done i.e.[facility], [facility], [facility], [facility] etc?       If no, do not schedule and route to RN pool    If MRI was not done at [facility], [facility] or [facility] do NOT schedule and route to RN pool.      If pt has an imaging disc, the injection MAY be scheduled but pt has to bring disc to appt.     If they show up without the disc the injection cannot be done    Procedure Specific Instructions:      If celiac plexus block, informed patient NPO for 6 hours and that it is okay to take medications with sips of water, especially blood pressure medications  Not Applicable         If this is for a cervical procedure, informed patient that aspirin needs to be held for 6 days.   Not Applicable      If IV needed:    Do not schedule procedures requiring IV placement in the first appointment of the day or first appointment after lunch. Do NOT schedule at 0745, 0815 or 1245.     Instructed pt to arrive 30 minutes early for IV start if required. (Check Procedure Scheduling Grid)  Not Applicable    Reminders:      If you are started on any steroids or antibiotics between now and your appointment, you must contact us because the procedure may need to be cancelled.  Yes      For all procedures except radiofrequency ablations (RFAs) and spinal cord stimulator (SCS) trials, informed patient:    IV sedation is not provided for this procedure.  If you feel that an oral anti-anxiety medication is needed, you can discuss this further with your referring provider or primary care provider.  The Pain Clinic provider will discuss specifics of what the procedure includes at your  appointment.  Most procedures last 10-20 minutes.  We use numbing medications to help with any discomfort during the procedure.  Not Applicable      For patients 85 or older we recommend having an adult stay w/ them for the remainder of the day.       Does the patient have any questions?  NO  Danika Farooq  [facility]

## 2020-10-09 ENCOUNTER — MYC MEDICAL ADVICE (OUTPATIENT)
Dept: OPTOMETRY | Facility: CLINIC | Age: 65
End: 2020-10-09

## 2020-10-09 DIAGNOSIS — M35.00 SICCA SYNDROME (H): Primary | ICD-10-CM

## 2020-10-09 DIAGNOSIS — H04.123 DRY EYE SYNDROME, BILATERAL: ICD-10-CM

## 2020-10-12 DIAGNOSIS — M05.9 SEROPOSITIVE RHEUMATOID ARTHRITIS (H): ICD-10-CM

## 2020-10-12 DIAGNOSIS — E55.9 VITAMIN D DEFICIENCY: ICD-10-CM

## 2020-10-12 DIAGNOSIS — Z79.899 HIGH RISK MEDICATION USE: ICD-10-CM

## 2020-10-12 LAB
ALBUMIN SERPL-MCNC: 3.8 G/DL (ref 3.4–5)
ALP SERPL-CCNC: 107 U/L (ref 40–150)
ALT SERPL W P-5'-P-CCNC: 42 U/L (ref 0–50)
AST SERPL W P-5'-P-CCNC: 23 U/L (ref 0–45)
BASOPHILS # BLD AUTO: 0.1 10E9/L (ref 0–0.2)
BASOPHILS NFR BLD AUTO: 1 %
BILIRUB DIRECT SERPL-MCNC: 0.1 MG/DL (ref 0–0.2)
BILIRUB SERPL-MCNC: 0.5 MG/DL (ref 0.2–1.3)
CREAT SERPL-MCNC: 0.74 MG/DL (ref 0.52–1.04)
CRP SERPL-MCNC: <2.9 MG/L (ref 0–8)
DEPRECATED CALCIDIOL+CALCIFEROL SERPL-MC: 33 UG/L (ref 20–75)
DIFFERENTIAL METHOD BLD: ABNORMAL
EOSINOPHIL # BLD AUTO: 1 10E9/L (ref 0–0.7)
EOSINOPHIL NFR BLD AUTO: 15.6 %
ERYTHROCYTE [DISTWIDTH] IN BLOOD BY AUTOMATED COUNT: 12.9 % (ref 10–15)
ERYTHROCYTE [SEDIMENTATION RATE] IN BLOOD BY WESTERGREN METHOD: 4 MM/H (ref 0–30)
GFR SERPL CREATININE-BSD FRML MDRD: 87 ML/MIN/{1.73_M2}
HCT VFR BLD AUTO: 39.7 % (ref 35–47)
HGB BLD-MCNC: 12.7 G/DL (ref 11.7–15.7)
IMM GRANULOCYTES # BLD: 0 10E9/L (ref 0–0.4)
IMM GRANULOCYTES NFR BLD: 0.3 %
LYMPHOCYTES # BLD AUTO: 2 10E9/L (ref 0.8–5.3)
LYMPHOCYTES NFR BLD AUTO: 33.4 %
MCH RBC QN AUTO: 30.4 PG (ref 26.5–33)
MCHC RBC AUTO-ENTMCNC: 32 G/DL (ref 31.5–36.5)
MCV RBC AUTO: 95 FL (ref 78–100)
MONOCYTES # BLD AUTO: 0.5 10E9/L (ref 0–1.3)
MONOCYTES NFR BLD AUTO: 8.9 %
NEUTROPHILS # BLD AUTO: 2.5 10E9/L (ref 1.6–8.3)
NEUTROPHILS NFR BLD AUTO: 40.8 %
NRBC # BLD AUTO: 0 10*3/UL
NRBC BLD AUTO-RTO: 0 /100
PLATELET # BLD AUTO: 221 10E9/L (ref 150–450)
PROT SERPL-MCNC: 6.9 G/DL (ref 6.8–8.8)
RBC # BLD AUTO: 4.18 10E12/L (ref 3.8–5.2)
WBC # BLD AUTO: 6.1 10E9/L (ref 4–11)

## 2020-10-12 PROCEDURE — 85652 RBC SED RATE AUTOMATED: CPT | Performed by: INTERNAL MEDICINE

## 2020-10-12 PROCEDURE — 85025 COMPLETE CBC W/AUTO DIFF WBC: CPT | Performed by: INTERNAL MEDICINE

## 2020-10-12 PROCEDURE — 82565 ASSAY OF CREATININE: CPT | Performed by: INTERNAL MEDICINE

## 2020-10-12 PROCEDURE — 80076 HEPATIC FUNCTION PANEL: CPT | Performed by: INTERNAL MEDICINE

## 2020-10-12 PROCEDURE — 82306 VITAMIN D 25 HYDROXY: CPT | Performed by: INTERNAL MEDICINE

## 2020-10-12 PROCEDURE — 86140 C-REACTIVE PROTEIN: CPT | Performed by: INTERNAL MEDICINE

## 2020-10-12 PROCEDURE — 36415 COLL VENOUS BLD VENIPUNCTURE: CPT | Performed by: INTERNAL MEDICINE

## 2020-10-13 ENCOUNTER — RADIOLOGY INJECTION OFFICE VISIT (OUTPATIENT)
Dept: PALLIATIVE MEDICINE | Facility: CLINIC | Age: 65
End: 2020-10-13
Payer: COMMERCIAL

## 2020-10-13 ENCOUNTER — ANCILLARY PROCEDURE (OUTPATIENT)
Dept: RADIOLOGY | Facility: CLINIC | Age: 65
End: 2020-10-13
Attending: PAIN MEDICINE
Payer: COMMERCIAL

## 2020-10-13 VITALS — SYSTOLIC BLOOD PRESSURE: 140 MMHG | RESPIRATION RATE: 95 BRPM | DIASTOLIC BLOOD PRESSURE: 73 MMHG | HEART RATE: 87 BPM

## 2020-10-13 DIAGNOSIS — M53.3 SI (SACROILIAC) JOINT DYSFUNCTION: Primary | ICD-10-CM

## 2020-10-13 DIAGNOSIS — M53.3 SI (SACROILIAC) JOINT DYSFUNCTION: ICD-10-CM

## 2020-10-13 PROCEDURE — 27096 INJECT SACROILIAC JOINT: CPT | Mod: LT | Performed by: PAIN MEDICINE

## 2020-10-13 ASSESSMENT — PAIN SCALES - GENERAL: PAINLEVEL: MODERATE PAIN (5)

## 2020-10-13 NOTE — PROGRESS NOTES
Pre procedure Diagnosis: SI joint dysfunction    Post procedure Diagnosis: Same  Procedure performed: left SI joint injection  Anesthesia: none  Complications: none  Operators: Clint Fuentes MD     Indications:   Sharon Fung is a 65 year old female. The patient has a history of left buttocks Pain. Other conservative treatments prior to injection include meds/pt.    Options/alternatives, benefits and risks were discussed with the patient including bleeding, infection, tissue trauma, exposure to radiation, reaction to medications including seizure, nerve injury, weakness, and numbness.  Questions were answered to her satisfaction and she agrees to proceed. Voluntary informed consent was obtained and signed.     Vitals were reviewed: Yes  Allergies were reviewed:  Yes   Medications were reviewed:  Yes   Pre-procedure pain score: 9/10    Procedure:  After obtaining signed informed consent, the patient was brought into the procedure suite and was placed in a prone position on the procedure table.   A Pause for the Cause was performed.  The patient was prepped and draped in the usual sterile fashion.     After identifying the left SI joint, the C-arm was rotated obliquely to obtain the best view of the inferior angle of the joint.  Then 5 ml of Lidocaine 1%  was used to anesthetize the skin at a skin entry site coaxial with the fluoroscopy beam at this location.  A 22 gauge 3.5 inch needle was advanced under intermittent fluoroscopy until it was felt to enter the SI joint.  Aspiration was negative.    A total of 1ml of Omnipaque-300 was injected, confirming appropriate position, with spread into the intraarticular space, with no intravascular uptake noted.  9ml of Omnipaque was wasted. Location was verified in lateral view.    2 ml of 0.5% bupivacaine with 40mg of Kenalog was injected.  The needle was removed.     Hemostasis was achieved, the area was cleaned, and bandaids were placed when appropriate.  The  patient tolerated the procedure well, and was taken to the recovery room.  Images were saved to PACS.    Post-procedure pain score: 2/10  Follow-up includes:   -f/u phone call in one week  -f/u with referring Physician     Clint Fuentes MD  [facility]

## 2020-10-13 NOTE — PATIENT INSTRUCTIONS
[facility]   Procedure Discharge Instructions    Today you saw:  Dr. Clint Fuentes     You had an:     sacroiliac joint injection         Be cautious when walking. Numbness and/or weakness in the lower extremities may occur for up to 6-8 hours after the procedure due to effect of the local anesthetic    Do not drive for 6 hours. The effect of the local anesthetic could slow your reflexes.     You may resume your regular activities after 24 hours    Avoid strenuous activity for the first 24 hours    You may shower, however avoid swimming, tub baths or hot tubs for 24 hours following your procedure    You may have a mild to moderate increase in pain for several days following the injection.    It may take up to 14 days for the steroid medication to start working although you may feel the effect as early as a few days after the procedure.       You may use ice packs for 10-15 minutes, 3 to 4 times a day at the injection site for comfort    Do not use heat to painful areas for 6 to 8 hours. This will give the local anesthetic time to wear off and prevent you from accidentally burning your skin.     Unless you have been directed to avoid the use of anti-inflammatory medications (NSAIDS), you may use medications such as ibuprofen, Aleve or Tylenol for pain control if needed.     If you were fasting, you may resume your normal diet and medications after the procedure    If you have diabetes, check your blood sugar more frequently than usual as your blood sugar may be higher than normal for 10-14 days following a steroid injection. Contact your doctor who manages your diabetes if your blood sugar is higher than usual    Possible side effects of steroids that you may experience include flushing, elevated blood pressure, increased appetite, mild headaches and restlessness.  All of these symptoms will get better with time.    If you experience any of the following, call the Pain Clinic during  work hours (Mon-Friday 8-4:30 pm) at [phone] or the Provider Line after hours at [phone]:  -Fever over 100 degree F  -Swelling, bleeding, redness, drainage, warmth at the injection site  -Progressive weakness or numbness in your legs or arms  -Loss of bowel or bladder function  -Unusual headache that is not relieved by Tylenol or other pain reliever  -Unusual new onset of pain that is not improving

## 2020-10-13 NOTE — NURSING NOTE
Discharge Information    IV Discontiued Time:  NA    Amount of Fluid Infused:  NA    Discharge Criteria = When patient returns to baseline or as per MD order    Consciousness:  Pt is fully awake    Circulation:  BP +/- 20% of pre-procedure level    Respiration:  Patient is able to breathe deeply    O2 Sat:  Patient is able to maintain O2 Sat >92% on room air    Activity:  Moves 4 extremities on command    Ambulation:  Patient is able to stand and walk or stand and pivot into wheelchair    Dressing:  Clean/dry or No Dressing    Notes:   Discharge instructions and AVS given to patient    Patient meets criteria for discharge?  YES    Admitted to PCU?  No    Responsible adult present to accompany patient home?  Yes    Signature/Title:    rg torres RN  RN Care Coordinator  [facility]

## 2020-10-13 NOTE — NURSING NOTE
Pre-procedure Intake    Have you been fasting? NA    If yes, for how long? No     Are you taking a prescribed blood thinner such as coumadin, Plavix, Xarelto?    No    If yes, when did you take your last dose? No     Do you take aspirin?  No    If cervical procedure, have you held aspirin for 6 days?   No     Do you have any allergies to contrast dye, iodine, steroid and/or numbing medications?  NO    Are you currently taking antibiotics or have an active infection?  NO    Have you had a fever/elevated temperature within the past week? NO    Are you currently taking oral steroids? NO    Do you have a ? Yes       Are you pregnant or breastfeeding?  NO    Are the vital signs normal?  Yes    Francisco Javier Mg MA

## 2020-10-13 NOTE — PROGRESS NOTES
"Sharon Fung is a 65 year old female who is being evaluated via a billable telephone visit.      The patient has been notified of following:     \"This telephone visit will be conducted via a call between you and your physician/provider. We have found that certain health care needs can be provided without the need for a physical exam.  This service lets us provide the care you need with a short phone conversation.  If a prescription is necessary we can send it directly to your pharmacy.  If lab work is needed we can place an order for that and you can then stop by our lab to have the test done at a later time.    Telephone visits are billed at different rates depending on your insurance coverage. During this emergency period, for some insurers they may be billed the same as an in-person visit.  Please reach out to your insurance provider with any questions.    If during the course of the call the physician/provider feels a telephone visit is not appropriate, you will not be charged for this service.\"    Patient has given verbal consent for Telephone visit?  Yes    What phone number would you like to be contacted at? [phone]    How would you like to [facility]      Rheumatology Video Visit      Sharon Fung MRN# [id number]   YOB: 1955 Age: 65 year old      Date of visit: 10/14/20   PCP: Dr. Reynaldo Saavedra    Chief Complaint   Patient presents with:  Arthritis: RA.    Assessment and Plan     1. Seropositive nonerosive rheumatoid arthritis (RF negative, CCP low positive): Previously on Humira (lost efficacy), Cimzia (ineffective), Orencia (ineffective), leflunomide (ineffective as monotherapy), hydroxychloroquine (ineffective), Xeljanz (ineffective), MTX (GI upset). Currently on SSZ 1500mg BID, leflunomide 20mg daily, and Kevzara 200mg SQ every 14 days (started 1/2019).   Note that Enbrel was denied by insurance who preferred Kevzara.  RA doing okay right now.   - Continue " sulfasalazine 1500 mg twice daily   - Continue leflunomide 20mg daily  - Continue prednisone 5mg daily (2.5mg tablets; reduce to lowest dose and stop if symptoms tolerate)  - Continue Kevzara 200mg SQ every 14 days  - Fasting labs in 3 months: CBC, Creatinine, Hepatic Panel, ESR, CRP, Lipid Panel              Rapid 3, cumulative scores                      10/14/2020 doing well (SSZ 1500mg BID, leflunomide 20mg daily, Kevzara 200mg q14 days)                      08/28/2019: 3.2   (SSZ 1500mg BID, Kevzara)  Now on gabapentin                      04/17/2019: 15    (SSZ 1500mg BID, Kevzara)  Mostly fibromyalgia symptoms                      12/19/2018:         (MTX 20mg SQ wkly, Xeljanz XR 11mg daily, SSZ 1500mg BID)                          05/02/2018:  9     (MTX 20mg SQ wkly, Xeljanz XR 11mg daily, SSZ 1000mg BID)                          01/31/2018: 22.3 (MTX 20mg SQ wkly, Xeljanz XR 11mg daily)                          11/29/2017: 16.8 (MTX 20mg SQ wkly)                      08/02/2017: 4.2   (MTX 20mg SQ wkly, Xeljanz XR 11mg daily)                         05/04/2017: 7      (MTX 20mg SQ wkly, Xeljanz XR 11mg daily)                        02/02/2017: 8      (MTX 20mg SQ wkly, Xeljanz XR 11mg daily)                      11/04/2016: 13    (MTX 20mg SQ weekly)                      07/15/2016: 10.8    2. Positive LORNA and dsDNA / Secondary Sjogren's Syndrome: Repeat dsDNAs have been negative. Has dry eyes but no dry mouth.  Dry eyes are treated by ophthalmology with Restasis.  Not discussed today.    3. Morbid obesity: weight loss would be beneficial for general health and RA.    4. Bilateral patellofemoral syndrome: home exercises that she says are taught by her physical therapist friend, and weight loss encouragement. Not discussed today.    5. Fibromyalgia: improved on gabapentin and followed in the pain management clinic for this issue.     6.  Bone Health, vitamin D deficiency: normal 12/20/2019 DEXA; plan to  repeat in 3-5 years but this may change depending on prednisone exposure.  VItamin D was low so now on vitamin D 2000 IU daily  - Continue vitamin D 2000 IU daily    7. Chronic lower back pain with left sided sciatica: suspect related to degenerative changes seen on L-spine MRI. Already following in the pain management clinic and recently had a steroid injection of her SI joint per patient.     8.  Vaccinations: Vaccinations reviewed with Ms. Fung.  Risks and benefits of vaccinations were discussed.    - Influenza: up to date per patient for the 2020/2021 flu season  - Gennsrp53: up to date  - [id number]: up to date  - Shingrix: up to date  - TDAP: advised receiving prior to 2/9/2021; she says that she will get this at her upcoming physical    # Relevant labs and imaging were reviewed with the patient    # High risk medication toxicity monitoring: discussion and labs reviewed; appropriate labs ordered. See above.  Instructed that if confirmed to have COVID-19 or exposure to someone with confirmed COVID-19 to call this clinic for directions on DMARD management.    # Note that this is a virtual visit to reduce the risk of COVID-19 exposure during this current pandemic.      # Considered to be at high risk of complications from the COVID-19 virus.  It is recommended to limit contact with other people and if possible to work remotely or provide a leave of absence to reduce the risk for COVID-19.      Ms. Fung verbalized agreement with and understanding of the rational for the diagnosis and treatment plan.  All questions were answered to best of my ability and the patient's satisfaction. Ms. Fung was advised to contact the clinic with any questions that may arise after the clinic visit.      Thank you for involving me in the care of the patient    Return to clinic: 3-4 months    HPI   Sharon Fung is a 65 year old female with medical history significant for GERD, allergic rhinitis, obstructive sleep  apnea, obesity, hx of trigger fingers, hx of carpal tunnel surgery, and rheumatoid arthritis who presents for follow-up of rheumatoid arthritis.    Today, 10/14/2020: continued improvement with leflunomide.  Sometimes with ache at the MCPs that is random and short lived; not at any specific time of the day or after any specific activity or inactivity.  Morning stiffness for about 1 hour, better with time, activity, and a hot shower.  +gelling that has improved in severity with a longer duration of leflunomide use.  Had a SI joint injection in the pain clinic yesterday and waiting to see results of that.   Not on prednisone now.    No fevers or chills. No nausea, vomiting, constipation, diarrhea. No chest pain/pressure, palpitations, or shortness of breath. No oral or nasal sores. No neck pain. No rash.      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 No oral or nasal ulcers.  CV: No chest pain, pressure, palpitations, or dyspnea on exertion.  PULM: no shortness of breath.   GI: No nausea, vomiting, constipation, diarrhea. No blood in stool. No abdominal pain.  : No blood in urine.  MSK: See HPI.  NEURO: No numbness or tingling  EXT: No LE swelling  PSYCH: Negative    Active Problem List     Patient Active Problem List   Diagnosis     AR (allergic rhinitis)     GERD (gastroesophageal reflux disease)     Status post bariatric surgery     Mild major depression (H)     Advanced directives, counseling/discussion     High risk medication use     Obstructive sleep apnea     Obesity, Class III, BMI 40-49.9 (morbid obesity) (H)     Anterior basement membrane dystrophy     Seropositive rheumatoid arthritis (H)     LORNA positive     Carpal tunnel syndrome of right wrist     Headache     Chronic obstructive pulmonary disease, unspecified COPD type (H)      Immunosuppression (H)     Fibromyalgia     Past Medical History     Past Medical History:   Diagnosis Date     AR (allergic rhinitis)  as teen     Arthritis 12/14/2015     Chronic obstructive pulmonary disease, unspecified COPD type (H) 3/27/2018     Depressive disorder 3 years ago     GERD (gastroesophageal reflux disease) 4-07     Headache 7/11/2017     Mild major depression (H) 3 years ago     Morbid obesity (H) teens     BRETT (obstructive sleep apnea)     uses CPAP     Rheumatoid arthritis, adult (H)      Past Surgical History     Past Surgical History:   Procedure Laterality Date     BLEPHAROPLASTY BILATERAL Bilateral 8/5/2016    Procedure: BLEPHAROPLASTY BILATERAL;  Surgeon: Cortez Robin MD;  Location:  [facility]     BREAST BIOPSY, RT/LT  1-94    Benign     C APPENDECTOMY  1977     COLONOSCOPY       COLONOSCOPY WITH CO2 INSUFFLATION N/A 3/30/2017    Procedure: COLONOSCOPY WITH CO2 INSUFFLATION;  Surgeon: Issa Weeks MD;  Location:  OR     ESOPHAGOSCOPY, GASTROSCOPY, DUODENOSCOPY (EGD), COMBINED N/A 10/29/2015    Procedure: COMBINED ESOPHAGOSCOPY, GASTROSCOPY, DUODENOSCOPY (EGD);  Surgeon: Shaq Mims MD;  Location: [facility]     LAPAROSCOPIC GASTRIC ADJUSTABLE BANDING  11/09/10    Lap band procedure     LAPAROSCOPIC REMOVAL GASTRIC ADJUSTABLE BAND N/A 10/31/2015    Procedure: LAPAROSCOPIC REMOVAL GASTRIC ADJUSTABLE BAND;  Surgeon: Shaq Mims MD;  Location: [facility]     RELEASE CARPAL TUNNEL Left 4/27/2017    Procedure: RELEASE CARPAL TUNNEL;  Left Open Carpal Tunnel Release;  Surgeon: Ciara Middleton MD;  [facility]     RELEASE CARPAL TUNNEL Right 6/15/2017    Procedure: RELEASE CARPAL TUNNEL;  Right Carpal Tunnel Release Open;  Surgeon: Ciara Middleton MD;  [facility]     REPAIR PTOSIS       TUBAL LIGATION  1988     Allergy   No Known Allergies  Current Medication List     Current Outpatient Medications   Medication Sig     acetaminophen (TYLENOL) 500  MG tablet Take 500-1,000 mg by mouth every 6 hours as needed for mild pain     azelastine (OPTIVAR) 0.05 % ophthalmic solution Apply 1 drop to eye 2 times daily     citalopram (CELEXA) 20 MG tablet TAKE ONE TABLET BY MOUTH ONCE DAILY     cycloSPORINE (RESTASIS) 0.05 % ophthalmic emulsion Place 1 drop into both eyes 2 times daily     fexofenadine (ALLEGRA) 180 MG tablet Take 1 tablet (180 mg) by mouth daily     fluticasone (FLONASE) 50 MCG/ACT nasal spray Spray 2 sprays into both nostrils as needed     gabapentin (NEURONTIN) 100 MG capsule TAKE ONE CAPSULE BY MOUTH EVERY MORNING, ONE CAPSULE BY MOUTH AT MIDDAY, AND TAKE FOUR CAPSULES BY MOUTH EVERY NIGHT AT BEDTIME     ibuprofen 200 MG capsule Take 600-800 mg by mouth At Bedtime     leflunomide (ARAVA) 20 MG tablet Take 1 tablet (20 mg) by mouth daily     naproxen sodium 220 MG capsule Take 220 mg by mouth 2 times daily (with meals)     ORDER FOR DME Use your CPAP device as directed by your provider.     Sarilumab 200 MG/1.14ML SOAJ Inject 200 mg Subcutaneous every 14 days . Hold for signs of infection and seek medical attention.     sulfaSALAzine (AZULFIDINE) 500 MG tablet Take 3 tablets (1,500 mg) by mouth 2 times daily     topiramate (TOPAMAX) 25 MG tablet Take 1 tab at HS for 7 days. Increase by 1 tab daily every 7 days to max dose of 2 tabs BID. Stay at this dose.     vitamin D3 (CHOLECALCIFEROL) 50 mcg (2000 units) tablet Take 1 tablet (50 mcg) by mouth daily     albuterol (PROAIR HFA/PROVENTIL HFA/VENTOLIN HFA) 108 (90 Base) MCG/ACT inhaler Inhale 2 puffs into the lungs every 6 hours as needed for shortness of breath / dyspnea or wheezing (Patient not taking: Reported on 8/17/2020)     cycloSPORINE (RESTASIS) 0.05 % ophthalmic emulsion Place 1 drop into both eyes 2 times daily (Patient not taking: Reported on 12/11/2019)     ketorolac (TORADOL) 10 MG tablet Take 1 tablet (10 mg) by mouth every 6 hours as needed for moderate pain or pain (Patient not taking:  Reported on 8/17/2020)     ondansetron (ZOFRAN-ODT) 8 MG ODT tab Take 1 tablet (8 mg) by mouth every 8 hours as needed for nausea (Patient not taking: Reported on 12/11/2019)     predniSONE (DELTASONE) 2.5 MG tablet Take 2 tablets (5 mg) by mouth daily (Patient not taking: Reported on 7/22/2020)     No current facility-administered medications for this visit.      Facility-Administered Medications Ordered in Other Visits   Medication     sodium chloride (PF) 0.9% PF flush 10 mL     sodium chloride bacteriostatic 0.9 % flush 12 mL         Social History   See HPI    Family History     Family History   Problem Relation Age of Onset     Heart Disease Father         MI     Neurologic Disorder Father 79        Parkinson's     Diabetes Father      Hypertension Father      Coronary Artery Disease Father      Cancer Maternal Grandmother         uterine     Neurologic Disorder Sister 16        migraine     Depression Sister      Neurologic Disorder Mother 20        migraine     Depression Mother      Neurologic Disorder Son 7        migraine     Substance Abuse Son      Depression Sister      Substance Abuse Sister        Physical Exam     Telephone Visit     Labs / Imaging (select studies)   RF/CCP  Recent Labs   Lab Test 11/19/12  0900   CCPABY 53*   RHF <7     CBC  Recent Labs   Lab Test 10/12/20  1113 06/29/20  0742 03/31/20  0755   WBC 6.1 6.2 6.4   RBC 4.18 4.20 4.53   HGB 12.7 12.9 14.2   HCT 39.7 40.4 42.5   MCV 95 96 94   RDW 12.9 12.9 12.6    203 259   MCH 30.4 30.7 31.3   MCHC 32.0 31.9 33.4   NEUTROPHIL 40.8 42.9 53.8   LYMPH 33.4 28.1 32.8   MONOCYTE 8.9 8.7 7.7   EOSINOPHIL 15.6 19.2 4.7   BASOPHIL 1.0 0.8 0.8   ANEU 2.5 2.7 3.4   ALYM 2.0 1.7 2.1   KIMBERLY 0.5 0.5 0.5   AEOS 1.0* 1.2* 0.3   ABAS 0.1 0.1 0.1     CMP  Recent Labs   Lab Test 10/12/20  1113 06/29/20  0742 03/31/20  0755 11/05/19  1745 11/05/19  1745 10/08/19  0906 03/26/19  0925 03/26/19  0925   NA  --   --   --   --  138 141  --  024    POTASSIUM  --   --   --   --  3.8 4.2  --  4.0   CHLORIDE  --   --   --   --  103 105  --  107   CO2  --   --   --   --  28 28  --  24   ANIONGAP  --   --   --   --  7 8  --  8   GLC  --   --   --   --  87 86  --  71   BUN  --   --   --   --  13 14  --  18   CR 0.74 0.79 0.84   < > 0.74 0.66   < > 0.76   GFRESTIMATED 87 80 74   < > 86 >90   < > 85   GFRESTBLACK >90 >90 86   < > >90 >90   < > >90   ISH  --   --   --   --  9.1 9.0  --  8.9   BILITOTAL 0.5 0.4 0.4   < > 0.5 0.4   < > 0.3   ALBUMIN 3.8 3.7 4.2   < > 4.1 4.0   < > 3.9   PROTTOTAL 6.9 6.8 7.5   < > 7.5 7.1   < > 7.5   ALKPHOS 107 142 112   < > 96 96   < > 113   AST 23 25 28   < > 23 28   < > 28   ALT 42 45 38   < > 45 41   < > 50    < > = values in this interval not displayed.     Calcium/VitaminD  Recent Labs   Lab Test 10/12/20  1113 03/31/20  0755 11/05/19  1745 10/08/19  0906 03/26/19  0925 02/20/15  0750 02/20/15  0750   ISH  --   --  9.1 9.0 8.9   < > 9.0   VITDT 33 15*  --   --   --   --  39    < > = values in this interval not displayed.     ESR/CRP  Recent Labs   Lab Test 10/12/20  1113 06/29/20  0742 03/31/20  0755   SED 4 4 5   CRP <2.9 <2.9 <2.9     Lipid Panel  Recent Labs   Lab Test 03/31/20  0755 04/27/17  0732 12/27/16  0836 02/20/15  0750 10/20/14  0821 11/22/13  0843   CHOL 224* 204* 184 157 194 167   TRIG 171* 148 154* 79 193* 140   HDL 73 82 56 63 63 60   * 92 97 78 92 79   VLDL  --   --   --  16 39* 28   CHOLHDLRATIO  --   --   --  2.5 3.1 2.8   NHDL 151* 122 128  --   --   --      Hepatitis B  Recent Labs   Lab Test 12/08/15  0855 03/06/15  1650   HBCAB Nonreactive  --    HEPBANG Nonreactive Nonreactive     Hepatitis C  Recent Labs   Lab Test 12/08/15  0855 03/06/15  1650 11/19/12  0900   HCVAB Nonreactive   Assay performance characteristics have not been established for newborns,   infants, and children   Nonreactive   Assay performance characteristics have not been established for newborns,   infants, and children    Negative       Tuberculosis Screening  Recent Labs   Lab Test 10/31/17  1400 02/02/17  0822 12/08/15  0855   TBRSLT Negative Negative Negative   TBAGN 0.05 0.00 0.01     HIV Screening  Recent Labs   Lab Test 07/24/18  0738   HIAGAB Nonreactive         Immunization History     Immunization History   Administered Date(s) Administered     Influenza Vaccine IM > 6 months Valent IIV4 10/15/2013, 09/15/2014, 09/28/2015, 09/28/2016, 10/16/2017, 10/01/2018     Influenza Vaccine Im 4yrs+ 4 Valent CCIIV4 10/15/2019     Pneumo Conj 13-V (2010&after) 04/15/2016     Pneumococcal 23 valent 07/15/2016     TDAP Vaccine (Adacel) 02/09/2011     Zoster vaccine recombinant adjuvanted (SHINGRIX) 04/17/2019, 08/28/2019          Chart documentation done in part with Dragon Voice recognition Software. Although reviewed after completion, some word and grammatical error may remain.     Phone call start time: 7:54 AM  Phone call end time: 8:00 AM    This visit is equivalent to a 34313 visit    Location of patient: home, in [place]  Location of provider: home    Follow up:  follow up appointment scheduled to be in early Feb    Freddy Guerra MD

## 2020-10-14 ENCOUNTER — TELEPHONE (OUTPATIENT)
Dept: OPTOMETRY | Facility: CLINIC | Age: 65
End: 2020-10-14

## 2020-10-14 ENCOUNTER — VIRTUAL VISIT (OUTPATIENT)
Dept: RHEUMATOLOGY | Facility: CLINIC | Age: 65
End: 2020-10-14
Payer: COMMERCIAL

## 2020-10-14 DIAGNOSIS — M05.9 SEROPOSITIVE RHEUMATOID ARTHRITIS (H): Primary | ICD-10-CM

## 2020-10-14 DIAGNOSIS — M54.42 CHRONIC BILATERAL LOW BACK PAIN WITH LEFT-SIDED SCIATICA: ICD-10-CM

## 2020-10-14 DIAGNOSIS — E55.9 VITAMIN D DEFICIENCY: ICD-10-CM

## 2020-10-14 DIAGNOSIS — M79.7 FIBROMYALGIA: ICD-10-CM

## 2020-10-14 DIAGNOSIS — Z79.899 HIGH RISK MEDICATION USE: ICD-10-CM

## 2020-10-14 DIAGNOSIS — G89.29 CHRONIC BILATERAL LOW BACK PAIN WITH LEFT-SIDED SCIATICA: ICD-10-CM

## 2020-10-14 PROCEDURE — 99213 OFFICE O/P EST LOW 20 MIN: CPT | Mod: TEL | Performed by: INTERNAL MEDICINE

## 2020-10-14 RX ORDER — SULFASALAZINE 500 MG/1
1500 TABLET ORAL 2 TIMES DAILY
Qty: 540 TABLET | Refills: 2 | Status: SHIPPED | OUTPATIENT
Start: 2020-10-14 | End: 2021-05-11

## 2020-10-14 RX ORDER — LEFLUNOMIDE 20 MG/1
20 TABLET ORAL DAILY
Qty: 90 TABLET | Refills: 2 | Status: SHIPPED | OUTPATIENT
Start: 2020-10-14 | End: 2021-05-11

## 2020-10-14 RX ORDER — CYCLOSPORINE 0.5 MG/ML
1 EMULSION OPHTHALMIC 2 TIMES DAILY
Qty: 60 EACH | Refills: 11 | Status: SHIPPED | OUTPATIENT
Start: 2020-10-14 | End: 2021-12-28

## 2020-10-14 NOTE — TELEPHONE ENCOUNTER
Central Prior Authorization Team   Phone: [phone]      PA Initiation    Medication: Restasis  Insurance Company: [employer] - Phone [phone] Fax [phone]  Pharmacy Filling the Rx: [place], [place] - [address]  Filling Pharmacy Phone: [phone]  Filling Pharmacy Fax:    Start Date: 10/14/2020

## 2020-10-14 NOTE — TELEPHONE ENCOUNTER
Prior Authorization Retail Medication Request    Medication/Dose: Restasis  ICD code (if different than what is on RX):   D91961  Previously Tried and Failed:  N/A  Rationale:  Twice daily    Insurance Name:  [employer]   Insurance ID:  [id number]      Pharmacy Information (if different than what is on RX)  Name:  [facility]  Phone:  [phone]

## 2020-10-15 NOTE — TELEPHONE ENCOUNTER
Prior Authorization Approval    Authorization Effective Date: 10/14/2020  Authorization Expiration Date: 10/14/2021  Medication: Restasis  Approved Dose/Quantity:    Reference #:     Insurance Company: [employer] - Phone [phone] Fax [phone]  Expected CoPay:       CoPay Card Available:      Foundation Assistance Needed:    Which Pharmacy is filling the prescription (Not needed for infusion/clinic administered): [facility] [place], [place] - [address]  Pharmacy Notified: Yes  Patient Notified: Yes  **Instructed pharmacy to notify patient when script is ready to /ship.**

## 2020-10-21 ENCOUNTER — MYC MEDICAL ADVICE (OUTPATIENT)
Dept: PALLIATIVE MEDICINE | Facility: CLINIC | Age: 65
End: 2020-10-21

## 2020-10-21 NOTE — TELEPHONE ENCOUNTER
It does not look like Jennifer ordered any labs. Will leave this for her review when she returns next week.     Kathy BARBOSA, RN CNP, FNP  [facility]  [facility]

## 2020-10-30 ENCOUNTER — OFFICE VISIT (OUTPATIENT)
Dept: PALLIATIVE MEDICINE | Facility: CLINIC | Age: 65
End: 2020-10-30
Payer: COMMERCIAL

## 2020-10-30 ENCOUNTER — OFFICE VISIT (OUTPATIENT)
Dept: FAMILY MEDICINE | Facility: CLINIC | Age: 65
End: 2020-10-30
Payer: COMMERCIAL

## 2020-10-30 VITALS
HEART RATE: 84 BPM | OXYGEN SATURATION: 95 % | SYSTOLIC BLOOD PRESSURE: 126 MMHG | BODY MASS INDEX: 46.95 KG/M2 | WEIGHT: 265 LBS | TEMPERATURE: 97.7 F | DIASTOLIC BLOOD PRESSURE: 78 MMHG | HEIGHT: 63 IN

## 2020-10-30 VITALS — DIASTOLIC BLOOD PRESSURE: 78 MMHG | SYSTOLIC BLOOD PRESSURE: 126 MMHG | HEART RATE: 84 BPM

## 2020-10-30 DIAGNOSIS — D84.9 IMMUNOSUPPRESSION (H): ICD-10-CM

## 2020-10-30 DIAGNOSIS — E66.01 OBESITY, CLASS III, BMI 40-49.9 (MORBID OBESITY) (H): ICD-10-CM

## 2020-10-30 DIAGNOSIS — M53.3 SI (SACROILIAC) JOINT DYSFUNCTION: ICD-10-CM

## 2020-10-30 DIAGNOSIS — M79.7 FIBROMYALGIA: ICD-10-CM

## 2020-10-30 DIAGNOSIS — Z98.84 STATUS POST BARIATRIC SURGERY: ICD-10-CM

## 2020-10-30 DIAGNOSIS — F32.0 MILD MAJOR DEPRESSION (H): ICD-10-CM

## 2020-10-30 DIAGNOSIS — M54.16 LUMBAR RADICULOPATHY: ICD-10-CM

## 2020-10-30 DIAGNOSIS — M47.816 LUMBAR FACET ARTHROPATHY: ICD-10-CM

## 2020-10-30 DIAGNOSIS — Z00.00 ROUTINE GENERAL MEDICAL EXAMINATION AT A HEALTH CARE FACILITY: Primary | ICD-10-CM

## 2020-10-30 DIAGNOSIS — M05.9 SEROPOSITIVE RHEUMATOID ARTHRITIS (H): ICD-10-CM

## 2020-10-30 DIAGNOSIS — M05.9 SEROPOSITIVE RHEUMATOID ARTHRITIS (H): Primary | ICD-10-CM

## 2020-10-30 DIAGNOSIS — Z23 NEED FOR PROPHYLACTIC VACCINATION WITH TETANUS-DIPHTHERIA (TD): ICD-10-CM

## 2020-10-30 PROBLEM — J44.9 CHRONIC OBSTRUCTIVE PULMONARY DISEASE, UNSPECIFIED COPD TYPE (H): Status: RESOLVED | Noted: 2018-03-27 | Resolved: 2020-10-30

## 2020-10-30 PROCEDURE — 99213 OFFICE O/P EST LOW 20 MIN: CPT | Mod: 25 | Performed by: FAMILY MEDICINE

## 2020-10-30 PROCEDURE — 90471 IMMUNIZATION ADMIN: CPT | Performed by: FAMILY MEDICINE

## 2020-10-30 PROCEDURE — 90714 TD VACC NO PRESV 7 YRS+ IM: CPT | Performed by: FAMILY MEDICINE

## 2020-10-30 PROCEDURE — 99396 PREV VISIT EST AGE 40-64: CPT | Mod: 25 | Performed by: FAMILY MEDICINE

## 2020-10-30 PROCEDURE — 99214 OFFICE O/P EST MOD 30 MIN: CPT | Performed by: NURSE PRACTITIONER

## 2020-10-30 RX ORDER — TOPIRAMATE 50 MG/1
50 TABLET, FILM COATED ORAL 2 TIMES DAILY
Qty: 180 TABLET | Refills: 1 | Status: SHIPPED | OUTPATIENT
Start: 2020-10-30 | End: 2021-02-12

## 2020-10-30 RX ORDER — GABAPENTIN 100 MG/1
CAPSULE ORAL
Qty: 180 CAPSULE | Refills: 5 | Status: SHIPPED | OUTPATIENT
Start: 2020-10-30 | End: 2021-02-08

## 2020-10-30 RX ORDER — CITALOPRAM HYDROBROMIDE 20 MG/1
20 TABLET ORAL DAILY
Qty: 90 TABLET | Refills: 3 | Status: SHIPPED | OUTPATIENT
Start: 2020-10-30 | End: 2021-11-05

## 2020-10-30 ASSESSMENT — PAIN SCALES - GENERAL
PAINLEVEL: NO PAIN (1)
PAINLEVEL: NO PAIN (0)

## 2020-10-30 ASSESSMENT — ENCOUNTER SYMPTOMS
CHILLS: 0
JOINT SWELLING: 1
BREAST MASS: 0
FREQUENCY: 0
ARTHRALGIAS: 0
HEADACHES: 0
HEMATURIA: 0
DYSURIA: 0
DIZZINESS: 0
HEMATOCHEZIA: 0
COUGH: 0
NERVOUS/ANXIOUS: 0
SHORTNESS OF BREATH: 1
NAUSEA: 0
SORE THROAT: 0
MYALGIAS: 0
PARESTHESIAS: 0
HEARTBURN: 0
PALPITATIONS: 0
EYE PAIN: 0
ABDOMINAL PAIN: 0
FEVER: 0
WEAKNESS: 0
DIARRHEA: 0
CONSTIPATION: 0

## 2020-10-30 ASSESSMENT — MIFFLIN-ST. JEOR: SCORE: 1732.28

## 2020-10-30 NOTE — PATIENT INSTRUCTIONS
After Visit Instructions:     Thank you for coming to [facility] for your care. It is my goal to partner with you to help you reach your optimal state of health.     Continue daily self-care, identifying contributing factors, and monitoring variations in pain level. Continue to integrate self-care into your life.      1. Schedule physical therapy assessment and visits as recommended by your physical therapist. AZEB will call you to schedule  2. Schedule VIDEO follow-up with CHELSEY Alex NP-C in 4 weeks PRIOR to any refills that will be due at that time.   3. Medication recommendations:   1. Topiramate 50 m. Take 50 mg in am and 75 mg at HS until out of 25 mg tabs  2. Then increase to 50 mg  in am and 100 mg at HS for 1-2 weeks.   3. Then increase to 100 mg BID. Stay at this dose.       CHELSEY Gamez NP-C  [facility]  [facility]    Clinic Number:  [phone]     Call with any questions about your care and for scheduling assistance.     Calls are returned Monday through Friday between 8 AM and 4:30 PM. We usually get back to you within 2 business days depending on the issue/request.    If we are prescribing your medications:    For opioid medication refills, call the clinic or send a [employer] message 7 days in advance.  Please include:    Name of requested medication    Name of the pharmacy.    For non-opioid medications, call your pharmacy directly to request a refill. Please allow 3-4 days to be processed.     Per [place] State Law:    All controlled substance prescriptions must be filled within 30 days of being written.      For those controlled substances allowing refills, pickup must occur within 30 days of last fill.      We believe regular attendance is key to your success in our program!      Any time you are unable to keep your appointment we ask that you call us at least 24 hours in advance to cancel.This will allow us to offer  the appointment time to another patient.   Multiple missed appointments may lead to dismissal from the clinic.

## 2020-10-30 NOTE — PROGRESS NOTES
SUBJECTIVE:   CC: Sharon Fung is an 65 year old woman who presents for a preventive health visit and follow-up on some baseline health conditions.     Patient has been advised of split billing requirements and indicates understanding: No  Healthy Habits:     Getting at least 3 servings of Calcium per day:  Yes    Bi-annual eye exam:  Yes    Dental care twice a year:  Yes    Sleep apnea or symptoms of sleep apnea:  Excessive snoring and Sleep apnea    Diet:  Regular (no restrictions)    Frequency of exercise:  None    Taking medications regularly:  Yes    Medication side effects:  None    PHQ-2 Total Score: 0    Additional concerns today:  No  Imm/Inj  Associated symptoms include joint swelling. Pertinent negatives include no abdominal pain, arthralgias, chest pain, chills, congestion, coughing, fever, headaches, myalgias, nausea, rash, sore throat or weakness.     She sees Dr. Guerra of rheumatology for rheumatoid arthritis.  She has been seeing the pain team recently for some low back and SI joint discomfort.  She had a couple of injections for that.  She remains on gabapentin for chronic pain.  She is on citalopram for depression.  She feels like the medicine is working fine, but she still feels a lot of tension and unrest with the world around her, including the Covid situation.  She has been seeing endocrinology for her obesity.  She has had bariatric surgery in the past and her insurance does not allow for another surgery for that.  She saw pulmonary medicine in the last couple of years for dyspnea on exertion.  See notes in chart regarding this.      Today's PHQ-2 Score:   PHQ-2 ( 1999 Pf[employer]) 10/30/2020   Q1: Little interest or pleasure in doing things 0   Q2: Feeling down, depressed or hopeless 0   PHQ-2 Score 0   Q1: Little interest or pleasure in doing things Not at all   Q2: Feeling down, depressed or hopeless Not at all   PHQ-2 Score 0       Abuse: Current or Past (Physical, Sexual or  Emotional) - No  Do you feel safe in your environment? Yes        Social History     Tobacco Use     Smoking status: Never Smoker     Smokeless tobacco: Never Used   Substance Use Topics     Alcohol use: No     Alcohol/week: 1.0 - 2.0 standard drinks     Types: 1 - 2 Standard drinks or equivalent per week     If you drink alcohol do you typically have >3 drinks per day or >7 drinks per week? No    Alcohol Use 10/30/2020   Prescreen: >3 drinks/day or >7 drinks/week? No   Prescreen: >3 drinks/day or >7 drinks/week? -   No flowsheet data found.    Reviewed orders with patient.  Reviewed health maintenance and updated orders accordingly - Yes  Patient Active Problem List   Diagnosis     AR (allergic rhinitis)     GERD (gastroesophageal reflux disease)     Status post bariatric surgery     Mild major depression (H)     Advanced directives, counseling/discussion     High risk medication use     Obstructive sleep apnea     Obesity, Class III, BMI 40-49.9 (morbid obesity) (H)     Anterior basement membrane dystrophy     Seropositive rheumatoid arthritis (H)     LORNA positive     Carpal tunnel syndrome of right wrist     Headache     Chronic obstructive pulmonary disease, unspecified COPD type (H)     Immunosuppression (H)     Fibromyalgia     Past Surgical History:   Procedure Laterality Date     BLEPHAROPLASTY BILATERAL Bilateral 8/5/2016    Procedure: BLEPHAROPLASTY BILATERAL;  Surgeon: Cortez Robin MD;  Location:  [facility]     BREAST BIOPSY, RT/LT  1-94    Benign     C APPENDECTOMY  1977     COLONOSCOPY       COLONOSCOPY WITH CO2 INSUFFLATION N/A 3/30/2017    Procedure: COLONOSCOPY WITH CO2 INSUFFLATION;  Surgeon: Issa Weeks MD;  Location:  OR     ESOPHAGOSCOPY, GASTROSCOPY, DUODENOSCOPY (EGD), COMBINED N/A 10/29/2015    Procedure: COMBINED ESOPHAGOSCOPY, GASTROSCOPY, DUODENOSCOPY (EGD);  Surgeon: Shaq Mims MD;  [facility]     LAPAROSCOPIC GASTRIC ADJUSTABLE BANDING  11/09/10    Lap band  procedure     LAPAROSCOPIC REMOVAL GASTRIC ADJUSTABLE BAND N/A 10/31/2015    Procedure: LAPAROSCOPIC REMOVAL GASTRIC ADJUSTABLE BAND;  Surgeon: Shaq Mims MD;  [facility]     RELEASE CARPAL TUNNEL Left 4/27/2017    Procedure: RELEASE CARPAL TUNNEL;  Left Open Carpal Tunnel Release;  Surgeon: Ciara Middleton MD;  [facility]     RELEASE CARPAL TUNNEL Right 6/15/2017    Procedure: RELEASE CARPAL TUNNEL;  Right Carpal Tunnel Release Open;  Surgeon: Ciara Middleton MD;  [facility]     REPAIR PTOSIS       TUBAL LIGATION  1988       Social History     Tobacco Use     Smoking status: Never Smoker     Smokeless tobacco: Never Used   Substance Use Topics     Alcohol use: No     Alcohol/week: 1.0 - 2.0 standard drinks     Types: 1 - 2 Standard drinks or equivalent per week     Family History   Problem Relation Age of Onset     Heart Disease Father         MI     Neurologic Disorder Father 79        Parkinson's     Diabetes Father      Hypertension Father      Coronary Artery Disease Father      Cancer Maternal Grandmother         uterine     Neurologic Disorder Sister 16        migraine     Depression Sister      Neurologic Disorder Mother 20        migraine     Depression Mother      Neurologic Disorder Son 7        migraine     Substance Abuse Son      Depression Sister      Substance Abuse Sister          Current Outpatient Medications   Medication Sig Dispense Refill     acetaminophen (TYLENOL) 500 MG tablet Take 500-1,000 mg by mouth every 6 hours as needed for mild pain       azelastine (OPTIVAR) 0.05 % ophthalmic solution Apply 1 drop to eye 2 times daily 1 Bottle 11     citalopram (CELEXA) 20 MG tablet Take 1 tablet (20 mg) by mouth daily 90 tablet 3     cycloSPORINE (RESTASIS) 0.05 % ophthalmic emulsion Place 1 drop into both eyes 2 times daily 60 each 11     fexofenadine (ALLEGRA) 180 MG tablet Take 1 tablet (180 mg) by mouth daily 90 tablet 2     fluticasone (FLONASE) 50  MCG/ACT nasal spray Spray 2 sprays into both nostrils as needed       gabapentin (NEURONTIN) 100 MG capsule TAKE ONE CAPSULE BY MOUTH EVERY MORNING, ONE CAPSULE BY MOUTH AT MIDDAY, AND TAKE FOUR CAPSULES BY MOUTH EVERY NIGHT AT BEDTIME 180 capsule 5     ibuprofen 200 MG capsule Take 600-800 mg by mouth At Bedtime       ketorolac (TORADOL) 10 MG tablet Take 1 tablet (10 mg) by mouth every 6 hours as needed for moderate pain or pain 10 tablet 0     leflunomide (ARAVA) 20 MG tablet Take 1 tablet (20 mg) by mouth daily 90 tablet 2     naproxen sodium 220 MG capsule Take 220 mg by mouth 2 times daily (with meals)       ORDER FOR DME Use your CPAP device as directed by your provider.       Sarilumab 200 MG/1.14ML SOAJ Inject 1.14 mLs (200 mg) Subcutaneous every 14 days . Hold for signs of infection and seek medical attention. 2 pen 12     sulfaSALAzine (AZULFIDINE) 500 MG tablet Take 3 tablets (1,500 mg) by mouth 2 times daily 540 tablet 2     topiramate (TOPAMAX) 25 MG tablet Take 1 tab at HS for 7 days. Increase by 1 tab daily every 7 days to max dose of 2 tabs BID. Stay at this dose. 120 tablet 1     vitamin D3 (CHOLECALCIFEROL) 50 mcg (2000 units) tablet Take 1 tablet (50 mcg) by mouth daily 90 tablet 1     albuterol (PROAIR HFA/PROVENTIL HFA/VENTOLIN HFA) 108 (90 Base) MCG/ACT inhaler Inhale 2 puffs into the lungs every 6 hours as needed for shortness of breath / dyspnea or wheezing (Patient not taking: Reported on 8/17/2020) 1 Inhaler 1     ondansetron (ZOFRAN-ODT) 8 MG ODT tab Take 1 tablet (8 mg) by mouth every 8 hours as needed for nausea (Patient not taking: Reported on 12/11/2019) 40 tablet 2     No Known Allergies    Mammogram Screening: Patient over age 50, mutual decision to screen reflected in health maintenance.    Pertinent mammograms are reviewed under the imaging tab.  History of abnormal Pap smear: NO - age 30-65 PAP every 5 years with negative HPV co-testing recommended  PAP / HPV Latest Ref Rng &  "Units 2/2/2017 11/22/2013 2/9/2011   PAP - NIL NIL NIL   HPV 16 DNA NEG Negative - -   HPV 18 DNA NEG Negative - -   OTHER HR HPV NEG Negative - -     Reviewed and updated as needed this visit by clinical staff                 Reviewed and updated as needed this visit by Provider                    Review of Systems   Constitutional: Negative for chills and fever.   HENT: Negative for congestion, ear pain, hearing loss and sore throat.    Eyes: Negative for pain and visual disturbance.   Respiratory: Positive for shortness of breath. Negative for cough.    Cardiovascular: Positive for peripheral edema. Negative for chest pain and palpitations.   Gastrointestinal: Negative for abdominal pain, constipation, diarrhea, heartburn, hematochezia and nausea.   Breasts:  Negative for tenderness, breast mass and discharge.   Genitourinary: Negative for dysuria, frequency, genital sores, hematuria, pelvic pain, urgency, vaginal bleeding and vaginal discharge.   Musculoskeletal: Positive for joint swelling. Negative for arthralgias and myalgias.   Skin: Negative for rash.   Neurological: Negative for dizziness, weakness, headaches and paresthesias.   Psychiatric/Behavioral: Negative for mood changes. The patient is not nervous/anxious.         OBJECTIVE:   /78 (BP Location: Right arm, Patient Position: Chair, Cuff Size: Adult Large)   Pulse 84   Temp 97.7  F (36.5  C) (Oral)   Ht 1.61 m (5' 3.39\")   Wt 120.2 kg (265 lb)   SpO2 95%   BMI 46.37 kg/m    Physical Exam  GENERAL: alert, no distress and obese  EYES: Eyes grossly normal to inspection, PERRL and conjunctivae and sclerae normal  HENT: Grossly normal  NECK: no adenopathy, no asymmetry, masses, or scars and thyroid normal to palpation  RESP: lungs clear to auscultation - no rales, rhonchi or wheezes  CV: regular rate and rhythm, normal S1 S2, no S3 or S4, no murmur, click or rub, trace peripheral edema   ABDOMEN: soft, nontender, no hepatosplenomegaly, no " "masses   MS: no gross musculoskeletal defects noted  SKIN: no suspicious lesions or rashes  NEURO: Normal strength and tone, mentation intact and speech normal  PSYCH: mentation appears normal, affect normal/bright.  She scored a 4 on her PHQ-9.    Diagnostic Test Results:  Labs reviewed in [facility].  Previous office notes from other providers were reviewed as well.    ASSESSMENT/PLAN:       ICD-10-CM    1. Routine general medical examination at a health care facility  Z00.00    2. Mild major depression (H)  F32.0 citalopram (CELEXA) 20 MG tablet   3. Obesity, Class III, BMI 40-49.9 (morbid obesity) (H)  E66.01    4. Seropositive rheumatoid arthritis (H)  M05.9    5. Immunosuppression (H)  D84.9    6. Lumbar radiculopathy  M54.16 gabapentin (NEURONTIN) 100 MG capsule   7. Status post bariatric surgery  Z98.84    8. Need for prophylactic vaccination with tetanus-diphtheria (Td)  Z23 TD PRESERV FREE, IM (7+ YRS)     Blood pressure and other vital signs look fine  We discussed the above items  We will continue her same citalopram and gabapentin  She will continue ongoing care with the various specialists as per their recommendations  She is getting routine labs done by rheumatology, so we will pass on lab work today  She is due for a tetanus booster soon, so we will give her that today  She just had a flu shot at work  Plan a tentative recheck in 6 to 12 months      Patient has been advised of split billing requirements and indicates understanding: Yes  COUNSELING:  Reviewed preventive health counseling, as reflected in patient instructions       Regular exercise       Healthy diet/nutrition       Immunizations    Vaccinated for: Td          Estimated body mass index is 44.74 kg/m  as calculated from the following:    Height as of 12/11/19: 1.626 m (5' 4.02\").    Weight as of 12/11/19: 118.3 kg (260 lb 12.8 oz).    Weight management plan: Discussed healthy diet and exercise guidelines    She reports that she has never " smoked. She has never used smokeless tobacco.      Counseling Resources:  ATP IV Guidelines  Pooled Cohorts Equation Calculator  Breast Cancer Risk Calculator  BRCA-Related Cancer Risk Assessment: FHS-7 Tool  FRAX Risk Assessment  ICSI Preventive Guidelines  Dietary Guidelines for Americans, 2010  USDA's MyPlate  ASA Prophylaxis  Lung CA Screening    Reynaldo Saavedra MD  [facility]

## 2020-10-30 NOTE — PROGRESS NOTES
[facility]    CHIEF COMPLAINT: Chronic pain     INTERVAL HISTORY:  Last seen on 10/1/20.        Recommendations/plan at the last visit included:  1. Schedule CLINIC follow-up with CHELSEY Alex NP-C in 2 weeks after injection   2. Procedures recommended: Left SI joint injection    3. Referrals: Nutrition   4. Medication recommendations:             1. Topiramate 25 mg: take 1 tab at HS for 7 days. Increase to 1 tab BID for 7 days. Continue to increase by 1 tab daily to max dose of 2 tabs BID. We will discuss dose options at clinic appt.     Since last visit:    - Topiramate has helped somewhat.  Mild numbness and tingling relieved with increasing fluids.  Left SI joint injection 10/13/2020 was very helpful.  Has an appointment coming up with the lifestyle weight management clinic.    Pain Information:   Pain quality: Nagging    Pain rating: intensity ranges from 2/10 to 10/10, and averages 3/10 on a 0-10 scale.      Annual requirements last collected: N/A    CURRENT RELEVANT PAIN MEDICATIONS:   OTC pain medications per package instructions PRN  Gabapentin 100 mg in am/midday & 400 mg at HS     Patient is using the medication as prescribed:  YES  Is your medication helpful?     NO   Medication side effects? denies any problems     Previous Pain Relevant Medications: (H--helped; HI--Helped initially; [facility]--Somewhat helpful; NH--No help; W--worse; SE--side effects; ?--Unsure if helpful)   NOTE: This medication information taken from patient's intake form, not medical records.               Opiates: None               NSAIDS: Ibuprofen: H, ketorolac: H, naproxen: H              Muscle Relaxants: None noted              Anti-migraine mediations: None noted              Anti-depressants: None              Sleep aids: None              Anxiolytics: None              Neuropathics: Topiramate: H                  Topicals: None              Other medications not covered above: Tylenol: H,  prednisone: H     Any illicit drug use: none  EtOH use: rare   Caffeine use: 3-4 per day   Nicotine use: none   Any use of prescriptions other than how they were prescribed:none         THE 4 As OF OPIOID MAINTENANCE ANALGESIA    Analgesia: Is pain relief clinically significant? N/A   Activity: Is patient functional and able to perform Activities of Daily Living? N/A   Adverse effects: Is patient free from adverse side effects from opiates? N/A   Adherence to Rx protocol: Is patient adhering to Controlled Substance Agreement and taking medications ONLY as ordered? N/A     Is Narcan prescribed for opiate use >50 MME daily? N/A     [place] Board of Pharmacy Data Base Reviewed:    YES; No concern for abuse or misuse of controlled medications based on this report.      Past Pain Treatments:              Pain Clinic:   No               PT: No               Psychologist: No             Relaxation techniques/biofeedback: No             Chiropractor: No             Acupuncture: No             Pharmacotherapy:                         Opioids: No                          Non-opioids:  Yes              TENs Unit:No             Injections: Yes:               Date?  For right bunion: H               Aug 2020:  L5-S1 interlaminar steroid injection: NH   10/13/2020: Left SI joint steroid injection: H             Self-care:   Yes              Surgeries related to pain: No      Review of Systems: A 10-point review of systems was negative, with the exception concerns noted above and chronic pain issues.      PHYSICAL EXAM    Vitals:    10/30/20 1433   BP: 126/78   Pulse: 84       Constitutional: healthy, alert and no distress A&O. Patient is appropriate.  Psychiatric/mental status: Alert, without lethargy or stupor. Appropriate affect.    Neurologic exam:  CN:  Cranial nerves 2-12 are grossly normal.    Musculoskeletal: Posture upright, shoulders and pelvis are leveled.   Gait pattern: Patient has antalgic gait favoring the neither  side.   Sensory exam: Light touch: normal bilateral upper and lower extremities     Cervical spine:   Flex:  20 degrees, pain free   Ext: 10 degrees, pain free   Rotation to right: 20 degrees, pain free   Rotation to left: 20 degrees, pain free   Tenderness in the cervical spine at midline. Yes   Tenderness in the cervical paraspinal muscles. Yes  Thoracic spine:    Kyphosis. Yes   Tenderness in the thoracic spine at midline. No   Tenderness in the thoracic paraspinal muscles. No  Lumbar/Sacral spine:   Forward Flexion:  90 degrees, painful    Ext: 20 degrees, painful    Rotation/ext to right: painful    Rotation/ext to left: painful    Lordosis. Yes   Tenderness in the lumbar spine at midline. No   Tenderness in the lumbar paraspinal muscles.No    DIRE Score for ongoing opioid management is calculated as follows:    Diagnosis = 2 pts (slowly progressive; moderate pain/objective findings)    Intractability = 1 pt (few therapies tried; passive patient role)    Risk        Psych = 3 pts (no significant personality dysfunction/mental illness; good communication with clinic)         Chem Hlth = 3 pts (no history of chemical dependency; not drug-focused)       Reliability = 3 pts (highly reliable with meds, appointments, treatments)       Social = 3 pts (supportive family/close relationships; involved in work/school; no isolation)       (Psych + Chem hlth + Reliability + Social) = 15    Efficacy = 2 pts (too early/not tried meds)    DIRE Score = 17        7-13: likely NOT suitable candidate for long-term opioid analgesia       14-21: may be a suitable candidate for long-term opioid analgesia        DIAGNOSTIC TESTS:  Imaging Studies:      MRI of the Cervical Spine without contrast 7/23/2014  History: episodic leg weakness,Other musculoskeletal symptoms  referable to limbs(729.89)  Comparison: None.   Technique: Sagittal T1-weighted, T2-weighted and sagittal and axial  T2* gradient echo images were obtained of the  cervical spine without  intravenous contrast.  Reconstructed MR myelographic images were also  obtained.  Findings:  There is straightening of the normal cervical lordosis. Minimal  retrolisthesis of C5 on C6. There is no evidence for fracture or  hemorrhage in the cervical spine. Normal signal within the bone  marrow. Mild narrowing of the intervertebral disc space at C5-C6.  Multilevel disc desiccation. There is normal signal within the  cervical spinal cord which also demonstrates a normal contour. No  evidence of definite restricted diffusion within the cervical spinal  cord. Dominant right vertebral artery.  The findings on a level by level basis are as follows:  C2-3: No spinal canal or neural foraminal narrowing.  C3-4:  No spinal canal or neural foraminal narrowing.  C4-5:  Small posterior disc bulge. No significant spinal canal or  neural foraminal narrowing. No spinal canal or neural foraminal  narrowing.  C5-6:  Small posterior annular disc bulge which indents the ventral  spinal cord resulting in mild to moderate spinal canal narrowing. No  significant neural foraminal narrowing.  C6-7:  Small posterior annular disc bulge with mild spinal canal  narrowing. No significant neural foraminal narrowing.  C7-T1:  No spinal canal or neural foraminal narrowing.  No abnormality of the paraspinal soft tissues.  IMPRESSION:   Multilevel degenerative changes throughout the cervical spine,  greatest at C5-C6 with a posterior disc bulge indenting the ventral  spinal cord resulting in mild to moderate spinal canal narrowing.           MR LUMBAR SPINE W/O CONTRAST 5/7/2019 3:43 PM  Provided History: Back pain, > 6wks conservative tx, persistent sx;  See MRI from 2014, facet arth, DDD; Lumbar radiculopathy; Lumbar facet  arthropathy; DDD (degenerative disc disease), lumbar  ICD-10: Lumbar radiculopathy; Lumbar facet arthropathy; DDD  (degenerative disc disease), lumbar  Comparison: MRI lumbar spine 5/13/2014  Technique:  Sagittal T1-weighted, sagittal STIR, 3D volumetric axial  and sagittal reconstructed T2-weighted images of the lumbar spine were  obtained without intravenous contrast.   Findings: Regarding numbering convention, there are 5 lumbar-type  vertebrae assumed for the purposes of this dictation. There is  straightening of the normal lumbar spondylosis. The tip of the conus  medullaris is at L1.  Regarding alignment, the lumbar vertebral column  appears normally aligned.  There is multilevel disc height narrowing ,  most pronounced at L5-S1 with mild disc height loss.  Regarding bone  marrow signal intensity, no abnormality is visualized on STIR images.  On a level by level basis:  T12-L1: Facet arthropathy and ligamentum flavum hypertrophy. Mild  right neural foraminal stenosis. Left neural foramen is patent. Spinal  canal is patent.  L1-2: No spinal canal or neural foraminal stenosis  L2-3: Facet arthropathy bilaterally. Ligamentum flavum hypertrophy.  Small posterior disc bulge. Mild spinal canal stenosis. No neural  foraminal stenosis.  L3-4: Facet arthropathy bilaterally. Ligamentum flavum hypertrophy.  Mild spinal canal stenosis. Mild left neural foraminal stenosis. Right  neural foramen is patent.   L4-5: Facet arthropathy bilaterally. Posterior disc bulge. Ligamentum  flavum hypertrophy. Mild neural foraminal stenosis bilaterally. Mild  spinal canal stenosis..  L5-S1: Facet arthropathy bilaterally. Central disc protrusion  approaches without abutting the left traversing S1 nerve root.. Mild  neural foraminal stenosis bilaterally. Spinal canal is patent.  Paraspinous tissues are within normal limits.  Impression:  1. Multilevel lumbar spondylosis, most pronounced at L4-5 with mild  neural foraminal stenosis bilaterally. There is minimal spinal canal  stenosis at L2-3 and L3-4.  2. Central disc region at L5-S1 approaches the traversing left S1  nerve root without abutment.  3. No significant change since  2014.           ASSESSMENT:   1.  Rheumatoid Arthritis affecting multidisciplinary joint             1. Bilateral knee pain, OA vs RA             2. Bilateral hand pain and swelling             3. Bilateral hip pain              4. Bilateral ankle pain   2.  Morbid obesity s/p bariatric surgery   3.  Fibromyalgia   4.  Depression  5.  Cervical spine: DDD, mild stenosis  6.  Lumbar spine: DDD, facet arthropathy, right S1 nerve involvement, stenosis     Sharon presents for physical exam.  As noted above recent left SI joint injection was very beneficial.  Topiramate seems to be somewhat helpful.  We will increase dose and titration schedule was provided.  We have ordered physical therapy through the [facility] which works better for her schedule than pain PT.  We will touch base again in 1 month to discuss her medications and physical therapy are going.  No other change to current treatment regimen.    Plan:    Diagnosis reviewed, treatment option addressed, and risk/benifits discussed.  Self-care instructions given.  I am recommending a multidisciplinary treatment plan to help this patient better manage pain.      1. Schedule physical therapy assessment and visits as recommended by your physical therapist. AZEB will call you to schedule  2. Schedule VIDEO follow-up with CHELSEY Alex NP-C in 4 weeks PRIOR to any refills that will be due at that time.   3. Medication recommendations:   1. Topiramate 50 m. Take 50 mg in am and 75 mg at HS until out of 25 mg tabs  2. Then increase to 50 mg  in am and 100 mg at HS for 1-2 weeks.   3. Then increase to 100 mg BID. Stay at this dose.     A total of 30 minutes was spent on the patient today, greater than 50% of that time was spent on face to face counseling and care coordination regarding diagnoses and treatment options as mentioned above including the multidisciplinary pain management program and the benefits of physical therapy, pain  psychology and medication options.      CHELSEY Gamez, NP-C  [facility]

## 2020-10-30 NOTE — NURSING NOTE
Prior to immunization administration, verified patients identity using patient s name and date of birth. Please see Immunization Activity for additional information.     Screening Questionnaire for Adult Immunization    Are you sick today?   No   Do you have allergies to medications, food, a vaccine component or latex?   No   Have you ever had a serious reaction after receiving a vaccination?   No   Do you have a long-term health problem with heart, lung, kidney, or metabolic disease (e.g., diabetes), asthma, a blood disorder, no spleen, complement component deficiency, a cochlear implant, or a spinal fluid leak?  Are you on long-term aspirin therapy?   No   Do you have cancer, leukemia, HIV/AIDS, or any other immune system problem?   No   Do you have a parent, brother, or sister with an immune system problem?   No   In the past 3 months, have you taken medications that affect  your immune system, such as prednisone, other steroids, or anticancer drugs; drugs for the treatment of rheumatoid arthritis, Crohn s disease, or psoriasis; or have you had radiation treatments?   No   Have you had a seizure, or a brain or other nervous system problem?   No   During the past year, have you received a transfusion of blood or blood    products, or been given immune (gamma) globulin or antiviral drug?   No   For women: Are you pregnant or is there a chance you could become       pregnant during the next month?   No   Have you received any vaccinations in the past 4 weeks?   No     Immunization questionnaire answers were all negative.      Vaccine information supplied.    Per orders of Dr. Saavedra, injection of TD given by Imelda Ngo. Patient instructed to remain in clinic for 15 minutes afterwards, and to report any adverse reaction to me immediately.       Screening performed by Imelda Ngo on 10/30/2020 at 12:07 PM.

## 2020-11-02 ASSESSMENT — PATIENT HEALTH QUESTIONNAIRE - PHQ9: SUM OF ALL RESPONSES TO PHQ QUESTIONS 1-9: 4

## 2020-12-21 ENCOUNTER — OFFICE VISIT (OUTPATIENT)
Dept: OPTOMETRY | Facility: CLINIC | Age: 65
End: 2020-12-21
Payer: COMMERCIAL

## 2020-12-21 DIAGNOSIS — H04.123 DRY EYE SYNDROME, BILATERAL: ICD-10-CM

## 2020-12-21 DIAGNOSIS — Z01.00 EXAMINATION OF EYES AND VISION: Primary | ICD-10-CM

## 2020-12-21 DIAGNOSIS — H52.4 PRESBYOPIA: ICD-10-CM

## 2020-12-21 DIAGNOSIS — M35.00 SICCA SYNDROME (H): ICD-10-CM

## 2020-12-21 DIAGNOSIS — H52.223 REGULAR ASTIGMATISM OF BOTH EYES: ICD-10-CM

## 2020-12-21 DIAGNOSIS — H10.13 ALLERGIC CONJUNCTIVITIS OF BOTH EYES: ICD-10-CM

## 2020-12-21 DIAGNOSIS — H18.529 ANTERIOR BASEMENT MEMBRANE DYSTROPHY: ICD-10-CM

## 2020-12-21 DIAGNOSIS — H52.13 MYOPIA OF BOTH EYES: ICD-10-CM

## 2020-12-21 PROCEDURE — 92015 DETERMINE REFRACTIVE STATE: CPT | Performed by: OPTOMETRIST

## 2020-12-21 PROCEDURE — 92014 COMPRE OPH EXAM EST PT 1/>: CPT | Performed by: OPTOMETRIST

## 2020-12-21 RX ORDER — AZELASTINE HYDROCHLORIDE 0.5 MG/ML
1 SOLUTION/ DROPS OPHTHALMIC 2 TIMES DAILY
Qty: 1 BOTTLE | Refills: 11 | Status: SHIPPED | OUTPATIENT
Start: 2020-12-21 | End: 2023-07-20

## 2020-12-21 ASSESSMENT — REFRACTION_WEARINGRX
OD_HPRISM: 1.0
OS_CYLINDER: +0.50
OS_CYLINDER: +0.50
OS_SPHERE: -1.75
OD_CYLINDER: +2.00
OS_AXIS: 016
OD_HBASE: OUT
OD_ADD: +2.75
SPECS_TYPE: PAL
OS_ADD: +2.75
OD_HBASE: OUT
OD_HPRISM: 1.0
OD_AXIS: 160
OS_AXIS: 016
OD_SPHERE: -2.25
OD_CYLINDER: +2.00
OS_SPHERE: -1.75
OD_SPHERE: -2.25
OD_AXIS: 160
OS_ADD: +2.75
OD_ADD: +2.75

## 2020-12-21 ASSESSMENT — REFRACTION_MANIFEST
OS_AXIS: 016
OD_ADD: +2.75
OD_AXIS: 160
OS_ADD: +2.75
OS_SPHERE: -1.75
OD_SPHERE: -2.25
OD_CYLINDER: +2.00
OS_CYLINDER: +0.50

## 2020-12-21 ASSESSMENT — REFRACTION_FINALRX
OD_HBASE: OUT
OD_HPRISM: 1.0

## 2020-12-21 ASSESSMENT — VISUAL ACUITY
OS_CC: 20/30
CORRECTION_TYPE: GLASSES
OS_CC: 20/30
OD_SC: 20/40
OD_CC: 20/40
OS_SC: 20/150
METHOD: SNELLEN - LINEAR
OD_CC+: -1
OD_CC: 20/30
OS_CC+: -2

## 2020-12-21 ASSESSMENT — EXTERNAL EXAM - RIGHT EYE: OD_EXAM: NORMAL

## 2020-12-21 ASSESSMENT — CONF VISUAL FIELD
OS_NORMAL: 1
OD_NORMAL: 1

## 2020-12-21 ASSESSMENT — CUP TO DISC RATIO
OS_RATIO: 0.3
OD_RATIO: 0.3

## 2020-12-21 ASSESSMENT — TONOMETRY
IOP_METHOD: TONOPEN
OS_IOP_MMHG: 21
OD_IOP_MMHG: 21

## 2020-12-21 ASSESSMENT — EXTERNAL EXAM - LEFT EYE: OS_EXAM: NORMAL

## 2020-12-21 NOTE — LETTER
12/21/2020         RE: Sharon Fung  [address] [id number]        Dear Colleague,    Thank you for referring your patient, Sharon Fung, to the [facility]. Please see a copy of my visit note below.    Chief Complaint   Patient presents with     Annual Eye Exam      Accompanied by self  Last Eye Exam: 6-  Dilated Previously: Yes    What are you currently using to see?  glasses       Distance Vision Acuity: Satisfied with vision    Near Vision Acuity: Satisfied with vision while reading  with readers    Eye Comfort: good  Do you use eye drops? : Yes: restasis  Occupation or Hobbies: [employer]          Medical, surgical and family histories reviewed and updated 12/21/2020.       OBJECTIVE: See Ophthalmology exam    ASSESSMENT:    ICD-10-CM    1. Examination of eyes and vision  Z01.00 EYE EXAM (SIMPLE-NONBILLABLE)   2. Myopia of both eyes  H52.13 REFRACTION   3. Regular astigmatism of both eyes  H52.223 REFRACTION   4. Presbyopia  H52.4 REFRACTION   5. Sicca syndrome (H)  M35.00 EYE EXAM (SIMPLE-NONBILLABLE)   6. Dry eye syndrome, bilateral  H04.123 EYE EXAM (SIMPLE-NONBILLABLE)   7. Allergic conjunctivitis of both eyes  H10.13 EYE EXAM (SIMPLE-NONBILLABLE)     azelastine (OPTIVAR) 0.05 % ophthalmic solution   8. Anterior basement membrane dystrophy  H18.529 EYE EXAM (SIMPLE-NONBILLABLE)      PLAN:     Patient Instructions   Restasis- 1 drop both eyes 2 x day.    Optivar- 1 drop both eyes 2 x day as needed for itchy eyes.     Non preserved Systane or Refresh- 1 drop both eyes every 2 hours.     Blephadex eyelid wipes- cleanse eyelids 2 x day for 6 weeks then nightly.  These can be purchased on Amazon.com     Shayan heat masks can be purchased at Amazon to be used daily for 10-15 minutes.     Return in 1 year for a complete eye exam or sooner if needed.     Robles Valderrama, OD         Again, thank  you for allowing me to participate in the care of your patient.        Sincerely,        Robles Valderrama OD

## 2020-12-21 NOTE — PROGRESS NOTES
Chief Complaint   Patient presents with     Annual Eye Exam      Accompanied by self  Last Eye Exam: 6-  Dilated Previously: Yes    What are you currently using to see?  glasses       Distance Vision Acuity: Satisfied with vision    Near Vision Acuity: Satisfied with vision while reading  with readers    Eye Comfort: good  Do you use eye drops? : Yes: restasis  Occupation or Hobbies: [employer], [employer]          Medical, surgical and family histories reviewed and updated 12/21/2020.       OBJECTIVE: See Ophthalmology exam    ASSESSMENT:    ICD-10-CM    1. Examination of eyes and vision  Z01.00 EYE EXAM (SIMPLE-NONBILLABLE)   2. Myopia of both eyes  H52.13 REFRACTION   3. Regular astigmatism of both eyes  H52.223 REFRACTION   4. Presbyopia  H52.4 REFRACTION   5. Sicca syndrome (H)  M35.00 EYE EXAM (SIMPLE-NONBILLABLE)   6. Dry eye syndrome, bilateral  H04.123 EYE EXAM (SIMPLE-NONBILLABLE)   7. Allergic conjunctivitis of both eyes  H10.13 EYE EXAM (SIMPLE-NONBILLABLE)     azelastine (OPTIVAR) 0.05 % ophthalmic solution   8. Anterior basement membrane dystrophy  H18.529 EYE EXAM (SIMPLE-NONBILLABLE)      PLAN:     Patient Instructions   Restasis- 1 drop both eyes 2 x day.    Optivar- 1 drop both eyes 2 x day as needed for itchy eyes.     Non preserved Systane or Refresh- 1 drop both eyes every 2 hours.     Blephadex eyelid wipes- cleanse eyelids 2 x day for 6 weeks then nightly.  These can be purchased on Amazon.com     Shayan heat masks can be purchased at Amazon to be used daily for 10-15 minutes.     Return in 1 year for a complete eye exam or sooner if needed.     Robles Valderrama, OD

## 2020-12-21 NOTE — PATIENT INSTRUCTIONS
Restasis- 1 drop both eyes 2 x day.    Optivar- 1 drop both eyes 2 x day as needed for itchy eyes.     Non preserved Systane or Refresh- 1 drop both eyes every 2 hours.     Blephadex eyelid wipes- cleanse eyelids 2 x day for 6 weeks then nightly.  These can be purchased on Amazon.com     Shayan heat masks can be purchased at Amazon to be used daily for 10-15 minutes.     Return in 1 year for a complete eye exam or sooner if needed.     Robles Valderrama, FRANCOISE    The affects of the dilating drops last for 4- 6 hours.  You will be more sensitive to light and vision will be blurry up close.  Do not drive if you do not feel comfortable.  Mydriatic sunglasses were given if needed.    There is a combination of three treatments which can greatly improve symptoms of dry eyes.    1.  Artificial tears  2. Heat (eyes closed)  3. Eyelid and eyelash cleansing (eyes closed)     Use one drop of artificial tears both eyes 4 x daily.  Once in the morning, lunch, dinner and bedtime. Continue to use the drops regardless if your eyes are comfortable or not.  Artificial tears work best as a preventative and not as well after your eyes are starting to bother you.  It may take 4- 6 weeks of using the drops before you notice improvement.  If after that time you are still having problems schedule an appointment for an evaluation and discussion of different treatments such as Restasis or Xiidra.  Dry eyes are a chronic condition and you may have more symptoms at certain times of the year.    Excess tearing can be due to the right tears not working properly or a blockage in the tear drainage system.  You can try using artificial tears 1 drop right eye 4 x day.  If the excess tearing is bothersome after 4-6 weeks of treatment then we can send you for further testing.  This would entail a referral to our oculoplastic specialist Dr. Cortez Robin at the [facility]-[phone].    Recommended brands are:    Systane Complete  Systane  Ultra  Systane Balance  Refresh Advanced Optive  Refresh Relieva  Blink    Recommended brands for contact lens wearers are:    Systane contacts  Refresh contacts  Blink contacts    If you are using drops more than 4 x day or have sensitivities to preservatives I recommend non preserved artificial tears.  These come in 1 use vials.  They can be used every 1-2 hours.  Do not reuse the vials.    Recommended brands are:    Refresh Optive Pietro-3  Systane- preservative free  Refresh-  preservative free  Blink- preservative free    Gels or ointment can be used at night.    Recommended brands are:    Systane Gel  Refresh Gel  Blink Gel  Genteal Gel    Systane night time (ointment)  Refresh Celluvisc  Refresh PM (ointment)      Visine, Clear Eyes or Murine (drops that get the red out) can irritate the eyes and cause a rebound effect where the eyes become more red and you end up using more drops.  Avoid drops containing tetrahydrozoline, naphazoline, phenylephrine, oxymetazoline.      OTC Lumify is a newer product that gives immediate redness relief without the rebound effect.  Use as needed to take the redness out.    Artificial tears may be used with other drops (such as allergy, glaucoma, antibiotics) around the same time.  Be sure to wait 5 minutes in between drops.    Heat to the eyelids can also improve your symptoms of dry eyes.  Shayan heat masks can be purchased at Amazon to be used nightly for 10-15 minutes.  Other options are gel masks that can be put in the microwave and purchased at most pharmacies.      Tea Tree Oil eyelid cleansers recommended are Ocusoft Oust foam cleanser to cleanse eyelids/lashes at night and in the am. Other options are Blephadex or Cliradex eyelid wipes.  KEEP EYES CLOSED when using these products.  These can be purchased on amazon.com   A good product for make up remover with tea tree oil is RupeeTimes.  This can be found at www.AppSheet or ScanÃ¢â‚¬Â¢Jour.    Other good eyelid  cleansers have hypochlorous acid which removes excess bacteria and is safe around the eyes. Products are Avenova, Ocusoft Hypochlor or Heyedrate. Spray solution onto cotton pad, close eyes and gently apply to eyelids and eyelashes using side to side motion.  You can also KEEP EYES CLOSED spray and rub into eyelashes.  You do not need to rinse it off. Use morning and evening. These products can be found on Amazon.  You can check with your local pharmacy and see if they can order if for you if they don't have it.    Other brands of eyelid cleansing wipes are:    Ocusoft wipes  Systane wipes

## 2021-01-28 ENCOUNTER — TELEPHONE (OUTPATIENT)
Dept: RHEUMATOLOGY | Facility: CLINIC | Age: 66
End: 2021-01-28

## 2021-01-28 NOTE — TELEPHONE ENCOUNTER
Contacted the [facility] pharmacy and spoke to a technician Amber Sage, who noted that sulfasalazine and sulfasalazine ER are both on backorder until potentially March. They have a supply for patient for the next  2 months. RN asked that they fill that prescription and we will reassess med shortage when the time comes.    Mukul Rodriguez RN....1/28/2021 2:58 PM

## 2021-01-28 NOTE — TELEPHONE ENCOUNTER
Hello,    Sulfasalazine 500 mg is currently on long term back order.  Is there another medication you could prescribe while we are unable to get the sulfasalazine?     Thank you,  Amber Bains, [facility]  [phone]

## 2021-02-02 DIAGNOSIS — Z79.899 HIGH RISK MEDICATION USE: ICD-10-CM

## 2021-02-02 DIAGNOSIS — M05.9 SEROPOSITIVE RHEUMATOID ARTHRITIS (H): ICD-10-CM

## 2021-02-02 LAB
ALBUMIN SERPL-MCNC: 4 G/DL (ref 3.4–5)
ALP SERPL-CCNC: 115 U/L (ref 40–150)
ALT SERPL W P-5'-P-CCNC: 58 U/L (ref 0–50)
AST SERPL W P-5'-P-CCNC: 36 U/L (ref 0–45)
BASOPHILS # BLD AUTO: 0.1 10E9/L (ref 0–0.2)
BASOPHILS NFR BLD AUTO: 1.4 %
BILIRUB DIRECT SERPL-MCNC: 0.1 MG/DL (ref 0–0.2)
BILIRUB SERPL-MCNC: 0.4 MG/DL (ref 0.2–1.3)
CREAT SERPL-MCNC: 0.65 MG/DL (ref 0.52–1.04)
CRP SERPL-MCNC: <2.9 MG/L (ref 0–8)
DIFFERENTIAL METHOD BLD: ABNORMAL
EOSINOPHIL # BLD AUTO: 0.9 10E9/L (ref 0–0.7)
EOSINOPHIL NFR BLD AUTO: 12.6 %
ERYTHROCYTE [DISTWIDTH] IN BLOOD BY AUTOMATED COUNT: 12.5 % (ref 10–15)
ERYTHROCYTE [SEDIMENTATION RATE] IN BLOOD BY WESTERGREN METHOD: 4 MM/H (ref 0–30)
GFR SERPL CREATININE-BSD FRML MDRD: >90 ML/MIN/{1.73_M2}
GLUCOSE SERPL-MCNC: 74 MG/DL (ref 70–99)
HCT VFR BLD AUTO: 42.8 % (ref 35–47)
HGB BLD-MCNC: 13.4 G/DL (ref 11.7–15.7)
IMM GRANULOCYTES # BLD: 0 10E9/L (ref 0–0.4)
IMM GRANULOCYTES NFR BLD: 0.4 %
LYMPHOCYTES # BLD AUTO: 2.6 10E9/L (ref 0.8–5.3)
LYMPHOCYTES NFR BLD AUTO: 34.8 %
MCH RBC QN AUTO: 30.4 PG (ref 26.5–33)
MCHC RBC AUTO-ENTMCNC: 31.3 G/DL (ref 31.5–36.5)
MCV RBC AUTO: 97 FL (ref 78–100)
MONOCYTES # BLD AUTO: 0.8 10E9/L (ref 0–1.3)
MONOCYTES NFR BLD AUTO: 10.7 %
NEUTROPHILS # BLD AUTO: 3 10E9/L (ref 1.6–8.3)
NEUTROPHILS NFR BLD AUTO: 40.1 %
NRBC # BLD AUTO: 0 10*3/UL
NRBC BLD AUTO-RTO: 0 /100
PLATELET # BLD AUTO: 216 10E9/L (ref 150–450)
PROT SERPL-MCNC: 7.1 G/DL (ref 6.8–8.8)
RBC # BLD AUTO: 4.41 10E12/L (ref 3.8–5.2)
WBC # BLD AUTO: 7.4 10E9/L (ref 4–11)

## 2021-02-02 PROCEDURE — 86140 C-REACTIVE PROTEIN: CPT | Performed by: INTERNAL MEDICINE

## 2021-02-02 PROCEDURE — 85025 COMPLETE CBC W/AUTO DIFF WBC: CPT | Performed by: INTERNAL MEDICINE

## 2021-02-02 PROCEDURE — 36415 COLL VENOUS BLD VENIPUNCTURE: CPT | Performed by: INTERNAL MEDICINE

## 2021-02-02 PROCEDURE — 85652 RBC SED RATE AUTOMATED: CPT | Performed by: INTERNAL MEDICINE

## 2021-02-02 PROCEDURE — 82565 ASSAY OF CREATININE: CPT | Performed by: INTERNAL MEDICINE

## 2021-02-02 PROCEDURE — 82947 ASSAY GLUCOSE BLOOD QUANT: CPT | Performed by: INTERNAL MEDICINE

## 2021-02-02 PROCEDURE — 80076 HEPATIC FUNCTION PANEL: CPT | Performed by: INTERNAL MEDICINE

## 2021-02-02 NOTE — PATIENT INSTRUCTIONS
RHEUMATOLOGY    Dr. Freddy Guerra    [address]  [place] [address]    Our new phone number for Rheumatology is [phone], this number will be able to help you schedule appointments for Dr. Guerra or if you have any message you would like sent to us.    Thank you for choosing [facility]!  Evie Rosas CMA Rheumatology    --------------------------------    [email or web address]

## 2021-02-02 NOTE — PROGRESS NOTES
Sharon Fung  is a 65 year old year old female who is being evaluated via a billable video visit.      How would you like to obtain your AVS? MyChart  If the video visit is dropped, the invitation should be resent by: Text to cell phone: [phone]  Will anyone else be joining your video visit? No     Rheumatology Video Visit      Sharon Fung MRN# [id number]   YOB: 1955 Age: 65 year old      Date of visit: 2/03/21   PCP: Dr. Reynaldo Saavedra    Chief Complaint   Patient presents with:  Arthritis: RA    Assessment and Plan     1. Seropositive nonerosive rheumatoid arthritis (RF negative, CCP low positive): Previously on Humira (lost efficacy), Cimzia (ineffective), Orencia (ineffective), leflunomide (ineffective as monotherapy), hydroxychloroquine (ineffective), Xeljanz (ineffective), MTX (GI upset). Currently on SSZ 1500mg BID, leflunomide 20mg daily, and Kevzara 200mg SQ every 14 days (started 1/2019).   Note that Enbrel was denied by insurance who preferred Kevzara.  Doing okay with regard to rheumatoid arthritis at this time but having some return of symptoms 1-2 days prior to her next Kevzara dose.  Encouraged weight loss.  Chronic illness, stable  - Continue sulfasalazine 1500 mg twice daily   - Continue leflunomide 20mg daily  - Continue prednisone 2.5-5mg daily as needed, with plans to likely only use 1-2 days prior to Kevzara dose as she is slightly more symptomatic at that time (she says that she does not need a refill at this time)  - Continue Kevzara 200mg SQ every 14 days  - Fasting labs in 3 months: CBC, CMP, lipid panel    High risk medication requiring intensive toxicity monitoring at least quarterly: labs ordered include CBC, Creatinine, Hepatic panel to monitor for cytopenia and hepatotoxicity; checking creatinine as it affects clearance of medication.                Rapid 3, cumulative scores                      10/14/2020 doing well (SSZ 1500mg BID, leflunomide 20mg daily,  Kevzara 200mg q14 days)                      08/28/2019: 3.2   (SSZ 1500mg BID, Kevzara)  Now on gabapentin                      04/17/2019: 15    (SSZ 1500mg BID, Kevzara)  Mostly fibromyalgia symptoms                      12/19/2018:         (MTX 20mg SQ wkly, Xeljanz XR 11mg daily, SSZ 1500mg BID)                          05/02/2018:  9     (MTX 20mg SQ wkly, Xeljanz XR 11mg daily, SSZ 1000mg BID)                          01/31/2018: 22.3 (MTX 20mg SQ wkly, Xeljanz XR 11mg daily)                          11/29/2017: 16.8 (MTX 20mg SQ wkly)                      08/02/2017: 4.2   (MTX 20mg SQ wkly, Xeljanz XR 11mg daily)                         05/04/2017: 7      (MTX 20mg SQ wkly, Xeljanz XR 11mg daily)                        02/02/2017: 8      (MTX 20mg SQ wkly, Xeljanz XR 11mg daily)                      11/04/2016: 13    (MTX 20mg SQ weekly)                      07/15/2016: 10.8    2. Positive LORNA and dsDNA / Secondary Sjogren's Syndrome: Repeat dsDNAs have been negative. Has dry eyes but no dry mouth.  Dry eyes are treated by ophthalmology with Restasis.  Not discussed today.    3. Morbid obesity: weight loss would be beneficial for general health and RA.  Chronic illness.    4. Bilateral patellofemoral syndrome: home exercises have been helpful.  Weight loss encouraged.  Chronic illness, stable    5. Fibromyalgia: improved on gabapentin and followed in the pain management clinic for this issue.     6.  Bone Health, vitamin D deficiency: normal 12/20/2019 DEXA; plan to repeat in 3-5 years but this may change depending on prednisone exposure.  VItamin D was low so now on vitamin D 2000 IU daily  - Continue vitamin D 2000 IU daily    7. Chronic lower back pain with left sided sciatica: suspect related to degenerative changes seen on L-spine MRI.  Following with the pain clinic for this issue    8.  Shoulder pain: Mild and intermittent.  Occurs with over the head activity on exam today.  Suspect mild impingement  syndrome and advised physical therapy exercises, directing her to the handout available online from the [employer].    #  Instructed that if confirmed to have COVID-19 or exposure to someone with confirmed COVID-19 to call this clinic for directions on DMARD management.    # This is a virtual visit to reduce the risk of COVID-19 exposure during this current pandemic.      # Considered to be at high risk of complications from the COVID-19 virus.  It is recommended to limit contact with other people and if possible to work remotely or provide a leave of absence to reduce the risk for COVID-19.      Total minutes spent in evaluation with patient, documentation, and review of pertinent lab tests, imaging studies, and chart notes: 24     Ms. Fung verbalized agreement with and understanding of the rational for the diagnosis and treatment plan.  All questions were answered to best of my ability and the patient's satisfaction. Ms. Fung was advised to contact the clinic with any questions that may arise after the clinic visit.      Thank you for involving me in the care of the patient    Return to clinic: 3-4 months    HPI   Sharon Fung is a 65 year old female with medical history significant for GERD, allergic rhinitis, obstructive sleep apnea, obesity, hx of trigger fingers, hx of carpal tunnel surgery, and rheumatoid arthritis who presents for follow-up of rheumatoid arthritis.    Today, 2/3/2021: mild ache and stiffness that is worse in the AM and improves with hot shower and better after about 1 hour.  Also some ache in the shoulder that is on and off, and she does not find that it is activity or time of day related.  Has received both Pfizer COVID-19 vaccines.  Received another tetanus shot as well.  Overall happy with how she is doing and states that she is tolerating medications well.      No fevers or chills. No nausea, vomiting, constipation, diarrhea. No chest  pain/pressure, palpitations, or shortness of breath. No oral or nasal sores. No neck pain. No rash.      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 [facility]    ROS   12 point review of system was completed and negative except as noted in the HPI     Active Problem List     Patient Active Problem List   Diagnosis     AR (allergic rhinitis)     GERD (gastroesophageal reflux disease)     Status post bariatric surgery     Mild major depression (H)     Advanced directives, counseling/discussion     High risk medication use     Obstructive sleep apnea     Obesity, Class III, BMI 40-49.9 (morbid obesity) (H)     Anterior basement membrane dystrophy     Seropositive rheumatoid arthritis (H)     LORNA positive     Carpal tunnel syndrome of right wrist     Headache     Immunosuppression (H)     Fibromyalgia     Past Medical History     Past Medical History:   Diagnosis Date     AR (allergic rhinitis)  as teen     Arthritis 12/14/2015     Chronic obstructive pulmonary disease, unspecified COPD type (H) 3/27/2018     Depressive disorder 3 years ago     GERD (gastroesophageal reflux disease) 4-07     Headache 7/11/2017     Mild major depression (H) 3 years ago     Morbid obesity (H) teens     BRETT (obstructive sleep apnea)     uses CPAP     Rheumatoid arthritis, adult (H)      Past Surgical History     Past Surgical History:   Procedure Laterality Date     BLEPHAROPLASTY BILATERAL Bilateral 8/5/2016    Procedure: BLEPHAROPLASTY BILATERAL;  Surgeon: Cortez Robin MD;  Location:  [facility]     BREAST BIOPSY, RT/LT  1-94    Benign     C APPENDECTOMY  1977     COLONOSCOPY       COLONOSCOPY WITH CO2 INSUFFLATION N/A 3/30/2017    Procedure: COLONOSCOPY WITH CO2 INSUFFLATION;  Surgeon: Issa Weeks MD;  Location:  OR     ESOPHAGOSCOPY, GASTROSCOPY, DUODENOSCOPY (EGD), COMBINED N/A 10/29/2015    Procedure: COMBINED ESOPHAGOSCOPY, GASTROSCOPY, DUODENOSCOPY (EGD);   Surgeon: Shaq Mims MD;  Location: [facility]     LAPAROSCOPIC GASTRIC ADJUSTABLE BANDING  11/09/10    Lap band procedure     LAPAROSCOPIC REMOVAL GASTRIC ADJUSTABLE BAND N/A 10/31/2015    Procedure: LAPAROSCOPIC REMOVAL GASTRIC ADJUSTABLE BAND;  Surgeon: Shaq Mims MD;  Location: [facility]     RELEASE CARPAL TUNNEL Left 4/27/2017    Procedure: RELEASE CARPAL TUNNEL;  Left Open Carpal Tunnel Release;  Surgeon: Ciara Middleton MD;  Location: [facility]     RELEASE CARPAL TUNNEL Right 6/15/2017    Procedure: RELEASE CARPAL TUNNEL;  Right Carpal Tunnel Release Open;  Surgeon: Ciara Middleton MD;  Location: [facility]     REPAIR PTOSIS       TUBAL LIGATION  1988     Allergy   No Known Allergies  Current Medication List     Current Outpatient Medications   Medication Sig     acetaminophen (TYLENOL) 500 MG tablet Take 500-1,000 mg by mouth every 6 hours as needed for mild pain     albuterol (PROAIR HFA/PROVENTIL HFA/VENTOLIN HFA) 108 (90 Base) MCG/ACT inhaler Inhale 2 puffs into the lungs every 6 hours as needed for shortness of breath / dyspnea or wheezing     azelastine (OPTIVAR) 0.05 % ophthalmic solution Apply 1 drop to eye 2 times daily     citalopram (CELEXA) 20 MG tablet Take 1 tablet (20 mg) by mouth daily     cycloSPORINE (RESTASIS) 0.05 % ophthalmic emulsion Place 1 drop into both eyes 2 times daily     fexofenadine (ALLEGRA) 180 MG tablet Take 1 tablet (180 mg) by mouth daily     fluticasone (FLONASE) 50 MCG/ACT nasal spray Spray 2 sprays into both nostrils as needed     gabapentin (NEURONTIN) 100 MG capsule TAKE ONE CAPSULE BY MOUTH EVERY MORNING, ONE CAPSULE BY MOUTH AT MIDDAY, AND TAKE FOUR CAPSULES BY MOUTH EVERY NIGHT AT BEDTIME     ibuprofen 200 MG capsule Take 600-800 mg by mouth At Bedtime     ketorolac (TORADOL) 10 MG tablet Take 1 tablet (10 mg) by mouth every 6 hours as needed for moderate pain or pain     leflunomide (ARAVA) 20 MG tablet Take 1 tablet (20 mg) by mouth daily      naproxen sodium 220 MG capsule Take 220 mg by mouth 2 times daily (with meals)     ondansetron (ZOFRAN-ODT) 8 MG ODT tab Take 1 tablet (8 mg) by mouth every 8 hours as needed for nausea     Sarilumab 200 MG/1.14ML SOAJ Inject 1.14 mLs (200 mg) Subcutaneous every 14 days . Hold for signs of infection and seek medical attention.     sulfaSALAzine (AZULFIDINE) 500 MG tablet Take 3 tablets (1,500 mg) by mouth 2 times daily     topiramate (TOPAMAX) 25 MG tablet Take 1 tab at HS for 7 days. Increase by 1 tab daily every 7 days to max dose of 2 tabs BID. Stay at this dose.     topiramate (TOPAMAX) 50 MG tablet Take 1 tablet (50 mg) by mouth 2 times daily 90 days supply     vitamin D3 (CHOLECALCIFEROL) 50 mcg (2000 units) tablet Take 1 tablet (50 mcg) by mouth daily     ORDER FOR DME Use your CPAP device as directed by your provider.     No current facility-administered medications for this visit.      Facility-Administered Medications Ordered in Other Visits   Medication     sodium chloride (PF) 0.9% PF flush 10 mL     sodium chloride bacteriostatic 0.9 % flush 12 mL         Social History   See HPI    Family History     Family History   Problem Relation Age of Onset     Heart Disease Father         MI     Neurologic Disorder Father 79        Parkinson's     Diabetes Father      Hypertension Father      Coronary Artery Disease Father      Cancer Maternal Grandmother         uterine     Neurologic Disorder Sister 16        migraine     Depression Sister      Neurologic Disorder Mother 20        migraine     Depression Mother      Neurologic Disorder Son 7        migraine     Substance Abuse Son      Depression Sister      Substance Abuse Sister        Physical Exam     No vitals taken today because this is a virtual visit    GEN: NAD. Healthy appearing adult.   HEENT: MMM.  Anicteric, noninjected sclera. No obvious external lesions of the ear and nose. Hearing intact.  PULM: No increased work of breathing  MSK:  Hands  and wrists without swelling. Elbows without swelling. Shoulders with normal ROM; mild ache with over the head activity   SKIN: No rash or jaundice seen  PSYCH: Alert. Appropriate.        Labs / Imaging (select studies)     RF/CCP  Recent Labs   Lab Test 11/19/12  0900   CCPABY 53*   RHF <7     CBC  Recent Labs   Lab Test 02/02/21  1056 10/12/20  1113 06/29/20  0742   WBC 7.4 6.1 6.2   RBC 4.41 4.18 4.20   HGB 13.4 12.7 12.9   HCT 42.8 39.7 40.4   MCV 97 95 96   RDW 12.5 12.9 12.9    221 203   MCH 30.4 30.4 30.7   MCHC 31.3* 32.0 31.9   NEUTROPHIL 40.1 40.8 42.9   LYMPH 34.8 33.4 28.1   MONOCYTE 10.7 8.9 8.7   EOSINOPHIL 12.6 15.6 19.2   BASOPHIL 1.4 1.0 0.8   ANEU 3.0 2.5 2.7   ALYM 2.6 2.0 1.7   KIMBERLY 0.8 0.5 0.5   AEOS 0.9* 1.0* 1.2*   ABAS 0.1 0.1 0.1     CMP  Recent Labs   Lab Test 02/02/21  1056 10/12/20  1113 06/29/20  0742 11/05/19  1745 11/05/19  1745 10/08/19  0906 03/26/19  0925 03/26/19  0925   NA  --   --   --   --  138 141  --  139   POTASSIUM  --   --   --   --  3.8 4.2  --  4.0   CHLORIDE  --   --   --   --  103 105  --  107   CO2  --   --   --   --  28 28  --  24   ANIONGAP  --   --   --   --  7 8  --  8   GLC 74  --   --   --  87 86  --  71   BUN  --   --   --   --  13 14  --  18   CR 0.65 0.74 0.79   < > 0.74 0.66   < > 0.76   GFRESTIMATED >90 87 80   < > 86 >90   < > 85   GFRESTBLACK >90 >90 >90   < > >90 >90   < > >90   ISH  --   --   --   --  9.1 9.0  --  8.9   BILITOTAL 0.4 0.5 0.4   < > 0.5 0.4   < > 0.3   ALBUMIN 4.0 3.8 3.7   < > 4.1 4.0   < > 3.9   PROTTOTAL 7.1 6.9 6.8   < > 7.5 7.1   < > 7.5   ALKPHOS 115 107 142   < > 96 96   < > 113   AST 36 23 25   < > 23 28   < > 28   ALT 58* 42 45   < > 45 41   < > 50    < > = values in this interval not displayed.     Calcium/VitaminD  Recent Labs   Lab Test 10/12/20  1113 03/31/20  0755 11/05/19  1745 10/08/19  0906 03/26/19  0925 02/20/15  0750 02/20/15  0750   ISH  --   --  9.1 9.0 8.9   < > 9.0   VITDT 33 15*  --   --   --   --  39    < >  = values in this interval not displayed.     ESR/CRP  Recent Labs   Lab Test 02/02/21  1056 10/12/20  1113 06/29/20  0742   SED 4 4 4   CRP <2.9 <2.9 <2.9     Lipid Panel  Recent Labs   Lab Test 03/31/20  0755 04/27/17  0732 12/27/16  0836 02/20/15  0750 10/20/14  0821 11/22/13  0843   CHOL 224* 204* 184 157 194 167   TRIG 171* 148 154* 79 193* 140   HDL 73 82 56 63 63 60   * 92 97 78 92 79   VLDL  --   --   --  16 39* 28   CHOLHDLRATIO  --   --   --  2.5 3.1 2.8   NHDL 151* 122 128  --   --   --      Hepatitis B  Recent Labs   Lab Test 12/08/15  0855 03/06/15  1650   HBCAB Nonreactive  --    HEPBANG Nonreactive Nonreactive     Hepatitis C  Recent Labs   Lab Test 12/08/15  0855 03/06/15  1650 11/19/12  0900   HCVAB Nonreactive   Assay performance characteristics have not been established for newborns,   infants, and children   Nonreactive   Assay performance characteristics have not been established for newborns,   infants, and children   Negative       Tuberculosis Screening  Recent Labs   Lab Test 10/31/17  1400 02/02/17  0822 12/08/15  0855   TBRSLT Negative Negative Negative   TBAGN 0.05 0.00 0.01     HIV Screening  Recent Labs   Lab Test 07/24/18  0738   HIAGAB Nonreactive       Immunization History     Immunization History   Administered Date(s) Administered     Influenza Vaccine IM > 6 months Valent IIV4 10/15/2013, 09/15/2014, 09/28/2015, 09/28/2016, 10/16/2017, 10/01/2018     Influenza Vaccine Im 4yrs+ 4 Valent CCIIV4 10/15/2019     Pneumo Conj 13-V (2010&after) 04/15/2016     Pneumococcal 23 valent 07/15/2016     TD (ADULT, 7+) 10/30/2020     TDAP Vaccine (Adacel) 02/09/2011     Zoster vaccine recombinant adjuvanted (SHINGRIX) 04/17/2019, 08/28/2019          Chart documentation done in part with Dragon Voice recognition Software. Although reviewed after completion, some word and grammatical error may remain.       Video-Visit Details    Type of service:  Video Visit    Video Start Time: 7:40  AM    Video End Time:7:53 AM    Originating Location (pt. Location): [place], [place]    Distant Location (provider location):  Home    Platform used for Video Visit: Pao Guerra MD

## 2021-02-03 ENCOUNTER — VIRTUAL VISIT (OUTPATIENT)
Dept: RHEUMATOLOGY | Facility: CLINIC | Age: 66
End: 2021-02-03
Payer: COMMERCIAL

## 2021-02-03 DIAGNOSIS — Z79.899 HIGH RISK MEDICATION USE: ICD-10-CM

## 2021-02-03 DIAGNOSIS — E66.01 MORBID OBESITY (H): ICD-10-CM

## 2021-02-03 DIAGNOSIS — E55.9 VITAMIN D DEFICIENCY: ICD-10-CM

## 2021-02-03 DIAGNOSIS — M05.9 SEROPOSITIVE RHEUMATOID ARTHRITIS (H): Primary | ICD-10-CM

## 2021-02-03 DIAGNOSIS — M25.519 SHOULDER PAIN, UNSPECIFIED CHRONICITY, UNSPECIFIED LATERALITY: ICD-10-CM

## 2021-02-03 PROCEDURE — 99214 OFFICE O/P EST MOD 30 MIN: CPT | Mod: 95 | Performed by: INTERNAL MEDICINE

## 2021-02-03 RX ORDER — CHOLECALCIFEROL (VITAMIN D3) 50 MCG
1 TABLET ORAL DAILY
Qty: 90 TABLET | Refills: 3 | Status: SHIPPED | OUTPATIENT
Start: 2021-02-03 | End: 2021-11-30

## 2021-02-04 DIAGNOSIS — M54.16 LUMBAR RADICULOPATHY: ICD-10-CM

## 2021-02-06 RX ORDER — GABAPENTIN 100 MG/1
CAPSULE ORAL
Qty: 180 CAPSULE | Refills: 5 | OUTPATIENT
Start: 2021-02-06

## 2021-02-07 ENCOUNTER — MYC MEDICAL ADVICE (OUTPATIENT)
Dept: FAMILY MEDICINE | Facility: CLINIC | Age: 66
End: 2021-02-07

## 2021-02-07 DIAGNOSIS — M54.16 LUMBAR RADICULOPATHY: ICD-10-CM

## 2021-02-08 RX ORDER — GABAPENTIN 100 MG/1
CAPSULE ORAL
Qty: 180 CAPSULE | Refills: 5 | Status: SHIPPED | OUTPATIENT
Start: 2021-02-08 | End: 2021-08-07

## 2021-02-12 ENCOUNTER — VIRTUAL VISIT (OUTPATIENT)
Dept: PALLIATIVE MEDICINE | Facility: CLINIC | Age: 66
End: 2021-02-12
Payer: COMMERCIAL

## 2021-02-12 DIAGNOSIS — M54.16 LUMBAR RADICULOPATHY: ICD-10-CM

## 2021-02-12 DIAGNOSIS — M47.816 LUMBAR FACET ARTHROPATHY: ICD-10-CM

## 2021-02-12 DIAGNOSIS — M05.9 SEROPOSITIVE RHEUMATOID ARTHRITIS (H): ICD-10-CM

## 2021-02-12 DIAGNOSIS — M53.3 SI (SACROILIAC) JOINT DYSFUNCTION: Primary | ICD-10-CM

## 2021-02-12 DIAGNOSIS — E66.01 MORBID OBESITY (H): ICD-10-CM

## 2021-02-12 DIAGNOSIS — M79.7 FIBROMYALGIA: ICD-10-CM

## 2021-02-12 PROCEDURE — 99214 OFFICE O/P EST MOD 30 MIN: CPT | Mod: 95 | Performed by: NURSE PRACTITIONER

## 2021-02-12 ASSESSMENT — PAIN SCALES - GENERAL: PAINLEVEL: EXTREME PAIN (8)

## 2021-02-12 NOTE — PROGRESS NOTES
[facility]      Sharon Fung is a 65 year old female who is being evaluated via a billable virtual visit.      VIDEO VISIT   How would you like to obtain your AVS? [facility]  If you are dropped from the video visit, the video invite should be resent to: [facility] waiting room  Will anyone else be joining your video visit? No  If patient encounters technical issues they should call [phone]    Genesis Martinez CMA (AAMA)      Video-Visit Details  Type of service:  Video Visit  Video Start Time: 9:33 AM  Video End Time: 10:00 AM  Originating Location (pt. Location): Other Work, in private office.   Distant Location (provider location):  [facility]   Platform used for Video Visit: [facility]        CHIEF COMPLAINT: Chronic pain     INTERVAL HISTORY:  Last seen on 10/30/20.        Recommendations/plan at the last visit included:  1. Schedule physical therapy assessment and visits as recommended by your physical therapist. AZEB will call you to schedule  2. Schedule VIDEO follow-up with CHELSEY Alex NP-C in 4 weeks PRIOR to any refills that will be due at that time.   3. Medication recommendations:             1. Topiramate 50 m. Take 50 mg in am and 75 mg at HS until out of 25 mg tabs  2. Then increase to 50 mg  in am and 100 mg at HS for 1-2 weeks.   3. Then increase to 100 mg BID. Stay at this dose.     Since last visit:   - Needs a low back and SI joint injection . Left SI joint radiating past thigh.   - Tried topiramate but was too fatiguing.   - Didn't try PT due to scheduling. Has been going to gym and has appt with weight loss clinic     Pain Information:   Pain rating: intensity ranges from 3/10 to 10/10, and averages 7/10 on a 0-10 scale.    Annual requirements last collected: N/A     CURRENT RELEVANT PAIN MEDICATIONS:   OTC pain medications per package instructions PRN  Gabapentin 100 mg in am/midday & 400 mg at HS     Patient is using the  medication as prescribed:  YES  Is your medication helpful?     NO   Medication side effects? denies any problems     Previous Pain Relevant Medications: (H--helped; HI--Helped initially; SWH--Somewhat helpful; NH--No help; W--worse; SE--side effects; ?--Unsure if helpful)   NOTE: This medication information taken from patient's intake form, not medical records.               Opiates: None               NSAIDS: Ibuprofen: H, ketorolac: H, naproxen: H              Muscle Relaxants: None noted              Anti-migraine mediations: None noted              Anti-depressants: None              Sleep aids: None              Anxiolytics: None              Neuropathics: Topiramate: H                  Topicals: None              Other medications not covered above: Tylenol: H, prednisone: H     Any illicit drug use: none  EtOH use: rare   Caffeine use: 3-4 per day   Nicotine use: none   Any use of prescriptions other than how they were prescribed:none         THE 4 As OF OPIOID MAINTENANCE ANALGESIA    Analgesia: Is pain relief clinically significant? N/A   Activity: Is patient functional and able to perform Activities of Daily Living? N/A   Adverse effects: Is patient free from adverse side effects from opiates? N/A   Adherence to Rx protocol: Is patient adhering to Controlled Substance Agreement and taking medications ONLY as ordered? N/A     Is Narcan prescribed for opiate use >50 MME daily? N/A     [place] Board of Pharmacy Data Base Reviewed:    YES; No concern for abuse or misuse of controlled medications based on this report.      Past Pain Treatments:              Pain Clinic:   No               PT: No               Psychologist: No             Relaxation techniques/biofeedback: No             Chiropractor: No             Acupuncture: No             Pharmacotherapy:                         Opioids: No                          Non-opioids:  Yes              TENs Unit:No             Injections:  Yes:               Date?  For right bunion: H               Aug 2020:  L5-S1 interlaminar steroid injection: NH              10/13/2020: Left SI joint steroid injection: H             Self-care:   Yes              Surgeries related to pain: No    Is Narcan prescribed for opiate use >50 MME daily or concurrent use of opiates and benzodiazepines? N/A    [place] Board of Pharmacy Data Base Reviewed:    YES; As expected, no concern for misuse/abuse of controlled medications based on this report. Reviewed [facility] February 11, 2021- no concerning fills.    _______________________________________________      Physical Exam unable to be completed due to virtual visit. There were no vitals taken for this visit or reported by patient.     Review of Systems: A 10-point review of systems was negative, with the exception of any concerns as noted and chronic pain issues.      General: Verbal, alert and no distress   Psychiatric:  affect and mood normal, mentation appears normal    Cervical spine: (assessed at 10/30/20 appt)              Flex:  20 degrees, pain free             Ext: 10 degrees, pain free             Rotation to right: 20 degrees, pain free             Rotation to left: 20 degrees, pain free             Tenderness in the cervical spine at midline. Yes             Tenderness in the cervical paraspinal muscles. Yes  Thoracic spine:              Kyphosis. Yes             Tenderness in the thoracic spine at midline. No             Tenderness in the thoracic paraspinal muscles. No  Lumbar/Sacral spine:             Forward Flexion:  90 degrees, painful              Ext: 20 degrees, painful              Rotation/ext to right: painful              Rotation/ext to left: painful              Lordosis. Yes             Tenderness in the lumbar spine at midline. No             Tenderness in the lumbar paraspinal muscles.No      DIRE Score for ongoing opioid management is calculated as follows:    Diagnosis = 2 pts (slowly  progressive; moderate pain/objective findings)    Intractability = 1 pt (few therapies tried; passive patient role)    Risk        Psych = 3 pts (no significant personality dysfunction/mental illness; good communication with clinic)         Chem Hlth = 3 pts (no history of chemical dependency; not drug-focused)       Reliability = 3 pts (highly reliable with meds, appointments, treatments)       Social = 3 pts (supportive family/close relationships; involved in work/school; no isolation)       (Psych + Chem hlth + Reliability + Social) = 15    Efficacy = 2 pts (too early/not tried meds)    DIRE Score = 17        7-13: likely NOT suitable candidate for long-term opioid analgesia       14-21: may be a suitable candidate for long-term opioid analgesia        DIAGNOSTIC TESTS:  Imaging Studies:    MRI of the Cervical Spine without contrast 7/23/2014  History: episodic leg weakness,Other musculoskeletal symptoms  referable to limbs(729.89)  Comparison: None.   Technique: Sagittal T1-weighted, T2-weighted and sagittal and axial  T2* gradient echo images were obtained of the cervical spine without  intravenous contrast.  Reconstructed MR myelographic images were also  obtained.  Findings:  There is straightening of the normal cervical lordosis. Minimal  retrolisthesis of C5 on C6. There is no evidence for fracture or  hemorrhage in the cervical spine. Normal signal within the bone  marrow. Mild narrowing of the intervertebral disc space at C5-C6.  Multilevel disc desiccation. There is normal signal within the  cervical spinal cord which also demonstrates a normal contour. No  evidence of definite restricted diffusion within the cervical spinal  cord. Dominant right vertebral artery.  The findings on a level by level basis are as follows:  C2-3: No spinal canal or neural foraminal narrowing.  C3-4:  No spinal canal or neural foraminal narrowing.  C4-5:  Small posterior disc bulge. No significant spinal canal or  neural  foraminal narrowing. No spinal canal or neural foraminal  narrowing.  C5-6:  Small posterior annular disc bulge which indents the ventral  spinal cord resulting in mild to moderate spinal canal narrowing. No  significant neural foraminal narrowing.  C6-7:  Small posterior annular disc bulge with mild spinal canal  narrowing. No significant neural foraminal narrowing.  C7-T1:  No spinal canal or neural foraminal narrowing.  No abnormality of the paraspinal soft tissues.  IMPRESSION:   Multilevel degenerative changes throughout the cervical spine,  greatest at C5-C6 with a posterior disc bulge indenting the ventral  spinal cord resulting in mild to moderate spinal canal narrowing.           MR LUMBAR SPINE W/O CONTRAST 5/7/2019 3:43 PM  Provided History: Back pain, > 6wks conservative tx, persistent sx;  See MRI from 2014, facet arth, DDD; Lumbar radiculopathy; Lumbar facet  arthropathy; DDD (degenerative disc disease), lumbar  ICD-10: Lumbar radiculopathy; Lumbar facet arthropathy; DDD  (degenerative disc disease), lumbar  Comparison: MRI lumbar spine 5/13/2014  Technique: Sagittal T1-weighted, sagittal STIR, 3D volumetric axial  and sagittal reconstructed T2-weighted images of the lumbar spine were  obtained without intravenous contrast.   Findings: Regarding numbering convention, there are 5 lumbar-type  vertebrae assumed for the purposes of this dictation. There is  straightening of the normal lumbar spondylosis. The tip of the conus  medullaris is at L1.  Regarding alignment, the lumbar vertebral column  appears normally aligned.  There is multilevel disc height narrowing ,  most pronounced at L5-S1 with mild disc height loss.  Regarding bone  marrow signal intensity, no abnormality is visualized on STIR images.  On a level by level basis:  T12-L1: Facet arthropathy and ligamentum flavum hypertrophy. Mild  right neural foraminal stenosis. Left neural foramen is patent. Spinal  canal is patent.  L1-2: No spinal  canal or neural foraminal stenosis  L2-3: Facet arthropathy bilaterally. Ligamentum flavum hypertrophy.  Small posterior disc bulge. Mild spinal canal stenosis. No neural  foraminal stenosis.  L3-4: Facet arthropathy bilaterally. Ligamentum flavum hypertrophy.  Mild spinal canal stenosis. Mild left neural foraminal stenosis. Right  neural foramen is patent.   L4-5: Facet arthropathy bilaterally. Posterior disc bulge. Ligamentum  flavum hypertrophy. Mild neural foraminal stenosis bilaterally. Mild  spinal canal stenosis..  L5-S1: Facet arthropathy bilaterally. Central disc protrusion  approaches without abutting the left traversing S1 nerve root.. Mild  neural foraminal stenosis bilaterally. Spinal canal is patent.  Paraspinous tissues are within normal limits.  Impression:  1. Multilevel lumbar spondylosis, most pronounced at L4-5 with mild  neural foraminal stenosis bilaterally. There is minimal spinal canal  stenosis at L2-3 and L3-4.  2. Central disc region at L5-S1 approaches the traversing left S1  nerve root without abutment.  3. No significant change since 5/13/2014.           ASSESSMENT:   1.  Rheumatoid Arthritis affecting multidisciplinary joint             1. Bilateral knee pain, OA vs RA             2. Bilateral hand pain and swelling             3. Bilateral hip pain              4. Bilateral ankle pain   2.  Morbid obesity s/p bariatric surgery   3.  Fibromyalgia   4.  Depression  5.  Cervical spine: DDD, mild stenosis  6.  Lumbar spine: DDD, facet arthropathy, right S1 nerve involvement, stenosis     Sharon presents with SI joint pain flare. Previously SI joint injections have provided good relief. Injections ordered. Will follow up and consider other treatment options if these are not adequate. Toradol ordered for PRN severe pain. Reviewed safe use of this medication related to kidney health.     Working with weight loss clinic and increasing exercise to strengthen and support her back. Extensive  discussion on food addiction and family of origin issues. Advised looking into Adult Children of Alcoholics.       Plan:    Diagnosis reviewed, treatment option addressed, and risk/benifits discussed.  Self-care instructions given.  I am recommending a multidisciplinary treatment plan to help this patient better manage pain.        1. Schedule VIDEO follow-up with CHELSEY Alex, NPWilyC in 1-2 months or sooner as needed.   2. SI joint injections ordered.   3. Look into Adult Children of Alcoholics: Yellow Workbook and Big Red Book are good resources. Books about women and food addiction issues by Neeru Dominguez.   4. Medication recommendations:   1. Toradol 10 mg 1 tablet PRN severe pain. Stay well hydrated and eat before taking this medication.     I have reviewed the note as documented above.  This accurately captures the substance of my conversation with the patient.    BILLING TIME DOCUMENTATION:   TOTAL TIME includes:   Time spent preparing to see the patient: 4 minutes (reviewing records and tests)  Time spend face to face with the patient: 27 minutes  Time spent ordering tests, medications, procedures and referrals: 0 minutes  Time spent Referring and communicating with other healthcare professionals: 0 minutes  Documenting clinical information in Epic: 0 minutes    The total TIME spent on this patient on the day of the appointment was 31 minutes.     CHELSEY Gamez, NP-C  [facility] [place]

## 2021-02-12 NOTE — PROGRESS NOTES
Duplicate note created by Epic. Disregard.     CHELSEY Gamez, NP-C  [facility] [place]

## 2021-02-12 NOTE — PROGRESS NOTES
Duplicate note created by Epic. Disregard.     CHELSEY Gamez, NP-C  [facility] [place]

## 2021-02-12 NOTE — PATIENT INSTRUCTIONS
After Visit Instructions:     Thank you for coming to [facility] [place] for your care. It is my goal to partner with you to help you reach your optimal state of health.     Continue daily self-care, identifying contributing factors, and monitoring variations in pain level. Continue to integrate self-care into your life.      1. Schedule VIDEO follow-up with CHELSEY Alex NP-C in 1-2 months or sooner as needed.   2. Look into Adult Children of Alcoholics: Yellow Workbook and Big Red Book are good resources. Books about women and food addiction issues by Neeru Dominguez.   3. Medication recommendations:   1. Toradol 10 mg 1 tablet PRN severe pain. Stay well hydrated and eat before taking this medication.       CHELSEY Gamez NP-C  [facility]    Clinic Number:  [phone]     Call with any questions about your care and for scheduling assistance.     Calls are returned Monday through Friday between 8 AM and 4:30 PM. We usually get back to you within 2 business days depending on the issue/request.    If we are prescribing your medications:    For opioid medication refills, call the clinic or send a Goji message 7 days in advance.  Please include:    Name of requested medication    Name of the pharmacy.    For non-opioid medications, call your pharmacy directly to request a refill. Please allow 3-4 days to be processed.     Per [place] State Law:    All controlled substance prescriptions must be filled within 30 days of being written.      For those controlled substances allowing refills, pickup must occur within 30 days of last fill.      We believe regular attendance is key to your success in our program!      Any time you are unable to keep your appointment we ask that you call us at least 24 hours in advance to cancel.This will allow us to offer the appointment time to another patient.   Multiple missed appointments may lead to dismissal from  the clinic.

## 2021-02-14 NOTE — PROGRESS NOTES
Video-Visit Details    Type of service:  Video Visit    Video start time: 11:20 am  Video End Time:11:40 am    Originating Location (pt. Location): Home    Distant Location (provider location):  [facility] [place]     Platform used for Video Visit: [employer]      Assessment and Plan:   Obesity, Class III, BMI 40-49.9 (morbid obesity) (H)  Minimal response to Strattera  No response to low dose phentermine/topiramate  No response to Contrave  Now off meds    PLAN:   Start an eating plan by discussing with dietitian  We discussed some options today for meal planning and using some meal replacements  Avoid fast food    Health coaching visits.     Will consider saxenda in one month if she is still struggling.       35 minutes spent on the date of the encounter doing chart review, history and exam, documentation and further activities as noted above         Return in about 4 weeks (around 3/15/2021).    Litzy Vallejo MD  [facility] [place]    Subjective     Sharon Fung is a 65 year old female who presents today for the following health issues: obesity    April 2018 I started her on phentermine/topiramate (QSYMIA). It was rejected by insurance on the basis of having to try and fail Contrave first. She ended up trying generic forms of phentermine and topiramate (low doses) but did not get any weight loss results so she stopped them.     January 2019 I started her on Contrave. She did not have any response. I then restarted her onTopiramate 75 mg at bedtime.     Interval History:   Since last visit May 2019.   Her weight has been stuck in the 260 range.     She has been on and off Prednisone for RA.     Exercise: has not been on a routine. she will be joining the [employer]     Diet: no specific plan. Struggling with her diet. Limited by long hours at work. Fast food for dinner.     Craves sweets.     Weight Tracking:   Initial consult weight was 240 on  "8/29/16.  Weight change since last seen on 5/13/2019 is down 6 lbs  Total gain is 20 pounds.      Review of Systems  Constitutional, HEENT, cardiovascular, pulmonary, gi and gu systems are negative, except as otherwise noted.    Objective   Ht 1.613 m (5' 3.5\")   Wt 117.9 kg (260 lb)   BMI 45.33 kg/m    Body mass index is 45.33 kg/m .    Physical Exam  GENERAL: Healthy, alert and no distress  EYES: Eyes grossly normal to inspection.  No discharge or erythema, or obvious scleral/conjunctival abnormalities.  RESP: No audible wheeze, cough, or visible cyanosis.  No visible retractions or increased work of breathing.    SKIN: Visible skin clear. No significant rash, abnormal pigmentation or lesions.  NEURO: Cranial nerves grossly intact.  Mentation and speech appropriate for age.  PSYCH: Mentation appears normal, affect normal/bright, judgement and insight intact, normal speech and appearance well-groomed.         "

## 2021-02-15 ENCOUNTER — TELEPHONE (OUTPATIENT)
Dept: PALLIATIVE MEDICINE | Facility: CLINIC | Age: 66
End: 2021-02-15

## 2021-02-15 ENCOUNTER — VIRTUAL VISIT (OUTPATIENT)
Dept: ENDOCRINOLOGY | Facility: CLINIC | Age: 66
End: 2021-02-15
Payer: COMMERCIAL

## 2021-02-15 VITALS — WEIGHT: 260 LBS | BODY MASS INDEX: 44.39 KG/M2 | HEIGHT: 64 IN

## 2021-02-15 DIAGNOSIS — E66.01 OBESITY, CLASS III, BMI 40-49.9 (MORBID OBESITY) (H): Primary | ICD-10-CM

## 2021-02-15 PROCEDURE — 99214 OFFICE O/P EST MOD 30 MIN: CPT | Mod: 95 | Performed by: INTERNAL MEDICINE

## 2021-02-15 ASSESSMENT — PAIN SCALES - GENERAL: PAINLEVEL: NO PAIN (0)

## 2021-02-15 ASSESSMENT — MIFFLIN-ST. JEOR: SCORE: 1711.41

## 2021-02-15 NOTE — TELEPHONE ENCOUNTER
Pt will call back to schedule Bilateral SI joint injection.    Danika ZAMARRIPA    [facility]

## 2021-02-15 NOTE — TELEPHONE ENCOUNTER
Screening Questions for Radiology Injections:    Injection to be done at which interventional clinic site? [facility]    If [facility] location, tell patient that this procedure requires a COVID-19 lab test be done within 4 days of the procedure. Would you still like to move forward with scheduling the procedure?  Not Applicable   If YES, let patient know that someone will call them to schedule the COVID-19 test and that they will only receive a call back if the result is positive. Route to nursing to enter order.     Instruct patient to arrive as directed prior to the scheduled appointment time:    [place] minutes before      Evie: 30 minutes before; if IV needed 1 hour before     Procedure ordered by Thi    Procedure ordered? Bilateral SI joint injection      Transforaminal Cervical DAGO - no pain provider currently performing    As a reminder, receiving steroids can decrease your body's ability to fight infection.   Would you still like to move forward with scheduling the injection?  Yes    What insurance would patient like us to bill for this procedure? [employer]      Worker's comp or MVA (motor vehicle accident) -Any injection DO NOT SCHEDULE and route to Jackelin Montalvo.      [employer] insurance - For SI joint injections, DO NOT SCHEDULE and route Jackelin Montalvo.       ALL BCBS, [employer] and [employer] C[employer]-Route to Jackelin for review DO NOT SCHEDULE      IF SCHEDULING IN [place] AND NEEDS A PA, IT IS OKAY TO SCHEDULE. [place] HANDLES THEIR OWN PA'S AFTER THE PATIENT IS SCHEDULED. PLEASE SCHEDULE AT LEAST 1 WEEK OUT SO A PA CAN BE OBTAINED.    Any chance of pregnancy? NO   If YES, do NOT schedule and route to  pool    Is an  needed? No     Patient has a drive home? (mandatory) YES: INFORMED    Is patient taking any blood thinners (i.e. plavix, coumadin, jantoven, warfarin, heparin, pradaxa or dabigatran, etc)? No   If hold needed, do NOT schedule,  route to RN pool     Is patient taking any aspirin products (includes Excedrin and Fiorinal)? No     If more than 325mg/day, OK to schedule; Instruct pt to decrease to less than 325 mg for 7 days AND route to RN pool    For CERVICAL procedures, hold all aspirin products for 6 days.     Tell pt that if aspirin product is not held for 6 days, the procedure WILL BE cancelled.      Does the patient have a bleeding or clotting disorder? No     If YES, okay to schedule AND route to RN nurse pool    For any patients with platelet count <100, must be forwarded to provider    Is patient diabetic?  No  If YES, instruct them to bring their glucometer.    Does patient have an active infection or treated for one within the past week? No     Is patient currently taking any antibiotics?  No     For patients on chronic, preventative, or prophylactic antibiotics, procedures may be scheduled.     For patients on antibiotics for active or recent infection:antibiotic course must have been completed for 4 days    Is patient currently taking any steroid medications? (i.e. Prednisone, Medrol)  No     For patients on steroid medications, course must have been completed for 4 days    Is patient actively being treated for cancer or immunocompromised? No  If YES, do NOT schedule and route to RN pool     Are you able to get on and off an exam table with minimal or no assistance? Yes  If NO, do NOT schedule and route to RN pool    Are you able to roll over and lay on your stomach with minimal or no assistance? Yes  If NO, do NOT schedule and route to RN pool     Any allergies to contrast dye, iodine, shellfish, or numbing and steroid medications? No  If YES, route to RN pool AND add allergy information to appointment notes    Allergies: Patient has no known allergies.      Has the patient had a flu shot or any other vaccinations within 7 days before or after the procedure.  No     Does patient have an MRI/CT?  Not Applicable  Check Procedure  Scheduling Grid to see if required.      Was the MRI done within the last 3 years?  NA    If yes, where was the MRI done i.e.[facility], [facility], [facility], [facility] etc?       If no, do not schedule and route to RN pool    If MRI was not done at [facility], [facility] or [facility] do NOT schedule and route to RN pool.      If pt has an imaging disc, the injection MAY be scheduled but pt has to bring disc to appt.     If they show up without the disc the injection cannot be done    Procedure Specific Instructions:      If celiac plexus block, informed patient NPO for 6 hours and that it is okay to take medications with sips of water, especially blood pressure medications  Not Applicable         If this is for a cervical procedure, informed patient that aspirin needs to be held for 6 days.   Not Applicable      If IV needed:    Do not schedule procedures requiring IV placement in the first appointment of the day or first appointment after lunch. Do NOT schedule at 0745, 0815 or 1245.     Instructed pt to arrive 30 minutes early for IV start if required. (Check Procedure Scheduling Grid)  Not Applicable    Reminders:      If you are started on any steroids or antibiotics between now and your appointment, you must contact us because the procedure may need to be cancelled.  Yes      For all procedures except radiofrequency ablations (RFAs) and spinal cord stimulator (SCS) trials, informed patient:    IV sedation is not provided for this procedure.  If you feel that an oral anti-anxiety medication is needed, you can discuss this further with your referring provider or primary care provider.  The Pain Clinic provider will discuss specifics of what the procedure includes at your appointment.  Most procedures last 10-20 minutes.  We use numbing medications to help with any discomfort during the procedure.  Not Applicable      For patients 85 or older we recommend having an adult stay w/ them for the remainder of the  day.       Does the patient have any questions?  NO  Danika Farooq  [facility]

## 2021-02-15 NOTE — LETTER
2/15/2021       RE: Sharon Fung  [address] [address]     Dear Colleague,    Thank you for referring your patient, Sharon Fung, to the [facility] [place] at [facility]. Please see a copy of my visit note below.    Video-Visit Details    Type of service:  Video Visit    Video start time: 11:20 am  Video End Time:11:40 am    Originating Location (pt. Location): Home    Distant Location (provider location):  [facility] [place]     Platform used for Video Visit: [employer]      Assessment and Plan:   Obesity, Class III, BMI 40-49.9 (morbid obesity) (H)  Minimal response to Strattera  No response to low dose phentermine/topiramate  No response to Contrave  Now off meds    PLAN:   Start an eating plan by discussing with dietitian  We discussed some options today for meal planning and using some meal replacements  Avoid fast food    Health coaching visits.     Will consider saxenda in one month if she is still struggling.       35 minutes spent on the date of the encounter doing chart review, history and exam, documentation and further activities as noted above         Return in about 4 weeks (around 3/15/2021).    Litzy Vallejo MD  [facility] [place]    Subjective     Sharon Fung is a 65 year old female who presents today for the following health issues: obesity    April 2018 I started her on phentermine/topiramate (QSYMIA). It was rejected by insurance on the basis of having to try and fail Contrave first. She ended up trying generic forms of phentermine and topiramate (low doses) but did not get any weight loss results so she stopped them.     January 2019 I started her on Contrave. She did not have any response. I then restarted her onTopiramate 75 mg at bedtime.     Interval History:   Since last visit May 2019.   Her  "weight has been stuck in the 260 range.     She has been on and off Prednisone for RA.     Exercise: has not been on a routine. she will be joining the [employer]     Diet: no specific plan. Struggling with her diet. Limited by long hours at work. Fast food for dinner.     Craves sweets.     Weight Tracking:   Initial consult weight was 240 on 8/29/16.  Weight change since last seen on 5/13/2019 is down 6 lbs  Total gain is 20 pounds.      Review of Systems  Constitutional, HEENT, cardiovascular, pulmonary, gi and gu systems are negative, except as otherwise noted.    Objective   Ht 1.613 m (5' 3.5\")   Wt 117.9 kg (260 lb)   BMI 45.33 kg/m    Body mass index is 45.33 kg/m .    Physical Exam  GENERAL: Healthy, alert and no distress  EYES: Eyes grossly normal to inspection.  No discharge or erythema, or obvious scleral/conjunctival abnormalities.  RESP: No audible wheeze, cough, or visible cyanosis.  No visible retractions or increased work of breathing.    SKIN: Visible skin clear. No significant rash, abnormal pigmentation or lesions.  NEURO: Cranial nerves grossly intact.  Mentation and speech appropriate for age.  PSYCH: Mentation appears normal, affect normal/bright, judgement and insight intact, normal speech and appearance well-groomed.      "

## 2021-02-15 NOTE — PATIENT INSTRUCTIONS
Interested in working with a health ?  Health coaches work with you to improve your overall health and wellbeing.  They look at the whole person, and may involve discussion of different areas of life, including, but not limited to the four pillars of health (sleep, exercise, nutrition, and stress management). Discuss with your care team if you would like to start working a health .     Health Coaching-3 Pack:      $99 for three health coaching visits (self pay; insurance does not cover health coaching)    Visits are done via phone at this time    Coaching is a partnership between the  and the client; Coaches do not prescribe or diagnose    Coaching helps inspire the client to reach his/her personal goals   - To schedule your first health  visit, call  [phone]  - To find out more about health coaching, contact Health  Blanka at [email or web address]

## 2021-02-16 ENCOUNTER — RADIOLOGY INJECTION OFFICE VISIT (OUTPATIENT)
Dept: PALLIATIVE MEDICINE | Facility: CLINIC | Age: 66
End: 2021-02-16
Payer: COMMERCIAL

## 2021-02-16 ENCOUNTER — TELEPHONE (OUTPATIENT)
Dept: ENDOCRINOLOGY | Facility: CLINIC | Age: 66
End: 2021-02-16

## 2021-02-16 VITALS — SYSTOLIC BLOOD PRESSURE: 154 MMHG | HEART RATE: 84 BPM | OXYGEN SATURATION: 95 % | DIASTOLIC BLOOD PRESSURE: 84 MMHG

## 2021-02-16 DIAGNOSIS — M53.3 SI (SACROILIAC) JOINT DYSFUNCTION: ICD-10-CM

## 2021-02-16 DIAGNOSIS — Z12.31 VISIT FOR SCREENING MAMMOGRAM: ICD-10-CM

## 2021-02-16 PROCEDURE — 77067 SCR MAMMO BI INCL CAD: CPT | Mod: GC | Performed by: RADIOLOGY

## 2021-02-16 PROCEDURE — 77063 BREAST TOMOSYNTHESIS BI: CPT | Mod: GC | Performed by: RADIOLOGY

## 2021-02-16 PROCEDURE — 27096 INJECT SACROILIAC JOINT: CPT | Mod: 50 | Performed by: PAIN MEDICINE

## 2021-02-16 ASSESSMENT — PAIN SCALES - GENERAL: PAINLEVEL: EXTREME PAIN (8)

## 2021-02-16 NOTE — TELEPHONE ENCOUNTER
Called pt interested in 3 pack; referred by Yoni    Pt is an employee; told her about [employer] pass; she may want to use her HSA.     Send Maggie in financial her name for billing.    Explained briefly the nature of health coaching and asked why she was interested. Pt says she has arthritis so does not exercise much and turns to food to manage emotions. We talked about how health coaching can look at other ways to manage stress and change the habit of turning to food.    Scheduled first session for 2/18 at 11:30

## 2021-02-16 NOTE — NURSING NOTE
Pre-procedure Intake    Have you been fasting? NA    If yes, for how long? NA    Are you taking a prescribed blood thinner such as coumadin, Plavix, Xarelto?    No    If yes, when did you take your last dose? NA    Do you take aspirin?  No    If cervical procedure, have you held aspirin for 6 days?   NA    Do you have any allergies to contrast dye, iodine, steroid and/or numbing medications?  NO    Are you currently taking antibiotics or have an active infection?  NO    Have you had a fever/elevated temperature within the past week? NO    Are you currently taking oral steroids? NO    Do you have a ? Yes       Are you pregnant or breastfeeding?  NO    Are the vital signs normal?  Yes      Genesis Martinez CMA ([facility])

## 2021-02-16 NOTE — NURSING NOTE
Discharge Information    IV Discontiued Time:  NA    Amount of Fluid Infused:  NA    Discharge Criteria = When patient returns to baseline or as per MD order    Consciousness:  Pt is fully awake    Circulation:  BP +/- 20% of pre-procedure level    Respiration:  Patient is able to breathe deeply    O2 Sat:  Patient is able to maintain O2 Sat >92% on room air    Activity:  Moves 4 extremities on command    Ambulation:  Patient is able to stand and walk or stand and pivot into wheelchair    Dressing:  Clean/dry or No Dressing    Notes:   Discharge instructions and AVS given to patient    Patient meets criteria for discharge?  YES    Admitted to PCU?  No    Responsible adult present to accompany patient home?  Yes    Signature/Title:    rg torres RN  RN Care Coordinator  [facility]

## 2021-02-16 NOTE — PROGRESS NOTES
Pre procedure Diagnosis: SI joint dysfunction    Post procedure Diagnosis: Same  Procedure performed: bilateral SI joint injection  Anesthesia: none  Complications: none  Operators: Clint Fuentes MD     Indications:   Sharon Fung is a 65 year old female. The patient has a history of bilateral buttocks Pain. Other conservative treatments prior to injection include meds/pt. prior injections with benefit    Options/alternatives, benefits and risks were discussed with the patient including bleeding, infection, tissue trauma, exposure to radiation, reaction to medications including seizure, nerve injury, weakness, and numbness.  Questions were answered to her satisfaction and she agrees to proceed. Voluntary informed consent was obtained and signed.     Vitals were reviewed: Yes  Allergies were reviewed:  Yes   Medications were reviewed:  Yes   Pre-procedure pain score: 9/10    Procedure:  After obtaining signed informed consent, the patient was brought into the procedure suite and was placed in a prone position on the procedure table.   A Pause for the Cause was performed.  The patient was prepped and draped in the usual sterile fashion.     After identifying the left SI joint, the C-arm was rotated obliquely to obtain the best view of the inferior angle of the joint.  Then 5 ml of Lidocaine 1%  was used to anesthetize the skin at a skin entry site coaxial with the fluoroscopy beam at this location.  A 22 gauge 3.5 inch needle was advanced under intermittent fluoroscopy until it was felt to enter the SI joint.  Aspiration was negative.    After identifying the right SI joint, the C-arm was rotated obliquely to obtain the best view of the inferior angle of the joint.  Then 5 ml of Lidocaine 1%  was used to anesthetize the skin at a skin entry site coaxial with the fluoroscopy beam at this location.  A 22 gauge 3.5 inch needle was advanced under intermittent fluoroscopy until it was felt to enter the SI joint.   Aspiration was negative.    A total of 1ml of Omnipaque-300 was injected, confirming appropriate position, with spread into the intraarticular space, with no intravascular uptake noted.  9ml of Omnipaque was wasted. Location was verified in lateral view.    2 ml of a combination of 3 mL of 0.25% bupivacaine and 40mg of Kenalog was injected into each joint.  The needle was removed.     Hemostasis was achieved, the area was cleaned, and bandaids were placed when appropriate.  The patient tolerated the procedure well, and was taken to the recovery room.  Images were saved to PACS.    Post-procedure pain score: 2/10  Follow-up includes:   -f/u phone call in one week  -f/u with referring Physician     Clint Fuentes MD  [facility]

## 2021-02-18 ENCOUNTER — OFFICE VISIT (OUTPATIENT)
Dept: ENDOCRINOLOGY | Facility: CLINIC | Age: 66
End: 2021-02-18
Payer: COMMERCIAL

## 2021-02-18 DIAGNOSIS — E66.9 OBESITY: Primary | ICD-10-CM

## 2021-02-18 PROCEDURE — 99207 PR NO CHARGE LOS: CPT

## 2021-02-18 NOTE — PROGRESS NOTES
"DEJAH BRITT    Progress Notes    New Weight Management Health Coaching Note    Sharon Fung          MRN: [id number]  : 1955  JENIFER: 2021    Health  Visit #: 1  1/3 3 Pack    ASSESSMENT:    Referred by Dr Vallejo  Pt is an employee    Explained briefly the nature of health coaching and asked why she was interested. Pt says she has arthritis so does not exercise much and turns to food to manage emotions. We talked about how health coaching can look at other ways to manage stress and change the habit of turning to food.    Sent well-being assessment/vision by [employer] but pt did not read [employer]. Will resend today.    Tracking my food on paper.    Wants to know why I eat food as a crutch/ Explore this question during your week and see what comes up.    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 Stress at work leads to poor eating choices. Will want to chose the hamburger and fries even though knows a salad with chicken is healthy. Knows what to do but says it is too hard to do it. Talked about how she should bring food with her. Food is not an issues at home, just goes home to sleep but is an issue at work.    Looking forward to getting ideas from RD; meets her In March; hates cooking.    We did a mini nutrition vision of \"nothing changes in 2 years vs. Empowered/changed in 2 years.    Nutrition: I am at a 1. I would like to be at a 5. I do not have to be at a perfect 10.    What Does a 5 Look like:  -portion control  - cut down on diet soda  -eat more fruit/veggies  -Eat in the breakroom just one time per week.    ACTION PLAN:  1.) When I feel triggered to eat something due to stress/temptation, STOP and breathe. Take 3 breaths and say\" Where Do I want to be in 2 months?\"    2.) Chose one goal to try out:    -portion control  - cut down on diet soda  -eat more fruit/veggies  -Eat in the breakroom just one time per " week.  --Bring my food to work.      Well Being Assessment  [email or web address]    Pillar 4: Managing Stress  Pillar 4 Overview              [email or web address]         Mindfulness Meditation         [email or web address]    Conscious Breathing           [email or web address]    Self-care Toolkit         [email or web address]    FOLLOW-UP:    NEXT HEALTH COACHING VISIT with DEJAH, MARCH 4, 11:30am    To schedule your next visit, please call [phone].      TIME: 40 min         SIGNATURE:  Dejah Shell, Certified Health  on 2/18/2021 at 10:35 AM

## 2021-02-18 NOTE — PATIENT INSTRUCTIONS
"Nutrition: I am at a 1. I would like to be at a 5. I do not have to be at a perfect 10.    What Does a 5 Look like:  -portion control  - cut down on diet soda  -eat more fruit/veggies  -Eat in the breakroom just one time per week.    ACTION PLAN:  1.) When I feel triggered to eat something due to stress/temptation, STOP and breathe. Take 3 breaths and say\" Where Do I want to be in 2 months?\"    2.) Chose one goal to try out:    -portion control  - cut down on diet soda  -eat more fruit/veggies  -Eat in the breakroom just one time per week.  --Bring my food to work.      Well Being Assessment  [email or web address]    Pillar 4: Managing Stress  Pillar 4 Overview              [email or web address]         Mindfulness Meditation         [email or web address]    Conscious Breathing           [email or web address]    Self-care Toolkit         [email or web address]    FOLLOW-UP:    NEXT HEALTH COACHING VISIT with DEJAH, MARCH 4, 11:30am    To schedule your next visit, please call [phone].      TIME: 40 min         SIGNATURE:  Dejah Shell, Certified Health  on 2/18/2021 at 10:35 AM   "

## 2021-02-18 NOTE — LETTER
"2021       RE: Sharon Fung  [address] [address]     Dear Colleague,    Thank you for referring your patient, Sharon Fung, to the [facility]. Please see a copy of my visit note below.    DEJAH BRITT Notes    New Weight Management Health Coaching Note    Sharon Fung          MRN: [id number]  : 1955  JENIFER: 2021    Health  Visit #: 1  1/3 3 Pack    ASSESSMENT:    Referred by Dr Vallejo  Pt is an employee    Explained briefly the nature of health coaching and asked why she was interested. Pt says she has arthritis so does not exercise much and turns to food to manage emotions. We talked about how health coaching can look at other ways to manage stress and change the habit of turning to food.    Sent well-being assessment/vision by [employer] but pt did not read [employer]. Will resend today.    Tracking my food on paper.    Wants to know why I eat food as a crutch/ Explore this question during your week and see what comes up.    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 Stress at work leads to poor eating choices. Will want to chose the hamburger and fries even though knows a salad with chicken is healthy. Knows what to do but says it is too hard to do it. Talked about how she should bring food with her. Food is not an issues at home, just goes home to sleep but is an issue at work.    Looking forward to getting ideas from RD; meets her In March; hates cooking.    We did a mini nutrition vision of \"nothing changes in 2 years vs. Empowered/changed in 2 years.    Nutrition: I am at a 1. I would like to be at a 5. I do not have to be at a perfect 10.    What Does a 5 Look like:  -portion control  - cut down on diet soda  -eat more fruit/veggies  -Eat in the breakroom " "just one time per week.    ACTION PLAN:  1.) When I feel triggered to eat something due to stress/temptation, STOP and breathe. Take 3 breaths and say\" Where Do I want to be in 2 months?\"    2.) Chose one goal to try out:    -portion control  - cut down on diet soda  -eat more fruit/veggies  -Eat in the breakroom just one time per week.  --Bring my food to work.      Well Being Assessment  [email or web address]    Pillar 4: Managing Stress  Pillar 4 Overview              [email or web address]         Mindfulness Meditation         [email or web address]    Conscious Breathing           [email or web address]    Self-care Toolkit         [email or web address]    FOLLOW-UP:    NEXT HEALTH COACHING VISIT with DEJAH, MARCH 4, 11:30am    To schedule your next visit, please call [phone].      TIME: 40 min         SIGNATURE:  Dejah Shell, Certified Health  on 2/18/2021 at 10:35 AM     "

## 2021-02-21 RX ORDER — KETOROLAC TROMETHAMINE 10 MG/1
10 TABLET, FILM COATED ORAL EVERY 6 HOURS PRN
Qty: 10 TABLET | Refills: 1 | Status: ON HOLD | OUTPATIENT
Start: 2021-02-21 | End: 2021-06-30

## 2021-02-25 ENCOUNTER — TELEPHONE (OUTPATIENT)
Dept: RHEUMATOLOGY | Facility: CLINIC | Age: 66
End: 2021-02-25

## 2021-02-25 NOTE — TELEPHONE ENCOUNTER
[facility] Prior Authorization Team   Phone: [phone]  Fax: [phone]    PA Initiation    Medication: Kevzara  Insurance Company: import2 - Phone [phone] Fax [phone]  Pharmacy Filling the Rx: [facility] - [place], [place] - [address]  Filling Pharmacy Phone: [phone]  Filling Pharmacy Fax: [phone]  Start Date: 2/25/2021

## 2021-02-26 NOTE — TELEPHONE ENCOUNTER
Prior Authorization Approval    Authorization Effective Date: 2/25/2021  Authorization Expiration Date: 2/25/2022  Medication: Kevzara-APPROVED  Approved Dose/Quantity: 2.28  Reference #: [id number]   Insurance Company: [employer] - Phone [phone] Fax [phone]  Expected CoPay:       CoPay Card Available: Yes    Foundation Assistance Needed:    Which Pharmacy is filling the prescription (Not needed for infusion/clinic administered): [facility] - [place], [place] - [address]  Pharmacy Notified:    Patient Notified:

## 2021-03-03 NOTE — PROGRESS NOTES
"DEJAH BRITT     Progress Notes    Return Weight Management Health Coaching Note    Sharon Fung          MRN: [id number]  : 1955  JENIFER: March 3, 2021    Health  Visit #: 2/3  (Health  3 pack)    ASSESSMENT:    Current Weight: 265 lb    PREVIOUS GOAL(S) REVIEW:    ACTION PLAN:  1.) When I feel triggered to eat something due to stress/temptation, STOP and breathe. Take 3 breaths and say\" Where Do I want to be in 2 months?\" IN PROGRESS     2.) Chose one goal to try out:     -portion control  - cut down on diet soda; meeting. Just having one in the morning. Drinking water (keeps water bottle full). And 1-2 weekends.  -eat more fruit/veggies: Meeting  -Eat in the breakroom just one time per week. MEETING  --Bring my food to work. MEETING        Well Being Assessment  [email or web address]     Pillar 4: Managing Stress  Pillar 4 Overview              [email or web address]                                        Mindfulness Meditation         [email or web address]     Conscious Breathing           [email or web address]     Self-care Toolkit         [email or web address]         PILLAR(S) DISCUSSED IN THIS VISIT:        What are you proud of: Pt replied: Proud to take a walk after work, but putting in 12 hours work days is hard. Avoiding the break room temptations.    Brining more fruit and nuts to work instead of the candy machine.    Powerful self statements:  I want to feel proud of my choices.  Sharon, you can do it!   I will be feeling confident in my swim suit in [place].    How can I approach stress at work?    What if I approach my work stress or work role differently? What would it look like? Switch the kalediscope to a diiferent lens.    Manage stress at work; aromatherapy and breathe, close the door. \"I can take 5 minutes.\"    Continue with current goals:    --portion control  - cut down on diet soda; 1 can per day  -eat more " fruit/veggies:   -Eat in the breakroom just one time per week.   --Bring my food to work.       Next HC visit is April 15, 11:30am      FOLLOW-UP:  To schedule your next visit, please call [phone].      TIME: 30 min       SIGNATURE:  Blanka Shell, Certified Health  on 3/3/2021 at 10:41 AM

## 2021-03-04 ENCOUNTER — OFFICE VISIT (OUTPATIENT)
Dept: ENDOCRINOLOGY | Facility: CLINIC | Age: 66
End: 2021-03-04
Payer: COMMERCIAL

## 2021-03-04 ENCOUNTER — MYC MEDICAL ADVICE (OUTPATIENT)
Dept: ENDOCRINOLOGY | Facility: CLINIC | Age: 66
End: 2021-03-04

## 2021-03-04 DIAGNOSIS — E66.9 OBESITY: Primary | ICD-10-CM

## 2021-03-04 PROCEDURE — 99207 PR NO CHARGE LOS: CPT

## 2021-03-04 PROCEDURE — 99207 PR MEDICAL WEIGHT MANAGEMENT MAINTENANCE: CPT | Performed by: PHYSICIAN ASSISTANT

## 2021-03-04 NOTE — LETTER
"3/4/2021       RE: Sharon Fung  [address] [address]     Dear Colleague,    Thank you for referring your patient, Sharon Fung, to the [facility]. Please see a copy of my visit note below.    DEJAH BRITT     Progress Notes    Return Weight Management Health Coaching Note    Sharon Fung          MRN: [id number]  : 1955  JENIFER: March 3, 2021    Health  Visit #: 2/3  (Health  3 pack)    ASSESSMENT:    Current Weight: 265 lb    PREVIOUS GOAL(S) REVIEW:    ACTION PLAN:  1.) When I feel triggered to eat something due to stress/temptation, STOP and breathe. Take 3 breaths and say\" Where Do I want to be in 2 months?\" IN PROGRESS     2.) Chose one goal to try out:     -portion control  - cut down on diet soda; meeting. Just having one in the morning. Drinking water (keeps water bottle full). And 1-2 weekends.  -eat more fruit/veggies: Meeting  -Eat in the breakroom just one time per week. MEETING  --Bring my food to work. MEETING        Well Being Assessment  [email or web address]     Pillar 4: Managing Stress  Pillar 4 Overview              [email or web address]                                        Mindfulness Meditation         [email or web address]     Conscious Breathing           [email or web address]     Self-care Toolkit         [email or web address]         PILLAR(S) DISCUSSED IN THIS VISIT:        What are you proud of: Pt replied: Proud to take a walk after work, but putting in 12 hours work days is hard. Avoiding the break room temptations.    Brining more fruit and nuts to work instead of the candy machine.    Powerful self statements:  I want to feel proud of my choices.  Sharon, you can do it!   I will be feeling confident in my swim suit in [place].    How can I approach stress at " "work?    What if I approach my work stress or work role differently? What would it look like? Switch the kalediscope to a diiferent lens.    Manage stress at work; aromatherapy and breathe, close the door. \"I can take 5 minutes.\"    Continue with current goals:    --portion control  - cut down on diet soda; 1 can per day  -eat more fruit/veggies:   -Eat in the breakroom just one time per week.   --Bring my food to work.       Next HC visit is April 15, 11:30am      FOLLOW-UP:  To schedule your next visit, please call [phone].      TIME: 30 min       SIGNATURE:  Blakna Shell, Certified Health  on 3/3/2021 at 10:41 AM    "

## 2021-03-25 NOTE — TELEPHONE ENCOUNTER
RECORDS RECEIVED FROM: [facility] (R) foot/Self/ E-PREFERREDONE/PREFERREDONE HMO   DATE RECEIVED: Apr 21, 2021     NOTES STATUS DETAILS   OFFICE NOTE  Internal    OFFICE NOTE from other specialist N/A    DISCHARGE SUMMARY from hospital N/A    DISCHARGE REPORT from the ER N/A    OPERATIVE REPORT N/A    MEDICATION LIST Internal    IMPLANT RECORD/STICKER N/A    LABS     CBC/DIFF N/A    CULTURES N/A    INJECTIONS DONE IN RADIOLOGY N/A    MRI N/A    CT SCAN N/A    XRAYS (IMAGES & REPORTS) N/A    TUMOR     PATHOLOGY  Slides & report N/A    03/25/21   9:11 AM   COMPLETE  Jennifer Lynn CMA

## 2021-03-30 ENCOUNTER — VIRTUAL VISIT (OUTPATIENT)
Dept: NUTRITION | Facility: CLINIC | Age: 66
End: 2021-03-30
Attending: NURSE PRACTITIONER
Payer: COMMERCIAL

## 2021-03-30 DIAGNOSIS — E66.01 OBESITY, CLASS III, BMI 40-49.9 (MORBID OBESITY) (H): Primary | ICD-10-CM

## 2021-03-30 PROCEDURE — 97802 MEDICAL NUTRITION INDIV IN: CPT | Mod: 95

## 2021-03-30 NOTE — PROGRESS NOTES
Type of service:  Video Visit    If the video visit is dropped, the video visit invitation should be resent by: Send to e-mail at: [email or web address]    Originating Location (pt. Location): Home  Distant Location (provider location): [facility]  Mode of Communication:  Video Conference via [employer]    Video Start Time: 2:40pm  Video End Time (time video stopped): 3:17pm    How would patient like to obtain AVS? [facility]      Medical Nutrition Therapy  Visit Type:Initial assessment and intervention    Sharon Fung presents today for MNT and education related to weight management.   She is accompanied by self.     ASSESSMENT:   Patient comments/concerns relating to nutrition: Says had gastric band a few years ago and then she developed an infection and it had to remove it.  Says gaining weight, not eat right, works long hours and lives along.  Says tried medication but not as helpful.     Says with rheumatoid arthritis, struggles with being more active since getting short of breath.  Doing Nutrisystem in the past but meals were tiresome and boring. Says likes a lot of foods but not fan of some veggies or fish.     Says has to eat breakfast early in the morning. Craves sweets in the afternoon. Says not like cooking.     NUTRITION HISTORY:    Breakfast: Special K with berries and 1% milk  AM: 8:30-10am: carrots, string cheese and fruit cup- no added sugar.  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  Sees it since has to go through breakroom to get to rest room.   Lunch: 11:30-12:30pm: Can [place] soup OR bagel with cream cheese   PM: sweets if buys them; struggles not to buy them  Dinner: 5:30-6pm:  none OR pot roast in crock pot with carrots and potatoes OR hot dish OR quarter pounder with cheese OR fillet of fish and 12 oz milk (feels dinner is larger portions)  Snacks: none  Bed: 9pm    Beverages: Milk 1%- 1.5 cups-2 cups/day, Pop (Diet) 1 time/day and Water all  day. Minimal alcohol consumption.     Misses meals? Sometimes dinner  Eats out:  2 meals/per week (once at [employer] and once with a friend)     Previous diet education:  No     Food allergies/intolerances: none    Diet is high in: calories  Diet is low in: fiber, fruits and vegetables    EXERCISE: sporadic or irregular exercise- trying to park farther away and move more at work.  Wanted to join [employer] to do water aerobics but not work with time.     SOCIO/ECONOMIC:   Lives with: self    MEDICATIONS:  Current Outpatient Medications   Medication     acetaminophen (TYLENOL) 500 MG tablet     albuterol (PROAIR HFA/PROVENTIL HFA/VENTOLIN HFA) 108 (90 Base) MCG/ACT inhaler     azelastine (OPTIVAR) 0.05 % ophthalmic solution     citalopram (CELEXA) 20 MG tablet     cycloSPORINE (RESTASIS) 0.05 % ophthalmic emulsion     fexofenadine (ALLEGRA) 180 MG tablet     fluticasone (FLONASE) 50 MCG/ACT nasal spray     gabapentin (NEURONTIN) 100 MG capsule     ibuprofen 200 MG capsule     ketorolac (TORADOL) 10 MG tablet     ketorolac (TORADOL) 10 MG tablet     leflunomide (ARAVA) 20 MG tablet     naproxen sodium 220 MG capsule     ondansetron (ZOFRAN-ODT) 8 MG ODT tab     ORDER FOR DME     Sarilumab 200 MG/1.14ML SOAJ     sulfaSALAzine (AZULFIDINE) 500 MG tablet     vitamin D3 (CHOLECALCIFEROL) 50 mcg (2000 units) tablet     No current facility-administered medications for this visit.      Facility-Administered Medications Ordered in Other Visits   Medication     sodium chloride (PF) 0.9% PF flush 10 mL     sodium chloride bacteriostatic 0.9 % flush 12 mL       LABS:  Last Basic Metabolic Panel:  Lab Results   Component Value Date     11/05/2019      Lab Results   Component Value Date    POTASSIUM 3.8 11/05/2019     Lab Results   Component Value Date    CHLORIDE 103 11/05/2019     Lab Results   Component Value Date    ISH 9.1 11/05/2019     Lab Results   Component Value Date    CO2 28 11/05/2019     Lab Results   Component Value  "Date    BUN 13 11/05/2019     Lab Results   Component Value Date    CR 0.65 02/02/2021     Lab Results   Component Value Date    GLC 74 02/02/2021       ANTHROPOMETRICS:  Vitals: There were no vitals taken for this visit.  Estimated body mass index is 45.33 kg/m  as calculated from the following:    Height as of 2/15/21: 1.613 m (5' 3.5\").    Weight as of 2/15/21: 117.9 kg (260 lb).      Wt Readings from Last 5 Encounters:   02/15/21 117.9 kg (260 lb)   10/30/20 120.2 kg (265 lb)   12/11/19 118.3 kg (260 lb 12.8 oz)   11/12/19 117.9 kg (260 lb)   08/28/19 115.2 kg (254 lb)       Weight Change: unable to assess - no recent weight    ESTIMATED KCAL REQUIREMENTS:  2062 kcal per day    NUTRITION DIAGNOSIS: Overweight/Obesity related to excessive energy intake as evidenced by larger portions, higher calorie snack prior to changes and BMI >40    NUTRITION INTERVENTION:  Nutrition Prescription: Energy Intake: 1800 kcal/day for weight loss  Education given to support: general nutrition guidelines, weight reduction, consistent meals, exercise, fiber, behavior modification, portion control and heart healthy diet  Education Materials Provided: 1800 Calorie 5-day Sample Menus, 100 Calorie Snacks and Weight Loss Tips  Motivational Interviewing    Discussion: Reviewed patient's 24 hour diet recall.  Commended Sharon on making changes already to target snacking at work by bringing in carrots and dip or fruit cup.  She was interested in help with meal planning so suggested Plate Method for simplicity on balancing meals.  Discussed healthy breakfast, lunch, dinner and snack ideas and encouraged her to eat 3 meals a day.  Answered her questions on some crock pot ideas for chicken to prevent it from being as dry, recipe ideas, quick meal alternatives and what to look for in a protein shake/powder.     Encouraged Sharon to plan basics of her meal, mostly a fruits and vegetables to balance the meal, and to make and stick to a shopping " list.  If she can do so in a controlled manor, discussed adding a small treat and plan it in for the week so a controlled amount.  Will send ideas for 100 calorie snack ideas but recommended anything sweet be limited to 100-200 calories to start. Encouraged her to increase activity as able and to also consider resistance/strength (yoga or pilates) if easier with her arthritis.  Pt verbalized understanding of concepts discussed and recommendations provided.       PATIENT'S BEHAVIOR CHANGE GOALS:   See Patient Instructions for patient stated behavior change goals. AVS was sent by [employer].    MONITOR / EVALUATE:  RD will monitor/evaluate:  Food / Beverage / Nutrient intake   Weight change    FOLLOW-UP:  Call RD with questions/concerns.   Follow-up appointment scheduled on 5/11/21.     Yarely Montoya RDN, LD, [facility]   Time spent in minutes: 37 minutes  Encounter: Individual

## 2021-03-30 NOTE — PATIENT INSTRUCTIONS
1. Make a shopping list and stick to it for meal planning during the week. Plan in something sweet to enjoy if you can control the portion.   -May start with 1-2 fresh veggies/bagged salad and 1-2 packets frozen veggies to heat for the week.    2. Use plate method at guide for meal plannin/2 plate fruits and vegetables   1/2 plate casserole (1.5 cups)  OR   1/2 plate fruits and vegetables  1/4 plate lean meat (3-4 oz)  1/4 plate grains (1/2- 1 cup OR 1-2 slices bread)    3. If you are hungry at work, try to include some protein with breakfast.   -Hard boiled egg, cheese stick or use Greek yogurt    Otherwise, you already made a great change here to eat some carrots and dip or the fruit cup- good job!    4. If you choose protein shake or powder keep to 30 gm or less of protein.    5. Move when you can and as often as you can.  Being more active with both aerobic activity helps but also can try resistance/weights (yoga or pilates) if tolerated ok.    6. Ideas for chicken in crock pot:  - Use boneless, skinless thighs and cook whole.  Cut up at the end so not dry out as much  -Can try with whole chicken pieces if above trick still too dry  -Check out [employer] Test Kitchen's Slow Cooker books for ideas/recipes (more time up front but usually have good end results).    7. Try to limit sweets to 100-200 calories.    FOLLOW UP APPOINTMENT: Video visit follow up on Tuesday, May 11th at 3:30pm.  Please sign in again through [email or web address].    Yarely Montoya RDN, [place], [facility]   [phone]

## 2021-04-21 ENCOUNTER — ANCILLARY PROCEDURE (OUTPATIENT)
Dept: GENERAL RADIOLOGY | Facility: CLINIC | Age: 66
End: 2021-04-21
Attending: PODIATRIST
Payer: COMMERCIAL

## 2021-04-21 ENCOUNTER — OFFICE VISIT (OUTPATIENT)
Dept: ORTHOPEDICS | Facility: CLINIC | Age: 66
End: 2021-04-21
Payer: COMMERCIAL

## 2021-04-21 ENCOUNTER — PRE VISIT (OUTPATIENT)
Dept: ORTHOPEDICS | Facility: CLINIC | Age: 66
End: 2021-04-21

## 2021-04-21 DIAGNOSIS — M20.11 HALLUX VALGUS OF RIGHT FOOT: Primary | ICD-10-CM

## 2021-04-21 DIAGNOSIS — M20.11 HALLUX VALGUS OF RIGHT FOOT: ICD-10-CM

## 2021-04-21 DIAGNOSIS — M79.671 RIGHT FOOT PAIN: ICD-10-CM

## 2021-04-21 PROCEDURE — 99203 OFFICE O/P NEW LOW 30 MIN: CPT | Performed by: PODIATRIST

## 2021-04-21 PROCEDURE — 73630 X-RAY EXAM OF FOOT: CPT | Mod: RT | Performed by: RADIOLOGY

## 2021-04-21 ASSESSMENT — ENCOUNTER SYMPTOMS
MUSCLE CRAMPS: 1
ARTHRALGIAS: 1
MUSCLE WEAKNESS: 0
JOINT SWELLING: 1
MYALGIAS: 1
BACK PAIN: 0
NECK PAIN: 0
STIFFNESS: 1

## 2021-04-21 NOTE — LETTER
4/21/2021         RE: Sharon Fung  [address] [address]        Dear Colleague,    Thank you for referring your patient, Sharon Fung, to the [facility] [place]. Please see a copy of my visit note below.    Date of Service: 4/21/2021    Chief Complaint:   Chief Complaint   Patient presents with     Consult     Bunion, right foot. Patient stated that this is painful.         HPI: Sharon is a 65 year old female who presents today for further evaluation of right bunion.  Nature: sharp and dull    Location: right bunion    Duration: years    Onset: gradual. No inciting trauma    Course: worsening    Aggravating/alleviating factors: walking and weight bearing aggrvate.     Previous Treatments: padding. Shoe switching.   She relates that she is under the care of Dr. Guerra for RA. This has recently been under control with sulfasalazine, Arava, and sarilumab.       Review of Systems: Answers for HPI/ROS submitted by the patient on 4/21/2021   General Symptoms: No  Skin Symptoms: No  HENT Symptoms: No  EYE SYMPTOMS: No  HEART SYMPTOMS: No  LUNG SYMPTOMS: No  INTESTINAL SYMPTOMS: No  URINARY SYMPTOMS: No  GYNECOLOGIC SYMPTOMS: No  BREAST SYMPTOMS: No  SKELETAL SYMPTOMS: Yes  BLOOD SYMPTOMS: No  NERVOUS SYSTEM SYMPTOMS: No  MENTAL HEALTH SYMPTOMS: No  Back pain: No  Muscle aches: Yes  Neck pain: No  Swollen joints: Yes  Joint pain: Yes  Bone pain: Yes  Muscle cramps: Yes  Muscle weakness: No  Joint stiffness: Yes  Bone fracture: No      PMH:   Past Medical History:   Diagnosis Date     AR (allergic rhinitis)  as teen     Arthritis 12/14/2015     Chronic obstructive pulmonary disease, unspecified COPD type (H) 3/27/2018     Depressive disorder 3 years ago     GERD (gastroesophageal reflux disease) 4-07     Headache 7/11/2017     Mild major depression (H) 3 years ago     Morbid obesity (H) teens     BRETT (obstructive sleep apnea)     uses CPAP     Rheumatoid  arthritis, adult (H)        PSxH:   Past Surgical History:   Procedure Laterality Date     BLEPHAROPLASTY BILATERAL Bilateral 8/5/2016    Procedure: BLEPHAROPLASTY BILATERAL;  Surgeon: Cortez Robin MD;  Location: [facility]     BREAST BIOPSY, RT/LT  1-94    Benign     C APPENDECTOMY  1977     COLONOSCOPY       COLONOSCOPY WITH CO2 INSUFFLATION N/A 3/30/2017    Procedure: COLONOSCOPY WITH CO2 INSUFFLATION;  Surgeon: Issa Weeks MD;  Location: [facility] OR     ESOPHAGOSCOPY, GASTROSCOPY, DUODENOSCOPY (EGD), COMBINED N/A 10/29/2015    Procedure: COMBINED ESOPHAGOSCOPY, GASTROSCOPY, DUODENOSCOPY (EGD);  Surgeon: Shaq Mims MD;  Location: [facility]     LAPAROSCOPIC GASTRIC ADJUSTABLE BANDING  11/09/10    Lap band procedure     LAPAROSCOPIC REMOVAL GASTRIC ADJUSTABLE BAND N/A 10/31/2015    Procedure: LAPAROSCOPIC REMOVAL GASTRIC ADJUSTABLE BAND;  Surgeon: Shaq Mims MD;  Location: [facility] OR     RELEASE CARPAL TUNNEL Left 4/27/2017    Procedure: RELEASE CARPAL TUNNEL;  Left Open Carpal Tunnel Release;  Surgeon: Ciara Middleton MD;  Location: [facility]     RELEASE CARPAL TUNNEL Right 6/15/2017    Procedure: RELEASE CARPAL TUNNEL;  Right Carpal Tunnel Release Open;  Surgeon: Ciara Middleton MD;  Location: [facility] OR     REPAIR PTOSIS       TUBAL LIGATION  1988       Allergies: Patient has no known allergies.    SH:   Social History     Socioeconomic History     Marital status:      Spouse name: Not on file     Number of children: 2     Years of education: 18     Highest education level: Not on file   Occupational History     Occupation: RN     Employer: [employer]   Social Needs     Financial resource strain: Not on file     Food insecurity     Worry: Not on file     Inability: Not on file     Transportation needs     Medical: Not on file     Non-medical: Not on file   Tobacco Use     Smoking status: Never Smoker     Smokeless tobacco: Never Used   Substance and Sexual Activity      Alcohol use: No     Alcohol/week: 1.0 - 2.0 standard drinks     Types: 1 - 2 Standard drinks or equivalent per week     Drug use: No     Sexual activity: Not Currently     Partners: Male     Birth control/protection: Abstinence, Female Surgical   Lifestyle     Physical activity     Days per week: Not on file     Minutes per session: Not on file     Stress: Not on file   Relationships     Social connections     Talks on phone: Not on file     Gets together: Not on file     Attends Shinto service: Not on file     Active member of club or organization: Not on file     Attends meetings of clubs or organizations: Not on file     Relationship status: Not on file     Intimate partner violence     Fear of current or ex partner: Not on file     Emotionally abused: Not on file     Physically abused: Not on file     Forced sexual activity: Not on file   Other Topics Concern     Parent/sibling w/ CABG, MI or angioplasty before 65F 55M? No   Social History Narrative     Not on file       FH:   Family History   Problem Relation Age of Onset     Heart Disease Father         MI     Neurologic Disorder Father 79        Parkinson's     Diabetes Father      Hypertension Father      Coronary Artery Disease Father      Cancer Maternal Grandmother         uterine     Neurologic Disorder Sister 16        migraine     Depression Sister      Neurologic Disorder Mother 20        migraine     Depression Mother      Neurologic Disorder Son 7        migraine     Substance Abuse Son      Depression Sister      Substance Abuse Sister        Objective:  Data Unavailable Data Unavailable Data Unavailable Data Unavailable Data Unavailable 0 lbs 0 oz    PT and DP pulses are 2/4 bilaterally. CRT is instant. Positive pedal hair.   Gross sensation is intact bilaterally.   Equinus is noted bilaterally. Pes planus noted BL. HAV noted to the right with pain with palpation of the medial eminence. Pain with ROM and distraction of the joint. Edema  noted to the foot.   Nails normal bilaterally. No open lesions are noted.     right foot xrays indicated in 3 weightbearing views.    hallux valgus deformity  WILFRED - 18.6  HAA - 26  Tibial sesamoid position is 6.    Assessment: Sharon is a 64 YO with painful right HAV. She has tried and failed conservative treatment. She would like to discuss sx. I will refer her to see Dr. Wolf, however she will likely need to speak to Dr. Guerra about changing her RA drugs to allow her for normal healing.    Plan:  - Pt seen and evaluated.  - XRs taken and discussed with her.   - Start topical diclofenac.   - Referral to Dr. Wolf.   - See again PRN.          Todd Antonio DPM

## 2021-04-21 NOTE — NURSING NOTE
Reason For Visit:   Chief Complaint   Patient presents with     Consult     Bunion, right foot. Patient stated that this is painful.        Pain Assessment  Patient Currently in Pain: Yes  Primary Pain Location: Foot(Right)        No Known Allergies        Paulette Moscoso LPN

## 2021-04-21 NOTE — PROGRESS NOTES
Date of Service: 4/21/2021    Chief Complaint:   Chief Complaint   Patient presents with     Consult     Bunion, right foot. Patient stated that this is painful.         HPI: Sharon is a 65 year old female who presents today for further evaluation of right bunion.  Nature: sharp and dull    Location: right bunion    Duration: years    Onset: gradual. No inciting trauma    Course: worsening    Aggravating/alleviating factors: walking and weight bearing aggrvate.     Previous Treatments: padding. Shoe switching.   She relates that she is under the care of Dr. Guerra for RA. This has recently been under control with sulfasalazine, Arava, and sarilumab.       Review of Systems: Answers for HPI/ROS submitted by the patient on 4/21/2021   General Symptoms: No  Skin Symptoms: No  HENT Symptoms: No  EYE SYMPTOMS: No  HEART SYMPTOMS: No  LUNG SYMPTOMS: No  INTESTINAL SYMPTOMS: No  URINARY SYMPTOMS: No  GYNECOLOGIC SYMPTOMS: No  BREAST SYMPTOMS: No  SKELETAL SYMPTOMS: Yes  BLOOD SYMPTOMS: No  NERVOUS SYSTEM SYMPTOMS: No  MENTAL HEALTH SYMPTOMS: No  Back pain: No  Muscle aches: Yes  Neck pain: No  Swollen joints: Yes  Joint pain: Yes  Bone pain: Yes  Muscle cramps: Yes  Muscle weakness: No  Joint stiffness: Yes  Bone fracture: No      PMH:   Past Medical History:   Diagnosis Date     AR (allergic rhinitis)  as teen     Arthritis 12/14/2015     Chronic obstructive pulmonary disease, unspecified COPD type (H) 3/27/2018     Depressive disorder 3 years ago     GERD (gastroesophageal reflux disease) 4-07     Headache 7/11/2017     Mild major depression (H) 3 years ago     Morbid obesity (H) teens     BRETT (obstructive sleep apnea)     uses CPAP     Rheumatoid arthritis, adult (H)        PSxH:   Past Surgical History:   Procedure Laterality Date     BLEPHAROPLASTY BILATERAL Bilateral 8/5/2016    Procedure: BLEPHAROPLASTY BILATERAL;  Surgeon: Cortez Robin MD;  [facility]     BREAST BIOPSY, RT/LT  1-94    Benign     C  APPENDECTOMY  1977     COLONOSCOPY       COLONOSCOPY WITH CO2 INSUFFLATION N/A 3/30/2017    Procedure: COLONOSCOPY WITH CO2 INSUFFLATION;  Surgeon: Issa Weeks MD;  [facility]     ESOPHAGOSCOPY, GASTROSCOPY, DUODENOSCOPY (EGD), COMBINED N/A 10/29/2015    Procedure: COMBINED ESOPHAGOSCOPY, GASTROSCOPY, DUODENOSCOPY (EGD);  Surgeon: Shaq Mims MD;  [facility]     LAPAROSCOPIC GASTRIC ADJUSTABLE BANDING  11/09/10    Lap band procedure     LAPAROSCOPIC REMOVAL GASTRIC ADJUSTABLE BAND N/A 10/31/2015    Procedure: LAPAROSCOPIC REMOVAL GASTRIC ADJUSTABLE BAND;  Surgeon: Shaq Mims MD;  [facility]     RELEASE CARPAL TUNNEL Left 4/27/2017    Procedure: RELEASE CARPAL TUNNEL;  Left Open Carpal Tunnel Release;  Surgeon: Ciara Middleton MD;  [facility]     RELEASE CARPAL TUNNEL Right 6/15/2017    Procedure: RELEASE CARPAL TUNNEL;  Right Carpal Tunnel Release Open;  Surgeon: Ciara Middleton MD;  [facility]     REPAIR PTOSIS       TUBAL LIGATION  1988       Allergies: Patient has no known allergies.    SH:   Social History     Socioeconomic History     Marital status:      Spouse name: Not on file     Number of children: 2     Years of education: 18     Highest education level: Not on file   Occupational History     Occupation: RN     Employer: [employer]   Social Needs     Financial resource strain: Not on file     Food insecurity     Worry: Not on file     Inability: Not on file     Transportation needs     Medical: Not on file     Non-medical: Not on file   Tobacco Use     Smoking status: Never Smoker     Smokeless tobacco: Never Used   Substance and Sexual Activity     Alcohol use: No     Alcohol/week: 1.0 - 2.0 standard drinks     Types: 1 - 2 Standard drinks or equivalent per week     Drug use: No     Sexual activity: Not Currently     Partners: Male     Birth control/protection: Abstinence, Female Surgical   Lifestyle      Physical activity     Days per week: Not on file     Minutes per session: Not on file     Stress: Not on file   Relationships     Social connections     Talks on phone: Not on file     Gets together: Not on file     Attends Roman Catholic service: Not on file     Active member of club or organization: Not on file     Attends meetings of clubs or organizations: Not on file     Relationship status: Not on file     Intimate partner violence     Fear of current or ex partner: Not on file     Emotionally abused: Not on file     Physically abused: Not on file     Forced sexual activity: Not on file   Other Topics Concern     Parent/sibling w/ CABG, MI or angioplasty before 65F 55M? No   Social History Narrative     Not on file       FH:   Family History   Problem Relation Age of Onset     Heart Disease Father         MI     Neurologic Disorder Father 79        Parkinson's     Diabetes Father      Hypertension Father      Coronary Artery Disease Father      Cancer Maternal Grandmother         uterine     Neurologic Disorder Sister 16        migraine     Depression Sister      Neurologic Disorder Mother 20        migraine     Depression Mother      Neurologic Disorder Son 7        migraine     Substance Abuse Son      Depression Sister      Substance Abuse Sister        Objective:  Data Unavailable Data Unavailable Data Unavailable Data Unavailable Data Unavailable 0 lbs 0 oz    PT and DP pulses are 2/4 bilaterally. CRT is instant. Positive pedal hair.   Gross sensation is intact bilaterally.   Equinus is noted bilaterally. Pes planus noted BL. HAV noted to the right with pain with palpation of the medial eminence. Pain with ROM and distraction of the joint. Edema noted to the foot.   Nails normal bilaterally. No open lesions are noted.     right foot xrays indicated in 3 weightbearing views.    hallux valgus deformity  WILFRED - 18.6  HAA - 26  Tibial sesamoid position is 6.    Assessment: Sharon is a 64 YO with painful right HAV.  She has tried and failed conservative treatment. She would like to discuss sx. I will refer her to see Dr. Wolf, however she will likely need to speak to Dr. Guerra about changing her RA drugs to allow her for normal healing.    Plan:  - Pt seen and evaluated.  - XRs taken and discussed with her.   - Start topical diclofenac.   - Referral to Dr. Wolf.   - See again PRN.

## 2021-05-10 DIAGNOSIS — Z79.899 HIGH RISK MEDICATION USE: ICD-10-CM

## 2021-05-10 DIAGNOSIS — M05.9 SEROPOSITIVE RHEUMATOID ARTHRITIS (H): ICD-10-CM

## 2021-05-10 LAB
ALBUMIN SERPL-MCNC: 3.8 G/DL (ref 3.4–5)
ALP SERPL-CCNC: 103 U/L (ref 40–150)
ALT SERPL W P-5'-P-CCNC: 54 U/L (ref 0–50)
ANION GAP SERPL CALCULATED.3IONS-SCNC: 5 MMOL/L (ref 3–14)
AST SERPL W P-5'-P-CCNC: 33 U/L (ref 0–45)
BASOPHILS # BLD AUTO: 0.1 10E9/L (ref 0–0.2)
BASOPHILS NFR BLD AUTO: 1.8 %
BILIRUB SERPL-MCNC: 0.6 MG/DL (ref 0.2–1.3)
BUN SERPL-MCNC: 12 MG/DL (ref 7–30)
CALCIUM SERPL-MCNC: 8.6 MG/DL (ref 8.5–10.1)
CHLORIDE SERPL-SCNC: 110 MMOL/L (ref 94–109)
CHOLEST SERPL-MCNC: 224 MG/DL
CO2 SERPL-SCNC: 25 MMOL/L (ref 20–32)
CREAT SERPL-MCNC: 0.73 MG/DL (ref 0.52–1.04)
CRP SERPL-MCNC: <2.9 MG/L (ref 0–8)
DIFFERENTIAL METHOD BLD: ABNORMAL
EOSINOPHIL # BLD AUTO: 1 10E9/L (ref 0–0.7)
EOSINOPHIL NFR BLD AUTO: 19.5 %
ERYTHROCYTE [DISTWIDTH] IN BLOOD BY AUTOMATED COUNT: 12.8 % (ref 10–15)
ERYTHROCYTE [SEDIMENTATION RATE] IN BLOOD BY WESTERGREN METHOD: 5 MM/H (ref 0–30)
GFR SERPL CREATININE-BSD FRML MDRD: 88 ML/MIN/{1.73_M2}
GLUCOSE SERPL-MCNC: 99 MG/DL (ref 70–99)
HCT VFR BLD AUTO: 42.8 % (ref 35–47)
HDLC SERPL-MCNC: 52 MG/DL
HGB BLD-MCNC: 13.5 G/DL (ref 11.7–15.7)
IMM GRANULOCYTES # BLD: 0 10E9/L (ref 0–0.4)
IMM GRANULOCYTES NFR BLD: 0.2 %
LDLC SERPL CALC-MCNC: 123 MG/DL
LYMPHOCYTES # BLD AUTO: 1.3 10E9/L (ref 0.8–5.3)
LYMPHOCYTES NFR BLD AUTO: 26.4 %
MCH RBC QN AUTO: 30.1 PG (ref 26.5–33)
MCHC RBC AUTO-ENTMCNC: 31.5 G/DL (ref 31.5–36.5)
MCV RBC AUTO: 96 FL (ref 78–100)
MONOCYTES # BLD AUTO: 0.5 10E9/L (ref 0–1.3)
MONOCYTES NFR BLD AUTO: 9.5 %
NEUTROPHILS # BLD AUTO: 2.2 10E9/L (ref 1.6–8.3)
NEUTROPHILS NFR BLD AUTO: 42.6 %
NONHDLC SERPL-MCNC: 172 MG/DL
NRBC # BLD AUTO: 0 10*3/UL
NRBC BLD AUTO-RTO: 0 /100
PLATELET # BLD AUTO: 193 10E9/L (ref 150–450)
POTASSIUM SERPL-SCNC: 4.3 MMOL/L (ref 3.4–5.3)
PROT SERPL-MCNC: 7.1 G/DL (ref 6.8–8.8)
RBC # BLD AUTO: 4.48 10E12/L (ref 3.8–5.2)
SODIUM SERPL-SCNC: 140 MMOL/L (ref 133–144)
TRIGL SERPL-MCNC: 243 MG/DL
WBC # BLD AUTO: 5.1 10E9/L (ref 4–11)

## 2021-05-10 PROCEDURE — 80061 LIPID PANEL: CPT | Performed by: INTERNAL MEDICINE

## 2021-05-10 PROCEDURE — 36415 COLL VENOUS BLD VENIPUNCTURE: CPT | Performed by: INTERNAL MEDICINE

## 2021-05-10 PROCEDURE — 85652 RBC SED RATE AUTOMATED: CPT | Performed by: INTERNAL MEDICINE

## 2021-05-10 PROCEDURE — 86140 C-REACTIVE PROTEIN: CPT | Performed by: INTERNAL MEDICINE

## 2021-05-10 PROCEDURE — 85025 COMPLETE CBC W/AUTO DIFF WBC: CPT | Performed by: INTERNAL MEDICINE

## 2021-05-10 PROCEDURE — 80053 COMPREHEN METABOLIC PANEL: CPT | Performed by: INTERNAL MEDICINE

## 2021-05-11 ENCOUNTER — VIRTUAL VISIT (OUTPATIENT)
Dept: RHEUMATOLOGY | Facility: CLINIC | Age: 66
End: 2021-05-11
Payer: COMMERCIAL

## 2021-05-11 DIAGNOSIS — M05.9 SEROPOSITIVE RHEUMATOID ARTHRITIS (H): Primary | ICD-10-CM

## 2021-05-11 DIAGNOSIS — Z79.899 HIGH RISK MEDICATION USE: ICD-10-CM

## 2021-05-11 PROCEDURE — 99214 OFFICE O/P EST MOD 30 MIN: CPT | Mod: 95 | Performed by: INTERNAL MEDICINE

## 2021-05-11 RX ORDER — SULFASALAZINE 500 MG/1
1500 TABLET ORAL 2 TIMES DAILY
Qty: 540 TABLET | Refills: 2 | Status: SHIPPED | OUTPATIENT
Start: 2021-05-11 | End: 2021-11-30

## 2021-05-11 RX ORDER — LEFLUNOMIDE 20 MG/1
20 TABLET ORAL DAILY
Qty: 90 TABLET | Refills: 2 | Status: SHIPPED | OUTPATIENT
Start: 2021-05-11 | End: 2021-11-30

## 2021-05-11 NOTE — PROGRESS NOTES
Sharon Fung  is a 65 year old year old female who is being evaluated via a billable video visit.      How would you like to obtain your AVS? MyChart  If the video visit is dropped, the invitation should be resent by: Text to cell phone: [phone]  Will anyone else be joining your video visit? No     Rheumatology Video Visit      Sharon Fung MRN# [id number]   YOB: 1955 Age: 65 year old      Date of visit: 5/11/21   PCP: Dr. Reynaldo Saavedra    Chief Complaint   Patient presents with:  Arthritis: RA    Assessment and Plan     1. Seropositive nonerosive rheumatoid arthritis (RF negative, CCP low positive): Previously on Humira (lost efficacy), Cimzia (ineffective), Orencia (ineffective), leflunomide (ineffective as monotherapy), hydroxychloroquine (ineffective), Xeljanz (ineffective), MTX (GI upset). Currently on SSZ 1500mg BID, leflunomide 20mg daily, and Kevzara 200mg SQ every 14 days (started 1/2019).   Note that Enbrel was denied by insurance who preferred Kevzara.  Doing well with regard to rheumatoid arthritis at this time.  Encouraged weight loss.  No longer requiring prednisone 2.5-5 mg daily as needed; she has not taken prednisone for months now.  Chronic illness, stable  - Continue sulfasalazine 1500 mg twice daily   - Continue leflunomide 20mg daily  - Continue Kevzara 200mg SQ every 14 days  - Labs in 3 months: CBC, Creatinine, Hepatic Panel, ESR, CRP    High risk medication requiring intensive toxicity monitoring at least quarterly: labs ordered include CBC, Creatinine, Hepatic panel to monitor for cytopenia and hepatotoxicity; checking creatinine as it affects clearance of medication.                 Rapid 3, cumulative scores                      10/14/2020 doing well (SSZ 1500mg BID, leflunomide 20mg daily, Kevzara 200mg q14 days)                      08/28/2019: 3.2   (SSZ 1500mg BID, Kevzara)  Now on gabapentin                      04/17/2019: 15    (SSZ 1500mg BID, Kevzara)   Mostly fibromyalgia symptoms                      12/19/2018:         (MTX 20mg SQ wkly, Xeljanz XR 11mg daily, SSZ 1500mg BID)                          05/02/2018:  9     (MTX 20mg SQ wkly, Xeljanz XR 11mg daily, SSZ 1000mg BID)                          01/31/2018: 22.3 (MTX 20mg SQ wkly, Xeljanz XR 11mg daily)                          11/29/2017: 16.8 (MTX 20mg SQ wkly)                      08/02/2017: 4.2   (MTX 20mg SQ wkly, Xeljanz XR 11mg daily)                         05/04/2017: 7      (MTX 20mg SQ wkly, Xeljanz XR 11mg daily)                        02/02/2017: 8      (MTX 20mg SQ wkly, Xeljanz XR 11mg daily)                      11/04/2016: 13    (MTX 20mg SQ weekly)                      07/15/2016: 10.8    2. Positive LORNA and dsDNA / Secondary Sjogren's Syndrome: Repeat dsDNAs have been negative. Has dry eyes but no dry mouth.  Dry eyes are treated by ophthalmology with Restasis.  Not discussed today.    3. Morbid obesity: Encouraged weight loss.  Reportedly she has had gastric sleeve surgery in the past but this became infected and had to be removed and her insurance would not approve a second weight loss surgery.  She has been working with a dietitian who she plans to meet with today, and has seen the weight loss surgeon who she says has appealed to insurance for repeat weight loss surgery but this has been unsuccessful..    4. Bilateral patellofemoral syndrome: home exercises have been helpful.  Weight loss encouraged.      5. Fibromyalgia: Managed in the pain management clinic    6.  Bone Health, vitamin D deficiency: normal 12/20/2019 DEXA; plan to repeat in 3-5 years but this may change depending on prednisone exposure.    - Continue vitamin D 2000 IU daily    7. Chronic lower back pain with left sided sciatica: suspect related to degenerative changes seen on L-spine MRI.  Following with the pain clinic for this issue    8.  Hyperlipidemia: She is trying to lose weight and meeting with a dietitian  today.  Noted that Kevzara may be playing a role.  Medical treatment with a statin could also be considered and she may discuss with PCP if weight loss efforts to reduce lipids are not successful.    9. Planning for possible right bunion surgery: following with podiatry.  Kevzara (sarilumab) should not be taken at least 2 weeks prior to the  surgery; and restarted no sooner than 2 weeks after the surgery is completed and only in the absence of infection, absence of delayed wound healing, and only after given approval to restart by the surgeon.  Leflunomide and sulfasalazine may be continued perioperatively.  She should also discuss with her surgeon.    # Status-post 2 doses of the Pfizer COVID-19 vaccine, received on 12/21/2020 and 1/7/2021    Total minutes spent in evaluation with patient, documentation, , and review of pertinent studies and chart notes: 19     Ms. Fung verbalized agreement with and understanding of the rational for the diagnosis and treatment plan.  All questions were answered to best of my ability and the patient's satisfaction. Ms. Fung was advised to contact the clinic with any questions that may arise after the clinic visit.      Thank you for involving me in the care of the patient    Return to clinic: 3-4 months    HPI   Sharon Fung is a 65 year old female with medical history significant for GERD, allergic rhinitis, obstructive sleep apnea, obesity, hx of trigger fingers, hx of carpal tunnel surgery, and rheumatoid arthritis who presents for follow-up of rheumatoid arthritis.    Today, 5/11/2021: Mild ache and stiffness that is worse in the morning and improves with a hot shower, and is much better after about 1 hour.  Overall feels stable.  Tolerating medications.  Planning to have right first MTP surgery for a bunion.  Overall happy with how well she is doing.  Notes some frustration over her weight; she states that she had a gastric sleeve placed that was very  effective but due to an infection associated with it it had to be removed and her insurance will not approve more than 1 bariatric surgery in a lifetime.    No fevers or chills. No nausea, vomiting, constipation, diarrhea. No chest pain/pressure, palpitations, or shortness of breath. No oral or nasal sores. No neck pain. No rash.      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    ROS   12 point review of system was completed and negative except as noted in the HPI     Active Problem List     Patient Active Problem List   Diagnosis     AR (allergic rhinitis)     GERD (gastroesophageal reflux disease)     Status post bariatric surgery     Mild major depression (H)     Advanced directives, counseling/discussion     High risk medication use     Obstructive sleep apnea     Obesity, Class III, BMI 40-49.9 (morbid obesity) (H)     Anterior basement membrane dystrophy     Seropositive rheumatoid arthritis (H)     LORNA positive     Carpal tunnel syndrome of right wrist     Headache     Immunosuppression (H)     Fibromyalgia     Past Medical History     Past Medical History:   Diagnosis Date     AR (allergic rhinitis)  as teen     Arthritis 12/14/2015     Chronic obstructive pulmonary disease, unspecified COPD type (H) 3/27/2018     Depressive disorder 3 years ago     GERD (gastroesophageal reflux disease) 4-07     Headache 7/11/2017     Mild major depression (H) 3 years ago     Morbid obesity (H) teens     BRETT (obstructive sleep apnea)     uses CPAP     Rheumatoid arthritis, adult (H)      Past Surgical History     Past Surgical History:   Procedure Laterality Date     BLEPHAROPLASTY BILATERAL Bilateral 8/5/2016    Procedure: BLEPHAROPLASTY BILATERAL;  Surgeon: Cortez Robin MD;  [facility]     BREAST BIOPSY, RT/LT  1-94    Benign     C APPENDECTOMY  1977     COLONOSCOPY       COLONOSCOPY WITH CO2 INSUFFLATION N/A 3/30/2017    Procedure: COLONOSCOPY  WITH CO2 INSUFFLATION;  Surgeon: Issa Weeks MD;  [facility]     ESOPHAGOSCOPY, GASTROSCOPY, DUODENOSCOPY (EGD), COMBINED N/A 10/29/2015    Procedure: COMBINED ESOPHAGOSCOPY, GASTROSCOPY, DUODENOSCOPY (EGD);  Surgeon: Shaq Mims MD;  [facility]     LAPAROSCOPIC GASTRIC ADJUSTABLE BANDING  11/09/10    Lap band procedure     LAPAROSCOPIC REMOVAL GASTRIC ADJUSTABLE BAND N/A 10/31/2015    Procedure: LAPAROSCOPIC REMOVAL GASTRIC ADJUSTABLE BAND;  Surgeon: Shaq Mims MD;  [facility]     RELEASE CARPAL TUNNEL Left 4/27/2017    Procedure: RELEASE CARPAL TUNNEL;  Left Open Carpal Tunnel Release;  Surgeon: Ciara Middleton MD;  [facility]     RELEASE CARPAL TUNNEL Right 6/15/2017    Procedure: RELEASE CARPAL TUNNEL;  Right Carpal Tunnel Release Open;  Surgeon: Ciara Middleton MD;  [facility]     REPAIR PTOSIS       TUBAL LIGATION  1988     Allergy   No Known Allergies  Current Medication List     Current Outpatient Medications   Medication Sig     acetaminophen (TYLENOL) 500 MG tablet Take 500-1,000 mg by mouth every 6 hours as needed for mild pain     azelastine (OPTIVAR) 0.05 % ophthalmic solution Apply 1 drop to eye 2 times daily     citalopram (CELEXA) 20 MG tablet Take 1 tablet (20 mg) by mouth daily     cycloSPORINE (RESTASIS) 0.05 % ophthalmic emulsion Place 1 drop into both eyes 2 times daily     diclofenac (VOLTAREN) 1 % topical gel Apply 2 g topically 4 times daily     fexofenadine (ALLEGRA) 180 MG tablet Take 1 tablet (180 mg) by mouth daily     fluticasone (FLONASE) 50 MCG/ACT nasal spray Spray 2 sprays into both nostrils as needed     gabapentin (NEURONTIN) 100 MG capsule TAKE ONE CAPSULE BY MOUTH EVERY MORNING, ONE CAPSULE BY MOUTH AT MIDDAY, AND TAKE FOUR CAPSULES BY MOUTH EVERY NIGHT AT BEDTIME     ibuprofen 200 MG capsule Take 600-800 mg by mouth At Bedtime     ketorolac (TORADOL) 10 MG tablet Take 1 tablet (10 mg) by mouth every 6  hours as needed for moderate pain     ketorolac (TORADOL) 10 MG tablet Take 1 tablet (10 mg) by mouth every 6 hours as needed for moderate pain or pain     leflunomide (ARAVA) 20 MG tablet Take 1 tablet (20 mg) by mouth daily     naproxen sodium 220 MG capsule Take 220 mg by mouth 2 times daily (with meals)     ondansetron (ZOFRAN-ODT) 8 MG ODT tab Take 1 tablet (8 mg) by mouth every 8 hours as needed for nausea     ORDER FOR DME Use your CPAP device as directed by your provider.     Sarilumab 200 MG/1.14ML SOAJ Inject 1.14 mLs (200 mg) Subcutaneous every 14 days . Hold for signs of infection and seek medical attention.     sulfaSALAzine (AZULFIDINE) 500 MG tablet Take 3 tablets (1,500 mg) by mouth 2 times daily     vitamin D3 (CHOLECALCIFEROL) 50 mcg (2000 units) tablet Take 1 tablet (50 mcg) by mouth daily     albuterol (PROAIR HFA/PROVENTIL HFA/VENTOLIN HFA) 108 (90 Base) MCG/ACT inhaler Inhale 2 puffs into the lungs every 6 hours as needed for shortness of breath / dyspnea or wheezing (Patient not taking: Reported on 2/12/2021)     No current facility-administered medications for this visit.      Facility-Administered Medications Ordered in Other Visits   Medication     sodium chloride (PF) 0.9% PF flush 10 mL     sodium chloride bacteriostatic 0.9 % flush 12 mL         Social History   See HPI    Family History     Family History   Problem Relation Age of Onset     Heart Disease Father         MI     Neurologic Disorder Father 79        Parkinson's     Diabetes Father      Hypertension Father      Coronary Artery Disease Father      Cancer Maternal Grandmother         uterine     Neurologic Disorder Sister 16        migraine     Depression Sister      Neurologic Disorder Mother 20        migraine     Depression Mother      Neurologic Disorder Son 7        migraine     Substance Abuse Son      Depression Sister      Substance Abuse Sister        Physical Exam     GEN: NAD.  Obese  HEENT: MMM.  Anicteric,  noninjected sclera. No obvious external lesions of the ear and nose. Hearing intact.  PULM: No increased work of breathing  MSK:  Hands and wrists without swelling.    SKIN: No rash or jaundice seen  PSYCH: Alert. Appropriate.     Labs / Imaging (select studies)     CBC  Recent Labs   Lab Test 05/10/21  0802 02/02/21  1056 10/12/20  1113   WBC 5.1 7.4 6.1   RBC 4.48 4.41 4.18   HGB 13.5 13.4 12.7   HCT 42.8 42.8 39.7   MCV 96 97 95   RDW 12.8 12.5 12.9    216 221   MCH 30.1 30.4 30.4   MCHC 31.5 31.3* 32.0   NEUTROPHIL 42.6 40.1 40.8   LYMPH 26.4 34.8 33.4   MONOCYTE 9.5 10.7 8.9   EOSINOPHIL 19.5 12.6 15.6   BASOPHIL 1.8 1.4 1.0   ANEU 2.2 3.0 2.5   ALYM 1.3 2.6 2.0   KIMBERLY 0.5 0.8 0.5   AEOS 1.0* 0.9* 1.0*   ABAS 0.1 0.1 0.1     CMP  Recent Labs   Lab Test 05/10/21  0802 02/02/21  1056 10/12/20  1113 11/05/19  1745 11/05/19  1745 10/08/19  0906     --   --   --  138 141   POTASSIUM 4.3  --   --   --  3.8 4.2   CHLORIDE 110*  --   --   --  103 105   CO2 25  --   --   --  28 28   ANIONGAP 5  --   --   --  7 8   GLC 99 74  --   --  87 86   BUN 12  --   --   --  13 14   CR 0.73 0.65 0.74   < > 0.74 0.66   GFRESTIMATED 88 >90 87   < > 86 >90   GFRESTBLACK >90 >90 >90   < > >90 >90   ISH 8.6  --   --   --  9.1 9.0   BILITOTAL 0.6 0.4 0.5   < > 0.5 0.4   ALBUMIN 3.8 4.0 3.8   < > 4.1 4.0   PROTTOTAL 7.1 7.1 6.9   < > 7.5 7.1   ALKPHOS 103 115 107   < > 96 96   AST 33 36 23   < > 23 28   ALT 54* 58* 42   < > 45 41    < > = values in this interval not displayed.     Calcium/VitaminD  Recent Labs   Lab Test 05/10/21  0802 10/12/20  1113 03/31/20  0755 11/05/19  1745 10/08/19  0906 02/20/15  0750 02/20/15  0750   ISH 8.6  --   --  9.1 9.0   < > 9.0   VITDT  --  33 15*  --   --   --  39    < > = values in this interval not displayed.     ESR/CRP  Recent Labs   Lab Test 05/10/21  0802 02/02/21  1056 10/12/20  1113   SED 5 4 4   CRP <2.9 <2.9 <2.9     Lipid Panel  Recent Labs   Lab Test 05/10/21  0802 03/31/20  0755  04/27/17  0732 02/20/15  0750 02/20/15  0750 10/20/14  0821 11/22/13  0843   CHOL 224* 224* 204*   < > 157 194 167   TRIG 243* 171* 148   < > 79 193* 140   HDL 52 73 82   < > 63 63 60   * 117* 92   < > 78 92 79   VLDL  --   --   --   --  16 39* 28   CHOLHDLRATIO  --   --   --   --  2.5 3.1 2.8   NHDL 172* 151* 122   < >  --   --   --     < > = values in this interval not displayed.     Hepatitis B  Recent Labs   Lab Test 12/08/15  0855 03/06/15  1650   HBCAB Nonreactive  --    HEPBANG Nonreactive Nonreactive     Hepatitis C  Recent Labs   Lab Test 12/08/15  0855 03/06/15  1650   HCVAB Nonreactive   Assay performance characteristics have not been established for newborns,   infants, and children   Nonreactive   Assay performance characteristics have not been established for newborns,   infants, and children       Tuberculosis Screening  Recent Labs   Lab Test 10/31/17  1400 02/02/17  0822 12/08/15  0855   TBRSLT Negative Negative Negative   TBAGN 0.05 0.00 0.01     HIV Screening  Recent Labs   Lab Test 07/24/18  0738   HIAGAB Nonreactive     Immunization History     Immunization History   Administered Date(s) Administered     Influenza Vaccine IM > 6 months Valent IIV4 10/15/2013, 09/15/2014, 09/28/2015, 09/28/2016, 10/16/2017, 10/01/2018     Influenza Vaccine Im 4yrs+ 4 Valent CCIIV4 10/15/2019     Pneumo Conj 13-V (2010&after) 04/15/2016     Pneumococcal 23 valent 07/15/2016     TD (ADULT, 7+) 10/30/2020     TDAP Vaccine (Adacel) 02/09/2011     Zoster vaccine recombinant adjuvanted (SHINGRIX) 04/17/2019, 08/28/2019          Chart documentation done in part with Dragon Voice recognition Software. Although reviewed after completion, some word and grammatical error may remain.         Video-Visit Details    Type of service:  Video Visit    Video Start Time: 8:12 AM    Video End Time:8:21 AM    Originating Location (pt. Location):  [place], [place]    Distant Location (provider location):  Home    Platform used for  Video Visit: Pao Guerra MD

## 2021-05-11 NOTE — PATIENT INSTRUCTIONS
RHEUMATOLOGY    Dr. Freddy Guerra    [facility]  [address] [place] [address]    Our new phone number for Rheumatology is [phone], this number will be able to help you schedule appointments for Dr. Guerra or if you have any message you would like sent to us.    Thank you for choosing [facility]!    Evie Rosas [facility] Rheumatology

## 2021-05-18 ENCOUNTER — PREP FOR PROCEDURE (OUTPATIENT)
Dept: ORTHOPEDICS | Facility: CLINIC | Age: 66
End: 2021-05-18

## 2021-05-18 ENCOUNTER — OFFICE VISIT (OUTPATIENT)
Dept: ORTHOPEDICS | Facility: CLINIC | Age: 66
End: 2021-05-18
Payer: COMMERCIAL

## 2021-05-18 VITALS — BODY MASS INDEX: 47.66 KG/M2 | WEIGHT: 269 LBS | HEIGHT: 63 IN

## 2021-05-18 DIAGNOSIS — M25.571 PAIN IN JOINT, ANKLE AND FOOT, RIGHT: Primary | ICD-10-CM

## 2021-05-18 DIAGNOSIS — D84.9 IMMUNOSUPPRESSION (H): ICD-10-CM

## 2021-05-18 DIAGNOSIS — F32.0 MILD MAJOR DEPRESSION (H): ICD-10-CM

## 2021-05-18 PROCEDURE — 99204 OFFICE O/P NEW MOD 45 MIN: CPT | Performed by: ORTHOPAEDIC SURGERY

## 2021-05-18 ASSESSMENT — PATIENT HEALTH QUESTIONNAIRE - PHQ9: SUM OF ALL RESPONSES TO PHQ QUESTIONS 1-9: 0

## 2021-05-18 ASSESSMENT — ENCOUNTER SYMPTOMS
MYALGIAS: 1
MUSCLE CRAMPS: 0
JOINT SWELLING: 1
NECK PAIN: 0
ARTHRALGIAS: 1
MUSCLE WEAKNESS: 0
BACK PAIN: 1
STIFFNESS: 1

## 2021-05-18 ASSESSMENT — MIFFLIN-ST. JEOR: SCORE: 1747.31

## 2021-05-18 NOTE — NURSING NOTE
Teaching Flowsheet   Relevant Diagnosis: Right 1st MTPJ DJD  Teaching Topic: Preop Teaching Right 1st MTPJ fusion    Sharon lives alone in [place], has daughter who can help her postop.  She is a nurse at [employer], is on immunosuppressants for RA which she will hold around time of surgery.  BMI 47, surgery will be at [employer] planned for 6/30/21     Person(s) involved in teaching:   Patient     Motivation Level:  Asks Questions: Yes  Eager to Learn: Yes  Cooperative: Yes  Receptive (willing/able to accept information): Yes  Any cultural factors/Latter-day beliefs that may influence understanding or compliance? No       Patient demonstrates understanding of the following:  Reason for the appointment, diagnosis and treatment plan: Yes  Knowledge of proper use of medications and conditions for which they are ordered (with special attention to potential side effects or drug interactions): Yes  Which situations necessitate calling provider and whom to contact: Yes       Teaching Concerns Addressed:     Proper use and care of ortho boot (medical equip, care aids, etc.): Yes  Nutritional needs and diet plan: Yes  Pain management techniques: Yes  Wound Care: Yes  How and/when to access community resources: NA     Instructional Materials Used/Given: Preop Packet and Antiseptic Soap       Time spent with patient: 15 minutes.

## 2021-05-18 NOTE — LETTER
5/18/2021         RE: Sharon Fung  [address] [facility] [address]        Dear Colleague,    Thank you for referring your patient, Sharon Fung, to the [facility] [place]. Please see a copy of my visit note below.    CHIEF COMPLAINT:  Right foot pain.    HISTORY OF PRESENT ILLNESS:  Ms. Fung is a 65-year-old female who presents today for evaluation of her right foot.  The patient reports to have developed a bunion that has become progressively more painful.  The patient reports to have pain on daily basis as well as pain with regular walking.      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  She reports to enjoy walking and she in fact is planning to do a fair amount of walking this summer in order to lose some weight.    The patient has always been evaluated by Dr. Antonio in the past, who recommend for her to visit with us for a surgical discussion.    The patient also reports to have some concerns with regards to what it would entail to improve her discomfort.  Given the fact that she is worried about being required to be nonweightbearing.    Denies to have any problems on the left foot.    PAST MEDICAL HISTORY:  Fibromyalgia, seropositive rheumatoid arthritis, obesity, gastric reflux among others.    PAST SURGICAL HISTORY:  Reviewed today.    DRUG ALLERGIES:  None.    CURRENT MEDICATIONS:  Please refer to encounter form.    PHYSICAL EXAMINATION:  The patient presents as a pleasant female, in no apparent distress with a height of 5 feet 3 inches and a weight of 269 pounds, BMI 47.3.  On today's visit, she presents with palpable pulses along the right ankle and the right foot, with a semi-correctable varus deformity.  There is full range of motion of the ankle, hindfoot and midfoot joints.  CMS intact.  Skin is intact.  There is pain  to palpation of the medial aspect of the first MTP joint as well as some painful dorsal osteophytes.    Plain x-rays of the right foot were reviewed today, which are significant for showing a reasonable amount of osteoarthritis of the first MTP joint with bone spur formation on the medial aspect of the first metatarsal as well as the lateral aspect of the proximal phalanx.  There is some joint space narrowing along the lateral half of the first MTP joint as well.  In the lateral projection, there is also a fair amount of osteophyte formation dorsally.    There are no acute findings on the x-rays.    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    PLAN:  Discussed with the patient that at this point that the safest approach to improve her discomfort long-term will be to undergo a first MTP joint fusion.  This also will facilitate her recovery given the fact that she will be weightbearing as tolerated.    I discussed with her the most likely postoperative course and complications from undergoing a first MTP joint arthrodesis, which include but not limited to infection, bleeding, nerve damage, residual pain, nonunion and stiffness.    All questions were answered after preoperatively discussion.      The patient has no activity restrictions and she will schedule surgery at her convenience, which most likely will be on 06/03/2021 and will skip several of the immunosuppression infusions that she gets and then we will be able to get it in between doses.    All questions were answered.  The patient was pleased with the discussion.    TT:  30 minutes.  CT:  20 minutes.      Jesus Wolf MD

## 2021-05-18 NOTE — NURSING NOTE
"Reason For Visit:   Chief Complaint   Patient presents with     Consult     Bunion on Right foot.     Ht 1.605 m (5' 3.19\")   Wt 122 kg (269 lb)   BMI 47.37 kg/m      Pain Assessment  Patient Currently in Pain: Yes  0-10 Pain Scale: 5  Primary Pain Location: Foot    Nisreen Álvarez ATC     "

## 2021-05-18 NOTE — PROGRESS NOTES
CHIEF COMPLAINT:  Right foot pain.    HISTORY OF PRESENT ILLNESS:  Ms. Fung is a 65-year-old female who presents today for evaluation of her right foot.  The patient reports to have developed a bunion that has become progressively more painful.  The patient reports to have pain on daily basis as well as pain with regular walking.      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  She reports to enjoy walking and she in fact is planning to do a fair amount of walking this summer in order to lose some weight.    The patient has always been evaluated by Dr. Antonio in the past, who recommend for her to visit with us for a surgical discussion.    The patient also reports to have some concerns with regards to what it would entail to improve her discomfort.  Given the fact that she is worried about being required to be nonweightbearing.    Denies to have any problems on the left foot.    PAST MEDICAL HISTORY:  Fibromyalgia, seropositive rheumatoid arthritis, obesity, gastric reflux among others.    PAST SURGICAL HISTORY:  Reviewed today.    DRUG ALLERGIES:  None.    CURRENT MEDICATIONS:  Please refer to encounter form.    PHYSICAL EXAMINATION:  The patient presents as a pleasant female, in no apparent distress with a height of 5 feet 3 inches and a weight of 269 pounds, BMI 47.3.  On today's visit, she presents with palpable pulses along the right ankle and the right foot, with a semi-correctable varus deformity.  There is full range of motion of the ankle, hindfoot and midfoot joints.  CMS intact.  Skin is intact.  There is pain to palpation of the medial aspect of the first MTP joint as well as some painful dorsal osteophytes.    Plain x-rays of the right foot were reviewed today, which are significant for showing a reasonable amount of osteoarthritis of the first MTP joint with bone spur formation on  the medial aspect of the first metatarsal as well as the lateral aspect of the proximal phalanx.  There is some joint space narrowing along the lateral half of the first MTP joint as well.  In the lateral projection, there is also a fair amount of osteophyte formation dorsally.    There are no acute findings on the x-rays.    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    PLAN:  Discussed with the patient that at this point that the safest approach to improve her discomfort long-term will be to undergo a first MTP joint fusion.  This also will facilitate her recovery given the fact that she will be weightbearing as tolerated.    I discussed with her the most likely postoperative course and complications from undergoing a first MTP joint arthrodesis, which include but not limited to infection, bleeding, nerve damage, residual pain, nonunion and stiffness.    All questions were answered after preoperatively discussion.      The patient has no activity restrictions and she will schedule surgery at her convenience, which most likely will be on 06/03/2021 and will skip several of the immunosuppression infusions that she gets and then we will be able to get it in between doses.    All questions were answered.  The patient was pleased with the discussion.    TT:  30 minutes.  CT:  20 minutes.

## 2021-05-20 DIAGNOSIS — M25.571 PAIN IN JOINT, ANKLE AND FOOT, RIGHT: Primary | ICD-10-CM

## 2021-05-26 ENCOUNTER — TELEPHONE (OUTPATIENT)
Dept: ENDOCRINOLOGY | Facility: CLINIC | Age: 66
End: 2021-05-26

## 2021-05-26 NOTE — TELEPHONE ENCOUNTER
Called pt to schedule a virtual follow up with Dr. Vallejo. Please schedule virtual visit with Yoni next avail.     Left CC and sent MyC.

## 2021-05-31 DIAGNOSIS — Z11.59 ENCOUNTER FOR SCREENING FOR OTHER VIRAL DISEASES: ICD-10-CM

## 2021-06-11 ENCOUNTER — OFFICE VISIT (OUTPATIENT)
Dept: FAMILY MEDICINE | Facility: CLINIC | Age: 66
End: 2021-06-11
Payer: COMMERCIAL

## 2021-06-11 VITALS
DIASTOLIC BLOOD PRESSURE: 78 MMHG | HEIGHT: 63 IN | BODY MASS INDEX: 47.27 KG/M2 | OXYGEN SATURATION: 94 % | HEART RATE: 104 BPM | RESPIRATION RATE: 16 BRPM | SYSTOLIC BLOOD PRESSURE: 149 MMHG | WEIGHT: 266.8 LBS

## 2021-06-11 DIAGNOSIS — M05.9 SEROPOSITIVE RHEUMATOID ARTHRITIS (H): ICD-10-CM

## 2021-06-11 DIAGNOSIS — M79.671 RIGHT FOOT PAIN: ICD-10-CM

## 2021-06-11 DIAGNOSIS — Z01.818 PRE-OP EXAM: Primary | ICD-10-CM

## 2021-06-11 DIAGNOSIS — G47.33 OBSTRUCTIVE SLEEP APNEA: ICD-10-CM

## 2021-06-11 DIAGNOSIS — D84.9 IMMUNOSUPPRESSION (H): ICD-10-CM

## 2021-06-11 DIAGNOSIS — E66.01 OBESITY, CLASS III, BMI 40-49.9 (MORBID OBESITY) (H): ICD-10-CM

## 2021-06-11 PROCEDURE — 93000 ELECTROCARDIOGRAM COMPLETE: CPT | Performed by: FAMILY MEDICINE

## 2021-06-11 PROCEDURE — 99215 OFFICE O/P EST HI 40 MIN: CPT | Performed by: FAMILY MEDICINE

## 2021-06-11 ASSESSMENT — MIFFLIN-ST. JEOR: SCORE: 1732.33

## 2021-06-11 NOTE — PROGRESS NOTES
[facility]  [address]  [place] [address]  Phone: [phone]  Primary Provider: Callum Lemons  Pre-op Performing Provider: CALLUM LEMONS      PREOPERATIVE EVALUATION:  Today's date: 6/11/2021    Sharon Fung is a 66 year old female who presents for a preoperative evaluation.    Surgical Information:  Surgery/Procedure: Foot Surgery -- right   Surgery Location: [facility]  Surgeon: Luciano  Surgery Date: 6/30  Time of Surgery:   Where patient plans to recover: At home with family  Fax number for surgical facility: not needed    Type of Anesthesia Anticipated: to be determined    Assessment & Plan     The proposed surgical procedure is considered INTERMEDIATE risk.      ICD-10-CM    1. Pre-op exam  Z01.818 EKG 12-lead complete w/read - Clinics   2. Right foot pain  M79.671    3. Seropositive rheumatoid arthritis (H)  M05.9    4. Immunosuppression (H)  D84.9    5. Obesity, Class III, BMI 40-49.9 (morbid obesity) (H)  E66.01    6. Obstructive sleep apnea  G47.33        Risks and Recommendations:  The patient has the following additional risks and recommendations for perioperative complications:   - Morbid obesity (BMI >40)   - BRETT   - Underlying RA with immunosuppressive medication    Medication Instructions:  Patient is to take all scheduled medications on the day of surgery   - DMARDs -- she will skip her next dose of sarilumab scheduled to be given next week    RECOMMENDATION:  APPROVAL GIVEN to proceed with proposed procedure, without further diagnostic evaluation.      40 minutes spent on the date of the encounter doing chart review, history and exam, documentation and further activities per the note        Subjective     HPI related to upcoming procedure: 66-year-old white female has been having ongoing right foot pain for quite some time.  She has a bunion and first MTP joint arthritis.  She is coming in now for a first MTP joint fusion to try to help her right  foot pain.    Preop Questions 6/11/2021   1. Have you ever had a heart attack or stroke? No   2. Have you ever had surgery on your heart or blood vessels, such as a stent placement, a coronary artery bypass, or surgery on an artery in your head, neck, heart, or legs? No   3. Do you have chest pain with activity? No   4. Do you have a history of  heart failure? No   5. Do you currently have a cold, bronchitis or symptoms of other infection? No   6. Do you have a cough, shortness of breath, or wheezing? YES - chronic dyspnea on exertion   7. Do you or anyone in your family have previous history of blood clots? No   8. Do you or does anyone in your family have a serious bleeding problem such as prolonged bleeding following surgeries or cuts? No   9. Have you ever had problems with anemia or been told to take iron pills? No   10. Have you had any abnormal blood loss such as black, tarry or bloody stools, or abnormal vaginal bleeding? No   11. Have you ever had a blood transfusion? No   12. Are you willing to have a blood transfusion if it is medically needed before, during, or after your surgery? Yes   13. Have you or any of your relatives ever had problems with anesthesia? No   14. Do you have sleep apnea, excessive snoring or daytime drowsiness? YES - has BRETT   14a. Do you have a CPAP machine? Yes   15. Do you have any artifical heart valves or other implanted medical devices like a pacemaker, defibrillator, or continuous glucose monitor? No   16. Do you have artificial joints? No   17. Are you allergic to latex? No       Status of Chronic Conditions:  See problem list for active medical problems.  Problems all longstanding and stable, except as noted/documented.  See ROS for pertinent symptoms related to these conditions.      Review of Systems  CONSTITUTIONAL: NEGATIVE for fever, chills, change in weight  INTEGUMENTARY/SKIN: NEGATIVE for worrisome rashes, moles or lesions  EYES: NEGATIVE for vision changes or  irritation  ENT/MOUTH: NEGATIVE for ear, mouth and throat problems  RESP: Has chronic dyspnea on exertion  BREAST: NEGATIVE for masses, tenderness or discharge  CV: Gets a little ankle swelling at times  GI: NEGATIVE for nausea, abdominal pain, heartburn, or change in bowel habits  : NEGATIVE for frequency, dysuria, or hematuria  MUSCULOSKELETAL: See above  NEURO: NEGATIVE for weakness, dizziness or paresthesias  ENDOCRINE: NEGATIVE for temperature intolerance, skin/hair changes  HEME: NEGATIVE for bleeding problems  PSYCHIATRIC: Gets frustrated and discouraged from her difficulty in losing weight    Patient Active Problem List    Diagnosis Date Noted     High risk medication use 06/14/2013     Priority: High     Pain in joint, ankle and foot, right 05/18/2021     Priority: Medium     Added automatically from request for surgery [id number]       Fibromyalgia 04/17/2019     Priority: Medium     Immunosuppression (H) 12/07/2018     Priority: Medium     Headache 07/11/2017     Priority: Medium     Carpal tunnel syndrome of right wrist 06/22/2017     Priority: Medium     Seropositive rheumatoid arthritis (H) 07/15/2016     Priority: Medium     LORNA positive 07/15/2016     Priority: Medium     Anterior basement membrane dystrophy 06/21/2016     Priority: Medium     Obesity, Class III, BMI 40-49.9 (morbid obesity) (H) 05/19/2016     Priority: Medium     Obstructive sleep apnea 05/12/2015     Priority: Medium     Problem list name updated by automated process. Provider to review       Advanced directives, counseling/discussion 06/07/2012     Priority: Medium     Patient states has Advance Directive and will bring in a copy to clinic. 6/7/2012        Mild major depression (H)      Priority: Medium     Status post bariatric surgery      Priority: Medium     lap band       AR (allergic rhinitis)      Priority: Medium     GERD (gastroesophageal reflux disease)      Priority: Medium      Past Medical History:   Diagnosis Date      AR (allergic rhinitis)  as teen     Arthritis 12/14/2015     Chronic obstructive pulmonary disease, unspecified COPD type (H) 3/27/2018     Depressive disorder 3 years ago     GERD (gastroesophageal reflux disease) 4-07     Headache 7/11/2017     Mild major depression (H) 3 years ago     Morbid obesity (H) teens     BRETT (obstructive sleep apnea)     uses CPAP     RA (rheumatoid arthritis) (H) 4 years     Reduced vision 3 years     Rheumatoid arthritis, adult (H)      Past Surgical History:   Procedure Laterality Date     BLEPHAROPLASTY BILATERAL Bilateral 8/5/2016    Procedure: BLEPHAROPLASTY BILATERAL;  Surgeon: Cortez Robin MD;  Location:  [facility]     BREAST BIOPSY, RT/LT  1-94    Benign     C APPENDECTOMY  1977     COLONOSCOPY       COLONOSCOPY WITH CO2 INSUFFLATION N/A 3/30/2017    Procedure: COLONOSCOPY WITH CO2 INSUFFLATION;  Surgeon: Issa Weeks MD;  Location: [facility] OR     ESOPHAGOSCOPY, GASTROSCOPY, DUODENOSCOPY (EGD), COMBINED N/A 10/29/2015    Procedure: COMBINED ESOPHAGOSCOPY, GASTROSCOPY, DUODENOSCOPY (EGD);  Surgeon: Shaq Mims MD;  Location: [facility]     LAPAROSCOPIC GASTRIC ADJUSTABLE BANDING  11/09/10    Lap band procedure     LAPAROSCOPIC REMOVAL GASTRIC ADJUSTABLE BAND N/A 10/31/2015    Procedure: LAPAROSCOPIC REMOVAL GASTRIC ADJUSTABLE BAND;  Surgeon: Shaq Mims MD;  Location: [facility] OR     SC HAND/FINGER SURGERY UNLISTED  2016     SC STOMACH SURGERY PROCEDURE UNLISTED  11/2015     RELEASE CARPAL TUNNEL Left 4/27/2017    Procedure: RELEASE CARPAL TUNNEL;  Left Open Carpal Tunnel Release;  Surgeon: Ciara Middleton MD;  Location: [facility]     RELEASE CARPAL TUNNEL Right 6/15/2017    Procedure: RELEASE CARPAL TUNNEL;  Right Carpal Tunnel Release Open;  Surgeon: Ciara Middleton MD;  Location: [facility]     REPAIR PTOSIS       TUBAL LIGATION  1988     Current Outpatient Medications   Medication Sig Dispense Refill     acetaminophen (TYLENOL) 500 MG tablet Take  500-1,000 mg by mouth every 6 hours as needed for mild pain       albuterol (PROAIR HFA/PROVENTIL HFA/VENTOLIN HFA) 108 (90 Base) MCG/ACT inhaler Inhale 2 puffs into the lungs every 6 hours as needed for shortness of breath / dyspnea or wheezing 1 Inhaler 1     azelastine (OPTIVAR) 0.05 % ophthalmic solution Apply 1 drop to eye 2 times daily 1 Bottle 11     citalopram (CELEXA) 20 MG tablet Take 1 tablet (20 mg) by mouth daily 90 tablet 3     cycloSPORINE (RESTASIS) 0.05 % ophthalmic emulsion Place 1 drop into both eyes 2 times daily 60 each 11     diclofenac (VOLTAREN) 1 % topical gel Apply 2 g topically 4 times daily 100 g 1     fexofenadine (ALLEGRA) 180 MG tablet Take 1 tablet (180 mg) by mouth daily 90 tablet 2     fluticasone (FLONASE) 50 MCG/ACT nasal spray Spray 2 sprays into both nostrils as needed       gabapentin (NEURONTIN) 100 MG capsule TAKE ONE CAPSULE BY MOUTH EVERY MORNING, ONE CAPSULE BY MOUTH AT MIDDAY, AND TAKE FOUR CAPSULES BY MOUTH EVERY NIGHT AT BEDTIME 180 capsule 5     ibuprofen 200 MG capsule Take 600-800 mg by mouth At Bedtime       ketorolac (TORADOL) 10 MG tablet Take 1 tablet (10 mg) by mouth every 6 hours as needed for moderate pain 10 tablet 1     ketorolac (TORADOL) 10 MG tablet Take 1 tablet (10 mg) by mouth every 6 hours as needed for moderate pain or pain 10 tablet 0     leflunomide (ARAVA) 20 MG tablet Take 1 tablet (20 mg) by mouth daily 90 tablet 2     ondansetron (ZOFRAN-ODT) 8 MG ODT tab Take 1 tablet (8 mg) by mouth every 8 hours as needed for nausea 40 tablet 2     ORDER FOR DME Use your CPAP device as directed by your provider.       Sarilumab 200 MG/1.14ML SOAJ Inject 1.14 mLs (200 mg) Subcutaneous every 14 days . Hold for signs of infection and seek medical attention. 2 pen 12     sulfaSALAzine (AZULFIDINE) 500 MG tablet Take 3 tablets (1,500 mg) by mouth 2 times daily 540 tablet 2     vitamin D3 (CHOLECALCIFEROL) 50 mcg (2000 units) tablet Take 1 tablet (50 mcg) by mouth  "daily 90 tablet 3       No Known Allergies     Social History     Tobacco Use     Smoking status: Never Smoker     Smokeless tobacco: Never Used   Substance Use Topics     Alcohol use: No     Alcohol/week: 1.0 - 2.0 standard drinks     Family History   Problem Relation Age of Onset     Heart Disease Father         MI     Neurologic Disorder Father 79        Parkinson's     Diabetes Father      Hypertension Father      Coronary Artery Disease Father      Cancer Maternal Grandmother         uterine     Neurologic Disorder Sister 16        migraine     Depression Sister      Neurologic Disorder Mother 20        migraine     Depression Mother      Neurologic Disorder Son 7        migraine     Substance Abuse Son      Migraines Son         none     Depression Sister      Substance Abuse Sister      Migraines Sister      History   Drug Use No         Objective     BP (!) 149/78   Pulse 104   Resp 16   Ht 1.605 m (5' 3.19\")   Wt 121 kg (266 lb 12.8 oz)   SpO2 94%   BMI 46.98 kg/m      Physical Exam    GENERAL APPEARANCE: alert, no distress, cooperative and over weight     EYES: EOMI, PERRL     HENT: Grossly normal     NECK: Supple     RESP: lungs clear to auscultation - no rales, rhonchi or wheezes     CV: regular rates and rhythm, normal S1 S2, no S3 or S4 and no murmur, click or rub     ABDOMEN:  soft, nontender, no HSM or masses      MS: Hallux valgus of right foot with pain at the first MTP joint     SKIN: no suspicious lesions or rashes     NEURO: Normal strength and tone, sensory exam grossly normal, mentation intact and speech normal     PSYCH: mentation appears normal and affect normal/bright      Recent Labs   Lab Test 05/10/21  0802 02/02/21  1056 11/05/19  1745 11/05/19  1745   HGB 13.5 13.4   < > 13.5    216   < > 257     --   --  138   POTASSIUM 4.3  --   --  3.8   CR 0.73 0.65   < > 0.74    < > = values in this interval not displayed.        Diagnostics:  LABS: Patient had extensive lab " work done last month.  See results below.    Orders Only on 05/10/2021   Component Date Value Ref Range Status     WBC 05/10/2021 5.1  4.0 - 11.0 10e9/L Final     RBC Count 05/10/2021 4.48  3.8 - 5.2 10e12/L Final     Hemoglobin 05/10/2021 13.5  11.7 - 15.7 g/dL Final     Hematocrit 05/10/2021 42.8  35.0 - 47.0 % Final     MCV 05/10/2021 96  78 - 100 fl Final     MCH 05/10/2021 30.1  26.5 - 33.0 pg Final     MCHC 05/10/2021 31.5  31.5 - 36.5 g/dL Final     RDW 05/10/2021 12.8  10.0 - 15.0 % Final     Platelet Count 05/10/2021 193  150 - 450 10e9/L Final     Diff Method 05/10/2021 Automated Method   Final     % Neutrophils 05/10/2021 42.6  % Final     % Lymphocytes 05/10/2021 26.4  % Final     % Monocytes 05/10/2021 9.5  % Final     % Eosinophils 05/10/2021 19.5  % Final     % Basophils 05/10/2021 1.8  % Final     % Immature Granulocytes 05/10/2021 0.2  % Final     Nucleated RBCs 05/10/2021 0  0 /100 Final     Absolute Neutrophil 05/10/2021 2.2  1.6 - 8.3 10e9/L Final     Absolute Lymphocytes 05/10/2021 1.3  0.8 - 5.3 10e9/L Final     Absolute Monocytes 05/10/2021 0.5  0.0 - 1.3 10e9/L Final     Absolute Eosinophils 05/10/2021 1.0* 0.0 - 0.7 10e9/L Final     Absolute Basophils 05/10/2021 0.1  0.0 - 0.2 10e9/L Final     Abs Immature Granulocytes 05/10/2021 0.0  0 - 0.4 10e9/L Final     Absolute Nucleated RBC 05/10/2021 0.0   Final     Sodium 05/10/2021 140  133 - 144 mmol/L Final     Potassium 05/10/2021 4.3  3.4 - 5.3 mmol/L Final     Chloride 05/10/2021 110* 94 - 109 mmol/L Final     Carbon Dioxide 05/10/2021 25  20 - 32 mmol/L Final     Anion Gap 05/10/2021 5  3 - 14 mmol/L Final     Glucose 05/10/2021 99  70 - 99 mg/dL Final    Fasting specimen     Urea Nitrogen 05/10/2021 12  7 - 30 mg/dL Final     Creatinine 05/10/2021 0.73  0.52 - 1.04 mg/dL Final     GFR Estimate 05/10/2021 88  >60 mL/min/[1.73_m2] Final    Comment: Non  GFR Calc  Starting 12/18/2018, serum creatinine based estimated GFR (eGFR)  will be   calculated using the Chronic Kidney Disease Epidemiology Collaboration   (CKD-EPI) equation.       GFR Estimate If Black 05/10/2021 >90  >60 mL/min/[1.73_m2] Final    Comment:  GFR Calc  Starting 12/18/2018, serum creatinine based estimated GFR (eGFR) will be   calculated using the Chronic Kidney Disease Epidemiology Collaboration   (CKD-EPI) equation.       Calcium 05/10/2021 8.6  8.5 - 10.1 mg/dL Final     Bilirubin Total 05/10/2021 0.6  0.2 - 1.3 mg/dL Final     Albumin 05/10/2021 3.8  3.4 - 5.0 g/dL Final     Protein Total 05/10/2021 7.1  6.8 - 8.8 g/dL Final     Alkaline Phosphatase 05/10/2021 103  40 - 150 U/L Final     ALT 05/10/2021 54* 0 - 50 U/L Final     AST 05/10/2021 33  0 - 45 U/L Final     Cholesterol 05/10/2021 224* <200 mg/dL Final    Desirable:       <200 mg/dl     Triglycerides 05/10/2021 243* <150 mg/dL Final    Comment: Borderline high:  150-199 mg/dl  High:             200-499 mg/dl  Very high:       >499 mg/dl  Fasting specimen       HDL Cholesterol 05/10/2021 52  >49 mg/dL Final     LDL Cholesterol Calculated 05/10/2021 123* <100 mg/dL Final    Comment: Above desirable:  100-129 mg/dl  Borderline High:  130-159 mg/dL  High:             160-189 mg/dL  Very high:       >189 mg/dl       Non HDL Cholesterol 05/10/2021 172* <130 mg/dL Final    Comment: Above Desirable:  130-159 mg/dl  Borderline high:  160-189 mg/dl  High:             190-219 mg/dl  Very high:       >219 mg/dl       CRP Inflammation 05/10/2021 <2.9  0.0 - 8.0 mg/L Final     Sed Rate 05/10/2021 5  0 - 30 mm/h Final       EKG: Sinus  Rhythm   Low voltage in precordial leads.    -Left atrial enlargement.     Revised Cardiac Risk Index (RCRI):  The patient has the following serious cardiovascular risks for perioperative complications:   - No serious cardiac risks = 0 points     RCRI Interpretation: 0 points: Class I (very low risk - 0.4% complication rate)           Signed Electronically by: Reynaldo Saavedra,  MD  Copy of this evaluation report is provided to requesting physician.

## 2021-06-11 NOTE — H&P (VIEW-ONLY)
[facility]  [address]  [place] [address]  Phone: [phone]  Primary Provider: Callum Lemons  Pre-op Performing Provider: CALLUM LEMONS      PREOPERATIVE EVALUATION:  Today's date: 6/11/2021    Sharon Fung is a 66 year old female who presents for a preoperative evaluation.    Surgical Information:  Surgery/Procedure: Foot Surgery -- right   Surgery Location: [facility]  Surgeon: Luciano  Surgery Date: 6/30  Time of Surgery:   Where patient plans to recover: At home with family  Fax number for surgical facility: not needed    Type of Anesthesia Anticipated: to be determined    Assessment & Plan     The proposed surgical procedure is considered INTERMEDIATE risk.      ICD-10-CM    1. Pre-op exam  Z01.818 EKG 12-lead complete w/read - Clinics   2. Right foot pain  M79.671    3. Seropositive rheumatoid arthritis (H)  M05.9    4. Immunosuppression (H)  D84.9    5. Obesity, Class III, BMI 40-49.9 (morbid obesity) (H)  E66.01    6. Obstructive sleep apnea  G47.33        Risks and Recommendations:  The patient has the following additional risks and recommendations for perioperative complications:   - Morbid obesity (BMI >40)   - BRETT   - Underlying RA with immunosuppressive medication    Medication Instructions:  Patient is to take all scheduled medications on the day of surgery   - DMARDs -- she will skip her next dose of sarilumab scheduled to be given next week    RECOMMENDATION:  APPROVAL GIVEN to proceed with proposed procedure, without further diagnostic evaluation.      40 minutes spent on the date of the encounter doing chart review, history and exam, documentation and further activities per the note        Subjective     HPI related to upcoming procedure: 66-year-old white female has been having ongoing right foot pain for quite some time.  She has a bunion and first MTP joint arthritis.  She is coming in now for a first MTP joint fusion to try to help her right  foot pain.    Preop Questions 6/11/2021   1. Have you ever had a heart attack or stroke? No   2. Have you ever had surgery on your heart or blood vessels, such as a stent placement, a coronary artery bypass, or surgery on an artery in your head, neck, heart, or legs? No   3. Do you have chest pain with activity? No   4. Do you have a history of  heart failure? No   5. Do you currently have a cold, bronchitis or symptoms of other infection? No   6. Do you have a cough, shortness of breath, or wheezing? YES - chronic dyspnea on exertion   7. Do you or anyone in your family have previous history of blood clots? No   8. Do you or does anyone in your family have a serious bleeding problem such as prolonged bleeding following surgeries or cuts? No   9. Have you ever had problems with anemia or been told to take iron pills? No   10. Have you had any abnormal blood loss such as black, tarry or bloody stools, or abnormal vaginal bleeding? No   11. Have you ever had a blood transfusion? No   12. Are you willing to have a blood transfusion if it is medically needed before, during, or after your surgery? Yes   13. Have you or any of your relatives ever had problems with anesthesia? No   14. Do you have sleep apnea, excessive snoring or daytime drowsiness? YES - has BRETT   14a. Do you have a CPAP machine? Yes   15. Do you have any artifical heart valves or other implanted medical devices like a pacemaker, defibrillator, or continuous glucose monitor? No   16. Do you have artificial joints? No   17. Are you allergic to latex? No       Status of Chronic Conditions:  See problem list for active medical problems.  Problems all longstanding and stable, except as noted/documented.  See ROS for pertinent symptoms related to these conditions.      Review of Systems  CONSTITUTIONAL: NEGATIVE for fever, chills, change in weight  INTEGUMENTARY/SKIN: NEGATIVE for worrisome rashes, moles or lesions  EYES: NEGATIVE for vision changes or  irritation  ENT/MOUTH: NEGATIVE for ear, mouth and throat problems  RESP: Has chronic dyspnea on exertion  BREAST: NEGATIVE for masses, tenderness or discharge  CV: Gets a little ankle swelling at times  GI: NEGATIVE for nausea, abdominal pain, heartburn, or change in bowel habits  : NEGATIVE for frequency, dysuria, or hematuria  MUSCULOSKELETAL: See above  NEURO: NEGATIVE for weakness, dizziness or paresthesias  ENDOCRINE: NEGATIVE for temperature intolerance, skin/hair changes  HEME: NEGATIVE for bleeding problems  PSYCHIATRIC: Gets frustrated and discouraged from her difficulty in losing weight    Patient Active Problem List    Diagnosis Date Noted     High risk medication use 06/14/2013     Priority: High     Pain in joint, ankle and foot, right 05/18/2021     Priority: Medium     Added automatically from request for surgery [id number]       Fibromyalgia 04/17/2019     Priority: Medium     Immunosuppression (H) 12/07/2018     Priority: Medium     Headache 07/11/2017     Priority: Medium     Carpal tunnel syndrome of right wrist 06/22/2017     Priority: Medium     Seropositive rheumatoid arthritis (H) 07/15/2016     Priority: Medium     LORNA positive 07/15/2016     Priority: Medium     Anterior basement membrane dystrophy 06/21/2016     Priority: Medium     Obesity, Class III, BMI 40-49.9 (morbid obesity) (H) 05/19/2016     Priority: Medium     Obstructive sleep apnea 05/12/2015     Priority: Medium     Problem list name updated by automated process. Provider to review       Advanced directives, counseling/discussion 06/07/2012     Priority: Medium     Patient states has Advance Directive and will bring in a copy to clinic. 6/7/2012        Mild major depression (H)      Priority: Medium     Status post bariatric surgery      Priority: Medium     lap band       AR (allergic rhinitis)      Priority: Medium     GERD (gastroesophageal reflux disease)      Priority: Medium      Past Medical History:   Diagnosis Date      AR (allergic rhinitis)  as teen     Arthritis 12/14/2015     Chronic obstructive pulmonary disease, unspecified COPD type (H) 3/27/2018     Depressive disorder 3 years ago     GERD (gastroesophageal reflux disease) 4-07     Headache 7/11/2017     Mild major depression (H) 3 years ago     Morbid obesity (H) teens     BRETT (obstructive sleep apnea)     uses CPAP     RA (rheumatoid arthritis) (H) 4 years     Reduced vision 3 years     Rheumatoid arthritis, adult (H)      Past Surgical History:   Procedure Laterality Date     BLEPHAROPLASTY BILATERAL Bilateral 8/5/2016    Procedure: BLEPHAROPLASTY BILATERAL;  Surgeon: Cortez Robin MD;  [facility]     BREAST BIOPSY, RT/LT  1-94    Benign     C APPENDECTOMY  1977     COLONOSCOPY       COLONOSCOPY WITH CO2 INSUFFLATION N/A 3/30/2017    Procedure: COLONOSCOPY WITH CO2 INSUFFLATION;  Surgeon: Issa Weeks MD;  Location: [facility]     ESOPHAGOSCOPY, GASTROSCOPY, DUODENOSCOPY (EGD), COMBINED N/A 10/29/2015    Procedure: COMBINED ESOPHAGOSCOPY, GASTROSCOPY, DUODENOSCOPY (EGD);  Surgeon: Shaq Mims MD;  Location: [facility]     LAPAROSCOPIC GASTRIC ADJUSTABLE BANDING  11/09/10    Lap band procedure     LAPAROSCOPIC REMOVAL GASTRIC ADJUSTABLE BAND N/A 10/31/2015    Procedure: LAPAROSCOPIC REMOVAL GASTRIC ADJUSTABLE BAND;  Surgeon: Shaq Mims MD;  Location: [facility]     GA HAND/FINGER SURGERY UNLISTED  2016     GA STOMACH SURGERY PROCEDURE UNLISTED  11/2015     RELEASE CARPAL TUNNEL Left 4/27/2017    Procedure: RELEASE CARPAL TUNNEL;  Left Open Carpal Tunnel Release;  Surgeon: Ciara Middleton MD;  Location: [facility]     RELEASE CARPAL TUNNEL Right 6/15/2017    Procedure: RELEASE CARPAL TUNNEL;  Right Carpal Tunnel Release Open;  Surgeon: Ciara Middleton MD;  Location: [facility]     REPAIR PTOSIS       TUBAL LIGATION  1988     Current Outpatient Medications   Medication Sig Dispense Refill     acetaminophen (TYLENOL) 500 MG tablet Take  500-1,000 mg by mouth every 6 hours as needed for mild pain       albuterol (PROAIR HFA/PROVENTIL HFA/VENTOLIN HFA) 108 (90 Base) MCG/ACT inhaler Inhale 2 puffs into the lungs every 6 hours as needed for shortness of breath / dyspnea or wheezing 1 Inhaler 1     azelastine (OPTIVAR) 0.05 % ophthalmic solution Apply 1 drop to eye 2 times daily 1 Bottle 11     citalopram (CELEXA) 20 MG tablet Take 1 tablet (20 mg) by mouth daily 90 tablet 3     cycloSPORINE (RESTASIS) 0.05 % ophthalmic emulsion Place 1 drop into both eyes 2 times daily 60 each 11     diclofenac (VOLTAREN) 1 % topical gel Apply 2 g topically 4 times daily 100 g 1     fexofenadine (ALLEGRA) 180 MG tablet Take 1 tablet (180 mg) by mouth daily 90 tablet 2     fluticasone (FLONASE) 50 MCG/ACT nasal spray Spray 2 sprays into both nostrils as needed       gabapentin (NEURONTIN) 100 MG capsule TAKE ONE CAPSULE BY MOUTH EVERY MORNING, ONE CAPSULE BY MOUTH AT MIDDAY, AND TAKE FOUR CAPSULES BY MOUTH EVERY NIGHT AT BEDTIME 180 capsule 5     ibuprofen 200 MG capsule Take 600-800 mg by mouth At Bedtime       ketorolac (TORADOL) 10 MG tablet Take 1 tablet (10 mg) by mouth every 6 hours as needed for moderate pain 10 tablet 1     ketorolac (TORADOL) 10 MG tablet Take 1 tablet (10 mg) by mouth every 6 hours as needed for moderate pain or pain 10 tablet 0     leflunomide (ARAVA) 20 MG tablet Take 1 tablet (20 mg) by mouth daily 90 tablet 2     ondansetron (ZOFRAN-ODT) 8 MG ODT tab Take 1 tablet (8 mg) by mouth every 8 hours as needed for nausea 40 tablet 2     ORDER FOR DME Use your CPAP device as directed by your provider.       Sarilumab 200 MG/1.14ML SOAJ Inject 1.14 mLs (200 mg) Subcutaneous every 14 days . Hold for signs of infection and seek medical attention. 2 pen 12     sulfaSALAzine (AZULFIDINE) 500 MG tablet Take 3 tablets (1,500 mg) by mouth 2 times daily 540 tablet 2     vitamin D3 (CHOLECALCIFEROL) 50 mcg (2000 units) tablet Take 1 tablet (50 mcg) by mouth  "daily 90 tablet 3       No Known Allergies     Social History     Tobacco Use     Smoking status: Never Smoker     Smokeless tobacco: Never Used   Substance Use Topics     Alcohol use: No     Alcohol/week: 1.0 - 2.0 standard drinks     Family History   Problem Relation Age of Onset     Heart Disease Father         MI     Neurologic Disorder Father 79        Parkinson's     Diabetes Father      Hypertension Father      Coronary Artery Disease Father      Cancer Maternal Grandmother         uterine     Neurologic Disorder Sister 16        migraine     Depression Sister      Neurologic Disorder Mother 20        migraine     Depression Mother      Neurologic Disorder Son 7        migraine     Substance Abuse Son      Migraines Son         none     Depression Sister      Substance Abuse Sister      Migraines Sister      History   Drug Use No         Objective     BP (!) 149/78   Pulse 104   Resp 16   Ht 1.605 m (5' 3.19\")   Wt 121 kg (266 lb 12.8 oz)   SpO2 94%   BMI 46.98 kg/m      Physical Exam    GENERAL APPEARANCE: alert, no distress, cooperative and over weight     EYES: EOMI, PERRL     HENT: Grossly normal     NECK: Supple     RESP: lungs clear to auscultation - no rales, rhonchi or wheezes     CV: regular rates and rhythm, normal S1 S2, no S3 or S4 and no murmur, click or rub     ABDOMEN:  soft, nontender, no HSM or masses      MS: Hallux valgus of right foot with pain at the first MTP joint     SKIN: no suspicious lesions or rashes     NEURO: Normal strength and tone, sensory exam grossly normal, mentation intact and speech normal     PSYCH: mentation appears normal and affect normal/bright      Recent Labs   Lab Test 05/10/21  0802 02/02/21  1056 11/05/19  1745 11/05/19  1745   HGB 13.5 13.4   < > 13.5    216   < > 257     --   --  138   POTASSIUM 4.3  --   --  3.8   CR 0.73 0.65   < > 0.74    < > = values in this interval not displayed.        Diagnostics:  LABS: Patient had extensive lab " work done last month.  See results below.    Orders Only on 05/10/2021   Component Date Value Ref Range Status     WBC 05/10/2021 5.1  4.0 - 11.0 10e9/L Final     RBC Count 05/10/2021 4.48  3.8 - 5.2 10e12/L Final     Hemoglobin 05/10/2021 13.5  11.7 - 15.7 g/dL Final     Hematocrit 05/10/2021 42.8  35.0 - 47.0 % Final     MCV 05/10/2021 96  78 - 100 fl Final     MCH 05/10/2021 30.1  26.5 - 33.0 pg Final     MCHC 05/10/2021 31.5  31.5 - 36.5 g/dL Final     RDW 05/10/2021 12.8  10.0 - 15.0 % Final     Platelet Count 05/10/2021 193  150 - 450 10e9/L Final     Diff Method 05/10/2021 Automated Method   Final     % Neutrophils 05/10/2021 42.6  % Final     % Lymphocytes 05/10/2021 26.4  % Final     % Monocytes 05/10/2021 9.5  % Final     % Eosinophils 05/10/2021 19.5  % Final     % Basophils 05/10/2021 1.8  % Final     % Immature Granulocytes 05/10/2021 0.2  % Final     Nucleated RBCs 05/10/2021 0  0 /100 Final     Absolute Neutrophil 05/10/2021 2.2  1.6 - 8.3 10e9/L Final     Absolute Lymphocytes 05/10/2021 1.3  0.8 - 5.3 10e9/L Final     Absolute Monocytes 05/10/2021 0.5  0.0 - 1.3 10e9/L Final     Absolute Eosinophils 05/10/2021 1.0* 0.0 - 0.7 10e9/L Final     Absolute Basophils 05/10/2021 0.1  0.0 - 0.2 10e9/L Final     Abs Immature Granulocytes 05/10/2021 0.0  0 - 0.4 10e9/L Final     Absolute Nucleated RBC 05/10/2021 0.0   Final     Sodium 05/10/2021 140  133 - 144 mmol/L Final     Potassium 05/10/2021 4.3  3.4 - 5.3 mmol/L Final     Chloride 05/10/2021 110* 94 - 109 mmol/L Final     Carbon Dioxide 05/10/2021 25  20 - 32 mmol/L Final     Anion Gap 05/10/2021 5  3 - 14 mmol/L Final     Glucose 05/10/2021 99  70 - 99 mg/dL Final    Fasting specimen     Urea Nitrogen 05/10/2021 12  7 - 30 mg/dL Final     Creatinine 05/10/2021 0.73  0.52 - 1.04 mg/dL Final     GFR Estimate 05/10/2021 88  >60 mL/min/[1.73_m2] Final    Comment: Non  GFR Calc  Starting 12/18/2018, serum creatinine based estimated GFR (eGFR)  will be   calculated using the Chronic Kidney Disease Epidemiology Collaboration   (CKD-EPI) equation.       GFR Estimate If Black 05/10/2021 >90  >60 mL/min/[1.73_m2] Final    Comment:  GFR Calc  Starting 12/18/2018, serum creatinine based estimated GFR (eGFR) will be   calculated using the Chronic Kidney Disease Epidemiology Collaboration   (CKD-EPI) equation.       Calcium 05/10/2021 8.6  8.5 - 10.1 mg/dL Final     Bilirubin Total 05/10/2021 0.6  0.2 - 1.3 mg/dL Final     Albumin 05/10/2021 3.8  3.4 - 5.0 g/dL Final     Protein Total 05/10/2021 7.1  6.8 - 8.8 g/dL Final     Alkaline Phosphatase 05/10/2021 103  40 - 150 U/L Final     ALT 05/10/2021 54* 0 - 50 U/L Final     AST 05/10/2021 33  0 - 45 U/L Final     Cholesterol 05/10/2021 224* <200 mg/dL Final    Desirable:       <200 mg/dl     Triglycerides 05/10/2021 243* <150 mg/dL Final    Comment: Borderline high:  150-199 mg/dl  High:             200-499 mg/dl  Very high:       >499 mg/dl  Fasting specimen       HDL Cholesterol 05/10/2021 52  >49 mg/dL Final     LDL Cholesterol Calculated 05/10/2021 123* <100 mg/dL Final    Comment: Above desirable:  100-129 mg/dl  Borderline High:  130-159 mg/dL  High:             160-189 mg/dL  Very high:       >189 mg/dl       Non HDL Cholesterol 05/10/2021 172* <130 mg/dL Final    Comment: Above Desirable:  130-159 mg/dl  Borderline high:  160-189 mg/dl  High:             190-219 mg/dl  Very high:       >219 mg/dl       CRP Inflammation 05/10/2021 <2.9  0.0 - 8.0 mg/L Final     Sed Rate 05/10/2021 5  0 - 30 mm/h Final       EKG: Sinus  Rhythm   Low voltage in precordial leads.    -Left atrial enlargement.     Revised Cardiac Risk Index (RCRI):  The patient has the following serious cardiovascular risks for perioperative complications:   - No serious cardiac risks = 0 points     RCRI Interpretation: 0 points: Class I (very low risk - 0.4% complication rate)           Signed Electronically by: Reynaldo Saavedra,  MD  Copy of this evaluation report is provided to requesting physician.

## 2021-06-26 DIAGNOSIS — Z11.59 ENCOUNTER FOR SCREENING FOR OTHER VIRAL DISEASES: ICD-10-CM

## 2021-06-26 LAB
SARS-COV-2 RNA RESP QL NAA+PROBE: NORMAL
SPECIMEN SOURCE: NORMAL

## 2021-06-26 PROCEDURE — U0005 INFEC AGEN DETEC AMPLI PROBE: HCPCS | Performed by: ORTHOPAEDIC SURGERY

## 2021-06-26 PROCEDURE — U0003 INFECTIOUS AGENT DETECTION BY NUCLEIC ACID (DNA OR RNA); SEVERE ACUTE RESPIRATORY SYNDROME CORONAVIRUS 2 (SARS-COV-2) (CORONAVIRUS DISEASE [COVID-19]), AMPLIFIED PROBE TECHNIQUE, MAKING USE OF HIGH THROUGHPUT TECHNOLOGIES AS DESCRIBED BY CMS-2020-01-R: HCPCS | Performed by: ORTHOPAEDIC SURGERY

## 2021-06-27 LAB
LABORATORY COMMENT REPORT: NORMAL
SARS-COV-2 RNA RESP QL NAA+PROBE: NEGATIVE
SPECIMEN SOURCE: NORMAL

## 2021-06-28 ENCOUNTER — ANESTHESIA EVENT (OUTPATIENT)
Dept: SURGERY | Facility: CLINIC | Age: 66
End: 2021-06-28
Payer: COMMERCIAL

## 2021-06-28 LAB — INTERPRETATION ECG - MUSE: NORMAL

## 2021-06-29 ASSESSMENT — COPD QUESTIONNAIRES
CAT_SEVERITY: MILD
COPD: 1

## 2021-06-29 NOTE — ANESTHESIA PREPROCEDURE EVALUATION
Anesthesia Pre-Procedure Evaluation    Patient: Sharon Fung   MRN: [id number] : 1955        Preoperative Diagnosis: Pain in joint, ankle and foot, right [M25.571]   Procedure : Procedure(s):  Right 1st metatarsophalangeal joint fusion     Past Medical History:   Diagnosis Date     AR (allergic rhinitis)  as teen     Arthritis 2015     Chronic obstructive pulmonary disease, unspecified COPD type (H) 3/27/2018     Depressive disorder 3 years ago     GERD (gastroesophageal reflux disease) 4-07     Headache 2017     Mild major depression (H) 3 years ago     Morbid obesity (H) teens     BRETT (obstructive sleep apnea)     uses CPAP     RA (rheumatoid arthritis) (H) 4 years     Reduced vision 3 years     Rheumatoid arthritis, adult (H)       Past Surgical History:   Procedure Laterality Date     BLEPHAROPLASTY BILATERAL Bilateral 2016    Procedure: BLEPHAROPLASTY BILATERAL;  Surgeon: Cortez Robin MD;  [facility]     BREAST BIOPSY, RT/LT      Benign     C APPENDECTOMY       COLONOSCOPY       COLONOSCOPY WITH CO2 INSUFFLATION N/A 3/30/2017    Procedure: COLONOSCOPY WITH CO2 INSUFFLATION;  Surgeon: Issa Weeks MD;  Location:  OR     ESOPHAGOSCOPY, GASTROSCOPY, DUODENOSCOPY (EGD), COMBINED N/A 10/29/2015    Procedure: COMBINED ESOPHAGOSCOPY, GASTROSCOPY, DUODENOSCOPY (EGD);  Surgeon: Shaq Mims MD;  [facility]     LAPAROSCOPIC GASTRIC ADJUSTABLE BANDING  11/09/10    Lap band procedure     LAPAROSCOPIC REMOVAL GASTRIC ADJUSTABLE BAND N/A 10/31/2015    Procedure: LAPAROSCOPIC REMOVAL GASTRIC ADJUSTABLE BAND;  Surgeon: Shaq Mims MD;  Location:  OR     DE HAND/FINGER SURGERY UNLISTED       DE STOMACH SURGERY PROCEDURE UNLISTED  2015     RELEASE CARPAL TUNNEL Left 2017    Procedure: RELEASE CARPAL TUNNEL;  Left Open Carpal Tunnel Release;  Surgeon: Ciara Middleton MD;  Location:  OR     RELEASE CARPAL TUNNEL Right  6/15/2017    Procedure: RELEASE CARPAL TUNNEL;  Right Carpal Tunnel Release Open;  Surgeon: Ciara Middleton MD;  [facility]     REPAIR PTOSIS       TUBAL LIGATION  1988      No Known Allergies   Social History     Tobacco Use     Smoking status: Never Smoker     Smokeless tobacco: Never Used   Substance Use Topics     Alcohol use: No     Alcohol/week: 1.0 - 2.0 standard drinks      Wt Readings from Last 1 Encounters:   06/11/21 121 kg (266 lb 12.8 oz)        Anesthesia Evaluation   Pt has had prior anesthetic. Type: General.    No history of anesthetic complications       ROS/MED HX  ENT/Pulmonary:     (+) sleep apnea, allergic rhinitis, mild,  COPD,     Neurologic:       Cardiovascular:  - neg cardiovascular ROS     METS/Exercise Tolerance:     Hematologic:       Musculoskeletal: Comment: RA  Fibrromyalgia      GI/Hepatic:     (+) GERD,     Renal/Genitourinary:  - neg Renal ROS     Endo:     (+) Obesity,     Psychiatric/Substance Use:     (+) psychiatric history depression     Infectious Disease: Comment: Immunosuppression       Malignancy:       Other:            Physical Exam    Airway        Mallampati: II   TM distance: > 3 FB   Neck ROM: full   Mouth opening: > 3 cm    Respiratory Devices and Support         Dental           Cardiovascular   cardiovascular exam normal       Rhythm and rate: regular and normal     Pulmonary   pulmonary exam normal        breath sounds clear to auscultation           OUTSIDE LABS:  CBC:   Lab Results   Component Value Date    WBC 5.1 05/10/2021    WBC 7.4 02/02/2021    HGB 13.5 05/10/2021    HGB 13.4 02/02/2021    HCT 42.8 05/10/2021    HCT 42.8 02/02/2021     05/10/2021     02/02/2021     BMP:   Lab Results   Component Value Date     05/10/2021     11/05/2019    POTASSIUM 4.3 05/10/2021    POTASSIUM 3.8 11/05/2019    CHLORIDE 110 (H) 05/10/2021    CHLORIDE 103 11/05/2019    CO2 25 05/10/2021    CO2 28 11/05/2019    BUN 12 05/10/2021     BUN 13 11/05/2019    CR 0.73 05/10/2021    CR 0.65 02/02/2021    GLC 99 05/10/2021    GLC 74 02/02/2021     COAGS:   Lab Results   Component Value Date    PTT 33 07/15/2017    INR 0.96 07/15/2017     POC:   Lab Results   Component Value Date    BGM 98 07/11/2017     HEPATIC:   Lab Results   Component Value Date    ALBUMIN 3.8 05/10/2021    PROTTOTAL 7.1 05/10/2021    ALT 54 (H) 05/10/2021    AST 33 05/10/2021    ALKPHOS 103 05/10/2021    BILITOTAL 0.6 05/10/2021     OTHER:   Lab Results   Component Value Date    PH 7.43 06/04/2015    LACT 2.4 (H) 11/05/2019    A1C 5.6 02/20/2015    ISH 8.6 05/10/2021    TSH 1.20 09/29/2015    CRP <2.9 05/10/2021    SED 5 05/10/2021       Anesthesia Plan    ASA Status:  3   NPO Status:  NPO Appropriate    Anesthesia Type: General.   Induction: Intravenous.   Maintenance: Balanced.   Techniques and Equipment:     - Lines/Monitors: BIS     Consents    Anesthesia Plan(s) and associated risks, benefits, and realistic alternatives discussed. Questions answered and patient/representative(s) expressed understanding.     - Discussed with:  Patient         Postoperative Care    Pain management: Multi-modal analgesia, Peripheral nerve block (Single Shot), IV analgesics, Oral pain medications.   PONV prophylaxis: Ondansetron (or other 5HT-3), Dexamethasone or Solumedrol     Comments:                Dinorah Angeles MD

## 2021-06-30 ENCOUNTER — ANCILLARY PROCEDURE (OUTPATIENT)
Dept: ULTRASOUND IMAGING | Facility: CLINIC | Age: 66
End: 2021-06-30
Attending: STUDENT IN AN ORGANIZED HEALTH CARE EDUCATION/TRAINING PROGRAM
Payer: COMMERCIAL

## 2021-06-30 ENCOUNTER — ANESTHESIA (OUTPATIENT)
Dept: SURGERY | Facility: CLINIC | Age: 66
End: 2021-06-30
Payer: COMMERCIAL

## 2021-06-30 ENCOUNTER — HOSPITAL ENCOUNTER (OUTPATIENT)
Facility: CLINIC | Age: 66
Discharge: HOME | End: 2021-06-30
Attending: ORTHOPAEDIC SURGERY | Admitting: ORTHOPAEDIC SURGERY
Payer: COMMERCIAL

## 2021-06-30 ENCOUNTER — APPOINTMENT (OUTPATIENT)
Dept: GENERAL RADIOLOGY | Facility: CLINIC | Age: 66
End: 2021-06-30
Attending: ORTHOPAEDIC SURGERY
Payer: COMMERCIAL

## 2021-06-30 VITALS
BODY MASS INDEX: 47.23 KG/M2 | HEART RATE: 89 BPM | HEIGHT: 63 IN | WEIGHT: 266.54 LBS | RESPIRATION RATE: 14 BRPM | SYSTOLIC BLOOD PRESSURE: 131 MMHG | OXYGEN SATURATION: 90 % | TEMPERATURE: 98.1 F | DIASTOLIC BLOOD PRESSURE: 73 MMHG

## 2021-06-30 DIAGNOSIS — M25.571 PAIN IN JOINT, ANKLE AND FOOT, RIGHT: ICD-10-CM

## 2021-06-30 LAB — GLUCOSE BLDC GLUCOMTR-MCNC: 126 MG/DL (ref 70–99)

## 2021-06-30 PROCEDURE — 271N000001 HC OR GENERAL SUPPLY NON-STERILE: Performed by: ORTHOPAEDIC SURGERY

## 2021-06-30 PROCEDURE — 250N000013 HC RX MED GY IP 250 OP 250 PS 637: Performed by: STUDENT IN AN ORGANIZED HEALTH CARE EDUCATION/TRAINING PROGRAM

## 2021-06-30 PROCEDURE — 250N000009 HC RX 250: Performed by: STUDENT IN AN ORGANIZED HEALTH CARE EDUCATION/TRAINING PROGRAM

## 2021-06-30 PROCEDURE — 82962 GLUCOSE BLOOD TEST: CPT

## 2021-06-30 PROCEDURE — 250N000025 HC SEVOFLURANE, PER MIN: Performed by: ORTHOPAEDIC SURGERY

## 2021-06-30 PROCEDURE — 250N000011 HC RX IP 250 OP 636: Performed by: STUDENT IN AN ORGANIZED HEALTH CARE EDUCATION/TRAINING PROGRAM

## 2021-06-30 PROCEDURE — 258N000003 HC RX IP 258 OP 636: Performed by: STUDENT IN AN ORGANIZED HEALTH CARE EDUCATION/TRAINING PROGRAM

## 2021-06-30 PROCEDURE — 710N000010 HC RECOVERY PHASE 1, LEVEL 2, PER MIN: Performed by: ORTHOPAEDIC SURGERY

## 2021-06-30 PROCEDURE — 710N000012 HC RECOVERY PHASE 2, PER MINUTE: Performed by: ORTHOPAEDIC SURGERY

## 2021-06-30 PROCEDURE — 272N000001 HC OR GENERAL SUPPLY STERILE: Performed by: ORTHOPAEDIC SURGERY

## 2021-06-30 PROCEDURE — 250N000009 HC RX 250: Performed by: ANESTHESIOLOGY

## 2021-06-30 PROCEDURE — 360N000083 HC SURGERY LEVEL 3 W/ FLUORO, PER MIN: Performed by: ORTHOPAEDIC SURGERY

## 2021-06-30 PROCEDURE — 999N000180 XR SURGERY CARM FLUORO LESS THAN 5 MIN: Mod: TC

## 2021-06-30 PROCEDURE — 250N000011 HC RX IP 250 OP 636: Performed by: ANESTHESIOLOGY

## 2021-06-30 PROCEDURE — C1713 ANCHOR/SCREW BN/BN,TIS/BN: HCPCS | Performed by: ORTHOPAEDIC SURGERY

## 2021-06-30 PROCEDURE — 999N000141 HC STATISTIC PRE-PROCEDURE NURSING ASSESSMENT: Performed by: ORTHOPAEDIC SURGERY

## 2021-06-30 PROCEDURE — 370N000017 HC ANESTHESIA TECHNICAL FEE, PER MIN: Performed by: ORTHOPAEDIC SURGERY

## 2021-06-30 DEVICE — IMP SCR ZIM CANC HEX 4.0X30X14MM PT
Type: IMPLANTABLE DEVICE | Site: FOOT | Status: NON-FUNCTIONAL
Removed: 2021-12-27

## 2021-06-30 RX ORDER — MEPERIDINE HYDROCHLORIDE 25 MG/ML
12.5 INJECTION INTRAMUSCULAR; INTRAVENOUS; SUBCUTANEOUS
Status: DISCONTINUED | OUTPATIENT
Start: 2021-06-30 | End: 2021-06-30 | Stop reason: HOSPADM

## 2021-06-30 RX ORDER — HYDROCODONE BITARTRATE AND ACETAMINOPHEN 5; 325 MG/1; MG/1
1-2 TABLET ORAL EVERY 4 HOURS PRN
Qty: 25 TABLET | Refills: 0 | Status: SHIPPED | OUTPATIENT
Start: 2021-06-30 | End: 2022-02-17

## 2021-06-30 RX ORDER — PROPOFOL 10 MG/ML
INJECTION, EMULSION INTRAVENOUS PRN
Status: DISCONTINUED | OUTPATIENT
Start: 2021-06-30 | End: 2021-06-30

## 2021-06-30 RX ORDER — ONDANSETRON 4 MG/1
4 TABLET, ORALLY DISINTEGRATING ORAL EVERY 30 MIN PRN
Status: DISCONTINUED | OUTPATIENT
Start: 2021-06-30 | End: 2021-06-30 | Stop reason: HOSPADM

## 2021-06-30 RX ORDER — DEXAMETHASONE SODIUM PHOSPHATE 10 MG/ML
INJECTION, SOLUTION INTRAMUSCULAR; INTRAVENOUS
Status: COMPLETED | OUTPATIENT
Start: 2021-06-30 | End: 2021-06-30

## 2021-06-30 RX ORDER — NALOXONE HYDROCHLORIDE 0.4 MG/ML
0.2 INJECTION, SOLUTION INTRAMUSCULAR; INTRAVENOUS; SUBCUTANEOUS
Status: DISCONTINUED | OUTPATIENT
Start: 2021-06-30 | End: 2021-06-30 | Stop reason: HOSPADM

## 2021-06-30 RX ORDER — NALOXONE HYDROCHLORIDE 0.4 MG/ML
0.4 INJECTION, SOLUTION INTRAMUSCULAR; INTRAVENOUS; SUBCUTANEOUS
Status: DISCONTINUED | OUTPATIENT
Start: 2021-06-30 | End: 2021-06-30 | Stop reason: HOSPADM

## 2021-06-30 RX ORDER — LIDOCAINE HYDROCHLORIDE 20 MG/ML
INJECTION, SOLUTION INFILTRATION; PERINEURAL PRN
Status: DISCONTINUED | OUTPATIENT
Start: 2021-06-30 | End: 2021-06-30

## 2021-06-30 RX ORDER — HYDROMORPHONE HYDROCHLORIDE 1 MG/ML
.3-.5 INJECTION, SOLUTION INTRAMUSCULAR; INTRAVENOUS; SUBCUTANEOUS EVERY 10 MIN PRN
Status: DISCONTINUED | OUTPATIENT
Start: 2021-06-30 | End: 2021-06-30 | Stop reason: HOSPADM

## 2021-06-30 RX ORDER — FLUMAZENIL 0.1 MG/ML
0.2 INJECTION, SOLUTION INTRAVENOUS
Status: DISCONTINUED | OUTPATIENT
Start: 2021-06-30 | End: 2021-06-30 | Stop reason: HOSPADM

## 2021-06-30 RX ORDER — HYDRALAZINE HYDROCHLORIDE 20 MG/ML
2.5-5 INJECTION INTRAMUSCULAR; INTRAVENOUS
Status: DISCONTINUED | OUTPATIENT
Start: 2021-06-30 | End: 2021-06-30 | Stop reason: HOSPADM

## 2021-06-30 RX ORDER — ONDANSETRON 2 MG/ML
4 INJECTION INTRAMUSCULAR; INTRAVENOUS EVERY 30 MIN PRN
Status: DISCONTINUED | OUTPATIENT
Start: 2021-06-30 | End: 2021-06-30 | Stop reason: HOSPADM

## 2021-06-30 RX ORDER — ACETAMINOPHEN 325 MG/1
975 TABLET ORAL ONCE
Status: COMPLETED | OUTPATIENT
Start: 2021-06-30 | End: 2021-06-30

## 2021-06-30 RX ORDER — FENTANYL CITRATE 50 UG/ML
25-50 INJECTION, SOLUTION INTRAMUSCULAR; INTRAVENOUS
Status: DISCONTINUED | OUTPATIENT
Start: 2021-06-30 | End: 2021-06-30 | Stop reason: HOSPADM

## 2021-06-30 RX ORDER — FENTANYL CITRATE 50 UG/ML
25-50 INJECTION, SOLUTION INTRAMUSCULAR; INTRAVENOUS EVERY 5 MIN PRN
Status: DISCONTINUED | OUTPATIENT
Start: 2021-06-30 | End: 2021-06-30 | Stop reason: HOSPADM

## 2021-06-30 RX ORDER — BUPIVACAINE HYDROCHLORIDE AND EPINEPHRINE 5; 5 MG/ML; UG/ML
INJECTION, SOLUTION PERINEURAL
Status: COMPLETED | OUTPATIENT
Start: 2021-06-30 | End: 2021-06-30

## 2021-06-30 RX ORDER — CEFAZOLIN SODIUM IN 0.9 % NACL 3 G/100 ML
3 INTRAVENOUS SOLUTION, PIGGYBACK (ML) INTRAVENOUS
Status: DISCONTINUED | OUTPATIENT
Start: 2021-06-30 | End: 2021-06-30 | Stop reason: HOSPADM

## 2021-06-30 RX ORDER — CEFAZOLIN SODIUM 1 G/3ML
1 INJECTION, POWDER, FOR SOLUTION INTRAMUSCULAR; INTRAVENOUS SEE ADMIN INSTRUCTIONS
Status: DISCONTINUED | OUTPATIENT
Start: 2021-06-30 | End: 2021-06-30 | Stop reason: HOSPADM

## 2021-06-30 RX ORDER — DEXAMETHASONE SODIUM PHOSPHATE 4 MG/ML
INJECTION, SOLUTION INTRA-ARTICULAR; INTRALESIONAL; INTRAMUSCULAR; INTRAVENOUS; SOFT TISSUE PRN
Status: DISCONTINUED | OUTPATIENT
Start: 2021-06-30 | End: 2021-06-30

## 2021-06-30 RX ORDER — ALBUTEROL SULFATE 0.83 MG/ML
2.5 SOLUTION RESPIRATORY (INHALATION) EVERY 4 HOURS PRN
Status: DISCONTINUED | OUTPATIENT
Start: 2021-06-30 | End: 2021-06-30 | Stop reason: HOSPADM

## 2021-06-30 RX ORDER — ONDANSETRON 2 MG/ML
INJECTION INTRAMUSCULAR; INTRAVENOUS PRN
Status: DISCONTINUED | OUTPATIENT
Start: 2021-06-30 | End: 2021-06-30

## 2021-06-30 RX ORDER — SODIUM CHLORIDE, SODIUM LACTATE, POTASSIUM CHLORIDE, CALCIUM CHLORIDE 600; 310; 30; 20 MG/100ML; MG/100ML; MG/100ML; MG/100ML
INJECTION, SOLUTION INTRAVENOUS CONTINUOUS PRN
Status: DISCONTINUED | OUTPATIENT
Start: 2021-06-30 | End: 2021-06-30

## 2021-06-30 RX ORDER — HYDROXYZINE HYDROCHLORIDE 25 MG/1
25 TABLET, FILM COATED ORAL
Status: DISCONTINUED | OUTPATIENT
Start: 2021-06-30 | End: 2021-06-30 | Stop reason: HOSPADM

## 2021-06-30 RX ORDER — SODIUM CHLORIDE, SODIUM LACTATE, POTASSIUM CHLORIDE, CALCIUM CHLORIDE 600; 310; 30; 20 MG/100ML; MG/100ML; MG/100ML; MG/100ML
INJECTION, SOLUTION INTRAVENOUS CONTINUOUS
Status: DISCONTINUED | OUTPATIENT
Start: 2021-06-30 | End: 2021-06-30 | Stop reason: HOSPADM

## 2021-06-30 RX ORDER — HYDROXYZINE HYDROCHLORIDE 25 MG/1
25 TABLET, FILM COATED ORAL EVERY 8 HOURS PRN
Qty: 20 TABLET | Refills: 0 | Status: SHIPPED | OUTPATIENT
Start: 2021-06-30 | End: 2022-02-17

## 2021-06-30 RX ORDER — HYDROCODONE BITARTRATE AND ACETAMINOPHEN 5; 325 MG/1; MG/1
1 TABLET ORAL
Status: DISCONTINUED | OUTPATIENT
Start: 2021-06-30 | End: 2021-06-30 | Stop reason: HOSPADM

## 2021-06-30 RX ORDER — OXYCODONE HYDROCHLORIDE 5 MG/1
5 TABLET ORAL EVERY 4 HOURS PRN
Status: DISCONTINUED | OUTPATIENT
Start: 2021-06-30 | End: 2021-06-30 | Stop reason: HOSPADM

## 2021-06-30 RX ORDER — ALBUTEROL SULFATE 90 UG/1
AEROSOL, METERED RESPIRATORY (INHALATION) PRN
Status: DISCONTINUED | OUTPATIENT
Start: 2021-06-30 | End: 2021-06-30

## 2021-06-30 RX ADMIN — ACETAMINOPHEN 975 MG: 325 TABLET, FILM COATED ORAL at 07:42

## 2021-06-30 RX ADMIN — FENTANYL CITRATE 50 MCG: 50 INJECTION INTRAMUSCULAR; INTRAVENOUS at 09:00

## 2021-06-30 RX ADMIN — FENTANYL CITRATE 50 MCG: 50 INJECTION INTRAMUSCULAR; INTRAVENOUS at 09:09

## 2021-06-30 RX ADMIN — ALBUTEROL SULFATE 6 PUFF: 108 INHALANT RESPIRATORY (INHALATION) at 10:05

## 2021-06-30 RX ADMIN — BUPIVACAINE HYDROCHLORIDE AND EPINEPHRINE BITARTRATE 10 ML: 5; .005 INJECTION, SOLUTION EPIDURAL; INTRACAUDAL; PERINEURAL at 09:21

## 2021-06-30 RX ADMIN — MIDAZOLAM 2 MG: 1 INJECTION INTRAMUSCULAR; INTRAVENOUS at 09:55

## 2021-06-30 RX ADMIN — PROPOFOL 200 MG: 10 INJECTION, EMULSION INTRAVENOUS at 09:49

## 2021-06-30 RX ADMIN — MIDAZOLAM 1 MG: 1 INJECTION INTRAMUSCULAR; INTRAVENOUS at 08:59

## 2021-06-30 RX ADMIN — DEXAMETHASONE SODIUM PHOSPHATE 4 MG: 4 INJECTION, SOLUTION INTRAMUSCULAR; INTRAVENOUS at 09:53

## 2021-06-30 RX ADMIN — LIDOCAINE HYDROCHLORIDE 100 MG: 20 INJECTION, SOLUTION INFILTRATION; PERINEURAL at 09:45

## 2021-06-30 RX ADMIN — DEXAMETHASONE SODIUM PHOSPHATE 1 MG: 10 INJECTION, SOLUTION INTRAMUSCULAR; INTRAVENOUS at 09:15

## 2021-06-30 RX ADMIN — PROPOFOL 200 MG: 10 INJECTION, EMULSION INTRAVENOUS at 09:55

## 2021-06-30 RX ADMIN — SODIUM CHLORIDE, POTASSIUM CHLORIDE, SODIUM LACTATE AND CALCIUM CHLORIDE: 600; 310; 30; 20 INJECTION, SOLUTION INTRAVENOUS at 09:37

## 2021-06-30 RX ADMIN — BUPIVACAINE HYDROCHLORIDE AND EPINEPHRINE BITARTRATE 20 ML: 5; .005 INJECTION, SOLUTION EPIDURAL; INTRACAUDAL; PERINEURAL at 09:15

## 2021-06-30 RX ADMIN — ONDANSETRON 4 MG: 2 INJECTION INTRAMUSCULAR; INTRAVENOUS at 09:49

## 2021-06-30 RX ADMIN — DEXAMETHASONE SODIUM PHOSPHATE 1 MG: 10 INJECTION, SOLUTION INTRAMUSCULAR; INTRAVENOUS at 09:21

## 2021-06-30 RX ADMIN — MIDAZOLAM 1 MG: 1 INJECTION INTRAMUSCULAR; INTRAVENOUS at 09:04

## 2021-06-30 ASSESSMENT — MIFFLIN-ST. JEOR: SCORE: 1731.13

## 2021-06-30 NOTE — ANESTHESIA PROCEDURE NOTES
Saphenous Procedure Note  Pre-Procedure   Staff -        Anesthesiologist:  Kathy Worrell MD       Resident/Fellow: Rivera Doran MD       Performed By: fellow       Location: pre-op       Procedure Start/Stop Times: 6/30/2021 9:16 AM and 6/30/2021 9:21 AM       Pre-Anesthestic Checklist: patient identified, IV checked, site marked, risks and benefits discussed, informed consent, monitors and equipment checked, pre-op evaluation, at physician/surgeon's request and post-op pain management  Timeout:       Correct Patient: Yes        Correct Procedure: Yes        Correct Site: Yes        Correct Position: Yes        Correct Laterality: Yes        Site Marked: Yes  Procedure Documentation  Procedure: Saphenous       Diagnosis: POST OPERATIVE PAIN       Laterality: right       Patient Position: supine       Patient Prep/Sterile Barriers: sterile gloves, mask       Skin prep: Chloraprep       Needle Type: short bevel       Needle Gauge: 21.        Needle Length (millimeters): 110        Ultrasound guided       1. Ultrasound was used to identify targeted nerve, plexus, vascular marker, or fascial plane and place a needle adjacent to it in real-time.       2. Ultrasound was used to visualize the spread of anesthetic in close proximity to the above referenced structure.       3. A permanent image is entered into the patient's record.    Assessment/Narrative         The placement was negative for: blood aspirated, painful injection and site bleeding       Paresthesias: No.     Bolus given via needle..        Secured via.        Insertion/Infusion Method: Single Shot       Complications: none       Injection made incrementally with aspirations every 5 mL.    Medication(s) Administered   Bupivacaine 0.5% w/ 1:200K Epi (Other), 10 mL  Dexamethasone 10 mg/mL PF (Perineural), 1 mg  Medication Administration Time: 6/30/2021 9:21 AM    Comments:  Discussed risks of nerve block, including nerve injury, bleeding, infection,  incomplete analgesia. Discussed anticipated areas of sensory and motor block, limb precautions, and fall precautions. Discussed alternative of not performing a nerve block. Ensured understanding, invited questions and all questions were answered. Patient wishes to proceed.  Informed consent was obtained.   Patient tolerated well. Incremental aspiration every 5 mL. No paresthesia, no heme. Needle tip visualized throughout with appropriate spread of local anesthetic around nerves.   Block was placed at the surgeon's request for post operative pain control.

## 2021-06-30 NOTE — ANESTHESIA PROCEDURE NOTES
Popliteal Procedure Note  Pre-Procedure   Staff -        Anesthesiologist:  Kathy Worrell MD       Resident/Fellow: Rivera Doran MD       Performed By: fellow       Location: pre-op       Procedure Start/Stop Times: 6/30/2021 9:00 AM and 6/30/2021 9:15 AM       Pre-Anesthestic Checklist: patient identified, IV checked, site marked, risks and benefits discussed, informed consent, monitors and equipment checked, pre-op evaluation, at physician/surgeon's request and post-op pain management  Timeout:       Correct Patient: Yes        Correct Procedure: Yes        Correct Site: Yes        Correct Position: Yes        Correct Laterality: Yes        Site Marked: Yes  Procedure Documentation  Procedure: Popliteal       Diagnosis: POST OPERATIVE PAIN       Laterality: right       Patient Position: supine       Patient Prep/Sterile Barriers: sterile gloves, mask       Skin prep: Chloraprep       Needle Type: short bevel       Needle Gauge: 21.        Needle Length (millimeters): 110        Ultrasound guided       1. Ultrasound was used to identify targeted nerve, plexus, vascular marker, or fascial plane and place a needle adjacent to it in real-time.       2. Ultrasound was used to visualize the spread of anesthetic in close proximity to the above referenced structure.       3. A permanent image is entered into the patient's record.    Assessment/Narrative         The placement was negative for: blood aspirated, painful injection and site bleeding       Paresthesias: No.     Bolus given via needle..        Secured via.        Insertion/Infusion Method: Single Shot       Complications: none       Injection made incrementally with aspirations every 5 mL.    Medication(s) Administered   Bupivacaine 0.5% w/ 1:200K Epi (Other), 20 mL  Dexamethasone 10 mg/mL PF (Perineural), 1 mg  Medication Administration Time: 6/30/2021 9:15 AM    Comments:  Discussed risks of nerve block, including nerve injury, bleeding, infection,  incomplete analgesia. Discussed anticipated areas of sensory and motor block, limb precautions, and fall precautions. Discussed alternative of not performing a nerve block. Ensured understanding, invited questions and all questions were answered. Patient wishes to proceed.  Informed consent was obtained.   Patient tolerated well. Incremental aspiration every 5 mL. No paresthesia, no heme. Needle tip visualized throughout with appropriate spread of local anesthetic around nerves.   Block was placed at the surgeon's request for post operative pain control.

## 2021-06-30 NOTE — ANESTHESIA CARE TRANSFER NOTE
Patient: Sharon Fung    Procedure(s):  Right 1st metatarsophalangeal joint fusion    Diagnosis: Pain in joint, ankle and foot, right [M25.571]  Diagnosis Additional Information: No value filed.    Anesthesia Type:   General     Note:    Oropharynx: spontaneously breathing  Level of Consciousness: awake  Oxygen Supplementation: nasal cannula  Level of Supplemental Oxygen (L/min / FiO2): 4  Independent Airway: airway patency satisfactory and stable  Dentition: dentition unchanged  Vital Signs Stable: post-procedure vital signs reviewed and stable  Report to RN Given: handoff report given  Patient transferred to: PACU    Handoff Report: Identifed the Patient, Identified the Reponsible Provider, Reviewed the pertinent medical history, Discussed the surgical course, Reviewed Intra-OP anesthesia mangement and issues during anesthesia, Set expectations for post-procedure period and Allowed opportunity for questions and acknowledgement of understanding      Vitals: (Last set prior to Anesthesia Care Transfer)  CRNA VITALS  6/30/2021 0958 - 6/30/2021 1036      6/30/2021             Pulse:  107    SpO2:  94 %        Electronically Signed By: Dinorah Angeles MD  June 30, 2021  10:36 AM

## 2021-06-30 NOTE — BRIEF OP NOTE
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  Complications: None.  Implants:   Implant Name Type Inv. Item Serial No.  Lot No. LRB No. Used Action   IMP SCR ZIM CANC HEX [id number] PT - [id number] [employer]allic [employer]ware/[place] IMP SCR ZIM CANC HEX [id number] PT  [employer]  Right 2 Implanted       Plan:  Same Day surgery discharge to home once criteria met.  CAM boot to remain on right  lower extremity and WBAT.  Norco and atarax for pain.  No dressing change on own.  Leave dressing on until 2 weeks follow up appointment.  F/U in clinic in 2 weeks    I was asked by Dr. Wolf to assist with surgery. I positioned and prepped the patient. I retracted soft tissue.   I suctioned fluids when needed. I provided traction for dissection. I helped to ligate blood vessels. I helped Dr. Wolf identify and protect important structures. The procedure was medically necessary for an assistant because Dr. Wolf needed the operative exposure and assistance that I provided. This allowed him to safely and efficiently operate. It was also important that I help ligate blood vessels to maintain hemostasis and reduce the bleeding risk. I helped with the closure of the operative incisions as well as helping with the boot/cast/splint.  The assistance that I provided reduced operative time which meant less general anesthetic for the patient. No qualified residents were available to assist.    Nisreen Gifford PA-C

## 2021-06-30 NOTE — DISCHARGE INSTRUCTIONS
Same-Day Surgery   Adult Discharge Orders & Instructions     For 24 hours after surgery:  1. Get plenty of rest.  A responsible adult must stay with you for at least 24 hours after you leave the hospital.   2. Pain medication can slow your reflexes. Do not drive or use heavy equipment.  If you have weakness or tingling, don't drive or use heavy equipment until this feeling goes away.  3. Mixing alcohol and pain medication can cause dizziness and slow your breathing. It can even be fatal. Do not drink alcohol while taking pain medication.  4. Avoid strenuous or risky activities.  Ask for help when climbing stairs.   5. You may feel lightheaded.  If so, sit for a few minutes before standing.  Have someone help you get up.   6. If you have nausea (feel sick to your stomach), drink only clear liquids such as apple juice, ginger ale, broth or 7-Up.  Rest may also help.  Be sure to drink enough fluids.  Move to a regular diet as you feel able. Take pain medications with a small amount of solid food, such as toast or crackers, to avoid nausea.   7. A slight fever is normal. Call the doctor if your fever is over 100 F (37.7 C) (taken under the tongue) or lasts longer than 24 hours.  8. You may have a dry mouth, muscle aches, trouble sleeping or a sore throat.  These symptoms should go away after 24 hours.  9. Do not make important or legal decisions.   Pain Management:      1. Take pain medication (if prescribed) for pain as directed by your physician.        2. WARNING: If the pain medication you have been prescribed contains Tylenol  (acetaminophen), DO NOT take additional doses of Tylenol (acetaminophen).     Call your doctor for any of the followin.  Signs of infection (fever, growing tenderness at the surgery site, severe pain, a large amount of drainage or bleeding, foul-smelling drainage, redness, swelling).    2.  It has been over 8 to 10 hours since surgery and you are still not able to urinate (pee).    3.   Headache for over 24 hours.    4.  Numbness, tingling or weakness the day after surgery (if you had spinal anesthesia).  To contact a doctor, call _____________________________________ or:      [phone] and ask for the Resident On Call for:          __________________________________________ (answered 24 hours a day)      Emergency Department:  [facility]: [phone]  [facility]: [phone]               Rev. 10/2014

## 2021-06-30 NOTE — ANESTHESIA POSTPROCEDURE EVALUATION
Patient: Sharon Fung    Procedure(s):  Right 1st metatarsophalangeal joint fusion    Diagnosis:Pain in joint, ankle and foot, right [M25.571]  Diagnosis Additional Information: No value filed.    Anesthesia Type:  General    Note:  Disposition: Outpatient   Postop Pain Control: Uneventful            Sign Out: Well controlled pain   PONV: No   Neuro/Psych: Uneventful            Sign Out: Acceptable/Baseline neuro status   Airway/Respiratory: Uneventful            Sign Out: Acceptable/Baseline resp. status   CV/Hemodynamics: Uneventful            Sign Out: Acceptable CV status; No obvious hypovolemia; No obvious fluid overload   Other NRE: NONE   DID A NON-ROUTINE EVENT OCCUR? No           Last vitals:  Vitals:    06/30/21 1140 06/30/21 1156 06/30/21 1200   BP: (!) 140/77 131/73 131/73   Pulse:      Resp: 16 14 14   Temp: 36.7  C (98.1  F) 36.7  C (98.1  F)    SpO2: 92% 92% 90%       Last vitals prior to Anesthesia Care Transfer:  CRNA VITALS  6/30/2021 0958 - 6/30/2021 1058      6/30/2021             Pulse:  107    SpO2:  94 %          Electronically Signed By: Bernardo Linn MD  June 30, 2021  12:52 PM

## 2021-06-30 NOTE — ANESTHESIA PROCEDURE NOTES
Airway       Patient location during procedure: OR  Staff -        Anesthesiologist:  Bernardo Beck MD       Resident/Fellow: Dinorah Angeles MD       Performed By: resident and with residents       Procedure performed by resident/fellow/CRNA in presence of a teaching physician.    Consent for Airway        Urgency: elective  Indications and Patient Condition       Indications for airway management: guy-procedural         Mask difficulty assessment: 0 - not attempted    Final Airway Details       Final airway type: endotracheal airway       Successful airway: ETT - single  Endotracheal Airway Details        ETT size (mm): 7.0       Cuffed: yes       Successful intubation technique: direct laryngoscopy       DL Blade Type: MAC 3       Grade View of Cords: 2       Adjucts: stylet       Position: Right       Measured from: gums/teeth       Secured at (cm): 23       Bite block used: None    Post intubation assessment        Placement verified by: capnometry        Number of attempts at approach: 1       Secured with: pink tape       Ease of procedure: easy       Dentition: Intact and Unchanged    Medication(s) Administered   Medication Administration Time: 6/30/2021 10:00 AM    Additional Comments       Attempted to place #5 LMA and reattempted with #4 LMA. Ventilating small tidal volumes. Unable to maintain seal so ETT placed by staff

## 2021-06-30 NOTE — ANESTHESIA CARE TRANSFER NOTE
Patient: Sharon Fung    Procedure(s):  Right 1st metatarsophalangeal joint fusion    Diagnosis: Pain in joint, ankle and foot, right [M25.571]  Diagnosis Additional Information: No value filed.    Anesthesia Type:   General     Note:  Anesthesia Care Transfer Notewriter  Vitals: (Last set prior to Anesthesia Care Transfer)  CRNA VITALS  6/30/2021 0958 - 6/30/2021 1058      6/30/2021             Pulse:  107    SpO2:  94 %        Electronically Signed By: Dinorah Agneles MD  June 30, 2021  12:13 PM

## 2021-07-06 NOTE — OP NOTE
Procedure Date: 06/30/2021    PREOPERATIVE DIAGNOSIS:  Right first MTP joint arthritis.    POSTOPERATIVE DIAGNOSIS:  Right first metatarsophalangeal joint arthritis.    PROCEDURE:  Right first MTP joint arthrodesis.    SURGEON:  Jesus Wolf MD.    ASSISTANT:  Nisreen Gifford PA-C. Mrs. Gifford's assistance was required in order to provide assistance with positioning, the surgery itself, holding retractors, closure of the wound and application of immobilization devices.  At the time of the surgery, there was no available help from an orthopedic trainee.    COMPLICATIONS:  None.    SPECIMENS:  None.    ESTIMATED BLOOD LOSS:  Less than 20 mL.    ANESTHESIA:  General endotracheal.    INDICATIONS:  Please refer to clinic note for further details.  Discussed indications with Ms. Borges.      PROCEDURE NOTE:  On 06/30/2021 patient was taken to surgery.  Preoperative antibiotics were administered to the patient prior to arrival to the OR.  After successful induction of general endotracheal anesthesia, she was placed supine on the operating table.  The right lower extremity was prepped and draped in sterile fashion.  After exsanguination by gravity, and tourniquet cuff was inflated to 300 mmHg on the proximal third of the right thigh.    The pause for the cause was performed according to institutions policy which confirmed laterality of the procedure.    An incision was made on the dorsal medial aspect of the first MTP joint.  Subcutaneous were dissected.  The joint was identified and we proceeded with removal of the cartilaginous surface.  Bleeding subchondral bone was exposed with a 2.5 mm drill bit.  The medial resection of the medial eminence of the first metatarsal was also performed in order to avoid any prominence medially.    Provisional fixation was accomplished with a K-wire, which confirmed to have a physiological alignment of the great toe and this was followed by definitive fixation with 4.0 mm screws  x2 from dorsal and proximal to distal and plantar with excellent purchase.    We confirmed the fluoroscopic examination and 3 views of the right foot to have excellent location of the hardware as well as reduction of the joint.  These images were sent to PACS for definitive documentation.    The tourniquet was deflated.  Satisfactory hemostasis was accomplished.  Wound was closed in layers.  Sterile dressings were applied.  The patient was placed in a short CAM walker and transferred in stable condition to PACU.    PLAN:  The patient will remain weightbearing as tolerated with use of the CAM Walker.  CAM walker will be utilized at all times except for hygiene for the first 6 weeks from surgery.  She will return to clinic at 2 weeks from surgery for suture removal will be followed by appointment at 6 weeks from surgery, and at that time, 3 views of the right foot will be obtained, and based on those findings, further recommendations will be given to the patient.  The patient will not pursue any physical therapy for the first 6 weeks from surgery.      Jesus Wolf MD        D: 2021   T: 2021   MT: [email or web address]    Name:     ALONZO HOLMAN  MRN:      [id number]        Account:        [id number]   :      1955           Procedure Date: 2021     Document: [id number]

## 2021-07-07 ENCOUNTER — MYC MEDICAL ADVICE (OUTPATIENT)
Dept: ORTHOPEDICS | Facility: CLINIC | Age: 66
End: 2021-07-07

## 2021-07-07 NOTE — TELEPHONE ENCOUNTER
Sharon was called by RN regarding her postop pain Right 1st MTPJ fusion on 6/30/21.  Pt states the Norco was not helping much, she also is taking OTC analgesics, icing, elevating on bolster.  Pt states she is afebrile, has appetite, otherwise is feeling well.  She states her nurse friend helped change the outer dressings which got wet during a shower, toe dressings remained dry.  Pt states she is able to move her other toes and the skin color on her foot is normal.  Pt states the ice seems to help even more than when taking Norco.  We reviewed normal sensations after MTPJ fusion, the nerve block has now worn off.  Pt was advised to continue elevating, icing, resting, and monitoring.  Pt verbalized understanding.  She has appt next week for suture removal, and will call us if needed before then.  Karina Luu RN

## 2021-07-14 ENCOUNTER — OFFICE VISIT (OUTPATIENT)
Dept: ORTHOPEDICS | Facility: CLINIC | Age: 66
End: 2021-07-14
Payer: COMMERCIAL

## 2021-07-14 DIAGNOSIS — M25.571 PAIN IN JOINT, ANKLE AND FOOT, RIGHT: Primary | ICD-10-CM

## 2021-07-14 PROCEDURE — 99024 POSTOP FOLLOW-UP VISIT: CPT

## 2021-07-14 NOTE — PROGRESS NOTES
Reason for visit:    Sharon Fung came in to the clinic for a two week post op check.    Her surgery was done 6/30/21 by Dr Wolf.  She had right 1st MTPJ fusion      Assessment:    Sharon came into the clinic in CAM walker WBAT    The Surgical wounds were exposed and found to be well-healed and without evidence of infection; so the sutures were removed. CMS was found to be intact.    Plan:     She was placed in CAM walker that will be utilized at all times except hygiene.  She was told to WBAT     She has an appointment to see Dr. Wolf at that time Dr. Wolf will determine further restrictions.    She has our phone number and will call with questions or problems.

## 2021-07-27 ENCOUNTER — MYC MEDICAL ADVICE (OUTPATIENT)
Dept: FAMILY MEDICINE | Facility: CLINIC | Age: 66
End: 2021-07-27

## 2021-07-27 DIAGNOSIS — G47.33 OBSTRUCTIVE SLEEP APNEA: Primary | ICD-10-CM

## 2021-08-03 ENCOUNTER — APPOINTMENT (OUTPATIENT)
Dept: GENERAL RADIOLOGY | Facility: CLINIC | Age: 66
End: 2021-08-03
Attending: EMERGENCY MEDICINE
Payer: COMMERCIAL

## 2021-08-03 ENCOUNTER — HOSPITAL ENCOUNTER (EMERGENCY)
Facility: CLINIC | Age: 66
Discharge: HOME | End: 2021-08-03
Attending: EMERGENCY MEDICINE | Admitting: EMERGENCY MEDICINE
Payer: COMMERCIAL

## 2021-08-03 VITALS
SYSTOLIC BLOOD PRESSURE: 170 MMHG | RESPIRATION RATE: 16 BRPM | TEMPERATURE: 97.6 F | DIASTOLIC BLOOD PRESSURE: 67 MMHG | HEART RATE: 76 BPM | OXYGEN SATURATION: 95 %

## 2021-08-03 DIAGNOSIS — Z51.89 VISIT FOR WOUND CHECK: ICD-10-CM

## 2021-08-03 DIAGNOSIS — T81.31XA RUPTURE OF OPERATION WOUND: ICD-10-CM

## 2021-08-03 LAB
ANION GAP SERPL CALCULATED.3IONS-SCNC: 2 MMOL/L (ref 3–14)
BASOPHILS # BLD AUTO: 0.1 10E3/UL (ref 0–0.2)
BASOPHILS NFR BLD AUTO: 1 %
BUN SERPL-MCNC: 14 MG/DL (ref 7–30)
CALCIUM SERPL-MCNC: 9.1 MG/DL (ref 8.5–10.1)
CHLORIDE BLD-SCNC: 107 MMOL/L (ref 94–109)
CO2 SERPL-SCNC: 29 MMOL/L (ref 20–32)
CREAT SERPL-MCNC: 0.66 MG/DL (ref 0.52–1.04)
CRP SERPL-MCNC: <2.9 MG/L (ref 0–8)
EOSINOPHIL # BLD AUTO: 0.5 10E3/UL (ref 0–0.7)
EOSINOPHIL NFR BLD AUTO: 9 %
ERYTHROCYTE [DISTWIDTH] IN BLOOD BY AUTOMATED COUNT: 12.7 % (ref 10–15)
ERYTHROCYTE [SEDIMENTATION RATE] IN BLOOD BY WESTERGREN METHOD: 6 MM/HR (ref 0–30)
GFR SERPL CREATININE-BSD FRML MDRD: >90 ML/MIN/1.73M2
GLUCOSE BLD-MCNC: 74 MG/DL (ref 70–99)
HCT VFR BLD AUTO: 42.3 % (ref 35–47)
HGB BLD-MCNC: 13.3 G/DL (ref 11.7–15.7)
HOLD SPECIMEN: NORMAL
IMM GRANULOCYTES # BLD: 0 10E3/UL
IMM GRANULOCYTES NFR BLD: 0 %
LYMPHOCYTES # BLD AUTO: 2 10E3/UL (ref 0.8–5.3)
LYMPHOCYTES NFR BLD AUTO: 35 %
MCH RBC QN AUTO: 29.5 PG (ref 26.5–33)
MCHC RBC AUTO-ENTMCNC: 31.4 G/DL (ref 31.5–36.5)
MCV RBC AUTO: 94 FL (ref 78–100)
MONOCYTES # BLD AUTO: 0.7 10E3/UL (ref 0–1.3)
MONOCYTES NFR BLD AUTO: 11 %
NEUTROPHILS # BLD AUTO: 2.5 10E3/UL (ref 1.6–8.3)
NEUTROPHILS NFR BLD AUTO: 44 %
NRBC # BLD AUTO: 0 10E3/UL
NRBC BLD AUTO-RTO: 0 /100
PLATELET # BLD AUTO: 206 10E3/UL (ref 150–450)
POTASSIUM BLD-SCNC: 3.9 MMOL/L (ref 3.4–5.3)
RBC # BLD AUTO: 4.51 10E6/UL (ref 3.8–5.2)
SODIUM SERPL-SCNC: 138 MMOL/L (ref 133–144)
WBC # BLD AUTO: 5.8 10E3/UL (ref 4–11)

## 2021-08-03 PROCEDURE — 86140 C-REACTIVE PROTEIN: CPT | Performed by: EMERGENCY MEDICINE

## 2021-08-03 PROCEDURE — 36415 COLL VENOUS BLD VENIPUNCTURE: CPT | Performed by: EMERGENCY MEDICINE

## 2021-08-03 PROCEDURE — 73630 X-RAY EXAM OF FOOT: CPT | Mod: RT

## 2021-08-03 PROCEDURE — 85025 COMPLETE CBC W/AUTO DIFF WBC: CPT | Performed by: EMERGENCY MEDICINE

## 2021-08-03 PROCEDURE — 99207 PR NO BILLABLE SERVICE THIS VISIT: CPT | Performed by: PHYSICIAN ASSISTANT

## 2021-08-03 PROCEDURE — 85652 RBC SED RATE AUTOMATED: CPT | Performed by: EMERGENCY MEDICINE

## 2021-08-03 PROCEDURE — 99284 EMERGENCY DEPT VISIT MOD MDM: CPT

## 2021-08-03 PROCEDURE — 99284 EMERGENCY DEPT VISIT MOD MDM: CPT | Performed by: EMERGENCY MEDICINE

## 2021-08-03 PROCEDURE — 80048 BASIC METABOLIC PNL TOTAL CA: CPT | Performed by: EMERGENCY MEDICINE

## 2021-08-03 RX ORDER — CEPHALEXIN 500 MG/1
500 CAPSULE ORAL 4 TIMES DAILY
Qty: 28 CAPSULE | Refills: 0 | Status: SHIPPED | OUTPATIENT
Start: 2021-08-03 | End: 2021-08-10

## 2021-08-03 ASSESSMENT — ENCOUNTER SYMPTOMS
WOUND: 1
FEVER: 0

## 2021-08-03 NOTE — ED PROVIDER NOTES
ED Provider Note  [facility]      History     Chief Complaint   Patient presents with     Wound Check     wound on right foot is draining from  incision site     HPI  Sharon Fung is a 66 year old female with a PMH of COPD, RA, BRETT on CPAP and GERD who presents to the ED today complaining of a right foot wound check. Patient underwent first metatarsal phalangeal joint fusion approximately 1 month ago.  Today she reports that the ER surgical wound yesterday does not seem to be healing up and she has begun to have discharge from an area of dehiscence in the wound.  She has noticed over the past day that her right foot has gotten somewhat more swollen and yellow-red in appearance and that her pain level is increasing.  She is concerned about the possibility of infection.  She denies fevers and has no other specific complaints at this time.    Past Medical History  Past Medical History:   Diagnosis Date     AR (allergic rhinitis)  as teen     Arthritis 12/14/2015     Chronic obstructive pulmonary disease, unspecified COPD type (H) 3/27/2018     Depressive disorder 3 years ago     GERD (gastroesophageal reflux disease) 4-07     Headache 7/11/2017     Mild major depression (H) 3 years ago     Morbid obesity (H) teens     BRETT (obstructive sleep apnea)     uses CPAP     RA (rheumatoid arthritis) (H) 4 years     Reduced vision 3 years     Rheumatoid arthritis, adult (H)      Past Surgical History:   Procedure Laterality Date     ARTHRODESIS FOOT Right 6/30/2021    Procedure: Right 1st metatarsophalangeal joint fusion;  Surgeon: Jesus Wolf MD;  Location: [facility]     BLEPHAROPLASTY BILATERAL Bilateral 8/5/2016    Procedure: BLEPHAROPLASTY BILATERAL;  Surgeon: Cortez Robin MD;  Location: [facility]     BREAST BIOPSY, RT/LT  1-94    Benign     C APPENDECTOMY  1977     COLONOSCOPY       COLONOSCOPY WITH CO2 INSUFFLATION N/A 3/30/2017    Procedure: COLONOSCOPY WITH CO2 INSUFFLATION;   Surgeon: Issa Weeks MD;  Location: [facility] OR     ESOPHAGOSCOPY, GASTROSCOPY, DUODENOSCOPY (EGD), COMBINED N/A 10/29/2015    Procedure: COMBINED ESOPHAGOSCOPY, GASTROSCOPY, DUODENOSCOPY (EGD);  Surgeon: Shaq Mims MD;  Location: [facility] GI     LAPAROSCOPIC GASTRIC ADJUSTABLE BANDING  11/09/10    Lap band procedure     LAPAROSCOPIC REMOVAL GASTRIC ADJUSTABLE BAND N/A 10/31/2015    Procedure: LAPAROSCOPIC REMOVAL GASTRIC ADJUSTABLE BAND;  Surgeon: Shaq Mims MD;  Location: [facility] OR     FL HAND/FINGER SURGERY UNLISTED  2016     FL STOMACH SURGERY PROCEDURE UNLISTED  11/2015     RELEASE CARPAL TUNNEL Left 4/27/2017    Procedure: RELEASE CARPAL TUNNEL;  Left Open Carpal Tunnel Release;  Surgeon: Ciara Middleton MD;  [facility]     RELEASE CARPAL TUNNEL Right 6/15/2017    Procedure: RELEASE CARPAL TUNNEL;  Right Carpal Tunnel Release Open;  Surgeon: Ciara Middleton MD;  [facility]     REPAIR PTOSIS       TUBAL LIGATION  1988     acetaminophen (TYLENOL) 500 MG tablet  albuterol (PROAIR HFA/PROVENTIL HFA/VENTOLIN HFA) 108 (90 Base) MCG/ACT inhaler  azelastine (OPTIVAR) 0.05 % ophthalmic solution  cephALEXin (KEFLEX) 500 MG capsule  citalopram (CELEXA) 20 MG tablet  cycloSPORINE (RESTASIS) 0.05 % ophthalmic emulsion  diclofenac (VOLTAREN) 1 % topical gel  fexofenadine (ALLEGRA) 180 MG tablet  fluticasone (FLONASE) 50 MCG/ACT nasal spray  gabapentin (NEURONTIN) 100 MG capsule  HYDROcodone-acetaminophen (NORCO) 5-325 MG tablet  hydrOXYzine (ATARAX) 25 MG tablet  ibuprofen 200 MG capsule  leflunomide (ARAVA) 20 MG tablet  ondansetron (ZOFRAN-ODT) 8 MG ODT tab  Sarilumab 200 MG/1.14ML SOAJ  sulfaSALAzine (AZULFIDINE) 500 MG tablet  vitamin D3 (CHOLECALCIFEROL) 50 mcg (2000 units) tablet  ORDER FOR DME      No Known Allergies  Family History  Family History   Problem Relation Age of Onset     Heart Disease Father         MI     Neurologic Disorder Father 79        Parkinson's      Diabetes Father      Hypertension Father      Coronary Artery Disease Father      Cancer Maternal Grandmother         uterine     Neurologic Disorder Sister 16        migraine     Depression Sister      Neurologic Disorder Mother 20        migraine     Depression Mother      Neurologic Disorder Son 7        migraine     Substance Abuse Son      Migraines Son         none     Depression Sister      Substance Abuse Sister      Migraines Sister      Social History   Social History     Tobacco Use     Smoking status: Never Smoker     Smokeless tobacco: Never Used   Substance Use Topics     Alcohol use: No     Alcohol/week: 1.0 - 2.0 standard drinks     Drug use: No      Past medical history, past surgical history, medications, allergies, family history, and social history were reviewed with the patient. No additional pertinent items.       Review of Systems   Constitutional: Negative for fever.   Skin: Positive for wound (Right foot surgical site, swelling, color change, drainage and dehiscence).   All other systems reviewed and are negative.    A complete review of systems was performed with pertinent positives and negatives noted in the HPI, and all other systems negative.    Physical Exam   BP: (!) 164/82  Pulse: 77  Temp: 97.6  F (36.4  C)  Resp: 18  SpO2: 94 %  Physical Exam  Vitals and nursing note reviewed.   Constitutional:       General: She is not in acute distress.     Appearance: She is well-developed. She is not ill-appearing or toxic-appearing.   HENT:      Head: Normocephalic and atraumatic.   Eyes:      General: No scleral icterus.     Pupils: Pupils are equal, round, and reactive to light.   Cardiovascular:      Rate and Rhythm: Normal rate.   Pulmonary:      Effort: Pulmonary effort is normal. No respiratory distress.   Musculoskeletal:      Cervical back: Normal range of motion and neck supple.        Feet:    Skin:     General: Skin is warm and dry.      Coloration: Skin is not pale.       Findings: No rash.   Neurological:      Mental Status: She is alert and oriented to person, place, and time.      Sensory: No sensory deficit.   Psychiatric:         Behavior: Behavior normal.           ED Course      Procedures              Results for orders placed or performed during the hospital encounter of 08/03/21   Foot  XR, G/E 3 views, right     Status: None    Narrative    FOOT RIGHT THREE OR MORE VIEWS August 3, 2021 12:20 PM     HISTORY: Postoperative swelling redness and discharge.    COMPARISON: 6/30/2021.    FINDINGS: Again there is first MTP arthrodesis. Plantar calcaneal  spurring.      Impression    IMPRESSION: No soft tissue gas or focal bone destruction.    ROSLYN HA MD         SYSTEM ID:  [id number]   Basic metabolic panel     Status: Abnormal   Result Value Ref Range    Sodium 138 133 - 144 mmol/L    Potassium 3.9 3.4 - 5.3 mmol/L    Chloride 107 94 - 109 mmol/L    Carbon Dioxide (CO2) 29 20 - 32 mmol/L    Anion Gap 2 (L) 3 - 14 mmol/L    Urea Nitrogen 14 7 - 30 mg/dL    Creatinine 0.66 0.52 - 1.04 mg/dL    Calcium 9.1 8.5 - 10.1 mg/dL    Glucose 74 70 - 99 mg/dL    GFR Estimate >90 >60 mL/min/1.73m2   CRP inflammation     Status: Normal   Result Value Ref Range    CRP Inflammation <2.9 0.0 - 8.0 mg/L   Erythrocyte sedimentation rate auto     Status: Normal   Result Value Ref Range    Erythrocyte Sedimentation Rate 6 0 - 30 mm/hr   CBC with platelets and differential     Status: Abnormal   Result Value Ref Range    WBC Count 5.8 4.0 - 11.0 10e3/uL    RBC Count 4.51 3.80 - 5.20 10e6/uL    Hemoglobin 13.3 11.7 - 15.7 g/dL    Hematocrit 42.3 35.0 - 47.0 %    MCV 94 78 - 100 fL    MCH 29.5 26.5 - 33.0 pg    MCHC 31.4 (L) 31.5 - 36.5 g/dL    RDW 12.7 10.0 - 15.0 %    Platelet Count 206 150 - 450 10e3/uL    % Neutrophils 44 %    % Lymphocytes 35 %    % Monocytes 11 %    % Eosinophils 9 %    % Basophils 1 %    % Immature Granulocytes 0 %    NRBCs per 100 WBC 0 <1 /100    Absolute Neutrophils 2.5 1.6  - 8.3 10e3/uL    Absolute Lymphocytes 2.0 0.8 - 5.3 10e3/uL    Absolute Monocytes 0.7 0.0 - 1.3 10e3/uL    Absolute Eosinophils 0.5 0.0 - 0.7 10e3/uL    Absolute Basophils 0.1 0.0 - 0.2 10e3/uL    Absolute Immature Granulocytes 0.0 <=0.0 10e3/uL    Absolute NRBCs 0.0 10e3/uL   Extra Red Top Tube     Status: None   Result Value Ref Range    Hold Specimen [facility]    CBC with platelets differential     Status: Abnormal    Narrative    The following orders were created for panel order CBC with platelets differential.  Procedure                               Abnormality         Status                     ---------                               -----------         ------                     CBC with platelets and d...[[id number]]  Abnormal            Final result                 Please view results for these tests on the individual orders.   [place] Draw *Canceled*     Status: None ()    Narrative    The following orders were created for panel order [place] Draw.  Procedure                               Abnormality         Status                     ---------                               -----------         ------                       Please view results for these tests on the individual orders.   [place] Draw     Status: None    Narrative    The following orders were created for panel order [place] Draw.  Procedure                               Abnormality         Status                     ---------                               -----------         ------                     Extra Blood Culture Bottle[[id number]]                                                  Extra Red Top Tube[[id number]]                               Final result                 Please view results for these tests on the individual orders.     Medications - No data to display     Assessments & Plan (with Medical Decision Making)   This patient is sent to the emergency department with concerns about possible wound infection.  She is afebrile, no obvious  clinical signs of abscess.  X-ray demonstrated no signs of bony erosion or gas in the soft tissue. Sed rate, CRP and white blood cell count are all within normal limits decreasing all suspicion for more serious deeper infection or hardware infection.  I did consult with orthopedics who saw the patient in the emergency department and patient will be placed on a very short course of Keflex for possible early wound infection.  She is set to follow-up in the orthopedic clinic with Dr. Leong next week and will follow through on this.  She was discharged in good condition.    I have reviewed the nursing notes. I have reviewed the findings, diagnosis, plan and need for follow up with the patient.    Discharge Medication List as of 8/3/2021  2:59 PM      START taking these medications    Details   cephALEXin (KEFLEX) 500 MG capsule Take 1 capsule (500 mg) by mouth 4 times daily for 7 days, Disp-28 capsule, R-0, E-Prescribe             Final diagnoses:   Visit for wound check   I, Lio Corral, am serving as a trained medical scribe to document services personally performed by Nhan España MD, based on the provider's statements to me.     I, Nhan España MD, was physically present and have reviewed and verified the accuracy of this note documented by Lio Corral.      --  Nhan España MD  [facility] EMERGENCY DEPARTMENT  8/3/2021     Nhan España MD  08/04/21 1044

## 2021-08-03 NOTE — CONSULTS
[facility] Orthopedic Consultation    Sharon Fung MRN# [id number]   Age: 66 year old YOB: 1955   Date of Admission:  8/3/2021    Reason for consult: Right foot pain    Requesting physician: Nhan España*              Impression and Recommendation:   The orthopaedic surgery service was consulted by Nhan Sarkar* for evaluation and treatment recommendations of Right foot pain.    Impression:  Sharon Fung is a 66 year old female with PMH of COPD, RA, BRETT on CPAP and GERD. She is approximately 5 weeks status-post Right first MTP joint arthrodesis on 6/30/21 with Dr. Wolf who presented to the ED with worsening pain, swelling, wound dehiscence and drainage to the left great toe. No evidence of abscess, hardware failure, acute infection on exam today. Laboratory studies wnl.      Recomendations:  - 7 day course of oral Keflex for infection prevention.  - Keep wound clean and dry.   - WBAT to the RLE in CAM boot. Current CAM boot in poor repair, will obtain a new one in ED today. Recommended that she take it easy to allow the soft tissue to heal.   - Keep follow up appt with Dr. Wolf on 8/10/21 for recheck.     Assessment and Plan discussed with Dr. Jesus Wolf, Orthopedic Surgery attending.          Chief Complaint:   Right foot pain and swelling.          History of Present Illness:   Sharon Fung is a 66 year old female with PMH of COPD, RA, BRETT on CPAP and GERD. She is approximately 5 weeks status-post Right first MTP joint arthrodesis on 6/30/21 with Dr. Wolf.  Patient reports that she has been doing well until proximately 3 days ago when she developed increasing pain and swelling to the right foot.  She also notes a small area of wound dehiscence over the mid aspect of the surgical incision.  There is very mild clear red drainage from the area on her sock.  Patient reports that she has been compliant with wearing cam boot to the right lower extremity at  all times.  She is taking easy and elevating while at work and at home.    Denies numbness, tingling, or weakness to the affected extremities.  Denies fevers, chills, nausea, vomiting, diarrhea, constipation, chest pain, shortness of breath.     History obtained from patient interview and chart review.        Past Medical History:     Past Medical History:   Diagnosis Date     AR (allergic rhinitis)  as teen     Arthritis 12/14/2015     Chronic obstructive pulmonary disease, unspecified COPD type (H) 3/27/2018     Depressive disorder 3 years ago     GERD (gastroesophageal reflux disease) 4-07     Headache 7/11/2017     Mild major depression (H) 3 years ago     Morbid obesity (H) teens     BRETT (obstructive sleep apnea)     uses CPAP     RA (rheumatoid arthritis) (H) 4 years     Reduced vision 3 years     Rheumatoid arthritis, adult (H)              Past Surgical History:     Past Surgical History:   Procedure Laterality Date     ARTHRODESIS FOOT Right 6/30/2021    Procedure: Right 1st metatarsophalangeal joint fusion;  Surgeon: Jesus Wolf MD;  [facility] OR     BLEPHAROPLASTY BILATERAL Bilateral 8/5/2016    Procedure: BLEPHAROPLASTY BILATERAL;  Surgeon: Cortez Robin MD;  [facility]     BREAST BIOPSY, RT/LT  1-94    Benign     C APPENDECTOMY  1977     COLONOSCOPY       COLONOSCOPY WITH CO2 INSUFFLATION N/A 3/30/2017    Procedure: COLONOSCOPY WITH CO2 INSUFFLATION;  Surgeon: Issa Weeks MD;  Location:  OR     ESOPHAGOSCOPY, GASTROSCOPY, DUODENOSCOPY (EGD), COMBINED N/A 10/29/2015    Procedure: COMBINED ESOPHAGOSCOPY, GASTROSCOPY, DUODENOSCOPY (EGD);  Surgeon: Shaq iMms MD;  Location:  GI     LAPAROSCOPIC GASTRIC ADJUSTABLE BANDING  11/09/10    Lap band procedure     LAPAROSCOPIC REMOVAL GASTRIC ADJUSTABLE BAND N/A 10/31/2015    Procedure: LAPAROSCOPIC REMOVAL GASTRIC ADJUSTABLE BAND;  Surgeon: Shaq Mims MD;  Location:  OR     [place] HAND/FINGER SURGERY  UNLISTED  2016     [place] STOMACH SURGERY PROCEDURE UNLISTED  11/2015     RELEASE CARPAL TUNNEL Left 4/27/2017    Procedure: RELEASE CARPAL TUNNEL;  Left Open Carpal Tunnel Release;  Surgeon: Ciara Middleton MD;  [facility]     RELEASE CARPAL TUNNEL Right 6/15/2017    Procedure: RELEASE CARPAL TUNNEL;  Right Carpal Tunnel Release Open;  Surgeon: Ciara Middleton MD;  Location:  OR     REPAIR PTOSIS       TUBAL LIGATION  1988             Social History:   Reviewed in chart.           Family History:   No family history of anesthesia, bleeding or clotting complications.           Allergies:   No Known Allergies          Medications:   Medication reviewed with patient and in chart.  Anticoagulation: None  Antibiotics: None          Review of Systems:   CONSTITUTIONAL:  negative for  fevers, chills, sweats, fatigue, malaise and weight loss  HEENT:  negative for tinnitus, earaches, nasal congestion, epistaxis, sore throat  RESPIRATORY:  negative for dyspnea, wheezing, chest pain and cough.  CARDIOVASCULAR:  negative for chest pain, palpitations, orthopnea, edema, syncope.  GASTROINTESTINAL:  negative for nausea, vomiting, diarrhea, constipation, abdominal pain.  GENITOURINARY:  negative for dysuria, nocturia, urinary incontinence and hematuria  INTEGUMENT/BREAST:  negative for rash.  HEMATOLOGIC/LYMPHATIC:  negative for easy bruising, bleeding, swelling/edema  ALLERGIC/IMMUNOLOGIC:  negative for recurrent infections and drug reactions  ENDOCRINE: negative for diabetic symptoms including polyuria and polydipsia  MUSCULOSKELETAL: negative for myalgias, arthralgias, joint swelling and muscle weakness  NEUROLOGICAL: negative for headaches, dizziness, gait problems, numbness/tingling          Physical Exam:     Vitals:    08/03/21 1114   BP: (!) 164/82   Pulse: 77   Resp: 18   Temp: 97.6  F (36.4  C)   TempSrc: Oral   SpO2: 94%     General: Awake, alert, appropriate, following commands, NAD.  Neuro: EOM  grossly intact  Skin: No rashes,  skin color normal.  HEENT: Normal.   Lungs: Breathing comfortably and nonlabored, no wheezes or stridor noted.  Heart/Cardiovascular: Regular pulse, no peripheral cyanosis.  Abdomen: Soft, non-tender, non-distended.     Right Lower Extremity:   - No gross deformity.   -Well-healed surgical incision over the dorsal aspect of the right great toe.  There is a small 5 mm area of wound dehiscence at the level of the MTP.  No palpable fluctuance, no active drainage.  Mild hyperemia to the foot.  Mild edema.  No significant erythema or warmth.   -Mild tenderness to palpation over the surgical site, no significant tenderness to palpation throughout the remaining foot.  - SILT sp, dp, sa, ferrer, ti n. Distributions.   - DP/PT pulses palpable, toes warm and well perfused.          Imaging:   All imaging independently reviewed.  3 views of the right foot from 8/3/2021 demonstrate status post surgical changes to the right first MTP.  Hardware appears to be intact with stable positioning and no evidence of hardware failure.  No obvious fractures, no soft tissue gas.          Laboratory date:   CBC:  Lab Results   Component Value Date    WBC 5.8 08/03/2021    HGB 13.3 08/03/2021     08/03/2021       BMP:  Lab Results   Component Value Date     08/03/2021    POTASSIUM 3.9 08/03/2021    CHLORIDE 107 08/03/2021    CO2 29 08/03/2021    BUN 14 08/03/2021    CR 0.66 08/03/2021    ANIONGAP 2 (L) 08/03/2021    ISH 9.1 08/03/2021    GLC 74 08/03/2021       Inflammatory Markers:  Lab Results   Component Value Date    WBC 5.8 08/03/2021    CRP <2.9 08/03/2021    SED 6 08/03/2021       Cultures:  No results for input(s): CULT in the last 168 hours.    ROLANDO PADRON PA-C  8/3/2021 3:03 PM  Orthopaedic Surgery     Thank you for allowing me to participate in this patient's care. Please page me directly any questions/concerns.   Securely message with the Vocera Web Console (learn more here)  Text  page via [facility] Paging/Directory    If there is no response, if it is a weekend, or if it is during evening hours, please page the orthopaedic surgery resident on call via [facility] Paging/Directory

## 2021-08-03 NOTE — DISCHARGE INSTRUCTIONS
Follow-up with Dr. Bell as scheduled.    Keflex as directed.    Return to the emergency department for any problems.

## 2021-08-06 DIAGNOSIS — M54.16 LUMBAR RADICULOPATHY: ICD-10-CM

## 2021-08-07 RX ORDER — GABAPENTIN 100 MG/1
CAPSULE ORAL
Qty: 180 CAPSULE | Refills: 5 | Status: SHIPPED | OUTPATIENT
Start: 2021-08-07 | End: 2022-02-09

## 2021-08-10 ENCOUNTER — OFFICE VISIT (OUTPATIENT)
Dept: ORTHOPEDICS | Facility: CLINIC | Age: 66
End: 2021-08-10
Payer: COMMERCIAL

## 2021-08-10 ENCOUNTER — ANCILLARY PROCEDURE (OUTPATIENT)
Dept: GENERAL RADIOLOGY | Facility: CLINIC | Age: 66
End: 2021-08-10
Attending: ORTHOPAEDIC SURGERY
Payer: COMMERCIAL

## 2021-08-10 DIAGNOSIS — M25.571 PAIN IN JOINT, ANKLE AND FOOT, RIGHT: ICD-10-CM

## 2021-08-10 DIAGNOSIS — M25.571 PAIN IN JOINT, ANKLE AND FOOT, RIGHT: Primary | ICD-10-CM

## 2021-08-10 PROCEDURE — 99024 POSTOP FOLLOW-UP VISIT: CPT | Performed by: ORTHOPAEDIC SURGERY

## 2021-08-10 PROCEDURE — 73630 X-RAY EXAM OF FOOT: CPT | Mod: RT | Performed by: RADIOLOGY

## 2021-08-10 NOTE — LETTER
8/10/2021         RE: Sharon Fung  [address] [id number]        Dear Colleague,    Thank you for referring your patient, Sharon Fung, to the [facility]. Please see a copy of my visit note below.    CHIEF COMPLAINT:  Status post right first MTP joint fusion performed on 06/30/2021.    HISTORY OF PRESENT ILLNESS:  Mrs. Fung presents today for further evaluation.  Reports to be doing well.  Reports to be compliant.  Reports also to go back to work.    PHYSICAL EXAMINATION:  On today's visit, she presents with a well-healed surgical incision; however, there is a bit of an opening along the most distal portion of the wound, which seemed to match either a skin crease or some rubbing from the boot.  There are no signs of infection or inflammation.  There is no active drainage.  There is no odor.    Presents with a fair amount of swelling of the soft tissues.  It is hard to tell if she presented with any gross motion across the first MTP joint.  I suspect she does, but it is not confirmed.    IMAGING:  Three views of the right foot were obtained today, which were significant for showing partial consolidation across the fusion site.  Hardware is intact and in place.  Alignment is excellent.  I do not visualize any sort of loosening or shadowing around the screws.    ASSESSMENT:  Status post right first MTP joint arthrodesis.    PLAN:  I discussed with the patient that I suspect that she is doing too much.  I encouraged her to slow down quite a bit and to minimize her activities.  With regards to the wound, we are going to apply some Betadine in order to dehydrate it and, therefore, to accomplish some scabbing of the wound.    The patient will follow up in 6 weeks from now, and at that time, 3 views of the right foot will be obtained, and based on those findings, further recommendations will be given to the patient.  The patient is to maintain  the CAM walker at all times except for hygiene for the next 6 weeks as well.    Jesus Wolf MD

## 2021-08-10 NOTE — PROGRESS NOTES
CHIEF COMPLAINT:  Status post right first MTP joint fusion performed on 06/30/2021.    HISTORY OF PRESENT ILLNESS:  Mrs. Fung presents today for further evaluation.  Reports to be doing well.  Reports to be compliant.  Reports also to go back to work.    PHYSICAL EXAMINATION:  On today's visit, she presents with a well-healed surgical incision; however, there is a bit of an opening along the most distal portion of the wound, which seemed to match either a skin crease or some rubbing from the boot.  There are no signs of infection or inflammation.  There is no active drainage.  There is no odor.    Presents with a fair amount of swelling of the soft tissues.  It is hard to tell if she presented with any gross motion across the first MTP joint.  I suspect she does, but it is not confirmed.    IMAGING:  Three views of the right foot were obtained today, which were significant for showing partial consolidation across the fusion site.  Hardware is intact and in place.  Alignment is excellent.  I do not visualize any sort of loosening or shadowing around the screws.    ASSESSMENT:  Status post right first MTP joint arthrodesis.    PLAN:  I discussed with the patient that I suspect that she is doing too much.  I encouraged her to slow down quite a bit and to minimize her activities.  With regards to the wound, we are going to apply some Betadine in order to dehydrate it and, therefore, to accomplish some scabbing of the wound.    The patient will follow up in 6 weeks from now, and at that time, 3 views of the right foot will be obtained, and based on those findings, further recommendations will be given to the patient.  The patient is to maintain the CAM walker at all times except for hygiene for the next 6 weeks as well.

## 2021-08-10 NOTE — NURSING NOTE
Reason For Visit:   Chief Complaint   Patient presents with     RECHECK     6wk POP Right 1st MTPJ Fusion DOS 6/30/21       There were no vitals taken for this visit.    Pain Assessment  Patient Currently in Pain: Yes  0-10 Pain Scale: 5 (pain is intermittent)  Primary Pain Location: Other (Comment) (pain localized in big toe area, throbbing nerve ending pain)    Fede Davison, EMT

## 2021-08-23 ENCOUNTER — LAB (OUTPATIENT)
Dept: LAB | Facility: CLINIC | Age: 66
End: 2021-08-23
Payer: COMMERCIAL

## 2021-08-23 DIAGNOSIS — Z79.899 HIGH RISK MEDICATION USE: ICD-10-CM

## 2021-08-23 DIAGNOSIS — M05.9 SEROPOSITIVE RHEUMATOID ARTHRITIS (H): ICD-10-CM

## 2021-08-23 LAB
ALBUMIN SERPL-MCNC: 3.9 G/DL (ref 3.4–5)
ALP SERPL-CCNC: 104 U/L (ref 40–150)
ALT SERPL W P-5'-P-CCNC: 53 U/L (ref 0–50)
AST SERPL W P-5'-P-CCNC: 33 U/L (ref 0–45)
BASOPHILS # BLD AUTO: 0.1 10E3/UL (ref 0–0.2)
BASOPHILS NFR BLD AUTO: 1 %
BILIRUB DIRECT SERPL-MCNC: 0.1 MG/DL (ref 0–0.2)
BILIRUB SERPL-MCNC: 0.6 MG/DL (ref 0.2–1.3)
CREAT SERPL-MCNC: 0.73 MG/DL (ref 0.52–1.04)
CRP SERPL-MCNC: <2.9 MG/L (ref 0–8)
EOSINOPHIL # BLD AUTO: 0.6 10E3/UL (ref 0–0.7)
EOSINOPHIL NFR BLD AUTO: 9 %
ERYTHROCYTE [DISTWIDTH] IN BLOOD BY AUTOMATED COUNT: 13 % (ref 10–15)
ERYTHROCYTE [SEDIMENTATION RATE] IN BLOOD BY WESTERGREN METHOD: 4 MM/HR (ref 0–30)
GFR SERPL CREATININE-BSD FRML MDRD: 87 ML/MIN/1.73M2
HCT VFR BLD AUTO: 41.5 % (ref 35–47)
HGB BLD-MCNC: 13 G/DL (ref 11.7–15.7)
IMM GRANULOCYTES # BLD: 0 10E3/UL
IMM GRANULOCYTES NFR BLD: 0 %
LYMPHOCYTES # BLD AUTO: 2.2 10E3/UL (ref 0.8–5.3)
LYMPHOCYTES NFR BLD AUTO: 30 %
MCH RBC QN AUTO: 30.2 PG (ref 26.5–33)
MCHC RBC AUTO-ENTMCNC: 31.3 G/DL (ref 31.5–36.5)
MCV RBC AUTO: 96 FL (ref 78–100)
MONOCYTES # BLD AUTO: 1 10E3/UL (ref 0–1.3)
MONOCYTES NFR BLD AUTO: 13 %
NEUTROPHILS # BLD AUTO: 3.3 10E3/UL (ref 1.6–8.3)
NEUTROPHILS NFR BLD AUTO: 47 %
NRBC # BLD AUTO: 0 10E3/UL
NRBC BLD AUTO-RTO: 0 /100
PLATELET # BLD AUTO: 216 10E3/UL (ref 150–450)
PROT SERPL-MCNC: 7.3 G/DL (ref 6.8–8.8)
RBC # BLD AUTO: 4.31 10E6/UL (ref 3.8–5.2)
WBC # BLD AUTO: 7.2 10E3/UL (ref 4–11)

## 2021-08-23 PROCEDURE — 36415 COLL VENOUS BLD VENIPUNCTURE: CPT

## 2021-08-23 PROCEDURE — 82040 ASSAY OF SERUM ALBUMIN: CPT

## 2021-08-23 PROCEDURE — 85652 RBC SED RATE AUTOMATED: CPT

## 2021-08-23 PROCEDURE — 86140 C-REACTIVE PROTEIN: CPT

## 2021-08-23 PROCEDURE — 82565 ASSAY OF CREATININE: CPT

## 2021-08-23 PROCEDURE — 85025 COMPLETE CBC W/AUTO DIFF WBC: CPT

## 2021-08-24 ENCOUNTER — VIRTUAL VISIT (OUTPATIENT)
Dept: RHEUMATOLOGY | Facility: CLINIC | Age: 66
End: 2021-08-24
Payer: COMMERCIAL

## 2021-08-24 DIAGNOSIS — M05.9 SEROPOSITIVE RHEUMATOID ARTHRITIS (H): Primary | ICD-10-CM

## 2021-08-24 DIAGNOSIS — Z79.899 HIGH RISK MEDICATION USE: ICD-10-CM

## 2021-08-24 PROCEDURE — 99214 OFFICE O/P EST MOD 30 MIN: CPT | Mod: 95 | Performed by: INTERNAL MEDICINE

## 2021-08-24 NOTE — PROGRESS NOTES
Sharon Fung  is a 66 year old year old female who is being evaluated via a billable video visit.      How would you like to obtain your AVS? MyChart  If the video visit is dropped, the invitation should be resent by: Text to cell phone: [phone]  Will anyone else be joining your video visit? No     Rheumatology Video Visit      Sharon Fung MRN# [id number]   YOB: 1955 Age: 66 year old      Date of visit: 8/24/21   PCP: Dr. Reynaldo Saavedra    Chief Complaint   Patient presents with:  Rheumatoid Arthritis    Assessment and Plan     1. Seropositive nonerosive rheumatoid arthritis (RF negative, CCP low positive): Previously on Humira (lost efficacy), Cimzia (ineffective), Orencia (ineffective), leflunomide (ineffective as monotherapy), hydroxychloroquine (ineffective), Xeljanz (ineffective), MTX (GI upset). Currently on SSZ 1500mg BID, leflunomide 20mg daily, and Kevzara 200mg SQ every 14 days (started 1/2019).   Doing well with regard to rheumatoid arthritis at this time.  Chronic illness, stable  - Continue sulfasalazine 1500 mg twice daily   - Continue leflunomide 20mg daily  - Continue Kevzara 200mg SQ every 14 days  - Labs in 3 months: CBC, Creatinine, Hepatic Panel, ESR, CRP, QuantiFERON-TB gold plus    High risk medication requiring intensive toxicity monitoring at least quarterly: labs ordered include CBC, Creatinine, Hepatic panel to monitor for cytopenia and hepatotoxicity; checking creatinine as it affects clearance of medication.                 Rapid 3, cumulative scores                      8/24/2021: No inflammatory joint symptoms.  She says that this combination is great (SSZ 1500mg BID, leflunomide 20mg daily, Kevzara 200mg q14 days)                      10/14/2020 doing well (SSZ 1500mg BID, leflunomide 20mg daily, Kevzara 200mg q14 days)                      08/28/2019: 3.2   (SSZ 1500mg BID, Kevzara)  Now on gabapentin                      04/17/2019: 15    (SSZ 1500mg BID,  Kevzara)  Mostly fibromyalgia symptoms                      12/19/2018:         (MTX 20mg SQ wkly, Xeljanz XR 11mg daily, SSZ 1500mg BID)                          05/02/2018:  9     (MTX 20mg SQ wkly, Xeljanz XR 11mg daily, SSZ 1000mg BID)                          01/31/2018: 22.3 (MTX 20mg SQ wkly, Xeljanz XR 11mg daily)                          11/29/2017: 16.8 (MTX 20mg SQ wkly)                      08/02/2017: 4.2   (MTX 20mg SQ wkly, Xeljanz XR 11mg daily)                         05/04/2017: 7      (MTX 20mg SQ wkly, Xeljanz XR 11mg daily)                        02/02/2017: 8      (MTX 20mg SQ wkly, Xeljanz XR 11mg daily)                      11/04/2016: 13    (MTX 20mg SQ weekly)                      07/15/2016: 10.8    2. Positive LORNA and dsDNA / Secondary Sjogren's Syndrome: Repeat dsDNAs have been negative. Has dry eyes but no dry mouth.  Dry eyes are treated by ophthalmology with Restasis.  Not discussed today.    3. Morbid obesity: Encouraged weight loss.  Reportedly she has had gastric sleeve surgery in the past but this became infected and had to be removed and her insurance would not approve a second weight loss surgery.  She has been working with a dietitian who she plans to meet with today, and has seen the weight loss surgeon who she says has appealed to insurance for repeat weight loss surgery but this has been unsuccessful..    4. Bilateral patellofemoral syndrome: home exercises have been helpful.  Weight loss encouraged.      5. Fibromyalgia: Managed in the pain management clinic    6.  Bone Health, vitamin D deficiency: normal 12/20/2019 DEXA; plan to repeat in 3-5 years but this may change depending on prednisone exposure.    - Continue vitamin D 2000 IU daily    7. Chronic lower back pain with left sided sciatica: suspect related to degenerative changes seen on L-spine MRI.  Following with the pain clinic for this issue  - COVID-19: has received the Pfizer COVID-19 vaccine on 12/21/2020 and  1/7/2021    A single additional dose of Pfizer COVID-19 vaccine or Moderna COVID-19 vaccine is recommended at least 28 days after the completion of the 2-dose mRNA vaccine series for patients receiving any immunosuppressive or immunomodulatory therapy. Attempts should be made to match the additional mRNA dose type to the type given in the mRNA primary series; however, if that is not feasible, a booster dose with the alternative mRNA vaccine is permitted.    Based on our current understanding (and this may change over time as we learn more), medications should be adjusted as noted below, if disease activity allows:  - NSAIDs and Acetaminophen: hold for 24 hours prior to vaccination if able to do so  - Sulfasalazine should be held for 1 week after the COVID19 booster vaccine  - Leflunomide should be held for 2 weeks after the COVID19 booster vaccine    Total minutes spent in evaluation with patient, documentation, , and review of pertinent studies and chart notes: 18       Ms. Fung verbalized agreement with and understanding of the rational for the diagnosis and treatment plan.  All questions were answered to best of my ability and the patient's satisfaction. Ms. Fung was advised to contact the clinic with any questions that may arise after the clinic visit.      Thank you for involving me in the care of the patient    Return to clinic: 3-4 months    HPI   Sharon Fung is a 66 year old female with medical history significant for GERD, allergic rhinitis, obstructive sleep apnea, obesity, hx of trigger fingers, hx of carpal tunnel surgery, and rheumatoid arthritis who presents for follow-up of rheumatoid arthritis.    Today, 8/24/2021: Recovering from her foot surgery.  Other joints are doing well.  She says that this combination of medications works great.  Morning stiffness for less than 10 minutes.  No gelling.  Tolerating medications well.    No fevers or chills. No nausea, vomiting,  constipation, diarrhea. No chest pain/pressure, palpitations, or shortness of breath. No oral or nasal sores. No neck pain. No rash.      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    ROS   12 point review of system was completed and negative except as noted in the HPI     Active Problem List     Patient Active Problem List   Diagnosis     AR (allergic rhinitis)     GERD (gastroesophageal reflux disease)     Status post bariatric surgery     Mild major depression (H)     Advanced directives, counseling/discussion     High risk medication use     Obstructive sleep apnea     Obesity, Class III, BMI 40-49.9 (morbid obesity) (H)     Anterior basement membrane dystrophy     Seropositive rheumatoid arthritis (H)     LORNA positive     Carpal tunnel syndrome of right wrist     Headache     Immunosuppression (H)     Fibromyalgia     Pain in joint, ankle and foot, right     Past Medical History     Past Medical History:   Diagnosis Date     AR (allergic rhinitis)  as teen     Arthritis 12/14/2015     Chronic obstructive pulmonary disease, unspecified COPD type (H) 3/27/2018     Depressive disorder 3 years ago     GERD (gastroesophageal reflux disease) 4-07     Headache 7/11/2017     Mild major depression (H) 3 years ago     Morbid obesity (H) teens     BRETT (obstructive sleep apnea)     uses CPAP     RA (rheumatoid arthritis) (H) 4 years     Reduced vision 3 years     Rheumatoid arthritis, adult (H)      Past Surgical History     Past Surgical History:   Procedure Laterality Date     ARTHRODESIS FOOT Right 6/30/2021    Procedure: Right 1st metatarsophalangeal joint fusion;  Surgeon: Jesus Wolf MD;  Location: [facility]     BLEPHAROPLASTY BILATERAL Bilateral 8/5/2016    Procedure: BLEPHAROPLASTY BILATERAL;  Surgeon: Cortez Robin MD;  Location:  [facility]     BREAST BIOPSY, RT/LT  1-94    Benign     C APPENDECTOMY  1977     COLONOSCOPY       COLONOSCOPY  WITH CO2 INSUFFLATION N/A 3/30/2017    Procedure: COLONOSCOPY WITH CO2 INSUFFLATION;  Surgeon: Issa Weeks MD;  Location: [facility]     ESOPHAGOSCOPY, GASTROSCOPY, DUODENOSCOPY (EGD), COMBINED N/A 10/29/2015    Procedure: COMBINED ESOPHAGOSCOPY, GASTROSCOPY, DUODENOSCOPY (EGD);  Surgeon: Shaq Mims MD;  Location: [facility]     LAPAROSCOPIC GASTRIC ADJUSTABLE BANDING  11/09/10    Lap band procedure     LAPAROSCOPIC REMOVAL GASTRIC ADJUSTABLE BAND N/A 10/31/2015    Procedure: LAPAROSCOPIC REMOVAL GASTRIC ADJUSTABLE BAND;  Surgeon: Shaq Mims MD;  Location: [facility] OR     WY HAND/FINGER SURGERY UNLISTED  2016     WY STOMACH SURGERY PROCEDURE UNLISTED  11/2015     RELEASE CARPAL TUNNEL Left 4/27/2017    Procedure: RELEASE CARPAL TUNNEL;  Left Open Carpal Tunnel Release;  Surgeon: Ciara Middleton MD;  [facility]     RELEASE CARPAL TUNNEL Right 6/15/2017    Procedure: RELEASE CARPAL TUNNEL;  Right Carpal Tunnel Release Open;  Surgeon: Ciara Middleton MD;  Location: [facility]     REPAIR PTOSIS       TUBAL LIGATION  1988     Allergy   No Known Allergies  Current Medication List     Current Outpatient Medications   Medication Sig     acetaminophen (TYLENOL) 500 MG tablet Take 500-1,000 mg by mouth every 6 hours as needed for mild pain     albuterol (PROAIR HFA/PROVENTIL HFA/VENTOLIN HFA) 108 (90 Base) MCG/ACT inhaler Inhale 2 puffs into the lungs every 6 hours as needed for shortness of breath / dyspnea or wheezing     azelastine (OPTIVAR) 0.05 % ophthalmic solution Apply 1 drop to eye 2 times daily     citalopram (CELEXA) 20 MG tablet Take 1 tablet (20 mg) by mouth daily     cycloSPORINE (RESTASIS) 0.05 % ophthalmic emulsion Place 1 drop into both eyes 2 times daily     diclofenac (VOLTAREN) 1 % topical gel Apply 2 g topically 4 times daily     fexofenadine (ALLEGRA) 180 MG tablet Take 1 tablet (180 mg) by mouth daily     fluticasone (FLONASE) 50 MCG/ACT nasal spray Spray 2 sprays  into both nostrils as needed     gabapentin (NEURONTIN) 100 MG capsule TAKE ONE CAPSULE BY MOUTH EVERY MORNING, ONE CAPSULE BY MOUTH AT MIDDAY, AND TAKE FOUR CAPSULES BY MOUTH EVERY NIGHT AT BEDTIME     HYDROcodone-acetaminophen (NORCO) 5-325 MG tablet Take 1-2 tablets by mouth every 4 hours as needed for moderate to severe pain     hydrOXYzine (ATARAX) 25 MG tablet Take 1 tablet (25 mg) by mouth every 8 hours as needed for itching or anxiety (pain)     ibuprofen 200 MG capsule Take 600-800 mg by mouth At Bedtime     leflunomide (ARAVA) 20 MG tablet Take 1 tablet (20 mg) by mouth daily     ondansetron (ZOFRAN-ODT) 8 MG ODT tab Take 1 tablet (8 mg) by mouth every 8 hours as needed for nausea     ORDER FOR DME Use your CPAP device as directed by your provider.     Sarilumab 200 MG/1.14ML SOAJ Inject 1.14 mLs (200 mg) Subcutaneous every 14 days . Hold for signs of infection and seek medical attention.     sulfaSALAzine (AZULFIDINE) 500 MG tablet Take 3 tablets (1,500 mg) by mouth 2 times daily     vitamin D3 (CHOLECALCIFEROL) 50 mcg (2000 units) tablet Take 1 tablet (50 mcg) by mouth daily     No current facility-administered medications for this visit.     Facility-Administered Medications Ordered in Other Visits   Medication     sodium chloride (PF) 0.9% PF flush 10 mL     sodium chloride bacteriostatic 0.9 % flush 12 mL         Social History   See HPI    Family History     Family History   Problem Relation Age of Onset     Heart Disease Father         MI     Neurologic Disorder Father 79        Parkinson's     Diabetes Father      Hypertension Father      Coronary Artery Disease Father      Cancer Maternal Grandmother         uterine     Neurologic Disorder Sister 16        migraine     Depression Sister      Neurologic Disorder Mother 20        migraine     Depression Mother      Neurologic Disorder Son 7        migraine     Substance Abuse Son      Migraines Son         none     Depression Sister      Substance  Abuse Sister      Migraines Sister        Physical Exam     GEN: NAD.    HEENT: MMM.  Anicteric, noninjected sclera. No obvious external lesions of the ear and nose. Hearing intact.  PULM: No increased work of breathing  MSK:  Hands and wrists without swelling.    SKIN: No rash or jaundice seen  PSYCH: Alert. Appropriate.     Labs / Imaging (select studies)     CBC  Recent Labs   Lab Test 08/23/21  1131 08/03/21  1150 05/10/21  0802 02/02/21  1056 10/12/20  1113   WBC 7.2 5.8 5.1 7.4 6.1   RBC 4.31 4.51 4.48 4.41 4.18   HGB 13.0 13.3 13.5 13.4 12.7   HCT 41.5 42.3 42.8 42.8 39.7   MCV 96 94 96 97 95   RDW 13.0 12.7 12.8 12.5 12.9    206 193 216 221   MCH 30.2 29.5 30.1 30.4 30.4   MCHC 31.3* 31.4* 31.5 31.3* 32.0   NEUTROPHIL 47 44 42.6 40.1 40.8   LYMPH 30 35 26.4 34.8 33.4   MONOCYTE 13 11 9.5 10.7 8.9   EOSINOPHIL 9 9 19.5 12.6 15.6   BASOPHIL 1 1 1.8 1.4 1.0   ANEU  --   --  2.2 3.0 2.5   ALYM  --   --  1.3 2.6 2.0   KIMBERLY  --   --  0.5 0.8 0.5   AEOS  --   --  1.0* 0.9* 1.0*   ABAS  --   --  0.1 0.1 0.1   ANEUTAUTO 3.3 2.5  --   --   --    ALYMPAUTO 2.2 2.0  --   --   --    AMONOAUTO 1.0 0.7  --   --   --    AEOSAUTO 0.6 0.5  --   --   --    ABSBASO 0.1 0.1  --   --   --      CMP  Recent Labs   Lab Test 08/23/21  1131 08/03/21  1150 05/10/21  0802 02/02/21  1056 10/12/20  1113 11/05/19  1745   NA  --  138 140  --   --  138   POTASSIUM  --  3.9 4.3  --   --  3.8   CHLORIDE  --  107 110*  --   --  103   CO2  --  29 25  --   --  28   ANIONGAP  --  2* 5  --   --  7   GLC  --  74 99 74  --  87   BUN  --  14 12  --   --  13   CR 0.73 0.66 0.73 0.65 0.74 0.74   GFRESTIMATED 87 >90 88 >90 87 86   GFRESTBLACK  --   --  >90 >90 >90 >90   ISH  --  9.1 8.6  --   --  9.1   BILITOTAL 0.6  --  0.6 0.4 0.5 0.5   ALBUMIN 3.9  --  3.8 4.0 3.8 4.1   PROTTOTAL 7.3  --  7.1 7.1 6.9 7.5   ALKPHOS 104  --  103 115 107 96   AST 33  --  33 36 23 23   ALT 53*  --  54* 58* 42 45     Calcium/VitaminD  Recent Labs   Lab Test  08/03/21  1150 05/10/21  0802 10/12/20  1113 03/31/20  0755 11/05/19  1745 02/20/15  0750   ISH 9.1 8.6  --   --  9.1 9.0   VITDT  --   --  33 15*  --  39     ESR/CRP  Recent Labs   Lab Test 08/23/21  1131 08/03/21  1150 05/10/21  0802   SED 4 6 5   CRP <2.9 <2.9 <2.9     Lipid Panel  Recent Labs   Lab Test 05/10/21  0802 03/31/20  0755 04/27/17  0732 02/20/15  0750 10/20/14  0821 10/20/14  0821 11/22/13  0843   CHOL 224* 224* 204* 157  --  194 167   TRIG 243* 171* 148 79  --  193* 140   HDL 52 73 82 63  --  63 60   * 117* 92 78  --  92 79   VLDL  --   --   --  16  --  39* 28   CHOLHDLRATIO  --   --   --  2.5  --  3.1 2.8   NHDL 172* 151* 122  --    < >  --   --     < > = values in this interval not displayed.     Hepatitis B  Recent Labs   Lab Test 12/08/15  0855 03/06/15  1650   HBCAB Nonreactive  --    HEPBANG Nonreactive Nonreactive     Hepatitis C  Recent Labs   Lab Test 12/08/15  0855 03/06/15  1650   HCVAB Nonreactive   Assay performance characteristics have not been established for newborns,   infants, and children   Nonreactive   Assay performance characteristics have not been established for newborns,   infants, and children       Lyme ab screening  No results for input(s): LYMEGM in the last 90138 hours.  Lyme confirmation testing by Western Blot  No results for input(s): LYWG, LYWM in the last 57734 hours.  Tuberculosis Screening  Recent Labs   Lab Test 10/31/17  1400 02/02/17  0822 12/08/15  0855   TBRSLT Negative Negative Negative   TBAGN 0.05 0.00 0.01     HIV Screening  Recent Labs   Lab Test 07/24/18  0738   HIAGAB Nonreactive         Immunization History     Immunization History   Administered Date(s) Administered     COVID-19,PF,Pfizer 12/21/2020, 01/07/2021     Flu, Unspecified 10/12/2020     Influenza Vaccine IM > 6 months Valent IIV4 10/15/2013, 09/15/2014, 09/28/2015, 09/28/2016, 10/16/2017, 10/01/2018     Influenza Vaccine Im 4yrs+ 4 Valent CCIIV4 10/15/2019     Pneumo Conj 13-V  (2010&after) 04/15/2016     Pneumococcal 23 valent 07/15/2016     TD (ADULT, 7+) 10/30/2020     TDAP Vaccine (Adacel) 02/09/2011     Zoster vaccine recombinant adjuvanted (SHINGRIX) 04/17/2019, 08/28/2019          Chart documentation done in part with Dragon Voice recognition Software. Although reviewed after completion, some word and grammatical error may remain.     Video-Visit Details    Type of service:  Video Visit    Video Start Time: 8:12 AM    Video End Time: 8:23 AM    Originating Location (pt. Location):  [place], [place]    Distant Location (provider location):  Home    Platform used for Video Visit: Pao Guerra MD

## 2021-09-28 ENCOUNTER — OFFICE VISIT (OUTPATIENT)
Dept: ORTHOPEDICS | Facility: CLINIC | Age: 66
End: 2021-09-28
Payer: COMMERCIAL

## 2021-09-28 ENCOUNTER — ANCILLARY PROCEDURE (OUTPATIENT)
Dept: GENERAL RADIOLOGY | Facility: CLINIC | Age: 66
End: 2021-09-28
Attending: ORTHOPAEDIC SURGERY
Payer: COMMERCIAL

## 2021-09-28 DIAGNOSIS — M25.571 PAIN IN JOINT, ANKLE AND FOOT, RIGHT: ICD-10-CM

## 2021-09-28 DIAGNOSIS — M25.571 PAIN IN JOINT, ANKLE AND FOOT, RIGHT: Primary | ICD-10-CM

## 2021-09-28 PROCEDURE — 73630 X-RAY EXAM OF FOOT: CPT | Mod: RT | Performed by: RADIOLOGY

## 2021-09-28 PROCEDURE — 99024 POSTOP FOLLOW-UP VISIT: CPT | Performed by: ORTHOPAEDIC SURGERY

## 2021-09-28 NOTE — NURSING NOTE
Reason For Visit:   Chief Complaint   Patient presents with     RECHECK     6 week f/u Right 1st MTPJ Fusion // DOS 6/30/21       There were no vitals taken for this visit.    Pain Assessment  Patient Currently in Pain: Giovany Recinos, ATC

## 2021-09-28 NOTE — PROGRESS NOTES
CHIEF COMPLAINT:  Status post right first MTP joint fusion performed on 06/30/2021.    HISTORY OF PRESENT ILLNESS:  Ms. Fung presents today for further followup.  Denies to have any problems or complaints.  Denies to have any pain.  Continues using the boot for the majority of the time.    PHYSICAL EXAMINATION:  On today's exam, she presents with loosening of the right first MTP joint.  There is no pain with a small amount of motion that she presents.  There is minimal swelling.  There is a well-healed surgical incision.    IMAGING:  Three views of the right foot were obtained today, which were significant for showing a failure of the screw, which is highly suspicious for having pseudoarthrosis.  Alignment is acceptable.    ASSESSMENT:  Status post right first MTP joint arthrodesis with current pseudoarthrosis.    PLAN:  I discussed with the patient that sometimes we will accomplish painless pseudarthrosis, which seems to be her case.  We are going to continue advancing her activities, proceed with the use of the CAM Walker for comfort purposes and she will follow up on a p.r.n. basis.  I discussed with her that the only way to improve her discomfort if any is present would be by performing a revision surgery, which will make her nonweightbearing for at least 2 or 3 months.    All questions were answered.  The patient was pleased with the discussion.  A prescription for physical therapy and gait training was given to the patient as well as a new short CAM Walker.  The patient will follow up on a p.r.n. basis.    TT:  20 minutes.  CT:  15 minutes.

## 2021-09-28 NOTE — LETTER
9/28/2021      RE: Sharon Fung  [address] [address]    Dear Colleague,    Thank you for referring your patient, Sharon Fung, to the [facility] [place]. Please see a copy of my visit note below.    CHIEF COMPLAINT:  Status post right first MTP joint fusion performed on 06/30/2021.    HISTORY OF PRESENT ILLNESS:  Ms. Fung presents today for further followup.  Denies to have any problems or complaints.  Denies to have any pain.  Continues using the boot for the majority of the time.    PHYSICAL EXAMINATION:  On today's exam, she presents with loosening of the right first MTP joint.  There is no pain with a small amount of motion that she presents.  There is minimal swelling.  There is a well-healed surgical incision.    IMAGING:  Three views of the right foot were obtained today, which were significant for showing a failure of the screw, which is highly suspicious for having pseudoarthrosis.  Alignment is acceptable.    ASSESSMENT:  Status post right first MTP joint arthrodesis with current pseudoarthrosis.    PLAN:  I discussed with the patient that sometimes we will accomplish painless pseudarthrosis, which seems to be her case.  We are going to continue advancing her activities, proceed with the use of the CAM Walker for comfort purposes and she will follow up on a p.r.n. basis.  I discussed with her that the only way to improve her discomfort if any is present would be by performing a revision surgery, which will make her nonweightbearing for at least 2 or 3 months.    All questions were answered.  The patient was pleased with the discussion.  A prescription for physical therapy and gait training was given to the patient as well as a new short CAM Walker.  The patient will follow up on a p.r.n. basis.    TT:  20 minutes.  CT:  15 minutes.      Jesus Wolf MD

## 2021-10-10 ENCOUNTER — E-VISIT (OUTPATIENT)
Dept: URGENT CARE | Facility: URGENT CARE | Age: 66
End: 2021-10-10
Payer: COMMERCIAL

## 2021-10-10 ENCOUNTER — LAB (OUTPATIENT)
Dept: URGENT CARE | Facility: URGENT CARE | Age: 66
End: 2021-10-10
Attending: PHYSICIAN ASSISTANT
Payer: COMMERCIAL

## 2021-10-10 DIAGNOSIS — Z20.822 SUSPECTED COVID-19 VIRUS INFECTION: Primary | ICD-10-CM

## 2021-10-10 DIAGNOSIS — Z20.822 SUSPECTED COVID-19 VIRUS INFECTION: ICD-10-CM

## 2021-10-10 LAB — SARS-COV-2 RNA RESP QL NAA+PROBE: NEGATIVE

## 2021-10-10 PROCEDURE — U0005 INFEC AGEN DETEC AMPLI PROBE: HCPCS

## 2021-10-10 PROCEDURE — U0003 INFECTIOUS AGENT DETECTION BY NUCLEIC ACID (DNA OR RNA); SEVERE ACUTE RESPIRATORY SYNDROME CORONAVIRUS 2 (SARS-COV-2) (CORONAVIRUS DISEASE [COVID-19]), AMPLIFIED PROBE TECHNIQUE, MAKING USE OF HIGH THROUGHPUT TECHNOLOGIES AS DESCRIBED BY CMS-2020-01-R: HCPCS

## 2021-10-10 PROCEDURE — 99421 OL DIG E/M SVC 5-10 MIN: CPT | Performed by: PHYSICIAN ASSISTANT

## 2021-10-10 NOTE — PATIENT INSTRUCTIONS
Dear Sharon Fung,    Your symptoms show that you may have coronavirus (COVID-19). This illness can cause fever, cough and trouble breathing. Many people get a mild case and get better on their own. Some people can get very sick.    Will I be tested for COVID-19?  We would like to test you for Covid-19 virus. I have placed orders for this test.     For all employees or close contacts (except [place] and [employer] - see below), go to your [employer] home page and scroll down to the section that says  You have an appointment that needs to be scheduled  and click the large green button that says  Schedule Now  and follow the steps to find the next available opening.     If you are unable to complete these steps or if you cannot find any available times, please call [phone] to schedule employee testing.     [place] employees or close contacts, please call [phone].   [place] ([employer]) employees or close contacts call [phone].    Return to work/school/ guidance:  Please let your workplace manager and staffing office know when your quarantine ends     We can t give you an exact date as it depends on the above. You can calculate this on your own or work with your manager/staffing office to calculate this. (For example if you were exposed on 10/4, you would have to quarantine for 14 full days. That would be through 10/18. You could return on 10/19.)      If you receive a positive COVID-19 test result, follow the guidance of the those who are giving you the results. Usually the return to work is 10 (or in some cases 20 days from symptom onset.) If you work at [employer], you must also be cleared by Employee Occupational Health and Safety to return to work.        If you receive a negative COVID-19 test result and did not have a high risk exposure to someone with a known positive COVID-19 test, you can return to work once you're free of fever for 24 hours without fever-reducing medication and  your symptoms are improving or resolved.      If you receive a negative COVID-19 test and If you had a high risk exposure to someone who has tested positive for COVID-19 then you can return to work 14 days after your last contact with the positive individual    Note: If you have ongoing exposure to the covid positive person, this quarantine period may be more than 14 days. (For example, if you are continued to be exposed to your child who tested positive and cannot isolate from them, then the quarantine of 7-14 days can't start until your child is no longer contagious. This is typically 10 days from onset of the child's symptoms. So the total duration may be 17-24 days in this case.)    Sign up for [employer].   We know it's scary to hear that you might have COVID-19. We want to track your symptoms to make sure you're okay over the next 2 weeks. Please look for an email from [employer]--this is a free, online program that we'll use to keep in touch. To sign up, follow the link in the email you will receive. Learn more at [email or web address]    How can I take care of myself?    Get lots of rest. Drink extra fluids (unless a doctor has told you not to)    Take Tylenol (acetaminophen) or ibuprofen for fever or pain. If you have liver or kidney problems, ask your family doctor if it's okay to take Tylenol o ibuprofen    If you have other health problems (like cancer, heart failure, an organ transplant or severe kidney disease): Call your specialty clinic if you don't feel better in the next 2 days.    Know when to call 911. Emergency warning signs include:  o Trouble breathing or shortness of breath  o Pain or pressure in the chest that doesn't go away  o Feeling confused like you haven't felt before, or not being able to wake up  o Bluish-colored lips or face    Where can I get more information?   [employer] [facility] - About COVID-19:   [email or web address]    CDC - What to Do If You're Sick:    www.cdc.gov/coronavirus/2019-ncov/about/steps-when-sick.html

## 2021-10-24 ENCOUNTER — HEALTH MAINTENANCE LETTER (OUTPATIENT)
Age: 66
End: 2021-10-24

## 2021-11-03 DIAGNOSIS — F32.0 MILD MAJOR DEPRESSION (H): ICD-10-CM

## 2021-11-05 RX ORDER — CITALOPRAM HYDROBROMIDE 20 MG/1
20 TABLET ORAL DAILY
Qty: 90 TABLET | Refills: 0 | Status: SHIPPED | OUTPATIENT
Start: 2021-11-05 | End: 2022-02-04

## 2021-11-05 NOTE — TELEPHONE ENCOUNTER
Prescription approved per [facility] Refill Protocol.    PHQ-9 score:    PHQ 5/18/2021   PHQ-9 Total Score 0   Q9: Thoughts of better off dead/self-harm past 2 weeks Not at all

## 2021-11-12 ENCOUNTER — OFFICE VISIT (OUTPATIENT)
Dept: FAMILY MEDICINE | Facility: CLINIC | Age: 66
End: 2021-11-12
Payer: COMMERCIAL

## 2021-11-12 VITALS
HEART RATE: 90 BPM | WEIGHT: 268 LBS | TEMPERATURE: 99.6 F | OXYGEN SATURATION: 94 % | DIASTOLIC BLOOD PRESSURE: 73 MMHG | BODY MASS INDEX: 47.19 KG/M2 | SYSTOLIC BLOOD PRESSURE: 186 MMHG

## 2021-11-12 DIAGNOSIS — I10 HYPERTENSION GOAL BP (BLOOD PRESSURE) < 140/90: Primary | ICD-10-CM

## 2021-11-12 PROCEDURE — 99213 OFFICE O/P EST LOW 20 MIN: CPT | Performed by: FAMILY MEDICINE

## 2021-11-12 RX ORDER — LOSARTAN POTASSIUM AND HYDROCHLOROTHIAZIDE 25; 100 MG/1; MG/1
1 TABLET ORAL DAILY
Qty: 90 TABLET | Refills: 3 | Status: SHIPPED | OUTPATIENT
Start: 2021-11-12 | End: 2022-07-01

## 2021-11-12 ASSESSMENT — ANXIETY QUESTIONNAIRES
6. BECOMING EASILY ANNOYED OR IRRITABLE: SEVERAL DAYS
5. BEING SO RESTLESS THAT IT IS HARD TO SIT STILL: NOT AT ALL
GAD7 TOTAL SCORE: 3
7. FEELING AFRAID AS IF SOMETHING AWFUL MIGHT HAPPEN: NOT AT ALL
3. WORRYING TOO MUCH ABOUT DIFFERENT THINGS: SEVERAL DAYS
1. FEELING NERVOUS, ANXIOUS, OR ON EDGE: SEVERAL DAYS
2. NOT BEING ABLE TO STOP OR CONTROL WORRYING: NOT AT ALL
IF YOU CHECKED OFF ANY PROBLEMS ON THIS QUESTIONNAIRE, HOW DIFFICULT HAVE THESE PROBLEMS MADE IT FOR YOU TO DO YOUR WORK, TAKE CARE OF THINGS AT HOME, OR GET ALONG WITH OTHER PEOPLE: NOT DIFFICULT AT ALL

## 2021-11-12 ASSESSMENT — PATIENT HEALTH QUESTIONNAIRE - PHQ9: 5. POOR APPETITE OR OVEREATING: NOT AT ALL

## 2021-11-12 NOTE — PROGRESS NOTES
Assessment & Plan       ICD-10-CM    1. Hypertension goal BP (blood pressure) < 140/90  I10 losartan-hydrochlorothiazide (HYZAAR) 100-25 MG tablet     Basic metabolic panel  (Ca, Cl, CO2, Creat, Gluc, K, Na, BUN)     We discussed her hypertension we will get her started on losartan HCTZ as above  I will order a basic metabolic panel to be done in a couple of weeks or so when she has her rheumatology labs checked  She will check blood pressure readings on her own over the next month and then return for an office visit follow-up with me on this in about 1 month    Return in about 1 month (around 12/12/2021) for BP Recheck.    Reynaldo Saavedra MD  [facility] is a 66 year old who presents for the following health issues     HPI     Blood pressure has been running high since August.  Lightheaded, tired.    Her blood pressure has been running high in recent months.  She has had some headaches at times and is also felt lightheaded and tired at times.  She has various underlying health conditions as listed below.    She does get routine labs done every few months ago from her rheumatologist.  She will be seeing him later this month on November 30 and will have labs prior to that.    Patient Active Problem List   Diagnosis     AR (allergic rhinitis)     GERD (gastroesophageal reflux disease)     Status post bariatric surgery     Mild major depression (H)     Advanced directives, counseling/discussion     High risk medication use     Obstructive sleep apnea     Obesity, Class III, BMI 40-49.9 (morbid obesity) (H)     Anterior basement membrane dystrophy     Seropositive rheumatoid arthritis (H)     LORNA positive     Carpal tunnel syndrome of right wrist     Headache     Immunosuppression (H)     Fibromyalgia     Pain in joint, ankle and foot, right     Current Outpatient Medications   Medication     acetaminophen (TYLENOL) 500 MG tablet     albuterol (PROAIR HFA/PROVENTIL  HFA/VENTOLIN HFA) 108 (90 Base) MCG/ACT inhaler     azelastine (OPTIVAR) 0.05 % ophthalmic solution     citalopram (CELEXA) 20 MG tablet     cycloSPORINE (RESTASIS) 0.05 % ophthalmic emulsion     diclofenac (VOLTAREN) 1 % topical gel     fexofenadine (ALLEGRA) 180 MG tablet     fluticasone (FLONASE) 50 MCG/ACT nasal spray     gabapentin (NEURONTIN) 100 MG capsule     HYDROcodone-acetaminophen (NORCO) 5-325 MG tablet     hydrOXYzine (ATARAX) 25 MG tablet     ibuprofen 200 MG capsule     leflunomide (ARAVA) 20 MG tablet         ondansetron (ZOFRAN-ODT) 8 MG ODT tab     ORDER FOR DME     Sarilumab 200 MG/1.14ML SOAJ     sulfaSALAzine (AZULFIDINE) 500 MG tablet     vitamin D3 (CHOLECALCIFEROL) 50 mcg (2000 units) tablet     No current facility-administered medications for this visit.     Facility-Administered Medications Ordered in Other Visits   Medication     sodium chloride (PF) 0.9% PF flush 10 mL     sodium chloride bacteriostatic 0.9 % flush 12 mL         Review of Systems   She is still having some right foot and ankle pain.  She will be seeing a different orthopedist for this through [facility] in the next couple of weeks.      Objective    BP (!) 186/73 (BP Location: Right arm, Patient Position: Sitting, Cuff Size: Adult Regular)   Pulse 90   Temp 99.6  F (37.6  C) (Oral)   Wt 121.6 kg (268 lb)   SpO2 94%   BMI 47.19 kg/m    Body mass index is 47.19 kg/m .  Physical Exam   GENERAL: alert, no distress and over weight  MS: She does have some mild swelling of the right foot and ankle    Past lab results were reviewed.  She has had normal renal function test in the past.

## 2021-11-13 ASSESSMENT — ANXIETY QUESTIONNAIRES: GAD7 TOTAL SCORE: 3

## 2021-11-13 ASSESSMENT — PATIENT HEALTH QUESTIONNAIRE - PHQ9: SUM OF ALL RESPONSES TO PHQ QUESTIONS 1-9: 4

## 2021-11-30 ENCOUNTER — VIRTUAL VISIT (OUTPATIENT)
Dept: RHEUMATOLOGY | Facility: CLINIC | Age: 66
End: 2021-11-30
Payer: COMMERCIAL

## 2021-11-30 ENCOUNTER — TRANSFERRED RECORDS (OUTPATIENT)
Dept: HEALTH INFORMATION MANAGEMENT | Facility: CLINIC | Age: 66
End: 2021-11-30

## 2021-11-30 ENCOUNTER — LAB (OUTPATIENT)
Dept: LAB | Facility: CLINIC | Age: 66
End: 2021-11-30
Payer: COMMERCIAL

## 2021-11-30 ENCOUNTER — MEDICAL CORRESPONDENCE (OUTPATIENT)
Dept: HEALTH INFORMATION MANAGEMENT | Facility: CLINIC | Age: 66
End: 2021-11-30

## 2021-11-30 DIAGNOSIS — M05.9 SEROPOSITIVE RHEUMATOID ARTHRITIS (H): ICD-10-CM

## 2021-11-30 DIAGNOSIS — I10 HYPERTENSION GOAL BP (BLOOD PRESSURE) < 140/90: ICD-10-CM

## 2021-11-30 DIAGNOSIS — E55.9 VITAMIN D DEFICIENCY: ICD-10-CM

## 2021-11-30 DIAGNOSIS — Z79.899 HIGH RISK MEDICATION USE: ICD-10-CM

## 2021-11-30 DIAGNOSIS — M05.9 SEROPOSITIVE RHEUMATOID ARTHRITIS (H): Primary | ICD-10-CM

## 2021-11-30 LAB
ALBUMIN SERPL-MCNC: 3.6 G/DL (ref 3.4–5)
ALP SERPL-CCNC: 113 U/L (ref 40–150)
ALT SERPL W P-5'-P-CCNC: 43 U/L (ref 0–50)
ANION GAP SERPL CALCULATED.3IONS-SCNC: 5 MMOL/L (ref 3–14)
AST SERPL W P-5'-P-CCNC: 26 U/L (ref 0–45)
BASOPHILS # BLD AUTO: 0.1 10E3/UL (ref 0–0.2)
BASOPHILS NFR BLD AUTO: 1 %
BILIRUB DIRECT SERPL-MCNC: 0.1 MG/DL (ref 0–0.2)
BILIRUB SERPL-MCNC: 0.5 MG/DL (ref 0.2–1.3)
BUN SERPL-MCNC: 19 MG/DL (ref 7–30)
CALCIUM SERPL-MCNC: 9.3 MG/DL (ref 8.5–10.1)
CHLORIDE BLD-SCNC: 104 MMOL/L (ref 94–109)
CO2 SERPL-SCNC: 28 MMOL/L (ref 20–32)
CREAT SERPL-MCNC: 0.61 MG/DL (ref 0.52–1.04)
CRP SERPL-MCNC: <2.9 MG/L (ref 0–8)
EOSINOPHIL # BLD AUTO: 0.8 10E3/UL (ref 0–0.7)
EOSINOPHIL NFR BLD AUTO: 13 %
ERYTHROCYTE [DISTWIDTH] IN BLOOD BY AUTOMATED COUNT: 12.6 % (ref 10–15)
ERYTHROCYTE [SEDIMENTATION RATE] IN BLOOD BY WESTERGREN METHOD: 6 MM/HR (ref 0–30)
GFR SERPL CREATININE-BSD FRML MDRD: >90 ML/MIN/1.73M2
GLUCOSE BLD-MCNC: 165 MG/DL (ref 70–99)
HCT VFR BLD AUTO: 40.5 % (ref 35–47)
HGB BLD-MCNC: 12.9 G/DL (ref 11.7–15.7)
IMM GRANULOCYTES # BLD: 0 10E3/UL
IMM GRANULOCYTES NFR BLD: 0 %
LYMPHOCYTES # BLD AUTO: 1.7 10E3/UL (ref 0.8–5.3)
LYMPHOCYTES NFR BLD AUTO: 27 %
MCH RBC QN AUTO: 30.1 PG (ref 26.5–33)
MCHC RBC AUTO-ENTMCNC: 31.9 G/DL (ref 31.5–36.5)
MCV RBC AUTO: 95 FL (ref 78–100)
MONOCYTES # BLD AUTO: 0.6 10E3/UL (ref 0–1.3)
MONOCYTES NFR BLD AUTO: 9 %
NEUTROPHILS # BLD AUTO: 3.1 10E3/UL (ref 1.6–8.3)
NEUTROPHILS NFR BLD AUTO: 50 %
NRBC # BLD AUTO: 0 10E3/UL
NRBC BLD AUTO-RTO: 0 /100
PLATELET # BLD AUTO: 226 10E3/UL (ref 150–450)
POTASSIUM BLD-SCNC: 3.3 MMOL/L (ref 3.4–5.3)
PROT SERPL-MCNC: 7 G/DL (ref 6.8–8.8)
RBC # BLD AUTO: 4.28 10E6/UL (ref 3.8–5.2)
SODIUM SERPL-SCNC: 137 MMOL/L (ref 133–144)
WBC # BLD AUTO: 6.3 10E3/UL (ref 4–11)

## 2021-11-30 PROCEDURE — 36415 COLL VENOUS BLD VENIPUNCTURE: CPT

## 2021-11-30 PROCEDURE — 82248 BILIRUBIN DIRECT: CPT

## 2021-11-30 PROCEDURE — 86481 TB AG RESPONSE T-CELL SUSP: CPT

## 2021-11-30 PROCEDURE — 99214 OFFICE O/P EST MOD 30 MIN: CPT | Mod: 95 | Performed by: INTERNAL MEDICINE

## 2021-11-30 PROCEDURE — 86140 C-REACTIVE PROTEIN: CPT

## 2021-11-30 PROCEDURE — 85652 RBC SED RATE AUTOMATED: CPT

## 2021-11-30 PROCEDURE — 80048 BASIC METABOLIC PNL TOTAL CA: CPT

## 2021-11-30 PROCEDURE — 83036 HEMOGLOBIN GLYCOSYLATED A1C: CPT | Performed by: FAMILY MEDICINE

## 2021-11-30 PROCEDURE — 85025 COMPLETE CBC W/AUTO DIFF WBC: CPT

## 2021-11-30 RX ORDER — CHOLECALCIFEROL (VITAMIN D3) 50 MCG
1 TABLET ORAL DAILY
Qty: 90 TABLET | Refills: 3 | Status: SHIPPED | OUTPATIENT
Start: 2021-11-30 | End: 2023-02-28

## 2021-11-30 RX ORDER — LEFLUNOMIDE 20 MG/1
20 TABLET ORAL DAILY
Qty: 90 TABLET | Refills: 2 | Status: SHIPPED | OUTPATIENT
Start: 2021-11-30 | End: 2022-05-31

## 2021-11-30 RX ORDER — SULFASALAZINE 500 MG/1
1500 TABLET ORAL 2 TIMES DAILY
Qty: 540 TABLET | Refills: 2 | Status: SHIPPED | OUTPATIENT
Start: 2021-11-30 | End: 2022-05-31

## 2021-11-30 NOTE — PATIENT INSTRUCTIONS
RHEUMATOLOGY    Dr. Freddy Guerra    [address] [place] [address]  Phone number: [phone]  Fax number: [phone]    Thank you for choosing [facility]!    Evie Rosas CMA Rheumatology

## 2021-11-30 NOTE — PROGRESS NOTES
Sharon Fung  is a 66 year old year old female who is being evaluated via a billable video visit.      How would you like to obtain your AVS? MyChart  If the video visit is dropped, the invitation should be resent by: Text to cell phone: [phone]  Will anyone else be joining your video visit? No     Rheumatology Video Visit      Sharon Fung MRN# [id number]   YOB: 1955 Age: 66 year old      Date of visit: 11/30/21   PCP: Dr. Reynaldo Saavedra    Chief Complaint   Patient presents with:  Rheumatoid Arthritis    Assessment and Plan     1. Seropositive nonerosive rheumatoid arthritis (RF negative, CCP low positive): Previously on Humira (lost efficacy), Cimzia (ineffective), Orencia (ineffective), leflunomide (ineffective as monotherapy), hydroxychloroquine (ineffective), Xeljanz (ineffective), MTX (GI upset). Currently on SSZ 1500mg BID, leflunomide 20mg daily, and Kevzara 200mg SQ every 14 days (started 1/2019).   RA controlled.  Chronic illness, stable  - Continue sulfasalazine 1500 mg twice daily   - Continue leflunomide 20mg daily  - Continue Kevzara 200mg SQ every 14 days  - Labs in 3 months: CBC, Creatinine, Hepatic Panel, ESR, CRP    High risk medication requiring intensive toxicity monitoring at least quarterly: labs ordered include CBC, Creatinine, Hepatic panel to monitor for cytopenia and hepatotoxicity; checking creatinine as it affects clearance of medication.                 Rapid 3, cumulative scores                      8/24/2021: No inflammatory joint symptoms.  She says that this combination is great (SSZ 1500mg BID, leflunomide 20mg daily, Kevzara 200mg q14 days)                      10/14/2020 doing well (SSZ 1500mg BID, leflunomide 20mg daily, Kevzara 200mg q14 days)                      08/28/2019: 3.2   (SSZ 1500mg BID, Kevzara)  Now on gabapentin                      04/17/2019: 15    (SSZ 1500mg BID, Kevzara)  Mostly fibromyalgia symptoms                      12/19/2018:     "     (MTX 20mg SQ wkly, Xeljanz XR 11mg daily, SSZ 1500mg BID)                          05/02/2018:  9     (MTX 20mg SQ wkly, Xeljanz XR 11mg daily, SSZ 1000mg BID)                          01/31/2018: 22.3 (MTX 20mg SQ wkly, Xeljanz XR 11mg daily)                          11/29/2017: 16.8 (MTX 20mg SQ wkly)                      08/02/2017: 4.2   (MTX 20mg SQ wkly, Xeljanz XR 11mg daily)                         05/04/2017: 7      (MTX 20mg SQ wkly, Xeljanz XR 11mg daily)                        02/02/2017: 8      (MTX 20mg SQ wkly, Xeljanz XR 11mg daily)                      11/04/2016: 13    (MTX 20mg SQ weekly)                      07/15/2016: 10.8    2. Positive LORNA and dsDNA / Secondary Sjogren's Syndrome: Repeat dsDNAs have been negative. Has dry eyes but no dry mouth.  Dry eyes are treated by ophthalmology with Restasis.      3. Bilateral patellofemoral syndrome: home exercises have been helpful.  Weight loss encouraged.      4. Fibromyalgia: Managed in the pain management clinic    5.  Bone Health, vitamin D deficiency: normal 12/20/2019 DEXA; plan to repeat in 3-5 years but this may change depending on prednisone exposure.    - Continue vitamin D 2000 IU daily    6. Chronic lower back pain with left sided sciatica: suspect related to degenerative changes seen on L-spine MRI.  Following with the pain clinic for this issue. Not discussed today.     - COVID-19: has received the Pfizer COVID-19 vaccine on 12/21/2020 and 1/7/2021 and 8/27/2021.  Based on today's data, recommend receiving the Moderna COVID19 vaccine 6 months from the 3rd dose of the Pfizer COVID19 vaccine; the 4th COVID19 vaccine dose will be considered the \"booster\"    A single additional dose of the Pfizer or Moderna COVID-19 vaccine is recommended at least 28 days after the completion of the 2-dose mRNA vaccine series for patients receiving any immunosuppressive or immunomodulatory therapy. Attempts should be made to match the additional mRNA " dose type to the type given in the mRNA primary series; however, if that is not feasible, a booster dose with the alternative mRNA vaccine is permitted.    Based on our current understanding (and this may change over time as we learn more), medications should be adjusted as noted below, if disease activity allows:  - NSAIDs and Acetaminophen: hold for 24 hours prior to vaccination if able to do so  - Sulfasalazine should be held for 1 week after the COVID19 booster vaccine  - Leflunomide should be held for 1 week after the COVID19 booster vaccine    Total minutes spent in evaluation with patient, documentation, , and review of pertinent studies and chart notes: 15        Ms. Fnug verbalized agreement with and understanding of the rational for the diagnosis and treatment plan.  All questions were answered to best of my ability and the patient's satisfaction. Ms. Fung was advised to contact the clinic with any questions that may arise after the clinic visit.      Thank you for involving me in the care of the patient    Return to clinic: 3-4 months    HPI   Sharon Fung is a 66 year old female with medical history significant for GERD, allergic rhinitis, obstructive sleep apnea, obesity, hx of trigger fingers, hx of carpal tunnel surgery, and rheumatoid arthritis who presents for follow-up of rheumatoid arthritis.    Today, 11/30/2021: RA doing well. Tolerating medications well.  States that her RA is controlled.  S/p 3 doses of the pfizer COVID19 vaccine. Morning stiffness <10 min. No gelling.     No fevers or chills. No nausea, vomiting, constipation, diarrhea. No chest pain/pressure, palpitations, or shortness of breath. No oral or nasal sores. No neck pain. No rash.      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    ROS   12 point review of system was completed and negative except as noted in the HPI     Active Problem List      Patient Active Problem List   Diagnosis     AR (allergic rhinitis)     GERD (gastroesophageal reflux disease)     Status post bariatric surgery     Mild major depression (H)     Advanced directives, counseling/discussion     High risk medication use     Obstructive sleep apnea     Obesity, Class III, BMI 40-49.9 (morbid obesity) (H)     Anterior basement membrane dystrophy     Seropositive rheumatoid arthritis (H)     LORNA positive     Carpal tunnel syndrome of right wrist     Headache     Immunosuppression (H)     Fibromyalgia     Pain in joint, ankle and foot, right     Past Medical History     Past Medical History:   Diagnosis Date     AR (allergic rhinitis)  as teen     Arthritis 12/14/2015     Chronic obstructive pulmonary disease, unspecified COPD type (H) 3/27/2018     Depressive disorder 3 years ago     GERD (gastroesophageal reflux disease) 4-07     Headache 7/11/2017     Mild major depression (H) 3 years ago     Morbid obesity (H) teens     BRETT (obstructive sleep apnea)     uses CPAP     RA (rheumatoid arthritis) (H) 4 years     Reduced vision 3 years     Rheumatoid arthritis, adult (H)      Past Surgical History     Past Surgical History:   Procedure Laterality Date     ARTHRODESIS FOOT Right 6/30/2021    Procedure: Right 1st metatarsophalangeal joint fusion;  Surgeon: Jesus Wolf MD;  Location: [facility] OR     BLEPHAROPLASTY BILATERAL Bilateral 8/5/2016    Procedure: BLEPHAROPLASTY BILATERAL;  Surgeon: Cortez Robin MD;  Location:  [facility]     BREAST BIOPSY, RT/LT  1-94    Benign     C APPENDECTOMY  1977     COLONOSCOPY       COLONOSCOPY WITH CO2 INSUFFLATION N/A 3/30/2017    Procedure: COLONOSCOPY WITH CO2 INSUFFLATION;  Surgeon: Issa Weeks MD;  Location: [facility] OR     ESOPHAGOSCOPY, GASTROSCOPY, DUODENOSCOPY (EGD), COMBINED N/A 10/29/2015    Procedure: COMBINED ESOPHAGOSCOPY, GASTROSCOPY, DUODENOSCOPY (EGD);  Surgeon: Shaq Mims MD;  [facility]     LAPAROSCOPIC  GASTRIC ADJUSTABLE BANDING  11/09/10    Lap band procedure     LAPAROSCOPIC REMOVAL GASTRIC ADJUSTABLE BAND N/A 10/31/2015    Procedure: LAPAROSCOPIC REMOVAL GASTRIC ADJUSTABLE BAND;  Surgeon: Shaq Mims MD;  Location: [facility] OR     [place] HAND/FINGER SURGERY UNLISTED  2016     [place] STOMACH SURGERY PROCEDURE UNLISTED  11/2015     RELEASE CARPAL TUNNEL Left 4/27/2017    Procedure: RELEASE CARPAL TUNNEL;  Left Open Carpal Tunnel Release;  Surgeon: Ciara Middleton MD;  Location: [facility]     RELEASE CARPAL TUNNEL Right 6/15/2017    Procedure: RELEASE CARPAL TUNNEL;  Right Carpal Tunnel Release Open;  Surgeon: Ciara Middleton MD;  Location: [facility] OR     REPAIR PTOSIS       TUBAL LIGATION  1988     Allergy   No Known Allergies  Current Medication List     Current Outpatient Medications   Medication Sig     acetaminophen (TYLENOL) 500 MG tablet Take 500-1,000 mg by mouth every 6 hours as needed for mild pain     albuterol (PROAIR HFA/PROVENTIL HFA/VENTOLIN HFA) 108 (90 Base) MCG/ACT inhaler Inhale 2 puffs into the lungs every 6 hours as needed for shortness of breath / dyspnea or wheezing     azelastine (OPTIVAR) 0.05 % ophthalmic solution Apply 1 drop to eye 2 times daily     citalopram (CELEXA) 20 MG tablet Take 1 tablet (20 mg) by mouth daily     cycloSPORINE (RESTASIS) 0.05 % ophthalmic emulsion Place 1 drop into both eyes 2 times daily     diclofenac (VOLTAREN) 1 % topical gel Apply 2 g topically 4 times daily     fexofenadine (ALLEGRA) 180 MG tablet Take 1 tablet (180 mg) by mouth daily     fluticasone (FLONASE) 50 MCG/ACT nasal spray Spray 2 sprays into both nostrils as needed     gabapentin (NEURONTIN) 100 MG capsule TAKE ONE CAPSULE BY MOUTH EVERY MORNING, ONE CAPSULE BY MOUTH AT MIDDAY, AND TAKE FOUR CAPSULES BY MOUTH EVERY NIGHT AT BEDTIME     HYDROcodone-acetaminophen (NORCO) 5-325 MG tablet Take 1-2 tablets by mouth every 4 hours as needed for moderate to severe pain     hydrOXYzine (ATARAX) 25  MG tablet Take 1 tablet (25 mg) by mouth every 8 hours as needed for itching or anxiety (pain)     ibuprofen 200 MG capsule Take 600-800 mg by mouth At Bedtime     leflunomide (ARAVA) 20 MG tablet Take 1 tablet (20 mg) by mouth daily     losartan-hydrochlorothiazide (HYZAAR) 100-25 MG tablet Take 1 tablet by mouth daily     ondansetron (ZOFRAN-ODT) 8 MG ODT tab Take 1 tablet (8 mg) by mouth every 8 hours as needed for nausea     ORDER FOR DME Use your CPAP device as directed by your provider.     Sarilumab 200 MG/1.14ML SOAJ Inject 1.14 mLs (200 mg) Subcutaneous every 14 days . Hold for signs of infection and seek medical attention.     sulfaSALAzine (AZULFIDINE) 500 MG tablet Take 3 tablets (1,500 mg) by mouth 2 times daily     vitamin D3 (CHOLECALCIFEROL) 50 mcg (2000 units) tablet Take 1 tablet (50 mcg) by mouth daily     No current facility-administered medications for this visit.     Facility-Administered Medications Ordered in Other Visits   Medication     sodium chloride (PF) 0.9% PF flush 10 mL     sodium chloride bacteriostatic 0.9 % flush 12 mL         Social History   See HPI    Family History     Family History   Problem Relation Age of Onset     Heart Disease Father         MI     Neurologic Disorder Father 79        Parkinson's     Diabetes Father      Hypertension Father      Coronary Artery Disease Father      Cancer Maternal Grandmother         uterine     Neurologic Disorder Sister 16        migraine     Depression Sister      Neurologic Disorder Mother 20        migraine     Depression Mother      Neurologic Disorder Son 7        migraine     Substance Abuse Son      Migraines Son         none     Depression Sister      Substance Abuse Sister      Migraines Sister        Physical Exam     GEN: NAD.    HEENT: MMM.  Anicteric, noninjected sclera. No obvious external lesions of the ear and nose. Hearing intact.  PULM: No increased work of breathing  MSK:  Hands and wrists without swelling.    SKIN:  No rash or jaundice seen  PSYCH: Alert. Appropriate.     Labs / Imaging (select studies)     CBC  Recent Labs   Lab Test 11/30/21  0806 08/23/21  1131 08/03/21  1150 05/10/21  0802 02/02/21  1056 10/12/20  1113   WBC 6.3 7.2 5.8 5.1 7.4 6.1   RBC 4.28 4.31 4.51 4.48 4.41 4.18   HGB 12.9 13.0 13.3 13.5 13.4 12.7   HCT 40.5 41.5 42.3 42.8 42.8 39.7   MCV 95 96 94 96 97 95   RDW 12.6 13.0 12.7 12.8 12.5 12.9    216 206 193 216 221   MCH 30.1 30.2 29.5 30.1 30.4 30.4   MCHC 31.9 31.3* 31.4* 31.5 31.3* 32.0   NEUTROPHIL 50 47 44 42.6 40.1 40.8   LYMPH 27 30 35 26.4 34.8 33.4   MONOCYTE 9 13 11 9.5 10.7 8.9   EOSINOPHIL 13 9 9 19.5 12.6 15.6   BASOPHIL 1 1 1 1.8 1.4 1.0   ANEU  --   --   --  2.2 3.0 2.5   ALYM  --   --   --  1.3 2.6 2.0   KIMBERLY  --   --   --  0.5 0.8 0.5   AEOS  --   --   --  1.0* 0.9* 1.0*   ABAS  --   --   --  0.1 0.1 0.1   ANEUTAUTO 3.1 3.3 2.5  --   --   --    ALYMPAUTO 1.7 2.2 2.0  --   --   --    AMONOAUTO 0.6 1.0 0.7  --   --   --    AEOSAUTO 0.8* 0.6 0.5  --   --   --    ABSBASO 0.1 0.1 0.1  --   --   --      CMP  Recent Labs   Lab Test 08/23/21  1131 08/03/21  1150 05/10/21  0802 02/02/21  1056 10/12/20  1113 12/10/19  1452 11/05/19  1745   NA  --  138 140  --   --   --  138   POTASSIUM  --  3.9 4.3  --   --   --  3.8   CHLORIDE  --  107 110*  --   --   --  103   CO2  --  29 25  --   --   --  28   ANIONGAP  --  2* 5  --   --   --  7   GLC  --  74 99 74  --   --  87   BUN  --  14 12  --   --   --  13   CR 0.73 0.66 0.73 0.65 0.74   < > 0.74   GFRESTIMATED 87 >90 88 >90 87   < > 86   GFRESTBLACK  --   --  >90 >90 >90   < > >90   ISH  --  9.1 8.6  --   --   --  9.1   BILITOTAL 0.6  --  0.6 0.4 0.5   < > 0.5   ALBUMIN 3.9  --  3.8 4.0 3.8   < > 4.1   PROTTOTAL 7.3  --  7.1 7.1 6.9   < > 7.5   ALKPHOS 104  --  103 115 107   < > 96   AST 33  --  33 36 23   < > 23   ALT 53*  --  54* 58* 42   < > 45    < > = values in this interval not displayed.     Calcium/VitaminD  Recent Labs   Lab Test  08/03/21  1150 05/10/21  0802 10/12/20  1113 03/31/20  0755 11/05/19  1745 10/28/15  2233 02/20/15  0750   ISH 9.1 8.6  --   --  9.1   < > 9.0   VITDT  --   --  33 15*  --   --  39    < > = values in this interval not displayed.     ESR/CRP  Recent Labs   Lab Test 08/23/21  1131 08/03/21  1150 05/10/21  0802   SED 4 6 5   CRP <2.9 <2.9 <2.9     Lipid Panel  Recent Labs   Lab Test 05/10/21  0802 03/31/20  0755 04/27/17  0732 12/27/16  0836 02/20/15  0750 10/20/14  0821 11/22/13  0843   CHOL 224* 224* 204*   < > 157 194 167   TRIG 243* 171* 148   < > 79 193* 140   HDL 52 73 82   < > 63 63 60   * 117* 92   < > 78 92 79   VLDL  --   --   --   --  16 39* 28   CHOLHDLRATIO  --   --   --   --  2.5 3.1 2.8   NHDL 172* 151* 122   < >  --   --   --     < > = values in this interval not displayed.     Hepatitis B  Recent Labs   Lab Test 12/08/15  0855 03/06/15  1650   HBCAB Nonreactive  --    HEPBANG Nonreactive Nonreactive     Hepatitis C  Recent Labs   Lab Test 12/08/15  0855 03/06/15  1650   HCVAB Nonreactive   Assay performance characteristics have not been established for newborns,   infants, and children   Nonreactive   Assay performance characteristics have not been established for newborns,   infants, and children       Tuberculosis Screening  Recent Labs   Lab Test 10/31/17  1400 02/02/17  0822 12/08/15  0855   TBRSLT Negative Negative Negative   TBAGN 0.05 0.00 0.01     HIV Screening  Recent Labs   Lab Test 07/24/18  0738   HIAGAB Nonreactive     Immunization History     Immunization History   Administered Date(s) Administered     COVID-19,PF,Pfizer (12+ Yrs) 12/21/2020, 01/07/2021, 08/27/2021     Flu, Unspecified 10/12/2020     Influenza Quad, Recombinant, pf(RIV4) (Flublok) 09/15/2021     Influenza Vaccine IM > 6 months Valent IIV4 (Alfuria,Fluzone) 10/15/2013, 09/15/2014, 09/28/2015, 09/28/2016, 10/16/2017, 10/01/2018     Influenza Vaccine Im 4yrs+ 4 Valent CCIIV4 10/15/2019     Pneumo Conj 13-V  (2010&after) 04/15/2016     Pneumococcal 23 valent 07/15/2016     TD (ADULT, 7+) 10/30/2020     TDAP Vaccine (Adacel) 02/09/2011     Zoster vaccine recombinant adjuvanted (SHINGRIX) 04/17/2019, 08/28/2019          Chart documentation done in part with Dragon Voice recognition Software. Although reviewed after completion, some word and grammatical error may remain.       Video-Visit Details    Type of service:  Video Visit    Video Start Time: 8:58 AM    Video End Time:9:10 AM    Originating Location (pt. Location): Home, [place]    Distant Location (provider location):  Home    Platform used for Video Visit: Pao Guerra MD

## 2021-12-01 DIAGNOSIS — R73.9 ELEVATED BLOOD SUGAR: Primary | ICD-10-CM

## 2021-12-01 LAB
GAMMA INTERFERON BACKGROUND BLD IA-ACNC: 0 IU/ML
HBA1C MFR BLD: 4.4 % (ref 0–5.6)
M TB IFN-G BLD-IMP: NEGATIVE
M TB IFN-G CD4+ BCKGRND COR BLD-ACNC: 10 IU/ML
MITOGEN IGNF BCKGRD COR BLD-ACNC: 0 IU/ML
MITOGEN IGNF BCKGRD COR BLD-ACNC: 0.03 IU/ML
QUANTIFERON MITOGEN: 10 IU/ML
QUANTIFERON NIL TUBE: 0 IU/ML
QUANTIFERON TB1 TUBE: 0 IU/ML
QUANTIFERON TB2 TUBE: 0.03

## 2021-12-01 NOTE — RESULT ENCOUNTER NOTE
Sharon,  Your potassium came back slightly low, which may be related to the new blood pressure medication.  Getting extra potassium in your diet would be a good thing, while keeping sodium in your diet to a low level.Your other electrolytes and kidney tests were normal.  Your blood sugar was elevated, but I am guessing that you were not fasting.  I did add a hemoglobin A1c (diabetes test) to your labs to make sure you are not developing diabetes, but that came back nice and normal.  Please continue with your same blood pressure medication and follow-up on that sometime in the upcoming weeks when you get a chance.    Reynaldo Saavedra MD

## 2021-12-01 NOTE — PROGRESS NOTES
Patient's blood sugar was elevated to 165 yesterday morning when checked on her BMP.  I am not sure if she was fasting, but I will add an A1c to the lab testing to help assess whether she is developing diabetes...    Reynaldo Saavedra MD

## 2021-12-03 DIAGNOSIS — Z11.59 ENCOUNTER FOR SCREENING FOR OTHER VIRAL DISEASES: ICD-10-CM

## 2021-12-19 ENCOUNTER — HEALTH MAINTENANCE LETTER (OUTPATIENT)
Age: 66
End: 2021-12-19

## 2021-12-20 ENCOUNTER — OFFICE VISIT (OUTPATIENT)
Dept: FAMILY MEDICINE | Facility: CLINIC | Age: 66
End: 2021-12-20
Payer: COMMERCIAL

## 2021-12-20 VITALS
TEMPERATURE: 98 F | WEIGHT: 269 LBS | DIASTOLIC BLOOD PRESSURE: 59 MMHG | BODY MASS INDEX: 47.37 KG/M2 | OXYGEN SATURATION: 95 % | SYSTOLIC BLOOD PRESSURE: 100 MMHG | HEART RATE: 88 BPM

## 2021-12-20 DIAGNOSIS — E66.01 OBESITY, CLASS III, BMI 40-49.9 (MORBID OBESITY) (H): ICD-10-CM

## 2021-12-20 DIAGNOSIS — M05.9 SEROPOSITIVE RHEUMATOID ARTHRITIS (H): ICD-10-CM

## 2021-12-20 DIAGNOSIS — Z01.818 PREOP GENERAL PHYSICAL EXAM: Primary | ICD-10-CM

## 2021-12-20 DIAGNOSIS — G47.33 OBSTRUCTIVE SLEEP APNEA: ICD-10-CM

## 2021-12-20 DIAGNOSIS — I10 HYPERTENSION GOAL BP (BLOOD PRESSURE) < 140/90: ICD-10-CM

## 2021-12-20 DIAGNOSIS — M79.671 RIGHT FOOT PAIN: ICD-10-CM

## 2021-12-20 DIAGNOSIS — Z79.899 HIGH RISK MEDICATION USE: ICD-10-CM

## 2021-12-20 LAB
ANION GAP SERPL CALCULATED.3IONS-SCNC: 6 MMOL/L (ref 3–14)
BUN SERPL-MCNC: 20 MG/DL (ref 7–30)
CALCIUM SERPL-MCNC: 9.3 MG/DL (ref 8.5–10.1)
CHLORIDE BLD-SCNC: 105 MMOL/L (ref 94–109)
CO2 SERPL-SCNC: 28 MMOL/L (ref 20–32)
CREAT SERPL-MCNC: 0.68 MG/DL (ref 0.52–1.04)
GFR SERPL CREATININE-BSD FRML MDRD: >90 ML/MIN/1.73M2
GLUCOSE BLD-MCNC: 144 MG/DL (ref 70–99)
POTASSIUM BLD-SCNC: 3.7 MMOL/L (ref 3.4–5.3)
SODIUM SERPL-SCNC: 139 MMOL/L (ref 133–144)

## 2021-12-20 PROCEDURE — 80048 BASIC METABOLIC PNL TOTAL CA: CPT | Performed by: FAMILY MEDICINE

## 2021-12-20 PROCEDURE — 36415 COLL VENOUS BLD VENIPUNCTURE: CPT | Performed by: FAMILY MEDICINE

## 2021-12-20 PROCEDURE — 99214 OFFICE O/P EST MOD 30 MIN: CPT | Performed by: FAMILY MEDICINE

## 2021-12-20 NOTE — PROGRESS NOTES
[facility]  [address]  [place] [address]  Phone: [phone]  Primary Provider: Callum Lemons  Pre-op Performing Provider: CALLUM LEMONS      PREOPERATIVE EVALUATION:  Today's date: 12/20/2021    Sharon Fung is a 66 year old female who presents for a preoperative evaluation.    Surgical Information:  Surgery/Procedure: Right toe revision  [place] [facility] [place]  Surgeon: Dr. Ryan Gee  Surgery Date: 12/27/21  Time of Surgery: ?  Where patient plans to recover: At home with family  Fax number for surgical facility:     Type of Anesthesia Anticipated: to be determined    Assessment & Plan     The proposed surgical procedure is considered LOW to INTERMEDIATE risk (low risk procedure done on patient with comorbidities as listed below).      ICD-10-CM    1. Preop general physical exam  Z01.818    2. Right foot pain  M79.671    3. Hypertension goal BP (blood pressure) < 140/90  I10 Basic metabolic panel  (Ca, Cl, CO2, Creat, Gluc, K, Na, BUN)     Basic metabolic panel  (Ca, Cl, CO2, Creat, Gluc, K, Na, BUN)   4. Obesity, Class III, BMI 40-49.9 (morbid obesity) (H)  E66.01    5. Seropositive rheumatoid arthritis (H)  M05.9    6. High risk medication use  Z79.899    7. Obstructive sleep apnea  G47.33      Risks and Recommendations:  The patient has the following additional risks and recommendations for perioperative complications:   - No identified additional risk factors other than previously addressed    Medication Instructions:   - ibuprofen (Advil, Motrin): HOLD 1 day before surgery.     RECOMMENDATION:  APPROVAL GIVEN to proceed with proposed procedure, without further diagnostic evaluation.        Subjective     HPI related to upcoming procedure: 66-year-old female who underwent right foot arthrodesis surgery back in June of this year with Dr. Wolf, but has had persistent swelling and discomfort since then.  One of the screws in her foot broke.  She is coming  in now for a revision procedure to be done by Dr. Gee.    Preop Questions 12/20/2021   1. Have you ever had a heart attack or stroke? No   2. Have you ever had surgery on your heart or blood vessels, such as a stent placement, a coronary artery bypass, or surgery on an artery in your head, neck, heart, or legs? No   3. Do you have chest pain with activity? No   4. Do you have a history of  heart failure? No   5. Do you currently have a cold, bronchitis or symptoms of other infection? No   6. Do you have a cough, shortness of breath, or wheezing? No   7. Do you or anyone in your family have previous history of blood clots? No   8. Do you or does anyone in your family have a serious bleeding problem such as prolonged bleeding following surgeries or cuts? No   9. Have you ever had problems with anemia or been told to take iron pills? No   10. Have you had any abnormal blood loss such as black, tarry or bloody stools, or abnormal vaginal bleeding? No   11. Have you ever had a blood transfusion? No   12. Are you willing to have a blood transfusion if it is medically needed before, during, or after your surgery? Yes   13. Have you or any of your relatives ever had problems with anesthesia? No   14. Do you have sleep apnea, excessive snoring or daytime drowsiness? YES -- has BRETT and uses CPAP   14a. Do you have a CPAP machine? Yes   15. Do you have any artifical heart valves or other implanted medical devices like a pacemaker, defibrillator, or continuous glucose monitor? No   16. Do you have artificial joints? No   17. Are you allergic to latex? No         Status of Chronic Conditions:  See problem list for active medical problems.  Problems all longstanding and stable, except as noted/documented.  See ROS for pertinent symptoms related to these conditions.      Review of Systems  CONSTITUTIONAL:Has had gradual weight gain  INTEGUMENTARY/SKIN: NEGATIVE for worrisome rashes, moles or lesions  EYES: NEGATIVE for vision  changes or irritation  ENT/MOUTH: NEGATIVE for ear, mouth and throat problems  RESP: NEGATIVE for significant cough or SOB  CV: NEGATIVE for chest pain, palpitations or peripheral edema  GI: Gets nauseated with a.m. meds, especially if she takes them without food  : NEGATIVE for frequency, dysuria, or hematuria  MUSCULOSKELETAL:POSITIVE  for arthralgias Of various joints  NEURO: NEGATIVE for weakness, dizziness or paresthesias  ENDOCRINE: NEGATIVE for temperature intolerance, skin/hair changes  HEME: NEGATIVE for bleeding problems  PSYCHIATRIC: Takes medication for depression    Patient Active Problem List    Diagnosis Date Noted     High risk medication use 06/14/2013     Priority: High     Pain in joint, ankle and foot, right 05/18/2021     Priority: Medium     Added automatically from request for surgery [id number]       Fibromyalgia 04/17/2019     Priority: Medium     Immunosuppression (H) 12/07/2018     Priority: Medium     Headache 07/11/2017     Priority: Medium     Carpal tunnel syndrome of right wrist 06/22/2017     Priority: Medium     Seropositive rheumatoid arthritis (H) 07/15/2016     Priority: Medium     LORNA positive 07/15/2016     Priority: Medium     Anterior basement membrane dystrophy 06/21/2016     Priority: Medium     Obesity, Class III, BMI 40-49.9 (morbid obesity) (H) 05/19/2016     Priority: Medium     Obstructive sleep apnea 05/12/2015     Priority: Medium     Problem list name updated by automated process. Provider to review       Advanced directives, counseling/discussion 06/07/2012     Priority: Medium     Patient states has Advance Directive and will bring in a copy to clinic. 6/7/2012        Mild major depression (H)      Priority: Medium     Status post bariatric surgery      Priority: Medium     lap band       AR (allergic rhinitis)      Priority: Medium     GERD (gastroesophageal reflux disease)      Priority: Medium      Past Medical History:   Diagnosis Date     AR (allergic  rhinitis)  as teen     Arthritis 12/14/2015     Chronic obstructive pulmonary disease, unspecified COPD type (H) 3/27/2018     Depressive disorder 3 years ago     GERD (gastroesophageal reflux disease) 4-07     Headache 7/11/2017     Mild major depression (H) 3 years ago     Morbid obesity (H) teens     BRETT (obstructive sleep apnea)     uses CPAP     RA (rheumatoid arthritis) (H) 4 years     Reduced vision 3 years     Rheumatoid arthritis, adult (H)      Past Surgical History:   Procedure Laterality Date     ARTHRODESIS FOOT Right 6/30/2021    Procedure: Right 1st metatarsophalangeal joint fusion;  Surgeon: Jesus Wolf MD;  Location: [facility]     BLEPHAROPLASTY BILATERAL Bilateral 8/5/2016    Procedure: BLEPHAROPLASTY BILATERAL;  Surgeon: Cortez Robin MD;  Location: [facility]     BREAST BIOPSY, RT/LT  1-94    Benign     C APPENDECTOMY  1977     COLONOSCOPY       COLONOSCOPY WITH CO2 INSUFFLATION N/A 3/30/2017    Procedure: COLONOSCOPY WITH CO2 INSUFFLATION;  Surgeon: Issa Weeks MD;  Location: [facility] OR     ESOPHAGOSCOPY, GASTROSCOPY, DUODENOSCOPY (EGD), COMBINED N/A 10/29/2015    Procedure: COMBINED ESOPHAGOSCOPY, GASTROSCOPY, DUODENOSCOPY (EGD);  Surgeon: Shaq Mims MD;  Location: [facility]     LAPAROSCOPIC GASTRIC ADJUSTABLE BANDING  11/09/10    Lap band procedure     LAPAROSCOPIC REMOVAL GASTRIC ADJUSTABLE BAND N/A 10/31/2015    Procedure: LAPAROSCOPIC REMOVAL GASTRIC ADJUSTABLE BAND;  Surgeon: Shaq Mims MD;  Location: [facility] OR     NH HAND/FINGER SURGERY UNLISTED  2016     NH STOMACH SURGERY PROCEDURE UNLISTED  11/2015     RELEASE CARPAL TUNNEL Left 4/27/2017    Procedure: RELEASE CARPAL TUNNEL;  Left Open Carpal Tunnel Release;  Surgeon: Ciara Middleton MD;  Location: [facility]     RELEASE CARPAL TUNNEL Right 6/15/2017    Procedure: RELEASE CARPAL TUNNEL;  Right Carpal Tunnel Release Open;  Surgeon: Ciara Middleton MD;  Location: [facility]     REPAIR PTOSIS        TUBAL LIGATION  1988     Current Outpatient Medications   Medication Sig Dispense Refill     acetaminophen (TYLENOL) 500 MG tablet Take 500-1,000 mg by mouth every 6 hours as needed for mild pain       albuterol (PROAIR HFA/PROVENTIL HFA/VENTOLIN HFA) 108 (90 Base) MCG/ACT inhaler Inhale 2 puffs into the lungs every 6 hours as needed for shortness of breath / dyspnea or wheezing 1 Inhaler 1     azelastine (OPTIVAR) 0.05 % ophthalmic solution Apply 1 drop to eye 2 times daily 1 Bottle 11     citalopram (CELEXA) 20 MG tablet Take 1 tablet (20 mg) by mouth daily 90 tablet 0     cycloSPORINE (RESTASIS) 0.05 % ophthalmic emulsion Place 1 drop into both eyes 2 times daily 60 each 11     diclofenac (VOLTAREN) 1 % topical gel Apply 2 g topically 4 times daily 100 g 1     fexofenadine (ALLEGRA) 180 MG tablet Take 1 tablet (180 mg) by mouth daily 90 tablet 2     fluticasone (FLONASE) 50 MCG/ACT nasal spray Spray 2 sprays into both nostrils as needed       gabapentin (NEURONTIN) 100 MG capsule TAKE ONE CAPSULE BY MOUTH EVERY MORNING, ONE CAPSULE BY MOUTH AT MIDDAY, AND TAKE FOUR CAPSULES BY MOUTH EVERY NIGHT AT BEDTIME 180 capsule 5     HYDROcodone-acetaminophen (NORCO) 5-325 MG tablet Take 1-2 tablets by mouth every 4 hours as needed for moderate to severe pain 25 tablet 0     hydrOXYzine (ATARAX) 25 MG tablet Take 1 tablet (25 mg) by mouth every 8 hours as needed for itching or anxiety (pain) 20 tablet 0     ibuprofen 200 MG capsule Take 600-800 mg by mouth At Bedtime       leflunomide (ARAVA) 20 MG tablet Take 1 tablet (20 mg) by mouth daily 90 tablet 2     losartan-hydrochlorothiazide (HYZAAR) 100-25 MG tablet Take 1 tablet by mouth daily 90 tablet 3     ondansetron (ZOFRAN-ODT) 8 MG ODT tab Take 1 tablet (8 mg) by mouth every 8 hours as needed for nausea 40 tablet 2     ORDER FOR DME Use your CPAP device as directed by your provider.       Sarilumab 200 MG/1.14ML SOAJ Inject 1.14 mLs (200 mg) Subcutaneous every 14 days .  Hold for signs of infection and seek medical attention. 6.84 mL 2     sulfaSALAzine (AZULFIDINE) 500 MG tablet Take 3 tablets (1,500 mg) by mouth 2 times daily 540 tablet 2     vitamin D3 (CHOLECALCIFEROL) 50 mcg (2000 units) tablet Take 1 tablet (50 mcg) by mouth daily 90 tablet 3       No Known Allergies     Social History     Tobacco Use     Smoking status: Never Smoker     Smokeless tobacco: Never Used   Substance Use Topics     Alcohol use: No     Alcohol/week: 1.0 - 2.0 standard drink     Family History   Problem Relation Age of Onset     Heart Disease Father         MI     Neurologic Disorder Father 79        Parkinson's     Diabetes Father      Hypertension Father      Coronary Artery Disease Father      Cancer Maternal Grandmother         uterine     Neurologic Disorder Sister 16        migraine     Depression Sister      Neurologic Disorder Mother 20        migraine     Depression Mother      Neurologic Disorder Son 7        migraine     Substance Abuse Son      Migraines Son         none     Depression Sister      Substance Abuse Sister      Migraines Sister      History   Drug Use No         Objective     /59 (BP Location: Right arm, Patient Position: Sitting, Cuff Size: Adult Large)   Pulse 88   Temp 98  F (36.7  C) (Oral)   Wt 122 kg (269 lb)   SpO2 95%   BMI 47.37 kg/m      Physical Exam    GENERAL APPEARANCE: alert, no distress, cooperative and over weight     EYES: EOMI, PERRL     HENT: Grossly normal     NECK: Supple     RESP: lungs clear to auscultation - no rales, rhonchi or wheezes     CV: regular rates and rhythm, normal S1 S2, no S3 or S4 and no murmur, click or rub     ABDOMEN:  soft, nontender, no HSM or masses and bowel sounds normal     MS: Has a well-healed scar at the base of the first MTP joint of the right foot and some swelling and discomfort there to palpation     SKIN: no suspicious lesions or rashes     NEURO: Normal strength and tone, sensory exam grossly normal,  mentation intact and speech normal     PSYCH: mentation appears normal and affect normal/bright    Recent Labs   Lab Test 11/30/21  0806 08/23/21  1131 08/03/21  1150   HGB 12.9 13.0 13.3    216 206     --  138   POTASSIUM 3.3*  --  3.9   CR 0.61 0.73 0.66   A1C 4.4  --   --         Diagnostics:  LABS:   Office Visit on 12/20/2021   Component Date Value Ref Range Status     Sodium 12/20/2021 139  133 - 144 mmol/L Final     Potassium 12/20/2021 3.7  3.4 - 5.3 mmol/L Final     Chloride 12/20/2021 105  94 - 109 mmol/L Final     Carbon Dioxide (CO2) 12/20/2021 28  20 - 32 mmol/L Final     Anion Gap 12/20/2021 6  3 - 14 mmol/L Final     Urea Nitrogen 12/20/2021 20  7 - 30 mg/dL Final     Creatinine 12/20/2021 0.68  0.52 - 1.04 mg/dL Final     Calcium 12/20/2021 9.3  8.5 - 10.1 mg/dL Final     Glucose 12/20/2021 144* 70 - 99 mg/dL Final     GFR Estimate 12/20/2021 >90  >60 mL/min/1.73m2 Final    As of July 11, 2021, eGFR is calculated by the CKD-EPI creatinine equation, without race adjustment. eGFR can be influenced by muscle mass, exercise, and diet. The reported eGFR is an estimation only and is only applicable if the renal function is stable.       EKG: from 6/11/21 -- Sinus  Rhythm   Low voltage in precordial leads.    -Left atrial enlargement.     Revised Cardiac Risk Index (RCRI):  The patient has the following serious cardiovascular risks for perioperative complications:   - No serious cardiac risks = 0 points   (but does have morbid obesity and use of high-risk medications)    RCRI Interpretation: 0 points: Class I (very low risk - 0.4% complication rate)           Signed Electronically by: Reynaldo Saavedra MD  Copy of this evaluation report is provided to requesting physician.

## 2021-12-21 NOTE — RESULT ENCOUNTER NOTE
Sharon,  Your potassium is back in the normal range and the rest of your electrolytes and kidney tests are normal as well, so these laboratory tests look in good shape for your planned surgery.  Hope the procedure goes well for you!    Reynaldo Saavedra MD

## 2021-12-24 ENCOUNTER — LAB (OUTPATIENT)
Dept: URGENT CARE | Facility: URGENT CARE | Age: 66
End: 2021-12-24
Attending: ORTHOPAEDIC SURGERY
Payer: COMMERCIAL

## 2021-12-24 DIAGNOSIS — H04.123 DRY EYE SYNDROME, BILATERAL: ICD-10-CM

## 2021-12-24 DIAGNOSIS — M35.00 SICCA SYNDROME (H): ICD-10-CM

## 2021-12-24 DIAGNOSIS — Z11.59 ENCOUNTER FOR SCREENING FOR OTHER VIRAL DISEASES: ICD-10-CM

## 2021-12-24 PROCEDURE — U0005 INFEC AGEN DETEC AMPLI PROBE: HCPCS

## 2021-12-24 PROCEDURE — U0003 INFECTIOUS AGENT DETECTION BY NUCLEIC ACID (DNA OR RNA); SEVERE ACUTE RESPIRATORY SYNDROME CORONAVIRUS 2 (SARS-COV-2) (CORONAVIRUS DISEASE [COVID-19]), AMPLIFIED PROBE TECHNIQUE, MAKING USE OF HIGH THROUGHPUT TECHNOLOGIES AS DESCRIBED BY CMS-2020-01-R: HCPCS

## 2021-12-25 LAB — SARS-COV-2 RNA RESP QL NAA+PROBE: NEGATIVE

## 2021-12-27 ENCOUNTER — APPOINTMENT (OUTPATIENT)
Dept: GENERAL RADIOLOGY | Facility: CLINIC | Age: 66
End: 2021-12-27
Attending: ORTHOPAEDIC SURGERY
Payer: COMMERCIAL

## 2021-12-27 ENCOUNTER — ANESTHESIA (OUTPATIENT)
Dept: SURGERY | Facility: CLINIC | Age: 66
End: 2021-12-27
Payer: COMMERCIAL

## 2021-12-27 ENCOUNTER — ANESTHESIA EVENT (OUTPATIENT)
Dept: SURGERY | Facility: CLINIC | Age: 66
End: 2021-12-27
Payer: COMMERCIAL

## 2021-12-27 ENCOUNTER — HOSPITAL ENCOUNTER (OUTPATIENT)
Facility: CLINIC | Age: 66
Discharge: HOME | End: 2021-12-27
Attending: ORTHOPAEDIC SURGERY | Admitting: ORTHOPAEDIC SURGERY
Payer: COMMERCIAL

## 2021-12-27 VITALS
DIASTOLIC BLOOD PRESSURE: 77 MMHG | RESPIRATION RATE: 18 BRPM | BODY MASS INDEX: 47.15 KG/M2 | HEART RATE: 94 BPM | OXYGEN SATURATION: 93 % | TEMPERATURE: 98.8 F | SYSTOLIC BLOOD PRESSURE: 135 MMHG | HEIGHT: 63 IN | WEIGHT: 266.1 LBS

## 2021-12-27 DIAGNOSIS — Z98.890 S/P FOOT SURGERY, RIGHT: Primary | ICD-10-CM

## 2021-12-27 LAB — POTASSIUM BLD-SCNC: 3.5 MMOL/L (ref 3.4–5.3)

## 2021-12-27 PROCEDURE — 250N000025 HC SEVOFLURANE, PER MIN: Performed by: ORTHOPAEDIC SURGERY

## 2021-12-27 PROCEDURE — 999N000179 XR SURGERY CARM FLUORO LESS THAN 5 MIN W STILLS

## 2021-12-27 PROCEDURE — 272N000001 HC OR GENERAL SUPPLY STERILE: Performed by: ORTHOPAEDIC SURGERY

## 2021-12-27 PROCEDURE — 250N000009 HC RX 250: Performed by: NURSE ANESTHETIST, CERTIFIED REGISTERED

## 2021-12-27 PROCEDURE — 36415 COLL VENOUS BLD VENIPUNCTURE: CPT | Performed by: ANESTHESIOLOGY

## 2021-12-27 PROCEDURE — 999N000141 HC STATISTIC PRE-PROCEDURE NURSING ASSESSMENT: Performed by: ORTHOPAEDIC SURGERY

## 2021-12-27 PROCEDURE — 360N000083 HC SURGERY LEVEL 3 W/ FLUORO, PER MIN: Performed by: ORTHOPAEDIC SURGERY

## 2021-12-27 PROCEDURE — C1713 ANCHOR/SCREW BN/BN,TIS/BN: HCPCS | Performed by: ORTHOPAEDIC SURGERY

## 2021-12-27 PROCEDURE — 271N000001 HC OR GENERAL SUPPLY NON-STERILE: Performed by: ORTHOPAEDIC SURGERY

## 2021-12-27 PROCEDURE — 258N000003 HC RX IP 258 OP 636: Performed by: NURSE ANESTHETIST, CERTIFIED REGISTERED

## 2021-12-27 PROCEDURE — 250N000011 HC RX IP 250 OP 636: Performed by: ORTHOPAEDIC SURGERY

## 2021-12-27 PROCEDURE — 250N000009 HC RX 250: Performed by: ORTHOPAEDIC SURGERY

## 2021-12-27 PROCEDURE — 710N000012 HC RECOVERY PHASE 2, PER MINUTE: Performed by: ORTHOPAEDIC SURGERY

## 2021-12-27 PROCEDURE — 370N000017 HC ANESTHESIA TECHNICAL FEE, PER MIN: Performed by: ORTHOPAEDIC SURGERY

## 2021-12-27 PROCEDURE — 250N000011 HC RX IP 250 OP 636: Performed by: NURSE ANESTHETIST, CERTIFIED REGISTERED

## 2021-12-27 PROCEDURE — 250N000011 HC RX IP 250 OP 636: Performed by: PHYSICIAN ASSISTANT

## 2021-12-27 PROCEDURE — 84132 ASSAY OF SERUM POTASSIUM: CPT | Performed by: ANESTHESIOLOGY

## 2021-12-27 PROCEDURE — 258N000003 HC RX IP 258 OP 636: Performed by: ANESTHESIOLOGY

## 2021-12-27 PROCEDURE — 710N000009 HC RECOVERY PHASE 1, LEVEL 1, PER MIN: Performed by: ORTHOPAEDIC SURGERY

## 2021-12-27 DEVICE — IMP SCR ARTHREX CORTICAL 3.5MMX34MM AR-8935-34: Type: IMPLANTABLE DEVICE | Site: TOE | Status: FUNCTIONAL

## 2021-12-27 DEVICE — IMPLANTABLE DEVICE: Type: IMPLANTABLE DEVICE | Site: TOE | Status: FUNCTIONAL

## 2021-12-27 RX ORDER — OXYCODONE HYDROCHLORIDE 5 MG/1
5-10 TABLET ORAL EVERY 4 HOURS PRN
Qty: 30 TABLET | Refills: 0 | Status: SHIPPED | OUTPATIENT
Start: 2021-12-27 | End: 2022-03-29

## 2021-12-27 RX ORDER — OXYCODONE HYDROCHLORIDE 5 MG/1
5 TABLET ORAL EVERY 4 HOURS PRN
Status: DISCONTINUED | OUTPATIENT
Start: 2021-12-27 | End: 2021-12-27 | Stop reason: HOSPADM

## 2021-12-27 RX ORDER — FENTANYL CITRATE 0.05 MG/ML
50 INJECTION, SOLUTION INTRAMUSCULAR; INTRAVENOUS EVERY 5 MIN PRN
Status: DISCONTINUED | OUTPATIENT
Start: 2021-12-27 | End: 2021-12-27 | Stop reason: HOSPADM

## 2021-12-27 RX ORDER — ROPIVACAINE HYDROCHLORIDE 5 MG/ML
INJECTION, SOLUTION EPIDURAL; INFILTRATION; PERINEURAL PRN
Status: DISCONTINUED | OUTPATIENT
Start: 2021-12-27 | End: 2021-12-27 | Stop reason: HOSPADM

## 2021-12-27 RX ORDER — HYDRALAZINE HYDROCHLORIDE 20 MG/ML
2.5-5 INJECTION INTRAMUSCULAR; INTRAVENOUS EVERY 10 MIN PRN
Status: DISCONTINUED | OUTPATIENT
Start: 2021-12-27 | End: 2021-12-27 | Stop reason: HOSPADM

## 2021-12-27 RX ORDER — METHOCARBAMOL 750 MG/1
750 TABLET, FILM COATED ORAL
Status: DISCONTINUED | OUTPATIENT
Start: 2021-12-27 | End: 2021-12-27 | Stop reason: HOSPADM

## 2021-12-27 RX ORDER — ONDANSETRON 2 MG/ML
4 INJECTION INTRAMUSCULAR; INTRAVENOUS EVERY 30 MIN PRN
Status: DISCONTINUED | OUTPATIENT
Start: 2021-12-27 | End: 2021-12-27 | Stop reason: HOSPADM

## 2021-12-27 RX ORDER — HYDROMORPHONE HCL IN WATER/PF 6 MG/30 ML
0.2 PATIENT CONTROLLED ANALGESIA SYRINGE INTRAVENOUS EVERY 5 MIN PRN
Status: DISCONTINUED | OUTPATIENT
Start: 2021-12-27 | End: 2021-12-27 | Stop reason: HOSPADM

## 2021-12-27 RX ORDER — AMOXICILLIN 250 MG
1-2 CAPSULE ORAL 2 TIMES DAILY
Qty: 30 TABLET | Refills: 0 | Status: SHIPPED | OUTPATIENT
Start: 2021-12-27 | End: 2022-03-26

## 2021-12-27 RX ORDER — OXYCODONE AND ACETAMINOPHEN 5; 325 MG/1; MG/1
1 TABLET ORAL
Status: DISCONTINUED | OUTPATIENT
Start: 2021-12-27 | End: 2021-12-27 | Stop reason: HOSPADM

## 2021-12-27 RX ORDER — FENTANYL CITRATE 50 UG/ML
INJECTION, SOLUTION INTRAMUSCULAR; INTRAVENOUS PRN
Status: DISCONTINUED | OUTPATIENT
Start: 2021-12-27 | End: 2021-12-27

## 2021-12-27 RX ORDER — LIDOCAINE HYDROCHLORIDE 20 MG/ML
INJECTION, SOLUTION INFILTRATION; PERINEURAL PRN
Status: DISCONTINUED | OUTPATIENT
Start: 2021-12-27 | End: 2021-12-27

## 2021-12-27 RX ORDER — ONDANSETRON 4 MG/1
4 TABLET, ORALLY DISINTEGRATING ORAL EVERY 30 MIN PRN
Status: DISCONTINUED | OUTPATIENT
Start: 2021-12-27 | End: 2021-12-27 | Stop reason: HOSPADM

## 2021-12-27 RX ORDER — CEFAZOLIN SODIUM IN 0.9 % NACL 3 G/100 ML
3 INTRAVENOUS SOLUTION, PIGGYBACK (ML) INTRAVENOUS SEE ADMIN INSTRUCTIONS
Status: DISCONTINUED | OUTPATIENT
Start: 2021-12-27 | End: 2021-12-27 | Stop reason: HOSPADM

## 2021-12-27 RX ORDER — ONDANSETRON 4 MG/1
4-8 TABLET, ORALLY DISINTEGRATING ORAL EVERY 6 HOURS PRN
Qty: 15 TABLET | Refills: 1 | Status: SHIPPED | OUTPATIENT
Start: 2021-12-27 | End: 2022-03-26

## 2021-12-27 RX ORDER — ACETAMINOPHEN 325 MG/1
975 TABLET ORAL ONCE
Status: DISCONTINUED | OUTPATIENT
Start: 2021-12-27 | End: 2021-12-27 | Stop reason: HOSPADM

## 2021-12-27 RX ORDER — DEXAMETHASONE SODIUM PHOSPHATE 4 MG/ML
INJECTION, SOLUTION INTRA-ARTICULAR; INTRALESIONAL; INTRAMUSCULAR; INTRAVENOUS; SOFT TISSUE PRN
Status: DISCONTINUED | OUTPATIENT
Start: 2021-12-27 | End: 2021-12-27

## 2021-12-27 RX ORDER — ONDANSETRON 2 MG/ML
INJECTION INTRAMUSCULAR; INTRAVENOUS PRN
Status: DISCONTINUED | OUTPATIENT
Start: 2021-12-27 | End: 2021-12-27

## 2021-12-27 RX ORDER — HYDROXYZINE HYDROCHLORIDE 25 MG/1
25 TABLET, FILM COATED ORAL
Status: DISCONTINUED | OUTPATIENT
Start: 2021-12-27 | End: 2021-12-27 | Stop reason: HOSPADM

## 2021-12-27 RX ORDER — ONDANSETRON 4 MG/1
4 TABLET, ORALLY DISINTEGRATING ORAL
Status: DISCONTINUED | OUTPATIENT
Start: 2021-12-27 | End: 2021-12-27 | Stop reason: HOSPADM

## 2021-12-27 RX ORDER — PROPOFOL 10 MG/ML
INJECTION, EMULSION INTRAVENOUS PRN
Status: DISCONTINUED | OUTPATIENT
Start: 2021-12-27 | End: 2021-12-27

## 2021-12-27 RX ORDER — CEFAZOLIN SODIUM IN 0.9 % NACL 3 G/100 ML
3 INTRAVENOUS SOLUTION, PIGGYBACK (ML) INTRAVENOUS
Status: COMPLETED | OUTPATIENT
Start: 2021-12-27 | End: 2021-12-27

## 2021-12-27 RX ORDER — HYDROXYZINE HYDROCHLORIDE 25 MG/1
25 TABLET, FILM COATED ORAL EVERY 6 HOURS PRN
Status: DISCONTINUED | OUTPATIENT
Start: 2021-12-27 | End: 2021-12-27 | Stop reason: HOSPADM

## 2021-12-27 RX ORDER — LIDOCAINE 40 MG/G
CREAM TOPICAL
Status: DISCONTINUED | OUTPATIENT
Start: 2021-12-27 | End: 2021-12-27 | Stop reason: HOSPADM

## 2021-12-27 RX ORDER — MAGNESIUM HYDROXIDE 1200 MG/15ML
LIQUID ORAL PRN
Status: DISCONTINUED | OUTPATIENT
Start: 2021-12-27 | End: 2021-12-27 | Stop reason: HOSPADM

## 2021-12-27 RX ORDER — LABETALOL HYDROCHLORIDE 5 MG/ML
10 INJECTION, SOLUTION INTRAVENOUS
Status: DISCONTINUED | OUTPATIENT
Start: 2021-12-27 | End: 2021-12-27 | Stop reason: HOSPADM

## 2021-12-27 RX ORDER — SODIUM CHLORIDE, SODIUM LACTATE, POTASSIUM CHLORIDE, CALCIUM CHLORIDE 600; 310; 30; 20 MG/100ML; MG/100ML; MG/100ML; MG/100ML
INJECTION, SOLUTION INTRAVENOUS CONTINUOUS
Status: DISCONTINUED | OUTPATIENT
Start: 2021-12-27 | End: 2021-12-27 | Stop reason: HOSPADM

## 2021-12-27 RX ORDER — DIMENHYDRINATE 50 MG/ML
25 INJECTION, SOLUTION INTRAMUSCULAR; INTRAVENOUS
Status: DISCONTINUED | OUTPATIENT
Start: 2021-12-27 | End: 2021-12-27 | Stop reason: HOSPADM

## 2021-12-27 RX ORDER — EPHEDRINE SULFATE 50 MG/ML
INJECTION, SOLUTION INTRAMUSCULAR; INTRAVENOUS; SUBCUTANEOUS PRN
Status: DISCONTINUED | OUTPATIENT
Start: 2021-12-27 | End: 2021-12-27

## 2021-12-27 RX ADMIN — PHENYLEPHRINE HYDROCHLORIDE 100 MCG: 10 INJECTION INTRAVENOUS at 08:45

## 2021-12-27 RX ADMIN — Medication 5 MG: at 08:43

## 2021-12-27 RX ADMIN — Medication 3 G: at 08:21

## 2021-12-27 RX ADMIN — FENTANYL CITRATE 50 MCG: 50 INJECTION, SOLUTION INTRAMUSCULAR; INTRAVENOUS at 08:24

## 2021-12-27 RX ADMIN — PHENYLEPHRINE HYDROCHLORIDE 100 MCG: 10 INJECTION INTRAVENOUS at 08:40

## 2021-12-27 RX ADMIN — PROPOFOL 250 MG: 10 INJECTION, EMULSION INTRAVENOUS at 08:24

## 2021-12-27 RX ADMIN — FENTANYL CITRATE 50 MCG: 50 INJECTION, SOLUTION INTRAMUSCULAR; INTRAVENOUS at 08:30

## 2021-12-27 RX ADMIN — LIDOCAINE HYDROCHLORIDE 100 MG: 20 INJECTION, SOLUTION INFILTRATION; PERINEURAL at 08:24

## 2021-12-27 RX ADMIN — ONDANSETRON 4 MG: 2 INJECTION INTRAMUSCULAR; INTRAVENOUS at 08:30

## 2021-12-27 RX ADMIN — DEXAMETHASONE SODIUM PHOSPHATE 4 MG: 4 INJECTION, SOLUTION INTRA-ARTICULAR; INTRALESIONAL; INTRAMUSCULAR; INTRAVENOUS; SOFT TISSUE at 08:30

## 2021-12-27 RX ADMIN — SODIUM CHLORIDE, POTASSIUM CHLORIDE, SODIUM LACTATE AND CALCIUM CHLORIDE: 600; 310; 30; 20 INJECTION, SOLUTION INTRAVENOUS at 07:25

## 2021-12-27 ASSESSMENT — COPD QUESTIONNAIRES
COPD: 1
CAT_SEVERITY: MILD

## 2021-12-27 ASSESSMENT — MIFFLIN-ST. JEOR: SCORE: 1726.15

## 2021-12-27 ASSESSMENT — LIFESTYLE VARIABLES: TOBACCO_USE: 0

## 2021-12-27 NOTE — DISCHARGE INSTRUCTIONS
**Because you had anesthesia today and your history of sleep apnea, it is extremely important that you use your CPAP machine for the next 24 hours while napping or sleeping.**    Same Day Surgery Discharge Instructions for  Sedation and General Anesthesia       It's not unusual to feel dizzy, light-headed or faint for up to 24 hours after surgery or while taking pain medication.  If you have these symptoms: sit for a few minutes before standing and have someone assist you when you get up to walk or use the bathroom.      You should rest and relax for the next 24 hours. We recommend you make arrangements to have an adult stay with you for at least 24 hours after your discharge.  Avoid hazardous and strenuous activity.      DO NOT DRIVE any vehicle or operate mechanical equipment for 24 hours following the end of your surgery.  Even though you may feel normal, your reactions may be affected by the medication you have received.      Do not drink alcoholic beverages for 24 hours following surgery.       Slowly progress to your regular diet as you feel able. It's not unusual to feel nauseated and/or vomit after receiving anesthesia.  If you develop these symptoms, drink clear liquids (apple juice, ginger ale, broth, 7-up, etc. ) until you feel better.  If your nausea and vomiting persists for 24 hours, please notify your surgeon.        All narcotic pain medications, along with inactivity and anesthesia, can cause constipation. Drinking plenty of liquids and increasing fiber intake will help.      For any questions of a medical nature, call your surgeon.      Do not make important decisions for 24 hours.      If you had general anesthesia, you may have a sore throat for a couple of days related to the breathing tube used during surgery.  You may use Cepacol lozenges to help with this discomfort.  If it worsens or if you develop a fever, contact your surgeon.       If you feel your pain is not well managed with the pain  medications prescribed by your surgeon, please contact your surgeon's office to let them know so they can address your concerns.       CoVid 19 Information    We want to give you information regarding Covid. Please consult your primary care provider with any questions you might have.     Patient who have symptoms (cough, fever, or shortness of breath), need to isolate for 7 days from when symptoms started OR 72 hours after fever resolves (without fever reducing medications) AND improvement of respiratory symptoms (whichever is longer).      Isolate yourself at home (in own room/own bathroom if possible)    Do Not allow any visitors    Do Not go to work or school    Do Not go to [employer],  centers, shopping, or other public places.    Do Not shake hands.    Avoid close and intimate contact with others (hugging, kissing).    Follow CDC recommendations for household cleaning of frequently touched services.     After the initial 7 days, continue to isolate yourself from household members as much as possible. To continue decrease the risk of community spread and exposure, you and any members of your household should limit activities in public for 14 days after starting home isolation.     You can reference the following CDC link for helpful home isolation/care tips:  https://www.cdc.gov/coronavirus/2019-ncov/downloads/10Things.pdf    Protect Others:    Cover Your Mouth and Nose with a mask, disposable tissue or wash cloth to avoid spreading germs to others.    Wash your hands and face frequently with soap and water    Call Your Primary Doctor If: Breathing difficulty develops or you become worse.    For more information about COVID19 and options for caring for yourself at home, please visit the CDC website at https://www.cdc.gov/coronavirus/2019-ncov/about/steps-when-sick.html  For more options for care at [facility], please visit our website at  [email or web address]    POST-OPERATIVE INSTRUCTIONS  FOOT AND ANKLE SURGERY  NOHEMY Gee M.D.  Edward Crawford P.A.-C    These precautions MUST be followed for the first 24 hours after surgery:    Upon discharge, go directly home.    You must make arrangements to have a responsible adult stay with you.    DO NOT DRINK ALCOHOLIC BEVERAGES    It is not unusual to feel lightheaded up to 24 hours after surgery or while taking pain medication.  If you feel lightheaded, sit for a few minutes before standing and have someone assisted you when you get up to walk or use the restroom.    Do not use any mechanical equipment.    Do not make any important or legal decisions for 24 hours or while on pain medications.    You may experience dry mouth, sore throat, or sleep disturbances from the anesthesia and medications used during surgery.  Generalized muscle aches can sometimes occur.  These usually disappear in 12-24 hours.    POST-OPERATIVE CARE GUIDELINES    The following are general guidelines about what to expect the first days/weeks after surgery.  They are not specific, and your recovery may be slightly different.    Blood clots are not common, but are emergencies.  If you have sudden onset pain in your calf or leg, or have sudden shortness of breath, go to the Emergency Department.      Elevation of your operative foot/ankle is key to reducing the swelling in the immediate post-operative phase (first 3-5 days).  When you are at rest, elevate your foot at or above the level of your heart.  When sitting, your foot should be elevated on a chair or stool; not hanging down.      You should plan to rest the day after surgery no matter how minor the procedure.  More complicated procedures will require more time to return to normal activity.      Foot and ankle surgery is painful in most cases.   It is not unusual for the pain to be worse a day or two after surgery than on the day of.  If your  pain is more than 8/10 contact our office.  If you don t have pain, gradually decrease your pain meds by substituting Tylenol.  Please don t use Advil/ibuprofen unless we order it for you.  Pt may resume regular home medications of:  ALL PRESCRIBED BLOOD THINNERS (INCLUDING ASA 81MG) on the day after surgery.        You will be prescribed Percocet or Vicodin for pain, Vistaril for spasms/pain adjunct, Senokot for constipation.  If you have had trouble taking these meds before or experience nausea or vomiting after surgery from your medication, please advise the recovery room nurses.  If you are already at home, try drinking only clear liquids and/or call our office.  Start taking narcotic pain medication when you feel sensation in your lower extremity after a block.       All pain medications, along with inactivity can cause constipation.  Use the Senakot as directed, increase fluid intake to 1 quart per day and increase your dietary fiber.  (The  P  fruits - peaches, plums, pears, and prunes as well as anise/black licorice are recommended.)    It is not unusual to run a low-grade fever after surgery.  If your temperature is elevated above 102 , lasts longer than 24 hours, or is questionable in any way, contact our office.      Drainage from your cast/dressing is normal.  Reinforce your cast/dressing with 4x4 dressings and cover with an Ace wrap.  Wait until 24 hours after surgery to change your dressings; by this time most of your bleeding will have stopped.  If you have drainage through your dressings 2 days after surgery, contact our office.      You cast/dressing will be changed at your 2-week follow up appointment.  They should be kept clean and DRY.  If your cast/dressing is damaged before that, contact our office.      It is normal to experience some bruising in the toes after surgery and they may appear  dark  when your foot hangs down.  It is important to actively wiggle your toes for at least 5-10 minutes  each hour UNLESS you had surgery on those toes.      Use ice on your foot/ankle over the dressing/cast for 20 minutes per hour, 10 or more times per day.  A large bag of frozen peas works well.      Bathing: take a tub or sponge bath instead of a shower if possible during the first two weeks.  If you choose to shower, cover the dressing/cast with a waterproof covering, these may be ordered from www.seal-tight.com or are available at some pharmacies/medical supply stores.  Another option is XeroSox, which is the original vacuum sealed bandage and cast cover.  The cast cover has a vacuum seal and is absolutely waterproof.  [phone] or www.xerosox.[employer]qBak for more information.      Driving:  For surgery on the left foot/ankle, you may drive as soon as narcotic medications are stopped.  For surgery on the right foot/ankle, you may drive when you are out of a cast, off pain medications, and you feel secure with braking.      In general, listen to your foot/ankle.  If you have a sudden, dramatic increase in pain that does not resolve after an hour or so, something is wrong - call our office.  If you fall or bump your foot/ankle and have sudden pain that resolves, give it 24 hours before you call.      Many of your questions can be addressed at your 2-week follow-up appointment - please make a list of things to ask us as they come up during your recovery.      If you had a nerve block it is common to have numbness in your leg and foot for up to 30 hours after surgery.  If your leg or foot is still numb more than 30 hours after surgery, please call the office.      FUTURE DENTIST VISITS:  If you have had a total ankle arthroplasty please be advised you must be on antibiotics prior to ANY dental work.  Please call your dentist office ahead of time and make them aware of this as your dentist will be able to order the prescription.  If you have had any other surgery this is not a concern.    If you have any further questions  or concerns, please call our office at [phone]      Pt may resume regular home medications of:  ALL PRESCRIBED BLOOD THINNERS (INCLUDING ASA 81MG)   on the day after surgery.      **If you have questions or concerns about your procedure,   call Dr. Gee at [phone]**

## 2021-12-27 NOTE — ANESTHESIA PREPROCEDURE EVALUATION
Anesthesia Pre-Procedure Evaluation    Patient: Sharon Fung   MRN: [id number] : 1955        Preoperative Diagnosis: Failure of joint fusion (H) [T84.9XXA]    Procedure : Procedure(s):  right revision great toe fusion          Past Medical History:   Diagnosis Date     AR (allergic rhinitis)  as teen     Arthritis 2015     Chronic obstructive pulmonary disease, unspecified COPD type (H) 3/27/2018     Depressive disorder 3 years ago     GERD (gastroesophageal reflux disease) 4-07     Headache 2017     Hypertension goal BP (blood pressure) < 140/90 2021     Mild major depression (H) 3 years ago     Morbid obesity (H) teens     BRETT (obstructive sleep apnea)     uses CPAP     RA (rheumatoid arthritis) (H) 4 years     Reduced vision 3 years     Rheumatoid arthritis, adult (H)       Past Surgical History:   Procedure Laterality Date     ARTHRODESIS FOOT Right 2021    Procedure: Right 1st metatarsophalangeal joint fusion;  Surgeon: Jesus Wolf MD;  Location: [facility]     BLEPHAROPLASTY BILATERAL Bilateral 2016    Procedure: BLEPHAROPLASTY BILATERAL;  Surgeon: Cortez Robin MD;  Location:  [facility]     BREAST BIOPSY, RT/LT  1-94    Benign     COLONOSCOPY       COLONOSCOPY WITH CO2 INSUFFLATION N/A 3/30/2017    Procedure: COLONOSCOPY WITH CO2 INSUFFLATION;  Surgeon: Issa Weeks MD;  Location:  OR     ESOPHAGOSCOPY, GASTROSCOPY, DUODENOSCOPY (EGD), COMBINED N/A 10/29/2015    Procedure: COMBINED ESOPHAGOSCOPY, GASTROSCOPY, DUODENOSCOPY (EGD);  Surgeon: Shaq Mims MD;  Location: [facility]     LAPAROSCOPIC GASTRIC ADJUSTABLE BANDING  11/09/10    Lap band procedure     LAPAROSCOPIC REMOVAL GASTRIC ADJUSTABLE BAND N/A 10/31/2015    Procedure: LAPAROSCOPIC REMOVAL GASTRIC ADJUSTABLE BAND;  Surgeon: Shaq Mims MD;  Location: [facility]     AR HAND/FINGER SURGERY UNLISTED       AR STOMACH SURGERY PROCEDURE UNLISTED  2015     RELEASE CARPAL TUNNEL Left  4/27/2017    Procedure: RELEASE CARPAL TUNNEL;  Left Open Carpal Tunnel Release;  Surgeon: Ciara Middleton MD;  Location:  OR     RELEASE CARPAL TUNNEL Right 6/15/2017    Procedure: RELEASE CARPAL TUNNEL;  Right Carpal Tunnel Release Open;  Surgeon: Ciara Middleton MD;  Location:  OR     REPAIR PTOSIS       TUBAL LIGATION  1988     Z APPENDECTOMY  1977      No Known Allergies   Social History     Tobacco Use     Smoking status: Never Smoker     Smokeless tobacco: Never Used   Substance Use Topics     Alcohol use: No     Alcohol/week: 1.0 - 2.0 standard drink      Wt Readings from Last 1 Encounters:   12/27/21 120.7 kg (266 lb 1.6 oz)        Anesthesia Evaluation   Pt has had prior anesthetic. Type: General.    No history of anesthetic complications       ROS/MED HX  ENT/Pulmonary:     (+) sleep apnea, allergic rhinitis, mild,  COPD,  (-) tobacco use   Neurologic:       Cardiovascular:     (+) hypertension-----HAYS.     METS/Exercise Tolerance:     Hematologic:       Musculoskeletal:   (+) arthritis (Rheumatoid),     GI/Hepatic:     (+) GERD,     Renal/Genitourinary:       Endo:     (+) Obesity (Class 3),     Psychiatric/Substance Use:     (+) psychiatric history depression     Infectious Disease:       Malignancy:       Other:      (+) , H/O Chronic Pain (Fibromyalgia),        Physical Exam    Airway        Mallampati: III   TM distance: > 3 FB   Neck ROM: full   Mouth opening: > 3 cm    Respiratory Devices and Support         Dental  no notable dental history     (+) caps      Cardiovascular          Rhythm and rate: regular and normal     Pulmonary   pulmonary exam normal                OUTSIDE LABS:  CBC:   Lab Results   Component Value Date    WBC 6.3 11/30/2021    WBC 7.2 08/23/2021    HGB 12.9 11/30/2021    HGB 13.0 08/23/2021    HCT 40.5 11/30/2021    HCT 41.5 08/23/2021     11/30/2021     08/23/2021     BMP:   Lab Results   Component Value Date     12/20/2021      11/30/2021    POTASSIUM 3.7 12/20/2021    POTASSIUM 3.3 (L) 11/30/2021    CHLORIDE 105 12/20/2021    CHLORIDE 104 11/30/2021    CO2 28 12/20/2021    CO2 28 11/30/2021    BUN 20 12/20/2021    BUN 19 11/30/2021    CR 0.68 12/20/2021    CR 0.61 11/30/2021     (H) 12/20/2021     (H) 11/30/2021     COAGS:   Lab Results   Component Value Date    PTT 33 07/15/2017    INR 0.96 07/15/2017     POC:   Lab Results   Component Value Date     (H) 06/30/2021     HEPATIC:   Lab Results   Component Value Date    ALBUMIN 3.6 11/30/2021    PROTTOTAL 7.0 11/30/2021    ALT 43 11/30/2021    AST 26 11/30/2021    ALKPHOS 113 11/30/2021    BILITOTAL 0.5 11/30/2021     OTHER:   Lab Results   Component Value Date    PH 7.43 06/04/2015    LACT 2.4 (H) 11/05/2019    A1C 4.4 11/30/2021    ISH 9.3 12/20/2021    TSH 1.20 09/29/2015    CRP <2.9 11/30/2021    SED 6 11/30/2021       Anesthesia Plan    ASA Status:  3   NPO Status:  NPO Appropriate    Anesthesia Type: General.     - Airway: LMA   Induction: Intravenous.   Maintenance: Balanced.        Consents    Anesthesia Plan(s) and associated risks, benefits, and realistic alternatives discussed. Questions answered and patient/representative(s) expressed understanding.    - Discussed:     - Discussed with:  Patient         Postoperative Care    Pain management: Multi-modal analgesia, IV analgesics, Oral pain medications.   PONV prophylaxis: Ondansetron (or other 5HT-3), Background Propofol Infusion     Comments:    Other Comments: Precedex pushes prn            Catalino Howard MD

## 2021-12-27 NOTE — ANESTHESIA PROCEDURE NOTES
Airway       Patient location during procedure: OR       Procedure Start/Stop Times: 12/27/2021 8:29 AM  Staff -        Anesthesiologist:  Catalino Howard MD       CRNA: Nery Aj APRN CRNA       Performed By: CRNA  Consent for Airway        Urgency: elective  Indications and Patient Condition       Indications for airway management: guy-procedural         Mask difficulty assessment: 0 - not attempted    Final Airway Details       Final airway type: supraglottic airway    Supraglottic Airway Details        Type: LMA       Brand: Ambu AuraGain       LMA size: 4    Post intubation assessment        Placement verified by: capnometry, equal breath sounds and chest rise        Number of attempts at approach: 1       Secured with: commercial tube maradiaga and pink tape       Ease of procedure: easy       Dentition: Intact and Unchanged

## 2021-12-27 NOTE — ANESTHESIA CARE TRANSFER NOTE
Patient: Sharon Fung    Procedure: Procedure(s):  right revision great toe fusion       Diagnosis: Failure of joint fusion (H) [T84.9XXA]  Diagnosis Additional Information: No value filed.    Anesthesia Type:   General     Note:    Oropharynx: oropharynx clear of all foreign objects  Level of Consciousness: awake  Oxygen Supplementation: face mask    Independent Airway: airway patency satisfactory and stable  Dentition: dentition unchanged  Vital Signs Stable: post-procedure vital signs reviewed and stable  Report to RN Given: handoff report given  Patient transferred to: PACU    Handoff Report: Identifed the Patient, Identified the Reponsible Provider, Reviewed the pertinent medical history, Discussed the surgical course, Reviewed Intra-OP anesthesia mangement and issues during anesthesia, Set expectations for post-procedure period and Allowed opportunity for questions and acknowledgement of understanding      Vitals:  Vitals Value Taken Time   /89    Temp     Pulse 99    Resp 12    SpO2 97 % 12/27/21 0854   Vitals shown include unvalidated device data.    Electronically Signed By: CHELSEY Martin CRNA  December 27, 2021  8:55 AM

## 2021-12-27 NOTE — OP NOTE
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    SURGICAL PROCEDURE:     1.  Revision right great toe fusion.  2.  Removal of screws including broken screws from the right first MTP joint.    ANESTHETIC:  General.    SURGEON:  Ryan Gee MD    ASSISTANT:  Edward Crawford PA-C    PREAMBLE:  Ms. Fung previously had a right ankle fusion done at [facility].  This unfortunately went on to a very symptomatic nonunion with both the screws broken.  Informed consent was obtained for above-mentioned procedure.    DESCRIPTION OF PROCEDURE:  After adequate induction of a general anesthetic, the patient was positioned supine on the operating table.  The right leg was sterilely prepped and free draped in the usual fashion.  Tourniquet around the thigh was inflated to 300 mmHg.    A 6 cm incision was made along the medial border of the great toe MTP joint.  There was dense scar tissue that was removed.  There were 2 small screws from the dorsum of the first metatarsal that were removed.  There was a complete nonunion with lots of movement through the joint.  There was still articular cartilage on the plantar half of the joint.  A rongeur and osteotomes were used to remove the articular cartilage from the opposing surfaces of the joint.  The 2 broken ends of the screws in the proximal phalanx were also removed.    Subchondral bone was exposed on both sides and once fully prepared the toe was reduced and immobilized with a screw from the medial aspect of the first metatarsal into the proximal phalanx.  The second screw was inserted from the medial side of the proximal phalanx into the metatarsal with excellent alignment and stability confirmed with intraoperative x-rays.    The tourniquet was deflated.  Hemostasis obtained.  The wound closed in layers.  A sterile dressing and a light compressive  bandage applied.  She tolerated the procedure well and was taken to the recovery room in satisfactory condition.    She can ambulate heel weightbearing with crutches.  Sutures will be removed in 2 weeks.    Ryan Gee MD        D: 2021   T: 2021   MT: DIEUDONNE    Name:     ALONZO HOLMAN  MRN:      [id number]        Account:        [id number]   :      1955           Procedure Date: 2021     Document: [id number]

## 2021-12-28 ENCOUNTER — TELEPHONE (OUTPATIENT)
Dept: OPTOMETRY | Facility: CLINIC | Age: 66
End: 2021-12-28
Payer: COMMERCIAL

## 2021-12-28 RX ORDER — CYCLOSPORINE 0.5 MG/ML
1 EMULSION OPHTHALMIC 2 TIMES DAILY
Qty: 60 EACH | Refills: 11 | Status: SHIPPED | OUTPATIENT
Start: 2021-12-28 | End: 2023-07-20

## 2021-12-28 NOTE — TELEPHONE ENCOUNTER
PA required on: restasis 0.05%  Medication NDC is: [address]-86  Patient Insurance is: [facility]  Insurance ID is: [id number]  Insurance phone number is: [phone]      Please let us know when PA is granted or denied. Thank you.    Mohan Vaz  [phone]

## 2021-12-28 NOTE — TELEPHONE ENCOUNTER
Central Prior Authorization Team   Phone: [phone]    PA Initiation    Medication: restasis  Insurance Company: [employer] - Phone [phone] Fax [phone]  Pharmacy Filling the Rx: [place], [place] - [address]  Filling Pharmacy Phone: [phone]  Filling Pharmacy Fax:    Start Date: 12/28/2021

## 2021-12-29 NOTE — TELEPHONE ENCOUNTER
Prior Authorization Approval    Authorization Effective Date: 12/29/2021  Authorization Expiration Date: 12/29/2023  Medication: restasis  Approved Dose/Quantity:   Reference #:     Insurance Company: [employer] - Phone [phone] Fax [phone]  Expected CoPay:       CoPay Card Available:      Foundation Assistance Needed:    Which Pharmacy is filling the prescription (Not needed for infusion/clinic administered): [facility] [place], [place] - [address]  Pharmacy Notified: Yes  Patient Notified: Yes

## 2022-01-06 ENCOUNTER — TRANSFERRED RECORDS (OUTPATIENT)
Dept: HEALTH INFORMATION MANAGEMENT | Facility: CLINIC | Age: 67
End: 2022-01-06
Payer: COMMERCIAL

## 2022-02-03 ENCOUNTER — TRANSFERRED RECORDS (OUTPATIENT)
Dept: HEALTH INFORMATION MANAGEMENT | Facility: CLINIC | Age: 67
End: 2022-02-03
Payer: COMMERCIAL

## 2022-02-03 DIAGNOSIS — F32.0 MILD MAJOR DEPRESSION (H): ICD-10-CM

## 2022-02-04 RX ORDER — CITALOPRAM HYDROBROMIDE 20 MG/1
TABLET ORAL
Qty: 90 TABLET | Refills: 0 | Status: SHIPPED | OUTPATIENT
Start: 2022-02-04 | End: 2022-05-09

## 2022-02-04 NOTE — TELEPHONE ENCOUNTER
Prescription approved per [facility] Refill Protocol.  Kathy Dill RN      PHQ-9 score:    PHQ 11/12/2021   PHQ-9 Total Score 4   Q9: Thoughts of better off dead/self-harm past 2 weeks Not at all

## 2022-02-17 ENCOUNTER — OFFICE VISIT (OUTPATIENT)
Dept: OPTOMETRY | Facility: CLINIC | Age: 67
End: 2022-02-17
Payer: COMMERCIAL

## 2022-02-17 DIAGNOSIS — H01.006 ANGULAR BLEPHARITIS OF BOTH EYES: ICD-10-CM

## 2022-02-17 DIAGNOSIS — Z01.00 EXAMINATION OF EYES AND VISION: Primary | ICD-10-CM

## 2022-02-17 DIAGNOSIS — M35.00 SICCA SYNDROME (H): ICD-10-CM

## 2022-02-17 DIAGNOSIS — H52.4 PRESBYOPIA: ICD-10-CM

## 2022-02-17 DIAGNOSIS — H52.13 MYOPIA OF BOTH EYES: ICD-10-CM

## 2022-02-17 DIAGNOSIS — H01.003 ANGULAR BLEPHARITIS OF BOTH EYES: ICD-10-CM

## 2022-02-17 DIAGNOSIS — H18.523 ANTERIOR BASEMENT MEMBRANE DYSTROPHY OF BOTH EYES: ICD-10-CM

## 2022-02-17 DIAGNOSIS — H52.223 REGULAR ASTIGMATISM OF BOTH EYES: ICD-10-CM

## 2022-02-17 DIAGNOSIS — H10.13 ALLERGIC CONJUNCTIVITIS OF BOTH EYES: ICD-10-CM

## 2022-02-17 PROCEDURE — 92014 COMPRE OPH EXAM EST PT 1/>: CPT | Performed by: OPTOMETRIST

## 2022-02-17 PROCEDURE — 92015 DETERMINE REFRACTIVE STATE: CPT | Performed by: OPTOMETRIST

## 2022-02-17 RX ORDER — NEOMYCIN SULFATE, POLYMYXIN B SULFATE, AND DEXAMETHASONE 3.5; 10000; 1 MG/G; [USP'U]/G; MG/G
OINTMENT OPHTHALMIC
Qty: 3.5 G | Refills: 0 | Status: SHIPPED | OUTPATIENT
Start: 2022-02-17 | End: 2023-03-01

## 2022-02-17 RX ORDER — SODIUM CHLORIDE 5 %
OINTMENT (GRAM) OPHTHALMIC (EYE)
Qty: 3.5 G | Refills: 12 | COMMUNITY
Start: 2022-02-17 | End: 2023-03-01

## 2022-02-17 RX ORDER — SILICONE ADHESIVE 1.5" X 3"
1-2 SHEET (EA) TOPICAL 3 TIMES DAILY
COMMUNITY
Start: 2022-02-17 | End: 2023-07-20

## 2022-02-17 RX ORDER — OLOPATADINE HYDROCHLORIDE 2 MG/ML
1 SOLUTION/ DROPS OPHTHALMIC DAILY
Qty: 2.5 ML | Refills: 11 | Status: SHIPPED | OUTPATIENT
Start: 2022-02-17 | End: 2023-02-17

## 2022-02-17 ASSESSMENT — VISUAL ACUITY
OS_CC: 20/40
OS_CC: 20/50
OS_SC: 20/150
OD_CC+: -2
METHOD: SNELLEN - LINEAR
OD_SC: 20/150
OD_CC: 20/25
CORRECTION_TYPE: GLASSES
OD_CC: 20/50

## 2022-02-17 ASSESSMENT — REFRACTION_MANIFEST
OD_SPHERE: -2.25
OD_AXIS: 160
OS_SPHERE: -1.75
OD_AXIS: 160
OD_CYLINDER: +2.00
OD_SPHERE: -2.25
OS_AXIS: 061
OS_CYLINDER: +2.25
METHOD_AUTOREFRACTION: 1
OS_CYLINDER: +0.50
OS_SPHERE: -3.25
OD_CYLINDER: +2.00
OS_AXIS: 016

## 2022-02-17 ASSESSMENT — KERATOMETRY
OD_AXISANGLE_DEGREES: 151
OD_K2POWER_DIOPTERS: 47.00
OD_K1POWER_DIOPTERS: 44.50
METHOD_AUTO_MANUAL: AUTOMATED
OS_K1POWER_DIOPTERS: 44.50
OS_AXISANGLE2_DEGREES: 173
OD_AXISANGLE2_DEGREES: 061
OS_AXISANGLE_DEGREES: 083
OS_K2POWER_DIOPTERS: 46.75

## 2022-02-17 ASSESSMENT — REFRACTION_WEARINGRX
OD_CYLINDER: +2.00
OD_HBASE: OUT
OD_HPRISM: 1.0
OS_SPHERE: -1.75
OS_ADD: +2.75
OD_AXIS: 160
OD_ADD: +2.75
SPECS_TYPE: PAL
OD_SPHERE: -2.25
OS_AXIS: 016
OS_CYLINDER: +0.50

## 2022-02-17 ASSESSMENT — EXTERNAL EXAM - RIGHT EYE: OD_EXAM: NORMAL

## 2022-02-17 ASSESSMENT — CONF VISUAL FIELD
OS_NORMAL: 1
OD_NORMAL: 1

## 2022-02-17 ASSESSMENT — CUP TO DISC RATIO
OD_RATIO: 0.3
OS_RATIO: 0.3

## 2022-02-17 ASSESSMENT — EXTERNAL EXAM - LEFT EYE: OS_EXAM: NORMAL

## 2022-02-17 ASSESSMENT — TONOMETRY
OS_IOP_MMHG: 21
OD_IOP_MMHG: 18
IOP_METHOD: TONOPEN

## 2022-02-17 NOTE — PROGRESS NOTES
Chief Complaint   Patient presents with     Annual Eye Exam      Accompanied by self  Last Eye Exam: 12-  Dilated Previously: Yes    What are you currently using to see?  glasses       Distance Vision Acuity: Satisfied with vision    Near Vision Acuity: Not satisfied     Eye Comfort: watery, itchy and red  Do you use eye drops? 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       OBJECTIVE: See Ophthalmology exam    ASSESSMENT:    ICD-10-CM    1. Examination of eyes and vision  Z01.00 EYE EXAM (SIMPLE-NONBILLABLE)   2. Myopia of both eyes  H52.13 REFRACTION   3. Regular astigmatism of both eyes  H52.223 REFRACTION   4. Presbyopia  H52.4 REFRACTION   5. Allergic conjunctivitis of both eyes  H10.13 EYE EXAM (SIMPLE-NONBILLABLE)     olopatadine (PATADAY) 0.2 % ophthalmic solution   6. Sicca syndrome (H)  M35.00 EYE EXAM (SIMPLE-NONBILLABLE)   7. Anterior basement membrane dystrophy of both eyes  H18.523 EYE EXAM (SIMPLE-NONBILLABLE)     sodium chloride (MATT 128) 5 % ophthalmic ointment     sodium chloride (MATT 128) 5 % ophthalmic solution   8. Angular blepharitis of both eyes  H01.003 EYE EXAM (SIMPLE-NONBILLABLE)    H01.006 neomycin-polymyxin-dexamethasone (MAXITROL) 3.5-70942-8.1 ophthalmic ointment      PLAN:     Patient Instructions   Hold off on prescription for today.     Pataday to be used once daily for itchy eyes.  Use as needed.    Restasis- 1 drop both eyes 2 x day.    You have a corneal dystrophy which may be causing some of your blurred vision.  A referral to the corneal specialist is an option to discuss corneal procedures which may improve your vision.  Or we can try conservatively usign ointment and drops and rechecking in 6 weeks.     Matt 128 5% ointment to eyes at night and Matt 128 5% solution 3 x day.    Maxitrol  ointment to outer eyelids for red irritated, itchy lids.    You have the start of mild cataracts.  You may notice some blurred vision or glare with night driving.  It is important that you wear good sunglasses to protect your eyes from the ultraviolet light from the sun.  I recommend that you return in 1 year for an eye exam unless there are any sudden changes in your vision.       Return in 6 weeks for recheck and refraction.    Recommend annual eye exams.    Robles Valderrama, OD

## 2022-02-17 NOTE — PATIENT INSTRUCTIONS
Hold off on prescription for today.     Pataday to be used once daily for itchy eyes.  Use as needed.    Restasis- 1 drop both eyes 2 x day.    You have a corneal dystrophy which may be causing some of your blurred vision.  A referral to the corneal specialist is an option to discuss corneal procedures which may improve your vision.  Or we can try conservatively usign ointment and drops and rechecking in 6 weeks.     Matt 128 5% ointment to eyes at night and Matt 128 5% solution 3 x day.    Maxitrol ointment to outer eyelids for red irritated, itchy lids.    You have the start of mild cataracts.  You may notice some blurred vision or glare with night driving.  It is important that you wear good sunglasses to protect your eyes from the ultraviolet light from the sun.  I recommend that you return in 1 year for an eye exam unless there are any sudden changes in your vision.       Return in 6 weeks for recheck and refraction.    Recommend annual eye exams.    Robles Valderrama, OD    The affects of the dilating drops last for 4- 6 hours.  You will be more sensitive to light and vision will be blurry up close.  Do not drive if you do not feel comfortable.  Mydriatic sunglasses were given if needed.      Optometry Providers       Clinic Locations                                 Telephone Number   Dr. Theresa Whiting   [facility] [phone]     [facility] Hours:                [facility] Hours:       [facility] Hours:   [address]   [address]     [address] [place] [address]   [place] [address]    [place] [address]  Phone: [phone]                    Phone: [phone]     Phone: [phone]                      Monday 8:00-6:00                          Monday 8:00-6:00                          Monday 8:00-6:00              Tuesday 8:00-6:00                           Tuesday 8:00-6:00                          Tuesday 8:00-6:00              Wednesday 8:00-6:00                  Wednesday 8:00-6:00                   Wednesday 8:00-6:00      Thursday 8:00-6:00                        Thursday 8:00-6:00                         Thursday 8:00-6:00            Friday 8:00-5:00                              Friday 8:00-5:00                              Friday 8:00-5:00    [facility] Hours:   [address] [place] [address]  [phone]    Monday 9:00-6:00  Tuesday 9:00-6:00  Wednesday 9:00-6:00  Thursday 9:00-6:00 Friday 9:00-5:00  Please log on to [email or web address] to order your contact lenses.  The link is found on the [facility] page.  As always, Thank you for trusting us with your health care needs!    There is a combination of three treatments which can greatly improve symptoms of dry eyes.    1.  Artificial tears  2. Heat (eyes closed)  3. Eyelid and eyelash cleansing (eyes closed)     Use one drop of artificial tears both eyes 4 x daily.  Once in the morning, lunch, dinner and bedtime. Continue to use the drops regardless if your eyes are comfortable or not.  Artificial tears work best as a preventative and not as well after your eyes are starting to bother you.  It may take 4- 6 weeks of using the drops before you notice improvement.  If after that time you are still having problems schedule an appointment for an evaluation and discussion of different treatments such as Restasis or Xiidra.  Dry eyes are a chronic condition and you may have more symptoms at certain times of the year.    Excess tearing can be due to the right tears not working properly or a blockage in the tear drainage system.  You can try using artificial tears 1 drop both eyes 4 x day.  If the excess tearing is bothersome after 4-6 weeks of treatment then we can send you for further testing.  This would entail a referral to our oculoplastic specialist Dr. Toro  Sharda at the [facility]-[phone].    Recommended brands are:    Systane Complete  Systane Ultra  Systane Balance  Refresh Advanced Optive  Refresh Relieva  Blink    Recommended brands for contact lens wearers are:    Systane contacts  Refresh contacts  Blink contacts    If you are using drops more than 4 x day or have sensitivities to preservatives I recommend non preserved artificial tears.  These come in 1 use vials.  They can be used every 1-2 hours.  Do not reuse the vials.    Recommended brands are:    Refresh Optive Pietro-3  Systane- preservative free  Refresh-  preservative free  Blink- preservative free    Gels or ointment can be used at night.    Recommended brands are:    Systane Gel  Refresh Gel  Blink Gel  Genteal Gel    Systane night time (ointment)  Refresh Celluvisc  Refresh PM (ointment)      Visine, Clear Eyes or Murine (drops that get the red out) can irritate the eyes and cause a rebound effect where the eyes become more red and you end up using more drops.  Avoid drops containing tetrahydrozoline, naphazoline, phenylephrine, oxymetazoline.      OTC Lumify is a newer product that gives immediate redness relief without the rebound effect.  Use as needed to take the redness out.    Artificial tears may be used with other drops (such as allergy, glaucoma, antibiotics) around the same time.  Be sure to wait 5 minutes in between drops.    Heat to the eyelids can also improve your symptoms of dry eyes.  Shayan heat masks can be purchased at Amazon to be used nightly for 10-15 minutes.  Other options are gel masks that can be put in the microwave and purchased at most pharmacies.      Tea Tree Oil eyelid cleansers recommended are Ocusoft Oust foam cleanser to cleanse eyelids/lashes at night and in the am. Other options are Blephadex or Cliradex eyelid wipes.  KEEP EYES CLOSED when using these products.  These can be purchased on amazon.com   A good product for make up remover with tea tree  oil is WeLoveEyes.  This can be found at www.[employer]."[employer] or Quantum[employer].    Other good eyelid cleansers have hypochlorous which removes excess bacteria and is safe around the eyes. Products are Avenova, Ocusoft Hypochlor or Heyedrate. Spray solution onto cotton pad, close eyes and gently apply to eyelids and eyelashes using side to side motion.  You can also KEEP EYES CLOSED spray and rub into eyelashes.  You do not need to rinse it off. Use morning and evening. These products can be found on Amazon.  You can check with your local pharmacy and see if they can order if for you if they don't have it.    Other brands of eyelid cleansing wipes are:    Ocusoft wipes  Systane wipes    A great eye make up line is [email or web address].[email or web address]

## 2022-02-17 NOTE — LETTER
2/17/2022         RE: Sharon Fung  [address] [address]        Dear Colleague,    Thank you for referring your patient, Sharon Fung, to the [facility]. Please see a copy of my visit note below.    Chief Complaint   Patient presents with     Annual Eye Exam      Accompanied by self  Last Eye Exam: 12-  Dilated Previously: Yes    What are you currently using to see?  glasses       Distance Vision Acuity: Satisfied with vision    Near Vision Acuity: Not satisfied     Eye Comfort: watery, itchy and red  Do you use eye drops? 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       OBJECTIVE: See Ophthalmology exam    ASSESSMENT:    ICD-10-CM    1. Examination of eyes and vision  Z01.00 EYE EXAM (SIMPLE-NONBILLABLE)   2. Myopia of both eyes  H52.13 REFRACTION   3. Regular astigmatism of both eyes  H52.223 REFRACTION   4. Presbyopia  H52.4 REFRACTION   5. Allergic conjunctivitis of both eyes  H10.13 EYE EXAM (SIMPLE-NONBILLABLE)     olopatadine (PATADAY) 0.2 % ophthalmic solution   6. Sicca syndrome (H)  M35.00 EYE EXAM (SIMPLE-NONBILLABLE)   7. Anterior basement membrane dystrophy of both eyes  H18.523 EYE EXAM (SIMPLE-NONBILLABLE)     sodium chloride (DIMAS 128) 5 % ophthalmic ointment     sodium chloride (DIMAS 128) 5 % ophthalmic solution   8. Angular blepharitis of both eyes  H01.003 EYE EXAM (SIMPLE-NONBILLABLE)    H01.006 neomycin-polymyxin-dexamethasone (MAXITROL) 3.5-78472-9.1 ophthalmic ointment      PLAN:     Patient Instructions   Hold off on prescription for today.     Pataday to be used once daily for itchy eyes.  Use as needed.    Restasis- 1 drop both eyes 2 x day.    You have a corneal dystrophy which may be causing some of your blurred vision.  A referral to the  corneal specialist is an option to discuss corneal procedures which may improve your vision.  Or we can try conservatively usign ointment and drops and rechecking in 6 weeks.     Matt 128 5% ointment to eyes at night and Matt 128 5% solution 3 x day.    Maxitrol ointment to outer eyelids for red irritated, itchy lids.    You have the start of mild cataracts.  You may notice some blurred vision or glare with night driving.  It is important that you wear good sunglasses to protect your eyes from the ultraviolet light from the sun.  I recommend that you return in 1 year for an eye exam unless there are any sudden changes in your vision.       Return in 6 weeks for recheck and refraction.    Recommend annual eye exams.    Robles Valderrama, OD             Again, thank you for allowing me to participate in the care of your patient.        Sincerely,        Robles Valderrama, OD

## 2022-02-23 NOTE — PROGRESS NOTES
[facility]      Sharon Fung is a 66 year old female who is being evaluated via a billable virtual visit.      TELEPHONE VISIT  What phone number would you like to be contacted at? [phone]  Is patient CURRENTLY in [place]? yes  How would you like to obtain your AVS? Carolynn Mg MA    Phone call contact time  Call Started at 2:20 PM  Call Ended at 2:35 PM  Phone call duration: 15 minutes      CHIEF COMPLAINT: Chronic pain    INTERVAL HISTORY:  Last seen on 2/12/2021       Recommendations/plan at the last visit included:  1. Schedule VIDEO follow-up with CHELSEY Alex NP-C in 1-2 months or sooner as needed.   2. SI joint injections ordered.   3. Look into Adult Children of Alcoholics: Yellow Workbook and Big Red Book are good resources. Books about women and food addiction issues by Neeru Dominguez.   4. Medication recommendations:             1. Toradol 10 mg 1 tablet PRN severe pain. Stay well hydrated and eat before taking this medication.       Since last visit:   - Had two foot surgeries, first June 2021 & in December 2021  - Since walking off balance for 8 months, she is having left SI joint pain.     Pain Information Today: Score: Moderate Pain (4)/10. Location of pain: low back, SI joints     Annual requirements last collected: N/A     CURRENT RELEVANT PAIN MEDICATIONS:   OTC pain medications per package instructions PRN  Gabapentin 100 mg in am/midday & 400 mg at HS     Patient is using the medication as prescribed:  YES  Is your medication helpful?     NO   Medication side effects? denies any problems     Previous Pain Relevant Medications: (H--helped; HI--Helped initially; SWH--Somewhat helpful; NH--No help; W--worse; SE--side effects; ?--Unsure if helpful)   NOTE: This medication information taken from patient's intake form, not medical records.               Opiates: None               NSAIDS: Ibuprofen: H, ketorolac: H, naproxen: H              Muscle  Relaxants: None noted              Anti-migraine mediations: None noted              Anti-depressants: None              Sleep aids: None              Anxiolytics: None              Neuropathics: Topiramate: H                  Topicals: None              Other medications not covered above: Tylenol: H, prednisone: H     Any illicit drug use: none  EtOH use: rare   Caffeine use: 3-4 per day   Nicotine use: none   Any use of prescriptions other than how they were prescribed:none      Past Pain Treatments:              Pain Clinic:   No               PT: No               Psychologist: No             Relaxation techniques/biofeedback: No             Chiropractor: No             Acupuncture: No             Pharmacotherapy:                         Opioids: No                          Non-opioids:  Yes              TENs Unit:No             Injections: Yes:               Date?  For right bunion: H               Aug 2020:  L5-S1 interlaminar steroid injection: [facility]              10/13/2020: Left SI joint steroid injection: H             Self-care:   Yes              Surgeries related to pain: No     THE 4 As OF OPIOID MAINTENANCE ANALGESIA    Analgesia: Is pain relief clinically significant? N/A   Activity: Is patient functional and able to perform Activities of Daily Living? N/A   Adverse effects: Is patient free from adverse side effects from opiates? N/A   Adherence to Rx protocol: Is patient adhering to Controlled Substance Agreement and taking medications ONLY as ordered? N/A     Is Narcan prescribed for opiate use >50 MME daily? N/A    [place] Board of Pharmacy Data Base Reviewed:    YES; As expected, no concern for misuse/abuse of controlled medications based on this report. Reviewed [facility] February 23, 2022- no concerning fills.    _______________________________________________      Physical Exam unable to be completed due to virtual visit. There were no vitals taken for this visit or reported by patient.      General: Verbal, alert and no distress   Psychiatric:  affect and mood normal, mentation appears normal.     DIRE Score for ongoing opioid management is calculated as follows:    Diagnosis = 2 pts (slowly progressive; moderate pain/objective findings)    Intractability = 1 pt (few therapies tried; passive patient role)    Risk        Psych = 3 pts (no significant personality dysfunction/mental illness; good communication with clinic)         Chem Hlth = 3 pts (no history of chemical dependency; not drug-focused)       Reliability = 3 pts (highly reliable with meds, appointments, treatments)       Social = 3 pts (supportive family/close relationships; involved in work/school; no isolation)       (Psych + Chem hlth + Reliability + Social) = 15    Efficacy = 2 pts (too early/not tried meds)    DIRE Score = 17        7-13: likely NOT suitable candidate for long-term opioid analgesia       14-21: may be a suitable candidate for long-term opioid analgesia        DIAGNOSTIC TESTS:  Imaging Studies:   MRI of the Cervical Spine without contrast 7/23/2014  IMPRESSION:   Multilevel degenerative changes throughout the cervical spine,  greatest at C5-C6 with a posterior disc bulge indenting the ventral  spinal cord resulting in mild to moderate spinal canal narrowing.     MR LUMBAR SPINE W/O CONTRAST 5/7/2019   Impression:  1. Multilevel lumbar spondylosis, most pronounced at L4-5 with mild  neural foraminal stenosis bilaterally. There is minimal spinal canal  stenosis at L2-3 and L3-4.  2. Central disc region at L5-S1 approaches the traversing left S1  nerve root without abutment.  3. No significant change since 5/13/2014.        PAIN RELEVANT CONDITIONS:  1.  Rheumatoid Arthritis affecting multidisciplinary joint             1. Bilateral knee pain, OA vs RA             2. Bilateral hand pain and swelling             3. Bilateral hip pain              4. Bilateral ankle pain   2.  Morbid obesity s/p bariatric surgery    3.  Fibromyalgia   4.  Depression  5.  Cervical spine: DDD, mild stenosis  6.  Lumbar spine: DDD, facet arthropathy, right S1 nerve involvement, stenosis     ASSESSMENT AND PLAN    (M53.3) SI (sacroiliac) joint dysfunction  (primary encounter diagnosis)  Comment: Chronic SI joint pain, will repeat previous injection. F/U if pain is not relieved.   Plan: PAIN INJECTION EVAL/TREAT/FOLLOW UP    PATIENT INSTRUCTIONS:     Diagnosis reviewed, treatment option addressed, and risk/benefits discussed.  Self-care instructions given.  I am recommending a multidisciplinary treatment plan to help this patient better manage pain.    Remember to request ALL medication refills 5 BUSINESS days before you run out.     1. Video or Clinic follow-up with CHELSEY Alex, NP-C in following injection if pain is not relieved   2. Procedures recommended: SI Joint injection    3. Medication Management : No change     I have reviewed the note as documented above.  This accurately captures the substance of my conversation with the patient.    BILLING TIME DOCUMENTATION:   TOTAL TIME on the date of service includes:   Time spent preparing to see the patient: 5 minutes (reviewing records and tests)  Time spend face to face with the patient: 15 minutes  Time spent ordering tests, medications, procedures and referrals: 0 minutes  Time spent Referring and communicating with other healthcare professionals: 0 minutes  Documenting clinical information in Epic: 10 minutes    The total TIME spent on this patient on the day of the appointment was 30 minutes.     CHELSEY Gamez, NP-C  [facility]

## 2022-02-24 ENCOUNTER — VIRTUAL VISIT (OUTPATIENT)
Dept: PALLIATIVE MEDICINE | Facility: CLINIC | Age: 67
End: 2022-02-24
Payer: COMMERCIAL

## 2022-02-24 DIAGNOSIS — M53.3 SI (SACROILIAC) JOINT DYSFUNCTION: Primary | ICD-10-CM

## 2022-02-24 PROCEDURE — 99214 OFFICE O/P EST MOD 30 MIN: CPT | Mod: 95 | Performed by: NURSE PRACTITIONER

## 2022-02-24 ASSESSMENT — PAIN SCALES - GENERAL: PAINLEVEL: MODERATE PAIN (4)

## 2022-02-25 ENCOUNTER — TELEPHONE (OUTPATIENT)
Dept: PALLIATIVE MEDICINE | Facility: CLINIC | Age: 67
End: 2022-02-25
Payer: COMMERCIAL

## 2022-02-25 NOTE — TELEPHONE ENCOUNTER
Screening Questions for Radiology Injections:    Injection to be done at which interventional clinic site? [facility]    Remind [place] Pt's that Covid test is no longer required except for sedation procedure Pt's.    Instruct patient to arrive as directed prior to the scheduled appointment time:    [facility]      [place]: 30 minutes before; if IV needed 1 hour before     Procedure ordered by Thi    Procedure ordered? Bilateral SI joint injections       Transforaminal Cervical DAGO -  [facility] does NOT have providers that do these- [facility] [place] [facility] do have providers that do    As a reminder, receiving steroids can decrease your body's ability to fight infection.   Would you still like to move forward with scheduling the injection?  Yes    What insurance would patient like us to bill for this procedure? [employer]       Worker's comp or MVA (motor vehicle accident) -Any injection DO NOT SCHEDULE and route to Marla Murphy.      [employer] insurance - For SI joint injections, DO NOT SCHEDULE and route Jackelin Montalvo.       ALL BCBS, [employer] and [employer]-Route to Jackelin for review DO NOT SCHEDULE      IF SCHEDULING IN [place] AND NEEDS A PA, IT IS OKAY TO SCHEDULE. [place] HANDLES THEIR OWN PA'S AFTER THE PATIENT IS SCHEDULED. PLEASE SCHEDULE AT LEAST 1 WEEK OUT SO A PA CAN BE OBTAINED.    Any chance of pregnancy? NO   If YES, do NOT schedule and route to RN pool    Is an  needed? No     Patient has a drive home? (mandatory) YES: ok    Is patient taking any blood thinners (That is: Coumadin, Warfarin, Jantoven, Pradaxa Xarelto, Eliquis, Edoxaban, Enoxaparin, Lovenox, Heparin, Arixtra, Fondaparinux, or Fragmin? OR Antiplatelet medication such as Plavix, Brilinta, or Effient? )? No   If hold needed, do NOT schedule, route to RN pool     Is patient taking any aspirin products (includes Excedrin and Fiorinal)? No     If more than 325mg/day, OK to  schedule; Instruct pt to decrease to less than 325 mg for 7 days AND route to RN pool    For CERVICAL procedures, hold all aspirin products for 6 days.     Tell pt that if aspirin product is not held for 6 days, the procedure WILL BE cancelled.      Does the patient have a bleeding or clotting disorder? No     If YES, okay to schedule AND route to RN nurse pool    For any patients with platelet count <100, must be forwarded to provider    Any allergies to contrast dye, iodine, shellfish, or numbing and steroid medications? No    If YES, add allergy information to appointment notes AND route to the RN pool     If DAGO and Contrast Dye / Iodine Allergy? DO NOT SCHEDULE, route to RN pool    Allergies: Patient has no known allergies.     Is patient diabetic?  No  If YES, instruct them to bring their glucometer.    Does patient have an active infection or treated for one within the past week? No     Is patient currently taking any antibiotics?  No     For patients on chronic, preventative, or prophylactic antibiotics, procedures may be scheduled.     For patients on antibiotics for active or recent infection:antibiotic course must have been completed for 4 days    Is patient currently taking any steroid medications? (i.e. Prednisone, Medrol)  No     For patients on steroid medications, course must have been completed for 4 days    Is patient actively being treated for cancer or immunocompromised? No  If YES, do NOT schedule and route to RN pool     Are you able to get on and off an exam table with minimal or no assistance? Yes  If NO, do NOT schedule and route to RN pool    Are you able to roll over and lay on your stomach with minimal or no assistance? Yes  If NO, do NOT schedule and route to RN pool     Has the patient had a flu shot or any other vaccinations within 7 days before or after the procedure.  No     Have you recently had a COVID vaccine or have plans to get it in the near future? No    If yes, explain that  for the vaccine to work best they need to:       wait 1 week before and 1 week after getting Vaccine #1    wait 1 week before and 2 weeks after getting Vaccine #2    wait 1 week before and 2 weeks after getting Vaccine BOOSTER    If patient has concerns about the timing, send to RN pool     Does patient have an MRI/CT?  Not Applicable  Check Procedure Scheduling Grid to see if required.      Was the MRI done within the last 3 years?  NA    If yes, where was the MRI done i.e.[facility], [facility], [facility], [facility] etc?       If no, do not schedule and route to RN pool    If MRI was not done at [facility], [facility] or [facility] do NOT schedule and route to RN pool.      If pt has an imaging disc, the injection MAY be scheduled but pt has to bring disc to appt.     If they show up without the disc the injection cannot be done    Procedure Specific Instructions:      If celiac plexus block, informed patient NPO for 6 hours and that it is okay to take medications with sips of water, especially blood pressure medications  Not Applicable         If this is for a cervical procedure, informed patient that aspirin needs to be held for 6 days.   Not Applicable      If IV needed:    Do not schedule procedures requiring IV placement in the first appointment of the day or first appointment after lunch. Do NOT schedule at 0745, 0815 or 1245. ok    Instructed pt to arrive 30 minutes early for IV start if required. (Check Procedure Scheduling Grid)  Not Applicable    Reminders:      If you are started on any steroids or antibiotics between now and your appointment, you must contact us because the procedure may need to be cancelled.  Yes      For all procedures except radiofrequency ablations (RFAs) and spinal cord stimulator (SCS) trials, informed patient:    IV sedation is not provided for this procedure.  If you feel that an oral anti-anxiety medication is needed, you can discuss this further with your referring  provider or primary care provider.  The Pain Clinic provider will discuss specifics of what the procedure includes at your appointment.  Most procedures last 10-20 minutes.  We use numbing medications to help with any discomfort during the procedure.  Not Applicable      For patients 85 or older we recommend having an adult stay w/ them for the remainder of the day.   ok    Does the patient have any questions?  NO  Claire Murphy  [facility]

## 2022-02-28 NOTE — PATIENT INSTRUCTIONS
RHEUMATOLOGY    Dr. Freddy Guerra    [address] [place] [address]  Phone number: [phone]  Fax number: [phone]      Thank you for choosing [facility]!    Evie Rosas CMA Rheumatology

## 2022-02-28 NOTE — PROGRESS NOTES
Sharon Fung  is a 66 year old year old female who is being evaluated via a billable video visit.      How would you like to obtain your AVS? MyChart  If the video visit is dropped, the invitation should be resent by: Text to cell phone: [phone]  Will anyone else be joining your video visit? No    Rheumatology Video Visit      Sharon Fung MRN# [id number]   YOB: 1955 Age: 66 year old      Date of visit: 3/01/22   PCP: Dr. Reynaldo Saavedra    Chief Complaint   Patient presents with:  Rheumatoid Arthritis    Assessment and Plan     1. Seropositive nonerosive rheumatoid arthritis (RF negative, CCP low positive): Previously on Humira (lost efficacy), Cimzia (ineffective), Orencia (ineffective), leflunomide (ineffective as monotherapy), hydroxychloroquine (ineffective), Xeljanz (ineffective), MTX (GI upset). Currently on SSZ 1500mg BID, leflunomide 20mg daily, and Kevzara 200mg SQ every 14 days (started 1/2019).   RA generally doing well but 1 week of worsening MCP symptoms. She will get the COVID19 vaccination first (Sharon's preference) and then if still with arthritis symptoms then to use prednisone.  Chronic illness, stable  - Continue sulfasalazine 1500 mg twice daily   - Continue leflunomide 20mg daily  - Continue Kevzara 200mg SQ every 14 days  - For RA flare, not to be used until at least 2 weeks after covid19 vaccination: prednisone 10mg daily x7days, then 5mg daily x7days, then stop.   - Labs now: CBC, Creatinine, Hepatic Panel, ESR, CRP  - Fasting labs in 3 months: cbc, cr, hepatic panel, esr, crp, lipid panel    High risk medication requiring intensive toxicity monitoring at least quarterly: labs ordered include CBC, Creatinine, Hepatic panel to monitor for cytopenia and hepatotoxicity; checking creatinine as it affects clearance of medication.                 Rapid 3, cumulative scores                      8/24/2021: No inflammatory joint symptoms.  She says that this combination is  great (SSZ 1500mg BID, leflunomide 20mg daily, Kevzara 200mg q14 days)                      10/14/2020 doing well (SSZ 1500mg BID, leflunomide 20mg daily, Kevzara 200mg q14 days)                      08/28/2019: 3.2   (SSZ 1500mg BID, Kevzara)  Now on gabapentin                      04/17/2019: 15    (SSZ 1500mg BID, Kevzara)  Mostly fibromyalgia symptoms                      12/19/2018:         (MTX 20mg SQ wkly, Xeljanz XR 11mg daily, SSZ 1500mg BID)                          05/02/2018:  9     (MTX 20mg SQ wkly, Xeljanz XR 11mg daily, SSZ 1000mg BID)                          01/31/2018: 22.3 (MTX 20mg SQ wkly, Xeljanz XR 11mg daily)                          11/29/2017: 16.8 (MTX 20mg SQ wkly)                      08/02/2017: 4.2   (MTX 20mg SQ wkly, Xeljanz XR 11mg daily)                         05/04/2017: 7      (MTX 20mg SQ wkly, Xeljanz XR 11mg daily)                        02/02/2017: 8      (MTX 20mg SQ wkly, Xeljanz XR 11mg daily)                      11/04/2016: 13    (MTX 20mg SQ weekly)                      07/15/2016: 10.8    2. Positive LORNA and dsDNA / Secondary Sjogren's Syndrome: Repeat dsDNAs have been negative. Has dry eyes but no dry mouth.  Dry eyes are treated by ophthalmology with Restasis.      3. Bilateral patellofemoral syndrome: home exercises have been helpful.  Weight loss encouraged.      4. Fibromyalgia: Managed in the pain management clinic    5.  Bone Health, vitamin D deficiency: normal 12/20/2019 DEXA; plan to repeat in 3-5 years but this may change depending on prednisone exposure.    - Continue vitamin D 2000 IU daily    6. Chronic lower back pain with left sided sciatica: suspect related to degenerative changes seen on L-spine MRI.  Following with the pain clinic for this issue. Not discussed today.     - COVID-19: has received the Pfizer COVID-19 vaccine on 12/21/2020 and 1/7/2021 and 8/27/2021.  A 4th dose of the mRNA COVID-19 vaccination is recommended to be received 5 months  after the third mRNA COVID-19 vaccination was received.   Based on our current understanding (and this may change over time as we learn more), medications should be adjusted as noted below, if disease activity allows:  - NSAIDs and Acetaminophen: hold for 24 hours prior to vaccination if able to do so  - Sulfasalazine should be held for 1 week after the COVID19 booster vaccine  - Leflunomide should be held for 1 week after the COVID19 booster vaccine     Total minutes spent in evaluation with patient, documentation, , and review of pertinent studies and chart notes: 22       Ms. Fung verbalized agreement with and understanding of the rational for the diagnosis and treatment plan.  All questions were answered to best of my ability and the patient's satisfaction. Ms. Fung was advised to contact the clinic with any questions that may arise after the clinic visit.      Thank you for involving me in the care of the patient    Return to clinic: 3-4 months    HPI   Sharon Fung is a 66 year old female with medical history significant for GERD, allergic rhinitis, obstructive sleep apnea, obesity, hx of trigger fingers, hx of carpal tunnel surgery, and rheumatoid arthritis who presents for follow-up of rheumatoid arthritis.    Today, 3/1/2022: 1 week of worsening 1st-3rd mcps; no stiffness in the am; ibuprofen at bedtime helps.  Otherwise has been doing well. Morning stiffness <10 min. No gelling. Tolerating medications well.  Would prefer to get the 4th COVID19 vaccination and then wait to use prednisone, rather than use prednisone and wait to get the vaccination.     No fevers or chills. No nausea, vomiting, constipation, diarrhea. No chest pain/pressure, palpitations, or shortness of breath. No oral or nasal sores. No neck pain. No rash.      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    ROS   12 point review of system was completed  and negative except as noted in the HPI     Active Problem List     Patient Active Problem List   Diagnosis     AR (allergic rhinitis)     GERD (gastroesophageal reflux disease)     Status post bariatric surgery     Mild major depression (H)     Advanced directives, counseling/discussion     High risk medication use     Obstructive sleep apnea     Obesity, Class III, BMI 40-49.9 (morbid obesity) (H)     Anterior basement membrane dystrophy     Seropositive rheumatoid arthritis (H)     LORNA positive     Carpal tunnel syndrome of right wrist     Headache     Immunosuppression (H)     Fibromyalgia     Pain in joint, ankle and foot, right     Hypertension goal BP (blood pressure) < 140/90     Past Medical History     Past Medical History:   Diagnosis Date     AR (allergic rhinitis)  as teen     Arthritis 12/14/2015     Chronic obstructive pulmonary disease, unspecified COPD type (H) 3/27/2018     Depressive disorder 3 years ago     GERD (gastroesophageal reflux disease) 4-07     Headache 7/11/2017     Hypertension goal BP (blood pressure) < 140/90 12/20/2021     Mild major depression (H) 3 years ago     Morbid obesity (H) teens     BRETT (obstructive sleep apnea)     uses CPAP     RA (rheumatoid arthritis) (H) 4 years     Reduced vision 3 years     Rheumatoid arthritis, adult (H)      Past Surgical History     Past Surgical History:   Procedure Laterality Date     ARTHRODESIS FOOT Right 6/30/2021    Procedure: Right 1st metatarsophalangeal joint fusion;  Surgeon: Jesus Wolf MD;  Location: [facility]     ARTHRODESIS TOE(S) Right 12/27/2021    Procedure: right revision great toe fusion;  Surgeon: Ryan Gee MD;  Location: [facility] OR     BLEPHAROPLASTY BILATERAL Bilateral 8/5/2016    Procedure: BLEPHAROPLASTY BILATERAL;  Surgeon: Cortez Robin MD;  Location:  [facility]     BREAST BIOPSY, RT/LT  1-94    Benign     COLONOSCOPY       COLONOSCOPY WITH CO2 INSUFFLATION N/A 3/30/2017    Procedure: COLONOSCOPY WITH CO2  INSUFFLATION;  Surgeon: Issa Weeks MD;  Location: [facility] OR     ESOPHAGOSCOPY, GASTROSCOPY, DUODENOSCOPY (EGD), COMBINED N/A 10/29/2015    Procedure: COMBINED ESOPHAGOSCOPY, GASTROSCOPY, DUODENOSCOPY (EGD);  Surgeon: Shaq Mims MD;  Location: [facility]     LAPAROSCOPIC GASTRIC ADJUSTABLE BANDING  11/09/10    Lap band procedure     LAPAROSCOPIC REMOVAL GASTRIC ADJUSTABLE BAND N/A 10/31/2015    Procedure: LAPAROSCOPIC REMOVAL GASTRIC ADJUSTABLE BAND;  Surgeon: Shaq Mims MD;  Location: [facility] OR     CT HAND/FINGER SURGERY UNLISTED  2016     CT STOMACH SURGERY PROCEDURE UNLISTED  11/2015     RELEASE CARPAL TUNNEL Left 4/27/2017    Procedure: RELEASE CARPAL TUNNEL;  Left Open Carpal Tunnel Release;  Surgeon: Ciara Middleton MD;  Location: [facility]     RELEASE CARPAL TUNNEL Right 6/15/2017    Procedure: RELEASE CARPAL TUNNEL;  Right Carpal Tunnel Release Open;  Surgeon: Ciara Middleton MD;  Location:  OR     REPAIR PTOSIS       TUBAL LIGATION  1988     [facility] APPENDECTOMY  1977     Allergy   No Known Allergies  Current Medication List     Current Outpatient Medications   Medication Sig     acetaminophen (TYLENOL) 500 MG tablet Take 500-1,000 mg by mouth every 6 hours as needed for mild pain     albuterol (PROAIR HFA/PROVENTIL HFA/VENTOLIN HFA) 108 (90 Base) MCG/ACT inhaler Inhale 2 puffs into the lungs every 6 hours as needed for shortness of breath / dyspnea or wheezing     azelastine (OPTIVAR) 0.05 % ophthalmic solution Apply 1 drop to eye 2 times daily     citalopram (CELEXA) 20 MG tablet TAKE ONE TABLET BY MOUTH ONCE DAILY     cycloSPORINE (RESTASIS) 0.05 % ophthalmic emulsion Place 1 drop into both eyes 2 times daily     fexofenadine (ALLEGRA) 180 MG tablet Take 1 tablet (180 mg) by mouth daily     fluticasone (FLONASE) 50 MCG/ACT nasal spray Spray 2 sprays into both nostrils as needed      gabapentin (NEURONTIN) 100 MG capsule TAKE ONE CAPSULE BY MOUTH EVERY MORNING, ONE  CAPSULE BY MOUTH AT MIDDAY, AND TAKE FOUR CAPSULES BY MOUTH EVERY NIGHT AT BEDTIME     ibuprofen 200 MG capsule Take 600-800 mg by mouth At Bedtime     leflunomide (ARAVA) 20 MG tablet Take 1 tablet (20 mg) by mouth daily     losartan-hydrochlorothiazide (HYZAAR) 100-25 MG tablet Take 1 tablet by mouth daily     neomycin-polymyxin-dexamethasone (MAXITROL) 3.5-07427-8.1 ophthalmic ointment Apply to eyelids 3 x day for 7 days.     olopatadine (PATADAY) 0.2 % ophthalmic solution Place 1 drop into both eyes daily     Sarilumab 200 MG/1.14ML SOAJ Inject 1.14 mLs (200 mg) Subcutaneous every 14 days . Hold for signs of infection and seek medical attention.     sodium chloride (DIMAS 128) 5 % ophthalmic ointment Insert into eyes at night.     sodium chloride (DIMAS 128) 5 % ophthalmic solution Place 1-2 drops into both eyes 3 times daily     sulfaSALAzine (AZULFIDINE) 500 MG tablet Take 3 tablets (1,500 mg) by mouth 2 times daily     vitamin D3 (CHOLECALCIFEROL) 50 mcg (2000 units) tablet Take 1 tablet (50 mcg) by mouth daily     diclofenac (VOLTAREN) 1 % topical gel Apply 2 g topically 4 times daily     ondansetron (ZOFRAN-ODT) 4 MG ODT tab Take 1-2 tablets (4-8 mg) by mouth every 6 hours as needed for nausea (Patient not taking: Reported on 2/24/2022)     ondansetron (ZOFRAN-ODT) 8 MG ODT tab Take 1 tablet (8 mg) by mouth every 8 hours as needed for nausea (Patient not taking: Reported on 2/24/2022)     ORDER FOR DME Use your CPAP device as directed by your provider.     oxyCODONE (ROXICODONE) 5 MG tablet Take 1-2 tablets (5-10 mg) by mouth every 4 hours as needed for moderate to severe pain (Patient not taking: Reported on 2/24/2022)     senna-docusate (SENOKOT-S/PERICOLACE) 8.6-50 MG tablet Take 1-2 tablets by mouth 2 times daily     No current facility-administered medications for this visit.     Facility-Administered Medications Ordered in Other Visits   Medication     sodium chloride (PF) 0.9% PF flush 10 mL     sodium  chloride bacteriostatic 0.9 % flush 12 mL         Social History   See HPI    Family History     Family History   Problem Relation Age of Onset     Heart Disease Father         MI     Neurologic Disorder Father 79        Parkinson's     Diabetes Father      Hypertension Father      Coronary Artery Disease Father      Cancer Maternal Grandmother         uterine     Neurologic Disorder Sister 16        migraine     Depression Sister      Neurologic Disorder Mother 20        migraine     Depression Mother      Neurologic Disorder Son 7        migraine     Substance Abuse Son      Migraines Son         none     Depression Sister      Substance Abuse Sister      Migraines Sister        Physical Exam     GEN: NAD.    HEENT: MMM.  Anicteric, noninjected sclera. No obvious external lesions of the ear and nose. Hearing intact.  PULM: No increased work of breathing  MSK:  Hands and wrists without swelling.    SKIN: No rash or jaundice seen  PSYCH: Alert. Appropriate.     Labs / Imaging (select studies)     CBC  Recent Labs   Lab Test 11/30/21  0806 08/23/21  1131 08/03/21  1150 05/10/21  0802 02/02/21  1056 10/12/20  1113   WBC 6.3 7.2 5.8 5.1 7.4 6.1   RBC 4.28 4.31 4.51 4.48 4.41 4.18   HGB 12.9 13.0 13.3 13.5 13.4 12.7   HCT 40.5 41.5 42.3 42.8 42.8 39.7   MCV 95 96 94 96 97 95   RDW 12.6 13.0 12.7 12.8 12.5 12.9    216 206 193 216 221   MCH 30.1 30.2 29.5 30.1 30.4 30.4   MCHC 31.9 31.3* 31.4* 31.5 31.3* 32.0   NEUTROPHIL 50 47 44 42.6 40.1 40.8   LYMPH 27 30 35 26.4 34.8 33.4   MONOCYTE 9 13 11 9.5 10.7 8.9   EOSINOPHIL 13 9 9 19.5 12.6 15.6   BASOPHIL 1 1 1 1.8 1.4 1.0   ANEU  --   --   --  2.2 3.0 2.5   ALYM  --   --   --  1.3 2.6 2.0   KIMBERLY  --   --   --  0.5 0.8 0.5   AEOS  --   --   --  1.0* 0.9* 1.0*   ABAS  --   --   --  0.1 0.1 0.1   ANEUTAUTO 3.1 3.3 2.5  --   --   --    ALYMPAUTO 1.7 2.2 2.0  --   --   --    AMONOAUTO 0.6 1.0 0.7  --   --   --    AEOSAUTO 0.8* 0.6 0.5  --   --   --    ABSBASO 0.1 0.1 0.1   --   --   --      CMP  Recent Labs   Lab Test 12/27/21  0710 12/20/21  0851 11/30/21  0806 08/23/21  1131 08/03/21  1150 08/03/21  1150 05/10/21  0802 02/02/21  1056 10/12/20  1113   NA  --  139 137  --   --  138 140  --   --    POTASSIUM 3.5 3.7 3.3*  --    < > 3.9 4.3  --   --    CHLORIDE  --  105 104  --   --  107 110*  --   --    CO2  --  28 28  --   --  29 25  --   --    ANIONGAP  --  6 5  --   --  2* 5  --   --    GLC  --  144* 165*  --   --  74 99 74  --    BUN  --  20 19  --   --  14 12  --   --    CR  --  0.68 0.61 0.73   < > 0.66 0.73 0.65 0.74   GFRESTIMATED  --  >90 >90 87   < > >90 88 >90 87   GFRESTBLACK  --   --   --   --   --   --  >90 >90 >90   ISH  --  9.3 9.3  --   --  9.1 8.6  --   --    BILITOTAL  --   --  0.5 0.6  --   --  0.6 0.4 0.5   ALBUMIN  --   --  3.6 3.9  --   --  3.8 4.0 3.8   PROTTOTAL  --   --  7.0 7.3  --   --  7.1 7.1 6.9   ALKPHOS  --   --  113 104  --   --  103 115 107   AST  --   --  26 33  --   --  33 36 23   ALT  --   --  43 53*  --   --  54* 58* 42    < > = values in this interval not displayed.     Calcium/VitaminD  Recent Labs   Lab Test 12/20/21  0851 11/30/21  0806 08/03/21  1150 05/10/21  0802 10/12/20  1113 03/31/20  0755 10/28/15  2233 02/20/15  0750   ISH 9.3 9.3 9.1   < >  --   --    < > 9.0   VITDT  --   --   --   --  33 15*  --  39    < > = values in this interval not displayed.     ESR/CRP  Recent Labs   Lab Test 11/30/21  0806 08/23/21  1131 08/03/21  1150   SED 6 4 6   CRP <2.9 <2.9 <2.9     Lipid Panel  Recent Labs   Lab Test 05/10/21  0802 03/31/20  0755 04/27/17  0732 12/27/16  0836 02/20/15  0750 10/20/14  0821   CHOL 224* 224* 204*   < > 157 194   TRIG 243* 171* 148   < > 79 193*   HDL 52 73 82   < > 63 63   * 117* 92   < > 78 92   VLDL  --   --   --   --  16 39*   CHOLHDLRATIO  --   --   --   --  2.5 3.1   NHDL 172* 151* 122   < >  --   --     < > = values in this interval not displayed.     Hepatitis B  Recent Labs   Lab Test 12/08/15  0855  03/06/15  1650   HBCAB Nonreactive  --    HEPBANG Nonreactive Nonreactive     Hepatitis C  Recent Labs   Lab Test 12/08/15  0855 03/06/15  1650   HCVAB Nonreactive   Assay performance characteristics have not been established for newborns,   infants, and children   Nonreactive   Assay performance characteristics have not been established for newborns,   infants, and children       Tuberculosis Screening  Recent Labs   Lab Test 11/30/21  0806 10/31/17  1400 02/02/17  0822 12/08/15  0855   TBRES Negative  --   --   --    TBRSLT  --  Negative Negative Negative   TBAGN  --  0.05 0.00 0.01     HIV Screening  Recent Labs   Lab Test 07/24/18  0738   HIAGAB Nonreactive     Immunization History     Immunization History   Administered Date(s) Administered     COVID-19,PF,Pfizer (12+ Yrs) 12/21/2020, 01/07/2021, 08/27/2021     Flu, Unspecified 10/12/2020     Influenza Quad, Recombinant, pf(RIV4) (Flublok) 09/15/2021     Influenza Vaccine IM > 6 months Valent IIV4 (Alfuria,Fluzone) 10/15/2013, 09/15/2014, 09/28/2015, 09/28/2016, 10/16/2017, 10/01/2018     Influenza Vaccine Im 4yrs+ 4 Valent CCIIV4 10/15/2019     Pneumo Conj 13-V (2010&after) 04/15/2016     Pneumococcal 23 valent 07/15/2016     TD (ADULT, 7+) 10/30/2020     TDAP Vaccine (Adacel) 02/09/2011     Zoster vaccine recombinant adjuvanted (SHINGRIX) 04/17/2019, 08/28/2019          Chart documentation done in part with Dragon Voice recognition Software. Although reviewed after completion, some word and grammatical error may remain.       Video-Visit Details    Type of service:  Video Visit    Video Start Time: 8:34 AM    Video End Time:8:44 AM    Originating Location (pt. Location): [place], [place]    Distant Location (provider location):  [place]    Platform used for Video Visit: Pao Guerra MD

## 2022-03-01 ENCOUNTER — VIRTUAL VISIT (OUTPATIENT)
Dept: RHEUMATOLOGY | Facility: CLINIC | Age: 67
End: 2022-03-01
Payer: COMMERCIAL

## 2022-03-01 ENCOUNTER — RADIOLOGY INJECTION OFFICE VISIT (OUTPATIENT)
Dept: PALLIATIVE MEDICINE | Facility: CLINIC | Age: 67
End: 2022-03-01
Attending: NURSE PRACTITIONER
Payer: COMMERCIAL

## 2022-03-01 VITALS — SYSTOLIC BLOOD PRESSURE: 174 MMHG | DIASTOLIC BLOOD PRESSURE: 77 MMHG | OXYGEN SATURATION: 95 % | HEART RATE: 86 BPM

## 2022-03-01 DIAGNOSIS — M53.3 SI (SACROILIAC) JOINT DYSFUNCTION: ICD-10-CM

## 2022-03-01 DIAGNOSIS — Z79.899 HIGH RISK MEDICATION USE: ICD-10-CM

## 2022-03-01 DIAGNOSIS — M05.9 SEROPOSITIVE RHEUMATOID ARTHRITIS (H): Primary | ICD-10-CM

## 2022-03-01 PROCEDURE — 99214 OFFICE O/P EST MOD 30 MIN: CPT | Mod: 95 | Performed by: INTERNAL MEDICINE

## 2022-03-01 PROCEDURE — 27096 INJECT SACROILIAC JOINT: CPT | Mod: 50 | Performed by: PAIN MEDICINE

## 2022-03-01 RX ORDER — PREDNISONE 5 MG/1
TABLET ORAL
Qty: 21 TABLET | Refills: 0 | Status: SHIPPED | OUTPATIENT
Start: 2022-03-01 | End: 2022-03-15

## 2022-03-01 RX ORDER — TRIAMCINOLONE ACETONIDE 40 MG/ML
40 INJECTION, SUSPENSION INTRA-ARTICULAR; INTRAMUSCULAR ONCE
Status: COMPLETED | OUTPATIENT
Start: 2022-03-01 | End: 2022-03-01

## 2022-03-01 RX ADMIN — TRIAMCINOLONE ACETONIDE 40 MG: 40 INJECTION, SUSPENSION INTRA-ARTICULAR; INTRAMUSCULAR at 10:42

## 2022-03-01 ASSESSMENT — PAIN SCALES - GENERAL: PAINLEVEL: EXTREME PAIN (9)

## 2022-03-01 NOTE — NURSING NOTE
Pre-procedure Intake  If YES to any questions or NO to having a   Please complete laminated checklist and leave on the computer keyboard for Provider, verbally inform provider if able.    For SCS Trial, RFA's or any sedation procedure:  Have you been fasting? NA    If yes, for how long?     Are you taking any any blood thinners such as Coumadin, Warfarin, Jantoven, Pradaxa Xarelto, Eliquis, Edoxaban, Enoxaparin, Lovenox, Heparin, Arixtra, Fondaparinux, or Fragmin? OR Antiplatelet medication such as Plavix, Brilinta, or Effient?   No     If yes, when did you take your last dose?     Do you take aspirin?  No    If cervical procedure, have you held aspirin for 6 days?   NA    Do you have any allergies to contrast dye, iodine, steroid and/or numbing medications?  NO    Are you currently taking antibiotics or have an active infection?  NO    Have you had a fever/elevated temperature within the past week? NO    Are you currently taking oral steroids? NO    Do you have a ? Yes    Are you pregnant or breastfeeding?  NO    Have you received the COVID-19 vaccine? Yes    If yes, was it your 1st, 2nd or only dose needed? Booster     Date of most recent vaccine: 8/27/2021    Notify provider and RNs if systolic BP >170, diastolic BP >100, P >100 or O2 sats < 90%

## 2022-03-01 NOTE — NURSING NOTE
Discharge Information    IV Discontiued Time:  NA    Amount of Fluid Infused:  NA    Discharge Criteria = When patient returns to baseline or as per MD order    Consciousness:  Pt is fully awake    Circulation:  BP +/- 20% of pre-procedure level    Respiration:  Patient is able to breathe deeply    O2 Sat:  Patient is able to maintain O2 Sat >92% on room air    Activity:  Moves 4 extremities on command    Ambulation:  Patient is able to stand and walk or stand and pivot into wheelchair    Dressing:  Clean/dry or No Dressing    Notes:   Discharge instructions and AVS given to patient    Patient meets criteria for discharge?  YES    Admitted to PCU?  No    Responsible adult present to accompany patient home?  Yes    Signature/Title:    rg torres RN  RN Care Coordinator  [facility]

## 2022-03-01 NOTE — PATIENT INSTRUCTIONS
[facility]   Procedure Discharge Instructions    Today you saw: Dr. Clint Fuentes    You had an: bilateral  sacroiliac joint injection       Medications used:  Lidocaine   Bupivacaine    Omnipaque    Kenalog            Be cautious when walking. Numbness and/or weakness in the lower extremities may occur for up to 6-8 hours after the procedure due to effect of the local anesthetic    Do not drive for 6 hours. The effect of the local anesthetic could slow your reflexes.     You may resume your regular activities after 24 hours    Avoid strenuous activity for the first 24 hours    You may shower, however avoid swimming, tub baths or hot tubs for 24 hours following your procedure    You may have a mild to moderate increase in pain for several days following the injection.    It may take up to 14 days for the steroid medication to start working although you may feel the effect as early as a few days after the procedure.       You may use ice packs for 10-15 minutes, 3 to 4 times a day at the injection site for comfort    Do not use heat to painful areas for 6 to 8 hours. This will give the local anesthetic time to wear off and prevent you from accidentally burning your skin.     Unless you have been directed to avoid the use of anti-inflammatory medications (NSAIDS), you may use medications such as ibuprofen, Aleve or Tylenol for pain control if needed.     If you were fasting, you may resume your normal diet and medications after the procedure    If you have diabetes, check your blood sugar more frequently than usual as your blood sugar may be higher than normal for 10-14 days following a steroid injection. Contact your doctor who manages your diabetes if your blood sugar is higher than usual    Possible side effects of steroids that you may experience include flushing, elevated blood pressure, increased appetite, mild headaches and restlessness.  All of these symptoms will get better  with time.    If you experience any of the following, call the Pain Clinic during work hours (Mon-Friday 8-4:30 pm) at [phone] or the Provider Line after hours at [phone]:  -Fever over 100 degree F  -Swelling, bleeding, redness, drainage, warmth at the injection site  -Progressive weakness or numbness in your legs or arms  -Loss of bowel or bladder function  -Unusual headache that is not relieved by Tylenol or other pain reliever  -Unusual new onset of pain that is not improving

## 2022-03-01 NOTE — PROGRESS NOTES
Pre procedure Diagnosis: SI joint dysfunction    Post procedure Diagnosis: Same  Procedure performed: bilateral SI joint injection  Anesthesia: none  Complications: none  Operators: Clint Fuentes MD     Indications:   Sharon Fung is a 65 year old female. The patient has a history of bilateral buttocks Pain. Other conservative treatments prior to injection include meds/pt. prior injections with benefit    Options/alternatives, benefits and risks were discussed with the patient including bleeding, infection, tissue trauma, exposure to radiation, reaction to medications including seizure, nerve injury, weakness, and numbness.  Questions were answered to her satisfaction and she agrees to proceed. Voluntary informed consent was obtained and signed.     Vitals were reviewed: Yes  Allergies were reviewed:  Yes   Medications were reviewed:  Yes   Pre-procedure pain score: 9/10    Procedure:  After obtaining signed informed consent, the patient was brought into the procedure suite and was placed in a prone position on the procedure table.   A Pause for the Cause was performed.  The patient was prepped and draped in the usual sterile fashion.     After identifying the left SI joint, the C-arm was rotated obliquely to obtain the best view of the inferior angle of the joint.  Then 5 ml of Lidocaine 1%  was used to anesthetize the skin at a skin entry site coaxial with the fluoroscopy beam at this location.  A 22 gauge 3.5 inch needle was advanced under intermittent fluoroscopy until it was felt to enter the SI joint.  Aspiration was negative.    After identifying the right SI joint, the C-arm was rotated obliquely to obtain the best view of the inferior angle of the joint.  Then 5 ml of Lidocaine 1%  was used to anesthetize the skin at a skin entry site coaxial with the fluoroscopy beam at this location.  A 22 gauge 3.5 inch needle was advanced under intermittent fluoroscopy until it was felt to enter the SI joint.   Aspiration was negative.    A total of 1ml of Omnipaque-300 was injected, confirming appropriate position, with spread into the intraarticular space, with no intravascular uptake noted.  9ml of Omnipaque was wasted. Location was verified in lateral view.    2 ml of a combination of 3 mL of 0.25% bupivacaine and 40mg of Kenalog was injected into each joint.  The needle was removed.     Hemostasis was achieved, the area was cleaned, and bandaids were placed when appropriate.  The patient tolerated the procedure well, and was taken to the recovery room.  Images were saved to PACS.    Post-procedure pain score: 0/10  Follow-up includes:   -f/u phone call in one week  -f/u with referring Physician     Clint Fuentes MD  [facility]

## 2022-03-08 DIAGNOSIS — M54.16 LUMBAR RADICULOPATHY: ICD-10-CM

## 2022-03-08 RX ORDER — GABAPENTIN 100 MG/1
CAPSULE ORAL
Qty: 180 CAPSULE | Refills: 0 | Status: SHIPPED | OUTPATIENT
Start: 2022-03-08 | End: 2022-04-12

## 2022-03-08 NOTE — TELEPHONE ENCOUNTER
Routing refill request to provider for review/approval because:  Drug not on the [facility] refill protocol         Rose Wilson RN  [facility]

## 2022-03-10 ENCOUNTER — TELEPHONE (OUTPATIENT)
Dept: FAMILY MEDICINE | Facility: CLINIC | Age: 67
End: 2022-03-10
Payer: COMMERCIAL

## 2022-03-10 NOTE — LETTER
March 31, 2022      Sharon Fung  [address] [address]      Your healthcare team cares about your health. To provide you with the best care,   we have reviewed your chart and based on our findings, we see that you are due to:     - CERVICAL CANCER SCREENING: Schedule a Cervical Cancer Screening, with Pap and wellness exam.  You can call [phone] to schedule your Physical.    If you have already completed these items, please contact the clinic via phone or   [employer] so your care team can review and update your records. Thank you for   choosing [facility] for your healthcare needs. For any questions,   concerns, or to schedule an appointment please contact the clinic.       Healthy Regards,  Blue Team    Your [facility] Care Team

## 2022-03-10 NOTE — TELEPHONE ENCOUNTER
Patient Quality Outreach    Patient is due for the following:   Cervical Cancer Screening - PAP Needed    NEXT STEPS:   Schedule a yearly physical    Type of outreach:    Phone, left message for patient/parent to call back.    Next Steps:  Reach out within 90 days via Phone.    Max number of attempts reached: No. Will try again in 90 days if patient still on fail list.    Questions for provider review:    None     Kailyn Toth, [facility]  Chart routed to me.

## 2022-03-11 ENCOUNTER — LAB (OUTPATIENT)
Dept: LAB | Facility: CLINIC | Age: 67
End: 2022-03-11
Payer: COMMERCIAL

## 2022-03-11 DIAGNOSIS — M05.9 SEROPOSITIVE RHEUMATOID ARTHRITIS (H): ICD-10-CM

## 2022-03-11 DIAGNOSIS — Z79.899 HIGH RISK MEDICATION USE: ICD-10-CM

## 2022-03-11 LAB
ALBUMIN SERPL-MCNC: 3.8 G/DL (ref 3.4–5)
ALP SERPL-CCNC: 107 U/L (ref 40–150)
ALT SERPL W P-5'-P-CCNC: 46 U/L (ref 0–50)
AST SERPL W P-5'-P-CCNC: 30 U/L (ref 0–45)
BASOPHILS # BLD AUTO: 0.1 10E3/UL (ref 0–0.2)
BASOPHILS NFR BLD AUTO: 1 %
BILIRUB DIRECT SERPL-MCNC: 0.1 MG/DL (ref 0–0.2)
BILIRUB SERPL-MCNC: 0.5 MG/DL (ref 0.2–1.3)
CREAT SERPL-MCNC: 0.75 MG/DL (ref 0.52–1.04)
CRP SERPL-MCNC: <2.9 MG/L (ref 0–8)
EOSINOPHIL # BLD AUTO: 0.6 10E3/UL (ref 0–0.7)
EOSINOPHIL NFR BLD AUTO: 12 %
ERYTHROCYTE [DISTWIDTH] IN BLOOD BY AUTOMATED COUNT: 13.3 % (ref 10–15)
ERYTHROCYTE [SEDIMENTATION RATE] IN BLOOD BY WESTERGREN METHOD: 5 MM/HR (ref 0–30)
GFR SERPL CREATININE-BSD FRML MDRD: 88 ML/MIN/1.73M2
HCT VFR BLD AUTO: 42.1 % (ref 35–47)
HGB BLD-MCNC: 13.7 G/DL (ref 11.7–15.7)
IMM GRANULOCYTES # BLD: 0 10E3/UL
IMM GRANULOCYTES NFR BLD: 0 %
LYMPHOCYTES # BLD AUTO: 1.2 10E3/UL (ref 0.8–5.3)
LYMPHOCYTES NFR BLD AUTO: 24 %
MCH RBC QN AUTO: 30.2 PG (ref 26.5–33)
MCHC RBC AUTO-ENTMCNC: 32.5 G/DL (ref 31.5–36.5)
MCV RBC AUTO: 93 FL (ref 78–100)
MONOCYTES # BLD AUTO: 0.9 10E3/UL (ref 0–1.3)
MONOCYTES NFR BLD AUTO: 17 %
NEUTROPHILS # BLD AUTO: 2.4 10E3/UL (ref 1.6–8.3)
NEUTROPHILS NFR BLD AUTO: 46 %
NRBC # BLD AUTO: 0 10E3/UL
NRBC BLD AUTO-RTO: 0 /100
PLATELET # BLD AUTO: 232 10E3/UL (ref 150–450)
PROT SERPL-MCNC: 7.2 G/DL (ref 6.8–8.8)
RBC # BLD AUTO: 4.54 10E6/UL (ref 3.8–5.2)
WBC # BLD AUTO: 5.2 10E3/UL (ref 4–11)

## 2022-03-11 PROCEDURE — 82565 ASSAY OF CREATININE: CPT

## 2022-03-11 PROCEDURE — 86140 C-REACTIVE PROTEIN: CPT

## 2022-03-11 PROCEDURE — 85652 RBC SED RATE AUTOMATED: CPT

## 2022-03-11 PROCEDURE — 82040 ASSAY OF SERUM ALBUMIN: CPT

## 2022-03-11 PROCEDURE — 36415 COLL VENOUS BLD VENIPUNCTURE: CPT

## 2022-03-11 PROCEDURE — 85025 COMPLETE CBC W/AUTO DIFF WBC: CPT

## 2022-03-22 ENCOUNTER — MYC MEDICAL ADVICE (OUTPATIENT)
Dept: PALLIATIVE MEDICINE | Facility: CLINIC | Age: 67
End: 2022-03-22
Payer: COMMERCIAL

## 2022-03-22 DIAGNOSIS — M54.16 LUMBAR RADICULOPATHY: ICD-10-CM

## 2022-03-22 DIAGNOSIS — M53.3 SI (SACROILIAC) JOINT DYSFUNCTION: Primary | ICD-10-CM

## 2022-03-22 DIAGNOSIS — M47.816 LUMBAR FACET ARTHROPATHY: ICD-10-CM

## 2022-03-23 NOTE — TELEPHONE ENCOUNTER
Please see [employer] update below.    03/01/222 Procedure performed: bilateral SI joint injection    05/07/19 MR of the lumbar spine Impression:  1. Multilevel lumbar spondylosis, most pronounced at L4-5 with mild  neural foraminal stenosis bilaterally. There is minimal spinal canal  stenosis at L2-3 and L3-4.  2. Central disc region at L5-S1 approaches the traversing left S1  nerve root without abutment.  3. No significant change since 5/13/2014.    Lumbar MRI pended Routing to Jennifer Ness to review. Spoke to patient and she will call and schedule a follow up to review those results and for further planning.     Rose Cardona, BABATUNDEN, RN  Care Coordinator  [facility] [place]

## 2022-03-29 ENCOUNTER — OFFICE VISIT (OUTPATIENT)
Dept: PALLIATIVE MEDICINE | Facility: CLINIC | Age: 67
End: 2022-03-29
Payer: COMMERCIAL

## 2022-03-29 ENCOUNTER — ANCILLARY PROCEDURE (OUTPATIENT)
Dept: MRI IMAGING | Facility: CLINIC | Age: 67
End: 2022-03-29
Attending: NURSE PRACTITIONER
Payer: COMMERCIAL

## 2022-03-29 VITALS — OXYGEN SATURATION: 95 % | DIASTOLIC BLOOD PRESSURE: 78 MMHG | HEART RATE: 78 BPM | SYSTOLIC BLOOD PRESSURE: 129 MMHG

## 2022-03-29 DIAGNOSIS — M54.16 LUMBAR RADICULOPATHY: ICD-10-CM

## 2022-03-29 DIAGNOSIS — M05.9 SEROPOSITIVE RHEUMATOID ARTHRITIS (H): ICD-10-CM

## 2022-03-29 DIAGNOSIS — M47.816 LUMBAR FACET ARTHROPATHY: ICD-10-CM

## 2022-03-29 DIAGNOSIS — M54.16 LUMBAR RADICULOPATHY: Primary | ICD-10-CM

## 2022-03-29 DIAGNOSIS — M53.3 SI (SACROILIAC) JOINT DYSFUNCTION: ICD-10-CM

## 2022-03-29 DIAGNOSIS — M51.369 DDD (DEGENERATIVE DISC DISEASE), LUMBAR: ICD-10-CM

## 2022-03-29 DIAGNOSIS — E66.01 MORBID OBESITY (H): ICD-10-CM

## 2022-03-29 PROCEDURE — 72148 MRI LUMBAR SPINE W/O DYE: CPT | Mod: GC | Performed by: RADIOLOGY

## 2022-03-29 PROCEDURE — 99214 OFFICE O/P EST MOD 30 MIN: CPT | Performed by: NURSE PRACTITIONER

## 2022-03-29 ASSESSMENT — PAIN SCALES - GENERAL: PAINLEVEL: MILD PAIN (3)

## 2022-03-29 NOTE — NURSING NOTE
Referral order faxed per providers instructions. Faxed to [facility] at [phone]. Verified by Bradley.     Sophia Barnett [facility]

## 2022-03-29 NOTE — PATIENT INSTRUCTIONS
After Visit Instructions:     Thank you for coming to [facility] for your care. It is my goal to partner with you to help you reach your optimal state of health.   Continue daily self-care, identifying contributing factors, and monitoring variations in pain level. Continue to integrate self-care into your life.      1. Physical therapy: YES Order   2. Video or Clinic follow-up with CHELSEY Alex NP-C 3 weeks after injection.   3. Imaging: Jennifer will send [facility] message with MRI results  4. Medication Management :   1. Ok to use OTC analgesics per directions      CHELSEY Gamez NP-C  [facility]  [facility] - Monday and Friday  [place] [facility] - Tuesday  [place] - Thursday    Be sure to request ALL medication refills 5 days prior to the due date unless you will see your medical provider in an appointment before the due date.  This is YOUR responsibility. Do not expect same day refills. If you do not plan ahead you may run out of medications.     NO early refills are allowed. It is your responsibility to manage your medications responsibility and keep them safely stored. Lost or destroyed medications WILL NOT be replaced    [facility] Scheduling/Clinic Telephone Number:  [phone]    [facility] Scheduling/Clinic Telephone Number: [phone]  After Hours On-Call Service:  [phone]      Call with any questions about your care and for scheduling assistance.     Calls are returned Monday through Friday between 8 AM and 4:00 PM. We usually get back to you within 2 business days depending on the issue/request.    If we are prescribing your medications:    For opioid medication refills, call the clinic or send a [employer] message 7 days in advance.  Please include:    Your name and date of birth.     Name of requested medication    Name of the pharmacy.    For non-opioid medications, call your pharmacy directly to request a refill. Please allow 3-4  days to be processed.     Per [place] State Law:    All controlled substance prescriptions must be filled within 30 days of being written.      For those controlled substances allowing refills, pickup must occur within 30 days of last fill.      We believe regular attendance is key to your success in our program!      Any time you are unable to keep your appointment we ask that you call us at least 24 hours in advance to cancel.This will allow us to offer the appointment time to another patient.     Multiple missed appointments may lead to dismissal from the clinic.     -

## 2022-03-29 NOTE — PROGRESS NOTES
[facility]    CHIEF COMPLAINT: Chronic Pain.    INTERVAL HISTORY:  Last seen on 2/24/22.        Recommendations/plan at the last visit included:     1. Video or Clinic follow-up with CHELSEY Alex NP-C in following injection if pain is not relieved   2. Procedures recommended: SI Joint injection    3. Medication Management : No change       Since last visit:   - No relief with SI joint injection. MRI done this morning. Left low back goes to left buttock   - Has a lot of work related stress, hoping to retire in 20 months   -     Pain Information today: Mild Pain (3)/10. Location of pain: low buttock on left side  .    Annual requirements last collected: N/A     CURRENT RELEVANT PAIN MEDICATIONS:   OTC pain medications per package instructions PRN  Gabapentin 100 mg in am/midday & 400 mg at HS     Patient is using the medication as prescribed:  YES  Is your medication helpful?     NO   Medication side effects? denies any problems     Previous Pain Relevant Medications: (H--helped; HI--Helped initially; SWH--Somewhat helpful; NH--No help; W--worse; SE--side effects; ?--Unsure if helpful)   NOTE: This medication information taken from patient's intake form, not medical records.               Opiates: None               NSAIDS: Ibuprofen: H, ketorolac: H, naproxen: H              Muscle Relaxants: None noted              Anti-migraine mediations: None noted              Anti-depressants: None              Sleep aids: None              Anxiolytics: None              Neuropathics: Topiramate: H                  Topicals: None              Other medications not covered above: Tylenol: H, prednisone: H     Any illicit drug use: none  EtOH use: rare   Caffeine use: 3-4 per day   Nicotine use: none   Any use of prescriptions other than how they were prescribed:none      Past Pain Treatments:              Pain Clinic:   No               PT: No               Psychologist:  No             Relaxation techniques/biofeedback: No             Chiropractor: No             Acupuncture: No             Pharmacotherapy:                         Opioids: No                          Non-opioids:  Yes              TENs Unit:No             Injections: Yes:               Date?  For right bunion: H               Aug 2020:  L5-S1 interlaminar steroid injection: NH              10/13/2020: Left SI joint steroid injection: H             Self-care:   Yes              Surgeries related to pain: No     THE 4 As OF OPIOID MAINTENANCE ANALGESIA    Analgesia: Is pain relief clinically significant? N/A   Activity: Is patient functional and able to perform Activities of Daily Living? N/A   Adverse effects: Is patient free from adverse side effects from opiates? N/A   Adherence to Rx protocol: Is patient adhering to Controlled Substance Agreement and taking medications ONLY as ordered? N/A     Is Narcan prescribed for opiate use >50 MME daily? N/A    [place] Board of Pharmacy Data Base Reviewed:    YES; No concern for abuse or misuse of controlled medications based on this report. Reviewed [facility] March 29, 2022- no concerning fills.      PHYSICAL EXAM    Vitals:    03/29/22 1035   BP: 129/78   Pulse: 78   SpO2: 95%       Constitutional: healthy, alert and no distress A&O.   Patient is appropriate.  Psychiatric/mental status: Alert, without lethargy or stupor. Appropriate affect.     Neurologic exam:  CN:  Cranial nerves 2-12 are grossly normal.    MUSCULOSKELETAL:     Posture: Upright, shoulders and pelvis are leveled. Yes  Gait pattern: Patient has antalgic gait. Yes Gait favors the left side.    Thoracic spine:    Kyphosis. Yes   Tenderness in the thoracic spine at midline. Yes   Tenderness in the thoracic paraspinal muscles. Yes  Lumbar/Sacral spine:   Forward Flexion: painful    Ext: painful    Rotation/ext to right: painful    Rotation/ext to left: painful    Lordosis. Yes   Tenderness in the lumbar spine  at midline. Yes   Tenderness in the lumbar paraspinal muscles.Yes      DIRE Score for ongoing opioid management is calculated as follows:    Diagnosis = 2 pts (slowly progressive; moderate pain/objective findings)    Intractability = 1 pt (few therapies tried; passive patient role)    Risk        Psych = 3 pts (no significant personality dysfunction/mental illness; good communication with clinic)         Chem Hlth = 3 pts (no history of chemical dependency; not drug-focused)       Reliability = 3 pts (highly reliable with meds, appointments, treatments)       Social = 3 pts (supportive family/close relationships; involved in work/school; no isolation)       (Psych + Chem hlth + Reliability + Social) = 15    Efficacy = 2 pts (too early/not tried meds)    DIRE Score = 17        7-13: likely NOT suitable candidate for long-term opioid analgesia       14-21: may be a suitable candidate for long-term opioid analgesia        DIAGNOSTIC TESTS:  Imaging Studies:   MRI of the Cervical Spine without contrast 7/23/2014  IMPRESSION:   Multilevel degenerative changes throughout the cervical spine,  greatest at C5-C6 with a posterior disc bulge indenting the ventral  spinal cord resulting in mild to moderate spinal canal narrowing.     MR LUMBAR SPINE W/O CONTRAST 5/7/2019   Impression:  1. Multilevel lumbar spondylosis, most pronounced at L4-5 with mild  neural foraminal stenosis bilaterally. There is minimal spinal canal  stenosis at L2-3 and L3-4.  2. Central disc region at L5-S1 approaches the traversing left S1  nerve root without abutment.  3. No significant change since 5/13/2014.        PAIN RELEVANT CONDITIONS:  1.  Rheumatoid Arthritis affecting multidisciplinary joint             1. Bilateral knee pain, OA vs RA             2. Bilateral hand pain and swelling             3. Bilateral hip pain              4. Bilateral ankle pain   2.  Morbid obesity s/p bariatric surgery   3.  Fibromyalgia   4.  Depression  5.   Cervical spine: DDD, mild stenosis  6.  Lumbar spine: DDD, facet arthropathy, right S1 nerve involvement, stenosis     DIAGNOSIS AND PLAN:     (M54.16) Lumbar radiculopathy  (primary encounter diagnosis)  (M53.3) SI (sacroiliac) joint dysfunction  (M47.816) Lumbar facet arthropathy  (M05.9) Seropositive rheumatoid arthritis (H)  (E66.01) Morbid obesity (H)  (M51.36) DDD (degenerative disc disease), lumbar  Comment: Sharon continues to have debilitating low back pain which worsens with walking. She would prefer to avoid invasive procedures/interventional injections/surgery. We will start with PT. Discussed concern about obesity as it relates to her back pain, RA and overall health. I have previously ordered PT, nutrition consult which Sharon has not followed through with. Discussed that she needs to put energy toward self-care. 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 Coming up to jail age and would like to be more active, be able to enjoy her jail.   Plan: Physical Therapy Referral    PATIENT INSTRUCTIONS:      Diagnosis reviewed, treatment option addressed, and risk/benefits discussed.  Self-care instructions given.  I am recommending a multidisciplinary treatment plan to help this patient better manage pain.    Remember to request ALL medication refills 5 BUSINESS days before you run out.     1. Physical therapy: YES Order placed   2. Video or Clinic follow-up with CHELSEY Alex, NP-C 3 weeks after injection.   3. Imaging: Jennifer will send [facility] message with MRI results  4. Medication Management :   1. Ok to use OTC analgesics per directions    I have reviewed the note as documented above.  This accurately captures the substance of my conversation with the patient.  A total of 30 minutes of preparation, care, and consultation were spent on this visit today.     CHELSEY Gamez, NP-C  [facility]

## 2022-03-31 ENCOUNTER — OFFICE VISIT (OUTPATIENT)
Dept: OPTOMETRY | Facility: CLINIC | Age: 67
End: 2022-03-31
Payer: COMMERCIAL

## 2022-03-31 DIAGNOSIS — H18.523 ANTERIOR BASEMENT MEMBRANE DYSTROPHY OF BOTH EYES: Primary | ICD-10-CM

## 2022-03-31 PROCEDURE — 99213 OFFICE O/P EST LOW 20 MIN: CPT | Performed by: OPTOMETRIST

## 2022-03-31 ASSESSMENT — REFRACTION_FINALRX
OD_HBASE: OUT
OD_HPRISM: 2.0

## 2022-03-31 ASSESSMENT — VISUAL ACUITY
METHOD: SNELLEN - LINEAR
OD_CC: 20/25
METHOD_MR: 3 BO
OS_CC: 20/30
OD_CC+: -2
CORRECTION_TYPE: GLASSES

## 2022-03-31 ASSESSMENT — REFRACTION_MANIFEST
OD_SPHERE: -2.00
OD_CYLINDER: +2.00
OS_SPHERE: -2.00
OS_AXIS: 025
OD_AXIS: 160
OS_CYLINDER: +0.50

## 2022-03-31 ASSESSMENT — REFRACTION_WEARINGRX
OD_SPHERE: -2.25
OD_HPRISM: 1.0
OD_CYLINDER: +2.00
OS_ADD: +2.75
OD_AXIS: 160
OS_SPHERE: -1.75
OS_CYLINDER: +0.50
OS_AXIS: 016
OD_ADD: +2.75
OD_HBASE: OUT

## 2022-03-31 ASSESSMENT — SLIT LAMP EXAM - LIDS
COMMENTS: MEIBOMIAN GLAND DYSFUNCTION,
COMMENTS: MEIBOMIAN GLAND DYSFUNCTION

## 2022-03-31 ASSESSMENT — EXTERNAL EXAM - RIGHT EYE: OD_EXAM: NORMAL

## 2022-03-31 ASSESSMENT — EXTERNAL EXAM - LEFT EYE: OS_EXAM: NORMAL

## 2022-03-31 NOTE — TELEPHONE ENCOUNTER
Patient Quality Outreach    Patient is due for the following:   Cervical Cancer Screening - PAP Needed  Physical  - now    NEXT STEPS:   See below    Type of outreach:    Sent letter.    Next Steps:  Reach out within 90 days via Letter.    Max number of attempts reached: Yes. Will try again in 90 days if patient still on fail list.    Questions for provider review:    None     Kailyn Toth, [facility]  Chart routed to closing chart.

## 2022-03-31 NOTE — PROGRESS NOTES
Red Eye Recheck    Reason:   Chief Complaint   Patient presents with     Refraction     6 week [place] os eye         Eyes feel: same    Medications used: luba 128 drops,  restasis  And maxitrol ointment     How often: 3 times per day, 2x, pm      Tanja Do, Optometric Assistant, A.B.O.C.     OBJECTIVE:     See ophthalmology exam      ASSESSMENT:        ICD-10-CM    1. Anterior basement membrane dystrophy of both eyes  H18.523           PLAN:       Patient Instructions   Continue present treatment.    Restasis- 1 drop both eyes 2 x day.    Luba ointment at night., Luba 128 drops - 3 x day.    Maxitrol ointment as needed to eyelids.    Discussed referral to corneal specialist at this time.  (patient would like to monitor)    Eyeglass prescription given.  Slight change in prism.  Give the glasses 1-2 weeks to adjust to the new prescription.  You may get headaches or eyestrain as your eyes get used to the new prescription.  Sometimes the symptoms get worse before it gets better.  If any problems after 1-2 weeks schedule an appointment for a recheck.      Recommend annual eye exams.    Robles Valderrama, OD

## 2022-03-31 NOTE — LETTER
3/31/2022         RE: Sharon Fung  [address] [address]        Dear Colleague,    Thank you for referring your patient, Sharon Fung, to the [facility]. Please see a copy of my visit note below.    Red Eye Recheck    Reason:   Chief Complaint   Patient presents with     Refraction     6 week [place] os eye         Eyes feel: same    Medications used: luba 128 drops,  restasis  And maxitrol ointment     How often: 3 times per day, 2x, pm      Tanja Do, Optometric Assistant, A.B.O.C.     OBJECTIVE:     See ophthalmology exam      ASSESSMENT:        ICD-10-CM    1. Anterior basement membrane dystrophy of both eyes  H18.523           PLAN:       Patient Instructions   Continue present treatment.    Restasis- 1 drop both eyes 2 x day.    Luba ointment at night., Luba 128 drops - 3 x day.    Maxitrol ointment as needed to eyelids.    Discussed referral to corneal specialist at this time.  (patient would like to monitor)    Eyeglass prescription given.  Slight change in prism.  Give the glasses 1-2 weeks to adjust to the new prescription.  You may get headaches or eyestrain as your eyes get used to the new prescription.  Sometimes the symptoms get worse before it gets better.  If any problems after 1-2 weeks schedule an appointment for a recheck.      Recommend annual eye exams.    Robles Valderrama OD                 Again, thank you for allowing me to participate in the care of your patient.        Sincerely,        Robles Valderrama, OD

## 2022-03-31 NOTE — PATIENT INSTRUCTIONS
Continue present treatment.    Restasis- 1 drop both eyes 2 x day.    Matt ointment at night., Matt 128 drops - 3 x day.    Maxitrol ointment as needed to eyelids.    Discussed referral to corneal specialist at this time.  (patient would like to monitor)    Eyeglass prescription given.  Slight change in prism.  Give the glasses 1-2 weeks to adjust to the new prescription.  You may get headaches or eyestrain as your eyes get used to the new prescription.  Sometimes the symptoms get worse before it gets better.  If any problems after 1-2 weeks schedule an appointment for a recheck.      Recommend annual eye exams.    Robles Valderrama, OD

## 2022-04-11 DIAGNOSIS — M54.16 LUMBAR RADICULOPATHY: ICD-10-CM

## 2022-04-12 RX ORDER — GABAPENTIN 100 MG/1
CAPSULE ORAL
Qty: 180 CAPSULE | Refills: 5 | Status: SHIPPED | OUTPATIENT
Start: 2022-04-12 | End: 2022-10-18

## 2022-04-12 NOTE — TELEPHONE ENCOUNTER
Routing refill request to provider for review/approval because:  Drug not on the [facility] refill protocol   Kathy Dill RN

## 2022-04-20 ENCOUNTER — TELEPHONE (OUTPATIENT)
Dept: FAMILY MEDICINE | Facility: CLINIC | Age: 67
End: 2022-04-20
Payer: COMMERCIAL

## 2022-04-20 NOTE — TELEPHONE ENCOUNTER
Patient Quality Outreach    Patient is due for the following:   Cervical Cancer Screening - PAP Needed  Physical  - now    NEXT STEPS:   See below    Type of outreach:    Sent [employer] message.    Next Steps:  Reach out within 90 days via Phone.    Max number of attempts reached: No. Will try again in 90 days if patient still on fail list.    Questions for provider review:    None     Kailyn Toth, [facility]  Chart routed to Routing to me.

## 2022-04-20 NOTE — LETTER
April 27, 2022      hSaron Fung  [address] [address]      Your healthcare team cares about your health. To provide you with the best care,   we have reviewed your chart and based on our findings, we see that you are due to:     - CERVICAL CANCER SCREENING: Schedule a Cervical Cancer Screening, with Pap and wellness exam.   - OTHER FOLLOW UP:  Depression questionnaire   Please call and schedule Physical with pap screen at [phone].  Please fill out PHQ9 questionnaire and mail back to CAMPOS Brice, thank you.    If you have already completed these items, please contact the clinic via phone or   [employer] so your care team can review and update your records. Thank you for   choosing [facility] for your healthcare needs. For any questions,   concerns, or to schedule an appointment please contact the clinic.       Healthy Regards,    Blue Team    Your [facility] Care Team

## 2022-04-27 NOTE — TELEPHONE ENCOUNTER
Patient Quality Outreach    Patient is due for the following:   Cervical Cancer Screening - PAP Needed  Depression  -  PHQ-9 Needed    NEXT STEPS:   See below    Type of outreach:    Sent letter.    Next Steps:  Reach out within 90 days via Letter.    Max number of attempts reached: Yes. Will try again in 90 days if patient still on fail list.    Questions for provider review:    None     Kailyn Toth, [facility]  Chart routed to closing chart.

## 2022-05-31 ENCOUNTER — VIRTUAL VISIT (OUTPATIENT)
Dept: RHEUMATOLOGY | Facility: CLINIC | Age: 67
End: 2022-05-31
Payer: COMMERCIAL

## 2022-05-31 ENCOUNTER — MYC MEDICAL ADVICE (OUTPATIENT)
Dept: RHEUMATOLOGY | Facility: CLINIC | Age: 67
End: 2022-05-31

## 2022-05-31 DIAGNOSIS — M54.50 CHRONIC LEFT-SIDED LOW BACK PAIN WITHOUT SCIATICA: ICD-10-CM

## 2022-05-31 DIAGNOSIS — Z79.899 HIGH RISK MEDICATION USE: ICD-10-CM

## 2022-05-31 DIAGNOSIS — G89.29 CHRONIC LEFT-SIDED LOW BACK PAIN WITHOUT SCIATICA: ICD-10-CM

## 2022-05-31 DIAGNOSIS — M05.9 SEROPOSITIVE RHEUMATOID ARTHRITIS (H): Primary | ICD-10-CM

## 2022-05-31 PROCEDURE — 99214 OFFICE O/P EST MOD 30 MIN: CPT | Mod: 95 | Performed by: INTERNAL MEDICINE

## 2022-05-31 RX ORDER — LEFLUNOMIDE 20 MG/1
20 TABLET ORAL DAILY
Qty: 90 TABLET | Refills: 2 | Status: SHIPPED | OUTPATIENT
Start: 2022-05-31 | End: 2023-01-02

## 2022-05-31 RX ORDER — SULFASALAZINE 500 MG/1
1500 TABLET ORAL 2 TIMES DAILY
Qty: 540 TABLET | Refills: 2 | Status: SHIPPED | OUTPATIENT
Start: 2022-05-31 | End: 2023-01-02

## 2022-05-31 NOTE — PATIENT INSTRUCTIONS
RHEUMATOLOGY    Dr. Freddy Guerra    [address] [place] [address]  Phone number: [phone]  Fax number: [phone]      Thank you for choosing [facility]!    Evie Rosas CMA Rheumatology

## 2022-05-31 NOTE — PROGRESS NOTES
Sharon Fung  is a 67 year old year old who is being evaluated via a billable video visit.      How would you like to obtain your AVS? MyChart  If the video visit is dropped, the invitation should be resent by: Text to cell phone: [phone]   Will anyone else be joining your video visit? No     Rheumatology Video Visit      Sharno Fung MRN# [id number]   YOB: 1955 Age: 67 year old      Date of visit: 5/31/22   PCP: Dr. Reynaldo Saavedra    Chief Complaint   Patient presents with:  Rheumatoid Arthritis    Assessment and Plan     1. Seropositive nonerosive rheumatoid arthritis (RF negative, CCP low positive): Previously on Humira (lost efficacy), Cimzia (ineffective), Orencia (ineffective), leflunomide (ineffective as monotherapy), hydroxychloroquine (ineffective), Xeljanz (ineffective), MTX (GI upset). Currently on SSZ 1500mg BID, leflunomide 20mg daily, and Kevzara 200mg SQ every 14 days (started 1/2019).  RA doing well.  Chronic illness, stable  - Continue sulfasalazine 1500 mg twice daily   - Continue leflunomide 20mg daily  - Continue Kevzara 200mg SQ every 14 days  - PRN RA flare: prednisone 10mg daily x7days, then 5mg daily x7days, then stop.   - Fasting labs now: CBC, creatinine, hepatic panel, ESR, CRP, lipid panel  - Labs in 3 months: CBC, Creatinine, Hepatic Panel, ESR, CRP    High risk medication requiring intensive toxicity monitoring at least quarterly: labs ordered include CBC, Creatinine, Hepatic panel to monitor for cytopenia and hepatotoxicity; checking creatinine as it affects clearance of medication.                 Rapid 3, cumulative scores                      8/24/2021: No inflammatory joint symptoms.  She says that this combination is great (SSZ 1500mg BID, leflunomide 20mg daily, Kevzara 200mg q14 days)                      10/14/2020 doing well (SSZ 1500mg BID, leflunomide 20mg daily, Kevzara 200mg q14 days)                      08/28/2019: 3.2   (SSZ 1500mg BID, Kevzara)   Now on gabapentin                      04/17/2019: 15    (SSZ 1500mg BID, Kevzara)  Mostly fibromyalgia symptoms                      12/19/2018:         (MTX 20mg SQ wkly, Xeljanz XR 11mg daily, SSZ 1500mg BID)                          05/02/2018:  9     (MTX 20mg SQ wkly, Xeljanz XR 11mg daily, SSZ 1000mg BID)                          01/31/2018: 22.3 (MTX 20mg SQ wkly, Xeljanz XR 11mg daily)                          11/29/2017: 16.8 (MTX 20mg SQ wkly)                      08/02/2017: 4.2   (MTX 20mg SQ wkly, Xeljanz XR 11mg daily)                         05/04/2017: 7      (MTX 20mg SQ wkly, Xeljanz XR 11mg daily)                        02/02/2017: 8      (MTX 20mg SQ wkly, Xeljanz XR 11mg daily)                      11/04/2016: 13    (MTX 20mg SQ weekly)                      07/15/2016: 10.8    2. Positive LORNA and dsDNA / Secondary Sjogren's Syndrome: Repeat dsDNAs have been negative. Has dry eyes but no dry mouth.  Dry eyes are treated by ophthalmology with Restasis.      3. Bilateral patellofemoral syndrome: home exercises have been helpful.  Weight loss encouraged.      4. Fibromyalgia: Managed in the pain management clinic    5.  Bone Health, vitamin D deficiency: normal 12/20/2019 DEXA; plan to repeat in 3-5 years but this may change depending on prednisone exposure.    - Continue vitamin D 2000 IU daily    6. Chronic lower back pain with left sided sciatica: suspect related to degenerative changes seen on L-spine MRI.  Following with the pain clinic for this issue but has not been seeing improvement with injections and she would like to go to see a spine surgeon.  She requests a spine surgery referral  - Spine surgery referral    - COVID-19: has received the Pfizer COVID-19 vaccine on 12/21/2020 and 1/7/2021 and 8/27/2021; Moderna received on 3/8/2022.  A 5th mRNA vaccine (2nd booster) may be received at least 4 months after the 4th dose.   Based on our current understanding (and this may change over time  as we learn more), medications should be adjusted as noted below, if disease activity allows:  - NSAIDs and Acetaminophen: hold for 24 hours prior to vaccination if able to do so  - Sulfasalazine should be held for 1-2 weeks after each COVID-19 vaccine (as disease activity allows)  - Leflunomide should be held for 1-2 weeks after each COVID-19 vaccine (as disease activity allows)     Total minutes spent in evaluation with patient, documentation, , and review of pertinent studies and chart notes: 21       Ms. Fung verbalized agreement with and understanding of the rational for the diagnosis and treatment plan.  All questions were answered to best of my ability and the patient's satisfaction. Ms. Fung was advised to contact the clinic with any questions that may arise after the clinic visit.      Thank you for involving me in the care of the patient    Return to clinic: 3-4 months    HPI   Sharon Fung is a 67 year old female with medical history significant for GERD, allergic rhinitis, obstructive sleep apnea, obesity, hx of trigger fingers, hx of carpal tunnel surgery, and rheumatoid arthritis who presents for follow-up of rheumatoid arthritis.    Today, 3/1/2022: 1 week of worsening 1st-3rd mcps; no stiffness in the am; ibuprofen at bedtime helps.  Otherwise has been doing well. Morning stiffness <10 min. No gelling. Tolerating medications well.  Would prefer to get the 4th COVID19 vaccination and then wait to use prednisone, rather than use prednisone and wait to get the vaccination.     No fevers or chills. No nausea, vomiting, constipation, diarrhea. No chest pain/pressure, palpitations, or shortness of breath. No oral or nasal sores. No neck pain. No rash.      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    ROS   12 point review of system was completed and negative except as noted in the HPI     Active Problem List      Patient Active Problem List   Diagnosis     AR (allergic rhinitis)     GERD (gastroesophageal reflux disease)     Status post bariatric surgery     Mild major depression (H)     Advanced directives, counseling/discussion     High risk medication use     Obstructive sleep apnea     Obesity, Class III, BMI 40-49.9 (morbid obesity) (H)     Anterior basement membrane dystrophy     Seropositive rheumatoid arthritis (H)     LORNA positive     Carpal tunnel syndrome of right wrist     Headache     Immunosuppression (H)     Fibromyalgia     Pain in joint, ankle and foot, right     Hypertension goal BP (blood pressure) < 140/90     Past Medical History     Past Medical History:   Diagnosis Date     AR (allergic rhinitis)  as teen     Arthritis 12/14/2015     Chronic obstructive pulmonary disease, unspecified COPD type (H) 3/27/2018     Depressive disorder 3 years ago     GERD (gastroesophageal reflux disease) 4-07     Headache 7/11/2017     Hypertension goal BP (blood pressure) < 140/90 12/20/2021     Mild major depression (H) 3 years ago     Morbid obesity (H) teens     BRETT (obstructive sleep apnea)     uses CPAP     RA (rheumatoid arthritis) (H) 4 years     Reduced vision 3 years     Rheumatoid arthritis, adult (H)      Past Surgical History     Past Surgical History:   Procedure Laterality Date     ARTHRODESIS FOOT Right 6/30/2021    Procedure: Right 1st metatarsophalangeal joint fusion;  Surgeon: Jesus Wolf MD;  Location: [facility]     ARTHRODESIS TOE(S) Right 12/27/2021    Procedure: right revision great toe fusion;  Surgeon: Ryan Gee MD;  Location:  OR     BLEPHAROPLASTY BILATERAL Bilateral 8/5/2016    Procedure: BLEPHAROPLASTY BILATERAL;  Surgeon: Cortez Robin MD;  Location:  [facility]     BREAST BIOPSY, RT/LT  1-94    Benign     COLONOSCOPY       COLONOSCOPY WITH CO2 INSUFFLATION N/A 3/30/2017    Procedure: COLONOSCOPY WITH CO2 INSUFFLATION;  Surgeon: Issa Weeks MD;  Location:  [facility] OR     ESOPHAGOSCOPY, GASTROSCOPY, DUODENOSCOPY (EGD), COMBINED N/A 10/29/2015    Procedure: COMBINED ESOPHAGOSCOPY, GASTROSCOPY, DUODENOSCOPY (EGD);  Surgeon: Shaq Mims MD;  Location: [facility] GI     LAPAROSCOPIC GASTRIC ADJUSTABLE BANDING  11/09/10    Lap band procedure     LAPAROSCOPIC REMOVAL GASTRIC ADJUSTABLE BAND N/A 10/31/2015    Procedure: LAPAROSCOPIC REMOVAL GASTRIC ADJUSTABLE BAND;  Surgeon: Shaq Mims MD;  Location: [facility] OR     RI HAND/FINGER SURGERY UNLISTED  2016     RI STOMACH SURGERY PROCEDURE UNLISTED  11/2015     RELEASE CARPAL TUNNEL Left 4/27/2017    Procedure: RELEASE CARPAL TUNNEL;  Left Open Carpal Tunnel Release;  Surgeon: Ciara Middleton MD;  Location: [facility]     RELEASE CARPAL TUNNEL Right 6/15/2017    Procedure: RELEASE CARPAL TUNNEL;  Right Carpal Tunnel Release Open;  Surgeon: Ciara Middleton MD;  Location: [facility] OR     REPAIR PTOSIS       TUBAL LIGATION  1988     [facility] APPENDECTOMY  1977     Allergy   No Known Allergies  Current Medication List     Current Outpatient Medications   Medication Sig     acetaminophen (TYLENOL) 500 MG tablet Take 500-1,000 mg by mouth every 6 hours as needed for mild pain     albuterol (PROAIR HFA/PROVENTIL HFA/VENTOLIN HFA) 108 (90 Base) MCG/ACT inhaler Inhale 2 puffs into the lungs every 6 hours as needed for shortness of breath / dyspnea or wheezing     azelastine (OPTIVAR) 0.05 % ophthalmic solution Apply 1 drop to eye 2 times daily     citalopram (CELEXA) 20 MG tablet TAKE ONE TABLET BY MOUTH ONCE DAILY     cycloSPORINE (RESTASIS) 0.05 % ophthalmic emulsion Place 1 drop into both eyes 2 times daily     fexofenadine (ALLEGRA) 180 MG tablet Take 1 tablet (180 mg) by mouth daily     fluticasone (FLONASE) 50 MCG/ACT nasal spray Spray 2 sprays into both nostrils as needed      gabapentin (NEURONTIN) 100 MG capsule TAKE ONE CAPSULE BY MOUTH ONCE DAILY IN THE MORNING, ONE CAPSULE AT MIDDAY, AND FOUR CAPSULES BY MOUTH EVERY  NIGHT AT BEDTIME.     ibuprofen 200 MG capsule Take 600-800 mg by mouth At Bedtime     leflunomide (ARAVA) 20 MG tablet Take 1 tablet (20 mg) by mouth daily     losartan-hydrochlorothiazide (HYZAAR) 100-25 MG tablet Take 1 tablet by mouth daily     neomycin-polymyxin-dexamethasone (MAXITROL) 3.5-61790-3.1 ophthalmic ointment Apply to eyelids 3 x day for 7 days.     olopatadine (PATADAY) 0.2 % ophthalmic solution Place 1 drop into both eyes daily     ondansetron (ZOFRAN-ODT) 8 MG ODT tab Take 1 tablet (8 mg) by mouth every 8 hours as needed for nausea     ORDER FOR DME Use your CPAP device as directed by your provider.     Sarilumab 200 MG/1.14ML SOAJ Inject 1.14 mLs (200 mg) Subcutaneous every 14 days . Hold for signs of infection and seek medical attention.     sodium chloride (DIMAS 128) 5 % ophthalmic ointment Insert into eyes at night.     sodium chloride (DIMAS 128) 5 % ophthalmic solution Place 1-2 drops into both eyes 3 times daily     sulfaSALAzine (AZULFIDINE) 500 MG tablet Take 3 tablets (1,500 mg) by mouth 2 times daily     vitamin D3 (CHOLECALCIFEROL) 50 mcg (2000 units) tablet Take 1 tablet (50 mcg) by mouth daily     No current facility-administered medications for this visit.     Facility-Administered Medications Ordered in Other Visits   Medication     sodium chloride (PF) 0.9% PF flush 10 mL     sodium chloride bacteriostatic 0.9 % flush 12 mL         Social History   See HPI    Family History     Family History   Problem Relation Age of Onset     Heart Disease Father         MI     Neurologic Disorder Father 79        Parkinson's     Diabetes Father      Hypertension Father      Coronary Artery Disease Father      Cancer Maternal Grandmother         uterine     Neurologic Disorder Sister 16        migraine     Depression Sister      Neurologic Disorder Mother 20        migraine     Depression Mother      Neurologic Disorder Son 7        migraine     Substance Abuse Son      Migraines Son         none      Depression Sister      Substance Abuse Sister      Migraines Sister        Physical Exam     GEN: NAD.    HEENT: MMM.  Anicteric, noninjected sclera. No obvious external lesions of the ear and nose. Hearing intact.  PULM: No increased work of breathing  MSK:  Hands and wrists without swelling.    SKIN: No rash or jaundice seen  PSYCH: Alert. Appropriate.     Labs / Imaging (select studies)       CBC  Recent Labs   Lab Test 03/11/22  0910 11/30/21  0806 08/23/21  1131 08/03/21  1150 05/10/21  0802 02/02/21  1056 10/12/20  1113   WBC 5.2 6.3 7.2   < > 5.1 7.4 6.1   RBC 4.54 4.28 4.31   < > 4.48 4.41 4.18   HGB 13.7 12.9 13.0   < > 13.5 13.4 12.7   HCT 42.1 40.5 41.5   < > 42.8 42.8 39.7   MCV 93 95 96   < > 96 97 95   RDW 13.3 12.6 13.0   < > 12.8 12.5 12.9    226 216   < > 193 216 221   MCH 30.2 30.1 30.2   < > 30.1 30.4 30.4   MCHC 32.5 31.9 31.3*   < > 31.5 31.3* 32.0   NEUTROPHIL 46 50 47   < > 42.6 40.1 40.8   LYMPH 24 27 30   < > 26.4 34.8 33.4   MONOCYTE 17 9 13   < > 9.5 10.7 8.9   EOSINOPHIL 12 13 9   < > 19.5 12.6 15.6   BASOPHIL 1 1 1   < > 1.8 1.4 1.0   ANEU  --   --   --   --  2.2 3.0 2.5   ALYM  --   --   --   --  1.3 2.6 2.0   KIMBERLY  --   --   --   --  0.5 0.8 0.5   AEOS  --   --   --   --  1.0* 0.9* 1.0*   ABAS  --   --   --   --  0.1 0.1 0.1   ANEUTAUTO 2.4 3.1 3.3   < >  --   --   --    ALYMPAUTO 1.2 1.7 2.2   < >  --   --   --    AMONOAUTO 0.9 0.6 1.0   < >  --   --   --    AEOSAUTO 0.6 0.8* 0.6   < >  --   --   --    ABSBASO 0.1 0.1 0.1   < >  --   --   --     < > = values in this interval not displayed.     CMP  Recent Labs   Lab Test 03/11/22  0910 12/27/21  0710 12/20/21  0851 11/30/21  0806 08/23/21  1131 08/03/21  1150 08/03/21  1150 05/10/21  0802 02/02/21  1056 10/12/20  1113   NA  --   --  139 137  --   --  138 140  --   --    POTASSIUM  --  3.5 3.7 3.3*  --    < > 3.9 4.3  --   --    CHLORIDE  --   --  105 104  --   --  107 110*  --   --    CO2  --   --  28 28  --   --  29 25  --    --    ANIONGAP  --   --  6 5  --   --  2* 5  --   --    GLC  --   --  144* 165*  --   --  74 99 74  --    BUN  --   --  20 19  --   --  14 12  --   --    CR 0.75  --  0.68 0.61 0.73   < > 0.66 0.73 0.65 0.74   GFRESTIMATED 88  --  >90 >90 87   < > >90 88 >90 87   GFRESTBLACK  --   --   --   --   --   --   --  >90 >90 >90   ISH  --   --  9.3 9.3  --   --  9.1 8.6  --   --    BILITOTAL 0.5  --   --  0.5 0.6  --   --  0.6 0.4 0.5   ALBUMIN 3.8  --   --  3.6 3.9  --   --  3.8 4.0 3.8   PROTTOTAL 7.2  --   --  7.0 7.3  --   --  7.1 7.1 6.9   ALKPHOS 107  --   --  113 104  --   --  103 115 107   AST 30  --   --  26 33  --   --  33 36 23   ALT 46  --   --  43 53*  --   --  54* 58* 42    < > = values in this interval not displayed.     Calcium/VitaminD  Recent Labs   Lab Test 12/20/21  0851 11/30/21  0806 08/03/21  1150 05/10/21  0802 10/12/20  1113 03/31/20  0755 10/28/15  2233 02/20/15  0750   ISH 9.3 9.3 9.1   < >  --   --    < > 9.0   VITDT  --   --   --   --  33 15*  --  39    < > = values in this interval not displayed.     ESR/CRP  Recent Labs   Lab Test 03/11/22  0910 11/30/21  0806 08/23/21  1131   SED 5 6 4   CRP <2.9 <2.9 <2.9     Lipid Panel  Recent Labs   Lab Test 05/10/21  0802 03/31/20  0755 04/27/17  0732 12/27/16  0836 02/20/15  0750 10/20/14  0821   CHOL 224* 224* 204*   < > 157 194   TRIG 243* 171* 148   < > 79 193*   HDL 52 73 82   < > 63 63   * 117* 92   < > 78 92   VLDL  --   --   --   --  16 39*   CHOLHDLRATIO  --   --   --   --  2.5 3.1   NHDL 172* 151* 122   < >  --   --     < > = values in this interval not displayed.     Hepatitis B  Recent Labs   Lab Test 12/08/15  0855 03/06/15  1650   HBCAB Nonreactive  --    HEPBANG Nonreactive Nonreactive     Hepatitis C  Recent Labs   Lab Test 12/08/15  0855 03/06/15  1650   HCVAB Nonreactive   Assay performance characteristics have not been established for newborns,   infants, and children   Nonreactive   Assay performance characteristics have not  been established for newborns,   infants, and children       Tuberculosis Screening  Recent Labs   Lab Test 11/30/21  0806 10/31/17  1400 02/02/17  0822 12/08/15  0855   TBRES Negative  --   --   --    TBRSLT  --  Negative Negative Negative   TBAGN  --  0.05 0.00 0.01     HIV Screening  Recent Labs   Lab Test 07/24/18  0738   HIAGAB Nonreactive       Immunization History     Immunization History   Administered Date(s) Administered     COVID-19,PF,Pfizer (12+ Yrs) 12/21/2020, 01/07/2021, 08/27/2021     Flu, Unspecified 10/12/2020     Influenza Quad, Recombinant, pf(RIV4) (Flublok) 09/15/2021     Influenza Vaccine IM > 6 months Valent IIV4 (Alfuria,Fluzone) 10/15/2013, 09/15/2014, 09/28/2015, 09/28/2016, 10/16/2017, 10/01/2018     Influenza Vaccine Im 4yrs+ 4 Valent CCIIV4 10/15/2019     Pneumo Conj 13-V (2010&after) 04/15/2016     Pneumococcal 23 valent 07/15/2016     TD (ADULT, 7+) 10/30/2020     TDAP Vaccine (Adacel) 02/09/2011     Zoster vaccine recombinant adjuvanted (SHINGRIX) 04/17/2019, 08/28/2019          Chart documentation done in part with Dragon Voice recognition Software. Although reviewed after completion, some word and grammatical error may remain.       Video-Visit Details    Type of service:  Video Visit    Video Start Time: 8:51 AM    Video End Time:9:04 AM    Originating Location (pt. Location): [place], [place]    Distant Location (provider location):  [place]    Platform used for Video Visit: Pao Guerra MD

## 2022-06-02 ENCOUNTER — TRANSFERRED RECORDS (OUTPATIENT)
Dept: HEALTH INFORMATION MANAGEMENT | Facility: CLINIC | Age: 67
End: 2022-06-02
Payer: COMMERCIAL

## 2022-06-07 ASSESSMENT — ENCOUNTER SYMPTOMS
NIGHT SWEATS: 0
SNORES LOUDLY: 1
POLYPHAGIA: 0
BACK PAIN: 1
ARTHRALGIAS: 1
WEIGHT GAIN: 0
JOINT SWELLING: 1
POLYDIPSIA: 0
EYE PAIN: 0
DOUBLE VISION: 0
FEVER: 0
ALTERED TEMPERATURE REGULATION: 0
HALLUCINATIONS: 0
COUGH: 1
COUGH DISTURBING SLEEP: 0
CHILLS: 0
HEMOPTYSIS: 0
SPUTUM PRODUCTION: 0
STIFFNESS: 1
WHEEZING: 0
MUSCLE CRAMPS: 0
MYALGIAS: 1
NECK PAIN: 0
DECREASED APPETITE: 0
DYSPNEA ON EXERTION: 1
EYE REDNESS: 0
WEIGHT LOSS: 0
FATIGUE: 1
EYE WATERING: 1
POSTURAL DYSPNEA: 0
SHORTNESS OF BREATH: 0
INCREASED ENERGY: 0
MUSCLE WEAKNESS: 0
EYE IRRITATION: 1

## 2022-06-09 DIAGNOSIS — M54.50 LUMBAR PAIN: Primary | ICD-10-CM

## 2022-06-09 NOTE — TELEPHONE ENCOUNTER
DIAGNOSIS: chronic L sided low back pain/Dr. Guerra/MRI/P1/ortho con   APPOINTMENT DATE: 06/13/2022   NOTES STATUS DETAILS   OFFICE NOTE from referring provider Spine Referral 5/31/2022, 03/01/2022, 02/03/2021, 10/14/2020, more in EPIC - Freddy Guerra MD [facility] Rheumatology   OFFICE NOTE from other specialist Internal 03/29/2022, 02/24/2022, 02/12/2021, 10/30/2020, more in EPIC - Jennifer Ness CNP [facility] Pain Med    10/14/2016 - Reynaldo Saavedra MD - [facility] FP    05/12/2014 - Stan Amos MD - [facility] Neuro  04/24/2014 - Silvio Valdovinos MD - [facility] Neuro   MEDICATION LIST EPIC    EMG (for Spine) Internal 04/24/2014   LABS     CBC/DIFF Internal Most Recent: 03/11/2022   CULTURES Internal 07/14/2017 - CSF Culture  Specimen #: [id number]     DEXA PACS 12/21/2019 - Hip/Pel/Spine   INJECTIONS DONE IN RADIOLOGY PACS 03/01/2022, 02/16/2021, 10/13/2020 - Pain SI Joint Injection    08/17/2020 - Translaminar Injection    04/11/2010 - FL Spinal Injection   MRI PACS 03/29/2022, 05/07/2019,  05/13/2014 - L Spine   XRAYS (IMAGES & REPORTS) PACS 07/14/2017, 07/13/2017 - Lumbar Spinal Tap

## 2022-06-13 ENCOUNTER — ANCILLARY PROCEDURE (OUTPATIENT)
Dept: GENERAL RADIOLOGY | Facility: CLINIC | Age: 67
End: 2022-06-13
Attending: ORTHOPAEDIC SURGERY
Payer: COMMERCIAL

## 2022-06-13 ENCOUNTER — OFFICE VISIT (OUTPATIENT)
Dept: ORTHOPEDICS | Facility: CLINIC | Age: 67
End: 2022-06-13
Payer: COMMERCIAL

## 2022-06-13 ENCOUNTER — PRE VISIT (OUTPATIENT)
Dept: ORTHOPEDICS | Facility: CLINIC | Age: 67
End: 2022-06-13
Payer: COMMERCIAL

## 2022-06-13 VITALS — HEIGHT: 63 IN | BODY MASS INDEX: 46.07 KG/M2 | WEIGHT: 260 LBS

## 2022-06-13 DIAGNOSIS — M54.50 CHRONIC LEFT-SIDED LOW BACK PAIN WITHOUT SCIATICA: ICD-10-CM

## 2022-06-13 DIAGNOSIS — G89.29 CHRONIC LEFT-SIDED LOW BACK PAIN WITHOUT SCIATICA: ICD-10-CM

## 2022-06-13 PROCEDURE — 72110 X-RAY EXAM L-2 SPINE 4/>VWS: CPT | Mod: GC | Performed by: RADIOLOGY

## 2022-06-13 PROCEDURE — 99214 OFFICE O/P EST MOD 30 MIN: CPT | Mod: GC | Performed by: PHYSICIAN ASSISTANT

## 2022-06-13 NOTE — NURSING NOTE
"Reason For Visit:   Chief Complaint   Patient presents with     Spine - Lumbar/SI     Chronic left sided low back pain, for the last few years it has gotten worse and worse, she points to the lower left side of her back. She stated it feels like her leg is about to give out has tried PT and injection and no previous surgeries.        Primary MD: Reynaldo Saavedra  Ref. MD: Charlie     ?  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  Currently working? Yes.  Work status?  Full time.  Date of injury: for a few years   Type of injury: chronic .  Date of surgery: none   Type of surgery: none .  Smoker: No  Request smoking cessation information: No    Ht 1.6 m (5' 3\")   Wt 117.9 kg (260 lb)   BMI 46.06 kg/m           Oswestry (NOAH) Questionnaire    OSWESTRY DISABILITY INDEX 6/7/2022   Count 9   Sum 18   Oswestry Score (%) 40   Some recent data might be hidden            Neck Disability Index (NDI) Questionnaire    No flowsheet data found.                Promis 10 Assessment    PROMIS 10 6/7/2022   In general, would you say your health is: Fair   In general, would you say your quality of life is: Good   In general, how would you rate your physical health? Fair   In general, how would you rate your mental health, including your mood and your ability to think? Very good   In general, how would you rate your satisfaction with your social activities and relationships? Fair   In general, please rate how well you carry out your usual social activities and roles Very good   To what extent are you able to carry out your everyday physical activities such as walking, climbing stairs, carrying groceries, or moving a chair? A little   How often have you been bothered by emotional problems such as feeling anxious, depressed or irritable? Never   How would you rate your fatigue on average? Moderate   How would you rate your pain on average?   0 = No Pain  to  10 = Worst Imaginable Pain 8   In general, would you say your " health is: 2   In general, would you say your quality of life is: 3   In general, how would you rate your physical health? 2   In general, how would you rate your mental health, including your mood and your ability to think? 4   In general, how would you rate your satisfaction with your social activities and relationships? 2   In general, please rate how well you carry out your usual social activities and roles. (This includes activities at home, at work and in your community, and responsibilities as a parent, child, spouse, employee, friend, etc.) 4   To what extent are you able to carry out your everyday physical activities such as walking, climbing stairs, carrying groceries, or moving a chair? 2   In the past 7 days, how often have you been bothered by emotional problems such as feeling anxious, depressed, or irritable? 1   In the past 7 days, how would you rate your fatigue on average? 3   In the past 7 days, how would you rate your pain on average, where 0 means no pain, and 10 means worst imaginable pain? 8   Global Mental Health Score 14   Global Physical Health Score 9   PROMIS TOTAL - SUBSCORES 23   Some recent data might be hidden                Juan Cook ATC

## 2022-06-13 NOTE — LETTER
6/13/2022       RE: Sharon Fung  [address] [facility] [place] [id number]    Dear Colleague,    Thank you for referring your patient, Sharon Fung, to the [facility] [place]. Please see a copy of my visit note below.    Spine Surgery Consultation    REFERRING PHYSICIAN: Freddy Guerra   PRIMARY CARE PHYSICIAN: Reynaldo Saavedra           Chief Complaint:   Lumbar/SI of the Spine (Chronic left sided low back pain, for the last few years it has gotten worse and worse, she points to the lower left side of her back. She stated it feels like her leg is about to give out has tried PT and injection and no previous surgeries. )      History of Present Illness:     Sharno Fung is a 67 year old female with history of RA, COPD, morbid obesity BMI of 46 , hypertensionwho presents today for evaluation of chronic low back pain with left-sided symptoms    Sharon notes symptoms started about a year and a half ago without any traumatic incident.  She notes that she is experiencing back pain that radiates to the left side.  She denies any radicular symptoms down her leg.  She denies any bowel or bladder changes.  She notes that she is tried physical therapy, specifically pool therapy working with bands.  She notes that this exacerbated her symptoms.  Therapy ended in April of this year.  She notes that she is also tried injections, she had a back injection about 2 years ago that she found relief from.  She also had bilateral SI joint injection without any changes of her symptoms.    She notes that symptoms are debilitating have been progressing over the past year.  She notes that she is limited with her ability to walk.  Walking from the parking ramp, she has to stop about 1-2 times due to worsening back pain and heaviness to her left leg.    Past treatments tried:  - Physical therapy: Has done physical therapy  - Injections: Has done bilateral SI joint injections in March, last  back injection was 2 years ago  - Medications: Multiple over-the-counter anti-inflammatories, Flexeril puts her to sleep, gabapentin has not been helpful.    Oswestry (NOAH) Questionnaire    OSWESTRY DISABILITY INDEX 6/7/2022   Count 9   Sum 18   Oswestry Score (%) 40   Some recent data might be hidden            Past Medical History:     Past Medical History:   Diagnosis Date     AR (allergic rhinitis)  as teen     Arthritis 12/14/2015     Chronic obstructive pulmonary disease, unspecified COPD type (H) 3/27/2018     Depressive disorder 3 years ago     GERD (gastroesophageal reflux disease) 4-07     Headache 7/11/2017     Hypertension goal BP (blood pressure) < 140/90 12/20/2021     Mild major depression (H) 3 years ago     Morbid obesity (H) teens     BRETT (obstructive sleep apnea)     uses CPAP     RA (rheumatoid arthritis) (H) 4 years     Reduced vision 3 years     Rheumatoid arthritis, adult (H)             Past Surgical History:     Past Surgical History:   Procedure Laterality Date     ARTHRODESIS FOOT Right 6/30/2021    Procedure: Right 1st metatarsophalangeal joint fusion;  Surgeon: Jesus Wolf MD;  [facility]     ARTHRODESIS TOE(S) Right 12/27/2021    Procedure: right revision great toe fusion;  Surgeon: Ryan Gee MD;  Location:  OR     BLEPHAROPLASTY BILATERAL Bilateral 8/5/2016    Procedure: BLEPHAROPLASTY BILATERAL;  Surgeon: Cortez Robin MD;  [facility]     BREAST BIOPSY, RT/LT  1-94    Benign     COLONOSCOPY       COLONOSCOPY WITH CO2 INSUFFLATION N/A 3/30/2017    Procedure: COLONOSCOPY WITH CO2 INSUFFLATION;  Surgeon: Issa Weeks MD;  Location:  OR     ESOPHAGOSCOPY, GASTROSCOPY, DUODENOSCOPY (EGD), COMBINED N/A 10/29/2015    Procedure: COMBINED ESOPHAGOSCOPY, GASTROSCOPY, DUODENOSCOPY (EGD);  Surgeon: Shaq Mims MD;  [facility]     LAPAROSCOPIC GASTRIC ADJUSTABLE BANDING  11/09/10    Lap band procedure     LAPAROSCOPIC REMOVAL  GASTRIC ADJUSTABLE BAND N/A 10/31/2015    Procedure: LAPAROSCOPIC REMOVAL GASTRIC ADJUSTABLE BAND;  Surgeon: Shaq Mims MD;  [facility]     NC HAND/FINGER SURGERY UNLISTED  2016     NC STOMACH SURGERY PROCEDURE UNLISTED  11/2015     RELEASE CARPAL TUNNEL Left 4/27/2017    Procedure: RELEASE CARPAL TUNNEL;  Left Open Carpal Tunnel Release;  Surgeon: Ciara Middleton MD;  [facility]     RELEASE CARPAL TUNNEL Right 6/15/2017    Procedure: RELEASE CARPAL TUNNEL;  Right Carpal Tunnel Release Open;  Surgeon: Ciara Middleton MD;  [facility]     REPAIR PTOSIS       TUBAL LIGATION  1988     [facility] APPENDECTOMY  1977            Social History:     Social History     Tobacco Use     Smoking status: Never Smoker     Smokeless tobacco: Never Used   Substance Use Topics     Alcohol use: No     Alcohol/week: 1.0 - 2.0 standard drink            Family History:     Family History   Problem Relation Age of Onset     Heart Disease Father         MI     Neurologic Disorder Father 79        Parkinson's     Diabetes Father      Hypertension Father      Coronary Artery Disease Father      Cancer Maternal Grandmother         uterine     Neurologic Disorder Sister 16        migraine     Depression Sister      Neurologic Disorder Mother 20        migraine     Depression Mother      Neurologic Disorder Son 7        migraine     Substance Abuse Son      Migraines Son         none     Depression Sister      Substance Abuse Sister      Migraines Sister             Allergies:   No Known Allergies         Medications:     Current Outpatient Medications   Medication     acetaminophen (TYLENOL) 500 MG tablet     albuterol (PROAIR HFA/PROVENTIL HFA/VENTOLIN HFA) 108 (90 Base) MCG/ACT inhaler     azelastine (OPTIVAR) 0.05 % ophthalmic solution     citalopram (CELEXA) 20 MG tablet     cycloSPORINE (RESTASIS) 0.05 % ophthalmic emulsion     fexofenadine (ALLEGRA) 180 MG tablet     fluticasone (FLONASE) 50 MCG/ACT  nasal spray     gabapentin (NEURONTIN) 100 MG capsule     ibuprofen 200 MG capsule     leflunomide (ARAVA) 20 MG tablet     losartan-hydrochlorothiazide (HYZAAR) 100-25 MG tablet     neomycin-polymyxin-dexamethasone (MAXITROL) 3.5-30355-5.1 ophthalmic ointment     olopatadine (PATADAY) 0.2 % ophthalmic solution     ondansetron (ZOFRAN-ODT) 8 MG ODT tab     ORDER FOR DME     Sarilumab 200 MG/1.14ML SOAJ     sodium chloride (DIMAS 128) 5 % ophthalmic ointment     sodium chloride (DIMAS 128) 5 % ophthalmic solution     sulfaSALAzine (AZULFIDINE) 500 MG tablet     vitamin D3 (CHOLECALCIFEROL) 50 mcg (2000 units) tablet     No current facility-administered medications for this visit.     Facility-Administered Medications Ordered in Other Visits   Medication     sodium chloride (PF) 0.9% PF flush 10 mL     sodium chloride bacteriostatic 0.9 % flush 12 mL             Review of Systems:     A 10 point ROS was performed and reviewed.  See HPI and Epic intake form for pertinent positive.          Physical Exam:     Vitals: There were no vitals taken for this visit.    Constitutional: Patient is healthy, well-nourished and appears stated age.    Respiratory: Patient is breathing normally and in no respiratory distress.    Skin: No suspicious rashes or lesions    Gait: Non-antalgic gait without use of assistive devices. Able to tandem gait without difficulty.     Neurologic - Sensation intact to light touch bilaterally. Deep tendon reflexes 1+ bilateral patellar and Achilles.     Musculoskeletal: Strength: 5 out of 5 bilateral hip flexion knee extension, dorsiflexion, plantarflexion  o Spine: overall good sagittal balance  - Lumbosacral Spine:    Appearance - Normal lordosis    Palpation - tender to palpation, facets non-tender. SI joints non-tender.     ROM - Full, no pain    o Hip: No pain with hip log roll and no tenderness over the greater trochanters. Bill negative.          Imaging:   We independently reviewed and  interpreted the following imaging at this clinic visit which were also reviewed with patient    XR lumbar 6/13/2022    L5-S1 degenerative disc disease, mild L5-S1 retrolisthesis.  Mild lumbar spondylosis.  Stable spinal alignment on coronal and sagittal views overall.  Mild flatback syndrome with loss of lumbar lordosis    MR lumbar 3/29/2022.      Overall stable spinal alignment.  L5-S1 degenerative disc disease.  Disc bulge at L5-S1 causing moderate central canal stenosis, and left neuroforaminal and lateral recess stenosis.  Patent thecal sac and neuroforamen on levels above L5-S1.         Assessment:      67 year old female with chronic low back pain, lumbar stenosis with neurogenic claudication, sacroiliitis, morbid obesity BMI of 46        Plan:     Sharon is experiencing chronic low back pain due to lumbar stenosis at the level of L5-S1.  She was educated that she has a moderate disc bulge causing mild left-sided neuroforaminal stenosis and mild central canal stenosis at L5-S1.  Is educated that surgery is not recommended at this time and the risks outweigh the benefits given her history of morbid obesity BMI and mild lumbar arthritis.  She was educated that we will plan on maximizing her conservative therapies in hopes for pain control.  She was educated to work on weight loss.  She will be following up with the weight management clinic in regards to working on weight loss.  We will plan on doing L5-S1 interlaminar DAGO.  She was educated that if the injections are beneficial she will follow-up with Dr. Velasquez.  He was educated to reach out if symptoms worsen or change.    She does have signs of sacroiliitis, but it does not seem to be her pain generator at this time.  She is denied any improvement with her SI joint injections in the past.  She denies pain with sitting on a chair for long periods of time.  Believes that her symptoms are related to her lumbar arthritis, surgical intervention would require a  fusion and at this time her current presentation of mild lumbar arthritis does not require surgical intervention.     -L5-S1 interlaminar DAGO  -Continue conservative therapies  -Follow-up as needed    Plan formulated with Dr. Segura who saw patient and examined the patient and reviewed imaging. We used the patient's imaging and models to explain their pathophysiology and treatment options. All the patient's questions were answered to their full satisfaction.     Thank you for allowing me to be a part of this patient's care.    Bishop BRIANA Russo PA-C   Orthopedic Spine Surgery    Attending MD (Dr. Avery Segura) :  I reviewed and verified the history and physical exam of the patient and discussed the patient's management with the other clinical providers involved in this patient's care including any involved residents or physicians assistants. I reviewed the above note and agree with the documented findings and plan of care, which were communicated to the patient.      Avery Segura MD

## 2022-06-13 NOTE — PROGRESS NOTES
Spine Surgery Consultation    REFERRING PHYSICIAN: Freddy Guerra   PRIMARY CARE PHYSICIAN: Reynaldo Saavedra           Chief Complaint:   Lumbar/SI of the Spine (Chronic left sided low back pain, for the last few years it has gotten worse and worse, she points to the lower left side of her back. She stated it feels like her leg is about to give out has tried PT and injection and no previous surgeries. )      History of Present Illness:     Sharon Fung is a 67 year old female with history of RA, COPD, morbid obesity BMI of 46 , hypertensionwho presents today for evaluation of chronic low back pain with left-sided symptoms    Sharon notes symptoms started about a year and a half ago without any traumatic incident.  She notes that she is experiencing back pain that radiates to the left side.  She denies any radicular symptoms down her leg.  She denies any bowel or bladder changes.  She notes that she is tried physical therapy, specifically pool therapy working with bands.  She notes that this exacerbated her symptoms.  Therapy ended in April of this year.  She notes that she is also tried injections, she had a back injection about 2 years ago that she found relief from.  She also had bilateral SI joint injection without any changes of her symptoms.    She notes that symptoms are debilitating have been progressing over the past year.  She notes that she is limited with her ability to walk.  Walking from the parking ramp, she has to stop about 1-2 times due to worsening back pain and heaviness to her left leg.    Past treatments tried:  - Physical therapy: Has done physical therapy  - Injections: Has done bilateral SI joint injections in March, last back injection was 2 years ago  - Medications: Multiple over-the-counter anti-inflammatories, Flexeril puts her to sleep, gabapentin has not been helpful.    Oswestry (NOAH) Questionnaire    OSWESTRY DISABILITY INDEX 6/7/2022   Count 9   Sum 18   Oswestry Score (%) 40   Some  recent data might be hidden            Past Medical History:     Past Medical History:   Diagnosis Date     AR (allergic rhinitis)  as teen     Arthritis 12/14/2015     Chronic obstructive pulmonary disease, unspecified COPD type (H) 3/27/2018     Depressive disorder 3 years ago     GERD (gastroesophageal reflux disease) 4-07     Headache 7/11/2017     Hypertension goal BP (blood pressure) < 140/90 12/20/2021     Mild major depression (H) 3 years ago     Morbid obesity (H) teens     BRETT (obstructive sleep apnea)     uses CPAP     RA (rheumatoid arthritis) (H) 4 years     Reduced vision 3 years     Rheumatoid arthritis, adult (H)             Past Surgical History:     Past Surgical History:   Procedure Laterality Date     ARTHRODESIS FOOT Right 6/30/2021    Procedure: Right 1st metatarsophalangeal joint fusion;  Surgeon: Jesus Wolf MD;  [facility]     ARTHRODESIS TOE(S) Right 12/27/2021    Procedure: right revision great toe fusion;  Surgeon: Ryan Gee MD;  Location: [facility]     BLEPHAROPLASTY BILATERAL Bilateral 8/5/2016    Procedure: BLEPHAROPLASTY BILATERAL;  Surgeon: Cortez Robin MD;  [facility]     BREAST BIOPSY, RT/LT  1-94    Benign     COLONOSCOPY       COLONOSCOPY WITH CO2 INSUFFLATION N/A 3/30/2017    Procedure: COLONOSCOPY WITH CO2 INSUFFLATION;  Surgeon: Issa eWeks MD;  Location: [facility]     ESOPHAGOSCOPY, GASTROSCOPY, DUODENOSCOPY (EGD), COMBINED N/A 10/29/2015    Procedure: COMBINED ESOPHAGOSCOPY, GASTROSCOPY, DUODENOSCOPY (EGD);  Surgeon: Shaq Mims MD;  [facility]     LAPAROSCOPIC GASTRIC ADJUSTABLE BANDING  11/09/10    Lap band procedure     LAPAROSCOPIC REMOVAL GASTRIC ADJUSTABLE BAND N/A 10/31/2015    Procedure: LAPAROSCOPIC REMOVAL GASTRIC ADJUSTABLE BAND;  Surgeon: Shaq Mims MD;  [facility]: [facility] OR     KS HAND/FINGER SURGERY UNLISTED  2016     KS STOMACH SURGERY PROCEDURE UNLISTED  11/2015     RELEASE CARPAL TUNNEL Left  4/27/2017    Procedure: RELEASE CARPAL TUNNEL;  Left Open Carpal Tunnel Release;  Surgeon: Ciara Middleton MD;  [facility]     RELEASE CARPAL TUNNEL Right 6/15/2017    Procedure: RELEASE CARPAL TUNNEL;  Right Carpal Tunnel Release Open;  Surgeon: Ciara Middleton MD;  [facility]     REPAIR PTOSIS       TUBAL LIGATION  1988     [facility] APPENDECTOMY  1977            Social History:     Social History     Tobacco Use     Smoking status: Never Smoker     Smokeless tobacco: Never Used   Substance Use Topics     Alcohol use: No     Alcohol/week: 1.0 - 2.0 standard drink            Family History:     Family History   Problem Relation Age of Onset     Heart Disease Father         MI     Neurologic Disorder Father 79        Parkinson's     Diabetes Father      Hypertension Father      Coronary Artery Disease Father      Cancer Maternal Grandmother         uterine     Neurologic Disorder Sister 16        migraine     Depression Sister      Neurologic Disorder Mother 20        migraine     Depression Mother      Neurologic Disorder Son 7        migraine     Substance Abuse Son      Migraines Son         none     Depression Sister      Substance Abuse Sister      Migraines Sister             Allergies:   No Known Allergies         Medications:     Current Outpatient Medications   Medication     acetaminophen (TYLENOL) 500 MG tablet     albuterol (PROAIR HFA/PROVENTIL HFA/VENTOLIN HFA) 108 (90 Base) MCG/ACT inhaler     azelastine (OPTIVAR) 0.05 % ophthalmic solution     citalopram (CELEXA) 20 MG tablet     cycloSPORINE (RESTASIS) 0.05 % ophthalmic emulsion     fexofenadine (ALLEGRA) 180 MG tablet     fluticasone (FLONASE) 50 MCG/ACT nasal spray     gabapentin (NEURONTIN) 100 MG capsule     ibuprofen 200 MG capsule     leflunomide (ARAVA) 20 MG tablet     losartan-hydrochlorothiazide (HYZAAR) 100-25 MG tablet     neomycin-polymyxin-dexamethasone (MAXITROL) 3.5-47396-8.1 ophthalmic ointment      olopatadine (PATADAY) 0.2 % ophthalmic solution     ondansetron (ZOFRAN-ODT) 8 MG ODT tab     ORDER FOR DME     Sarilumab 200 MG/1.14ML SOAJ     sodium chloride (DIMAS 128) 5 % ophthalmic ointment     sodium chloride (DIMAS 128) 5 % ophthalmic solution     sulfaSALAzine (AZULFIDINE) 500 MG tablet     vitamin D3 (CHOLECALCIFEROL) 50 mcg (2000 units) tablet     No current facility-administered medications for this visit.     Facility-Administered Medications Ordered in Other Visits   Medication     sodium chloride (PF) 0.9% PF flush 10 mL     sodium chloride bacteriostatic 0.9 % flush 12 mL             Review of Systems:     A 10 point ROS was performed and reviewed.  See HPI and Epic intake form for pertinent positive.          Physical Exam:     Vitals: There were no vitals taken for this visit.    Constitutional: Patient is healthy, well-nourished and appears stated age.    Respiratory: Patient is breathing normally and in no respiratory distress.    Skin: No suspicious rashes or lesions    Gait: Non-antalgic gait without use of assistive devices. Able to tandem gait without difficulty.     Neurologic - Sensation intact to light touch bilaterally. Deep tendon reflexes 1+ bilateral patellar and Achilles.     Musculoskeletal: Strength: 5 out of 5 bilateral hip flexion knee extension, dorsiflexion, plantarflexion  o Spine: overall good sagittal balance  - Lumbosacral Spine:    Appearance - Normal lordosis    Palpation - tender to palpation, facets non-tender. SI joints non-tender.     ROM - Full, no pain    o Hip: No pain with hip log roll and no tenderness over the greater trochanters. Bill negative.          Imaging:   We independently reviewed and interpreted the following imaging at this clinic visit which were also reviewed with patient    XR lumbar 6/13/2022    L5-S1 degenerative disc disease, mild L5-S1 retrolisthesis.  Mild lumbar spondylosis.  Stable spinal alignment on coronal and sagittal views overall.   Mild flatback syndrome with loss of lumbar lordosis    MR lumbar 3/29/2022.      Overall stable spinal alignment.  L5-S1 degenerative disc disease.  Disc bulge at L5-S1 causing moderate central canal stenosis, and left neuroforaminal and lateral recess stenosis.  Patent thecal sac and neuroforamen on levels above L5-S1.         Assessment:      67 year old female with chronic low back pain, lumbar stenosis with neurogenic claudication, sacroiliitis, morbid obesity BMI of 46        Plan:     Sharon is experiencing chronic low back pain due to lumbar stenosis at the level of L5-S1.  She was educated that she has a moderate disc bulge causing mild left-sided neuroforaminal stenosis and mild central canal stenosis at L5-S1.  Is educated that surgery is not recommended at this time and the risks outweigh the benefits given her history of morbid obesity BMI and mild lumbar arthritis.  She was educated that we will plan on maximizing her conservative therapies in hopes for pain control.  She was educated to work on weight loss.  She will be following up with the weight management clinic in regards to working on weight loss.  We will plan on doing L5-S1 interlaminar DAGO.  She was educated that if the injections are beneficial she will follow-up with Dr. Velasquez.  He was educated to reach out if symptoms worsen or change.    She does have signs of sacroiliitis, but it does not seem to be her pain generator at this time.  She is denied any improvement with her SI joint injections in the past.  She denies pain with sitting on a chair for long periods of time.  Believes that her symptoms are related to her lumbar arthritis, surgical intervention would require a fusion and at this time her current presentation of mild lumbar arthritis does not require surgical intervention.     -L5-S1 interlaminar DAGO  -Continue conservative therapies  -Follow-up as needed    Plan formulated with Dr. Segura who saw patient and examined the  patient and reviewed imaging. We used the patient's imaging and models to explain their pathophysiology and treatment options. All the patient's questions were answered to their full satisfaction.     Thank you for allowing me to be a part of this patient's care.    Bishop BRIANA Russo PA-C   Orthopedic Spine Surgery    Attending MD (Dr. Avery Segura) :  I reviewed and verified the history and physical exam of the patient and discussed the patient's management with the other clinical providers involved in this patient's care including any involved residents or physicians assistants. I reviewed the above note and agree with the documented findings and plan of care, which were communicated to the patient.      Avery Segura MD

## 2022-06-21 NOTE — TELEPHONE ENCOUNTER
Writer reviewed KELY. Pt has gotten 4 COVID vaccines. Most recent being 3/2022.    Amber VOGEL RN Specialty Triage 6/21/2022 9:38 AM

## 2022-06-24 ENCOUNTER — LAB (OUTPATIENT)
Dept: LAB | Facility: CLINIC | Age: 67
End: 2022-06-24
Payer: COMMERCIAL

## 2022-06-24 DIAGNOSIS — Z79.899 HIGH RISK MEDICATION USE: ICD-10-CM

## 2022-06-24 DIAGNOSIS — M05.9 SEROPOSITIVE RHEUMATOID ARTHRITIS (H): ICD-10-CM

## 2022-06-24 LAB
ALBUMIN SERPL-MCNC: 3.8 G/DL (ref 3.4–5)
ALP SERPL-CCNC: 88 U/L (ref 40–150)
ALT SERPL W P-5'-P-CCNC: 48 U/L (ref 0–50)
AST SERPL W P-5'-P-CCNC: 31 U/L (ref 0–45)
BASOPHILS # BLD AUTO: 0.1 10E3/UL (ref 0–0.2)
BASOPHILS NFR BLD AUTO: 1 %
BILIRUB DIRECT SERPL-MCNC: 0.1 MG/DL (ref 0–0.2)
BILIRUB SERPL-MCNC: 0.6 MG/DL (ref 0.2–1.3)
CHOLEST SERPL-MCNC: 213 MG/DL
CREAT SERPL-MCNC: 0.65 MG/DL (ref 0.52–1.04)
CRP SERPL-MCNC: <2.9 MG/L (ref 0–8)
EOSINOPHIL # BLD AUTO: 0.9 10E3/UL (ref 0–0.7)
EOSINOPHIL NFR BLD AUTO: 14 %
ERYTHROCYTE [DISTWIDTH] IN BLOOD BY AUTOMATED COUNT: 12.5 % (ref 10–15)
ERYTHROCYTE [SEDIMENTATION RATE] IN BLOOD BY WESTERGREN METHOD: 5 MM/HR (ref 0–30)
FASTING STATUS PATIENT QL REPORTED: YES
GFR SERPL CREATININE-BSD FRML MDRD: >90 ML/MIN/1.73M2
HCT VFR BLD AUTO: 40.5 % (ref 35–47)
HDLC SERPL-MCNC: 48 MG/DL
HGB BLD-MCNC: 13.4 G/DL (ref 11.7–15.7)
IMM GRANULOCYTES # BLD: 0 10E3/UL
IMM GRANULOCYTES NFR BLD: 0 %
LDLC SERPL CALC-MCNC: 110 MG/DL
LYMPHOCYTES # BLD AUTO: 1.6 10E3/UL (ref 0.8–5.3)
LYMPHOCYTES NFR BLD AUTO: 26 %
MCH RBC QN AUTO: 30.7 PG (ref 26.5–33)
MCHC RBC AUTO-ENTMCNC: 33.1 G/DL (ref 31.5–36.5)
MCV RBC AUTO: 93 FL (ref 78–100)
MONOCYTES # BLD AUTO: 0.6 10E3/UL (ref 0–1.3)
MONOCYTES NFR BLD AUTO: 9 %
NEUTROPHILS # BLD AUTO: 3.1 10E3/UL (ref 1.6–8.3)
NEUTROPHILS NFR BLD AUTO: 50 %
NONHDLC SERPL-MCNC: 165 MG/DL
NRBC # BLD AUTO: 0 10E3/UL
NRBC BLD AUTO-RTO: 0 /100
PLATELET # BLD AUTO: 204 10E3/UL (ref 150–450)
PROT SERPL-MCNC: 7 G/DL (ref 6.8–8.8)
RBC # BLD AUTO: 4.36 10E6/UL (ref 3.8–5.2)
TRIGL SERPL-MCNC: 274 MG/DL
WBC # BLD AUTO: 6.1 10E3/UL (ref 4–11)

## 2022-06-24 PROCEDURE — 85652 RBC SED RATE AUTOMATED: CPT

## 2022-06-24 PROCEDURE — 36415 COLL VENOUS BLD VENIPUNCTURE: CPT

## 2022-06-24 PROCEDURE — 80061 LIPID PANEL: CPT

## 2022-06-24 PROCEDURE — 82040 ASSAY OF SERUM ALBUMIN: CPT

## 2022-06-24 PROCEDURE — 86140 C-REACTIVE PROTEIN: CPT

## 2022-06-24 PROCEDURE — 82565 ASSAY OF CREATININE: CPT

## 2022-06-24 PROCEDURE — 85025 COMPLETE CBC W/AUTO DIFF WBC: CPT

## 2022-07-01 ENCOUNTER — OFFICE VISIT (OUTPATIENT)
Dept: FAMILY MEDICINE | Facility: CLINIC | Age: 67
End: 2022-07-01
Payer: COMMERCIAL

## 2022-07-01 VITALS
TEMPERATURE: 98.1 F | WEIGHT: 272.4 LBS | RESPIRATION RATE: 22 BRPM | BODY MASS INDEX: 48.27 KG/M2 | DIASTOLIC BLOOD PRESSURE: 60 MMHG | SYSTOLIC BLOOD PRESSURE: 110 MMHG | HEIGHT: 63 IN | OXYGEN SATURATION: 95 % | HEART RATE: 96 BPM

## 2022-07-01 DIAGNOSIS — I10 HYPERTENSION GOAL BP (BLOOD PRESSURE) < 140/90: ICD-10-CM

## 2022-07-01 DIAGNOSIS — Z23 NEED FOR PNEUMOCOCCAL VACCINATION: ICD-10-CM

## 2022-07-01 DIAGNOSIS — D84.9 IMMUNOSUPPRESSION (H): ICD-10-CM

## 2022-07-01 DIAGNOSIS — M05.9 SEROPOSITIVE RHEUMATOID ARTHRITIS (H): ICD-10-CM

## 2022-07-01 DIAGNOSIS — D23.9 INTRADERMAL NEVUS: ICD-10-CM

## 2022-07-01 DIAGNOSIS — F32.0 MILD MAJOR DEPRESSION (H): ICD-10-CM

## 2022-07-01 DIAGNOSIS — M54.9 CHRONIC BACK PAIN, UNSPECIFIED BACK LOCATION, UNSPECIFIED BACK PAIN LATERALITY: ICD-10-CM

## 2022-07-01 DIAGNOSIS — G89.29 CHRONIC BACK PAIN, UNSPECIFIED BACK LOCATION, UNSPECIFIED BACK PAIN LATERALITY: ICD-10-CM

## 2022-07-01 DIAGNOSIS — G47.33 OBSTRUCTIVE SLEEP APNEA: ICD-10-CM

## 2022-07-01 DIAGNOSIS — Z00.00 ROUTINE GENERAL MEDICAL EXAMINATION AT A HEALTH CARE FACILITY: Primary | ICD-10-CM

## 2022-07-01 DIAGNOSIS — E66.01 OBESITY, CLASS III, BMI 40-49.9 (MORBID OBESITY) (H): ICD-10-CM

## 2022-07-01 DIAGNOSIS — E78.5 HYPERLIPIDEMIA LDL GOAL <100: ICD-10-CM

## 2022-07-01 LAB
ANION GAP SERPL CALCULATED.3IONS-SCNC: 6 MMOL/L (ref 3–14)
BUN SERPL-MCNC: 18 MG/DL (ref 7–30)
CALCIUM SERPL-MCNC: 9.3 MG/DL (ref 8.5–10.1)
CHLORIDE BLD-SCNC: 107 MMOL/L (ref 94–109)
CO2 SERPL-SCNC: 27 MMOL/L (ref 20–32)
CREAT SERPL-MCNC: 0.74 MG/DL (ref 0.52–1.04)
GFR SERPL CREATININE-BSD FRML MDRD: 89 ML/MIN/1.73M2
GLUCOSE BLD-MCNC: 96 MG/DL (ref 70–99)
HBA1C MFR BLD: 4.8 % (ref 0–5.6)
POTASSIUM BLD-SCNC: 3.9 MMOL/L (ref 3.4–5.3)
SODIUM SERPL-SCNC: 140 MMOL/L (ref 133–144)

## 2022-07-01 PROCEDURE — 99397 PER PM REEVAL EST PAT 65+ YR: CPT | Mod: 25 | Performed by: FAMILY MEDICINE

## 2022-07-01 PROCEDURE — 99214 OFFICE O/P EST MOD 30 MIN: CPT | Mod: 25 | Performed by: FAMILY MEDICINE

## 2022-07-01 PROCEDURE — 36415 COLL VENOUS BLD VENIPUNCTURE: CPT | Performed by: FAMILY MEDICINE

## 2022-07-01 PROCEDURE — 80048 BASIC METABOLIC PNL TOTAL CA: CPT | Performed by: FAMILY MEDICINE

## 2022-07-01 PROCEDURE — 90471 IMMUNIZATION ADMIN: CPT | Performed by: FAMILY MEDICINE

## 2022-07-01 PROCEDURE — 90677 PCV20 VACCINE IM: CPT | Performed by: FAMILY MEDICINE

## 2022-07-01 PROCEDURE — 83036 HEMOGLOBIN GLYCOSYLATED A1C: CPT | Performed by: FAMILY MEDICINE

## 2022-07-01 RX ORDER — CITALOPRAM HYDROBROMIDE 20 MG/1
20 TABLET ORAL DAILY
Qty: 90 TABLET | Refills: 3 | Status: SHIPPED | OUTPATIENT
Start: 2022-07-01 | End: 2023-07-24

## 2022-07-01 RX ORDER — LOSARTAN POTASSIUM AND HYDROCHLOROTHIAZIDE 25; 100 MG/1; MG/1
1 TABLET ORAL DAILY
Qty: 90 TABLET | Refills: 3 | Status: SHIPPED | OUTPATIENT
Start: 2022-07-01 | End: 2023-09-21

## 2022-07-01 ASSESSMENT — ENCOUNTER SYMPTOMS
ABDOMINAL PAIN: 0
DIARRHEA: 0
HEADACHES: 0
HEMATOCHEZIA: 0
COUGH: 0
JOINT SWELLING: 1
NERVOUS/ANXIOUS: 0
EYE PAIN: 0
WEAKNESS: 0
PARESTHESIAS: 0
CHILLS: 0
HEMATURIA: 0
FREQUENCY: 0
BREAST MASS: 0
HEARTBURN: 0
ARTHRALGIAS: 1
PALPITATIONS: 0
NAUSEA: 0
FEVER: 0
DYSURIA: 0
CONSTIPATION: 0
MYALGIAS: 1
DIZZINESS: 0
SORE THROAT: 0
SHORTNESS OF BREATH: 1

## 2022-07-01 ASSESSMENT — ACTIVITIES OF DAILY LIVING (ADL): CURRENT_FUNCTION: NO ASSISTANCE NEEDED

## 2022-07-01 ASSESSMENT — PATIENT HEALTH QUESTIONNAIRE - PHQ9
SUM OF ALL RESPONSES TO PHQ QUESTIONS 1-9: 2
SUM OF ALL RESPONSES TO PHQ QUESTIONS 1-9: 2
10. IF YOU CHECKED OFF ANY PROBLEMS, HOW DIFFICULT HAVE THESE PROBLEMS MADE IT FOR YOU TO DO YOUR WORK, TAKE CARE OF THINGS AT HOME, OR GET ALONG WITH OTHER PEOPLE: NOT DIFFICULT AT ALL

## 2022-07-01 NOTE — PROGRESS NOTES
"SUBJECTIVE:   Sharon Fung is a 67 year old female who presents for a Preventive Visit and follow-up on some baseline health conditions.    Patient has been advised of split billing requirements and indicates understanding: Yes  Are you in the first 12 months of your Medicare coverage?  No    Healthy Habits:     In general, how would you rate your overall health?  Fair    Frequency of exercise:  1 day/week    Duration of exercise:  Less than 15 minutes    Do you usually eat at least 4 servings of fruit and vegetables a day, include whole grains    & fiber and avoid regularly eating high fat or \"junk\" foods?  No    Taking medications regularly:  Yes    Medication side effects:  None    Ability to successfully perform activities of daily living:  No assistance needed    Home Safety:  No safety concerns identified    Hearing Impairment:  No hearing concerns    In the past 6 months, have you been bothered by leaking of urine?  No    In general, how would you rate your overall mental or emotional health?  Good      PHQ-2 Total Score: 0    Additional concerns today:  No    Answers for HPI/ROS submitted by the patient on 7/1/2022  If you checked off any problems, how difficult have these problems made it for you to do your work, take care of things at home, or get along with other people?: Not difficult at all  PHQ9 TOTAL SCORE: 2      Do you feel safe in your environment? Yes    Have you ever done Advance Care Planning? (For example, a Health Directive, POLST, or a discussion with a medical provider or your loved ones about your wishes): Yes, patient states has an Advance Care Planning document and will bring a copy to the clinic.      Fall risk  Fallen 2 or more times in the past year?: No  Any fall with injury in the past year?: No    Cognitive Screening   1) Repeat 3 items (Leader, Season, Table)    2) Clock draw: NORMAL  3) 3 item recall: Recalls 2 objects   Results: NORMAL clock, 1-2 items recalled: COGNITIVE " IMPAIRMENT LESS LIKELY    Mini-CogTM Copyright CHANDRIKA Lopez. Licensed by the author for use in [facility]; reprinted with permission (derrick@.[facility]). All rights reserved.      Do you have sleep apnea, excessive snoring or daytime drowsiness?: yes    Reviewed and updated as needed this visit by clinical staff   Tobacco  Allergies  Meds   Med Hx  Surg Hx  Fam Hx  Soc Hx          Reviewed and updated as needed this visit by Provider                   Social History     Tobacco Use     Smoking status: Never Smoker     Smokeless tobacco: Never Used   Substance Use Topics     Alcohol use: No     Alcohol/week: 1.0 - 2.0 standard drink     If you drink alcohol do you typically have >3 drinks per day or >7 drinks per week? No    Alcohol Use 7/1/2022   Prescreen: >3 drinks/day or >7 drinks/week? Not Applicable   Prescreen: >3 drinks/day or >7 drinks/week? -   No flowsheet data found.    She sees Dr. Guerra on a regular basis for her rheumatoid arthritis.  She has been having ongoing back pain and he referred her to a back specialist.  She was found to have various degenerative changes.  She has been advised to get a recumbent bicycle and she has one  She does request ordered and plans to use that for exercise.  A handicap parking permit.  She cannot walk 200 feet before having to stop and rest because of her back pain and also breathing difficulty.  She was found to have some obstructive airway disease by spirometry in 2018.  She does not get wheezing.  She is not a smoker.  She remains on citalopram for depression and feels like that medicine is working fine.  She is on losartan HCTZ for hypertension.  She was placed on gabapentin for her back pain and she has not sure if that is helping.  She gets regular labs checked from Dr. Guerra, but did come in fasting today in case we wanted to do labs.  She has not had a fasting glucose done in quite some time.  She did just have lipids done and does have  hyperlipidemia.    She also has obstructive sleep apnea and thinks she may be due for new CPAP machine soon.  Her current one is at least 5 years old.  I reviewed her chart and saw that in 2016 she had settings of 9 - 15 cm H20 for her CPAP.    She has a growth on her left upper posterior thigh she went me to look at.  She cannot see it very well.  It is raised and slightly bothersome because it sometimes catches on clothing.    Current providers sharing in care for this patient include:   Patient Care Team:  Reynaldo Saavedra MD as PCP - General  Shaq Mims MD as MD (Surgery)  Rachelle Sena PA-C as Physician Assistant (Physician Assistant)  Ethel Panchal MD as MD (INTERNAL MEDICINE - ENDOCRINOLOGY, DIABETES & METABOLISM)  Jo Downs MD as Resident (Student in organized health care education/training program)  Robles Valderrama OD as Assigned Surgical Provider  Litzy Vallejo MD as Assigned Endocrinology Provider  Yarely Montoya RD as Diabetes Educator (Dietitian, Registered)  Jesus Wolf MD as Assigned Musculoskeletal Provider  Reynaldo Saavedra MD as Assigned PCP  Freddy Guerra MD as Assigned Rheumatology Provider    The following health maintenance items are reviewed in Epic and correct as of today:  Health Maintenance Due   Topic Date Due     ANNUAL REVIEW OF HM ORDERS  Never done     MEDICARE ANNUAL WELLNESS VISIT  05/28/2022     Pneumococcal Vaccine: 65+ Years (2 - PPSV23 or PCV20) 05/28/2022     COVID-19 Vaccine (5 - Booster for Pfizer series) 07/08/2022     Patient Active Problem List   Diagnosis     AR (allergic rhinitis)     GERD (gastroesophageal reflux disease)     Status post bariatric surgery     Mild major depression (H)     Advanced directives, counseling/discussion     High risk medication use     Obstructive sleep apnea     Obesity, Class III, BMI 40-49.9 (morbid obesity) (H)     Anterior basement membrane dystrophy     Seropositive  rheumatoid arthritis (H)     LORNA positive     Carpal tunnel syndrome of right wrist     Headache     Immunosuppression (H)     Fibromyalgia     Pain in joint, ankle and foot, right     Hypertension goal BP (blood pressure) < 140/90     Chronic back pain, unspecified back location, unspecified back pain laterality     Hyperlipidemia LDL goal <100     Past Surgical History:   Procedure Laterality Date     ARTHRODESIS FOOT Right 6/30/2021    Procedure: Right 1st metatarsophalangeal joint fusion;  Surgeon: Jesus Wolf MD;  Location: [facility]     ARTHRODESIS TOE(S) Right 12/27/2021    Procedure: right revision great toe fusion;  Surgeon: Ryan Gee MD;  Location: [facility] OR     BLEPHAROPLASTY BILATERAL Bilateral 8/5/2016    Procedure: BLEPHAROPLASTY BILATERAL;  Surgeon: Cortez Robin MD;  Location:  [facility]     BREAST BIOPSY, RT/LT  1-94    Benign     COLONOSCOPY       COLONOSCOPY WITH CO2 INSUFFLATION N/A 3/30/2017    Procedure: COLONOSCOPY WITH CO2 INSUFFLATION;  Surgeon: Issa Weeks MD;  Location: [facility] OR     ESOPHAGOSCOPY, GASTROSCOPY, DUODENOSCOPY (EGD), COMBINED N/A 10/29/2015    Procedure: COMBINED ESOPHAGOSCOPY, GASTROSCOPY, DUODENOSCOPY (EGD);  Surgeon: Shaq Mims MD;  Location: [facility]     LAPAROSCOPIC GASTRIC ADJUSTABLE BANDING  11/09/10    Lap band procedure     LAPAROSCOPIC REMOVAL GASTRIC ADJUSTABLE BAND N/A 10/31/2015    Procedure: LAPAROSCOPIC REMOVAL GASTRIC ADJUSTABLE BAND;  Surgeon: Shaq Mims MD;  Location: [facility] OR     IL HAND/FINGER SURGERY UNLISTED  2016     IL STOMACH SURGERY PROCEDURE UNLISTED  11/2015     RELEASE CARPAL TUNNEL Left 4/27/2017    Procedure: RELEASE CARPAL TUNNEL;  Left Open Carpal Tunnel Release;  Surgeon: Ciara Middleton MD;  Location: [facility]     RELEASE CARPAL TUNNEL Right 6/15/2017    Procedure: RELEASE CARPAL TUNNEL;  Right Carpal Tunnel Release Open;  Surgeon: Ciara Middleton MD;  Location: [facility] OR     REPAIR PTOSIS        TUBAL LIGATION  1988     [facility] APPENDECTOMY  1977       Social History     Tobacco Use     Smoking status: Never Smoker     Smokeless tobacco: Never Used   Substance Use Topics     Alcohol use: No     Alcohol/week: 1.0 - 2.0 standard drink     Family History   Problem Relation Age of Onset     Heart Disease Father         MI     Neurologic Disorder Father 79        Parkinson's     Diabetes Father      Hypertension Father      Coronary Artery Disease Father      Cancer Maternal Grandmother         uterine     Neurologic Disorder Sister 16        migraine     Depression Sister      Neurologic Disorder Mother 20        migraine     Depression Mother      Neurologic Disorder Son 7        migraine     Substance Abuse Son      Migraines Son         none     Depression Sister      Substance Abuse Sister      Migraines Sister          Current Outpatient Medications   Medication Sig Dispense Refill     acetaminophen (TYLENOL) 500 MG tablet Take 500-1,000 mg by mouth every 6 hours as needed for mild pain       albuterol (PROAIR HFA/PROVENTIL HFA/VENTOLIN HFA) 108 (90 Base) MCG/ACT inhaler Inhale 2 puffs into the lungs every 6 hours as needed for shortness of breath / dyspnea or wheezing 1 Inhaler 1     azelastine (OPTIVAR) 0.05 % ophthalmic solution Apply 1 drop to eye 2 times daily 1 Bottle 11     citalopram (CELEXA) 20 MG tablet Take 1 tablet (20 mg) by mouth daily 90 tablet 3     cycloSPORINE (RESTASIS) 0.05 % ophthalmic emulsion Place 1 drop into both eyes 2 times daily 60 each 11     fexofenadine (ALLEGRA) 180 MG tablet Take 1 tablet (180 mg) by mouth daily 90 tablet 2     fluticasone (FLONASE) 50 MCG/ACT nasal spray Spray 2 sprays into both nostrils as needed        gabapentin (NEURONTIN) 100 MG capsule TAKE ONE CAPSULE BY MOUTH ONCE DAILY IN THE MORNING, ONE CAPSULE AT MIDDAY, AND FOUR CAPSULES BY MOUTH EVERY NIGHT AT BEDTIME. 180 capsule 5     ibuprofen 200 MG capsule Take 600-800 mg by mouth At Bedtime        leflunomide (ARAVA) 20 MG tablet Take 1 tablet (20 mg) by mouth daily 90 tablet 2     losartan-hydrochlorothiazide (HYZAAR) 100-25 MG tablet Take 1 tablet by mouth daily 90 tablet 3     neomycin-polymyxin-dexamethasone (MAXITROL) 3.5-05436-0.1 ophthalmic ointment Apply to eyelids 3 x day for 7 days. 3.5 g 0     olopatadine (PATADAY) 0.2 % ophthalmic solution Place 1 drop into both eyes daily 2.5 mL 11     ondansetron (ZOFRAN-ODT) 8 MG ODT tab Take 1 tablet (8 mg) by mouth every 8 hours as needed for nausea 40 tablet 2     ORDER FOR DME Use your CPAP device as directed by your provider.       Sarilumab 200 MG/1.14ML SOAJ Inject 1.14 mLs (200 mg) Subcutaneous every 14 days . Hold for signs of infection and seek medical attention. 6.84 mL 2     sodium chloride (DIMAS 128) 5 % ophthalmic ointment Insert into eyes at night. 3.5 g 12     sodium chloride (DIMAS 128) 5 % ophthalmic solution Place 1-2 drops into both eyes 3 times daily       sulfaSALAzine (AZULFIDINE) 500 MG tablet Take 3 tablets (1,500 mg) by mouth 2 times daily 540 tablet 2     vitamin D3 (CHOLECALCIFEROL) 50 mcg (2000 units) tablet Take 1 tablet (50 mcg) by mouth daily 90 tablet 3     No Known Allergies      Breast CA Risk Assessment (FHS-7) 12/20/2021   Do you have a family history of breast, colon, or ovarian cancer? No / Unknown         Mammogram Screening: Recommended mammography every 1-2 years with patient discussion and risk factor consideration  Pertinent mammograms are reviewed under the imaging tab.    Review of Systems   Constitutional: Negative for chills and fever.   HENT: Negative for congestion, ear pain, hearing loss and sore throat.    Eyes: Negative for pain and visual disturbance.   Respiratory: Positive for shortness of breath. Negative for cough.    Cardiovascular: Positive for peripheral edema. Negative for chest pain and palpitations.   Gastrointestinal: Negative for abdominal pain, constipation, diarrhea, heartburn, hematochezia and  "nausea.   Breasts:  Negative for tenderness and breast mass.   Genitourinary: Negative for dysuria, frequency, genital sores, hematuria, pelvic pain, urgency, vaginal bleeding and vaginal discharge.   Musculoskeletal: Positive for arthralgias, joint swelling and myalgias.   Skin: Negative for rash.   Neurological: Negative for dizziness, weakness, headaches and paresthesias.   Psychiatric/Behavioral: Negative for mood changes. The patient is not nervous/anxious.          OBJECTIVE:   Resp 22   Ht 1.6 m (5' 3\")   Wt 123.6 kg (272 lb 6.4 oz)   BMI 48.25 kg/m   Estimated body mass index is 48.25 kg/m  as calculated from the following:    Height as of this encounter: 1.6 m (5' 3\").    Weight as of this encounter: 123.6 kg (272 lb 6.4 oz).  Physical Exam  GENERAL: Pleasant, alert, no distress and obese  EYES: Eyes grossly normal to inspection, PERRL and conjunctivae and sclerae normal  HENT: Grossly normal  NECK: no adenopathy, no asymmetry, masses, or scars and thyroid normal to palpation  RESP: lungs clear to auscultation - no rales, rhonchi or wheezes  CV: regular rate and rhythm, normal S1 S2, no S3 or S4, no murmur, click or rub, trace peripheral edema   ABDOMEN: soft, nontender, no hepatosplenomegaly, no masses   MS: no gross musculoskeletal defects noted  SKIN: no suspicious lesions or rashes, but she does have a small few millimeter diameter raised fleshy skin growth of the left posterior thigh consistent with an intradermal nevus  NEURO: Normal strength and tone, mentation intact and speech normal  PSYCH: mentation appears normal, affect normal/bright.  She scored a 2 on her PHQ-9 today.    Diagnostic Test Results:  Labs reviewed in Epic    ASSESSMENT / PLAN:       ICD-10-CM    1. Routine general medical examination at a health care facility  Z00.00 Basic metabolic panel     Hemoglobin A1c   2. Hypertension goal BP (blood pressure) < 140/90  I10 losartan-hydrochlorothiazide (HYZAAR) 100-25 MG tablet   3. " "Immunosuppression (H)  D84.9    4. Obstructive sleep apnea  G47.33    5. Obesity, Class III, BMI 40-49.9 (morbid obesity) (H)  E66.01    6. Mild major depression (H)  F32.0 citalopram (CELEXA) 20 MG tablet   7. Chronic back pain, unspecified back location, unspecified back pain laterality  M54.9     G89.29    8. Seropositive rheumatoid arthritis (H)  M05.9    9. Intradermal nevus  D23.9    10. Hyperlipidemia LDL goal <100  E78.5    11. Need for pneumococcal vaccination  Z23 Pneumococcal 20 Valent Conjugate (PCV20)     Blood pressure and other vital signs look good  We discussed the above items  We will check some fasting lab work today as above  We will plan to continue her same medications  She is up-to-date on COVID vaccines, but we will give her a Prevnar 20 pneumococcal vaccine today  I filled out a disability parking certificate for her  She will check into insurance coverage for a new CPAP machine and reach out to us if/when she desires that  She does use that consistently and it is very helpful for her  Continue ongoing rheumatology care with Dr. Guerra  Reassurance about the intradermal nevus -- I could excise it for her in the future if desired  Plan a tentative recheck in 6 to 12 months    COUNSELING:  Reviewed preventive health counseling, as reflected in patient instructions       Regular exercise -- she will try using the recumbent bicycle when she gets that       Healthy diet/nutrition       Immunizations    Vaccinated for: Pneumococcal          Estimated body mass index is 48.25 kg/m  as calculated from the following:    Height as of this encounter: 1.6 m (5' 3\").    Weight as of this encounter: 123.6 kg (272 lb 6.4 oz).    Weight management plan: Discussed healthy diet and exercise guidelines    She reports that she has never smoked. She has never used smokeless tobacco.      Appropriate preventive services were discussed with this patient, including applicable screening as appropriate for " cardiovascular disease, diabetes, osteopenia/osteoporosis, and glaucoma.  As appropriate for age/gender, discussed screening for colorectal cancer, prostate cancer, breast cancer, and cervical cancer. Checklist reviewing preventive services available has been given to the patient.    Reviewed patients plan of care and provided an AVS. The Basic Care Plan (routine screening as documented in Health Maintenance) for Sharon meets the Care Plan requirement. This Care Plan has been established and reviewed with the Patient.    Counseling Resources:  ATP IV Guidelines  Pooled Cohorts Equation Calculator  Breast Cancer Risk Calculator  Breast Cancer: Medication to Reduce Risk  FRAX Risk Assessment  ICSI Preventive Guidelines  Dietary Guidelines for Americans, 2010  USDA's MyPlate  ASA Prophylaxis  Lung CA Screening    Reynaldo Saavedra MD  [facility]    Identified Health Risks:

## 2022-07-01 NOTE — NURSING NOTE
Prior to immunization administration, verified patients identity using patient s name and date of birth. Please see Immunization Activity for additional information.     Screening Questionnaire for Adult Immunization    Are you sick today?   No   Do you have allergies to medications, food, a vaccine component or latex?   No   Have you ever had a serious reaction after receiving a vaccination?   No   Do you have a long-term health problem with heart, lung, kidney, or metabolic disease (e.g., diabetes), asthma, a blood disorder, no spleen, complement component deficiency, a cochlear implant, or a spinal fluid leak?  Are you on long-term aspirin therapy?   No   Do you have cancer, leukemia, HIV/AIDS, or any other immune system problem?   No   Do you have a parent, brother, or sister with an immune system problem?   No   In the past 3 months, have you taken medications that affect  your immune system, such as prednisone, other steroids, or anticancer drugs; drugs for the treatment of rheumatoid arthritis, Crohn s disease, or psoriasis; or have you had radiation treatments?   No   Have you had a seizure, or a brain or other nervous system problem?   No   During the past year, have you received a transfusion of blood or blood    products, or been given immune (gamma) globulin or antiviral drug?   No   For women: Are you pregnant or is there a chance you could become       pregnant during the next month?   No   Have you received any vaccinations in the past 4 weeks?   No     Immunization questionnaire answers were all negative.        Per orders of Dr. Saavedra, injection of Prevnar 20 given by Kenya Swanson MA. Patient instructed to remain in clinic for 15 minutes afterwards, and to report any adverse reaction to me immediately.       Screening performed by Kenya Swanson MA on 7/1/2022 at 7:58 AM.  Kenya Swanson MA on 7/1/2022 at 7:59 AM

## 2022-07-01 NOTE — PATIENT INSTRUCTIONS
Patient Education   Personalized Prevention Plan  You are due for the preventive services outlined below.  Your care team is available to assist you in scheduling these services.  If you have already completed any of these items, please share that information with your care team to update in your medical record.  Health Maintenance Due   Topic Date Due     ANNUAL REVIEW OF HM ORDERS  Never done     Pneumococcal Vaccine (2 - PPSV23 or PCV20) 05/28/2022     COVID-19 Vaccine (5 - Booster for Pfizer series) 07/08/2022     Your Health Risk Assessment indicates you feel you are not in good health    A healthy lifestyle helps keep the body fit and the mind alert. It helps protect you from disease, helps you fight disease, and helps prevent chronic disease (disease that doesn't go away) from getting worse. This is important as you get older and begin to notice twinges in muscles and joints and a decline in the strength and stamina you once took for granted. A healthy lifestyle includes good healthcare, good nutrition, weight control, recreation, and regular exercise. Avoid harmful substances and do what you can to keep safe. Another part of a healthy lifestyle is stay mentally active and socially involved.    Good healthcare     Have a wellness visit every year.     If you have new symptoms, let us know right away. Don't wait until the next checkup.     Take medicines exactly as prescribed and keep your medicines in a safe place. Tell us if your medicine causes problems.   Healthy diet and weight control     Eat 3 or 4 small, nutritious, low-fat, high-fiber meals a day. Include a variety of fruits, vegetables, and whole-grain foods.     Make sure you get enough calcium in your diet. Calcium, vitamin D, and exercise help prevent osteoporosis (bone thinning).     If you live alone, try eating with others when you can. That way you get a good meal and have company while you eat it.     Try to keep a healthy weight. If you  eat more calories than your body uses for energy, it will be stored as fat and you will gain weight.     Recreation   Recreation is not limited to sports and team events. It includes any activity that provides relaxation, interest, enjoyment, and exercise. Recreation provides an outlet for physical, mental, and social energy. It can give a sense of worth and achievement. It can help you stay healthy.    Mental Exercise and Social Involvement  Mental and emotional health is as important as physical health. Keep in touch with friends and family. Stay as active as possible. Continue to learn and challenge yourself.   Things you can do to stay mentally active are:    Learn something new, like a foreign language or musical instrument.     Play SCRABBLE or do crossword puzzles. If you cannot find people to play these games with you at home, you can play them with others on your computer through the Internet.     Join a games club--anything from card games to chess or checkers or lawn bowling.     Start a new hobby.     Go back to school.     Volunteer.     Read.   Keep up with world events.    Exercise for a Healthier Heart  You may wonder how you can improve the health of your heart. If you re thinking about exercise, you re on the right track. You don t need to become an athlete. But you do need a certain amount of brisk exercise to help strengthen your heart. If you have been diagnosed with a heart condition, your healthcare provider may advise exercise to help stabilize your condition. To help make exercise a habit, choose safe, fun activities.      Exercise with a friend. When activity is fun, you're more likely to stick with it.   Before you start  Check with your healthcare provider before starting an exercise program. This is especially important if you have not been active for a while. It's also important if you have a long-term (chronic) health problem such as heart disease, diabetes, or obesity. Or if you are  at high risk for having these problems.   Why exercise?  Exercising regularly offers many healthy rewards. It can help you do all of the following:     Improve your blood cholesterol level to help prevent further heart trouble    Lower your blood pressure to help prevent a stroke or heart attack    Control diabetes, or reduce your risk of getting this disease    Improve your heart and lung function    Reach and stay at a healthy weight    Make your muscles stronger so you can stay active    Prevent falls and fractures by slowing the loss of bone mass (osteoporosis)    Manage stress better    Reduce your blood pressure    Improve your sense of self and your body image  Exercise tips      Ease into your routine. Set small goals. Then build on them. If you are not sure what your activity level should be, talk with your healthcare provider first before starting an exercise routine.    Exercise on most days. Aim for a total of 150 minutes (2 hours and 30 minutes) or more of moderate-intensity aerobic activity each week. Or 75 minutes (1 hour and 15 minutes) or more of vigorous-intensity aerobic activity each week. Or try for a combination of both. Moderate activity means that you breathe heavier and your heart rate increases but you can still talk. Think about doing 40 minutes of moderate exercise, 3 to 4 times a week. For best results, activity should last for about 40 minutes to lower blood pressure and cholesterol. It's OK to work up to the 40-minute period over time. Examples of moderate-intensity activity are walking 1 mile in 15 minutes. Or doing 30 to 45 minutes of yard work.    Step up your daily activity level.  Along with your exercise program, try being more active the whole day. Walk instead of drive. Or park further away so that you take more steps each day. Do more household tasks or yard work. You may not be able to meet the advised mount of physical activity. But doing some moderate- or vigorous-intensity  aerobic activity can help reduce your risk for heart disease. Your healthcare provider can help you figure out what is best for you.    Choose 1 or more activities you enjoy.  Walking is one of the easiest things you can do. You can also try swimming, riding a bike, dancing, or taking an exercise class.    When to call your healthcare provider  Call your healthcare provider if you have any of these:     Chest pain or feel dizzy or lightheaded    Burning, tightness, pressure, or heaviness in your chest, neck, shoulders, back, or arms    Abnormal shortness of breath    More joint or muscle pain    A very fast or irregular heartbeat (palpitations)  [employer] last reviewed this educational content on 7/1/2019 2000-2021 [employer]. All rights reserved. This information is not intended as a substitute for professional medical care. Always follow your healthcare professional's instructions.          Understanding USDA MyPlate  The USDA has guidelines to help you make healthy food choices. These are called MyPlate. MyPlate shows the food groups that make up healthy meals using the image of a place setting. Before you eat, think about the healthiest choices for what to put on your plate or in your cup or bowl. To learn more about building a healthy plate, visit www.choosemyplate.gov.    The food groups    Fruits. Any fruit or 100% fruit juice counts as part of the Fruit Group. Fruits may be fresh, canned, frozen, or dried, and may be whole, cut-up, or pureed. Make 1/2 of your plate fruits and vegetables.    Vegetables. Any vegetable or 100% vegetable juice counts as a member of the Vegetable Group. Vegetables may be fresh, frozen, canned, or dried. They can be served raw or cooked and may be whole, cut-up, or mashed. Make 1/2 of your plate fruits and vegetables.    Grains. All foods made from grains are part of the Grains Group. These include wheat, rice, oats, cornmeal, and barley. Grains are often used to  make foods such as bread, pasta, oatmeal, cereal, tortillas, and grits. Grains should be no more than 1/4 of your plate. At least half of your grains should be whole grains.    Protein. This group includes meat, poultry, seafood, beans and peas, eggs, processed soy products (such as tofu), nuts (including nut butters), and seeds. Make protein choices no more than 1/4 of your plate. Meat and poultry choices should be lean or low fat.    Dairy. The Dairy Group includes all fluid milk products and foods made from milk that contain calcium, such as yogurt and cheese. (Foods that have little calcium, such as cream, butter, and cream cheese, are not part of this group.) Most dairy choices should be low-fat or fat-free.    Oils. Oils aren't a food group, but they do contain essential nutrients. However it's important to watch your intake of oils. These are fats that are liquid at room temperature. They include canola, corn, olive, soybean, vegetable, and sunflower oil. Foods that are mainly oil include mayonnaise, certain salad dressings, and soft margarines. You likely already get your daily oil allowance from the foods you eat.  Things to limit  Eating healthy also means limiting these things in your diet:       Salt (sodium). Many processed foods have a lot of sodium. To keep sodium intake down, eat fresh vegetables, meats, poultry, and seafood when possible. Purchase low-sodium, reduced-sodium, or no-salt-added food products at the store. And don't add salt to your meals at home. Instead, season them with herbs and spices such as dill, oregano, cumin, and paprika. Or try adding flavor with lemon or lime zest and juice.    Saturated fat. Saturated fats are most often found in animal products such as beef, pork, and chicken. They are often solid at room temperature, such as butter. To reduce your saturated fat intake, choose leaner cuts of meat and poultry. And try healthier cooking methods such as grilling, broiling,  roasting, or baking. For a simple lower-fat swap, use plain nonfat yogurt instead of mayonnaise when making potato salad or macaroni salad.    Added sugars. These are sugars added to foods. They are in foods such as ice cream, candy, soda, fruit drinks, sports drinks, energy drinks, cookies, pastries, jams, and syrups. Cut down on added sugars by sharing sweet treats with a family member or friend. You can also choose fruit for dessert, and drink water or other unsweetened beverages.     [employer] last reviewed this educational content on 6/1/2020 2000-2021 [employer]. All rights reserved. This information is not intended as a substitute for professional medical care. Always follow your healthcare professional's instructions.

## 2022-07-01 NOTE — PROGRESS NOTES
The patient was provided with suggestions to help her develop a healthy physical lifestyle.  She is at risk for lack of exercise and has been provided with information to increase physical activity for the benefit of her well-being.  The patient was counseled and encouraged to consider modifying their diet and eating habits. She was provided with information on recommended healthy diet options.

## 2022-07-02 NOTE — RESULT ENCOUNTER NOTE
Sharon,  These lab results look nice and healthy including electrolytes, kidney tests, and blood sugar (diabetes) testing.    Reynaldo Saavedra MD

## 2022-07-10 ENCOUNTER — TELEPHONE (OUTPATIENT)
Dept: RHEUMATOLOGY | Facility: CLINIC | Age: 67
End: 2022-07-10

## 2022-07-11 NOTE — TELEPHONE ENCOUNTER
Called patient and left a detailed message relaying lab results and recommendations.    Mukul LIN RN....7/11/2022 9:48 AM

## 2022-07-11 NOTE — TELEPHONE ENCOUNTER
"RN: Sharon Fung did not read the result note sent by [employer] message based on notification from [employer] stating that it was not read.  Therefore, please call Sharon to provide the information in the message.     \" Sharon,     Measurements on the lipid panel (cholesterol, triglycerides, etc.) could be improved.  This may be improved with medication such as statins and lifestyle modification such as diet and exercise.  I recommend focusing on diet and exercise at this time to lower the lipids.     Sincerely,  Freddy Guerra MD \"    "

## 2022-07-15 ENCOUNTER — ANCILLARY PROCEDURE (OUTPATIENT)
Dept: GENERAL RADIOLOGY | Facility: CLINIC | Age: 67
End: 2022-07-15
Attending: PHYSICIAN ASSISTANT
Payer: COMMERCIAL

## 2022-07-15 DIAGNOSIS — G89.29 CHRONIC LEFT-SIDED LOW BACK PAIN WITHOUT SCIATICA: ICD-10-CM

## 2022-07-15 DIAGNOSIS — M54.50 CHRONIC LEFT-SIDED LOW BACK PAIN WITHOUT SCIATICA: ICD-10-CM

## 2022-07-15 PROCEDURE — 62323 NJX INTERLAMINAR LMBR/SAC: CPT | Performed by: RADIOLOGY

## 2022-07-15 RX ORDER — BUPIVACAINE HYDROCHLORIDE 2.5 MG/ML
5 INJECTION, SOLUTION INFILTRATION; PERINEURAL ONCE
Status: COMPLETED | OUTPATIENT
Start: 2022-07-15 | End: 2022-07-15

## 2022-07-15 RX ORDER — METHYLPREDNISOLONE ACETATE 80 MG/ML
80 INJECTION, SUSPENSION INTRA-ARTICULAR; INTRALESIONAL; INTRAMUSCULAR; SOFT TISSUE ONCE
Status: COMPLETED | OUTPATIENT
Start: 2022-07-15 | End: 2022-07-15

## 2022-07-15 RX ORDER — IOPAMIDOL 408 MG/ML
10 INJECTION, SOLUTION INTRATHECAL ONCE
Status: COMPLETED | OUTPATIENT
Start: 2022-07-15 | End: 2022-07-15

## 2022-07-15 RX ADMIN — IOPAMIDOL 2 ML: 408 INJECTION, SOLUTION INTRATHECAL at 08:24

## 2022-07-15 RX ADMIN — BUPIVACAINE HYDROCHLORIDE 10 MG: 2.5 INJECTION, SOLUTION INFILTRATION; PERINEURAL at 08:24

## 2022-07-15 RX ADMIN — METHYLPREDNISOLONE ACETATE 80 MG: 80 INJECTION, SUSPENSION INTRA-ARTICULAR; INTRALESIONAL; INTRAMUSCULAR; SOFT TISSUE at 08:24

## 2022-08-11 ASSESSMENT — SLEEP AND FATIGUE QUESTIONNAIRES
HOW LIKELY ARE YOU TO NOD OFF OR FALL ASLEEP WHILE LYING DOWN TO REST IN THE AFTERNOON WHEN CIRCUMSTANCES PERMIT: MODERATE CHANCE OF DOZING
HOW LIKELY ARE YOU TO NOD OFF OR FALL ASLEEP WHILE WATCHING TV: MODERATE CHANCE OF DOZING
HOW LIKELY ARE YOU TO NOD OFF OR FALL ASLEEP WHILE SITTING INACTIVE IN A PUBLIC PLACE: SLIGHT CHANCE OF DOZING
HOW LIKELY ARE YOU TO NOD OFF OR FALL ASLEEP WHILE SITTING AND TALKING TO SOMEONE: WOULD NEVER DOZE
HOW LIKELY ARE YOU TO NOD OFF OR FALL ASLEEP WHILE SITTING AND READING: MODERATE CHANCE OF DOZING
HOW LIKELY ARE YOU TO NOD OFF OR FALL ASLEEP WHILE SITTING QUIETLY AFTER LUNCH WITHOUT ALCOHOL: MODERATE CHANCE OF DOZING
HOW LIKELY ARE YOU TO NOD OFF OR FALL ASLEEP IN A CAR, WHILE STOPPED FOR A FEW MINUTES IN TRAFFIC: WOULD NEVER DOZE
HOW LIKELY ARE YOU TO NOD OFF OR FALL ASLEEP WHEN YOU ARE A PASSENGER IN A CAR FOR AN HOUR WITHOUT A BREAK: MODERATE CHANCE OF DOZING

## 2022-09-13 ENCOUNTER — VIRTUAL VISIT (OUTPATIENT)
Dept: RHEUMATOLOGY | Facility: CLINIC | Age: 67
End: 2022-09-13
Payer: COMMERCIAL

## 2022-09-13 DIAGNOSIS — Z79.899 HIGH RISK MEDICATION USE: ICD-10-CM

## 2022-09-13 DIAGNOSIS — M05.9 SEROPOSITIVE RHEUMATOID ARTHRITIS (H): Primary | ICD-10-CM

## 2022-09-13 PROCEDURE — 99214 OFFICE O/P EST MOD 30 MIN: CPT | Mod: 95 | Performed by: INTERNAL MEDICINE

## 2022-09-13 NOTE — PATIENT INSTRUCTIONS
RHEUMATOLOGY    Dr. Freddy Guerra    [facility]  [address] [place] [address]  [place] [address]  Phone number: [phone]  Fax number: [phone]    You may schedule your FLU shot by calling [phone] or if you would like to get your shot at a [place] pharmacy you may schedule online at [email or web address].    Thank you for choosing [facility]!    Evie Rosas CMA Rheumatology

## 2022-09-13 NOTE — PROGRESS NOTES
Sharon Fung  is a 67 year old year old who is being evaluated via a billable video visit.      How would you like to obtain your AVS? MyChart  If the video visit is dropped, the invitation should be resent by: Text to cell phone: [phone]   Will anyone else be joining your video visit? No     Rheumatology Video Visit      Sharon Fung MRN# [id number]   YOB: 1955 Age: 67 year old      Date of visit: 9/13/22   PCP: Dr. Reynaldo Saavedra    Chief Complaint   Patient presents with:  Rheumatoid Arthritis    Assessment and Plan     1. Seropositive nonerosive rheumatoid arthritis (RF negative, CCP low positive): Previously on Humira (lost efficacy), Cimzia (ineffective), Orencia (ineffective), leflunomide (ineffective as monotherapy), hydroxychloroquine (ineffective), Xeljanz (ineffective), MTX (GI upset). Currently on SSZ 1500mg BID, leflunomide 20mg daily, and Kevzara 200mg SQ every 14 days (started 1/2019).  RA doing well at this time except for morning stiffness that lasts for several hours but is mild and does not interfere with her daily activities per patient.  Has been holding leflunomide, sulfasalazine, and Kevzara because of COVID-19 infection but symptoms have resolved except for mild cough so advised that she restart sulfasalazine and leflunomide now, and then in 2 weeks to restart kevzara.  Chronic illness, stable  - Continue sulfasalazine 1500 mg twice daily   - Continue leflunomide 20mg daily  - Continue Kevzara 200mg SQ every 14 days  - PRN RA flare: prednisone 10mg daily x7days, then 5mg daily x7days, then stop.   - Labs this month: CBC, Creatinine, Hepatic Panel  - Labs in 3 months: CBC, Creatinine, Hepatic Panel, ESR, CRP      High risk medication requiring intensive toxicity monitoring at least quarterly: labs ordered include CBC, Creatinine, Hepatic panel to monitor for cytopenia and hepatotoxicity; checking creatinine as it affects clearance of medication.                 Rapid 3,  cumulative scores                      8/24/2021: No inflammatory joint symptoms.  She says that this combination is great (SSZ 1500mg BID, leflunomide 20mg daily, Kevzara 200mg q14 days)                      10/14/2020 doing well (SSZ 1500mg BID, leflunomide 20mg daily, Kevzara 200mg q14 days)                      08/28/2019: 3.2   (SSZ 1500mg BID, Kevzara)  Now on gabapentin                      04/17/2019: 15    (SSZ 1500mg BID, Kevzara)  Mostly fibromyalgia symptoms                      12/19/2018:         (MTX 20mg SQ wkly, Xeljanz XR 11mg daily, SSZ 1500mg BID)                          05/02/2018:  9     (MTX 20mg SQ wkly, Xeljanz XR 11mg daily, SSZ 1000mg BID)                          01/31/2018: 22.3 (MTX 20mg SQ wkly, Xeljanz XR 11mg daily)                          11/29/2017: 16.8 (MTX 20mg SQ wkly)                      08/02/2017: 4.2   (MTX 20mg SQ wkly, Xeljanz XR 11mg daily)                         05/04/2017: 7      (MTX 20mg SQ wkly, Xeljanz XR 11mg daily)                        02/02/2017: 8      (MTX 20mg SQ wkly, Xeljanz XR 11mg daily)                      11/04/2016: 13    (MTX 20mg SQ weekly)                      07/15/2016: 10.8    2. Positive LORNA and dsDNA / Secondary Sjogren's Syndrome: Repeat dsDNAs have been negative. Has dry eyes but no dry mouth.  Dry eyes are treated by ophthalmology with Restasis.      3. Bilateral patellofemoral syndrome: home exercises have been helpful.  Weight loss encouraged.       4. Fibromyalgia: Managed in the pain management clinic     5.  Bone Health, vitamin D deficiency: normal 12/20/2019 DEXA; plan to repeat in 3-5 years but this may change depending on prednisone exposure.    - Continue vitamin D 2000 IU daily    6. Chronic lower back pain with left sided sciatica: suspect related to degenerative changes seen on L-spine MRI.  She has been seen in the pain clinic and spine surgery clinic.  Documented here for historical significance.    7.  Vaccinations:  Vaccinations reviewed with Ms. Fung.   - Influenza: encouraged yearly vaccination, ideally receiving in mid-late October    - COVID-19: Advised receiving the updated COVID-19 vaccination   Based on our current understanding (and this may change over time as we learn more), medications should be adjusted as noted below, if disease activity allows:  - NSAIDs and Acetaminophen: hold for 24 hours prior to vaccination if able to do so  - Sulfasalazine should be held for 1-2 weeks after each COVID-19 vaccine (as disease activity allows)  - Leflunomide should be held for 1-2 weeks after each COVID-19 vaccine (as disease activity allows)     Total minutes spent in evaluation with patient, documentation, , and review of pertinent studies and chart notes: 18    Ms. Fung verbalized agreement with and understanding of the rational for the diagnosis and treatment plan.  All questions were answered to best of my ability and the patient's satisfaction. Ms. Fung was advised to contact the clinic with any questions that may arise after the clinic visit.      Thank you for involving me in the care of the patient    Return to clinic: 3-4 months    HPI   Sharon Fung is a 67 year old female with medical history significant for GERD, allergic rhinitis, obstructive sleep apnea, obesity, hx of trigger fingers, hx of carpal tunnel surgery, and rheumatoid arthritis who presents for follow-up of rheumatoid arthritis.    Today, 9/13/2022: Had sinusitis symptoms that was diagnosed as a COVID-19 infection last week.  She is currently holding her RA medications.  All symptoms have resolved except for rare cough, mostly that occurs at night.  No fevers or chills.  Overall feels like her arthritis is doing well and she feels like the medication regimen that she is on for RA.    No fevers or chills. No nausea, vomiting, constipation, diarrhea. No chest pain/pressure, palpitations, or shortness of breath. No oral or nasal  sores. No neck pain. No rash.      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[employer]    ROS   12 point review of system was completed and negative except as noted in the HPI     Active Problem List     Patient Active Problem List   Diagnosis     AR (allergic rhinitis)     GERD (gastroesophageal reflux disease)     Status post bariatric surgery     Mild major depression (H)     Advanced directives, counseling/discussion     High risk medication use     Obstructive sleep apnea     Obesity, Class III, BMI 40-49.9 (morbid obesity) (H)     Anterior basement membrane dystrophy     Seropositive rheumatoid arthritis (H)     LORNA positive     Carpal tunnel syndrome of right wrist     Headache     Immunosuppression (H)     Fibromyalgia     Pain in joint, ankle and foot, right     Hypertension goal BP (blood pressure) < 140/90     Chronic back pain, unspecified back location, unspecified back pain laterality     Hyperlipidemia LDL goal <100     Past Medical History     Past Medical History:   Diagnosis Date     AR (allergic rhinitis)  as teen     Arthritis 12/14/2015     Chronic obstructive pulmonary disease, unspecified COPD type (H) 3/27/2018     Depressive disorder 3 years ago     GERD (gastroesophageal reflux disease) 4-07     Headache 7/11/2017     Hypertension goal BP (blood pressure) < 140/90 12/20/2021     Mild major depression (H) 3 years ago     Morbid obesity (H) teens     BRETT (obstructive sleep apnea)     uses CPAP     RA (rheumatoid arthritis) (H) 4 years     Reduced vision 3 years     Rheumatoid arthritis, adult (H)      Past Surgical History     Past Surgical History:   Procedure Laterality Date     ARTHRODESIS FOOT Right 6/30/2021    Procedure: Right 1st metatarsophalangeal joint fusion;  Surgeon: Jesus Wolf MD;  Location: [facility]     ARTHRODESIS TOE(S) Right 12/27/2021    Procedure: right revision great toe fusion;  Surgeon: Marlen  Ryan BENJAMIN MD;  Location:  OR     BLEPHAROPLASTY BILATERAL Bilateral 8/5/2016    Procedure: BLEPHAROPLASTY BILATERAL;  Surgeon: Cortez Robin MD;  Location:  [facility]     BREAST BIOPSY, RT/LT  1-94    Benign     COLONOSCOPY       COLONOSCOPY WITH CO2 INSUFFLATION N/A 3/30/2017    Procedure: COLONOSCOPY WITH CO2 INSUFFLATION;  Surgeon: Issa Weeks MD;  Location: [facility] OR     ESOPHAGOSCOPY, GASTROSCOPY, DUODENOSCOPY (EGD), COMBINED N/A 10/29/2015    Procedure: COMBINED ESOPHAGOSCOPY, GASTROSCOPY, DUODENOSCOPY (EGD);  Surgeon: Shaq Mims MD;  Location: [facility]     LAPAROSCOPIC GASTRIC ADJUSTABLE BANDING  11/09/10    Lap band procedure     LAPAROSCOPIC REMOVAL GASTRIC ADJUSTABLE BAND N/A 10/31/2015    Procedure: LAPAROSCOPIC REMOVAL GASTRIC ADJUSTABLE BAND;  Surgeon: Shaq Mims MD;  Location: [facility] OR     WY HAND/FINGER SURGERY UNLISTED  2016     WY STOMACH SURGERY PROCEDURE UNLISTED  11/2015     RELEASE CARPAL TUNNEL Left 4/27/2017    Procedure: RELEASE CARPAL TUNNEL;  Left Open Carpal Tunnel Release;  Surgeon: Ciara Middleton MD;  Location: [facility]     RELEASE CARPAL TUNNEL Right 6/15/2017    Procedure: RELEASE CARPAL TUNNEL;  Right Carpal Tunnel Release Open;  Surgeon: Ciara Middleton MD;  Location: [facility]     REPAIR PTOSIS       TUBAL LIGATION  1988     [facility] APPENDECTOMY  1977     Allergy   No Known Allergies  Current Medication List     Current Outpatient Medications   Medication Sig     acetaminophen (TYLENOL) 500 MG tablet Take 500-1,000 mg by mouth every 6 hours as needed for mild pain     albuterol (PROAIR HFA/PROVENTIL HFA/VENTOLIN HFA) 108 (90 Base) MCG/ACT inhaler Inhale 2 puffs into the lungs every 6 hours as needed for shortness of breath / dyspnea or wheezing     azelastine (OPTIVAR) 0.05 % ophthalmic solution Apply 1 drop to eye 2 times daily     citalopram (CELEXA) 20 MG tablet Take 1 tablet (20 mg) by mouth daily     cycloSPORINE (RESTASIS) 0.05 %  ophthalmic emulsion Place 1 drop into both eyes 2 times daily     fexofenadine (ALLEGRA) 180 MG tablet Take 1 tablet (180 mg) by mouth daily     fluticasone (FLONASE) 50 MCG/ACT nasal spray Spray 2 sprays into both nostrils as needed      gabapentin (NEURONTIN) 100 MG capsule TAKE ONE CAPSULE BY MOUTH ONCE DAILY IN THE MORNING, ONE CAPSULE AT MIDDAY, AND FOUR CAPSULES BY MOUTH EVERY NIGHT AT BEDTIME.     ibuprofen 200 MG capsule Take 600-800 mg by mouth At Bedtime     leflunomide (ARAVA) 20 MG tablet Take 1 tablet (20 mg) by mouth daily     losartan-hydrochlorothiazide (HYZAAR) 100-25 MG tablet Take 1 tablet by mouth daily     neomycin-polymyxin-dexamethasone (MAXITROL) 3.5-93201-6.1 ophthalmic ointment Apply to eyelids 3 x day for 7 days.     olopatadine (PATADAY) 0.2 % ophthalmic solution Place 1 drop into both eyes daily     Sarilumab 200 MG/1.14ML SOAJ Inject 1.14 mLs (200 mg) Subcutaneous every 14 days . Hold for signs of infection and seek medical attention.     sodium chloride (DIMAS 128) 5 % ophthalmic ointment Insert into eyes at night.     sodium chloride (DIMAS 128) 5 % ophthalmic solution Place 1-2 drops into both eyes 3 times daily     sulfaSALAzine (AZULFIDINE) 500 MG tablet Take 3 tablets (1,500 mg) by mouth 2 times daily     vitamin D3 (CHOLECALCIFEROL) 50 mcg (2000 units) tablet Take 1 tablet (50 mcg) by mouth daily     ondansetron (ZOFRAN-ODT) 8 MG ODT tab Take 1 tablet (8 mg) by mouth every 8 hours as needed for nausea     ORDER FOR DME Use your CPAP device as directed by your provider.     No current facility-administered medications for this visit.     Facility-Administered Medications Ordered in Other Visits   Medication     sodium chloride (PF) 0.9% PF flush 10 mL     sodium chloride bacteriostatic 0.9 % flush 12 mL       Social History   See HPI    Family History     Family History   Problem Relation Age of Onset     Heart Disease Father         MI     Neurologic Disorder Father 79         Parkinson's     Diabetes Father      Hypertension Father      Coronary Artery Disease Father      Cancer Maternal Grandmother         uterine     Neurologic Disorder Sister 16        migraine     Depression Sister      Neurologic Disorder Mother 20        migraine     Depression Mother      Neurologic Disorder Son 7        migraine     Substance Abuse Son      Migraines Son         none     Depression Sister      Substance Abuse Sister      Migraines Sister        Physical Exam     GEN: NAD.    HEENT:   Anicteric, noninjected sclera. No obvious external lesions of the ear and nose. Hearing intact.  PULM: No increased work of breathing.  Coughed once during the visit  MSK:  Hands and wrists without swelling.  Able to make a complete fist with his hand  SKIN: No rash or jaundice seen  PSYCH: Alert. Appropriate.     Labs / Imaging (select studies)       CBC  Recent Labs   Lab Test 06/24/22  0741 03/11/22  0910 11/30/21  0806 08/03/21  1150 05/10/21  0802 02/02/21  1056 10/12/20  1113   WBC 6.1 5.2 6.3   < > 5.1 7.4 6.1   RBC 4.36 4.54 4.28   < > 4.48 4.41 4.18   HGB 13.4 13.7 12.9   < > 13.5 13.4 12.7   HCT 40.5 42.1 40.5   < > 42.8 42.8 39.7   MCV 93 93 95   < > 96 97 95   RDW 12.5 13.3 12.6   < > 12.8 12.5 12.9    232 226   < > 193 216 221   MCH 30.7 30.2 30.1   < > 30.1 30.4 30.4   MCHC 33.1 32.5 31.9   < > 31.5 31.3* 32.0   NEUTROPHIL 50 46 50   < > 42.6 40.1 40.8   LYMPH 26 24 27   < > 26.4 34.8 33.4   MONOCYTE 9 17 9   < > 9.5 10.7 8.9   EOSINOPHIL 14 12 13   < > 19.5 12.6 15.6   BASOPHIL 1 1 1   < > 1.8 1.4 1.0   ANEU  --   --   --   --  2.2 3.0 2.5   ALYM  --   --   --   --  1.3 2.6 2.0   KIMBERLY  --   --   --   --  0.5 0.8 0.5   AEOS  --   --   --   --  1.0* 0.9* 1.0*   ABAS  --   --   --   --  0.1 0.1 0.1   ANEUTAUTO 3.1 2.4 3.1   < >  --   --   --    ALYMPAUTO 1.6 1.2 1.7   < >  --   --   --    AMONOAUTO 0.6 0.9 0.6   < >  --   --   --    AEOSAUTO 0.9* 0.6 0.8*   < >  --   --   --    ABSBASO 0.1 0.1 0.1    < >  --   --   --     < > = values in this interval not displayed.     CMP  Recent Labs   Lab Test 07/01/22  0807 06/24/22  0741 03/11/22  0910 12/27/21  0710 12/20/21  0851 11/30/21  0806 08/03/21  1150 05/10/21  0802 02/02/21  1056 10/12/20  1113     --   --   --  139 137   < > 140  --   --    POTASSIUM 3.9  --   --  3.5 3.7 3.3*   < > 4.3  --   --    CHLORIDE 107  --   --   --  105 104   < > 110*  --   --    CO2 27  --   --   --  28 28   < > 25  --   --    ANIONGAP 6  --   --   --  6 5   < > 5  --   --    GLC 96  --   --   --  144* 165*   < > 99 74  --    BUN 18  --   --   --  20 19   < > 12  --   --    CR 0.74 0.65 0.75  --  0.68 0.61   < > 0.73 0.65 0.74   GFRESTIMATED 89 >90 88  --  >90 >90   < > 88 >90 87   GFRESTBLACK  --   --   --   --   --   --   --  >90 >90 >90   ISH 9.3  --   --   --  9.3 9.3   < > 8.6  --   --    BILITOTAL  --  0.6 0.5  --   --  0.5   < > 0.6 0.4 0.5   ALBUMIN  --  3.8 3.8  --   --  3.6   < > 3.8 4.0 3.8   PROTTOTAL  --  7.0 7.2  --   --  7.0   < > 7.1 7.1 6.9   ALKPHOS  --  88 107  --   --  113   < > 103 115 107   AST  --  31 30  --   --  26   < > 33 36 23   ALT  --  48 46  --   --  43   < > 54* 58* 42    < > = values in this interval not displayed.     Calcium/VitaminD  Recent Labs   Lab Test 07/01/22  0807 12/20/21  0851 11/30/21  0806 05/10/21  0802 10/12/20  1113 03/31/20  0755 10/28/15  2233 02/20/15  0750   ISH 9.3 9.3 9.3   < >  --   --    < > 9.0   VITDT  --   --   --   --  33 15*  --  39    < > = values in this interval not displayed.     ESR/CRP  Recent Labs   Lab Test 06/24/22  0741 03/11/22  0910 11/30/21  0806   SED 5 5 6   CRP <2.9 <2.9 <2.9     Hepatitis B  Recent Labs   Lab Test 12/08/15  0855 03/06/15  1650   HBCAB Nonreactive  --    HEPBANG Nonreactive Nonreactive     Hepatitis C  Recent Labs   Lab Test 12/08/15  0855 03/06/15  1650   HCVAB Nonreactive   Assay performance characteristics have not been established for newborns,   infants, and children    Nonreactive   Assay performance characteristics have not been established for newborns,   infants, and children         Tuberculosis Screening  Recent Labs   Lab Test 11/30/21  0806 10/31/17  1400 02/02/17  0822 12/08/15  0855   TBRES Negative  --   --   --    TBRSLT  --  Negative Negative Negative   TBAGN  --  0.05 0.00 0.01     HIV Screening  Recent Labs   Lab Test 07/24/18  0738   HIAGAB Nonreactive       Immunization History     Immunization History   Administered Date(s) Administered     COVID-19,PF,Moderna 03/08/2022     COVID-19,PF,Pfizer (12+ Yrs) 12/21/2020, 01/07/2021, 08/27/2021     Flu, Unspecified 10/12/2020     Influenza (intradermal) 10/04/2011, 10/01/2012     Influenza Quad, Recombinant, pf(RIV4) (Flublok) 09/15/2021     Influenza Vaccine IM > 6 months Valent IIV4 (Alfuria,Fluzone) 10/15/2013, 09/15/2014, 09/28/2015, 09/28/2016, 10/16/2017, 10/01/2018     Influenza Vaccine Im 4yrs+ 4 Valent CCIIV4 10/15/2019     Pneumo Conj 13-V (2010&after) 04/15/2016     Pneumococcal 20 valent Conjugate (Prevnar 20) 07/01/2022     Pneumococcal 23 valent 07/15/2016     TD (ADULT, 7+) 10/30/2020     TDAP Vaccine (Adacel) 02/09/2011     Zoster vaccine recombinant adjuvanted (SHINGRIX) 04/17/2019, 08/28/2019          Chart documentation done in part with Dragon Voice recognition Software. Although reviewed after completion, some word and grammatical error may remain.       Video-Visit Details    Type of service:  Video Visit    Video Start Time: 8:04 AM    Video End Time: 8:19 AM    Originating Location (pt. Location):  [place]    Distant Location (provider location):  [place]    Platform used for Video Visit: Pao Guerra MD

## 2022-09-20 NOTE — TELEPHONE ENCOUNTER
Patient presents today for routine prenatal care. Pt denies any vaginal leaking bleeding or contractions. + Fetal movement. Prior Authorization Approval    Authorization Effective Date: 5/17/2019  Authorization Expiration Date: 11/13/2019  Medication: Phentermine 30mg Capsule-PA approved  Approved Dose/Quantity:   Reference #: [id number]   Insurance Company: [employer] - Phone [phone] Fax [phone]  Expected CoPay:       CoPay Card Available:      Foundation Assistance Needed:    Which Pharmacy is filling the prescription (Not needed for infusion/clinic administered): [facility] [place], [place] - [address]  Pharmacy Notified: Yes  Patient Notified: No-Pharmacy will contact

## 2022-10-16 ENCOUNTER — HEALTH MAINTENANCE LETTER (OUTPATIENT)
Age: 67
End: 2022-10-16

## 2022-10-17 DIAGNOSIS — M54.16 LUMBAR RADICULOPATHY: ICD-10-CM

## 2022-10-18 RX ORDER — GABAPENTIN 100 MG/1
CAPSULE ORAL
Qty: 180 CAPSULE | Refills: 5 | Status: SHIPPED | OUTPATIENT
Start: 2022-10-18 | End: 2023-04-16

## 2022-11-16 ENCOUNTER — LAB (OUTPATIENT)
Dept: LAB | Facility: CLINIC | Age: 67
End: 2022-11-16
Payer: COMMERCIAL

## 2022-11-16 DIAGNOSIS — Z79.899 HIGH RISK MEDICATION USE: ICD-10-CM

## 2022-11-16 DIAGNOSIS — M05.9 SEROPOSITIVE RHEUMATOID ARTHRITIS (H): ICD-10-CM

## 2022-11-16 LAB
ALBUMIN SERPL-MCNC: 3.7 G/DL (ref 3.4–5)
ALP SERPL-CCNC: 100 U/L (ref 40–150)
ALT SERPL W P-5'-P-CCNC: 47 U/L (ref 0–50)
AST SERPL W P-5'-P-CCNC: 26 U/L (ref 0–45)
BASOPHILS # BLD AUTO: 0.1 10E3/UL (ref 0–0.2)
BASOPHILS NFR BLD AUTO: 1 %
BILIRUB DIRECT SERPL-MCNC: 0.1 MG/DL (ref 0–0.2)
BILIRUB SERPL-MCNC: 0.5 MG/DL (ref 0.2–1.3)
CREAT SERPL-MCNC: 0.71 MG/DL (ref 0.52–1.04)
CRP SERPL-MCNC: <2.9 MG/L (ref 0–8)
EOSINOPHIL # BLD AUTO: 0.4 10E3/UL (ref 0–0.7)
EOSINOPHIL NFR BLD AUTO: 8 %
ERYTHROCYTE [DISTWIDTH] IN BLOOD BY AUTOMATED COUNT: 12.9 % (ref 10–15)
ERYTHROCYTE [SEDIMENTATION RATE] IN BLOOD BY WESTERGREN METHOD: 6 MM/HR (ref 0–30)
GFR SERPL CREATININE-BSD FRML MDRD: >90 ML/MIN/1.73M2
HCT VFR BLD AUTO: 38.8 % (ref 35–47)
HGB BLD-MCNC: 13 G/DL (ref 11.7–15.7)
IMM GRANULOCYTES # BLD: 0 10E3/UL
IMM GRANULOCYTES NFR BLD: 0 %
LYMPHOCYTES # BLD AUTO: 1.8 10E3/UL (ref 0.8–5.3)
LYMPHOCYTES NFR BLD AUTO: 33 %
MCH RBC QN AUTO: 31.2 PG (ref 26.5–33)
MCHC RBC AUTO-ENTMCNC: 33.5 G/DL (ref 31.5–36.5)
MCV RBC AUTO: 93 FL (ref 78–100)
MONOCYTES # BLD AUTO: 0.6 10E3/UL (ref 0–1.3)
MONOCYTES NFR BLD AUTO: 10 %
NEUTROPHILS # BLD AUTO: 2.6 10E3/UL (ref 1.6–8.3)
NEUTROPHILS NFR BLD AUTO: 48 %
NRBC # BLD AUTO: 0 10E3/UL
NRBC BLD AUTO-RTO: 0 /100
PLATELET # BLD AUTO: 221 10E3/UL (ref 150–450)
PROT SERPL-MCNC: 6.8 G/DL (ref 6.8–8.8)
RBC # BLD AUTO: 4.17 10E6/UL (ref 3.8–5.2)
WBC # BLD AUTO: 5.5 10E3/UL (ref 4–11)

## 2022-11-16 PROCEDURE — 85025 COMPLETE CBC W/AUTO DIFF WBC: CPT

## 2022-11-16 PROCEDURE — 82565 ASSAY OF CREATININE: CPT

## 2022-11-16 PROCEDURE — 36415 COLL VENOUS BLD VENIPUNCTURE: CPT

## 2022-11-16 PROCEDURE — 80076 HEPATIC FUNCTION PANEL: CPT

## 2022-11-16 PROCEDURE — 86140 C-REACTIVE PROTEIN: CPT

## 2022-11-16 PROCEDURE — 85652 RBC SED RATE AUTOMATED: CPT

## 2022-11-28 ENCOUNTER — OFFICE VISIT (OUTPATIENT)
Dept: SLEEP MEDICINE | Facility: CLINIC | Age: 67
End: 2022-11-28
Payer: COMMERCIAL

## 2022-11-28 VITALS
OXYGEN SATURATION: 94 % | WEIGHT: 272 LBS | HEART RATE: 94 BPM | BODY MASS INDEX: 48.2 KG/M2 | DIASTOLIC BLOOD PRESSURE: 63 MMHG | SYSTOLIC BLOOD PRESSURE: 115 MMHG | HEIGHT: 63 IN

## 2022-11-28 DIAGNOSIS — G47.33 OSA (OBSTRUCTIVE SLEEP APNEA): Primary | ICD-10-CM

## 2022-11-28 DIAGNOSIS — E66.01 MORBID OBESITY WITH BMI OF 45.0-49.9, ADULT (H): ICD-10-CM

## 2022-11-28 PROCEDURE — 99203 OFFICE O/P NEW LOW 30 MIN: CPT | Performed by: NURSE PRACTITIONER

## 2022-11-28 RX ORDER — CYCLOBENZAPRINE HCL 10 MG
TABLET ORAL
COMMUNITY
Start: 2022-11-08 | End: 2023-07-20

## 2022-11-28 RX ORDER — KETOROLAC TROMETHAMINE 10 MG/1
TABLET, FILM COATED ORAL
COMMUNITY
Start: 2022-04-13 | End: 2023-07-20

## 2022-11-28 ASSESSMENT — SLEEP AND FATIGUE QUESTIONNAIRES
HOW LIKELY ARE YOU TO NOD OFF OR FALL ASLEEP IN A CAR, WHILE STOPPED FOR A FEW MINUTES IN TRAFFIC: WOULD NEVER DOZE
HOW LIKELY ARE YOU TO NOD OFF OR FALL ASLEEP WHILE WATCHING TV: MODERATE CHANCE OF DOZING
HOW LIKELY ARE YOU TO NOD OFF OR FALL ASLEEP WHILE SITTING AND TALKING TO SOMEONE: WOULD NEVER DOZE
HOW LIKELY ARE YOU TO NOD OFF OR FALL ASLEEP WHILE SITTING AND READING: WOULD NEVER DOZE
HOW LIKELY ARE YOU TO NOD OFF OR FALL ASLEEP WHILE LYING DOWN TO REST IN THE AFTERNOON WHEN CIRCUMSTANCES PERMIT: MODERATE CHANCE OF DOZING
HOW LIKELY ARE YOU TO NOD OFF OR FALL ASLEEP WHILE SITTING QUIETLY AFTER LUNCH WITHOUT ALCOHOL: SLIGHT CHANCE OF DOZING
HOW LIKELY ARE YOU TO NOD OFF OR FALL ASLEEP WHILE SITTING INACTIVE IN A PUBLIC PLACE: SLIGHT CHANCE OF DOZING
HOW LIKELY ARE YOU TO NOD OFF OR FALL ASLEEP WHEN YOU ARE A PASSENGER IN A CAR FOR AN HOUR WITHOUT A BREAK: MODERATE CHANCE OF DOZING

## 2022-11-28 NOTE — PATIENT INSTRUCTIONS
"MY INFORMATION ON SLEEP APNEA-  Sharon Fung    DOCTOR : CHELSEY Lincoln CNP  SLEEP CENTER :      MY CONTACT NUMBER:   [facility]  [phone]  [facility]   [phone]  [facility]   [phone]      [facility]  [phone]  [facility]   [phone]    [facility] [place] [address], [address]  [place] [address]  Phone: [phone]    Hours:  Mon - Fri: 8:00 a.m. - 4:30 p.m.  Sat: Closed  Sun: Closed      Key Points:  1. What is Obstructive Sleep Apnea (BRETT)? BRETT is the most common type of sleep apnea. Apnea literally means, \"without breath.\" It is characterized by repetitive pauses in breathing, despite continued effort to breathe, and is usually associated with a reduction in blood oxygen saturation. Apneas can last 10 to over 60 seconds. It is caused by narrowing or collapse of the upper airway as muscles relax during sleep.   2. What are the consequences of BRETT? Symptoms include: daytime sleepiness- possibly increasing the risk of falling asleep while driving, unrefreshing/restless sleep, snoring, insomnia, waking frequently to urinate, waking with heartburn or reflux, reduced concentration and memory, and morning headaches. Other health consequences may include development of high blood pressure. Untreated BRETT also can contribute to heart disease, stroke and diabetes.   3. What are the treatment options? In most situations, sleep apnea is a lifelong disease that must be managed with daily therapy. Continuous Positive Airway (CPAP) is the most reliable treatment. A mouthguard to hold your jaw forward is usually the next most reliable option. Other options include postioning devices (to keep you off your back), nasal valves, tongue retaining device, weight loss, surgery. There is more detail about these options toward the end of this " document.  4. What are the most important things to remember about using CPAP?     WHERE CAN I FIND MORE INFORMATION?    American Academy of Sleep Medicine Patient information on sleep disorders:  http://yoursleep.aasmnet.org    CPAP -  WHY AND HOW?                 Continuous positive airway pressure, or CPAP, is the most effective treatment for obstructive sleep apnea. It works by blowing room air, through a mask, to hold your throat open. A decision to use CPAP is a major step forward in the pursuit of a healthier life. The successful use of CPAP will help you breathe easier, sleep better and live healthier. Using CPAP can be a positive experience if you keep these jones points in mind:  Commitment  CPAP is not a quick fix for your problem. It involves a long-term commitment to improve your sleep and your health.    Communication  Stay in close communication with both your sleep doctor and your CPAP supplier. Ask lots of questions and seek help when you need it.    Consistency  Use CPAP all night, every night and for every nap. You will receive the maximum health benefits from CPAP when you use it every time that you sleep. This will also make it easier for your body to adjust to the treatment.    Correction  The first machine and mask that you try may not be the best ones for you. Work with your sleep doctor and your CPAP supplier to make corrections to your equipment selection. Ask about trying a different type of machine or mask if you have ongoing problems. Make sure that your mask is a good fit and learn to use your equipment properly.    Challenge  Tell a family member or close friend to ask you each morning if you used your CPAP the previous night. Have someone to challenge you to give it your best effort.    Connection   Your adjustment to CPAP will be easier if you are able to connect with others who use the same treatment. Ask your sleep doctor if there is a support group in your area for people who have  "sleep apnea, or look for one on the Internet.  Comfort   Increase your level of comfort by using a saline spray, decongestant or heated humidifier if CPAP irritates your nose, mouth or throat. Use your unit's \"ramp\" setting to slowly get used to the air pressure level. There may be soft pads you can buy that will fit over your mask straps. Look on www.CPAP.com for accessories that can help make CPAP use more comfortable.  Cleaning   Clean your mask, tubing and headgear on a regular basis. Put this time in your schedule so that you don't forget to do it. Check and replace the filters for your CPAP unit and humidifier.    Completion   Although you are never finished with CPAP therapy, you should reward yourself by celebrating the completion of your first month of treatment. Expect this first month to be your hardest period of adjustment. It will involve some trial and error as you find the machine, mask and pressure settings that are right for you.    Continuation  After your first month of treatment, continue to make a daily commitment to use your CPAP all night, every night and for every nap.    CPAP-Tips to starting with success:  Begin using your CPAP for short periods of time during the day while you watch TV or read.    Use CPAP every night and for every nap. Using it less often reduces the health benefits and makes it harder for your body to get used to it.    Newer CPAP models are virtually silent; however, if you find the sound of your CPAP machine to be bothersome, place the unit under your bed to dampen the sound.     Make small adjustments to your mask, tubing, straps and headgear until you get the right fit. Tightening the mask may actually worsen the leak.  If it leaves significant marks on your face or irritates the bridge of your nose, it may not be the best mask for you.  Speak with the person who supplied the mask and consider trying other masks. Insurances will allow you to try different masks " during the first month of starting CPAP.  Insurance also covers a new mask, hose and filter about every 6 months.    Use a saline nasal spray to ease mild nasal congestion. Neti-Pot or saline nasal rinses may also help. Nasal gel sprays can help reduce nasal dryness.  Biotene mouthwash can be helpful to protect your teeth if you experience frequent dry mouth.  Dry mouth may be a sign of air escaping out of your mouth or out of the mask in the case of a full face mask.  Speak with your provider if you expect that is the case.     Take a nasal decongestant to relieve more severe nasal or sinus congestion.  Do not use Afrin (oxymetazoline) nasal spray more than 3 days in a row.  Speak with your sleep doctor if your nasal congestion is chronic.    Use a heated humidifier that fits your CPAP model to enhance your breathing comfort. Adjust the heat setting up if you get a dry nose or throat, down if you get condensation in the hose or mask.  Position the CPAP lower than you so that any condensation in the hose drains back into the machine rather than towards the mask.    Try a system that uses nasal pillows if traditional masks give you problems.    Clean your mask, tubing and headgear once a week. Make sure the equipment dries fully.    Regularly check and replace the filters for your CPAP unit and humidifier.    Work closely with your sleep provider and your CPAP supplier to make sure that you have the machine, mask and air pressure setting that works best for you. It is better to stop using it and call your provider to solve problems than to lay awake all night frustrated with the device.  Weight Loss:    Weight loss decreases severity of sleep apnea in most people with obesity. For those with mild obesity who have developed snoring with weight gain, even 15-30 pound weight loss can improve and occasionally eliminate sleep apnea.  Structured and life-long dietary and health habits are necessary to lose weight and keep  healthier weight levels.     Though there are significant health benefits from weight loss, long-term weight loss is very difficult to achieve- studies show success with dietary management in less than 10% of people. In addition, substantial weight loss may require years of dietary control and may be difficult if patients have severe obesity. In these cases, surgical management may be considered.    If you are interested in methods for weight loss, you should review the options discussed at the National Institutes of Health patient information sites:     http://win.niddk.nih.gov/publications/index.htm  http:/www.health.nih.gov/topic/WeightLossDieting    Bariatric programs offer counseling in all methods of weight loss:    Http:/[email or web address]/WeightLossSurgeryandMedicalMgmt/htm    Your BMI is Body mass index is 48.18 kg/m .    Weight management plan: Patient was referred to their PCP to discuss a diet and exercise plan.    Body mass index (BMI) is one way to tell whether you are at a healthy weight, overweight, or obese. It measures your weight in relation to your height.  A BMI of 18.5 to 24.9 is in the healthy range. A person with a BMI of 25 to 29.9 is considered overweight, and someone with a BMI of 30 or greater is considered obese.  Another way to find out if you are at risk for health problems caused by overweight and obesity is to measure your waist. If you are a woman and your waist is more than 35 inches, or if you are a man and your waist is more than 40 inches, your risk of disease may be higher.  More than two-thirds of [place] adults are considered overweight or obese. Being overweight or obese increases the risk for further weight gain.  Excess weight may lead to heart disease and diabetes. Creating and following plans for healthy eating and physical activity may help you improve your health.    Methods for maintaining or losing weight.    Weight control is part of healthy  lifestyle and includes exercise, emotional health, and healthy eating habits.  Careful eating habits lifelong is the mainstay of weight control.  Though there are significant health benefits from weight loss, long-term weight loss with diet alone may be very difficult to achieve- studies show long-term success with dietary management in less than 10% of people. Attaining a healthy weight may be especially difficult to achieve in those with severe obesity. In some cases, medications, devices and surgical management might be considered.    What can you do?    If you are overweight or obese and are interested in methods for weight loss, you should discuss this with your provider. In addition, we recommend that you review healthy life styles and methods for weight loss available through the National Institutes of Health patient information sites:     http://win.niddk.nih.gov/publications/index.htm

## 2022-11-28 NOTE — PROGRESS NOTES
Outpatient Sleep Medicine Consultation:      Name: Sharon Fung MRN# [id number]   Age: 67 year old YOB: 1955     Date of Consultation: November 28, 2022  Consultation is requested by: No referring provider defined for this encounter. No ref. provider found  Primary care provider: Reynaldo Saavedra       Reason for Sleep Consult:     Sharon Fung is self-referred for a sleep consultation regarding reestablish care for BRETT on CPAP.    Patient s Reason for visit  Sharon Fung main reason for visit: need a new mchine  Patient states problem(s) started: 4 weeks  Sharon Fung's goals for this visit: order for new cpap           Assessment and Plan:     Summary Sleep Diagnoses:  1. BRETT (obstructive sleep apnea)  2. Morbid obesity with BMI of 45.0-49.9, adult (H)  - Comprehensive DME      Comorbid Diagnoses:  1.  HTN  2.  COPD  3.  Major depression  4.  GERD  5.  Rheumatoid arthritis  6.  Chronic back pain  7.  Fibromyalgia  8.  Insomnia  9.  Morbid obesity      Summary Recommendations:  1.  Reestablish care for BRETT on CPAP therapy.  Previously followed with Padmini Mo NP.  Patient needs prescription for new APAP device due to message on device indicating end of motor life.  Overall, patient is doing very well on PAP therapy and she continues to benefit from CPAP therapy.  2.  A comprehensive DME order was placed for new APAP device with pressure setting 9-15 cm H2O, nasal mask and supplies sent to [facility] today.  We discussed usual use/compliance requirements associated with new APAP device therapy.  In addition, the patient was notified that there may be a delay in obtaining her new APAP device due to the ongoing nationwide CPAP supply shortage.  3.  Patient to follow-up in approximately 2 months, if required for PAP compliance requirements, otherwise, in 6 months to review download data on new device.    Orders Placed This Encounter   Procedures     Comprehensive DME          Summary Counseling:    Previous Sleep Testing Reviewed  Obstructive Sleep Apnea Reviewed  Complications of Untreated Sleep Apnea Reviewed  Previous recent chart notes and lab results reviewed    Medical Decision-making:   Educational materials provided in instructions    Total time spent reviewing medical records, history and physical examination, review of previous testing and interpretation as well as documentation on this date: 30 minutes    CC: No ref. provider found          History of Present Illness:     Sharon Fung is a 67-year-old female with PMH pertinent for allergic rhinitis, HTN, COPD, major depression, GERD, rheumatoid arthritis, chronic back pain, fibromyalgia, BRETT, insomnia, and morbid obesity who presents today to reestablish care for history of BRETT on CPAP therapy.  The patient was last seen by virtual visit on 8/16/2018 with Padmini Mo NP in follow-up for BRETT on CPAP therapy.  She would like to obtain a new APAP device due to to message on her machine indicating end of motor life now present for the last 4 to 5 months and she is concerned about the device stopping abruptly.  She was self-referred for this sleep consultation    Past Sleep Evaluations: Yes  Two PSG's were completed with Ms. Fung.  A diagnostic polysomnogram on 03/27/2015 (wt 234#)  noting an AHI of 11.5 without REM sleep and excessive amount of N3 sleep and RDI of 13.8.  TCM values greater than 50 for more than 10 minutes were observed.  She was provided with auto-titrating CPAP and had difficulty with inadvertent removal.  Did have a titration study on 06/04/2015 with TCM and ABGs.  She was titrated to a pressure of 12 cm and a residual AHI of 4.2 was seen.  A REM supine was seen at the final pressure, study was without hypoxemia and hypoventilation. Wt was 234#.   She has intermittent RLS.      Do you use a CPAP Machine at home: Yes  Overall, on a scale of 0-10 how would you rate your CPAP (0 poor, 10 great):  8    What type of mask do you use: Nasal Pillow  Is your mask comfortable: Yes  If not, why:      Is your mask leaking: No  If yes, where do you feel it:    How many night per week does the mask leak (0-7):      Do you notice snoring with mask on: No  Do you notice gasping arousals with mask on: No  Are you having significant oral or nasal dryness: No  Is the pressure setting comfortable: Yes  If not, why:      What is your typical bedtime: 830PM  How long does it take you to go to sleep on PAP therapy: 1hour  What time do you typically get out of bed for the day: 5a  How many hours on average per night are you using PAP therapy: 9  How many hours are you sleeping per night: 9  Do you feel well rested in the morning: No      PAP Download Data:  ResMed AirSense 10  Auto-PAP 9 - 15 cmH2O 30 day usage data:  10/29/22 - 11/27/22  100% of days with > 4 hours of use. 0/30 days with no use.   Average use 9 hours 50 minutes per day.   95%ile Leak 15.4 L/min.   CPAP 95% pressure 12.7 cm.   AHI 1.1 events per hour.       SLEEP-WAKE SCHEDULE:     Work/School Days: Patient goes to school/work: Yes   Usually gets into bed at 9  Takes patient about 20 min to fall asleep  Has trouble falling asleep 1 nights per week  Wakes up in the middle of the night 3 times.  Wakes up due to Use the bathroom  She has trouble falling back asleep   times a week.   It usually takes   to get back to sleep  Patient is usually up at 530  Uses alarm: Yes    Weekends/Non-work Days/All Other Days:  Usually gets into bed at 10   Takes patient about 20 to fall asleep  Patient is usually up at 730  Uses alarm: No    Sleep Need  Patient gets  9 sleep on average   Patient thinks she needs about 9 sleep    Sharon Burtonjessica prefers to sleep in this position(s): Side   Patient states they do the following activities in bed:      Naps  Patient takes a purposeful nap   times a week and naps are usually none in duration  She feels better after a nap: Yes  She  dozes off unintentionally 0 days per week  Patient has had a driving accident or near-miss due to sleepiness/drowsiness: No      SLEEP DISRUPTIONS:    Breathing/Snoring  Patient snores:Yes  Other people complain about her snoring: Yes  Patient has been told she stops breathing in her sleep:Yes  She has issues with the following: Morning mouth dryness    Movement:  Patient gets pain, discomfort, with an urge to move:  No  It happens when she is resting:  No  It happens more at night:  Yes  Patient has been told she kicks her legs at night:  No     Behaviors in Sleep:  Sharon Fung has experienced the following behaviors while sleeping: Recurring Nightmares  She has experienced sudden muscle weakness during the day: No      Is there anything else you would like your sleep provider to know: no      CAFFEINE AND OTHER SUBSTANCES:    Patient consumes caffeinated beverages per day:  4  Last caffeine use is usually: 3  List of any prescribed or over the counter stimulants that patient takes: none  List of any prescribed or over the counter sleep medication patient takes: none  List of previous sleep medications that patient has tried: none  Patient drinks alcohol to help them sleep: No  Patient drinks alcohol near bedtime: No    Family History:  Patient has a family member been diagnosed with a sleep disorder: No            SCALES:    EPWORTH SLEEPINESS SCALE      [place] Sleepiness Scale ( NEO Nicole  1990-1997[facility]/English - Final version - 21 Nov 07 - [facility] [place].) 11/28/2022   Sitting and reading Would never doze   Watching TV Moderate chance of dozing   Sitting, inactive in a public place (e.g. a theatre or a meeting) Slight chance of dozing   As a passenger in a car for an hour without a break Moderate chance of dozing   Lying down to rest in the afternoon when circumstances permit Moderate chance of dozing   Sitting and talking to someone Would never doze   Sitting quietly after a lunch without  alcohol Slight chance of dozing   In a car, while stopped for a few minutes in traffic Would never doze   [place] Score (MC) 8   [place]lyn Score (Sleep) 8         INSOMNIA SEVERITY INDEX (MIGUEL)      Insomnia Severity Index (MIGUEL) 11/28/2022   Difficulty falling asleep 1   Difficulty staying asleep 1   Problems waking up too early 0   How SATISFIED/DISSATISFIED are you with your CURRENT sleep pattern? 1   How NOTICEABLE to others do you think your sleep problem is in terms of impairing the quality of your life? 0   How WORRIED/DISTRESSED are you about your current sleep problem? 1   To what extent do you consider your sleep problem to INTERFERE with your daily functioning (e.g. daytime fatigue, mood, ability to function at work/daily chores, concentration, memory, mood, etc.) CURRENTLY? 1   MIGUEL Total Score 5       Guidelines for Scoring/Interpretation:  Total score categories:  0-7 = No clinically significant insomnia   8-14 = Subthreshold insomnia   15-21 = Clinical insomnia (moderate severity)  22-28 = Clinical insomnia (severe)  Used via courtesy of www.[place].va.gov with permission from Estrada Pickering PhD., [facility]      STOP BANG     STOP BANG Questionnaire (  2008, the American Society of Anesthesiologists, Inc. [employer] 11/28/2022   1. Snoring - Do you snore loudly (louder than talking or loud enough to be heard through closed doors)? Yes   2. Tired - Do you often feel tired, fatigued, or sleepy during daytime? Yes   3. Observed - Has anyone observed you stop breathing during your sleep? Yes   4. Blood pressure - Do you have or are you being treated for high blood pressure? Yes   5. BMI - BMI more than 35 kg/m2? Yes   6. Age - Age over 50 yr old? Yes   7. Neck circumference - Neck circumference greater than 40 cm? No   8. Gender - Gender male? Yes   STOP BANG Score (MC): 6 (High risk of BRETT)   B/P Clinic: 115/63   BMI Clinic: 48.18         GAD7    JUANPABLO-7  11/12/2021   1.  "Feeling nervous, anxious, or on edge 1   2. Not being able to stop or control worrying 0   3. Worrying too much about different things 1   4. Trouble relaxing 0   5. Being so restless that it is hard to sit still 0   6. Becoming easily annoyed or irritable 1   7. Feeling afraid, as if something awful might happen 0   JUANPABLO-7 Total Score 3   If you checked any problems, how difficult have they made it for you to do your work, take care of things at home, or get along with other people? Not difficult at all         CAGE-AID    No flowsheet data found.    CAGE-AID reprinted with permission from the [place] Medical Journal, MEGAN Montoya. and OREN Arredondo, \"Conjoint screening questionnaires for alcohol and drug abuse\" [place] Medical Journal 94: 135-140, 1995.      PATIENT HEALTH QUESTIONNAIRE-9 (PHQ - 9)    PHQ-9 ([employer]) 7/1/2022   No Interest In Doing Things -   Feeling Depressed -   Trouble Sleeping -   Tired / No Energy -   No appetite or Over-Eating -   Feeling Bad about Self -   Trouble Concentrating -   Moving Slow or Restless -   Suicidal Thoughts -   Total Score -   1.  Little interest or pleasure in doing things 0   2.  Feeling down, depressed, or hopeless 0   3.  Trouble falling or staying asleep, or sleeping too much 0   4.  Feeling tired or having little energy 2   5.  Poor appetite or overeating 0   6.  Feeling bad about yourself 0   7.  Trouble concentrating 0   8.  Moving slowly or restless 0   9.  Suicidal or self-harm thoughts 0   PHQ-9 Total Score 2   Difficulty at work, home, or with people -   1.  Little interest or pleasure in doing things Not at all   2.  Feeling down, depressed, or hopeless Not at all   3.  Trouble falling or staying asleep, or sleeping too much Not at all   4.  Feeling tired or having little energy More than half the days   5.  Poor appetite or overeating Not at all   6.  Feeling bad about yourself Not at all   7.  Trouble concentrating Not at all   8.  Moving slowly or restless " Not at all   9.  Suicidal or self-harm thoughts Not at all   PHQ-9 via [facility] TOTAL SCORE-----> 2 (Minimal depression)   Difficulty at work, home, or with people Not difficult at all       Developed by Bridgette Dorsey, Danica Pickering, Deion Del Rio and colleagues, with an educational taylor from [employer]. No permission required to reproduce, translate, display or distribute.        Allergies:    No Known Allergies    Medications:    Current Outpatient Medications   Medication Sig Dispense Refill     acetaminophen (TYLENOL) 500 MG tablet Take 500-1,000 mg by mouth every 6 hours as needed for mild pain       albuterol (PROAIR HFA/PROVENTIL HFA/VENTOLIN HFA) 108 (90 Base) MCG/ACT inhaler Inhale 2 puffs into the lungs every 6 hours as needed for shortness of breath / dyspnea or wheezing 1 Inhaler 1     azelastine (OPTIVAR) 0.05 % ophthalmic solution Apply 1 drop to eye 2 times daily 1 Bottle 11     citalopram (CELEXA) 20 MG tablet Take 1 tablet (20 mg) by mouth daily 90 tablet 3     cyclobenzaprine (FLEXERIL) 10 MG tablet        cycloSPORINE (RESTASIS) 0.05 % ophthalmic emulsion Place 1 drop into both eyes 2 times daily 60 each 11     fexofenadine (ALLEGRA) 180 MG tablet Take 1 tablet (180 mg) by mouth daily 90 tablet 2     fluticasone (FLONASE) 50 MCG/ACT nasal spray Spray 2 sprays into both nostrils as needed        gabapentin (NEURONTIN) 100 MG capsule TAKE ONE CAPSULE BY MOUTH EVERY MORNING, TAKE ONE MID-DAY, AND TAKE FOUR CAPSULES BY MOUTH EVERY NIGHT AT BEDTIME 180 capsule 5     ibuprofen 200 MG capsule Take 600-800 mg by mouth At Bedtime       ketorolac (TORADOL) 10 MG tablet        leflunomide (ARAVA) 20 MG tablet Take 1 tablet (20 mg) by mouth daily 90 tablet 2     losartan-hydrochlorothiazide (HYZAAR) 100-25 MG tablet Take 1 tablet by mouth daily 90 tablet 3     ondansetron (ZOFRAN-ODT) 8 MG ODT tab Take 1 tablet (8 mg) by mouth every 8 hours as needed for nausea 40 tablet 2     ORDER FOR DME Use  your CPAP device as directed by your provider.       Sarilumab 200 MG/1.14ML SOAJ Inject 1.14 mLs (200 mg) Subcutaneous every 14 days . Hold for signs of infection and seek medical attention. 6.84 mL 2     sulfaSALAzine (AZULFIDINE) 500 MG tablet Take 3 tablets (1,500 mg) by mouth 2 times daily 540 tablet 2     vitamin D3 (CHOLECALCIFEROL) 50 mcg (2000 units) tablet Take 1 tablet (50 mcg) by mouth daily 90 tablet 3     neomycin-polymyxin-dexamethasone (MAXITROL) 3.5-34976-5.1 ophthalmic ointment Apply to eyelids 3 x day for 7 days. (Patient not taking: Reported on 11/28/2022) 3.5 g 0     olopatadine (PATADAY) 0.2 % ophthalmic solution Place 1 drop into both eyes daily (Patient not taking: Reported on 11/28/2022) 2.5 mL 11     sodium chloride (DIMAS 128) 5 % ophthalmic ointment Insert into eyes at night. (Patient not taking: Reported on 11/28/2022) 3.5 g 12     sodium chloride (DIMAS 128) 5 % ophthalmic solution Place 1-2 drops into both eyes 3 times daily (Patient not taking: Reported on 11/28/2022)         Problem List:  Patient Active Problem List    Diagnosis Date Noted     High risk medication use 06/14/2013     Priority: High     Chronic back pain, unspecified back location, unspecified back pain laterality 07/01/2022     Priority: Medium     Hyperlipidemia LDL goal <100 07/01/2022     Priority: Medium     Hypertension goal BP (blood pressure) < 140/90 12/20/2021     Priority: Medium     Pain in joint, ankle and foot, right 05/18/2021     Priority: Medium     Added automatically from request for surgery [id number]       Fibromyalgia 04/17/2019     Priority: Medium     Immunosuppression (H) 12/07/2018     Priority: Medium     Headache 07/11/2017     Priority: Medium     Carpal tunnel syndrome of right wrist 06/22/2017     Priority: Medium     Seropositive rheumatoid arthritis (H) 07/15/2016     Priority: Medium     LORNA positive 07/15/2016     Priority: Medium     Anterior basement membrane dystrophy 06/21/2016      Priority: Medium     Obesity, Class III, BMI 40-49.9 (morbid obesity) (H) 05/19/2016     Priority: Medium     Obstructive sleep apnea 05/12/2015     Priority: Medium     Problem list name updated by automated process. Provider to review       Advanced directives, counseling/discussion 06/07/2012     Priority: Medium     Patient states has Advance Directive and will bring in a copy to clinic. 6/7/2012        Mild major depression (H)      Priority: Medium     Status post bariatric surgery      Priority: Medium     lap band       AR (allergic rhinitis)      Priority: Medium     GERD (gastroesophageal reflux disease)      Priority: Medium        Past Medical/Surgical History:  Past Medical History:   Diagnosis Date     AR (allergic rhinitis)  as teen     Arthritis 12/14/2015     Chronic obstructive pulmonary disease, unspecified COPD type (H) 3/27/2018     Depressive disorder 3 years ago     GERD (gastroesophageal reflux disease) 4-07     Headache 7/11/2017     Hypertension goal BP (blood pressure) < 140/90 12/20/2021     Mild major depression (H) 3 years ago     Morbid obesity (H) teens     BRETT (obstructive sleep apnea)     uses CPAP     RA (rheumatoid arthritis) (H) 4 years     Reduced vision 3 years     Rheumatoid arthritis, adult (H)      Past Surgical History:   Procedure Laterality Date     ARTHRODESIS FOOT Right 6/30/2021    Procedure: Right 1st metatarsophalangeal joint fusion;  Surgeon: Jesus Wolf MD;  Location: [facility]     ARTHRODESIS TOE(S) Right 12/27/2021    Procedure: right revision great toe fusion;  Surgeon: Ryan Gee MD;  Location:  OR     BLEPHAROPLASTY BILATERAL Bilateral 8/5/2016    Procedure: BLEPHAROPLASTY BILATERAL;  Surgeon: Cortez Robin MD;  Location:  [facility]     BREAST BIOPSY, RT/LT  1-94    Benign     COLONOSCOPY       COLONOSCOPY WITH CO2 INSUFFLATION N/A 3/30/2017    Procedure: COLONOSCOPY WITH CO2 INSUFFLATION;  Surgeon: Issa Weeks MD;  Location:   OR     ESOPHAGOSCOPY, GASTROSCOPY, DUODENOSCOPY (EGD), COMBINED N/A 10/29/2015    Procedure: COMBINED ESOPHAGOSCOPY, GASTROSCOPY, DUODENOSCOPY (EGD);  Surgeon: Shaq Mims MD;  Location: [facility]     LAPAROSCOPIC GASTRIC ADJUSTABLE BANDING  11/09/10    Lap band procedure     LAPAROSCOPIC REMOVAL GASTRIC ADJUSTABLE BAND N/A 10/31/2015    Procedure: LAPAROSCOPIC REMOVAL GASTRIC ADJUSTABLE BAND;  Surgeon: Shaq Mims MD;  Location: [facility] OR     MI HAND/FINGER SURGERY UNLISTED  2016     MI STOMACH SURGERY PROCEDURE UNLISTED  11/2015     RELEASE CARPAL TUNNEL Left 4/27/2017    Procedure: RELEASE CARPAL TUNNEL;  Left Open Carpal Tunnel Release;  Surgeon: Ciara Middleton MD;  Location: [facility]     RELEASE CARPAL TUNNEL Right 6/15/2017    Procedure: RELEASE CARPAL TUNNEL;  Right Carpal Tunnel Release Open;  Surgeon: Ciara Middleton MD;  [facility]     REPAIR PTOSIS       TUBAL LIGATION  1988     Z APPENDECTOMY  1977       Social History:  Social History     Socioeconomic History     Marital status:      Spouse name: Not on file     Number of children: 2     Years of education: 18     Highest education level: Not on file   Occupational History     Occupation: RN     Employer: [employer]   Tobacco Use     Smoking status: Never     Smokeless tobacco: Never   Vaping Use     Vaping Use: Never used   Substance and Sexual Activity     Alcohol use: No     Alcohol/week: 1.0 - 2.0 standard drink     Drug use: No     Sexual activity: Not Currently     Partners: Male     Birth control/protection: Abstinence, Female Surgical   Other Topics Concern     Parent/sibling w/ CABG, MI or angioplasty before 65F 55M? No   Social History Narrative     Not on file     Social Determinants of Health     Financial Resource Strain: Not on file   Food Insecurity: Not on file   Transportation Needs: Not on file   Physical Activity: Not on file   Stress: Not on file   Social Connections: Not on file  "  Intimate Partner Violence: Not on file   Housing Stability: Not on file       Family History:  Family History   Problem Relation Age of Onset     Heart Disease Father         MI     Neurologic Disorder Father 79        Parkinson's     Diabetes Father      Hypertension Father      Coronary Artery Disease Father      Cancer Maternal Grandmother         uterine     Neurologic Disorder Sister 16        migraine     Depression Sister      Neurologic Disorder Mother 20        migraine     Depression Mother      Neurologic Disorder Son 7        migraine     Substance Abuse Son      Migraines Son         none     Depression Sister      Substance Abuse Sister      Migraines Sister        Review of Systems:  A complete review of systems reviewed by me is negative with the exeption of what has been mentioned in the history of present illness.  In the last TWO WEEKS have you experienced any of the following symptoms?  Fevers: No  Night Sweats: No  Weight Gain: No  Pain at Night: No  Double Vision: No  Changes in Vision: No  Difficulty Breathing through Nose: No  Sore Throat in Morning: No  Dry Mouth in the Morning: No  Shortness of Breath Lying Flat: Yes  Shortness of Breath With Activity: Yes  Awakening with Shortness of Breath: No  Increased Cough: No  Heart Racing at Night: No  Swelling in Feet or Legs: No  Diarrhea at Night: No  Heartburn at Night: No  Urinating More than Once at Night: Yes  Losing Control of Urine at Night: No  Joint Pains at Night: Yes  Headaches in Morning: No  Weakness in Arms or Legs: No  Depressed Mood: No  Anxiety: No     Physical Examination:  Vitals: /63   Pulse 94   Ht 1.6 m (5' 3\")   Wt 123.4 kg (272 lb)   SpO2 94%   BMI 48.18 kg/m    BMI= Body mass index is 48.18 kg/m .           GENERAL APPEARANCE: healthy, alert, no distress and cooperative  EYES: Eyes grossly normal to inspection, PERRL, conjunctivae and sclerae normal, lids and lashes normal and wearing eyeglasses  HENT: nose " and mouth without ulcers or lesions, tongue base enlarged, oral mucous membranes moist and normal cephalic/atraumatic  NECK: no adenopathy, no asymmetry, masses, or scars, thyroid normal to palpation and trachea midline and normal to palpation  RESP: lungs clear to auscultation - no rales, rhonchi or wheezes  CV: regular rates and rhythm, normal S1 S2, no S3 or S4 and no murmur, click or rub  ABDOMEN: obese and soft, non-tender  MS: extremities normal- no gross deformities noted  NEURO: Normal strength and tone, mentation intact and speech normal  PSYCH: mentation appears normal and affect normal/bright  Mallampati Class: III.  Tonsillar Stage: 1  hidden by pillars.         Data: All pertinent previous laboratory data reviewed     Recent Labs   Lab Test 11/16/22  0937 07/01/22  0807 03/11/22  0910 12/27/21  0710 12/20/21  0851   NA  --  140  --   --  139   POTASSIUM  --  3.9  --  3.5 3.7   CHLORIDE  --  107  --   --  105   CO2  --  27  --   --  28   ANIONGAP  --  6  --   --  6   GLC  --  96  --   --  144*   BUN  --  18  --   --  20   CR 0.71 0.74   < >  --  0.68   ISH  --  9.3  --   --  9.3    < > = values in this interval not displayed.       Recent Labs   Lab Test 11/16/22  0937   WBC 5.5   RBC 4.17   HGB 13.0   HCT 38.8   MCV 93   MCH 31.2   MCHC 33.5   RDW 12.9          Recent Labs   Lab Test 11/16/22 0937   PROTTOTAL 6.8   ALBUMIN 3.7   BILITOTAL 0.5   ALKPHOS 100   AST 26   ALT 47       TSH (mU/L)   Date Value   09/29/2015 1.20   08/28/2012 1.48       No results found for: UAMP, UBARB, BENZODIAZEUR, UCANN, UCOC, OPIT, UPCP    Iron Saturation Index   Date/Time Value Ref Range Status   07/24/2018 07:38 AM 37 15 - 46 % Final     Ferritin   Date/Time Value Ref Range Status   07/24/2018 07:38  8 - 252 ng/mL Final       pH Arterial (pH)   Date Value   06/04/2015 7.43   03/27/2015 7.39     pO2 Arterial (mm Hg)   Date Value   06/04/2015 81   03/27/2015 77 (L)     pCO2 Arterial (mm Hg)   Date Value    06/04/2015 44   03/27/2015 46 (H)     Bicarbonate Arterial (mmol/L)   Date Value   06/04/2015 29 (H)   03/27/2015 28     Base Excess Art (mmol/L)   Date Value   06/04/2015 4.4   03/27/2015 2.8       @LABRCNTIPR(phv:4,pco2v:4,po2v:4,hco3v:4,jaspreet:4,o2per:4)@    Echocardiology: No results found for this or any previous visit (from the past 4320 hour(s)).    Chest x-ray: No results found for this or any previous visit from the past 365 days.      Chest CT: No results found for this or any previous visit from the past 365 days.      PFT: Most Recent [facility] Pulmonary Function Testing    FVC-Pred   Date Value Ref Range Status   02/22/2019 3.14 L      FVC-Pre   Date Value Ref Range Status   02/22/2019 2.24 L      FVC-%Pred-Pre   Date Value Ref Range Status   02/22/2019 71 %      FEV1-Pre   Date Value Ref Range Status   02/22/2019 1.70 L      FEV1-%Pred-Pre   Date Value Ref Range Status   02/22/2019 68 %      FEV1FVC-Pred   Date Value Ref Range Status   02/22/2019 79 %      FEV1FVC-Pre   Date Value Ref Range Status   02/22/2019 76 %      No results found for: 36194  FEFMax-Pred   Date Value Ref Range Status   02/22/2019 6.24 L/sec      FEFMax-Pre   Date Value Ref Range Status   02/22/2019 4.36 L/sec      FEFMax-%Pred-Pre   Date Value Ref Range Status   02/22/2019 69 %      ExpTime-Pre   Date Value Ref Range Status   02/22/2019 7.77 sec      FIFMax-Pre   Date Value Ref Range Status   02/22/2019 4.94 L/sec      FEV1FEV6-Pred   Date Value Ref Range Status   02/22/2019 81 %      FEV1FEV6-Pre   Date Value Ref Range Status   02/22/2019 76 %    Narrative  Performed by: BREEZE PFT  The FEV1 and FVC are reduced but the FEV1/FVC ratio is normal.  Lung volumes are within normal limits.  The diffusing capacity is normal.  However, the diffusing capacity was not corrected for the patient's hemoglobin.   IMPRESSION:   Non-specific reduction in FEV1 and FVC   Normal lung volumes and diffusion   Six minute walk distance is normal on room  air.  There was desaturation without hypoxia during the walk.  Lowest O2 saturation was 90%.          CHELSEY Lincoln CNP 11/28/2022   Sleep Medicine      This note was written with the assistance of the Dragon voice-dictation technology software. The final document, although reviewed, may contain errors. For corrections, please contact the office.

## 2022-11-28 NOTE — NURSING NOTE
DME Order placed for new APAP device, mask & Supplies.    Follow up in 2 months if required, otherwise in 6 months.

## 2022-12-08 ENCOUNTER — TELEPHONE (OUTPATIENT)
Dept: SLEEP MEDICINE | Facility: CLINIC | Age: 67
End: 2022-12-08

## 2022-12-15 NOTE — H&P (VIEW-ONLY)
[facility]  [address]  [place] [address]  Phone: [phone]  Primary Provider: Callum Lemons  Pre-op Performing Provider: CALLUM LEMONS      PREOPERATIVE EVALUATION:  Today's date: 12/20/2021    Sharon Fung is a 66 year old female who presents for a preoperative evaluation.    Surgical Information:  Surgery/Procedure: Right toe revision  Surgery Location: [facility]  Surgeon: Dr. Ryan Gee  Surgery Date: 12/27/21  Time of Surgery: ?  Where patient plans to recover: At home with family  Fax number for surgical facility:     Type of Anesthesia Anticipated: to be determined    Assessment & Plan     The proposed surgical procedure is considered LOW to INTERMEDIATE risk (low risk procedure done on patient with comorbidities as listed below).      ICD-10-CM    1. Preop general physical exam  Z01.818    2. Right foot pain  M79.671    3. Hypertension goal BP (blood pressure) < 140/90  I10 Basic metabolic panel  (Ca, Cl, CO2, Creat, Gluc, K, Na, BUN)     Basic metabolic panel  (Ca, Cl, CO2, Creat, Gluc, K, Na, BUN)   4. Obesity, Class III, BMI 40-49.9 (morbid obesity) (H)  E66.01    5. Seropositive rheumatoid arthritis (H)  M05.9    6. High risk medication use  Z79.899    7. Obstructive sleep apnea  G47.33      Risks and Recommendations:  The patient has the following additional risks and recommendations for perioperative complications:   - No identified additional risk factors other than previously addressed    Medication Instructions:   - ibuprofen (Advil, Motrin): HOLD 1 day before surgery.     RECOMMENDATION:  APPROVAL GIVEN to proceed with proposed procedure, without further diagnostic evaluation.        Subjective     HPI related to upcoming procedure: 66-year-old female who underwent right foot arthrodesis surgery back in June of this year with Dr. Wolf, but has had persistent swelling and discomfort since then.  One of the screws in her foot broke.  She is coming  in now for a revision procedure to be done by Dr. Gee.    Preop Questions 12/20/2021   1. Have you ever had a heart attack or stroke? No   2. Have you ever had surgery on your heart or blood vessels, such as a stent placement, a coronary artery bypass, or surgery on an artery in your head, neck, heart, or legs? No   3. Do you have chest pain with activity? No   4. Do you have a history of  heart failure? No   5. Do you currently have a cold, bronchitis or symptoms of other infection? No   6. Do you have a cough, shortness of breath, or wheezing? No   7. Do you or anyone in your family have previous history of blood clots? No   8. Do you or does anyone in your family have a serious bleeding problem such as prolonged bleeding following surgeries or cuts? No   9. Have you ever had problems with anemia or been told to take iron pills? No   10. Have you had any abnormal blood loss such as black, tarry or bloody stools, or abnormal vaginal bleeding? No   11. Have you ever had a blood transfusion? No   12. Are you willing to have a blood transfusion if it is medically needed before, during, or after your surgery? Yes   13. Have you or any of your relatives ever had problems with anesthesia? No   14. Do you have sleep apnea, excessive snoring or daytime drowsiness? YES -- has BRETT and uses CPAP   14a. Do you have a CPAP machine? Yes   15. Do you have any artifical heart valves or other implanted medical devices like a pacemaker, defibrillator, or continuous glucose monitor? No   16. Do you have artificial joints? No   17. Are you allergic to latex? No         Status of Chronic Conditions:  See problem list for active medical problems.  Problems all longstanding and stable, except as noted/documented.  See ROS for pertinent symptoms related to these conditions.      Review of Systems  CONSTITUTIONAL:Has had gradual weight gain  INTEGUMENTARY/SKIN: NEGATIVE for worrisome rashes, moles or lesions  EYES: NEGATIVE for vision  changes or irritation  ENT/MOUTH: NEGATIVE for ear, mouth and throat problems  RESP: NEGATIVE for significant cough or SOB  CV: NEGATIVE for chest pain, palpitations or peripheral edema  GI: Gets nauseated with a.m. meds, especially if she takes them without food  : NEGATIVE for frequency, dysuria, or hematuria  MUSCULOSKELETAL:POSITIVE  for arthralgias Of various joints  NEURO: NEGATIVE for weakness, dizziness or paresthesias  ENDOCRINE: NEGATIVE for temperature intolerance, skin/hair changes  HEME: NEGATIVE for bleeding problems  PSYCHIATRIC: Takes medication for depression    Patient Active Problem List    Diagnosis Date Noted     High risk medication use 06/14/2013     Priority: High     Pain in joint, ankle and foot, right 05/18/2021     Priority: Medium     Added automatically from request for surgery [id number]       Fibromyalgia 04/17/2019     Priority: Medium     Immunosuppression (H) 12/07/2018     Priority: Medium     Headache 07/11/2017     Priority: Medium     Carpal tunnel syndrome of right wrist 06/22/2017     Priority: Medium     Seropositive rheumatoid arthritis (H) 07/15/2016     Priority: Medium     LORNA positive 07/15/2016     Priority: Medium     Anterior basement membrane dystrophy 06/21/2016     Priority: Medium     Obesity, Class III, BMI 40-49.9 (morbid obesity) (H) 05/19/2016     Priority: Medium     Obstructive sleep apnea 05/12/2015     Priority: Medium     Problem list name updated by automated process. Provider to review       Advanced directives, counseling/discussion 06/07/2012     Priority: Medium     Patient states has Advance Directive and will bring in a copy to clinic. 6/7/2012        Mild major depression (H)      Priority: Medium     Status post bariatric surgery      Priority: Medium     lap band       AR (allergic rhinitis)      Priority: Medium     GERD (gastroesophageal reflux disease)      Priority: Medium      Past Medical History:   Diagnosis Date     AR (allergic  rhinitis)  as teen     Arthritis 12/14/2015     Chronic obstructive pulmonary disease, unspecified COPD type (H) 3/27/2018     Depressive disorder 3 years ago     GERD (gastroesophageal reflux disease) 4-07     Headache 7/11/2017     Mild major depression (H) 3 years ago     Morbid obesity (H) teens     BRETT (obstructive sleep apnea)     uses CPAP     RA (rheumatoid arthritis) (H) 4 years     Reduced vision 3 years     Rheumatoid arthritis, adult (H)      Past Surgical History:   Procedure Laterality Date     ARTHRODESIS FOOT Right 6/30/2021    Procedure: Right 1st metatarsophalangeal joint fusion;  Surgeon: Jesus Wolf MD;  Location: [facility]     BLEPHAROPLASTY BILATERAL Bilateral 8/5/2016    Procedure: BLEPHAROPLASTY BILATERAL;  Surgeon: Cortez Robin MD;  Location: [facility]     BREAST BIOPSY, RT/LT  1-94    Benign     C APPENDECTOMY  1977     COLONOSCOPY       COLONOSCOPY WITH CO2 INSUFFLATION N/A 3/30/2017    Procedure: COLONOSCOPY WITH CO2 INSUFFLATION;  Surgeon: Issa Weeks MD;  Location: [facility] OR     ESOPHAGOSCOPY, GASTROSCOPY, DUODENOSCOPY (EGD), COMBINED N/A 10/29/2015    Procedure: COMBINED ESOPHAGOSCOPY, GASTROSCOPY, DUODENOSCOPY (EGD);  Surgeon: Shaq Mims MD;  Location: [facility]     LAPAROSCOPIC GASTRIC ADJUSTABLE BANDING  11/09/10    Lap band procedure     LAPAROSCOPIC REMOVAL GASTRIC ADJUSTABLE BAND N/A 10/31/2015    Procedure: LAPAROSCOPIC REMOVAL GASTRIC ADJUSTABLE BAND;  Surgeon: Shaq Mims MD;  Location: [facility] OR     LA HAND/FINGER SURGERY UNLISTED  2016     LA STOMACH SURGERY PROCEDURE UNLISTED  11/2015     RELEASE CARPAL TUNNEL Left 4/27/2017    Procedure: RELEASE CARPAL TUNNEL;  Left Open Carpal Tunnel Release;  Surgeon: Ciara Middleton MD;  Location: [facility]     RELEASE CARPAL TUNNEL Right 6/15/2017    Procedure: RELEASE CARPAL TUNNEL;  Right Carpal Tunnel Release Open;  Surgeon: Ciara Middleton MD;  Location: [facility]     REPAIR PTOSIS        TUBAL LIGATION  1988     Current Outpatient Medications   Medication Sig Dispense Refill     acetaminophen (TYLENOL) 500 MG tablet Take 500-1,000 mg by mouth every 6 hours as needed for mild pain       albuterol (PROAIR HFA/PROVENTIL HFA/VENTOLIN HFA) 108 (90 Base) MCG/ACT inhaler Inhale 2 puffs into the lungs every 6 hours as needed for shortness of breath / dyspnea or wheezing 1 Inhaler 1     azelastine (OPTIVAR) 0.05 % ophthalmic solution Apply 1 drop to eye 2 times daily 1 Bottle 11     citalopram (CELEXA) 20 MG tablet Take 1 tablet (20 mg) by mouth daily 90 tablet 0     cycloSPORINE (RESTASIS) 0.05 % ophthalmic emulsion Place 1 drop into both eyes 2 times daily 60 each 11     diclofenac (VOLTAREN) 1 % topical gel Apply 2 g topically 4 times daily 100 g 1     fexofenadine (ALLEGRA) 180 MG tablet Take 1 tablet (180 mg) by mouth daily 90 tablet 2     fluticasone (FLONASE) 50 MCG/ACT nasal spray Spray 2 sprays into both nostrils as needed       gabapentin (NEURONTIN) 100 MG capsule TAKE ONE CAPSULE BY MOUTH EVERY MORNING, ONE CAPSULE BY MOUTH AT MIDDAY, AND TAKE FOUR CAPSULES BY MOUTH EVERY NIGHT AT BEDTIME 180 capsule 5     HYDROcodone-acetaminophen (NORCO) 5-325 MG tablet Take 1-2 tablets by mouth every 4 hours as needed for moderate to severe pain 25 tablet 0     hydrOXYzine (ATARAX) 25 MG tablet Take 1 tablet (25 mg) by mouth every 8 hours as needed for itching or anxiety (pain) 20 tablet 0     ibuprofen 200 MG capsule Take 600-800 mg by mouth At Bedtime       leflunomide (ARAVA) 20 MG tablet Take 1 tablet (20 mg) by mouth daily 90 tablet 2     losartan-hydrochlorothiazide (HYZAAR) 100-25 MG tablet Take 1 tablet by mouth daily 90 tablet 3     ondansetron (ZOFRAN-ODT) 8 MG ODT tab Take 1 tablet (8 mg) by mouth every 8 hours as needed for nausea 40 tablet 2     ORDER FOR DME Use your CPAP device as directed by your provider.       Sarilumab 200 MG/1.14ML SOAJ Inject 1.14 mLs (200 mg) Subcutaneous every 14 days .  Hold for signs of infection and seek medical attention. 6.84 mL 2     sulfaSALAzine (AZULFIDINE) 500 MG tablet Take 3 tablets (1,500 mg) by mouth 2 times daily 540 tablet 2     vitamin D3 (CHOLECALCIFEROL) 50 mcg (2000 units) tablet Take 1 tablet (50 mcg) by mouth daily 90 tablet 3       No Known Allergies     Social History     Tobacco Use     Smoking status: Never Smoker     Smokeless tobacco: Never Used   Substance Use Topics     Alcohol use: No     Alcohol/week: 1.0 - 2.0 standard drink     Family History   Problem Relation Age of Onset     Heart Disease Father         MI     Neurologic Disorder Father 79        Parkinson's     Diabetes Father      Hypertension Father      Coronary Artery Disease Father      Cancer Maternal Grandmother         uterine     Neurologic Disorder Sister 16        migraine     Depression Sister      Neurologic Disorder Mother 20        migraine     Depression Mother      Neurologic Disorder Son 7        migraine     Substance Abuse Son      Migraines Son         none     Depression Sister      Substance Abuse Sister      Migraines Sister      History   Drug Use No         Objective     /59 (BP Location: Right arm, Patient Position: Sitting, Cuff Size: Adult Large)   Pulse 88   Temp 98  F (36.7  C) (Oral)   Wt 122 kg (269 lb)   SpO2 95%   BMI 47.37 kg/m      Physical Exam    GENERAL APPEARANCE: alert, no distress, cooperative and over weight     EYES: EOMI, PERRL     HENT: Grossly normal     NECK: Supple     RESP: lungs clear to auscultation - no rales, rhonchi or wheezes     CV: regular rates and rhythm, normal S1 S2, no S3 or S4 and no murmur, click or rub     ABDOMEN:  soft, nontender, no HSM or masses and bowel sounds normal     MS: Has a well-healed scar at the base of the first MTP joint of the right foot and some swelling and discomfort there to palpation     SKIN: no suspicious lesions or rashes     NEURO: Normal strength and tone, sensory exam grossly normal,  mentation intact and speech normal     PSYCH: mentation appears normal and affect normal/bright    Recent Labs   Lab Test 11/30/21  0806 08/23/21  1131 08/03/21  1150   HGB 12.9 13.0 13.3    216 206     --  138   POTASSIUM 3.3*  --  3.9   CR 0.61 0.73 0.66   A1C 4.4  --   --         Diagnostics:  LABS:   Office Visit on 12/20/2021   Component Date Value Ref Range Status     Sodium 12/20/2021 139  133 - 144 mmol/L Final     Potassium 12/20/2021 3.7  3.4 - 5.3 mmol/L Final     Chloride 12/20/2021 105  94 - 109 mmol/L Final     Carbon Dioxide (CO2) 12/20/2021 28  20 - 32 mmol/L Final     Anion Gap 12/20/2021 6  3 - 14 mmol/L Final     Urea Nitrogen 12/20/2021 20  7 - 30 mg/dL Final     Creatinine 12/20/2021 0.68  0.52 - 1.04 mg/dL Final     Calcium 12/20/2021 9.3  8.5 - 10.1 mg/dL Final     Glucose 12/20/2021 144* 70 - 99 mg/dL Final     GFR Estimate 12/20/2021 >90  >60 mL/min/1.73m2 Final    As of July 11, 2021, eGFR is calculated by the CKD-EPI creatinine equation, without race adjustment. eGFR can be influenced by muscle mass, exercise, and diet. The reported eGFR is an estimation only and is only applicable if the renal function is stable.       EKG: from 6/11/21 -- Sinus  Rhythm   Low voltage in precordial leads.    -Left atrial enlargement.     Revised Cardiac Risk Index (RCRI):  The patient has the following serious cardiovascular risks for perioperative complications:   - No serious cardiac risks = 0 points   (but does have morbid obesity and use of high-risk medications)    RCRI Interpretation: 0 points: Class I (very low risk - 0.4% complication rate)           Signed Electronically by: Reynaldo Saavedra MD  Copy of this evaluation report is provided to requesting physician.       Kelvin Starks  [facility]  [facility]  Scheduled Appointment: 12/22/2022

## 2022-12-20 ENCOUNTER — DOCUMENTATION ONLY (OUTPATIENT)
Dept: SLEEP MEDICINE | Facility: CLINIC | Age: 67
End: 2022-12-20
Payer: COMMERCIAL

## 2022-12-20 NOTE — PROGRESS NOTES
Patient was offered choice of vendor and chose [facility].  Patient Sharon Fung was set up at virtual set up via telephone visit on December 20, 2022. Patient received a [employer]med Airsense 11 Pressures were set at 9-15 cm H2O.   Patient s ramp is off cm H2O for Off and FLEX/EPR is 2.  Patient received heated tubing and heated humidifier.  Patient does not need to meet compliance.    Marimar Chino

## 2022-12-23 ENCOUNTER — DOCUMENTATION ONLY (OUTPATIENT)
Dept: SLEEP MEDICINE | Facility: CLINIC | Age: 67
End: 2022-12-23
Payer: COMMERCIAL

## 2022-12-23 NOTE — PROGRESS NOTES
3 day Sleep therapy management telephone visit    Diagnostic AHI: 11.5  PSG    Confirmed with patient at time of call- N/A Patient is still interested in STM service       Message left for patient to return call        Objective data     Order Settings for PAP  CPAP min 9    CPAP max 15             Device settings from machine CPAP min 9.0     CPAP max 15.0           EPR Setting TWO          Assessment: No usage but has account in [employer] there is no data in [employer] not sure if patient started ing CPAP yet.      Action plan: Patient to have 14 day STM visit. Patient has a follow up visit scheduled:   no    Replacement device: Yes  STM ordered by provider: Yes     Total time spent on accessing and  interpreting remote patient PAP therapy data  10 minutes    Total time spent counseling, coaching  and reviewing PAP therapy data with patient  1 minutes    [id number] no

## 2023-01-02 ENCOUNTER — VIRTUAL VISIT (OUTPATIENT)
Dept: RHEUMATOLOGY | Facility: CLINIC | Age: 68
End: 2023-01-02
Payer: COMMERCIAL

## 2023-01-02 DIAGNOSIS — Z79.899 HIGH RISK MEDICATION USE: ICD-10-CM

## 2023-01-02 DIAGNOSIS — M05.9 SEROPOSITIVE RHEUMATOID ARTHRITIS (H): Primary | ICD-10-CM

## 2023-01-02 PROCEDURE — 99214 OFFICE O/P EST MOD 30 MIN: CPT | Mod: 95 | Performed by: INTERNAL MEDICINE

## 2023-01-02 RX ORDER — LEFLUNOMIDE 20 MG/1
20 TABLET ORAL DAILY
Qty: 90 TABLET | Refills: 2 | Status: SHIPPED | OUTPATIENT
Start: 2023-01-02 | End: 2023-06-14

## 2023-01-02 RX ORDER — PREDNISONE 5 MG/1
TABLET ORAL
Qty: 50 TABLET | Refills: 0 | Status: SHIPPED | OUTPATIENT
Start: 2023-01-02

## 2023-01-02 RX ORDER — SULFASALAZINE 500 MG/1
1500 TABLET ORAL 2 TIMES DAILY
Qty: 540 TABLET | Refills: 2 | Status: SHIPPED | OUTPATIENT
Start: 2023-01-02 | End: 2023-06-14

## 2023-01-02 NOTE — PROGRESS NOTES
Sharon Fung  is a 67 year old year old who is being evaluated via a billable video visit.      How would you like to obtain your AVS? MyChart  If the video visit is dropped, the invitation should be resent by: Text to cell phone: [phone]   Will anyone else be joining your video visit? No     Rheumatology Video Visit      Sharon Fung MRN# [id number]   YOB: 1955 Age: 67 year old      Date of visit: 1/02/23   PCP: Dr. Reynaldo Saavedra    Chief Complaint   Patient presents with:  Rheumatoid Arthritis    Assessment and Plan     1. Seropositive nonerosive rheumatoid arthritis (RF negative, CCP low positive): Previously on Humira (lost efficacy), Cimzia (ineffective), Orencia (ineffective), leflunomide (ineffective as monotherapy), hydroxychloroquine (ineffective), Xeljanz (ineffective), MTX (GI upset). Currently on SSZ 1500mg BID, leflunomide 20mg daily, and Kevzara 200mg SQ every 14 days (started 1/2019).  RA had been doing well until 12/31/2022 at which point she started to have more inflammatory joint symptoms at the MCPs and shoulders; advised using a course of prednisone as noted below and if not improved then she should be seen in clinic.  We also had a discussion about Medicare because she plans to change insurance to Medicare in about June 2023; she is going to work with a  to find a plan that will cover her medications but if a change is needed then could consider using Actemra IV.  Chronic illness, progressive  - Continue sulfasalazine 1500 mg twice daily   - Continue leflunomide 20mg daily  - Continue Kevzara 200mg SQ every 14 days  - Start for current RA flare: Prednisone 20mg daily x5days, then 15mg daily x5days, then 10mg daily x5days, then 5mg daily x5days, then stop.   - Labs in February: CBC, Creatinine, Hepatic Panel  - Labs in May: CBC, Creatinine, Hepatic Panel, ESR, CRP, QuantiFERON-TB gold plus      High risk medication requiring intensive toxicity monitoring at least  quarterly: labs ordered include CBC, Creatinine, Hepatic panel to monitor for cytopenia and hepatotoxicity; checking creatinine as it affects clearance of medication.                 Rapid 3, cumulative scores                      8/24/2021: No inflammatory joint symptoms.  She says that this combination is great (SSZ 1500mg BID, leflunomide 20mg daily, Kevzara 200mg q14 days)                      10/14/2020 doing well (SSZ 1500mg BID, leflunomide 20mg daily, Kevzara 200mg q14 days)                      08/28/2019: 3.2   (SSZ 1500mg BID, Kevzara)  Now on gabapentin                      04/17/2019: 15    (SSZ 1500mg BID, Kevzara)  Mostly fibromyalgia symptoms                      12/19/2018:         (MTX 20mg SQ wkly, Xeljanz XR 11mg daily, SSZ 1500mg BID)                          05/02/2018:  9     (MTX 20mg SQ wkly, Xeljanz XR 11mg daily, SSZ 1000mg BID)                          01/31/2018: 22.3 (MTX 20mg SQ wkly, Xeljanz XR 11mg daily)                          11/29/2017: 16.8 (MTX 20mg SQ wkly)                      08/02/2017: 4.2   (MTX 20mg SQ wkly, Xeljanz XR 11mg daily)                         05/04/2017: 7      (MTX 20mg SQ wkly, Xeljanz XR 11mg daily)                        02/02/2017: 8      (MTX 20mg SQ wkly, Xeljanz XR 11mg daily)                      11/04/2016: 13    (MTX 20mg SQ weekly)                      07/15/2016: 10.8    2. Positive LORNA; positive and negative dsDNA; Secondary Sjogren's Syndrome: Repeat dsDNAs have been negative. Has dry eyes but no dry mouth.  Dry eyes are treated by ophthalmology with Restasis.      3. Bilateral patellofemoral syndrome: home exercises have been helpful.  Weight loss encouraged.       4. Fibromyalgia: Managed in the pain clinic     5.  Bone Health, vitamin D deficiency: normal 12/20/2019 DEXA; plan to repeat in 2024.    - Continue vitamin D 2000 IU daily    6. Chronic lower back pain with left sided sciatica: suspect related to degenerative changes seen on  L-spine MRI.  She has been seen in the pain clinic and spine surgery clinic.  Documented here for historical significance.    7.  Vaccinations: Vaccinations reviewed with Ms. Fung.   - Influenza: encouraged yearly vaccination  - COVID-19: Advised receiving the updated COVID-19 vaccination.  Advised that she complete her course of prednisone (see #1) prior to vaccination to have a better vaccination response.  Advise holding sulfasalazine and leflunomide for 1-2 weeks after each COVID-19 vaccination     Total minutes spent in evaluation with patient, documentation, , and review of pertinent studies and chart notes: 20    Ms. Fung verbalized agreement with and understanding of the rational for the diagnosis and treatment plan.  All questions were answered to best of my ability and the patient's satisfaction. Ms. Fung was advised to contact the clinic with any questions that may arise after the clinic visit.      Thank you for involving me in the care of the patient    Return to clinic: 3-4 months    HPI   Sharon Fung is a 67 year old female with medical history significant for GERD, allergic rhinitis, obstructive sleep apnea, obesity, hx of trigger fingers, hx of carpal tunnel surgery, and rheumatoid arthritis who presents for follow-up of rheumatoid arthritis.    Today, 1/2/2023: for the past 2 days has had more pain in the shoulders and across the MCPs that is worse in the morning and not better with time or active.  Prior to Saturday she felt well with no increased joint symptoms.  Staying home from work today because of her joint pain.  Runny nose.  No fevers or chills.  Planning to go on medicare in June 2023 and working on getting a supplement to have Kevzara covered.      No fevers or chills. No nausea, vomiting, constipation, diarrhea. No chest pain/pressure, palpitations, or shortness of breath. No oral or nasal sores. No neck pain. No rash.      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    ROS   12 point review of system was completed and negative except as noted in the HPI     Active Problem List     Patient Active Problem List   Diagnosis     AR (allergic rhinitis)     GERD (gastroesophageal reflux disease)     Status post bariatric surgery     Mild major depression (H)     Advanced directives, counseling/discussion     High risk medication use     Obstructive sleep apnea     Obesity, Class III, BMI 40-49.9 (morbid obesity) (H)     Anterior basement membrane dystrophy     Seropositive rheumatoid arthritis (H)     LORNA positive     Carpal tunnel syndrome of right wrist     Headache     Immunosuppression (H)     Fibromyalgia     Pain in joint, ankle and foot, right     Hypertension goal BP (blood pressure) < 140/90     Chronic back pain, unspecified back location, unspecified back pain laterality     Hyperlipidemia LDL goal <100     Past Medical History     Past Medical History:   Diagnosis Date     AR (allergic rhinitis)  as teen     Arthritis 12/14/2015     Chronic obstructive pulmonary disease, unspecified COPD type (H) 3/27/2018     Depressive disorder 3 years ago     GERD (gastroesophageal reflux disease) 4-07     Headache 7/11/2017     Hypertension goal BP (blood pressure) < 140/90 12/20/2021     Mild major depression (H) 3 years ago     Morbid obesity (H) teens     BRETT (obstructive sleep apnea)     uses CPAP     RA (rheumatoid arthritis) (H) 4 years     Reduced vision 3 years     Rheumatoid arthritis, adult (H)      Past Surgical History     Past Surgical History:   Procedure Laterality Date     ARTHRODESIS FOOT Right 6/30/2021    Procedure: Right 1st metatarsophalangeal joint fusion;  Surgeon: Jesus Wolf MD;  Location: [facility]     ARTHRODESIS TOE(S) Right 12/27/2021    Procedure: right revision great toe fusion;  Surgeon: Ryan Gee MD;  Location: [facility] OR     BLEPHAROPLASTY BILATERAL Bilateral  8/5/2016    Procedure: BLEPHAROPLASTY BILATERAL;  Surgeon: Cortez Robin MD;  Location:  [facility]     BREAST BIOPSY, RT/LT  1-94    Benign     COLONOSCOPY       COLONOSCOPY WITH CO2 INSUFFLATION N/A 3/30/2017    Procedure: COLONOSCOPY WITH CO2 INSUFFLATION;  Surgeon: Issa Weeks MD;  Location: [facility] OR     ESOPHAGOSCOPY, GASTROSCOPY, DUODENOSCOPY (EGD), COMBINED N/A 10/29/2015    Procedure: COMBINED ESOPHAGOSCOPY, GASTROSCOPY, DUODENOSCOPY (EGD);  Surgeon: Shaq Mims MD;  Location: [facility]     LAPAROSCOPIC GASTRIC ADJUSTABLE BANDING  11/09/10    Lap band procedure     LAPAROSCOPIC REMOVAL GASTRIC ADJUSTABLE BAND N/A 10/31/2015    Procedure: LAPAROSCOPIC REMOVAL GASTRIC ADJUSTABLE BAND;  Surgeon: Shaq Mims MD;  Location: [facility] OR     NH HAND/FINGER SURGERY UNLISTED  2016     NH STOMACH SURGERY PROCEDURE UNLISTED  11/2015     RELEASE CARPAL TUNNEL Left 4/27/2017    Procedure: RELEASE CARPAL TUNNEL;  Left Open Carpal Tunnel Release;  Surgeon: Ciara Middleton MD;  Location: [facility]     RELEASE CARPAL TUNNEL Right 6/15/2017    Procedure: RELEASE CARPAL TUNNEL;  Right Carpal Tunnel Release Open;  Surgeon: Ciara Middleton MD;  Location: [facility]     REPAIR PTOSIS       TUBAL LIGATION  1988     [facility] APPENDECTOMY  1977     Allergy   No Known Allergies  Current Medication List     Current Outpatient Medications   Medication Sig     acetaminophen (TYLENOL) 500 MG tablet Take 500-1,000 mg by mouth every 6 hours as needed for mild pain     albuterol (PROAIR HFA/PROVENTIL HFA/VENTOLIN HFA) 108 (90 Base) MCG/ACT inhaler Inhale 2 puffs into the lungs every 6 hours as needed for shortness of breath / dyspnea or wheezing     azelastine (OPTIVAR) 0.05 % ophthalmic solution Apply 1 drop to eye 2 times daily     citalopram (CELEXA) 20 MG tablet Take 1 tablet (20 mg) by mouth daily     cyclobenzaprine (FLEXERIL) 10 MG tablet      cycloSPORINE (RESTASIS) 0.05 % ophthalmic emulsion Place 1 drop  into both eyes 2 times daily     fexofenadine (ALLEGRA) 180 MG tablet Take 1 tablet (180 mg) by mouth daily     fluticasone (FLONASE) 50 MCG/ACT nasal spray Spray 2 sprays into both nostrils as needed      gabapentin (NEURONTIN) 100 MG capsule TAKE ONE CAPSULE BY MOUTH EVERY MORNING, TAKE ONE MID-DAY, AND TAKE FOUR CAPSULES BY MOUTH EVERY NIGHT AT BEDTIME     ibuprofen 200 MG capsule Take 600-800 mg by mouth At Bedtime     ketorolac (TORADOL) 10 MG tablet      leflunomide (ARAVA) 20 MG tablet Take 1 tablet (20 mg) by mouth daily     losartan-hydrochlorothiazide (HYZAAR) 100-25 MG tablet Take 1 tablet by mouth daily     ondansetron (ZOFRAN-ODT) 8 MG ODT tab Take 1 tablet (8 mg) by mouth every 8 hours as needed for nausea     Sarilumab 200 MG/1.14ML SOAJ Inject 1.14 mLs (200 mg) Subcutaneous every 14 days . Hold for signs of infection and seek medical attention.     sulfaSALAzine (AZULFIDINE) 500 MG tablet Take 3 tablets (1,500 mg) by mouth 2 times daily     vitamin D3 (CHOLECALCIFEROL) 50 mcg (2000 units) tablet Take 1 tablet (50 mcg) by mouth daily     neomycin-polymyxin-dexamethasone (MAXITROL) 3.5-46288-3.1 ophthalmic ointment Apply to eyelids 3 x day for 7 days. (Patient not taking: Reported on 11/28/2022)     olopatadine (PATADAY) 0.2 % ophthalmic solution Place 1 drop into both eyes daily (Patient not taking: Reported on 11/28/2022)     ORDER FOR DME Use your CPAP device as directed by your provider.     sodium chloride (DIMAS 128) 5 % ophthalmic ointment Insert into eyes at night. (Patient not taking: Reported on 11/28/2022)     sodium chloride (DIMAS 128) 5 % ophthalmic solution Place 1-2 drops into both eyes 3 times daily (Patient not taking: Reported on 11/28/2022)     No current facility-administered medications for this visit.     Facility-Administered Medications Ordered in Other Visits   Medication     sodium chloride (PF) 0.9% PF flush 10 mL     sodium chloride bacteriostatic 0.9 % flush 12 mL        Social History   See HPI    Family History     Family History   Problem Relation Age of Onset     Heart Disease Father         MI     Neurologic Disorder Father 79        Parkinson's     Diabetes Father      Hypertension Father      Coronary Artery Disease Father      Cancer Maternal Grandmother         uterine     Neurologic Disorder Sister 16        migraine     Depression Sister      Neurologic Disorder Mother 20        migraine     Depression Mother      Neurologic Disorder Son 7        migraine     Substance Abuse Son      Migraines Son         none     Depression Sister      Substance Abuse Sister      Migraines Sister        Physical Exam       GEN: NAD.  HEENT:  Anicteric, noninjected sclera. No obvious external lesions of the ear and nose. Hearing intact.  PULM: No increased work of breathing  MSK:  Hands and wrists without swelling; she points at the MCPs as the location of the pain.  Points at the anterior lateral aspect of the shoulders as the location of pain at the shoulder  SKIN: No rash or jaundice seen  PSYCH: Alert. Appropriate.        Labs / Imaging (select studies)     CBC  Recent Labs   Lab Test 11/16/22  0937 06/24/22  0741 03/11/22  0910 08/03/21  1150 05/10/21  0802 02/02/21  1056 10/12/20  1113   WBC 5.5 6.1 5.2   < > 5.1 7.4 6.1   RBC 4.17 4.36 4.54   < > 4.48 4.41 4.18   HGB 13.0 13.4 13.7   < > 13.5 13.4 12.7   HCT 38.8 40.5 42.1   < > 42.8 42.8 39.7   MCV 93 93 93   < > 96 97 95   RDW 12.9 12.5 13.3   < > 12.8 12.5 12.9    204 232   < > 193 216 221   MCH 31.2 30.7 30.2   < > 30.1 30.4 30.4   MCHC 33.5 33.1 32.5   < > 31.5 31.3* 32.0   NEUTROPHIL 48 50 46   < > 42.6 40.1 40.8   LYMPH 33 26 24   < > 26.4 34.8 33.4   MONOCYTE 10 9 17   < > 9.5 10.7 8.9   EOSINOPHIL 8 14 12   < > 19.5 12.6 15.6   BASOPHIL 1 1 1   < > 1.8 1.4 1.0   ANEU  --   --   --   --  2.2 3.0 2.5   ALYM  --   --   --   --  1.3 2.6 2.0   KIMBERLY  --   --   --   --  0.5 0.8 0.5   AEOS  --   --   --   --  1.0*  0.9* 1.0*   ABAS  --   --   --   --  0.1 0.1 0.1   ANEUTAUTO 2.6 3.1 2.4   < >  --   --   --    ALYMPAUTO 1.8 1.6 1.2   < >  --   --   --    AMONOAUTO 0.6 0.6 0.9   < >  --   --   --    AEOSAUTO 0.4 0.9* 0.6   < >  --   --   --    ABSBASO 0.1 0.1 0.1   < >  --   --   --     < > = values in this interval not displayed.     CMP  Recent Labs   Lab Test 11/16/22  0937 07/01/22  0807 06/24/22  0741 03/11/22  0910 12/27/21  0710 12/20/21  0851 11/30/21  0806 08/03/21  1150 05/10/21  0802 02/02/21  1056 10/12/20  1113   NA  --  140  --   --   --  139 137   < > 140  --   --    POTASSIUM  --  3.9  --   --  3.5 3.7 3.3*   < > 4.3  --   --    CHLORIDE  --  107  --   --   --  105 104   < > 110*  --   --    CO2  --  27  --   --   --  28 28   < > 25  --   --    ANIONGAP  --  6  --   --   --  6 5   < > 5  --   --    GLC  --  96  --   --   --  144* 165*   < > 99 74  --    BUN  --  18  --   --   --  20 19   < > 12  --   --    CR 0.71 0.74 0.65 0.75  --  0.68 0.61   < > 0.73 0.65 0.74   GFRESTIMATED >90 89 >90 88  --  >90 >90   < > 88 >90 87   GFRESTBLACK  --   --   --   --   --   --   --   --  >90 >90 >90   ISH  --  9.3  --   --   --  9.3 9.3   < > 8.6  --   --    BILITOTAL 0.5  --  0.6 0.5  --   --  0.5   < > 0.6 0.4 0.5   ALBUMIN 3.7  --  3.8 3.8  --   --  3.6   < > 3.8 4.0 3.8   PROTTOTAL 6.8  --  7.0 7.2  --   --  7.0   < > 7.1 7.1 6.9   ALKPHOS 100  --  88 107  --   --  113   < > 103 115 107   AST 26  --  31 30  --   --  26   < > 33 36 23   ALT 47  --  48 46  --   --  43   < > 54* 58* 42    < > = values in this interval not displayed.     Calcium/VitaminD  Recent Labs   Lab Test 07/01/22  0807 12/20/21  0851 11/30/21  0806 05/10/21  0802 10/12/20  1113 03/31/20  0755 10/28/15  2233 02/20/15  0750   ISH 9.3 9.3 9.3   < >  --   --    < > 9.0   VITDT  --   --   --   --  33 15*  --  39    < > = values in this interval not displayed.     ESR/CRP  Recent Labs   Lab Test 11/16/22  0937 06/24/22  0741 03/11/22  0910   SED 6 5 5   CRP  <2.9 <2.9 <2.9     Lipid Panel  Recent Labs   Lab Test 06/24/22  0741 05/10/21  0802 03/31/20  0755 12/27/16  0836 02/20/15  0750 10/20/14  0821   CHOL 213* 224* 224*   < > 157 194   TRIG 274* 243* 171*   < > 79 193*   HDL 48* 52 73   < > 63 63   * 123* 117*   < > 78 92   VLDL  --   --   --   --  16 39*   CHOLHDLRATIO  --   --   --   --  2.5 3.1   NHDL 165* 172* 151*   < >  --   --     < > = values in this interval not displayed.     Hepatitis B  Recent Labs   Lab Test 12/08/15  0855 03/06/15  1650   HBCAB Nonreactive  --    HEPBANG Nonreactive Nonreactive     Hepatitis C  Recent Labs   Lab Test 12/08/15  0855 03/06/15  1650   HCVAB Nonreactive   Assay performance characteristics have not been established for newborns,   infants, and children   Nonreactive   Assay performance characteristics have not been established for newborns,   infants, and children       Tuberculosis Screening  Recent Labs   Lab Test 11/30/21  0806 10/31/17  1400 02/02/17  0822 12/08/15  0855   TBRES Negative  --   --   --    TBRSLT  --  Negative Negative Negative   TBAGN  --  0.05 0.00 0.01     HIV Screening  Recent Labs   Lab Test 07/24/18  0738   HIAGAB Nonreactive     Immunization History     Immunization History   Administered Date(s) Administered     COVID-19 Vaccine 12+ (Pfizer) 12/21/2020, 01/07/2021, 08/27/2021     COVID-19 Vaccine 18+ (Moderna) 03/08/2022     Flu, Unspecified 10/12/2020     Influenza (intradermal) 10/04/2011, 10/01/2012     Influenza Vaccine 50-64 or 18-64 w/egg allergy (Flublok) 09/15/2021     Influenza Vaccine >6 months (Alfuria,Fluzone) 10/15/2013, 09/15/2014, 09/28/2015, 09/28/2016, 10/16/2017, 10/01/2018     Influenza Vaccine Im 4yrs+ 4 Valent CCIIV4 10/15/2019     Pneumo Conj 13-V (2010&after) 04/15/2016     Pneumococcal 20 valent Conjugate (Prevnar 20) 07/01/2022     Pneumococcal 23 valent 07/15/2016     TD (ADULT, 7+) 10/30/2020     TDAP Vaccine (Adacel) 02/09/2011     Zoster vaccine recombinant  adjuvanted (SHINGRIX) 04/17/2019, 08/28/2019          Chart documentation done in part with Dragon Voice recognition Software. Although reviewed after completion, some word and grammatical error may remain.       Video-Visit Details    Type of service:  Video Visit    Video Start Time: 8:27 AM    Video End Time:8:38 AM    Originating Location (pt. Location):  [place], [place]    Distant Location (provider location):  Off site, [place]    Platform used for Video Visit: Pao Guerra MD

## 2023-01-02 NOTE — PATIENT INSTRUCTIONS
RHEUMATOLOGY    Dr. Freddy Guerra    [facility]  [address] [place] [address]  [place] [address]  Phone number: [phone]  Fax number: [phone]    You may schedule your FLU shot by calling [phone] or if you would like to get your shot at a [facility] pharmacy you may schedule online at [email or web address].    Thank you for choosing [facility]!    Evie Rosas CMA Rheumatology

## 2023-01-05 ENCOUNTER — DOCUMENTATION ONLY (OUTPATIENT)
Dept: SLEEP MEDICINE | Facility: CLINIC | Age: 68
End: 2023-01-05
Payer: COMMERCIAL

## 2023-01-26 ENCOUNTER — OFFICE VISIT (OUTPATIENT)
Dept: OPHTHALMOLOGY | Facility: CLINIC | Age: 68
End: 2023-01-26
Attending: OPTOMETRIST
Payer: COMMERCIAL

## 2023-01-26 DIAGNOSIS — H04.123 BILATERAL DRY EYES: Primary | ICD-10-CM

## 2023-01-26 DIAGNOSIS — H18.523 ANTERIOR BASEMENT MEMBRANE DYSTROPHY OF BOTH EYES: ICD-10-CM

## 2023-01-26 PROCEDURE — 99204 OFFICE O/P NEW MOD 45 MIN: CPT | Mod: GC | Performed by: OPHTHALMOLOGY

## 2023-01-26 PROCEDURE — G0463 HOSPITAL OUTPT CLINIC VISIT: HCPCS

## 2023-01-26 ASSESSMENT — VISUAL ACUITY
OD_CC+: -2
METHOD: SNELLEN - LINEAR
OD_PH_CC: 20/40
OS_CC: 20/40
OD_CC: 20/60

## 2023-01-26 ASSESSMENT — SLIT LAMP EXAM - LIDS
COMMENTS: MEIBOMIAN GLAND DYSFUNCTION
COMMENTS: MEIBOMIAN GLAND DYSFUNCTION,

## 2023-01-26 ASSESSMENT — TONOMETRY
OS_IOP_MMHG: 19
IOP_METHOD: ICARE
OD_IOP_MMHG: 18

## 2023-01-26 ASSESSMENT — CONF VISUAL FIELD
OD_INFERIOR_NASAL_RESTRICTION: 0
OS_INFERIOR_NASAL_RESTRICTION: 0
OD_NORMAL: 1
OD_INFERIOR_TEMPORAL_RESTRICTION: 0
OS_SUPERIOR_NASAL_RESTRICTION: 0
OD_SUPERIOR_NASAL_RESTRICTION: 0
METHOD: COUNTING FINGERS
OS_NORMAL: 1
OD_SUPERIOR_TEMPORAL_RESTRICTION: 0
OS_INFERIOR_TEMPORAL_RESTRICTION: 0
OS_SUPERIOR_TEMPORAL_RESTRICTION: 0

## 2023-01-26 ASSESSMENT — EXTERNAL EXAM - RIGHT EYE: OD_EXAM: NORMAL

## 2023-01-26 ASSESSMENT — EXTERNAL EXAM - LEFT EYE: OS_EXAM: NORMAL

## 2023-01-26 NOTE — NURSING NOTE
"Chief Complaints and History of Present Illnesses   Patient presents with     Follow Up     Anterior basement membrane dystrophy of both eyes     Chief Complaint(s) and History of Present Illness(es)     Follow Up            Comments: Anterior basement membrane dystrophy of both eyes          Comments    Pt states she has \"a film on her eyes\"  States discontinued restatsis 2 months ago, did not feel it help  C/o burning, tearing and dryness  No eye pain    Neha Nice COT 7:19 AM January 26, 2023                      "

## 2023-01-26 NOTE — PROGRESS NOTES
"Chief complaint   ABMD evaluation    Referring Provider: Dr. Valderrama     HPI    Sharon HOPPER Michealjessica 67 year old female     HPI     Follow Up     Additional comments: Anterior basement membrane dystrophy of both eyes           Comments    Pt states she has \"a film on her eyes\"  States discontinued restatsis 2 months ago, did not feel it help  C/o burning, tearing and dryness  No eye pain    Neha Nice COT 7:19 AM January 26, 2023               Last edited by Neha Nice on 1/26/2023  7:19 AM.         Complains of gradually worsening haze over the vision in both eyes starting ~3/2022 and progressing since then. Has tried some sort of ointment and drops without improvement. Denies any pain. Does endorse epiphora just from the right eye. Does endorse significant itching predating onset of hazy vision for which she uses pataday PRN - she does have seasonal allergies for which she takes allegra. Of note, she uses a CPAP for her COPD and has RA for which she follows with rheumatology with immunosuppressant treatments including courses of steroids. At her last visit 3/31/2022 with Dr. Valderrama she was instructed to continue present treatment of restasis (no longer using - did not help), luba zaynba and gtt (not using), and maxitrol PRN to eyelids. No history of painful episodes. Problem is not time-of-day dependent. No waking with pain.    Past ocular history   Prior eye surgery/laser/Trauma: yes bilateral upper lid blepharoplasty (2016)  CTL wearer:No  Glasses : yes  Family Hx of eye disease: No    PMH     Past Medical History:   Diagnosis Date     AR (allergic rhinitis)  as teen     Arthritis 12/14/2015     Chronic obstructive pulmonary disease, unspecified COPD type (H) 3/27/2018     Depressive disorder 3 years ago     GERD (gastroesophageal reflux disease) 4-07     Headache 7/11/2017     Hypertension goal BP (blood pressure) < 140/90 12/20/2021     Mild major depression (H) 3 years ago     Morbid obesity (H) teens     BRETT " (obstructive sleep apnea)     uses CPAP     RA (rheumatoid arthritis) (H) 4 years     Reduced vision 3 years     Rheumatoid arthritis, adult (H)        PSH     Past Surgical History:   Procedure Laterality Date     ARTHRODESIS FOOT Right 6/30/2021    Procedure: Right 1st metatarsophalangeal joint fusion;  Surgeon: Jesus Wolf MD;  [facility]     ARTHRODESIS TOE(S) Right 12/27/2021    Procedure: right revision great toe fusion;  Surgeon: Ryan Gee MD;  [facility]: [facility]     BLEPHAROPLASTY BILATERAL Bilateral 8/5/2016    Procedure: BLEPHAROPLASTY BILATERAL;  Surgeon: Cortez Robin MD;  [facility]     BREAST BIOPSY, RT/LT  1-94    Benign     COLONOSCOPY       COLONOSCOPY WITH CO2 INSUFFLATION N/A 3/30/2017    Procedure: COLONOSCOPY WITH CO2 INSUFFLATION;  Surgeon: Issa Weeks MD;  [facility]     ESOPHAGOSCOPY, GASTROSCOPY, DUODENOSCOPY (EGD), COMBINED N/A 10/29/2015    Procedure: COMBINED ESOPHAGOSCOPY, GASTROSCOPY, DUODENOSCOPY (EGD);  Surgeon: Shaq Mims MD;  [facility]     LAPAROSCOPIC GASTRIC ADJUSTABLE BANDING  11/09/10    Lap band procedure     LAPAROSCOPIC REMOVAL GASTRIC ADJUSTABLE BAND N/A 10/31/2015    Procedure: LAPAROSCOPIC REMOVAL GASTRIC ADJUSTABLE BAND;  Surgeon: Shaq Mims MD;  [facility]     [place] HAND/FINGER SURGERY UNLISTED  2016     [place] STOMACH SURGERY PROCEDURE UNLISTED  11/2015     RELEASE CARPAL TUNNEL Left 4/27/2017    Procedure: RELEASE CARPAL TUNNEL;  Left Open Carpal Tunnel Release;  Surgeon: Ciara Middleton MD;  [facility]     RELEASE CARPAL TUNNEL Right 6/15/2017    Procedure: RELEASE CARPAL TUNNEL;  Right Carpal Tunnel Release Open;  Surgeon: Ciara Middleton MD;  [facility]     REPAIR PTOSIS       TUBAL LIGATION  1988     [facility] APPENDECTOMY  1977       Meds     Current Outpatient Medications   Medication     acetaminophen (TYLENOL) 500 MG tablet     albuterol (PROAIR HFA/PROVENTIL  HFA/VENTOLIN HFA) 108 (90 Base) MCG/ACT inhaler     azelastine (OPTIVAR) 0.05 % ophthalmic solution     citalopram (CELEXA) 20 MG tablet     cyclobenzaprine (FLEXERIL) 10 MG tablet     cycloSPORINE (RESTASIS) 0.05 % ophthalmic emulsion     fexofenadine (ALLEGRA) 180 MG tablet     fluticasone (FLONASE) 50 MCG/ACT nasal spray     gabapentin (NEURONTIN) 100 MG capsule     ibuprofen 200 MG capsule     ketorolac (TORADOL) 10 MG tablet     leflunomide (ARAVA) 20 MG tablet     losartan-hydrochlorothiazide (HYZAAR) 100-25 MG tablet     neomycin-polymyxin-dexamethasone (MAXITROL) 3.5-28393-0.1 ophthalmic ointment     olopatadine (PATADAY) 0.2 % ophthalmic solution     ondansetron (ZOFRAN-ODT) 8 MG ODT tab     ORDER FOR DME     predniSONE (DELTASONE) 5 MG tablet     Sarilumab 200 MG/1.14ML SOAJ     sodium chloride (DIMAS 128) 5 % ophthalmic ointment     sodium chloride (DIMAS 128) 5 % ophthalmic solution     sulfaSALAzine (AZULFIDINE) 500 MG tablet     vitamin D3 (CHOLECALCIFEROL) 50 mcg (2000 units) tablet     No current facility-administered medications for this visit.     Facility-Administered Medications Ordered in Other Visits   Medication     sodium chloride (PF) 0.9% PF flush 10 mL     sodium chloride bacteriostatic 0.9 % flush 12 mL       Labs   -    Imaging   -    Drops Currently Taking   Pataday PRN OU    Assessment/Plan   # Anterior Basement Membrane Dystrophy, OU  - 01/26/23 Vision in both eyes has fluctuated from 20/20cc to 20/40cc OU since 2014 but now is subjectively the worst it has been. No pain. Does have epiphora OD longstanding. Denies dryness. Does have allergies improving with pataday. Irregular surface from ABMD most likely the source of decreased visual acuity.    Plan:  - Contact lens over refraction to evaluate corneal surface contribution to decreased visual acuity vs lenticular - follow up with Dr. Manzo  - Next options are Superficial keratiectomy  and PTK  - Artificial tears at least QID each  eye - list given  - Trial of Xiidra    # Horizontal exotropia  - Treated with 2.0 JOSUE prism starting 3/2022    # Nuclear sclerosis, OU  - History of steroid use for RA. BCVA fluctuant.  - Monitor    Follow up: 4 - 6 weeks V,T, or sooner PROSPER Andrew MD  Resident Physician - PGY3  Department of Ophthalmology   [facility]      Attending Physician Attestation:  Complete documentation of historical and exam elements from today's encounter can be found in the full encounter summary report (not reduplicated in this progress note).  I personally obtained the chief complaint(s) and history of present illness.  I confirmed and edited as necessary the review of systems, past medical/surgical history, family history, social history, and examination findings as documented by others; and I examined the patient myself.  I personally reviewed the relevant tests, images, and reports as documented above.  I formulated and edited as necessary the assessment and plan and discussed the findings and management plan with the patient and family. - Levon Jamil MD

## 2023-02-28 ENCOUNTER — ANCILLARY PROCEDURE (OUTPATIENT)
Dept: MAMMOGRAPHY | Facility: CLINIC | Age: 68
End: 2023-02-28
Attending: FAMILY MEDICINE
Payer: COMMERCIAL

## 2023-02-28 DIAGNOSIS — Z12.31 VISIT FOR SCREENING MAMMOGRAM: ICD-10-CM

## 2023-02-28 DIAGNOSIS — E55.9 VITAMIN D DEFICIENCY: ICD-10-CM

## 2023-02-28 PROCEDURE — 77067 SCR MAMMO BI INCL CAD: CPT | Mod: TC | Performed by: RADIOLOGY

## 2023-02-28 PROCEDURE — 77063 BREAST TOMOSYNTHESIS BI: CPT | Mod: TC | Performed by: RADIOLOGY

## 2023-02-28 RX ORDER — CHOLECALCIFEROL (VITAMIN D3) 50 MCG
1 TABLET ORAL DAILY
Qty: 90 TABLET | Refills: 3 | Status: SHIPPED | OUTPATIENT
Start: 2023-02-28

## 2023-02-28 NOTE — TELEPHONE ENCOUNTER
Medication:   Vitamin D3 50MCG  Last written on:   11/30/2021  Quantity:   90    Refills:   3    Last office visit:   1/22/2023  Next office visit:   6/9/2023  Last labs:   11/16/2022

## 2023-03-01 ENCOUNTER — OFFICE VISIT (OUTPATIENT)
Dept: OPTOMETRY | Facility: CLINIC | Age: 68
End: 2023-03-01
Payer: COMMERCIAL

## 2023-03-01 DIAGNOSIS — H18.523 ANTERIOR BASEMENT MEMBRANE DYSTROPHY OF BOTH EYES: ICD-10-CM

## 2023-03-01 DIAGNOSIS — H52.213 IRREGULAR ASTIGMATISM OF BOTH EYES: Primary | ICD-10-CM

## 2023-03-01 DIAGNOSIS — H25.13 NUCLEAR AGE-RELATED CATARACT, BOTH EYES: ICD-10-CM

## 2023-03-01 ASSESSMENT — REFRACTION_CURRENTRX
OS_BRAND: ONEFIT
OS_DIAMETER: 15.2
OD_SPHERE: -2.00
OD_BASECURVE: 7.9
OD_DIAMETER: 15.2
OD_BRAND: ONEFIT
OS_BASECURVE: 8.0
OS_SPHERE: -1.50

## 2023-03-01 ASSESSMENT — REFRACTION_WEARINGRX
OD_HPRISM: 2.0
OS_SPHERE: -2.00
OD_CYLINDER: +2.00
OD_ADD: +2.75
OS_CYLINDER: +0.50
OS_ADD: +2.75
OD_HBASE: OUT
OD_SPHERE: -2.00
OD_AXIS: 160
OS_AXIS: 025

## 2023-03-01 ASSESSMENT — SLIT LAMP EXAM - LIDS
COMMENTS: MEIBOMIAN GLAND DYSFUNCTION,
COMMENTS: MEIBOMIAN GLAND DYSFUNCTION

## 2023-03-01 ASSESSMENT — VISUAL ACUITY
METHOD: SNELLEN - LINEAR
OD_CC: 20/50-1
OS_CC: 20/40-2

## 2023-03-01 ASSESSMENT — EXTERNAL EXAM - RIGHT EYE: OD_EXAM: NORMAL

## 2023-03-01 ASSESSMENT — EXTERNAL EXAM - LEFT EYE: OS_EXAM: NORMAL

## 2023-03-01 NOTE — PROGRESS NOTES
A/P  1.) ABMD with irregular astigmatism OU  -Symptomatic for blurred vision right eye>left eye ongoing without improvement  -Here for hard lens overrefraction to determine cornea vs cataract for vision decrease  -BCVA with hard lens 20/25 right eye, 20/30 left eye. Improved subjective clarity  -She is not interested in contact lens wear at this time and would like to pursue corneal surgery to help improve acuity    2.) Cataracts OU  -BCVA with hard lens still limited by cataract, may consider in the future    Return as scheduled with Dr. Jamil for eval/management

## 2023-03-21 ENCOUNTER — OFFICE VISIT (OUTPATIENT)
Dept: OPHTHALMOLOGY | Facility: CLINIC | Age: 68
End: 2023-03-21
Attending: OPHTHALMOLOGY
Payer: COMMERCIAL

## 2023-03-21 DIAGNOSIS — H18.523 ANTERIOR BASEMENT MEMBRANE DYSTROPHY OF BOTH EYES: Primary | ICD-10-CM

## 2023-03-21 PROCEDURE — 99214 OFFICE O/P EST MOD 30 MIN: CPT | Mod: GC | Performed by: OPHTHALMOLOGY

## 2023-03-21 PROCEDURE — G0463 HOSPITAL OUTPT CLINIC VISIT: HCPCS | Performed by: OPHTHALMOLOGY

## 2023-03-21 RX ORDER — OFLOXACIN 3 MG/ML
1-2 SOLUTION/ DROPS OPHTHALMIC 4 TIMES DAILY
Qty: 10 ML | Refills: 1 | Status: SHIPPED | OUTPATIENT
Start: 2023-03-21 | End: 2023-07-20

## 2023-03-21 RX ORDER — KETOROLAC TROMETHAMINE 4 MG/ML
1 SOLUTION/ DROPS OPHTHALMIC 3 TIMES DAILY
Qty: 5 ML | Refills: 1 | Status: SHIPPED | OUTPATIENT
Start: 2023-03-21 | End: 2023-05-02

## 2023-03-21 RX ORDER — PREDNISOLONE ACETATE 10 MG/ML
1-2 SUSPENSION/ DROPS OPHTHALMIC DAILY
Qty: 5 ML | Refills: 1 | Status: SHIPPED | OUTPATIENT
Start: 2023-03-21 | End: 2023-05-02

## 2023-03-21 ASSESSMENT — CONF VISUAL FIELD
OD_INFERIOR_TEMPORAL_RESTRICTION: 0
METHOD: COUNTING FINGERS
OS_INFERIOR_TEMPORAL_RESTRICTION: 0
OS_SUPERIOR_TEMPORAL_RESTRICTION: 0
OS_INFERIOR_NASAL_RESTRICTION: 0
OS_SUPERIOR_NASAL_RESTRICTION: 0
OS_NORMAL: 1
OD_INFERIOR_NASAL_RESTRICTION: 0
OD_NORMAL: 1
OD_SUPERIOR_TEMPORAL_RESTRICTION: 0
OD_SUPERIOR_NASAL_RESTRICTION: 0

## 2023-03-21 ASSESSMENT — TONOMETRY
OD_IOP_MMHG: 16
IOP_METHOD: ICARE
OS_IOP_MMHG: 18

## 2023-03-21 ASSESSMENT — VISUAL ACUITY
CORRECTION_TYPE: GLASSES
OD_CC: 20/50
METHOD: SNELLEN - LINEAR
OD_PH_CC: 20/25
OS_CC: 20/40
OS_CC+: +2
OD_CC+: -2
OD_PH_CC+: -3

## 2023-03-21 ASSESSMENT — EXTERNAL EXAM - LEFT EYE: OS_EXAM: NORMAL

## 2023-03-21 ASSESSMENT — REFRACTION_WEARINGRX
OD_HBASE: OUT
OS_AXIS: 025
OD_SPHERE: -2.00
OD_AXIS: 160
OS_SPHERE: -2.00
OD_CYLINDER: +2.00
OS_ADD: +2.75
OS_CYLINDER: +0.50
OD_ADD: +2.75
OD_HPRISM: 2.0

## 2023-03-21 ASSESSMENT — SLIT LAMP EXAM - LIDS
COMMENTS: MEIBOMIAN GLAND DYSFUNCTION,
COMMENTS: MEIBOMIAN GLAND DYSFUNCTION

## 2023-03-21 ASSESSMENT — EXTERNAL EXAM - RIGHT EYE: OD_EXAM: NORMAL

## 2023-03-21 NOTE — PROGRESS NOTES
"Chief complaint   ABMD evaluation    Referring Provider: Dr. Valderrama     HPI    Sharon HOPPER Michealjessica 67 year old female     HPI     Follow Up     Additional comments: 2 month follow up Anterior Basement Membrane Dystrophy, OU           Comments    Pt states vision is the same as last visit. Still appears to be a \"film\" over both eyes. No eye pain today. No new flashes or floaters.    GABY Norman March 21, 2023 8:08 AM                Last edited by Erica Self COMT on 3/21/2023  8:09 AM.         Complains of gradually worsening haze over the vision in both eyes starting ~3/2022 and progressing since then. Has tried some sort of ointment and drops without improvement. Denies any pain. Does endorse epiphora just from the right eye. Does endorse significant itching predating onset of hazy vision for which she uses pataday PRN - she does have seasonal allergies for which she takes allegra. Of note, she uses a CPAP for her COPD and has RA for which she follows with rheumatology with immunosuppressant treatments including courses of steroids. At her last visit 3/31/2022 with Dr. Valderrama she was instructed to continue present treatment of restasis (no longer using - did not help), luba zaynab and gtt (not using), and maxitrol PRN to eyelids. No history of painful episodes. Problem is not time-of-day dependent. No waking with pain.    Interval history 3/21/23: Patient saw Dr. Manzo since last visit, BCVA improved with contacts but patient not interested in contacts at this time. Patient reports no other changes since last visit.     Past ocular history   Prior eye surgery/laser/Trauma: yes bilateral upper lid blepharoplasty (2016)  CTL wearer:No  Glasses : yes  Family Hx of eye disease: No    PMH     Past Medical History:   Diagnosis Date     AR (allergic rhinitis)  as teen     Arthritis 12/14/2015     Chronic obstructive pulmonary disease, unspecified COPD type (H) 3/27/2018     Depressive disorder 3 years ago "     GERD (gastroesophageal reflux disease) 4-07     Headache 7/11/2017     Hypertension goal BP (blood pressure) < 140/90 12/20/2021     Mild major depression (H) 3 years ago     Morbid obesity (H) teens     BRETT (obstructive sleep apnea)     uses CPAP     RA (rheumatoid arthritis) (H) 4 years     Reduced vision 3 years     Rheumatoid arthritis, adult (H)        PSH     Past Surgical History:   Procedure Laterality Date     ARTHRODESIS FOOT Right 6/30/2021    Procedure: Right 1st metatarsophalangeal joint fusion;  Surgeon: Jesus Wolf MD;  [facility]     ARTHRODESIS TOE(S) Right 12/27/2021    Procedure: right revision great toe fusion;  Surgeon: Ryan Gee MD;  [facility]     BLEPHAROPLASTY BILATERAL Bilateral 8/5/2016    Procedure: BLEPHAROPLASTY BILATERAL;  Surgeon: Cortez Robin MD;  [facility]     BREAST BIOPSY, RT/LT  1-94    Benign     COLONOSCOPY       COLONOSCOPY WITH CO2 INSUFFLATION N/A 3/30/2017    Procedure: COLONOSCOPY WITH CO2 INSUFFLATION;  Surgeon: Issa Weeks MD;  [facility]     ESOPHAGOSCOPY, GASTROSCOPY, DUODENOSCOPY (EGD), COMBINED N/A 10/29/2015    Procedure: COMBINED ESOPHAGOSCOPY, GASTROSCOPY, DUODENOSCOPY (EGD);  Surgeon: Shaq Mims MD;  [facility]     LAPAROSCOPIC GASTRIC ADJUSTABLE BANDING  11/09/10    Lap band procedure     LAPAROSCOPIC REMOVAL GASTRIC ADJUSTABLE BAND N/A 10/31/2015    Procedure: LAPAROSCOPIC REMOVAL GASTRIC ADJUSTABLE BAND;  Surgeon: Shaq Mims MD;  [facility]     [place] HAND/FINGER SURGERY UNLISTED  2016     [place] STOMACH SURGERY PROCEDURE UNLISTED  11/2015     RELEASE CARPAL TUNNEL Left 4/27/2017    Procedure: RELEASE CARPAL TUNNEL;  Left Open Carpal Tunnel Release;  Surgeon: Ciara Middleton MD;  [facility]     RELEASE CARPAL TUNNEL Right 6/15/2017    Procedure: RELEASE CARPAL TUNNEL;  Right Carpal Tunnel Release Open;  Surgeon: Ciara Middleton MD;  Location:   OR     REPAIR PTOSIS       TUBAL LIGATION  1988     [facility] APPENDECTOMY  1977       Meds     Current Outpatient Medications   Medication     acetaminophen (TYLENOL) 500 MG tablet     albuterol (PROAIR HFA/PROVENTIL HFA/VENTOLIN HFA) 108 (90 Base) MCG/ACT inhaler     azelastine (OPTIVAR) 0.05 % ophthalmic solution     citalopram (CELEXA) 20 MG tablet     cyclobenzaprine (FLEXERIL) 10 MG tablet     cycloSPORINE (RESTASIS) 0.05 % ophthalmic emulsion     fexofenadine (ALLEGRA) 180 MG tablet     fluticasone (FLONASE) 50 MCG/ACT nasal spray     gabapentin (NEURONTIN) 100 MG capsule     ibuprofen 200 MG capsule     ketorolac (TORADOL) 10 MG tablet     leflunomide (ARAVA) 20 MG tablet     losartan-hydrochlorothiazide (HYZAAR) 100-25 MG tablet     ondansetron (ZOFRAN-ODT) 8 MG ODT tab     ORDER FOR DME     predniSONE (DELTASONE) 5 MG tablet     Sarilumab 200 MG/1.14ML SOAJ     sulfaSALAzine (AZULFIDINE) 500 MG tablet     vitamin D3 (CHOLECALCIFEROL) 50 mcg (2000 units) tablet     sodium chloride (DIMAS 128) 5 % ophthalmic solution     No current facility-administered medications for this visit.     Facility-Administered Medications Ordered in Other Visits   Medication     sodium chloride (PF) 0.9% PF flush 10 mL     sodium chloride bacteriostatic 0.9 % flush 12 mL       Labs   -    Imaging   -    Drops Currently Taking   - Pataday PRN each eye  - Systane QID both eyes.   - Xiidra BID both eyes.     Assessment/Plan   # Anterior Basement Membrane Dystrophy, OU  - 01/26/23 Vision in both eyes has fluctuated from 20/20cc to 20/40cc OU since 2014 but now is subjectively the worst it has been. No pain. Does have epiphora OD longstanding. Denies dryness. Does have allergies improving with pataday. Irregular surface from ABMD most likely the source of decreased visual acuity.      Plan:  - Patient tried contact lens over refraction which improved BCVA but elects to avoid contact lenses for now, would rather pursue surgery.  - Consider  Superficial keratiectomy 1st, then and PTK (if necessary) vs cataract surgery (in future). Disc R, B, PC & options) - Artificial tears at least QID each eye - list given.  - Pt understands that she will still need refractive correction afterward and tx for cataract at some point.  - Continue trial of Xiidra BID both eyes.     # Horizontal exotropia  - Treated with 2.0 JOSUE prism starting 3/2022    # Nuclear sclerosis, OU  - History of steroid use for RA. BCVA fluctuant.  - Monitor        Follow up: left eye superficial keratectomy in minor procedure room on Friday - schedule surgery        Brady Gary MD  Ophthalmology, PGY-3      Attending Physician Attestation:  Complete documentation of historical and exam elements from today's encounter can be found in the full encounter summary report (not reduplicated in this progress note).  I personally obtained the chief complaint(s) and history of present illness.  I confirmed and edited as necessary the review of systems, past medical/surgical history, family history, social history, and examination findings as documented by others; and I examined the patient myself.  I personally reviewed the relevant tests, images, and reports as documented above.  I formulated and edited as necessary the assessment and plan and discussed the findings and management plan with the patient and family. - Levon Jamil MD

## 2023-03-21 NOTE — NURSING NOTE
"Chief Complaints and History of Present Illnesses   Patient presents with     Follow Up     2 month follow up Anterior Basement Membrane Dystrophy, OU     Chief Complaint(s) and History of Present Illness(es)     Follow Up            Comments: 2 month follow up Anterior Basement Membrane Dystrophy, OU          Comments    Pt states vision is the same as last visit. Still appears to be a \"film\" over both eyes. No eye pain today. No new flashes or floaters.    GABY Norman March 21, 2023 8:08 AM                       "

## 2023-04-16 DIAGNOSIS — M54.16 LUMBAR RADICULOPATHY: ICD-10-CM

## 2023-04-16 RX ORDER — GABAPENTIN 100 MG/1
CAPSULE ORAL
Qty: 180 CAPSULE | Refills: 5 | Status: SHIPPED | OUTPATIENT
Start: 2023-04-16 | End: 2023-10-13

## 2023-04-20 DIAGNOSIS — H18.523 ANTERIOR BASEMENT MEMBRANE DYSTROPHY OF BOTH EYES: Primary | ICD-10-CM

## 2023-04-20 RX ORDER — DEHYDRATED ALCOHOL INJECTION 0.98 ML/ML
15 SOLUTION INTRASPINAL
Qty: 15 ML | Refills: 1 | Status: SHIPPED | OUTPATIENT
Start: 2023-04-20 | End: 2023-07-20

## 2023-04-21 ENCOUNTER — ALLIED HEALTH/NURSE VISIT (OUTPATIENT)
Dept: OPHTHALMOLOGY | Facility: CLINIC | Age: 68
End: 2023-04-21

## 2023-04-21 ENCOUNTER — TELEPHONE (OUTPATIENT)
Dept: OPHTHALMOLOGY | Facility: CLINIC | Age: 68
End: 2023-04-21

## 2023-04-21 ENCOUNTER — OFFICE VISIT (OUTPATIENT)
Dept: OPHTHALMOLOGY | Facility: CLINIC | Age: 68
End: 2023-04-21
Attending: OPHTHALMOLOGY
Payer: COMMERCIAL

## 2023-04-21 DIAGNOSIS — H18.523 ANTERIOR BASEMENT MEMBRANE DYSTROPHY OF BOTH EYES: Primary | ICD-10-CM

## 2023-04-21 PROCEDURE — G0463 HOSPITAL OUTPT CLINIC VISIT: HCPCS | Mod: 25 | Performed by: OPHTHALMOLOGY

## 2023-04-21 PROCEDURE — 99214 OFFICE O/P EST MOD 30 MIN: CPT | Mod: 25 | Performed by: OPHTHALMOLOGY

## 2023-04-21 PROCEDURE — 65435 CURETTE/TREAT CORNEA: CPT | Mod: LT | Performed by: OPHTHALMOLOGY

## 2023-04-21 RX ORDER — ALCOHOL 1 ML/ML
15 INJECTION, SOLUTION PERCUTANEOUS ONCE
Status: ACTIVE | OUTPATIENT
Start: 2023-04-21

## 2023-04-21 ASSESSMENT — TONOMETRY
OS_IOP_MMHG: 18
OD_IOP_MMHG: 16
IOP_METHOD: ICARE

## 2023-04-21 ASSESSMENT — CONF VISUAL FIELD
OS_SUPERIOR_TEMPORAL_RESTRICTION: 0
OD_INFERIOR_NASAL_RESTRICTION: 0
OS_SUPERIOR_NASAL_RESTRICTION: 0
OD_INFERIOR_TEMPORAL_RESTRICTION: 0
OD_SUPERIOR_NASAL_RESTRICTION: 0
OD_SUPERIOR_TEMPORAL_RESTRICTION: 0
OD_NORMAL: 1
OS_INFERIOR_TEMPORAL_RESTRICTION: 0
OS_INFERIOR_NASAL_RESTRICTION: 0
OS_NORMAL: 1

## 2023-04-21 ASSESSMENT — VISUAL ACUITY
OD_CC: 20/40
OS_CC: 20/40
OD_PH_CC: 20/30
METHOD: SNELLEN - LINEAR
OS_PH_CC: 20/30

## 2023-04-21 ASSESSMENT — EXTERNAL EXAM - RIGHT EYE: OD_EXAM: NORMAL

## 2023-04-21 ASSESSMENT — SLIT LAMP EXAM - LIDS
COMMENTS: MEIBOMIAN GLAND DYSFUNCTION,
COMMENTS: MEIBOMIAN GLAND DYSFUNCTION

## 2023-04-21 ASSESSMENT — EXTERNAL EXAM - LEFT EYE: OS_EXAM: NORMAL

## 2023-04-21 NOTE — TELEPHONE ENCOUNTER
Spoke to pt at 1215 and pt does confirm contact lens came out    Pt states no pain, but eye may be numb yet from procedure    Reviewed with pt and confirmed with Dr. Medellin to come back for replacement as will buck have more discomfort following procedure     Pt on way back to clinic    Acuvue 8.5 base curve recommended     Ethan Concepcion RN 12:45 PM 04/21/23          [facility]    Phone Message    May a detailed message be left on voicemail: yes     Reason for Call: Other: Patient states she just had a procedure this morning, and when took down the gauze to put in her eye drops, the CL was stuck to the gauze.  Please call.  Thank you.    Action Taken: Message routed to:  [facility] ([facility] Ophthalmology    Travel Screening: Not Applicable

## 2023-04-21 NOTE — PROGRESS NOTES
Chief complaint   ABMD evaluation    Referring Provider: Dr. Valderrama     HPI    Sharon HOPPER Michealjessica 67 year old female     HPI     Follow Up    In left eye.  Associated symptoms include Negative for eye pain.  Treatments tried include artificial tears.  Response to treatment was no improvement.  Pain was noted as 0/10. Additional comments: left eye superficial keratectomy in minor procedure room   Filmover vision BE  Systane   Juana Danielle COA 9:21 AM April 21, 2023             Last edited by Juana Santos on 4/21/2023  9:21 AM.         Complains of gradually worsening haze over the vision in both eyes starting ~3/2022 and progressing since then. Has tried some sort of ointment and drops without improvement. Denies any pain. Does endorse epiphora just from the right eye. Does endorse significant itching predating onset of hazy vision for which she uses pataday PRN - she does have seasonal allergies for which she takes allegra. Of note, she uses a CPAP for her COPD and has RA for which she follows with rheumatology with immunosuppressant treatments including courses of steroids. At her last visit 3/31/2022 with Dr. Valderrama she was instructed to continue present treatment of restasis (no longer using - did not help), luba zaynab and gtt (not using), and maxitrol PRN to eyelids. No history of painful episodes. Problem is not time-of-day dependent. No waking with pain.    Interval history 4/21/23: Here for SK left eye today. No changes in vision. No eye pain. No flashes, floaters, curtains.     Past ocular history   Prior eye surgery/laser/Trauma: yes bilateral upper lid blepharoplasty (2016)  CTL wearer:No  Glasses : yes  Family Hx of eye disease: No    PMH     Past Medical History:   Diagnosis Date     AR (allergic rhinitis)  as teen     Arthritis 12/14/2015     Chronic obstructive pulmonary disease, unspecified COPD type (H) 3/27/2018     Depressive disorder 3 years ago     GERD (gastroesophageal reflux disease) 4-07      Headache 7/11/2017     Hypertension goal BP (blood pressure) < 140/90 12/20/2021     Mild major depression (H) 3 years ago     Morbid obesity (H) teens     BRETT (obstructive sleep apnea)     uses CPAP     RA (rheumatoid arthritis) (H) 4 years     Reduced vision 3 years     Rheumatoid arthritis, adult (H)        PSH     Past Surgical History:   Procedure Laterality Date     ARTHRODESIS FOOT Right 6/30/2021    Procedure: Right 1st metatarsophalangeal joint fusion;  Surgeon: Jesus Wolf MD;  [facility]     ARTHRODESIS TOE(S) Right 12/27/2021    Procedure: right revision great toe fusion;  Surgeon: Ryan Gee MD;  [facility]     BLEPHAROPLASTY BILATERAL Bilateral 8/5/2016    Procedure: BLEPHAROPLASTY BILATERAL;  Surgeon: Cortez Robin MD;  [facility]     BREAST BIOPSY, RT/LT  1-94    Benign     COLONOSCOPY       COLONOSCOPY WITH CO2 INSUFFLATION N/A 3/30/2017    Procedure: COLONOSCOPY WITH CO2 INSUFFLATION;  Surgeon: Issa Weeks MD;  [facility]     ESOPHAGOSCOPY, GASTROSCOPY, DUODENOSCOPY (EGD), COMBINED N/A 10/29/2015    Procedure: COMBINED ESOPHAGOSCOPY, GASTROSCOPY, DUODENOSCOPY (EGD);  Surgeon: Shaq Mims MD;  [facility]     LAPAROSCOPIC GASTRIC ADJUSTABLE BANDING  11/09/10    Lap band procedure     LAPAROSCOPIC REMOVAL GASTRIC ADJUSTABLE BAND N/A 10/31/2015    Procedure: LAPAROSCOPIC REMOVAL GASTRIC ADJUSTABLE BAND;  Surgeon: Shaq Mims MD;  [facility]     KS HAND/FINGER SURGERY UNLISTED  2016     [place] STOMACH SURGERY PROCEDURE UNLISTED  11/2015     RELEASE CARPAL TUNNEL Left 4/27/2017    Procedure: RELEASE CARPAL TUNNEL;  Left Open Carpal Tunnel Release;  Surgeon: Ciara Middleton MD;  [facility]     RELEASE CARPAL TUNNEL Right 6/15/2017    Procedure: RELEASE CARPAL TUNNEL;  Right Carpal Tunnel Release Open;  Surgeon: Ciara Middleton MD;  [facility]     REPAIR PTOSIS       TUBAL LIGATION  1988      ZZC APPENDECTOMY  1977       Meds     Current Outpatient Medications   Medication     acetaminophen (TYLENOL) 500 MG tablet     albuterol (PROAIR HFA/PROVENTIL HFA/VENTOLIN HFA) 108 (90 Base) MCG/ACT inhaler     azelastine (OPTIVAR) 0.05 % ophthalmic solution     citalopram (CELEXA) 20 MG tablet     cyclobenzaprine (FLEXERIL) 10 MG tablet     cycloSPORINE (RESTASIS) 0.05 % ophthalmic emulsion     Dehydrated Alcohol 98 % SOLN     fexofenadine (ALLEGRA) 180 MG tablet     fluticasone (FLONASE) 50 MCG/ACT nasal spray     gabapentin (NEURONTIN) 100 MG capsule     ibuprofen 200 MG capsule     ketorolac (TORADOL) 10 MG tablet     ketorolac tromethamine (ACULAR-LS) 0.4 % SOLN ophthalmic solution     leflunomide (ARAVA) 20 MG tablet     losartan-hydrochlorothiazide (HYZAAR) 100-25 MG tablet     ofloxacin (OCUFLOX) 0.3 % ophthalmic solution     ondansetron (ZOFRAN-ODT) 8 MG ODT tab     ORDER FOR DME     prednisoLONE acetate (PRED FORTE) 1 % ophthalmic suspension     predniSONE (DELTASONE) 5 MG tablet     Sarilumab 200 MG/1.14ML SOAJ     sodium chloride (DIMAS 128) 5 % ophthalmic solution     sulfaSALAzine (AZULFIDINE) 500 MG tablet     vitamin D3 (CHOLECALCIFEROL) 50 mcg (2000 units) tablet     Current Facility-Administered Medications   Medication     alcohol (dehydrated) (ABLYSINOL) 99 % injection 15 mL     Facility-Administered Medications Ordered in Other Visits   Medication     sodium chloride (PF) 0.9% PF flush 10 mL     sodium chloride bacteriostatic 0.9 % flush 12 mL       Labs   -    Imaging   -    Drops Currently Taking   - Pataday PRN each eye  - Systane QID both eyes.   - Xiidra BID both eyes.     Assessment/Plan   # Anterior Basement Membrane Dystrophy, OU  - 01/26/23 Vision in both eyes has fluctuated from 20/20cc to 20/40cc OU since 2014 but now is subjectively the worst it has been. No pain. Does have epiphora OD longstanding. Denies dryness. Does have allergies improving with pataday. Irregular surface from  ABMD most likely the source of decreased visual acuity.  - 4/21/23: Superficial keratectomy of the left eye today.     Plan: left eye epithelial debeidment - 4/21/23  - Start prednisolone BID left eye  - Start ketorolac TID left eye  - Start ofloxacin QID left eye  -  - Consider PTK (if necessary) vs cataract surgery (in future). Disc R, B, PC & options)  - Artificial tears at least QID each eye  - Continue trial of Xiidra BID both eyes.     # Horizontal exotropia  - Treated with 2.0 JOSUE prism starting 3/2022    # Nuclear sclerosis, OU  - History of steroid use for RA. BCVA fluctuant.  - Monitor    Follow up: 1 week - call sooner with any problems    Levon Jamil MD    Attending Physician Attestation:  Complete documentation of historical and exam elements from today's encounter can be found in the full encounter summary report (not reduplicated in this progress note).  I personally obtained the chief complaint(s) and history of present illness.  I confirmed and edited as necessary the review of systems, past medical/surgical history, family history, social history, and examination findings as documented by others; and I examined the patient myself.  I personally reviewed the relevant tests, images, and reports as documented above.  I formulated and edited as necessary the assessment and plan and discussed the findings and management plan with the patient and family. - Levon Jamil MD

## 2023-04-21 NOTE — PROGRESS NOTES
I inserted a new bandage contact lens into left eye without complication. Re-taped shield onto left eye. Gave patient an extra bandage lens as she's very concerned this one will fall out again - discussed her previous wear of contact lenses so she feels comfortable inserting on her own. Sent home with both paper and plastic tape. Asked her to call us should she have any further concerns.     Iris Barajas  1:45 PM

## 2023-04-21 NOTE — NURSING NOTE
Chief Complaints and History of Present Illnesses   Patient presents with     Follow Up     left eye superficial keratectomy in minor procedure room   Film[facility] vision BE  Vinny Santos [facility] 9:21 AM April 21, 2023        Chief Complaint(s) and History of Present Illness(es)     Follow Up            Laterality: left eye    Associated symptoms: Negative for eye pain    Treatments tried: artificial tears    Response to treatment: no improvement    Pain scale: 0/10    Comments: left eye superficial keratectomy in minor procedure room   Filmover vision BE  Vinny Santos [facility] 9:21 AM April 21, 2023

## 2023-04-26 ENCOUNTER — OFFICE VISIT (OUTPATIENT)
Dept: OPHTHALMOLOGY | Facility: CLINIC | Age: 68
End: 2023-04-26
Attending: STUDENT IN AN ORGANIZED HEALTH CARE EDUCATION/TRAINING PROGRAM
Payer: COMMERCIAL

## 2023-04-26 DIAGNOSIS — H18.523 ANTERIOR BASEMENT MEMBRANE DYSTROPHY OF BOTH EYES: Primary | ICD-10-CM

## 2023-04-26 PROCEDURE — G0463 HOSPITAL OUTPT CLINIC VISIT: HCPCS | Performed by: STUDENT IN AN ORGANIZED HEALTH CARE EDUCATION/TRAINING PROGRAM

## 2023-04-26 PROCEDURE — 99213 OFFICE O/P EST LOW 20 MIN: CPT | Performed by: STUDENT IN AN ORGANIZED HEALTH CARE EDUCATION/TRAINING PROGRAM

## 2023-04-26 ASSESSMENT — TONOMETRY
OD_IOP_MMHG: 21
IOP_METHOD: ICARE
OS_IOP_MMHG: 23

## 2023-04-26 ASSESSMENT — SLIT LAMP EXAM - LIDS
COMMENTS: MEIBOMIAN GLAND DYSFUNCTION,
COMMENTS: MEIBOMIAN GLAND DYSFUNCTION

## 2023-04-26 ASSESSMENT — VISUAL ACUITY
OS_PH_CC: 20/40
OD_PH_CC+: +2
OD_CC: 20/40
CORRECTION_TYPE: GLASSES
OS_CC: 20/100
METHOD: SNELLEN - LINEAR
OS_PH_CC+: -1
OD_PH_CC: 20/30
OD_CC+: +1

## 2023-04-26 ASSESSMENT — CONF VISUAL FIELD
OD_SUPERIOR_NASAL_RESTRICTION: 0
OD_INFERIOR_NASAL_RESTRICTION: 0
OD_NORMAL: 1
OS_INFERIOR_TEMPORAL_RESTRICTION: 0
OS_SUPERIOR_TEMPORAL_RESTRICTION: 0
OD_SUPERIOR_TEMPORAL_RESTRICTION: 0
METHOD: COUNTING FINGERS
OS_INFERIOR_NASAL_RESTRICTION: 0
OS_NORMAL: 1
OS_SUPERIOR_NASAL_RESTRICTION: 0
OD_INFERIOR_TEMPORAL_RESTRICTION: 0

## 2023-04-26 ASSESSMENT — EXTERNAL EXAM - RIGHT EYE: OD_EXAM: NORMAL

## 2023-04-26 ASSESSMENT — EXTERNAL EXAM - LEFT EYE: OS_EXAM: NORMAL

## 2023-04-26 NOTE — PROGRESS NOTES
Chief complaint   ABMD evaluation    Referring Provider: Dr. Valderrama     HPI    Sharon HOPPER Michealjessica 67 year old female     HPI     Follow Up     Additional comments: 5 day follow up Anterior Basement Membrane Dystrophy, OU           Comments    Pt states vision is about the same as last visit. No eye pain today. No new flashes or floaters.    GABY Norman April 26, 2023 7:29 AM                Last edited by Erica Self COMT on 4/26/2023  7:29 AM.         Complains of gradually worsening haze over the vision in both eyes starting ~3/2022 and progressing since then. Has tried some sort of ointment and drops without improvement. Denies any pain. Does endorse epiphora just from the right eye. Does endorse significant itching predating onset of hazy vision for which she uses pataday PRN - she does have seasonal allergies for which she takes allegra. Of note, she uses a CPAP for her COPD and has RA for which she follows with rheumatology with immunosuppressant treatments including courses of steroids. At her last visit 3/31/2022 with Dr. Valderrama she was instructed to continue present treatment of restasis (no longer using - did not help), luba zaynab and gtt (not using), and maxitrol PRN to eyelids. No history of painful episodes. Problem is not time-of-day dependent. No waking with pain.    Interval history 4/26/23: Doing well without pain. Feels that vision is stable. No flashes, floaters, curtains. Using prednisolone once daily (as opposed to twice daily) though compliant with other drops as prescribed.    Past ocular history   Prior eye surgery/laser/Trauma: yes bilateral upper lid blepharoplasty (2016)  CTL wearer:No  Glasses : yes  Family Hx of eye disease: No    PMH     Past Medical History:   Diagnosis Date     AR (allergic rhinitis)  as teen     Arthritis 12/14/2015     Chronic obstructive pulmonary disease, unspecified COPD type (H) 3/27/2018     Depressive disorder 3 years ago     GERD  (gastroesophageal reflux disease) 4-07     Headache 7/11/2017     Hypertension goal BP (blood pressure) < 140/90 12/20/2021     Mild major depression (H) 3 years ago     Morbid obesity (H) teens     BRETT (obstructive sleep apnea)     uses CPAP     RA (rheumatoid arthritis) (H) 4 years     Reduced vision 3 years     Rheumatoid arthritis, adult (H)        PSH     Past Surgical History:   Procedure Laterality Date     ARTHRODESIS FOOT Right 6/30/2021    Procedure: Right 1st metatarsophalangeal joint fusion;  Surgeon: Jesus Wolf MD;  [facility]     ARTHRODESIS TOE(S) Right 12/27/2021    Procedure: right revision great toe fusion;  Surgeon: Ryan Gee MD;  [facility]: [facility]     BLEPHAROPLASTY BILATERAL Bilateral 8/5/2016    Procedure: BLEPHAROPLASTY BILATERAL;  Surgeon: Cortez Robin MD;  [facility]     BREAST BIOPSY, RT/LT  1-94    Benign     COLONOSCOPY       COLONOSCOPY WITH CO2 INSUFFLATION N/A 3/30/2017    Procedure: COLONOSCOPY WITH CO2 INSUFFLATION;  Surgeon: Issa Weeks MD;  [facility]     ESOPHAGOSCOPY, GASTROSCOPY, DUODENOSCOPY (EGD), COMBINED N/A 10/29/2015    Procedure: COMBINED ESOPHAGOSCOPY, GASTROSCOPY, DUODENOSCOPY (EGD);  Surgeon: Shaq Mims MD;  [facility]     LAPAROSCOPIC GASTRIC ADJUSTABLE BANDING  11/09/10    Lap band procedure     LAPAROSCOPIC REMOVAL GASTRIC ADJUSTABLE BAND N/A 10/31/2015    Procedure: LAPAROSCOPIC REMOVAL GASTRIC ADJUSTABLE BAND;  Surgeon: Shaq Mims MD;  [facility]     FL HAND/FINGER SURGERY UNLISTED  2016     FL STOMACH SURGERY PROCEDURE UNLISTED  11/2015     RELEASE CARPAL TUNNEL Left 4/27/2017    Procedure: RELEASE CARPAL TUNNEL;  Left Open Carpal Tunnel Release;  Surgeon: Ciara Middleton MD;  [facility]     RELEASE CARPAL TUNNEL Right 6/15/2017    Procedure: RELEASE CARPAL TUNNEL;  Right Carpal Tunnel Release Open;  Surgeon: Ciara Middleton MD;  [facility]      REPAIR PTOSIS       TUBAL LIGATION  1988     [facility] APPENDECTOMY  1977       Meds     Current Outpatient Medications   Medication     acetaminophen (TYLENOL) 500 MG tablet     albuterol (PROAIR HFA/PROVENTIL HFA/VENTOLIN HFA) 108 (90 Base) MCG/ACT inhaler     azelastine (OPTIVAR) 0.05 % ophthalmic solution     citalopram (CELEXA) 20 MG tablet     cyclobenzaprine (FLEXERIL) 10 MG tablet     cycloSPORINE (RESTASIS) 0.05 % ophthalmic emulsion     Dehydrated Alcohol 98 % SOLN     fexofenadine (ALLEGRA) 180 MG tablet     fluticasone (FLONASE) 50 MCG/ACT nasal spray     gabapentin (NEURONTIN) 100 MG capsule     ibuprofen 200 MG capsule     ketorolac (TORADOL) 10 MG tablet     ketorolac tromethamine (ACULAR-LS) 0.4 % SOLN ophthalmic solution     leflunomide (ARAVA) 20 MG tablet     losartan-hydrochlorothiazide (HYZAAR) 100-25 MG tablet     ofloxacin (OCUFLOX) 0.3 % ophthalmic solution     ondansetron (ZOFRAN-ODT) 8 MG ODT tab     ORDER FOR DME     prednisoLONE acetate (PRED FORTE) 1 % ophthalmic suspension     predniSONE (DELTASONE) 5 MG tablet     Sarilumab 200 MG/1.14ML SOAJ     sodium chloride (DIMAS 128) 5 % ophthalmic solution     sulfaSALAzine (AZULFIDINE) 500 MG tablet     vitamin D3 (CHOLECALCIFEROL) 50 mcg (2000 units) tablet     Current Facility-Administered Medications   Medication     alcohol (dehydrated) (ABLYSINOL) 99 % injection 15 mL     Facility-Administered Medications Ordered in Other Visits   Medication     sodium chloride (PF) 0.9% PF flush 10 mL     sodium chloride bacteriostatic 0.9 % flush 12 mL       Labs   -    Imaging   -    Drops Currently Taking   - Pataday PRN each eye  - Prednisolone BID left eye (using every day)  - Ketorolac TID left eye  - Ofloxacin QID left eye  - Systane QID both eyes.   - Xiidra BID both eyes.     Assessment/Plan   # Anterior Basement Membrane Dystrophy, OU  - 01/26/23 Vision in both eyes has fluctuated from 20/20cc to 20/40cc OU since 2014 but now is subjectively the worst  it has been. No pain. Does have epiphora OD longstanding. Denies dryness. Does have allergies improving with pataday. Irregular surface from ABMD most likely the source of decreased visual acuity.  - 4/21/23: Superficial keratectomy of the left eye today.   - 4/26/23: BCL in place, still with large irregular inferior epithelial defect though healing well superiorly and into visual axis    Plan: left eye epithelial debridement - 4/21/23  - Replaced BCL Today  - continue prednisolone BID left eye  - continue ketorolac TID left eye  - continue ofloxacin QID left eye  - Return 1 week; if residual epi defect may hold off on debridement OD    - Consider PTK (if necessary) vs cataract surgery (in future). Disc R, B, PC & options)  - Artificial tears at least QID each eye  - Continue Xiidra BID both eyes.     # Horizontal exotropia  - Treated with 2.0 JOSUE prism starting 3/2022    # Nuclear sclerosis, OU  - History of steroid use for RA. BCVA fluctuant.  - Monitor    Follow up: 1 wek - call sooner with any problems    Raghu Medellin MD  Fellow, Cornea, External Disease and Refractive Surgery  Department of Ophthalmology  [facility]    Attending Physician Attestation:  Complete documentation of historical and exam elements from today's encounter can be found in the full encounter summary report (not reduplicated in this progress note).  I personally obtained the chief complaint(s) and history of present illness.  I confirmed and edited as necessary the review of systems, past medical/surgical history, family history, social history, and examination findings as documented by others; and I examined the patient myself.  I personally reviewed the relevant tests, images, and reports as documented above.  I formulated and edited as necessary the assessment and plan and discussed the findings and management plan with the patient and family. - Raghu Medellin MD

## 2023-04-26 NOTE — NURSING NOTE
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 No eye pain today. No new flashes or floaters.    GABY Norman April 26, 2023 7:29 AM

## 2023-04-27 ENCOUNTER — TELEPHONE (OUTPATIENT)
Dept: RHEUMATOLOGY | Facility: CLINIC | Age: 68
End: 2023-04-27
Payer: COMMERCIAL

## 2023-04-27 NOTE — TELEPHONE ENCOUNTER
PA Initiation    Medication: Kevzara- PA Pending  Insurance Company: [employer] - Phone [phone] Fax [phone]  Pharmacy Filling the Rx: [facility] - [place], [place] - [facility] [address]  Filling Pharmacy Phone:    Filling Pharmacy Fax:    Start Date: 4/27/2023

## 2023-04-28 NOTE — TELEPHONE ENCOUNTER
Prior Authorization Approval    Authorization Effective Date: 4/28/2023  Authorization Expiration Date: 4/27/2024  Medication: Kevzara- PA Approved  Approved Dose/Quantity: 200mg/1.14ml  Reference #: Key: [id number]   Insurance Company: [employer] - Phone [phone] Fax [phone]  Expected CoPay:       CoPay Card Available:      Foundation Assistance Needed:    Which Pharmacy is filling the prescription (Not needed for infusion/clinic administered): [facility] - [place], [place] - [facility] [address]  Pharmacy Notified:    Patient Notified:

## 2023-05-02 ENCOUNTER — OFFICE VISIT (OUTPATIENT)
Dept: OPHTHALMOLOGY | Facility: CLINIC | Age: 68
End: 2023-05-02
Attending: OPHTHALMOLOGY
Payer: COMMERCIAL

## 2023-05-02 DIAGNOSIS — H04.123 BILATERAL DRY EYES: Primary | ICD-10-CM

## 2023-05-02 DIAGNOSIS — H18.523 ANTERIOR BASEMENT MEMBRANE DYSTROPHY OF BOTH EYES: ICD-10-CM

## 2023-05-02 PROCEDURE — G0463 HOSPITAL OUTPT CLINIC VISIT: HCPCS | Performed by: OPHTHALMOLOGY

## 2023-05-02 PROCEDURE — 99214 OFFICE O/P EST MOD 30 MIN: CPT | Performed by: OPHTHALMOLOGY

## 2023-05-02 RX ORDER — PREDNISOLONE ACETATE 10 MG/ML
1-2 SUSPENSION/ DROPS OPHTHALMIC DAILY
Qty: 5 ML | Refills: 1 | Status: SHIPPED | OUTPATIENT
Start: 2023-05-02 | End: 2023-07-20

## 2023-05-02 RX ORDER — KETOROLAC TROMETHAMINE 4 MG/ML
1 SOLUTION/ DROPS OPHTHALMIC 3 TIMES DAILY
Qty: 5 ML | Refills: 1 | Status: SHIPPED | OUTPATIENT
Start: 2023-05-02 | End: 2023-07-20

## 2023-05-02 ASSESSMENT — VISUAL ACUITY
OD_PH_CC: 20/30
OD_CC: 20/30
OD_PH_CC+: +2
OS_PH_CC: 20/40
OS_CC: 20/100
OD_CC+: -1
CORRECTION_TYPE: GLASSES
METHOD: SNELLEN - LINEAR
OS_CC+: -1
OS_PH_CC+: -1

## 2023-05-02 ASSESSMENT — REFRACTION_WEARINGRX
OD_CYLINDER: +2.00
OS_CYLINDER: +0.50
OD_HPRISM: 2.0
OD_AXIS: 160
OS_ADD: +2.75
OD_HBASE: OUT
OS_AXIS: 025
OD_ADD: +2.75
OS_SPHERE: -2.00
OD_SPHERE: -2.00

## 2023-05-02 ASSESSMENT — CONF VISUAL FIELD
OS_INFERIOR_TEMPORAL_RESTRICTION: 0
METHOD: COUNTING FINGERS
OS_SUPERIOR_TEMPORAL_RESTRICTION: 0
OS_INFERIOR_NASAL_RESTRICTION: 0
OD_INFERIOR_NASAL_RESTRICTION: 0
OD_NORMAL: 1
OD_INFERIOR_TEMPORAL_RESTRICTION: 0
OD_SUPERIOR_NASAL_RESTRICTION: 0
OS_SUPERIOR_NASAL_RESTRICTION: 0
OD_SUPERIOR_TEMPORAL_RESTRICTION: 0
OS_NORMAL: 1

## 2023-05-02 ASSESSMENT — EXTERNAL EXAM - LEFT EYE: OS_EXAM: NORMAL

## 2023-05-02 ASSESSMENT — TONOMETRY
OD_IOP_MMHG: 19
IOP_METHOD: ICARE
OS_IOP_MMHG: 18

## 2023-05-02 ASSESSMENT — SLIT LAMP EXAM - LIDS
COMMENTS: MEIBOMIAN GLAND DYSFUNCTION,
COMMENTS: MEIBOMIAN GLAND DYSFUNCTION

## 2023-05-02 ASSESSMENT — EXTERNAL EXAM - RIGHT EYE: OD_EXAM: NORMAL

## 2023-05-02 NOTE — NURSING NOTE
Chief Complaints and History of Present Illnesses   Patient presents with     Follow Up     1 week follow up  left eye epithelial debridement - 4/21/23     Chief Complaint(s) and History of Present Illness(es)     Follow Up            Comments: 1 week follow up  left eye epithelial debridement - 4/21/23          Comments    Pt states vision is the same as last  visit. No eye pain today. No new flashes or floaters.   No redness or dryness.    GABY Norman May 2, 2023 7:18 AM

## 2023-05-02 NOTE — PROGRESS NOTES
Chief complaint   ABMD evaluation    Referring Provider: Dr. Valderrama     HPI    Sharon HOPPER Michealjessica 67 year old female     HPI     Follow Up     Additional comments: 1 week follow up  left eye epithelial debridement - 4/21/23           Comments    Pt states vision is the same as last  visit. No eye pain today. No new flashes or floaters.   No redness or dryness.    GABY Norman May 2, 2023 7:18 AM                Last edited by Erica Self COMT on 5/2/2023  7:19 AM.         Complains of gradually worsening haze over the vision in both eyes starting ~3/2022 and progressing since then. Has tried some sort of ointment and drops without improvement. Denies any pain. Does endorse epiphora just from the right eye. Does endorse significant itching predating onset of hazy vision for which she uses pataday PRN - she does have seasonal allergies for which she takes allegra. Of note, she uses a CPAP for her COPD and has RA for which she follows with rheumatology with immunosuppressant treatments including courses of steroids. At her last visit 3/31/2022 with Dr. Valderrama she was instructed to continue present treatment of restasis (no longer using - did not help), luba zaynab and gtt (not using), and maxitrol PRN to eyelids. No history of painful episodes. Problem is not time-of-day dependent. No waking with pain.    Interval history 4/26/23: Doing well without pain. Feels that vision is stable. No flashes, floaters, curtains. Using prednisolone once daily (as opposed to twice daily) though compliant with other drops as prescribed.    Past ocular history   Prior eye surgery/laser/Trauma: yes bilateral upper lid blepharoplasty (2016)  CTL wearer:No  Glasses : yes  Family Hx of eye disease: No    PMH     Past Medical History:   Diagnosis Date     AR (allergic rhinitis)  as teen     Arthritis 12/14/2015     Chronic obstructive pulmonary disease, unspecified COPD type (H) 3/27/2018     Depressive disorder 3 years ago      GERD (gastroesophageal reflux disease) 4-07     Headache 7/11/2017     Hypertension goal BP (blood pressure) < 140/90 12/20/2021     Mild major depression (H) 3 years ago     Morbid obesity (H) teens     BRETT (obstructive sleep apnea)     uses CPAP     RA (rheumatoid arthritis) (H) 4 years     Reduced vision 3 years     Rheumatoid arthritis, adult (H)        PSH     Past Surgical History:   Procedure Laterality Date     ARTHRODESIS FOOT Right 6/30/2021    Procedure: Right 1st metatarsophalangeal joint fusion;  Surgeon: Jesus Wolf MD;  [facility]     ARTHRODESIS TOE(S) Right 12/27/2021    Procedure: right revision great toe fusion;  Surgeon: Ryan Gee MD;  [facility]     BLEPHAROPLASTY BILATERAL Bilateral 8/5/2016    Procedure: BLEPHAROPLASTY BILATERAL;  Surgeon: Cortez Robin MD;  [facility]     BREAST BIOPSY, RT/LT  1-94    Benign     COLONOSCOPY       COLONOSCOPY WITH CO2 INSUFFLATION N/A 3/30/2017    Procedure: COLONOSCOPY WITH CO2 INSUFFLATION;  Surgeon: Issa Weeks MD;  [facility]: [facility]     ESOPHAGOSCOPY, GASTROSCOPY, DUODENOSCOPY (EGD), COMBINED N/A 10/29/2015    Procedure: COMBINED ESOPHAGOSCOPY, GASTROSCOPY, DUODENOSCOPY (EGD);  Surgeon: Shaq Mims MD;  [facility]     LAPAROSCOPIC GASTRIC ADJUSTABLE BANDING  11/09/10    Lap band procedure     LAPAROSCOPIC REMOVAL GASTRIC ADJUSTABLE BAND N/A 10/31/2015    Procedure: LAPAROSCOPIC REMOVAL GASTRIC ADJUSTABLE BAND;  Surgeon: Shaq Mims MD;  [facility]     AK HAND/FINGER SURGERY UNLISTED  2016     AK STOMACH SURGERY PROCEDURE UNLISTED  11/2015     RELEASE CARPAL TUNNEL Left 4/27/2017    Procedure: RELEASE CARPAL TUNNEL;  Left Open Carpal Tunnel Release;  Surgeon: Ciara Middleton MD;  [facility]     RELEASE CARPAL TUNNEL Right 6/15/2017    Procedure: RELEASE CARPAL TUNNEL;  Right Carpal Tunnel Release Open;  Surgeon: Ciara Middleton MD;  [facility]      REPAIR PTOSIS       TUBAL LIGATION  1988     [facility] APPENDECTOMY  1977       Meds     Current Outpatient Medications   Medication     acetaminophen (TYLENOL) 500 MG tablet     albuterol (PROAIR HFA/PROVENTIL HFA/VENTOLIN HFA) 108 (90 Base) MCG/ACT inhaler     azelastine (OPTIVAR) 0.05 % ophthalmic solution     citalopram (CELEXA) 20 MG tablet     cyclobenzaprine (FLEXERIL) 10 MG tablet     cycloSPORINE (RESTASIS) 0.05 % ophthalmic emulsion     Dehydrated Alcohol 98 % SOLN     fexofenadine (ALLEGRA) 180 MG tablet     fluticasone (FLONASE) 50 MCG/ACT nasal spray     gabapentin (NEURONTIN) 100 MG capsule     ibuprofen 200 MG capsule     ketorolac (TORADOL) 10 MG tablet     ketorolac tromethamine (ACULAR-LS) 0.4 % SOLN ophthalmic solution     leflunomide (ARAVA) 20 MG tablet     losartan-hydrochlorothiazide (HYZAAR) 100-25 MG tablet     ofloxacin (OCUFLOX) 0.3 % ophthalmic solution     ondansetron (ZOFRAN-ODT) 8 MG ODT tab     ORDER FOR DME     prednisoLONE acetate (PRED FORTE) 1 % ophthalmic suspension     predniSONE (DELTASONE) 5 MG tablet     Sarilumab 200 MG/1.14ML SOAJ     sodium chloride (DIMAS 128) 5 % ophthalmic solution     sulfaSALAzine (AZULFIDINE) 500 MG tablet     vitamin D3 (CHOLECALCIFEROL) 50 mcg (2000 units) tablet     Current Facility-Administered Medications   Medication     alcohol (dehydrated) (ABLYSINOL) 99 % injection 15 mL     Facility-Administered Medications Ordered in Other Visits   Medication     sodium chloride (PF) 0.9% PF flush 10 mL     sodium chloride bacteriostatic 0.9 % flush 12 mL       Labs   -    Imaging   -    Drops Currently Taking   - Pataday PRN each eye  - Prednisolone BID left eye (using every day)  - Ketorolac TID left eye  - Ofloxacin QID left eye  - Systane QID both eyes.   - Xiidra BID both eyes.     Assessment/Plan   # Anterior Basement Membrane Dystrophy, OU  - 01/26/23 Vision in both eyes has fluctuated from 20/20cc to 20/40cc OU since 2014 but now is subjectively the  worst it has been. No pain. Does have epiphora OD longstanding. Denies dryness. Does have allergies improving with pataday. Irregular surface from ABMD most likely the source of decreased visual acuity.  - 4/21/23: Superficial keratectomy of the left eye today.   - 4/26/23: BCL in place, still with large irregular inferior epithelial defect though healing well superiorly and into visual axis    Plan: left eye epithelial debridement - 4/21/23  - Replaced BCL Today  - stop prednisolone BID left eye  - stop ketorolac TID left eye  - continue ofloxacin QID left eye x 3 days  -Disc need for PFAT between oflox and ideally every 2 hours.   - Return 1-2 week; if residual epi defect may hold off on debridement OD    - Consider PTK (if necessary) vs cataract surgery (in future). Disc R, B, PC & options)  - Artificial tears at least QID each eye  - Continue Xiidra BID both eyes.     # Horizontal exotropia  - Treated with 2.0 JOSUE prism starting 3/2022    # Nuclear sclerosis, OU  - History of steroid use for RA. BCVA fluctuant.  - Monitor    Follow up: 1-2 weeks - call sooner with any problems  If left eye helaing well, we can continue with right eye epi - keratectomy        Levon Jamil MD  Attending Physician Attestation:  Complete documentation of historical and exam elements from today's encounter can be found in the full encounter summary report (not reduplicated in this progress note).  I personally obtained the chief complaint(s) and history of present illness.  I confirmed and edited as necessary the review of systems, past medical/surgical history, family history, social history, and examination findings as documented by others; and I examined the patient myself.  I personally reviewed the relevant tests, images, and reports as documented above.  I formulated and edited as necessary the assessment and plan and discussed the findings and management plan with the patient and family. - Raghu Medellin MD

## 2023-05-23 ENCOUNTER — OFFICE VISIT (OUTPATIENT)
Dept: OPHTHALMOLOGY | Facility: CLINIC | Age: 68
End: 2023-05-23
Attending: OPHTHALMOLOGY
Payer: COMMERCIAL

## 2023-05-23 DIAGNOSIS — H18.523 ANTERIOR BASEMENT MEMBRANE DYSTROPHY OF BOTH EYES: Primary | ICD-10-CM

## 2023-05-23 DIAGNOSIS — H04.123 BILATERAL DRY EYES: ICD-10-CM

## 2023-05-23 PROCEDURE — 99214 OFFICE O/P EST MOD 30 MIN: CPT | Mod: 25 | Performed by: OPHTHALMOLOGY

## 2023-05-23 PROCEDURE — 65435 CURETTE/TREAT CORNEA: CPT | Mod: RT | Performed by: OPHTHALMOLOGY

## 2023-05-23 ASSESSMENT — EXTERNAL EXAM - RIGHT EYE: OD_EXAM: NORMAL

## 2023-05-23 ASSESSMENT — VISUAL ACUITY
CORRECTION_TYPE: GLASSES
OS_CC: 20/40
METHOD: SNELLEN - LINEAR
OS_PH_CC+: -1
OD_CC+: -1
OD_CC: 20/40
OS_PH_CC: 20/30

## 2023-05-23 ASSESSMENT — TONOMETRY
OD_IOP_MMHG: 16
IOP_METHOD: ICARE
OS_IOP_MMHG: 17

## 2023-05-23 ASSESSMENT — SLIT LAMP EXAM - LIDS
COMMENTS: MEIBOMIAN GLAND DYSFUNCTION,
COMMENTS: MEIBOMIAN GLAND DYSFUNCTION

## 2023-05-23 ASSESSMENT — EXTERNAL EXAM - LEFT EYE: OS_EXAM: NORMAL

## 2023-05-23 NOTE — PROGRESS NOTES
Chief complaint   ABMD evaluation    Referring Provider: Dr. Royal JAKUB HOPPER Antonieta 67 year old female        Complains of gradually worsening haze over the vision in both eyes starting ~3/2022 and progressing since then. Has tried some sort of ointment and drops without improvement. Denies any pain. Does endorse epiphora just from the right eye. Does endorse significant itching predating onset of hazy vision for which she uses pataday PRN - she does have seasonal allergies for which she takes allegra. Of note, she uses a CPAP for her COPD and has RA for which she follows with rheumatology with immunosuppressant treatments including courses of steroids. At her last visit 3/31/2022 with Dr. Valderrama she was instructed to continue present treatment of restasis (no longer using - did not help), luba zaynab and gtt (not using), and maxitrol PRN to eyelids. No history of painful episodes. Problem is not time-of-day dependent. No waking with pain.    Interval history 5/23/23: Doing well without pain. Feels that vision is improved in the left eye, stable right eye . No flashes, floaters, curtains. Very interested in proceeding with SK of the right eye today    Past ocular history   Prior eye surgery/laser/Trauma: yes bilateral upper lid blepharoplasty (2016)  CTL wearer:No  Glasses : yes  Family Hx of eye disease: No    PMH     Past Medical History:   Diagnosis Date     AR (allergic rhinitis)  as teen     Arthritis 12/14/2015     Chronic obstructive pulmonary disease, unspecified COPD type (H) 3/27/2018     Depressive disorder 3 years ago     GERD (gastroesophageal reflux disease) 4-07     Headache 7/11/2017     Hypertension goal BP (blood pressure) < 140/90 12/20/2021     Mild major depression (H) 3 years ago     Morbid obesity (H) teens     BRETT (obstructive sleep apnea)     uses CPAP     RA (rheumatoid arthritis) (H) 4 years     Reduced vision 3 years     Rheumatoid arthritis, adult (H)        PSH     Past Surgical  History:   Procedure Laterality Date     ARTHRODESIS FOOT Right 6/30/2021    Procedure: Right 1st metatarsophalangeal joint fusion;  Surgeon: Jesus Wolf MD;  [facility]     ARTHRODESIS TOE(S) Right 12/27/2021    Procedure: right revision great toe fusion;  Surgeon: Ryan Gee MD;  [facility]: [facility]     BLEPHAROPLASTY BILATERAL Bilateral 8/5/2016    Procedure: BLEPHAROPLASTY BILATERAL;  Surgeon: Cortez Robin MD;  [facility]     BREAST BIOPSY, RT/LT  1-94    Benign     COLONOSCOPY       COLONOSCOPY WITH CO2 INSUFFLATION N/A 3/30/2017    Procedure: COLONOSCOPY WITH CO2 INSUFFLATION;  Surgeon: Issa Weeks MD;  [facility]: [facility]     ESOPHAGOSCOPY, GASTROSCOPY, DUODENOSCOPY (EGD), COMBINED N/A 10/29/2015    Procedure: COMBINED ESOPHAGOSCOPY, GASTROSCOPY, DUODENOSCOPY (EGD);  Surgeon: Shaq Mims MD;  [facility]: [facility]     LAPAROSCOPIC GASTRIC ADJUSTABLE BANDING  11/09/10    Lap band procedure     LAPAROSCOPIC REMOVAL GASTRIC ADJUSTABLE BAND N/A 10/31/2015    Procedure: LAPAROSCOPIC REMOVAL GASTRIC ADJUSTABLE BAND;  Surgeon: Shaq Mims MD;  [facility]: [facility]     [place] HAND/FINGER SURGERY UNLISTED  2016     [place] STOMACH SURGERY PROCEDURE UNLISTED  11/2015     RELEASE CARPAL TUNNEL Left 4/27/2017    Procedure: RELEASE CARPAL TUNNEL;  Left Open Carpal Tunnel Release;  Surgeon: Ciara Middleton MD;  [facility]     RELEASE CARPAL TUNNEL Right 6/15/2017    Procedure: RELEASE CARPAL TUNNEL;  Right Carpal Tunnel Release Open;  Surgeon: Ciara Middleton MD;  [facility]     REPAIR PTOSIS       TUBAL LIGATION  1988     [facility] APPENDECTOMY  1977       Meds     Current Outpatient Medications   Medication     acetaminophen (TYLENOL) 500 MG tablet     albuterol (PROAIR HFA/PROVENTIL HFA/VENTOLIN HFA) 108 (90 Base) MCG/ACT inhaler     azelastine (OPTIVAR) 0.05 % ophthalmic solution     citalopram (CELEXA) 20 MG tablet     cyclobenzaprine (FLEXERIL) 10 MG  tablet     cycloSPORINE (RESTASIS) 0.05 % ophthalmic emulsion     Dehydrated Alcohol 98 % SOLN     fexofenadine (ALLEGRA) 180 MG tablet     fluticasone (FLONASE) 50 MCG/ACT nasal spray     gabapentin (NEURONTIN) 100 MG capsule     ibuprofen 200 MG capsule     ketorolac (TORADOL) 10 MG tablet     ketorolac tromethamine (ACULAR-LS) 0.4 % SOLN ophthalmic solution     leflunomide (ARAVA) 20 MG tablet     losartan-hydrochlorothiazide (HYZAAR) 100-25 MG tablet     ofloxacin (OCUFLOX) 0.3 % ophthalmic solution     ondansetron (ZOFRAN-ODT) 8 MG ODT tab     ORDER FOR DME     prednisoLONE acetate (PRED FORTE) 1 % ophthalmic suspension     predniSONE (DELTASONE) 5 MG tablet     Sarilumab 200 MG/1.14ML SOAJ     sodium chloride (DIMAS 128) 5 % ophthalmic solution     sulfaSALAzine (AZULFIDINE) 500 MG tablet     vitamin D3 (CHOLECALCIFEROL) 50 mcg (2000 units) tablet     Current Facility-Administered Medications   Medication     alcohol (dehydrated) (ABLYSINOL) 99 % injection 15 mL     Facility-Administered Medications Ordered in Other Visits   Medication     sodium chloride (PF) 0.9% PF flush 10 mL     sodium chloride bacteriostatic 0.9 % flush 12 mL       Labs   -    Imaging   -    Drops Currently Taking   - Pataday PRN each eye  - Systane QID both eyes.     Assessment/Plan   # Anterior Basement Membrane Dystrophy, OU  - 01/26/23 Vision in both eyes has fluctuated from 20/20cc to 20/40cc OU since 2014 but now is subjectively the worst it has been. No pain. Does have epiphora OD longstanding. Denies dryness. Does have allergies improving with pataday. Irregular surface from ABMD most likely the source of decreased visual acuity.  - 4/21/23: Superficial keratectomy of the left eye today.   - 4/26/23: BCL in place, still with large irregular inferior epithelial defect though healing well superiorly and into visual axis  - 5/23/23: Epi fully intact left eye. Right eye superficial keratectomy today    Plan: RIGHT eye superficial  keratectomy in minor procedure room today 5/23/23  - start prednisolone BID right eye  - start ketorolac TID right eye  - start ofloxacin QID right eye    - Disc need for PFAT between oflox and ideally every 2 hours.   - BCL placed right eye  -     # Horizontal exotropia  - Treated with 2.0 JOSUE prism starting 3/2022    # Nuclear sclerosis, OU  - History of steroid use for RA. BCVA fluctuant.  - Monitor    Follow up: 1 week VT, sooner PRN    Raghu Medellin MD  Fellow, Cornea, External Disease and Refractive Surgery  Department of Ophthalmology  [facility]    Attending Physician Attestation:  Complete documentation of historical and exam elements from today's encounter can be found in the full encounter summary report (not reduplicated in this progress note).  I personally obtained the chief complaint(s) and history of present illness.  I confirmed and edited as necessary the review of systems, past medical/surgical history, family history, social history, and examination findings as documented by others; and I examined the patient myself.  I personally reviewed the relevant tests, images, and reports as documented above.  I formulated and edited as necessary the assessment and plan and discussed the findings and management plan with the patient and family. - Levon Jamil MD     I was present for the entire procedure(s). - Levon Jamil MD

## 2023-05-30 ENCOUNTER — OFFICE VISIT (OUTPATIENT)
Dept: OPHTHALMOLOGY | Facility: CLINIC | Age: 68
End: 2023-05-30
Attending: OPHTHALMOLOGY
Payer: COMMERCIAL

## 2023-05-30 DIAGNOSIS — H04.123 BILATERAL DRY EYES: ICD-10-CM

## 2023-05-30 DIAGNOSIS — H18.523 ANTERIOR BASEMENT MEMBRANE DYSTROPHY OF BOTH EYES: Primary | ICD-10-CM

## 2023-05-30 PROCEDURE — 99214 OFFICE O/P EST MOD 30 MIN: CPT | Mod: GC | Performed by: OPHTHALMOLOGY

## 2023-05-30 ASSESSMENT — TONOMETRY
OD_IOP_MMHG: 21
IOP_METHOD: ICARE
OS_IOP_MMHG: 22

## 2023-05-30 ASSESSMENT — VISUAL ACUITY
OS_CC+: -2
CORRECTION_TYPE: GLASSES
OS_CC: 20/40
OD_CC+: -2
OD_CC: 20/30
METHOD: SNELLEN - LINEAR

## 2023-05-30 ASSESSMENT — SLIT LAMP EXAM - LIDS
COMMENTS: MEIBOMIAN GLAND DYSFUNCTION
COMMENTS: MEIBOMIAN GLAND DYSFUNCTION,

## 2023-05-30 ASSESSMENT — EXTERNAL EXAM - RIGHT EYE: OD_EXAM: NORMAL

## 2023-05-30 ASSESSMENT — EXTERNAL EXAM - LEFT EYE: OS_EXAM: NORMAL

## 2023-05-30 NOTE — PROGRESS NOTES
Chief complaint   ABMD evaluation    Referring Provider: Dr. Royal JAKUB HOPPER Antonieta 68 year old female      Complains of gradually worsening haze over the vision in both eyes starting ~3/2022 and progressing since then. Has tried some sort of ointment and drops without improvement. Denies any pain. Does endorse epiphora just from the right eye. Does endorse significant itching predating onset of hazy vision for which she uses pataday PRN - she does have seasonal allergies for which she takes allegra. Of note, she uses a CPAP for her COPD and has RA for which she follows with rheumatology with immunosuppressant treatments including courses of steroids. At her last visit 3/31/2022 with Dr. Valderrama she was instructed to continue present treatment of restasis (no longer using - did not help), luba zaynab and gtt (not using), and maxitrol PRN to eyelids. No history of painful episodes. Problem is not time-of-day dependent. No waking with pain.    Interval history 05/30/23: doing well, no pain or discomfort. Contact lens has remained in place in the right eye. Feels that vision is slightly improved. No flashes, floaters, curtains.     Past ocular history   Prior eye surgery/laser/Trauma: yes bilateral upper lid blepharoplasty (2016)  CTL wearer:No  Glasses : yes  Family Hx of eye disease: No    PMH     Past Medical History:   Diagnosis Date     AR (allergic rhinitis)  as teen     Arthritis 12/14/2015     Chronic obstructive pulmonary disease, unspecified COPD type (H) 3/27/2018     Depressive disorder 3 years ago     GERD (gastroesophageal reflux disease) 4-07     Headache 7/11/2017     Hypertension goal BP (blood pressure) < 140/90 12/20/2021     Mild major depression (H) 3 years ago     Morbid obesity (H) teens     BRETT (obstructive sleep apnea)     uses CPAP     RA (rheumatoid arthritis) (H) 4 years     Reduced vision 3 years     Rheumatoid arthritis, adult (H)        PSH     Past Surgical History:   Procedure  Laterality Date     ARTHRODESIS FOOT Right 6/30/2021    Procedure: Right 1st metatarsophalangeal joint fusion;  Surgeon: Jesus Wolf MD;  [facility]     ARTHRODESIS TOE(S) Right 12/27/2021    Procedure: right revision great toe fusion;  Surgeon: Ryan Gee MD;  [facility]: [facility]     BLEPHAROPLASTY BILATERAL Bilateral 8/5/2016    Procedure: BLEPHAROPLASTY BILATERAL;  Surgeon: Cortez Robin MD;  [facility]     BREAST BIOPSY, RT/LT  1-94    Benign     COLONOSCOPY       COLONOSCOPY WITH CO2 INSUFFLATION N/A 3/30/2017    Procedure: COLONOSCOPY WITH CO2 INSUFFLATION;  Surgeon: Issa Weeks MD;  [facility]     ESOPHAGOSCOPY, GASTROSCOPY, DUODENOSCOPY (EGD), COMBINED N/A 10/29/2015    Procedure: COMBINED ESOPHAGOSCOPY, GASTROSCOPY, DUODENOSCOPY (EGD);  Surgeon: Shaq Mims MD;  [facility]     LAPAROSCOPIC GASTRIC ADJUSTABLE BANDING  11/09/10    Lap band procedure     LAPAROSCOPIC REMOVAL GASTRIC ADJUSTABLE BAND N/A 10/31/2015    Procedure: LAPAROSCOPIC REMOVAL GASTRIC ADJUSTABLE BAND;  Surgeon: Shaq Mims MD;  [facility]     [place] HAND/FINGER SURGERY UNLISTED  2016     [place] STOMACH SURGERY PROCEDURE UNLISTED  11/2015     RELEASE CARPAL TUNNEL Left 4/27/2017    Procedure: RELEASE CARPAL TUNNEL;  Left Open Carpal Tunnel Release;  Surgeon: Ciara Middleton MD;  [facility]     RELEASE CARPAL TUNNEL Right 6/15/2017    Procedure: RELEASE CARPAL TUNNEL;  Right Carpal Tunnel Release Open;  Surgeon: Ciara Middleton MD;  [facility]     REPAIR PTOSIS       TUBAL LIGATION  1988     [facility] [place] APPENDECTOMY  1977       Meds     Current Outpatient Medications   Medication     acetaminophen (TYLENOL) 500 MG tablet     albuterol (PROAIR HFA/PROVENTIL HFA/VENTOLIN HFA) 108 (90 Base) MCG/ACT inhaler     azelastine (OPTIVAR) 0.05 % ophthalmic solution     citalopram (CELEXA) 20 MG tablet     cyclobenzaprine (FLEXERIL) 10 MG tablet      cycloSPORINE (RESTASIS) 0.05 % ophthalmic emulsion     Dehydrated Alcohol 98 % SOLN     fexofenadine (ALLEGRA) 180 MG tablet     fluticasone (FLONASE) 50 MCG/ACT nasal spray     gabapentin (NEURONTIN) 100 MG capsule     ibuprofen 200 MG capsule     ketorolac (TORADOL) 10 MG tablet     ketorolac tromethamine (ACULAR-LS) 0.4 % SOLN ophthalmic solution     leflunomide (ARAVA) 20 MG tablet     losartan-hydrochlorothiazide (HYZAAR) 100-25 MG tablet     ofloxacin (OCUFLOX) 0.3 % ophthalmic solution     ondansetron (ZOFRAN-ODT) 8 MG ODT tab     ORDER FOR DME     prednisoLONE acetate (PRED FORTE) 1 % ophthalmic suspension     predniSONE (DELTASONE) 5 MG tablet     Sarilumab 200 MG/1.14ML SOAJ     sodium chloride (DIMAS 128) 5 % ophthalmic solution     sulfaSALAzine (AZULFIDINE) 500 MG tablet     vitamin D3 (CHOLECALCIFEROL) 50 mcg (2000 units) tablet     Current Facility-Administered Medications   Medication     alcohol (dehydrated) (ABLYSINOL) 99 % injection 15 mL     Facility-Administered Medications Ordered in Other Visits   Medication     sodium chloride (PF) 0.9% PF flush 10 mL     sodium chloride bacteriostatic 0.9 % flush 12 mL       Labs   -    Imaging   -    Drops Currently Taking   - Pataday PRN each eye  - Systane QID both eyes.  - Prednioslone BID right eye  - Ketorolac TID right eye  - Ofloxacin QID OD     Assessment/Plan   # Anterior Basement Membrane Dystrophy, OU  - 01/26/23 Vision in both eyes has fluctuated from 20/20cc to 20/40cc OU since 2014 but now is subjectively the worst it has been. No pain. Does have epiphora OD longstanding. Denies dryness. Does have allergies improving with pataday. Irregular surface from ABMD most likely the source of decreased visual acuity.  - 4/21/23: Superficial keratectomy of the left eye today.   - 4/26/23: BCL in place, still with large irregular inferior epithelial defect though healing well superiorly and into visual axis  - 5/23/23: Epi fully intact left eye. Right eye  superficial keratectomy  - 05/30/23 BCL removed right eye. Epi rough but intact, recently healed. Left eye epi dry but intact    Plan:   - taper prednisolone to every day x1 day, then stop right eye  - discontinue ketorolac TID right eye  - continue ofloxacin QID right eye  x1 week  - Recommended consistent use of PFATs every 1-2 hours    # Horizontal exotropia  - Treated with 2.0 JOSUE prism starting 3/2022    # Nuclear sclerosis, OU  - History of steroid use for RA. BCVA fluctuant.  - Monitor    Follow up:  1 month VT, sooner PRN    Raghu Medellin MD  Fellow, Cornea, External Disease and Refractive Surgery  Department of Ophthalmology  [facility]    Attending Physician Attestation:  Complete documentation of historical and exam elements from today's encounter can be found in the full encounter summary report (not reduplicated in this progress note).  I personally obtained the chief complaint(s) and history of present illness.  I confirmed and edited as necessary the review of systems, past medical/surgical history, family history, social history, and examination findings as documented by others; and I examined the patient myself.  I personally reviewed the relevant tests, images, and reports as documented above.  I formulated and edited as necessary the assessment and plan and discussed the findings and management plan with the patient and family. - Levon Jamil MD

## 2023-06-01 ENCOUNTER — PATIENT OUTREACH (OUTPATIENT)
Dept: CARE COORDINATION | Facility: CLINIC | Age: 68
End: 2023-06-01
Payer: COMMERCIAL

## 2023-06-09 ENCOUNTER — VIRTUAL VISIT (OUTPATIENT)
Dept: RHEUMATOLOGY | Facility: CLINIC | Age: 68
End: 2023-06-09
Payer: COMMERCIAL

## 2023-06-09 DIAGNOSIS — M05.9 SEROPOSITIVE RHEUMATOID ARTHRITIS (H): Primary | ICD-10-CM

## 2023-06-09 DIAGNOSIS — Z79.899 HIGH RISK MEDICATION USE: ICD-10-CM

## 2023-06-09 PROCEDURE — 99214 OFFICE O/P EST MOD 30 MIN: CPT | Mod: VID | Performed by: INTERNAL MEDICINE

## 2023-06-09 RX ORDER — EPINEPHRINE 1 MG/ML
0.3 INJECTION, SOLUTION, CONCENTRATE INTRAVENOUS EVERY 5 MIN PRN
Status: CANCELLED | OUTPATIENT
Start: 2023-07-18

## 2023-06-09 RX ORDER — ALBUTEROL SULFATE 0.83 MG/ML
2.5 SOLUTION RESPIRATORY (INHALATION)
Status: CANCELLED | OUTPATIENT
Start: 2023-07-18

## 2023-06-09 RX ORDER — MEPERIDINE HYDROCHLORIDE 25 MG/ML
25 INJECTION INTRAMUSCULAR; INTRAVENOUS; SUBCUTANEOUS EVERY 30 MIN PRN
Status: CANCELLED | OUTPATIENT
Start: 2023-07-18

## 2023-06-09 RX ORDER — ALBUTEROL SULFATE 90 UG/1
1-2 AEROSOL, METERED RESPIRATORY (INHALATION)
Status: CANCELLED
Start: 2023-07-18

## 2023-06-09 RX ORDER — HEPARIN SODIUM,PORCINE 10 UNIT/ML
5 VIAL (ML) INTRAVENOUS
Status: CANCELLED | OUTPATIENT
Start: 2023-07-18

## 2023-06-09 RX ORDER — DIPHENHYDRAMINE HYDROCHLORIDE 50 MG/ML
50 INJECTION INTRAMUSCULAR; INTRAVENOUS
Status: CANCELLED
Start: 2023-07-18

## 2023-06-09 RX ORDER — METHYLPREDNISOLONE SODIUM SUCCINATE 125 MG/2ML
125 INJECTION, POWDER, LYOPHILIZED, FOR SOLUTION INTRAMUSCULAR; INTRAVENOUS
Status: CANCELLED
Start: 2023-07-18

## 2023-06-09 RX ORDER — HEPARIN SODIUM (PORCINE) LOCK FLUSH IV SOLN 100 UNIT/ML 100 UNIT/ML
5 SOLUTION INTRAVENOUS
Status: CANCELLED | OUTPATIENT
Start: 2023-07-18

## 2023-06-09 NOTE — PROGRESS NOTES
Sharon Fung  is a 68 year old year old who is being evaluated via a billable video visit.      How would you like to obtain your AVS? MyChart  If the video visit is dropped, the invitation should be resent by: Text to cell phone: [phone]   Will anyone else be joining your video visit? No     Rheumatology Video Visit      Sharon Fung MRN# [id number]   YOB: 1955 Age: 68 year old      Date of visit: 6/09/23   PCP: Dr. Reynaldo Saavedra    Chief Complaint   Patient presents with:  Rheumatoid Arthritis    Assessment and Plan     1. Seropositive nonerosive rheumatoid arthritis (RF negative, CCP low positive): Previously on Humira (lost efficacy), Cimzia (ineffective), Orencia (ineffective), leflunomide (ineffective as monotherapy), hydroxychloroquine (ineffective), Xeljanz (ineffective), MTX (GI upset). Currently on SSZ 1500mg BID, leflunomide 20mg daily, and Kevzara 200mg SQ every 14 days (started 1/2019).  Currently doing well regard to rheumatoid arthritis but changing insurance Medicare and will require infusion.  She states that incomes too high to call back for assistance to continue Kevzara.  Discussed the options for Simponi, Remicade, and Actemra; and after thorough discussion decided to change to Simponi.  If Simponi is not effective then we will change to Actemra.  She had 3 more doses of Kevzara so we will plan to start Simponi in mid July, so we will follow-up in mid-late October.   Chronic illness  - Continue sulfasalazine 1500 mg twice daily if labs are okay  - Continue leflunomide 20mg daily if labs are okay  - Continue Kevzara 200mg SQ every 14 days until early July, with the last dose on 7/4/2023 (when she runs out of her current supply)  - Start Simponi 2 mg/kg IV on week 0, 4, and every 8 weeks thereafter  - Labs today: CBC, Creatinine, Hepatic Panel, ESR, CRP, hepatitis B core antibody and surface antigen.  QuantiFERON-TB gold plus  - Labs in 3 months: CBC, Creatinine, Hepatic  Panel, ESR, CRP    High risk medication requiring intensive toxicity monitoring at least quarterly                   Rapid 3, cumulative scores                      8/24/2021: No inflammatory joint symptoms.  She says that this combination is great (SSZ 1500mg BID, leflunomide 20mg daily, Kevzara 200mg q14 days)                      10/14/2020 doing well (SSZ 1500mg BID, leflunomide 20mg daily, Kevzara 200mg q14 days)                      08/28/2019: 3.2   (SSZ 1500mg BID, Kevzara)  Now on gabapentin                      04/17/2019: 15    (SSZ 1500mg BID, Kevzara)  Mostly fibromyalgia symptoms                      12/19/2018:         (MTX 20mg SQ wkly, Xeljanz XR 11mg daily, SSZ 1500mg BID)                          05/02/2018:  9     (MTX 20mg SQ wkly, Xeljanz XR 11mg daily, SSZ 1000mg BID)                          01/31/2018: 22.3 (MTX 20mg SQ wkly, Xeljanz XR 11mg daily)                          11/29/2017: 16.8 (MTX 20mg SQ wkly)                      08/02/2017: 4.2   (MTX 20mg SQ wkly, Xeljanz XR 11mg daily)                         05/04/2017: 7      (MTX 20mg SQ wkly, Xeljanz XR 11mg daily)                        02/02/2017: 8      (MTX 20mg SQ wkly, Xeljanz XR 11mg daily)                      11/04/2016: 13    (MTX 20mg SQ weekly)                      07/15/2016: 10.8    # Golimumab (Simponi) Risks and Benefits: The risks and benefits of golimumab were discussed in detail and the patient verbalized understanding.  The risks discussed include, but are not limited to, the risk for hypersensitivity, anaphylaxis, anaphylactoid reactions, an increased risk for serious infections leading to hospitalization or death, a possible increased risk for lymphoma and other malignancies, a possible worsening of demyelinating diseases, a possible worsening of heart failure, risk for cytopenias, risk for drug induced lupus, possible reactivation of hepatitis B, and possible reactivation of latent tuberculosis.  Subcutaneous  injections may result in injection site reactions and/or pain at the site of injection.  The most common adverse reactions are infections and injection site reactions.  It was discussed that the medication would need to be discontinued if a serious infection develops.  It was discussed that live vaccinations should not be received while using golimumab or within 30 days prior to starting golimumab.  I encouraged reviewing the package insert and asking any questions about the medication.      2. Positive LORNA; positive and negative dsDNA; Secondary Sjogren's Syndrome: Repeat dsDNA was negative. Has dry eyes but no dry mouth.  Dry eyes are treated by ophthalmology with Restasis.      3. Bilateral patellofemoral syndrome: home exercises have been helpful.  Weight loss encouraged.       4. Fibromyalgia: Managed in the pain clinic     5.  Bone Health, vitamin D deficiency: normal 12/20/2019 DEXA; plan to repeat in 2024.    - Continue vitamin D 2000 IU daily    6. Chronic lower back pain with left sided sciatica: suspect related to degenerative changes seen on L-spine MRI.  She has been seen in the pain clinic and spine surgery clinic.  Documented here for historical significance.    7.  Vaccinations: Vaccinations reviewed with Ms. Fung.   - Influenza: encouraged yearly vaccination  - COVID-19: advised updating     Total minutes spent in evaluation with patient, documentation, , and review of pertinent studies and chart notes: 22    Ms. Fung verbalized agreement with and understanding of the rational for the diagnosis and treatment plan.  All questions were answered to best of my ability and the patient's satisfaction. Ms. Fung was advised to contact the clinic with any questions that may arise after the clinic visit.      Thank you for involving me in the care of the patient    Return to clinic: Oct 2023    JAKUB   Sharon Fung is a 68 year old female with medical history significant for GERD,  allergic rhinitis, obstructive sleep apnea, obesity, hx of trigger fingers, hx of carpal tunnel surgery, and rheumatoid arthritis who presents for follow-up of rheumatoid arthritis.    Today, 6/9/2023: RA controlled.  Morning stiffness <30 min. No joint swelling. Changing to medicare and needs to discuss changing kevzara to a different agent.     No fevers or chills. No nausea, vomiting, constipation, diarrhea. No chest pain/pressure, palpitations, or shortness of breath. No oral or nasal sores. No neck pain. No rash.      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    ROS   12 point review of system was completed and negative except as noted in the HPI     Active Problem List     Patient Active Problem List   Diagnosis     AR (allergic rhinitis)     GERD (gastroesophageal reflux disease)     Status post bariatric surgery     Mild major depression (H)     Advanced directives, counseling/discussion     High risk medication use     Obstructive sleep apnea     Obesity, Class III, BMI 40-49.9 (morbid obesity) (H)     Anterior basement membrane dystrophy     Seropositive rheumatoid arthritis (H)     LORNA positive     Carpal tunnel syndrome of right wrist     Headache     Immunosuppression (H)     Fibromyalgia     Pain in joint, ankle and foot, right     Hypertension goal BP (blood pressure) < 140/90     Chronic back pain, unspecified back location, unspecified back pain laterality     Hyperlipidemia LDL goal <100     Past Medical History     Past Medical History:   Diagnosis Date     AR (allergic rhinitis)  as teen     Arthritis 12/14/2015     Chronic obstructive pulmonary disease, unspecified COPD type (H) 3/27/2018     Depressive disorder 3 years ago     GERD (gastroesophageal reflux disease) 4-07     Headache 7/11/2017     Hypertension goal BP (blood pressure) < 140/90 12/20/2021     Mild major depression (H) 3 years ago     Morbid obesity (H) teens      BRETT (obstructive sleep apnea)     uses CPAP     RA (rheumatoid arthritis) (H) 4 years     Reduced vision 3 years     Rheumatoid arthritis, adult (H)      Past Surgical History     Past Surgical History:   Procedure Laterality Date     ARTHRODESIS FOOT Right 6/30/2021    Procedure: Right 1st metatarsophalangeal joint fusion;  Surgeon: Jesus Wolf MD;  Location: [facility]     ARTHRODESIS TOE(S) Right 12/27/2021    Procedure: right revision great toe fusion;  Surgeon: Ryan Gee MD;  Location: [facility] OR     BLEPHAROPLASTY BILATERAL Bilateral 8/5/2016    Procedure: BLEPHAROPLASTY BILATERAL;  Surgeon: Cortez Robin MD;  Location:  [facility]     BREAST BIOPSY, RT/LT  1-94    Benign     COLONOSCOPY       COLONOSCOPY WITH CO2 INSUFFLATION N/A 3/30/2017    Procedure: COLONOSCOPY WITH CO2 INSUFFLATION;  Surgeon: Issa Weeks MD;  Location: [facility] OR     ESOPHAGOSCOPY, GASTROSCOPY, DUODENOSCOPY (EGD), COMBINED N/A 10/29/2015    Procedure: COMBINED ESOPHAGOSCOPY, GASTROSCOPY, DUODENOSCOPY (EGD);  Surgeon: Shaq Mims MD;  Location: [facility]     LAPAROSCOPIC GASTRIC ADJUSTABLE BANDING  11/09/10    Lap band procedure     LAPAROSCOPIC REMOVAL GASTRIC ADJUSTABLE BAND N/A 10/31/2015    Procedure: LAPAROSCOPIC REMOVAL GASTRIC ADJUSTABLE BAND;  Surgeon: Shaq Mims MD;  Location: [facility] OR     NY HAND/FINGER SURGERY UNLISTED  2016     [place] STOMACH SURGERY PROCEDURE UNLISTED  11/2015     RELEASE CARPAL TUNNEL Left 4/27/2017    Procedure: RELEASE CARPAL TUNNEL;  Left Open Carpal Tunnel Release;  Surgeon: Ciara Middleton MD;  Location: [facility]     RELEASE CARPAL TUNNEL Right 6/15/2017    Procedure: RELEASE CARPAL TUNNEL;  Right Carpal Tunnel Release Open;  Surgeon: Ciara Middleton MD;  Location: [facility] OR     REPAIR PTOSIS       TUBAL LIGATION  1988     [facility] APPENDECTOMY  1977     Allergy   No Known Allergies  Current Medication List     Current Outpatient Medications   Medication Sig      acetaminophen (TYLENOL) 500 MG tablet Take 500-1,000 mg by mouth every 6 hours as needed for mild pain     albuterol (PROAIR HFA/PROVENTIL HFA/VENTOLIN HFA) 108 (90 Base) MCG/ACT inhaler Inhale 2 puffs into the lungs every 6 hours as needed for shortness of breath / dyspnea or wheezing     azelastine (OPTIVAR) 0.05 % ophthalmic solution Apply 1 drop to eye 2 times daily     citalopram (CELEXA) 20 MG tablet Take 1 tablet (20 mg) by mouth daily     cyclobenzaprine (FLEXERIL) 10 MG tablet      cycloSPORINE (RESTASIS) 0.05 % ophthalmic emulsion Place 1 drop into both eyes 2 times daily     Dehydrated Alcohol 98 % SOLN Place 15 mLs into both eyes every 14 days     fexofenadine (ALLEGRA) 180 MG tablet Take 1 tablet (180 mg) by mouth daily     fluticasone (FLONASE) 50 MCG/ACT nasal spray Spray 2 sprays into both nostrils as needed      gabapentin (NEURONTIN) 100 MG capsule TAKE ONE CAPSULE BY MOUTH EVERY MORNING, TAKE ONE CAPSULE MID-DAY, AND TAKE FOUR CAPSULES BY MOUTH EVERY NIGHT AT BEDTIME     ketorolac (TORADOL) 10 MG tablet      ketorolac tromethamine (ACULAR-LS) 0.4 % SOLN ophthalmic solution Place 1 drop Into the left eye 3 times daily     leflunomide (ARAVA) 20 MG tablet Take 1 tablet (20 mg) by mouth daily     losartan-hydrochlorothiazide (HYZAAR) 100-25 MG tablet Take 1 tablet by mouth daily     ofloxacin (OCUFLOX) 0.3 % ophthalmic solution Place 1-2 drops Into the left eye 4 times daily     ondansetron (ZOFRAN-ODT) 8 MG ODT tab Take 1 tablet (8 mg) by mouth every 8 hours as needed for nausea     prednisoLONE acetate (PRED FORTE) 1 % ophthalmic suspension Place 1-2 drops Into the left eye daily     Sarilumab 200 MG/1.14ML SOAJ Inject 1.14 mLs (200 mg) Subcutaneous every 14 days . Hold for signs of infection and seek medical attention.     sodium chloride (DIMAS 128) 5 % ophthalmic solution Place 1-2 drops into both eyes 3 times daily     sulfaSALAzine (AZULFIDINE) 500 MG tablet Take 3 tablets (1,500 mg) by mouth 2  times daily     vitamin D3 (CHOLECALCIFEROL) 50 mcg (2000 units) tablet Take 1 tablet (50 mcg) by mouth daily     ibuprofen 200 MG capsule Take 600-800 mg by mouth At Bedtime     ORDER FOR DME Use your CPAP device as directed by your provider.     predniSONE (DELTASONE) 5 MG tablet Prednisone 20mg daily x5days, then 15mg daily x5days, then 10mg daily x5days, then 5mg daily x5days, then stop.     Current Facility-Administered Medications   Medication     alcohol (dehydrated) (ABLYSINOL) 99 % injection 15 mL     Facility-Administered Medications Ordered in Other Visits   Medication     sodium chloride (PF) 0.9% PF flush 10 mL     sodium chloride bacteriostatic 0.9 % flush 12 mL       Social History   See HPI    Family History     Family History   Problem Relation Age of Onset     Neurologic Disorder Mother 20        migraine     Depression Mother      Heart Disease Father         MI     Neurologic Disorder Father 79        Parkinson's     Diabetes Father      Hypertension Father      Coronary Artery Disease Father      Cancer Maternal Grandmother         uterine     Neurologic Disorder Sister 16        migraine     Depression Sister      Depression Sister      Substance Abuse Sister      Migraines Sister      Neurologic Disorder Son 7        migraine     Substance Abuse Son      Migraines Son         none     Glaucoma No family hx of      Macular Degeneration No family hx of        Physical Exam       GEN: NAD.  HEENT:  Anicteric, noninjected sclera. No obvious external lesions of the ear and nose. Hearing intact.  PULM: No increased work of breathing  MSK:  Hands and wrists without swelling; she points at the MCPs as the location of the pain.    SKIN: No rash or jaundice seen  PSYCH: Alert. Appropriate.        Labs / Imaging (select studies)       CBC  Recent Labs   Lab Test 11/16/22  0937 06/24/22  0741 03/11/22  0910 08/03/21  1150 05/10/21  0802 02/02/21  1056 10/12/20  1113   WBC 5.5 6.1 5.2   < > 5.1 7.4 6.1    RBC 4.17 4.36 4.54   < > 4.48 4.41 4.18   HGB 13.0 13.4 13.7   < > 13.5 13.4 12.7   HCT 38.8 40.5 42.1   < > 42.8 42.8 39.7   MCV 93 93 93   < > 96 97 95   RDW 12.9 12.5 13.3   < > 12.8 12.5 12.9    204 232   < > 193 216 221   MCH 31.2 30.7 30.2   < > 30.1 30.4 30.4   MCHC 33.5 33.1 32.5   < > 31.5 31.3* 32.0   NEUTROPHIL 48 50 46   < > 42.6 40.1 40.8   LYMPH 33 26 24   < > 26.4 34.8 33.4   MONOCYTE 10 9 17   < > 9.5 10.7 8.9   EOSINOPHIL 8 14 12   < > 19.5 12.6 15.6   BASOPHIL 1 1 1   < > 1.8 1.4 1.0   ANEU  --   --   --   --  2.2 3.0 2.5   ALYM  --   --   --   --  1.3 2.6 2.0   KIMBERLY  --   --   --   --  0.5 0.8 0.5   AEOS  --   --   --   --  1.0* 0.9* 1.0*   ABAS  --   --   --   --  0.1 0.1 0.1   ANEUTAUTO 2.6 3.1 2.4   < >  --   --   --    ALYMPAUTO 1.8 1.6 1.2   < >  --   --   --    AMONOAUTO 0.6 0.6 0.9   < >  --   --   --    AEOSAUTO 0.4 0.9* 0.6   < >  --   --   --    ABSBASO 0.1 0.1 0.1   < >  --   --   --     < > = values in this interval not displayed.     CMP  Recent Labs   Lab Test 11/16/22  0937 07/01/22  0807 06/24/22  0741 03/11/22  0910 12/27/21  0710 12/20/21  0851 11/30/21  0806 08/03/21  1150 05/10/21  0802 02/02/21  1056 10/12/20  1113   NA  --  140  --   --   --  139 137   < > 140  --   --    POTASSIUM  --  3.9  --   --  3.5 3.7 3.3*   < > 4.3  --   --    CHLORIDE  --  107  --   --   --  105 104   < > 110*  --   --    CO2  --  27  --   --   --  28 28   < > 25  --   --    ANIONGAP  --  6  --   --   --  6 5   < > 5  --   --    GLC  --  96  --   --   --  144* 165*   < > 99 74  --    BUN  --  18  --   --   --  20 19   < > 12  --   --    CR 0.71 0.74 0.65 0.75  --  0.68 0.61   < > 0.73 0.65 0.74   GFRESTIMATED >90 89 >90 88  --  >90 >90   < > 88 >90 87   GFRESTBLACK  --   --   --   --   --   --   --   --  >90 >90 >90   ISH  --  9.3  --   --   --  9.3 9.3   < > 8.6  --   --    BILITOTAL 0.5  --  0.6 0.5  --   --  0.5   < > 0.6 0.4 0.5   ALBUMIN 3.7  --  3.8 3.8  --   --  3.6   < > 3.8 4.0 3.8    PROTTOTAL 6.8  --  7.0 7.2  --   --  7.0   < > 7.1 7.1 6.9   ALKPHOS 100  --  88 107  --   --  113   < > 103 115 107   AST 26  --  31 30  --   --  26   < > 33 36 23   ALT 47  --  48 46  --   --  43   < > 54* 58* 42    < > = values in this interval not displayed.     Calcium/VitaminD  Recent Labs   Lab Test 07/01/22  0807 12/20/21  0851 11/30/21  0806 05/10/21  0802 10/12/20  1113 03/31/20  0755   ISH 9.3 9.3 9.3   < >  --   --    VITDT  --   --   --   --  33 15*    < > = values in this interval not displayed.     ESR/CRP  Recent Labs   Lab Test 11/16/22  0937 06/24/22  0741 03/11/22  0910   SED 6 5 5   CRP <2.9 <2.9 <2.9     Lipid Panel  Recent Labs   Lab Test 06/24/22  0741 05/10/21  0802 03/31/20  0755   CHOL 213* 224* 224*   TRIG 274* 243* 171*   HDL 48* 52 73   * 123* 117*   NHDL 165* 172* 151*     Hepatitis B  Recent Labs   Lab Test 12/08/15  0855   HBCAB Nonreactive   HEPBANG Nonreactive     Hepatitis C  Recent Labs   Lab Test 12/08/15  0855   HCVAB Nonreactive   Assay performance characteristics have not been established for newborns,   infants, and children         Tuberculosis Screening  Recent Labs   Lab Test 11/30/21  0806 10/31/17  1400 02/02/17  0822 12/08/15  0855   TBRES Negative  --   --   --    TBRSLT  --  Negative Negative Negative   TBAGN  --  0.05 0.00 0.01     HIV Screening  Recent Labs   Lab Test 07/24/18  0738   HIAGAB Nonreactive     Immunization History     Immunization History   Administered Date(s) Administered     COVID-19 Bivalent 12+ (Pfizer) 03/30/2023     COVID-19 MONOVALENT 12+ (Pfizer) 12/21/2020, 01/07/2021, 08/27/2021     COVID-19 Monovalent 18+ (Moderna) 03/08/2022     Flu, Unspecified 10/12/2020     Influenza (intradermal) 10/04/2011, 10/01/2012     Influenza Vaccine 50-64 or 18-64 w/egg allergy (Flublok) 09/15/2021     Influenza Vaccine >6 months (Chloe,Fluzone) 10/15/2013, 09/15/2014, 09/28/2015, 09/28/2016, 10/16/2017, 10/01/2018     Influenza Vaccine Im 4yrs+ 4  Valent CCIIV4 10/15/2019     Pneumo Conj 13-V (2010&after) 04/15/2016     Pneumococcal 20 valent Conjugate (Prevnar 20) 07/01/2022     Pneumococcal 23 valent 07/15/2016     TD,PF 7+ (Tenivac) 10/30/2020     TDAP Vaccine (Adacel) 02/09/2011     Zoster recombinant adjuvanted (SHINGRIX) 04/17/2019, 08/28/2019          Chart documentation done in part with Dragon Voice recognition Software. Although reviewed after completion, some word and grammatical error may remain.       Video-Visit Details    Type of service:  Video Visit    Video Start Time: 8:27 AM    Video End Time:8:38 AM    Originating Location (pt. Location):  [place], [place]    Distant Location (provider location):  Off site, [place]    Platform used for Video Visit: Pao Guerra MD

## 2023-06-09 NOTE — PATIENT INSTRUCTIONS
RHEUMATOLOGY    [facility]  [address]  [place] [address]    Phone number: [phone]  Fax number: [phone]      Thank you for choosing [facility]!    Evie Rosas CMA

## 2023-06-12 ENCOUNTER — LAB (OUTPATIENT)
Dept: LAB | Facility: CLINIC | Age: 68
End: 2023-06-12
Payer: COMMERCIAL

## 2023-06-12 DIAGNOSIS — M05.9 SEROPOSITIVE RHEUMATOID ARTHRITIS (H): ICD-10-CM

## 2023-06-12 DIAGNOSIS — Z79.899 HIGH RISK MEDICATION USE: ICD-10-CM

## 2023-06-12 LAB
ALBUMIN SERPL BCG-MCNC: 4 G/DL (ref 3.5–5.2)
ALP SERPL-CCNC: 86 U/L (ref 35–104)
ALT SERPL W P-5'-P-CCNC: 17 U/L (ref 10–35)
AST SERPL W P-5'-P-CCNC: 33 U/L (ref 10–35)
BASOPHILS # BLD AUTO: 0.1 10E3/UL (ref 0–0.2)
BASOPHILS NFR BLD AUTO: 2 %
BILIRUB DIRECT SERPL-MCNC: <0.2 MG/DL (ref 0–0.3)
BILIRUB SERPL-MCNC: 0.4 MG/DL
CREAT SERPL-MCNC: 0.72 MG/DL (ref 0.51–0.95)
CRP SERPL-MCNC: 10.6 MG/L
EOSINOPHIL # BLD AUTO: 0.5 10E3/UL (ref 0–0.7)
EOSINOPHIL NFR BLD AUTO: 11 %
ERYTHROCYTE [DISTWIDTH] IN BLOOD BY AUTOMATED COUNT: 13.9 % (ref 10–15)
ERYTHROCYTE [SEDIMENTATION RATE] IN BLOOD BY WESTERGREN METHOD: 16 MM/HR (ref 0–30)
GFR SERPL CREATININE-BSD FRML MDRD: >90 ML/MIN/1.73M2
HCT VFR BLD AUTO: 36.6 % (ref 35–47)
HGB BLD-MCNC: 11.8 G/DL (ref 11.7–15.7)
IMM GRANULOCYTES # BLD: 0 10E3/UL
IMM GRANULOCYTES NFR BLD: 0 %
LYMPHOCYTES # BLD AUTO: 1.8 10E3/UL (ref 0.8–5.3)
LYMPHOCYTES NFR BLD AUTO: 37 %
MCH RBC QN AUTO: 30.8 PG (ref 26.5–33)
MCHC RBC AUTO-ENTMCNC: 32.2 G/DL (ref 31.5–36.5)
MCV RBC AUTO: 96 FL (ref 78–100)
MONOCYTES # BLD AUTO: 0.4 10E3/UL (ref 0–1.3)
MONOCYTES NFR BLD AUTO: 9 %
NEUTROPHILS # BLD AUTO: 2 10E3/UL (ref 1.6–8.3)
NEUTROPHILS NFR BLD AUTO: 42 %
PLATELET # BLD AUTO: 208 10E3/UL (ref 150–450)
PROT SERPL-MCNC: 6.7 G/DL (ref 6.4–8.3)
RBC # BLD AUTO: 3.83 10E6/UL (ref 3.8–5.2)
WBC # BLD AUTO: 4.8 10E3/UL (ref 4–11)

## 2023-06-12 PROCEDURE — 86704 HEP B CORE ANTIBODY TOTAL: CPT

## 2023-06-12 PROCEDURE — 86140 C-REACTIVE PROTEIN: CPT

## 2023-06-12 PROCEDURE — 80076 HEPATIC FUNCTION PANEL: CPT

## 2023-06-12 PROCEDURE — 85652 RBC SED RATE AUTOMATED: CPT

## 2023-06-12 PROCEDURE — 85025 COMPLETE CBC W/AUTO DIFF WBC: CPT

## 2023-06-12 PROCEDURE — 82565 ASSAY OF CREATININE: CPT

## 2023-06-12 PROCEDURE — 36415 COLL VENOUS BLD VENIPUNCTURE: CPT

## 2023-06-12 PROCEDURE — 87340 HEPATITIS B SURFACE AG IA: CPT

## 2023-06-12 PROCEDURE — 86481 TB AG RESPONSE T-CELL SUSP: CPT

## 2023-06-12 PROCEDURE — 86803 HEPATITIS C AB TEST: CPT

## 2023-06-13 ENCOUNTER — APPOINTMENT (OUTPATIENT)
Dept: CT IMAGING | Facility: CLINIC | Age: 68
DRG: 418 | End: 2023-06-13
Attending: EMERGENCY MEDICINE
Payer: COMMERCIAL

## 2023-06-13 ENCOUNTER — HOSPITAL ENCOUNTER (OUTPATIENT)
Facility: CLINIC | Age: 68
Discharge: HOME | DRG: 418 | End: 2023-06-13
Attending: EMERGENCY MEDICINE | Admitting: SURGERY
Payer: COMMERCIAL

## 2023-06-13 ENCOUNTER — APPOINTMENT (OUTPATIENT)
Dept: GENERAL RADIOLOGY | Facility: CLINIC | Age: 68
DRG: 418 | End: 2023-06-13
Attending: EMERGENCY MEDICINE
Payer: COMMERCIAL

## 2023-06-13 ENCOUNTER — APPOINTMENT (OUTPATIENT)
Dept: ULTRASOUND IMAGING | Facility: CLINIC | Age: 68
DRG: 418 | End: 2023-06-13
Attending: EMERGENCY MEDICINE
Payer: COMMERCIAL

## 2023-06-13 ENCOUNTER — ANESTHESIA (OUTPATIENT)
Dept: SURGERY | Facility: CLINIC | Age: 68
DRG: 418 | End: 2023-06-13
Payer: COMMERCIAL

## 2023-06-13 ENCOUNTER — ANESTHESIA EVENT (OUTPATIENT)
Dept: SURGERY | Facility: CLINIC | Age: 68
DRG: 418 | End: 2023-06-13
Payer: COMMERCIAL

## 2023-06-13 VITALS
DIASTOLIC BLOOD PRESSURE: 72 MMHG | HEART RATE: 95 BPM | OXYGEN SATURATION: 90 % | HEIGHT: 64 IN | RESPIRATION RATE: 16 BRPM | SYSTOLIC BLOOD PRESSURE: 113 MMHG | TEMPERATURE: 97.6 F | WEIGHT: 276.4 LBS | BODY MASS INDEX: 47.19 KG/M2

## 2023-06-13 DIAGNOSIS — K80.50 BILIARY COLIC: ICD-10-CM

## 2023-06-13 DIAGNOSIS — K81.0 ACUTE CHOLECYSTITIS: Primary | ICD-10-CM

## 2023-06-13 LAB
ALBUMIN SERPL BCG-MCNC: 4.3 G/DL (ref 3.5–5.2)
ALBUMIN UR-MCNC: 10 MG/DL
ALP SERPL-CCNC: 95 U/L (ref 35–104)
ALT SERPL W P-5'-P-CCNC: 29 U/L (ref 10–35)
ANION GAP SERPL CALCULATED.3IONS-SCNC: 15 MMOL/L (ref 7–15)
APPEARANCE UR: CLEAR
AST SERPL W P-5'-P-CCNC: 26 U/L (ref 10–35)
ATRIAL RATE - MUSE: 106 BPM
BASOPHILS # BLD AUTO: 0.1 10E3/UL (ref 0–0.2)
BASOPHILS NFR BLD AUTO: 1 %
BILIRUB SERPL-MCNC: 0.5 MG/DL
BILIRUB UR QL STRIP: NEGATIVE
BUN SERPL-MCNC: 17.1 MG/DL (ref 8–23)
CALCIUM SERPL-MCNC: 9.6 MG/DL (ref 8.8–10.2)
CHLORIDE SERPL-SCNC: 101 MMOL/L (ref 98–107)
COLOR UR AUTO: YELLOW
CREAT SERPL-MCNC: 0.69 MG/DL (ref 0.51–0.95)
DEPRECATED HCO3 PLAS-SCNC: 24 MMOL/L (ref 22–29)
DIASTOLIC BLOOD PRESSURE - MUSE: NORMAL MMHG
EOSINOPHIL # BLD AUTO: 0.3 10E3/UL (ref 0–0.7)
EOSINOPHIL NFR BLD AUTO: 4 %
ERYTHROCYTE [DISTWIDTH] IN BLOOD BY AUTOMATED COUNT: 13.7 % (ref 10–15)
GFR SERPL CREATININE-BSD FRML MDRD: >90 ML/MIN/1.73M2
GLUCOSE BLDC GLUCOMTR-MCNC: 99 MG/DL (ref 70–99)
GLUCOSE SERPL-MCNC: 138 MG/DL (ref 70–99)
GLUCOSE UR STRIP-MCNC: NEGATIVE MG/DL
HBV CORE AB SERPL QL IA: NONREACTIVE
HBV SURFACE AG SERPL QL IA: NONREACTIVE
HCT VFR BLD AUTO: 38.4 % (ref 35–47)
HCV AB SERPL QL IA: NONREACTIVE
HGB BLD-MCNC: 12.5 G/DL (ref 11.7–15.7)
HGB UR QL STRIP: NEGATIVE
IMM GRANULOCYTES # BLD: 0 10E3/UL
IMM GRANULOCYTES NFR BLD: 0 %
INTERPRETATION ECG - MUSE: NORMAL
KETONES UR STRIP-MCNC: NEGATIVE MG/DL
LACTATE SERPL-SCNC: 2 MMOL/L (ref 0.7–2)
LACTATE SERPL-SCNC: 3.2 MMOL/L (ref 0.7–2)
LEUKOCYTE ESTERASE UR QL STRIP: NEGATIVE
LIPASE SERPL-CCNC: 18 U/L (ref 13–60)
LYMPHOCYTES # BLD AUTO: 1.3 10E3/UL (ref 0.8–5.3)
LYMPHOCYTES NFR BLD AUTO: 20 %
MCH RBC QN AUTO: 30.3 PG (ref 26.5–33)
MCHC RBC AUTO-ENTMCNC: 32.6 G/DL (ref 31.5–36.5)
MCV RBC AUTO: 93 FL (ref 78–100)
MONOCYTES # BLD AUTO: 0.5 10E3/UL (ref 0–1.3)
MONOCYTES NFR BLD AUTO: 9 %
NEUTROPHILS # BLD AUTO: 4.1 10E3/UL (ref 1.6–8.3)
NEUTROPHILS NFR BLD AUTO: 66 %
NITRATE UR QL: NEGATIVE
NRBC # BLD AUTO: 0 10E3/UL
NRBC BLD AUTO-RTO: 0 /100
P AXIS - MUSE: 58 DEGREES
PH UR STRIP: 6.5 [PH] (ref 5–7)
PLATELET # BLD AUTO: 238 10E3/UL (ref 150–450)
POTASSIUM SERPL-SCNC: 3.4 MMOL/L (ref 3.4–5.3)
PR INTERVAL - MUSE: 160 MS
PROT SERPL-MCNC: 7.2 G/DL (ref 6.4–8.3)
QRS DURATION - MUSE: 80 MS
QT - MUSE: 360 MS
QTC - MUSE: 478 MS
R AXIS - MUSE: 34 DEGREES
RBC # BLD AUTO: 4.13 10E6/UL (ref 3.8–5.2)
RBC URINE: 2 /HPF
SARS-COV-2 RNA RESP QL NAA+PROBE: NEGATIVE
SODIUM SERPL-SCNC: 140 MMOL/L (ref 136–145)
SP GR UR STRIP: >1.05 (ref 1–1.03)
SQUAMOUS EPITHELIAL: 1 /HPF
SYSTOLIC BLOOD PRESSURE - MUSE: NORMAL MMHG
T AXIS - MUSE: -8 DEGREES
TROPONIN T SERPL HS-MCNC: 8 NG/L
TROPONIN T SERPL HS-MCNC: 9 NG/L
UROBILINOGEN UR STRIP-MCNC: NORMAL MG/DL
VENTRICULAR RATE- MUSE: 106 BPM
WBC # BLD AUTO: 6.2 10E3/UL (ref 4–11)
WBC URINE: 1 /HPF

## 2023-06-13 PROCEDURE — 88304 TISSUE EXAM BY PATHOLOGIST: CPT | Mod: TC | Performed by: SURGERY

## 2023-06-13 PROCEDURE — 710N000010 HC RECOVERY PHASE 1, LEVEL 2, PER MIN: Performed by: SURGERY

## 2023-06-13 PROCEDURE — 83690 ASSAY OF LIPASE: CPT | Performed by: EMERGENCY MEDICINE

## 2023-06-13 PROCEDURE — 85025 COMPLETE CBC W/AUTO DIFF WBC: CPT | Performed by: EMERGENCY MEDICINE

## 2023-06-13 PROCEDURE — 47562 LAPAROSCOPIC CHOLECYSTECTOMY: CPT | Mod: 22 | Performed by: SURGERY

## 2023-06-13 PROCEDURE — 360N000076 HC SURGERY LEVEL 3, PER MIN: Performed by: SURGERY

## 2023-06-13 PROCEDURE — 99221 1ST HOSP IP/OBS SF/LOW 40: CPT | Mod: 57 | Performed by: SURGERY

## 2023-06-13 PROCEDURE — 370N000017 HC ANESTHESIA TECHNICAL FEE, PER MIN: Performed by: SURGERY

## 2023-06-13 PROCEDURE — 81003 URINALYSIS AUTO W/O SCOPE: CPT | Performed by: EMERGENCY MEDICINE

## 2023-06-13 PROCEDURE — 87635 SARS-COV-2 COVID-19 AMP PRB: CPT | Performed by: EMERGENCY MEDICINE

## 2023-06-13 PROCEDURE — 83605 ASSAY OF LACTIC ACID: CPT | Performed by: EMERGENCY MEDICINE

## 2023-06-13 PROCEDURE — 250N000011 HC RX IP 250 OP 636: Performed by: EMERGENCY MEDICINE

## 2023-06-13 PROCEDURE — 80053 COMPREHEN METABOLIC PANEL: CPT | Performed by: EMERGENCY MEDICINE

## 2023-06-13 PROCEDURE — 250N000009 HC RX 250: Performed by: EMERGENCY MEDICINE

## 2023-06-13 PROCEDURE — 250N000011 HC RX IP 250 OP 636: Performed by: SURGERY

## 2023-06-13 PROCEDURE — 258N000003 HC RX IP 258 OP 636: Performed by: EMERGENCY MEDICINE

## 2023-06-13 PROCEDURE — 999N000141 HC STATISTIC PRE-PROCEDURE NURSING ASSESSMENT: Performed by: SURGERY

## 2023-06-13 PROCEDURE — 84484 ASSAY OF TROPONIN QUANT: CPT | Performed by: EMERGENCY MEDICINE

## 2023-06-13 PROCEDURE — 93005 ELECTROCARDIOGRAM TRACING: CPT | Performed by: EMERGENCY MEDICINE

## 2023-06-13 PROCEDURE — 99285 EMERGENCY DEPT VISIT HI MDM: CPT | Mod: 25 | Performed by: EMERGENCY MEDICINE

## 2023-06-13 PROCEDURE — 250N000009 HC RX 250: Performed by: SURGERY

## 2023-06-13 PROCEDURE — 88304 TISSUE EXAM BY PATHOLOGIST: CPT | Mod: 26 | Performed by: PATHOLOGY

## 2023-06-13 PROCEDURE — 93010 ELECTROCARDIOGRAM REPORT: CPT | Performed by: EMERGENCY MEDICINE

## 2023-06-13 PROCEDURE — 250N000011 HC RX IP 250 OP 636: Performed by: STUDENT IN AN ORGANIZED HEALTH CARE EDUCATION/TRAINING PROGRAM

## 2023-06-13 PROCEDURE — 0FT44ZZ RESECTION OF GALLBLADDER, PERCUTANEOUS ENDOSCOPIC APPROACH: ICD-10-PCS | Performed by: SURGERY

## 2023-06-13 PROCEDURE — 71046 X-RAY EXAM CHEST 2 VIEWS: CPT

## 2023-06-13 PROCEDURE — 250N000009 HC RX 250: Performed by: STUDENT IN AN ORGANIZED HEALTH CARE EDUCATION/TRAINING PROGRAM

## 2023-06-13 PROCEDURE — 74177 CT ABD & PELVIS W/CONTRAST: CPT

## 2023-06-13 PROCEDURE — 96375 TX/PRO/DX INJ NEW DRUG ADDON: CPT | Mod: 59 | Performed by: EMERGENCY MEDICINE

## 2023-06-13 PROCEDURE — 710N000012 HC RECOVERY PHASE 2, PER MINUTE: Performed by: SURGERY

## 2023-06-13 PROCEDURE — 258N000003 HC RX IP 258 OP 636: Performed by: STUDENT IN AN ORGANIZED HEALTH CARE EDUCATION/TRAINING PROGRAM

## 2023-06-13 PROCEDURE — 36415 COLL VENOUS BLD VENIPUNCTURE: CPT | Performed by: EMERGENCY MEDICINE

## 2023-06-13 PROCEDURE — 250N000025 HC SEVOFLURANE, PER MIN: Performed by: SURGERY

## 2023-06-13 PROCEDURE — 272N000001 HC OR GENERAL SUPPLY STERILE: Performed by: SURGERY

## 2023-06-13 PROCEDURE — 76705 ECHO EXAM OF ABDOMEN: CPT

## 2023-06-13 PROCEDURE — 96374 THER/PROPH/DIAG INJ IV PUSH: CPT | Mod: 59 | Performed by: EMERGENCY MEDICINE

## 2023-06-13 PROCEDURE — 250N000011 HC RX IP 250 OP 636: Performed by: ANESTHESIOLOGY

## 2023-06-13 RX ORDER — INDOCYANINE GREEN AND WATER 25 MG
2.5 KIT INJECTION ONCE
Status: COMPLETED | OUTPATIENT
Start: 2023-06-13 | End: 2023-06-13

## 2023-06-13 RX ORDER — OXYCODONE HYDROCHLORIDE 10 MG/1
10 TABLET ORAL
Status: DISCONTINUED | OUTPATIENT
Start: 2023-06-13 | End: 2023-06-13 | Stop reason: HOSPADM

## 2023-06-13 RX ORDER — BUPIVACAINE HYDROCHLORIDE AND EPINEPHRINE 2.5; 5 MG/ML; UG/ML
INJECTION, SOLUTION EPIDURAL; INFILTRATION; INTRACAUDAL; PERINEURAL PRN
Status: DISCONTINUED | OUTPATIENT
Start: 2023-06-13 | End: 2023-06-13 | Stop reason: HOSPADM

## 2023-06-13 RX ORDER — ONDANSETRON 4 MG/1
4 TABLET, ORALLY DISINTEGRATING ORAL EVERY 30 MIN PRN
Status: DISCONTINUED | OUTPATIENT
Start: 2023-06-13 | End: 2023-06-13 | Stop reason: HOSPADM

## 2023-06-13 RX ORDER — ONDANSETRON 2 MG/ML
INJECTION INTRAMUSCULAR; INTRAVENOUS PRN
Status: DISCONTINUED | OUTPATIENT
Start: 2023-06-13 | End: 2023-06-13

## 2023-06-13 RX ORDER — HYDROMORPHONE HYDROCHLORIDE 1 MG/ML
0.5 INJECTION, SOLUTION INTRAMUSCULAR; INTRAVENOUS; SUBCUTANEOUS
Status: DISCONTINUED | OUTPATIENT
Start: 2023-06-13 | End: 2023-06-13 | Stop reason: HOSPADM

## 2023-06-13 RX ORDER — CEFAZOLIN SODIUM/WATER 3 G/30 ML
3 SYRINGE (ML) INTRAVENOUS
Status: COMPLETED | OUTPATIENT
Start: 2023-06-13 | End: 2023-06-13

## 2023-06-13 RX ORDER — ONDANSETRON 2 MG/ML
4 INJECTION INTRAMUSCULAR; INTRAVENOUS EVERY 30 MIN PRN
Status: DISCONTINUED | OUTPATIENT
Start: 2023-06-13 | End: 2023-06-13 | Stop reason: HOSPADM

## 2023-06-13 RX ORDER — MORPHINE SULFATE 4 MG/ML
4 INJECTION, SOLUTION INTRAMUSCULAR; INTRAVENOUS ONCE
Status: COMPLETED | OUTPATIENT
Start: 2023-06-13 | End: 2023-06-13

## 2023-06-13 RX ORDER — PROPOFOL 10 MG/ML
INJECTION, EMULSION INTRAVENOUS PRN
Status: DISCONTINUED | OUTPATIENT
Start: 2023-06-13 | End: 2023-06-13

## 2023-06-13 RX ORDER — DEXAMETHASONE SODIUM PHOSPHATE 4 MG/ML
INJECTION, SOLUTION INTRA-ARTICULAR; INTRALESIONAL; INTRAMUSCULAR; INTRAVENOUS; SOFT TISSUE PRN
Status: DISCONTINUED | OUTPATIENT
Start: 2023-06-13 | End: 2023-06-13

## 2023-06-13 RX ORDER — LIDOCAINE HYDROCHLORIDE 20 MG/ML
INJECTION, SOLUTION INFILTRATION; PERINEURAL PRN
Status: DISCONTINUED | OUTPATIENT
Start: 2023-06-13 | End: 2023-06-13

## 2023-06-13 RX ORDER — SODIUM CHLORIDE, SODIUM LACTATE, POTASSIUM CHLORIDE, CALCIUM CHLORIDE 600; 310; 30; 20 MG/100ML; MG/100ML; MG/100ML; MG/100ML
INJECTION, SOLUTION INTRAVENOUS CONTINUOUS PRN
Status: DISCONTINUED | OUTPATIENT
Start: 2023-06-13 | End: 2023-06-13

## 2023-06-13 RX ORDER — FENTANYL CITRATE 50 UG/ML
25 INJECTION, SOLUTION INTRAMUSCULAR; INTRAVENOUS EVERY 5 MIN PRN
Status: DISCONTINUED | OUTPATIENT
Start: 2023-06-13 | End: 2023-06-13 | Stop reason: HOSPADM

## 2023-06-13 RX ORDER — FENTANYL CITRATE 50 UG/ML
INJECTION, SOLUTION INTRAMUSCULAR; INTRAVENOUS PRN
Status: DISCONTINUED | OUTPATIENT
Start: 2023-06-13 | End: 2023-06-13

## 2023-06-13 RX ORDER — ESMOLOL HYDROCHLORIDE 10 MG/ML
INJECTION INTRAVENOUS PRN
Status: DISCONTINUED | OUTPATIENT
Start: 2023-06-13 | End: 2023-06-13

## 2023-06-13 RX ORDER — SODIUM CHLORIDE 9 MG/ML
INJECTION, SOLUTION INTRAVENOUS CONTINUOUS PRN
Status: DISCONTINUED | OUTPATIENT
Start: 2023-06-13 | End: 2023-06-13

## 2023-06-13 RX ORDER — SODIUM CHLORIDE, SODIUM LACTATE, POTASSIUM CHLORIDE, CALCIUM CHLORIDE 600; 310; 30; 20 MG/100ML; MG/100ML; MG/100ML; MG/100ML
INJECTION, SOLUTION INTRAVENOUS CONTINUOUS
Status: DISCONTINUED | OUTPATIENT
Start: 2023-06-13 | End: 2023-06-13 | Stop reason: HOSPADM

## 2023-06-13 RX ORDER — FENTANYL CITRATE 50 UG/ML
50 INJECTION, SOLUTION INTRAMUSCULAR; INTRAVENOUS EVERY 5 MIN PRN
Status: DISCONTINUED | OUTPATIENT
Start: 2023-06-13 | End: 2023-06-13 | Stop reason: HOSPADM

## 2023-06-13 RX ORDER — HYDROMORPHONE HYDROCHLORIDE 1 MG/ML
0.5 INJECTION, SOLUTION INTRAMUSCULAR; INTRAVENOUS; SUBCUTANEOUS ONCE
Status: COMPLETED | OUTPATIENT
Start: 2023-06-13 | End: 2023-06-13

## 2023-06-13 RX ORDER — CEFAZOLIN SODIUM/WATER 3 G/30 ML
3 SYRINGE (ML) INTRAVENOUS SEE ADMIN INSTRUCTIONS
Status: DISCONTINUED | OUTPATIENT
Start: 2023-06-13 | End: 2023-06-13 | Stop reason: HOSPADM

## 2023-06-13 RX ORDER — ONDANSETRON 2 MG/ML
4 INJECTION INTRAMUSCULAR; INTRAVENOUS EVERY 6 HOURS PRN
Status: DISCONTINUED | OUTPATIENT
Start: 2023-06-13 | End: 2023-06-13 | Stop reason: HOSPADM

## 2023-06-13 RX ORDER — ACETAMINOPHEN 325 MG/1
975 TABLET ORAL ONCE
Status: DISCONTINUED | OUTPATIENT
Start: 2023-06-13 | End: 2023-06-13 | Stop reason: HOSPADM

## 2023-06-13 RX ORDER — ENOXAPARIN SODIUM 100 MG/ML
40 INJECTION SUBCUTANEOUS
Status: COMPLETED | OUTPATIENT
Start: 2023-06-13 | End: 2023-06-13

## 2023-06-13 RX ORDER — IOPAMIDOL 755 MG/ML
100 INJECTION, SOLUTION INTRAVASCULAR ONCE
Status: COMPLETED | OUTPATIENT
Start: 2023-06-13 | End: 2023-06-13

## 2023-06-13 RX ORDER — SODIUM CHLORIDE 9 MG/ML
INJECTION, SOLUTION INTRAVENOUS CONTINUOUS
Status: DISCONTINUED | OUTPATIENT
Start: 2023-06-13 | End: 2023-06-13 | Stop reason: HOSPADM

## 2023-06-13 RX ORDER — OXYCODONE HYDROCHLORIDE 5 MG/1
5 TABLET ORAL EVERY 6 HOURS PRN
Qty: 12 TABLET | Refills: 0 | Status: SHIPPED | OUTPATIENT
Start: 2023-06-13 | End: 2023-06-16

## 2023-06-13 RX ORDER — HYDROMORPHONE HCL IN WATER/PF 6 MG/30 ML
0.2 PATIENT CONTROLLED ANALGESIA SYRINGE INTRAVENOUS EVERY 5 MIN PRN
Status: DISCONTINUED | OUTPATIENT
Start: 2023-06-13 | End: 2023-06-13 | Stop reason: HOSPADM

## 2023-06-13 RX ORDER — ONDANSETRON 2 MG/ML
4 INJECTION INTRAMUSCULAR; INTRAVENOUS ONCE
Status: COMPLETED | OUTPATIENT
Start: 2023-06-13 | End: 2023-06-13

## 2023-06-13 RX ORDER — HYDROMORPHONE HCL IN WATER/PF 6 MG/30 ML
0.4 PATIENT CONTROLLED ANALGESIA SYRINGE INTRAVENOUS EVERY 5 MIN PRN
Status: DISCONTINUED | OUTPATIENT
Start: 2023-06-13 | End: 2023-06-13 | Stop reason: HOSPADM

## 2023-06-13 RX ORDER — HYDRALAZINE HYDROCHLORIDE 20 MG/ML
2.5-5 INJECTION INTRAMUSCULAR; INTRAVENOUS EVERY 10 MIN PRN
Status: DISCONTINUED | OUTPATIENT
Start: 2023-06-13 | End: 2023-06-13 | Stop reason: HOSPADM

## 2023-06-13 RX ORDER — OXYCODONE HYDROCHLORIDE 5 MG/1
5 TABLET ORAL
Status: DISCONTINUED | OUTPATIENT
Start: 2023-06-13 | End: 2023-06-13 | Stop reason: HOSPADM

## 2023-06-13 RX ORDER — LABETALOL HYDROCHLORIDE 5 MG/ML
10 INJECTION, SOLUTION INTRAVENOUS
Status: COMPLETED | OUTPATIENT
Start: 2023-06-13 | End: 2023-06-13

## 2023-06-13 RX ADMIN — PHENYLEPHRINE HYDROCHLORIDE 200 MCG: 10 INJECTION INTRAVENOUS at 11:28

## 2023-06-13 RX ADMIN — SODIUM CHLORIDE 74 ML: 9 INJECTION, SOLUTION INTRAVENOUS at 06:34

## 2023-06-13 RX ADMIN — LIDOCAINE HYDROCHLORIDE 100 MG: 20 INJECTION, SOLUTION INFILTRATION; PERINEURAL at 11:28

## 2023-06-13 RX ADMIN — SODIUM CHLORIDE, POTASSIUM CHLORIDE, SODIUM LACTATE AND CALCIUM CHLORIDE: 600; 310; 30; 20 INJECTION, SOLUTION INTRAVENOUS at 12:17

## 2023-06-13 RX ADMIN — ONDANSETRON 4 MG: 2 INJECTION INTRAMUSCULAR; INTRAVENOUS at 08:04

## 2023-06-13 RX ADMIN — SODIUM CHLORIDE 1000 ML: 9 INJECTION, SOLUTION INTRAVENOUS at 06:26

## 2023-06-13 RX ADMIN — PROPOFOL 50 MG: 10 INJECTION, EMULSION INTRAVENOUS at 11:29

## 2023-06-13 RX ADMIN — PROPOFOL 200 MG: 10 INJECTION, EMULSION INTRAVENOUS at 11:28

## 2023-06-13 RX ADMIN — IOPAMIDOL 125 ML: 755 INJECTION, SOLUTION INTRAVENOUS at 06:33

## 2023-06-13 RX ADMIN — SODIUM CHLORIDE: 9 INJECTION, SOLUTION INTRAVENOUS at 08:02

## 2023-06-13 RX ADMIN — SODIUM CHLORIDE: 0.9 INJECTION, SOLUTION INTRAVENOUS at 11:22

## 2023-06-13 RX ADMIN — FENTANYL CITRATE 100 MCG: 50 INJECTION, SOLUTION INTRAMUSCULAR; INTRAVENOUS at 11:28

## 2023-06-13 RX ADMIN — ENOXAPARIN SODIUM 40 MG: 40 INJECTION SUBCUTANEOUS at 10:24

## 2023-06-13 RX ADMIN — HYDROMORPHONE HYDROCHLORIDE 1 MG: 1 INJECTION, SOLUTION INTRAMUSCULAR; INTRAVENOUS; SUBCUTANEOUS at 09:42

## 2023-06-13 RX ADMIN — HYDROMORPHONE HYDROCHLORIDE 0.5 MG: 1 INJECTION, SOLUTION INTRAMUSCULAR; INTRAVENOUS; SUBCUTANEOUS at 08:02

## 2023-06-13 RX ADMIN — ONDANSETRON 4 MG: 2 INJECTION INTRAMUSCULAR; INTRAVENOUS at 05:28

## 2023-06-13 RX ADMIN — HYDROMORPHONE HYDROCHLORIDE 1 MG: 1 INJECTION, SOLUTION INTRAMUSCULAR; INTRAVENOUS; SUBCUTANEOUS at 06:23

## 2023-06-13 RX ADMIN — HYDROMORPHONE HYDROCHLORIDE 0.5 MG: 1 INJECTION, SOLUTION INTRAMUSCULAR; INTRAVENOUS; SUBCUTANEOUS at 12:12

## 2023-06-13 RX ADMIN — ONDANSETRON 4 MG: 2 INJECTION INTRAMUSCULAR; INTRAVENOUS at 09:44

## 2023-06-13 RX ADMIN — HYDROMORPHONE HYDROCHLORIDE 0.5 MG: 1 INJECTION, SOLUTION INTRAMUSCULAR; INTRAVENOUS; SUBCUTANEOUS at 13:10

## 2023-06-13 RX ADMIN — Medication 10 MG: at 12:47

## 2023-06-13 RX ADMIN — PHENYLEPHRINE HYDROCHLORIDE 200 MCG: 10 INJECTION INTRAVENOUS at 11:39

## 2023-06-13 RX ADMIN — DEXAMETHASONE SODIUM PHOSPHATE 6 MG: 4 INJECTION, SOLUTION INTRA-ARTICULAR; INTRALESIONAL; INTRAMUSCULAR; INTRAVENOUS; SOFT TISSUE at 11:28

## 2023-06-13 RX ADMIN — SUGAMMADEX 200 MG: 100 INJECTION, SOLUTION INTRAVENOUS at 14:27

## 2023-06-13 RX ADMIN — SODIUM CHLORIDE 1000 ML: 9 INJECTION, SOLUTION INTRAVENOUS at 05:28

## 2023-06-13 RX ADMIN — HYDROMORPHONE HYDROCHLORIDE 0.5 MG: 1 INJECTION, SOLUTION INTRAMUSCULAR; INTRAVENOUS; SUBCUTANEOUS at 09:11

## 2023-06-13 RX ADMIN — INDOCYANINE GREEN AND WATER 2.5 MG: KIT at 10:23

## 2023-06-13 RX ADMIN — MORPHINE SULFATE 4 MG: 4 INJECTION INTRAVENOUS at 05:29

## 2023-06-13 RX ADMIN — Medication 10 MG: at 13:14

## 2023-06-13 RX ADMIN — LABETALOL HYDROCHLORIDE 10 MG: 5 INJECTION, SOLUTION INTRAVENOUS at 15:39

## 2023-06-13 RX ADMIN — ESMOLOL HYDROCHLORIDE 20 MG: 10 INJECTION, SOLUTION INTRAVENOUS at 14:45

## 2023-06-13 RX ADMIN — Medication 3 G: at 11:28

## 2023-06-13 RX ADMIN — HYDROMORPHONE HYDROCHLORIDE 0.5 MG: 1 INJECTION, SOLUTION INTRAMUSCULAR; INTRAVENOUS; SUBCUTANEOUS at 07:12

## 2023-06-13 RX ADMIN — Medication 50 MG: at 11:28

## 2023-06-13 RX ADMIN — ONDANSETRON 4 MG: 2 INJECTION INTRAMUSCULAR; INTRAVENOUS at 14:12

## 2023-06-13 ASSESSMENT — ACTIVITIES OF DAILY LIVING (ADL)
ADLS_ACUITY_SCORE: 35

## 2023-06-13 ASSESSMENT — COPD QUESTIONNAIRES
CAT_SEVERITY: MILD
COPD: 1

## 2023-06-13 NOTE — DISCHARGE INSTRUCTIONS
[facility], [place]  Same-Day Surgery   Adult Discharge Orders & Instructions     For 24 hours after surgery    Get plenty of rest.  A responsible adult must stay with you for at least 24 hours after you leave the hospital.   Do not drive or use heavy equipment.  If you have weakness or tingling, don't drive or use heavy equipment until this feeling goes away.  Do not drink alcohol.  Avoid strenuous or risky activities.  Ask for help when climbing stairs.   You may feel lightheaded.  IF so, sit for a few minutes before standing.  Have someone help you get up.   If you have nausea (feel sick to your stomach): Drink only clear liquids such as apple juice, ginger ale, broth or 7-Up.  Rest may also help.  Be sure to drink enough fluids.  Move to a regular diet as you feel able.  You may have a slight fever. Call the doctor if your fever is over 100 F (37.7 C) (taken under the tongue) or lasts longer than 24 hours.  You may have a dry mouth, a sore throat, muscle aches or trouble sleeping.  These should go away after 24 hours.  Do not make important or legal decisions.   Call your doctor for any of the followin.  Signs of infection (fever, growing tenderness at the surgery site, a large amount of drainage or bleeding, severe pain, foul-smelling drainage, redness, swelling).    2. It has been over 8 to 10 hours since surgery and you are still not able to urinate (pass water).    3.  Headache for over 24 hours.    To contact a doctor, call [phone] or:    '   [phone] and ask for the resident on call for   gastroenterology (answered 24 hours a day)  '   Emergency Department:    [facility]: [phone]       (TTY for hearing impaired: [phone])             Discharge Instructions for Laparoscopic Gallbladder Removal Surgery (Cholecystectomy)   You had surgery to remove your gallbladder. This is called a cholecystectomy. You had the surgery done with laparoscopy. This  means it was done with several small incisions. People who have the surgery done this way often recover more quickly. They may have less pain than with open gallbladder surgery.    You can live a full and healthy life without your gallbladder. This includes eating the foods and doing the things you enjoyed before. Below are guidelines for home care after surgery.   Home care  To care for yourself at home:   Get plenty of rest. Don t worry if you feel tired for the first couple of weeks after your surgery. Fatigue is common. Nap when you feel tired.   Wash the skin around your cut (incision) daily with mild soap and water. It's OK to shower the day after your surgery unless your healthcare provider says not to.  Eat your normal diet. But don't eat rich, greasy, or spicy food for a few days. Many surgeons advise a low-fat diet for the first month after surgery. Don t eat fried food during this time.  You can walk around the house, do office work, climb stairs, or ride in a car if you feel able to do so.  Ask someone to drive you to your appointments for the next 3 days. Don t drive until you have stopped taking pain medicine. Make sure you can step on the brake pedal with no delay.  Eat more fiber and use a stool softener if you are constipated. Pain medicine can cause constipation. Talk with your provider if you need more help.  Don t sit in a bathtub, swimming pool, or hot tub until your healthcare provider says it s safe. Wait until the incision is closed. Wait until any surgical tubes (drains) are removed.  Follow-up care  Make a follow-up appointment with your surgeon as advised. Call your healthcare provider if these symptoms don t go away within 1 week after your surgery:   Extreme tiredness (fatigue)  Pain around the incision  Diarrhea or constipation  Loss of appetite  When to call your healthcare provider  Call your healthcare provider right away if you have any of these:   Yellowing of your eyes or skin  (jaundice)  Chills  Fever of 100.4 F (38.0 C) or higher, or as directed by your provider   Redness or swelling of the incision  Fluid leaking or a bad smell from the incision  Incision pain that gets worse  Dark or rust-colored urine  Stool that is light in color instead of brown  Increasing belly pain  Rectal bleeding  Trouble breathing or shortness of breath  Leg swelling  Evelyn last reviewed this educational content on 12/1/2021 2000-2022 [employer]. All rights reserved. This information is not intended as a substitute for professional medical care. Always follow your healthcare professional's instructions.

## 2023-06-13 NOTE — H&P
[facility]    History and Physical -   EGS Service       Date of Admission:  6/13/2023    Assessment & Plan: Surgery   Sharon Fung is a 68 year old female admitted on 6/13/2023. She has a past medical history of Rheumatoid arthritis on immunotherapy. Additionally, patient has had 3 previous abdominal surgeries including: an open appendectomy 40 years ago, laparoscopic gastric banding (2010) which was removed on 2015 due to intra-abdominal infection for which she also underwent drainage of an abscess followed by an IR drain placed on 11/11/2015 and removed 7 days after.    Last night, patient started with an abdominal pain after a fatty meal. Pain was localized in her epigastrium and started with an intensity of 6/10 and progressed to a 10/10 with a band-like pattern in the upper abdomen with irradiation to his right flank and back. Patient described it as an stabbing pain. In addition patient referred to be diaphoretic, not being able to sleep and 2 vomiting episodes. She does not remember the consistency or color of the vomit. She has had 2 bowel movements since last night. Afterwards, patient was brought to the ED in [facility] by her daughter were they performed an abdominal CT scan depicting a stone in the neck of her gallbladder. There were no signs of inflammation. Patient then was directly transferred to our ED for surgical treatment.     - Admission  - NPO  - 0.9% NS 1L bolus  - 0.9%  ml/hr   - Ancef 3 g pre-op for prophylaxis  - Lovenox 40 mg   - Dilaudid 1 mg (given in the ED)  - Zofran 4 mg IV   - PCDs         Diet: NPO per Anesthesia Guidelines for Procedure/Surgery Except for: Meds    DVT Prophylaxis: Enoxaparin (Lovenox) SQ  Smith Catheter: Not present  Lines: None     Drains: None     Cardiac Monitoring: None    Clinically Significant Risk Factors Present on Admission                  # Hypertension: Noted on problem list      # Severe  "Obesity: Estimated body mass index is 47.44 kg/m  as calculated from the following:    Height as of this encounter: 1.626 m (5' 4\").    Weight as of this encounter: 125.4 kg (276 lb 6.4 oz).            Disposition Plan      Expected Discharge Date: 06/14/2023                 The patient's care was discussed with the Chief Resident/Fellow.    Fredis Galloway MD  [facility]  Non-urgent messages: Securely message with Stuffle (more info)  Text page via [facility] Paging/Directory     ______________________________________________________________________    Chief Complaint   Abdominal pain since yesterday after a fatty meal     History is obtained from the patient    History of Present Illness   Sharon Fung is a 68 year old female admitted on 6/13/2023. She has a past medical history of Rheumatoid arthritis on cyclosporine, leflunomide, prednisone taper per chart, sarilumab, sulfasalazine and PRN tylenol, ibuprofen and ketorolac. Additionally, patient has had 3 previous abdominal surgeries including: an open appendectomy 40 years ago, and a laparoscopic gastric banding in 2010 which was removed on 10/31/2015 due to intra-abdominal infection for which she also underwent drainage of an intra-abdominal abscess. For the same, IR placed a Stafford Barrett drain on 11/11/2015 which was removed 7 days after.    Last night, patient started with an abdominal pain after a fatty meal. Pain was localized in her epigastrium and started with an intensity of 6/10 and progressed to a 10/10 with a band-like pattern in the upper abdomen with irradiation to his right flank and back. Patient described it as an stabbing pain. In addition patient referred to be diaphoretic, not being able to sleep and 2 vomiting episodes. She does not remember the consistency or color of the vomit. She has had 2 bowel movements since last night. Afterwards, patient was brought to the ED in [facility] by her " daughter were they performed an abdominal CT scan showed mild diffuse fatty infiltration of the liver. 2.6 x 1.9 x 1.9 cm gallstone in the neck of a mildly distended gallbladder. No gallbladder wall thickening or pericholecystic inflammatory changes are visualized. There were no signs of inflammation. Patient then was directly transferred to our ED for surgical treatment.      Past Medical History    Past Medical History:   Diagnosis Date     AR (allergic rhinitis)  as teen     Arthritis 12/14/2015     Chronic obstructive pulmonary disease, unspecified COPD type (H) 03/27/2018     Depressive disorder 3 years ago     GERD (gastroesophageal reflux disease) 04/2007     Headache 07/11/2017     Hypertension goal BP (blood pressure) < 140/90 12/20/2021     Mild major depression (H) 3 years ago     Morbid obesity (H) teens     BRETT (obstructive sleep apnea)     uses CPAP     RA (rheumatoid arthritis) (H) 4 years     Reduced vision 3 years       Past Surgical History   Past Surgical History:   Procedure Laterality Date     ARTHRODESIS FOOT Right 6/30/2021    Procedure: Right 1st metatarsophalangeal joint fusion;  Surgeon: Jesus Wolf MD;  Location: [facility]     ARTHRODESIS TOE(S) Right 12/27/2021    Procedure: right revision great toe fusion;  Surgeon: Ryan Gee MD;  Location: [facility] OR     BLEPHAROPLASTY BILATERAL Bilateral 8/5/2016    Procedure: BLEPHAROPLASTY BILATERAL;  Surgeon: Cortez Robin MD;  [facility]     BREAST BIOPSY, RT/LT  1-94    Benign     COLONOSCOPY       COLONOSCOPY WITH CO2 INSUFFLATION N/A 3/30/2017    Procedure: COLONOSCOPY WITH CO2 INSUFFLATION;  Surgeon: Issa Weeks MD;  Location: [facility] OR     ESOPHAGOSCOPY, GASTROSCOPY, DUODENOSCOPY (EGD), COMBINED N/A 10/29/2015    Procedure: COMBINED ESOPHAGOSCOPY, GASTROSCOPY, DUODENOSCOPY (EGD);  Surgeon: Shaq Mims MD;  [facility]     LAPAROSCOPIC GASTRIC ADJUSTABLE BANDING  11/09/10    Lap band procedure      LAPAROSCOPIC REMOVAL GASTRIC ADJUSTABLE BAND N/A 10/31/2015    Procedure: LAPAROSCOPIC REMOVAL GASTRIC ADJUSTABLE BAND;  Surgeon: Shaq Mims MD;  [facility] OR     TX HAND/FINGER SURGERY UNLISTED  2016     TX STOMACH SURGERY PROCEDURE UNLISTED  11/2015     RELEASE CARPAL TUNNEL Left 4/27/2017    Procedure: RELEASE CARPAL TUNNEL;  Left Open Carpal Tunnel Release;  Surgeon: Ciara Middleton MD;  [facility]     RELEASE CARPAL TUNNEL Right 6/15/2017    Procedure: RELEASE CARPAL TUNNEL;  Right Carpal Tunnel Release Open;  Surgeon: Ciara Middleton MD;  [facility] OR     REPAIR PTOSIS       TUBAL LIGATION  1988     [facility] APPENDECTOMY  1977       Prior to Admission Medications   Prior to Admission Medications   Prescriptions Last Dose Informant Patient Reported? Taking?   Dehydrated Alcohol 98 % SOLN   No No   Sig: Place 15 mLs into both eyes every 14 days   ORDER FOR DME   Yes No   Sig: Use your CPAP device as directed by your provider.   Sarilumab 200 MG/1.14ML SOAJ   No No   Sig: Inject 1.14 mLs (200 mg) Subcutaneous every 14 days . Hold for signs of infection and seek medical attention.   acetaminophen (TYLENOL) 500 MG tablet Unknown  Yes Yes   Sig: Take 500-1,000 mg by mouth every 6 hours as needed for mild pain   albuterol (PROAIR HFA/PROVENTIL HFA/VENTOLIN HFA) 108 (90 Base) MCG/ACT inhaler 6/12/2023  No Yes   Sig: Inhale 2 puffs into the lungs every 6 hours as needed for shortness of breath / dyspnea or wheezing   azelastine (OPTIVAR) 0.05 % ophthalmic solution 6/12/2023  No Yes   Sig: Apply 1 drop to eye 2 times daily   citalopram (CELEXA) 20 MG tablet 6/12/2023  No Yes   Sig: Take 1 tablet (20 mg) by mouth daily   cycloSPORINE (RESTASIS) 0.05 % ophthalmic emulsion 6/12/2023  No Yes   Sig: Place 1 drop into both eyes 2 times daily   cyclobenzaprine (FLEXERIL) 10 MG tablet 6/12/2023  Yes Yes   fexofenadine (ALLEGRA) 180 MG tablet 6/12/2023  No Yes   Sig: Take 1 tablet (180 mg) by  mouth daily   fluticasone (FLONASE) 50 MCG/ACT nasal spray 6/12/2023 Self Yes Yes   Sig: Spray 2 sprays into both nostrils as needed    gabapentin (NEURONTIN) 100 MG capsule 6/12/2023  No Yes   Sig: TAKE ONE CAPSULE BY MOUTH EVERY MORNING, TAKE ONE CAPSULE MID-DAY, AND TAKE FOUR CAPSULES BY MOUTH EVERY NIGHT AT BEDTIME   ibuprofen 200 MG capsule Unknown  Yes Yes   Sig: Take 600-800 mg by mouth At Bedtime   ketorolac (TORADOL) 10 MG tablet Unknown  Yes Yes   ketorolac tromethamine (ACULAR-LS) 0.4 % SOLN ophthalmic solution 6/12/2023  No Yes   Sig: Place 1 drop Into the left eye 3 times daily   leflunomide (ARAVA) 20 MG tablet 6/12/2023  No Yes   Sig: Take 1 tablet (20 mg) by mouth daily   losartan-hydrochlorothiazide (HYZAAR) 100-25 MG tablet 6/12/2023  No Yes   Sig: Take 1 tablet by mouth daily   ofloxacin (OCUFLOX) 0.3 % ophthalmic solution 6/12/2023  No Yes   Sig: Place 1-2 drops Into the left eye 4 times daily   ondansetron (ZOFRAN-ODT) 8 MG ODT tab 6/13/2023  No Yes   Sig: Take 1 tablet (8 mg) by mouth every 8 hours as needed for nausea   predniSONE (DELTASONE) 5 MG tablet   No No   Sig: Prednisone 20mg daily x5days, then 15mg daily x5days, then 10mg daily x5days, then 5mg daily x5days, then stop.   prednisoLONE acetate (PRED FORTE) 1 % ophthalmic suspension 6/12/2023  No Yes   Sig: Place 1-2 drops Into the left eye daily   sodium chloride (DIMAS 128) 5 % ophthalmic solution 6/12/2023  No Yes   Sig: Place 1-2 drops into both eyes 3 times daily   sulfaSALAzine (AZULFIDINE) 500 MG tablet 6/12/2023  No Yes   Sig: Take 3 tablets (1,500 mg) by mouth 2 times daily   vitamin D3 (CHOLECALCIFEROL) 50 mcg (2000 units) tablet 6/12/2023  No Yes   Sig: Take 1 tablet (50 mcg) by mouth daily      Facility-Administered Medications Last Administration Doses Remaining   alcohol (dehydrated) (ABLYSINOL) 99 % injection 15 mL None recorded 1           Review of Systems    The 10 point Review of Systems is negative other than noted in  the HPI or here.      Physical Exam   Vital Signs: Temp: 97.4  F (36.3  C) Temp src: Oral BP: 129/68 Pulse: 92   Resp: 12 SpO2: 93 % O2 Device: None (Room air)    Weight: 276 lbs 6.4 oz    Intake/Output Summary (Last 24 hours) at 6/13/2023 1032  Last data filed at 6/13/2023 0626  Gross per 24 hour   Intake 1000 ml   Output --   Net 1000 ml     General Appearance: Patient lying in bed with moderate discomfort   Respiratory: on 2lt on NC for comfort. No shortness of breath  Cardiovascular: RRR  GI: upper quadrants moderate pain on deep palpation. Abdomen soft. With 4 scars of previous surgical interventions (1 supraumbilical, 1 LUQ, 2 RUQ)  Skin: dry mouth. Otherwise, intact.           Data   Recent Labs   Lab 06/13/23  0522 06/12/23  1247   WBC 6.2 4.8   HGB 12.5 11.8   MCV 93 96    208     --    POTASSIUM 3.4  --    CHLORIDE 101  --    CO2 24  --    BUN 17.1  --    CR 0.69 0.72   ANIONGAP 15  --    ISH 9.6  --    *  --    ALBUMIN 4.3 4.0   PROTTOTAL 7.2 6.7   BILITOTAL 0.5 0.4   ALKPHOS 95 86   ALT 29 17   AST 26 33   LIPASE 18  --

## 2023-06-13 NOTE — ED TRIAGE NOTES
Started last evening with epigastric/midsternal chest pain.  Getting worse.  Pt. diaphoretic, nauseated  and vomiting.  Pain radiating to upper back.   Triage Assessment     Row Name 06/13/23 0526       Triage Assessment (Adult)    Airway WDL WDL       Respiratory WDL    Respiratory WDL WDL       Skin Circulation/Temperature WDL    Skin Circulation/Temperature WDL WDL       Cardiac WDL    Cardiac WDL X;chest pain       Chest Pain Assessment    Chest Pain Location epigastric;midsternal    Chest Pain Radiation back    Character burning    Chest Pain Intervention cardiac monitoring continued       Peripheral/Neurovascular WDL    Peripheral Neurovascular WDL WDL       Cognitive/Neuro/Behavioral WDL    Cognitive/Neuro/Behavioral WDL WDL

## 2023-06-13 NOTE — ANESTHESIA CARE TRANSFER NOTE
Patient: Sharon Fung    Procedure: Procedure(s):  CHOLECYSTECTOMY, LAPAROSCOPIC       Diagnosis: Biliary colic [K80.50]  Diagnosis Additional Information: No value filed.    Anesthesia Type:   General     Note:    Oropharynx: oropharynx clear of all foreign objects and spontaneously breathing  Level of Consciousness: drowsy  Oxygen Supplementation: face mask    Independent Airway: airway patency satisfactory and stable  Dentition: dentition unchanged  Vital Signs Stable: post-procedure vital signs reviewed and stable  Report to RN Given: handoff report given  Patient transferred to: PACU  Comments: Patient transferred to PACU in stable condition, breathing spontaneously on face mask, VSS.  Report given to RN.  Handoff Report: Identifed the Patient, Identified the Reponsible Provider, Reviewed the pertinent medical history, Discussed the surgical course, Reviewed Intra-OP anesthesia mangement and issues during anesthesia, Set expectations for post-procedure period and Allowed opportunity for questions and acknowledgement of understanding      Vitals:  Vitals Value Taken Time   /70 06/13/23 1448   Temp     Pulse 103 06/13/23 1454   Resp 8 06/13/23 1454   SpO2 96 % 06/13/23 1454   Vitals shown include unvalidated device data.    Electronically Signed By: CHELSEY Espinoza CRNA  June 13, 2023  2:55 PM

## 2023-06-13 NOTE — ANESTHESIA PREPROCEDURE EVALUATION
Anesthesia Pre-Procedure Evaluation    Patient: Sharon Fung   MRN: [id number] : 1955        Procedure : Procedure(s):  CHOLECYSTECTOMY, LAPAROSCOPIC          Sharon Fung is a 68 year old female who has medical history of hypertension, status post bariatric surgery, GERD presenting with upper abdominal pain.  Is on the right in the middle.  It does radiate to the back.  She never had any like this in the past.  Happened at about 9 PM yesterday.  She ate some pizza prior to this.  Has a little shortness of breath but thinks it is related to pain.  No history of blood clots.  Denies chest pain or pressure.  No diaphoresis.  No leg pain or swelling.  No black or blood in the vomit.  She is otherwise been having normal bowel movements.  Denies alcohol use.    Past Medical History:   Diagnosis Date     AR (allergic rhinitis)  as teen     Arthritis 2015     Chronic obstructive pulmonary disease, unspecified COPD type (H) 2018     Depressive disorder 3 years ago     GERD (gastroesophageal reflux disease) 2007     Headache 2017     Hypertension goal BP (blood pressure) < 140/90 2021     Mild major depression (H) 3 years ago     Morbid obesity (H) teens     BRETT (obstructive sleep apnea)     uses CPAP     RA (rheumatoid arthritis) (H) 4 years     Reduced vision 3 years      Past Surgical History:   Procedure Laterality Date     ARTHRODESIS FOOT Right 2021    Procedure: Right 1st metatarsophalangeal joint fusion;  Surgeon: Jesus Wolf MD;  [facility]     ARTHRODESIS TOE(S) Right 2021    Procedure: right revision great toe fusion;  Surgeon: Ryan Gee MD;  Location:  OR     BLEPHAROPLASTY BILATERAL Bilateral 2016    Procedure: BLEPHAROPLASTY BILATERAL;  Surgeon: Cortez Robin MD;  [facility]     BREAST BIOPSY, RT/LT  1-    Benign     COLONOSCOPY       COLONOSCOPY WITH CO2 INSUFFLATION N/A 3/30/2017    Procedure: COLONOSCOPY WITH  CO2 INSUFFLATION;  Surgeon: Issa Weeks MD;  [facility]: [facility]     ESOPHAGOSCOPY, GASTROSCOPY, DUODENOSCOPY (EGD), COMBINED N/A 10/29/2015    Procedure: COMBINED ESOPHAGOSCOPY, GASTROSCOPY, DUODENOSCOPY (EGD);  Surgeon: Shaq Mims MD;  [facility]     LAPAROSCOPIC GASTRIC ADJUSTABLE BANDING  11/09/10    Lap band procedure     LAPAROSCOPIC REMOVAL GASTRIC ADJUSTABLE BAND N/A 10/31/2015    Procedure: LAPAROSCOPIC REMOVAL GASTRIC ADJUSTABLE BAND;  Surgeon: Shaq Mims MD;  [facility] OR     DE HAND/FINGER SURGERY UNLISTED  2016     DE STOMACH SURGERY PROCEDURE UNLISTED  11/2015     RELEASE CARPAL TUNNEL Left 4/27/2017    Procedure: RELEASE CARPAL TUNNEL;  Left Open Carpal Tunnel Release;  Surgeon: Ciara Middleton MD;  Location:  OR     RELEASE CARPAL TUNNEL Right 6/15/2017    Procedure: RELEASE CARPAL TUNNEL;  Right Carpal Tunnel Release Open;  Surgeon: Ciara Middleton MD;  Location:  OR     REPAIR PTOSIS       TUBAL LIGATION  1988     [facility] APPENDECTOMY  1977      No Known Allergies   Social History     Tobacco Use     Smoking status: Never     Smokeless tobacco: Never   Vaping Use     Vaping status: Never Used   Substance Use Topics     Alcohol use: No     Alcohol/week: 1.0 - 2.0 standard drink of alcohol      Wt Readings from Last 1 Encounters:   06/13/23 125.4 kg (276 lb 6.4 oz)        Anesthesia Evaluation            ROS/MED HX  ENT/Pulmonary:     (+) sleep apnea, mild,  COPD,     Neurologic:       Cardiovascular:     (+) hypertension-----    METS/Exercise Tolerance:     Hematologic:       Musculoskeletal:       GI/Hepatic:     (+) GERD,     Renal/Genitourinary:       Endo:     (+) Obesity,     Psychiatric/Substance Use:       Infectious Disease:       Malignancy:       Other:            Physical Exam    Airway        Mallampati: IV   TM distance: > 3 FB   Neck ROM: full   Mouth opening: > 3 cm    Respiratory Devices and Support         Dental       (+)  Minor Abnormalities - some fillings, tiny chips      Cardiovascular   cardiovascular exam normal          Pulmonary   pulmonary exam normal                OUTSIDE LABS:  CBC:   Lab Results   Component Value Date    WBC 6.2 06/13/2023    WBC 4.8 06/12/2023    HGB 12.5 06/13/2023    HGB 11.8 06/12/2023    HCT 38.4 06/13/2023    HCT 36.6 06/12/2023     06/13/2023     06/12/2023     BMP:   Lab Results   Component Value Date     06/13/2023     07/01/2022    POTASSIUM 3.4 06/13/2023    POTASSIUM 3.9 07/01/2022    CHLORIDE 101 06/13/2023    CHLORIDE 107 07/01/2022    CO2 24 06/13/2023    CO2 27 07/01/2022    BUN 17.1 06/13/2023    BUN 18 07/01/2022    CR 0.69 06/13/2023    CR 0.72 06/12/2023     (H) 06/13/2023    GLC 96 07/01/2022     COAGS:   Lab Results   Component Value Date    PTT 33 07/15/2017    INR 0.96 07/15/2017     POC:   Lab Results   Component Value Date     (H) 06/30/2021     HEPATIC:   Lab Results   Component Value Date    ALBUMIN 4.3 06/13/2023    PROTTOTAL 7.2 06/13/2023    ALT 29 06/13/2023    AST 26 06/13/2023    ALKPHOS 95 06/13/2023    BILITOTAL 0.5 06/13/2023     OTHER:   Lab Results   Component Value Date    PH 7.43 06/04/2015    LACT 2.0 06/13/2023    A1C 4.8 07/01/2022    ISH 9.6 06/13/2023    LIPASE 18 06/13/2023    TSH 1.20 09/29/2015    CRP <2.9 11/16/2022    SED 16 06/12/2023       Anesthesia Plan    ASA Status:  3, emergent    NPO Status:  NPO Appropriate    Anesthesia Type: General.     - Airway: ETT   Induction: Intravenous.   Maintenance: Balanced.   Techniques and Equipment:     - Lines/Monitors: 2nd IV     Consents    Anesthesia Plan(s) and associated risks, benefits, and realistic alternatives discussed. Questions answered and patient/representative(s) expressed understanding.     - Discussed: Risks, Benefits and Alternatives for BOTH SEDATION and the PROCEDURE were discussed     - Discussed with:  Patient      - Extended Intubation/Ventilatory  Support Discussed: No.      - Patient is DNR/DNI Status: No    Use of blood products discussed: No .     Postoperative Care    Pain management: IV analgesics.   PONV prophylaxis: Ondansetron (or other 5HT-3), Dexamethasone or Solumedrol     Comments:           H&P reviewed: Unable to attach H&P to encounter due to EHR limitations. H&P Update: appropriate H&P reviewed, patient examined. No interval changes since H&P (within 30 days).         Billy Zamora MD

## 2023-06-13 NOTE — ANESTHESIA POSTPROCEDURE EVALUATION
Patient: Sharon Fung    Procedure: Procedure(s):  CHOLECYSTECTOMY, LAPAROSCOPIC       Anesthesia Type:  General    Note:  Disposition: Outpatient   Postop Pain Control: Uneventful            Sign Out: Well controlled pain   PONV: No   Neuro/Psych: Uneventful            Sign Out: Acceptable/Baseline neuro status   Airway/Respiratory: Uneventful            Sign Out: Acceptable/Baseline resp. status   CV/Hemodynamics: Uneventful            Sign Out: Acceptable CV status; No obvious hypovolemia; No obvious fluid overload   Other NRE: NONE   DID A NON-ROUTINE EVENT OCCUR? No           Last vitals:  Vitals Value Taken Time   /72 06/13/23 1527   Temp 36.8  C (98.2  F) 06/13/23 1500   Pulse 112 06/13/23 1529   Resp 17 06/13/23 1529   SpO2 89 % 06/13/23 1529   Vitals shown include unvalidated device data.    Electronically Signed By: Billy Zamora MD  June 13, 2023  3:30 PM

## 2023-06-13 NOTE — OR NURSING
Dr. Zamoar at bedside. Patient tachycardic into the 110s with SBPs in the 150s. Will give 10 mg labetalol IV push per MD.

## 2023-06-13 NOTE — ED PROVIDER NOTES
ED Provider Note  [facility]      History   CC: Abdominal pain    HPI  Sharon Fung is a 68 year old female who has medical history of hypertension, status post bariatric surgery, GERD presenting with upper abdominal pain.  Is on the right in the middle.  It does radiate to the back.  She never had any like this in the past.  Happened at about 9 PM yesterday.  She ate some pizza prior to this.  Has a little shortness of breath but thinks it is related to pain.  No history of blood clots.  Denies chest pain or pressure.  No diaphoresis.  No leg pain or swelling.  No black or blood in the vomit.  She is otherwise been having normal bowel movements.  Denies alcohol use.    Past Medical History  Past Medical History:   Diagnosis Date     AR (allergic rhinitis)  as teen     Arthritis 12/14/2015     Chronic obstructive pulmonary disease, unspecified COPD type (H) 3/27/2018     Depressive disorder 3 years ago     GERD (gastroesophageal reflux disease) 4-07     Headache 7/11/2017     Hypertension goal BP (blood pressure) < 140/90 12/20/2021     Mild major depression (H) 3 years ago     Morbid obesity (H) teens     BRETT (obstructive sleep apnea)     uses CPAP     RA (rheumatoid arthritis) (H) 4 years     Reduced vision 3 years     Rheumatoid arthritis, adult (H)      Past Surgical History:   Procedure Laterality Date     ARTHRODESIS FOOT Right 6/30/2021    Procedure: Right 1st metatarsophalangeal joint fusion;  Surgeon: Jesus Wolf MD;  Location: [facility] OR     ARTHRODESIS TOE(S) Right 12/27/2021    Procedure: right revision great toe fusion;  Surgeon: Ryan Gee MD;  Location:  OR     BLEPHAROPLASTY BILATERAL Bilateral 8/5/2016    Procedure: BLEPHAROPLASTY BILATERAL;  Surgeon: Cortez Robin MD;  Location:  [facility]     BREAST BIOPSY, RT/LT  1-94    Benign     COLONOSCOPY       COLONOSCOPY WITH CO2 INSUFFLATION N/A 3/30/2017    Procedure: COLONOSCOPY WITH CO2 INSUFFLATION;   Surgeon: Issa Weeks MD;  Location: [facility] OR     ESOPHAGOSCOPY, GASTROSCOPY, DUODENOSCOPY (EGD), COMBINED N/A 10/29/2015    Procedure: COMBINED ESOPHAGOSCOPY, GASTROSCOPY, DUODENOSCOPY (EGD);  Surgeon: Shaq Mims MD;  Location: [facility] GI     LAPAROSCOPIC GASTRIC ADJUSTABLE BANDING  11/09/10    Lap band procedure     LAPAROSCOPIC REMOVAL GASTRIC ADJUSTABLE BAND N/A 10/31/2015    Procedure: LAPAROSCOPIC REMOVAL GASTRIC ADJUSTABLE BAND;  Surgeon: Shaq Mims MD;  Location: [facility] OR     [place] HAND/FINGER SURGERY UNLISTED  2016     [place] STOMACH SURGERY PROCEDURE UNLISTED  11/2015     RELEASE CARPAL TUNNEL Left 4/27/2017    Procedure: RELEASE CARPAL TUNNEL;  Left Open Carpal Tunnel Release;  Surgeon: Ciara Middleton MD;  Location: [facility] OR     RELEASE CARPAL TUNNEL Right 6/15/2017    Procedure: RELEASE CARPAL TUNNEL;  Right Carpal Tunnel Release Open;  Surgeon: Ciara Middleton MD;  Location: [facility] OR     REPAIR PTOSIS       TUBAL LIGATION  1988     RUST APPENDECTOMY  1977     acetaminophen (TYLENOL) 500 MG tablet  albuterol (PROAIR HFA/PROVENTIL HFA/VENTOLIN HFA) 108 (90 Base) MCG/ACT inhaler  azelastine (OPTIVAR) 0.05 % ophthalmic solution  citalopram (CELEXA) 20 MG tablet  cyclobenzaprine (FLEXERIL) 10 MG tablet  cycloSPORINE (RESTASIS) 0.05 % ophthalmic emulsion  Dehydrated Alcohol 98 % SOLN  fexofenadine (ALLEGRA) 180 MG tablet  fluticasone (FLONASE) 50 MCG/ACT nasal spray  gabapentin (NEURONTIN) 100 MG capsule  ibuprofen 200 MG capsule  ketorolac (TORADOL) 10 MG tablet  ketorolac tromethamine (ACULAR-LS) 0.4 % SOLN ophthalmic solution  leflunomide (ARAVA) 20 MG tablet  losartan-hydrochlorothiazide (HYZAAR) 100-25 MG tablet  ofloxacin (OCUFLOX) 0.3 % ophthalmic solution  ondansetron (ZOFRAN-ODT) 8 MG ODT tab  ORDER FOR DME  prednisoLONE acetate (PRED FORTE) 1 % ophthalmic suspension  predniSONE (DELTASONE) 5 MG tablet  Sarilumab 200 MG/1.14ML SOAJ  sodium chloride (DIMAS 128) 5 %  ophthalmic solution  sulfaSALAzine (AZULFIDINE) 500 MG tablet  vitamin D3 (CHOLECALCIFEROL) 50 mcg (2000 units) tablet      No Known Allergies  Family History  Family History   Problem Relation Age of Onset     Neurologic Disorder Mother 20        migraine     Depression Mother      Heart Disease Father         MI     Neurologic Disorder Father 79        Parkinson's     Diabetes Father      Hypertension Father      Coronary Artery Disease Father      Cancer Maternal Grandmother         uterine     Neurologic Disorder Sister 16        migraine     Depression Sister      Depression Sister      Substance Abuse Sister      Migraines Sister      Neurologic Disorder Son 7        migraine     Substance Abuse Son      Migraines Son         none     Glaucoma No family hx of      Macular Degeneration No family hx of      Social History   Social History     Tobacco Use     Smoking status: Never     Smokeless tobacco: Never   Vaping Use     Vaping status: Never Used   Substance Use Topics     Alcohol use: No     Alcohol/week: 1.0 - 2.0 standard drink of alcohol     Drug use: No         A medically appropriate review of systems was performed with pertinent positives and negatives noted in the HPI, and all other systems negative.    Physical Exam      Physical Exam  Physical Exam   Constitutional: oriented to person, place, and time. appears well-developed and well-nourished.   HENT:   Head: Normocephalic and atraumatic.   Neck: Normal range of motion.   Pulmonary/Chest: Effort normal. No respiratory distress.   Cardiac: No murmurs, rubs, gallops. RRR.  Abdominal: Abdomen soft, tender palpation the right upper quadrant epigastric area, nondistended. No rebound tenderness.  MSK: Long bones without deformity or evidence of trauma.  No extremity edema.  Neurological: alert and oriented to person, place, and time.   Skin: Skin is warm and dry.   Psychiatric:  normal mood and affect.  behavior is normal. Thought content normal.        ED Course, Procedures, & Data      Procedures       ED Course Selections:        EKG Interpretation:      Interpreted by Michael Wilson MD  Time reviewed: 0515  Symptoms at time of EKG: epigastric pain   Rhythm: sinus tachycardia  Rate: 106  Axis: Normal  Ectopy: none  Conduction: normal  ST Segments/ T Waves: No acute ischemic changes.  Largely similar to prior EKG  Q Waves: v1 and v2  Comparison to prior: Unchanged    Clinical Impression: no acute changes suggesting ischemia/infarction           No results found for any visits on 06/13/23.  Medications   morphine (PF) injection 4 mg (has no administration in time range)   ondansetron (ZOFRAN) injection 4 mg (has no administration in time range)   0.9% sodium chloride BOLUS (has no administration in time range)     Followed by   sodium chloride 0.9% infusion (has no administration in time range)     Labs Ordered and Resulted from Time of ED Arrival to Time of ED Departure - No data to display  XR Chest 2 Views    (Results Pending)   US Abdomen Limited (RUQ)    (Results Pending)          Critical care was not performed.     Medical Decision Making  The patient's presentation was of high complexity (an acute health issue posing potential threat to life or bodily function).    The patient's evaluation involved:  ordering and/or review of 3+ test(s) in this encounter (see separate area of note for details)  review of 1 test result(s) ordered prior to this encounter (CT with cholilithiasis)  independent interpretation of testing performed by another health professional (CT/US with cholelithiasis on my review)    The patient's management necessitated high risk (a parenteral controlled substance), high risk (a decision regarding hospitalization) and further care after sign-out to [facility] (see their note for further management).      Assessment & Plan    MDM  Patient presenting with right upper quadrant pain.  EKG does not show signs of ischemia, initial troponin  is within normal limits, very unlikely ACS, will get a delta.  Initial lactic is elevated.  She does not have an elevated white blood count or fever.  LFTs and bilirubin are normal.  No signs of cholecystitis, pericholecystic fluid or thickened gallbladder wall.  Will defer antibiotics at this point.  We will recheck a lactic acid and troponin.  Ultrasound does show cholelithiasis without cholecystitis.  Low suspicion for choledocholithiasis, cholangitis based on work-up so far and labs.  She does feel better but is requiring multiple doses of pain medications.  May need observation for pain control and possible ERCP or cholecystectomy if not improving.  Patient will be signed to oncoming physician.    I have reviewed the nursing notes. I have reviewed the findings, diagnosis, plan and need for follow up with the patient.    New Prescriptions    No medications on file       Final diagnoses:   Biliary calculus of other site without obstruction       Michael Wilson  [facility] EMERGENCY DEPARTMENT  6/13/2023     Michael Wilson MD  06/13/23 0704

## 2023-06-13 NOTE — ED NOTES
Patient signed out to me at the end of my partner shift.  Patient CT revealed a 2.5 cm stone stuck in the gallbladder neck and the case was discussed with general surgery who wants the patient transferred to the [place] ER so they can see her there and taken to the OR provided there is a OR opening.  Case was discussed with Dr. Segura from surgery and Dr. Durant from the [place] [facility].    Results for orders placed or performed during the hospital encounter of 06/13/23 (from the past 24 hour(s))   CBC with platelets differential    Narrative    The following orders were created for panel order CBC with platelets differential.  Procedure                               Abnormality         Status                     ---------                               -----------         ------                     CBC with platelets and d...[[id number]]                      Final result                 Please view results for these tests on the individual orders.   Comprehensive metabolic panel   Result Value Ref Range    Sodium 140 136 - 145 mmol/L    Potassium 3.4 3.4 - 5.3 mmol/L    Chloride 101 98 - 107 mmol/L    Carbon Dioxide (CO2) 24 22 - 29 mmol/L    Anion Gap 15 7 - 15 mmol/L    Urea Nitrogen 17.1 8.0 - 23.0 mg/dL    Creatinine 0.69 0.51 - 0.95 mg/dL    Calcium 9.6 8.8 - 10.2 mg/dL    Glucose 138 (H) 70 - 99 mg/dL    Alkaline Phosphatase 95 35 - 104 U/L    AST 26 10 - 35 U/L    ALT 29 10 - 35 U/L    Protein Total 7.2 6.4 - 8.3 g/dL    Albumin 4.3 3.5 - 5.2 g/dL    Bilirubin Total 0.5 <=1.2 mg/dL    GFR Estimate >90 >60 mL/min/1.73m2   Lipase   Result Value Ref Range    Lipase 18 13 - 60 U/L   Troponin T, High Sensitivity   Result Value Ref Range    Troponin T, High Sensitivity 8 <=14 ng/L   Lactic acid whole blood   Result Value Ref Range    Lactic Acid 3.2 (H) 0.7 - 2.0 mmol/L   CBC with platelets and differential   Result Value Ref Range    WBC Count 6.2 4.0 - 11.0 10e3/uL    RBC Count 4.13 3.80 - 5.20 10e6/uL     Hemoglobin 12.5 11.7 - 15.7 g/dL    Hematocrit 38.4 35.0 - 47.0 %    MCV 93 78 - 100 fL    MCH 30.3 26.5 - 33.0 pg    MCHC 32.6 31.5 - 36.5 g/dL    RDW 13.7 10.0 - 15.0 %    Platelet Count 238 150 - 450 10e3/uL    % Neutrophils 66 %    % Lymphocytes 20 %    % Monocytes 9 %    % Eosinophils 4 %    % Basophils 1 %    % Immature Granulocytes 0 %    NRBCs per 100 WBC 0 <1 /100    Absolute Neutrophils 4.1 1.6 - 8.3 10e3/uL    Absolute Lymphocytes 1.3 0.8 - 5.3 10e3/uL    Absolute Monocytes 0.5 0.0 - 1.3 10e3/uL    Absolute Eosinophils 0.3 0.0 - 0.7 10e3/uL    Absolute Basophils 0.1 0.0 - 0.2 10e3/uL    Absolute Immature Granulocytes 0.0 <=0.4 10e3/uL    Absolute NRBCs 0.0 10e3/uL   US Abdomen Limited (RUQ)    Narrative    EXAM: US ABDOMEN LIMITED  LOCATION: [facility]  DATE/TIME: 6/13/2023 5:48 AM CDT    INDICATION: ruq pain, hx cholelithiasis  COMPARISON: None.  TECHNIQUE: Limited abdominal ultrasound.    FINDINGS:    GALLBLADDER: Gallstones in an otherwise normal gallbladder. No wall thickening, or pericholecystic fluid. Negative sonographic Bae's sign.    BILE DUCTS: No biliary dilatation. The common duct not well visualized.    LIVER: Borderline size, increased echogenicity. No focal mass.    RIGHT KIDNEY: No hydronephrosis.    PANCREAS: The visualized portions are normal.    No ascites.      Impression    IMPRESSION:  1.  Cholelithiasis.  2.  Hepatic steatosis.       XR Chest 2 Views    Narrative    EXAM: XR CHEST 2 VIEWS  LOCATION: [facility]  DATE: 6/13/2023    INDICATION: sob  COMPARISON: 11/28/2018      Impression    IMPRESSION: Negative chest.   CT Abdomen Pelvis w Contrast    Narrative    EXAM: CT ABDOMEN AND PELVIS WITH CONTRAST  LOCATION: [facility]  DATE/TIME: 6/13/2023 6:43 AM CDT    INDICATION: Mid abdominal pain.  COMPARISON: None.    TECHNIQUE: CT scan of the  abdomen and pelvis was performed following injection of IV contrast. Multiplanar reformats were obtained. Dose reduction techniques were used.  CONTRAST: 125 mL Isovue 370.    FINDINGS:    LOWER CHEST: Unremarkable.    HEPATOBILIARY: Mild diffuse fatty infiltration of the liver. 2.6 x 1.9 x 1.9 cm gallstone in the neck of a mildly distended gallbladder. No gallbladder wall thickening or pericholecystic inflammatory changes are visualized. No intra or extrahepatic   biliary dilatation.    SPLEEN: Unremarkable.    PANCREAS: Unremarkable.    ADRENAL GLANDS: Unremarkable.    KIDNEYS/BLADDER: Unremarkable.    BOWEL: A few colonic diverticula are present, without evidence of diverticulitis. The appendix is not visualized.    LYMPH NODES: Unremarkable.    PELVIC ORGANS: No acute findings.    MUSCULOSKELETAL: No acute findings.    OTHER: Atherosclerotic calcification in the abdominal aorta.      Impression    IMPRESSION:   1.  2.6 cm gallstone in the neck of a mildly distended gallbladder. No convincing CT evidence of acute cholecystitis.  2.  No other cause of acute pain identified in the abdomen or pelvis.   3.  Colonic diverticulosis, without evidence of diverticulitis.                 UA with Microscopic reflex to Culture    Specimen: Urine, Clean Catch   Result Value Ref Range    Color Urine Yellow Colorless, Straw, Light Yellow, Yellow    Appearance Urine Clear Clear    Glucose Urine Negative Negative mg/dL    Bilirubin Urine Negative Negative    Ketones Urine Negative Negative mg/dL    Specific Gravity Urine >1.050 (H) 1.003 - 1.035    Blood Urine Negative Negative    pH Urine 6.5 5.0 - 7.0    Protein Albumin Urine 10 (A) Negative mg/dL    Urobilinogen Urine Normal Normal, 2.0 mg/dL    Nitrite Urine Negative Negative    Leukocyte Esterase Urine Negative Negative    RBC Urine 2 <=2 /HPF    WBC Urine 1 <=5 /HPF    Squamous Epithelials Urine 1 <=1 /HPF    Narrative    Urine Culture not indicated   Lactic acid whole  blood   Result Value Ref Range    Lactic Acid 2.0 0.7 - 2.0 mmol/L     Medications   0.9% sodium chloride BOLUS (0 mLs Intravenous Stopped 6/13/23 0626)     Followed by   sodium chloride 0.9% infusion ( Intravenous $New Bag 6/13/23 0802)   HYDROmorphone (PF) (DILAUDID) injection 0.5 mg (0.5 mg Intravenous $Given 6/13/23 0802)   ondansetron (ZOFRAN) injection 4 mg (4 mg Intravenous $Given 6/13/23 0804)   morphine (PF) injection 4 mg (4 mg Intravenous $Given 6/13/23 0529)   ondansetron (ZOFRAN) injection 4 mg (4 mg Intravenous $Given 6/13/23 0528)   0.9% sodium chloride BOLUS (0 mLs Intravenous Stopped 6/13/23 0801)   HYDROmorphone (DILAUDID) injection 1 mg (1 mg Intravenous $Given 6/13/23 0623)   iopamidol (ISOVUE-370) solution 100 mL (125 mLs Intravenous $Given 6/13/23 0633)   sodium chloride 0.9 % bag 100mL (74 mLs Intravenous $Given 6/13/23 0634)   HYDROmorphone (PF) (DILAUDID) injection 0.5 mg (0.5 mg Intravenous $Given 6/13/23 0712)       Final diagnoses:   Biliary colic - with 2.5 cm gallstone in neck of GB     Danny Deluca MD, MD Deluca, Danny Serra MD  06/13/23 0889

## 2023-06-13 NOTE — ANESTHESIA PROCEDURE NOTES
Airway       Patient location during procedure: OR       Procedure Start/Stop Times: 6/13/2023 11:32 AM  Staff -        CRNA: Niels Collins APRN CRNA       Performed By: CRNA  Consent for Airway        Urgency: elective  Indications and Patient Condition       Indications for airway management: guy-procedural       Induction type:intravenous       Mask difficulty assessment: 3 - difficult mask (inadequate, unstable, or two providers) +/- NMBA    Final Airway Details       Final airway type: endotracheal airway       Successful airway: ETT - single  Endotracheal Airway Details        ETT size (mm): 7.0       Cuffed: yes       Cuff volume (mL): 10       Successful intubation technique: direct laryngoscopy       DL Blade Type: MAC 3       Adjucts: stylet       Position: Right       Measured from: lips       Secured at (cm): 22       Bite block used: None    Post intubation assessment        Placement verified by: capnometry, equal breath sounds and chest rise        Number of attempts at approach: 1       Secured with: pink tape       Ease of procedure: easy       Dentition: Intact and Unchanged    Medication(s) Administered   Medication Administration Time: 6/13/2023 11:32 AM

## 2023-06-13 NOTE — ED NOTES
Pt transferred here from [facility] to [place] with RUQ/chest/back pain secondary to a 2.5 cm gallstone in the gall bladder neck. Surgery wanted to evaluate her on the [place] with a plan for lap cholecystectomy. A bed has already been placed. Upon arrival her vital signs are normal and she rates her pain a 4/10 and is still having nausea. She will be given another dose of dilaudid and zofran and surgery will evaluate and take her to the OR.     Marito Durant MD  06/13/23 0930

## 2023-06-13 NOTE — OP NOTE
[facility] Operative Note    Pre-operative diagnosis: Biliary colic [K80.50]   Post-operative diagnosis * No post-op diagnosis entered *   Procedure: Procedure(s):  CHOLECYSTECTOMY, LAPAROSCOPIC   Surgeon: Reynaldo Segura MD   Assistants(s): Babak Riley MD   Estimated blood loss: 25ml    Specimens: Gallbladder and contents   Findings: Hydrops with distended and acutely inflamed gallbladder. Enlarged liver with steatohepatitis.  Adhesions within the LUQ- failed veress entry- no injury seen on inspection.  Modifier 22 for this case for severe morbid obesity and dense liver making dissection significantly harder- adding roughly 90 minutes to this case.         Sharon Fung was brought to the operating room and placed supine on the operating table with the bed flexed.  They had received indocyanine in the preoperative area.  ABX were given.  They were sedated and intubated without issue, an OG tube was placed.  They had not received a TAP block and so local anesthetic was used along the incisions.  The abdomen was prepped and draped in sterile fashion and a time out was performed.    We placed a veress needle in the LUQ and insufflated to 15mmhg.  Using visiport we entered the abdomen- We encountered what appeared to be omental adhesions we could not safely pass through.  We then made a supraaumbilical incision sharply (abdomen entered bluntly) and a kristal port was placed in an open fashion.  The abdomen was insufflated and the laparoscope inserted. We placed 4 additional 5 mm ports- in the LUQ Veress site- the epigastric region and along the RUQ.  We inspected the site of veress entry and took down omental adhesions-no significant bleeding or evidence of injury was seen.  The liver was bulky with fatty changes.  The gallbladder was intrahepatic.  The gallbladder was grasped via the assistant port and retracted cephalad as much as possible (limited due to habitus and enlarged liver).  The gallbladder  appeared acutely inflamed and edematous.  There were some adhesions along the gallbladder from the omentum.  The infundibulum was grasped and retracted laterally.  Using firefly we were able to identify the cystic duct and common bile duct.  Using hook cautery the peritoneal lining around the infundibulum was dissected off of the gallbladder.  The cystic duct and artery were isolated and the gallbladder was dissected of the cystic plate.  After identifying our critical view of safety with only these two tubular structures entering the gallbladder we clipped the cystic duct and cystic artery with metallic 5mm clips and then sharply divided them.  The gallbladder was then taken off of the liver with cautery and removed from the abdomen.  The clips were inspected and found to be intact without spillage of blood or bile.  Spillage of bile had occurred and this was suctioned out and irrigated.  We re-examined the LUQ without additional findings.      The umbilical fascia was closed with 0 vicryl suture. The ports were then removed and the abdomen allowed to collapse. Skin was closed with 4.0 monocryl and steri strips applied. Sharon Fung  was then woken from anesthesia, extubated and brought to recovery.    I performed the procedure.

## 2023-06-14 ENCOUNTER — PATIENT OUTREACH (OUTPATIENT)
Dept: CARE COORDINATION | Facility: CLINIC | Age: 68
End: 2023-06-14
Payer: COMMERCIAL

## 2023-06-14 DIAGNOSIS — M05.9 SEROPOSITIVE RHEUMATOID ARTHRITIS (H): ICD-10-CM

## 2023-06-14 LAB
GAMMA INTERFERON BACKGROUND BLD IA-ACNC: 0 IU/ML
M TB IFN-G BLD-IMP: NEGATIVE
M TB IFN-G CD4+ BCKGRND COR BLD-ACNC: 10 IU/ML
MITOGEN IGNF BCKGRD COR BLD-ACNC: 0.02 IU/ML
MITOGEN IGNF BCKGRD COR BLD-ACNC: 0.02 IU/ML
QUANTIFERON MITOGEN: 10 IU/ML
QUANTIFERON NIL TUBE: 0 IU/ML
QUANTIFERON TB1 TUBE: 0.02 IU/ML
QUANTIFERON TB2 TUBE: 0.02

## 2023-06-14 RX ORDER — LEFLUNOMIDE 20 MG/1
20 TABLET ORAL DAILY
Qty: 90 TABLET | Refills: 2 | Status: SHIPPED | OUTPATIENT
Start: 2023-06-14 | End: 2023-10-18

## 2023-06-14 RX ORDER — SULFASALAZINE 500 MG/1
1500 TABLET ORAL 2 TIMES DAILY
Qty: 540 TABLET | Refills: 2 | Status: SHIPPED | OUTPATIENT
Start: 2023-06-14 | End: 2023-10-18

## 2023-06-14 NOTE — PROGRESS NOTES
Clinic Care Coordination Contact  [facility]: Post-Discharge Note  SITUATION                                                      Admission:    Admission Date: 06/13/23   Reason for Admission: abdominal pain  Discharge:   Discharge Date: 06/13/23  Discharge Diagnosis: acute cholecystitis    BACKGROUND                                                      Per hospital discharge summary and inpatient provider notes:  Sharon Fung is a 68 year old female admitted on 6/13/2023. She has a past medical history of Rheumatoid arthritis on cyclosporine, leflunomide, prednisone taper per chart, sarilumab, sulfasalazine and PRN tylenol, ibuprofen and ketorolac. Additionally, patient has had 3 previous abdominal surgeries including: an open appendectomy 40 years ago, and a laparoscopic gastric banding in 2010 which was removed on 10/31/2015 due to intra-abdominal infection for which she also underwent drainage of an intra-abdominal abscess. For the same, IR placed a Stafford Barrett drain on 11/11/2015 which was removed 7 days after.     Last night, patient started with an abdominal pain after a fatty meal. Pain was localized in her epigastrium and started with an intensity of 6/10 and progressed to a 10/10 with a band-like pattern in the upper abdomen with irradiation to his right flank and back. Patient described it as an stabbing pain. In addition patient referred to be diaphoretic, not being able to sleep and 2 vomiting episodes. She does not remember the consistency or color of the vomit. She has had 2 bowel movements since last night. Afterwards, patient was brought to the ED in [facility] by her daughter were they performed an abdominal CT scan showed mild diffuse fatty infiltration of the liver. 2.6 x 1.9 x 1.9 cm gallstone in the neck of a mildly distended gallbladder. No gallbladder wall thickening or pericholecystic inflammatory changes are visualized. There were no signs of inflammation. Patient then was  directly transferred to our ED for surgical treatment.    ASSESSMENT           Discharge Assessment  How are you doing now that you are home?: in pain but manageable  How are your symptoms? (Red Flag symptoms escalate to triage hotline per guidelines): Unchanged  Do you feel your condition is stable enough to be safe at home until your provider visit?: Yes  Does the patient have their discharge instructions? : Yes  Does the patient have questions regarding their discharge instructions? : No  Were you started on any new medications or were there changes to any of your previous medications? : Yes  Does the patient have all of their medications?: Yes  Do you have questions regarding any of your medications? : No  Do you have all of your needed medical supplies or equipment (DME)?  (i.e. oxygen tank, CPAP, cane, etc.): Yes  Discharge follow-up appointment scheduled within 14 calendar days? : Yes  Discharge Follow Up Appointment Date: 06/21/23                  PLAN                                                      Outpatient Plan:  Make a follow-up appointment with your surgeon as advised. Call your healthcare provider if these symptoms don t go  away within 1 week after your surgery:  Extreme tiredness (fatigue)  Pain around the incision  Diarrhea or constipation  Loss of appetite    Future Appointments   Date Time Provider Department Center   6/21/2023 10:15 AM Reynaldo Segura MD [facility]   6/27/2023  9:30 AM Levon Jamil MD [facility]   10/18/2023  8:00 AM Freddy Guerra MD [facility]         For any urgent concerns, please contact our 24 hour nurse triage line: [phone] ([phone])         Mini Noel MA

## 2023-06-14 NOTE — DISCHARGE SUMMARY
[facility] Discharge Summary    Sharon Fung MRN# [id number]   Age: 68 year old YOB: 1955     Date of Admission:  6/13/2023  Date of Discharge::  6/13/23  Admitting Physician:  Reynaldo Segura MD  Discharge Physician:  Reynaldo Segura MD               Admission Diagnoses:   Acute cholecystitis          Discharge Diagnosis:   -Acute cholecystitis          Procedures:   Procedure(s): Cholecystectomy, laproscopic       No other procedures performed during this admission           Medications Prior to Admission:   reviewed          Discharge Medications:   No change to home meds  Oxycodone 5mg x12 given          Consultations:   No consultations were requested during this admission          Brief History of Illness:   Acute abdominal pain, acute cholecystitis seen on CT and RUQ US. Taken directly to the OR for laparoscopic cholecystectomy. Discharged from PACU           Hospital Course:   The patient's hospital course was unremarkable.  She recovered as anticipated and experienced no post-operative complications.           Discharge Instructions and Follow-Up:   Discharge diet: Regular   Discharge activity: Lifting restricted to 20 pounds   Discharge follow-up: Will call in 2-3 days   Wound care: Ice to area for comfort  Keep wound clean and dry           Discharge Disposition:   Discharged to home      Attestation:  Amount of time performed on this discharge: 25 minutes.    Reynaldo Segura MD

## 2023-06-15 ENCOUNTER — PATIENT OUTREACH (OUTPATIENT)
Dept: CARE COORDINATION | Facility: CLINIC | Age: 68
End: 2023-06-15
Payer: COMMERCIAL

## 2023-06-15 ENCOUNTER — PATIENT OUTREACH (OUTPATIENT)
Dept: SURGERY | Facility: CLINIC | Age: 68
End: 2023-06-15
Payer: COMMERCIAL

## 2023-06-15 NOTE — PROGRESS NOTES
RN Post-Op/Post-Discharge Care Coordination Note    Ms. Sharon Fung is a 68 year old female who underwent laparoscopic cholecystectomy on 6/13 with  Dr. Reynaldo Segura. Spoke with Patient.    Support  Patient able to care for self independently     Health Status  Nausea/Vomiting: Patient denies nausea/vomiting.  Eating/drinking: Patient is able to eat and drink without any complaints.  Bowel habits: Patient reports having a normal bowel movement.  Fevers/chills: Patient denies fevers or chills.  Incisions: Patient denies any signs and symptoms of infection. Wound closure:  Steri-strips  Pain: tolerable, has only used 3 tabs of oxycodone. Advised on use of OTC Tylenol per package instructions PRN. Also advised on use of ice or heat for 20min intervals protecting skin from injury.   New Medications: oxycodone    Activity/Restrictions  No lifting in excess of 15-20 pounds for 3-4 weeks    Equipment  None    Pathology reviewed with patient:  No, not yet available    Forms/Letters  No    All of her questions were answered including reviewing restrictions and wound care.  She will call this office if she has any further questions and/or concerns.      PO appointment scheduled for 6/21 at 1015 with Dr. Segura. Patient is aware that she may cancel this or change to video visit if she is feeling well.     A copy of this note was routed to the primary surgeon.      Whom and When to Call  Patient acknowledges understanding of how to manage any medication changes and when to seek medical care.     Patient advised that if after hour medical concerns arise to please call [phone] and choose option 4 to speak to the physician on call.

## 2023-06-16 LAB
PATH REPORT.COMMENTS IMP SPEC: NORMAL
PATH REPORT.COMMENTS IMP SPEC: NORMAL
PATH REPORT.FINAL DX SPEC: NORMAL
PATH REPORT.GROSS SPEC: NORMAL
PATH REPORT.MICROSCOPIC SPEC OTHER STN: NORMAL
PATH REPORT.RELEVANT HX SPEC: NORMAL
PHOTO IMAGE: NORMAL

## 2023-06-20 ENCOUNTER — PRE VISIT (OUTPATIENT)
Dept: SURGERY | Facility: CLINIC | Age: 68
End: 2023-06-20
Payer: COMMERCIAL

## 2023-06-20 NOTE — TELEPHONE ENCOUNTER
Patient Telephone Reminder Call    Date of call:  06/20/23  Phone numbers:  Cell number on file:    Telephone Information:   Mobile [phone]       Reached patient/confirmed appointment:  Yes  Appointment with:   Dr. Reynaldo Segura  Reason for visit:  Vivienne post-op  Remind patient that this visit is a consultation only, NO procedure:  Yes  Can this visit be changed to a video visit:  No

## 2023-06-21 ENCOUNTER — OFFICE VISIT (OUTPATIENT)
Dept: SURGERY | Facility: CLINIC | Age: 68
End: 2023-06-21
Payer: COMMERCIAL

## 2023-06-21 VITALS
HEART RATE: 75 BPM | SYSTOLIC BLOOD PRESSURE: 183 MMHG | BODY MASS INDEX: 46.01 KG/M2 | DIASTOLIC BLOOD PRESSURE: 83 MMHG | HEIGHT: 64 IN | OXYGEN SATURATION: 94 % | WEIGHT: 269.5 LBS

## 2023-06-21 DIAGNOSIS — Z98.890 POST-OPERATIVE STATE: Primary | ICD-10-CM

## 2023-06-21 PROCEDURE — 99024 POSTOP FOLLOW-UP VISIT: CPT | Performed by: SURGERY

## 2023-06-21 ASSESSMENT — PAIN SCALES - GENERAL: PAINLEVEL: MILD PAIN (3)

## 2023-06-21 NOTE — PATIENT INSTRUCTIONS
You met with Dr. Reynaldo Segura.      Today's visit instructions:    Return to the Surgery Clinic on an as needed basis.        If you have questions please contact Jennifer RN or Galina RN during regular clinic hours, Monday through Friday 7:30 AM - 4:00 PM, or you can contact us via [facility] at anytime.       If you have urgent needs after-hours, weekends, or holidays please call the hospital at [phone] and ask to speak with our on-call General Surgery Team.    Appointment [phone]  Nurse Advice (Jennifer or Galina): [phone]   Surgery Scheduler (Alejandra): [phone]  Fax: [phone]

## 2023-06-21 NOTE — NURSING NOTE
"Chief Complaint   Patient presents with     Post-Op - General Surgery     Post-op       Vitals:    06/21/23 1009   BP: (!) 183/83   BP Location: Right arm   Patient Position: Sitting   Cuff Size: Adult Regular   Pulse: 75   SpO2: 94%   Weight: 122.2 kg (269 lb 8 oz)   Height: 1.626 m (5' 4\")       Body mass index is 46.26 kg/m .                          Juan Goncalves, EMT    "

## 2023-06-21 NOTE — LETTER
"6/21/2023       RE: Sharon Fung  [address] [address]     Dear Colleague,    Thank you for referring your patient, Sharon Fung, to the [facility] [place] [facility]. Please see a copy of my visit note below.    I saw Sharon Fnug today in follow-up of her recent cholecystectomy for an impacted stone in the gallbladder neck (discharged same day).    She reports doing ok. No fever/chills/nausea/vomiting.  Tolerating a diet. No diarrhea.  No longer taking pain medications. No shoulder pain. Having some discomfort along her upper abdomen (worse in LUQ) that is slowly improving.    PE  BP (!) 183/83 (BP Location: Right arm, Patient Position: Sitting, Cuff Size: Adult Regular)   Pulse 75   Ht 1.626 m (5' 4\")   Wt 122.2 kg (269 lb 8 oz)   SpO2 94%   BMI 46.26 kg/m    A+Ox3, NAD  RRR  No resp distress or wheezing  Abd is soft, nondistended. No tenderness to palpation. Incisions healing appropriately without evidence of infection (some mild bruising).    Pathology- cholecystitis    A/P  Appropriate healing post operatively.  Pain improving, no concerning signs of infection/bleeding    She may follow-up as needed      Reynaldo Segura MD      "

## 2023-06-21 NOTE — PROGRESS NOTES
"I saw Sharon Fung today in follow-up of her recent cholecystectomy for an impacted stone in the gallbladder neck (discharged same day).    She reports doing ok. No fever/chills/nausea/vomiting.  Tolerating a diet. No diarrhea.  No longer taking pain medications. No shoulder pain. Having some discomfort along her upper abdomen (worse in LUQ) that is slowly improving.    PE  BP (!) 183/83 (BP Location: Right arm, Patient Position: Sitting, Cuff Size: Adult Regular)   Pulse 75   Ht 1.626 m (5' 4\")   Wt 122.2 kg (269 lb 8 oz)   SpO2 94%   BMI 46.26 kg/m    A+Ox3, NAD  RRR  No resp distress or wheezing  Abd is soft, nondistended. No tenderness to palpation. Incisions healing appropriately without evidence of infection (some mild bruising).    Pathology- cholecystitis    A/P  Appropriate healing post operatively.  Pain improving, no concerning signs of infection/bleeding    She may follow-up as needed  "

## 2023-06-27 ENCOUNTER — OFFICE VISIT (OUTPATIENT)
Dept: OPHTHALMOLOGY | Facility: CLINIC | Age: 68
End: 2023-06-27
Attending: OPHTHALMOLOGY
Payer: COMMERCIAL

## 2023-06-27 DIAGNOSIS — H18.523 ANTERIOR BASEMENT MEMBRANE DYSTROPHY OF BOTH EYES: Primary | ICD-10-CM

## 2023-06-27 DIAGNOSIS — H04.123 BILATERAL DRY EYES: ICD-10-CM

## 2023-06-27 PROCEDURE — 99214 OFFICE O/P EST MOD 30 MIN: CPT | Mod: 24 | Performed by: OPHTHALMOLOGY

## 2023-06-27 ASSESSMENT — VISUAL ACUITY
OS_CC: 20/40
OS_PH_CC+: -1
OD_PH_CC: 20/25
OD_PH_CC+: -2
OS_CC+: -1
CORRECTION_TYPE: GLASSES
OD_CC: 20/50
OD_CC+: -2
METHOD: SNELLEN - LINEAR
OS_PH_CC: 20/25

## 2023-06-27 ASSESSMENT — TONOMETRY
IOP_METHOD: ICARE
OD_IOP_MMHG: 19
OS_IOP_MMHG: 21

## 2023-06-27 ASSESSMENT — EXTERNAL EXAM - RIGHT EYE: OD_EXAM: NORMAL

## 2023-06-27 ASSESSMENT — SLIT LAMP EXAM - LIDS
COMMENTS: MEIBOMIAN GLAND DYSFUNCTION
COMMENTS: MEIBOMIAN GLAND DYSFUNCTION,

## 2023-06-27 ASSESSMENT — EXTERNAL EXAM - LEFT EYE: OS_EXAM: NORMAL

## 2023-06-27 NOTE — PROGRESS NOTES
Chief complaint   ABMD evaluation    Referring Provider: Dr. Royal JAKUB Foleylance Fung 68 year old female      Complains of gradually worsening haze over the vision in both eyes starting ~3/2022 and progressing since then. Has tried some sort of ointment and drops without improvement. Denies any pain. Does endorse epiphora just from the right eye. Does endorse significant itching predating onset of hazy vision for which she uses pataday PRN - she does have seasonal allergies for which she takes allegra. Of note, she uses a CPAP for her COPD and has RA for which she follows with rheumatology with immunosuppressant treatments including courses of steroids. At her last visit 3/31/2022 with Dr. Valderrama she was instructed to continue present treatment of restasis (no longer using - did not help), luba zaynab and gtt (not using), and maxitrol PRN to eyelids. No history of painful episodes. Problem is not time-of-day dependent. No waking with pain.    Interval history 06/27/23: Doing well overall, no pain or irritation. Feels that she needs an updated pair of glasses as her vision is not quite as sharp as she would like. No flashes, floaters, curtains.     Past ocular history   Prior eye surgery/laser/Trauma: yes bilateral upper lid blepharoplasty (2016)  CTL wearer:No  Glasses : yes  Family Hx of eye disease: No    PMH     Past Medical History:   Diagnosis Date     AR (allergic rhinitis)  as teen     Arthritis 12/14/2015     Chronic obstructive pulmonary disease, unspecified COPD type (H) 03/27/2018     Depressive disorder 3 years ago     GERD (gastroesophageal reflux disease) 04/2007     Headache 07/11/2017     Hypertension goal BP (blood pressure) < 140/90 12/20/2021     Mild major depression (H) 3 years ago     Morbid obesity (H) teens     BRETT (obstructive sleep apnea)     uses CPAP     RA (rheumatoid arthritis) (H) 4 years     Reduced vision 3 years       PSH     Past Surgical History:   Procedure Laterality Date      ARTHRODESIS FOOT Right 6/30/2021    Procedure: Right 1st metatarsophalangeal joint fusion;  Surgeon: Jesus Wolf MD;  Location: [facility] OR     ARTHRODESIS TOE(S) Right 12/27/2021    Procedure: right revision great toe fusion;  Surgeon: Ryan Gee MD;  Location: [facility] OR     BLEPHAROPLASTY BILATERAL Bilateral 8/5/2016    Procedure: BLEPHAROPLASTY BILATERAL;  Surgeon: Cortez Robin MD;  Location:  [facility]     BREAST BIOPSY, RT/LT  1-94    Benign     COLONOSCOPY       COLONOSCOPY WITH CO2 INSUFFLATION N/A 3/30/2017    Procedure: COLONOSCOPY WITH CO2 INSUFFLATION;  Surgeon: Issa Weeks MD;  Location: [facility] OR     ESOPHAGOSCOPY, GASTROSCOPY, DUODENOSCOPY (EGD), COMBINED N/A 10/29/2015    Procedure: COMBINED ESOPHAGOSCOPY, GASTROSCOPY, DUODENOSCOPY (EGD);  Surgeon: Shaq Mims MD;  Location: [facility] GI     LAPAROSCOPIC CHOLECYSTECTOMY N/A 6/13/2023    Procedure: CHOLECYSTECTOMY, LAPAROSCOPIC;  Surgeon: Reynaldo Segura MD;  Location: [facility] OR     LAPAROSCOPIC GASTRIC ADJUSTABLE BANDING  11/09/10    Lap band procedure     LAPAROSCOPIC REMOVAL GASTRIC ADJUSTABLE BAND N/A 10/31/2015    Procedure: LAPAROSCOPIC REMOVAL GASTRIC ADJUSTABLE BAND;  Surgeon: Shaq Mims MD;  Location: [facility] OR     NH HAND/FINGER SURGERY UNLISTED  2016     NH STOMACH SURGERY PROCEDURE UNLISTED  11/2015     RELEASE CARPAL TUNNEL Left 4/27/2017    Procedure: RELEASE CARPAL TUNNEL;  Left Open Carpal Tunnel Release;  Surgeon: Ciara Middleton MD;  Location: [facility] OR     RELEASE CARPAL TUNNEL Right 6/15/2017    Procedure: RELEASE CARPAL TUNNEL;  Right Carpal Tunnel Release Open;  Surgeon: Ciara Middleton MD;  Location: [facility] OR     REPAIR PTOSIS       TUBAL LIGATION  1988     [facility] APPENDECTOMY  1977       Meds     Current Outpatient Medications   Medication     acetaminophen (TYLENOL) 500 MG tablet     albuterol (PROAIR HFA/PROVENTIL HFA/VENTOLIN HFA) 108 (90 Base) MCG/ACT inhaler     azelastine  (OPTIVAR) 0.05 % ophthalmic solution     citalopram (CELEXA) 20 MG tablet     cyclobenzaprine (FLEXERIL) 10 MG tablet     cycloSPORINE (RESTASIS) 0.05 % ophthalmic emulsion     Dehydrated Alcohol 98 % SOLN     fexofenadine (ALLEGRA) 180 MG tablet     fluticasone (FLONASE) 50 MCG/ACT nasal spray     gabapentin (NEURONTIN) 100 MG capsule     ibuprofen 200 MG capsule     ketorolac (TORADOL) 10 MG tablet     ketorolac tromethamine (ACULAR-LS) 0.4 % SOLN ophthalmic solution     leflunomide (ARAVA) 20 MG tablet     losartan-hydrochlorothiazide (HYZAAR) 100-25 MG tablet     ofloxacin (OCUFLOX) 0.3 % ophthalmic solution     ondansetron (ZOFRAN-ODT) 8 MG ODT tab     ORDER FOR DME     prednisoLONE acetate (PRED FORTE) 1 % ophthalmic suspension     predniSONE (DELTASONE) 5 MG tablet     Sarilumab 200 MG/1.14ML SOAJ     sodium chloride (DIMAS 128) 5 % ophthalmic solution     sulfaSALAzine (AZULFIDINE) 500 MG tablet     vitamin D3 (CHOLECALCIFEROL) 50 mcg (2000 units) tablet     Current Facility-Administered Medications   Medication     alcohol (dehydrated) (ABLYSINOL) 99 % injection 15 mL     Facility-Administered Medications Ordered in Other Visits   Medication     sodium chloride (PF) 0.9% PF flush 10 mL     sodium chloride bacteriostatic 0.9 % flush 12 mL       Labs   -    Imaging   -    Drops Currently Taking   - Pataday PRN each eye  - Systane QID both eyes.  - Prednioslone every day right eye    Assessment/Plan   # Anterior Basement Membrane Dystrophy, OU  - 01/26/23 Vision in both eyes has fluctuated from 20/20cc to 20/40cc OU since 2014 but now is subjectively the worst it has been. No pain. Does have epiphora OD longstanding. Denies dryness. Does have allergies improving with pataday. Irregular surface from ABMD most likely the source of decreased visual acuity.  - 4/21/23: Superficial keratectomy of the left eye today.   - 4/26/23: BCL in place, still with large irregular inferior epithelial defect though healing well  superiorly and into visual axis  - 5/23/23: Epi fully intact left eye. Right eye superficial keratectomy  - 05/30/23 BCL removed right eye. Epi rough but intact, recently healed. Left eye epi dry but intact    Plan:   - Discontinue prednisolone  - Continue Systane as needed; continue pataday as needed  - Recommend updated refraction, patient wishes to go to her primary eye doctor (Dr Valderrama) - vision improved with pinhole    # Horizontal exotropia  - Treated with 2.0 JOSUE prism starting 3/2022    # Nuclear sclerosis, OU  - History of steroid use for RA. BCVA fluctuant.  - Monitor    Follow up:  1 year VT, sooner PRN    Raghu Medellin MD  Fellow, Cornea, External Disease and Refractive Surgery  Department of Ophthalmology  [facility]      Attending Physician Attestation:  Complete documentation of historical and exam elements from today's encounter can be found in the full encounter summary report (not reduplicated in this progress note).  I personally obtained the chief complaint(s) and history of present illness.  I confirmed and edited as necessary the review of systems, past medical/surgical history, family history, social history, and examination findings as documented by others; and I examined the patient myself.  I personally reviewed the relevant tests, images, and reports as documented above.  I formulated and edited as necessary the assessment and plan and discussed the findings and management plan with the patient and family. - Levon Jamil MD

## 2023-07-20 ENCOUNTER — OFFICE VISIT (OUTPATIENT)
Dept: OPTOMETRY | Facility: CLINIC | Age: 68
End: 2023-07-20
Payer: COMMERCIAL

## 2023-07-20 ENCOUNTER — INFUSION THERAPY VISIT (OUTPATIENT)
Dept: INFUSION THERAPY | Facility: CLINIC | Age: 68
End: 2023-07-20
Attending: INTERNAL MEDICINE
Payer: COMMERCIAL

## 2023-07-20 VITALS
HEIGHT: 63 IN | RESPIRATION RATE: 16 BRPM | DIASTOLIC BLOOD PRESSURE: 75 MMHG | SYSTOLIC BLOOD PRESSURE: 131 MMHG | BODY MASS INDEX: 46.71 KG/M2 | TEMPERATURE: 97.9 F | HEART RATE: 83 BPM | OXYGEN SATURATION: 94 % | WEIGHT: 263.6 LBS

## 2023-07-20 DIAGNOSIS — H25.13 NUCLEAR AGE-RELATED CATARACT, BOTH EYES: ICD-10-CM

## 2023-07-20 DIAGNOSIS — M05.9 SEROPOSITIVE RHEUMATOID ARTHRITIS (H): Primary | ICD-10-CM

## 2023-07-20 DIAGNOSIS — H52.4 PRESBYOPIA: ICD-10-CM

## 2023-07-20 DIAGNOSIS — H52.223 REGULAR ASTIGMATISM OF BOTH EYES: ICD-10-CM

## 2023-07-20 DIAGNOSIS — H52.13 MYOPIA OF BOTH EYES: ICD-10-CM

## 2023-07-20 DIAGNOSIS — Z01.00 EXAMINATION OF EYES AND VISION: Primary | ICD-10-CM

## 2023-07-20 DIAGNOSIS — H10.13 ALLERGIC CONJUNCTIVITIS OF BOTH EYES: ICD-10-CM

## 2023-07-20 DIAGNOSIS — H18.523 ANTERIOR BASEMENT MEMBRANE DYSTROPHY OF BOTH EYES: ICD-10-CM

## 2023-07-20 PROCEDURE — 250N000011 HC RX IP 250 OP 636: Mod: JZ | Performed by: INTERNAL MEDICINE

## 2023-07-20 PROCEDURE — 92014 COMPRE OPH EXAM EST PT 1/>: CPT | Performed by: OPTOMETRIST

## 2023-07-20 PROCEDURE — 96365 THER/PROPH/DIAG IV INF INIT: CPT

## 2023-07-20 PROCEDURE — 258N000003 HC RX IP 258 OP 636: Performed by: INTERNAL MEDICINE

## 2023-07-20 PROCEDURE — 92015 DETERMINE REFRACTIVE STATE: CPT | Performed by: OPTOMETRIST

## 2023-07-20 RX ORDER — EPINEPHRINE 1 MG/ML
0.3 INJECTION, SOLUTION, CONCENTRATE INTRAVENOUS EVERY 5 MIN PRN
Status: CANCELLED | OUTPATIENT
Start: 2023-08-17

## 2023-07-20 RX ORDER — ALBUTEROL SULFATE 0.83 MG/ML
2.5 SOLUTION RESPIRATORY (INHALATION)
Status: CANCELLED | OUTPATIENT
Start: 2023-08-17

## 2023-07-20 RX ORDER — HEPARIN SODIUM,PORCINE 10 UNIT/ML
5 VIAL (ML) INTRAVENOUS
Status: CANCELLED | OUTPATIENT
Start: 2023-08-17

## 2023-07-20 RX ORDER — HEPARIN SODIUM (PORCINE) LOCK FLUSH IV SOLN 100 UNIT/ML 100 UNIT/ML
5 SOLUTION INTRAVENOUS
Status: CANCELLED | OUTPATIENT
Start: 2023-08-17

## 2023-07-20 RX ORDER — METHYLPREDNISOLONE SODIUM SUCCINATE 125 MG/2ML
125 INJECTION, POWDER, LYOPHILIZED, FOR SOLUTION INTRAMUSCULAR; INTRAVENOUS
Status: CANCELLED
Start: 2023-08-17

## 2023-07-20 RX ORDER — DIPHENHYDRAMINE HYDROCHLORIDE 50 MG/ML
50 INJECTION INTRAMUSCULAR; INTRAVENOUS
Status: CANCELLED
Start: 2023-08-17

## 2023-07-20 RX ORDER — MEPERIDINE HYDROCHLORIDE 25 MG/ML
25 INJECTION INTRAMUSCULAR; INTRAVENOUS; SUBCUTANEOUS EVERY 30 MIN PRN
Status: CANCELLED | OUTPATIENT
Start: 2023-08-17

## 2023-07-20 RX ORDER — ALBUTEROL SULFATE 90 UG/1
1-2 AEROSOL, METERED RESPIRATORY (INHALATION)
Status: CANCELLED
Start: 2023-08-17

## 2023-07-20 RX ADMIN — GOLIMUMAB 250 MG: 50 SOLUTION INTRAVENOUS at 12:43

## 2023-07-20 ASSESSMENT — REFRACTION_WEARINGRX
OD_HPRISM: 2.0
OS_SPHERE: -2.00
OD_SPHERE: -2.00
OD_AXIS: 160
OS_CYLINDER: +0.50
OD_HBASE: OUT
OS_AXIS: 025
OD_ADD: +2.75
SPECS_TYPE: PAL
OS_ADD: +2.75
OD_CYLINDER: +2.00

## 2023-07-20 ASSESSMENT — REFRACTION_MANIFEST
OD_AXIS: 094
OD_CYLINDER: +0.25
OS_SPHERE: -2.50
OD_SPHERE: -2.00
METHOD_AUTOREFRACTION: 1
OS_CYLINDER: +0.75
OS_AXIS: 050
OS_ADD: +2.50
OD_ADD: +2.50

## 2023-07-20 ASSESSMENT — EXTERNAL EXAM - RIGHT EYE: OD_EXAM: NORMAL

## 2023-07-20 ASSESSMENT — CONF VISUAL FIELD
OD_SUPERIOR_TEMPORAL_RESTRICTION: 0
OD_SUPERIOR_NASAL_RESTRICTION: 0
OS_NORMAL: 1
OS_SUPERIOR_TEMPORAL_RESTRICTION: 0
OS_SUPERIOR_NASAL_RESTRICTION: 0
OD_INFERIOR_NASAL_RESTRICTION: 0
OS_INFERIOR_TEMPORAL_RESTRICTION: 0
OD_NORMAL: 1
OD_INFERIOR_TEMPORAL_RESTRICTION: 0
OS_INFERIOR_NASAL_RESTRICTION: 0

## 2023-07-20 ASSESSMENT — REFRACTION_FINALRX
OD_HPRISM: 2.0
OS_VPRISM: 1.0
OD_VPRISM: 1.0
OD_HBASE: OUT

## 2023-07-20 ASSESSMENT — TONOMETRY
OD_IOP_MMHG: 17
IOP_METHOD: TONOPEN
OS_IOP_MMHG: 17

## 2023-07-20 ASSESSMENT — SLIT LAMP EXAM - LIDS
COMMENTS: MEIBOMIAN GLAND DYSFUNCTION,
COMMENTS: MEIBOMIAN GLAND DYSFUNCTION

## 2023-07-20 ASSESSMENT — PAIN SCALES - GENERAL: PAINLEVEL: MILD PAIN (3)

## 2023-07-20 ASSESSMENT — KERATOMETRY
OD_AXISANGLE_DEGREES: 091
OD_K1POWER_DIOPTERS: 45.50
OS_AXISANGLE_DEGREES: 078
OS_K2POWER_DIOPTERS: 46.25
OS_AXISANGLE2_DEGREES: 168
OS_K1POWER_DIOPTERS: 45.00
OD_AXISANGLE2_DEGREES: 001
OD_K2POWER_DIOPTERS: 48.25

## 2023-07-20 ASSESSMENT — VISUAL ACUITY
OD_CC: 20/70-1
OS_SC: 20/125
OS_CC+: -1
OD_CC: 20/40
METHOD: SNELLEN - LINEAR
OS_CC: 20/30
OD_SC: 20/150
CORRECTION_TYPE: GLASSES
OS_CC: 20/40

## 2023-07-20 ASSESSMENT — EXTERNAL EXAM - LEFT EYE: OS_EXAM: NORMAL

## 2023-07-20 ASSESSMENT — CUP TO DISC RATIO
OS_RATIO: 0.3
OD_RATIO: 0.3

## 2023-07-20 NOTE — PROGRESS NOTES
Chief Complaint   Patient presents with     Annual Eye Exam     No dilation          Last Eye Exam: 2-  Dilated Previously: Yes, patient  declines today    What are you currently using to see?  glasses       Distance Vision Acuity: Noticed gradual change in both eyes and in right eye worse  Near Vision Acuity: Not satisfied,closes od eye to read     Eye Comfort: itchy  Do you use eye drops? : No  Occupation or Hobbies:  employee health     Some double vision with glasses in distance.  Has prism in her glasses currently.    History of ABMD- followed with the corneal specialist- Dr. Jamil at the [facility]    4/21/23: Superficial keratectomy of the left eye  -4/26/23: BCL in place, still with large irregular inferior epithelial defect though healing well superiorly and into visual axis   5/23/23: Epi fully intact left eye. Right eye superficial keratectomy  05/30/23 BCL removed right eye. Epi rough but intact, recently healed. Left eye epi dry but intact   Copied from last visit- 6/27/2023  Plan:   - Discontinue prednisolone  - Continue Systane as needed; continue pataday as needed  - Recommend updated refraction, patient wishes to go to her primary eye doctor (Dr Valderrama) - vision improved with pinhole    Tanja Do Optometric Assistant, A.B.O.C.          Medical, surgical and family histories reviewed and updated 7/20/2023.       OBJECTIVE: See Ophthalmology exam    ASSESSMENT:    ICD-10-CM    1. Examination of eyes and vision  Z01.00       2. Myopia of both eyes  H52.13       3. Regular astigmatism of both eyes  H52.223       4. Presbyopia  H52.4       5. Anterior basement membrane dystrophy of both eyes  H18.523       6. Nuclear age-related cataract, both eyes  H25.13       7. Allergic conjunctivitis of both eyes  H10.13           PLAN:     Patient Instructions   Eyeglass prescription given.  Vision will still be hazy due to corneal irregularity.  There is a change in the prism.  Give the glasses 1-2 weeks  to adjust to the new prescription.  You may get headaches or eyestrain as your eyes get used to the new prescription.  Sometimes the symptoms get worse before it gets better.  If any problems after 1-2 weeks schedule an appointment for a recheck.      Work space glasses are an option.    Scleral contact lenses would most likely give better visual acuity.  Schedule with Dr. Bishop if interested.    You have the start of mild cataracts.  You may notice some blurred vision or glare with night driving.  It is important that you wear good sunglasses to protect your eyes from the ultraviolet light from the sun.  I recommend that you return in 1 year for an eye exam unless there are any sudden changes in your vision.       OTC Pataday or Lastacaft to be used once daily for itchy eyes.  Use as needed.     Return to see me as needed.    Recommend annual eye exams.    Robles Valderrama, OD

## 2023-07-20 NOTE — PATIENT INSTRUCTIONS
Eyeglass prescription given.  Vision will still be hazy due to corneal irregularity.  There is a change in the prism.  Give the glasses 1-2 weeks to adjust to the new prescription.  You may get headaches or eyestrain as your eyes get used to the new prescription.  Sometimes the symptoms get worse before it gets better.  If any problems after 1-2 weeks schedule an appointment for a recheck.      Work space glasses are an option.    Scleral contact lenses would most likely give better visual acuity.  Schedule with Dr. Bishop if interested.    You have the start of mild cataracts.  You may notice some blurred vision or glare with night driving.  It is important that you wear good sunglasses to protect your eyes from the ultraviolet light from the sun.  I recommend that you return in 1 year for an eye exam unless there are any sudden changes in your vision.       OTC Pataday or Lastacaft to be used once daily for itchy eyes.  Use as needed.     Return to see me as needed.    Recommend annual eye exams.    Robles Valderrama OD      Optometry Providers       Clinic Locations                                 Telephone Number   Dr. Theresa Whiting   [facility] [phone]     [facility] Hours:                [facility] Hours:       [facility] Hours:   [address]   [address]     [address] [place] [address]   [place] [address]    [place] [address]  Phone: [phone]                    Phone: [phone]     Phone: [phone]                      Monday 8:00-6:00                          Monday 8:00-6:00                          Monday 8:00-6:00              Tuesday 8:00-6:00                          Tuesday 8:00-6:00                          Tuesday 8:00-6:00              Wednesday 8:00-6:00                   Wednesday 8:00-6:00                   Wednesday 8:00-6:00      Thursday 8:00-6:00                        Thursday 8:00-6:00                         Thursday 8:00-6:00            Friday 8:00-5:00                              Friday 8:00-5:00                              Friday 8:00-5:00    [facility] Hours:   [address] [place] [address]  [phone]    Monday 9:00-6:00  Tuesday 9:00-6:00  Wednesday 9:00-6:00  Thursday 9:00-6:00  Friday 9:00-5:00  As always, Thank you for trusting us with your health care needs!

## 2023-07-20 NOTE — PROGRESS NOTES
Infusion Nursing Note:  Sharon Fung presents today for first dose of simponi.    Patient seen by provider today: No   present during visit today: Not Applicable.    Note: Reviewed simponi and written information given to patient.      Intravenous Access:  Peripheral IV placed.    Treatment Conditions:  Biological Infusion Checklist:  ~~~ NOTE: If the patient answers yes to any of the questions below, hold the infusion and contact ordering provider or on-call provider.    1. Have you recently had an elevated temperature, fever, chills, productive cough, coughing for 3 weeks or longer or hemoptysis,  abnormal vital signs, night sweats,  chest pain or have you noticed a decrease in your appetite, unexplained weight loss or fatigue? No  2. Do you have any open wounds or new incisions? No  3. Do you have any upcoming hospitalizations or surgeries? Does not include esophagogastroduodenoscopy, colonoscopy, endoscopic retrograde cholangiopancreatography (ERCP), endoscopic ultrasound (EUS), dental procedures or joint aspiration/steroid injections No  4. Do you currently have any signs of illness or infection or are you on any antibiotics? No  5. Have you had any new, sudden or worsening abdominal pain? No  6. Have you or anyone in your household received a live vaccination in the past 4 weeks? Please note: No live vaccines while on biologic/chemotherapy until 6 months after the last treatment. Patient can receive the flu vaccine (shot only), pneumovax and the Covid vaccine. It is optimal for the patient to get these vaccines mid cycle, but they can be given at any time as long as it is not on the day of the infusion. No  7. Have you recently been diagnosed with any new nervous system diseases (ie. Multiple sclerosis, Guillain [place], seizures, neurological changes) or cancer diagnosis? Are you on any form of radiation or chemotherapy? No  8. Are you pregnant or breast feeding or do you have plans of pregnancy  in the future? No  9. Have you been having any signs of worsening depression or suicidal ideations?  (benlysta only) No  10. Have there been any other new onset medical symptoms? No  11. Have you had any new blood clots? (IVIG only) No        Post Infusion Assessment:  Patient tolerated infusion without incident.  Blood return noted pre and post infusion.  Site patent and intact, free from redness, edema or discomfort.  No evidence of extravasations.  Access discontinued per protocol.       Discharge Plan:   Patient discharged in stable condition accompanied by: self.  Departure Mode: Ambulatory.  Will return to clinic in 4 weeks.    Amber Howard RN

## 2023-07-20 NOTE — LETTER
7/20/2023         RE: Sharon Fung  [address] [address]        Dear Colleague,    Thank you for referring your patient, Sharon Fung, to the [facility]. Please see a copy of my visit note below.    Chief Complaint   Patient presents with     Annual Eye Exam     No dilation          Last Eye Exam: 2-  Dilated Previously: Yes, patient  declines today    What are you currently using to see?  glasses       Distance Vision Acuity: Noticed gradual change in both eyes and in right eye worse  Near Vision Acuity: Not satisfied,closes od eye to read     Eye Comfort: itchy  Do you use eye drops? : No  Occupation or Hobbies:  employee health     Some double vision with glasses in distance.  Has prism in her glasses currently.    History of ABMD- followed with the corneal specialist- Dr. Jamil at the [facility]    4/21/23: Superficial keratectomy of the left eye  -4/26/23: BCL in place, still with large irregular inferior epithelial defect though healing well superiorly and into visual axis   5/23/23: Epi fully intact left eye. Right eye superficial keratectomy  05/30/23 BCL removed right eye. Epi rough but intact, recently healed. Left eye epi dry but intact   Copied from last visit- 6/27/2023  Plan:   - Discontinue prednisolone  - Continue Systane as needed; continue pataday as needed  - Recommend updated refraction, patient wishes to go to her primary eye doctor (Dr Vadlerrama) - vision improved with pinhole    Tanja Do Optometric Assistant, A.B.O.C.          Medical, surgical and family histories reviewed and updated 7/20/2023.       OBJECTIVE: See Ophthalmology exam    ASSESSMENT:    ICD-10-CM    1. Examination of eyes and vision  Z01.00       2. Myopia of both eyes  H52.13       3. Regular astigmatism of both eyes  H52.223       4. Presbyopia  H52.4       5. Anterior basement membrane dystrophy of both eyes  H18.523       6. Nuclear age-related  cataract, both eyes  H25.13       7. Allergic conjunctivitis of both eyes  H10.13           PLAN:     Patient Instructions   Eyeglass prescription given.  Vision will still be hazy due to corneal irregularity.  There is a change in the prism.  Give the glasses 1-2 weeks to adjust to the new prescription.  You may get headaches or eyestrain as your eyes get used to the new prescription.  Sometimes the symptoms get worse before it gets better.  If any problems after 1-2 weeks schedule an appointment for a recheck.      Work space glasses are an option.    Scleral contact lenses would most likely give better visual acuity.  Schedule with Dr. Bishop if interested.    You have the start of mild cataracts.  You may notice some blurred vision or glare with night driving.  It is important that you wear good sunglasses to protect your eyes from the ultraviolet light from the sun.  I recommend that you return in 1 year for an eye exam unless there are any sudden changes in your vision.       OTC Pataday or Lastacaft to be used once daily for itchy eyes.  Use as needed.     Return to see me as needed.    Recommend annual eye exams.    Robles Valdrerama, OD           Again, thank you for allowing me to participate in the care of your patient.        Sincerely,        Robles Valderrama, OD

## 2023-07-22 DIAGNOSIS — F32.0 MILD MAJOR DEPRESSION (H): ICD-10-CM

## 2023-07-24 RX ORDER — CITALOPRAM HYDROBROMIDE 20 MG/1
TABLET ORAL
Qty: 90 TABLET | Refills: 1 | Status: SHIPPED | OUTPATIENT
Start: 2023-07-24 | End: 2023-10-13

## 2023-08-17 ENCOUNTER — INFUSION THERAPY VISIT (OUTPATIENT)
Dept: INFUSION THERAPY | Facility: CLINIC | Age: 68
End: 2023-08-17
Attending: INTERNAL MEDICINE
Payer: COMMERCIAL

## 2023-08-17 VITALS
RESPIRATION RATE: 16 BRPM | SYSTOLIC BLOOD PRESSURE: 133 MMHG | DIASTOLIC BLOOD PRESSURE: 83 MMHG | OXYGEN SATURATION: 98 % | HEART RATE: 76 BPM

## 2023-08-17 DIAGNOSIS — M05.9 SEROPOSITIVE RHEUMATOID ARTHRITIS (H): Primary | ICD-10-CM

## 2023-08-17 PROCEDURE — 96365 THER/PROPH/DIAG IV INF INIT: CPT

## 2023-08-17 PROCEDURE — 258N000003 HC RX IP 258 OP 636: Performed by: INTERNAL MEDICINE

## 2023-08-17 PROCEDURE — 250N000011 HC RX IP 250 OP 636: Mod: JZ | Performed by: INTERNAL MEDICINE

## 2023-08-17 RX ORDER — HEPARIN SODIUM (PORCINE) LOCK FLUSH IV SOLN 100 UNIT/ML 100 UNIT/ML
5 SOLUTION INTRAVENOUS
Status: CANCELLED | OUTPATIENT
Start: 2023-09-14

## 2023-08-17 RX ORDER — MEPERIDINE HYDROCHLORIDE 25 MG/ML
25 INJECTION INTRAMUSCULAR; INTRAVENOUS; SUBCUTANEOUS EVERY 30 MIN PRN
Status: CANCELLED | OUTPATIENT
Start: 2023-09-14

## 2023-08-17 RX ORDER — DIPHENHYDRAMINE HYDROCHLORIDE 50 MG/ML
50 INJECTION INTRAMUSCULAR; INTRAVENOUS
Status: CANCELLED
Start: 2023-09-14

## 2023-08-17 RX ORDER — ALBUTEROL SULFATE 90 UG/1
1-2 AEROSOL, METERED RESPIRATORY (INHALATION)
Status: CANCELLED
Start: 2023-09-14

## 2023-08-17 RX ORDER — ALBUTEROL SULFATE 0.83 MG/ML
2.5 SOLUTION RESPIRATORY (INHALATION)
Status: CANCELLED | OUTPATIENT
Start: 2023-09-14

## 2023-08-17 RX ORDER — EPINEPHRINE 1 MG/ML
0.3 INJECTION, SOLUTION, CONCENTRATE INTRAVENOUS EVERY 5 MIN PRN
Status: CANCELLED | OUTPATIENT
Start: 2023-09-14

## 2023-08-17 RX ORDER — METHYLPREDNISOLONE SODIUM SUCCINATE 125 MG/2ML
125 INJECTION, POWDER, LYOPHILIZED, FOR SOLUTION INTRAMUSCULAR; INTRAVENOUS
Status: CANCELLED
Start: 2023-09-14

## 2023-08-17 RX ORDER — HEPARIN SODIUM,PORCINE 10 UNIT/ML
5 VIAL (ML) INTRAVENOUS
Status: CANCELLED | OUTPATIENT
Start: 2023-09-14

## 2023-08-17 RX ADMIN — GOLIMUMAB 250 MG: 50 SOLUTION INTRAVENOUS at 09:54

## 2023-08-17 ASSESSMENT — PAIN SCALES - GENERAL: PAINLEVEL: NO PAIN (0)

## 2023-08-17 NOTE — PROGRESS NOTES
Infusion Nursing Note:  Sharon Burtonjessica presents today for Simponi Aria infusion.    Patient seen by provider today: No   present during visit today: Not Applicable.    Note: Patient states that she has been doing well. Does not have concerns after first infusion.      Intravenous Access:  Peripheral IV placed.    Treatment Conditions:  Biological Infusion Checklist:  ~~~ NOTE: If the patient answers yes to any of the questions below, hold the infusion and contact ordering provider or on-call provider.    Have you recently had an elevated temperature, fever, chills, productive cough, coughing for 3 weeks or longer or hemoptysis,  abnormal vital signs, night sweats,  chest pain or have you noticed a decrease in your appetite, unexplained weight loss or fatigue? No  Do you have any open wounds or new incisions? No  Do you have any upcoming hospitalizations or surgeries? Does not include esophagogastroduodenoscopy, colonoscopy, endoscopic retrograde cholangiopancreatography (ERCP), endoscopic ultrasound (EUS), dental procedures or joint aspiration/steroid injections No  Do you currently have any signs of illness or infection or are you on any antibiotics? No  Have you had any new, sudden or worsening abdominal pain? No  Have you or anyone in your household received a live vaccination in the past 4 weeks? Please note: No live vaccines while on biologic/chemotherapy until 6 months after the last treatment. Patient can receive the flu vaccine (shot only), pneumovax and the Covid vaccine. It is optimal for the patient to get these vaccines mid cycle, but they can be given at any time as long as it is not on the day of the infusion. No  Have you recently been diagnosed with any new nervous system diseases (ie. Multiple sclerosis, Guillain [place], seizures, neurological changes) or cancer diagnosis? Are you on any form of radiation or chemotherapy? No  Are you pregnant or breast feeding or do you have plans of  pregnancy in the future? No  Have you been having any signs of worsening depression or suicidal ideations?  (benlysta only) No  Have there been any other new onset medical symptoms? No  Have you had any new blood clots? (IVIG only) No      Post Infusion Assessment:  Patient tolerated infusion without incident.  Blood return noted pre and post infusion.  Site patent and intact, free from redness, edema or discomfort.  No evidence of extravasations.  Access discontinued per protocol.       Discharge Plan:   Discharge instructions reviewed with: Patient.  Patient and/or family verbalized understanding of discharge instructions and all questions answered.  Patient discharged in stable condition accompanied by: self.  Departure Mode: Ambulatory.      Raven Okeefe RN

## 2023-08-26 ENCOUNTER — HEALTH MAINTENANCE LETTER (OUTPATIENT)
Age: 68
End: 2023-08-26

## 2023-09-17 NOTE — ANESTHESIA POSTPROCEDURE EVALUATION
Patient: Sharon Fung    Procedure: Procedure(s):  right revision great toe fusion       Diagnosis:Failure of joint fusion (H) [T84.9XXA]  Diagnosis Additional Information: No value filed.    Anesthesia Type:  General    Note:     Postop Pain Control: Uneventful            Sign Out: Well controlled pain   PONV: No   Neuro/Psych: Uneventful            Sign Out: Acceptable/Baseline neuro status   Airway/Respiratory: Uneventful            Sign Out: Acceptable/Baseline resp. status   CV/Hemodynamics: Uneventful            Sign Out: Acceptable CV status   Other NRE: NONE   DID A NON-ROUTINE EVENT OCCUR? No           Last vitals:  Vitals Value Taken Time   /70 12/27/21 0915   Temp 36  C (96.8  F) 12/27/21 0853   Pulse 92 12/27/21 0921   Resp 15 12/27/21 0921   SpO2 93 % 12/27/21 0921   Vitals shown include unvalidated device data.    Electronically Signed By: Catalino Howard MD  December 27, 2021  9:22 AM  
difficulty breathing

## 2023-09-21 ENCOUNTER — NURSE TRIAGE (OUTPATIENT)
Dept: FAMILY MEDICINE | Facility: CLINIC | Age: 68
End: 2023-09-21
Payer: COMMERCIAL

## 2023-09-21 NOTE — TELEPHONE ENCOUNTER
Received call from patient.   She is not sure if she has covid-19.  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 The date that she was working giving the shots was Tuesday.   After coming home she started experiencing symptoms of headache, body aches, explosive diarrhea and fever. Her fever yesterday was as high as 101.0 yesterday. It first started Tuesday night.   She is not getting any better.   She did an at home covid-19 test and it was negative, but it was one of the tests from the hospital that they advised to not be used due to not containing enough fluid in the vials for the swabs.   No cough.  She is concerned about dehydration  She declines any SOB, chest pain, confusion, blue lips or face.   Writer gave home care advise for patient's current symptoms. Notified patient of emergency symptoms requiring ED. Advised another covid-19 OTC test since there was concern that the last one she did is not accurate or effective any longer. Notified patient that another option would be an e-visit if wanting to do a PCR test, or if covid-19 test is negative and she would like to consider flu testing.   Discussed options for virtual care (telephone or video) visit if test negative and/or symptoms not improving. Notified patient that PCP does not have any appointment openings today, but we would be able to find a visit with another primary care physician if needed.   Reason for Disposition   COVID-19 infection suspected by caller or triager and mild symptoms (cough, fever, or others) and has not gotten tested yet    Additional Information   Negative: Severe difficulty breathing (struggling for each breath, unable to speak or cry, making grunting noises with each breath, severe retractions) (Triage tip: Listen to the child's breathing.)   Negative: Slow, shallow, weak breathing   Negative: SEVERE difficulty breathing (e.g., struggling for each breath, speaks in single words)   Negative: Difficult to  awaken or acting confused (e.g., disoriented, slurred speech)   Negative: Bluish (or gray) lips or face now   Negative: Shock suspected (e.g., cold/pale/clammy skin, too weak to stand, low BP, rapid pulse)   Negative: Sounds like a life-threatening emergency to the triager   Negative: Diagnosed or suspected COVID-19 and symptoms lasting 3 or more weeks   Negative: COVID-19 exposure and no symptoms   Negative: COVID-19 vaccine reaction suspected (e.g., fever, headache, muscle aches) occurring 1 to 3 days after getting vaccine   Negative: COVID-19 vaccine, questions about   Negative: Lives with someone known to have influenza (flu test positive) and flu-like symptoms (e.g., cough, runny nose, sore throat, SOB; with or without fever)   Negative: Possible COVID-19 symptoms and triager concerned about severity of symptoms or other causes   Negative: COVID-19 and breastfeeding, questions about   Negative: SEVERE or constant chest pain or pressure  (Exception: Mild central chest pain, present only when coughing.)   Negative: MODERATE difficulty breathing (e.g., speaks in phrases, SOB even at rest, pulse 100-120)   Negative: Headache and stiff neck (can't touch chin to chest)   Negative: Oxygen level (e.g., pulse oximetry) 90% or lower   Negative: Chest pain or pressure  (Exception: MILD central chest pain, present only when coughing.)   Negative: Drinking very little and dehydration suspected (e.g., no urine > 12 hours, very dry mouth, very lightheaded)   Negative: Patient sounds very sick or weak to the triager   Negative: MILD difficulty breathing (e.g., minimal/no SOB at rest, SOB with walking, pulse <100)   Negative: Fever > 103 F (39.4 C)   Negative: Fever > 101 F (38.3 C) and over 60 years of age   Negative: Fever > 100.0 F (37.8 C) and bedridden (e.g., CVA, chronic illness, recovering from surgery)   Negative: HIGH RISK patient (e.g., weak immune system, age > 64 years, obesity with BMI of 30 or higher, pregnant,  chronic lung disease or other chronic medical condition) and COVID symptoms (e.g., cough, fever)  (Exceptions: Already seen by doctor or NP/PA and no new or worsening symptoms.)   Negative: HIGH RISK patient and influenza is widespread in the community and ONE OR MORE respiratory symptoms: cough, sore throat, runny or stuffy nose   Negative: HIGH RISK patient and influenza exposure within the last 7 days and ONE OR MORE respiratory symptoms: cough, sore throat, runny or stuffy nose   Negative: Oxygen level (e.g., pulse oximetry) 91 to 94%   Negative: COVID-19 infection suspected by caller or triager and mild symptoms (cough, fever, or others) and negative COVID-19 rapid test   Negative: Fever present > 3 days (72 hours)   Negative: Fever returns after gone for over 24 hours and symptoms worse or not improved   Negative: Continuous (nonstop) coughing interferes with work or school and no improvement using cough treatment per Care Advice   Negative: Cough present > 3 weeks   Negative: COVID-19 diagnosed by positive lab test (e.g., PCR, rapid self-test kit) and NO symptoms (e.g., cough, fever, others)   Negative: COVID-19 diagnosed by positive lab test (e.g., PCR, rapid self-test kit) and mild symptoms (e.g., cough, fever, others) and no complications or SOB   Negative: COVID-19 diagnosed by doctor (or NP/PA) and mild symptoms (e.g., cough, fever, others) and no complications or SOB   Negative: COVID-19 diagnosed and has mild nausea, vomiting or diarrhea    Protocols used: Coronavirus (COVID-19) Diagnosed or [id number], Coronavirus (COVID-19) Diagnosed or [id number]    Jocelyn cA RN   [facility]

## 2023-10-12 ENCOUNTER — LAB (OUTPATIENT)
Dept: LAB | Facility: CLINIC | Age: 68
End: 2023-10-12
Attending: INTERNAL MEDICINE
Payer: COMMERCIAL

## 2023-10-12 ENCOUNTER — INFUSION THERAPY VISIT (OUTPATIENT)
Dept: INFUSION THERAPY | Facility: CLINIC | Age: 68
End: 2023-10-12
Attending: INTERNAL MEDICINE
Payer: COMMERCIAL

## 2023-10-12 VITALS
RESPIRATION RATE: 18 BRPM | DIASTOLIC BLOOD PRESSURE: 73 MMHG | TEMPERATURE: 98.3 F | SYSTOLIC BLOOD PRESSURE: 139 MMHG | HEART RATE: 75 BPM

## 2023-10-12 DIAGNOSIS — M05.9 SEROPOSITIVE RHEUMATOID ARTHRITIS (H): Primary | ICD-10-CM

## 2023-10-12 DIAGNOSIS — Z79.899 HIGH RISK MEDICATION USE: ICD-10-CM

## 2023-10-12 DIAGNOSIS — M05.9 SEROPOSITIVE RHEUMATOID ARTHRITIS (H): ICD-10-CM

## 2023-10-12 LAB
ALBUMIN SERPL BCG-MCNC: 4.2 G/DL (ref 3.5–5.2)
ALP SERPL-CCNC: 106 U/L (ref 35–104)
ALT SERPL W P-5'-P-CCNC: 24 U/L (ref 0–50)
AST SERPL W P-5'-P-CCNC: 27 U/L (ref 0–45)
BASO+EOS+MONOS # BLD AUTO: NORMAL 10*3/UL
BASO+EOS+MONOS NFR BLD AUTO: NORMAL %
BASOPHILS # BLD AUTO: 0.1 10E3/UL (ref 0–0.2)
BASOPHILS NFR BLD AUTO: 1 %
BILIRUB DIRECT SERPL-MCNC: <0.2 MG/DL (ref 0–0.3)
BILIRUB SERPL-MCNC: 0.4 MG/DL
CREAT SERPL-MCNC: 0.66 MG/DL (ref 0.51–0.95)
CRP SERPL-MCNC: 3.1 MG/L
EGFRCR SERPLBLD CKD-EPI 2021: >90 ML/MIN/1.73M2
EOSINOPHIL # BLD AUTO: 0.6 10E3/UL (ref 0–0.7)
EOSINOPHIL NFR BLD AUTO: 8 %
ERYTHROCYTE [DISTWIDTH] IN BLOOD BY AUTOMATED COUNT: 13.1 % (ref 10–15)
ERYTHROCYTE [SEDIMENTATION RATE] IN BLOOD BY WESTERGREN METHOD: 15 MM/HR (ref 0–30)
HCT VFR BLD AUTO: 41.4 % (ref 35–47)
HGB BLD-MCNC: 13.4 G/DL (ref 11.7–15.7)
IMM GRANULOCYTES # BLD: 0 10E3/UL
IMM GRANULOCYTES NFR BLD: 0 %
LYMPHOCYTES # BLD AUTO: 2.2 10E3/UL (ref 0.8–5.3)
LYMPHOCYTES NFR BLD AUTO: 31 %
MCH RBC QN AUTO: 30.1 PG (ref 26.5–33)
MCHC RBC AUTO-ENTMCNC: 32.4 G/DL (ref 31.5–36.5)
MCV RBC AUTO: 93 FL (ref 78–100)
MONOCYTES # BLD AUTO: 1 10E3/UL (ref 0–1.3)
MONOCYTES NFR BLD AUTO: 14 %
NEUTROPHILS # BLD AUTO: 3.2 10E3/UL (ref 1.6–8.3)
NEUTROPHILS NFR BLD AUTO: 46 %
NRBC # BLD AUTO: 0 10E3/UL
NRBC BLD AUTO-RTO: 0 /100
PLATELET # BLD AUTO: 281 10E3/UL (ref 150–450)
PROT SERPL-MCNC: 7.1 G/DL (ref 6.4–8.3)
RBC # BLD AUTO: 4.45 10E6/UL (ref 3.8–5.2)
WBC # BLD AUTO: 7 10E3/UL (ref 4–11)

## 2023-10-12 PROCEDURE — 85025 COMPLETE CBC W/AUTO DIFF WBC: CPT

## 2023-10-12 PROCEDURE — 36415 COLL VENOUS BLD VENIPUNCTURE: CPT

## 2023-10-12 PROCEDURE — 250N000011 HC RX IP 250 OP 636: Mod: JZ | Performed by: INTERNAL MEDICINE

## 2023-10-12 PROCEDURE — 96365 THER/PROPH/DIAG IV INF INIT: CPT

## 2023-10-12 PROCEDURE — 85652 RBC SED RATE AUTOMATED: CPT

## 2023-10-12 PROCEDURE — 86140 C-REACTIVE PROTEIN: CPT

## 2023-10-12 PROCEDURE — 80076 HEPATIC FUNCTION PANEL: CPT

## 2023-10-12 PROCEDURE — 258N000003 HC RX IP 258 OP 636: Performed by: INTERNAL MEDICINE

## 2023-10-12 PROCEDURE — 82565 ASSAY OF CREATININE: CPT

## 2023-10-12 RX ORDER — METHYLPREDNISOLONE SODIUM SUCCINATE 125 MG/2ML
125 INJECTION, POWDER, LYOPHILIZED, FOR SOLUTION INTRAMUSCULAR; INTRAVENOUS
Status: CANCELLED
Start: 2023-11-09

## 2023-10-12 RX ORDER — EPINEPHRINE 1 MG/ML
0.3 INJECTION, SOLUTION, CONCENTRATE INTRAVENOUS EVERY 5 MIN PRN
Status: CANCELLED | OUTPATIENT
Start: 2023-11-09

## 2023-10-12 RX ORDER — ALBUTEROL SULFATE 90 UG/1
1-2 AEROSOL, METERED RESPIRATORY (INHALATION)
Status: CANCELLED
Start: 2023-11-09

## 2023-10-12 RX ORDER — DIPHENHYDRAMINE HYDROCHLORIDE 50 MG/ML
50 INJECTION INTRAMUSCULAR; INTRAVENOUS
Status: CANCELLED
Start: 2023-11-09

## 2023-10-12 RX ORDER — ALBUTEROL SULFATE 0.83 MG/ML
2.5 SOLUTION RESPIRATORY (INHALATION)
Status: CANCELLED | OUTPATIENT
Start: 2023-11-09

## 2023-10-12 RX ORDER — HEPARIN SODIUM (PORCINE) LOCK FLUSH IV SOLN 100 UNIT/ML 100 UNIT/ML
5 SOLUTION INTRAVENOUS
Status: CANCELLED | OUTPATIENT
Start: 2023-11-09

## 2023-10-12 RX ORDER — HEPARIN SODIUM,PORCINE 10 UNIT/ML
5 VIAL (ML) INTRAVENOUS
Status: CANCELLED | OUTPATIENT
Start: 2023-11-09

## 2023-10-12 RX ORDER — MEPERIDINE HYDROCHLORIDE 25 MG/ML
25 INJECTION INTRAMUSCULAR; INTRAVENOUS; SUBCUTANEOUS EVERY 30 MIN PRN
Status: CANCELLED | OUTPATIENT
Start: 2023-11-09

## 2023-10-12 RX ADMIN — GOLIMUMAB 250 MG: 50 SOLUTION INTRAVENOUS at 09:23

## 2023-10-12 ASSESSMENT — PATIENT HEALTH QUESTIONNAIRE - PHQ9
10. IF YOU CHECKED OFF ANY PROBLEMS, HOW DIFFICULT HAVE THESE PROBLEMS MADE IT FOR YOU TO DO YOUR WORK, TAKE CARE OF THINGS AT HOME, OR GET ALONG WITH OTHER PEOPLE: NOT DIFFICULT AT ALL
SUM OF ALL RESPONSES TO PHQ QUESTIONS 1-9: 1
SUM OF ALL RESPONSES TO PHQ QUESTIONS 1-9: 1

## 2023-10-12 NOTE — PROGRESS NOTES
Infusion Nursing Note:  Sharon Fung presents today for Simponi Aria infusion.    Patient seen by provider today: No   present during visit today: Not Applicable.    Note: Patient did not pass biological checklist, see below. Paged provider, received a phone call back with OK to proceed with infusion.      Intravenous Access:  Peripheral IV placed.    Treatment Conditions:  Biological Infusion Checklist:  ~~~ NOTE: If the patient answers yes to any of the questions below, hold the infusion and contact ordering provider or on-call provider.    Have you recently had an elevated temperature, fever, chills, productive cough, coughing for 3 weeks or longer or hemoptysis,  abnormal vital signs, night sweats,  chest pain or have you noticed a decrease in your appetite, unexplained weight loss or fatigue? No  Do you have any open wounds or new incisions? No  Do you have any upcoming hospitalizations or surgeries? Does not include esophagogastroduodenoscopy, colonoscopy, endoscopic retrograde cholangiopancreatography (ERCP), endoscopic ultrasound (EUS), dental procedures or joint aspiration/steroid injections No  Do you currently have any signs of illness or infection or are you on any antibiotics? Recently finished 14 day course of doxycycline on Tuesday 10/10. Paged provider, received OK to proceed with infusion.  Have you had any new, sudden or worsening abdominal pain? No  Have you or anyone in your household received a live vaccination in the past 4 weeks? Please note: No live vaccines while on biologic/chemotherapy until 6 months after the last treatment. Patient can receive the flu vaccine (shot only), pneumovax and the Covid vaccine. It is optimal for the patient to get these vaccines mid cycle, but they can be given at any time as long as it is not on the day of the infusion. No  Have you recently been diagnosed with any new nervous system diseases (ie. Multiple sclerosis, Guillain [place], seizures,  neurological changes) or cancer diagnosis? Are you on any form of radiation or chemotherapy? No  Are you pregnant or breast feeding or do you have plans of pregnancy in the future? No  Have you been having any signs of worsening depression or suicidal ideations?  (benlysta only) No  Have there been any other new onset medical symptoms? No  Have you had any new blood clots? (IVIG only) No      Post Infusion Assessment:  Patient tolerated infusion without incident.  Blood return noted pre and post infusion.  Site patent and intact, free from redness, edema or discomfort.  No evidence of extravasations.  Access discontinued per protocol.       Discharge Plan:   Discharge instructions reviewed with: Patient.  Patient and/or family verbalized understanding of discharge instructions and all questions answered.  Patient discharged in stable condition accompanied by: self.  Departure Mode: Ambulatory.  RTC in 2 months for next dose.      Raven Okeefe RN

## 2023-10-13 ENCOUNTER — TELEPHONE (OUTPATIENT)
Dept: FAMILY MEDICINE | Facility: CLINIC | Age: 68
End: 2023-10-13

## 2023-10-13 ENCOUNTER — OFFICE VISIT (OUTPATIENT)
Dept: FAMILY MEDICINE | Facility: CLINIC | Age: 68
End: 2023-10-13
Payer: COMMERCIAL

## 2023-10-13 VITALS
WEIGHT: 266 LBS | OXYGEN SATURATION: 96 % | DIASTOLIC BLOOD PRESSURE: 67 MMHG | HEART RATE: 81 BPM | BODY MASS INDEX: 45.41 KG/M2 | HEIGHT: 64 IN | SYSTOLIC BLOOD PRESSURE: 123 MMHG | TEMPERATURE: 97.9 F

## 2023-10-13 DIAGNOSIS — E66.01 MORBID OBESITY WITH BMI OF 45.0-49.9, ADULT (H): ICD-10-CM

## 2023-10-13 DIAGNOSIS — L98.8: ICD-10-CM

## 2023-10-13 DIAGNOSIS — I10 HYPERTENSION GOAL BP (BLOOD PRESSURE) < 140/90: ICD-10-CM

## 2023-10-13 DIAGNOSIS — G89.29 CHRONIC LEFT-SIDED LOW BACK PAIN WITHOUT SCIATICA: Primary | ICD-10-CM

## 2023-10-13 DIAGNOSIS — F33.0 MILD RECURRENT MAJOR DEPRESSION (H): ICD-10-CM

## 2023-10-13 DIAGNOSIS — D84.9 IMMUNOSUPPRESSION (H): ICD-10-CM

## 2023-10-13 DIAGNOSIS — M54.50 CHRONIC LEFT-SIDED LOW BACK PAIN WITHOUT SCIATICA: Primary | ICD-10-CM

## 2023-10-13 DIAGNOSIS — M05.9 SEROPOSITIVE RHEUMATOID ARTHRITIS (H): ICD-10-CM

## 2023-10-13 DIAGNOSIS — Z23 HIGH PRIORITY FOR 2019-NCOV VACCINE: ICD-10-CM

## 2023-10-13 PROCEDURE — 90480 ADMN SARSCOV2 VAC 1/ONLY CMP: CPT | Performed by: FAMILY MEDICINE

## 2023-10-13 PROCEDURE — 91320 SARSCV2 VAC 30MCG TRS-SUC IM: CPT | Performed by: FAMILY MEDICINE

## 2023-10-13 PROCEDURE — 99214 OFFICE O/P EST MOD 30 MIN: CPT | Performed by: FAMILY MEDICINE

## 2023-10-13 RX ORDER — CITALOPRAM HYDROBROMIDE 20 MG/1
20 TABLET ORAL DAILY
Qty: 90 TABLET | Refills: 1 | Status: SHIPPED | OUTPATIENT
Start: 2023-10-13 | End: 2024-04-15

## 2023-10-13 RX ORDER — GABAPENTIN 100 MG/1
CAPSULE ORAL
Qty: 180 CAPSULE | Refills: 5 | Status: SHIPPED | OUTPATIENT
Start: 2023-10-13 | End: 2024-04-15

## 2023-10-13 RX ORDER — LOSARTAN POTASSIUM AND HYDROCHLOROTHIAZIDE 25; 100 MG/1; MG/1
1 TABLET ORAL DAILY
Qty: 90 TABLET | Refills: 0 | Status: SHIPPED | OUTPATIENT
Start: 2023-10-13 | End: 2024-04-15

## 2023-10-13 ASSESSMENT — PAIN SCALES - GENERAL: PAINLEVEL: NO PAIN (0)

## 2023-10-13 NOTE — PROGRESS NOTES
Assessment & Plan       ICD-10-CM    1. Chronic left-sided low back pain without sciatica  M54.50 Pain Management  Referral    G89.29 gabapentin (NEURONTIN) 100 MG capsule      2. Wrinkling of forehead skin  L98.8       3. Mild recurrent major depression (H24)  F33.0 citalopram (CELEXA) 20 MG tablet      4. Hypertension goal BP (blood pressure) < 140/90  I10 losartan-hydrochlorothiazide (HYZAAR) 100-25 MG tablet      5. Seropositive rheumatoid arthritis (H)  M05.9       6. Immunosuppression (H24)  D84.9       7. Morbid obesity with BMI of 45.0-49.9, adult (H)  E66.01     Z68.42       8. High priority for 2019-nCoV vaccine  Z23 COVID-19 12+ (2023-24) (PFIZER)        We will put in a referral to our pain management team for a lumbar injection again as per her request  I do not think the indentation of her upper forehead represents any significant pathology, so we will just follow that for now  We will continue her same baseline meds including the citalopram, gabapentin, and the losartan HCTZ that I am prescribing for her  We will give her a COVID-vaccine today  I advised her to schedule an RSV vaccine from her local pharmacy sometime in the upcoming weeks  Continue ongoing rheumatoid arthritis care with Dr. Guerra   Continue diet and exercise treatment for the obesity  I advised an annual wellness exam with me sometime in the upcoming months at her convenience        Reynaldo Saavedra MD  [facility]    Mukul Herrera is a 68 year old, presenting for the following health issues:  indentations on her head      10/13/2023     7:02 AM   Additional Questions   Roomed by cam   Accompanied by none         10/13/2023     7:02 AM   Patient Reported Additional Medications   Patient reports taking the following new medications none       History of Present Illness       Reason for visit:  Increased back pain need forehead indentation  Symptom onset:  3-4 weeks ago    She eats 0-1 servings of  "fruits and vegetables daily.She consumes 2 sweetened beverage(s) daily.She exercises with enough effort to increase her heart rate 10 to 19 minutes per day.  She exercises with enough effort to increase her heart rate 3 or less days per week.   She is taking medications regularly.     She has had chronic low back pain for many years, especially in the left low back.  She has had injections periodically which she has found helpful.  She would like to have another injection done.  Her most recent MRI scan was a year and a half ago for the lumbar spine.  She has also noticed an indentation of her upper forehead just below the hairline in the last few weeks.  She is not sure if that is anything significant or not.  She has no particular symptoms associated with it.    She remains on various baseline medications as below.  She sees Dr. Guerra of rheumatology for her RA and receives periodic injections for that.  She takes citalopram for depression and feels like it is working fine.  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  She is on gabapentin for her chronic back pain and losartan HCTZ for hypertension.  She has had a flu shot already, but not the latest COVID-vaccine or an RSV vaccine.    Patient Active Problem List   Diagnosis    AR (allergic rhinitis)    GERD (gastroesophageal reflux disease)    Status post bariatric surgery    Advanced directives, counseling/discussion    High risk medication use    Obstructive sleep apnea    Obesity, Class III, BMI 40-49.9 (morbid obesity) (H)    Anterior basement membrane dystrophy    Seropositive rheumatoid arthritis (H)    LORNA positive    Carpal tunnel syndrome of right wrist    Headache    Immunosuppression (H24)    Fibromyalgia    Pain in joint, ankle and foot, right    Hypertension goal BP (blood pressure) < 140/90    Chronic back pain, unspecified back location, unspecified back pain " "laterality    Hyperlipidemia LDL goal <100    Mild recurrent major depression (H24)     Current Outpatient Medications   Medication    citalopram (CELEXA) 20 MG tablet    gabapentin (NEURONTIN) 100 MG capsule    losartan-hydrochlorothiazide (HYZAAR) 100-25 MG tablet    acetaminophen (TYLENOL) 500 MG tablet    albuterol (PROAIR HFA/PROVENTIL HFA/VENTOLIN HFA) 108 (90 Base) MCG/ACT inhaler    fexofenadine (ALLEGRA) 180 MG tablet    fluticasone (FLONASE) 50 MCG/ACT nasal spray    ibuprofen 200 MG capsule    leflunomide (ARAVA) 20 MG tablet    ORDER FOR DME    predniSONE (DELTASONE) 5 MG tablet    sulfaSALAzine (AZULFIDINE) 500 MG tablet    vitamin D3 (CHOLECALCIFEROL) 50 mcg (2000 units) tablet     Current Facility-Administered Medications   Medication    alcohol (dehydrated) (ABLYSINOL) 99 % injection 15 mL     Facility-Administered Medications Ordered in Other Visits   Medication    sodium chloride (PF) 0.9% PF flush 10 mL    sodium chloride bacteriostatic 0.9 % flush 12 mL           Review of Systems   Noncontributory except as above.      Objective    /67 (BP Location: Left arm, Patient Position: Sitting)   Pulse 81   Temp 97.9  F (36.6  C) (Temporal)   Ht 1.613 m (5' 3.5\")   Wt 120.7 kg (266 lb)   SpO2 96%   BMI 46.38 kg/m    Body mass index is 46.38 kg/m .  Physical Exam   GENERAL: Pleasant, alert, no distress, and obese  HENT: She does have a subtle indentation to palpation of the upper forehead, primarily on the right side, just underneath the hairline.  I do not feel any underlying mass in the area.  There are no overlying skin changes.  Her cranial nerves II through XII are intact.  It is nontender to palpation there.  BACK: Mild discomfort to palpation over the left low back  PSYCH: mentation appears normal, affect normal/bright.  She scored a 1 on her PHQ-9 yesterday.    Previous lab results were reviewed in her chart.              "

## 2023-10-13 NOTE — TELEPHONE ENCOUNTER
Patient Quality Outreach    Patient is due for the following:   Physical Annual Wellness Visit    Next Steps:   See below    Type of outreach:    Phone, spoke to patient/parent. Discussed HM due and patient will schedule with there PCP a wellness visit.    Next Steps:  Reach out within 90 days via  done .    Max number of attempts reached: Yes. Will try again in 90 days if patient still on fail list.    Questions for provider review:    None           Kailyn Toth, [facility]  Chart routed to closing encounter.

## 2023-10-16 ENCOUNTER — TELEPHONE (OUTPATIENT)
Dept: PALLIATIVE MEDICINE | Facility: CLINIC | Age: 68
End: 2023-10-16
Payer: COMMERCIAL

## 2023-10-16 DIAGNOSIS — M54.16 LUMBAR RADICULOPATHY: Primary | ICD-10-CM

## 2023-10-16 NOTE — TELEPHONE ENCOUNTER
"Injection Order (copy and paste all info under \"order questions\" section: Injection to be Determined by Pain Management Specialist  Associated Diagnosis:   Chronic left-sided low back pain without sciatica [M54.50, G89.29]     Referring Provider: Reynaldo Saavedra MD in [facility]  Injection History (previous injections with pain, type and date):  Bilateral SI joint injections - February 2022    After Review please route to Pain Nurse Pool for nursing to triage.    "

## 2023-10-16 NOTE — TELEPHONE ENCOUNTER
10/17/23: called and left message to call back.     Fatuma RN-BSN  [facility]   [phone]

## 2023-10-17 NOTE — TELEPHONE ENCOUNTER
Routed to injection schedulers to call pt to schedule a repeat L5/S1 interlaminar DAGO  ________________________________    Called pt.  Pt wanting repeat injection    Preferred clinic location: [place]    Injection TBD order for dx of: Chronic left-sided low back pain without sciatica [M54.50, G89.29]    Per the 3/29/2022 lumbar MRI:  IMPRESSION:  Multilevel lumbar spondylosis, most pronounced at L3-4 with moderate  spinal canal stenosis and mild to moderate left neural foraminal  stenosis. Findings appear mildly progressed, most notably L3-4 and  L4-5.  Imaging over 3 years old?  No   3/2022  Are there any change in symptoms since the imaging was done/in the past? No  Injection hx (be sure to note inj response):    3/1/2023, 2/16/2021 bilateral SI joint injection with Dr. Fuentes ;   10/13/2020 Left SI joint injection with Dr. Fuentes;  8/17/202  L5/S1 interlaminar DAGO w/ Dr. Walker:  pt reports 75% benefit particularly in function.  and had 5 months of relief.         Fatuma, RN-BSN  [facility]   [phone]

## 2023-10-17 NOTE — TELEPHONE ENCOUNTER
RAFA to schedule L5/S1 interlaminar DAGO           Marla Jeffrey  Complex   [facility]  Pain Management

## 2023-10-18 ENCOUNTER — OFFICE VISIT (OUTPATIENT)
Dept: RHEUMATOLOGY | Facility: CLINIC | Age: 68
End: 2023-10-18
Payer: COMMERCIAL

## 2023-10-18 VITALS
SYSTOLIC BLOOD PRESSURE: 127 MMHG | HEART RATE: 108 BPM | OXYGEN SATURATION: 95 % | BODY MASS INDEX: 45.86 KG/M2 | WEIGHT: 263 LBS | DIASTOLIC BLOOD PRESSURE: 65 MMHG

## 2023-10-18 DIAGNOSIS — Z79.899 HIGH RISK MEDICATION USE: ICD-10-CM

## 2023-10-18 DIAGNOSIS — M05.9 SEROPOSITIVE RHEUMATOID ARTHRITIS (H): Primary | ICD-10-CM

## 2023-10-18 PROCEDURE — 99214 OFFICE O/P EST MOD 30 MIN: CPT | Performed by: INTERNAL MEDICINE

## 2023-10-18 RX ORDER — SULFASALAZINE 500 MG/1
1500 TABLET ORAL 2 TIMES DAILY
Qty: 540 TABLET | Refills: 2 | Status: SHIPPED | OUTPATIENT
Start: 2023-10-18 | End: 2024-05-15

## 2023-10-18 RX ORDER — LEFLUNOMIDE 20 MG/1
20 TABLET ORAL DAILY
Qty: 90 TABLET | Refills: 2 | Status: SHIPPED | OUTPATIENT
Start: 2023-10-18 | End: 2024-05-15

## 2023-10-18 NOTE — NURSING NOTE
RAPID3 (0-30) Cumulative Score  13.2          RAPID3 Weighted Score (divide #4 by 3 and that is the weighted score)  4.4

## 2023-10-18 NOTE — TELEPHONE ENCOUNTER
"Screening Questions for Radiology Injections:    Injection to be done at which interventional clinic site? [facility]    If choosing [facility] for location, please inform patient:  \"[facility] is a Hospital based clinic. Before your visit, you should check with your insurance about how it covers the charges for facility services in a hospital-based clinic.     Procedure ordered by Keri    Procedure ordered? L5/S1 interlaminar DAGO   Transforaminal Cervical DAGO - [place] [facility] - No [facility] providers perform this procedure    What insurance would patient like us to bill for this procedure? BC/MEDICARE  IF SCHEDULING IN [place] PAIN OR SPINE PLEASE SCHEDULE AT LEAST 7-10 BUSINESS DAYS OUT SO A PA CAN BE OBTAINED  Worker's comp or MVA (motor vehicle accident) -Any injection DO NOT SCHEDULE and route to Marla Murphy.    [employer] insurance - For SI joint injections, DO NOT SCHEDULE and route to Jackelin Montalvo.     ALL BCBS, [employer] and [employer] - DO NOT SCHEDULE and route to Jackelin Montalvo  MEDICA- facet joint injections, route to Jackelin Selvin    Is patient scheduled at [facility]? NO   If YES, route every encounter to [facility] SPINE C[facility] CARE NAVIGATION POOL [[phone]]    Is an  needed? No     Patient has a  home? (Review Grid) YES: OK    Any chance of pregnancy? NO   If YES, do NOT schedule and route to RN pool  - Dr. Bundy route to Jayshree Washington and PM&R Nurse  [[id number]]      Is patient actively being treated for cancer or immunocompromised? No  If YES, do NOT schedule and route to RN pool/ Dr. Bundy's Team    Does the patient have a bleeding or clotting disorder? No   If YES, okay to schedule AND route to RN nurse / Dr. Bundy's Team   (For any patients with platelet count <100, RN must forward to provider)    Is patient taking any Blood Thinners OR Antiplatelet medication?  No   If hold needed, do NOT schedule, route to " HOWARD stark/ Dr. Bundy's Team  Examples:   Blood Thinners: (Coumadin, Warfarin, Jantoven, Pradaxa, Xarelto, Eliquis, Edoxaban, Enoxaparin, Lovenox, Heparin, Arixtra, Fondaparinux or Fragmin)  Antiplatelet Medications: (Plavix, Brilinta or Effient)     Is patient taking any aspirin products (includes Excedrin and Fiorinal)? No   If more than 325mg/day, OK to schedule; Instruct Pt to decrease to less than 325 mg for 7 days AND route to RN pool/ Dr. Bundy's Team   For CERVICAL procedures, hold all aspirin products for 6 days.   Tell Pt that if aspirin product is not held for 6 days, the procedure WILL BE cancelled.     Any allergies to contrast dye, iodine, shellfish, or numbing and steroid medications? No  If YES, schedule and add allergy information to appointment notes AND route to the RN pool/ Dr. Bundy's Team  If DAGO and Contrast Dye / Iodine Allergy? DO NOT SCHEDULE, route to RN pool/ Dr. Bundy's Team  Allergies: Patient has no known allergies.     Does patient have an active infection or treated for one within the past week? No  Is patient currently taking any antibiotics or steroid medications?  No   For patients on chronic, preventative, or prophylactic antibiotics, procedures may be scheduled.   For patients on antibiotics for active or recent infection, schedule 4 days after completed.  For patients on steroid medications, schedule 4 days after completed.     Has the patient had a flu shot or any other vaccinations within the past 7 days? Yes - 10/13/23  If yes, explain that for the vaccine to work best they need to:     wait 1 week before and 1 week after getting any Vaccine  wait 1 week before and 2 weeks after getting Covid Vaccine #2 or BOOSTER  If patient has concerns about the timing, send to RN pool/ Dr. Bundy's Team    Does patient have an MRI/CT?  YES: MRI Include Date and Check Procedure Scheduling Grid to see if required.  Was the MRI/CT done within the last 3 years?  Yes   If no route to RN  Evelio/ Dr. Bundy's Team  If yes, where was the MRI/CT done? [facility]   Refer to PACS Transmissions list for approved external locations and route to RN Pool High Priority/ Dr. Redds Team  If MRI was not done at approved external location do NOT schedule and route to RN pool/ Dr. Redds Team    If patient has an imaging disc, the injection MAY be scheduled but patient must bring disc to appt or appt will be cancelled.    Is patient able to transfer to a procedure table with minimal or no assistance? Yes   If no, do NOT schedule and route to RN Pool/ Dr. Bundy's Team    Procedure Specific Instructions:  If celiac plexus block, informed patient NPO for 6 hours and that it is okay to take medications with sips of water, especially blood pressure medications Not Applicable       If this is for a cervical procedure, informed patient that aspirin needs to be held for 6 days.   Not Applicable    Sedation, If Sedation is ordered for any procedure, patient must be NPO for 6 hours prior to procedure Not Applicable    If IV needed:  Do not schedule procedures requiring IV placement in the first appointment of the day or first appointment after lunch. Do NOT schedule at 0745, 0815 or 1245. OK  Instructed patient to arrive 30 minutes early for IV start if required. (Check Procedure Scheduling Grid)  Not Applicable    Reminders:  If you are started on any steroids or antibiotics between now and your appointment, you must contact us because the procedure may need to be cancelled.  Yes    As a reminder, receiving steroids can decrease your body's ability to fight infection.   Would you still like to move forward with scheduling the injection?  Yes    IV Sedation is not provided for procedures. If oral anti-anxiety medication is needed, the patient should request this from their referring provider.    Instruct patient to arrive as directed prior to the scheduled appointment time:  If IV needed 30 minutes before appointment  time     For patients 85 or older we recommend having an adult stay w/ them for the remainder of the day.     If the patient is Diabetic, remind them to bring their glucometer.      Does the patient have any questions?  NO  Claire Murphy  [facility] [place]

## 2023-10-18 NOTE — PROGRESS NOTES
Rheumatology Clinic Visit      Sharon Fung MRN# [id number]   YOB: 1955 Age: 68 year old      Date of visit: 10/18/23   PCP: Dr. Reynaldo Saavedra    Chief Complaint   Patient presents with:  RECHECK: 4 month follow up RA    Assessment and Plan     1. Seropositive nonerosive rheumatoid arthritis (RF negative, CCP low positive): Previously on Humira (lost efficacy), Cimzia (ineffective), Orencia (ineffective), leflunomide (ineffective as monotherapy), hydroxychloroquine (ineffective), Xeljanz (ineffective), MTX (GI upset), Kevzara (effective, became cost prohibitive when insurance changed to Medicare, used [id number]).  Well-controlled arthritis when on a combination of sulfasalazine 1500 mg twice daily, leflunomide 20 mg daily, and Kevzara, but because of Kevzara becoming cost prohibitive with change to Medicare Kevzara was discontinued and started Simponi.  Simponi has not been quite as effective but she is on only 3 doses.  We discussed continuing Simponi in hopes that a longer duration of therapy will provide more benefit or to change to Actemra; after thorough discussion the plan is to remain on Simponi for another 3-4 months and if not doing well at that time then consider changing to Actemra.   Chronic illness, progressive  - Continue sulfasalazine 1500 mg twice daily if labs are okay  - Continue leflunomide 20mg daily if labs are okay  - Continue Simponi 2 mg/kg IV every 8 weeks  - Labs in 3 months: CBC, Creatinine, Hepatic Panel, ESR, CRP    High risk medication requiring intensive toxicity monitoring at least quarterly                 Rapid 3, cumulative scores                      10/18/2023: 13.2 (SSZ 1500mg BID, leflunomide 20mg daily, Simponi IV [id number])                      8/24/2021: No inflammatory joint symptoms.  She says that this combination is great (SSZ 1500mg BID, leflunomide 20mg daily, Kevzara 200mg q14 days)                      10/14/2020 doing well (SSZ 1500mg BID,  leflunomide 20mg daily, Kevzara 200mg q14 days)                      08/28/2019: 3.2   (SSZ 1500mg BID, Kevzara)  Now on gabapentin                      04/17/2019: 15    (SSZ 1500mg BID, Kevzara)  Mostly fibromyalgia symptoms                      12/19/2018:         (MTX 20mg SQ wkly, Xeljanz XR 11mg daily, SSZ 1500mg BID)                          05/02/2018:  9     (MTX 20mg SQ wkly, Xeljanz XR 11mg daily, SSZ 1000mg BID)                          01/31/2018: 22.3 (MTX 20mg SQ wkly, Xeljanz XR 11mg daily)                          11/29/2017: 16.8 (MTX 20mg SQ wkly)                      08/02/2017: 4.2   (MTX 20mg SQ wkly, Xeljanz XR 11mg daily)                         05/04/2017: 7      (MTX 20mg SQ wkly, Xeljanz XR 11mg daily)                        02/02/2017: 8      (MTX 20mg SQ wkly, Xeljanz XR 11mg daily)                      11/04/2016: 13    (MTX 20mg SQ weekly)                      07/15/2016: 10.8    2. Positive LORNA; positive and negative dsDNA; Secondary Sjogren's Syndrome: Repeat dsDNA was negative. Has dry eyes but no dry mouth.  Dry eyes are treated by ophthalmology with Restasis.      3. Bilateral patellofemoral syndrome: home exercises have been helpful.  Weight loss encouraged.       4. Fibromyalgia: Managed in the pain clinic     5.  Bone Health, vitamin D deficiency: normal 12/20/2019 DEXA; plan to repeat in 2024.    - Continue vitamin D 2000 IU daily    6. Chronic lower back pain with left sided sciatica: suspect related to degenerative changes seen on L-spine MRI.  She has been seen in the pain clinic and spine surgery clinic.  Documented here for historical significance.    7.  Vaccinations: Vaccinations reviewed with Ms. Fung.   - Influenza: encouraged yearly vaccination  - COVID-19: advised keeping updated    Total minutes spent in evaluation with patient, documentation, , and review of pertinent studies and chart notes: 18    Ms. Fung verbalized agreement with and  understanding of the rational for the diagnosis and treatment plan.  All questions were answered to best of my ability and the patient's satisfaction. Ms. Fung was advised to contact the clinic with any questions that may arise after the clinic visit.      Thank you for involving me in the care of the patient    Return to clinic: 3 months    HPI   Sharon Fung is a 68 year old female with medical history significant for GERD, allergic rhinitis, obstructive sleep apnea, obesity, hx of trigger fingers, hx of carpal tunnel surgery, and rheumatoid arthritis who presents for follow-up of rheumatoid arthritis.    6/9/2023: RA controlled.  Morning stiffness <30 min. No joint swelling. Changing to medicare and needs to discuss changing kevzara to a different agent because it has become cost prohibitive with Medicare.     Today, 10/18/2023: RA not as well-controlled since changing to Simponi.  Has had 3 doses of Simponi thus far.  Simponi infusions are well-tolerated but has not controlled her arthritis as well.  Pain, stiffness, and swelling at the MCPs, PIPs, and wrists.  Joint pain is worse in the morning and improves with time and activity.  Morning stiffness for approximately 2 hours.  She would like to continue Simponi for now to see if a longer duration of therapy will be more helpful.    No fevers or chills. No nausea, vomiting, constipation, diarrhea. No chest pain/pressure, palpitations, or shortness of breath. No oral or nasal sores. No neck pain. No rash.      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; now working casual    ROS   12 point review of system was completed and negative except as noted in the HPI     Active Problem List     Patient Active Problem List   Diagnosis    AR (allergic rhinitis)    GERD (gastroesophageal reflux disease)    Status post bariatric surgery    Advanced directives, counseling/discussion    High risk  medication use    Obstructive sleep apnea    Obesity, Class III, BMI 40-49.9 (morbid obesity) (H)    Anterior basement membrane dystrophy    Seropositive rheumatoid arthritis (H)    LORNA positive    Carpal tunnel syndrome of right wrist    Headache    Immunosuppression (H24)    Fibromyalgia    Pain in joint, ankle and foot, right    Hypertension goal BP (blood pressure) < 140/90    Chronic back pain, unspecified back location, unspecified back pain laterality    Hyperlipidemia LDL goal <100    Mild recurrent major depression (H24)     Past Medical History     Past Medical History:   Diagnosis Date    AR (allergic rhinitis)  as teen    Arthritis 12/14/2015    Chronic obstructive pulmonary disease, unspecified COPD type (H) 03/27/2018    Depressive disorder 3 years ago    GERD (gastroesophageal reflux disease) 04/2007    Headache 07/11/2017    Hypertension goal BP (blood pressure) < 140/90 12/20/2021    Mild major depression (H24) 3 years ago    Morbid obesity (H) teens    BRETT (obstructive sleep apnea)     uses CPAP    RA (rheumatoid arthritis) (H) 4 years    Reduced vision 3 years     Past Surgical History     Past Surgical History:   Procedure Laterality Date    ARTHRODESIS FOOT Right 6/30/2021    Procedure: Right 1st metatarsophalangeal joint fusion;  Surgeon: Jesus Wolf MD;  Location: [facility]    ARTHRODESIS TOE(S) Right 12/27/2021    Procedure: right revision great toe fusion;  Surgeon: Ryan Gee MD;  Location: [facility] OR    BLEPHAROPLASTY BILATERAL Bilateral 8/5/2016    Procedure: BLEPHAROPLASTY BILATERAL;  Surgeon: Cortez Robin MD;  Location:  [facility]    BREAST BIOPSY, RT/LT  1-94    Benign    COLONOSCOPY      COLONOSCOPY WITH CO2 INSUFFLATION N/A 3/30/2017    Procedure: COLONOSCOPY WITH CO2 INSUFFLATION;  Surgeon: Issa Weeks MD;  Location:  OR    ESOPHAGOSCOPY, GASTROSCOPY, DUODENOSCOPY (EGD), COMBINED N/A 10/29/2015    Procedure: COMBINED ESOPHAGOSCOPY, GASTROSCOPY,  DUODENOSCOPY (EGD);  Surgeon: Shaq Mims MD;  Location: [facility]    LAPAROSCOPIC CHOLECYSTECTOMY N/A 6/13/2023    Procedure: CHOLECYSTECTOMY, LAPAROSCOPIC;  Surgeon: Reynaldo Segura MD;  Location: [facility]    LAPAROSCOPIC GASTRIC ADJUSTABLE BANDING  11/09/10    Lap band procedure    LAPAROSCOPIC REMOVAL GASTRIC ADJUSTABLE BAND N/A 10/31/2015    Procedure: LAPAROSCOPIC REMOVAL GASTRIC ADJUSTABLE BAND;  Surgeon: Shaq Mims MD;  Location: [facility] OR    WI HAND/FINGER SURGERY UNLISTED  2016    WI STOMACH SURGERY PROCEDURE UNLISTED  11/2015    RELEASE CARPAL TUNNEL Left 4/27/2017    Procedure: RELEASE CARPAL TUNNEL;  Left Open Carpal Tunnel Release;  Surgeon: Ciara Middleton MD;  [facility]: [facility]    RELEASE CARPAL TUNNEL Right 6/15/2017    Procedure: RELEASE CARPAL TUNNEL;  Right Carpal Tunnel Release Open;  Surgeon: Ciara Middleton MD;  Location: [facility]    REPAIR PTOSIS      TUBAL LIGATION  1988    [facility] APPENDECTOMY  1977     Allergy   No Known Allergies  Current Medication List     Current Outpatient Medications   Medication Sig    acetaminophen (TYLENOL) 500 MG tablet Take 500-1,000 mg by mouth every 6 hours as needed for mild pain    albuterol (PROAIR HFA/PROVENTIL HFA/VENTOLIN HFA) 108 (90 Base) MCG/ACT inhaler Inhale 2 puffs into the lungs every 6 hours as needed for shortness of breath / dyspnea or wheezing    citalopram (CELEXA) 20 MG tablet Take 1 tablet (20 mg) by mouth daily    fexofenadine (ALLEGRA) 180 MG tablet Take 1 tablet (180 mg) by mouth daily    fluticasone (FLONASE) 50 MCG/ACT nasal spray Spray 2 sprays into both nostrils as needed     gabapentin (NEURONTIN) 100 MG capsule TAKE ONE CAPSULE BY MOUTH EVERY MORNING, TAKE ONE CAPSULE MID-DAY, AND TAKE FOUR CAPSULES BY MOUTH EVERY NIGHT AT BEDTIME    ibuprofen 200 MG capsule Take 600-800 mg by mouth At Bedtime    leflunomide (ARAVA) 20 MG tablet Take 1 tablet (20 mg) by mouth daily    losartan-hydrochlorothiazide (HYZAAR)  100-25 MG tablet Take 1 tablet by mouth daily    ORDER FOR DME Use your CPAP device as directed by your provider.    predniSONE (DELTASONE) 5 MG tablet Prednisone 20mg daily x5days, then 15mg daily x5days, then 10mg daily x5days, then 5mg daily x5days, then stop.    sulfaSALAzine (AZULFIDINE) 500 MG tablet Take 3 tablets (1,500 mg) by mouth 2 times daily    vitamin D3 (CHOLECALCIFEROL) 50 mcg (2000 units) tablet Take 1 tablet (50 mcg) by mouth daily     Current Facility-Administered Medications   Medication    alcohol (dehydrated) (ABLYSINOL) 99 % injection 15 mL     Facility-Administered Medications Ordered in Other Visits   Medication    sodium chloride (PF) 0.9% PF flush 10 mL    sodium chloride bacteriostatic 0.9 % flush 12 mL       Social History   See HPI    Family History     Family History   Problem Relation Age of Onset    Neurologic Disorder Mother 20        migraine    Depression Mother     Heart Disease Father         MI    Neurologic Disorder Father 79        Parkinson's    Diabetes Father     Hypertension Father     Coronary Artery Disease Father     Cancer Maternal Grandmother         uterine    Neurologic Disorder Sister 16        migraine    Depression Sister     Depression Sister     Substance Abuse Sister     Migraines Sister     Neurologic Disorder Son 7        migraine    Substance Abuse Son     Migraines Son         none    Glaucoma No family hx of     Macular Degeneration No family hx of        Physical Exam     Temp Readings from Last 3 Encounters:   10/13/23 97.9  F (36.6  C) (Temporal)   10/12/23 98.3  F (36.8  C)   07/20/23 97.9  F (36.6  C)     BP Readings from Last 5 Encounters:   10/18/23 127/65   10/13/23 123/67   10/12/23 139/73   08/17/23 133/83   07/20/23 131/75     Pulse Readings from Last 1 Encounters:   10/18/23 108     Resp Readings from Last 1 Encounters:   10/12/23 18     Estimated body mass index is 45.86 kg/m  as calculated from the following:    Height as of 10/13/23: 1.613  "m (5' 3.5\").    Weight as of this encounter: 119.3 kg (263 lb).      GEN: NAD.   HEENT:  Anicteric, noninjected sclera. No obvious external lesions of the ear and nose. Hearing intact.  CV: S1, S2. RRR. No m/r/g  PULM: No increased work of breathing. CTA bilaterally   MSK:Synovial swelling and tenderness palpation of bilateral second-third MCPs and second-fourth PIPs.  DIPs without swelling or tenderness to palpation.  Wrists with mild tenderness to palpation and subtle synovial swelling but no increased warmth or overlying erythema.   Elbows and shoulders without swelling or tenderness to palpation.   Knees, ankles, and MTPs without swelling or tenderness to palpation.    SKIN: No rash or jaundice seen  PSYCH: Alert. Appropriate.      Labs / Imaging (select studies)     CBC  Recent Labs   Lab Test 10/12/23  1025 06/13/23  0522 06/12/23  1247 08/03/21  1150 05/10/21  0802 02/02/21  1056 10/12/20  1113   WBC 7.0 6.2 4.8   < > 5.1 7.4 6.1   RBC 4.45 4.13 3.83   < > 4.48 4.41 4.18   HGB 13.4 12.5 11.8   < > 13.5 13.4 12.7   HCT 41.4 38.4 36.6   < > 42.8 42.8 39.7   MCV 93 93 96   < > 96 97 95   RDW 13.1 13.7 13.9   < > 12.8 12.5 12.9    238 208   < > 193 216 221   MCH 30.1 30.3 30.8   < > 30.1 30.4 30.4   MCHC 32.4 32.6 32.2   < > 31.5 31.3* 32.0   NEUTROPHIL 46 66 42   < > 42.6 40.1 40.8   LYMPH 31 20 37   < > 26.4 34.8 33.4   MONOCYTE 14 9 9   < > 9.5 10.7 8.9   EOSINOPHIL 8 4 11   < > 19.5 12.6 15.6   BASOPHIL 1 1 2   < > 1.8 1.4 1.0   ANEU  --   --   --   --  2.2 3.0 2.5   ALYM  --   --   --   --  1.3 2.6 2.0   KIMBERLY  --   --   --   --  0.5 0.8 0.5   AEOS  --   --   --   --  1.0* 0.9* 1.0*   ABAS  --   --   --   --  0.1 0.1 0.1   ANEUTAUTO 3.2 4.1 2.0   < >  --   --   --    ALYMPAUTO 2.2 1.3 1.8   < >  --   --   --    AMONOAUTO 1.0 0.5 0.4   < >  --   --   --    AEOSAUTO 0.6 0.3 0.5   < >  --   --   --    ABSBASO 0.1 0.1 0.1   < >  --   --   --     < > = values in this interval not displayed.     CMP  Recent " Labs   Lab Test 10/12/23  1025 06/13/23  0951 06/13/23  0522 06/12/23  1247 11/16/22  0937 07/01/22  0807 03/11/22  0910 12/27/21  0710 12/20/21  0851 08/03/21  1150 05/10/21  0802 02/02/21  1056 10/12/20  1113   NA  --   --  140  --   --  140  --   --  139   < > 140  --   --    POTASSIUM  --   --  3.4  --   --  3.9  --  3.5 3.7   < > 4.3  --   --    CHLORIDE  --   --  101  --   --  107  --   --  105   < > 110*  --   --    CO2  --   --  24  --   --  27  --   --  28   < > 25  --   --    ANIONGAP  --   --  15  --   --  6  --   --  6   < > 5  --   --    GLC  --  99 138*  --   --  96  --   --  144*   < > 99 74  --    BUN  --   --  17.1  --   --  18  --   --  20   < > 12  --   --    CR 0.66  --  0.69 0.72   < > 0.74   < >  --  0.68   < > 0.73 0.65 0.74   GFRESTIMATED >90  --  >90 >90   < > 89   < >  --  >90   < > 88 >90 87   GFRESTBLACK  --   --   --   --   --   --   --   --   --   --  >90 >90 >90   ISH  --   --  9.6  --   --  9.3  --   --  9.3   < > 8.6  --   --    BILITOTAL 0.4  --  0.5 0.4   < >  --    < >  --   --    < > 0.6 0.4 0.5   ALBUMIN 4.2  --  4.3 4.0   < >  --    < >  --   --    < > 3.8 4.0 3.8   PROTTOTAL 7.1  --  7.2 6.7   < >  --    < >  --   --    < > 7.1 7.1 6.9   ALKPHOS 106*  --  95 86   < >  --    < >  --   --    < > 103 115 107   AST 27  --  26 33   < >  --    < >  --   --    < > 33 36 23   ALT 24  --  29 17   < >  --    < >  --   --    < > 54* 58* 42    < > = values in this interval not displayed.     Calcium/VitaminD  Recent Labs   Lab Test 06/13/23  0522 07/01/22  0807 12/20/21  0851 05/10/21  0802 10/12/20  1113 03/31/20  0755   ISH 9.6 9.3 9.3   < >  --   --    VITDT  --   --   --   --  33 15*    < > = values in this interval not displayed.     ESR/CRP  Recent Labs   Lab Test 10/12/23  1025 06/12/23  1247 11/16/22  0937 06/24/22  0741 03/11/22  0910   SED 15 16 6 5 5   CRP  --   --  <2.9 <2.9 <2.9   CRPI 3.10 10.60*  --   --   --      Hepatitis B  Recent Labs   Lab Test 06/12/23  1247  12/08/15  0855   HBCAB Nonreactive Nonreactive   HEPBANG Nonreactive Nonreactive     Hepatitis C  Recent Labs   Lab Test 06/12/23  1247 12/08/15  0855   HCVAB Nonreactive Nonreactive   Assay performance characteristics have not been established for newborns,   infants, and children       Tuberculosis Screening  Recent Labs   Lab Test 06/12/23  1247 11/30/21  0806 10/31/17  1400 02/02/17  0822 12/08/15  0855   TBRES Negative Negative  --   --   --    TBRSLT  --   --  Negative Negative Negative   TBAGN  --   --  0.05 0.00 0.01     HIV Screening  Recent Labs   Lab Test 07/24/18  0738   HIAGAB Nonreactive     Immunization History     Immunization History   Administered Date(s) Administered    COVID-19 12+ (2023-24) (Pfizer) 10/13/2023    COVID-19 Bivalent 12+ (Pfizer) 03/30/2023    COVID-19 MONOVALENT 12+ (Pfizer) 12/21/2020, 01/07/2021, 08/27/2021    COVID-19 Monovalent 18+ (Moderna) 03/08/2022    Flu, Unspecified 10/12/2020    Influenza (High Dose) 3 valent vaccine 11/01/2022, 09/29/2023    Influenza (intradermal) 10/04/2011, 10/01/2012    Influenza Vaccine 18-64 (Flublok) 09/15/2021    Influenza Vaccine >6 months (Alfuria,Fluzone) 10/15/2013, 09/15/2014, 09/28/2015, 09/28/2016, 10/16/2017, 10/01/2018    Influenza Vaccine Im 4yrs+ 4 Valent CCIIV4 10/15/2019    Pneumo Conj 13-V (2010&after) 04/15/2016    Pneumococcal 20 valent Conjugate (Prevnar 20) 07/01/2022    Pneumococcal 23 valent 07/15/2016    TD,PF 7+ (Tenivac) 10/30/2020    TDAP Vaccine (Adacel) 02/09/2011    Zoster recombinant adjuvanted (SHINGRIX) 04/17/2019, 08/28/2019          Chart documentation done in part with Dragon Voice recognition Software. Although reviewed after completion, some word and grammatical error may remain.         Freddy Guerra MD

## 2023-10-23 NOTE — TELEPHONE ENCOUNTER
Authorization Number: [id number]  Review Date: 10/23/2023 11:45:21 AM  Expiration Date: 12/22/2023  Status: Your case has been Approved.      OKAY TO SCHEDULE ILESI WITH DR. DAPHNIE PINEDO  Complex   [facility]

## 2023-11-21 ENCOUNTER — RADIOLOGY INJECTION OFFICE VISIT (OUTPATIENT)
Dept: PALLIATIVE MEDICINE | Facility: CLINIC | Age: 68
End: 2023-11-21
Payer: COMMERCIAL

## 2023-11-21 VITALS — SYSTOLIC BLOOD PRESSURE: 148 MMHG | OXYGEN SATURATION: 94 % | DIASTOLIC BLOOD PRESSURE: 69 MMHG | HEART RATE: 79 BPM

## 2023-11-21 DIAGNOSIS — G89.29 CHRONIC LEFT-SIDED LOW BACK PAIN WITHOUT SCIATICA: ICD-10-CM

## 2023-11-21 DIAGNOSIS — M54.50 CHRONIC LEFT-SIDED LOW BACK PAIN WITHOUT SCIATICA: ICD-10-CM

## 2023-11-21 DIAGNOSIS — M54.16 LUMBAR RADICULOPATHY: ICD-10-CM

## 2023-11-21 PROCEDURE — 62323 NJX INTERLAMINAR LMBR/SAC: CPT | Performed by: PAIN MEDICINE

## 2023-11-21 RX ORDER — TRIAMCINOLONE ACETONIDE 40 MG/ML
40 INJECTION, SUSPENSION INTRA-ARTICULAR; INTRAMUSCULAR ONCE
Status: COMPLETED | OUTPATIENT
Start: 2023-11-21 | End: 2023-11-21

## 2023-11-21 RX ADMIN — TRIAMCINOLONE ACETONIDE 40 MG: 40 INJECTION, SUSPENSION INTRA-ARTICULAR; INTRAMUSCULAR at 15:39

## 2023-11-21 ASSESSMENT — PAIN SCALES - GENERAL
PAINLEVEL: EXTREME PAIN (8)
PAINLEVEL: SEVERE PAIN (7)

## 2023-11-21 NOTE — NURSING NOTE
Discharge Information    IV Discontiued Time:  NA    Amount of Fluid Infused:  NA    Discharge Criteria = When patient returns to baseline or as per MD order    Consciousness:  Pt is fully awake    Circulation:  BP +/- 20% of pre-procedure level    Respiration:  Patient is able to breathe deeply    O2 Sat:  Patient is able to maintain O2 Sat >92% on room air    Activity:  Moves 4 extremities on command    Ambulation:  Patient is able to stand and walk or stand and pivot into wheelchair    Dressing:  Clean/dry or No Dressing    Notes:   Discharge instructions and AVS given to patient    Patient meets criteria for discharge?  YES    Admitted to PCU?  No    Responsible adult present to accompany patient home?  Yes    Signature/Title:    Danika Morgan RN  RN Care Coordinator  [facility]

## 2023-11-21 NOTE — PROGRESS NOTES
Pre procedure Diagnosis: Lumbar radiculopathy  Post procedure Diagnosis: Same  Procedure performed: L5-S1 interlaminar epidural steroid injection   Anesthesia: none  Complications: none  Operators: Clint Fuentes MD     Indications:   Sharon Fung is a 68 year old female.  The patient has a history of lbp radiating to her le.  Examination shows + slumnp.  she has tried conservative treatment including meds.    MRIreviewed   Options/alternatives, benefits and risks were discussed with the patient including but not limited to bleeding, infection, no pain relief, tissue trauma, exposure to radiation, reaction to medications, spinal cord injury, dural puncture, weakness, numbness and headache.  Questions were answered to her satisfaction and she wishes to proceed. Voluntary informed consent was obtained and signed.     Vitals were reviewed: Yes  Allergies were reviewed:  Yes   Medications were reviewed:  Yes   Pre-procedure pain score: 8/10    Procedure:  The patient's medical history, medications, and allergies were reviewed and reconciled.  After obtaining signed informed consent, the patient was brought into the procedure suite and was placed in a prone position on the procedure table.   A Pause for the Cause was performed.  Patient was prepped and draped in the usual sterile fashion.     The L5-S1 interspace was identified with AP fluoroscopy.  A total of 4ml of 1% lidocaine was used to anesthetize the skin and subcutaneous tissues for a  midline approach.    A 20gauge 3.5inch Touhy needle was advanced utilizing intermittent AP and Lateral fluoroscopy and air for loss of resistance.  The epidural space was encountered on the first pass without difficulties.  Aspiration for blood and CSF was negative.  Needle position was verified by injecting 1 ml of Omnipaque utilizing real-time fluoroscopy that showed good needle placement and epidural spread without signs of intravascular or intrathecal uptake.  Omnipaque  wasted:  9 ml.    Then, after repeated negative aspiration for blood or CSF, a combination of Kenalog 40 mg, Bupivicaine 0.25% 2 ml, diluted with 3 ml of normal saline to a total injectate volume of 6 ml was injected into the epidural space in a slow and incremental fashion and the needle was restyletted and withdrawn.  All injected medications were preservative free.    The injection site was cleaned and a sterile dressing was applied.    The patient tolerated the procedure well without complications and was taken to the recovery room for continued observation.    Images were saved to PACS.    Post-procedure pain score: 7/10  Follow-up includes:   -f/u with referring provider     Clint Fuentes MD  [facility]

## 2023-11-21 NOTE — PATIENT INSTRUCTIONS
[facility]   Procedure Discharge Instructions    Today you saw:    Dr. Clint Fuentes,            You had an:  Epidural steroid injection            Be cautious when walking. Numbness and/or weakness in the lower extremities may occur for up to 6-8 hours after the procedure due to effect of the local anesthetic  Do not drive for 6 hours. The effect of the local anesthetic could slow your reflexes.   You may resume your regular activities after 24 hours  Avoid strenuous activity for the first 24 hours  You may shower, however avoid swimming, tub baths or hot tubs for 24 hours following your procedure  You may have a mild to moderate increase in pain for several days following the injection.  It may take up to 14 days for the steroid medication to start working although you may feel the effect as early as a few days after the procedure.     You may use ice packs for 10-15 minutes, 3 to 4 times a day at the injection site for comfort  Do not use heat to painful areas for 6 to 8 hours. This will give the local anesthetic time to wear off and prevent you from accidentally burning your skin.   Unless you have been directed to avoid the use of anti-inflammatory medications (NSAIDS), you may use medications such as ibuprofen, Aleve or Tylenol for pain control if needed.   If you have diabetes, check your blood sugar more frequently than usual as your blood sugar may be higher than normal for 10-14 days following a steroid injection. Contact your doctor who manages your diabetes if your blood sugar is higher than usual  Possible side effects of steroids that you may experience include flushing, elevated blood pressure, increased appetite, mild headaches and restlessness.  All of these symptoms will get better with time.  If you experience any of the following, call the Pain Clinic during work hours (Mon-Friday 8-4:30 pm) at [phone] or the Provider Line after hours at [phone]:  -Fever  over 100 degree F  -Swelling, bleeding, redness, drainage, warmth at the injection site  -Progressive weakness or numbness in your legs   -Loss of bowel or bladder function  -Unusual new onset of pain that is not improving

## 2023-11-21 NOTE — NURSING NOTE
Pre-procedure Intake  If YES to any questions or NO to having a   Please complete laminated checklist and leave on the computer keyboard for Provider, verbally inform provider if able.    For SCS Trial, RFA's or any sedation procedure:  Have you been fasting? NA  If yes, for how long?     Are you taking any any blood thinners such as Coumadin, Warfarin, Jantoven, Pradaxa Xarelto, Eliquis, Edoxaban, Enoxaparin, Lovenox, Heparin, Arixtra, Fondaparinux, or Fragmin? OR Antiplatelet medication such as Plavix, Brilinta, or Effient?   No   If yes, when did you take your last dose?     Do you take aspirin?  No  If cervical procedure, have you held aspirin for 6 days?   NA    Do you have any allergies to contrast dye, iodine, steroid and/or numbing medications?  NO    Are you currently taking antibiotics or have an active infection?  NO    Have you had a fever/elevated temperature within the past week? NO    Are you currently taking oral steroids? NO    Do you have a ? Yes    Are you pregnant or breastfeeding?  NO    Have you received the COVID-19 vaccine? Yes  If yes, was it your 1st, 2nd or only dose needed? 2nd dose and booster  Date of most recent vaccine: 10/13/2023    Notify provider and RNs if systolic BP >170, diastolic BP >100, P >100 or O2 sats < 90%     Jayshree Verde MA  [facility]

## 2023-12-07 ENCOUNTER — INFUSION THERAPY VISIT (OUTPATIENT)
Dept: INFUSION THERAPY | Facility: CLINIC | Age: 68
End: 2023-12-07
Attending: INTERNAL MEDICINE
Payer: COMMERCIAL

## 2023-12-07 VITALS
BODY MASS INDEX: 45.96 KG/M2 | OXYGEN SATURATION: 94 % | TEMPERATURE: 98 F | SYSTOLIC BLOOD PRESSURE: 120 MMHG | DIASTOLIC BLOOD PRESSURE: 67 MMHG | RESPIRATION RATE: 16 BRPM | WEIGHT: 263.6 LBS | HEART RATE: 77 BPM

## 2023-12-07 DIAGNOSIS — M05.9 SEROPOSITIVE RHEUMATOID ARTHRITIS (H): Primary | ICD-10-CM

## 2023-12-07 PROCEDURE — 96365 THER/PROPH/DIAG IV INF INIT: CPT

## 2023-12-07 PROCEDURE — 250N000011 HC RX IP 250 OP 636: Mod: JZ | Performed by: INTERNAL MEDICINE

## 2023-12-07 PROCEDURE — 258N000003 HC RX IP 258 OP 636: Performed by: INTERNAL MEDICINE

## 2023-12-07 RX ORDER — DIPHENHYDRAMINE HYDROCHLORIDE 50 MG/ML
50 INJECTION INTRAMUSCULAR; INTRAVENOUS
Status: CANCELLED
Start: 2024-02-01

## 2023-12-07 RX ORDER — ALBUTEROL SULFATE 90 UG/1
1-2 AEROSOL, METERED RESPIRATORY (INHALATION)
Status: CANCELLED
Start: 2024-02-01

## 2023-12-07 RX ORDER — HEPARIN SODIUM (PORCINE) LOCK FLUSH IV SOLN 100 UNIT/ML 100 UNIT/ML
5 SOLUTION INTRAVENOUS
Status: CANCELLED | OUTPATIENT
Start: 2024-02-01

## 2023-12-07 RX ORDER — HEPARIN SODIUM,PORCINE 10 UNIT/ML
5 VIAL (ML) INTRAVENOUS
Status: CANCELLED | OUTPATIENT
Start: 2024-02-01

## 2023-12-07 RX ORDER — ALBUTEROL SULFATE 0.83 MG/ML
2.5 SOLUTION RESPIRATORY (INHALATION)
Status: CANCELLED | OUTPATIENT
Start: 2024-02-01

## 2023-12-07 RX ORDER — EPINEPHRINE 1 MG/ML
0.3 INJECTION, SOLUTION, CONCENTRATE INTRAVENOUS EVERY 5 MIN PRN
Status: CANCELLED | OUTPATIENT
Start: 2024-02-01

## 2023-12-07 RX ORDER — METHYLPREDNISOLONE SODIUM SUCCINATE 125 MG/2ML
125 INJECTION, POWDER, LYOPHILIZED, FOR SOLUTION INTRAMUSCULAR; INTRAVENOUS
Status: CANCELLED
Start: 2024-02-01

## 2023-12-07 RX ORDER — MEPERIDINE HYDROCHLORIDE 25 MG/ML
25 INJECTION INTRAMUSCULAR; INTRAVENOUS; SUBCUTANEOUS EVERY 30 MIN PRN
Status: CANCELLED | OUTPATIENT
Start: 2024-02-01

## 2023-12-07 RX ADMIN — GOLIMUMAB 250 MG: 50 SOLUTION INTRAVENOUS at 09:46

## 2023-12-07 ASSESSMENT — PAIN SCALES - GENERAL: PAINLEVEL: EXTREME PAIN (8)

## 2023-12-07 NOTE — PROGRESS NOTES
Infusion Nursing Note:  Sharon Fung presents today for simponi.    Patient seen by provider today: No   present during visit today: Not Applicable.    Note: Patient states about 2 weeks prior to dose her joints get inflamed and painful.  Had seen her rheumatologist and they may look into trying something different.      Intravenous Access:  Peripheral IV placed.    Treatment Conditions:  Biological Infusion Checklist:  ~~~ NOTE: If the patient answers yes to any of the questions below, hold the infusion and contact ordering provider or on-call provider.    Have you recently had an elevated temperature, fever, chills, productive cough, coughing for 3 weeks or longer or hemoptysis,  abnormal vital signs, night sweats,  chest pain or have you noticed a decrease in your appetite, unexplained weight loss or fatigue? No  Do you have any open wounds or new incisions? No  Do you have any upcoming hospitalizations or surgeries? Does not include esophagogastroduodenoscopy, colonoscopy, endoscopic retrograde cholangiopancreatography (ERCP), endoscopic ultrasound (EUS), dental procedures or joint aspiration/steroid injections No  Do you currently have any signs of illness or infection or are you on any antibiotics? No  Have you had any new, sudden or worsening abdominal pain? No  Have you or anyone in your household received a live vaccination in the past 4 weeks? Please note: No live vaccines while on biologic/chemotherapy until 6 months after the last treatment. Patient can receive the flu vaccine (shot only), pneumovax and the Covid vaccine. It is optimal for the patient to get these vaccines mid cycle, but they can be given at any time as long as it is not on the day of the infusion. No  Have you recently been diagnosed with any new nervous system diseases (ie. Multiple sclerosis, Guillain Magnolia, seizures, neurological changes) or cancer diagnosis? Are you on any form of radiation or chemotherapy? No  Are  you pregnant or breast feeding or do you have plans of pregnancy in the future? No  Have you been having any signs of worsening depression or suicidal ideations?  (benlysta only) No  Have there been any other new onset medical symptoms? No  Have you had any new blood clots? (IVIG only) No      Post Infusion Assessment:  Patient tolerated infusion without incident.  Blood return noted pre and post infusion.  Site patent and intact, free from redness, edema or discomfort.  No evidence of extravasations.  Access discontinued per protocol.       Discharge Plan:   Patient discharged in stable condition accompanied by: self.  Departure Mode: Ambulatory.  Will return to clinic in 2 months.    Amber Howard RN

## 2024-01-15 ENCOUNTER — LAB (OUTPATIENT)
Dept: LAB | Facility: CLINIC | Age: 69
End: 2024-01-15
Payer: COMMERCIAL

## 2024-01-15 DIAGNOSIS — Z79.899 HIGH RISK MEDICATION USE: ICD-10-CM

## 2024-01-15 DIAGNOSIS — M05.9 SEROPOSITIVE RHEUMATOID ARTHRITIS (H): ICD-10-CM

## 2024-01-15 LAB
ALBUMIN SERPL BCG-MCNC: 4.5 G/DL (ref 3.5–5.2)
ALP SERPL-CCNC: 102 U/L (ref 40–150)
ALT SERPL W P-5'-P-CCNC: 27 U/L (ref 0–50)
AST SERPL W P-5'-P-CCNC: 27 U/L (ref 0–45)
BASOPHILS # BLD AUTO: 0.1 10E3/UL (ref 0–0.2)
BASOPHILS NFR BLD AUTO: 1 %
BILIRUB DIRECT SERPL-MCNC: <0.2 MG/DL (ref 0–0.3)
BILIRUB SERPL-MCNC: 0.4 MG/DL
CREAT SERPL-MCNC: 0.68 MG/DL (ref 0.51–0.95)
CRP SERPL-MCNC: <3 MG/L
EGFRCR SERPLBLD CKD-EPI 2021: >90 ML/MIN/1.73M2
EOSINOPHIL # BLD AUTO: 0.3 10E3/UL (ref 0–0.7)
EOSINOPHIL NFR BLD AUTO: 4 %
ERYTHROCYTE [DISTWIDTH] IN BLOOD BY AUTOMATED COUNT: 13.4 % (ref 10–15)
ERYTHROCYTE [SEDIMENTATION RATE] IN BLOOD BY WESTERGREN METHOD: 20 MM/HR (ref 0–30)
HCT VFR BLD AUTO: 43.5 % (ref 35–47)
HGB BLD-MCNC: 13.7 G/DL (ref 11.7–15.7)
IMM GRANULOCYTES # BLD: 0 10E3/UL
IMM GRANULOCYTES NFR BLD: 0 %
LYMPHOCYTES # BLD AUTO: 3.1 10E3/UL (ref 0.8–5.3)
LYMPHOCYTES NFR BLD AUTO: 40 %
MCH RBC QN AUTO: 30.4 PG (ref 26.5–33)
MCHC RBC AUTO-ENTMCNC: 31.5 G/DL (ref 31.5–36.5)
MCV RBC AUTO: 97 FL (ref 78–100)
MONOCYTES # BLD AUTO: 1.1 10E3/UL (ref 0–1.3)
MONOCYTES NFR BLD AUTO: 14 %
NEUTROPHILS # BLD AUTO: 3.1 10E3/UL (ref 1.6–8.3)
NEUTROPHILS NFR BLD AUTO: 41 %
NRBC # BLD AUTO: 0 10E3/UL
NRBC BLD AUTO-RTO: 0 /100
PLATELET # BLD AUTO: 292 10E3/UL (ref 150–450)
PROT SERPL-MCNC: 7.8 G/DL (ref 6.4–8.3)
RBC # BLD AUTO: 4.51 10E6/UL (ref 3.8–5.2)
WBC # BLD AUTO: 7.7 10E3/UL (ref 4–11)

## 2024-01-15 PROCEDURE — 86140 C-REACTIVE PROTEIN: CPT

## 2024-01-15 PROCEDURE — 85025 COMPLETE CBC W/AUTO DIFF WBC: CPT

## 2024-01-15 PROCEDURE — 82565 ASSAY OF CREATININE: CPT

## 2024-01-15 PROCEDURE — 85652 RBC SED RATE AUTOMATED: CPT

## 2024-01-15 PROCEDURE — 80076 HEPATIC FUNCTION PANEL: CPT

## 2024-01-15 PROCEDURE — 36415 COLL VENOUS BLD VENIPUNCTURE: CPT

## 2024-01-25 ENCOUNTER — OFFICE VISIT (OUTPATIENT)
Dept: RHEUMATOLOGY | Facility: CLINIC | Age: 69
End: 2024-01-25
Payer: COMMERCIAL

## 2024-01-25 VITALS
DIASTOLIC BLOOD PRESSURE: 74 MMHG | HEIGHT: 64 IN | OXYGEN SATURATION: 94 % | HEART RATE: 92 BPM | BODY MASS INDEX: 44.76 KG/M2 | WEIGHT: 262.2 LBS | SYSTOLIC BLOOD PRESSURE: 127 MMHG

## 2024-01-25 DIAGNOSIS — M05.9 SEROPOSITIVE RHEUMATOID ARTHRITIS (H): Primary | ICD-10-CM

## 2024-01-25 PROCEDURE — 99214 OFFICE O/P EST MOD 30 MIN: CPT | Performed by: INTERNAL MEDICINE

## 2024-01-25 PROCEDURE — G2211 COMPLEX E/M VISIT ADD ON: HCPCS | Performed by: INTERNAL MEDICINE

## 2024-01-25 RX ORDER — HYDROXYCHLOROQUINE SULFATE 200 MG/1
200 TABLET, FILM COATED ORAL 2 TIMES DAILY
Qty: 60 TABLET | Refills: 4 | Status: SHIPPED | OUTPATIENT
Start: 2024-01-25 | End: 2024-05-15

## 2024-01-25 ASSESSMENT — PAIN SCALES - GENERAL: PAINLEVEL: MODERATE PAIN (5)

## 2024-01-25 NOTE — NURSING NOTE
RAPID3 (0-30) Cumulative Score  14          RAPID3 Weighted Score (divide #4 by 3 and that is the weighted score)  4.67

## 2024-01-25 NOTE — PROGRESS NOTES
Rheumatology Clinic Visit      Sharon Fung MRN# [id number]   YOB: 1955 Age: 68 year old      Date of visit: 1/25/24   PCP: Dr. Reynaldo Saavedra    Chief Complaint   Patient presents with:  RECHECK: Seropositive RA. Things are going alright. Hands are still swollen.     Assessment and Plan     1. Seropositive nonerosive rheumatoid arthritis (RF negative, CCP low positive): Previously on Humira (lost efficacy), Cimzia (ineffective), Orencia (ineffective), leflunomide (ineffective as monotherapy), hydroxychloroquine (ineffective), Xeljanz (ineffective), MTX (GI upset), Kevzara (effective, became cost prohibitive when insurance changed to Medicare, used [id number]).  Well-controlled arthritis when on a combination of sulfasalazine 1500 mg twice daily, leflunomide 20 mg daily, and Kevzara, but because Kevzara becoming cost prohibitive with change to Medicare, Kevzara was discontinued and Simponi IV was started.  Simponi has not been as effective as Kevzara; minimal synovitis on exam today but she reports having morning stiffness for about 2-3 hours.  Discussed adding hydroxychloroquine in hopes that combination therapy will be effective; hydroxychloroquine was noted to not be effective previously but in combination with her current regimen and may be helpful.  If she does not respond to this then the plan will be to change Simponi to either Remicade or Actemra, both IV options..   Chronic illness, progressive  - Continue sulfasalazine 1500 mg twice daily   - Continue leflunomide 20mg daily   - Continue Simponi 2 mg/kg IV every 8 weeks  - Start hydroxychloroquine 200 mg twice daily (if effective at follow-up then will refer to ophthalmology for toxicity monitoring)  - Labs in 3 months: CBC, Creatinine, Hepatic Panel, ESR, CRP    High risk medication requiring intensive toxicity monitoring at least quarterly                 Rapid 3, cumulative scores                      01/25/2024: 14    (SSZ 1500mg  BID, leflunomide 20mg daily)                      10/18/2023: 13.2 (SSZ 1500mg BID, leflunomide 20mg daily, Simponi IV [id number])                      8/24/2021: No inflammatory joint symptoms.  She says that this combination is great (SSZ 1500mg BID, leflunomide 20mg daily, Kevzara 200mg q14 days)                      10/14/2020 doing well (SSZ 1500mg BID, leflunomide 20mg daily, Kevzara 200mg q14 days)                      08/28/2019: 3.2   (SSZ 1500mg BID, Kevzara)  Now on gabapentin                      04/17/2019: 15    (SSZ 1500mg BID, Kevzara)  Mostly fibromyalgia symptoms                      12/19/2018:         (MTX 20mg SQ wkly, Xeljanz XR 11mg daily, SSZ 1500mg BID)                          05/02/2018:  9     (MTX 20mg SQ wkly, Xeljanz XR 11mg daily, SSZ 1000mg BID)                          01/31/2018: 22.3 (MTX 20mg SQ wkly, Xeljanz XR 11mg daily)                          11/29/2017: 16.8 (MTX 20mg SQ wkly)                      08/02/2017: 4.2   (MTX 20mg SQ wkly, Xeljanz XR 11mg daily)                         05/04/2017: 7      (MTX 20mg SQ wkly, Xeljanz XR 11mg daily)                        02/02/2017: 8      (MTX 20mg SQ wkly, Xeljanz XR 11mg daily)                      11/04/2016: 13    (MTX 20mg SQ weekly)                      07/15/2016: 10.8    # Hydroxychloroquine (Plaquenil) Risks and Benefits:  The risks and benefits of hydroxychloroquine were discussed in detail and the patient verbalized understanding; the patient also verbalized agreement to get the required ophthalmologic toxicity monitoring.  The risks discussed include, but are not limited to, the risk for hypersensitivity, anaphylaxis, anaphylactoid reactions, irreversible retinal damage, rare hematologic reactions, and rare cardiomyopathy.  Patients with G6PD deficiency or hepatic impairment may be at an increased risk for adverse effects.  I encouraged reviewing the package insert and asking any questions about the medication.       2.  Positive LORNA; positive and negative dsDNA; Secondary Sjogren's Syndrome: Repeat dsDNA was negative. Has dry eyes but no dry mouth.  Dry eyes are treated by ophthalmology with Restasis.      3. Bilateral patellofemoral syndrome: home exercises have been helpful.  Weight loss encouraged.       4. Fibromyalgia: Managed in the pain clinic     5.  Bone Health, vitamin D deficiency: normal 12/20/2019 DEXA; plan to repeat in 2024.    - Continue vitamin D 2000 IU daily    6. Chronic lower back pain with left sided sciatica: suspect related to degenerative changes seen on L-spine MRI.  She has been seen in the pain clinic and spine surgery clinic.  Documented here for historical significance.    7.  Vaccinations: Vaccinations reviewed with Ms. Fung.   - Influenza: encouraged yearly vaccination  - COVID-19: advised keeping updated    Total minutes spent in evaluation with patient, documentation, , and review of pertinent studies and chart notes: 14  The longitudinal plan of care for the rheumatology problem(s) were addressed during this visit.  Due to added complexity of care, we will continue to support the patient and the subsequent management of this condition with ongoing continuity of care.       Ms. Fung verbalized agreement with and understanding of the rational for the diagnosis and treatment plan.  All questions were answered to best of my ability and the patient's satisfaction. Ms. Fung was advised to contact the clinic with any questions that may arise after the clinic visit.      Thank you for involving me in the care of the patient    Return to clinic: 3 months    HPI   Sharon Fung is a 68 year old female with medical history significant for GERD, allergic rhinitis, obstructive sleep apnea, obesity, hx of trigger fingers, hx of carpal tunnel surgery, and rheumatoid arthritis who presents for follow-up of rheumatoid arthritis.    6/9/2023: RA controlled.  Morning stiffness <30 min. No  joint swelling. Changing to medicare and needs to discuss changing kevzara to a different agent because it has become cost prohibitive with Medicare.     10/18/2023: RA not as well-controlled since changing to Simponi.  Has had 3 doses of Simponi thus far.  Simponi infusions are well-tolerated but has not controlled her arthritis as well.  Pain, stiffness, and swelling at the MCPs, PIPs, and wrists.  Joint pain is worse in the morning and improves with time and activity.  Morning stiffness for approximately 2 hours.  She would like to continue Simponi for now to see if a longer duration of therapy will be more helpful.    Today, 1/25/2024: Doing fairly well on her current regimen but having morning stiffness in her hands for 2-3 hours, and still some ache in the hands that improves with flexion and extension of the fingers and general movement.  Arthritis is not limiting her daily activities.    No fevers or chills. No nausea, vomiting, constipation, diarrhea. No chest pain/pressure, palpitations, or shortness of breath. No oral or nasal sores. No neck pain. No rash.      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; now working casual    ROS   12 point review of system was completed and negative except as noted in the HPI     Active Problem List     Patient Active Problem List   Diagnosis    AR (allergic rhinitis)    GERD (gastroesophageal reflux disease)    Status post bariatric surgery    Advanced directives, counseling/discussion    High risk medication use    Obstructive sleep apnea    Obesity, Class III, BMI 40-49.9 (morbid obesity) (H)    Anterior basement membrane dystrophy    Seropositive rheumatoid arthritis (H)    LORNA positive    Carpal tunnel syndrome of right wrist    Headache    Immunosuppression (H24)    Fibromyalgia    Pain in joint, ankle and foot, right    Hypertension goal BP (blood pressure) < 140/90    Chronic back pain,  unspecified back location, unspecified back pain laterality    Hyperlipidemia LDL goal <100    Mild recurrent major depression (H24)     Past Medical History     Past Medical History:   Diagnosis Date    AR (allergic rhinitis)  as teen    Arthritis 12/14/2015    Chronic obstructive pulmonary disease, unspecified COPD type (H) 03/27/2018    Depressive disorder 3 years ago    GERD (gastroesophageal reflux disease) 04/2007    Headache 07/11/2017    Hypertension goal BP (blood pressure) < 140/90 12/20/2021    Mild major depression (H24) 3 years ago    Morbid obesity (H) teens    BRETT (obstructive sleep apnea)     uses CPAP    RA (rheumatoid arthritis) (H) 4 years    Reduced vision 3 years     Past Surgical History     Past Surgical History:   Procedure Laterality Date    ARTHRODESIS FOOT Right 6/30/2021    Procedure: Right 1st metatarsophalangeal joint fusion;  Surgeon: Jesus Wolf MD;  Location: [facility]    ARTHRODESIS TOE(S) Right 12/27/2021    Procedure: right revision great toe fusion;  Surgeon: Ryan Gee MD;  Location:  OR    BLEPHAROPLASTY BILATERAL Bilateral 8/5/2016    Procedure: BLEPHAROPLASTY BILATERAL;  Surgeon: Cortez Robin MD;  Location:  [facility]    BREAST BIOPSY, RT/LT  1-94    Benign    COLONOSCOPY      COLONOSCOPY WITH CO2 INSUFFLATION N/A 3/30/2017    Procedure: COLONOSCOPY WITH CO2 INSUFFLATION;  Surgeon: Issa Weeks MD;  Location:  OR    ESOPHAGOSCOPY, GASTROSCOPY, DUODENOSCOPY (EGD), COMBINED N/A 10/29/2015    Procedure: COMBINED ESOPHAGOSCOPY, GASTROSCOPY, DUODENOSCOPY (EGD);  Surgeon: Shaq Mims MD;  Location: [facility]    LAPAROSCOPIC CHOLECYSTECTOMY N/A 6/13/2023    Procedure: CHOLECYSTECTOMY, LAPAROSCOPIC;  Surgeon: Reynaldo Segura MD;  Location: [facility]    LAPAROSCOPIC GASTRIC ADJUSTABLE BANDING  11/09/10    Lap band procedure    LAPAROSCOPIC REMOVAL GASTRIC ADJUSTABLE BAND N/A 10/31/2015    Procedure: LAPAROSCOPIC REMOVAL GASTRIC ADJUSTABLE  BAND;  Surgeon: Shaq Mims MD;  Location: [facility] OR    [place] HAND/FINGER SURGERY UNLISTED  2016    [place] STOMACH SURGERY PROCEDURE UNLISTED  11/2015    RELEASE CARPAL TUNNEL Left 4/27/2017    Procedure: RELEASE CARPAL TUNNEL;  Left Open Carpal Tunnel Release;  Surgeon: Ciara Middleton MD;  Location:  OR    RELEASE CARPAL TUNNEL Right 6/15/2017    Procedure: RELEASE CARPAL TUNNEL;  Right Carpal Tunnel Release Open;  Surgeon: Ciara Middleton MD;  Location:  OR    REPAIR PTOSIS      TUBAL LIGATION  1988    [facility] APPENDECTOMY  1977     Allergy   No Known Allergies  Current Medication List     Current Outpatient Medications   Medication Sig    acetaminophen (TYLENOL) 500 MG tablet Take 500-1,000 mg by mouth every 6 hours as needed for mild pain    albuterol (PROAIR HFA/PROVENTIL HFA/VENTOLIN HFA) 108 (90 Base) MCG/ACT inhaler Inhale 2 puffs into the lungs every 6 hours as needed for shortness of breath / dyspnea or wheezing    citalopram (CELEXA) 20 MG tablet Take 1 tablet (20 mg) by mouth daily    fexofenadine (ALLEGRA) 180 MG tablet Take 1 tablet (180 mg) by mouth daily    fluticasone (FLONASE) 50 MCG/ACT nasal spray Spray 2 sprays into both nostrils as needed    gabapentin (NEURONTIN) 100 MG capsule TAKE ONE CAPSULE BY MOUTH EVERY MORNING, TAKE ONE CAPSULE MID-DAY, AND TAKE FOUR CAPSULES BY MOUTH EVERY NIGHT AT BEDTIME    ibuprofen 200 MG capsule Take 600-800 mg by mouth At Bedtime    leflunomide (ARAVA) 20 MG tablet Take 1 tablet (20 mg) by mouth daily    losartan-hydrochlorothiazide (HYZAAR) 100-25 MG tablet Take 1 tablet by mouth daily    ORDER FOR DME Use your CPAP device as directed by your provider.    sulfaSALAzine (AZULFIDINE) 500 MG tablet Take 3 tablets (1,500 mg) by mouth 2 times daily    vitamin D3 (CHOLECALCIFEROL) 50 mcg (2000 units) tablet Take 1 tablet (50 mcg) by mouth daily    predniSONE (DELTASONE) 5 MG tablet Prednisone 20mg daily x5days, then 15mg daily x5days, then 10mg  "daily x5days, then 5mg daily x5days, then stop. (Patient not taking: Reported on 11/21/2023)     Current Facility-Administered Medications   Medication    alcohol (dehydrated) (ABLYSINOL) 99 % injection 15 mL     Facility-Administered Medications Ordered in Other Visits   Medication    sodium chloride (PF) 0.9% PF flush 10 mL    sodium chloride bacteriostatic 0.9 % flush 12 mL       Social History   See HPI    Family History     Family History   Problem Relation Age of Onset    Neurologic Disorder Mother 20        migraine    Depression Mother     Heart Disease Father         MI    Neurologic Disorder Father 79        Parkinson's    Diabetes Father     Hypertension Father     Coronary Artery Disease Father     Cancer Maternal Grandmother         uterine    Neurologic Disorder Sister 16        migraine    Depression Sister     Depression Sister     Substance Abuse Sister     Migraines Sister     Neurologic Disorder Son 7        migraine    Substance Abuse Son     Migraines Son         none    Glaucoma No family hx of     Macular Degeneration No family hx of        Physical Exam     Temp Readings from Last 3 Encounters:   12/07/23 98  F (36.7  C)   10/13/23 97.9  F (36.6  C) (Temporal)   10/12/23 98.3  F (36.8  C)     BP Readings from Last 5 Encounters:   01/25/24 (!) 151/81   12/07/23 120/67   11/21/23 (!) 148/69   10/18/23 127/65   10/13/23 123/67     Pulse Readings from Last 1 Encounters:   01/25/24 92     Resp Readings from Last 1 Encounters:   12/07/23 16     Estimated body mass index is 45.72 kg/m  as calculated from the following:    Height as of this encounter: 1.613 m (5' 3.5\").    Weight as of this encounter: 118.9 kg (262 lb 3.2 oz).      GEN: NAD.   HEENT:  Anicteric, noninjected sclera. No obvious external lesions of the ear and nose. Hearing intact.  CV: S1, S2. RRR. No m/r/g  PULM: No increased work of breathing. CTA bilaterally   MSK: Mild synovial swelling and tenderness palpation of right " second-third MCPs.  Other MCPs without swelling or tenderness to palpation.  PIPs and DIPs without swelling or tenderness to palpation.  Wrists with mild tenderness to palpation and subtle synovial swelling but no increased warmth or overlying erythema.   Elbows and shoulders without swelling or tenderness to palpation.   Knees, ankles, and MTPs without swelling or tenderness to palpation.    SKIN: No rash or jaundice seen  PSYCH: Alert. Appropriate.      Labs / Imaging (select studies)     CBC  Recent Labs   Lab Test 01/15/24  1130 10/12/23  1025 06/13/23  0522 08/03/21  1150 05/10/21  0802 02/02/21  1056 10/12/20  1113   WBC 7.7 7.0 6.2   < > 5.1 7.4 6.1   RBC 4.51 4.45 4.13   < > 4.48 4.41 4.18   HGB 13.7 13.4 12.5   < > 13.5 13.4 12.7   HCT 43.5 41.4 38.4   < > 42.8 42.8 39.7   MCV 97 93 93   < > 96 97 95   RDW 13.4 13.1 13.7   < > 12.8 12.5 12.9    281 238   < > 193 216 221   MCH 30.4 30.1 30.3   < > 30.1 30.4 30.4   MCHC 31.5 32.4 32.6   < > 31.5 31.3* 32.0   NEUTROPHIL 41 46 66   < > 42.6 40.1 40.8   LYMPH 40 31 20   < > 26.4 34.8 33.4   MONOCYTE 14 14 9   < > 9.5 10.7 8.9   EOSINOPHIL 4 8 4   < > 19.5 12.6 15.6   BASOPHIL 1 1 1   < > 1.8 1.4 1.0   ANEU  --   --   --   --  2.2 3.0 2.5   ALYM  --   --   --   --  1.3 2.6 2.0   KIMBERLY  --   --   --   --  0.5 0.8 0.5   AEOS  --   --   --   --  1.0* 0.9* 1.0*   ABAS  --   --   --   --  0.1 0.1 0.1   ANEUTAUTO 3.1 3.2 4.1   < >  --   --   --    ALYMPAUTO 3.1 2.2 1.3   < >  --   --   --    AMONOAUTO 1.1 1.0 0.5   < >  --   --   --    AEOSAUTO 0.3 0.6 0.3   < >  --   --   --    ABSBASO 0.1 0.1 0.1   < >  --   --   --     < > = values in this interval not displayed.     CMP  Recent Labs   Lab Test 01/15/24  1130 10/12/23  1025 06/13/23  0951 06/13/23  0522 11/16/22  0937 07/01/22  0807 03/11/22  0910 12/27/21  0710 12/20/21  0851 08/03/21  1150 05/10/21  0802 02/02/21  1056 10/12/20  1113   NA  --   --   --  140  --  140  --   --  139   < > 140  --   --     POTASSIUM  --   --   --  3.4  --  3.9  --  3.5 3.7   < > 4.3  --   --    CHLORIDE  --   --   --  101  --  107  --   --  105   < > 110*  --   --    CO2  --   --   --  24  --  27  --   --  28   < > 25  --   --    ANIONGAP  --   --   --  15  --  6  --   --  6   < > 5  --   --    GLC  --   --  99 138*  --  96  --   --  144*   < > 99 74  --    BUN  --   --   --  17.1  --  18  --   --  20   < > 12  --   --    CR 0.68 0.66  --  0.69   < > 0.74   < >  --  0.68   < > 0.73 0.65 0.74   GFRESTIMATED >90 >90  --  >90   < > 89   < >  --  >90   < > 88 >90 87   GFRESTBLACK  --   --   --   --   --   --   --   --   --   --  >90 >90 >90   ISH  --   --   --  9.6  --  9.3  --   --  9.3   < > 8.6  --   --    BILITOTAL 0.4 0.4  --  0.5   < >  --    < >  --   --    < > 0.6 0.4 0.5   ALBUMIN 4.5 4.2  --  4.3   < >  --    < >  --   --    < > 3.8 4.0 3.8   PROTTOTAL 7.8 7.1  --  7.2   < >  --    < >  --   --    < > 7.1 7.1 6.9   ALKPHOS 102 106*  --  95   < >  --    < >  --   --    < > 103 115 107   AST 27 27  --  26   < >  --    < >  --   --    < > 33 36 23   ALT 27 24  --  29   < >  --    < >  --   --    < > 54* 58* 42    < > = values in this interval not displayed.     Calcium/VitaminD  Recent Labs   Lab Test 06/13/23  0522 07/01/22  0807 12/20/21  0851 05/10/21  0802 10/12/20  1113 03/31/20  0755   ISH 9.6 9.3 9.3   < >  --   --    VITDT  --   --   --   --  33 15*    < > = values in this interval not displayed.     ESR/CRP  Recent Labs   Lab Test 01/15/24  1130 10/12/23  1025 06/12/23  1247 11/16/22  0937 06/24/22  0741 03/11/22  0910   SED 20 15 16 6 5 5   CRP  --   --   --  <2.9 <2.9 <2.9   CRPI <3.00 3.10 10.60*  --   --   --      Hepatitis B  Recent Labs   Lab Test 06/12/23  1247 12/08/15  0855   HBCAB Nonreactive Nonreactive   HEPBANG Nonreactive Nonreactive     Hepatitis C  Recent Labs   Lab Test 06/12/23  1247 12/08/15  0855   HCVAB Nonreactive Nonreactive   Assay performance characteristics have not been established for  newborns,   infants, and children       Tuberculosis Screening  Recent Labs   Lab Test 06/12/23  1247 11/30/21  0806 10/31/17  1400 02/02/17  0822 12/08/15  0855   TBRES Negative Negative  --   --   --    TBRSLT  --   --  Negative Negative Negative   TBAGN  --   --  0.05 0.00 0.01     HIV Screening  Recent Labs   Lab Test 07/24/18  0738   HIAGAB Nonreactive     Immunization History     Immunization History   Administered Date(s) Administered    COVID-19 12+ (2023-24) (Pfizer) 10/13/2023    COVID-19 Bivalent 12+ (Pfizer) 03/30/2023    COVID-19 MONOVALENT 12+ (Pfizer) 12/21/2020, 01/07/2021, 08/27/2021    COVID-19 Monovalent 18+ (Moderna) 03/08/2022    Flu, Unspecified 10/12/2020    Influenza (High Dose) 3 valent vaccine 11/01/2022, 09/29/2023    Influenza (intradermal) 10/04/2011, 10/01/2012    Influenza Vaccine 18-64 (Flublok) 09/15/2021    Influenza Vaccine >6 months,quad, PF 10/15/2013, 09/15/2014, 09/28/2015, 09/28/2016, 10/16/2017, 10/01/2018    Influenza Vaccine Im 4yrs+ 4 Valent CCIIV4 10/15/2019    Pneumo Conj 13-V (2010&after) 04/15/2016    Pneumococcal 20 valent Conjugate (Prevnar 20) 07/01/2022    Pneumococcal 23 valent 07/15/2016    TD,PF 7+ (Tenivac) 10/30/2020    TDAP Vaccine (Adacel) 02/09/2011    Zoster recombinant adjuvanted (SHINGRIX) 04/17/2019, 08/28/2019          Chart documentation done in part with Dragon Voice recognition Software. Although reviewed after completion, some word and grammatical error may remain.     Freddy Guerra MD

## 2024-02-07 ENCOUNTER — OFFICE VISIT (OUTPATIENT)
Dept: URGENT CARE | Facility: URGENT CARE | Age: 69
End: 2024-02-07
Payer: COMMERCIAL

## 2024-02-07 VITALS
BODY MASS INDEX: 45.98 KG/M2 | WEIGHT: 263.7 LBS | DIASTOLIC BLOOD PRESSURE: 80 MMHG | SYSTOLIC BLOOD PRESSURE: 135 MMHG | TEMPERATURE: 98.3 F | RESPIRATION RATE: 16 BRPM | HEART RATE: 103 BPM | OXYGEN SATURATION: 92 %

## 2024-02-07 DIAGNOSIS — N89.8 VAGINAL IRRITATION: Primary | ICD-10-CM

## 2024-02-07 DIAGNOSIS — R30.0 DYSURIA: ICD-10-CM

## 2024-02-07 LAB
ALBUMIN UR-MCNC: NEGATIVE MG/DL
APPEARANCE UR: CLEAR
BACTERIA #/AREA URNS HPF: ABNORMAL /HPF
BILIRUB UR QL STRIP: NEGATIVE
CLUE CELLS: ABNORMAL
COLOR UR AUTO: YELLOW
GLUCOSE UR STRIP-MCNC: NEGATIVE MG/DL
HGB UR QL STRIP: NEGATIVE
KETONES UR STRIP-MCNC: NEGATIVE MG/DL
LEUKOCYTE ESTERASE UR QL STRIP: ABNORMAL
NITRATE UR QL: NEGATIVE
PH UR STRIP: 6 [PH] (ref 5–7)
RBC #/AREA URNS AUTO: ABNORMAL /HPF
SP GR UR STRIP: 1.01 (ref 1–1.03)
SQUAMOUS #/AREA URNS AUTO: ABNORMAL /LPF
TRICHOMONAS, WET PREP: ABNORMAL
UROBILINOGEN UR STRIP-ACNC: 0.2 E.U./DL
WBC #/AREA URNS AUTO: ABNORMAL /HPF
WBC'S/HIGH POWER FIELD, WET PREP: ABNORMAL
YEAST, WET PREP: ABNORMAL

## 2024-02-07 PROCEDURE — 99214 OFFICE O/P EST MOD 30 MIN: CPT | Performed by: PHYSICIAN ASSISTANT

## 2024-02-07 PROCEDURE — 81001 URINALYSIS AUTO W/SCOPE: CPT | Performed by: PHYSICIAN ASSISTANT

## 2024-02-07 PROCEDURE — 87210 SMEAR WET MOUNT SALINE/INK: CPT | Performed by: PHYSICIAN ASSISTANT

## 2024-02-07 ASSESSMENT — ENCOUNTER SYMPTOMS
SORE THROAT: 0
NECK STIFFNESS: 0
GASTROINTESTINAL NEGATIVE: 1
SHORTNESS OF BREATH: 0
COUGH: 0
RESPIRATORY NEGATIVE: 1
DIZZINESS: 0
MYALGIAS: 0
ENDOCRINE NEGATIVE: 1
FEVER: 0
WEAKNESS: 0
NAUSEA: 0
POLYDIPSIA: 0
FATIGUE: 0
HEADACHES: 0
CHILLS: 0
PALPITATIONS: 0
FLANK PAIN: 0
CARDIOVASCULAR NEGATIVE: 1
ACTIVITY CHANGE: 0
CONSTITUTIONAL NEGATIVE: 1
FREQUENCY: 0
NECK PAIN: 0
NEUROLOGICAL NEGATIVE: 1
LIGHT-HEADEDNESS: 0
HEMATURIA: 0
ADENOPATHY: 0
DYSURIA: 1
VOMITING: 0
RHINORRHEA: 0
MUSCULOSKELETAL NEGATIVE: 1
ABDOMINAL PAIN: 0
DIARRHEA: 0

## 2024-02-07 ASSESSMENT — PAIN SCALES - GENERAL: PAINLEVEL: MILD PAIN (2)

## 2024-02-07 NOTE — PROGRESS NOTES
"Chief Complaint:    Chief Complaint   Patient presents with    Vaginal Problem     Patient reports \"severe\" vaginal pain; patient states it hurts to wipe because it is so tender. Patient started new RA medication two weeks ago and is wondering if it is related.      ASSESSMENT  1. Vaginal irritation    2. Dysuria         PLAN    Urinalysis discussed with patient.  This was unremarkable for UTI.  Wet prep was negative  Rx for Premarin cream today  Follow up with PCP or OB/GYN in 1 week if symptoms are not improving.  Worrisome symptoms discussed with instructions to go to the ED.  Patient verbalized understanding and agreed with this plan.    35 minutes was spent in the care of this patient including chart review, HPI, ROS, PE, review of plan, and placing of orders.      Labs:     Results for orders placed or performed in visit on 02/07/24   UA Macroscopic with reflex to Microscopic and Culture - Lab Collect     Status: Abnormal    Specimen: Urine, Midstream   Result Value Ref Range    Color Urine Yellow Colorless, Straw, Light Yellow, Yellow    Appearance Urine Clear Clear    Glucose Urine Negative Negative mg/dL    Bilirubin Urine Negative Negative    Ketones Urine Negative Negative mg/dL    Specific Gravity Urine 1.015 1.003 - 1.035    Blood Urine Negative Negative    pH Urine 6.0 5.0 - 7.0    Protein Albumin Urine Negative Negative mg/dL    Urobilinogen Urine 0.2 0.2, 1.0 E.U./dL    Nitrite Urine Negative Negative    Leukocyte Esterase Urine Small (A) Negative   UA Microscopic with Reflex to Culture     Status: Abnormal   Result Value Ref Range    Bacteria Urine Moderate (A) None Seen /HPF    RBC Urine 0-2 0-2 /HPF /HPF    WBC Urine 0-5 0-5 /HPF /HPF    Squamous Epithelials Urine Few (A) None Seen /LPF    Narrative    Urine Culture not indicated   Wet prep - lab collect     Status: Abnormal    Specimen: Vagina; Swab   Result Value Ref Range    Trichomonas Absent Absent    Yeast Absent Absent    Clue Cells Absent " Absent    WBCs/high power field 1+ (A) None       Problem history    Patient Active Problem List   Diagnosis    AR (allergic rhinitis)    GERD (gastroesophageal reflux disease)    Status post bariatric surgery    Advanced directives, counseling/discussion    High risk medication use    Obstructive sleep apnea    Obesity, Class III, BMI 40-49.9 (morbid obesity) (H)    Anterior basement membrane dystrophy    Seropositive rheumatoid arthritis (H)    LORNA positive    Carpal tunnel syndrome of right wrist    Headache    Immunosuppression (H24)    Fibromyalgia    Pain in joint, ankle and foot, right    Hypertension goal BP (blood pressure) < 140/90    Chronic back pain, unspecified back location, unspecified back pain laterality    Hyperlipidemia LDL goal <100    Mild recurrent major depression (H24)       Current Meds    Current Outpatient Medications:     conjugated estrogens (PREMARIN) 0.625 MG/GM vaginal cream, Place 0.5 g vaginally three times a week, Disp: 30 g, Rfl: 0    acetaminophen (TYLENOL) 500 MG tablet, Take 500-1,000 mg by mouth every 6 hours as needed for mild pain, Disp: , Rfl:     albuterol (PROAIR HFA/PROVENTIL HFA/VENTOLIN HFA) 108 (90 Base) MCG/ACT inhaler, Inhale 2 puffs into the lungs every 6 hours as needed for shortness of breath / dyspnea or wheezing, Disp: 1 Inhaler, Rfl: 1    citalopram (CELEXA) 20 MG tablet, Take 1 tablet (20 mg) by mouth daily, Disp: 90 tablet, Rfl: 1    fexofenadine (ALLEGRA) 180 MG tablet, Take 1 tablet (180 mg) by mouth daily, Disp: 90 tablet, Rfl: 2    fluticasone (FLONASE) 50 MCG/ACT nasal spray, Spray 2 sprays into both nostrils as needed, Disp: , Rfl:     gabapentin (NEURONTIN) 100 MG capsule, TAKE ONE CAPSULE BY MOUTH EVERY MORNING, TAKE ONE CAPSULE MID-DAY, AND TAKE FOUR CAPSULES BY MOUTH EVERY NIGHT AT BEDTIME, Disp: 180 capsule, Rfl: 5    hydroxychloroquine (PLAQUENIL) 200 MG tablet, Take 1 tablet (200 mg) by mouth 2 times daily, Disp: 60 tablet, Rfl: 4    ibuprofen  200 MG capsule, Take 600-800 mg by mouth At Bedtime, Disp: , Rfl:     leflunomide (ARAVA) 20 MG tablet, Take 1 tablet (20 mg) by mouth daily, Disp: 90 tablet, Rfl: 2    losartan-hydrochlorothiazide (HYZAAR) 100-25 MG tablet, Take 1 tablet by mouth daily, Disp: 90 tablet, Rfl: 0    ORDER FOR DME, Use your CPAP device as directed by your provider., Disp: , Rfl:     predniSONE (DELTASONE) 5 MG tablet, Prednisone 20mg daily x5days, then 15mg daily x5days, then 10mg daily x5days, then 5mg daily x5days, then stop. (Patient not taking: Reported on 11/21/2023), Disp: 50 tablet, Rfl: 0    sulfaSALAzine (AZULFIDINE) 500 MG tablet, Take 3 tablets (1,500 mg) by mouth 2 times daily, Disp: 540 tablet, Rfl: 2    vitamin D3 (CHOLECALCIFEROL) 50 mcg (2000 units) tablet, Take 1 tablet (50 mcg) by mouth daily, Disp: 90 tablet, Rfl: 3    Current Facility-Administered Medications:     alcohol (dehydrated) (ABLYSINOL) 99 % injection 15 mL, 15 mL, Other, Once, Levon Jamil MD    Facility-Administered Medications Ordered in Other Visits:     sodium chloride (PF) 0.9% PF flush 10 mL, 10 mL, Intracatheter, Once, Nely Matthews MD    sodium chloride bacteriostatic 0.9 % flush 12 mL, 12 mL, Intravenous, Once, Nely Matthews MD    Allergies  No Known Allergies    SUBJECTIVE    HPI:  Sharon Fung is a 68 year old female who has symptoms of vaginal pain and dysuria.  Symptoms started 2 days ago and have not changefd.  she denies back pain, nausea, vomiting, fever, and chills, flank pain, vaginal discharge, and vaginal odor.    ROS:      Review of Systems   Constitutional: Negative.  Negative for activity change, chills, fatigue and fever.   HENT:  Negative for congestion, ear pain, rhinorrhea and sore throat.    Respiratory: Negative.  Negative for cough and shortness of breath.    Cardiovascular: Negative.  Negative for chest pain and palpitations.   Gastrointestinal: Negative.  Negative for abdominal pain, diarrhea, nausea  and vomiting.   Endocrine: Negative.  Negative for polydipsia and polyuria.   Genitourinary:  Positive for dysuria. Negative for flank pain, frequency, hematuria, pelvic pain, urgency, vaginal discharge and vaginal pain.   Musculoskeletal: Negative.  Negative for myalgias, neck pain and neck stiffness.   Allergic/Immunologic: Negative for immunocompromised state.   Neurological: Negative.  Negative for dizziness, weakness, light-headedness and headaches.   Hematological:  Negative for adenopathy.       Family History   Family History   Problem Relation Age of Onset    Neurologic Disorder Mother 20        migraine    Depression Mother     Heart Disease Father         MI    Neurologic Disorder Father 79        Parkinson's    Diabetes Father     Hypertension Father     Coronary Artery Disease Father     Cancer Maternal Grandmother         uterine    Neurologic Disorder Sister 16        migraine    Depression Sister     Depression Sister     Substance Abuse Sister     Migraines Sister     Neurologic Disorder Son 7        migraine    Substance Abuse Son     Migraines Son         none    Glaucoma No family hx of     Macular Degeneration No family hx of         Social History  Social History     Socioeconomic History    Marital status:      Spouse name: Not on file    Number of children: 2    Years of education: 18    Highest education level: Not on file   Occupational History    Occupation: RN     Employer: [employer]   Tobacco Use    Smoking status: Never     Passive exposure: Never    Smokeless tobacco: Never   Vaping Use    Vaping Use: Never used   Substance and Sexual Activity    Alcohol use: No     Alcohol/week: 1.0 - 2.0 standard drink of alcohol    Drug use: No    Sexual activity: Not Currently     Partners: Male     Birth control/protection: Abstinence, Female Surgical   Other Topics Concern    Parent/sibling w/ CABG, MI or angioplasty before 65F 55M? No   Social History Narrative    Not on file      Social Determinants of Health     Financial Resource Strain: Low Risk  (10/13/2023)    Financial Resource Strain     Within the past 12 months, have you or your family members you live with been unable to get utilities (heat, electricity) when it was really needed?: No   Food Insecurity: Low Risk  (10/13/2023)    Food Insecurity     Within the past 12 months, did you worry that your food would run out before you got money to buy more?: No     Within the past 12 months, did the food you bought just not last and you didn t have money to get more?: No   Transportation Needs: Low Risk  (10/13/2023)    Transportation Needs     Within the past 12 months, has lack of transportation kept you from medical appointments, getting your medicines, non-medical meetings or appointments, work, or from getting things that you need?: No   Physical Activity: Not on file   Stress: Not on file   Social Connections: Not on file   Interpersonal Safety: Low Risk  (10/13/2023)    Interpersonal Safety     Do you feel physically and emotionally safe where you currently live?: Yes     Within the past 12 months, have you been hit, slapped, kicked or otherwise physically hurt by someone?: No     Within the past 12 months, have you been humiliated or emotionally abused in other ways by your partner or ex-partner?: No   Housing Stability: Low Risk  (10/13/2023)    Housing Stability     Do you have housing? : Yes     Are you worried about losing your housing?: No           OBJECTIVE     Vital signs noted and reviewed by Turner Arellano PA-C  /80 (BP Location: Left arm, Patient Position: Sitting, Cuff Size: Adult Large)   Pulse 103   Temp 98.3  F (36.8  C) (Tympanic)   Resp 16   Wt 119.6 kg (263 lb 11.2 oz)   SpO2 92%   BMI 45.98 kg/m       Physical Exam  Vitals and nursing note reviewed.   Constitutional:       General: She is not in acute distress.     Appearance: Normal appearance. She is well-developed. She is not ill-appearing,  toxic-appearing or diaphoretic.   HENT:      Head: Normocephalic and atraumatic.      Right Ear: Tympanic membrane and external ear normal.      Left Ear: Tympanic membrane and external ear normal.   Eyes:      Pupils: Pupils are equal, round, and reactive to light.   Cardiovascular:      Rate and Rhythm: Normal rate and regular rhythm.      Heart sounds: Normal heart sounds. No murmur heard.     No friction rub. No gallop.   Pulmonary:      Effort: Pulmonary effort is normal. No respiratory distress.      Breath sounds: Normal breath sounds. No wheezing or rales.   Chest:      Chest wall: No tenderness.   Abdominal:      General: Bowel sounds are normal. There is no distension.      Palpations: Abdomen is soft. Abdomen is not rigid. There is no mass.      Tenderness: There is no abdominal tenderness. There is no guarding or rebound. Negative signs include Bae's sign and McBurney's sign.   Genitourinary:     Labia:         Right: Tenderness present. No rash, lesion or injury.         Left: Tenderness present. No rash, lesion or injury.       Comments: Redness and irritation of the labia.   Musculoskeletal:      Cervical back: Normal range of motion and neck supple.   Lymphadenopathy:      Cervical: No cervical adenopathy.   Skin:     General: Skin is warm and dry.   Neurological:      Mental Status: She is alert and oriented to person, place, and time.      Cranial Nerves: No cranial nerve deficit.      Deep Tendon Reflexes: Reflexes are normal and symmetric.   Psychiatric:         Behavior: Behavior normal. Behavior is cooperative.         Thought Content: Thought content normal.         Judgment: Judgment normal.             Turner Arellano PA-C  2/7/2024, 1:27 PM

## 2024-02-14 ENCOUNTER — OFFICE VISIT (OUTPATIENT)
Dept: FAMILY MEDICINE | Facility: CLINIC | Age: 69
End: 2024-02-14
Payer: COMMERCIAL

## 2024-02-14 VITALS
SYSTOLIC BLOOD PRESSURE: 130 MMHG | TEMPERATURE: 97.9 F | HEART RATE: 79 BPM | HEIGHT: 64 IN | OXYGEN SATURATION: 96 % | DIASTOLIC BLOOD PRESSURE: 78 MMHG | WEIGHT: 261 LBS | RESPIRATION RATE: 15 BRPM | BODY MASS INDEX: 44.56 KG/M2

## 2024-02-14 DIAGNOSIS — B96.89 BV (BACTERIAL VAGINOSIS): ICD-10-CM

## 2024-02-14 DIAGNOSIS — N76.0 BV (BACTERIAL VAGINOSIS): ICD-10-CM

## 2024-02-14 DIAGNOSIS — N89.8 VAGINAL IRRITATION: Primary | ICD-10-CM

## 2024-02-14 LAB
CLUE CELLS: PRESENT
TRICHOMONAS, WET PREP: ABNORMAL
WBC'S/HIGH POWER FIELD, WET PREP: ABNORMAL
YEAST, WET PREP: ABNORMAL

## 2024-02-14 PROCEDURE — 87210 SMEAR WET MOUNT SALINE/INK: CPT | Performed by: STUDENT IN AN ORGANIZED HEALTH CARE EDUCATION/TRAINING PROGRAM

## 2024-02-14 PROCEDURE — 99213 OFFICE O/P EST LOW 20 MIN: CPT | Performed by: STUDENT IN AN ORGANIZED HEALTH CARE EDUCATION/TRAINING PROGRAM

## 2024-02-14 RX ORDER — METRONIDAZOLE 500 MG/1
500 TABLET ORAL 2 TIMES DAILY
Qty: 14 TABLET | Refills: 0 | Status: SHIPPED | OUTPATIENT
Start: 2024-02-14 | End: 2024-02-21

## 2024-02-14 RX ORDER — RESPIRATORY SYNCYTIAL VIRUS VACCINE 120MCG/0.5
0.5 KIT INTRAMUSCULAR ONCE
Qty: 1 EACH | Refills: 0 | Status: CANCELLED | OUTPATIENT
Start: 2024-02-14 | End: 2024-02-14

## 2024-02-14 RX ORDER — FLUCONAZOLE 150 MG/1
TABLET ORAL
Qty: 4 TABLET | Refills: 0 | Status: SHIPPED | OUTPATIENT
Start: 2024-02-14 | End: 2024-04-15

## 2024-02-14 ASSESSMENT — ENCOUNTER SYMPTOMS
CHILLS: 0
SWEATS: 0
FLANK PAIN: 0
FREQUENCY: 0
NAUSEA: 0
HEMATURIA: 0
VOMITING: 0

## 2024-02-14 NOTE — PROGRESS NOTES
Assessment & Plan     Vaginal irritation  Wet prep obtained during visit.  Concern for yeast infection based on examination.  We did send Diflucan into the pharmacy, wet prep did return negative for yeast but positive for BV.  Recommend treating with Flagyl twice daily for 7 days.  She can hold the Diflucan and use once at the end of her antibiotic course.  Not improving recommend being seen.  There was some asymmetry in the labia, this may be her normal anatomy, but if not improving recommend seeing gynecology.  We discussed that she can hold the Premarin as these symptoms were an abrupt onset and is likely due to chronic vaginal atrophy but she does have.  - fluconazole (DIFLUCAN) 150 MG tablet; Take 1 tablet every 72 hours for 4 doses  - Wet prep - Clinic Collect    BV (bacterial vaginosis)  - metroNIDAZOLE (FLAGYL) 500 MG tablet; Take 1 tablet (500 mg) by mouth 2 times daily for 7 days            Mukul Herrera is a 68 year old, presenting for the following health issues:  Vaginal Problem        2/14/2024     8:48 AM   Additional Questions   Accompanied by na         2/14/2024     8:48 AM   Patient Reported Additional Medications   Patient reports taking the following new medications Estrogen cream     HPI         ED/UC Followup:    Facility:  [facility]   Date of visit: 02/07/2024  Reason for visit: Vaginal pain  Current Status:   Answers submitted by the patient for this visit:  Painful Urination Questionnaire (Submitted on 2/14/2024)  Chronicity: new  Onset: 1 to 4 weeks ago  Frequency: intermittently  Progression since onset: unchanged  Pain quality: burning  Pain - numeric: 3/10  Fever: no fever  Fever duration: less than 1 day  Sexually active?: No  History of pyelonephritis?: No  chills: No  discharge: No  flank pain: No  frequency: No  hematuria: No  hesitancy: No  nausea: No  possible pregnancy: No  sweats: No  urgency: No  vomiting: No      Pain and swelling of  "external area of vagina, started 9 days ago abruptly. Hurts to walk and sitting. Hurts to touch. Wiping with toilet paper hurts. No itching. Started abruptly.     Only change was started plaquenil.     Not sexually active, no masturbation.     No baths or hot tubs. Doesn't use soap in that area.     No fevers/chills.. no abdominal pain.         Review of Systems  Constitutional, HEENT, cardiovascular, pulmonary, gi and gu systems are negative, except as otherwise noted.      Objective    /78 (Patient Position: Sitting, Cuff Size: Adult Large)   Pulse 79   Temp 97.9  F (36.6  C)   Resp 15   Ht 1.613 m (5' 3.5\")   Wt 118.4 kg (261 lb)   SpO2 96%   BMI 45.50 kg/m    Body mass index is 45.5 kg/m .  Physical Exam   GENERAL: alert and no distress  RESP: lungs clear to auscultation - no rales, rhonchi or wheezes  CV: regular rate and rhythm, normal S1 S2, no S3 or S4, no murmur, click or rub, no peripheral edema  ABDOMEN: soft, nontender, no hepatosplenomegaly, no masses and bowel sounds normal   (female): abnormal female external genitalia with brightly erythematous and friable labias b/l with white film over area, very tender to touch. , normal urethral meatus, vaginal mucosa atrophic. Right side of labia/perineum large than  left but not palpable mass/abscess/cysts.   SKIN: no suspicious lesions or rashes  NEURO: Normal strength and tone, mentation intact and speech normal  PSYCH: mentation appears normal, affect normal/bright    Results for orders placed or performed in visit on 02/14/24   Wet prep - Clinic Collect     Status: Abnormal    Specimen: Vagina; Swab   Result Value Ref Range    Trichomonas Absent Absent    Yeast Absent Absent    Clue Cells Present (A) Absent    WBCs/high power field 1+ (A) None           Signed Electronically by: Gely Cespedes DO    "

## 2024-04-15 ENCOUNTER — OFFICE VISIT (OUTPATIENT)
Dept: FAMILY MEDICINE | Facility: CLINIC | Age: 69
End: 2024-04-15
Payer: COMMERCIAL

## 2024-04-15 VITALS
TEMPERATURE: 98.3 F | HEART RATE: 80 BPM | HEIGHT: 64 IN | OXYGEN SATURATION: 93 % | WEIGHT: 263 LBS | BODY MASS INDEX: 44.9 KG/M2 | DIASTOLIC BLOOD PRESSURE: 66 MMHG | SYSTOLIC BLOOD PRESSURE: 124 MMHG

## 2024-04-15 DIAGNOSIS — F33.0 MILD RECURRENT MAJOR DEPRESSION (H): Primary | ICD-10-CM

## 2024-04-15 DIAGNOSIS — F41.1 GAD (GENERALIZED ANXIETY DISORDER): ICD-10-CM

## 2024-04-15 DIAGNOSIS — M54.50 CHRONIC LEFT-SIDED LOW BACK PAIN WITHOUT SCIATICA: ICD-10-CM

## 2024-04-15 DIAGNOSIS — M05.9 SEROPOSITIVE RHEUMATOID ARTHRITIS (H): ICD-10-CM

## 2024-04-15 DIAGNOSIS — R73.09 ELEVATED GLUCOSE LEVEL: ICD-10-CM

## 2024-04-15 DIAGNOSIS — G89.29 CHRONIC LEFT-SIDED LOW BACK PAIN WITHOUT SCIATICA: ICD-10-CM

## 2024-04-15 DIAGNOSIS — I10 HYPERTENSION GOAL BP (BLOOD PRESSURE) < 140/90: ICD-10-CM

## 2024-04-15 DIAGNOSIS — D84.9 IMMUNOSUPPRESSION (H): ICD-10-CM

## 2024-04-15 DIAGNOSIS — E78.5 HYPERLIPIDEMIA LDL GOAL <100: ICD-10-CM

## 2024-04-15 DIAGNOSIS — E66.01 OBESITY, CLASS III, BMI 40-49.9 (MORBID OBESITY) (H): ICD-10-CM

## 2024-04-15 PROCEDURE — 99214 OFFICE O/P EST MOD 30 MIN: CPT | Performed by: FAMILY MEDICINE

## 2024-04-15 PROCEDURE — 96127 BRIEF EMOTIONAL/BEHAV ASSMT: CPT | Performed by: FAMILY MEDICINE

## 2024-04-15 RX ORDER — CITALOPRAM HYDROBROMIDE 20 MG/1
20 TABLET ORAL DAILY
Qty: 90 TABLET | Refills: 3 | Status: SHIPPED | OUTPATIENT
Start: 2024-04-15

## 2024-04-15 RX ORDER — LOSARTAN POTASSIUM AND HYDROCHLOROTHIAZIDE 25; 100 MG/1; MG/1
1 TABLET ORAL DAILY
Qty: 90 TABLET | Refills: 3 | Status: SHIPPED | OUTPATIENT
Start: 2024-04-15

## 2024-04-15 RX ORDER — GABAPENTIN 100 MG/1
CAPSULE ORAL
Qty: 180 CAPSULE | Refills: 5 | Status: SHIPPED | OUTPATIENT
Start: 2024-04-15 | End: 2024-09-12

## 2024-04-15 RX ORDER — HYDROXYZINE PAMOATE 25 MG/1
25 CAPSULE ORAL 3 TIMES DAILY PRN
Qty: 30 CAPSULE | Refills: 1 | Status: SHIPPED | OUTPATIENT
Start: 2024-04-15

## 2024-04-15 ASSESSMENT — PATIENT HEALTH QUESTIONNAIRE - PHQ9
SUM OF ALL RESPONSES TO PHQ QUESTIONS 1-9: 4
5. POOR APPETITE OR OVEREATING: NOT AT ALL
SUM OF ALL RESPONSES TO PHQ QUESTIONS 1-9: 4
10. IF YOU CHECKED OFF ANY PROBLEMS, HOW DIFFICULT HAVE THESE PROBLEMS MADE IT FOR YOU TO DO YOUR WORK, TAKE CARE OF THINGS AT HOME, OR GET ALONG WITH OTHER PEOPLE: NOT DIFFICULT AT ALL

## 2024-04-15 ASSESSMENT — ANXIETY QUESTIONNAIRES
6. BECOMING EASILY ANNOYED OR IRRITABLE: NOT AT ALL
1. FEELING NERVOUS, ANXIOUS, OR ON EDGE: SEVERAL DAYS
7. FEELING AFRAID AS IF SOMETHING AWFUL MIGHT HAPPEN: SEVERAL DAYS
1. FEELING NERVOUS, ANXIOUS, OR ON EDGE: SEVERAL DAYS
5. BEING SO RESTLESS THAT IT IS HARD TO SIT STILL: NOT AT ALL
GAD7 TOTAL SCORE: 5
GAD7 TOTAL SCORE: 5
2. NOT BEING ABLE TO STOP OR CONTROL WORRYING: MORE THAN HALF THE DAYS
GAD7 TOTAL SCORE: 5
5. BEING SO RESTLESS THAT IT IS HARD TO SIT STILL: NOT AT ALL
7. FEELING AFRAID AS IF SOMETHING AWFUL MIGHT HAPPEN: SEVERAL DAYS
3. WORRYING TOO MUCH ABOUT DIFFERENT THINGS: SEVERAL DAYS
GAD7 TOTAL SCORE: 5
3. WORRYING TOO MUCH ABOUT DIFFERENT THINGS: SEVERAL DAYS
8. IF YOU CHECKED OFF ANY PROBLEMS, HOW DIFFICULT HAVE THESE MADE IT FOR YOU TO DO YOUR WORK, TAKE CARE OF THINGS AT HOME, OR GET ALONG WITH OTHER PEOPLE?: SOMEWHAT DIFFICULT
IF YOU CHECKED OFF ANY PROBLEMS ON THIS QUESTIONNAIRE, HOW DIFFICULT HAVE THESE PROBLEMS MADE IT FOR YOU TO DO YOUR WORK, TAKE CARE OF THINGS AT HOME, OR GET ALONG WITH OTHER PEOPLE: NOT DIFFICULT AT ALL
6. BECOMING EASILY ANNOYED OR IRRITABLE: SEVERAL DAYS
2. NOT BEING ABLE TO STOP OR CONTROL WORRYING: SEVERAL DAYS
4. TROUBLE RELAXING: NOT AT ALL
IF YOU CHECKED OFF ANY PROBLEMS ON THIS QUESTIONNAIRE, HOW DIFFICULT HAVE THESE PROBLEMS MADE IT FOR YOU TO DO YOUR WORK, TAKE CARE OF THINGS AT HOME, OR GET ALONG WITH OTHER PEOPLE: SOMEWHAT DIFFICULT
7. FEELING AFRAID AS IF SOMETHING AWFUL MIGHT HAPPEN: SEVERAL DAYS

## 2024-04-15 ASSESSMENT — ENCOUNTER SYMPTOMS: NERVOUS/ANXIOUS: 1

## 2024-04-15 ASSESSMENT — PAIN SCALES - GENERAL: PAINLEVEL: NO PAIN (0)

## 2024-04-15 NOTE — PROGRESS NOTES
{PROVIDER CHARTING PREFERENCE:904266}    Subjective   Sharon is a 68 year old, presenting for the following health issues:  Depression and Anxiety      4/15/2024     7:51 AM   Additional Questions   Roomed by cam         4/15/2024     7:51 AM   Patient Reported Additional Medications   Patient reports taking the following new medications none     Anxiety    History of Present Illness       Mental Health Follow-up:  Patient presents to follow-up on Depression & Anxiety.Patient's depression since last visit has been:  Medium  The patient is not having other symptoms associated with depression.  Patient's anxiety since last visit has been:  Bad  The patient is having other symptoms associated with anxiety.  Any significant life events: other  Patient is feeling anxious or having panic attacks.  Patient has no concerns about alcohol or drug use.    She eats 0-1 servings of fruits and vegetables daily.She consumes 0 sweetened beverage(s) daily.She exercises with enough effort to increase her heart rate 9 or less minutes per day.  She exercises with enough effort to increase her heart rate 3 or less days per week.   She is taking medications regularly.       {MA/LPN/RN Pre-Provider Visit Orders- hCG/UA/Strep (Optional):022435}  {SUPERLIST (Optional):380879}  {additonal problems for provider to add (Optional):047212}    {ROS Picklists (Optional):218634}      Objective    There were no vitals taken for this visit.  There is no height or weight on file to calculate BMI.  Physical Exam   {Exam List (Optional):270189}    {Diagnostic Test Results (Optional):693931}        Signed Electronically by: Reynaldo Saavedra MD  {Email feedback regarding this note to [email or web address]   :[id number]}

## 2024-04-15 NOTE — PROGRESS NOTES
"  Assessment & Plan       ICD-10-CM    1. Mild recurrent major depression (H24)  F33.0 citalopram (CELEXA) 20 MG tablet      2. JUANPABLO (generalized anxiety disorder)  F41.1 hydrOXYzine cha (VISTARIL) 25 MG capsule      3. Hypertension goal BP (blood pressure) < 140/90  I10 losartan-hydrochlorothiazide (HYZAAR) 100-25 MG tablet     Basic metabolic panel      4. Chronic left-sided low back pain without sciatica  M54.50 gabapentin (NEURONTIN) 100 MG capsule    G89.29       5. Seropositive rheumatoid arthritis (H)  M05.9       6. Immunosuppression (H24)  D84.9       7. Obesity, Class III, BMI 40-49.9 (morbid obesity) (H)  E66.01 Hemoglobin A1c      8. Hyperlipidemia LDL goal <100  E78.5 Lipid panel reflex to direct LDL Fasting      9. Elevated glucose level  R73.09 Hemoglobin A1c        We discussed further options for treatment of her anxiety and depression and elected to keep her on the same SSRI, but I will prescribe hydroxyzine that she could use on an as-needed basis for increased anxiety  We will continue her other same baseline medicines as well  I will order some fasting lab work for her to have done in early May when she comes in for her routine rheumatology labs  She will continue to work on diet and exercise treatment for weight loss  Continue ongoing rheumatology care for the rheumatoid arthritis  We will have her return in the next couple of months for an annual wellness exam and follow-up on the above items        BMI  Estimated body mass index is 45.86 kg/m  as calculated from the following:    Height as of this encounter: 1.613 m (5' 3.5\").    Weight as of this encounter: 119.3 kg (263 lb).   Weight management plan: Discussed healthy diet and exercise guidelines          Mukul Herrera is a 68 year old, presenting for the following health issues:  Depression and Anxiety      4/15/2024     7:51 AM   Additional Questions   Roomed by cam         4/15/2024     7:51 AM   Patient Reported Additional " Medications   Patient reports taking the following new medications none     Anxiety    History of Present Illness       Mental Health Follow-up:  Patient presents to follow-up on Depression & Anxiety.Patient's depression since last visit has been:  Medium  The patient is not having other symptoms associated with depression.  Patient's anxiety since last visit has been:  Bad  The patient is having other symptoms associated with anxiety.  Any significant life events: other  Patient is feeling anxious or having panic attacks.  Patient has no concerns about alcohol or drug use.    She eats 0-1 servings of fruits and vegetables daily.She consumes 0 sweetened beverage(s) daily.She exercises with enough effort to increase her heart rate 9 or less minutes per day.  She exercises with enough effort to increase her heart rate 3 or less days per week.   She is taking medications regularly.         Depression and Anxiety   Do you have any concerns with your use of alcohol or other drugs? No    Social History     Tobacco Use    Smoking status: Never     Passive exposure: Never    Smokeless tobacco: Never   Vaping Use    Vaping status: Never Used   Substance Use Topics    Alcohol use: No     Alcohol/week: 1.0 - 2.0 standard drink of alcohol    Drug use: No         7/1/2022     7:05 AM 10/12/2023     8:07 AM 4/15/2024     7:39 AM   PHQ   PHQ-9 Total Score 2 1 4   Q9: Thoughts of better off dead/self-harm past 2 weeks Not at all Not at all Not at all         11/12/2021    11:15 AM 4/15/2024     7:41 AM 4/15/2024     7:47 AM   JUANPABLO-7 SCORE   Total Score  5 (mild anxiety)    Total Score 3 5 5         4/15/2024     7:39 AM   Last PHQ-9   1.  Little interest or pleasure in doing things 1   2.  Feeling down, depressed, or hopeless 0   3.  Trouble falling or staying asleep, or sleeping too much 1   4.  Feeling tired or having little energy 1   5.  Poor appetite or overeating 1   6.  Feeling bad about yourself 0   7.  Trouble concentrating  "0   8.  Moving slowly or restless 0   Q9: Thoughts of better off dead/self-harm past 2 weeks 0   PHQ-9 Total Score 4         4/15/2024     7:47 AM   JUANPABLO-7    1. Feeling nervous, anxious, or on edge 1   2. Not being able to stop or control worrying 1   3. Worrying too much about different things 1   4. Trouble relaxing 0   5. Being so restless that it is hard to sit still 0   6. Becoming easily annoyed or irritable 1   7. Feeling afraid, as if something awful might happen 1   JUANPABLO-7 Total Score 5   If you checked any problems, how difficult have they made it for you to do your work, take care of things at home, or get along with other people? Not difficult at all       Suicide Assessment Five-step Evaluation and Treatment (SAFE-T)    She remains on citalopram for for depression.  She has had some increased anxiety recently.  She worries about her son who drinks too much and also worries about her grandchildren.  She wonders if there is something else she can take for her anxiety.  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  She was previously asleep working the emergency room and that was very busy, and this is much less busy, which is both good and bad for her.  She remains on medication for rheumatoid arthritis.  She will be having labs done in a few weeks or so for that and then we will see her rheumatologist in mid May.    Patient Active Problem List   Diagnosis    AR (allergic rhinitis)    GERD (gastroesophageal reflux disease)    Status post bariatric surgery    Advanced directives, counseling/discussion    High risk medication use    Obstructive sleep apnea    Obesity, Class III, BMI 40-49.9 (morbid obesity) (H)    Anterior basement membrane dystrophy    Seropositive rheumatoid arthritis (H)    LORNA positive    Carpal tunnel syndrome of right wrist    Headache    Immunosuppression (H24)    Fibromyalgia    Pain in joint, ankle and foot, right    Hypertension goal BP (blood " pressure) < 140/90    Chronic back pain, unspecified back location, unspecified back pain laterality    Hyperlipidemia LDL goal <100    Mild recurrent major depression (H24)     Current Outpatient Medications   Medication Sig Dispense Refill    acetaminophen (TYLENOL) 500 MG tablet Take 500-1,000 mg by mouth every 6 hours as needed for mild pain      citalopram (CELEXA) 20 MG tablet Take 1 tablet (20 mg) by mouth daily 90 tablet 3    fexofenadine (ALLEGRA) 180 MG tablet Take 1 tablet (180 mg) by mouth daily 90 tablet 2    fluticasone (FLONASE) 50 MCG/ACT nasal spray Spray 2 sprays into both nostrils as needed      gabapentin (NEURONTIN) 100 MG capsule TAKE ONE CAPSULE BY MOUTH EVERY MORNING, TAKE ONE CAPSULE MID-DAY, AND TAKE FOUR CAPSULES BY MOUTH EVERY NIGHT AT BEDTIME 180 capsule 5    hydroxychloroquine (PLAQUENIL) 200 MG tablet Take 1 tablet (200 mg) by mouth 2 times daily 60 tablet 4           leflunomide (ARAVA) 20 MG tablet Take 1 tablet (20 mg) by mouth daily 90 tablet 2    losartan-hydrochlorothiazide (HYZAAR) 100-25 MG tablet Take 1 tablet by mouth daily 90 tablet 3    ORDER FOR DME Use your CPAP device as directed by your provider.      sulfaSALAzine (AZULFIDINE) 500 MG tablet Take 3 tablets (1,500 mg) by mouth 2 times daily 540 tablet 2    vitamin D3 (CHOLECALCIFEROL) 50 mcg (2000 units) tablet Take 1 tablet (50 mcg) by mouth daily 90 tablet 3    albuterol (PROAIR HFA/PROVENTIL HFA/VENTOLIN HFA) 108 (90 Base) MCG/ACT inhaler Inhale 2 puffs into the lungs every 6 hours as needed for shortness of breath / dyspnea or wheezing (Patient not taking: Reported on 4/15/2024) 1 Inhaler 1    ibuprofen 200 MG capsule Take 600-800 mg by mouth At Bedtime (Patient not taking: Reported on 4/15/2024)      predniSONE (DELTASONE) 5 MG tablet Prednisone 20mg daily x5days, then 15mg daily x5days, then 10mg daily x5days, then 5mg daily x5days, then stop. (Patient not taking: Reported on 4/15/2024) 50 tablet 0     Current  "Facility-Administered Medications   Medication Dose Route Frequency Provider Last Rate Last Admin    alcohol (dehydrated) (ABLYSINOL) 99 % injection 15 mL  15 mL Other Once Levon Jamil MD         Facility-Administered Medications Ordered in Other Visits   Medication Dose Route Frequency Provider Last Rate Last Admin    sodium chloride (PF) 0.9% PF flush 10 mL  10 mL Intracatheter Once Nely Matthews MD        sodium chloride bacteriostatic 0.9 % flush 12 mL  12 mL Intravenous Once Nely Matthews MD             Review of Systems  Mainly significant for the above.      Objective    /66 (BP Location: Left arm, Patient Position: Sitting, Cuff Size: Adult Large)   Pulse 80   Temp 98.3  F (36.8  C) (Temporal)   Ht 1.613 m (5' 3.5\")   Wt 119.3 kg (263 lb)   SpO2 93%   BMI 45.86 kg/m    Body mass index is 45.86 kg/m .  Physical Exam   GENERAL: alert and no distress  PSYCH: mentation appears normal, affect normal/bright.  She scored a 4 on her PHQ-9 and a 5 on the JUANPABLO-7 today as noted above.    Previous lab results reviewed.        Signed Electronically by: Reynaldo Saavedra MD    "

## 2024-05-07 ENCOUNTER — TELEPHONE (OUTPATIENT)
Dept: OPHTHALMOLOGY | Facility: CLINIC | Age: 69
End: 2024-05-07
Payer: COMMERCIAL

## 2024-05-10 ENCOUNTER — LAB (OUTPATIENT)
Dept: LAB | Facility: CLINIC | Age: 69
End: 2024-05-10
Payer: COMMERCIAL

## 2024-05-10 DIAGNOSIS — I10 HYPERTENSION GOAL BP (BLOOD PRESSURE) < 140/90: ICD-10-CM

## 2024-05-10 DIAGNOSIS — E66.01 OBESITY, CLASS III, BMI 40-49.9 (MORBID OBESITY) (H): ICD-10-CM

## 2024-05-10 DIAGNOSIS — E78.5 HYPERLIPIDEMIA LDL GOAL <100: ICD-10-CM

## 2024-05-10 DIAGNOSIS — R73.09 ELEVATED GLUCOSE LEVEL: ICD-10-CM

## 2024-05-10 LAB
ANION GAP SERPL CALCULATED.3IONS-SCNC: 13 MMOL/L (ref 7–15)
BUN SERPL-MCNC: 13.6 MG/DL (ref 8–23)
CALCIUM SERPL-MCNC: 9.5 MG/DL (ref 8.8–10.2)
CHLORIDE SERPL-SCNC: 103 MMOL/L (ref 98–107)
CHOLEST SERPL-MCNC: 199 MG/DL
CREAT SERPL-MCNC: 0.7 MG/DL (ref 0.51–0.95)
DEPRECATED HCO3 PLAS-SCNC: 24 MMOL/L (ref 22–29)
EGFRCR SERPLBLD CKD-EPI 2021: >90 ML/MIN/1.73M2
FASTING STATUS PATIENT QL REPORTED: YES
FASTING STATUS PATIENT QL REPORTED: YES
GLUCOSE SERPL-MCNC: 98 MG/DL (ref 70–99)
HBA1C MFR BLD: 4.4 % (ref 0–5.6)
HDLC SERPL-MCNC: 47 MG/DL
LDLC SERPL CALC-MCNC: 112 MG/DL
NONHDLC SERPL-MCNC: 152 MG/DL
POTASSIUM SERPL-SCNC: 4.2 MMOL/L (ref 3.4–5.3)
SODIUM SERPL-SCNC: 140 MMOL/L (ref 135–145)
TRIGL SERPL-MCNC: 202 MG/DL

## 2024-05-10 PROCEDURE — 80048 BASIC METABOLIC PNL TOTAL CA: CPT

## 2024-05-10 PROCEDURE — 36415 COLL VENOUS BLD VENIPUNCTURE: CPT

## 2024-05-10 PROCEDURE — 80061 LIPID PANEL: CPT

## 2024-05-10 PROCEDURE — 83036 HEMOGLOBIN GLYCOSYLATED A1C: CPT

## 2024-05-13 DIAGNOSIS — M05.9 SEROPOSITIVE RHEUMATOID ARTHRITIS (H): Primary | ICD-10-CM

## 2024-05-13 NOTE — RESULT ENCOUNTER NOTE
Sharon,  Your recent lab work shows that your electrolytes and kidney tests are nice and normal.  Diabetes testing is normal.  Lipid values are similar to the past, not great but not terrible.  Continued efforts at healthy eating and regular exercise would be appropriate treatment for them.    Reynaldo Saavedra MD

## 2024-05-15 ENCOUNTER — ANCILLARY PROCEDURE (OUTPATIENT)
Dept: GENERAL RADIOLOGY | Facility: CLINIC | Age: 69
End: 2024-05-15
Attending: INTERNAL MEDICINE
Payer: COMMERCIAL

## 2024-05-15 ENCOUNTER — OFFICE VISIT (OUTPATIENT)
Dept: RHEUMATOLOGY | Facility: CLINIC | Age: 69
End: 2024-05-15
Payer: COMMERCIAL

## 2024-05-15 VITALS
RESPIRATION RATE: 18 BRPM | BODY MASS INDEX: 46.03 KG/M2 | DIASTOLIC BLOOD PRESSURE: 77 MMHG | WEIGHT: 264 LBS | OXYGEN SATURATION: 93 % | HEART RATE: 113 BPM | SYSTOLIC BLOOD PRESSURE: 129 MMHG

## 2024-05-15 DIAGNOSIS — M54.2 NECK PAIN: ICD-10-CM

## 2024-05-15 DIAGNOSIS — M05.9 SEROPOSITIVE RHEUMATOID ARTHRITIS (H): ICD-10-CM

## 2024-05-15 DIAGNOSIS — Z79.899 LONG-TERM USE OF HYDROXYCHLOROQUINE: ICD-10-CM

## 2024-05-15 DIAGNOSIS — Z79.899 HIGH RISK MEDICATION USE: ICD-10-CM

## 2024-05-15 DIAGNOSIS — M05.9 SEROPOSITIVE RHEUMATOID ARTHRITIS (H): Primary | ICD-10-CM

## 2024-05-15 LAB
ALBUMIN SERPL BCG-MCNC: 4.2 G/DL (ref 3.5–5.2)
ALP SERPL-CCNC: 102 U/L (ref 40–150)
ALT SERPL W P-5'-P-CCNC: 15 U/L (ref 0–50)
AST SERPL W P-5'-P-CCNC: 20 U/L (ref 0–45)
BASOPHILS # BLD AUTO: 0.1 10E3/UL (ref 0–0.2)
BASOPHILS NFR BLD AUTO: 1 %
BILIRUB DIRECT SERPL-MCNC: <0.2 MG/DL (ref 0–0.3)
BILIRUB SERPL-MCNC: 0.4 MG/DL
EOSINOPHIL # BLD AUTO: 0.5 10E3/UL (ref 0–0.7)
EOSINOPHIL NFR BLD AUTO: 9 %
ERYTHROCYTE [DISTWIDTH] IN BLOOD BY AUTOMATED COUNT: 13.2 % (ref 10–15)
HCT VFR BLD AUTO: 39 % (ref 35–47)
HGB BLD-MCNC: 12.6 G/DL (ref 11.7–15.7)
IMM GRANULOCYTES # BLD: 0 10E3/UL
IMM GRANULOCYTES NFR BLD: 1 %
LYMPHOCYTES # BLD AUTO: 1.4 10E3/UL (ref 0.8–5.3)
LYMPHOCYTES NFR BLD AUTO: 23 %
MCH RBC QN AUTO: 29.7 PG (ref 26.5–33)
MCHC RBC AUTO-ENTMCNC: 32.3 G/DL (ref 31.5–36.5)
MCV RBC AUTO: 92 FL (ref 78–100)
MONOCYTES # BLD AUTO: 0.6 10E3/UL (ref 0–1.3)
MONOCYTES NFR BLD AUTO: 11 %
NEUTROPHILS # BLD AUTO: 3.2 10E3/UL (ref 1.6–8.3)
NEUTROPHILS NFR BLD AUTO: 55 %
PLATELET # BLD AUTO: 270 10E3/UL (ref 150–450)
PROT SERPL-MCNC: 7.3 G/DL (ref 6.4–8.3)
RBC # BLD AUTO: 4.24 10E6/UL (ref 3.8–5.2)
WBC # BLD AUTO: 5.8 10E3/UL (ref 4–11)

## 2024-05-15 PROCEDURE — 85025 COMPLETE CBC W/AUTO DIFF WBC: CPT | Performed by: INTERNAL MEDICINE

## 2024-05-15 PROCEDURE — 99214 OFFICE O/P EST MOD 30 MIN: CPT | Performed by: INTERNAL MEDICINE

## 2024-05-15 PROCEDURE — G2211 COMPLEX E/M VISIT ADD ON: HCPCS | Performed by: INTERNAL MEDICINE

## 2024-05-15 PROCEDURE — 72050 X-RAY EXAM NECK SPINE 4/5VWS: CPT | Mod: TC | Performed by: RADIOLOGY

## 2024-05-15 PROCEDURE — 80076 HEPATIC FUNCTION PANEL: CPT | Performed by: INTERNAL MEDICINE

## 2024-05-15 PROCEDURE — 36415 COLL VENOUS BLD VENIPUNCTURE: CPT | Performed by: INTERNAL MEDICINE

## 2024-05-15 RX ORDER — HYDROXYCHLOROQUINE SULFATE 200 MG/1
200 TABLET, FILM COATED ORAL 2 TIMES DAILY
Qty: 180 TABLET | Refills: 2 | Status: SHIPPED | OUTPATIENT
Start: 2024-05-15

## 2024-05-15 RX ORDER — SULFASALAZINE 500 MG/1
1500 TABLET ORAL 2 TIMES DAILY
Qty: 540 TABLET | Refills: 2 | Status: SHIPPED | OUTPATIENT
Start: 2024-05-15

## 2024-05-15 RX ORDER — LEFLUNOMIDE 20 MG/1
20 TABLET ORAL DAILY
Qty: 90 TABLET | Refills: 2 | Status: SHIPPED | OUTPATIENT
Start: 2024-05-15

## 2024-05-15 NOTE — PROGRESS NOTES
Rheumatology Clinic Visit      Sharon Fung MRN# [id number]   YOB: 1955 Age: 68 year old      Date of visit: 5/15/24   PCP: Dr. Reynaldo Saavedra    Chief Complaint   Patient presents with:  RECHECK    Assessment and Plan     1. Seropositive nonerosive rheumatoid arthritis (RF negative, CCP low positive): Previously on Humira (lost efficacy), Cimzia (ineffective), Orencia (ineffective), leflunomide (ineffective as monotherapy), hydroxychloroquine (ineffective), Xeljanz (ineffective), MTX (GI upset), Kevzara (effective, became cost prohibitive when insurance changed to Medicare, used [id number]).  Well-controlled arthritis when on a combination of sulfasalazine 1500 mg twice daily, leflunomide 20 mg daily, and Kevzara, but because Kevzara becoming cost prohibitive with change to Medicare, Kevzara was discontinued and Simponi IV was started.  Simponi has not been as effective as Kevzara; minimal synovitis on exam today but she had reported having morning stiffness for 2-3 hours.  Added hydroxychloroquine and this has been helpful.  Labs today.  Labs in 3 months.  If needed in the future consider changing Simponi to Remicade or Actemra, that are both IV options.  Another option is to change back to Kevzara that was previously effective, and possibly cost permissible given the changes to Medicare now.   Chronic illness, progressive  - Continue sulfasalazine 1500 mg twice daily   - Continue leflunomide 20mg daily   - Continue Simponi 2 mg/kg IV every 8 weeks  - Continue hydroxychloroquine 200 mg twice daily  - Ophthalmology referral for hydroxychloroquine toxicity monitoring   - Labs today: CBC, Hepatic Panel  - Labs in 3 months: CBC, Creatinine, Hepatic Panel, ESR, CRP    High risk medication requiring intensive toxicity monitoring at least quarterly                 Rapid 3, cumulative scores                      01/25/2024: 14    (SSZ 1500mg BID, leflunomide 20mg daily)                      10/18/2023:  13.2 (SSZ 1500mg BID, leflunomide 20mg daily, Simponi IV [id number])                      8/24/2021: No inflammatory joint symptoms.  She says that this combination is great (SSZ 1500mg BID, leflunomide 20mg daily, Kevzara 200mg q14 days)                      10/14/2020 doing well (SSZ 1500mg BID, leflunomide 20mg daily, Kevzara 200mg q14 days)                      08/28/2019: 3.2   (SSZ 1500mg BID, Kevzara)  Now on gabapentin                      04/17/2019: 15    (SSZ 1500mg BID, Kevzara)  Mostly fibromyalgia symptoms                      12/19/2018:         (MTX 20mg SQ wkly, Xeljanz XR 11mg daily, SSZ 1500mg BID)                          05/02/2018:  9     (MTX 20mg SQ wkly, Xeljanz XR 11mg daily, SSZ 1000mg BID)                          01/31/2018: 22.3 (MTX 20mg SQ wkly, Xeljanz XR 11mg daily)                          11/29/2017: 16.8 (MTX 20mg SQ wkly)                      08/02/2017: 4.2   (MTX 20mg SQ wkly, Xeljanz XR 11mg daily)                         05/04/2017: 7      (MTX 20mg SQ wkly, Xeljanz XR 11mg daily)                        02/02/2017: 8      (MTX 20mg SQ wkly, Xeljanz XR 11mg daily)                      11/04/2016: 13    (MTX 20mg SQ weekly)                      07/15/2016: 10.8    2. Positive LORNA; positive and negative dsDNA; Secondary Sjogren's Syndrome: Repeat dsDNA was negative. Has dry eyes but no dry mouth.  Dry eyes are treated by ophthalmology with Restasis.      3. Bilateral patellofemoral syndrome: home exercises have been helpful.  Weight loss encouraged.       4. Fibromyalgia: Managed in the pain clinic     5.  Bone Health, vitamin D deficiency: normal 12/20/2019 DEXA; plan to repeat in 2024.    - Continue vitamin D 2000 IU daily    6. Chronic lower back pain with left sided sciatica: suspect related to degenerative changes seen on L-spine MRI.  She has been seen in the pain clinic and spine surgery clinic.  Documented here for historical significance.    7. Neck pain: degenerative in  nature; pain with extremes of ROM and limits to ROM in each direction.  Check x-rays to assess evaluate for instability.  Advised physical therapy exercises and she says she is already doing these at home and does not need a formal physical therapy referral.  Infrequently doing the exercises though so she plans to do that more frequently.  - X-ray: C-spine with flex/ext/neutral     8.  Vaccinations: Vaccinations reviewed with Ms. Fung.   - Influenza: encouraged yearly vaccination  - COVID-19: advised keeping updated, and to hold leflunomide and sulfasalazine for 1-2 weeks afterward  - RSV: Advised vaccination    Total minutes spent in evaluation with patient, documentation, , and review of pertinent studies and chart notes: 18  The longitudinal plan of care for the rheumatology problem(s) were addressed during this visit.  Due to added complexity of care, we will continue to support the patient and the subsequent management of this condition with ongoing continuity of care.       Ms. Fung verbalized agreement with and understanding of the rational for the diagnosis and treatment plan.  All questions were answered to best of my ability and the patient's satisfaction. Ms. Fung was advised to contact the clinic with any questions that may arise after the clinic visit.      Thank you for involving me in the care of the patient    Return to clinic: 3 months    HPI   Sharon Fung is a 68 year old female with medical history significant for GERD, allergic rhinitis, obstructive sleep apnea, obesity, hx of trigger fingers, hx of carpal tunnel surgery, and rheumatoid arthritis who presents for follow-up of rheumatoid arthritis.    6/9/2023: RA controlled.  Morning stiffness <30 min. No joint swelling. Changing to medicare and needs to discuss changing kevzara to a different agent because it has become cost prohibitive with Medicare.     10/18/2023: RA not as well-controlled since changing to  Simponi.  Has had 3 doses of Simponi thus far.  Simponi infusions are well-tolerated but has not controlled her arthritis as well.  Pain, stiffness, and swelling at the MCPs, PIPs, and wrists.  Joint pain is worse in the morning and improves with time and activity.  Morning stiffness for approximately 2 hours.  She would like to continue Simponi for now to see if a longer duration of therapy will be more helpful.    1/25/2024: Doing fairly well on her current regimen but having morning stiffness in her hands for 2-3 hours, and still some ache in the hands that improves with flexion and extension of the fingers and general movement.  Arthritis is not limiting her daily activities.    Today, 5/15/2024: RA improved with addition of hydroxychloroquine.  Neck pain worse with activity and improves with rest; does not radiate down the arms; has been a chronic issue and has been doing home exercises but they are infrequently done.  She says she does not need to go to formal physical therapy and will continue the exercises on her own at home.    No fevers or chills. No nausea, vomiting, constipation, diarrhea. No chest pain/pressure, palpitations, or shortness of breath. No oral or nasal sores.  Dermatitis of the fingertips and following with dermatology; suspects contact allergy.    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; now working casual    ROS   12 point review of system was completed and negative except as noted in the HPI     Active Problem List     Patient Active Problem List   Diagnosis    AR (allergic rhinitis)    GERD (gastroesophageal reflux disease)    Status post bariatric surgery    Advanced directives, counseling/discussion    High risk medication use    Obstructive sleep apnea    Obesity, Class III, BMI 40-49.9 (morbid obesity) (H)    Anterior basement membrane dystrophy    Seropositive rheumatoid arthritis (H)    LORNA positive    Carpal  tunnel syndrome of right wrist    Headache    Immunosuppression (H24)    Fibromyalgia    Pain in joint, ankle and foot, right    Hypertension goal BP (blood pressure) < 140/90    Chronic back pain, unspecified back location, unspecified back pain laterality    Hyperlipidemia LDL goal <100    Mild recurrent major depression (H24)     Past Medical History     Past Medical History:   Diagnosis Date    AR (allergic rhinitis)  as teen    Arthritis 12/14/2015    Chronic obstructive pulmonary disease, unspecified COPD type (H) 03/27/2018    Depressive disorder 3 years ago    GERD (gastroesophageal reflux disease) 04/2007    Headache 07/11/2017    Hypertension goal BP (blood pressure) < 140/90 12/20/2021    Mild major depression (H24) 3 years ago    Morbid obesity (H) teens    BRETT (obstructive sleep apnea)     uses CPAP    RA (rheumatoid arthritis) (H) 4 years    Reduced vision 3 years     Past Surgical History     Past Surgical History:   Procedure Laterality Date    ARTHRODESIS FOOT Right 6/30/2021    Procedure: Right 1st metatarsophalangeal joint fusion;  Surgeon: Jesus Wolf MD;  Location: [facility]    ARTHRODESIS TOE(S) Right 12/27/2021    Procedure: right revision great toe fusion;  Surgeon: Ryan Gee MD;  Location:  OR    BLEPHAROPLASTY BILATERAL Bilateral 8/5/2016    Procedure: BLEPHAROPLASTY BILATERAL;  Surgeon: Cortez Robin MD;  Location:  [facility]    BREAST BIOPSY, RT/LT  1-94    Benign    COLONOSCOPY      COLONOSCOPY WITH CO2 INSUFFLATION N/A 3/30/2017    Procedure: COLONOSCOPY WITH CO2 INSUFFLATION;  Surgeon: Issa Weeks MD;  Location:  OR    ESOPHAGOSCOPY, GASTROSCOPY, DUODENOSCOPY (EGD), COMBINED N/A 10/29/2015    Procedure: COMBINED ESOPHAGOSCOPY, GASTROSCOPY, DUODENOSCOPY (EGD);  Surgeon: Shaq Mims MD;  Location:  GI    LAPAROSCOPIC CHOLECYSTECTOMY N/A 6/13/2023    Procedure: CHOLECYSTECTOMY, LAPAROSCOPIC;  Surgeon: Reynaldo Segura MD;  Location:   OR    LAPAROSCOPIC GASTRIC ADJUSTABLE BANDING  11/09/10    Lap band procedure    LAPAROSCOPIC REMOVAL GASTRIC ADJUSTABLE BAND N/A 10/31/2015    Procedure: LAPAROSCOPIC REMOVAL GASTRIC ADJUSTABLE BAND;  Surgeon: Shaq Mims MD;  Location: [facility] OR    CA HAND/FINGER SURGERY UNLISTED  2016    CA STOMACH SURGERY PROCEDURE UNLISTED  11/2015    RELEASE CARPAL TUNNEL Left 4/27/2017    Procedure: RELEASE CARPAL TUNNEL;  Left Open Carpal Tunnel Release;  Surgeon: Ciara Middleton MD;  Location: [facility] OR    RELEASE CARPAL TUNNEL Right 6/15/2017    Procedure: RELEASE CARPAL TUNNEL;  Right Carpal Tunnel Release Open;  Surgeon: Ciara Middleton MD;  Location: [facility] OR    REPAIR PTOSIS      TUBAL LIGATION  1988    [facility] APPENDECTOMY  1977     Allergy   No Known Allergies  Current Medication List     Current Outpatient Medications   Medication Sig Dispense Refill    hydroxychloroquine (PLAQUENIL) 200 MG tablet Take 1 tablet (200 mg) by mouth 2 times daily 180 tablet 2    leflunomide (ARAVA) 20 MG tablet Take 1 tablet (20 mg) by mouth daily 90 tablet 2    sulfaSALAzine (AZULFIDINE) 500 MG tablet Take 3 tablets (1,500 mg) by mouth 2 times daily 540 tablet 2    acetaminophen (TYLENOL) 500 MG tablet Take 500-1,000 mg by mouth every 6 hours as needed for mild pain      albuterol (PROAIR HFA/PROVENTIL HFA/VENTOLIN HFA) 108 (90 Base) MCG/ACT inhaler Inhale 2 puffs into the lungs every 6 hours as needed for shortness of breath / dyspnea or wheezing (Patient not taking: Reported on 4/15/2024) 1 Inhaler 1    citalopram (CELEXA) 20 MG tablet Take 1 tablet (20 mg) by mouth daily 90 tablet 3    fexofenadine (ALLEGRA) 180 MG tablet Take 1 tablet (180 mg) by mouth daily 90 tablet 2    fluticasone (FLONASE) 50 MCG/ACT nasal spray Spray 2 sprays into both nostrils as needed      gabapentin (NEURONTIN) 100 MG capsule TAKE ONE CAPSULE BY MOUTH EVERY MORNING, TAKE ONE CAPSULE MID-DAY, AND TAKE FOUR CAPSULES BY MOUTH EVERY NIGHT AT  BEDTIME 180 capsule 5    hydrOXYzine cha (VISTARIL) 25 MG capsule Take 1 capsule (25 mg) by mouth 3 times daily as needed for anxiety 30 capsule 1    ibuprofen 200 MG capsule Take 600-800 mg by mouth At Bedtime (Patient not taking: Reported on 4/15/2024)      losartan-hydrochlorothiazide (HYZAAR) 100-25 MG tablet Take 1 tablet by mouth daily 90 tablet 3    ORDER FOR DME Use your CPAP device as directed by your provider.      predniSONE (DELTASONE) 5 MG tablet Prednisone 20mg daily x5days, then 15mg daily x5days, then 10mg daily x5days, then 5mg daily x5days, then stop. (Patient not taking: Reported on 4/15/2024) 50 tablet 0    vitamin D3 (CHOLECALCIFEROL) 50 mcg (2000 units) tablet Take 1 tablet (50 mcg) by mouth daily 90 tablet 3     Current Facility-Administered Medications   Medication Dose Route Frequency Provider Last Rate Last Admin    alcohol (dehydrated) (ABLYSINOL) 99 % injection 15 mL  15 mL Other Once Levon Jmail MD         Facility-Administered Medications Ordered in Other Visits   Medication Dose Route Frequency Provider Last Rate Last Admin    sodium chloride (PF) 0.9% PF flush 10 mL  10 mL Intracatheter Once Nely Matthews MD        sodium chloride bacteriostatic 0.9 % flush 12 mL  12 mL Intravenous Once Nely Matthews MD           Social History   See HPI    Family History     Family History   Problem Relation Age of Onset    Neurologic Disorder Mother 20        migraine    Depression Mother     Heart Disease Father         MI    Neurologic Disorder Father 79        Parkinson's    Diabetes Father     Hypertension Father     Coronary Artery Disease Father     Cancer Maternal Grandmother         uterine    Neurologic Disorder Sister 16        migraine    Depression Sister     Depression Sister     Substance Abuse Sister     Migraines Sister     Neurologic Disorder Son 7        migraine    Substance Abuse Son     Migraines Son         none    Glaucoma No family hx of     Macular  "Degeneration No family hx of        Physical Exam     Temp Readings from Last 3 Encounters:   04/15/24 98.3  F (36.8  C) (Temporal)   02/14/24 97.9  F (36.6  C)   02/07/24 98.3  F (36.8  C) (Tympanic)     BP Readings from Last 5 Encounters:   05/15/24 129/77   04/15/24 124/66   02/14/24 130/78   02/07/24 135/80   01/25/24 127/74     Pulse Readings from Last 1 Encounters:   05/15/24 113     Resp Readings from Last 1 Encounters:   05/15/24 18     Estimated body mass index is 46.03 kg/m  as calculated from the following:    Height as of 4/15/24: 1.613 m (5' 3.5\").    Weight as of this encounter: 119.7 kg (264 lb).      GEN: NAD.   HEENT:  Anicteric, noninjected sclera. No obvious external lesions of the ear and nose. Hearing intact.  CV: S1, S2. RRR. No m/r/g  PULM: No increased work of breathing. CTA bilaterally   MSK: MCPs, PIPs and DIPs without swelling or tenderness to palpation.  Wrists with mild tenderness to palpation and subtle synovial swelling but no increased warmth or overlying erythema.   Elbows and shoulders without swelling or tenderness to palpation.   Knees, ankles, and MTPs without swelling or tenderness to palpation.    SKIN: No rash or jaundice seen  PSYCH: Alert. Appropriate.      Labs / Imaging (select studies)     CBC  Recent Labs   Lab Test 01/15/24  1130 10/12/23  1025 06/13/23  0522 08/03/21  1150 05/10/21  0802 02/02/21  1056 10/12/20  1113   WBC 7.7 7.0 6.2   < > 5.1 7.4 6.1   RBC 4.51 4.45 4.13   < > 4.48 4.41 4.18   HGB 13.7 13.4 12.5   < > 13.5 13.4 12.7   HCT 43.5 41.4 38.4   < > 42.8 42.8 39.7   MCV 97 93 93   < > 96 97 95   RDW 13.4 13.1 13.7   < > 12.8 12.5 12.9    281 238   < > 193 216 221   MCH 30.4 30.1 30.3   < > 30.1 30.4 30.4   MCHC 31.5 32.4 32.6   < > 31.5 31.3* 32.0   NEUTROPHIL 41 46 66   < > 42.6 40.1 40.8   LYMPH 40 31 20   < > 26.4 34.8 33.4   MONOCYTE 14 14 9   < > 9.5 10.7 8.9   EOSINOPHIL 4 8 4   < > 19.5 12.6 15.6   BASOPHIL 1 1 1   < > 1.8 1.4 1.0   ANEU  --   " --   --   --  2.2 3.0 2.5   ALYM  --   --   --   --  1.3 2.6 2.0   KIMBERLY  --   --   --   --  0.5 0.8 0.5   AEOS  --   --   --   --  1.0* 0.9* 1.0*   ABAS  --   --   --   --  0.1 0.1 0.1   ANEUTAUTO 3.1 3.2 4.1   < >  --   --   --    ALYMPAUTO 3.1 2.2 1.3   < >  --   --   --    AMONOAUTO 1.1 1.0 0.5   < >  --   --   --    AEOSAUTO 0.3 0.6 0.3   < >  --   --   --    ABSBASO 0.1 0.1 0.1   < >  --   --   --     < > = values in this interval not displayed.     CMP  Recent Labs   Lab Test 05/10/24  1009 01/15/24  1130 10/12/23  1025 06/13/23  0951 06/13/23  0522 11/16/22  0937 07/01/22  0807 08/03/21  1150 05/10/21  0802 02/02/21  1056 10/12/20  1113     --   --   --  140  --  140   < > 140  --   --    POTASSIUM 4.2  --   --   --  3.4  --  3.9   < > 4.3  --   --    CHLORIDE 103  --   --   --  101  --  107   < > 110*  --   --    CO2 24  --   --   --  24  --  27   < > 25  --   --    ANIONGAP 13  --   --   --  15  --  6   < > 5  --   --    GLC 98  --   --  99 138*  --  96   < > 99 74  --    BUN 13.6  --   --   --  17.1  --  18   < > 12  --   --    CR 0.70 0.68 0.66  --  0.69   < > 0.74   < > 0.73 0.65 0.74   GFRESTIMATED >90 >90 >90  --  >90   < > 89   < > 88 >90 87   GFRESTBLACK  --   --   --   --   --   --   --   --  >90 >90 >90   ISH 9.5  --   --   --  9.6  --  9.3   < > 8.6  --   --    BILITOTAL  --  0.4 0.4  --  0.5   < >  --    < > 0.6 0.4 0.5   ALBUMIN  --  4.5 4.2  --  4.3   < >  --    < > 3.8 4.0 3.8   PROTTOTAL  --  7.8 7.1  --  7.2   < >  --    < > 7.1 7.1 6.9   ALKPHOS  --  102 106*  --  95   < >  --    < > 103 115 107   AST  --  27 27  --  26   < >  --    < > 33 36 23   ALT  --  27 24  --  29   < >  --    < > 54* 58* 42    < > = values in this interval not displayed.     Calcium/VitaminD  Recent Labs   Lab Test 05/10/24  1009 06/13/23  0522 07/01/22  0807 05/10/21  0802 10/12/20  1113 03/31/20  0755   ISH 9.5 9.6 9.3   < >  --   --    VITDT  --   --   --   --  33 15*    < > = values in this interval not  displayed.     ESR/CRP  Recent Labs   Lab Test 01/15/24  1130 10/12/23  1025 06/12/23  1247 11/16/22  0937 06/24/22  0741 03/11/22  0910   SED 20 15 16 6 5 5   CRP  --   --   --  <2.9 <2.9 <2.9   CRPI <3.00 3.10 10.60*  --   --   --      Hepatitis B  Recent Labs   Lab Test 06/12/23  1247   HBCAB Nonreactive   HEPBANG Nonreactive     Hepatitis C  Recent Labs   Lab Test 06/12/23  1247   HCVAB Nonreactive     Tuberculosis Screening  Recent Labs   Lab Test 06/12/23  1247 11/30/21  0806 10/31/17  1400 02/02/17  0822   TBRES Negative Negative  --   --    TBRSLT  --   --  Negative Negative   TBAGN  --   --  0.05 0.00     HIV Screening  Recent Labs   Lab Test 07/24/18  0738   HIAGAB Nonreactive     Immunization History     Immunization History   Administered Date(s) Administered    COVID-19 12+ (2023-24) (Pfizer) 10/13/2023    COVID-19 Bivalent 12+ (Pfizer) 03/30/2023    COVID-19 MONOVALENT 12+ (Pfizer) 12/21/2020, 01/07/2021, 08/27/2021    COVID-19 Monovalent 18+ (Moderna) 03/08/2022    Flu, Unspecified 10/12/2020    Influenza (High Dose) 3 valent vaccine 11/01/2022, 09/29/2023    Influenza (intradermal) 10/04/2011, 10/01/2012    Influenza Vaccine (Flucelvax Quadrivalent) 10/15/2019    Influenza Vaccine 18-64 (Flublok) 09/15/2021    Influenza Vaccine >6 months,quad, PF 10/15/2013, 09/15/2014, 09/28/2015, 09/28/2016, 10/16/2017, 10/01/2018    Pneumo Conj 13-V (2010&after) 04/15/2016    Pneumococcal 20 valent Conjugate (Prevnar 20) 07/01/2022    Pneumococcal 23 valent 07/15/2016    TD,PF 7+ (Tenivac) 10/30/2020    TDAP Vaccine (Adacel) 02/09/2011    Zoster recombinant adjuvanted (SHINGRIX) 04/17/2019, 08/28/2019          Chart documentation done in part with Dragon Voice recognition Software. Although reviewed after completion, some word and grammatical error may remain.     Freddy Guerra MD

## 2024-06-06 DIAGNOSIS — M05.9 SEROPOSITIVE RHEUMATOID ARTHRITIS (H): Primary | ICD-10-CM

## 2024-06-11 ENCOUNTER — TELEPHONE (OUTPATIENT)
Dept: FAMILY MEDICINE | Facility: CLINIC | Age: 69
End: 2024-06-11
Payer: COMMERCIAL

## 2024-06-11 NOTE — TELEPHONE ENCOUNTER
Patient Quality Outreach    Patient is due for the following:   Physical Annual Wellness Visit    Next Steps:   See below    Type of outreach:    Sent letter.    Next Steps:  Reach out within 90 days via  done .    Max number of attempts reached: Yes. Will try again in 90 days if patient still on fail list.    Questions for provider review:    None           Kailyn Toth, [facility]  Chart routed to Closing encounter  .

## 2024-06-11 NOTE — LETTER
June 11, 2024    To  Sharon Fung  [address] [id number]    Your team at [facility] cares about your health. We have reviewed your chart and based on our findings; we are making the following recommendations to better manage your health.     You are in particular need of attention regarding the following:     PREVENTATIVE VISIT: Annual Medicare Wellness:Schedule an Annual Medicare Wellness Exam. Please call your [facility] clinic to set up your appointment.    Please call [phone]    If you have already completed these items, please contact the clinic via phone or   MyChart so your care team can review and update your records. Thank you for   choosing [facility] for your healthcare needs. For any questions,   concerns, or to schedule an appointment please contact our clinic.    Healthy Regards,      Your [facility] Care Team

## 2024-06-26 ENCOUNTER — OFFICE VISIT (OUTPATIENT)
Dept: OPHTHALMOLOGY | Facility: CLINIC | Age: 69
End: 2024-06-26
Attending: OPHTHALMOLOGY
Payer: COMMERCIAL

## 2024-06-26 DIAGNOSIS — H25.13 NUCLEAR SCLEROSIS OF BOTH EYES: ICD-10-CM

## 2024-06-26 DIAGNOSIS — H18.523 ANTERIOR BASEMENT MEMBRANE DYSTROPHY OF BOTH EYES: Primary | ICD-10-CM

## 2024-06-26 DIAGNOSIS — H04.123 BILATERAL DRY EYES: ICD-10-CM

## 2024-06-26 PROCEDURE — G0463 HOSPITAL OUTPT CLINIC VISIT: HCPCS | Performed by: OPHTHALMOLOGY

## 2024-06-26 PROCEDURE — 99213 OFFICE O/P EST LOW 20 MIN: CPT | Performed by: OPHTHALMOLOGY

## 2024-06-26 ASSESSMENT — VISUAL ACUITY
OS_CC+: -1
OS_PH_CC+: -1
METHOD: SNELLEN - LINEAR
OD_CC: 20/30
OS_PH_CC: 20/25
OD_CC+: -1
OS_CC: 20/40
CORRECTION_TYPE: GLASSES

## 2024-06-26 ASSESSMENT — CONF VISUAL FIELD
OS_SUPERIOR_TEMPORAL_RESTRICTION: 0
OS_INFERIOR_TEMPORAL_RESTRICTION: 0
OD_INFERIOR_TEMPORAL_RESTRICTION: 0
OD_SUPERIOR_TEMPORAL_RESTRICTION: 0
OD_NORMAL: 1
OS_NORMAL: 1
OS_SUPERIOR_NASAL_RESTRICTION: 0
OS_INFERIOR_NASAL_RESTRICTION: 0
OD_INFERIOR_NASAL_RESTRICTION: 0
OD_SUPERIOR_NASAL_RESTRICTION: 0
METHOD: COUNTING FINGERS

## 2024-06-26 ASSESSMENT — EXTERNAL EXAM - LEFT EYE: OS_EXAM: NORMAL

## 2024-06-26 ASSESSMENT — TONOMETRY
IOP_METHOD: ICARE
OD_IOP_MMHG: 17
OS_IOP_MMHG: 17

## 2024-06-26 ASSESSMENT — EXTERNAL EXAM - RIGHT EYE: OD_EXAM: NORMAL

## 2024-06-26 ASSESSMENT — REFRACTION_WEARINGRX
OD_AXIS: 160
SPECS_TYPE: PAL
OD_HBASE: OUT
OS_ADD: +2.75
OS_CYLINDER: +0.50
OD_ADD: +2.75
OD_SPHERE: -2.00
OS_AXIS: 025
OS_SPHERE: -2.00
OD_HPRISM: 2.0
OD_CYLINDER: +2.00

## 2024-06-26 ASSESSMENT — SLIT LAMP EXAM - LIDS
COMMENTS: MEIBOMIAN GLAND DYSFUNCTION
COMMENTS: MEIBOMIAN GLAND DYSFUNCTION,

## 2024-06-26 NOTE — PROGRESS NOTES
Chief complaint   ABMD evaluation    Referring Provider: Dr. Valderrama     HPI   Sharon Fung 69 year old female   HPI       Follow Up    Associated symptoms include dryness.  Negative for eye pain, headache and photophobia.  Treatments tried include eye drops and artificial tears.  Pain was noted as 0/10. 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   Began taking Plaquenil 2 months ago.   No eye pain or but has dryness/itching often each eye     Ocular Meds:  Pataday PRN each eye (for itching)  Systane 1-2 times per day each eye     Alfredo Rivas 12:59 PM June 26, 2024            Last edited by Alfredo Rivas on 6/26/2024 12:59 PM.         Complains of gradually worsening haze over the vision in both eyes starting ~3/2022 and progressing since then. Has tried some sort of ointment and drops without improvement. Denies any pain. Does endorse epiphora just from the right eye. Does endorse significant itching predating onset of hazy vision for which she uses pataday PRN - she does have seasonal allergies for which she takes allegra. Of note, she uses a CPAP for her COPD and has RA for which she follows with rheumatology with immunosuppressant treatments including courses of steroids. At her last visit 3/31/2022 with Dr. Valderrama she was instructed to continue present treatment of restasis (no longer using - did not help), luba zaynab and gtt (not using), and maxitrol PRN to eyelids. No history of painful episodes. Problem is not time-of-day dependent. No waking with pain.    Interval hx 06/26/2024  Chief Complaint(s) and History of Present Illness(es)       Follow Up    Associated symptoms include dryness.  Negative for eye pain, headache and photophobia.  Treatments tried include eye drops and artificial tears.  Pain was noted as 0/10. 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   Began taking Plaquenil 2 months ago.   No eye pain or but has dryness/itching often each eye     Ocular Meds:  Pataday PRN each eye (for itching)  Systane 1-2 times per day each eye     Alfredo Ho 12:59 PM June 26, 2024                       Past ocular history   Prior eye surgery/laser/Trauma: yes bilateral upper lid blepharoplasty (2016)  CTL wearer:No  Glasses : yes  Family Hx of eye disease: No    PMH     Past Medical History:   Diagnosis Date    AR (allergic rhinitis)  as teen    Arthritis 12/14/2015    Chronic obstructive pulmonary disease, unspecified COPD type (H) 03/27/2018    Depressive disorder 3 years ago    GERD (gastroesophageal reflux disease) 04/2007    Headache 07/11/2017    Hypertension goal BP (blood pressure) < 140/90 12/20/2021    Mild major depression (H24) 3 years ago    Morbid obesity (H) teens    BRETT (obstructive sleep apnea)     uses CPAP    RA (rheumatoid arthritis) (H) 4 years    Reduced vision 3 years       PSH     Past Surgical History:   Procedure Laterality Date    ARTHRODESIS FOOT Right 6/30/2021    Procedure: Right 1st metatarsophalangeal joint fusion;  Surgeon: Jesus Wolf MD;  [facility]    ARTHRODESIS TOE(S) Right 12/27/2021    Procedure: right revision great toe fusion;  Surgeon: Ryan Gee MD;  Location:  OR    BLEPHAROPLASTY BILATERAL Bilateral 8/5/2016    Procedure: BLEPHAROPLASTY BILATERAL;  Surgeon: Cortez Robin MD;  [facility]    BREAST BIOPSY, RT/LT  1-94    Benign    COLONOSCOPY      COLONOSCOPY WITH CO2 INSUFFLATION N/A 3/30/2017    Procedure: COLONOSCOPY WITH CO2 INSUFFLATION;  Surgeon: Issa Weeks MD;  Location: [facility]    ESOPHAGOSCOPY, GASTROSCOPY, DUODENOSCOPY (EGD), COMBINED N/A 10/29/2015    Procedure: COMBINED ESOPHAGOSCOPY, GASTROSCOPY, DUODENOSCOPY (EGD);  Surgeon: Shaq Mims MD;  Location:  GI    LAPAROSCOPIC  CHOLECYSTECTOMY N/A 6/13/2023    Procedure: CHOLECYSTECTOMY, LAPAROSCOPIC;  Surgeon: Reynaldo Segura MD;  [facility]    LAPAROSCOPIC GASTRIC ADJUSTABLE BANDING  11/09/10    Lap band procedure    LAPAROSCOPIC REMOVAL GASTRIC ADJUSTABLE BAND N/A 10/31/2015    Procedure: LAPAROSCOPIC REMOVAL GASTRIC ADJUSTABLE BAND;  Surgeon: Shaq Mims MD;  [facility] OR    [place] HAND/FINGER SURGERY UNLISTED  2016    [place] STOMACH SURGERY PROCEDURE UNLISTED  11/2015    RELEASE CARPAL TUNNEL Left 4/27/2017    Procedure: RELEASE CARPAL TUNNEL;  Left Open Carpal Tunnel Release;  Surgeon: Ciara Middleton MD;  [facility]    RELEASE CARPAL TUNNEL Right 6/15/2017    Procedure: RELEASE CARPAL TUNNEL;  Right Carpal Tunnel Release Open;  Surgeon: Ciara Middleton MD;  [facility]    REPAIR PTOSIS      TUBAL LIGATION  1988    [facility] APPENDECTOMY  1977       Meds     Current Outpatient Medications   Medication Sig Dispense Refill    acetaminophen (TYLENOL) 500 MG tablet Take 500-1,000 mg by mouth every 6 hours as needed for mild pain      albuterol (PROAIR HFA/PROVENTIL HFA/VENTOLIN HFA) 108 (90 Base) MCG/ACT inhaler Inhale 2 puffs into the lungs every 6 hours as needed for shortness of breath / dyspnea or wheezing (Patient not taking: Reported on 4/15/2024) 1 Inhaler 1    citalopram (CELEXA) 20 MG tablet Take 1 tablet (20 mg) by mouth daily 90 tablet 3    fexofenadine (ALLEGRA) 180 MG tablet Take 1 tablet (180 mg) by mouth daily 90 tablet 2    fluticasone (FLONASE) 50 MCG/ACT nasal spray Spray 2 sprays into both nostrils as needed      gabapentin (NEURONTIN) 100 MG capsule TAKE ONE CAPSULE BY MOUTH EVERY MORNING, TAKE ONE CAPSULE MID-DAY, AND TAKE FOUR CAPSULES BY MOUTH EVERY NIGHT AT BEDTIME 180 capsule 5    hydroxychloroquine (PLAQUENIL) 200 MG tablet Take 1 tablet (200 mg) by mouth 2 times daily 180 tablet 2    hydrOXYzine cha (VISTARIL) 25 MG capsule Take 1 capsule (25 mg) by mouth 3 times daily as  needed for anxiety 30 capsule 1    ibuprofen 200 MG capsule Take 600-800 mg by mouth At Bedtime (Patient not taking: Reported on 4/15/2024)      leflunomide (ARAVA) 20 MG tablet Take 1 tablet (20 mg) by mouth daily 90 tablet 2    losartan-hydrochlorothiazide (HYZAAR) 100-25 MG tablet Take 1 tablet by mouth daily 90 tablet 3    ORDER FOR DME Use your CPAP device as directed by your provider.      predniSONE (DELTASONE) 5 MG tablet Prednisone 20mg daily x5days, then 15mg daily x5days, then 10mg daily x5days, then 5mg daily x5days, then stop. (Patient not taking: Reported on 4/15/2024) 50 tablet 0    sulfaSALAzine (AZULFIDINE) 500 MG tablet Take 3 tablets (1,500 mg) by mouth 2 times daily 540 tablet 2    vitamin D3 (CHOLECALCIFEROL) 50 mcg (2000 units) tablet Take 1 tablet (50 mcg) by mouth daily 90 tablet 3     Current Facility-Administered Medications   Medication Dose Route Frequency Provider Last Rate Last Admin    alcohol (dehydrated) (ABLYSINOL) 99 % injection 15 mL  15 mL Other Once Levon Jamil MD         Facility-Administered Medications Ordered in Other Visits   Medication Dose Route Frequency Provider Last Rate Last Admin    sodium chloride (PF) 0.9% PF flush 10 mL  10 mL Intracatheter Once Nely Matthews MD        sodium chloride bacteriostatic 0.9 % flush 12 mL  12 mL Intravenous Once Nely Matthews MD           Labs   -    Imaging   -    Drops Currently Taking   - Pataday PRN each eye  - Systane QID both eyes.    Assessment/Plan   # Anterior Basement Membrane Dystrophy, OU  - 01/26/23 Vision in both eyes has fluctuated from 20/20cc to 20/40cc OU since 2014 but now is subjectively the worst it has been. No pain. Does have epiphora OD longstanding. Denies dryness. Does have allergies improving with pataday. Irregular surface from ABMD most likely the source of decreased visual acuity.  - 4/21/23: Superficial keratectomy of the left eye today.   - 4/26/23: BCL in place, still with large  irregular inferior epithelial defect though healing well superiorly and into visual axis  - 5/23/23: Epi fully intact left eye. Right eye superficial keratectomy  - 05/30/23 BCL removed right eye. Epi rough but intact, recently healed. Left eye epi dry but intact  -6/26/24 no recurrence right eye, left eye mild epithelial irregularity, does not explain the current vision.        # Horizontal exotropia  - Treated with 2.0 JOSUE prism starting 3/2022    # Nuclear sclerosis, OU  - History of steroid use for RA. BCVA fluctuant.  - may be responsible for the recent vision changes  - we will wait until the testing for plaquenil is back to make decision on the operation    #plaquenil use  Will have testing in few weeks    Plan:   - Continue Systane as needed; continue pataday as needed      Follow up:  1-2 months, VTD MRX raymundo IOL calc OCT mac    Attending Physician Attestation:  Complete documentation of historical and exam elements from today's encounter can be found in the full encounter summary report (not reduplicated in this progress note).  I personally obtained the chief complaint(s) and history of present illness.  I confirmed and edited as necessary the review of systems, past medical/surgical history, family history, social history, and examination findings as documented by others; and I examined the patient myself.  I personally reviewed the relevant tests, images, and reports as documented above.  I formulated and edited as necessary the assessment and plan and discussed the findings and management plan with the patient and family. - Sia Herman MD

## 2024-06-26 NOTE — NURSING NOTE
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   Began taking Plaquenil 2 months ago.   No eye pain or but has dryness/itching often each eye     Ocular Meds:  Pataday PRN each eye (for itching)  Systane 1-2 times per day each eye     Alfredo Ho 12:59 PM June 26, 2024

## 2024-07-19 ENCOUNTER — OFFICE VISIT (OUTPATIENT)
Dept: OPHTHALMOLOGY | Facility: CLINIC | Age: 69
End: 2024-07-19
Attending: INTERNAL MEDICINE
Payer: COMMERCIAL

## 2024-07-19 DIAGNOSIS — H25.13 NUCLEAR SCLEROSIS OF BOTH EYES: ICD-10-CM

## 2024-07-19 DIAGNOSIS — Z79.899 LONG-TERM USE OF HYDROXYCHLOROQUINE: Primary | ICD-10-CM

## 2024-07-19 DIAGNOSIS — H52.4 PRESBYOPIA: ICD-10-CM

## 2024-07-19 DIAGNOSIS — H18.523 ANTERIOR BASEMENT MEMBRANE DYSTROPHY OF BOTH EYES: ICD-10-CM

## 2024-07-19 PROCEDURE — 99207 FUNDUS PHOTOS OU (BOTH EYES): CPT | Performed by: OPTOMETRIST

## 2024-07-19 PROCEDURE — 92083 EXTENDED VISUAL FIELD XM: CPT | Performed by: OPTOMETRIST

## 2024-07-19 PROCEDURE — 92134 CPTRZ OPH DX IMG PST SGM RTA: CPT | Performed by: OPTOMETRIST

## 2024-07-19 PROCEDURE — 92014 COMPRE OPH EXAM EST PT 1/>: CPT | Performed by: OPTOMETRIST

## 2024-07-19 ASSESSMENT — REFRACTION_WEARINGRX
SPECS_TYPE: PAL
OD_ADD: +2.75
OD_SPHERE: -2.00
OS_SPHERE: -2.00
OS_CYLINDER: +0.50
OD_CYLINDER: +2.00
OD_AXIS: 160
OD_HPRISM: 2.0
OD_HBASE: OUT
OS_AXIS: 025
OS_ADD: +2.75

## 2024-07-19 ASSESSMENT — SLIT LAMP EXAM - LIDS
COMMENTS: MEIBOMIAN GLAND DYSFUNCTION
COMMENTS: MEIBOMIAN GLAND DYSFUNCTION,

## 2024-07-19 ASSESSMENT — CONF VISUAL FIELD
METHOD: COUNTING FINGERS
OS_SUPERIOR_NASAL_RESTRICTION: 0
OS_INFERIOR_NASAL_RESTRICTION: 0
OD_INFERIOR_TEMPORAL_RESTRICTION: 0
OS_NORMAL: 1
OS_INFERIOR_TEMPORAL_RESTRICTION: 0
OD_NORMAL: 1
OS_SUPERIOR_TEMPORAL_RESTRICTION: 0
OD_SUPERIOR_NASAL_RESTRICTION: 0
OD_SUPERIOR_TEMPORAL_RESTRICTION: 0
OD_INFERIOR_NASAL_RESTRICTION: 0

## 2024-07-19 ASSESSMENT — VISUAL ACUITY
METHOD: SNELLEN - LINEAR
OS_PH_CC+: -2
OS_PH_CC: 20/25
OS_CC: 20/50
CORRECTION_TYPE: GLASSES
OD_CC: 20/30

## 2024-07-19 ASSESSMENT — CUP TO DISC RATIO
OS_RATIO: 0.2
OD_RATIO: 0.2

## 2024-07-19 ASSESSMENT — TONOMETRY
OS_IOP_MMHG: 19
OD_IOP_MMHG: 19
IOP_METHOD: ICARE

## 2024-07-19 ASSESSMENT — EXTERNAL EXAM - RIGHT EYE: OD_EXAM: NORMAL

## 2024-07-19 ASSESSMENT — EXTERNAL EXAM - LEFT EYE: OS_EXAM: NORMAL

## 2024-07-19 NOTE — PROGRESS NOTES
Assessment/Plan  (Z79.899) Long-term use of hydroxychloroquine  (primary encounter diagnosis)  Comment: [id number], restarted 5/2024.   Plan: OCT Retina Spectralis OU (both eyes), HVF 10-2         OU, Fundus Photos OU (both eyes)        Discussed findings with patient. Ok to continue taking Plaquenil from an eye health standpoint. Monitor in 1 year with complete exam and retinal imaging.     (H25.13) Nuclear sclerosis of both eyes  Comment: Likely impacting vision a little.   Plan: Recommend patient keep upcoming appointment with Dr. Herman. Suspect that surgery would likely improve some symptoms but better lighting and an updated Rx would also be reasonable options to consider.     (H18.523) Anterior basement membrane dystrophy of both eyes  Plan: Continue care with cornea clinic.     (H52.4) Presbyopia  Plan: See above note. If patient is not deemed to be a surgery candidate, would recommend obtaining an updated refraction. This was deferred today with upcoming surgery consult.     Complete documentation of historical and exam elements from today's encounter can  be found in the full encounter summary report (not reduplicated in this progress  note). I personally obtained the chief complaint(s) and history of present illness. I  confirmed and edited as necessary the review of systems, past medical/surgical  history, family history, social history, and examination findings as documented by  others; and I examined the patient myself. I personally reviewed the relevant tests,  images, and reports as documented above. I formulated and edited as necessary the  assessment and plan and discussed the findings and management plan with the  patient and family.    Ponce Lindo, FRANCOISE

## 2024-08-02 ENCOUNTER — OFFICE VISIT (OUTPATIENT)
Dept: OPHTHALMOLOGY | Facility: CLINIC | Age: 69
End: 2024-08-02
Attending: OPHTHALMOLOGY
Payer: COMMERCIAL

## 2024-08-02 DIAGNOSIS — H18.523 ANTERIOR BASEMENT MEMBRANE DYSTROPHY OF BOTH EYES: ICD-10-CM

## 2024-08-02 DIAGNOSIS — H25.13 NUCLEAR SCLEROSIS OF BOTH EYES: Primary | ICD-10-CM

## 2024-08-02 PROCEDURE — 92025 CPTRIZED CORNEAL TOPOGRAPHY: CPT | Performed by: OPHTHALMOLOGY

## 2024-08-02 PROCEDURE — 92134 CPTRZ OPH DX IMG PST SGM RTA: CPT | Performed by: OPHTHALMOLOGY

## 2024-08-02 PROCEDURE — 76519 ECHO EXAM OF EYE: CPT | Performed by: OPHTHALMOLOGY

## 2024-08-02 PROCEDURE — G0463 HOSPITAL OUTPT CLINIC VISIT: HCPCS | Performed by: OPHTHALMOLOGY

## 2024-08-02 PROCEDURE — 99214 OFFICE O/P EST MOD 30 MIN: CPT | Performed by: OPHTHALMOLOGY

## 2024-08-02 ASSESSMENT — TONOMETRY
IOP_METHOD: TONOPEN
OD_IOP_MMHG: 22
OS_IOP_MMHG: 14
OD_IOP_MMHG: 23
IOP_METHOD: TONOPEN

## 2024-08-02 ASSESSMENT — REFRACTION_WEARINGRX
OD_SPHERE: -2.00
OD_HPRISM: 2.0
OS_ADD: +2.75
OD_AXIS: 160
OS_AXIS: 025
OD_CYLINDER: +2.00
OS_CYLINDER: +0.50
OD_ADD: +2.75
OS_SPHERE: -2.00
OD_HBASE: OUT
SPECS_TYPE: PAL

## 2024-08-02 ASSESSMENT — VISUAL ACUITY
METHOD: SNELLEN - LINEAR
OS_CC: 20/40
OS_PH_CC+: -3
OS_PH_CC: 20/25
OD_CC: 20/40
OS_CC+: -1
OD_PH_CC: 20/30
CORRECTION_TYPE: GLASSES

## 2024-08-02 ASSESSMENT — EXTERNAL EXAM - RIGHT EYE: OD_EXAM: NORMAL

## 2024-08-02 ASSESSMENT — REFRACTION_MANIFEST
OD_CYLINDER: +1.75
OS_AXIS: 024
OS_ADD: +2.75
OD_ADD: +2.75
OD_AXIS: 156
OS_SPHERE: -2.00
OD_SPHERE: -2.25
OS_CYLINDER: +0.50

## 2024-08-02 ASSESSMENT — SLIT LAMP EXAM - LIDS
COMMENTS: MEIBOMIAN GLAND DYSFUNCTION,
COMMENTS: MEIBOMIAN GLAND DYSFUNCTION

## 2024-08-02 ASSESSMENT — EXTERNAL EXAM - LEFT EYE: OS_EXAM: NORMAL

## 2024-08-02 NOTE — PROGRESS NOTES
Chief complaint   ABMD evaluation    Referring Provider: Dr. Valderrama     [facility]   Sharon Fung 69 year old female   HPI       Cataract Evaluation    Associated symptoms include glare.  Negative for haloes and double vision.  Treatments tried include artificial tears.  Pain was noted as 0/10. Additional comments: Here for cataract evaluation each eye              Comments    Pt states VA has been stable lately.   She tells me she gets glare from sunlight while driving.   Denies eye pain, or double vision.   Occasional itching both eyes, uses AT PRN for relief.     Alfredo Rivas 1:02 PM August 2, 2024            Last edited by Alfredo Rivas on 8/2/2024  1:03 PM.         Complains of gradually worsening haze over the vision in both eyes starting ~3/2022 and progressing since then. Has tried some sort of ointment and drops without improvement. Denies any pain. Does endorse epiphora just from the right eye. Does endorse significant itching predating onset of hazy vision for which she uses pataday PRN - she does have seasonal allergies for which she takes allegra. Of note, she uses a CPAP for her COPD and has RA for which she follows with rheumatology with immunosuppressant treatments including courses of steroids. At her last visit 3/31/2022 with Dr. Valderrama she was instructed to continue present treatment of restasis (no longer using - did not help), luba zaynab and gtt (not using), and maxitrol PRN to eyelids. No history of painful episodes. Problem is not time-of-day dependent. No waking with pain.    Interval hx 08/02/2024  Chief Complaint(s) and History of Present Illness(es)       Follow Up    Associated symptoms include dryness.  Negative for eye pain, headache and photophobia.  Treatments tried include eye drops and artificial tears.  Pain was noted as 0/10. 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   Began  taking Plaquenil 2 months ago.   No eye pain or but has dryness/itching often each eye     Ocular Meds:  Pataday PRN each eye (for itching)  Systane 1-2 times per day each eye     Alfredo Ho 12:59 PM June 26, 2024                       Past ocular history   Prior eye surgery/laser/Trauma: yes bilateral upper lid blepharoplasty (2016)  CTL wearer:No  Glasses : yes  Family Hx of eye disease: No    PMH     Past Medical History:   Diagnosis Date    AR (allergic rhinitis)  as teen    Arthritis 12/14/2015    Chronic obstructive pulmonary disease, unspecified COPD type (H) 03/27/2018    Depressive disorder 3 years ago    GERD (gastroesophageal reflux disease) 04/2007    Headache 07/11/2017    Hypertension goal BP (blood pressure) < 140/90 12/20/2021    Mild major depression (H24) 3 years ago    Morbid obesity (H) teens    BRETT (obstructive sleep apnea)     uses CPAP    RA (rheumatoid arthritis) (H) 4 years    Reduced vision 3 years       PSH     Past Surgical History:   Procedure Laterality Date    ARTHRODESIS FOOT Right 6/30/2021    Procedure: Right 1st metatarsophalangeal joint fusion;  Surgeon: Jesus Wolf MD;  [facility]    ARTHRODESIS TOE(S) Right 12/27/2021    Procedure: right revision great toe fusion;  Surgeon: Ryan Gee MD;  Location:  OR    BLEPHAROPLASTY BILATERAL Bilateral 8/5/2016    Procedure: BLEPHAROPLASTY BILATERAL;  Surgeon: Cortez Robin MD;  [facility]    BREAST BIOPSY, RT/LT  1-94    Benign    COLONOSCOPY      COLONOSCOPY WITH CO2 INSUFFLATION N/A 3/30/2017    Procedure: COLONOSCOPY WITH CO2 INSUFFLATION;  Surgeon: Issa Weeks MD;  Location:  OR    ESOPHAGOSCOPY, GASTROSCOPY, DUODENOSCOPY (EGD), COMBINED N/A 10/29/2015    Procedure: COMBINED ESOPHAGOSCOPY, GASTROSCOPY, DUODENOSCOPY (EGD);  Surgeon: Shaq Mims MD;  Location:  GI    LAPAROSCOPIC CHOLECYSTECTOMY N/A 6/13/2023    Procedure: CHOLECYSTECTOMY, LAPAROSCOPIC;  Surgeon: Reynaldo Segura  MD [place];  [facility]    LAPAROSCOPIC GASTRIC ADJUSTABLE BANDING  11/09/10    Lap band procedure    LAPAROSCOPIC REMOVAL GASTRIC ADJUSTABLE BAND N/A 10/31/2015    Procedure: LAPAROSCOPIC REMOVAL GASTRIC ADJUSTABLE BAND;  Surgeon: Shaq Mims MD;  [facility]    AR HAND/FINGER SURGERY UNLISTED  2016    AR STOMACH SURGERY PROCEDURE UNLISTED  11/2015    RELEASE CARPAL TUNNEL Left 4/27/2017    Procedure: RELEASE CARPAL TUNNEL;  Left Open Carpal Tunnel Release;  Surgeon: Ciara Middleton MD;  [facility]    RELEASE CARPAL TUNNEL Right 6/15/2017    Procedure: RELEASE CARPAL TUNNEL;  Right Carpal Tunnel Release Open;  Surgeon: Ciara Middleton MD;  [facility]    REPAIR PTOSIS      TUBAL LIGATION  1988    [facility] APPENDECTOMY  1977       [facility]     Current Outpatient Medications   Medication Sig Dispense Refill    acetaminophen (TYLENOL) 500 MG tablet Take 500-1,000 mg by mouth every 6 hours as needed for mild pain      albuterol (PROAIR HFA/PROVENTIL HFA/VENTOLIN HFA) 108 (90 Base) MCG/ACT inhaler Inhale 2 puffs into the lungs every 6 hours as needed for shortness of breath / dyspnea or wheezing (Patient not taking: Reported on 4/15/2024) 1 Inhaler 1    citalopram (CELEXA) 20 MG tablet Take 1 tablet (20 mg) by mouth daily 90 tablet 3    fexofenadine (ALLEGRA) 180 MG tablet Take 1 tablet (180 mg) by mouth daily 90 tablet 2    fluticasone (FLONASE) 50 MCG/ACT nasal spray Spray 2 sprays into both nostrils as needed      gabapentin (NEURONTIN) 100 MG capsule TAKE ONE CAPSULE BY MOUTH EVERY MORNING, TAKE ONE CAPSULE MID-DAY, AND TAKE FOUR CAPSULES BY MOUTH EVERY NIGHT AT BEDTIME 180 capsule 5    hydroxychloroquine (PLAQUENIL) 200 MG tablet Take 1 tablet (200 mg) by mouth 2 times daily 180 tablet 2    hydrOXYzine cha (VISTARIL) 25 MG capsule Take 1 capsule (25 mg) by mouth 3 times daily as needed for anxiety 30 capsule 1    ibuprofen 200 MG capsule Take 600-800 mg by mouth At Bedtime  (Patient not taking: Reported on 4/15/2024)      leflunomide (ARAVA) 20 MG tablet Take 1 tablet (20 mg) by mouth daily 90 tablet 2    losartan-hydrochlorothiazide (HYZAAR) 100-25 MG tablet Take 1 tablet by mouth daily 90 tablet 3    ORDER FOR DME Use your CPAP device as directed by your provider.      predniSONE (DELTASONE) 5 MG tablet Prednisone 20mg daily x5days, then 15mg daily x5days, then 10mg daily x5days, then 5mg daily x5days, then stop. (Patient not taking: Reported on 4/15/2024) 50 tablet 0    sulfaSALAzine (AZULFIDINE) 500 MG tablet Take 3 tablets (1,500 mg) by mouth 2 times daily 540 tablet 2    vitamin D3 (CHOLECALCIFEROL) 50 mcg (2000 units) tablet Take 1 tablet (50 mcg) by mouth daily 90 tablet 3     Current Facility-Administered Medications   Medication Dose Route Frequency Provider Last Rate Last Admin    alcohol (dehydrated) (ABLYSINOL) 99 % injection 15 mL  15 mL Other Once Levon Jamil MD         Facility-Administered Medications Ordered in Other Visits   Medication Dose Route Frequency Provider Last Rate Last Admin    sodium chloride (PF) 0.9% PF flush 10 mL  10 mL Intracatheter Once Nely Matthews MD        sodium chloride bacteriostatic 0.9 % flush 12 mL  12 mL Intravenous Once Nely Matthews MD           Labs   -    Imaging   -    Drops Currently Taking   - Pataday PRN each eye  - Systane QID both eyes.    Assessment/Plan   # Anterior Basement Membrane Dystrophy, OU  - 01/26/23 Vision in both eyes has fluctuated from 20/20cc to 20/40cc OU since 2014 but now is subjectively the worst it has been. No pain. Does have epiphora OD longstanding. Denies dryness. Does have allergies improving with pataday. Irregular surface from ABMD most likely the source of decreased visual acuity.  - 4/21/23: Superficial keratectomy of the left eye today.   - 4/26/23: BCL in place, still with large irregular inferior epithelial defect though healing well superiorly and into visual axis  - 5/23/23:  Epi fully intact left eye. Right eye superficial keratectomy  - 05/30/23 BCL removed right eye. Epi rough but intact, recently healed. Left eye epi dry but intact  -6/26/24 no recurrence right eye, left eye mild epithelial irregularity, does not explain the current vision.  -8/2/24: no recurrence of epi defect      # Horizontal exotropia  - Treated with 2.0 JOSUE prism starting 3/2022    # Nuclear sclerosis, OU  #Senile Cataracts OU  Patient is having difficulty with daily activities due to visual impairment. Patient feels that they are no longer managing with current correction and would like to move forward with cataract surgery. Explained benefits alternatives and risks including but not limited to loss of vision, severe infection, retinal detachment, need for more surgery, visual disturbances etc...  Informed patient that reaching uncorrected vision aim (without glasses) after cataract surgery is not certain. Made patient aware they may need glasses, laser vision correction or in worst case scenario, IOL exchange.      Dilates well  no PXF  no Flomax  no guttae    -Aim: distance (ABMD)  - dominant eye  -Previous refractive surgery status:-   -Corneal astigmatism<1.5 use standard  Not resident case due to risk of epi erosion      #plaquenil use  Long-term use of hydroxychloroquine  (primary encounter diagnosis)  Comment: [id number], restarted 5/2024.   Plan: OCT Retina Spectralis OU (both eyes), HVF 10-2         OU, Fundus Photos OU (both eyes)        Plan:   - Continue Systane as needed; continue pataday as needed      Attending Physician Attestation:  Complete documentation of historical and exam elements from today's encounter can be found in the full encounter summary report (not reduplicated in this progress note).  I personally obtained the chief complaint(s) and history of present illness.  I confirmed and edited as necessary the review of systems, past medical/surgical history, family history, social history,  and examination findings as documented by others; and I examined the patient myself.  I personally reviewed the relevant tests, images, and reports as documented above.  I formulated and edited as necessary the assessment and plan and discussed the findings and management plan with the patient and family. - Sia Herman MD

## 2024-08-08 ENCOUNTER — TELEPHONE (OUTPATIENT)
Dept: OPHTHALMOLOGY | Facility: CLINIC | Age: 69
End: 2024-08-08
Payer: COMMERCIAL

## 2024-08-08 NOTE — TELEPHONE ENCOUNTER
Left voicemail for patient regarding scheduling surgery with Dr. Herman.  Provided contact number to discuss.  [phone]    Sonya Arguello, on 8/8/2024 at 7:47 AM

## 2024-08-20 PROBLEM — H25.13 NUCLEAR SCLEROSIS OF BOTH EYES: Status: ACTIVE | Noted: 2024-08-02

## 2024-08-20 NOTE — TELEPHONE ENCOUNTER
Called patient to schedule surgery with Dr. Herman    Spoke with: Sharon    Date(s) of Surgery: 11/4 NS 11/14    Patient aware of approximate arrival time: Yes      Location of surgery: [facility]     Pre-Op H&P: [facility]     Informed patient that they need to call to schedule pre-op H&P within 30 days of surgery date: Yes      Post-Op Appt Dates: 11/8, 11/15, and 12/6 ([facility])       Discussed with patient pre-op RN will call 2-3 days prior to surgery with arrival time and instructions:  Yes       Standard Surgery Packet Sent: Yes 08/20/24  via Mail - Standard      Additional Information Sent in Packet: Post-op Appointment Itinerary      Informed patient that they will need an adult  to bring patient home from surgery: Yes  : daughter         Additional Comments:  NA      All patients questions were answered and was instructed to review surgical packet and call back [phone] with any questions or concerns.       Sonya Arguello on 8/20/2024 at 12:59 PM

## 2024-08-22 ENCOUNTER — LAB (OUTPATIENT)
Dept: LAB | Facility: CLINIC | Age: 69
End: 2024-08-22
Payer: COMMERCIAL

## 2024-08-22 DIAGNOSIS — Z79.899 HIGH RISK MEDICATION USE: ICD-10-CM

## 2024-08-22 DIAGNOSIS — M05.9 SEROPOSITIVE RHEUMATOID ARTHRITIS (H): ICD-10-CM

## 2024-08-22 LAB
ALBUMIN SERPL BCG-MCNC: 4.1 G/DL (ref 3.5–5.2)
ALP SERPL-CCNC: 90 U/L (ref 40–150)
ALT SERPL W P-5'-P-CCNC: 9 U/L (ref 0–50)
AST SERPL W P-5'-P-CCNC: 28 U/L (ref 0–45)
BASOPHILS # BLD AUTO: 0.1 10E3/UL (ref 0–0.2)
BASOPHILS NFR BLD AUTO: 1 %
BILIRUB DIRECT SERPL-MCNC: <0.2 MG/DL (ref 0–0.3)
BILIRUB SERPL-MCNC: 0.3 MG/DL
CREAT SERPL-MCNC: 0.67 MG/DL (ref 0.51–0.95)
CRP SERPL-MCNC: 11.7 MG/L
EGFRCR SERPLBLD CKD-EPI 2021: >90 ML/MIN/1.73M2
EOSINOPHIL # BLD AUTO: 0.5 10E3/UL (ref 0–0.7)
EOSINOPHIL NFR BLD AUTO: 6 %
ERYTHROCYTE [DISTWIDTH] IN BLOOD BY AUTOMATED COUNT: 13.9 % (ref 10–15)
ERYTHROCYTE [SEDIMENTATION RATE] IN BLOOD BY WESTERGREN METHOD: 36 MM/HR (ref 0–30)
HCT VFR BLD AUTO: 37.4 % (ref 35–47)
HGB BLD-MCNC: 11.7 G/DL (ref 11.7–15.7)
IMM GRANULOCYTES # BLD: 0 10E3/UL
IMM GRANULOCYTES NFR BLD: 0 %
LYMPHOCYTES # BLD AUTO: 1.9 10E3/UL (ref 0.8–5.3)
LYMPHOCYTES NFR BLD AUTO: 25 %
MCH RBC QN AUTO: 29.3 PG (ref 26.5–33)
MCHC RBC AUTO-ENTMCNC: 31.3 G/DL (ref 31.5–36.5)
MCV RBC AUTO: 94 FL (ref 78–100)
MONOCYTES # BLD AUTO: 0.8 10E3/UL (ref 0–1.3)
MONOCYTES NFR BLD AUTO: 11 %
NEUTROPHILS # BLD AUTO: 4.2 10E3/UL (ref 1.6–8.3)
NEUTROPHILS NFR BLD AUTO: 57 %
PLATELET # BLD AUTO: 286 10E3/UL (ref 150–450)
PROT SERPL-MCNC: 7.2 G/DL (ref 6.4–8.3)
RBC # BLD AUTO: 3.99 10E6/UL (ref 3.8–5.2)
WBC # BLD AUTO: 7.4 10E3/UL (ref 4–11)

## 2024-08-22 PROCEDURE — 85652 RBC SED RATE AUTOMATED: CPT

## 2024-08-22 PROCEDURE — 85025 COMPLETE CBC W/AUTO DIFF WBC: CPT

## 2024-08-22 PROCEDURE — 36415 COLL VENOUS BLD VENIPUNCTURE: CPT

## 2024-08-22 PROCEDURE — 86140 C-REACTIVE PROTEIN: CPT

## 2024-08-22 PROCEDURE — 82565 ASSAY OF CREATININE: CPT

## 2024-08-22 PROCEDURE — 80076 HEPATIC FUNCTION PANEL: CPT

## 2024-08-28 ENCOUNTER — TELEPHONE (OUTPATIENT)
Dept: RHEUMATOLOGY | Facility: CLINIC | Age: 69
End: 2024-08-28

## 2024-08-28 ENCOUNTER — OFFICE VISIT (OUTPATIENT)
Dept: RHEUMATOLOGY | Facility: CLINIC | Age: 69
End: 2024-08-28
Payer: COMMERCIAL

## 2024-08-28 VITALS
HEART RATE: 84 BPM | DIASTOLIC BLOOD PRESSURE: 66 MMHG | BODY MASS INDEX: 45.86 KG/M2 | WEIGHT: 263 LBS | TEMPERATURE: 97.8 F | SYSTOLIC BLOOD PRESSURE: 126 MMHG | OXYGEN SATURATION: 93 %

## 2024-08-28 DIAGNOSIS — M05.9 SEROPOSITIVE RHEUMATOID ARTHRITIS (H): Primary | ICD-10-CM

## 2024-08-28 DIAGNOSIS — Z79.899 HIGH RISK MEDICATION USE: ICD-10-CM

## 2024-08-28 DIAGNOSIS — Z78.0 POSTMENOPAUSAL: ICD-10-CM

## 2024-08-28 PROCEDURE — 99214 OFFICE O/P EST MOD 30 MIN: CPT | Performed by: INTERNAL MEDICINE

## 2024-08-28 PROCEDURE — G2211 COMPLEX E/M VISIT ADD ON: HCPCS | Performed by: INTERNAL MEDICINE

## 2024-08-28 NOTE — PROGRESS NOTES
Rheumatology Clinic Visit      Sharon Fung MRN# [id number]   YOB: 1955 Age: 69 year old      Date of visit: 8/28/24   PCP: Dr. Reynaldo Saavedra    Chief Complaint   Patient presents with:  RECHECK    Assessment and Plan     1. Seropositive nonerosive rheumatoid arthritis (RF negative, CCP low positive): Previously on Humira (lost efficacy), Cimzia (ineffective), Orencia (ineffective), leflunomide (ineffective as monotherapy), hydroxychloroquine (ineffective), Xeljanz (ineffective), MTX (GI upset), Kevzara (effective, became cost prohibitive when insurance changed to Medicare, used 1/20195039-5003).  Well-controlled arthritis when on a combination of sulfasalazine 1500 mg twice daily, leflunomide 20 mg daily, and Kevzara, but because Kevzara becoming cost prohibitive with change to Medicare, Kevzara was discontinued and Simponi IV was started.  Simponi has not been as effective as Kevzara; minimal synovitis on exam today but she had reported having morning stiffness for 2-3 hours.  Added hydroxychloroquine and this has been helpful.  Simponi was discontinued because it was not effective.  However, still with active disease and not feeling as well now.  We discussed restarting Kevzara and she is in agreement; risks and side effects of Kevzara reviewed today.    Chronic illness, progressive  - Continue sulfasalazine 1500 mg twice daily   - Continue leflunomide 20mg daily   - Continue hydroxychloroquine 200 mg twice daily (last eye exam on 8/2/2024 by Dr. Sia Herman)  - Start Kevzara 200 mg SQ every 14 days   Fasting labs in 9 weeks: CBC, CMP, ESR, CRP, lipid panel    High risk medication requiring intensive toxicity monitoring at least quarterly                 Rapid 3, cumulative scores                      08/28/2024: 14    (SSZ 1500mg BID, leflunomide 20mg daily,  mg BID)                       01/25/2024: 14    (SSZ 1500mg BID, leflunomide 20mg daily, Simponi IV)                       10/18/2023: 13.2 (SSZ 1500mg BID, leflunomide 20mg daily, Simponi IV [id number])                      8/24/2021: No inflammatory joint symptoms.  She says that this combination is great (SSZ 1500mg BID, leflunomide 20mg daily, Kevzara 200mg q14 days)                      10/14/2020 doing well (SSZ 1500mg BID, leflunomide 20mg daily, Kevzara 200mg q14 days)                      08/28/2019: 3.2   (SSZ 1500mg BID, Kevzara)  Now on gabapentin                      04/17/2019: 15    (SSZ 1500mg BID, Kevzara)  Mostly fibromyalgia symptoms                      12/19/2018:         (MTX 20mg SQ wkly, Xeljanz XR 11mg daily, SSZ 1500mg BID)                          05/02/2018:  9     (MTX 20mg SQ wkly, Xeljanz XR 11mg daily, SSZ 1000mg BID)                          01/31/2018: 22.3 (MTX 20mg SQ wkly, Xeljanz XR 11mg daily)                          11/29/2017: 16.8 (MTX 20mg SQ wkly)                      08/02/2017: 4.2   (MTX 20mg SQ wkly, Xeljanz XR 11mg daily)                         05/04/2017: 7      (MTX 20mg SQ wkly, Xeljanz XR 11mg daily)                        02/02/2017: 8      (MTX 20mg SQ wkly, Xeljanz XR 11mg daily)                      11/04/2016: 13    (MTX 20mg SQ weekly)                      07/15/2016: 10.8    2. Positive LORNA; positive and negative dsDNA; Secondary Sjogren's Syndrome: Repeat dsDNA was negative. Has dry eyes but no dry mouth.  Dry eyes are treated by ophthalmology with Restasis.      3. Bilateral patellofemoral syndrome: home exercises have been helpful.  Weight loss encouraged.       4. Fibromyalgia: Managed in the pain clinic     5.  Bone Health, vitamin D deficiency: normal 12/20/2019 DEXA; plan to repeat in 2024.    - Continue vitamin D 2000 IU daily  - DEXA ordered    6. Chronic lower back pain with left sided sciatica: suspect related to degenerative changes seen on L-spine MRI.  She has been seen in the pain clinic and spine surgery clinic.  Documented here for historical significance.    7.  Neck pain: degenerative in nature; pain with extremes of ROM and limits to ROM in each direction.  Continue physical therapy exercises at home.  5/15/2024 cervical spine x-ray without evidence of instability.    8.  Vaccinations: Vaccinations reviewed with Ms. Fung.   - Influenza: encouraged yearly vaccination  - COVID-19: advised keeping updated, and to hold leflunomide and sulfasalazine for 1-2 weeks afterward  - RSV: Advise vaccination    Total minutes spent in evaluation with patient, documentation, , and review of pertinent studies and chart notes: 24  The longitudinal plan of care for the rheumatology problem(s) were addressed during this visit.  Due to added complexity of care, we will continue to support the patient and the subsequent management of this condition with ongoing continuity of care.       Ms. Fung verbalized agreement with and understanding of the rational for the diagnosis and treatment plan.  All questions were answered to best of my ability and the patient's satisfaction. Ms. Fung was advised to contact the clinic with any questions that may arise after the clinic visit.      Thank you for involving me in the care of the patient    Return to clinic: 3 months    HPI   Sharon Fung is a 69 year old female with medical history significant for GERD, allergic rhinitis, obstructive sleep apnea, obesity, hx of trigger fingers, hx of carpal tunnel surgery, and rheumatoid arthritis who presents for follow-up of rheumatoid arthritis.    6/9/2023: RA controlled.  Morning stiffness <30 min. No joint swelling. Changing to medicare and needs to discuss changing kevzara to a different agent because it has become cost prohibitive with Medicare.     10/18/2023: RA not as well-controlled since changing to Simponi.  Has had 3 doses of Simponi thus far.  Simponi infusions are well-tolerated but has not controlled her arthritis as well.  Pain, stiffness, and swelling at the MCPs, PIPs,  and wrists.  Joint pain is worse in the morning and improves with time and activity.  Morning stiffness for approximately 2 hours.  She would like to continue Simponi for now to see if a longer duration of therapy will be more helpful.    1/25/2024: Doing fairly well on her current regimen but having morning stiffness in her hands for 2-3 hours, and still some ache in the hands that improves with flexion and extension of the fingers and general movement.  Arthritis is not limiting her daily activities.    5/15/2024: RA improved with addition of hydroxychloroquine.  Neck pain worse with activity and improves with rest; does not radiate down the arms; has been a chronic issue and has been doing home exercises but they are infrequently done.  She says she does not need to go to formal physical therapy and will continue the exercises on her own at home.    Today, 8/28/2024: Pain, swelling, and stiffness at the MCPs and PIPs that is worse in the morning improves with time and activity.  Morning stiffness for approximately 1-1.5 hours.  Arthritis not limiting daily activities.  Felt better when on Kevzara    No fevers or chills. No nausea, vomiting, constipation, diarrhea. No chest pain/pressure, palpitations, or shortness of breath. No oral or nasal sores.  Dermatitis of the fingertips and following with dermatology; suspects contact allergy.    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; now working casual    ROS   12 point review of system was completed and negative except as noted in the HPI     Active Problem List     Patient Active Problem List   Diagnosis    AR (allergic rhinitis)    GERD (gastroesophageal reflux disease)    Status post bariatric surgery    High risk medication use    Obstructive sleep apnea    Obesity, Class III, BMI 40-49.9 (morbid obesity) (H)    Anterior basement membrane dystrophy    Seropositive rheumatoid arthritis (H)    LORNA  positive    Carpal tunnel syndrome of right wrist    Headache    Immunosuppression (H24)    Fibromyalgia    Pain in joint, ankle and foot, right    Hypertension goal BP (blood pressure) < 140/90    Chronic back pain, unspecified back location, unspecified back pain laterality    Hyperlipidemia LDL goal <100    Mild recurrent major depression (H24)    Nuclear sclerosis of both eyes     Past Medical History     Past Medical History:   Diagnosis Date    AR (allergic rhinitis)  as teen    Arthritis 12/14/2015    Chronic obstructive pulmonary disease, unspecified COPD type (H) 03/27/2018    Depressive disorder 3 years ago    GERD (gastroesophageal reflux disease) 04/2007    Headache 07/11/2017    Hypertension goal BP (blood pressure) < 140/90 12/20/2021    Mild major depression (H24) 3 years ago    Morbid obesity (H) teens    BRETT (obstructive sleep apnea)     uses CPAP    RA (rheumatoid arthritis) (H) 4 years    Reduced vision 3 years     Past Surgical History     Past Surgical History:   Procedure Laterality Date    ARTHRODESIS FOOT Right 6/30/2021    Procedure: Right 1st metatarsophalangeal joint fusion;  Surgeon: Jesus Wolf MD;  Location: [facility]    ARTHRODESIS TOE(S) Right 12/27/2021    Procedure: right revision great toe fusion;  Surgeon: Ryan Gee MD;  Location:  OR    BLEPHAROPLASTY BILATERAL Bilateral 8/5/2016    Procedure: BLEPHAROPLASTY BILATERAL;  Surgeon: Cortez Robin MD;  Location:  [facility]    BREAST BIOPSY, RT/LT  1-94    Benign    COLONOSCOPY      COLONOSCOPY WITH CO2 INSUFFLATION N/A 3/30/2017    Procedure: COLONOSCOPY WITH CO2 INSUFFLATION;  Surgeon: Issa Weeks MD;  Location:  OR    ESOPHAGOSCOPY, GASTROSCOPY, DUODENOSCOPY (EGD), COMBINED N/A 10/29/2015    Procedure: COMBINED ESOPHAGOSCOPY, GASTROSCOPY, DUODENOSCOPY (EGD);  Surgeon: Shaq Mims MD;  [facility]    LAPAROSCOPIC CHOLECYSTECTOMY N/A 6/13/2023    Procedure: CHOLECYSTECTOMY,  LAPAROSCOPIC;  Surgeon: Reynaldo Segura MD;  [facility]    LAPAROSCOPIC GASTRIC ADJUSTABLE BANDING  11/09/10    Lap band procedure    LAPAROSCOPIC REMOVAL GASTRIC ADJUSTABLE BAND N/A 10/31/2015    Procedure: LAPAROSCOPIC REMOVAL GASTRIC ADJUSTABLE BAND;  Surgeon: Shaq Mims MD;  Location: [facility] OR    DC HAND/FINGER SURGERY UNLISTED  2016    DC STOMACH SURGERY PROCEDURE UNLISTED  11/2015    RELEASE CARPAL TUNNEL Left 4/27/2017    Procedure: RELEASE CARPAL TUNNEL;  Left Open Carpal Tunnel Release;  Surgeon: Ciara Middleton MD;  [facility]    RELEASE CARPAL TUNNEL Right 6/15/2017    Procedure: RELEASE CARPAL TUNNEL;  Right Carpal Tunnel Release Open;  Surgeon: Ciara Middleton MD;  [facility]    REPAIR PTOSIS      TUBAL LIGATION  1988    [facility] [place] APPENDECTOMY  1977     Allergy   No Known Allergies  Current Medication List     Current Outpatient Medications   Medication Sig Dispense Refill    acetaminophen (TYLENOL) 500 MG tablet Take 500-1,000 mg by mouth every 6 hours as needed for mild pain      citalopram (CELEXA) 20 MG tablet Take 1 tablet (20 mg) by mouth daily 90 tablet 3    fexofenadine (ALLEGRA) 180 MG tablet Take 1 tablet (180 mg) by mouth daily 90 tablet 2    fluticasone (FLONASE) 50 MCG/ACT nasal spray Spray 2 sprays into both nostrils as needed      gabapentin (NEURONTIN) 100 MG capsule TAKE ONE CAPSULE BY MOUTH EVERY MORNING, TAKE ONE CAPSULE MID-DAY, AND TAKE FOUR CAPSULES BY MOUTH EVERY NIGHT AT BEDTIME 180 capsule 5    hydroxychloroquine (PLAQUENIL) 200 MG tablet Take 1 tablet (200 mg) by mouth 2 times daily 180 tablet 2    hydrOXYzine cha (VISTARIL) 25 MG capsule Take 1 capsule (25 mg) by mouth 3 times daily as needed for anxiety 30 capsule 1    ibuprofen 200 MG capsule Take 600-800 mg by mouth at bedtime.      leflunomide (ARAVA) 20 MG tablet Take 1 tablet (20 mg) by mouth daily 90 tablet 2    losartan-hydrochlorothiazide (HYZAAR) 100-25 MG tablet Take 1  tablet by mouth daily 90 tablet 3    ORDER FOR DME Use your CPAP device as directed by your provider.      sulfaSALAzine (AZULFIDINE) 500 MG tablet Take 3 tablets (1,500 mg) by mouth 2 times daily 540 tablet 2    vitamin D3 (CHOLECALCIFEROL) 50 mcg (2000 units) tablet Take 1 tablet (50 mcg) by mouth daily 90 tablet 3    albuterol (PROAIR HFA/PROVENTIL HFA/VENTOLIN HFA) 108 (90 Base) MCG/ACT inhaler Inhale 2 puffs into the lungs every 6 hours as needed for shortness of breath / dyspnea or wheezing (Patient not taking: Reported on 4/15/2024) 1 Inhaler 1    predniSONE (DELTASONE) 5 MG tablet Prednisone 20mg daily x5days, then 15mg daily x5days, then 10mg daily x5days, then 5mg daily x5days, then stop. (Patient not taking: Reported on 4/15/2024) 50 tablet 0     Current Facility-Administered Medications   Medication Dose Route Frequency Provider Last Rate Last Admin    alcohol (dehydrated) (ABLYSINOL) 99 % injection 15 mL  15 mL Other Once Levon Jamil MD         Facility-Administered Medications Ordered in Other Visits   Medication Dose Route Frequency Provider Last Rate Last Admin    sodium chloride (PF) 0.9% PF flush 10 mL  10 mL Intracatheter Once Nely Matthews MD        sodium chloride bacteriostatic 0.9 % flush 12 mL  12 mL Intravenous Once Nely Matthews MD           Social History   See HPI    Family History     Family History   Problem Relation Age of Onset    Neurologic Disorder Mother 20        migraine    Depression Mother     Heart Disease Father         MI    Neurologic Disorder Father 79        Parkinson's    Diabetes Father     Hypertension Father     Coronary Artery Disease Father     Cancer Maternal Grandmother         uterine    Neurologic Disorder Sister 16        migraine    Depression Sister     Depression Sister     Substance Abuse Sister     Migraines Sister     Neurologic Disorder Son 7        migraine    Substance Abuse Son     Migraines Son         none    Glaucoma No family hx  "of     Macular Degeneration No family hx of        Physical Exam     Temp Readings from Last 3 Encounters:   08/28/24 97.8  F (36.6  C)   04/15/24 98.3  F (36.8  C) (Temporal)   02/14/24 97.9  F (36.6  C)     BP Readings from Last 5 Encounters:   08/28/24 126/66   05/15/24 129/77   04/15/24 124/66   02/14/24 130/78   02/07/24 135/80     Pulse Readings from Last 1 Encounters:   08/28/24 84     Resp Readings from Last 1 Encounters:   05/15/24 18     Estimated body mass index is 45.86 kg/m  as calculated from the following:    Height as of 4/15/24: 1.613 m (5' 3.5\").    Weight as of this encounter: 119.3 kg (263 lb).    GEN: NAD.   HEENT:  Anicteric, noninjected sclera. No obvious external lesions of the ear and nose. Hearing intact.  CV: S1, S2. RRR. No m/r/g  PULM: No increased work of breathing. CTA bilaterally   MSK: Synovial swelling and tenderness to palpation of the bilateral 2nd-4th MCPs.  PIPs diffusely tender to palpation but no swelling.  DIPs without swelling or tenderness to palpation.  Heberden's and Pavithra's nodes present.  Wrists with mild tenderness to palpation and subtle synovial swelling but no increased warmth or overlying erythema.   Elbows and shoulders without swelling or tenderness to palpation.   Knees, ankles, and MTPs without swelling or tenderness to palpation.    SKIN: No rash or jaundice seen  PSYCH: Alert. Appropriate.      Labs / Imaging (select studies)     CBC  Recent Labs   Lab Test 08/22/24  1346 05/15/24  0813 01/15/24  1130 08/03/21  1150 05/10/21  0802 02/02/21  1056 10/12/20  1113   WBC 7.4 5.8 7.7   < > 5.1 7.4 6.1   RBC 3.99 4.24 4.51   < > 4.48 4.41 4.18   HGB 11.7 12.6 13.7   < > 13.5 13.4 12.7   HCT 37.4 39.0 43.5   < > 42.8 42.8 39.7   MCV 94 92 97   < > 96 97 95   RDW 13.9 13.2 13.4   < > 12.8 12.5 12.9    270 292   < > 193 216 221   MCH 29.3 29.7 30.4   < > 30.1 30.4 30.4   MCHC 31.3* 32.3 31.5   < > 31.5 31.3* 32.0   NEUTROPHIL 57 55 41   < > 42.6 40.1 40.8 "   LYMPH 25 23 40   < > 26.4 34.8 33.4   MONOCYTE 11 11 14   < > 9.5 10.7 8.9   EOSINOPHIL 6 9 4   < > 19.5 12.6 15.6   BASOPHIL 1 1 1   < > 1.8 1.4 1.0   ANEU  --   --   --   --  2.2 3.0 2.5   ALYM  --   --   --   --  1.3 2.6 2.0   KIMBERLY  --   --   --   --  0.5 0.8 0.5   AEOS  --   --   --   --  1.0* 0.9* 1.0*   ABAS  --   --   --   --  0.1 0.1 0.1   ANEUTAUTO 4.2 3.2 3.1   < >  --   --   --    ALYMPAUTO 1.9 1.4 3.1   < >  --   --   --    AMONOAUTO 0.8 0.6 1.1   < >  --   --   --    AEOSAUTO 0.5 0.5 0.3   < >  --   --   --    ABSBASO 0.1 0.1 0.1   < >  --   --   --     < > = values in this interval not displayed.     CMP  Recent Labs   Lab Test 08/22/24  1346 05/15/24  0813 05/10/24  1009 01/15/24  1130 10/12/23  1025 06/13/23  0951 06/13/23  0522 11/16/22  0937 07/01/22  0807 08/03/21  1150 05/10/21  0802 02/02/21  1056 10/12/20  1113   NA  --   --  140  --   --   --  140  --  140   < > 140  --   --    POTASSIUM  --   --  4.2  --   --   --  3.4  --  3.9   < > 4.3  --   --    CHLORIDE  --   --  103  --   --   --  101  --  107   < > 110*  --   --    CO2  --   --  24  --   --   --  24  --  27   < > 25  --   --    ANIONGAP  --   --  13  --   --   --  15  --  6   < > 5  --   --    GLC  --   --  98  --   --  99 138*  --  96   < > 99 74  --    BUN  --   --  13.6  --   --   --  17.1  --  18   < > 12  --   --    CR 0.67  --  0.70 0.68   < >  --  0.69   < > 0.74   < > 0.73 0.65 0.74   GFRESTIMATED >90  --  >90 >90   < >  --  >90   < > 89   < > 88 >90 87   GFRESTBLACK  --   --   --   --   --   --   --   --   --   --  >90 >90 >90   ISH  --   --  9.5  --   --   --  9.6  --  9.3   < > 8.6  --   --    BILITOTAL 0.3 0.4  --  0.4   < >  --  0.5   < >  --    < > 0.6 0.4 0.5   ALBUMIN 4.1 4.2  --  4.5   < >  --  4.3   < >  --    < > 3.8 4.0 3.8   PROTTOTAL 7.2 7.3  --  7.8   < >  --  7.2   < >  --    < > 7.1 7.1 6.9   ALKPHOS 90 102  --  102   < >  --  95   < >  --    < > 103 115 107   AST 28 20  --  27   < >  --  26   < >  --    < >  33 36 23   ALT 9 15  --  27   < >  --  29   < >  --    < > 54* 58* 42    < > = values in this interval not displayed.     Calcium/VitaminD  Recent Labs   Lab Test 05/10/24  1009 06/13/23  0522 07/01/22  0807 05/10/21  0802 10/12/20  1113 03/31/20  0755   ISH 9.5 9.6 9.3   < >  --   --    VITDT  --   --   --   --  33 15*    < > = values in this interval not displayed.     ESR/CRP  Recent Labs   Lab Test 08/22/24  1346 01/15/24  1130 10/12/23  1025 06/12/23  1247 11/16/22  0937 06/24/22  0741 03/11/22  0910   SED 36* 20 15   < > 6 5 5   CRP  --   --   --   --  <2.9 <2.9 <2.9   CRPI 11.70* <3.00 3.10   < >  --   --   --     < > = values in this interval not displayed.     Hepatitis B  Recent Labs   Lab Test 06/12/23  1247   HBCAB Nonreactive   HEPBANG Nonreactive     Hepatitis C  Recent Labs   Lab Test 06/12/23  1247   HCVAB Nonreactive     Tuberculosis Screening  Recent Labs   Lab Test 06/12/23  1247 11/30/21  0806 10/31/17  1400 02/02/17  0822   TBRES Negative Negative  --   --    TBRSLT  --   --  Negative Negative   TBAGN  --   --  0.05 0.00     HIV Screening  Recent Labs   Lab Test 07/24/18  0738   HIAGAB Nonreactive     Immunization History     Immunization History   Administered Date(s) Administered    COVID-19 12+ (2023-24) (Pfizer) 10/13/2023    COVID-19 Bivalent 12+ (Pfizer) 03/30/2023    COVID-19 MONOVALENT 12+ (Pfizer) 12/21/2020, 01/07/2021, 08/27/2021    COVID-19 Monovalent 18+ (Moderna) 03/08/2022    Flu, Unspecified 10/12/2020    Influenza (High Dose) 3 valent vaccine 11/01/2022, 09/29/2023    Influenza (intradermal) 10/04/2011, 10/01/2012    Influenza Vaccine (Flucelvax Quadrivalent) 10/15/2019    Influenza Vaccine 18-64 (Flublok) 09/15/2021    Influenza Vaccine >6 months,quad, PF 10/15/2013, 09/15/2014, 09/28/2015, 09/28/2016, 10/16/2017, 10/01/2018    Pneumo Conj 13-V (2010&after) 04/15/2016    Pneumococcal 20 valent Conjugate (Prevnar 20) 07/01/2022    Pneumococcal 23 valent 07/15/2016    TD,PF 7+  ([facility]) 10/30/2020    TDAP Vaccine (Adacel) 02/09/2011    Zoster recombinant adjuvanted (SHINGRIX) 04/17/2019, 08/28/2019          Chart documentation done in part with Dragon Voice recognition Software. Although reviewed after completion, some word and grammatical error may remain.     Freddy Guerra MD

## 2024-08-28 NOTE — TELEPHONE ENCOUNTER
PA Initiation    Medication: KEVZARA 200 MG/1.14ML SC SOAJ  Insurance Company: [employer] [place] - Phone [phone] Fax [phone]:  PART D  Pharmacy Filling the Rx: [facility] - [place], [place] - [facility] [address]   Filling Pharmacy Phone: [phone]  Filling Pharmacy Fax: [phone]  Start Date: 8/28/2024  RIVER (Key: [id number])          Thank You,     Dot Linares [facility]  Specialty Pharmacy Clinic [facility] Specialty  [email or web address]  [email or web address]  Phone: [phone]  Fax: [phone]

## 2024-08-28 NOTE — PROGRESS NOTES
RAPID3 (0-30) Cumulative Score  14.0          RAPID3 Weighted Score (divide #4 by 3 and that is the weighted score)  4.6

## 2024-08-29 NOTE — TELEPHONE ENCOUNTER
FPAP eligibility:    Patient was employed in 2023 with significant change in income after retiring July 2023. Tax documents from 2023 would put patient over income limit.    Household size: 1  Annual income: 2024  3400 from social security monthly  1800 from 401k monthly    Emailed patient at [email or web address] - requested most recent social security income statements along with 401k income statements.    Patient said she would get working on obtaining documents and appreciated discussion.     Thank You,     Feliberto Linares [facility]  Specialty Pharmacy Clinic Liaison [facility] Specialty  [email or web address]  [email or web address]  Phone: [phone]  Fax: [phone]

## 2024-08-29 NOTE — TELEPHONE ENCOUNTER
Prior Authorization Approval    Medication: KEVZARA 200 MG/1.14ML SC SOAJ  Authorization Effective Date: 5/30/2024  Authorization Expiration Date: 8/28/2025  Approved Dose/Quantity: 2.28 ML / 28 day  Reference #: RIVER (Key: [id number])   Insurance Company: [employer] [place] - Phone [phone] Fax [phone]:  PART D  Expected CoPay: $ 1,663.51  CoPay Card Available: No    Financial Assistance Needed: offer FPAP  Which Pharmacy is filling the prescription: [facility] - [place], [place] - [address] [place] [address]  Pharmacy Notified: financial assistance?  Patient Notified: Yes - called patient and left voicemail requesting call back to discuss financial assistance + MyChart message to offer FPAP because patient has expressed concerns with cost of medication.        Thank You,     Dot Linares [facility]  Specialty Pharmacy Clinic [facility] Specialty  [email or web address]  [email or web address]  Phone: [phone]  Fax: [phone]

## 2024-08-30 NOTE — TELEPHONE ENCOUNTER
ANTONIO INITIATED    Medication: KEVZARA 200 MG/1.14ML SC SOAJ  [facility] Name: FPAP  Date submitted: 8/30/2024  3:04 PM   [facility] Phone:    [facility] Fax: [phone]    Household size: 1  Annual income: 2024  3400 from social security monthly  1800 from 401k monthly  Part time employment: about $800 monthly    Thank You,     Dot Linares [facility]  Specialty Pharmacy Clinic [facility] Specialty  [email or web address]  [email or web address]  Phone: [phone]  Fax: [phone]

## 2024-09-04 NOTE — TELEPHONE ENCOUNTER
ANTONIO DENIED    Medication: KEVZARA 200 MG/1.14ML SC SOAJ  [facility] Name: FPAP  Denial Reason(s): Over income limit > 500% FPL  Denial Date: 9/4/2024  Patient Notified: Yes - called and spoke to patient  Additional Information: Total income year to date $77,611= 515% FPL        With part time job, estimated income for 2024 ~88,000     SSI + 401K: ($3,204 x 12)+($1,800 x 12) = $60,048  $60,048 + $17,563 (employment income YTD) = $77,611    Sharon would like to wait until January 2025 to start medication at which point she will be able to enroll to pay out-of-pocket prescription drug costs in capped monthly installment payments (total $2,000 for 2025)    Patient has appointment 12/02/2024 at which time we can send a new Rx for Kevzara to [facility] to be held onto until patient is ready to order medication in January.    Thank You,     Feliberto Linares [facility]  [facility]  [email or web address]  [email or web address]  Phone: [phone]  Fax: [phone]

## 2024-09-11 ASSESSMENT — PATIENT HEALTH QUESTIONNAIRE - PHQ9
10. IF YOU CHECKED OFF ANY PROBLEMS, HOW DIFFICULT HAVE THESE PROBLEMS MADE IT FOR YOU TO DO YOUR WORK, TAKE CARE OF THINGS AT HOME, OR GET ALONG WITH OTHER PEOPLE: SOMEWHAT DIFFICULT
SUM OF ALL RESPONSES TO PHQ QUESTIONS 1-9: 3
SUM OF ALL RESPONSES TO PHQ QUESTIONS 1-9: 3

## 2024-09-12 ENCOUNTER — OFFICE VISIT (OUTPATIENT)
Dept: FAMILY MEDICINE | Facility: CLINIC | Age: 69
End: 2024-09-12
Payer: COMMERCIAL

## 2024-09-12 VITALS
DIASTOLIC BLOOD PRESSURE: 64 MMHG | BODY MASS INDEX: 45.41 KG/M2 | WEIGHT: 266 LBS | HEIGHT: 64 IN | SYSTOLIC BLOOD PRESSURE: 115 MMHG | OXYGEN SATURATION: 95 % | TEMPERATURE: 98.3 F | RESPIRATION RATE: 16 BRPM | HEART RATE: 79 BPM

## 2024-09-12 DIAGNOSIS — M54.50 CHRONIC LEFT-SIDED LOW BACK PAIN WITHOUT SCIATICA: ICD-10-CM

## 2024-09-12 DIAGNOSIS — Z00.00 ENCOUNTER FOR MEDICARE ANNUAL WELLNESS EXAM: Primary | ICD-10-CM

## 2024-09-12 DIAGNOSIS — E66.01 OBESITY, CLASS III, BMI 40-49.9 (MORBID OBESITY) (H): ICD-10-CM

## 2024-09-12 DIAGNOSIS — G47.33 OBSTRUCTIVE SLEEP APNEA: ICD-10-CM

## 2024-09-12 DIAGNOSIS — H25.13 NUCLEAR SCLEROSIS OF BOTH EYES: ICD-10-CM

## 2024-09-12 DIAGNOSIS — I10 HYPERTENSION GOAL BP (BLOOD PRESSURE) < 140/90: ICD-10-CM

## 2024-09-12 DIAGNOSIS — G89.29 CHRONIC LEFT-SIDED LOW BACK PAIN WITHOUT SCIATICA: ICD-10-CM

## 2024-09-12 DIAGNOSIS — F33.0 MILD RECURRENT MAJOR DEPRESSION (H): ICD-10-CM

## 2024-09-12 DIAGNOSIS — M05.9 SEROPOSITIVE RHEUMATOID ARTHRITIS (H): ICD-10-CM

## 2024-09-12 PROCEDURE — G0438 PPPS, INITIAL VISIT: HCPCS | Performed by: FAMILY MEDICINE

## 2024-09-12 RX ORDER — GABAPENTIN 100 MG/1
CAPSULE ORAL
Qty: 540 CAPSULE | Refills: 2 | Status: SHIPPED | OUTPATIENT
Start: 2024-09-12

## 2024-09-12 ASSESSMENT — PAIN SCALES - GENERAL: PAINLEVEL: NO PAIN (0)

## 2024-09-12 NOTE — PROGRESS NOTES
Preventive Care Visit  [facility]  Reynaldo Saavedra MD, Family Medicine  Sep 12, 2024      Assessment & Plan       ICD-10-CM    1. Encounter for Medicare annual wellness exam  Z00.00       2. Chronic left-sided low back pain without sciatica  M54.50 gabapentin (NEURONTIN) 100 MG capsule    G89.29       3. Hypertension goal BP (blood pressure) < 140/90  I10       4. Nuclear sclerosis of both eyes  H25.13       5. Obesity, Class III, BMI 40-49.9 (morbid obesity) (H)  E66.01       6. Obstructive sleep apnea  G47.33 CPAP Order for DME - ONLY FOR DME      7. Mild recurrent major depression (H24)  F33.0       8. Seropositive rheumatoid arthritis (H)  M05.9         Blood pressure and other vital signs look acceptable  We discussed the above items  We will continue her same baseline meds  She requested a prescription for new CPAP supplies, so that was provided  She does use CPAP consistently for her BRETT and benefits from it  She will be returning to see me next month for a preoperative H&P before undergoing bilateral cataract removals  She will continue to work with Dr. Guerra for her RA treatment  She will obtain routine vaccines in the next month or 2 as planned      Counseling  Appropriate preventive services were addressed with this patient via screening, questionnaire, or discussion as appropriate for fall prevention, nutrition, physical activity, Tobacco-use cessation, social engagement, weight loss and cognition.  Checklist reviewing preventive services available has been given to the patient.  Reviewed patient's diet, addressing concerns and/or questions.   She is at risk for psychosocial distress and has been provided with information to reduce risk.   Patient reported safety concerns were addressed today.The patient was provided with written information regarding signs of hearing loss.         Muklu Herrera is a 69 year old, presenting for the following:  Physical        9/12/2024     "12:34 PM   Additional Questions   Roomed by cam   Accompanied by none         9/12/2024    12:34 PM   Patient Reported Additional Medications   Patient reports taking the following new medications none        Health Care Directive  Patient does not have a Health Care Directive or Living Will: Advance Directive received and scanned. Click on Code in the patient header to view.    HPI    She comes in today for annual wellness exam.  She feels like her health is fairly stable.  She continues to work with Dr. Guerra of rheumatology for her rheumatoid arthritis.  She is on baseline medications as below.  We had done some fasting lab work earlier this year in the spring which generally looked fine.  She will be having repeat fasting labs done at the end of October that have been ordered by Dr. Guerra.    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  She plans to get a flu shot next month and a COVID-vaccine in a couple of weeks or so.  She will be getting an RSV vaccine in the upcoming weeks as well.    She feels like her medicines are working fine.      9/11/2024   General Health   How would you rate your overall physical health? (!) FAIR   Feel stress (tense, anxious, or unable to sleep) Only a little      (!) STRESS CONCERN      9/11/2024   Nutrition   Diet: Regular (no restrictions)            9/11/2024   Exercise   Days per week of moderate/strenous exercise 0 days   Average minutes spent exercising at this level 0 min      (!) EXERCISE CONCERN      9/11/2024   Social Factors   Frequency of gathering with friends or relatives Twice a week   Worry food won't last until get money to buy more No   Food not last or not have enough money for food? No   Do you have housing? (Housing is defined as stable permanent housing and does not include staying ouside in a car, in a tent, in an abandoned building, in an overnight shelter, or couch-surfing.) Yes "   Are you worried about losing your housing? No   Lack of transportation? No   Unable to get utilities (heat,electricity)? No            9/12/2024   Fall Risk   Gait Speed Test (Document in seconds) 4             9/11/2024   Activities of Daily Living- Home Safety   Needs help with the following daily activites None of the above   Safety concerns in the home No grab bars in the bathroom            9/11/2024   Dental   Dentist two times every year? Yes            9/11/2024   Hearing Screening   Hearing concerns? (!) I NEED TO ASK PEOPLE TO SPEAK UP OR REPEAT THEMSELVES.            9/11/2024   Driving Risk Screening   Patient/family members have concerns about driving No            9/11/2024   General Alertness/Fatigue Screening   Have you been more tired than usual lately? No            9/11/2024   Urinary Incontinence Screening   Bothered by leaking urine in past 6 months No             Today's PHQ-9 Score:       9/11/2024     8:57 AM   PHQ-9 SCORE   PHQ-9 Total Score MyChart 3 (Minimal depression)   PHQ-9 Total Score 3         9/11/2024   Substance Use   Alcohol more than 3/day or more than 7/wk Not Applicable   Do you have a current opioid prescription? No   How severe/bad is pain from 1 to 10? 7/10   Do you use any other substances recreationally? No        Social History     Tobacco Use    Smoking status: Never     Passive exposure: Never    Smokeless tobacco: Never   Vaping Use    Vaping status: Never Used   Substance Use Topics    Alcohol use: No     Alcohol/week: 1.0 - 2.0 standard drink of alcohol    Drug use: No           2/28/2023   LAST FHS-7 RESULTS   1st degree relative breast or ovarian cancer No   Any relative bilateral breast cancer No   Any male have breast cancer No   Any ONE woman have BOTH breast AND ovarian cancer No   Any woman with breast cancer before 50yrs No   2 or more relatives with breast AND/OR ovarian cancer No   2 or more relatives with breast AND/OR bowel cancer No            Mammogram Screening - Mammogram every 1-2 years updated in Health Maintenance based on mutual decision making      History of abnormal Pap smear: No - age 65 or older with adequate negative prior screening test results (3 consecutive negative cytology results, 2 consecutive negative cotesting results, or 2 consecutive negative HrHPV test results within 10 years, with the most recent test occurring within the recommended screening interval for the test used)        Latest Ref Rng & Units 2/2/2017     3:33 PM 2/2/2017     3:25 PM 11/22/2013    12:00 AM   PAP / HPV   PAP (Historical)  NIL   NIL    HPV 16 DNA NEG  Negative     HPV 18 DNA NEG  Negative     Other HR HPV NEG  Negative       ASCVD Risk   The 10-year ASCVD risk score (Martin BRIAN, et al., 2019) is: 7.9%    Values used to calculate the score:      Age: 69 years      Sex: Female      Is Non- : No      Diabetic: No      Tobacco smoker: No      Systolic Blood Pressure: 115 mmHg      Is BP treated: Yes      HDL Cholesterol: 47 mg/dL      Total Cholesterol: 199 mg/dL            Reviewed and updated as needed this visit by Provider                    Patient Active Problem List   Diagnosis    AR (allergic rhinitis)    GERD (gastroesophageal reflux disease)    Status post bariatric surgery    High risk medication use    Obstructive sleep apnea    Obesity, Class III, BMI 40-49.9 (morbid obesity) (H)    Anterior basement membrane dystrophy    Seropositive rheumatoid arthritis (H)    LORNA positive    Carpal tunnel syndrome of right wrist    Headache    Immunosuppression (H24)    Fibromyalgia    Pain in joint, ankle and foot, right    Hypertension goal BP (blood pressure) < 140/90    Chronic back pain, unspecified back location, unspecified back pain laterality    Hyperlipidemia LDL goal <100    Mild recurrent major depression (H24)    Nuclear sclerosis of both eyes     Past Surgical History:   Procedure Laterality Date    ARTHRODESIS  FOOT Right 6/30/2021    Procedure: Right 1st metatarsophalangeal joint fusion;  Surgeon: Jesus Wolf MD;  Location: [facility]    ARTHRODESIS TOE(S) Right 12/27/2021    Procedure: right revision great toe fusion;  Surgeon: Ryan Gee MD;  Location: [facility] OR    BLEPHAROPLASTY BILATERAL Bilateral 8/5/2016    Procedure: BLEPHAROPLASTY BILATERAL;  Surgeon: Cortez Robin MD;  Location:  [facility]    BREAST BIOPSY, RT/LT  1-94    Benign    COLONOSCOPY      COLONOSCOPY WITH CO2 INSUFFLATION N/A 3/30/2017    Procedure: COLONOSCOPY WITH CO2 INSUFFLATION;  Surgeon: Issa Weeks MD;  Location: [facility] OR    ESOPHAGOSCOPY, GASTROSCOPY, DUODENOSCOPY (EGD), COMBINED N/A 10/29/2015    Procedure: COMBINED ESOPHAGOSCOPY, GASTROSCOPY, DUODENOSCOPY (EGD);  Surgeon: Shaq Mims MD;  Location: [facility]    LAPAROSCOPIC CHOLECYSTECTOMY N/A 6/13/2023    Procedure: CHOLECYSTECTOMY, LAPAROSCOPIC;  Surgeon: Reynaldo Segura MD;  Location: [facility]    LAPAROSCOPIC GASTRIC ADJUSTABLE BANDING  11/09/10    Lap band procedure    LAPAROSCOPIC REMOVAL GASTRIC ADJUSTABLE BAND N/A 10/31/2015    Procedure: LAPAROSCOPIC REMOVAL GASTRIC ADJUSTABLE BAND;  Surgeon: Shaq Mims MD;  Location: [facility] OR    MO HAND/FINGER SURGERY UNLISTED  2016    MO STOMACH SURGERY PROCEDURE UNLISTED  11/2015    RELEASE CARPAL TUNNEL Left 4/27/2017    Procedure: RELEASE CARPAL TUNNEL;  Left Open Carpal Tunnel Release;  Surgeon: Ciara Middleton MD;  [facility]    RELEASE CARPAL TUNNEL Right 6/15/2017    Procedure: RELEASE CARPAL TUNNEL;  Right Carpal Tunnel Release Open;  Surgeon: Ciara Middleton MD;  [facility]: [facility]    REPAIR PTOSIS      TUBAL LIGATION  1988    Z APPENDECTOMY  1977       Social History     Tobacco Use    Smoking status: Never     Passive exposure: Never    Smokeless tobacco: Never   Substance Use Topics    Alcohol use: No     Alcohol/week: 1.0 - 2.0 standard drink of alcohol     Family History    Problem Relation Age of Onset    Neurologic Disorder Mother 20        migraine    Depression Mother     Heart Disease Father         MI    Neurologic Disorder Father 79        Parkinson's    Diabetes Father     Hypertension Father     Coronary Artery Disease Father     Cancer Maternal Grandmother         uterine    Neurologic Disorder Sister 16        migraine    Depression Sister     Depression Sister     Substance Abuse Sister     Migraines Sister     Neurologic Disorder Son 7        migraine    Substance Abuse Son     Migraines Son         none    Glaucoma No family hx of     Macular Degeneration No family hx of          Current Outpatient Medications   Medication Sig Dispense Refill    citalopram (CELEXA) 20 MG tablet Take 1 tablet (20 mg) by mouth daily 90 tablet 3    fexofenadine (ALLEGRA) 180 MG tablet Take 1 tablet (180 mg) by mouth daily 90 tablet 2    gabapentin (NEURONTIN) 100 MG capsule TAKE ONE CAPSULE BY MOUTH EVERY MORNING AND TAKE FIVE CAPSULES BY MOUTH EVERY NIGHT AT BEDTIME 540 capsule 2    hydroxychloroquine (PLAQUENIL) 200 MG tablet Take 1 tablet (200 mg) by mouth 2 times daily 180 tablet 2    hydrOXYzine cha (VISTARIL) 25 MG capsule Take 1 capsule (25 mg) by mouth 3 times daily as needed for anxiety 30 capsule 1    ibuprofen 200 MG capsule Take 600-800 mg by mouth at bedtime.      leflunomide (ARAVA) 20 MG tablet Take 1 tablet (20 mg) by mouth daily 90 tablet 2    losartan-hydrochlorothiazide (HYZAAR) 100-25 MG tablet Take 1 tablet by mouth daily 90 tablet 3    ORDER FOR DME Use your CPAP device as directed by your provider.      sulfaSALAzine (AZULFIDINE) 500 MG tablet Take 3 tablets (1,500 mg) by mouth 2 times daily 540 tablet 2    vitamin D3 (CHOLECALCIFEROL) 50 mcg (2000 units) tablet Take 1 tablet (50 mcg) by mouth daily 90 tablet 3    acetaminophen (TYLENOL) 500 MG tablet Take 500-1,000 mg by mouth every 6 hours as needed for mild pain (Patient not taking: Reported on 9/12/2024)       fluticasone (FLONASE) 50 MCG/ACT nasal spray Spray 2 sprays into both nostrils as needed (Patient not taking: Reported on 9/12/2024)      predniSONE (DELTASONE) 5 MG tablet Prednisone 20mg daily x5days, then 15mg daily x5days, then 10mg daily x5days, then 5mg daily x5days, then stop. (Patient not taking: Reported on 4/15/2024) 50 tablet 0    Sarilumab 200 MG/1.14ML SOAJ Inject 1.14 mLs (200 mg) subcutaneously every 14 days. Hold for signs of infection and seek medical attention. (Patient not taking: Reported on 9/12/2024) 2.28 mL 3     No Known Allergies  Current providers sharing in care for this patient include:  Patient Care Team:  Reynaldo Saavedra MD as PCP - General  Shaq Mims MD as MD (Surgery)  Rachelle Sena PA-C as Physician Assistant (Physician Assistant)  Ethel Panchal MD as MD (INTERNAL MEDICINE - ENDOCRINOLOGY, DIABETES & METABOLISM)  Jo Downs MD as Resident (Student in organized health care education/training program)  Yarely Montoya RD as Diabetes Educator (Dietitian, Registered)  Reynaldo Saavedra MD as Assigned PCP  Freddy Guerra MD as Assigned Rheumatology Provider  Levon Jamil MD as MD (Ophthalmology)  Levon Jamil MD as Assigned Surgical Provider  Ponce Lindo OD as MD (Optometry)    The following health maintenance items are reviewed in Epic and correct as of today:  Health Maintenance   Topic Date Due    RSV VACCINE (1 - Risk 60-74 years 1-dose series) Never done    MEDICARE ANNUAL WELLNESS VISIT  07/01/2023    ANNUAL REVIEW OF HM ORDERS  07/01/2023    INFLUENZA VACCINE (1) 09/01/2024    COVID-19 Vaccine (7 - 2023-24 season) 09/01/2024    MAMMO SCREENING  02/28/2025    PHQ-9  03/12/2025    BMP  05/10/2025    LIPID  05/10/2025    EYE EXAM  07/19/2025    FALL RISK ASSESSMENT  09/12/2025    COLORECTAL CANCER SCREENING  03/30/2027    GLUCOSE  05/10/2027    ADVANCE CARE PLANNING  07/01/2027    DTAP/TDAP/TD  "IMMUNIZATION (3 - Td or Tdap) 10/30/2030    DEXA  12/20/2034    HEPATITIS C SCREENING  Completed    DEPRESSION ACTION PLAN  Completed    Pneumococcal Vaccine: 65+ Years  Completed    ZOSTER IMMUNIZATION  Completed    HPV IMMUNIZATION  Aged Out    MENINGITIS IMMUNIZATION  Aged Out    RSV MONOCLONAL ANTIBODY  Aged Out    PAP  Discontinued         Review of Systems  Constitutional, HEENT, cardiovascular, pulmonary, gi and gu systems are negative, except as otherwise noted.     Objective    Exam  /64 (BP Location: Right arm, Patient Position: Sitting, Cuff Size: Adult Large)   Pulse 79   Temp 98.3  F (36.8  C) (Temporal)   Resp 16   Ht 1.613 m (5' 3.5\")   Wt 120.7 kg (266 lb)   SpO2 95%   BMI 46.37 kg/m     Estimated body mass index is 46.37 kg/m  as calculated from the following:    Height as of this encounter: 1.613 m (5' 3.5\").    Weight as of this encounter: 120.7 kg (266 lb).    Physical Exam  GENERAL: alert and no distress  EYES: Eyes grossly normal to inspection, PERRL and conjunctivae and sclerae normal  HENT: ear canals and TM's normal, nose and mouth without ulcers or lesions  NECK: no adenopathy, no asymmetry, masses, or scars  RESP: lungs clear to auscultation - no rales, rhonchi or wheezes  CV: regular rate and rhythm, normal S1 S2, no S3 or S4, no murmur, click or rub, no peripheral edema  ABDOMEN: soft, nontender, no hepatosplenomegaly, no masses   MS: no gross musculoskeletal defects noted, no edema  SKIN: no suspicious lesions or rashes  NEURO: Normal strength and tone, mentation intact and speech normal  PSYCH: mentation appears normal, affect normal/bright.  She scored a 3 on her PHQ-9 today.        9/12/2024   Mini Cog   Clock Draw Score 2 Normal   3 Item Recall 3 objects recalled   Mini Cog Total Score 5            Vision Screen  Reason Vision Screen Not Completed: Patient had exam in last 12 months      Signed Electronically by: Reynaldo Saavedra MD    "

## 2024-09-14 NOTE — PATIENT INSTRUCTIONS
1- Take topamax 2 pills in the morning at ~ 6am  2- Take phentermine 30 mg 1 pill every morning around 6 am  3- Take topamax 3 pills at 5 pm, or when you get home from work  4- Stop Hanna Vallejo MD  Endocrinology, Diabetes and Metabolism  [facility]            MEDICATION STARTED AT THIS APPOINTMENT    We are starting a GLP-1 (Glucagon-like Peptide-1) medication called Saxenda. One of the ways it works is by slowing down the rate that food leaves your stomach. You feel bustamante and will eat less. It also helps regulate hormones that can help improve your blood sugars.    If you are a patient that checks blood sugars, continue to check as instructed by your doctor. Low blood sugars are rare but can happen if patients are on insulin or other oral agents. If you notice consistent low sugars or high sugars, your medication may need to be adjusted after your appointment. If this is the case, please call RN and provide her your blood sugar record from the last 3-4 days. The RN will get in touch with the doctor and call you back/Oration message with recommendations. We tolerate high sugars for a bit, so if sugars are running 180-200, this is ok. As weight starts dropping the blood sugars should too. If readings are consistently over 200 for 1-2 weeks, then you should call the doctor/nurse.    Dosing for this medication:   Week 1- Inject 0.6 mg daily  Week 2- Inject 1.2 mg daily  Week 3- Inject 1.8 mg daily  Week 4- Inject 2.4 mg daily  Week 5 and thereafter- Inject 3.0 mg daily    Side effects of GLP- Medications include: The most common side effects are all GI related and consist of: nausea, constipation, diarrhea, burping, or gassiness. Patients are advised to eat slowly and less, and nausea typically passes if people can stick it out.     The risk of pancreatitis (inflammation of the pancreas) has been associated with this type of medication, but is very rare.  If you have had pancreatitis in the  past, this medication may not be for you. Please let us know about any past history of pancreas problems.    Symptoms of pancreatitis include: Pain in your upper stomach area which may travel to your back and be worse after eating. Your stomach area may be tender to the touch.  You may have vomiting or nausea and/or have a fever. If you should develop any of these symptoms, stop the medication and contact your primary care doctor. They will do a blood test to check for pancreatitis.         There is a small chance you may have some low blood sugar after taking the medication.   The signs of low blood sugar are:  o Weakness  o Shaky   o Hungry  o Sweating  o Confusion      See below for ways to treat low blood sugar without adding in lots of extra calories.      Treating Low Blood Sugar    If you have symptoms of low blood sugar (sweating, shaking, dizzy, confused) eat 15 grams of carbs and wait 15 minutes:      Glucose Tabs are best for sugars under 70 -  Dex4 or BD Glucose tablets are good, you will need to take 3-4 of these to equal 15 grams.       One small box of raisins    4 oz fruit juice box or   cup fruit juice    1 small apple    1 small banana      cup canned fruit in water      English muffin or a slice of bread with jelly     1 low fat frozen waffle with sugar-free syrup      cup cottage cheese with   cup frozen or fresh blueberries    1 cup skim or low-fat milk      cup whole grain cereal    4-6 crackers such as Triscuits      This medication is usually not covered by insurance and can be quite expensive. Sometimes a prior authorization is required, which may take up to 1-2 weeks for an insurance company to make a decision if they will cover the medication. Please be patient, you will be notified after a decision has been made.    Call the nurse at [phone] if you have any questions or concerns. (Do not stop taking it if you don't think it's working. For some people it works without them knowing  it.)     In order to get refills of this or any medication we prescribe you must be seen in the medical weight mgmt clinic every 2-4 months. Please have your pharmacy fax a refill request to [phone].     Denies current AVH/No abnormalities

## 2024-10-01 ENCOUNTER — MYC MEDICAL ADVICE (OUTPATIENT)
Dept: FAMILY MEDICINE | Facility: CLINIC | Age: 69
End: 2024-10-01
Payer: COMMERCIAL

## 2024-10-01 DIAGNOSIS — G89.29 CHRONIC BACK PAIN, UNSPECIFIED BACK LOCATION, UNSPECIFIED BACK PAIN LATERALITY: Primary | ICD-10-CM

## 2024-10-01 DIAGNOSIS — M54.9 CHRONIC BACK PAIN, UNSPECIFIED BACK LOCATION, UNSPECIFIED BACK PAIN LATERALITY: Primary | ICD-10-CM

## 2024-10-14 ENCOUNTER — OFFICE VISIT (OUTPATIENT)
Dept: FAMILY MEDICINE | Facility: CLINIC | Age: 69
End: 2024-10-14
Payer: COMMERCIAL

## 2024-10-14 VITALS
TEMPERATURE: 98.6 F | BODY MASS INDEX: 46.07 KG/M2 | HEART RATE: 75 BPM | HEIGHT: 63 IN | OXYGEN SATURATION: 97 % | SYSTOLIC BLOOD PRESSURE: 119 MMHG | RESPIRATION RATE: 20 BRPM | WEIGHT: 260 LBS | DIASTOLIC BLOOD PRESSURE: 74 MMHG

## 2024-10-14 DIAGNOSIS — H25.13 NUCLEAR SCLEROSIS OF BOTH EYES: ICD-10-CM

## 2024-10-14 DIAGNOSIS — Z79.899 HIGH RISK MEDICATION USE: ICD-10-CM

## 2024-10-14 DIAGNOSIS — M05.9 SEROPOSITIVE RHEUMATOID ARTHRITIS (H): ICD-10-CM

## 2024-10-14 DIAGNOSIS — E66.01 OBESITY, CLASS III, BMI 40-49.9 (MORBID OBESITY) (H): ICD-10-CM

## 2024-10-14 DIAGNOSIS — G47.33 OBSTRUCTIVE SLEEP APNEA: ICD-10-CM

## 2024-10-14 DIAGNOSIS — Z01.818 PREOP GENERAL PHYSICAL EXAM: Primary | ICD-10-CM

## 2024-10-14 DIAGNOSIS — I10 HYPERTENSION GOAL BP (BLOOD PRESSURE) < 140/90: ICD-10-CM

## 2024-10-14 PROCEDURE — 99215 OFFICE O/P EST HI 40 MIN: CPT | Performed by: FAMILY MEDICINE

## 2024-10-14 PROCEDURE — G2211 COMPLEX E/M VISIT ADD ON: HCPCS | Performed by: FAMILY MEDICINE

## 2024-10-14 ASSESSMENT — PAIN SCALES - GENERAL: PAINLEVEL: NO PAIN (0)

## 2024-10-14 NOTE — PROGRESS NOTES
Preoperative Evaluation  [facility]  [address]  [place] [address]  Phone: [phone]  Primary Provider: Reynaldo Saavedra MD  Pre-op Performing Provider: Reynaldo Saavedra MD  Oct 14, 2024             10/12/2024   Surgical Information   What procedure is being done? Cataract removals   Facility or Hospital where procedure/surgery will be performed: [facility]   Who is doing the procedure / surgery? Dr Herman   Date of surgery / procedure: Right 11/7/2024, then Left 11/14/24   Time of surgery / procedure: Unknown   Where do you plan to recover after surgery? at home with family        Fax number for surgical facility: Note does not need to be faxed, will be available electronically in Epic.    Assessment & Plan     The proposed surgical procedure is considered LOW risk.      ICD-10-CM    1. Preop general physical exam  Z01.818       2. Nuclear sclerosis of both eyes  H25.13       3. Hypertension goal BP (blood pressure) < 140/90  I10       4. Seropositive rheumatoid arthritis (H)  M05.9       5. Obesity, Class III, BMI 40-49.9 (morbid obesity) (H)  E66.01       6. Obstructive sleep apnea  G47.33       7. High risk medication use  Z79.899         She will contact her rheumatologist to see if she needs to make any medication adjustments with her RA meds.       - No identified additional risk factors other than previously addressed    Antiplatelet or Anticoagulation Medication Instructions   - Patient is on no antiplatelet or anticoagulation medications.    Additional Medication Instructions  Take all scheduled medications on the day of surgery    Recommendation  Approval given to proceed with proposed procedure, without further diagnostic evaluation.    Mukul Herrera is a 69 year old, presenting for the following:  Pre-Op Exam          10/14/2024    11:14 AM   Additional Questions   Roomed by cam   Accompanied by none         10/14/2024    11:14 AM   Patient Reported  Additional Medications   Patient reports taking the following new medications none     HPI related to upcoming procedure: 69-year-old female with bilateral cataracts is coming in now for removal of them.        10/12/2024   Pre-Op Questionnaire   Have you ever had a heart attack or stroke? No   Have you ever had surgery on your heart or blood vessels, such as a stent placement, a coronary artery bypass, or surgery on an artery in your head, neck, heart, or legs? No   Do you have chest pain with activity? No   Do you have a history of heart failure? No   Do you currently have a cold, bronchitis or symptoms of other infection? No   Do you have a cough, shortness of breath, or wheezing? No   Do you or anyone in your family have previous history of blood clots? No   Do you or does anyone in your family have a serious bleeding problem such as prolonged bleeding following surgeries or cuts? No   Have you ever had problems with anemia or been told to take iron pills? No   Have you had any abnormal blood loss such as black, tarry or bloody stools, or abnormal vaginal bleeding? No   Have you ever had a blood transfusion? No   Are you willing to have a blood transfusion if it is medically needed before, during, or after your surgery? Yes   Have you or any of your relatives ever had problems with anesthesia? No   Do you have sleep apnea, excessive snoring or daytime drowsiness? (!) YES   Do you have a CPAP machine? Yes   Do you have any artifical heart valves or other implanted medical devices like a pacemaker, defibrillator, or continuous glucose monitor? No   Do you have artificial joints? No   Are you allergic to latex? No        Health Care Directive  Patient does not have a Health Care Directive or Living Will      Status of Chronic Conditions:  See problem list for active medical problems.  Problems all longstanding and stable, except as noted/documented.  See ROS for pertinent symptoms related to these  conditions.    Patient Active Problem List    Diagnosis Date Noted    High risk medication use 06/14/2013     Priority: High    Nuclear sclerosis of both eyes 08/02/2024     Priority: Medium    Mild recurrent major depression (H) 10/13/2023     Priority: Medium    Chronic back pain, unspecified back location, unspecified back pain laterality 07/01/2022     Priority: Medium    Hyperlipidemia LDL goal <100 07/01/2022     Priority: Medium    Hypertension goal BP (blood pressure) < 140/90 12/20/2021     Priority: Medium    Pain in joint, ankle and foot, right 05/18/2021     Priority: Medium     Added automatically from request for surgery [id number]      Fibromyalgia 04/17/2019     Priority: Medium    Immunosuppression (H) 12/07/2018     Priority: Medium    Headache 07/11/2017     Priority: Medium    Carpal tunnel syndrome of right wrist 06/22/2017     Priority: Medium    Seropositive rheumatoid arthritis (H) 07/15/2016     Priority: Medium    LORNA positive 07/15/2016     Priority: Medium    Anterior basement membrane dystrophy 06/21/2016     Priority: Medium    Obesity, Class III, BMI 40-49.9 (morbid obesity) (H) 05/19/2016     Priority: Medium    Obstructive sleep apnea 05/12/2015     Priority: Medium     Problem list name updated by automated process. Provider to review      Status post bariatric surgery      Priority: Medium     lap band      AR (allergic rhinitis)      Priority: Medium    GERD (gastroesophageal reflux disease)      Priority: Medium      Past Medical History:   Diagnosis Date    AR (allergic rhinitis)  as teen    Arthritis 12/14/2015    Chronic obstructive pulmonary disease, unspecified COPD type (H) 03/27/2018    Depressive disorder 3 years ago    GERD (gastroesophageal reflux disease) 04/2007    Headache 07/11/2017    Hypertension goal BP (blood pressure) < 140/90 12/20/2021    Mild major depression (H) 3 years ago    Morbid obesity (H) teens    BRETT (obstructive sleep apnea)     uses CPAP    RA  (rheumatoid arthritis) (H) 4 years    Reduced vision 3 years     Past Surgical History:   Procedure Laterality Date    ARTHRODESIS FOOT Right 6/30/2021    Procedure: Right 1st metatarsophalangeal joint fusion;  Surgeon: Jesus Wolf MD;  Location: [facility]    ARTHRODESIS TOE(S) Right 12/27/2021    Procedure: right revision great toe fusion;  Surgeon: Ryan Gee MD;  Location: [facility] OR    BLEPHAROPLASTY BILATERAL Bilateral 8/5/2016    Procedure: BLEPHAROPLASTY BILATERAL;  Surgeon: Cortez Robin MD;  Location: [facility]    BREAST BIOPSY, RT/LT  1-94    Benign    COLONOSCOPY      COLONOSCOPY WITH CO2 INSUFFLATION N/A 3/30/2017    Procedure: COLONOSCOPY WITH CO2 INSUFFLATION;  Surgeon: Issa Weeks MD;  Location: [facility] OR    ESOPHAGOSCOPY, GASTROSCOPY, DUODENOSCOPY (EGD), COMBINED N/A 10/29/2015    Procedure: COMBINED ESOPHAGOSCOPY, GASTROSCOPY, DUODENOSCOPY (EGD);  Surgeon: Shaq Mims MD;  Location: [facility]    LAPAROSCOPIC CHOLECYSTECTOMY N/A 6/13/2023    Procedure: CHOLECYSTECTOMY, LAPAROSCOPIC;  Surgeon: Reynaldo Segura MD;  Location: [facility]    LAPAROSCOPIC GASTRIC ADJUSTABLE BANDING  11/09/10    Lap band procedure    LAPAROSCOPIC REMOVAL GASTRIC ADJUSTABLE BAND N/A 10/31/2015    Procedure: LAPAROSCOPIC REMOVAL GASTRIC ADJUSTABLE BAND;  Surgeon: Shaq Mims MD;  Location: [facility] OR    HI HAND/FINGER SURGERY UNLISTED  2016    HI STOMACH SURGERY PROCEDURE UNLISTED  11/2015    RELEASE CARPAL TUNNEL Left 4/27/2017    Procedure: RELEASE CARPAL TUNNEL;  Left Open Carpal Tunnel Release;  Surgeon: Ciara Middleton MD;  Location: [facility]    RELEASE CARPAL TUNNEL Right 6/15/2017    Procedure: RELEASE CARPAL TUNNEL;  Right Carpal Tunnel Release Open;  Surgeon: Ciara Middleton MD;  Location: [facility]    REPAIR PTOSIS      TUBAL LIGATION  1988    ZC APPENDECTOMY  1977     Current Outpatient Medications   Medication Sig Dispense Refill    citalopram (CELEXA) 20 MG tablet  Take 1 tablet (20 mg) by mouth daily 90 tablet 3    fexofenadine (ALLEGRA) 180 MG tablet Take 1 tablet (180 mg) by mouth daily 90 tablet 2    gabapentin (NEURONTIN) 100 MG capsule TAKE ONE CAPSULE BY MOUTH EVERY MORNING AND TAKE FIVE CAPSULES BY MOUTH EVERY NIGHT AT BEDTIME 540 capsule 2    hydroxychloroquine (PLAQUENIL) 200 MG tablet Take 1 tablet (200 mg) by mouth 2 times daily 180 tablet 2    hydrOXYzine cha (VISTARIL) 25 MG capsule Take 1 capsule (25 mg) by mouth 3 times daily as needed for anxiety 30 capsule 1    leflunomide (ARAVA) 20 MG tablet Take 1 tablet (20 mg) by mouth daily 90 tablet 2    losartan-hydrochlorothiazide (HYZAAR) 100-25 MG tablet Take 1 tablet by mouth daily 90 tablet 3    ORDER FOR DME Use your CPAP device as directed by your provider.      sulfaSALAzine (AZULFIDINE) 500 MG tablet Take 3 tablets (1,500 mg) by mouth 2 times daily 540 tablet 2    vitamin D3 (CHOLECALCIFEROL) 50 mcg (2000 units) tablet Take 1 tablet (50 mcg) by mouth daily 90 tablet 3    acetaminophen (TYLENOL) 500 MG tablet Take 500-1,000 mg by mouth every 6 hours as needed for mild pain (Patient not taking: Reported on 9/12/2024)      fluticasone (FLONASE) 50 MCG/ACT nasal spray Spray 2 sprays into both nostrils as needed (Patient not taking: Reported on 9/12/2024)      ibuprofen 200 MG capsule Take 600-800 mg by mouth at bedtime. (Patient not taking: Reported on 10/14/2024)      predniSONE (DELTASONE) 5 MG tablet Prednisone 20mg daily x5days, then 15mg daily x5days, then 10mg daily x5days, then 5mg daily x5days, then stop. (Patient not taking: Reported on 4/15/2024) 50 tablet 0    Sarilumab 200 MG/1.14ML SOAJ Inject 1.14 mLs (200 mg) subcutaneously every 14 days. Hold for signs of infection and seek medical attention. (Patient not taking: Reported on 9/12/2024) 2.28 mL 3       No Known Allergies     Social History     Tobacco Use    Smoking status: Never     Passive exposure: Never    Smokeless tobacco: Never   Substance  "Use Topics    Alcohol use: No     Alcohol/week: 1.0 - 2.0 standard drink of alcohol     Family History   Problem Relation Age of Onset    Neurologic Disorder Mother 20        migraine    Depression Mother     Heart Disease Father         MI    Neurologic Disorder Father 79        Parkinson's    Diabetes Father     Hypertension Father     Coronary Artery Disease Father     Cancer Maternal Grandmother         uterine    Neurologic Disorder Sister 16        migraine    Depression Sister     Depression Sister     Substance Abuse Sister     Migraines Sister     Neurologic Disorder Son 7        migraine    Substance Abuse Son     Migraines Son         none    Glaucoma No family hx of     Macular Degeneration No family hx of      History   Drug Use No             Review of Systems  She has chronic back pain and will be seeing a specialist for that.  She has underlying RA and sees rheumatology for that.  No acute URI symptoms.  She is generally in her usual state of health currently.    Objective    /74 (BP Location: Right arm, Patient Position: Sitting, Cuff Size: Adult Large)   Pulse 75   Temp 98.6  F (37  C) (Temporal)   Resp 20   Ht 1.6 m (5' 3\")   Wt 117.9 kg (260 lb)   SpO2 97%   BMI 46.06 kg/m     Estimated body mass index is 46.06 kg/m  as calculated from the following:    Height as of this encounter: 1.6 m (5' 3\").    Weight as of this encounter: 117.9 kg (260 lb).  Physical Exam  GENERAL: alert and no distress  EYES: Eyes grossly normal to inspection, PERRL and conjunctivae and sclerae normal  HENT: Grossly normal  NECK: no adenopathy, no asymmetry, masses, or scars  RESP: lungs clear to auscultation - no rales, rhonchi or wheezes  CV: regular rate and rhythm, normal S1 S2, no S3 or S4, no murmur, click or rub, no peripheral edema  ABDOMEN: soft, nontender, no hepatosplenomegaly, no masses   MS: no gross musculoskeletal defects noted, no edema  SKIN: no suspicious lesions or rashes  NEURO: Normal " strength and tone, mentation intact and speech normal  PSYCH: mentation appears normal, affect normal/bright    Recent Labs   Lab Test 08/22/24  1346 05/15/24  0813 05/10/24  1009   HGB 11.7 12.6  --     270  --    NA  --   --  140   POTASSIUM  --   --  4.2   CR 0.67  --  0.70   A1C  --   --  4.4        Diagnostics  No labs were ordered during this visit.   No EKG required for low risk surgery (cataract, skin procedure, breast biopsy, etc).    Revised Cardiac Risk Index (RCRI)  The patient has the following serious cardiovascular risks for perioperative complications:   - No serious cardiac risks = 0 points     RCRI Interpretation: 0 points: Class I (very low risk - 0.4% complication rate)         The longitudinal plan of care for the diagnosis(es)/condition(s) as documented were addressed during this visit. Due to the added complexity in care, I will continue to support Sharon in the subsequent management and with ongoing continuity of care.    Signed Electronically by: Reynaldo Saavedra MD  A copy of this evaluation report is provided to the requesting physician.

## 2024-11-01 ENCOUNTER — LAB (OUTPATIENT)
Dept: LAB | Facility: CLINIC | Age: 69
End: 2024-11-01
Payer: COMMERCIAL

## 2024-11-01 DIAGNOSIS — M05.9 SEROPOSITIVE RHEUMATOID ARTHRITIS (H): ICD-10-CM

## 2024-11-01 DIAGNOSIS — Z79.899 HIGH RISK MEDICATION USE: ICD-10-CM

## 2024-11-01 LAB
ALBUMIN SERPL BCG-MCNC: 4.1 G/DL (ref 3.5–5.2)
ALP SERPL-CCNC: 91 U/L (ref 40–150)
ALT SERPL W P-5'-P-CCNC: 19 U/L (ref 0–50)
ANION GAP SERPL CALCULATED.3IONS-SCNC: 6 MMOL/L (ref 7–15)
AST SERPL W P-5'-P-CCNC: 29 U/L (ref 0–45)
BASOPHILS # BLD AUTO: 0.1 10E3/UL (ref 0–0.2)
BASOPHILS NFR BLD AUTO: 1 %
BILIRUB SERPL-MCNC: 0.5 MG/DL
BUN SERPL-MCNC: 12.1 MG/DL (ref 8–23)
CALCIUM SERPL-MCNC: 9.4 MG/DL (ref 8.8–10.4)
CHLORIDE SERPL-SCNC: 103 MMOL/L (ref 98–107)
CHOLEST SERPL-MCNC: 205 MG/DL
CREAT SERPL-MCNC: 0.71 MG/DL (ref 0.51–0.95)
CRP SERPL-MCNC: 8.5 MG/L
EGFRCR SERPLBLD CKD-EPI 2021: >90 ML/MIN/1.73M2
EOSINOPHIL # BLD AUTO: 0.4 10E3/UL (ref 0–0.7)
EOSINOPHIL NFR BLD AUTO: 6 %
ERYTHROCYTE [DISTWIDTH] IN BLOOD BY AUTOMATED COUNT: 13.5 % (ref 10–15)
ERYTHROCYTE [SEDIMENTATION RATE] IN BLOOD BY WESTERGREN METHOD: 30 MM/HR (ref 0–30)
FASTING STATUS PATIENT QL REPORTED: NO
FASTING STATUS PATIENT QL REPORTED: NO
GLUCOSE SERPL-MCNC: 92 MG/DL (ref 70–99)
HCO3 SERPL-SCNC: 30 MMOL/L (ref 22–29)
HCT VFR BLD AUTO: 37.7 % (ref 35–47)
HDLC SERPL-MCNC: 46 MG/DL
HGB BLD-MCNC: 12 G/DL (ref 11.7–15.7)
IMM GRANULOCYTES # BLD: 0 10E3/UL
IMM GRANULOCYTES NFR BLD: 0 %
LDLC SERPL CALC-MCNC: 107 MG/DL
LYMPHOCYTES # BLD AUTO: 1.8 10E3/UL (ref 0.8–5.3)
LYMPHOCYTES NFR BLD AUTO: 29 %
MCH RBC QN AUTO: 29.5 PG (ref 26.5–33)
MCHC RBC AUTO-ENTMCNC: 31.8 G/DL (ref 31.5–36.5)
MCV RBC AUTO: 93 FL (ref 78–100)
MONOCYTES # BLD AUTO: 0.7 10E3/UL (ref 0–1.3)
MONOCYTES NFR BLD AUTO: 12 %
NEUTROPHILS # BLD AUTO: 3.3 10E3/UL (ref 1.6–8.3)
NEUTROPHILS NFR BLD AUTO: 53 %
NONHDLC SERPL-MCNC: 159 MG/DL
PLATELET # BLD AUTO: 282 10E3/UL (ref 150–450)
POTASSIUM SERPL-SCNC: 3.9 MMOL/L (ref 3.4–5.3)
PROT SERPL-MCNC: 7.2 G/DL (ref 6.4–8.3)
RBC # BLD AUTO: 4.07 10E6/UL (ref 3.8–5.2)
SODIUM SERPL-SCNC: 139 MMOL/L (ref 135–145)
TRIGL SERPL-MCNC: 262 MG/DL
WBC # BLD AUTO: 6.2 10E3/UL (ref 4–11)

## 2024-11-01 PROCEDURE — 85652 RBC SED RATE AUTOMATED: CPT

## 2024-11-01 PROCEDURE — 85025 COMPLETE CBC W/AUTO DIFF WBC: CPT

## 2024-11-01 PROCEDURE — 86140 C-REACTIVE PROTEIN: CPT

## 2024-11-01 PROCEDURE — 80061 LIPID PANEL: CPT

## 2024-11-01 PROCEDURE — 36415 COLL VENOUS BLD VENIPUNCTURE: CPT

## 2024-11-01 PROCEDURE — 80053 COMPREHEN METABOLIC PANEL: CPT

## 2024-11-04 ENCOUNTER — OFFICE VISIT (OUTPATIENT)
Dept: FAMILY MEDICINE | Facility: CLINIC | Age: 69
End: 2024-11-04
Payer: COMMERCIAL

## 2024-11-04 VITALS
HEART RATE: 90 BPM | TEMPERATURE: 98.3 F | SYSTOLIC BLOOD PRESSURE: 112 MMHG | WEIGHT: 260 LBS | DIASTOLIC BLOOD PRESSURE: 68 MMHG | HEIGHT: 63 IN | RESPIRATION RATE: 20 BRPM | OXYGEN SATURATION: 94 % | BODY MASS INDEX: 46.07 KG/M2

## 2024-11-04 DIAGNOSIS — Z98.84 STATUS POST BARIATRIC SURGERY: ICD-10-CM

## 2024-11-04 DIAGNOSIS — M05.9 SEROPOSITIVE RHEUMATOID ARTHRITIS (H): ICD-10-CM

## 2024-11-04 DIAGNOSIS — E78.5 HYPERLIPIDEMIA LDL GOAL <100: ICD-10-CM

## 2024-11-04 DIAGNOSIS — M54.50 CHRONIC BILATERAL LOW BACK PAIN WITHOUT SCIATICA: Primary | ICD-10-CM

## 2024-11-04 DIAGNOSIS — G89.29 CHRONIC BILATERAL LOW BACK PAIN WITHOUT SCIATICA: Primary | ICD-10-CM

## 2024-11-04 DIAGNOSIS — E66.01 OBESITY, CLASS III, BMI 40-49.9 (MORBID OBESITY) (H): ICD-10-CM

## 2024-11-04 PROCEDURE — 99214 OFFICE O/P EST MOD 30 MIN: CPT | Performed by: FAMILY MEDICINE

## 2024-11-04 ASSESSMENT — PAIN SCALES - GENERAL: PAINLEVEL_OUTOF10: EXTREME PAIN (8)

## 2024-11-04 ASSESSMENT — ENCOUNTER SYMPTOMS: BACK PAIN: 1

## 2024-11-04 NOTE — PROGRESS NOTES
"  Assessment & Plan       ICD-10-CM    1. Chronic bilateral low back pain without sciatica  M54.50 Physical Therapy  Referral    G89.29       2. Obesity, Class III, BMI 40-49.9 (morbid obesity) (H)  E66.01       3. Status post bariatric surgery  Z98.84       4. Seropositive rheumatoid arthritis (H)  M05.9       5. Hyperlipidemia LDL goal <100  E78.5         She has acute on chronic low back pain which seems primarily related to underlying degenerative changes  I think physical therapy would be a good option for her at this point, along with weight loss, and we will plan to proceed with both of those items now going forward  She will continue her same baseline meds  We reviewed conservative care for her hyperlipidemia including diet and exercise treatment for that, and at some point rechecking her lipids when she is fasting (since her triglycerides were especially elevated this time compared to previous values)      BMI  Estimated body mass index is 46.06 kg/m  as calculated from the following:    Height as of this encounter: 1.6 m (5' 3\").    Weight as of this encounter: 117.9 kg (260 lb).   Weight management plan: Patient referred to endocrine and/or weight management specialty        Subjective   Sharon is a 69 year old, presenting for the following health issues:  Back Pain      11/4/2024     9:47 AM   Additional Questions   Roomed by cam         11/4/2024     9:47 AM   Patient Reported Additional Medications   Patient reports taking the following new medications none     Back Pain     History of Present Illness       Back Pain:  She presents for follow up of back pain. Patient's back pain is a chronic problem.  Location of back pain:  Right lower back and left lower back  Description of back pain: sharp and shooting  Back pain spreads: right thigh and left thigh    Since patient first noticed back pain, pain is: gradually worsening  Does back pain interfere with her job:  No       She eats 0-1 servings " of fruits and vegetables daily.She consumes 1 sweetened beverage(s) daily.She exercises with enough effort to increase her heart rate 9 or less minutes per day.  She exercises with enough effort to increase her heart rate 3 or less days per week.   She is taking medications regularly.     She has been having ongoing low back and leg pain which is becoming more bothersome.  Her symptoms are a bit more left-sided than right, but she is getting some radiation into the right upper leg as well as left upper leg now from the low back.  She was going to see Dr. Infante of neurosurgery, but she has encountered a number of different barriers and obstacles, and she is now planning to pass on that.  She has seen providers in [facility] for her low back.  She was felt to be a poor surgical candidate.  She did have some injections, but did not feel like those were very helpful.  She wonders about trying physical therapy again.  She is also going to have a video visit this coming Friday morning with the weight management team.  She previously had a lap band procedure done for weight loss.    Patient Active Problem List   Diagnosis    AR (allergic rhinitis)    GERD (gastroesophageal reflux disease)    Status post bariatric surgery    High risk medication use    Obstructive sleep apnea    Obesity, Class III, BMI 40-49.9 (morbid obesity) (H)    Anterior basement membrane dystrophy    Seropositive rheumatoid arthritis (H)    LORNA positive    Carpal tunnel syndrome of right wrist    Headache    Immunosuppression (H)    Fibromyalgia    Pain in joint, ankle and foot, right    Hypertension goal BP (blood pressure) < 140/90    Chronic back pain, unspecified back location, unspecified back pain laterality    Hyperlipidemia LDL goal <100    Mild recurrent major depression (H)    Nuclear sclerosis of both eyes     Current Outpatient Medications   Medication Sig Dispense Refill    acetaminophen (TYLENOL) 500 MG tablet Take 500-1,000 mg by  mouth every 6 hours as needed for mild pain.      citalopram (CELEXA) 20 MG tablet Take 1 tablet (20 mg) by mouth daily 90 tablet 3    fexofenadine (ALLEGRA) 180 MG tablet Take 1 tablet (180 mg) by mouth daily 90 tablet 2    fluticasone (FLONASE) 50 MCG/ACT nasal spray Spray 2 sprays into both nostrils as needed.      gabapentin (NEURONTIN) 100 MG capsule TAKE ONE CAPSULE BY MOUTH EVERY MORNING AND TAKE FIVE CAPSULES BY MOUTH EVERY NIGHT AT BEDTIME 540 capsule 2    hydroxychloroquine (PLAQUENIL) 200 MG tablet Take 1 tablet (200 mg) by mouth 2 times daily 180 tablet 2    hydrOXYzine cha (VISTARIL) 25 MG capsule Take 1 capsule (25 mg) by mouth 3 times daily as needed for anxiety 30 capsule 1    ibuprofen 200 MG capsule Take 600-800 mg by mouth at bedtime.      leflunomide (ARAVA) 20 MG tablet Take 1 tablet (20 mg) by mouth daily 90 tablet 2    losartan-hydrochlorothiazide (HYZAAR) 100-25 MG tablet Take 1 tablet by mouth daily 90 tablet 3    ORDER FOR DME Use your CPAP device as directed by your provider.      predniSONE (DELTASONE) 5 MG tablet Prednisone 20mg daily x5days, then 15mg daily x5days, then 10mg daily x5days, then 5mg daily x5days, then stop. 50 tablet 0    sulfaSALAzine (AZULFIDINE) 500 MG tablet Take 3 tablets (1,500 mg) by mouth 2 times daily 540 tablet 2    vitamin D3 (CHOLECALCIFEROL) 50 mcg (2000 units) tablet Take 1 tablet (50 mcg) by mouth daily 90 tablet 3    Sarilumab 200 MG/1.14ML SOAJ Inject 1.14 mLs (200 mg) subcutaneously every 14 days. Hold for signs of infection and seek medical attention. (Patient not taking: Reported on 11/4/2024) 2.28 mL 3     Current Facility-Administered Medications   Medication Dose Route Frequency Provider Last Rate Last Admin    alcohol (dehydrated) (ABLYSINOL) 99 % injection 15 mL  15 mL Other Once Levon Jamil MD         Facility-Administered Medications Ordered in Other Visits   Medication Dose Route Frequency Provider Last Rate Last Admin    sodium  "chloride (PF) 0.9% PF flush 10 mL  10 mL Intracatheter Once Nely Matthews MD        sodium chloride bacteriostatic 0.9 % flush 12 mL  12 mL Intravenous Once Nely Matthews MD                 Review of Systems  She has has underlying RA.  She recently had some lab work done and her lipids were elevated, but she was not fasting.    Noncontributory except as above.        Objective    /68 (BP Location: Left arm, Patient Position: Chair, Cuff Size: Adult Large)   Pulse 90   Temp 98.3  F (36.8  C) (Temporal)   Resp 20   Ht 1.6 m (5' 3\")   Wt 117.9 kg (260 lb)   SpO2 94%   BMI 46.06 kg/m    Body mass index is 46.06 kg/m .  Physical Exam   GENERAL: alert and no distress  MS: Mild discomfort to palpation over the lower mid back.  Straight leg raising is negative bilaterally.  NEURO: sensation and motor function are grossly intact in the lower extremities      Previous lumbar MRI results were reviewed from 2 and half years ago        Signed Electronically by: Reynaldo Saavedra MD    "

## 2024-11-06 ENCOUNTER — ANESTHESIA EVENT (OUTPATIENT)
Dept: SURGERY | Facility: AMBULATORY SURGERY CENTER | Age: 69
End: 2024-11-06
Payer: COMMERCIAL

## 2024-11-06 DIAGNOSIS — H25.13 NUCLEAR SCLEROSIS OF BOTH EYES: Primary | ICD-10-CM

## 2024-11-06 RX ORDER — MOXIFLOXACIN 5 MG/ML
1 SOLUTION/ DROPS OPHTHALMIC 4 TIMES DAILY
Qty: 5 ML | Refills: 1 | Status: SHIPPED | OUTPATIENT
Start: 2024-11-06 | End: 2024-11-14

## 2024-11-07 ENCOUNTER — ANESTHESIA (OUTPATIENT)
Dept: SURGERY | Facility: AMBULATORY SURGERY CENTER | Age: 69
End: 2024-11-07
Payer: COMMERCIAL

## 2024-11-07 ENCOUNTER — HOSPITAL ENCOUNTER (OUTPATIENT)
Facility: AMBULATORY SURGERY CENTER | Age: 69
Discharge: HOME | End: 2024-11-07
Attending: OPHTHALMOLOGY
Payer: COMMERCIAL

## 2024-11-07 VITALS
DIASTOLIC BLOOD PRESSURE: 68 MMHG | HEIGHT: 63 IN | SYSTOLIC BLOOD PRESSURE: 122 MMHG | TEMPERATURE: 97 F | HEART RATE: 82 BPM | BODY MASS INDEX: 46.07 KG/M2 | RESPIRATION RATE: 20 BRPM | WEIGHT: 260 LBS | OXYGEN SATURATION: 93 %

## 2024-11-07 DIAGNOSIS — H25.13 NUCLEAR SCLEROSIS OF BOTH EYES: Primary | ICD-10-CM

## 2024-11-07 PROCEDURE — 66982 XCAPSL CTRC RMVL CPLX WO ECP: CPT | Performed by: NURSE ANESTHETIST, CERTIFIED REGISTERED

## 2024-11-07 PROCEDURE — 66984 XCAPSL CTRC RMVL W/O ECP: CPT | Mod: RT | Performed by: OPHTHALMOLOGY

## 2024-11-07 PROCEDURE — 66982 XCAPSL CTRC RMVL CPLX WO ECP: CPT | Performed by: ANESTHESIOLOGY

## 2024-11-07 PROCEDURE — 66984 XCAPSL CTRC RMVL W/O ECP: CPT | Mod: RT

## 2024-11-07 DEVICE — LENS CC60WF 18.0 CLAREON UV ASPHERIC BICONVEX IOL: Type: IMPLANTABLE DEVICE | Site: EYE | Status: FUNCTIONAL

## 2024-11-07 RX ORDER — ACETAMINOPHEN 325 MG/1
975 TABLET ORAL ONCE
Status: COMPLETED | OUTPATIENT
Start: 2024-11-07 | End: 2024-11-07

## 2024-11-07 RX ORDER — BALANCED SALT SOLUTION 6.4; .75; .48; .3; 3.9; 1.7 MG/ML; MG/ML; MG/ML; MG/ML; MG/ML; MG/ML
SOLUTION OPHTHALMIC PRN
Status: DISCONTINUED | OUTPATIENT
Start: 2024-11-07 | End: 2024-11-07 | Stop reason: HOSPADM

## 2024-11-07 RX ORDER — OXYCODONE HYDROCHLORIDE 5 MG/1
5 TABLET ORAL
Status: DISCONTINUED | OUTPATIENT
Start: 2024-11-07 | End: 2024-11-08 | Stop reason: HOSPADM

## 2024-11-07 RX ORDER — NALOXONE HYDROCHLORIDE 0.4 MG/ML
0.1 INJECTION, SOLUTION INTRAMUSCULAR; INTRAVENOUS; SUBCUTANEOUS
Status: DISCONTINUED | OUTPATIENT
Start: 2024-11-07 | End: 2024-11-08 | Stop reason: HOSPADM

## 2024-11-07 RX ORDER — MOXIFLOXACIN IN NACL,ISO-OS/PF 0.3MG/0.3
SYRINGE (ML) INTRAOCULAR PRN
Status: DISCONTINUED | OUTPATIENT
Start: 2024-11-07 | End: 2024-11-07 | Stop reason: HOSPADM

## 2024-11-07 RX ORDER — ONDANSETRON 2 MG/ML
4 INJECTION INTRAMUSCULAR; INTRAVENOUS EVERY 30 MIN PRN
Status: DISCONTINUED | OUTPATIENT
Start: 2024-11-07 | End: 2024-11-08 | Stop reason: HOSPADM

## 2024-11-07 RX ORDER — TETRACAINE HYDROCHLORIDE 5 MG/ML
SOLUTION OPHTHALMIC PRN
Status: DISCONTINUED | OUTPATIENT
Start: 2024-11-07 | End: 2024-11-07 | Stop reason: HOSPADM

## 2024-11-07 RX ORDER — ONDANSETRON 4 MG/1
4 TABLET, ORALLY DISINTEGRATING ORAL EVERY 30 MIN PRN
Status: DISCONTINUED | OUTPATIENT
Start: 2024-11-07 | End: 2024-11-08 | Stop reason: HOSPADM

## 2024-11-07 RX ORDER — FENTANYL CITRATE 50 UG/ML
50 INJECTION, SOLUTION INTRAMUSCULAR; INTRAVENOUS EVERY 5 MIN PRN
Status: DISCONTINUED | OUTPATIENT
Start: 2024-11-07 | End: 2024-11-08 | Stop reason: HOSPADM

## 2024-11-07 RX ORDER — PROPARACAINE HYDROCHLORIDE 5 MG/ML
1 SOLUTION/ DROPS OPHTHALMIC ONCE
Status: COMPLETED | OUTPATIENT
Start: 2024-11-07 | End: 2024-11-07

## 2024-11-07 RX ORDER — LIDOCAINE HYDROCHLORIDE 10 MG/ML
INJECTION, SOLUTION EPIDURAL; INFILTRATION; INTRACAUDAL; PERINEURAL PRN
Status: DISCONTINUED | OUTPATIENT
Start: 2024-11-07 | End: 2024-11-07 | Stop reason: HOSPADM

## 2024-11-07 RX ORDER — ONDANSETRON 2 MG/ML
INJECTION INTRAMUSCULAR; INTRAVENOUS PRN
Status: DISCONTINUED | OUTPATIENT
Start: 2024-11-07 | End: 2024-11-07

## 2024-11-07 RX ORDER — LIDOCAINE 40 MG/G
CREAM TOPICAL
Status: DISCONTINUED | OUTPATIENT
Start: 2024-11-07 | End: 2024-11-08 | Stop reason: HOSPADM

## 2024-11-07 RX ORDER — TRIAMCINOLONE ACETONIDE 40 MG/ML
INJECTION, SUSPENSION INTRA-ARTICULAR; INTRAMUSCULAR PRN
Status: DISCONTINUED | OUTPATIENT
Start: 2024-11-07 | End: 2024-11-07 | Stop reason: HOSPADM

## 2024-11-07 RX ORDER — OXYCODONE HYDROCHLORIDE 5 MG/1
10 TABLET ORAL
Status: DISCONTINUED | OUTPATIENT
Start: 2024-11-07 | End: 2024-11-08 | Stop reason: HOSPADM

## 2024-11-07 RX ORDER — SODIUM CHLORIDE, SODIUM LACTATE, POTASSIUM CHLORIDE, CALCIUM CHLORIDE 600; 310; 30; 20 MG/100ML; MG/100ML; MG/100ML; MG/100ML
INJECTION, SOLUTION INTRAVENOUS CONTINUOUS
Status: DISCONTINUED | OUTPATIENT
Start: 2024-11-07 | End: 2024-11-08 | Stop reason: HOSPADM

## 2024-11-07 RX ORDER — FENTANYL CITRATE 50 UG/ML
25 INJECTION, SOLUTION INTRAMUSCULAR; INTRAVENOUS EVERY 5 MIN PRN
Status: DISCONTINUED | OUTPATIENT
Start: 2024-11-07 | End: 2024-11-08 | Stop reason: HOSPADM

## 2024-11-07 RX ORDER — DEXAMETHASONE SODIUM PHOSPHATE 10 MG/ML
4 INJECTION, SOLUTION INTRAMUSCULAR; INTRAVENOUS
Status: DISCONTINUED | OUTPATIENT
Start: 2024-11-07 | End: 2024-11-08 | Stop reason: HOSPADM

## 2024-11-07 RX ORDER — CYCLOPENTOLAT/TROPIC/PHENYLEPH 1%-1%-2.5%
1 DROPS (EA) OPHTHALMIC (EYE)
Status: COMPLETED | OUTPATIENT
Start: 2024-11-07 | End: 2024-11-07

## 2024-11-07 RX ORDER — HYDROMORPHONE HYDROCHLORIDE 1 MG/ML
0.4 INJECTION, SOLUTION INTRAMUSCULAR; INTRAVENOUS; SUBCUTANEOUS EVERY 5 MIN PRN
Status: DISCONTINUED | OUTPATIENT
Start: 2024-11-07 | End: 2024-11-08 | Stop reason: HOSPADM

## 2024-11-07 RX ORDER — HYDROMORPHONE HYDROCHLORIDE 1 MG/ML
0.2 INJECTION, SOLUTION INTRAMUSCULAR; INTRAVENOUS; SUBCUTANEOUS EVERY 5 MIN PRN
Status: DISCONTINUED | OUTPATIENT
Start: 2024-11-07 | End: 2024-11-08 | Stop reason: HOSPADM

## 2024-11-07 RX ORDER — SODIUM CHLORIDE 9 MG/ML
INJECTION, SOLUTION INTRAVENOUS CONTINUOUS PRN
Status: DISCONTINUED | OUTPATIENT
Start: 2024-11-07 | End: 2024-11-07

## 2024-11-07 RX ADMIN — ACETAMINOPHEN 975 MG: 325 TABLET ORAL at 11:26

## 2024-11-07 RX ADMIN — Medication 1 DROP: at 11:29

## 2024-11-07 RX ADMIN — ONDANSETRON 4 MG: 2 INJECTION INTRAMUSCULAR; INTRAVENOUS at 12:43

## 2024-11-07 RX ADMIN — SODIUM CHLORIDE: 9 INJECTION, SOLUTION INTRAVENOUS at 12:41

## 2024-11-07 RX ADMIN — Medication 1 DROP: at 11:35

## 2024-11-07 RX ADMIN — Medication 1 DROP: at 11:23

## 2024-11-07 RX ADMIN — PROPARACAINE HYDROCHLORIDE 1 DROP: 5 SOLUTION/ DROPS OPHTHALMIC at 11:23

## 2024-11-07 ASSESSMENT — COPD QUESTIONNAIRES: COPD: 1

## 2024-11-07 NOTE — OP NOTE
Operative  Report    Patient Name: Sharon Fung    MRN: [id number]    Date of Surgery: 11/7/2024    Surgeon: Sia Herman M.D    Assistant surgeon: Cedric Chew MD    Preoperative Diagnosis: Visually significant cataract, right eye    Postoperative Diagnosis: Same    Procedure: Phacoemulsification with intraocular lens implant, right eye    Implant: AIM Distance  Implant Name Type Inv. Item Serial No.  Lot No. LRB No. Used Action   LENS [id number] 18.0 [employer] UV ASPHERIC BICONVEX IOL - [id number] Lens/Eye Implant LENS [id number] 18.0 [employer] UV ASPHERIC BICONVEX IOL [id number] [employer]  Right 1 Implanted       Anesthesia Type: Mac    Estimated Blood Loss: Trace    Complications: None    INDICATIONS FOR PROCEDURE: Patient has a history of visually significant cataract affecting the patient's activity of daily living. After a discussion with the patient, the patient has elected to have the listed procedure(s) to maximize visual potential. All of the appropriate consent forms have been signed and all of the patient's questions have been answered.    DETAILS OF THE PROCEDURE: Patient was identified in the pre operative area and given pre operative eye drops. The patient was then brought into the operating room and intravenous sedation was begun after a time out was performed. The patient was prepped and draped in the usual sterile ophthalmic fashion.     A lid speculum was placed and the operating microscope was brought into position. A paracentesis was made and viscoelastic was injected into the anterior chamber. A clear corneal incision was made using a keratome blade. A cystotome needle and Utrata forceps were used to create an anterior continuous curvilinear capsulorhexis. Then BSS on a blunt tipped cannula was used for hydrodissection and hydrodelineation. Using the phacoemulsification unit, the nucleus was then removed from the eye. Using  irrigation and aspiration, the remaining cortical material was removed from the eye. The capsular bag was infused with viscoelastic material. The intraocular lens was then placed into the capsular bag and secured into position. The remaining viscoelastic material was then removed from the eye via irrigation and aspiration.  BSS on a blunt tipped cannula was used to hydrate all of the wounds. At the end of the case, the wounds were noted to be watertight, the pupil was noted to be round, the lens appeared to be in good position, and the pressure was palpated to be physiologic. Intracameral moxifloxacin and a subconjunctival injection of Kenalog was administered.     Post operative ointment was applied and the eye was patched and shielded. Patient tolerated procedure and was brought to the recovery area in good condition. Patient will follow in the eye clinic in one day.

## 2024-11-07 NOTE — ANESTHESIA CARE TRANSFER NOTE
Patient: Sharon Fung    Procedure: Procedure(s):  RIGHT EYE PHACOEMULSIFICATION, CATARACT, WITH STANDARD INTRAOCULAR LENS IMPLANT INSERTION       Diagnosis: Nuclear sclerosis of both eyes [H25.13]  Anterior basement membrane dystrophy of both eyes [H18.523]  Diagnosis Additional Information: No value filed.    Anesthesia Type:   MAC     Note:    Oropharynx: oropharynx clear of all foreign objects and spontaneously breathing  Level of Consciousness: awake  Oxygen Supplementation: room air      Dentition: dentition unchanged  Vital Signs Stable: post-procedure vital signs reviewed and stable  Report to RN Given: handoff report given  Patient transferred to: Phase II  Comments: Uneventful transport   Report to RN  Pt comfortable  Exchanging well; color natl  Pt responds appropriately to command  IV patent  Lips/teeth/dentition as preop status  Questions answered      Handoff Report: Identifed the Patient, Identified the Reponsible Provider, Reviewed the pertinent medical history, Discussed the surgical course, Reviewed Intra-OP anesthesia mangement and issues during anesthesia, Set expectations for post-procedure period and Allowed opportunity for questions and acknowledgement of understanding      Vitals:  Vitals Value Taken Time   /72 1316   Temp 97.2    Pulse 86    Resp 16    SpO2 95-96        Electronically Signed By: CHELSEY PAREKH CRNA  November 7, 2024  1:18 PM

## 2024-11-07 NOTE — DISCHARGE INSTRUCTIONS
Post-Operative Instructions     FIRST 24 HOURS AFTER SURGERY:  You are allowed to Tylenol (acetaminophen) 650mg every four hours as needed for pain unless you have liver disease or are allergic to Tylenol..  Continue taking your regular medications and eye drops in the non-operative eye.  Do not remove the metal or plastic shield unless otherwise instructed by your surgeon.  Do not operate a car, motorcycle, or machinery for 24 hours after surgery.  Call ED immediately if you have SEVERE PAIN unrelieved with Tylenol. And  ask for the resident on call.     Tylenol 975 mg given at 11:30 am.   Ok to take more after 5:30 pm.      MEDICATION INSTRUCTIONS:  -You will not have treatment eye drops prescription as medications were injected into your eye.  -If you feel your eyes are dry and itchy, you can use regular lubricating drops for one month after the surgery. These eye drops can be found over the counter Brand names example: Systane, Refresh. Please choose preservative free drops. To be used four times a day and as needed for 1 month  -Instilling the eye drops directly into the eye.    -If you had previously any glaucoma eye drops, You can remove the cover and instill the drops as prescribed before and after the procedure      GENERAL INSTRUCTIONS:  Hygiene of the operated eye  Wash your hands thoroughly before caring for the eye.  If the lids are sticky or itchy in the morning, debris, or matter can be gently wiped away with a cotton ball moistened with tap water. DO NOT press on the lids or eyeball.     FIVE MINUTES APART. If ointment is prescribed, it should be applied last.  If you have been taking medications in the non-operated eye, continue as they were prescribed.  If you take medications by mouth for a medical problem, continue as they were prescribed (unless otherwise instructed by physician)    EYE PROTECTION  From the time of surgery until the time of your post-operative day 1 appointment, wear the eye  shield at all times. Then, wear it whenever sleeping, for 1 week.  Protective sunglasses may be worn as needed for your comfort, and are suggested for outdoor activities.    ACTIVITIES  Avoid vigorous exertion and heavy lifting for the first week after surgery  You may take a bath or shower, but avoid getting water directly into your eye for one week after surgery, usually by keeping your eyes closed during the bath.  You should discuss driving and traveling with your surgeon. You may ride in a car and fly in an airplane unless otherwise instructed.  Avoid swimming or using a hot tube/sauna/pool/lake for 2 weeks after the surgery.      WHAT TO EXPECT:  Mild irritation and discomfort are normal.    Call the doctor if you experience any of the following:  Severe eye pain  Nausea  Vomiting  Severe headache                 [facility]  Home Care Following Anesthesia  For 24 hours after surgery:  Get plenty of rest.  A responsible adult must stay with you for at least 24 hours after you leave the surgery center.  Do not drive or use heavy equipment.  If you have weakness or tingling, don't drive or use heavy equipment until this feeling goes away.   Do not drink alcohol.   Avoid strenuous or risky activities.  Ask for help when climbing stairs.  You may feel lightheaded.  IF so, sit for a few minutes before standing.  Have someone help you get up.   If you have nausea (feel sick to your stomach): Drink only clear liquids such as apple juice, ginger ale, broth or 7-Up.  Rest may also help.  Be sure to drink enough fluids.  Move to a regular diet as you feel able.   You may have a slight fever.  Call the doctor if your fever is over 100 F (37.7 C) (taken under the tongue) or lasts longer than 24 hours.  You may have a dry mouth, a sore throat, muscle aches or trouble sleeping. These should go away after 24 hours.  Do not make important or legal decisions.   It is recommended to avoid  smoking.               Tips for taking pain medications  To get the best pain relief possible, remember these points:  Take pain medications as directed, before pain becomes severe.  Pain medication can upset your stomach: taking it with food may help.  Constipation is a common side effect of pain medication. Drink plenty of  fluids.  Eat foods high in fiber. Take a stool softener if recommended by your doctor or pharmacist.  Do not drink alcohol, drive or operate machinery while taking pain medications.  Ask about other ways to control pain, such as with heat, ice or relaxation.    Tylenol/Acetaminophen Consumption    If you feel your pain relief is insufficient, you may take Tylenol/Acetaminophen in addition to your narcotic pain medication.   Be careful not to exceed 4,000 mg of Tylenol/Acetaminophen in a 24 hour period from all sources.  If you are taking extra strength Tylenol/acetaminophen (500 mg), the maximum dose is 8 tablets in 24 hours.  If you are taking regular strength acetaminophen (325 mg), the maximum dose is 12 tablets in 24 hours.    Call a doctor for any of the following:  Signs of infection (fever, growing tenderness at the surgery site, a large amount of drainage or bleeding, severe pain, foul-smelling drainage, redness, swelling).  It has been over 8 to 10 hours since surgery and you are still not able to urinate (pass water).  Headache for over 24 hours.  Numbness, tingling or weakness the day after surgery (if you had spinal anesthesia).  Signs of Covid-19 infection (temperature over 100 degrees, shortness of breath, cough, loss of taste/smell, generalized body aches, persistent headache, chills, sore throat, nausea/vomiting/diarrhea)    Your doctor is:  Dr. Sia Herman, Ophthalmology: [phone]                  Or dial [phone] and ask for the resident on call for:  Ophthalmology  For emergency care, call the:  [facility]:  [phone] (TTY for hearing  impaired: [phone])

## 2024-11-07 NOTE — ANESTHESIA PREPROCEDURE EVALUATION
Anesthesia Pre-Procedure Evaluation    Patient: Sharon Fung   MRN: [id number] : 1955        Procedure : Procedure(s):  RIGHT EYE PHACOEMULSIFICATION, CATARACT, WITH STANDARD INTRAOCULAR LENS IMPLANT INSERTION          Past Medical History:   Diagnosis Date    AR (allergic rhinitis)  as teen    Arthritis 2015    Chronic obstructive pulmonary disease, unspecified COPD type (H) 2018    Depressive disorder 3 years ago    GERD (gastroesophageal reflux disease) 2007    Headache 2017    Hypertension goal BP (blood pressure) < 140/90 2021    Mild major depression (H) 3 years ago    Morbid obesity (H) teens    BRETT (obstructive sleep apnea)     uses CPAP    RA (rheumatoid arthritis) (H) 4 years    Reduced vision 3 years      Past Surgical History:   Procedure Laterality Date    ARTHRODESIS FOOT Right 2021    Procedure: Right 1st metatarsophalangeal joint fusion;  Surgeon: Jesus Wolf MD;  Location: [facility] OR    ARTHRODESIS TOE(S) Right 2021    Procedure: right revision great toe fusion;  Surgeon: Ryan Gee MD;  Location:  OR    BLEPHAROPLASTY BILATERAL Bilateral 2016    Procedure: BLEPHAROPLASTY BILATERAL;  Surgeon: Cortez Robin MD;  Location:  [facility]    BREAST BIOPSY, RT/LT  1-    Benign    COLONOSCOPY      COLONOSCOPY WITH CO2 INSUFFLATION N/A 3/30/2017    Procedure: COLONOSCOPY WITH CO2 INSUFFLATION;  Surgeon: Issa Weeks MD;  Location:  OR    ESOPHAGOSCOPY, GASTROSCOPY, DUODENOSCOPY (EGD), COMBINED N/A 10/29/2015    Procedure: COMBINED ESOPHAGOSCOPY, GASTROSCOPY, DUODENOSCOPY (EGD);  Surgeon: Shaq Mims MD;  Location:  GI    LAPAROSCOPIC CHOLECYSTECTOMY N/A 2023    Procedure: CHOLECYSTECTOMY, LAPAROSCOPIC;  Surgeon: Reynaldo Segura MD;  Location:  OR    LAPAROSCOPIC GASTRIC ADJUSTABLE BANDING  11/09/10    Lap band procedure    LAPAROSCOPIC REMOVAL GASTRIC ADJUSTABLE BAND N/A 10/31/2015    Procedure:  LAPAROSCOPIC REMOVAL GASTRIC ADJUSTABLE BAND;  Surgeon: Shaq Mims MD;  Location: [facility] OR    IA HAND/FINGER SURGERY UNLISTED  2016    IA STOMACH SURGERY PROCEDURE UNLISTED  11/2015    RELEASE CARPAL TUNNEL Left 4/27/2017    Procedure: RELEASE CARPAL TUNNEL;  Left Open Carpal Tunnel Release;  Surgeon: Ciara Middleton MD;  Location: [facility] OR    RELEASE CARPAL TUNNEL Right 6/15/2017    Procedure: RELEASE CARPAL TUNNEL;  Right Carpal Tunnel Release Open;  Surgeon: Ciara Middleton MD;  Location:  OR    REPAIR PTOSIS      TUBAL LIGATION  1988    [facility] APPENDECTOMY  1977      No Known Allergies   Social History     Tobacco Use    Smoking status: Never     Passive exposure: Never    Smokeless tobacco: Never   Substance Use Topics    Alcohol use: No     Alcohol/week: 1.0 - 2.0 standard drink of alcohol      Wt Readings from Last 1 Encounters:   11/07/24 117.9 kg (260 lb)        Anesthesia Evaluation            ROS/MED HX  ENT/Pulmonary:     (+) sleep apnea,          allergic rhinitis,               COPD,              Neurologic:       Cardiovascular:     (+)  hypertension- -   -  - -                                      METS/Exercise Tolerance:     Hematologic:       Musculoskeletal:   (+)  arthritis,             GI/Hepatic:     (+) GERD,                   Renal/Genitourinary:       Endo:     (+)               Obesity,       Psychiatric/Substance Use:       Infectious Disease:       Malignancy:       Other:            Physical Exam    Airway  airway exam normal      Mallampati: II   TM distance: > 3 FB   Neck ROM: full   Mouth opening: > 3 cm    Respiratory Devices and Support         Dental       (+) Completely normal teeth      Cardiovascular          Rhythm and rate: regular and normal     Pulmonary   pulmonary exam normal        breath sounds clear to auscultation           OUTSIDE LABS:  CBC:   Lab Results   Component Value Date    WBC 6.2 11/01/2024    WBC 7.4 08/22/2024    HGB 12.0 11/01/2024     HGB 11.7 08/22/2024    HCT 37.7 11/01/2024    HCT 37.4 08/22/2024     11/01/2024     08/22/2024     BMP:   Lab Results   Component Value Date     11/01/2024     05/10/2024    POTASSIUM 3.9 11/01/2024    POTASSIUM 4.2 05/10/2024    CHLORIDE 103 11/01/2024    CHLORIDE 103 05/10/2024    CO2 30 (H) 11/01/2024    CO2 24 05/10/2024    BUN 12.1 11/01/2024    BUN 13.6 05/10/2024    CR 0.71 11/01/2024    CR 0.67 08/22/2024    GLC 92 11/01/2024    GLC 98 05/10/2024     COAGS:   Lab Results   Component Value Date    PTT 33 07/15/2017    INR 0.96 07/15/2017     POC:   Lab Results   Component Value Date     (H) 06/30/2021     HEPATIC:   Lab Results   Component Value Date    ALBUMIN 4.1 11/01/2024    PROTTOTAL 7.2 11/01/2024    ALT 19 11/01/2024    AST 29 11/01/2024    ALKPHOS 91 11/01/2024    BILITOTAL 0.5 11/01/2024     OTHER:   Lab Results   Component Value Date    PH 7.43 06/04/2015    LACT 2.0 06/13/2023    A1C 4.4 05/10/2024    ISH 9.4 11/01/2024    LIPASE 18 06/13/2023    TSH 1.20 09/29/2015    CRP <2.9 11/16/2022    SED 30 11/01/2024       Anesthesia Plan    ASA Status:  3    NPO Status:  NPO Appropriate    Anesthesia Type: MAC.     - Reason for MAC: immobility needed, straight local not clinically adequate   Induction: Intravenous.           Consents    Anesthesia Plan(s) and associated risks, benefits, and realistic alternatives discussed. Questions answered and patient/representative(s) expressed understanding.     - Discussed:     - Discussed with:  Patient      - Extended Intubation/Ventilatory Support Discussed: No.      - Patient is DNR/DNI Status: No     Use of blood products discussed: No .     Postoperative Care    Pain management: IV analgesics, Oral pain medications, Multi-modal analgesia.   PONV prophylaxis: Ondansetron (or other 5HT-3)     Comments:               Anderson Guzman MD    I have reviewed the pertinent notes and labs in the chart from the past 30 days and  "(re)examined the patient.  Any updates or changes from those notes are reflected in this note.               # Hypertension: Noted on problem list           # Severe Obesity: Estimated body mass index is 46.06 kg/m  as calculated from the following:    Height as of this encounter: 1.6 m (5' 3\").    Weight as of this encounter: 117.9 kg (260 lb).             "

## 2024-11-07 NOTE — ANESTHESIA POSTPROCEDURE EVALUATION
Patient: Sharon Fung    Procedure: Procedure(s):  RIGHT EYE PHACOEMULSIFICATION, CATARACT, WITH STANDARD INTRAOCULAR LENS IMPLANT INSERTION       Anesthesia Type:  MAC    Note:  Disposition: Outpatient   Postop Pain Control: Uneventful            Sign Out: Well controlled pain   PONV: No   Neuro/Psych: Uneventful            Sign Out: Acceptable/Baseline neuro status   Airway/Respiratory: Uneventful            Sign Out: Acceptable/Baseline resp. status   CV/Hemodynamics: Uneventful            Sign Out: Acceptable CV status   Other NRE: NONE   DID A NON-ROUTINE EVENT OCCUR? No           Last vitals:  Vitals Value Taken Time   /68 11/07/24 1332   Temp 36.1  C (97  F) 11/07/24 1332   Pulse 82 11/07/24 1332   Resp 20 11/07/24 1332   SpO2 93 % 11/07/24 1332       Electronically Signed By: Anderson Guzman MD  November 7, 2024  4:41 PM

## 2024-11-07 NOTE — OR NURSING
Intraocular lens confirmed with the surgeon against the IOL request form and biometry paperwork prior to patient entering the operating room.

## 2024-11-08 ENCOUNTER — TELEPHONE (OUTPATIENT)
Dept: ENDOCRINOLOGY | Facility: CLINIC | Age: 69
End: 2024-11-08

## 2024-11-08 ENCOUNTER — VIRTUAL VISIT (OUTPATIENT)
Dept: ENDOCRINOLOGY | Facility: CLINIC | Age: 69
End: 2024-11-08
Payer: COMMERCIAL

## 2024-11-08 ENCOUNTER — OFFICE VISIT (OUTPATIENT)
Dept: OPHTHALMOLOGY | Facility: CLINIC | Age: 69
End: 2024-11-08
Attending: OPHTHALMOLOGY
Payer: COMMERCIAL

## 2024-11-08 VITALS — HEIGHT: 63 IN | BODY MASS INDEX: 46.07 KG/M2 | WEIGHT: 260 LBS

## 2024-11-08 DIAGNOSIS — Z98.890 POSTOPERATIVE EYE STATE: Primary | ICD-10-CM

## 2024-11-08 DIAGNOSIS — E66.01 CLASS 3 SEVERE OBESITY WITH SERIOUS COMORBIDITY AND BODY MASS INDEX (BMI) OF 45.0 TO 49.9 IN ADULT, UNSPECIFIED OBESITY TYPE (H): Primary | ICD-10-CM

## 2024-11-08 DIAGNOSIS — E66.813 CLASS 3 SEVERE OBESITY WITH SERIOUS COMORBIDITY AND BODY MASS INDEX (BMI) OF 45.0 TO 49.9 IN ADULT, UNSPECIFIED OBESITY TYPE (H): Primary | ICD-10-CM

## 2024-11-08 PROCEDURE — G0463 HOSPITAL OUTPT CLINIC VISIT: HCPCS | Performed by: OPHTHALMOLOGY

## 2024-11-08 RX ORDER — SEMAGLUTIDE 0.5 MG/.5ML
0.5 INJECTION, SOLUTION SUBCUTANEOUS WEEKLY
Qty: 2 ML | Refills: 2 | Status: SHIPPED | OUTPATIENT
Start: 2024-12-08

## 2024-11-08 RX ORDER — SEMAGLUTIDE 0.25 MG/.5ML
0.25 INJECTION, SOLUTION SUBCUTANEOUS WEEKLY
Qty: 2 ML | Refills: 0 | Status: SHIPPED | OUTPATIENT
Start: 2024-11-08

## 2024-11-08 ASSESSMENT — TONOMETRY
OD_IOP_MMHG: 34
OS_IOP_MMHG: 16
IOP_METHOD: TONOPEN
OD_IOP_MMHG: 14
IOP_METHOD: TONOPEN

## 2024-11-08 ASSESSMENT — CONF VISUAL FIELD
OS_SUPERIOR_NASAL_RESTRICTION: 0
OD_SUPERIOR_NASAL_RESTRICTION: 0
OD_SUPERIOR_TEMPORAL_RESTRICTION: 0
OS_NORMAL: 1
OS_SUPERIOR_TEMPORAL_RESTRICTION: 0
OD_INFERIOR_TEMPORAL_RESTRICTION: 0
OS_INFERIOR_TEMPORAL_RESTRICTION: 0
OS_INFERIOR_NASAL_RESTRICTION: 0
OD_INFERIOR_NASAL_RESTRICTION: 0
OD_NORMAL: 1

## 2024-11-08 ASSESSMENT — VISUAL ACUITY
OD_SC: 20/40
OS_SC+: -1
OD_SC+: -3
OS_SC: 20/100
OD_PH_SC: 20/25
OS_PH_SC: 20/30
METHOD: SNELLEN - LINEAR

## 2024-11-08 ASSESSMENT — SLIT LAMP EXAM - LIDS
COMMENTS: MEIBOMIAN GLAND DYSFUNCTION,
COMMENTS: MEIBOMIAN GLAND DYSFUNCTION

## 2024-11-08 ASSESSMENT — EXTERNAL EXAM - LEFT EYE: OS_EXAM: NORMAL

## 2024-11-08 ASSESSMENT — PAIN SCALES - GENERAL: PAINLEVEL_OUTOF10: MODERATE PAIN (4)

## 2024-11-08 ASSESSMENT — EXTERNAL EXAM - RIGHT EYE: OD_EXAM: NORMAL

## 2024-11-08 NOTE — LETTER
"2024       RE: Sharon Fung  [address] [address]     Dear Colleague,    Thank you for referring your patient, Sharon Fung, to the [facility] WEIGHT MANAGEMENT [facility]IN[facility]. Please see a copy of my visit note below.    Virtual Visit Details    Type of service:  Video Visit   Video Start Time:  7:02AM  Video End Time: 7:49AM    Originating Location (pt. Location): Home    Distant Location (provider location):  Off-site  Platform used for Video Visit: [employer]ion[employer]    60 minutes spent by me on the date of the encounter doing chart review, history and exam, documentation and further activities per the note    New Medical Weight Management Consult    PATIENT:  Sharon Fung  MRN:         [id number]  :         1955  JENIFER:         2024      I had the pleasure of seeing your patient, Sharon Fung. Full intake/assessment was done to determine barriers to weight loss success and develop a treatment plan. Sharon Fung is a 69 year old female interested in treatment of medical problems associated with excess weight. She has a height of 5' 3\", a weight of 260 lbs 0 oz, and the calculated Body mass index is 46.06 kg/m .        Assessment & Plan  Problem List Items Addressed This Visit       Class 3 severe obesity with serious comorbidity and body mass index (BMI) of 45.0 to 49.9 in adult, unspecified obesity type (H) - Primary     S/p lap band at [place] with Dr. Edmonds in 2010.   Starting weight - 220lb  Low weight - 200lb    Band removed in  due to band infection with Dr. Mims. Since then has seen a gradual weight regain plus. Would be interested in gastric sleeve, but when she looked at her insurance - it stated in only covered RYBP. Would like to consider a gastric sleeve in the future, but would like to exhaust all other options first.     Discussed AOMs to help with weight loss: "   - Phentermine contraindicated due to HTN and age.   - Topiramate to be started if GLP-1 not covered by insurance. No contraindications. No hx of kidney stones or disease. GFR >90.   - Naltrexone can be considered in the future. No hx of liver disease. Not taking opioids   - GLP-1 to be started today. No contraindications. Discussed side effects, risks, and benefits. No hx of pancreatitis. No personal or family hx of MTC or MENII. Concern for coverage with Medicare. No hx of Type II diabetes (A1C 4.4 5/2024) or CVD. Will see if Wegovy or Zepbound is covered.          Relevant Medications    Semaglutide-Weight Management (WEGOVY) 0.25 MG/0.5ML pen    Semaglutide-Weight Management (WEGOVY) 0.5 MG/0.5ML pen (Start on 12/8/2024)    Other Relevant Orders    Med Therapy Management Referral    Adult Bariatrics and Weight Management Clinic Follow-Up Order        Start Wegovy 0.25mg once weekly for 4 weeks, then increase to 0.5mg once weekly. Consider Zepbound and Saxenda as needed. If not covered will start Topiramate.   [facility] pharmacist in 6 weeks  Amber Perkins in 3 months         Sharon Fung is a 69 year old female who presents to clinic today for the following health issues.     She has the following co-morbidities:        11/7/2024     8:11 AM   --   I have the following health issues associated with obesity Pre-Diabetes    High Blood Pressure    High Cholesterol    Sleep Apnea    Osteoarthritis (joint disease)   I have the following symptoms associated with obesity Knee Pain    Back Pain    Fatigue     Back pain - going to PT     BRETT - uses CPAP nightly.     GERD - at times. Burning in throat. Takes TUMS prn     HLD - due to some medications she is on. Is not on statin.     HTN - well controlled on medications. Followed by PCP     RA - followed by rheumatology     Depression - mood is stable on medications. Followed by PCP         8/26/2016     2:54 PM   Patient Goals   I am interested in having a healthier weight to  "diminish current health problems: Yes   I am interested in having a healthier weight in order to prevent future health problems: Yes           11/7/2024     8:11 AM   Referring Provider   Please name the provider who referred you to Medical Weight Management  If you do not know, please answer \"I Don't Know\" Dr deja santiago           11/7/2024     8:11 AM   Weight History   How concerned are you about your weight? Very Concerned   I became overweight As a Teenager   The following factors have contributed to my weight gain Started on Medication that Caused Weight Gain    Eating Too Much    Lack of Exercise   I have tried the following methods to lose weight Watching Portions or Calories    Medications    Weight Loss Surgery   My lowest weight since age 20 was 150   My highest weight since age 20 was 260   The most weight I have ever lost was (lbs) 40   I have the following family history of obesity/being overweight My father is overweight    One or more of my siblings are overweight    Many of my relatives are overweight   How has your weight changed over the last year? Gained   How many pounds? 20     Has been weight stable around 150lbs for most of life. Weight gain through 2 pregnancies. Was hard to lose weight postpartum. Weight gain was gradual since.     S/p lap band at [place] with Dr. Edmonds in 11/2010.   Starting weight - 220lb  Low weight - 200lb  Maintained for many years. Band removed in 2015 due to band infection with Dr. Mims. Since then has seen a gradual weight regain plus. Would be interested in gastric sleeve, but when she looked at her insurance - it stated in only covered RYBP.     Has also seen weight gain due to being on steroids, back pain, stress from job.   Weight is currently stable. Has tried to lose weight through low calorie diet, but saw minimal weight loss.   Wants to lose weight to improve back pain, overall health, improve activity, and enjoy senior living.     Has not tried AOMs in " the past.         11/7/2024     8:11 AM   Diet Recall Review with Patient   If you do eat breakfast, what types of food do you eat? Ceral or eggs   If you do eat lunch, what types of food do you typically eat? [place]   If you do eat supper, what types of food do you typically eat? Padta soup   If you do snack, what types of food do you typically eat? Sweets   How many glasses of juice do you drink in a typical day? 1   How many of glasses of milk do you drink in a typical day? 2   If you do drink milk, what type? 1%   How many 8oz glasses of sugar containing drinks such as Sven-Aid/sweet tea do you drink in a day? 0   How many cans/bottles of sugar pop/soda/tea/sports drinks do you drink in a day? 0   How many cans/bottles of diet pop/soda/tea or sports drink do you drink in a day? 2   How often do you have a drink of alcohol? Monthly or Less   If you do drink, how many drinks might you have in a day? 1 or 2     Eating 2 meals a day, 1-2 snacks. Will wake up in the middle of the night to have a snack, happened more when she was working. Has been trying to better alterniatives such as vegetables for snacks. Craves sweets - trying to buy smaller pieces of cake. No increase hunger or thoughts of food. Can get full and stay full. Boredom eating. Stress eating, especially when was working. Drinks water, milk (1-2glasses daily), diet pop (2cans daily). Eats out 3xweek - easier since she is by herself. Hard to cook a meal just for her.         11/7/2024     8:11 AM   Eating Habits   Generally, my meals include foods like these bread, pasta, rice, potatoes, corn, crackers, sweet dessert, pop, or juice Almost Everyday   Generally, my meals include foods like these fried meats, brats, burgers, french fries, pizza, cheese, chips, or ice cream A Few Times a Week   Eat fast food (like McDonalds, Burger Erasmo, Taco Bell) A Few Times a Week   Eat at a buffet or sit-down restaurant Less Than Weekly   Eat most of my meals in front  of the TV or computer A Few Times a Week   Often skip meals, eat at random times, have no regular eating times A Few Times a Week   Rarely sit down for a meal but snack or graze throughout Once a Week   Eat extra snacks between meals A Few Times a Week   Eat most of my food at the end of the day Less Than Weekly   Eat in the middle of the night or wake up at night to eat Once a Week   Eat extra snacks to prevent or correct low blood sugar Never   Eat to prevent acid reflux or stomach pain Never   Worry about not having enough food to eat Never   I eat when I am depressed Less Than Weekly   I eat when I am stressed A Few Times a Week   I eat when I am bored A Few Times a Week   I eat when I am anxious A Few Times a Week   I eat when I am happy or as a reward A Few Times a Week   I feel hungry all the time even if I just have eaten Never   Feeling full is important to me Never   I finish all the food on my plate even if I am already full A Few Times a Week   I can't resist eating delicious food or walk past the good food/smell A Few Times a Week   I eat/snack without noticing that I am eating Less Than Weekly   I eat when I am preparing the meal Less Than Weekly   I eat more than usual when I see others eating Less Than Weekly   I have trouble not eating sweets, ice cream, cookies, or chips if they are around the house Almost Everyday   I think about food all day Never   What foods, if any, do you crave? Sweets/Candy/Chocolate           11/7/2024     8:11 AM   Amount of Food   I feel out of control when eating Weekly   I eat a large amount of food, like a loaf of bread, a box of cookies, a pint/quart of ice cream, all at once Never   I eat a large amount of food even when I am not hungry Monthly   I eat rapidly Almost Everyday   I eat alone because I feel embarrassed and do not want others to see how much I have eaten Never   I eat until I am uncomfortably full Never   I feel bad, disgusted, or guilty after I overeat  Weekly           11/7/2024     8:11 AM   Activity/Exercise History   How much of a typical 12 hour day do you spend sitting? Half the Day   How much of a typical 12 hour day do you spend lying down? Less Than Half the Day   How much of a typical day do you spend walking/standing? Less Than Half the Day   How many hours (not including work) do you spend on the TV/Video Games/Computer/Tablet/Phone? 6 Hours or More   How many times a week are you active for the purpose of exercise? Never   What keeps you from being more active? Pain    Shortness of Breath    Too tired   How many total minutes do you spend doing some activity for the purpose of exercising when you exercise? 15-30 Minutes     Can be very limited due to back pain and shortness of breath. Starting PT for back this month. Loves to walk, but cannot go far due to back pain. Does have a gym membership at the [employer] exercise class     PAST MEDICAL HISTORY:  Past Medical History:   Diagnosis Date     AR (allergic rhinitis)  as teen     Arthritis 12/14/2015     Chronic obstructive pulmonary disease, unspecified COPD type (H) 03/27/2018     Depressive disorder 3 years ago     GERD (gastroesophageal reflux disease) 04/2007     Headache 07/11/2017     Hypertension goal BP (blood pressure) < 140/90 12/20/2021     Mild major depression (H) 3 years ago     Morbid obesity (H) teens     BRETT (obstructive sleep apnea)     uses CPAP     RA (rheumatoid arthritis) (H) 4 years     Reduced vision 3 years           11/7/2024     8:11 AM   Work/Social History Reviewed With Patient   My employment status is Retired   My job is RN   How much of your job is spent on the computer or phone? 100%   How many hours do you spend commuting to work daily? 1/2   What is your marital status? Single   If you have children, are they overweight? No   Who do you live with? Alone   Who does the food shopping? 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 Has 3  children.     ETOH - rarely   No nicotine, drugs or THC         11/7/2024     8:11 AM   Mental Health History Reviewed With Patient   Have you ever been physically or sexually abused? No   How often in the past 2 weeks have you felt little interest or pleasure in doing things? For Several Days   Over the past 2 weeks how often have you felt down, depressed, or hopeless? Not at all           11/7/2024     8:11 AM   Sleep History Reviewed With Patient   How many hours do you sleep at night? 8       MEDICATIONS:   Current Outpatient Medications   Medication Sig Dispense Refill     acetaminophen (TYLENOL) 500 MG tablet Take 500-1,000 mg by mouth every 6 hours as needed for mild pain.       citalopram (CELEXA) 20 MG tablet Take 1 tablet (20 mg) by mouth daily 90 tablet 3     fexofenadine (ALLEGRA) 180 MG tablet Take 1 tablet (180 mg) by mouth daily 90 tablet 2     fluticasone (FLONASE) 50 MCG/ACT nasal spray Spray 2 sprays into both nostrils as needed.       gabapentin (NEURONTIN) 100 MG capsule TAKE ONE CAPSULE BY MOUTH EVERY MORNING AND TAKE FIVE CAPSULES BY MOUTH EVERY NIGHT AT BEDTIME 540 capsule 2     hydroxychloroquine (PLAQUENIL) 200 MG tablet Take 1 tablet (200 mg) by mouth 2 times daily 180 tablet 2     hydrOXYzine cha (VISTARIL) 25 MG capsule Take 1 capsule (25 mg) by mouth 3 times daily as needed for anxiety 30 capsule 1     ibuprofen 200 MG capsule Take 600-800 mg by mouth at bedtime.       leflunomide (ARAVA) 20 MG tablet Take 1 tablet (20 mg) by mouth daily 90 tablet 2     losartan-hydrochlorothiazide (HYZAAR) 100-25 MG tablet Take 1 tablet by mouth daily 90 tablet 3     Semaglutide-Weight Management (WEGOVY) 0.25 MG/0.5ML pen Inject 0.25 mg subcutaneously once a week. For 4 weeks 2 mL 0     [START ON 12/8/2024] Semaglutide-Weight Management (WEGOVY) 0.5 MG/0.5ML pen Inject 0.5 mg subcutaneously once a week. After completing 4 weeks of 0.25mg dose 2 mL 2     sulfaSALAzine (AZULFIDINE) 500 MG tablet Take 3  tablets (1,500 mg) by mouth 2 times daily 540 tablet 2     vitamin D3 (CHOLECALCIFEROL) 50 mcg (2000 units) tablet Take 1 tablet (50 mcg) by mouth daily 90 tablet 3     moxifloxacin (VIGAMOX) 0.5 % ophthalmic solution Place 1 drop into both eyes 4 times daily. 3 mL 1     ORDER FOR DME Use your CPAP device as directed by your provider.       prednisoLONE acetate (PRED FORTE) 1 % ophthalmic suspension Place 1 drop into both eyes 4 times daily. 10 mL 1     predniSONE (DELTASONE) 5 MG tablet Prednisone 20mg daily x5days, then 15mg daily x5days, then 10mg daily x5days, then 5mg daily x5days, then stop. 50 tablet 0     Sarilumab 200 MG/1.14ML SOAJ Inject 1.14 mLs (200 mg) subcutaneously every 14 days. Hold for signs of infection and seek medical attention. 2.28 mL 3       ALLERGIES:   No Known Allergies        5/9/2019    10:15 AM 11/7/2024     7:55 AM   STAR Score (Last Two)   STAR Raw Score 40 34    Activation Score 100 68.9    STAR Level 4 3        Patient-reported         Lab on 11/01/2024   Component Date Value Ref Range Status     Sodium 11/01/2024 139  135 - 145 mmol/L Final     Potassium 11/01/2024 3.9  3.4 - 5.3 mmol/L Final     Carbon Dioxide (CO2) 11/01/2024 30 (H)  22 - 29 mmol/L Final     Anion Gap 11/01/2024 6 (L)  7 - 15 mmol/L Final     Urea Nitrogen 11/01/2024 12.1  8.0 - 23.0 mg/dL Final     Creatinine 11/01/2024 0.71  0.51 - 0.95 mg/dL Final     GFR Estimate 11/01/2024 >90  >60 mL/min/1.73m2 Final    eGFR calculated using 2021 CKD-EPI equation.     Calcium 11/01/2024 9.4  8.8 - 10.4 mg/dL Final    Reference intervals for this test were updated on 7/16/2024 to reflect our healthy population more accurately. There may be differences in the flagging of prior results with similar values performed with this method. Those prior results can be interpreted in the context of the updated reference intervals.     Chloride 11/01/2024 103  98 - 107 mmol/L Final     Glucose 11/01/2024 92  70 - 99 mg/dL Final      Alkaline Phosphatase 11/01/2024 91  40 - 150 U/L Final     AST 11/01/2024 29  0 - 45 U/L Final     ALT 11/01/2024 19  0 - 50 U/L Final     Protein Total 11/01/2024 7.2  6.4 - 8.3 g/dL Final     Albumin 11/01/2024 4.1  3.5 - 5.2 g/dL Final     Bilirubin Total 11/01/2024 0.5  <=1.2 mg/dL Final     Patient Fasting > 8hrs? 11/01/2024 No   Final     Erythrocyte Sedimentation Rate 11/01/2024 30  0 - 30 mm/hr Final     CRP Inflammation 11/01/2024 8.50 (H)  <5.00 mg/L Final     Cholesterol 11/01/2024 205 (H)  <200 mg/dL Final     Triglycerides 11/01/2024 262 (H)  <150 mg/dL Final     Direct Measure HDL 11/01/2024 46 (L)  >=50 mg/dL Final     LDL Cholesterol Calculated 11/01/2024 107 (H)  <100 mg/dL Final     Non HDL Cholesterol 11/01/2024 159 (H)  <130 mg/dL Final     Patient Fasting > 8hrs? 11/01/2024 No   Final     WBC Count 11/01/2024 6.2  4.0 - 11.0 10e3/uL Final     RBC Count 11/01/2024 4.07  3.80 - 5.20 10e6/uL Final     Hemoglobin 11/01/2024 12.0  11.7 - 15.7 g/dL Final     Hematocrit 11/01/2024 37.7  35.0 - 47.0 % Final     MCV 11/01/2024 93  78 - 100 fL Final     MCH 11/01/2024 29.5  26.5 - 33.0 pg Final     MCHC 11/01/2024 31.8  31.5 - 36.5 g/dL Final     RDW 11/01/2024 13.5  10.0 - 15.0 % Final     Platelet Count 11/01/2024 282  150 - 450 10e3/uL Final     % Neutrophils 11/01/2024 53  % Final     % Lymphocytes 11/01/2024 29  % Final     % Monocytes 11/01/2024 12  % Final     % Eosinophils 11/01/2024 6  % Final     % Basophils 11/01/2024 1  % Final     % Immature Granulocytes 11/01/2024 0  % Final     Absolute Neutrophils 11/01/2024 3.3  1.6 - 8.3 10e3/uL Final     Absolute Lymphocytes 11/01/2024 1.8  0.8 - 5.3 10e3/uL Final     Absolute Monocytes 11/01/2024 0.7  0.0 - 1.3 10e3/uL Final     Absolute Eosinophils 11/01/2024 0.4  0.0 - 0.7 10e3/uL Final     Absolute Basophils 11/01/2024 0.1  0.0 - 0.2 10e3/uL Final     Absolute Immature Granulocytes 11/01/2024 0.0  <=0.4 10e3/uL Final         Objective   Ht 1.6 m (5'  "3\")   Wt 117.9 kg (260 lb)   BMI 46.06 kg/m             Physical Exam   GENERAL: alert and no distress  EYES: Eyes grossly normal to inspection.  No discharge or erythema, or obvious scleral/conjunctival abnormalities.  RESP: No audible wheeze, cough, or visible cyanosis.    SKIN: Visible skin clear. No significant rash, abnormal pigmentation or lesions.  NEURO: Cranial nerves grossly intact.  Mentation and speech appropriate for age.  PSYCH: Appropriate affect, tone, and pace of words     FIB-4 Calculation: 1.58 at 11/1/2024 10:41 AM  Calculated from:  SGOT/AST: 29 U/L at 11/1/2024 10:41 AM  SGPT/ALT: 19 U/L at 11/1/2024 10:41 AM  Platelets: 282 10e3/uL at 11/1/2024 10:41 AM  Age: 69 years    Fib-4 < 1.3: No further evaluation at this point, unless other concerns    - If the Fib-4 is >2.67  Fibroscan and elective liver clinic referral    - Intermediate Fib-4 scores: Get a Fibroscan, consider repeating this in 1-2 years.    Anti-obesity medication ROS:    HEENT  Hx of glaucoma: No    Cardiovascular  CAD:No  HTN:Yes    Gastrointestinal  GERD:Yes  Constipation:No  Liver Dz:No  H/O Pancreatitis:No    Psychiatric  Bipolar: No  Anxiety:Yes  Depression:Yes  History of alcohol/drug abuse: No  Hx of eating disorder:No    Endocrine  Personal or family hx of MTC or MEN2:No  Diabetes/prediabetes: No    Neurologic:  Hx of seizures: No  Hx of migraines: Yes  Memory Impairment: No      History of kidney stones: No  Kidney disease: No  Current birth control:  post menopausal    Taking Opioid/Narcotic: No        Sincerely,    Amber Manley PA-C    The longitudinal plan of care for the diagnosis(es)/condition(s) as documented were addressed during this visit. Due to the added complexity in care, I will continue to support Sharon in the subsequent management and with ongoing continuity of care.      Again, thank you for allowing me to participate in the care of your patient.      Sincerely,    Amber Manley PA-C    "

## 2024-11-08 NOTE — NURSING NOTE
Current patient location: [address] [facility] [address]    Is the patient currently in the state of [place]? YES    Visit mode:VIDEO    If the visit is dropped, the patient can be reconnected by: VIDEO VISIT: Text to cell phone:   Telephone Information:   Mobile [phone]       Will anyone else be joining the visit? NO  (If patient encounters technical issues they should call [phone]    Are changes needed to the allergy or medication list? No    Are refills needed on medications prescribed by this physician? Discuss with provider    Rooming Documentation:  Questionnaire(s) completed    Reason for visit: Consult    Pt states 4/10 right sided joint pain due to chronic arthritis.    Bill HYMANF

## 2024-11-08 NOTE — NURSING NOTE
Chief Complaints and History of Present Illnesses   Patient presents with    Post Op (Ophthalmology) Right Eye     POD1 for right eye PCIOL      Chief Complaint(s) and History of Present Illness(es)       Post Op (Ophthalmology) Right Eye              Laterality: right eye    Associated symptoms: Negative for eye pain, headache, flashes and floaters    Treatments tried: no treatments    Pain scale: 0/10    Comments: POD1 for right eye PCIOL               Comments    Patient reports   Ocular Meds: Moxi only -- did not receive Pred mildred SCHUSTER 8:33 AM November 8, 2024

## 2024-11-08 NOTE — TELEPHONE ENCOUNTER
PA Initiation    Medication: WEGOVY 0.25 MG/0.5ML SC SOAJ  Insurance Company: [facility] - Phone [phone] Fax [phone]  Pharmacy Filling the Rx: [facility] - [place], [place] - [facility] [address]  Filling Pharmacy Phone: [phone]  Filling Pharmacy Fax: [phone]  Start Date: 11/8/2024         Key: [id number]

## 2024-11-08 NOTE — PATIENT INSTRUCTIONS
"In the RIGHT eye:     Moxifloxacin (or Ofloxacin) antibiotic drop four times a day    Preservative free artificial tears at least four times a day      Wait 3-5 minutes between eyedrops if doing around the same time to prevent washout.     No complications of the procedure  No water in the eye for at least 1 week  Minimize bending, straining, lifting >10 pounds for 1 week  Cover the eye at bedtime for 1 week       What to watch out for:  If you experience any of the following \"RSVP Symptoms\", you should call immediately:  Worsening Redness  Worsening Sensitivity to light  Worsening Vision, including new flashing lights or floaters  Worsening Pain, including nausea/vomiting   "

## 2024-11-08 NOTE — PROGRESS NOTES
Chief complaint   ABMD evaluation    Referring Provider: Dr. Valderrama     HPI   Sharon Fung 69 year old female   HPI       Post Op (Ophthalmology) Right Eye    In right eye.  Associated symptoms include Negative for eye pain, headache, flashes and floaters.  Treatments tried include no treatments.  Pain was noted as 0/10. Additional comments: POD1 for right eye PCIOL              Comments    Patient reports   Ocular Meds: Moxi only -- did not receive Pred yet  Joyce Velázquez OA 8:33 AM November 8, 2024             Last edited by Joyce Velázquez on 11/8/2024  8:33 AM.         Complains of gradually worsening haze over the vision in both eyes starting ~3/2022 and progressing since then. Has tried some sort of ointment and drops without improvement. Denies any pain. Does endorse epiphora just from the right eye. Does endorse significant itching predating onset of hazy vision for which she uses pataday PRN - she does have seasonal allergies for which she takes allegra. Of note, she uses a CPAP for her COPD and has RA for which she follows with rheumatology with immunosuppressant treatments including courses of steroids. At her last visit 3/31/2022 with Dr. Valderrama she was instructed to continue present treatment of restasis (no longer using - did not help), luba zaynab and gtt (not using), and maxitrol PRN to eyelids. No history of painful episodes. Problem is not time-of-day dependent. No waking with pain.           Interval hx 11/08/2024  Chief Complaint(s) and History of Present Illness(es)       Post Op (Ophthalmology) Right Eye    In right eye.  Associated symptoms include Negative for eye pain, headache, flashes and floaters.  Treatments tried include no treatments.  Pain was noted as 0/10. Additional comments: POD1 for right eye PCIOL              Comments    Patient reports   Ocular Meds: Moxi only -- did not receive Pred yet  Joyce Velázquez OA 8:33 AM November 8, 2024                        Past ocular history    Prior eye surgery/laser/Trauma: yes bilateral upper lid blepharoplasty (2016)  CTL wearer:No  Glasses : yes  Family Hx of eye disease: No    PMH     Past Medical History:   Diagnosis Date    AR (allergic rhinitis)  as teen    Arthritis 12/14/2015    Chronic obstructive pulmonary disease, unspecified COPD type (H) 03/27/2018    Depressive disorder 3 years ago    GERD (gastroesophageal reflux disease) 04/2007    Headache 07/11/2017    Hypertension goal BP (blood pressure) < 140/90 12/20/2021    Mild major depression (H) 3 years ago    Morbid obesity (H) teens    BRETT (obstructive sleep apnea)     uses CPAP    RA (rheumatoid arthritis) (H) 4 years    Reduced vision 3 years       PSH     Past Surgical History:   Procedure Laterality Date    ARTHRODESIS FOOT Right 6/30/2021    Procedure: Right 1st metatarsophalangeal joint fusion;  Surgeon: Jesus Wolf MD;  Location: [facility]    ARTHRODESIS TOE(S) Right 12/27/2021    Procedure: right revision great toe fusion;  Surgeon: Ryan Gee MD;  Location:  OR    BLEPHAROPLASTY BILATERAL Bilateral 8/5/2016    Procedure: BLEPHAROPLASTY BILATERAL;  Surgeon: Cortez Robin MD;  Location:  [facility]    BREAST BIOPSY, RT/LT  1-94    Benign    COLONOSCOPY      COLONOSCOPY WITH CO2 INSUFFLATION N/A 3/30/2017    Procedure: COLONOSCOPY WITH CO2 INSUFFLATION;  Surgeon: Issa Weeks MD;  Location:  OR    ESOPHAGOSCOPY, GASTROSCOPY, DUODENOSCOPY (EGD), COMBINED N/A 10/29/2015    Procedure: COMBINED ESOPHAGOSCOPY, GASTROSCOPY, DUODENOSCOPY (EGD);  Surgeon: Shaq Mims MD;  Location:  GI    LAPAROSCOPIC CHOLECYSTECTOMY N/A 6/13/2023    Procedure: CHOLECYSTECTOMY, LAPAROSCOPIC;  Surgeon: Reynaldo Segura MD;  Location:  OR    LAPAROSCOPIC GASTRIC ADJUSTABLE BANDING  11/09/10    Lap band procedure    LAPAROSCOPIC REMOVAL GASTRIC ADJUSTABLE BAND N/A 10/31/2015    Procedure: LAPAROSCOPIC REMOVAL GASTRIC ADJUSTABLE BAND;  Surgeon: Shaq Mims  MD Jarett;  Location: [facility] OR    PHACOEMULSIFICATION WITH STANDARD INTRAOCULAR LENS IMPLANT Right 11/7/2024    Procedure: RIGHT EYE PHACOEMULSIFICATION, CATARACT, WITH STANDARD INTRAOCULAR LENS IMPLANT INSERTION;  Surgeon: Sia Herman MD;  Location: [facility] OR    WA HAND/FINGER SURGERY UNLISTED  2016    WA STOMACH SURGERY PROCEDURE UNLISTED  11/2015    RELEASE CARPAL TUNNEL Left 4/27/2017    Procedure: RELEASE CARPAL TUNNEL;  Left Open Carpal Tunnel Release;  Surgeon: Ciara Middleton MD;  Location: [facility]    RELEASE CARPAL TUNNEL Right 6/15/2017    Procedure: RELEASE CARPAL TUNNEL;  Right Carpal Tunnel Release Open;  Surgeon: Ciara Middleton MD;  Location: [facility] OR    REPAIR PTOSIS      TUBAL LIGATION  1988    [facility] APPENDECTOMY  1977       [facility]     Current Outpatient Medications   Medication Sig Dispense Refill    acetaminophen (TYLENOL) 500 MG tablet Take 500-1,000 mg by mouth every 6 hours as needed for mild pain.      citalopram (CELEXA) 20 MG tablet Take 1 tablet (20 mg) by mouth daily 90 tablet 3    fexofenadine (ALLEGRA) 180 MG tablet Take 1 tablet (180 mg) by mouth daily 90 tablet 2    fluticasone (FLONASE) 50 MCG/ACT nasal spray Spray 2 sprays into both nostrils as needed.      gabapentin (NEURONTIN) 100 MG capsule TAKE ONE CAPSULE BY MOUTH EVERY MORNING AND TAKE FIVE CAPSULES BY MOUTH EVERY NIGHT AT BEDTIME 540 capsule 2    hydroxychloroquine (PLAQUENIL) 200 MG tablet Take 1 tablet (200 mg) by mouth 2 times daily 180 tablet 2    hydrOXYzine cha (VISTARIL) 25 MG capsule Take 1 capsule (25 mg) by mouth 3 times daily as needed for anxiety 30 capsule 1    ibuprofen 200 MG capsule Take 600-800 mg by mouth at bedtime.      leflunomide (ARAVA) 20 MG tablet Take 1 tablet (20 mg) by mouth daily 90 tablet 2    losartan-hydrochlorothiazide (HYZAAR) 100-25 MG tablet Take 1 tablet by mouth daily 90 tablet 3    moxifloxacin (VIGAMOX) 0.5 % ophthalmic solution Place 1 drop into the right eye 4 times daily.  5 mL 1    ORDER FOR DME Use your CPAP device as directed by your provider.      predniSONE (DELTASONE) 5 MG tablet Prednisone 20mg daily x5days, then 15mg daily x5days, then 10mg daily x5days, then 5mg daily x5days, then stop. 50 tablet 0    Sarilumab 200 MG/1.14ML SOAJ Inject 1.14 mLs (200 mg) subcutaneously every 14 days. Hold for signs of infection and seek medical attention. 2.28 mL 3    Semaglutide-Weight Management (WEGOVY) 0.25 MG/0.5ML pen Inject 0.25 mg subcutaneously once a week. For 4 weeks 2 mL 0    [START ON 12/8/2024] Semaglutide-Weight Management (WEGOVY) 0.5 MG/0.5ML pen Inject 0.5 mg subcutaneously once a week. After completing 4 weeks of 0.25mg dose 2 mL 2    sulfaSALAzine (AZULFIDINE) 500 MG tablet Take 3 tablets (1,500 mg) by mouth 2 times daily 540 tablet 2    vitamin D3 (CHOLECALCIFEROL) 50 mcg (2000 units) tablet Take 1 tablet (50 mcg) by mouth daily 90 tablet 3     Current Facility-Administered Medications   Medication Dose Route Frequency Provider Last Rate Last Admin    alcohol (dehydrated) (ABLYSINOL) 99 % injection 15 mL  15 mL Other Once Levon Jamil MD         Facility-Administered Medications Ordered in Other Visits   Medication Dose Route Frequency Provider Last Rate Last Admin    sodium chloride (PF) 0.9% PF flush 10 mL  10 mL Intracatheter Once Nely Matthews MD        sodium chloride bacteriostatic 0.9 % flush 12 mL  12 mL Intravenous Once Nely Matthews MD           Labs   -    Imaging   -    Drops Currently Taking   - Pataday PRN each eye  - Systane QID both eyes.    Assessment/Plan     # Pseudophakia, right eye   S/p CEIOL right eye 11/7/24  No complications of the procedure.  Elevated Intraocular pressure to 34, burped paracentesis at [facility] with improvement of intraocular pressure to 14. Moxifloxacin given in clinic.   No water in the eye for at least 1 week  Minimize bending, straining, lifting >10 pounds for 1 week  Cover the eye at bedtime for 1 week   Discussed  return precautions (RSVP, severe headache)    # Anterior Basement Membrane Dystrophy, OU  - 01/26/23 Vision in both eyes has fluctuated from 20/20cc to 20/40cc OU since 2014 but now is subjectively the worst it has been. No pain. Does have epiphora OD longstanding. Denies dryness. Does have allergies improving with pataday. Irregular surface from ABMD most likely the source of decreased visual acuity.  - 4/21/23: Superficial keratectomy of the left eye today.   - 4/26/23: BCL in place, still with large irregular inferior epithelial defect though healing well superiorly and into visual axis  - 5/23/23: Epi fully intact left eye. Right eye superficial keratectomy  - 05/30/23 BCL removed right eye. Epi rough but intact, recently healed. Left eye epi dry but intact  -6/26/24 no recurrence right eye, left eye mild epithelial irregularity, does not explain the current vision.  -8/2/24: no recurrence of epi defect      # Horizontal exotropia  - Treated with 2.0 JOSUE prism starting 3/2022    # Nuclear sclerosis, OU  #Senile Cataracts OU  Patient is having difficulty with daily activities due to visual impairment. Patient feels that they are no longer managing with current correction and would like to move forward with cataract surgery. Explained benefits alternatives and risks including but not limited to loss of vision, severe infection, retinal detachment, need for more surgery, visual disturbances etc...  Informed patient that reaching uncorrected vision aim (without glasses) after cataract surgery is not certain. Made patient aware they may need glasses, laser vision correction or in worst case scenario, IOL exchange.      Dilates well  no PXF  no Flomax  no guttae    -Aim: distance (ABMD)  - dominant eye  -Previous refractive surgery status:-   -Corneal astigmatism<1.5 use standard  Not resident case due to risk of epi erosion      #plaquenil use  Long-term use of hydroxychloroquine  (primary encounter  diagnosis)  Comment: [id number], restarted 5/2024.   Plan: OCT Retina Spectralis OU (both eyes), HVF 10-2         OU, Fundus Photos OU (both eyes)      Douglas Moctezuma MD  Resident Physician, PGY-3  Department of Ophthalmology     Patient discussed with but not seen by Dr. Herman    I agree with the plan. I did not examine the patient today  Sia Herman M.D

## 2024-11-08 NOTE — PROGRESS NOTES
"Virtual Visit Details    Type of service:  Video Visit   Video Start Time:  7:02AM  Video End Time: 7:49AM    Originating Location (pt. Location): Home    Distant Location (provider location):  Off-site  Platform used for Video Visit: [employer]    60 minutes spent by me on the date of the encounter doing chart review, history and exam, documentation and further activities per the note    New Medical Weight Management Consult    PATIENT:  Sharon Fung  MRN:         [id number]  :         1955  JENIFER:         2024      I had the pleasure of seeing your patient, Sharon Fung. Full intake/assessment was done to determine barriers to weight loss success and develop a treatment plan. Sharon Fung is a 69 year old female interested in treatment of medical problems associated with excess weight. She has a height of 5' 3\", a weight of 260 lbs 0 oz, and the calculated Body mass index is 46.06 kg/m .        Assessment & Plan   Problem List Items Addressed This Visit       Class 3 severe obesity with serious comorbidity and body mass index (BMI) of 45.0 to 49.9 in adult, unspecified obesity type (H) - Primary     S/p lap band at [place] with Dr. Edmonds in 2010.   Starting weight - 220lb  Low weight - 200lb    Band removed in  due to band infection with Dr. Mims. Since then has seen a gradual weight regain plus. Would be interested in gastric sleeve, but when she looked at her insurance - it stated in only covered RYBP. Would like to consider a gastric sleeve in the future, but would like to exhaust all other options first.     Discussed AOMs to help with weight loss:   - Phentermine contraindicated due to HTN and age.   - Topiramate to be started if GLP-1 not covered by insurance. No contraindications. No hx of kidney stones or disease. GFR >90.   - Naltrexone can be considered in the future. No hx of liver disease. Not taking opioids   - GLP-1 to be started today. No contraindications. Discussed " "side effects, risks, and benefits. No hx of pancreatitis. No personal or family hx of MTC or MENII. Concern for coverage with Medicare. No hx of Type II diabetes (A1C 4.4 5/2024) or CVD. Will see if Wegovy or Zepbound is covered.          Relevant Medications    Semaglutide-Weight Management (WEGOVY) 0.25 MG/0.5ML pen    Semaglutide-Weight Management (WEGOVY) 0.5 MG/0.5ML pen (Start on 12/8/2024)    Other Relevant Orders    Med Therapy Management Referral    Adult Bariatrics and Weight Management Clinic Follow-Up Order        Start Wegovy 0.25mg once weekly for 4 weeks, then increase to 0.5mg once weekly. Consider Zepbound and Saxenda as needed. If not covered will start Topiramate.   MTM pharmacist in 6 weeks  Amber Perkins in 3 months         Sharon Fung is a 69 year old female who presents to clinic today for the following health issues.     She has the following co-morbidities:        11/7/2024     8:11 AM   --   I have the following health issues associated with obesity Pre-Diabetes    High Blood Pressure    High Cholesterol    Sleep Apnea    Osteoarthritis (joint disease)   I have the following symptoms associated with obesity Knee Pain    Back Pain    Fatigue     Back pain - going to PT     BRETT - uses CPAP nightly.     GERD - at times. Burning in throat. Takes TUMS prn     HLD - due to some medications she is on. Is not on statin.     HTN - well controlled on medications. Followed by PCP     RA - followed by rheumatology     Depression - mood is stable on medications. Followed by PCP         8/26/2016     2:54 PM   Patient Goals   I am interested in having a healthier weight to diminish current health problems: Yes   I am interested in having a healthier weight in order to prevent future health problems: Yes           11/7/2024     8:11 AM   Referring Provider   Please name the provider who referred you to Medical Weight Management  If you do not know, please answer \"I Don't Know\" Dr deja santiago           " 11/7/2024     8:11 AM   Weight History   How concerned are you about your weight? Very Concerned   I became overweight As a Teenager   The following factors have contributed to my weight gain Started on Medication that Caused Weight Gain    Eating Too Much    Lack of Exercise   I have tried the following methods to lose weight Watching Portions or Calories    Medications    Weight Loss Surgery   My lowest weight since age 20 was 150   My highest weight since age 20 was 260   The most weight I have ever lost was (lbs) 40   I have the following family history of obesity/being overweight My father is overweight    One or more of my siblings are overweight    Many of my relatives are overweight   How has your weight changed over the last year? Gained   How many pounds? 20     Has been weight stable around 150lbs for most of life. Weight gain through 2 pregnancies. Was hard to lose weight postpartum. Weight gain was gradual since.     S/p lap band at [place] with Dr. Edmonds in 11/2010.   Starting weight - 220lb  Low weight - 200lb  Maintained for many years. Band removed in 2015 due to band infection with Dr. Mims. Since then has seen a gradual weight regain plus. Would be interested in gastric sleeve, but when she looked at her insurance - it stated in only covered RYBP.     Has also seen weight gain due to being on steroids, back pain, stress from job.   Weight is currently stable. Has tried to lose weight through low calorie diet, but saw minimal weight loss.   Wants to lose weight to improve back pain, overall health, improve activity, and enjoy penitentiary.     Has not tried AOMs in the past.         11/7/2024     8:11 AM   Diet Recall Review with Patient   If you do eat breakfast, what types of food do you eat? Ceral or eggs   If you do eat lunch, what types of food do you typically eat? [place]   If you do eat supper, what types of food do you typically eat? Padta soup   If you do snack, what types of food do  you typically eat? Sweets   How many glasses of juice do you drink in a typical day? 1   How many of glasses of milk do you drink in a typical day? 2   If you do drink milk, what type? 1%   How many 8oz glasses of sugar containing drinks such as Sven-Aid/sweet tea do you drink in a day? 0   How many cans/bottles of sugar pop/soda/tea/sports drinks do you drink in a day? 0   How many cans/bottles of diet pop/soda/tea or sports drink do you drink in a day? 2   How often do you have a drink of alcohol? Monthly or Less   If you do drink, how many drinks might you have in a day? 1 or 2     Eating 2 meals a day, 1-2 snacks. Will wake up in the middle of the night to have a snack, happened more when she was working. Has been trying to better alterniatives such as vegetables for snacks. Craves sweets - trying to buy smaller pieces of cake. No increase hunger or thoughts of food. Can get full and stay full. Boredom eating. Stress eating, especially when was working. Drinks water, milk (1-2glasses daily), diet pop (2cans daily). Eats out 3xweek - easier since she is by herself. Hard to cook a meal just for her.         11/7/2024     8:11 AM   Eating Habits   Generally, my meals include foods like these bread, pasta, rice, potatoes, corn, crackers, sweet dessert, pop, or juice Almost Everyday   Generally, my meals include foods like these fried meats, brats, burgers, french fries, pizza, cheese, chips, or ice cream A Few Times a Week   Eat fast food (like McDonalds, [employer] Erasmo, Taco Bell) A Few Times a Week   Eat at a buffet or sit-down restaurant Less Than Weekly   Eat most of my meals in front of the TV or computer A Few Times a Week   Often skip meals, eat at random times, have no regular eating times A Few Times a Week   Rarely sit down for a meal but snack or graze throughout Once a Week   Eat extra snacks between meals A Few Times a Week   Eat most of my food at the end of the day Less Than Weekly   Eat in the middle of  the night or wake up at night to eat Once a Week   Eat extra snacks to prevent or correct low blood sugar Never   Eat to prevent acid reflux or stomach pain Never   Worry about not having enough food to eat Never   I eat when I am depressed Less Than Weekly   I eat when I am stressed A Few Times a Week   I eat when I am bored A Few Times a Week   I eat when I am anxious A Few Times a Week   I eat when I am happy or as a reward A Few Times a Week   I feel hungry all the time even if I just have eaten Never   Feeling full is important to me Never   I finish all the food on my plate even if I am already full A Few Times a Week   I can't resist eating delicious food or walk past the good food/smell A Few Times a Week   I eat/snack without noticing that I am eating Less Than Weekly   I eat when I am preparing the meal Less Than Weekly   I eat more than usual when I see others eating Less Than Weekly   I have trouble not eating sweets, ice cream, cookies, or chips if they are around the house Almost Everyday   I think about food all day Never   What foods, if any, do you crave? Sweets/Candy/Chocolate           11/7/2024     8:11 AM   Amount of Food   I feel out of control when eating Weekly   I eat a large amount of food, like a loaf of bread, a box of cookies, a pint/quart of ice cream, all at once Never   I eat a large amount of food even when I am not hungry Monthly   I eat rapidly Almost Everyday   I eat alone because I feel embarrassed and do not want others to see how much I have eaten Never   I eat until I am uncomfortably full Never   I feel bad, disgusted, or guilty after I overeat Weekly           11/7/2024     8:11 AM   Activity/Exercise History   How much of a typical 12 hour day do you spend sitting? Half the Day   How much of a typical 12 hour day do you spend lying down? Less Than Half the Day   How much of a typical day do you spend walking/standing? Less Than Half the Day   How many hours (not including  work) do you spend on the TV/Video Games/Computer/Tablet/Phone? 6 Hours or More   How many times a week are you active for the purpose of exercise? Never   What keeps you from being more active? Pain    Shortness of Breath    Too tired   How many total minutes do you spend doing some activity for the purpose of exercising when you exercise? 15-30 Minutes     Can be very limited due to back pain and shortness of breath. Starting PT for back this month. Loves to walk, but cannot go far due to back pain. Does have a gym membership at the [email or web address] exercise [employer]     PAST MEDICAL HISTORY:  Past Medical History:   Diagnosis Date    AR (allergic rhinitis)  as teen    Arthritis 12/14/2015    Chronic obstructive pulmonary disease, unspecified COPD type (H) 03/27/2018    Depressive disorder 3 years ago    GERD (gastroesophageal reflux disease) 04/2007    Headache 07/11/2017    Hypertension goal BP (blood pressure) < 140/90 12/20/2021    Mild major depression (H) 3 years ago    Morbid obesity (H) teens    BRETT (obstructive sleep apnea)     uses CPAP    RA (rheumatoid arthritis) (H) 4 years    Reduced vision 3 years           11/7/2024     8:11 AM   Work/Social History Reviewed With Patient   My employment status is Retired   My job is RN   How much of your job is spent on the computer or phone? 100%   How many hours do you spend commuting to work daily? 1/2   What is your marital status? Single   If you have children, are they overweight? No   Who do you live with? Alone   Who does the food shopping? 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 Has 3 children.     ETOH - rarely   No nicotine, drugs or THC         11/7/2024     8:11 AM   Mental Health History Reviewed With Patient   Have you ever been physically or sexually abused? No   How often in the past 2 weeks have you felt little interest or pleasure in doing things? For Several Days   Over the past 2 weeks how often have you felt  down, depressed, or hopeless? Not at all           11/7/2024     8:11 AM   Sleep History Reviewed With Patient   How many hours do you sleep at night? 8       MEDICATIONS:   Current Outpatient Medications   Medication Sig Dispense Refill    acetaminophen (TYLENOL) 500 MG tablet Take 500-1,000 mg by mouth every 6 hours as needed for mild pain.      citalopram (CELEXA) 20 MG tablet Take 1 tablet (20 mg) by mouth daily 90 tablet 3    fexofenadine (ALLEGRA) 180 MG tablet Take 1 tablet (180 mg) by mouth daily 90 tablet 2    fluticasone (FLONASE) 50 MCG/ACT nasal spray Spray 2 sprays into both nostrils as needed.      gabapentin (NEURONTIN) 100 MG capsule TAKE ONE CAPSULE BY MOUTH EVERY MORNING AND TAKE FIVE CAPSULES BY MOUTH EVERY NIGHT AT BEDTIME 540 capsule 2    hydroxychloroquine (PLAQUENIL) 200 MG tablet Take 1 tablet (200 mg) by mouth 2 times daily 180 tablet 2    hydrOXYzine cha (VISTARIL) 25 MG capsule Take 1 capsule (25 mg) by mouth 3 times daily as needed for anxiety 30 capsule 1    ibuprofen 200 MG capsule Take 600-800 mg by mouth at bedtime.      leflunomide (ARAVA) 20 MG tablet Take 1 tablet (20 mg) by mouth daily 90 tablet 2    losartan-hydrochlorothiazide (HYZAAR) 100-25 MG tablet Take 1 tablet by mouth daily 90 tablet 3    Semaglutide-Weight Management (WEGOVY) 0.25 MG/0.5ML pen Inject 0.25 mg subcutaneously once a week. For 4 weeks 2 mL 0    [START ON 12/8/2024] Semaglutide-Weight Management (WEGOVY) 0.5 MG/0.5ML pen Inject 0.5 mg subcutaneously once a week. After completing 4 weeks of 0.25mg dose 2 mL 2    sulfaSALAzine (AZULFIDINE) 500 MG tablet Take 3 tablets (1,500 mg) by mouth 2 times daily 540 tablet 2    vitamin D3 (CHOLECALCIFEROL) 50 mcg (2000 units) tablet Take 1 tablet (50 mcg) by mouth daily 90 tablet 3    moxifloxacin (VIGAMOX) 0.5 % ophthalmic solution Place 1 drop into both eyes 4 times daily. 3 mL 1    ORDER FOR DME Use your CPAP device as directed by your provider.      prednisoLONE  acetate (PRED FORTE) 1 % ophthalmic suspension Place 1 drop into both eyes 4 times daily. 10 mL 1    predniSONE (DELTASONE) 5 MG tablet Prednisone 20mg daily x5days, then 15mg daily x5days, then 10mg daily x5days, then 5mg daily x5days, then stop. 50 tablet 0    Sarilumab 200 MG/1.14ML SOAJ Inject 1.14 mLs (200 mg) subcutaneously every 14 days. Hold for signs of infection and seek medical attention. 2.28 mL 3       ALLERGIES:   No Known Allergies        5/9/2019    10:15 AM 11/7/2024     7:55 AM   STAR Score (Last Two)   STAR Raw Score 40 34    Activation Score 100 68.9    STAR Level 4 3        Patient-reported         Lab on 11/01/2024   Component Date Value Ref Range Status    Sodium 11/01/2024 139  135 - 145 mmol/L Final    Potassium 11/01/2024 3.9  3.4 - 5.3 mmol/L Final    Carbon Dioxide (CO2) 11/01/2024 30 (H)  22 - 29 mmol/L Final    Anion Gap 11/01/2024 6 (L)  7 - 15 mmol/L Final    Urea Nitrogen 11/01/2024 12.1  8.0 - 23.0 mg/dL Final    Creatinine 11/01/2024 0.71  0.51 - 0.95 mg/dL Final    GFR Estimate 11/01/2024 >90  >60 mL/min/1.73m2 Final    eGFR calculated using 2021 CKD-EPI equation.    Calcium 11/01/2024 9.4  8.8 - 10.4 mg/dL Final    Reference intervals for this test were updated on 7/16/2024 to reflect our healthy population more accurately. There may be differences in the flagging of prior results with similar values performed with this method. Those prior results can be interpreted in the context of the updated reference intervals.    Chloride 11/01/2024 103  98 - 107 mmol/L Final    Glucose 11/01/2024 92  70 - 99 mg/dL Final    Alkaline Phosphatase 11/01/2024 91  40 - 150 U/L Final    AST 11/01/2024 29  0 - 45 U/L Final    ALT 11/01/2024 19  0 - 50 U/L Final    Protein Total 11/01/2024 7.2  6.4 - 8.3 g/dL Final    Albumin 11/01/2024 4.1  3.5 - 5.2 g/dL Final    Bilirubin Total 11/01/2024 0.5  <=1.2 mg/dL Final    Patient Fasting > 8hrs? 11/01/2024 No   Final    Erythrocyte Sedimentation Rate  "11/01/2024 30  0 - 30 mm/hr Final    CRP Inflammation 11/01/2024 8.50 (H)  <5.00 mg/L Final    Cholesterol 11/01/2024 205 (H)  <200 mg/dL Final    Triglycerides 11/01/2024 262 (H)  <150 mg/dL Final    Direct Measure HDL 11/01/2024 46 (L)  >=50 mg/dL Final    LDL Cholesterol Calculated 11/01/2024 107 (H)  <100 mg/dL Final    Non HDL Cholesterol 11/01/2024 159 (H)  <130 mg/dL Final    Patient Fasting > 8hrs? 11/01/2024 No   Final    WBC Count 11/01/2024 6.2  4.0 - 11.0 10e3/uL Final    RBC Count 11/01/2024 4.07  3.80 - 5.20 10e6/uL Final    Hemoglobin 11/01/2024 12.0  11.7 - 15.7 g/dL Final    Hematocrit 11/01/2024 37.7  35.0 - 47.0 % Final    MCV 11/01/2024 93  78 - 100 fL Final    MCH 11/01/2024 29.5  26.5 - 33.0 pg Final    MCHC 11/01/2024 31.8  31.5 - 36.5 g/dL Final    RDW 11/01/2024 13.5  10.0 - 15.0 % Final    Platelet Count 11/01/2024 282  150 - 450 10e3/uL Final    % Neutrophils 11/01/2024 53  % Final    % Lymphocytes 11/01/2024 29  % Final    % Monocytes 11/01/2024 12  % Final    % Eosinophils 11/01/2024 6  % Final    % Basophils 11/01/2024 1  % Final    % Immature Granulocytes 11/01/2024 0  % Final    Absolute Neutrophils 11/01/2024 3.3  1.6 - 8.3 10e3/uL Final    Absolute Lymphocytes 11/01/2024 1.8  0.8 - 5.3 10e3/uL Final    Absolute Monocytes 11/01/2024 0.7  0.0 - 1.3 10e3/uL Final    Absolute Eosinophils 11/01/2024 0.4  0.0 - 0.7 10e3/uL Final    Absolute Basophils 11/01/2024 0.1  0.0 - 0.2 10e3/uL Final    Absolute Immature Granulocytes 11/01/2024 0.0  <=0.4 10e3/uL Final         Objective    Ht 1.6 m (5' 3\")   Wt 117.9 kg (260 lb)   BMI 46.06 kg/m             Physical Exam   GENERAL: alert and no distress  EYES: Eyes grossly normal to inspection.  No discharge or erythema, or obvious scleral/conjunctival abnormalities.  RESP: No audible wheeze, cough, or visible cyanosis.    SKIN: Visible skin clear. No significant rash, abnormal pigmentation or lesions.  NEURO: Cranial nerves grossly intact.  " Mentation and speech appropriate for age.  PSYCH: Appropriate affect, tone, and pace of words     FIB-4 Calculation: 1.58 at 11/1/2024 10:41 AM  Calculated from:  SGOT/AST: 29 U/L at 11/1/2024 10:41 AM  SGPT/ALT: 19 U/L at 11/1/2024 10:41 AM  Platelets: 282 10e3/uL at 11/1/2024 10:41 AM  Age: 69 years    Fib-4 < 1.3: No further evaluation at this point, unless other concerns    - If the Fib-4 is >2.67  Fibroscan and elective liver clinic referral    - Intermediate Fib-4 scores: Get a Fibroscan, consider repeating this in 1-2 years.    Anti-obesity medication ROS:    HEENT  Hx of glaucoma: No    Cardiovascular  CAD:No  HTN:Yes    Gastrointestinal  GERD:Yes  Constipation:No  Liver Dz:No  H/O Pancreatitis:No    Psychiatric  Bipolar: No  Anxiety:Yes  Depression:Yes  History of alcohol/drug abuse: No  Hx of eating disorder:No    Endocrine  Personal or family hx of MTC or MEN2:No  Diabetes/prediabetes: No    Neurologic:  Hx of seizures: No  Hx of migraines: Yes  Memory Impairment: No      History of kidney stones: No  Kidney disease: No  Current birth control:  post menopausal    Taking Opioid/Narcotic: No        Sincerely,    Amber Manley PA-C    The longitudinal plan of care for the diagnosis(es)/condition(s) as documented were addressed during this visit. Due to the added complexity in care, I will continue to support Sharon in the subsequent management and with ongoing continuity of care.

## 2024-11-11 NOTE — TELEPHONE ENCOUNTER
"PRIOR AUTHORIZATION DENIED    Medication: WEGOVY 0.25 MG/0.5ML SC SOAJ  Insurance Company: [employer] [place] - Phone [phone] Fax [phone]  Denial Date: 11/8/2024  Denial Reason(s): \"We are not able to approve the requested drug under your Medicare Part D benefit. The requested drug is excluded and cannot be covered when used for weight loss only.\"  Appeal Information:             "

## 2024-11-13 ENCOUNTER — ANESTHESIA EVENT (OUTPATIENT)
Dept: SURGERY | Facility: AMBULATORY SURGERY CENTER | Age: 69
End: 2024-11-13
Payer: COMMERCIAL

## 2024-11-14 ENCOUNTER — HOSPITAL ENCOUNTER (OUTPATIENT)
Facility: AMBULATORY SURGERY CENTER | Age: 69
Discharge: HOME | End: 2024-11-14
Attending: OPHTHALMOLOGY
Payer: COMMERCIAL

## 2024-11-14 ENCOUNTER — ANESTHESIA (OUTPATIENT)
Dept: SURGERY | Facility: AMBULATORY SURGERY CENTER | Age: 69
End: 2024-11-14
Payer: COMMERCIAL

## 2024-11-14 VITALS
DIASTOLIC BLOOD PRESSURE: 75 MMHG | OXYGEN SATURATION: 94 % | HEIGHT: 63 IN | SYSTOLIC BLOOD PRESSURE: 112 MMHG | HEART RATE: 83 BPM | TEMPERATURE: 97.2 F | RESPIRATION RATE: 18 BRPM | BODY MASS INDEX: 46.07 KG/M2 | WEIGHT: 260 LBS

## 2024-11-14 DIAGNOSIS — Z98.890 POSTOPERATIVE EYE STATE: Primary | ICD-10-CM

## 2024-11-14 DEVICE — LENS CC60WF 17.5 CLAREON UV ASPHERIC BICONVEX IOL: Type: IMPLANTABLE DEVICE | Site: EYE | Status: FUNCTIONAL

## 2024-11-14 RX ORDER — ONDANSETRON 2 MG/ML
INJECTION INTRAMUSCULAR; INTRAVENOUS PRN
Status: DISCONTINUED | OUTPATIENT
Start: 2024-11-14 | End: 2024-11-14

## 2024-11-14 RX ORDER — CYCLOPENTOLAT/TROPIC/PHENYLEPH 1%-1%-2.5%
1 DROPS (EA) OPHTHALMIC (EYE)
Status: COMPLETED | OUTPATIENT
Start: 2024-11-14 | End: 2024-11-14

## 2024-11-14 RX ORDER — ONDANSETRON 4 MG/1
4 TABLET, ORALLY DISINTEGRATING ORAL EVERY 30 MIN PRN
Status: DISCONTINUED | OUTPATIENT
Start: 2024-11-14 | End: 2024-11-15 | Stop reason: HOSPADM

## 2024-11-14 RX ORDER — FENTANYL CITRATE 50 UG/ML
INJECTION, SOLUTION INTRAMUSCULAR; INTRAVENOUS PRN
Status: DISCONTINUED | OUTPATIENT
Start: 2024-11-14 | End: 2024-11-14

## 2024-11-14 RX ORDER — PREDNISOLONE ACETATE 10 MG/ML
1 SUSPENSION/ DROPS OPHTHALMIC 4 TIMES DAILY
Qty: 10 ML | Refills: 1 | Status: SHIPPED | OUTPATIENT
Start: 2024-11-14

## 2024-11-14 RX ORDER — BALANCED SALT SOLUTION 6.4; .75; .48; .3; 3.9; 1.7 MG/ML; MG/ML; MG/ML; MG/ML; MG/ML; MG/ML
SOLUTION OPHTHALMIC PRN
Status: DISCONTINUED | OUTPATIENT
Start: 2024-11-14 | End: 2024-11-14 | Stop reason: HOSPADM

## 2024-11-14 RX ORDER — NALOXONE HYDROCHLORIDE 0.4 MG/ML
0.1 INJECTION, SOLUTION INTRAMUSCULAR; INTRAVENOUS; SUBCUTANEOUS
Status: DISCONTINUED | OUTPATIENT
Start: 2024-11-14 | End: 2024-11-15 | Stop reason: HOSPADM

## 2024-11-14 RX ORDER — DEXAMETHASONE SODIUM PHOSPHATE 10 MG/ML
4 INJECTION, SOLUTION INTRAMUSCULAR; INTRAVENOUS
Status: DISCONTINUED | OUTPATIENT
Start: 2024-11-14 | End: 2024-11-15 | Stop reason: HOSPADM

## 2024-11-14 RX ORDER — OXYCODONE HYDROCHLORIDE 5 MG/1
5 TABLET ORAL
Status: DISCONTINUED | OUTPATIENT
Start: 2024-11-14 | End: 2024-11-15 | Stop reason: HOSPADM

## 2024-11-14 RX ORDER — OXYCODONE HYDROCHLORIDE 5 MG/1
10 TABLET ORAL
Status: DISCONTINUED | OUTPATIENT
Start: 2024-11-14 | End: 2024-11-15 | Stop reason: HOSPADM

## 2024-11-14 RX ORDER — LIDOCAINE HYDROCHLORIDE 10 MG/ML
INJECTION, SOLUTION EPIDURAL; INFILTRATION; INTRACAUDAL; PERINEURAL PRN
Status: DISCONTINUED | OUTPATIENT
Start: 2024-11-14 | End: 2024-11-14 | Stop reason: HOSPADM

## 2024-11-14 RX ORDER — LIDOCAINE 40 MG/G
CREAM TOPICAL
Status: DISCONTINUED | OUTPATIENT
Start: 2024-11-14 | End: 2024-11-15 | Stop reason: HOSPADM

## 2024-11-14 RX ORDER — ACETAMINOPHEN 325 MG/1
975 TABLET ORAL ONCE
Status: COMPLETED | OUTPATIENT
Start: 2024-11-14 | End: 2024-11-14

## 2024-11-14 RX ORDER — MOXIFLOXACIN 5 MG/ML
1 SOLUTION/ DROPS OPHTHALMIC 4 TIMES DAILY
Qty: 3 ML | Refills: 1 | Status: SHIPPED | OUTPATIENT
Start: 2024-11-14

## 2024-11-14 RX ORDER — PROPARACAINE HYDROCHLORIDE 5 MG/ML
1 SOLUTION/ DROPS OPHTHALMIC ONCE
Status: COMPLETED | OUTPATIENT
Start: 2024-11-14 | End: 2024-11-14

## 2024-11-14 RX ORDER — CYCLOPENTOLAT/TROPIC/PHENYLEPH 1%-1%-2.5%
1 DROPS (EA) OPHTHALMIC (EYE)
Status: DISCONTINUED | OUTPATIENT
Start: 2024-11-14 | End: 2024-11-15 | Stop reason: HOSPADM

## 2024-11-14 RX ORDER — PROPARACAINE HYDROCHLORIDE 5 MG/ML
1 SOLUTION/ DROPS OPHTHALMIC ONCE
Status: DISCONTINUED | OUTPATIENT
Start: 2024-11-14 | End: 2024-11-15 | Stop reason: HOSPADM

## 2024-11-14 RX ORDER — TRIAMCINOLONE ACETONIDE 40 MG/ML
INJECTION, SUSPENSION INTRA-ARTICULAR; INTRAMUSCULAR PRN
Status: DISCONTINUED | OUTPATIENT
Start: 2024-11-14 | End: 2024-11-14 | Stop reason: HOSPADM

## 2024-11-14 RX ORDER — MOXIFLOXACIN IN NACL,ISO-OS/PF 0.3MG/0.3
SYRINGE (ML) INTRAOCULAR PRN
Status: DISCONTINUED | OUTPATIENT
Start: 2024-11-14 | End: 2024-11-14 | Stop reason: HOSPADM

## 2024-11-14 RX ORDER — ONDANSETRON 2 MG/ML
4 INJECTION INTRAMUSCULAR; INTRAVENOUS EVERY 30 MIN PRN
Status: DISCONTINUED | OUTPATIENT
Start: 2024-11-14 | End: 2024-11-15 | Stop reason: HOSPADM

## 2024-11-14 RX ORDER — TETRACAINE HYDROCHLORIDE 5 MG/ML
SOLUTION OPHTHALMIC PRN
Status: DISCONTINUED | OUTPATIENT
Start: 2024-11-14 | End: 2024-11-14 | Stop reason: HOSPADM

## 2024-11-14 RX ADMIN — Medication 1 DROP: at 07:06

## 2024-11-14 RX ADMIN — FENTANYL CITRATE 50 MCG: 50 INJECTION, SOLUTION INTRAMUSCULAR; INTRAVENOUS at 07:35

## 2024-11-14 RX ADMIN — PROPARACAINE HYDROCHLORIDE 1 DROP: 5 SOLUTION/ DROPS OPHTHALMIC at 07:00

## 2024-11-14 RX ADMIN — ONDANSETRON 4 MG: 2 INJECTION INTRAMUSCULAR; INTRAVENOUS at 07:35

## 2024-11-14 RX ADMIN — Medication 1 DROP: at 07:09

## 2024-11-14 RX ADMIN — Medication 1 DROP: at 07:00

## 2024-11-14 ASSESSMENT — COPD QUESTIONNAIRES: COPD: 1

## 2024-11-14 NOTE — OP NOTE
Operative  Report    Patient Name: Sharon Fung    MRN: [id number]    Date of Surgery: 11/14/2024    Surgeon: Sia Herman M.D    Assistant surgeon: Cedric Chew MD    Preoperative Diagnosis: Visually significant cataract, left eye    Postoperative Diagnosis: Same    Procedure: Phacoemulsification with intraocular lens implant, left eye    Implant: AIM Distance  Implant Name Type Inv. Item Serial No.  Lot No. LRB No. Used Action   LENS [id number] 17.5 CLAREON UV ASPHERIC BICONVEX IOL - [id number] Lens/Eye Implant LENS [id number] 17.5 CLAREON UV ASPHERIC BICONVEX IOL [id number] [employer]  Left 1 Implanted       Anesthesia Type: Mac    Estimated Blood Loss: Trace    Complications: None    INDICATIONS FOR PROCEDURE: Patient has a history of visually significant cataract affecting the patient's activity of daily living. After a discussion with the patient, the patient has elected to have the listed procedure(s) to maximize visual potential. All of the appropriate consent forms have been signed and all of the patient's questions have been answered.    DETAILS OF THE PROCEDURE: Patient was identified in the pre operative area and given pre operative eye drops. The patient was then brought into the operating room and intravenous sedation was begun after a time out was performed. The patient was prepped and draped in the usual sterile ophthalmic fashion.     A lid speculum was placed and the operating microscope was brought into position. A paracentesis was made and lidocain and viscoelastic was injected into the anterior chamber. A clear corneal incision was made using a keratome blade. A cystotome needle and Utrata forceps were used to create an anterior continuous curvilinear capsulorhexis. Then BSS on a blunt tipped cannula was used for hydrodissection and hydrodelineation. Using the phacoemulsification unit, the nucleus was then removed from the eye.  Using irrigation and aspiration, the remaining cortical material was removed from the eye. The capsular bag was infused with viscoelastic material. The intraocular lens was then placed into the capsular bag and secured into position. The remaining viscoelastic material was then removed from the eye via irrigation and aspiration. BSS on a blunt tipped cannula was used to hydrate all of the wounds. At the end of the case, the wounds were noted to be watertight, the pupil was noted to be round, the lens appeared to be in good position, and the pressure was palpated to be physiologic. Intracameral moxifloxacin and a subconjunctival injection of Kenalog were administered.     Post operative ointment was applied and the eye was patched and shielded. Patient tolerated procedure and was brought to the recovery area in good condition. Patient will follow in the eye clinic in one day.

## 2024-11-14 NOTE — ANESTHESIA POSTPROCEDURE EVALUATION
Patient: Sharon Fung    Procedure: Procedure(s):  LEFT EYE PHACOEMULSIFICATION, CATARACT, WITH STANDARD INTRAOCULAR LENS IMPLANT INSERTION       Anesthesia Type:  MAC    Note:  Disposition: Outpatient   Postop Pain Control: Uneventful            Sign Out: Well controlled pain   PONV: No   Neuro/Psych: Uneventful            Sign Out: Acceptable/Baseline neuro status   Airway/Respiratory: Uneventful            Sign Out: Acceptable/Baseline resp. status   CV/Hemodynamics: Uneventful            Sign Out: Acceptable CV status; No obvious hypovolemia; No obvious fluid overload   Other NRE: NONE   DID A NON-ROUTINE EVENT OCCUR?            Last vitals:  Vitals Value Taken Time   /75 11/14/24 0830   Temp 36.2  C (97.2  F) 11/14/24 0830   Pulse 83 11/14/24 0830   Resp 18 11/14/24 0830   SpO2 94 % 11/14/24 0830       Electronically Signed By: Delroy Oliver MD  November 14, 2024  8:46 AM

## 2024-11-14 NOTE — H&P
Pre-Operative H & P     CC:  Preoperative exam to assess for increased cardiopulmonary risk while undergoing surgery and anesthesia.    Date of Encounter: 11/14/2024  Primary Care Physician:  Reynaldo Saavedra     Reason for visit: Cataract surgery     HPI  Sharon Fung is a 69 year old female who presents for pre-operative H & P in preparation for cataract surgery  with Dr. Herman on 11/14/24 at the [facility] [place].     History is obtained from the patient and chart review      Hx of abnormal bleeding or anti-platelet use: No    Menstrual history: No LMP recorded. Patient is postmenopausal.: No    Prior to Admission Medications  Current Outpatient Medications   Medication Sig Dispense Refill    acetaminophen (TYLENOL) 500 MG tablet Take 500-1,000 mg by mouth every 6 hours as needed for mild pain.      citalopram (CELEXA) 20 MG tablet Take 1 tablet (20 mg) by mouth daily 90 tablet 3    fexofenadine (ALLEGRA) 180 MG tablet Take 1 tablet (180 mg) by mouth daily 90 tablet 2    fluticasone (FLONASE) 50 MCG/ACT nasal spray Spray 2 sprays into both nostrils as needed.      gabapentin (NEURONTIN) 100 MG capsule TAKE ONE CAPSULE BY MOUTH EVERY MORNING AND TAKE FIVE CAPSULES BY MOUTH EVERY NIGHT AT BEDTIME 540 capsule 2    hydroxychloroquine (PLAQUENIL) 200 MG tablet Take 1 tablet (200 mg) by mouth 2 times daily 180 tablet 2    hydrOXYzine cha (VISTARIL) 25 MG capsule Take 1 capsule (25 mg) by mouth 3 times daily as needed for anxiety 30 capsule 1    ibuprofen 200 MG capsule Take 600-800 mg by mouth at bedtime.      leflunomide (ARAVA) 20 MG tablet Take 1 tablet (20 mg) by mouth daily 90 tablet 2    losartan-hydrochlorothiazide (HYZAAR) 100-25 MG tablet Take 1 tablet by mouth daily 90 tablet 3    moxifloxacin (VIGAMOX) 0.5 % ophthalmic solution Place 1 drop into both eyes 4 times daily. 3 mL 1    prednisoLONE acetate (PRED FORTE) 1 % ophthalmic suspension Place 1 drop into both eyes 4 times daily. 10 mL 1    sulfaSALAzine  (AZULFIDINE) 500 MG tablet Take 3 tablets (1,500 mg) by mouth 2 times daily 540 tablet 2    vitamin D3 (CHOLECALCIFEROL) 50 mcg (2000 units) tablet Take 1 tablet (50 mcg) by mouth daily 90 tablet 3    ORDER FOR DME Use your CPAP device as directed by your provider.      predniSONE (DELTASONE) 5 MG tablet Prednisone 20mg daily x5days, then 15mg daily x5days, then 10mg daily x5days, then 5mg daily x5days, then stop. 50 tablet 0    Sarilumab 200 MG/1.14ML SOAJ Inject 1.14 mLs (200 mg) subcutaneously every 14 days. Hold for signs of infection and seek medical attention. 2.28 mL 3    Semaglutide-Weight Management (WEGOVY) 0.25 MG/0.5ML pen Inject 0.25 mg subcutaneously once a week. For 4 weeks 2 mL 0    [START ON 2024] Semaglutide-Weight Management (WEGOVY) 0.5 MG/0.5ML pen Inject 0.5 mg subcutaneously once a week. After completing 4 weeks of 0.25mg dose 2 mL 2       Family History  Family History   Problem Relation Age of Onset    Neurologic Disorder Mother 20        migraine    Depression Mother     Mental Illness Mother     Heart Disease Father         MI    Neurologic Disorder Father 79        Parkinson's    Diabetes Father     Hypertension Father     Coronary Artery Disease Father     Cancer Maternal Grandmother         uterine    Neurologic Disorder Sister 16        migraine    Depression Sister     Depression Sister     Substance Abuse Sister     Migraines Sister     Neurologic Disorder Son 7        migraine    Substance Abuse Son     Migraines Son         none    Glaucoma No family hx of     Macular Degeneration No family hx of        The complete review of systems is negative other than noted in the HPI or here.     Preprocedure Note       Last edited 24 by Delroy Oliver MD  Date of Service 24  Creation Time 24  Status: Signed             Anesthesia Pre-Procedure Evaluation    Patient: Sharon Fung   MRN: [id number] : 1955        Procedure :  Procedure(s):  LEFT EYE PHACOEMULSIFICATION, CATARACT, WITH STANDARD INTRAOCULAR LENS IMPLANT INSERTION          Past Medical History:   Diagnosis Date    AR (allergic rhinitis)  as teen    Arthritis 12/14/2015    Chronic obstructive pulmonary disease, unspecified COPD type (H) 03/27/2018    Depressive disorder 3 years ago    GERD (gastroesophageal reflux disease) 04/2007    Headache 07/11/2017    Hypertension goal BP (blood pressure) < 140/90 12/20/2021    Mild major depression (H) 3 years ago    Morbid obesity (H) teens    BRETT (obstructive sleep apnea)     uses CPAP    RA (rheumatoid arthritis) (H) 4 years    Reduced vision 3 years      Past Surgical History:   Procedure Laterality Date    ARTHRODESIS FOOT Right 6/30/2021    Procedure: Right 1st metatarsophalangeal joint fusion;  Surgeon: Jesus Wolf MD;  Location: [facility] OR    ARTHRODESIS TOE(S) Right 12/27/2021    Procedure: right revision great toe fusion;  Surgeon: Ryan Gee MD;  Location:  OR    BLEPHAROPLASTY BILATERAL Bilateral 8/5/2016    Procedure: BLEPHAROPLASTY BILATERAL;  Surgeon: Cortez Robin MD;  Location:  [facility]    BREAST BIOPSY, RT/LT  1-94    Benign    COLONOSCOPY      COLONOSCOPY WITH CO2 INSUFFLATION N/A 3/30/2017    Procedure: COLONOSCOPY WITH CO2 INSUFFLATION;  Surgeon: Issa Weeks MD;  Location:  OR    ESOPHAGOSCOPY, GASTROSCOPY, DUODENOSCOPY (EGD), COMBINED N/A 10/29/2015    Procedure: COMBINED ESOPHAGOSCOPY, GASTROSCOPY, DUODENOSCOPY (EGD);  Surgeon: Shaq Mims MD;  Location:  GI    LAPAROSCOPIC CHOLECYSTECTOMY N/A 6/13/2023    Procedure: CHOLECYSTECTOMY, LAPAROSCOPIC;  Surgeon: Reynaldo Segura MD;  Location:  OR    LAPAROSCOPIC GASTRIC ADJUSTABLE BANDING  11/09/10    Lap band procedure    LAPAROSCOPIC REMOVAL GASTRIC ADJUSTABLE BAND N/A 10/31/2015    Procedure: LAPAROSCOPIC REMOVAL GASTRIC ADJUSTABLE BAND;  Surgeon: hSaq Mims MD;  Location:  OR     PHACOEMULSIFICATION WITH STANDARD INTRAOCULAR LENS IMPLANT Right 11/7/2024    Procedure: RIGHT EYE PHACOEMULSIFICATION, CATARACT, WITH STANDARD INTRAOCULAR LENS IMPLANT INSERTION;  Surgeon: Sia Herman MD;  Location: [facility] OR    NE HAND/FINGER SURGERY UNLISTED  2016    NE STOMACH SURGERY PROCEDURE UNLISTED  11/2015    RELEASE CARPAL TUNNEL Left 4/27/2017    Procedure: RELEASE CARPAL TUNNEL;  Left Open Carpal Tunnel Release;  Surgeon: Ciara Middleton MD;  Location: [facility]    RELEASE CARPAL TUNNEL Right 6/15/2017    Procedure: RELEASE CARPAL TUNNEL;  Right Carpal Tunnel Release Open;  Surgeon: Ciara Middleton MD;  Location: [facility] OR    REPAIR PTOSIS      TUBAL LIGATION  1988    ZZC APPENDECTOMY  1977      No Known Allergies   Social History     Tobacco Use    Smoking status: Never     Passive exposure: Never    Smokeless tobacco: Never   Substance Use Topics    Alcohol use: No     Alcohol/week: 1.0 - 2.0 standard drink of alcohol      Wt Readings from Last 1 Encounters:   11/08/24 117.9 kg (260 lb)        Anesthesia Evaluation            ROS/MED HX  ENT/Pulmonary:     (+) sleep apnea,                         COPD,              Neurologic:       Cardiovascular:     (+)  hypertension- -   -  - -                                      METS/Exercise Tolerance:     Hematologic:       Musculoskeletal:       GI/Hepatic:       Renal/Genitourinary:       Endo:     (+)               Obesity,       Psychiatric/Substance Use:       Infectious Disease:       Malignancy:       Other:            Physical Exam    Airway        Mallampati: II   TM distance: > 3 FB   Neck ROM: full     Respiratory Devices and Support         Dental       (+) Completely normal teeth      Cardiovascular          Rhythm and rate: regular     Pulmonary           breath sounds clear to auscultation         OUTSIDE LABS:  CBC:   Lab Results   Component Value Date    WBC 6.2 11/01/2024    WBC 7.4 08/22/2024    HGB 12.0 11/01/2024    HGB  "11.7 08/22/2024    HCT 37.7 11/01/2024    HCT 37.4 08/22/2024     11/01/2024     08/22/2024     BMP:   Lab Results   Component Value Date     11/01/2024     05/10/2024    POTASSIUM 3.9 11/01/2024    POTASSIUM 4.2 05/10/2024    CHLORIDE 103 11/01/2024    CHLORIDE 103 05/10/2024    CO2 30 (H) 11/01/2024    CO2 24 05/10/2024    BUN 12.1 11/01/2024    BUN 13.6 05/10/2024    CR 0.71 11/01/2024    CR 0.67 08/22/2024    GLC 92 11/01/2024    GLC 98 05/10/2024     COAGS:   Lab Results   Component Value Date    PTT 33 07/15/2017    INR 0.96 07/15/2017     POC:   Lab Results   Component Value Date     (H) 06/30/2021     HEPATIC:   Lab Results   Component Value Date    ALBUMIN 4.1 11/01/2024    PROTTOTAL 7.2 11/01/2024    ALT 19 11/01/2024    AST 29 11/01/2024    ALKPHOS 91 11/01/2024    BILITOTAL 0.5 11/01/2024     OTHER:   Lab Results   Component Value Date    PH 7.43 06/04/2015    LACT 2.0 06/13/2023    A1C 4.4 05/10/2024    ISH 9.4 11/01/2024    LIPASE 18 06/13/2023    TSH 1.20 09/29/2015    CRP <2.9 11/16/2022    SED 30 11/01/2024       Anesthesia Plan    ASA Status:  3       Anesthesia Type: MAC.     - Reason for MAC: immobility needed              Consents    Anesthesia Plan(s) and associated risks, benefits, and realistic alternatives discussed. Questions answered and patient/representative(s) expressed understanding.     - Discussed:     - Discussed with:  Patient            Postoperative Care       PONV prophylaxis: Ondansetron (or other 5HT-3)     Comments:               Delroy Oliver MD    I have reviewed the pertinent notes and labs in the chart from the past 30 days and (re)examined the patient.  Any updates or changes from those notes are reflected in this note.               # Hypertension: Noted on problem list           # Severe Obesity: Estimated body mass index is 46.06 kg/m  as calculated from the following:    Height as of 11/8/24: 1.6 m (5' 3\").    Weight as of " "11/8/24: 117.9 kg (260 lb).                      BP (!) 151/77   Pulse 98   Temp 97.3  F (36.3  C) (Temporal)   Resp 18   Ht 1.6 m (5' 2.99\")   Wt 117.9 kg (260 lb)   SpO2 93%   BMI 46.07 kg/m           Physical Exam  Constitutional: Awake, alert, cooperative, no apparent distress, and appears stated age.  Eyes: Pupils equal, round and reactive to light, extra ocular muscles intact, sclera clear, conjunctiva normal.  HENT: Normocephalic, oral pharynx with moist mucus membranes, good dentition. No goiter appreciated.   Respiratory: Clear to auscultation bilaterally, no crackles or wheezing.  Cardiovascular: Regular rate and rhythm, normal S1 and S2, and no murmur noted.  Carotids +2, no bruits. No edema. Palpable pulses to radial  DP and PT arteries.   GI: Normal bowel sounds, soft, non-distended, non-tender, no masses palpated, no hepatosplenomegaly.  Surgical scars: none  Lymph/Hematologic: No cervical lymphadenopathy and no supraclavicular lymphadenopathy.  Genitourinary:  deferred  Skin: Warm and dry.  No rashes at anticipated surgical site.   Musculoskeletal: Full ROM of neck. There is no redness, warmth, or swelling of the joints. Gross motor strength is normal.    Neurologic: Awake, alert, oriented to name, place and time. Cranial nerves II-XII are grossly intact. Gait is normal.   Neuropsychiatric: Calm, cooperative. Normal affect.     PRIOR LABS/DIAGNOSTIC STUDIES:   All labs and imaging personally reviewed     EKG/ stress test - if available please see in ROS above   Echo result w/o MOPS: Interpretation SummaryTechnically difficult study.Extremely poor acoustic windows.Limited information was obtained during study.Global and regional left ventricular function is normal with an EF of 60-65%.Right ventricular function, chamber size, wall motion, and thickness arenormal.Most likely negative bubble study. No cardiac cause for dyspnea identified.          Latest Ref Rng & Units 2/22/2019     2:26 PM   PFT "   FVC L 2.24    FEV1 L 1.70    FVC% % 71    FEV1% % 68          The patient's records and results personally reviewed by this provider.     Outside records reviewed from: care everywhere    LAB/DIAGNOSTIC STUDIES TODAY:  None    ASSESSMENT and PLAN    Plan/Recommendations  Pt will be optimized for the proposed procedure.  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  Will need to be reassessed day of surgery.  Mallampati: Anesthesia will assess  TM: Anesthesia will assess     CARDIAC  - No h/o CAD/NSTEMI, CHF  - Does not have HLD and has not been on low-dose statin by choice  Patient performs 10 or more METS    CHF  Ischemic heart disease  CVA or TIA  DM requiring insulin  Cr >2  Intraperitoneal, intrathoracic, or vascular surgery above the inguinal ligament    0= 0.4%  1= 0.9%  2= 6.6%  3+ 11%         PULMONARY  Snore  Tired  Observed apnea  HTN  BMI >30  Age >50  Neck >40 cm   Male    1-2 Low  3-4 Intermediate  5-8 High  If =/> 3 and HCO3 =/> 28, treat as high risk  BRETT risk: 4 intermediate Risk     Total Score: 4     - Denies asthma or inhaler use  - Tobacco History    Social History     Socioeconomic History    Marital status:      Spouse name: None    Number of children: 2    Years of education: 18    Highest education level: None   Occupational History    Occupation: RN     Employer: [employer]   Tobacco Use    Smoking status: Never     Passive exposure: Never    Smokeless tobacco: Never   Vaping Use    Vaping status: Never Used   Substance and Sexual Activity    Alcohol use: No     Alcohol/week: 1.0 - 2.0 standard drink of alcohol    Drug use: No    Sexual activity: Not Currently     Partners: Male     Birth control/protection: Abstinence,  Female Surgical   Other Topics Concern    Parent/sibling w/ CABG, MI or angioplasty before 65F 55M? No     Social Drivers of Health     Financial Resource Strain: Low Risk  (9/11/2024)    Financial Resource Strain     Within the past 12 months, have you or your family members you live with been unable to get utilities (heat, electricity) when it was really needed?: No   Food Insecurity: Low Risk  (9/11/2024)    Food Insecurity     Within the past 12 months, did you worry that your food would run out before you got money to buy more?: No     Within the past 12 months, did the food you bought just not last and you didn t have money to get more?: No   Transportation Needs: Low Risk  (9/11/2024)    Transportation Needs     Within the past 12 months, has lack of transportation kept you from medical appointments, getting your medicines, non-medical meetings or appointments, work, or from getting things that you need?: No   Physical Activity: Inactive (9/11/2024)    Exercise Vital Sign     Days of Exercise per Week: 0 days     Minutes of Exercise per Session: 0 min   Stress: No Stress Concern Present (9/11/2024)    [employer] [place] [employer] - Occupational Stress Questionnaire     Feeling of Stress : Only a little   Social Connections: Unknown (9/11/2024)    Social Connection and Isolation Panel [NHANES]     Frequency of Social Gatherings with Friends and Family: Twice a week   Interpersonal Safety: Low Risk  (11/14/2024)    Interpersonal Safety     Do you feel physically and emotionally safe where you currently live?: Yes     Within the past 12 months, have you been hit, slapped, kicked or otherwise physically hurt by someone?: No     Within the past 12 months, have you been humiliated or emotionally abused in other ways by your partner or ex-partner?: No   Housing Stability: Low Risk  (9/11/2024)    Housing Stability     Do you have housing? : Yes     Are you worried about losing your housing?: No            GI  History of GERD  Hx of PONV  Pre-op opiate use  PONV risk score= 1.  (If > 2, anti-emetic intervention is recommended.)  PONV Medium Risk  Total Score: 2    1 AN PONV: Patient is not a current smoker    1 AN PONV: Intended Post Op Opioids         /RENAL  - No CKD     ENDOCRINE    Body mass index is 46.07 kg/m .  - No history of Diabetes Mellitus or thyroid abnormalities     HEME  - No history of abnormal bleeding or antiplatelet use or anticoagulant use.    Age =/>60 1 point  BMI =/> 40  1 point  Male  2 points    Sepsis/SIRS 3 points  Hx of VTE  3 points  FH of VTE 4 points  Active cancer  5 points    0= 0.26%  1-2= 0.5%  3-5= 1.8%  6-8= 3%  >8= 4.5%  VTE risk: 0.26%       MSK  - No concerns        Douglas Moctezuma MD  Resident Physician, PGY-3  Department of Ophthalmology

## 2024-11-14 NOTE — DISCHARGE INSTRUCTIONS
Post-Operative Instructions     FIRST 24 HOURS AFTER SURGERY:  You are allowed to Tylenol (acetaminophen) 650mg every four hours as needed for pain unless you have liver disease or are allergic to Tylenol..  Continue taking your regular medications and eye drops in the non-operative eye.  Do not remove the metal or plastic shield unless otherwise instructed by your surgeon.  Do not operate a car, motorcycle, or machinery for 24 hours after surgery.  Call ED immediately if you have SEVERE PAIN unrelieved with Tylenol. And  ask for the resident on call.     MEDICATION INSTRUCTIONS:  -Eyedrops:   1. Prednisolone (Pink) four times a day in BOTH eyes   2. Moxifloxacin (Tan) four times a day  in BOTH eyes  -If you feel your eyes are dry and itchy, you can use regular lubricating drops for one month after the surgery. These eye drops can be found over the counter Brand names example: Systane, Refresh. Please choose preservative free drops. To be used four times a day and as needed for 1 month  -Instilling the eye drops directly into the eye.    -If you had previously any glaucoma eye drops, You can remove the cover and instill the drops as prescribed before and after the procedure      GENERAL INSTRUCTIONS:  Hygiene of the operated eye  Wash your hands thoroughly before caring for the eye.  If the lids are sticky or itchy in the morning, debris, or matter can be gently wiped away with a cotton ball moistened with tap water. DO NOT press on the lids or eyeball.     FIVE MINUTES APART. If ointment is prescribed, it should be applied last.  If you have been taking medications in the non-operated eye, continue as they were prescribed.  If you take medications by mouth for a medical problem, continue as they were prescribed (unless otherwise instructed by physician)    EYE PROTECTION  From the time of surgery until the time of your post-operative day 1 appointment, wear the eye shield at all times. Then, wear it whenever  sleeping, for 1 week.  Protective sunglasses may be worn as needed for your comfort, and are suggested for outdoor activities.    ACTIVITIES  Avoid vigorous exertion and heavy lifting for the first week after surgery  You may take a bath or shower, but avoid getting water directly into your eye for one week after surgery, usually by keeping your eyes closed during the bath.  You should discuss driving and traveling with your surgeon. You may ride in a car and fly in an airplane unless otherwise instructed.  Avoid swimming or using a hot tube/sauna/pool/lake for 2 weeks after the surgery.      WHAT TO EXPECT:  Mild irritation and discomfort are normal.    Call the doctor if you experience any of the following:  Severe eye pain  Nausea  Vomiting  Severe headache     [facility]  Home Care Following Anesthesia  For 24 hours after surgery:  Get plenty of rest.  A responsible adult must stay with you for at least 24 hours after you leave the surgery center.  Do not drive or use heavy equipment.  If you have weakness or tingling, don't drive or use heavy equipment until this feeling goes away.   Do not drink alcohol.   Avoid strenuous or risky activities.  Ask for help when climbing stairs.  You may feel lightheaded.  IF so, sit for a few minutes before standing.  Have someone help you get up.   If you have nausea (feel sick to your stomach): Drink only clear liquids such as apple juice, ginger ale, broth or 7-Up.  Rest may also help.  Be sure to drink enough fluids.  Move to a regular diet as you feel able.   You may have a slight fever.  Call the doctor if your fever is over 100 F (37.7 C) (taken under the tongue) or lasts longer than 24 hours.  You may have a dry mouth, a sore throat, muscle aches or trouble sleeping. These should go away after 24 hours.  Do not make important or legal decisions.   It is recommended to avoid smoking.               Tips for taking pain  medications  To get the best pain relief possible, remember these points:  Take pain medications as directed, before pain becomes severe.  Pain medication can upset your stomach: taking it with food may help.  Constipation is a common side effect of pain medication. Drink plenty of  fluids.  Eat foods high in fiber. Take a stool softener if recommended by your doctor or pharmacist.  Do not drink alcohol, drive or operate machinery while taking pain medications.  Ask about other ways to control pain, such as with heat, ice or relaxation.    Tylenol/Acetaminophen Consumption    If you feel your pain relief is insufficient, you may take Tylenol/Acetaminophen in addition to your narcotic pain medication.   Be careful not to exceed 4,000 mg of Tylenol/Acetaminophen in a 24 hour period from all sources.  If you are taking extra strength Tylenol/acetaminophen (500 mg), the maximum dose is 8 tablets in 24 hours.  If you are taking regular strength acetaminophen (325 mg), the maximum dose is 12 tablets in 24 hours.    Call a doctor for any of the following:  Signs of infection (fever, growing tenderness at the surgery site, a large amount of drainage or bleeding, severe pain, foul-smelling drainage, redness, swelling).  It has been over 8 to 10 hours since surgery and you are still not able to urinate (pass water).  Headache for over 24 hours.  Numbness, tingling or weakness the day after surgery (if you had spinal anesthesia).  Signs of Covid-19 infection (temperature over 100 degrees, shortness of breath, cough, loss of taste/smell, generalized body aches, persistent headache, chills, sore throat, nausea/vomiting/diarrhea)  Your doctor is:  Dr. Sia Herman, Ophthalmology: [phone]                    Or dial [phone] and ask for the resident on call for:  Ophthalmology  For emergency care, call the:  [facility]:  [phone] (TTY for hearing impaired: [phone])

## 2024-11-14 NOTE — PATIENT INSTRUCTIONS
"Jaiden Herrera, it was nice to meet you today!  Thank you for allowing us the privilege of caring for you. We hope we provided you with the excellent service you deserve.   Please let us know if there is anything else we can do for you so that we can be sure you are completely satisfied with your care experience.    To ensure the quality of our services you may be receiving a patient satisfaction survey from an independent patient satisfaction monitoring company.    The greatest compliment you can give is a \"Likely to Recommend\"    Your visit was with Amber Manley PA-C today.    Instructions per today's visit:     Start Wegovy 0.25mg once weekly for 4 weeks, then increase to 0.5mg once weekly. Consider Zepbound and Saxenda as needed. If not covered will start Topiramate.   MTM pharmacist in 6 weeks  Amber Perkins in 3 months     ___________________________________________________________________________  Important contact and scheduling information:  Please call our contact center at [phone] to schedule your next appointments.  For any nursing questions or concerns call Adina Cooney LPN at [phone] or Emilie Winters RN at [phone]  Please call during clinic hours Monday through Friday 8:00a - 4:00p if you have questions or you can contact us via [employer] at anytime and we will reply during clinic hours.    Lab results will be communicated through My Chart or letter (if My Chart not used). Please call the clinic if you have not received communication after 1 week or if you have any questions.?  Clinic Fax: [phone]  __________________________________________________________________________    If labs were ordered today:    Please make an appointment to have them drawn at your convenience.     To schedule the Lab Appointment using [employer]:  Select \"Schedule an Appointment\"  Select \"Lab Only\"  For \"A couple of questions\", select \"Other\"  For \"Which locations work for you?, select the location and set up the " appointment    To schedule by phone call [phone] to schedule a lab only appointment at any [facility] lab.  ___________________________________________________________________________  Work with A [facility] !  Virtual Sessions are Available through [facility] Weight Management Clinics    To learn more, call to schedule a free, Health  Q&A appointment: [phone]     What is Health Coaching?  Do you know what you are supposed to do, but you just aren't doing it?  Then, HEALTH COACHING may help you!   Get unstuck and move forward with the support of a professionally trained NBC-HWC (National Board-Certified Health and ) who uses evidence-based approaches to help you move forward with healthy lifestyle changes in the areas of weight loss, stress management and overall well-being.    Health Coaches help you identify goals that will work best for you. Health Coaches provide support and encouragement with overcoming barriers and help you to find inspiration and motivation to lead a healthy lifestyle.    Option one:  Health Coaching 3-Pack; Three, 30-minute Health Coaching Visits, for $99  Visits are done virtually (phone or video)  This is a self pay service; we do not accept insurance for orlin coaching.    Option two:   The 24 week Plan; 11 Health Coaching Visits, and a 7 months subscription to [employer]-- on-demand fitness, nutrition and mindfulness classes, for $499 (employee discounts may be available). Participants will also meet regularly with a weight management Medical Provider and a Registered/Licensed Dietician.  This is a self-pay service; we do not accept insurance for health coaching.    To Schedule a free Health  Q&A appointment to learn more,  call [phone].  ____________________________________________________________________  [facility]  Healthy Lifestyle Group    Healthy Lifestyle Group  This is a 60 minute  "virtual coaching group for those who want to lead a healthier lifestyle. Come together to set goals and overcome barriers in a supportive group environment. We will address the four pillars of health--nutrition, exercise, sleep and emotional well-being.  This group is highly recommended for those who are participating in the 24 week Healthy Lifestyle Plan and our Health Coaching sessions.    WHEN: This group meets the first Friday of the month, 12:30 PM - 1:30 PM online, via a zoom meeting.      FACILITATOR: Led by National Board Certified Health and , Blanka Shell, [facility].    TO REGISTER: Please call the Call Center at [phone] to register. You will get an appointment to attend in [employer]. Fifteen minutes prior to the meeting, complete the e-check in and you will get the link to join the meeting.  There is no charge to attend this group and space is limited.      2023 and 2024 Meeting Topics and Dates:    November 3: Introduction to Mindfulness (Learn simple and effective mindfulness practices and how it can benefit you)    December 8: Let's Talk (guided discussion on our wins and challenges)    January 5: New Years Vision: Manifest your Best 2024! (Guided imagery,  journaling and discussion)    February 2: Let's Talk    March 1: 10 Percent Happier by Kings Huntley (Book Bites; a guided discussion on the nuggets of wisdom from favorite wellness books; no need to read the book but highly encouraged)    April 5: Let's Talk    May 3: \"Essentialism; The Disciplined Pursuit of Less by Mike Poon (book bites discussion)    June 7: Let's Talk    July 5: NO MEETING, off for the 4th of July Holiday    August 2: The Blue Zones, Secrets for Living a Longer Life by Kings Hemphill (book bites discussion)      If you would like bariatric surgery specific support group info please let your care team know.         Thank you,   [facility] Comprehensive Weight Management Team      WEGOVY (semaglutide)    What is " Wegovy?    Wegovy (semaglutide) injection 2.4 mg is an injectable prescription medicine used for adults with obesity (BMI >=30) or overweight (excess weight) (BMI >=27) who also have weight-related medical problems to help them lose weight and keep the weight off.    1.  Start Wegovy (semaglutide) 0.25 mg once weekly for 4 weeks, then if tolerating increase to 0.5 mg weekly for 4 weeks, then if tolerating increase to 1 mg weekly for 4 weeks, then if tolerating increase to 1.7 mg weekly for 4 weeks, then if tolerating increase to 2.4 mg weekly thereafter.    -Each Wegovy pen is a once weekly single-dose prefilled pen with a pen injector already built within the pen. Discard the Wegovy pen after use in sharps container.     2. Storage: make sure that when you get the prescription that you store the prescription in the refrigerator until it is time to use the Wegovy pen.  Once it is time to use the Wegovy pen, you can keep the pen at room temperature and it is good for up to 28 days at room temperature.     3.  Potential common side effects: nausea, headache, diarrhea, stomach upset.  If these become unmanageable or concerning symptoms, please make sure to call or mychart.    4. Wegovy should be discontinued for >=2 months prior to becoming pregnant.     Prior Authorization Process for Weight Management Medications: You are being prescribed a medication that will likely need to undergo a prior authorization.  A prior authorization is when the clinic needs to fill out a form that is sent to your insurance company to obtain coverage for that medication. The prescription will NOT automatically go to your pharmacy today if it needs a Prior Authorization. If the medication prior authorization is approved, the care team will contact you and the prescription will be released to your pharmacy. If denied, we will work to try and appeal the prior authorization if possible. The initial prior authorization process takes up to 5-10  business days and appeals can take up to 30 days. If you do not hear from us at the end of that time, you may contact the clinic.    Go to site: Wegovy video to learn more and watch instruction videos.    For any questions or concerns please send a [employer] message to our team or call our weight management call center at [phone] during regular business hours. For questions during evenings or weekends your messages will be addressed during the next business day.  For emergencies please call 911 or seek immediate medical care.

## 2024-11-14 NOTE — ANESTHESIA CARE TRANSFER NOTE
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    Anesthesia Type:   MAC     Note:    Oropharynx: oropharynx clear of all foreign objects and spontaneously breathing  Level of Consciousness: awake  Oxygen Supplementation: room air  Level of Supplemental Oxygen (L/min / FiO2): 0  Independent Airway: airway patency satisfactory and stable  Dentition: dentition unchanged  Vital Signs Stable: post-procedure vital signs reviewed and stable  Report to RN Given: handoff report given  Patient transferred to: Phase II  Comments: Uneventful transport   Report to RN- Lacie  Exchanging well; color natl  Pt responds appropriately to command  IV patent  Lips/teeth/dentition as preop status  Questions answered      Handoff Report: Identifed the Patient, Identified the Reponsible Provider, Reviewed the pertinent medical history, Discussed the surgical course, Reviewed Intra-OP anesthesia mangement and issues during anesthesia, Set expectations for post-procedure period and Allowed opportunity for questions and acknowledgement of understanding      Vitals:  Vitals Value Taken Time   /70 11/14/24 0809   Temp 36.2  C (97.2  F) 11/14/24 0809   Pulse 78 11/14/24 0809   Resp 18 11/14/24 0809   SpO2 94 % 11/14/24 0810   Vitals shown include unfiled device data.    Electronically Signed By: CHELSEY PAREKH CRNA  November 14, 2024  8:11 AM

## 2024-11-14 NOTE — ANESTHESIA PREPROCEDURE EVALUATION
Anesthesia Pre-Procedure Evaluation    Patient: Sharon Fung   MRN: [id number] : 1955        Procedure : Procedure(s):  LEFT EYE PHACOEMULSIFICATION, CATARACT, WITH STANDARD INTRAOCULAR LENS IMPLANT INSERTION          Past Medical History:   Diagnosis Date    AR (allergic rhinitis)  as teen    Arthritis 2015    Chronic obstructive pulmonary disease, unspecified COPD type (H) 2018    Depressive disorder 3 years ago    GERD (gastroesophageal reflux disease) 2007    Headache 2017    Hypertension goal BP (blood pressure) < 140/90 2021    Mild major depression (H) 3 years ago    Morbid obesity (H) teens    BRETT (obstructive sleep apnea)     uses CPAP    RA (rheumatoid arthritis) (H) 4 years    Reduced vision 3 years      Past Surgical History:   Procedure Laterality Date    ARTHRODESIS FOOT Right 2021    Procedure: Right 1st metatarsophalangeal joint fusion;  Surgeon: Jesus Wolf MD;  Location: [facility] OR    ARTHRODESIS TOE(S) Right 2021    Procedure: right revision great toe fusion;  Surgeon: Ryan Gee MD;  Location:  OR    BLEPHAROPLASTY BILATERAL Bilateral 2016    Procedure: BLEPHAROPLASTY BILATERAL;  Surgeon: Cortez Robin MD;  Location:  [facility]    BREAST BIOPSY, RT/LT  1-    Benign    COLONOSCOPY      COLONOSCOPY WITH CO2 INSUFFLATION N/A 3/30/2017    Procedure: COLONOSCOPY WITH CO2 INSUFFLATION;  Surgeon: Issa Weeks MD;  Location:  OR    ESOPHAGOSCOPY, GASTROSCOPY, DUODENOSCOPY (EGD), COMBINED N/A 10/29/2015    Procedure: COMBINED ESOPHAGOSCOPY, GASTROSCOPY, DUODENOSCOPY (EGD);  Surgeon: Shaq Mims MD;  Location:  GI    LAPAROSCOPIC CHOLECYSTECTOMY N/A 2023    Procedure: CHOLECYSTECTOMY, LAPAROSCOPIC;  Surgeon: Reynaldo Segura MD;  Location:  OR    LAPAROSCOPIC GASTRIC ADJUSTABLE BANDING  11/09/10    Lap band procedure    LAPAROSCOPIC REMOVAL GASTRIC ADJUSTABLE BAND N/A 10/31/2015    Procedure:  LAPAROSCOPIC REMOVAL GASTRIC ADJUSTABLE BAND;  Surgeon: Shaq Mims MD;  Location: [facility] OR    PHACOEMULSIFICATION WITH STANDARD INTRAOCULAR LENS IMPLANT Right 11/7/2024    Procedure: RIGHT EYE PHACOEMULSIFICATION, CATARACT, WITH STANDARD INTRAOCULAR LENS IMPLANT INSERTION;  Surgeon: Sia Herman MD;  Location: [facility] OR    NC HAND/FINGER SURGERY UNLISTED  2016    NC STOMACH SURGERY PROCEDURE UNLISTED  11/2015    RELEASE CARPAL TUNNEL Left 4/27/2017    Procedure: RELEASE CARPAL TUNNEL;  Left Open Carpal Tunnel Release;  Surgeon: Ciara Middleton MD;  Location: [facility] OR    RELEASE CARPAL TUNNEL Right 6/15/2017    Procedure: RELEASE CARPAL TUNNEL;  Right Carpal Tunnel Release Open;  Surgeon: Ciara Middleton MD;  Location:  OR    REPAIR PTOSIS      TUBAL LIGATION  1988    [facility] APPENDECTOMY  1977      No Known Allergies   Social History     Tobacco Use    Smoking status: Never     Passive exposure: Never    Smokeless tobacco: Never   Substance Use Topics    Alcohol use: No     Alcohol/week: 1.0 - 2.0 standard drink of alcohol      Wt Readings from Last 1 Encounters:   11/08/24 117.9 kg (260 lb)        Anesthesia Evaluation            ROS/MED HX  ENT/Pulmonary:     (+) sleep apnea,                         COPD,              Neurologic:       Cardiovascular:     (+)  hypertension- -   -  - -                                      METS/Exercise Tolerance:     Hematologic:       Musculoskeletal:       GI/Hepatic:       Renal/Genitourinary:       Endo:     (+)               Obesity,       Psychiatric/Substance Use:       Infectious Disease:       Malignancy:       Other:            Physical Exam    Airway        Mallampati: II   TM distance: > 3 FB   Neck ROM: full     Respiratory Devices and Support         Dental       (+) Completely normal teeth      Cardiovascular          Rhythm and rate: regular     Pulmonary           breath sounds clear to auscultation           OUTSIDE LABS:  CBC:   Lab  Results   Component Value Date    WBC 6.2 11/01/2024    WBC 7.4 08/22/2024    HGB 12.0 11/01/2024    HGB 11.7 08/22/2024    HCT 37.7 11/01/2024    HCT 37.4 08/22/2024     11/01/2024     08/22/2024     BMP:   Lab Results   Component Value Date     11/01/2024     05/10/2024    POTASSIUM 3.9 11/01/2024    POTASSIUM 4.2 05/10/2024    CHLORIDE 103 11/01/2024    CHLORIDE 103 05/10/2024    CO2 30 (H) 11/01/2024    CO2 24 05/10/2024    BUN 12.1 11/01/2024    BUN 13.6 05/10/2024    CR 0.71 11/01/2024    CR 0.67 08/22/2024    GLC 92 11/01/2024    GLC 98 05/10/2024     COAGS:   Lab Results   Component Value Date    PTT 33 07/15/2017    INR 0.96 07/15/2017     POC:   Lab Results   Component Value Date     (H) 06/30/2021     HEPATIC:   Lab Results   Component Value Date    ALBUMIN 4.1 11/01/2024    PROTTOTAL 7.2 11/01/2024    ALT 19 11/01/2024    AST 29 11/01/2024    ALKPHOS 91 11/01/2024    BILITOTAL 0.5 11/01/2024     OTHER:   Lab Results   Component Value Date    PH 7.43 06/04/2015    LACT 2.0 06/13/2023    A1C 4.4 05/10/2024    ISH 9.4 11/01/2024    LIPASE 18 06/13/2023    TSH 1.20 09/29/2015    CRP <2.9 11/16/2022    SED 30 11/01/2024       Anesthesia Plan    ASA Status:  3       Anesthesia Type: MAC.     - Reason for MAC: immobility needed              Consents    Anesthesia Plan(s) and associated risks, benefits, and realistic alternatives discussed. Questions answered and patient/representative(s) expressed understanding.     - Discussed:     - Discussed with:  Patient            Postoperative Care       PONV prophylaxis: Ondansetron (or other 5HT-3)     Comments:               Delroy Oliver MD    I have reviewed the pertinent notes and labs in the chart from the past 30 days and (re)examined the patient.  Any updates or changes from those notes are reflected in this note.               # Hypertension: Noted on problem list           # Severe Obesity: Estimated body mass index is  "46.06 kg/m  as calculated from the following:    Height as of 11/8/24: 1.6 m (5' 3\").    Weight as of 11/8/24: 117.9 kg (260 lb).             "

## 2024-11-14 NOTE — ASSESSMENT & PLAN NOTE
S/p lap band at [place] with Dr. Edmonds in 11/2010.   Starting weight - 220lb  Low weight - 200lb    Band removed in 2015 due to band infection with Dr. Mims. Since then has seen a gradual weight regain plus. Would be interested in gastric sleeve, but when she looked at her insurance - it stated in only covered RYBP. Would like to consider a gastric sleeve in the future, but would like to exhaust all other options first.     Discussed AOMs to help with weight loss:   - Phentermine contraindicated due to HTN and age.   - Topiramate to be started if GLP-1 not covered by insurance. No contraindications. No hx of kidney stones or disease. GFR >90.   - Naltrexone can be considered in the future. No hx of liver disease. Not taking opioids   - GLP-1 to be started today. No contraindications. Discussed side effects, risks, and benefits. No hx of pancreatitis. No personal or family hx of MTC or MENII. Concern for coverage with Medicare. No hx of Type II diabetes (A1C 4.4 5/2024) or CVD. Will see if Wegovy or Zepbound is covered.

## 2024-11-15 ENCOUNTER — MYC MEDICAL ADVICE (OUTPATIENT)
Dept: ENDOCRINOLOGY | Facility: CLINIC | Age: 69
End: 2024-11-15

## 2024-11-15 ENCOUNTER — OFFICE VISIT (OUTPATIENT)
Dept: OPHTHALMOLOGY | Facility: CLINIC | Age: 69
End: 2024-11-15
Attending: OPHTHALMOLOGY
Payer: COMMERCIAL

## 2024-11-15 DIAGNOSIS — E66.01 CLASS 3 SEVERE OBESITY WITH SERIOUS COMORBIDITY AND BODY MASS INDEX (BMI) OF 45.0 TO 49.9 IN ADULT, UNSPECIFIED OBESITY TYPE (H): Primary | ICD-10-CM

## 2024-11-15 DIAGNOSIS — Z98.890 POSTOPERATIVE EYE STATE: Primary | ICD-10-CM

## 2024-11-15 DIAGNOSIS — E66.813 CLASS 3 SEVERE OBESITY WITH SERIOUS COMORBIDITY AND BODY MASS INDEX (BMI) OF 45.0 TO 49.9 IN ADULT, UNSPECIFIED OBESITY TYPE (H): Primary | ICD-10-CM

## 2024-11-15 PROCEDURE — G0463 HOSPITAL OUTPT CLINIC VISIT: HCPCS | Performed by: OPHTHALMOLOGY

## 2024-11-15 RX ORDER — PREDNISOLONE ACETATE 10 MG/ML
1-2 SUSPENSION/ DROPS OPHTHALMIC 4 TIMES DAILY
Qty: 10 ML | Refills: 5 | Status: SHIPPED | OUTPATIENT
Start: 2024-11-15

## 2024-11-15 RX ORDER — TOPIRAMATE 25 MG/1
TABLET, FILM COATED ORAL
Qty: 90 TABLET | Refills: 3 | Status: SHIPPED | OUTPATIENT
Start: 2024-11-15

## 2024-11-15 RX ORDER — MOXIFLOXACIN 5 MG/ML
1 SOLUTION/ DROPS OPHTHALMIC 3 TIMES DAILY
Qty: 3 ML | Refills: 11 | Status: SHIPPED | OUTPATIENT
Start: 2024-11-15

## 2024-11-15 ASSESSMENT — VISUAL ACUITY
OD_SC: 20/60
METHOD: SNELLEN - LINEAR
OS_PH_SC+: -2
OD_PH_SC+: -2
OS_SC: 20/30
OD_SC+: -1
OS_PH_SC: 20/25
OD_PH_SC: 20/50

## 2024-11-15 ASSESSMENT — EXTERNAL EXAM - LEFT EYE: OS_EXAM: NORMAL

## 2024-11-15 ASSESSMENT — SLIT LAMP EXAM - LIDS
COMMENTS: MEIBOMIAN GLAND DYSFUNCTION
COMMENTS: MEIBOMIAN GLAND DYSFUNCTION,

## 2024-11-15 ASSESSMENT — TONOMETRY
OS_IOP_MMHG: 25
OD_IOP_MMHG: 16
IOP_METHOD: ICARE

## 2024-11-15 ASSESSMENT — PATIENT HEALTH QUESTIONNAIRE - PHQ9: SUM OF ALL RESPONSES TO PHQ QUESTIONS 1-9: 0

## 2024-11-15 ASSESSMENT — EXTERNAL EXAM - RIGHT EYE: OD_EXAM: NORMAL

## 2024-11-15 NOTE — NURSING NOTE
Chief Complaints and History of Present Illnesses   Patient presents with    Post Op (Ophthalmology) Left Eye     Chief Complaint(s) and History of Present Illness(es)       Post Op (Ophthalmology) Left Eye              Laterality: left eye    Associated symptoms: dryness.  Negative for eye pain, tearing, flashes, floaters and itching    Pain scale: 0/10              Comments    Sharon is here one day post left cataract surgery with IOL implant. Today she says left eye is doing well. Her right eye feels like it has a film over it and started prednisolone  yesterday.,    Stephane Barajas COT 1:05 PM November 15, 2024

## 2024-11-15 NOTE — PROGRESS NOTES
Chief complaint   ABMD evaluation    Referring Provider: Dr. Valderrama     HPI   Sharon Fung 69 year old female   HPI       Post Op (Ophthalmology) Left Eye    In left eye.  Associated symptoms include dryness.  Negative for eye pain, tearing, flashes, floaters and itching.  Pain was noted as 0/10.             Comments    Sharon is here one day post left cataract surgery with IOL implant. Today she says left eye is doing well. Her right eye feels like it has a film over it and started prednisolone  yesterday.,    Stephane Barajas COT 1:05 PM November 15, 2024             Last edited by Stephane Barajas on 11/15/2024  1:05 PM.         Complains of gradually worsening haze over the vision in both eyes starting ~3/2022 and progressing since then. Has tried some sort of ointment and drops without improvement. Denies any pain. Does endorse epiphora just from the right eye. Does endorse significant itching predating onset of hazy vision for which she uses pataday PRN - she does have seasonal allergies for which she takes allegra. Of note, she uses a CPAP for her COPD and has RA for which she follows with rheumatology with immunosuppressant treatments including courses of steroids. At her last visit 3/31/2022 with Dr. Valderrama she was instructed to continue present treatment of restasis (no longer using - did not help), luba zaynab and gtt (not using), and maxitrol PRN to eyelids. No history of painful episodes. Problem is not time-of-day dependent. No waking with pain.         Interval hx 11/15/2024  Chief Complaint(s) and History of Present Illness(es)       Post Op (Ophthalmology) Left Eye    In left eye.  Associated symptoms include dryness.  Negative for eye pain, tearing, flashes, floaters and itching.  Pain was noted as 0/10.             Comments    Sharon is here one day post left cataract surgery with IOL implant. Today she says left eye is doing well. Her right eye feels like it has a film over it and started prednisolone   yesterday.,    Stephane Barajas COT 1:05 PM November 15, 2024                            Past ocular history   Prior eye surgery/laser/Trauma: yes bilateral upper lid blepharoplasty (2016)  CTL wearer:No  Glasses : yes  Family Hx of eye disease: No    PMH     Past Medical History:   Diagnosis Date    AR (allergic rhinitis)  as teen    Arthritis 12/14/2015    Chronic obstructive pulmonary disease, unspecified COPD type (H) 03/27/2018    Depressive disorder 3 years ago    GERD (gastroesophageal reflux disease) 04/2007    Headache 07/11/2017    Hypertension goal BP (blood pressure) < 140/90 12/20/2021    Mild major depression (H) 3 years ago    Morbid obesity (H) teens    BRETT (obstructive sleep apnea)     uses CPAP    RA (rheumatoid arthritis) (H) 4 years    Reduced vision 3 years       PSH     Past Surgical History:   Procedure Laterality Date    ARTHRODESIS FOOT Right 6/30/2021    Procedure: Right 1st metatarsophalangeal joint fusion;  Surgeon: Jesus Wolf MD;  Location: [facility]    ARTHRODESIS TOE(S) Right 12/27/2021    Procedure: right revision great toe fusion;  Surgeon: Ryan Gee MD;  Location:  OR    BLEPHAROPLASTY BILATERAL Bilateral 8/5/2016    Procedure: BLEPHAROPLASTY BILATERAL;  Surgeon: Cortez Robin MD;  Location:  [facility]    BREAST BIOPSY, RT/LT  1-94    Benign    COLONOSCOPY      COLONOSCOPY WITH CO2 INSUFFLATION N/A 3/30/2017    Procedure: COLONOSCOPY WITH CO2 INSUFFLATION;  Surgeon: Issa Weeks MD;  Location:  OR    ESOPHAGOSCOPY, GASTROSCOPY, DUODENOSCOPY (EGD), COMBINED N/A 10/29/2015    Procedure: COMBINED ESOPHAGOSCOPY, GASTROSCOPY, DUODENOSCOPY (EGD);  Surgeon: Shaq Mims MD;  Location:  GI    LAPAROSCOPIC CHOLECYSTECTOMY N/A 6/13/2023    Procedure: CHOLECYSTECTOMY, LAPAROSCOPIC;  Surgeon: Reynaldo Segura MD;  Location:  OR    LAPAROSCOPIC GASTRIC ADJUSTABLE BANDING  11/09/10    Lap band procedure    LAPAROSCOPIC REMOVAL GASTRIC ADJUSTABLE  BAND N/A 10/31/2015    Procedure: LAPAROSCOPIC REMOVAL GASTRIC ADJUSTABLE BAND;  Surgeon: Shaq Mims MD;  Location: [facility] OR    PHACOEMULSIFICATION WITH STANDARD INTRAOCULAR LENS IMPLANT Right 11/7/2024    Procedure: RIGHT EYE PHACOEMULSIFICATION, CATARACT, WITH STANDARD INTRAOCULAR LENS IMPLANT INSERTION;  Surgeon: Sia Herman MD;  Location: [facility] OR    PHACOEMULSIFICATION WITH STANDARD INTRAOCULAR LENS IMPLANT Left 11/14/2024    Procedure: LEFT EYE PHACOEMULSIFICATION, CATARACT, WITH STANDARD INTRAOCULAR LENS IMPLANT INSERTION;  Surgeon: Sia Herman MD;  Location: [facility] OR    WY HAND/FINGER SURGERY UNLISTED  2016    WY STOMACH SURGERY PROCEDURE UNLISTED  11/2015    RELEASE CARPAL TUNNEL Left 4/27/2017    Procedure: RELEASE CARPAL TUNNEL;  Left Open Carpal Tunnel Release;  Surgeon: Ciara Middleton MD;  [facility]: [facility]    RELEASE CARPAL TUNNEL Right 6/15/2017    Procedure: RELEASE CARPAL TUNNEL;  Right Carpal Tunnel Release Open;  Surgeon: Ciara Middleton MD;  Location:  OR    REPAIR PTOSIS      TUBAL LIGATION  1988    [facility] APPENDECTOMY  1977       [facility]     Current Outpatient Medications   Medication Sig Dispense Refill    acetaminophen (TYLENOL) 500 MG tablet Take 500-1,000 mg by mouth every 6 hours as needed for mild pain.      citalopram (CELEXA) 20 MG tablet Take 1 tablet (20 mg) by mouth daily 90 tablet 3    fexofenadine (ALLEGRA) 180 MG tablet Take 1 tablet (180 mg) by mouth daily 90 tablet 2    fluticasone (FLONASE) 50 MCG/ACT nasal spray Spray 2 sprays into both nostrils as needed.      gabapentin (NEURONTIN) 100 MG capsule TAKE ONE CAPSULE BY MOUTH EVERY MORNING AND TAKE FIVE CAPSULES BY MOUTH EVERY NIGHT AT BEDTIME 540 capsule 2    hydroxychloroquine (PLAQUENIL) 200 MG tablet Take 1 tablet (200 mg) by mouth 2 times daily 180 tablet 2    hydrOXYzine cha (VISTARIL) 25 MG capsule Take 1 capsule (25 mg) by mouth 3 times daily as needed for anxiety 30 capsule 1     ibuprofen 200 MG capsule Take 600-800 mg by mouth at bedtime.      leflunomide (ARAVA) 20 MG tablet Take 1 tablet (20 mg) by mouth daily 90 tablet 2    losartan-hydrochlorothiazide (HYZAAR) 100-25 MG tablet Take 1 tablet by mouth daily 90 tablet 3    moxifloxacin (VIGAMOX) 0.5 % ophthalmic solution Place 1 drop into both eyes 4 times daily. 3 mL 1    ORDER FOR DME Use your CPAP device as directed by your provider.      prednisoLONE acetate (PRED FORTE) 1 % ophthalmic suspension Place 1 drop into both eyes 4 times daily. 10 mL 1    predniSONE (DELTASONE) 5 MG tablet Prednisone 20mg daily x5days, then 15mg daily x5days, then 10mg daily x5days, then 5mg daily x5days, then stop. 50 tablet 0    Sarilumab 200 MG/1.14ML SOAJ Inject 1.14 mLs (200 mg) subcutaneously every 14 days. Hold for signs of infection and seek medical attention. 2.28 mL 3    Semaglutide-Weight Management (WEGOVY) 0.25 MG/0.5ML pen Inject 0.25 mg subcutaneously once a week. For 4 weeks 2 mL 0    [START ON 12/8/2024] Semaglutide-Weight Management (WEGOVY) 0.5 MG/0.5ML pen Inject 0.5 mg subcutaneously once a week. After completing 4 weeks of 0.25mg dose 2 mL 2    sulfaSALAzine (AZULFIDINE) 500 MG tablet Take 3 tablets (1,500 mg) by mouth 2 times daily 540 tablet 2    vitamin D3 (CHOLECALCIFEROL) 50 mcg (2000 units) tablet Take 1 tablet (50 mcg) by mouth daily 90 tablet 3     Current Facility-Administered Medications   Medication Dose Route Frequency Provider Last Rate Last Admin    alcohol (dehydrated) (ABLYSINOL) 99 % injection 15 mL  15 mL Other Once Levon Jamil MD         Facility-Administered Medications Ordered in Other Visits   Medication Dose Route Frequency Provider Last Rate Last Admin    sodium chloride bacteriostatic 0.9 % flush 12 mL  12 mL Intravenous Once Nely Matthews MD           Labs   -    Imaging   -    Drops Currently Taking   - Pataday PRN each eye  - Systane QID both eyes.    Assessment/Plan     # Pseudophakia, right  eye   S/p CEIOL right eye 11/7/24 left eye 11/14/24  Edema developed right eye, no intraocular inflammation or signs of endophthalmitis  Left eye healing well without complications    Plan  Pred and moxi QID Both eyes until next visit      # Anterior Basement Membrane Dystrophy, OU  - 01/26/23 Vision in both eyes has fluctuated from 20/20cc to 20/40cc OU since 2014 but now is subjectively the worst it has been. No pain. Does have epiphora OD longstanding. Denies dryness. Does have allergies improving with pataday. Irregular surface from ABMD most likely the source of decreased visual acuity.  - 4/21/23: Superficial keratectomy of the left eye today.   - 4/26/23: BCL in place, still with large irregular inferior epithelial defect though healing well superiorly and into visual axis  - 5/23/23: Epi fully intact left eye. Right eye superficial keratectomy  - 05/30/23 BCL removed right eye. Epi rough but intact, recently healed. Left eye epi dry but intact  -6/26/24 no recurrence right eye, left eye mild epithelial irregularity, does not explain the current vision.  -8/2/24: no recurrence of epi defect      # Horizontal exotropia  - Treated with 2.0 JOSUE prism starting 3/2022    # Nuclear sclerosi  #plaquenil use  Long-term use of hydroxychloroquine  (primary encounter diagnosis)  Comment: [id number], restarted 5/2024.   Plan: OCT Retina Spectralis OU (both eyes), HVF 10-2         OU, Fundus Photos OU (both eyes)      Attending Physician Attestation:  Complete documentation of historical and exam elements from today's encounter can be found in the full encounter summary report (not reduplicated in this progress note).  I personally obtained the chief complaint(s) and history of present illness.  I confirmed and edited as necessary the review of systems, past medical/surgical history, family history, social history, and examination findings as documented by others; and I examined the patient myself.  I personally reviewed  the relevant tests, images, and reports as documented above.  I formulated and edited as necessary the assessment and plan and discussed the findings and management plan with the patient and family. - Sia Herman MD

## 2024-11-18 ENCOUNTER — TELEPHONE (OUTPATIENT)
Dept: ENDOCRINOLOGY | Facility: CLINIC | Age: 69
End: 2024-11-18
Payer: COMMERCIAL

## 2024-11-18 NOTE — TELEPHONE ENCOUNTER
Patient confirmed scheduled appointment:     Date: 4/29/25  Time: 11:30 AM  Visit type: Return Weight Management  Visit mode: Virtual Visit  Provider:  Amber Manley PA-C  Location: [facility]    Additional Notes: Pt confirmed will be in [place] for appt

## 2024-11-19 ENCOUNTER — THERAPY VISIT (OUTPATIENT)
Dept: PHYSICAL THERAPY | Facility: CLINIC | Age: 69
End: 2024-11-19
Attending: FAMILY MEDICINE
Payer: COMMERCIAL

## 2024-11-19 DIAGNOSIS — M54.50 CHRONIC BILATERAL LOW BACK PAIN WITHOUT SCIATICA: ICD-10-CM

## 2024-11-19 DIAGNOSIS — G89.29 CHRONIC BILATERAL LOW BACK PAIN WITHOUT SCIATICA: ICD-10-CM

## 2024-11-19 PROCEDURE — 97161 PT EVAL LOW COMPLEX 20 MIN: CPT | Mod: GP | Performed by: PHYSICAL THERAPIST

## 2024-11-19 PROCEDURE — 97110 THERAPEUTIC EXERCISES: CPT | Mod: GP | Performed by: PHYSICAL THERAPIST

## 2024-11-19 NOTE — PROGRESS NOTES
PHYSICAL THERAPY EVALUATION  Type of Visit: Evaluation        Fall Risk Screen:  Fall screen completed by: PT  Have you fallen 2 or more times in the past year?: No  Have you fallen and had an injury in the past year?: No  Is patient a fall risk?: No    Subjective         Presenting condition or subjective complaint: (Patient-Rptd) Lower back pain  Pt reports that she's struggled with LBP for up to two years. Struggles to walk more than a few feet without feeling like she needs to sit down. Ended up seeing provider on 11/4/24 and referred to PT.  MRI from March 2022:    Multilevel lumbar spondylosis, most pronounced at L3-4 with moderate  spinal canal stenosis and mild to moderate left neural foraminal  stenosis. Findings appear mildly progressed, most notably L3-4 and  L4-5.    Date of onset: 11/04/24    Relevant medical history:     Dates & types of surgery:  bunionectomy R foot around 2018 with revision (still numb and swells)    Prior diagnostic imaging/testing results: (Patient-Rptd) MRI     Prior therapy history for the same diagnosis, illness or injury: (Patient-Rptd) No      Prior Level of Function  Transfers:   Ambulation:   ADL:   IADL:     Living Environment  Social support: (Patient-Rptd) Alone   Type of home: (Patient-Rptd) [facility]   Stairs to enter the home: (Patient-Rptd) No       Ramp: (Patient-Rptd) No   Stairs inside the home: (Patient-Rptd) No       Help at home: (Patient-Rptd) None  Equipment owned:       Employment: (Patient-Rptd) Yes (Patient-Rptd) RN  Hobbies/Interests: (Patient-Rptd) Travel reading    Patient goals for therapy: (Patient-Rptd) Able to take walks    Pain assessment: See objective evaluation for additional pain details     Objective   LUMBAR SPINE EVALUATION  PAIN: Pain Level at Rest: 3/10  Pain Level with Use: 9/10  Pain Location: B L>R low back, can radiate to R thigh and down to L calf  Pain Quality: Aching and Sharp  Pain Frequency: intermittent  Pain is Worst: activity  "related  Pain is Exacerbated By: walking, back gets \"tired\" if standing in one place  Pain is Relieved By: getting off feet/sitting, heating pad  Pain Progression: Worsened  INTEGUMENTARY (edema, incisions):   POSTURE: Sitting Posture: Lordosis decreased, Thoracic kyphosis increased  GAIT:   Weightbearing Status:   Assistive Device(s):   Gait Deviations:   BALANCE/PROPRIOCEPTION:   WEIGHTBEARING ALIGNMENT: Lumbar/Pelvic WB Alignment:Lordosis decreased  NON-WEIGHTBEARING ALIGNMENT:    ROM:   (Degrees) Left AROM Left PROM  Right AROM Right PROM   Hip Flexion       Hip Extension       Hip Abduction       Hip Adduction       Hip Internal Rotation       Hip External Rotation       Knee Flexion       Knee Extension       Lumbar Side glide nil Nil with ERP   Lumbar Flexion nil   Lumbar Extension Mod loss with central ERP   Pain:   End feel:     Symptomatic response Mechanical response    During testing After testing Effect - increased ROM, decreased ROM, or key functional test No Effect   Pretest symptoms standing: none     Rep FIS       Rep EIS Produces  But dec with reps [employer] ext ROM, inc walking tolerance      Pretest symptoms lying:     During testing After testing Effect - increased ROM, decreased ROM, or key functional test No Effect   Rep TRISTIN       Rep EIL         If required, pretest symptoms:    During testing After testing Effect - increased ROM, decreased ROM, or key functional test No Effect   Rep SGIS - R       Rep SGIS - L           PELVIC/SI SCREEN:   STRENGTH:     MYOTOMES: WNL  DTR S:   CORD SIGNS:   DERMATOMES: WNL  NEURAL TENSION:   FLEXIBILITY:   LUMBAR/HIP Special Tests:    PELVIS/SI SPECIAL TESTS:   FUNCTIONAL TESTS:   PALPATION:   SPINAL SEGMENTAL CONCLUSIONS:       Assessment & Plan   CLINICAL IMPRESSIONS  Medical Diagnosis: Chronic bilateral low back pain without sciatica    Treatment Diagnosis: B lumbar radiculopathy   Impression/Assessment: Patient is a 69 year old female with lumbar " complaints.  The following significant findings have been identified: Pain, Decreased ROM/flexibility, Decreased joint mobility, Inflammation, Impaired muscle performance, and Decreased activity tolerance. These impairments interfere with their ability to perform self care tasks, recreational activities, household mobility, and community mobility as compared to previous level of function.     Clinical Decision Making (Complexity):  Clinical Presentation: Evolving/Changing  Clinical Presentation Rationale: based on medical and personal factors listed in PT evaluation  Clinical Decision Making (Complexity): Low complexity    PLAN OF CARE  Treatment Interventions:  Interventions: Manual Therapy, Neuromuscular Re-education, Therapeutic Activity, Therapeutic Exercise    Long Term Goals     PT Goal 1  Goal Identifier: ambulation  Goal Description: Pt will be able to walk for 15 min without increased pain  Rationale: to maximize safety and independence with performance of ADLs and functional tasks;to maximize safety and independence within the home;to maximize safety and independence within the community;to maximize safety and independence with self cares  Target Date: 02/11/25      Frequency of Treatment: 1x/wk  Duration of Treatment: 12 wks    Recommended Referrals to Other Professionals:   Education Assessment:        Risks and benefits of evaluation/treatment have been explained.   Patient/Family/caregiver agrees with Plan of Care.     Evaluation Time:     PT Eval, Low Complexity Minutes (69327): 18       Signing Clinician: Jaylan Ying, PT        [facility]                                                                                   OUTPATIENT PHYSICAL THERAPY      PLAN OF TREATMENT FOR OUTPATIENT REHABILITATION   Patient's Last Name, First Name, Sharon Mendoza YOB: 1955   Provider's Name   [facility]   Medical Record  No.  [id number]     Onset Date: 11/04/24  Start of Care Date: 11/19/24     Medical Diagnosis:  Chronic bilateral low back pain without sciatica      PT Treatment Diagnosis:  B lumbar radiculopathy Plan of Treatment  Frequency/Duration: 1x/wk/ 12 wks    Certification date from 11/19/24 to 02/11/25         See note for plan of treatment details and functional goals     Jaylan Ying, PT                         I CERTIFY THE NEED FOR THESE SERVICES FURNISHED UNDER        THIS PLAN OF TREATMENT AND WHILE UNDER MY CARE     (Physician attestation of this document indicates review and certification of the therapy plan).              Referring Provider:  Reynaldo Saavedra    Initial Assessment  See Epic Evaluation- Start of Care Date: 11/19/24

## 2024-11-22 NOTE — PATIENT INSTRUCTIONS
RHEUMATOLOGY    Dr. Freddy Guerra    [address] [place] [address]  Phone number: [phone]  Fax number: [phone]      Thank you for choosing [facility]!    Evie Rosas CMA Rheumatology    -----------------------------      Based on our current understanding (and this may change over time as we learn more), medications should be adjusted as noted below, if disease activity allows:  - NSAIDs and Acetaminophen: hold for 24 hours prior to vaccination if able to do so  - Sulfasalazine should be held for 1 week after the COVID19 booster vaccine  - Leflunomide should be held for 2 weeks after the COVID19 booster vaccine               cough

## 2024-11-25 ENCOUNTER — OFFICE VISIT (OUTPATIENT)
Dept: OPHTHALMOLOGY | Facility: CLINIC | Age: 69
End: 2024-11-25
Attending: OPHTHALMOLOGY
Payer: COMMERCIAL

## 2024-11-25 DIAGNOSIS — H18.523 ANTERIOR BASEMENT MEMBRANE DYSTROPHY OF BOTH EYES: Primary | ICD-10-CM

## 2024-11-25 DIAGNOSIS — H25.13 NUCLEAR SCLEROSIS OF BOTH EYES: ICD-10-CM

## 2024-11-25 DIAGNOSIS — Z98.890 POSTOPERATIVE EYE STATE: ICD-10-CM

## 2024-11-25 PROCEDURE — G0463 HOSPITAL OUTPT CLINIC VISIT: HCPCS | Performed by: OPHTHALMOLOGY

## 2024-11-25 PROCEDURE — 92134 CPTRZ OPH DX IMG PST SGM RTA: CPT | Performed by: OPHTHALMOLOGY

## 2024-11-25 ASSESSMENT — VISUAL ACUITY
OS_PH_SC: 20/40
OS_SC: 20/60
OD_PH_SC: 20/25
METHOD: SNELLEN - LINEAR
OD_SC: 20/30
OD_PH_SC+: -2
OD_SC+: +1
OS_PH_SC+: -2

## 2024-11-25 ASSESSMENT — TONOMETRY
OD_IOP_MMHG: 14
OS_IOP_MMHG: 17
IOP_METHOD: ICARE

## 2024-11-25 ASSESSMENT — REFRACTION_WEARINGRX
OD_ADD: +2.75
OS_ADD: +2.75
OD_HBASE: OUT
SPECS_TYPE: PAL
OS_AXIS: 025
OD_AXIS: 160
OS_CYLINDER: +0.50
OD_HPRISM: 2.0
OD_CYLINDER: +2.00
OS_SPHERE: -2.00
OD_SPHERE: -2.00

## 2024-11-25 ASSESSMENT — CONF VISUAL FIELD
OS_SUPERIOR_NASAL_RESTRICTION: 0
METHOD: COUNTING FINGERS
OS_SUPERIOR_TEMPORAL_RESTRICTION: 0
OD_INFERIOR_NASAL_RESTRICTION: 0
OD_SUPERIOR_NASAL_RESTRICTION: 0
OS_NORMAL: 1
OD_SUPERIOR_TEMPORAL_RESTRICTION: 0
OS_INFERIOR_TEMPORAL_RESTRICTION: 0
OD_NORMAL: 1
OS_INFERIOR_NASAL_RESTRICTION: 0
OD_INFERIOR_TEMPORAL_RESTRICTION: 0

## 2024-11-25 ASSESSMENT — SLIT LAMP EXAM - LIDS
COMMENTS: MEIBOMIAN GLAND DYSFUNCTION
COMMENTS: MEIBOMIAN GLAND DYSFUNCTION,

## 2024-11-25 ASSESSMENT — EXTERNAL EXAM - LEFT EYE: OS_EXAM: NORMAL

## 2024-11-25 ASSESSMENT — EXTERNAL EXAM - RIGHT EYE: OD_EXAM: NORMAL

## 2024-11-25 NOTE — NURSING NOTE
Chief Complaints and History of Present Illnesses   Patient presents with    Follow Up     1 week follow up S/p CEIOL right eye 11/7/24 left eye 11/14/24     Chief Complaint(s) and History of Present Illness(es)       Follow Up              Comments: 1 week follow up S/p CEIOL right eye 11/7/24 left eye 11/14/24              Comments    Pt states that she sees a film over both eyes, clears up during the day. No eye pain today. No new flashes or floaters.   No DM.    GABY Norman November 25, 2024 7:32 AM

## 2024-11-25 NOTE — PROGRESS NOTES
Chief complaint   ABMD evaluation    Referring Provider: Dr. Valderrama     HPI   Sharon RUCHI Fung 69 year old female   HPI       Follow Up     Additional comments: 1 week follow up S/p CEIOL right eye 11/7/24 left eye 11/14/24             Comments    Pt states that she sees a film over both eyes, clears up during the day. No eye pain today. No new flashes or floaters.   No DM.    GABY Norman November 25, 2024 7:32 AM              Last edited by Erica Slef COMT on 11/25/2024  7:32 AM.         Complains of gradually worsening haze over the vision in both eyes starting ~3/2022 and progressing since then. Has tried some sort of ointment and drops without improvement. Denies any pain. Does endorse epiphora just from the right eye. Does endorse significant itching predating onset of hazy vision for which she uses pataday PRN - she does have seasonal allergies for which she takes allegra. Of note, she uses a CPAP for her COPD and has RA for which she follows with rheumatology with immunosuppressant treatments including courses of steroids. At her last visit 3/31/2022 with Dr. Valderrama she was instructed to continue present treatment of restasis (no longer using - did not help), luba zaynab and gtt (not using), and maxitrol PRN to eyelids. No history of painful episodes. Problem is not time-of-day dependent. No waking with pain.      Interval hx 11/25/2024  Chief Complaint(s) and History of Present Illness(es)       Follow Up     Additional comments: 1 week follow up S/p CEIOL right eye 11/7/24 left eye 11/14/24             Comments    Pt states that she sees a film over both eyes, clears up during the day. No eye pain today. No new flashes or floaters.   No DM.    GABY Norman November 25, 2024 7:32 AM                         Past ocular history   Prior eye surgery/laser/Trauma: yes bilateral upper lid blepharoplasty (2016)  CTL wearer:No  Glasses : yes  Family Hx of eye disease: No    PMH     Past  Medical History:   Diagnosis Date    AR (allergic rhinitis)  as teen    Arthritis 12/14/2015    Chronic obstructive pulmonary disease, unspecified COPD type (H) 03/27/2018    Depressive disorder 3 years ago    GERD (gastroesophageal reflux disease) 04/2007    Headache 07/11/2017    Hypertension goal BP (blood pressure) < 140/90 12/20/2021    Mild major depression (H) 3 years ago    Morbid obesity (H) teens    BRETT (obstructive sleep apnea)     uses CPAP    RA (rheumatoid arthritis) (H) 4 years    Reduced vision 3 years       PSH     Past Surgical History:   Procedure Laterality Date    ARTHRODESIS FOOT Right 6/30/2021    Procedure: Right 1st metatarsophalangeal joint fusion;  Surgeon: Jesus Wolf MD;  Location: [facility]    ARTHRODESIS TOE(S) Right 12/27/2021    Procedure: right revision great toe fusion;  Surgeon: Ryan Gee MD;  Location: [facility] OR    BLEPHAROPLASTY BILATERAL Bilateral 8/5/2016    Procedure: BLEPHAROPLASTY BILATERAL;  Surgeon: Cortez Robin MD;  Location:  [facility]    BREAST BIOPSY, RT/LT  1-94    Benign    COLONOSCOPY      COLONOSCOPY WITH CO2 INSUFFLATION N/A 3/30/2017    Procedure: COLONOSCOPY WITH CO2 INSUFFLATION;  Surgeon: Issa Weeks MD;  Location: [facility] OR    ESOPHAGOSCOPY, GASTROSCOPY, DUODENOSCOPY (EGD), COMBINED N/A 10/29/2015    Procedure: COMBINED ESOPHAGOSCOPY, GASTROSCOPY, DUODENOSCOPY (EGD);  Surgeon: Shaq Mims MD;  Location: [facility]    LAPAROSCOPIC CHOLECYSTECTOMY N/A 6/13/2023    Procedure: CHOLECYSTECTOMY, LAPAROSCOPIC;  Surgeon: Reynaldo Segura MD;  Location: [facility]    LAPAROSCOPIC GASTRIC ADJUSTABLE BANDING  11/09/10    Lap band procedure    LAPAROSCOPIC REMOVAL GASTRIC ADJUSTABLE BAND N/A 10/31/2015    Procedure: LAPAROSCOPIC REMOVAL GASTRIC ADJUSTABLE BAND;  Surgeon: Shaq Mims MD;  Location: [facility]    PHACOEMULSIFICATION WITH STANDARD INTRAOCULAR LENS IMPLANT Right 11/7/2024    Procedure: RIGHT EYE PHACOEMULSIFICATION,  CATARACT, WITH STANDARD INTRAOCULAR LENS IMPLANT INSERTION;  Surgeon: Sia Herman MD;  Location: [facility] OR    PHACOEMULSIFICATION WITH STANDARD INTRAOCULAR LENS IMPLANT Left 11/14/2024    Procedure: LEFT EYE PHACOEMULSIFICATION, CATARACT, WITH STANDARD INTRAOCULAR LENS IMPLANT INSERTION;  Surgeon: Sia Herman MD;  Location: [facility] OR    CT HAND/FINGER SURGERY UNLISTED  2016    CT STOMACH SURGERY PROCEDURE UNLISTED  11/2015    RELEASE CARPAL TUNNEL Left 4/27/2017    Procedure: RELEASE CARPAL TUNNEL;  Left Open Carpal Tunnel Release;  Surgeon: Ciara Middleton MD;  Location: [facility]    RELEASE CARPAL TUNNEL Right 6/15/2017    Procedure: RELEASE CARPAL TUNNEL;  Right Carpal Tunnel Release Open;  Surgeon: Ciara Middleton MD;  Location:  OR    REPAIR PTOSIS      TUBAL LIGATION  1988    [facility] APPENDECTOMY  1977       [facility]     Current Outpatient Medications   Medication Sig Dispense Refill    acetaminophen (TYLENOL) 500 MG tablet Take 500-1,000 mg by mouth every 6 hours as needed for mild pain.      citalopram (CELEXA) 20 MG tablet Take 1 tablet (20 mg) by mouth daily 90 tablet 3    fexofenadine (ALLEGRA) 180 MG tablet Take 1 tablet (180 mg) by mouth daily 90 tablet 2    fluticasone (FLONASE) 50 MCG/ACT nasal spray Spray 2 sprays into both nostrils as needed.      gabapentin (NEURONTIN) 100 MG capsule TAKE ONE CAPSULE BY MOUTH EVERY MORNING AND TAKE FIVE CAPSULES BY MOUTH EVERY NIGHT AT BEDTIME 540 capsule 2    hydroxychloroquine (PLAQUENIL) 200 MG tablet Take 1 tablet (200 mg) by mouth 2 times daily 180 tablet 2    hydrOXYzine cha (VISTARIL) 25 MG capsule Take 1 capsule (25 mg) by mouth 3 times daily as needed for anxiety 30 capsule 1    ibuprofen 200 MG capsule Take 600-800 mg by mouth at bedtime.      leflunomide (ARAVA) 20 MG tablet Take 1 tablet (20 mg) by mouth daily 90 tablet 2    losartan-hydrochlorothiazide (HYZAAR) 100-25 MG tablet Take 1 tablet by mouth daily 90 tablet 3    moxifloxacin  (VIGAMOX) 0.5 % ophthalmic solution Place 1 drop into the right eye 3 times daily. 3 mL 11    moxifloxacin (VIGAMOX) 0.5 % ophthalmic solution Place 1 drop into both eyes 4 times daily. 3 mL 1    ORDER FOR DME Use your CPAP device as directed by your provider.      prednisoLONE acetate (PRED FORTE) 1 % ophthalmic suspension Place 1-2 drops into both eyes 4 times daily. 10 mL 5    prednisoLONE acetate (PRED FORTE) 1 % ophthalmic suspension Place 1 drop into both eyes 4 times daily. 10 mL 1    Sarilumab 200 MG/1.14ML SOAJ Inject 1.14 mLs (200 mg) subcutaneously every 14 days. Hold for signs of infection and seek medical attention. 2.28 mL 3    Semaglutide-Weight Management (WEGOVY) 0.25 MG/0.5ML pen Inject 0.25 mg subcutaneously once a week. For 4 weeks 2 mL 0    [START ON 12/8/2024] Semaglutide-Weight Management (WEGOVY) 0.5 MG/0.5ML pen Inject 0.5 mg subcutaneously once a week. After completing 4 weeks of 0.25mg dose 2 mL 2    sulfaSALAzine (AZULFIDINE) 500 MG tablet Take 3 tablets (1,500 mg) by mouth 2 times daily 540 tablet 2    topiramate (TOPAMAX) 25 MG tablet 25mg at bedtime for week 1, 50mg at bedtime for 1 week, and 75mg at bedtime thereafter 90 tablet 3    vitamin D3 (CHOLECALCIFEROL) 50 mcg (2000 units) tablet Take 1 tablet (50 mcg) by mouth daily 90 tablet 3    predniSONE (DELTASONE) 5 MG tablet Prednisone 20mg daily x5days, then 15mg daily x5days, then 10mg daily x5days, then 5mg daily x5days, then stop. (Patient not taking: Reported on 11/25/2024) 50 tablet 0     Current Facility-Administered Medications   Medication Dose Route Frequency Provider Last Rate Last Admin    alcohol (dehydrated) (ABLYSINOL) 99 % injection 15 mL  15 mL Other Once Levon Jamil MD         Facility-Administered Medications Ordered in Other Visits   Medication Dose Route Frequency Provider Last Rate Last Admin    sodium chloride bacteriostatic 0.9 % flush 12 mL  12 mL Intravenous Once Nely Matthews MD           Labs    -    Imaging   -    Drops Currently Taking   - Prednisolone QID, both eyes  - Moxifloxacin QID, both eyes  - Systane q2h daytime both eyes.    Assessment/Plan     # Pseudophakia, right eye   S/p CEIOL right eye 11/7/24 left eye 11/14/24  Edema developed right eye, no intraocular inflammation or signs of endophthalmitis  Left eye with some central edema and decreased vision compared to previous    Plan  Continue Pred taper weekly   Stop moxi  Continue AT's QID      # Anterior Basement Membrane Dystrophy, OU  - 01/26/23 Vision in both eyes has fluctuated from 20/20cc to 20/40cc OU since 2014 but now is subjectively the worst it has been. No pain. Does have epiphora OD longstanding. Denies dryness. Does have allergies improving with pataday. Irregular surface from ABMD most likely the source of decreased visual acuity.  - 4/21/23: Superficial keratectomy of the left eye today.   - 4/26/23: BCL in place, still with large irregular inferior epithelial defect though healing well superiorly and into visual axis  - 5/23/23: Epi fully intact left eye. Right eye superficial keratectomy  - 05/30/23 BCL removed right eye. Epi rough but intact, recently healed. Left eye epi dry but intact  -6/26/24 no recurrence right eye, left eye mild epithelial irregularity, does not explain the current vision.  -8/2/24: no recurrence of epi defect      # Horizontal exotropia  - Treated with 2.0 JOSUE prism starting 3/2022      Long-term use of hydroxychloroquine  (primary encounter diagnosis)    Follow-up 2 weeks VTD MRX    Attending Physician Attestation:  Complete documentation of historical and exam elements from today's encounter can be found in the full encounter summary report (not reduplicated in this progress note).  I personally obtained the chief complaint(s) and history of present illness.  I confirmed and edited as necessary the review of systems, past medical/surgical history, family history, social history, and examination  findings as documented by others; and I examined the patient myself.  I personally reviewed the relevant tests, images, and reports as documented above.  I formulated and edited as necessary the assessment and plan and discussed the findings and management plan with the patient and family. - Sia Herman MD

## 2024-11-26 ENCOUNTER — THERAPY VISIT (OUTPATIENT)
Dept: PHYSICAL THERAPY | Facility: CLINIC | Age: 69
End: 2024-11-26
Attending: FAMILY MEDICINE
Payer: COMMERCIAL

## 2024-11-26 DIAGNOSIS — M54.50 CHRONIC BILATERAL LOW BACK PAIN WITHOUT SCIATICA: Primary | ICD-10-CM

## 2024-11-26 DIAGNOSIS — G89.29 CHRONIC BILATERAL LOW BACK PAIN WITHOUT SCIATICA: Primary | ICD-10-CM

## 2024-11-26 PROCEDURE — 97530 THERAPEUTIC ACTIVITIES: CPT | Mod: GP | Performed by: PHYSICAL THERAPIST

## 2024-11-26 PROCEDURE — 97110 THERAPEUTIC EXERCISES: CPT | Mod: GP | Performed by: PHYSICAL THERAPIST

## 2024-12-02 ENCOUNTER — OFFICE VISIT (OUTPATIENT)
Dept: RHEUMATOLOGY | Facility: CLINIC | Age: 69
End: 2024-12-02
Payer: COMMERCIAL

## 2024-12-02 VITALS
OXYGEN SATURATION: 94 % | RESPIRATION RATE: 18 BRPM | DIASTOLIC BLOOD PRESSURE: 77 MMHG | HEART RATE: 115 BPM | SYSTOLIC BLOOD PRESSURE: 148 MMHG | WEIGHT: 261.6 LBS | BODY MASS INDEX: 46.35 KG/M2

## 2024-12-02 DIAGNOSIS — M05.9 SEROPOSITIVE RHEUMATOID ARTHRITIS (H): Primary | ICD-10-CM

## 2024-12-02 DIAGNOSIS — Z79.899 HIGH RISK MEDICATION USE: ICD-10-CM

## 2024-12-02 PROCEDURE — 99214 OFFICE O/P EST MOD 30 MIN: CPT | Performed by: INTERNAL MEDICINE

## 2024-12-02 PROCEDURE — G2211 COMPLEX E/M VISIT ADD ON: HCPCS | Performed by: INTERNAL MEDICINE

## 2024-12-02 RX ORDER — SULFASALAZINE 500 MG/1
1500 TABLET ORAL 2 TIMES DAILY
Qty: 540 TABLET | Refills: 2 | Status: SHIPPED | OUTPATIENT
Start: 2024-12-02

## 2024-12-02 RX ORDER — LEFLUNOMIDE 20 MG/1
20 TABLET ORAL DAILY
Qty: 90 TABLET | Refills: 2 | Status: SHIPPED | OUTPATIENT
Start: 2024-12-02

## 2024-12-02 RX ORDER — HYDROXYCHLOROQUINE SULFATE 200 MG/1
200 TABLET, FILM COATED ORAL 2 TIMES DAILY
Qty: 180 TABLET | Refills: 2 | Status: SHIPPED | OUTPATIENT
Start: 2024-12-02

## 2024-12-02 NOTE — NURSING NOTE
Sharon to follow up with Primary Care provider regarding elevated blood pressure.   Evie Rosas CMA Rheumatology  12/2/2024            RAPID3 (0-30) Cumulative Score  16.2          RAPID3 Weighted Score (divide #4 by 3 and that is the weighted score)  5.4

## 2024-12-02 NOTE — PATIENT INSTRUCTIONS
RHEUMATOLOGY    [facility]  [address] [place] [address]  [place] [address]    Phone number: [phone]  Fax number: [phone]    If you need a medication refill, please contact us as you may need lab work and/or a follow up visit prior to your refill.      Thank you for choosing [facility]!    Evie Rosas CMA Rheumatology

## 2024-12-02 NOTE — PROGRESS NOTES
Rheumatology Clinic Visit      Sharon Fung MRN# [id number]   YOB: 1955 Age: 69 year old      Date of visit: 12/02/24   PCP: Dr. Reynaldo Saavedra    Chief Complaint   Patient presents with:  Rheumatoid Arthritis: Achy all the time    Assessment and Plan     1. Seropositive nonerosive rheumatoid arthritis (RF negative, CCP low positive): Previously on Humira (lost efficacy), Cimzia (ineffective), Orencia (ineffective), leflunomide (ineffective as monotherapy), hydroxychloroquine (ineffective), Xeljanz (ineffective), MTX (GI upset), Kevzara (effective, became cost prohibitive when insurance changed to Medicare, used [id number]).  Well-controlled arthritis when on a combination of sulfasalazine 1500 mg twice daily, leflunomide 20 mg daily, and Kevzara, but because Kevzara becoming cost prohibitive with change to Medicare, Kevzara was discontinued and Simponi IV was started.  Simponi has not been as effective as Kevzara; minimal synovitis on exam today but she had reported having morning stiffness for 2-3 hours.  Added hydroxychloroquine and this has been helpful.  Simponi was discontinued because it was not effective.  However, still with active disease and not feeling as well so Kevzara was restarted but is being delayed due to financial constraints, with plans to start Kevzara in 2025 now that she is on Medicare and Kevzara will be financially feasible per patient.    Chronic illness, progressive  - Continue sulfasalazine 1500 mg twice daily   - Continue leflunomide 20mg daily   - Continue hydroxychloroquine 200 mg twice daily (last eye exam on 8/2/2024 by Dr. Sia Herman)  - Start Kevzara 200 mg SQ every 14 days (she plans to first fill in January 2025)  - Fasting labs in early March: CBC, creatinine, hepatic panel, ESR, CRP, lipid panel, QuantiFERON-TB gold plus    High risk medication requiring intensive toxicity monitoring at least quarterly                 Rapid 3, cumulative scores                       12/02/2024: 16.2 (SSZ 1500mg BID, leflunomide 20mg daily,  mg BID)                      08/28/2024: 14    (SSZ 1500mg BID, leflunomide 20mg daily,  mg BID)                       01/25/2024: 14    (SSZ 1500mg BID, leflunomide 20mg daily, Simponi IV)                      10/18/2023: 13.2 (SSZ 1500mg BID, leflunomide 20mg daily, Simponi IV [id number])                      8/24/2021: No inflammatory joint symptoms.  She says that this combination is great (SSZ 1500mg BID, leflunomide 20mg daily, Kevzara 200mg q14 days)                      10/14/2020 doing well (SSZ 1500mg BID, leflunomide 20mg daily, Kevzara 200mg q14 days)                      08/28/2019: 3.2   (SSZ 1500mg BID, Kevzara)  Now on gabapentin                      04/17/2019: 15    (SSZ 1500mg BID, Kevzara)  Mostly fibromyalgia symptoms                      12/19/2018:         (MTX 20mg SQ wkly, Xeljanz XR 11mg daily, SSZ 1500mg BID)                          05/02/2018:  9     (MTX 20mg SQ wkly, Xeljanz XR 11mg daily, SSZ 1000mg BID)                          01/31/2018: 22.3 (MTX 20mg SQ wkly, Xeljanz XR 11mg daily)                          11/29/2017: 16.8 (MTX 20mg SQ wkly)                      08/02/2017: 4.2   (MTX 20mg SQ wkly, Xeljanz XR 11mg daily)                         05/04/2017: 7      (MTX 20mg SQ wkly, Xeljanz XR 11mg daily)                        02/02/2017: 8      (MTX 20mg SQ wkly, Xeljanz XR 11mg daily)                      11/04/2016: 13    (MTX 20mg SQ weekly)                      07/15/2016: 10.8    2. Positive LORNA; positive and negative dsDNA; Secondary Sjogren's Syndrome: Repeat dsDNA was negative. Has dry eyes but no dry mouth.  Dry eyes are treated by ophthalmology with Restasis.      3. Bilateral patellofemoral syndrome: home exercises have been helpful.  Weight loss encouraged.       4. Fibromyalgia: Managed in the pain clinic     5.  Bone Health, vitamin D deficiency: normal 12/20/2019 DEXA; plan to  repeat in 2024.    - Continue vitamin D 2000 IU daily  - DEXA ordered    6. Chronic lower back pain with left sided sciatica: suspect related to degenerative changes seen on L-spine MRI.  She has been seen in the pain clinic and spine surgery clinic.  Documented here for historical significance.    7. Neck pain: degenerative in nature; pain with extremes of ROM and limits to ROM in each direction.  Continue physical therapy exercises at home.  5/15/2024 cervical spine x-ray without evidence of instability.    8.  Vaccinations: Vaccinations reviewed with Ms. Fung.   - Influenza: encouraged yearly vaccination  - COVID-19: advised keeping updated, and to hold leflunomide and sulfasalazine for 1-2 weeks afterward  - RSV: Advise vaccination    9. Elevated blood pressure:  Sharon to follow up with Primary Care provider regarding elevated blood pressure.     Total minutes spent in evaluation with patient, documentation, , and review of pertinent studies and chart notes: 18  The longitudinal plan of care for the rheumatology problem(s) were addressed during this visit.  Due to added complexity of care, we will continue to support the patient and the subsequent management of this condition with ongoing continuity of care.     Ms. Fung verbalized agreement with and understanding of the rational for the diagnosis and treatment plan.  All questions were answered to best of my ability and the patient's satisfaction. Ms. Fung was advised to contact the clinic with any questions that may arise after the clinic visit.      Thank you for involving me in the care of the patient    Return to clinic: early March HPI   Sharon Fung is a 69 year old female with medical history significant for GERD, allergic rhinitis, obstructive sleep apnea, obesity, hx of trigger fingers, hx of carpal tunnel surgery, and rheumatoid arthritis who presents for follow-up of rheumatoid arthritis.    6/9/2023: RA controlled.   Morning stiffness <30 min. No joint swelling. Changing to medicare and needs to discuss changing kevzara to a different agent because it has become cost prohibitive with Medicare.     10/18/2023: RA not as well-controlled since changing to Simponi.  Has had 3 doses of Simponi thus far.  Simponi infusions are well-tolerated but has not controlled her arthritis as well.  Pain, stiffness, and swelling at the MCPs, PIPs, and wrists.  Joint pain is worse in the morning and improves with time and activity.  Morning stiffness for approximately 2 hours.  She would like to continue Simponi for now to see if a longer duration of therapy will be more helpful.    1/25/2024: Doing fairly well on her current regimen but having morning stiffness in her hands for 2-3 hours, and still some ache in the hands that improves with flexion and extension of the fingers and general movement.  Arthritis is not limiting her daily activities.    5/15/2024: RA improved with addition of hydroxychloroquine.  Neck pain worse with activity and improves with rest; does not radiate down the arms; has been a chronic issue and has been doing home exercises but they are infrequently done.  She says she does not need to go to formal physical therapy and will continue the exercises on her own at home.    8/28/2024: Pain, swelling, and stiffness at the MCPs and PIPs that is worse in the morning improves with time and activity.  Morning stiffness for approximately 1-1.5 hours.  Arthritis not limiting daily activities.  Felt better when on Kevzara    Today, 12/2/2024: Sharon reports that due to her income being just a bit too high she did not qualify for the financial assistance program and therefore she does not plan to fill Kevzara until early January 2025.  No change in her joint symptoms.  Still with whole body aches and recalls feeling better when on Kevzara in the past.  She reports that she is now on Medicare and that the deductible in 2025 will be  financially feasible.  Currently with morning stiffness for 1-1.5 hours.  Joints most affected are the MCPs and PIPs, that are worse in the morning and improved with time and activity.    No fevers or chills. No nausea, vomiting, constipation, diarrhea. No chest pain/pressure, palpitations, or shortness of breath. No oral or nasal sores.  Dermatitis of the fingertips and following with dermatology; suspects contact allergy.    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; now working casual    ROS   12 point review of system was completed and negative except as noted in the HPI     Active Problem List     Patient Active Problem List   Diagnosis    AR (allergic rhinitis)    GERD (gastroesophageal reflux disease)    Class 3 severe obesity with serious comorbidity and body mass index (BMI) of 45.0 to 49.9 in adult, unspecified obesity type (H)    Status post bariatric surgery    High risk medication use    Obstructive sleep apnea    Anterior basement membrane dystrophy    Seropositive rheumatoid arthritis (H)    LORNA positive    Carpal tunnel syndrome of right wrist    Headache    Immunosuppression (H)    Fibromyalgia    Pain in joint, ankle and foot, right    Hypertension goal BP (blood pressure) < 140/90    Chronic back pain, unspecified back location, unspecified back pain laterality    Hyperlipidemia LDL goal <100    Mild recurrent major depression (H)    Nuclear sclerosis of both eyes    Chronic bilateral low back pain without sciatica     Past Medical History     Past Medical History:   Diagnosis Date    AR (allergic rhinitis)  as teen    Arthritis 12/14/2015    Chronic obstructive pulmonary disease, unspecified COPD type (H) 03/27/2018    Depressive disorder 3 years ago    GERD (gastroesophageal reflux disease) 04/2007    Headache 07/11/2017    Hypertension goal BP (blood pressure) < 140/90 12/20/2021    Mild major depression (H) 3 years ago     Morbid obesity (H) teens    BRETT (obstructive sleep apnea)     uses CPAP    RA (rheumatoid arthritis) (H) 4 years    Reduced vision 3 years     Past Surgical History     Past Surgical History:   Procedure Laterality Date    ARTHRODESIS FOOT Right 6/30/2021    Procedure: Right 1st metatarsophalangeal joint fusion;  Surgeon: Jesus Wolf MD;  Location: [facility]    ARTHRODESIS TOE(S) Right 12/27/2021    Procedure: right revision great toe fusion;  Surgeon: Ryan Gee MD;  Location: [facility]    BLEPHAROPLASTY BILATERAL Bilateral 8/5/2016    Procedure: BLEPHAROPLASTY BILATERAL;  Surgeon: Cortez Robin MD;  Location: [facility]    BREAST BIOPSY, RT/LT  1-94    Benign    COLONOSCOPY      COLONOSCOPY WITH CO2 INSUFFLATION N/A 3/30/2017    Procedure: COLONOSCOPY WITH CO2 INSUFFLATION;  Surgeon: Issa Weeks MD;  Location: [facility]    ESOPHAGOSCOPY, GASTROSCOPY, DUODENOSCOPY (EGD), COMBINED N/A 10/29/2015    Procedure: COMBINED ESOPHAGOSCOPY, GASTROSCOPY, DUODENOSCOPY (EGD);  Surgeon: Shaq Mims MD;  Location: [facility]    LAPAROSCOPIC CHOLECYSTECTOMY N/A 6/13/2023    Procedure: CHOLECYSTECTOMY, LAPAROSCOPIC;  Surgeon: Reynaldo Segura MD;  Location: [facility]    LAPAROSCOPIC GASTRIC ADJUSTABLE BANDING  11/09/10    Lap band procedure    LAPAROSCOPIC REMOVAL GASTRIC ADJUSTABLE BAND N/A 10/31/2015    Procedure: LAPAROSCOPIC REMOVAL GASTRIC ADJUSTABLE BAND;  Surgeon: Shaq Mims MD;  Location: [facility]    PHACOEMULSIFICATION WITH STANDARD INTRAOCULAR LENS IMPLANT Right 11/7/2024    Procedure: RIGHT EYE PHACOEMULSIFICATION, CATARACT, WITH STANDARD INTRAOCULAR LENS IMPLANT INSERTION;  Surgeon: Sia Herman MD;  Location: [facility] OR    PHACOEMULSIFICATION WITH STANDARD INTRAOCULAR LENS IMPLANT Left 11/14/2024    Procedure: LEFT EYE PHACOEMULSIFICATION, CATARACT, WITH STANDARD INTRAOCULAR LENS IMPLANT INSERTION;  Surgeon: Sia Herman MD;  Location: [facility] OR    [place] HAND/FINGER SURGERY  UNLISTED  2016    [place] STOMACH SURGERY PROCEDURE UNLISTED  11/2015    RELEASE CARPAL TUNNEL Left 4/27/2017    Procedure: RELEASE CARPAL TUNNEL;  Left Open Carpal Tunnel Release;  Surgeon: Ciara Middleton MD;  [facility]: [facility]    RELEASE CARPAL TUNNEL Right 6/15/2017    Procedure: RELEASE CARPAL TUNNEL;  Right Carpal Tunnel Release Open;  Surgeon: Ciara Middleton MD;  Location: [facility]    REPAIR PTOSIS      TUBAL LIGATION  1988    [facility] APPENDECTOMY  1977     Allergy   No Known Allergies  Current Medication List     Current Outpatient Medications   Medication Sig Dispense Refill    acetaminophen (TYLENOL) 500 MG tablet Take 500-1,000 mg by mouth every 6 hours as needed for mild pain.      citalopram (CELEXA) 20 MG tablet Take 1 tablet (20 mg) by mouth daily 90 tablet 3    fexofenadine (ALLEGRA) 180 MG tablet Take 1 tablet (180 mg) by mouth daily 90 tablet 2    fluticasone (FLONASE) 50 MCG/ACT nasal spray Spray 2 sprays into both nostrils as needed.      gabapentin (NEURONTIN) 100 MG capsule TAKE ONE CAPSULE BY MOUTH EVERY MORNING AND TAKE FIVE CAPSULES BY MOUTH EVERY NIGHT AT BEDTIME 540 capsule 2    hydroxychloroquine (PLAQUENIL) 200 MG tablet Take 1 tablet (200 mg) by mouth 2 times daily 180 tablet 2    hydrOXYzine cha (VISTARIL) 25 MG capsule Take 1 capsule (25 mg) by mouth 3 times daily as needed for anxiety 30 capsule 1    leflunomide (ARAVA) 20 MG tablet Take 1 tablet (20 mg) by mouth daily 90 tablet 2    losartan-hydrochlorothiazide (HYZAAR) 100-25 MG tablet Take 1 tablet by mouth daily 90 tablet 3    moxifloxacin (VIGAMOX) 0.5 % ophthalmic solution Place 1 drop into the right eye 3 times daily. 3 mL 11    prednisoLONE acetate (PRED FORTE) 1 % ophthalmic suspension Place 1-2 drops into both eyes 4 times daily. 10 mL 5    sulfaSALAzine (AZULFIDINE) 500 MG tablet Take 3 tablets (1,500 mg) by mouth 2 times daily 540 tablet 2    topiramate (TOPAMAX) 25 MG tablet 25mg at bedtime for week 1, 50mg at  bedtime for 1 week, and 75mg at bedtime thereafter 90 tablet 3    vitamin D3 (CHOLECALCIFEROL) 50 mcg (2000 units) tablet Take 1 tablet (50 mcg) by mouth daily 90 tablet 3    ibuprofen 200 MG capsule Take 600-800 mg by mouth at bedtime.      moxifloxacin (VIGAMOX) 0.5 % ophthalmic solution Place 1 drop into both eyes 4 times daily. 3 mL 1    ORDER FOR DME Use your CPAP device as directed by your provider.      prednisoLONE acetate (PRED FORTE) 1 % ophthalmic suspension Place 1 drop into both eyes 4 times daily. 10 mL 1    predniSONE (DELTASONE) 5 MG tablet Prednisone 20mg daily x5days, then 15mg daily x5days, then 10mg daily x5days, then 5mg daily x5days, then stop. (Patient not taking: Reported on 11/25/2024) 50 tablet 0    Sarilumab 200 MG/1.14ML SOAJ Inject 1.14 mLs (200 mg) subcutaneously every 14 days. Hold for signs of infection and seek medical attention. (Patient not taking: Reported on 12/2/2024) 2.28 mL 3    Semaglutide-Weight Management (WEGOVY) 0.25 MG/0.5ML pen Inject 0.25 mg subcutaneously once a week. For 4 weeks (Patient not taking: Reported on 12/2/2024) 2 mL 0    [START ON 12/8/2024] Semaglutide-Weight Management (WEGOVY) 0.5 MG/0.5ML pen Inject 0.5 mg subcutaneously once a week. After completing 4 weeks of 0.25mg dose (Patient not taking: Reported on 12/2/2024) 2 mL 2     Current Facility-Administered Medications   Medication Dose Route Frequency Provider Last Rate Last Admin    alcohol (dehydrated) (ABLYSINOL) 99 % injection 15 mL  15 mL Other Once Levon Jamil MD         Facility-Administered Medications Ordered in Other Visits   Medication Dose Route Frequency Provider Last Rate Last Admin    sodium chloride bacteriostatic 0.9 % flush 12 mL  12 mL Intravenous Once Nely Matthews MD           Social History   See HPI    Family History     Family History   Problem Relation Age of Onset    Neurologic Disorder Mother 20        migraine    Depression Mother     Mental Illness Mother   "   Heart Disease Father         MI    Neurologic Disorder Father 79        Parkinson's    Diabetes Father     Hypertension Father     Coronary Artery Disease Father     Cancer Maternal Grandmother         uterine    Neurologic Disorder Sister 16        migraine    Depression Sister     Depression Sister     Substance Abuse Sister     Migraines Sister     Neurologic Disorder Son 7        migraine    Substance Abuse Son     Migraines Son         none    Glaucoma No family hx of     Macular Degeneration No family hx of        Physical Exam     Temp Readings from Last 3 Encounters:   11/14/24 97.2  F (36.2  C)   11/07/24 97  F (36.1  C) (Temporal)   11/04/24 98.3  F (36.8  C) (Temporal)     BP Readings from Last 5 Encounters:   12/02/24 (!) 148/77   11/14/24 112/75   11/07/24 122/68   11/04/24 112/68   10/14/24 119/74     Pulse Readings from Last 1 Encounters:   12/02/24 115     Resp Readings from Last 1 Encounters:   12/02/24 18     Estimated body mass index is 46.35 kg/m  as calculated from the following:    Height as of 11/14/24: 1.6 m (5' 2.99\").    Weight as of this encounter: 118.7 kg (261 lb 9.6 oz).    GEN: NAD.   HEENT:  Anicteric, noninjected sclera. No obvious external lesions of the ear and nose. Hearing intact.  CV: S1, S2. RRR. No m/r/g  PULM: No increased work of breathing.    MSK: Synovial swelling and tenderness to palpation of the bilateral 2nd-4th MCPs.  PIPs diffusely tender to palpation but no swelling.  DIPs without swelling or tenderness to palpation.  Heberden's and Pavithra's nodes present.  Wrists with mild tenderness to palpation and subtle synovial swelling but no increased warmth or overlying erythema.   Elbows and shoulders without swelling or tenderness to palpation.   Knees and ankles without swelling or tenderness to palpation.  Negative MTP squeeze bilaterally.    SKIN: No rash or jaundice seen  PSYCH: Alert. Appropriate.      Labs / Imaging (select studies)     CBC  Recent Labs   Lab " Test 11/01/24  1041 08/22/24  1346 05/15/24  0813 08/03/21  1150 05/10/21  0802 02/02/21  1056 10/12/20  1113   WBC 6.2 7.4 5.8   < > 5.1 7.4 6.1   RBC 4.07 3.99 4.24   < > 4.48 4.41 4.18   HGB 12.0 11.7 12.6   < > 13.5 13.4 12.7   HCT 37.7 37.4 39.0   < > 42.8 42.8 39.7   MCV 93 94 92   < > 96 97 95   RDW 13.5 13.9 13.2   < > 12.8 12.5 12.9    286 270   < > 193 216 221   MCH 29.5 29.3 29.7   < > 30.1 30.4 30.4   MCHC 31.8 31.3* 32.3   < > 31.5 31.3* 32.0   NEUTROPHIL 53 57 55   < > 42.6 40.1 40.8   LYMPH 29 25 23   < > 26.4 34.8 33.4   MONOCYTE 12 11 11   < > 9.5 10.7 8.9   EOSINOPHIL 6 6 9   < > 19.5 12.6 15.6   BASOPHIL 1 1 1   < > 1.8 1.4 1.0   ANEU  --   --   --   --  2.2 3.0 2.5   ALYM  --   --   --   --  1.3 2.6 2.0   KIMBERLY  --   --   --   --  0.5 0.8 0.5   AEOS  --   --   --   --  1.0* 0.9* 1.0*   ABAS  --   --   --   --  0.1 0.1 0.1   ANEUTAUTO 3.3 4.2 3.2   < >  --   --   --    ALYMPAUTO 1.8 1.9 1.4   < >  --   --   --    AMONOAUTO 0.7 0.8 0.6   < >  --   --   --    AEOSAUTO 0.4 0.5 0.5   < >  --   --   --    ABSBASO 0.1 0.1 0.1   < >  --   --   --     < > = values in this interval not displayed.     CMP  Recent Labs   Lab Test 11/01/24  1041 08/22/24  1346 05/15/24  0813 05/10/24  1009 10/12/23  1025 06/13/23  0951 06/13/23  0522 08/03/21  1150 05/10/21  0802 02/02/21  1056 10/12/20  1113     --   --  140  --   --  140   < > 140  --   --    POTASSIUM 3.9  --   --  4.2  --   --  3.4   < > 4.3  --   --    CHLORIDE 103  --   --  103  --   --  101   < > 110*  --   --    CO2 30*  --   --  24  --   --  24   < > 25  --   --    ANIONGAP 6*  --   --  13  --   --  15   < > 5  --   --    GLC 92  --   --  98  --  99 138*   < > 99 74  --    BUN 12.1  --   --  13.6  --   --  17.1   < > 12  --   --    CR 0.71 0.67  --  0.70   < >  --  0.69   < > 0.73 0.65 0.74   GFRESTIMATED >90 >90  --  >90   < >  --  >90   < > 88 >90 87   GFRESTBLACK  --   --   --   --   --   --   --   --  >90 >90 >90   ISH 9.4  --   --   9.5  --   --  9.6   < > 8.6  --   --    BILITOTAL 0.5 0.3 0.4  --    < >  --  0.5   < > 0.6 0.4 0.5   ALBUMIN 4.1 4.1 4.2  --    < >  --  4.3   < > 3.8 4.0 3.8   PROTTOTAL 7.2 7.2 7.3  --    < >  --  7.2   < > 7.1 7.1 6.9   ALKPHOS 91 90 102  --    < >  --  95   < > 103 115 107   AST 29 28 20  --    < >  --  26   < > 33 36 23   ALT 19 9 15  --    < >  --  29   < > 54* 58* 42    < > = values in this interval not displayed.     Calcium/VitaminD  Recent Labs   Lab Test 11/01/24  1041 05/10/24  1009 06/13/23  0522 05/10/21  0802 10/12/20  1113 03/31/20  0755   ISH 9.4 9.5 9.6   < >  --   --    VITDT  --   --   --   --  33 15*    < > = values in this interval not displayed.     ESR/CRP  Recent Labs   Lab Test 11/01/24  1041 08/22/24  1346 01/15/24  1130 06/12/23  1247 11/16/22  0937 06/24/22  0741 03/11/22  0910   SED 30 36* 20   < > 6 5 5   CRP  --   --   --   --  <2.9 <2.9 <2.9   CRPI 8.50* 11.70* <3.00   < >  --   --   --     < > = values in this interval not displayed.     Lipid Panel  Recent Labs   Lab Test 11/01/24  1041 05/10/24  1009 06/24/22  0741   CHOL 205* 199 213*   TRIG 262* 202* 274*   HDL 46* 47* 48*   * 112* 110*   NHDL 159* 152* 165*     Hepatitis B  Recent Labs   Lab Test 06/12/23  1247   HBCAB Nonreactive   HEPBANG Nonreactive     Hepatitis C  Recent Labs   Lab Test 06/12/23  1247   HCVAB Nonreactive     Tuberculosis Screening  Recent Labs   Lab Test 06/12/23  1247 11/30/21  0806 10/31/17  1400 02/02/17  0822   TBRES Negative Negative  --   --    TBRSLT  --   --  Negative Negative   TBAGN  --   --  0.05 0.00     HIV Screening  Recent Labs   Lab Test 07/24/18  0738   HIAGAB Nonreactive     Immunization History     Immunization History   Administered Date(s) Administered    COVID-19 12+ (Pfizer) 10/13/2023    COVID-19 Bivalent 12+ (Pfizer) 03/30/2023    COVID-19 MONOVALENT 12+ (Pfizer) 12/21/2020, 01/07/2021, 08/27/2021    COVID-19 Monovalent 18+ (Moderna) 03/08/2022    Flu, Unspecified  10/12/2020    Influenza (High Dose) Trivalent,PF (Fluzone) 11/01/2022, 09/29/2023, 10/23/2024    Influenza (intradermal) 10/04/2011, 10/01/2012    Influenza Vaccine (Flucelvax Quadrivalent) 10/15/2019    Influenza Vaccine 18-64 (Flublok) 09/15/2021    Influenza Vaccine >6 months,quad, PF 10/15/2013, 09/15/2014, 09/28/2015, 09/28/2016, 10/16/2017, 10/01/2018    Pneumo Conj 13-V (2010&after) 04/15/2016    Pneumococcal 20 valent Conjugate (Prevnar 20) 07/01/2022    Pneumococcal 23 valent 07/15/2016    TD,PF 7+ (Tenivac) 10/30/2020    TDAP Vaccine (Adacel) 02/09/2011    Zoster recombinant adjuvanted (SHINGRIX) 04/17/2019, 08/28/2019          Chart documentation done in part with Dragon Voice recognition Software. Although reviewed after completion, some word and grammatical error may remain.     Freddy Guerra MD

## 2024-12-10 ENCOUNTER — THERAPY VISIT (OUTPATIENT)
Dept: PHYSICAL THERAPY | Facility: CLINIC | Age: 69
End: 2024-12-10
Payer: COMMERCIAL

## 2024-12-10 DIAGNOSIS — M54.50 CHRONIC BILATERAL LOW BACK PAIN WITHOUT SCIATICA: Primary | ICD-10-CM

## 2024-12-10 DIAGNOSIS — G89.29 CHRONIC BILATERAL LOW BACK PAIN WITHOUT SCIATICA: Primary | ICD-10-CM

## 2024-12-10 PROCEDURE — 97110 THERAPEUTIC EXERCISES: CPT | Mod: GP | Performed by: PHYSICAL THERAPIST

## 2024-12-10 PROCEDURE — 97530 THERAPEUTIC ACTIVITIES: CPT | Mod: GP | Performed by: PHYSICAL THERAPIST

## 2024-12-23 ENCOUNTER — OFFICE VISIT (OUTPATIENT)
Dept: OPTOMETRY | Facility: CLINIC | Age: 69
End: 2024-12-23
Payer: COMMERCIAL

## 2024-12-23 DIAGNOSIS — H26.491 RIGHT POSTERIOR CAPSULAR OPACIFICATION: ICD-10-CM

## 2024-12-23 DIAGNOSIS — H52.223 REGULAR ASTIGMATISM OF BOTH EYES: ICD-10-CM

## 2024-12-23 DIAGNOSIS — Z01.00 EXAMINATION OF EYES AND VISION: Primary | ICD-10-CM

## 2024-12-23 DIAGNOSIS — H52.4 PRESBYOPIA: ICD-10-CM

## 2024-12-23 DIAGNOSIS — H18.523 ANTERIOR BASEMENT MEMBRANE DYSTROPHY OF BOTH EYES: ICD-10-CM

## 2024-12-23 DIAGNOSIS — Z79.899 LONG-TERM USE OF PLAQUENIL: ICD-10-CM

## 2024-12-23 DIAGNOSIS — Z96.1 PSEUDOPHAKIA OF BOTH EYES: ICD-10-CM

## 2024-12-23 PROCEDURE — 92015 DETERMINE REFRACTIVE STATE: CPT | Performed by: OPTOMETRIST

## 2024-12-23 PROCEDURE — 92014 COMPRE OPH EXAM EST PT 1/>: CPT | Performed by: OPTOMETRIST

## 2024-12-23 ASSESSMENT — REFRACTION_WEARINGRX
OD_AXIS: 055
OS_ADD: +2.50
OD_CYLINDER: +0.50
OS_CYLINDER: +0.75
SPECS_TYPE: OTC READER
OD_ADD: +2.50
OD_SPHERE: -0.25
OS_SPHERE: -0.25
OS_SPHERE: +2.00
OS_AXIS: 030
OD_SPHERE: +2.00

## 2024-12-23 ASSESSMENT — REFRACTION_MANIFEST
OD_AXIS: 135
OD_ADD: +2.50
OS_ADD: +2.50
OS_CYLINDER: +0.75
OS_AXIS: 070
METHOD_AUTOREFRACTION: 1
OD_CYLINDER: +0.50
OD_SPHERE: -0.50
OS_SPHERE: -0.25

## 2024-12-23 ASSESSMENT — EXTERNAL EXAM - RIGHT EYE: OD_EXAM: NORMAL

## 2024-12-23 ASSESSMENT — CONF VISUAL FIELD
OS_NORMAL: 1
OD_INFERIOR_NASAL_RESTRICTION: 0
OD_SUPERIOR_TEMPORAL_RESTRICTION: 0
OS_INFERIOR_TEMPORAL_RESTRICTION: 0
OS_INFERIOR_NASAL_RESTRICTION: 0
OS_SUPERIOR_NASAL_RESTRICTION: 0
OD_SUPERIOR_NASAL_RESTRICTION: 0
OD_NORMAL: 1
OS_SUPERIOR_TEMPORAL_RESTRICTION: 0
OD_INFERIOR_TEMPORAL_RESTRICTION: 0

## 2024-12-23 ASSESSMENT — VISUAL ACUITY
OS_SC: 20/40
OD_SC+: -2
CORRECTION_TYPE: GLASSES
OD_SC: 20/30
OD_CC: 20/40-2
OS_CC: 20/40
OS_SC+: -1
METHOD: SNELLEN - LINEAR

## 2024-12-23 ASSESSMENT — KERATOMETRY
OD_AXISANGLE_DEGREES: 113
OD_K1POWER_DIOPTERS: 45.00
OS_K1POWER_DIOPTERS: 45.00
OD_K2POWER_DIOPTERS: 46.25
OS_AXISANGLE2_DEGREES: 157
OS_K2POWER_DIOPTERS: 46.25
OS_AXISANGLE_DEGREES: 067
OD_AXISANGLE2_DEGREES: 023

## 2024-12-23 ASSESSMENT — TONOMETRY
IOP_METHOD: APPLANATION
OD_IOP_MMHG: 20
OS_IOP_MMHG: 19

## 2024-12-23 ASSESSMENT — REFRACTION_FINALRX
OD_HPRISM: 1.5
OD_HBASE: OUT
OS_HBASE: OUT
OS_HPRISM: 1.5

## 2024-12-23 ASSESSMENT — CUP TO DISC RATIO
OS_RATIO: 0.3
OD_RATIO: 0.3

## 2024-12-23 ASSESSMENT — SLIT LAMP EXAM - LIDS
COMMENTS: MEIBOMIAN GLAND DYSFUNCTION,
COMMENTS: MEIBOMIAN GLAND DYSFUNCTION

## 2024-12-23 ASSESSMENT — EXTERNAL EXAM - LEFT EYE: OS_EXAM: NORMAL

## 2024-12-23 NOTE — PATIENT INSTRUCTIONS
Eyeglass prescription given with prism to help with double vision while driving.  Give the glasses 1-2 weeks to adjust to the new prescription.  You may get headaches or eyestrain as your eyes get used to the new prescription.  Sometimes the symptoms get worse before it gets better.  If any problems after 1-2 weeks schedule an appointment for a recheck.      You are due for your yearly Plaquenil testing in July.  Ok to schedule with Dr. Ponce Lindo at [place] or at the [place] [facility].    Artificial tears- 1 drop both eyes once before bedtime and once in the morning then 2 x day as needed.      Return in 1 year for a complete eye exam or sooner if needed.    Robles Valderrama OD      Optometry Providers       Clinic Locations                                 Telephone Number   Dr. Theresa Carroll    [facility]  [facility] [phone]     [facility] Hours:                [facility] Hours:       [facility] Hours:   [address]   [address]     [address] [place] [address]   [place] [address]    [place] [address]  Phone: [phone]                    Phone: [phone]     Phone: [phone]                      Monday 8:00-6:00                          Monday 8:00-6:00                          Monday 8:00-6:00              Tuesday 8:00-6:00 Tuesday 8:00-6:00                          Tuesday 8:00-6:00              Wednesday 8:00-6:00                  Wednesday 8:00-6:00                   Wednesday 8:00-6:00      Thursday 8:00-6:00                        Thursday 8:00-6:00                         Thursday 8:00-6:00            Friday 8:00-5:00                              Friday 8:00-5:00                              Friday 8:00-5:00    [facility]  Optical Hours:   [address] [place] [address]  [phone]    Monday 9:00-6:00  Tuesday 9:00-6:00  Wednesday 9:00-6:00  Thursday 9:00-6:00  Friday 9:00-5:00  As always, Thank you for trusting us with your health care needs!

## 2024-12-23 NOTE — LETTER
12/23/2024      Sharon Fung  [address] [address]      Dear Colleague,    Thank you for referring your patient, Sharon Fung, to the [facility]. Please see a copy of my visit note below.    Chief Complaint   Patient presents with     Annual Eye Exam     CE after cataracts per patient, patient states needs prism for driving       Accompanied by grand daughter   Last Eye Exam: 7-  Dilated Previously: Yes    What are you currently using to see?  Otc readers       Distance Vision Acuity: Not Satisfied with vision sees double when driving    Near Vision Acuity: Satisfied with vision while reading  with otc readers    Eye Comfort: good and dry  Do you use eye drops? : Yes: systane- every few hours  Occupation or Hobbies: RN    History of cataract surgery both eyes   Right eye-11/7/2024 with Dr. Herman  Left eye-11/14/2024    History of anterior basement membrane dystrophy   History of Superficial keratectomy both eyes with Dr. Jamil  Right eye -5/23/2023  Left eye-4/21/2023    History of prism in glasses- 2 Base Out pre cataract surgery- Sees double while driving- side by side images    Long-term use of hydroxychloroquine  (primary encounter diagnosis)  Comment: [id number], restarted 5/2024. Plaquenil Visual Field was 7/19/2024 with Dr. Lindo- recommended yearly testing.    History of BULB 2016 with Dr. Cortez Robin.      Tanja Do Optometric Assistant, A.B.O.C.      Medical, surgical and family histories reviewed and updated 12/23/2024.       OBJECTIVE: See Ophthalmology exam    ASSESSMENT:    ICD-10-CM    1. Examination of eyes and vision  Z01.00       2. Presbyopia  H52.4       3. Regular astigmatism of both eyes  H52.223       4. Pseudophakia of both eyes  Z96.1       5. Right posterior capsular opacification  H26.491       6. Long-term use of Plaquenil  Z79.899       7. Anterior basement membrane dystrophy of both eyes  H18.523            PLAN:     Patient Instructions   Eyeglass prescription given with prism to help with double vision while driving.  Give the glasses 1-2 weeks to adjust to the new prescription.  You may get headaches or eyestrain as your eyes get used to the new prescription.  Sometimes the symptoms get worse before it gets better.  If any problems after 1-2 weeks schedule an appointment for a recheck.      You are due for your yearly Plaquenil testing in July.  Ok to schedule with Dr. Ponce Lindo at [place] or at the [place] [facility].    Artificial tears- 1 drop both eyes once before bedtime and once in the morning then 2 x day as needed.      Return in 1 year for a complete eye exam or sooner if needed.    Robles Valderrama, OD         Again, thank you for allowing me to participate in the care of your patient.        Sincerely,        Robles Valderrama, OD    Electronically signed

## 2024-12-23 NOTE — PROGRESS NOTES
Chief Complaint   Patient presents with    Annual Eye Exam     CE after cataracts per patient, patient states needs prism for driving       Accompanied by grand daughter   Last Eye Exam: 7-  Dilated Previously: Yes    What are you currently using to see?  Otc readers       Distance Vision Acuity: Not Satisfied with vision sees double when driving    Near Vision Acuity: Satisfied with vision while reading  with otc readers    Eye Comfort: good and dry  Do you use eye drops? : Yes: systane- every few hours  Occupation or Hobbies: RN    History of cataract surgery both eyes   Right eye-11/7/2024 with Dr. Herman  Left eye-11/14/2024    History of anterior basement membrane dystrophy   History of Superficial keratectomy both eyes with Dr. Jamil  Right eye -5/23/2023  Left eye-4/21/2023    History of prism in glasses- 2 Base Out pre cataract surgery- Sees double while driving- side by side images    Long-term use of hydroxychloroquine  (primary encounter diagnosis)  Comment: [id number], restarted 5/2024. Plaquenil Visual Field was 7/19/2024 with Dr. Lindo- recommended yearly testing.    History of BULB 2016 with Dr. Cortez Robin.      Tanja Do Optometric Assistant, A.B.O.C.      Medical, surgical and family histories reviewed and updated 12/23/2024.       OBJECTIVE: See Ophthalmology exam    ASSESSMENT:    ICD-10-CM    1. Examination of eyes and vision  Z01.00       2. Presbyopia  H52.4       3. Regular astigmatism of both eyes  H52.223       4. Pseudophakia of both eyes  Z96.1       5. Right posterior capsular opacification  H26.491       6. Long-term use of Plaquenil  Z79.899       7. Anterior basement membrane dystrophy of both eyes  H18.523           PLAN:     Patient Instructions   Eyeglass prescription given with prism to help with double vision while driving.  Give the glasses 1-2 weeks to adjust to the new prescription.  You may get headaches or eyestrain as your eyes get used to the new  prescription.  Sometimes the symptoms get worse before it gets better.  If any problems after 1-2 weeks schedule an appointment for a recheck.      You are due for your yearly Plaquenil testing in July.  Ok to schedule with Dr. Ponce Lindo at [place] or at the [place] [facility].    Artificial tears- 1 drop both eyes once before bedtime and once in the morning then 2 x day as needed.      Return in 1 year for a complete eye exam or sooner if needed.    Robles Valderrama, OD

## 2024-12-26 ENCOUNTER — VIRTUAL VISIT (OUTPATIENT)
Dept: PHARMACY | Facility: CLINIC | Age: 69
End: 2024-12-26
Payer: COMMERCIAL

## 2024-12-26 VITALS — BODY MASS INDEX: 46.34 KG/M2 | HEIGHT: 63 IN

## 2024-12-26 DIAGNOSIS — F33.0 MILD RECURRENT MAJOR DEPRESSION (H): ICD-10-CM

## 2024-12-26 DIAGNOSIS — E66.813 CLASS 3 SEVERE OBESITY WITH SERIOUS COMORBIDITY AND BODY MASS INDEX (BMI) OF 45.0 TO 49.9 IN ADULT, UNSPECIFIED OBESITY TYPE (H): Primary | ICD-10-CM

## 2024-12-26 DIAGNOSIS — J30.81 ALLERGIC RHINITIS DUE TO ANIMAL HAIR AND DANDER: ICD-10-CM

## 2024-12-26 DIAGNOSIS — E66.01 CLASS 3 SEVERE OBESITY WITH SERIOUS COMORBIDITY AND BODY MASS INDEX (BMI) OF 45.0 TO 49.9 IN ADULT, UNSPECIFIED OBESITY TYPE (H): Primary | ICD-10-CM

## 2024-12-26 DIAGNOSIS — M05.9 SEROPOSITIVE RHEUMATOID ARTHRITIS (H): ICD-10-CM

## 2024-12-26 DIAGNOSIS — I10 HYPERTENSION GOAL BP (BLOOD PRESSURE) < 140/90: ICD-10-CM

## 2024-12-26 DIAGNOSIS — E55.9 VITAMIN D DEFICIENCY: ICD-10-CM

## 2024-12-26 ASSESSMENT — PAIN SCALES - GENERAL: PAINLEVEL_OUTOF10: MODERATE PAIN (5)

## 2024-12-26 NOTE — Clinical Note
Doing well on topiramate, no side effect and is really feeling reduction in cravings / hunger.  Wegovy was denied recently, however, newly approved indication for Zepbound for sleep apnea, she would like to pursue, would you be agreeable to pursuing coverage, if so I can place order / trigger Prior Authorization  - Joyce CASH 
Yes

## 2024-12-26 NOTE — PATIENT INSTRUCTIONS
"Recommendations from MTM Pharmacist visit:                                                    MTM (medication therapy management) is a service provided by a clinical pharmacist designed to help you get the most of out of your medicines.  You may be sent a phone or email survey evaluating today's visit.  Please provide feedback you have for the service he received today if you are able.      Zepbound was newly approved for sleep apnea, could pursue coverage under this indication   - will consult with Amber Manley PA-C and initiate Prior Authorization      Could consider having blood pressure checked at [facility] as they will add this reading to your medical record    Water goal:  at least 64 oz per day    - try to increase by one 30 Oz esperanza cup per day    Follow-up:  - Next MTM 6-8 weeks after Zepbound start if started or 6 months if not    - Next provider visit: 4/29/25 Amber Manley PA-C      It was great speaking with you today.  I value your experience and would be very thankful for your time in providing feedback in our clinic survey. In the next few days, you may receive an email or text message from [facility] [employer] with a link to a survey related to your  clinical pharmacist.\"     To schedule another MTM appointment, please call the clinic directly ([facility] Phone Number: [phone] (schedules for [facility] and [facility] - providers, dietitians, health coaches) or you may call the MTM scheduling line at [phone] or toll-free at [phone].     My Clinical Pharmacist's contact information:                                                      Please feel free to contact me with any questions or concerns you have.      Joyce Nice, PharmD    Medication Therapy Management Pharmacist   [facility]      Zepbound (Tirzepatide)    Zepbound (Tirzepatide): is an injectable prescription medicine " recently FDA approved for adults with obesity or overweight with an additional condition affected by their weight (type 2 diabetes, high blood pressure, high cholesterol, obstructive sleep apnea, etc). Zepbound contains the same active ingredient as what is in Mounjaro, an injectable FDA approved for Type 2 Diabtes. Zepbound is intended to be used along with dietary/behavioral changes and consistent exercise to improve assist with weight loss and other health improvement outcomes.     It is given as a shot once a week. It activates the body's receptors for GIP (glucose-dependent insulinotropic polypeptide) and GLP-1 (glucagon-like peptide-1) receptor, two naturally occurring hormones that help tell the brain that you are full. It also works is by slowing down how quickly food leaves your stomach. What this means for you: You will start to feel bustamante more quickly, notice portion changes and eat less often between meals. It is still important to maintain consistent eating schedule with meals +/- snacks and get in good hydration.      Dosing:  Initial dose: 2.5 mg injected subcutaneously once weekly for 4 weeks  Further dose changes can include: increase to 5 mg once weekly for 4 weeks, then 7.5 mg once weekly for 4 weeks, then 10 mg once weekly for 4 weeks then 12.5 mg once weekly for 4 weeks, then 15 mg (maximum dose) once weekly.    Dose changes are based on how you are tolerating the current dose, what benefits you are seeing at the current dose and if improvement in effectiveness of the drug is needed. The goal is to find the dose that works best for you with the least amount of side effects. You may find you reach this at a lower dose than the maximum dose.     Side effects: Patients are advised to eat more slowly and purposefully. Give yourself less portions. You may find after starting this medication you have a new point of fullness. It is also important to stay adequately hydrated on the medication to reduce  "risks of some of these side effects. Many of these side effects (nausea, diarrhea, etc) occurred during dose escalation but did improve over time with continuing the medication. If side effects are unmanageable, reach out to your prescribing provider.     The risk of pancreatitis (inflammation of the pancreas) has been associated with this type of medication, but is very rare.  If you have had pancreatitis in the past, this medication may not be for you. Please let us know about any past history of pancreas problems. If you experience persistent severe abdominal pain (sometimes radiating to the back potentially accompanied by vomiting and have a fever), stop the medication and contact your provider immediately for assessment. They will do a blood test to check for pancreatitis.       How to Inject:   https://www.zepb[employer]ion [employer]rapeutics.[employer].com/how-to-use    Storage:   Store your pen in the refrigerator. You may store your pen at room temperature for up to 21 days. If you store the pen at room temperature, do not return the pen to the refrigerator. Discard the pen if not used within 21 days after removing from the refrigerator.    For any questions or concerns please send a [employer] message to our team or call our weight management call center at [phone] during regular business hours.           Quick/Easy Protein Sources:  Hard boiled eggs  Part-skim cheese sticks  Baby Bell cheese rounds  Low-fat/low-sugar Greek yogurt  Low-fat cottage cheese  Lean deli meat (chicken/turkey/ham)  Roasted chickpeas or lentils  Nuts   Turkey meat stick  Protein shake/bar  \"P3\" snack (cheese, nuts, deli meat)  Aldi's \"Protein Bread\"   \"Egglife\" egg white wrap    Tuna/salmon can/packet     Protein Supplements:   Unflavored protein powder: Genepro, Isopure (25-30 gm protein per 1 scoop); Vital Proteins collagen powder (1 tbsp = 5 gm pro)  You may also use flavored protein powder if you prefer.   Protein shots: " https://store.bariatricpal.com/collections/protein-shots  Pre-made protein shakes (no more than 210 Calories, at least 20 grams of protein, and less than 10 grams of sugar):   Premier Protein (160 Calories, 30 g protein)  Boost/Ensure Max (160 calories, 30 gm protein)   Fairlife Core Power (170 calories, 26 gm protein)  Aldi's Elevation Protein Shake (160 calories, 30 gm protein)   Equate Protein Shake (160 calories, 30 gm protein)  Slim Fast Advanced Nutrition (180 Calories, 20 g protein)  Muscle Milk, lactose-free, 17 oz bottle (210 Calories, 30 g protein)  Glucerna Protein Smart (150 emeka, 30 gm protein)  Clear Protein Drinks:  BiPro  Premier Protein clear  [id number]  M Health Protein 15 Concentrate  Bone broth  Beneprotein protein powder mixed with 4-6 oz of fluid  [id number]  Gatorade Zero with Protein   Vital Proteins collagen powder mixed with clear fluid    Meal Replacement Shake Options:   *Protein Shake Criteria: no more than 210 Calories, at least 20 grams of protein, and less than 10 grams of sugar   Premier Protein (160 Calories, 30 g protein)  Slim Fast Advanced Nutrition (180 Calories, 20 g protein)  Muscle Milk, lactose-free, 17 oz bottle (210 Calories, 30 g protein)  Integrated Supplements, no artificial sugars (110 Calories, 20 g protein)  Boost/Ensure Max (160 calories, 30 gm protein)   Fairlife Protein Shakes (160-230 calories, 26-42 gm protein)  Aldi's Elevation Protein Powder (180 calories, 30 gm protein)   Orgain Protein Shakes (130-160 calories, 20-26 gm protein)     Meal Replacement Bar Options:  Quest Protein Bars (190 Calories, 20 g protein)  Built Bar (170 Calories, 15-20 g protein)  One Protein Bar (210 calories, 20 g protein)  Frausto Signature Protein Bar ([employer]co) (190 Calories, 21 g protein)  Pure Protein Bars (180 Calories, 21 g protein)    Low Calorie Frozen Meal:  Healthy Choice Power Bowls  Lean Cuisine  Smart Ones  Wei Clark  Delights    ---------------------------------------------------------------  Tips to Increase the Protein in Your Diet  You may need more protein in your diet to help you heal from an illness, surgery or wound. Extra protein can also help you gain weight. Here are some ideas for adding high-protein foods to your meals.  Meat and fish  Add chopped cooked meat to vegetables, salads, casseroles, soups, sauces and biscuit dough.  Use in omelets, soufflés, quiches, sandwich fillings and chicken or turkey stuffing.  Wrap in pie crust or biscuit dough to make a turnover.  Add to stuffed baked potatoes.  Make a dip with diced meat or flaked fish mixed with sour cream and spices.  Chopped, hard-cooked eggs  Add to salads.  Use for snacks and sandwich filling.  High-protein milk  To make high-protein milk, mix 1 quart whole milk with 1 cup powdered milk.  Add to cream soups, mashed potatoes, scrambled eggs, cereals and dried eggnog mix.  Use as an ingredient in puddings, custards, hot chocolate, milk shakes and pancakes.  Powdered milk  If you don't have any high-protein milk on hand, you can use powdered milk. Add 3 tablespoons to:  gravies, sauces, cream soups, mayonnaise  casseroles, meat patties, meatloaf, tuna salad, deviled ham  scalloped or mashed potatoes, creamed spinach  scrambled eggs, egg salad  cereals  yogurt, milk drinks, ice cream, frozen desserts, puddings, custards.  Add 4 to 6 tablespoons powdered milk to make:  cream sauces  breads, muffins, pancakes, waffles, cookies, cakes  cream pies, frostings, cake fillings  fruit cobblers, bread or rice pudding, gelatin desserts.  For high-protein eggnog, add 3 to 6 tablespoons powdered milk to prepared eggnog.  Hard or soft cheese  Melt on sandwiches, breads, tortillas, hamburgers, hot dogs, other meats, vegetables, eggs and pies.  Grate into soups, chili, sauces, casseroles, vegetables, potatoes, rice, noodles or meatloaf.  Eat with toast or crackers, or melt for  daphne dip.  Cottage cheese or ricotta cheese  Mix with or scoop on top of fruits and vegetables.  Add to casseroles, lasagna, spaghetti, noodles and egg dishes (omelets, scrambled eggs, soufflés).  Use in gelatin, pudding-type desserts, cheesecake and pancake batter.  Use to stuff crepes, pasta shells or manicotti.  Fruit yogurt  Blend with fruits for a fruit smoothie.  Use as a dip for fruits and vegetables.  Scoop on top of pancakes or waffles.  Tofu  Blend silken tofu with fruits and juices for a smoothie.  Add chunks of firm tofu to soups and stews, or crumble into meatloaf.  Blend dried onion soup mix into soft or silken tofu for dip.  Use pureed silken tofu for part of the mayonnaise, sour cream, cream cheese or ricotta cheese called for in recipes.  Beans  Use cooked beans or peas in soups, casseroles, pasta, tacos and burritos.  Nuts and seeds  Note: For children under 3, discuss with the child's care team.  Use in casseroles, breads, muffins, pancakes, cookies and waffles.  Sprinkle on fruits, cereals, ice cream, yogurt, vegetables and salads.  Mix with raisins, dried fruits and chocolate chips for a snack.  Nut butters  Note: For children under 3, discuss with the child's care team.  Spread on sandwiches, toast, muffins, crackers, waffles, pancakes and fruit slices.  Use as a dip for raw vegetables.  Blend with milk drinks, or swirl through ice cream, yogurt or hot cereal.  Nutrition supplements (nutrition bars, drinks and powders)  Add powders to milk drinks and desserts.  Mix with ice cream, milk and fruit for a high-protein milk shake.    For informational purposes only. Not to replace the advice of your health care provider. Clinically reviewed by Linda Fletcher, DAVID, LD, and the Clinical Nutrition Service Line. Copyright   2005 [facility]. All rights reserved. [employer] [id number] - REV  "04/24.  -----------------------------------------------------------------------------------------------------------------  Learning About High-Protein Foods  What foods are high in protein?     The foods you eat contain nutrients, such as vitamins and minerals. Protein is a nutrient. Your body needs the right amount to stay healthy and work as it should. You can use the list below to help you make choices about which foods to eat.  Here are some examples of foods that are high in protein.  Dairy and dairy alternatives  Cheese  Milk  Soy milk  Yogurt (especially Greek)  Meat  Beef  Chicken  Ham  Lamb  Lunch meat  Pork  Sausage  Turkey  Other protein foods  Beans (black, garbanzo, kidney, lima)  Eggs  Hummus  Lentils  Nuts  Peanut butter and other nut butters  Peas  Soybeans  Tofu  Veggie or soy neeraj (Check the nutrition label for the amount of protein in each serving.)  Seafood  Anchovies  Cod  Crab  Halibut  Oxly  Sardines  Shrimp  Tilapia  Carver  Tuna  Protein supplements  Bars (Check the nutrition label for the amount of protein in each serving.)  Drinks  Powders  Work with your doctor to find out how much of this nutrient you need. Depending on your health, you may need more or less of it in your diet.  Where can you learn more?  Go to https://www.LgDb.com.net/patiented  Enter P335 in the search box to learn more about \"Learning About High-Protein Foods.\"  Current as of: September 20, 2023               Content Version: 14.0    8822-5529 [employer].   Care instructions adapted under license by your healthcare professional. If you have questions about a medical condition or this instruction, always ask your healthcare professional. [employer] disclaims any warranty or liability for your use of this information.   "

## 2024-12-26 NOTE — NURSING NOTE
Current patient location: home     Is the patient currently in the state of [place]? YES    Visit mode:TELEPHONE    If the visit is dropped, the patient can be reconnected by: TELEPHONE VISIT: Phone number: [phone]    Will anyone else be joining the visit? NO  (If patient encounters technical issues they should call [phone] :[phone])    Are changes needed to the allergy or medication list? Pt stated no changes to allergies and Pt stated no med changes    Are refills needed on medications prescribed by this physician? NO    Rooming Documentation:  Not applicable      Reason for visit: Medication Therapy Management    Wt other than 24 hrs: no wt given    Pain more than one location:  no  Kayley RODRIGEZ

## 2024-12-26 NOTE — PROGRESS NOTES
Medication Therapy Management (MTM) Encounter    ASSESSMENT:                            Medication Adherence/Access: See below for considerations.    Weight Management/BRETT:   Sharon would benefit from continue therapy with topiramate 75 mg daily.  She is tolerating therapy, no reported side effects and is effective for weight management with reduced cravings and hunger. Discussed Zepbound has recent new approved indication for sleep apnea which she has diagnosis off and uses CPAP nightly, she would like to pursue coverage for Zepbound under this new indication. Will consult with Amber Manley PA-C and initiate Prior Authorization if agreeable.     Allergy:   Stable.      Mental Health /Anxiety and Depression:  Stable.      Hypertension:   Most recent blood pressure was above goal of <140/90 mmHg but this was possibly related to driving to clinic in snowy / bad weather, previous blood pressure readings well below goal.  Discussed [facility] can take blood pressure and add reading to medical record.      Rheumatoid Arthritis:   Stable.     Vitamin D Deficiency:   Stable.     PLAN:                            Zepbound was newly approved for sleep apnea, could pursue coverage under this indication   - will consult with Amber Manley PA-C and initiate Prior Authorization      Could consider having blood pressure checked at [facility] as they will add this reading to your medical record    Water goal:  at least 64 oz per day    - try to increase by one 30 Oz esperanza cup per day    Follow-up:  - Next MTM 6-8 weeks after Zepbound start if started or 6 months if not    - Next provider visit: 4/29/25 Amber Manley PA-C      SUBJECTIVE/OBJECTIVE:                          Sharon Fung is a 69 year old female seen for an initial visit. She was referred to me from Amber Manley PA-C.      Reason for visit: comprehensive medication review and topiramate maintenance     Allergies/ADRs: Reviewed in  chart  Past Medical History: Reviewed in chart  Tobacco: She reports that she has never smoked. She has never been exposed to tobacco smoke. She has never used smokeless tobacco.  Alcohol: not currently using, rarely   Caffeine: 2 cups coffee / day, 2 cans diet coke when at work (4 shifts / month)    Medication Adherence/Access:   - Patient takes medications directly from bottles.  - Patient takes medications 2 time(s) per day.   - Per patient, misses medication 0 time(s) per week.     Weight Management /BRETT:   - Topiramate 75 mg once daily   - med start:  11/2024, dose start: 12/2024     Has been on 75 mg dose of topiramate for about 3-4 weeks, has noticed significant reduction in cravings (sweets) and reduced hunger.  No drowsiness, tingling, memory / word finding difficulty, or taste changes, has fatigue with RA and has not noticed worsened fatigue.  Does not have scale at home, will purchase, but feels like she has lost weight, clothes are looser and  asked if she has lost weight.  Discussed Zepbound has new indication for sleep apnea which medicare D may cover.  She uses CPAP every night, would like to pursue coverage.     Nutrition/Eating Habits:  declined    - Water per day:  at least 32 Oz, uses 30 Oz esperanza   Sleep:  8 hrs / night, restful with CPAP  Exercise/Activity: lost coverage on RA medication and has had worse fatigue / pain, hopes to get back to the gym once she can restart therapy.   Medications Tried/Failed:  Wegovy: not covered    - Last provider visit: 11/8/24 Amber Manley PA-C    - Next provider visit: 4/29/25 Amber Manley PA-C      Initial Consult Weight: 260 lb (11/8/24)     Current weight today: 0 lbs 0 oz  - does not have a scale yet  Cumulative neutral : -0 lb, -0% from baseline (no recent weights)     Wt Readings from Last 4 Encounters:   12/02/24 261 lb 9.6 oz (118.7 kg)   11/14/24 260 lb (117.9 kg)   11/08/24 260 lb (117.9 kg)   11/07/24 260 lb (117.9 kg)  "    Estimated body mass index is 46.34 kg/m  as calculated from the following:    Height as of this encounter: 5' 3\" (1.6 m).    Weight as of 12/2/24: 261 lb 9.6 oz (118.7 kg).    Allergy   - Fexofenadine 180 mg once daily    Patient reports no current medication side effects.  Takes year round, once is removed from allergy triggers symptoms resolve within two hours.  Primary symptoms are nasal congestion, runny nose, sneezing, and itchy/watery eyes.  Primary triggers are dust mites, pollens, and animal dander.   Patient feels that current therapy is effective.      Mental Health /Anxiety and Depression  - Hydroxyzine cha 25 mg as needed   -  has not taken in several weeks, effective, only takes at bedtime due to sleepiness  - Citalopram 20 mg once daily.    - been on for many years, effective, no side effect    No current symptoms, managed by primary care provider.      Hypertension   - Losartan/hydrochlorothiazide 100-25 mg once daily      Patient reports no current medication side effects  Patient self monitors blood pressure.  Home BP monitoring at work, 130/70's, has been checking since elevated reading 12/2/24 (drove in snow / bad weather) and has not been above 140 .      BP Readings from Last 3 Encounters:   12/02/24 (!) 148/77   11/14/24 112/75   11/07/24 122/68      Rheumatoid Arthritis:   - Hydroxychloroquine 200 mg twice daily    - plans to stop once resumes Sarilumab   - Leflunomide 20 mg once daily   - Sarilumab 200 mg every 14 days   - not currently taking due to insurance coverage, resuming in January, very effective and tolerating  - Sulfasalazine 1,500 mg twice daily   - Gabapentin 100 mg morning and 500 mg at bedtime, effective   - Acetaminophen 1,000 mg at bedtime, effective  - Ibuprofen as needed      - twice per week, effective    Cannot tolerate midday dose of gabapentin due to sleepiness. Medications are effective and no side effects.     Vitamin D Deficiency:   - vitamin D 2000 units daily   " "  No concerns.   Lab Results   Component Value Date    VITDT 33 10/12/2020    VITDT 15 (L) 03/31/2020       Today's Vitals: Ht 5' 3\" (1.6 m)   BMI 46.34 kg/m    ----------------    I spent 53 minutes with this patient today. All changes were made via collaborative practice agreement with Amber Manley.     A summary of these recommendations was sent via [employer].    Joyce Nice, PharmD    Medication Therapy Management Pharmacist  [facility]     Telemedicine Visit Details  The patient's medications can be safely assessed via a telemedicine encounter.  Type of service:  Telephone visit  Originating Location (pt. Location): Home    Distant Location (provider location):  Off-site  Start Time: 11:02 AM  End Time: 11:55 AM     Medication Therapy Recommendations  Obstructive sleep apnea   1 Rationale: Synergistic therapy - Needs additional medication therapy - Indication   Recommendation: Start Medication - Zepbound 2.5 MG/0.5ML Soaj   Status: Contact Provider - Awaiting Response   Identified Date: 12/26/2024            "

## 2025-01-29 ENCOUNTER — PATIENT OUTREACH (OUTPATIENT)
Dept: CARE COORDINATION | Facility: CLINIC | Age: 70
End: 2025-01-29
Payer: COMMERCIAL

## 2025-02-10 PROBLEM — G89.29 CHRONIC BILATERAL LOW BACK PAIN WITHOUT SCIATICA: Status: RESOLVED | Noted: 2024-11-19 | Resolved: 2025-02-10

## 2025-02-10 PROBLEM — M54.50 CHRONIC BILATERAL LOW BACK PAIN WITHOUT SCIATICA: Status: RESOLVED | Noted: 2024-11-19 | Resolved: 2025-02-10

## 2025-02-26 ENCOUNTER — PATIENT OUTREACH (OUTPATIENT)
Dept: CARE COORDINATION | Facility: CLINIC | Age: 70
End: 2025-02-26
Payer: COMMERCIAL

## 2025-03-04 ENCOUNTER — LAB (OUTPATIENT)
Dept: LAB | Facility: CLINIC | Age: 70
End: 2025-03-04
Payer: COMMERCIAL

## 2025-03-04 DIAGNOSIS — Z79.899 HIGH RISK MEDICATION USE: ICD-10-CM

## 2025-03-04 DIAGNOSIS — M05.9 SEROPOSITIVE RHEUMATOID ARTHRITIS (H): ICD-10-CM

## 2025-03-04 LAB
ALBUMIN SERPL BCG-MCNC: 4.3 G/DL (ref 3.5–5.2)
ALP SERPL-CCNC: 89 U/L (ref 40–150)
ALT SERPL W P-5'-P-CCNC: 25 U/L (ref 0–50)
AST SERPL W P-5'-P-CCNC: 27 U/L (ref 0–45)
BASOPHILS # BLD AUTO: 0.1 10E3/UL (ref 0–0.2)
BASOPHILS NFR BLD AUTO: 2 %
BILIRUB DIRECT SERPL-MCNC: 0.13 MG/DL (ref 0–0.3)
BILIRUB SERPL-MCNC: 0.3 MG/DL
CHOLEST SERPL-MCNC: 233 MG/DL
CREAT SERPL-MCNC: 0.82 MG/DL (ref 0.51–0.95)
CRP SERPL-MCNC: <3 MG/L
EGFRCR SERPLBLD CKD-EPI 2021: 78 ML/MIN/1.73M2
EOSINOPHIL # BLD AUTO: 0.5 10E3/UL (ref 0–0.7)
EOSINOPHIL NFR BLD AUTO: 9 %
ERYTHROCYTE [DISTWIDTH] IN BLOOD BY AUTOMATED COUNT: 13.5 % (ref 10–15)
ERYTHROCYTE [SEDIMENTATION RATE] IN BLOOD BY WESTERGREN METHOD: 7 MM/HR (ref 0–30)
FASTING STATUS PATIENT QL REPORTED: YES
HCT VFR BLD AUTO: 40.1 % (ref 35–47)
HDLC SERPL-MCNC: 52 MG/DL
HGB BLD-MCNC: 12.8 G/DL (ref 11.7–15.7)
IMM GRANULOCYTES # BLD: 0 10E3/UL
IMM GRANULOCYTES NFR BLD: 0 %
LDLC SERPL CALC-MCNC: 124 MG/DL
LYMPHOCYTES # BLD AUTO: 1.8 10E3/UL (ref 0.8–5.3)
LYMPHOCYTES NFR BLD AUTO: 34 %
MCH RBC QN AUTO: 30 PG (ref 26.5–33)
MCHC RBC AUTO-ENTMCNC: 31.9 G/DL (ref 31.5–36.5)
MCV RBC AUTO: 94 FL (ref 78–100)
MONOCYTES # BLD AUTO: 0.5 10E3/UL (ref 0–1.3)
MONOCYTES NFR BLD AUTO: 9 %
NEUTROPHILS # BLD AUTO: 2.4 10E3/UL (ref 1.6–8.3)
NEUTROPHILS NFR BLD AUTO: 46 %
NONHDLC SERPL-MCNC: 181 MG/DL
NRBC # BLD AUTO: 0 10E3/UL
NRBC BLD AUTO-RTO: 0 /100
PLATELET # BLD AUTO: 218 10E3/UL (ref 150–450)
PROT SERPL-MCNC: 6.9 G/DL (ref 6.4–8.3)
RBC # BLD AUTO: 4.27 10E6/UL (ref 3.8–5.2)
TRIGL SERPL-MCNC: 287 MG/DL
WBC # BLD AUTO: 5.2 10E3/UL (ref 4–11)

## 2025-03-05 LAB
GAMMA INTERFERON BACKGROUND BLD IA-ACNC: 0.01 IU/ML
M TB IFN-G BLD-IMP: NEGATIVE
M TB IFN-G CD4+ BCKGRND COR BLD-ACNC: 8.69 IU/ML
MITOGEN IGNF BCKGRD COR BLD-ACNC: 0.01 IU/ML
MITOGEN IGNF BCKGRD COR BLD-ACNC: 0.02 IU/ML
QUANTIFERON MITOGEN: 8.7 IU/ML
QUANTIFERON NIL TUBE: 0.01 IU/ML
QUANTIFERON TB1 TUBE: 0.02 IU/ML
QUANTIFERON TB2 TUBE: 0.03

## 2025-03-10 ENCOUNTER — OFFICE VISIT (OUTPATIENT)
Dept: RHEUMATOLOGY | Facility: CLINIC | Age: 70
End: 2025-03-10
Payer: COMMERCIAL

## 2025-03-10 VITALS
HEART RATE: 97 BPM | DIASTOLIC BLOOD PRESSURE: 73 MMHG | HEIGHT: 63 IN | SYSTOLIC BLOOD PRESSURE: 134 MMHG | OXYGEN SATURATION: 93 % | WEIGHT: 262 LBS | RESPIRATION RATE: 16 BRPM | BODY MASS INDEX: 46.42 KG/M2

## 2025-03-10 DIAGNOSIS — M05.9 SEROPOSITIVE RHEUMATOID ARTHRITIS (H): Primary | ICD-10-CM

## 2025-03-10 DIAGNOSIS — Z79.899 HIGH RISK MEDICATION USE: ICD-10-CM

## 2025-03-10 ASSESSMENT — PAIN SCALES - GENERAL: PAINLEVEL_OUTOF10: MODERATE PAIN (4)

## 2025-03-10 NOTE — NURSING NOTE
RAPID3 (0-30) Cumulative Score  11.3          RAPID3 Weighted Score (divide #4 by 3 and that is the weighted score)  3.76

## 2025-03-10 NOTE — PROGRESS NOTES
Rheumatology Clinic Visit      Sharon Fung MRN# [id number]   YOB: 1955 Age: 69 year old      Date of visit: 3/10/25   PCP: Dr. Reynaldo Saavedra    Chief Complaint   Patient presents with:  RECHECK: Seropositive rheumatoid arthritis- states she is doing OK    Assessment and Plan     1. Seropositive nonerosive rheumatoid arthritis (RF negative, CCP low positive): Previously on Humira (lost efficacy), Cimzia (ineffective), Orencia (ineffective), leflunomide (ineffective as monotherapy), hydroxychloroquine (ineffective previously, effective in combination with other medications), Xeljanz (ineffective), MTX (GI upset), Simponi (partially effective; required due to insurance change and used from [id number]).  Currently on a combination of sulfasalazine 1500 mg twice daily, leflunomide 20 mg daily, hydroxychloroquine, and Kevzara (effective, became cost prohibitive when insurance changed to Medicare, used [id number], then restarted 1/2025).  No synovitis on exam today and she reports improvement since restarting Kevzara.    Chronic illness  - Continue sulfasalazine 1500 mg twice daily   - Continue leflunomide 20mg daily   - Continue hydroxychloroquine 200 mg twice daily (last eye exam on 8/2/2024 by Dr. Sia Herman)  - Continue Kevzara 200 mg SQ every 14 days (restarted January 2025)  - Labs in 3 months: CBC, Creatinine, Hepatic Panel, ESR, CRP      High risk medication requiring intensive toxicity monitoring at least quarterly                 Rapid 3, cumulative scores                      03/10/2025: 11.3 (SSZ 1500mg BID, leflunomide 20mg daily,  mg BID, Kevzara 200 mg SQ [id number])                      12/02/2024: 16.2 (SSZ 1500mg BID, leflunomide 20mg daily,  mg BID)                      08/28/2024: 14    (SSZ 1500mg BID, leflunomide 20mg daily,  mg BID)                       01/25/2024: 14    (SSZ 1500mg BID, leflunomide 20mg daily, Simponi IV)                      10/18/2023:  13.2 (SSZ 1500mg BID, leflunomide 20mg daily, Simponi IV [id number])                      8/24/2021: No inflammatory joint symptoms.  She says that this combination is great (SSZ 1500mg BID, leflunomide 20mg daily, Kevzara 200mg q14 days)                      10/14/2020 doing well (SSZ 1500mg BID, leflunomide 20mg daily, Kevzara 200mg q14 days)                      08/28/2019: 3.2   (SSZ 1500mg BID, Kevzara)  Now on gabapentin                      04/17/2019: 15    (SSZ 1500mg BID, Kevzara)  Mostly fibromyalgia symptoms                      12/19/2018:         (MTX 20mg SQ wkly, Xeljanz XR 11mg daily, SSZ 1500mg BID)                          05/02/2018:  9     (MTX 20mg SQ wkly, Xeljanz XR 11mg daily, SSZ 1000mg BID)                          01/31/2018: 22.3 (MTX 20mg SQ wkly, Xeljanz XR 11mg daily)                          11/29/2017: 16.8 (MTX 20mg SQ wkly)                      08/02/2017: 4.2   (MTX 20mg SQ wkly, Xeljanz XR 11mg daily)                         05/04/2017: 7      (MTX 20mg SQ wkly, Xeljanz XR 11mg daily)                        02/02/2017: 8      (MTX 20mg SQ wkly, Xeljanz XR 11mg daily)                      11/04/2016: 13    (MTX 20mg SQ weekly)                      07/15/2016: 10.8    2. Positive LORNA; positive and negative dsDNA; Secondary Sjogren's Syndrome: Repeat dsDNA was negative. Has dry eyes but no dry mouth.  Dry eyes are treated by ophthalmology with Restasis.      3. Bilateral patellofemoral syndrome: home exercises have been helpful.  Weight loss encouraged.       4. Fibromyalgia: Managed in the pain clinic     5.  Bone Health, vitamin D deficiency: normal 12/20/2019 DEXA; plan to repeat in 2024.    - Continue vitamin D 2000 IU daily  - DEXA ordered previously; advised that she call to schedule    6. Chronic lower back pain with left sided sciatica: suspect related to degenerative changes seen on L-spine MRI.  She has been seen in the pain clinic and spine surgery clinic.  Documented here  for historical significance.    7. Neck pain: degenerative in nature; pain with extremes of ROM and limits to ROM in each direction.  Continue physical therapy exercises at home.  5/15/2024 cervical spine x-ray without evidence of instability.    8.  Vaccinations: Vaccinations reviewed with Ms. Fung.   - Influenza: encouraged yearly vaccination  - Prevnar 20: Up-to-date  - Shingrix: Up-to-date  - COVID-19: advised keeping updated, and to hold leflunomide and sulfasalazine for 1-2 weeks afterward  - RSV: Advise vaccination    9.  Dyslipidemia: Advised that she just with her primary care provider to determine if treatment is needed    Total minutes spent in evaluation with patient, documentation, , and review of pertinent studies and chart notes: 16  The longitudinal plan of care for the rheumatology problem(s) were addressed during this visit.  Due to added complexity of care, we will continue to support the patient and the subsequent management of this condition with ongoing continuity of care.     Ms. Fung verbalized agreement with and understanding of the rational for the diagnosis and treatment plan.  All questions were answered to best of my ability and the patient's satisfaction. Ms. Fung was advised to contact the clinic with any questions that may arise after the clinic visit.      Thank you for involving me in the care of the patient    Return to clinic: early March    HPI   Sharon Fung is a 69 year old female with medical history significant for GERD, allergic rhinitis, obstructive sleep apnea, obesity, hx of trigger fingers, hx of carpal tunnel surgery, and rheumatoid arthritis who presents for follow-up of rheumatoid arthritis.    6/9/2023: RA controlled.  Morning stiffness <30 min. No joint swelling. Changing to medicare and needs to discuss changing kevzara to a different agent because it has become cost prohibitive with Medicare.     10/18/2023: RA not as well-controlled  since changing to Simponi.  Has had 3 doses of Simponi thus far.  Simponi infusions are well-tolerated but has not controlled her arthritis as well.  Pain, stiffness, and swelling at the MCPs, PIPs, and wrists.  Joint pain is worse in the morning and improves with time and activity.  Morning stiffness for approximately 2 hours.  She would like to continue Simponi for now to see if a longer duration of therapy will be more helpful.    1/25/2024: Doing fairly well on her current regimen but having morning stiffness in her hands for 2-3 hours, and still some ache in the hands that improves with flexion and extension of the fingers and general movement.  Arthritis is not limiting her daily activities.    5/15/2024: RA improved with addition of hydroxychloroquine.  Neck pain worse with activity and improves with rest; does not radiate down the arms; has been a chronic issue and has been doing home exercises but they are infrequently done.  She says she does not need to go to formal physical therapy and will continue the exercises on her own at home.    8/28/2024: Pain, swelling, and stiffness at the MCPs and PIPs that is worse in the morning improves with time and activity.  Morning stiffness for approximately 1-1.5 hours.  Arthritis not limiting daily activities.  Felt better when on Kevzara    12/2/2024: Sharon reports that due to her income being just a bit too high she did not qualify for the financial assistance program and therefore she does not plan to fill Kevzara until early January 2025.  No change in her joint symptoms.  Still with whole body aches and recalls feeling better when on Kevzara in the past.  She reports that she is now on Medicare and that the deductible in 2025 will be financially feasible.  Currently with morning stiffness for 1-1.5 hours.  Joints most affected are the MCPs and PIPs, that are worse in the morning and improved with time and activity.    Today, 3/10/2025: She started Kevzara in  January 2025 and feels like it is effective.  Morning stiffness for no more than 20 minutes.  No joint swelling.  Still with mild ache at the MCPs and PIPs in the morning that resolves within about 20-30 minutes.  Arthritis is not limiting her daily activities.    No fevers or chills. No nausea, vomiting, constipation, diarrhea. No chest pain/pressure, palpitations, or shortness of breath. No oral or nasal sores.  Dermatitis of the fingertips and following with dermatology; suspects contact allergy.    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[employer]; now working casual    ROS   12 point review of system was completed and negative except as noted in the HPI     Active Problem List     Patient Active Problem List   Diagnosis    AR (allergic rhinitis)    GERD (gastroesophageal reflux disease)    Class 3 severe obesity with serious comorbidity and body mass index (BMI) of 45.0 to 49.9 in adult, unspecified obesity type (H)    Status post bariatric surgery    High risk medication use    Obstructive sleep apnea    Anterior basement membrane dystrophy    Seropositive rheumatoid arthritis (H)    LORNA positive    Carpal tunnel syndrome of right wrist    Headache    Immunosuppression    Fibromyalgia    Pain in joint, ankle and foot, right    Hypertension goal BP (blood pressure) < 140/90    Chronic back pain, unspecified back location, unspecified back pain laterality    Hyperlipidemia LDL goal <100    Mild recurrent major depression    Nuclear sclerosis of both eyes     Past Medical History     Past Medical History:   Diagnosis Date    AR (allergic rhinitis)  as teen    Arthritis 12/14/2015    Chronic obstructive pulmonary disease, unspecified COPD type (H) 03/27/2018    Depressive disorder 3 years ago    GERD (gastroesophageal reflux disease) 04/2007    Headache 07/11/2017    Hypertension goal BP (blood pressure) < 140/90 12/20/2021    Mild major depression 3  years ago    Morbid obesity (H) teens    BRETT (obstructive sleep apnea)     uses CPAP    RA (rheumatoid arthritis) (H) 4 years    Reduced vision 3 years     Past Surgical History     Past Surgical History:   Procedure Laterality Date    ARTHRODESIS FOOT Right 6/30/2021    Procedure: Right 1st metatarsophalangeal joint fusion;  Surgeon: Jesus Wolf MD;  Location: [facility]    ARTHRODESIS TOE(S) Right 12/27/2021    Procedure: right revision great toe fusion;  Surgeon: Ryan Gee MD;  Location: [facility]    BLEPHAROPLASTY BILATERAL Bilateral 8/5/2016    Procedure: BLEPHAROPLASTY BILATERAL;  Surgeon: Cortez Robin MD;  Location: [facility]    BREAST BIOPSY, RT/LT  1-94    Benign    COLONOSCOPY      COLONOSCOPY WITH CO2 INSUFFLATION N/A 3/30/2017    Procedure: COLONOSCOPY WITH CO2 INSUFFLATION;  Surgeon: Issa Weeks MD;  Location: [facility]    ESOPHAGOSCOPY, GASTROSCOPY, DUODENOSCOPY (EGD), COMBINED N/A 10/29/2015    Procedure: COMBINED ESOPHAGOSCOPY, GASTROSCOPY, DUODENOSCOPY (EGD);  Surgeon: Shaq Mims MD;  Location: [facility]    LAPAROSCOPIC CHOLECYSTECTOMY N/A 6/13/2023    Procedure: CHOLECYSTECTOMY, LAPAROSCOPIC;  Surgeon: Reynaldo Segura MD;  Location: [facility]    LAPAROSCOPIC GASTRIC ADJUSTABLE BANDING  11/09/10    Lap band procedure    LAPAROSCOPIC REMOVAL GASTRIC ADJUSTABLE BAND N/A 10/31/2015    Procedure: LAPAROSCOPIC REMOVAL GASTRIC ADJUSTABLE BAND;  Surgeon: Shaq Mims MD;  Location: [facility]    PHACOEMULSIFICATION WITH STANDARD INTRAOCULAR LENS IMPLANT Right 11/7/2024    Procedure: RIGHT EYE PHACOEMULSIFICATION, CATARACT, WITH STANDARD INTRAOCULAR LENS IMPLANT INSERTION;  Surgeon: Sia Herman MD;  Location: [facility] OR    PHACOEMULSIFICATION WITH STANDARD INTRAOCULAR LENS IMPLANT Left 11/14/2024    Procedure: LEFT EYE PHACOEMULSIFICATION, CATARACT, WITH STANDARD INTRAOCULAR LENS IMPLANT INSERTION;  Surgeon: Sia Herman MD;  Location: [facility] OR    NM HAND/FINGER  SURGERY UNLISTED  2016    WI STOMACH SURGERY PROCEDURE UNLISTED  11/2015    RELEASE CARPAL TUNNEL Left 4/27/2017    Procedure: RELEASE CARPAL TUNNEL;  Left Open Carpal Tunnel Release;  Surgeon: Ciara Middleton MD;  [facility]: [facility]    RELEASE CARPAL TUNNEL Right 6/15/2017    Procedure: RELEASE CARPAL TUNNEL;  Right Carpal Tunnel Release Open;  Surgeon: Ciara Middleton MD;  Location: [facility]    REPAIR PTOSIS      TUBAL LIGATION  1988    [facility] APPENDECTOMY  1977     Allergy   No Known Allergies  Current Medication List     Current Outpatient Medications   Medication Sig Dispense Refill    acetaminophen (TYLENOL) 500 MG tablet Take 500-1,000 mg by mouth every 6 hours as needed for mild pain.      citalopram (CELEXA) 20 MG tablet Take 1 tablet (20 mg) by mouth daily 90 tablet 3    fexofenadine (ALLEGRA) 180 MG tablet Take 1 tablet (180 mg) by mouth daily 90 tablet 2    gabapentin (NEURONTIN) 100 MG capsule TAKE ONE CAPSULE BY MOUTH EVERY MORNING AND TAKE FIVE CAPSULES BY MOUTH EVERY NIGHT AT BEDTIME 540 capsule 2    hydroxychloroquine (PLAQUENIL) 200 MG tablet Take 1 tablet (200 mg) by mouth 2 times daily. 180 tablet 2    hydrOXYzine cha (VISTARIL) 25 MG capsule Take 1 capsule (25 mg) by mouth 3 times daily as needed for anxiety 30 capsule 1    ibuprofen 200 MG capsule Take 800 mg by mouth daily as needed for inflammatory pain. Twice weekly average      leflunomide (ARAVA) 20 MG tablet Take 1 tablet (20 mg) by mouth daily. 90 tablet 2    losartan-hydrochlorothiazide (HYZAAR) 100-25 MG tablet Take 1 tablet by mouth daily 90 tablet 3    ORDER FOR DME Use your CPAP device as directed by your provider.      Sarilumab 200 MG/1.14ML SOAJ Inject 1.14 mLs (200 mg) subcutaneously every 14 days. Hold for signs of infection and seek medical attention. 2.28 mL 3    sulfaSALAzine (AZULFIDINE) 500 MG tablet Take 3 tablets (1,500 mg) by mouth 2 times daily. 540 tablet 2    topiramate (TOPAMAX) 25 MG tablet 25mg at  "bedtime for week 1, 50mg at bedtime for 1 week, and 75mg at bedtime thereafter 90 tablet 3    vitamin D3 (CHOLECALCIFEROL) 50 mcg (2000 units) tablet Take 1 tablet (50 mcg) by mouth daily 90 tablet 3     No current facility-administered medications for this visit.     Facility-Administered Medications Ordered in Other Visits   Medication Dose Route Frequency Provider Last Rate Last Admin    sodium chloride bacteriostatic 0.9 % flush 12 mL  12 mL Intravenous Once Nely Matthews MD           Social History   See HPI    Family History     Family History   Problem Relation Age of Onset    Neurologic Disorder Mother 20        migraine    Depression Mother     Mental Illness Mother     Heart Disease Father         MI    Neurologic Disorder Father 79        Parkinson's    Diabetes Father     Hypertension Father     Coronary Artery Disease Father     Cancer Maternal Grandmother         uterine    Neurologic Disorder Sister 16        migraine    Depression Sister     Depression Sister     Substance Abuse Sister     Migraines Sister     Neurologic Disorder Son 7        migraine    Substance Abuse Son     Migraines Son         none    Glaucoma No family hx of     Macular Degeneration No family hx of        Physical Exam     Temp Readings from Last 3 Encounters:   11/14/24 97.2  F (36.2  C)   11/07/24 97  F (36.1  C) (Temporal)   11/04/24 98.3  F (36.8  C) (Temporal)     BP Readings from Last 5 Encounters:   03/10/25 134/73   12/02/24 (!) 148/77   11/14/24 112/75   11/07/24 122/68   11/04/24 112/68     Pulse Readings from Last 1 Encounters:   03/10/25 97     Resp Readings from Last 1 Encounters:   03/10/25 16     Estimated body mass index is 46.43 kg/m  as calculated from the following:    Height as of this encounter: 1.6 m (5' 2.99\").    Weight as of this encounter: 118.8 kg (262 lb).    GEN: NAD. Healthy appearing adult.   HEENT:  Anicteric, noninjected sclera. No obvious external lesions of the ear and nose. Hearing " intact.  CV: S1, S2. RRR. No m/r/g  PULM: No increased work of breathing. CTA bilaterally   MSK: MCPs, PIPs, DIPs without swelling or tenderness to palpation.  Wrists without swelling or tenderness to palpation.  Elbows and shoulders without swelling or tenderness to palpation.  Knees, ankles, and MTPs without swelling or tenderness to palpation.    SKIN: No rash or jaundice seen  PSYCH: Alert. Appropriate.      Labs / Imaging (select studies)     CBC  Recent Labs   Lab Test 03/04/25  0721 11/01/24  1041 08/22/24  1346 08/03/21  1150 05/10/21  0802 02/02/21  1056 10/12/20  1113   WBC 5.2 6.2 7.4   < > 5.1 7.4 6.1   RBC 4.27 4.07 3.99   < > 4.48 4.41 4.18   HGB 12.8 12.0 11.7   < > 13.5 13.4 12.7   HCT 40.1 37.7 37.4   < > 42.8 42.8 39.7   MCV 94 93 94   < > 96 97 95   RDW 13.5 13.5 13.9   < > 12.8 12.5 12.9    282 286   < > 193 216 221   MCH 30.0 29.5 29.3   < > 30.1 30.4 30.4   MCHC 31.9 31.8 31.3*   < > 31.5 31.3* 32.0   NEUTROPHIL 46 53 57   < > 42.6 40.1 40.8   LYMPH 34 29 25   < > 26.4 34.8 33.4   MONOCYTE 9 12 11   < > 9.5 10.7 8.9   EOSINOPHIL 9 6 6   < > 19.5 12.6 15.6   BASOPHIL 2 1 1   < > 1.8 1.4 1.0   ANEU  --   --   --   --  2.2 3.0 2.5   ALYM  --   --   --   --  1.3 2.6 2.0   KIMBERLY  --   --   --   --  0.5 0.8 0.5   AEOS  --   --   --   --  1.0* 0.9* 1.0*   ABAS  --   --   --   --  0.1 0.1 0.1   ANEUTAUTO 2.4 3.3 4.2   < >  --   --   --    ALYMPAUTO 1.8 1.8 1.9   < >  --   --   --    AMONOAUTO 0.5 0.7 0.8   < >  --   --   --    AEOSAUTO 0.5 0.4 0.5   < >  --   --   --    ABSBASO 0.1 0.1 0.1   < >  --   --   --     < > = values in this interval not displayed.     CMP  Recent Labs   Lab Test 03/04/25  0721 11/01/24  1041 08/22/24  1346 05/15/24  0813 05/10/24  1009 10/12/23  1025 06/13/23  0951 06/13/23  0522 08/03/21  1150 05/10/21  0802 02/02/21  1056 10/12/20  1113   NA  --  139  --   --  140  --   --  140   < > 140  --   --    POTASSIUM  --  3.9  --   --  4.2  --   --  3.4   < > 4.3  --   --     CHLORIDE  --  103  --   --  103  --   --  101   < > 110*  --   --    CO2  --  30*  --   --  24  --   --  24   < > 25  --   --    ANIONGAP  --  6*  --   --  13  --   --  15   < > 5  --   --    GLC  --  92  --   --  98  --  99 138*   < > 99 74  --    BUN  --  12.1  --   --  13.6  --   --  17.1   < > 12  --   --    CR 0.82 0.71 0.67  --  0.70   < >  --  0.69   < > 0.73 0.65 0.74   GFRESTIMATED 78 >90 >90  --  >90   < >  --  >90   < > 88 >90 87   GFRESTBLACK  --   --   --   --   --   --   --   --   --  >90 >90 >90   ISH  --  9.4  --   --  9.5  --   --  9.6   < > 8.6  --   --    BILITOTAL 0.3 0.5 0.3   < >  --    < >  --  0.5   < > 0.6 0.4 0.5   ALBUMIN 4.3 4.1 4.1   < >  --    < >  --  4.3   < > 3.8 4.0 3.8   PROTTOTAL 6.9 7.2 7.2   < >  --    < >  --  7.2   < > 7.1 7.1 6.9   ALKPHOS 89 91 90   < >  --    < >  --  95   < > 103 115 107   AST 27 29 28   < >  --    < >  --  26   < > 33 36 23   ALT 25 19 9   < >  --    < >  --  29   < > 54* 58* 42    < > = values in this interval not displayed.     Calcium/VitaminD  Recent Labs   Lab Test 11/01/24  1041 05/10/24  1009 06/13/23  0522 05/10/21  0802 10/12/20  1113 03/31/20  0755   ISH 9.4 9.5 9.6   < >  --   --    VITDT  --   --   --   --  33 15*    < > = values in this interval not displayed.     ESR/CRP  Recent Labs   Lab Test 03/04/25  0721 11/01/24  1041 08/22/24  1346 06/12/23  1247 11/16/22  0937 06/24/22  0741 03/11/22  0910   SED 7 30 36*   < > 6 5 5   CRP  --   --   --   --  <2.9 <2.9 <2.9   CRPI <3.00 8.50* 11.70*   < >  --   --   --     < > = values in this interval not displayed.     Lipid Panel  Recent Labs   Lab Test 03/04/25  0721 11/01/24  1041 05/10/24  1009   CHOL 233* 205* 199   TRIG 287* 262* 202*   HDL 52 46* 47*   * 107* 112*   NHDL 181* 159* 152*     Hepatitis B  Recent Labs   Lab Test 06/12/23  1247   HBCAB Nonreactive   HEPBANG Nonreactive     Hepatitis C  Recent Labs   Lab Test 06/12/23  1247   HCVAB Nonreactive     Tuberculosis  Screening  Recent Labs   Lab Test 03/04/25  0721 06/12/23  1247 11/30/21  0806 10/31/17  1400 02/02/17  0822   TBRES Negative Negative Negative  --   --    TBRSLT  --   --   --  Negative Negative   TBAGN  --   --   --  0.05 0.00     HIV Screening  Recent Labs   Lab Test 07/24/18  0738   HIAGAB Nonreactive     Immunization History     Immunization History   Administered Date(s) Administered    COVID-19 12+ (Pfizer) 10/13/2023    COVID-19 Bivalent 12+ (Pfizer) 03/30/2023    COVID-19 MONOVALENT 12+ (Pfizer) 12/21/2020, 01/07/2021, 08/27/2021    COVID-19 Monovalent 18+ (Moderna) 03/08/2022    Flu, Unspecified 10/12/2020    Influenza (High Dose) Trivalent,PF (Fluzone) 11/01/2022, 09/29/2023, 10/23/2024    Influenza (intradermal) 10/04/2011, 10/01/2012    Influenza Vaccine (Flucelvax Quadrivalent) 10/15/2019    Influenza Vaccine 18-64 (Flublok) 09/15/2021    Influenza Vaccine >6 months,quad, PF 10/15/2013, 09/15/2014, 09/28/2015, 09/28/2016, 10/16/2017, 10/01/2018    Pneumo Conj 13-V (2010&after) 04/15/2016    Pneumococcal 20 valent Conjugate (Prevnar 20) 07/01/2022    Pneumococcal 23 valent 07/15/2016    TD,PF 7+ (Tenivac) 10/30/2020    TDAP Vaccine (Adacel) 02/09/2011    Zoster recombinant adjuvanted (SHINGRIX) 04/17/2019, 08/28/2019          Chart documentation done in part with Dragon Voice recognition Software. Although reviewed after completion, some word and grammatical error may remain.     Freddy Guerra MD

## 2025-03-21 DIAGNOSIS — E66.813 CLASS 3 SEVERE OBESITY WITH SERIOUS COMORBIDITY AND BODY MASS INDEX (BMI) OF 45.0 TO 49.9 IN ADULT, UNSPECIFIED OBESITY TYPE (H): ICD-10-CM

## 2025-03-21 DIAGNOSIS — E66.01 CLASS 3 SEVERE OBESITY WITH SERIOUS COMORBIDITY AND BODY MASS INDEX (BMI) OF 45.0 TO 49.9 IN ADULT, UNSPECIFIED OBESITY TYPE (H): ICD-10-CM

## 2025-03-27 RX ORDER — TOPIRAMATE 25 MG/1
TABLET, FILM COATED ORAL
Qty: 90 TABLET | Refills: 1 | Status: SHIPPED | OUTPATIENT
Start: 2025-03-27

## 2025-03-27 NOTE — TELEPHONE ENCOUNTER
Last Written Prescription:  topiramate (TOPAMAX) 25 MG tablet 90 tablet 3 11/15/2024     ----------------------  Last Visit Date: 24  Future Visit Date: 25  ----------------------      Refill decision: Medication refilled per  Medication Refill in Ambulatory Care  policy.            Request from pharmacy:  Requested Prescriptions   Pending Prescriptions Disp Refills    topiramate (TOPAMAX) 25 MG tablet 90 tablet 3     Simg at bedtime for week 1, 50mg at bedtime for 1 week, and 75mg at bedtime thereafter       Anti-Seizure Meds Protocol  Failed - 3/27/2025  6:13 PM        Failed - Review Authorizing provider's last note.      Refer to last progress notes: confirm request is for original authorizing provider (cannot be through other providers).          Failed - Medication indicated for associated diagnosis     Medication is associated with one or more of the following diagnoses:     Bipolar   Dementia   Depression   Epilepsy   Migraine   Seizure   Trigeminal Neuralgia   Cyclothymia          Passed - Normal ALT or AST on file in past 26 months     Recent Labs   Lab Test 25  0721   ALT 25     Recent Labs   Lab Test 25  0721   AST 27             Passed - Medication is active on med list and the sig matches. RN to manually verify dose and sig if red X/fail.     If the protocol passes (green check), you do not need to verify med dose and sig.    A prescription matches if they are the same clinical intention.    For Example: once daily and every morning are the same.    The protocol can not identify upper and lower case letters as matching and will fail.     For Example: Take 1 tablet (50 mg) by mouth daily     TAKE 1 TABLET (50 MG) BY MOUTH DAILY    For all fails (red x), verify dose and sig.    If the refill does match what is on file, the RN can still proceed to approve the refill request.       If they do not match, route to the appropriate provider.             Passed - Has GFR on file in  past 12 months and most recent value is normal        Passed - Recent (12 mo) or future (90 days) visit within the authorizing provider's specialty     The patient must have completed an in-person or virtual visit within the past 12 months or has a future visit scheduled within the next 90 days with the authorizing provider s specialty.  Urgent care and e-visits do not qualify as an office visit for this protocol.          Passed - No active pregnancy on record        Passed - No positive pregnancy test in last 12 months

## 2025-04-13 DIAGNOSIS — I10 HYPERTENSION GOAL BP (BLOOD PRESSURE) < 140/90: ICD-10-CM

## 2025-04-13 DIAGNOSIS — F33.0 MILD RECURRENT MAJOR DEPRESSION: ICD-10-CM

## 2025-04-13 RX ORDER — CITALOPRAM HYDROBROMIDE 20 MG/1
20 TABLET ORAL DAILY
Qty: 90 TABLET | Refills: 0 | Status: SHIPPED | OUTPATIENT
Start: 2025-04-13

## 2025-04-13 RX ORDER — LOSARTAN POTASSIUM AND HYDROCHLOROTHIAZIDE 25; 100 MG/1; MG/1
1 TABLET ORAL DAILY
Qty: 90 TABLET | Refills: 0 | Status: SHIPPED | OUTPATIENT
Start: 2025-04-13

## 2025-04-28 ENCOUNTER — ANCILLARY PROCEDURE (OUTPATIENT)
Dept: BONE DENSITY | Facility: CLINIC | Age: 70
End: 2025-04-28
Attending: INTERNAL MEDICINE
Payer: COMMERCIAL

## 2025-04-28 DIAGNOSIS — M05.9 SEROPOSITIVE RHEUMATOID ARTHRITIS (H): ICD-10-CM

## 2025-04-28 DIAGNOSIS — Z78.0 POSTMENOPAUSAL: ICD-10-CM

## 2025-04-28 PROCEDURE — 77080 DXA BONE DENSITY AXIAL: CPT | Performed by: RADIOLOGY

## 2025-04-29 ENCOUNTER — TELEPHONE (OUTPATIENT)
Dept: ENDOCRINOLOGY | Facility: CLINIC | Age: 70
End: 2025-04-29

## 2025-04-29 ENCOUNTER — VIRTUAL VISIT (OUTPATIENT)
Dept: ENDOCRINOLOGY | Facility: CLINIC | Age: 70
End: 2025-04-29
Payer: COMMERCIAL

## 2025-04-29 VITALS — BODY MASS INDEX: 46.42 KG/M2 | HEIGHT: 63 IN | WEIGHT: 262 LBS

## 2025-04-29 DIAGNOSIS — G47.33 OBSTRUCTIVE SLEEP APNEA: ICD-10-CM

## 2025-04-29 DIAGNOSIS — E66.813 CLASS 3 SEVERE OBESITY WITH SERIOUS COMORBIDITY AND BODY MASS INDEX (BMI) OF 45.0 TO 49.9 IN ADULT, UNSPECIFIED OBESITY TYPE (H): ICD-10-CM

## 2025-04-29 PROCEDURE — 1125F AMNT PAIN NOTED PAIN PRSNT: CPT | Mod: 95

## 2025-04-29 PROCEDURE — 98006 SYNCH AUDIO-VIDEO EST MOD 30: CPT

## 2025-04-29 RX ORDER — TOPIRAMATE 25 MG/1
100 TABLET, FILM COATED ORAL AT BEDTIME
Qty: 120 TABLET | Refills: 4 | Status: SHIPPED | OUTPATIENT
Start: 2025-04-29

## 2025-04-29 ASSESSMENT — PAIN SCALES - GENERAL: PAINLEVEL_OUTOF10: MODERATE PAIN (5)

## 2025-04-29 NOTE — PROGRESS NOTES
Virtual Visit Details    Type of service:  Video Visit   Video Start Time:  11:30AM  Video End Time: 11:55AM    Originating Location (pt. Location): Home    Distant Location (provider location):  Off-site  Platform used for Video Visit: [facility] Medical Weight Management Note     Sharon Fung  MRN:  [id number]  :  1955  JENIFER:  2025    Dear Reynaldo Saaevdra MD,    I had the pleasure of seeing your patient Sharon Fung. She is a 69 year old female who I am continuing to see for treatment of obesity related to:        2024     8:11 AM   --   I have the following health issues associated with obesity Pre-Diabetes    High Blood Pressure    High Cholesterol    Sleep Apnea    Osteoarthritis (joint disease)   I have the following symptoms associated with obesity Knee Pain    Back Pain    Fatigue       Assessment & Plan   Problem List Items Addressed This Visit       Class 3 severe obesity with serious comorbidity and body mass index (BMI) of 45.0 to 49.9 in adult, unspecified obesity type (H)    Relevant Medications    tirzepatide-Weight Management (ZEPBOUND) 2.5 MG/0.5ML prefilled pen    tirzepatide-Weight Management (ZEPBOUND) 5 MG/0.5ML prefilled pen (Start on 2025)    topiramate (TOPAMAX) 25 MG tablet    Other Relevant Orders    Basic metabolic panel    Obstructive sleep apnea    Relevant Medications    tirzepatide-Weight Management (ZEPBOUND) 2.5 MG/0.5ML prefilled pen    tirzepatide-Weight Management (ZEPBOUND) 5 MG/0.5ML prefilled pen (Start on 2025)      Start Zepbound 2.5mg once weekly for 4 weeks, then increase to 5.0mg once weekly for history of sleep apnea.   Increase Topiramate 100mg at night   Protein goal - 20mg a meal   BMP ordered   Amber Perkins in 3 month. Mychart in 6 weeks       INTERVAL HISTORY:  S/p lap band at [place] with Dr. Edmonds in 2010.   Starting weight - 220lb  Low weight - 200lb  Band removed in  due to band infection with Dr. Mims. Since  then has seen a gradual weight regain plus.   Reestablished care on 11/8/2024. GLP-1 not covered by insurance - medicare. No hx of Type II diabetes.   Topiramate started       Clothes are fitting looser. People are noticing weight change. So, even though she is not seeing much change on the scale she is feeling better.     Anti-obesity medication history    Current:   Topiramate 75mg - taking at night. Not having any side effects. Felt like it was helpful at first - hunger better controlled. However over the past 2 months feels like it is not as helpful.       Recent diet changes: eating 2xday. Minimal snacks. At times will eat out of habit. No longer snacking in the middle of the night. Drinking 64oz daily. Also drinking coffee and diet pop (only 1 can daily) - has been decreasing.   Breakfast - toast   Lunch - protein shake   Dinner - chicken + vegetable, burger + salad     Recent exercise/activity changes: still having a lot of back pain. PT does not seem to be helping much. Has a recumbent bike at home, and is trying to bike more.     Recent stressors: no concerns     Recent sleep changes: BRETT, AHI 11. Uses CPAP nightly.     CURRENT WEIGHT:   262 lbs 0 oz    Initial Weight (lbs): 260 lbs  Last Visits Weight: 117.9 kg (260 lb)  Cumulative weight loss (lbs): -2  Weight Loss Percentage: -0.77%    Wt Readings from Last 5 Encounters:   04/29/25 118.8 kg (262 lb)   03/10/25 118.8 kg (262 lb)   12/02/24 118.7 kg (261 lb 9.6 oz)   11/14/24 117.9 kg (260 lb)   11/08/24 117.9 kg (260 lb)             4/25/2025     3:23 PM   Changes and Difficulties   I have made the following changes to my diet since my last visit: Eating more protein, less sweets drinking more watet   With regards to my diet, I am still struggling with: Over eating   I have made the following changes to my activity/exercise since my last visit: Really not much   With regards to my activity/exercise, I am still struggling with: Doing exercises          MEDICATIONS:   Current Outpatient Medications   Medication Sig Dispense Refill    tirzepatide-Weight Management (ZEPBOUND) 2.5 MG/0.5ML prefilled pen Inject 0.5 mLs (2.5 mg) subcutaneously every 7 days. For 4 weeks 2 mL 0    [START ON 5/29/2025] tirzepatide-Weight Management (ZEPBOUND) 5 MG/0.5ML prefilled pen Inject 0.5 mLs (5 mg) subcutaneously every 7 days. After completing 4 weeks of 2.5mg dose 2 mL 2    topiramate (TOPAMAX) 25 MG tablet Take 4 tablets (100 mg) by mouth at bedtime. 120 tablet 4    acetaminophen (TYLENOL) 500 MG tablet Take 500-1,000 mg by mouth every 6 hours as needed for mild pain.      citalopram (CELEXA) 20 MG tablet TAKE ONE TABLET BY MOUTH ONCE DAILY 90 tablet 0    fexofenadine (ALLEGRA) 180 MG tablet Take 1 tablet (180 mg) by mouth daily 90 tablet 2    gabapentin (NEURONTIN) 100 MG capsule TAKE ONE CAPSULE BY MOUTH EVERY MORNING AND TAKE FIVE CAPSULES BY MOUTH EVERY NIGHT AT BEDTIME 540 capsule 2    hydroxychloroquine (PLAQUENIL) 200 MG tablet Take 1 tablet (200 mg) by mouth 2 times daily. 180 tablet 2    hydrOXYzine cha (VISTARIL) 25 MG capsule Take 1 capsule (25 mg) by mouth 3 times daily as needed for anxiety 30 capsule 1    ibuprofen 200 MG capsule Take 800 mg by mouth daily as needed for inflammatory pain. Twice weekly average      leflunomide (ARAVA) 20 MG tablet Take 1 tablet (20 mg) by mouth daily. 90 tablet 2    losartan-hydrochlorothiazide (HYZAAR) 100-25 MG tablet TAKE ONE TABLET BY MOUTH ONCE DAILY 90 tablet 0    ORDER FOR DME Use your CPAP device as directed by your provider.      Sarilumab 200 MG/1.14ML SOAJ Inject 1.14 mLs (200 mg) subcutaneously every 14 days. Hold for signs of infection and seek medical attention. 2.28 mL 3    sulfaSALAzine (AZULFIDINE) 500 MG tablet Take 3 tablets (1,500 mg) by mouth 2 times daily. 540 tablet 2    vitamin D3 (CHOLECALCIFEROL) 50 mcg (2000 units) tablet Take 1 tablet (50 mcg) by mouth daily 90 tablet 3           4/25/2025     3:23  PM   Weight Loss Medication History Reviewed With Patient   Which weight loss medications are you currently taking on a regular basis? Topamax (topiramate)   Are you having any side effects from the weight loss medication that we have prescribed you? No       Lab on 03/04/2025   Component Date Value Ref Range Status    Creatinine 03/04/2025 0.82  0.51 - 0.95 mg/dL Final    GFR Estimate 03/04/2025 78  >60 mL/min/1.73m2 Final    eGFR calculated using 2021 CKD-EPI equation.    CRP Inflammation 03/04/2025 <3.00  <5.00 mg/L Final    Erythrocyte Sedimentation Rate 03/04/2025 7  0 - 30 mm/hr Final    Protein Total 03/04/2025 6.9  6.4 - 8.3 g/dL Final    Albumin 03/04/2025 4.3  3.5 - 5.2 g/dL Final    Bilirubin Total 03/04/2025 0.3  <=1.2 mg/dL Final    Alkaline Phosphatase 03/04/2025 89  40 - 150 U/L Final    AST 03/04/2025 27  0 - 45 U/L Final    ALT 03/04/2025 25  0 - 50 U/L Final    Bilirubin Direct 03/04/2025 0.13  0.00 - 0.30 mg/dL Final    Cholesterol 03/04/2025 233 (H)  <200 mg/dL Final    Triglycerides 03/04/2025 287 (H)  <150 mg/dL Final    Direct Measure HDL 03/04/2025 52  >=50 mg/dL Final    LDL Cholesterol Calculated 03/04/2025 124 (H)  <100 mg/dL Final    Non HDL Cholesterol 03/04/2025 181 (H)  <130 mg/dL Final    Patient Fasting > 8hrs? 03/04/2025 Yes   Final    WBC Count 03/04/2025 5.2  4.0 - 11.0 10e3/uL Final    RBC Count 03/04/2025 4.27  3.80 - 5.20 10e6/uL Final    Hemoglobin 03/04/2025 12.8  11.7 - 15.7 g/dL Final    Hematocrit 03/04/2025 40.1  35.0 - 47.0 % Final    MCV 03/04/2025 94  78 - 100 fL Final    MCH 03/04/2025 30.0  26.5 - 33.0 pg Final    MCHC 03/04/2025 31.9  31.5 - 36.5 g/dL Final    RDW 03/04/2025 13.5  10.0 - 15.0 % Final    Platelet Count 03/04/2025 218  150 - 450 10e3/uL Final    % Neutrophils 03/04/2025 46  % Final    % Lymphocytes 03/04/2025 34  % Final    % Monocytes 03/04/2025 9  % Final    % Eosinophils 03/04/2025 9  % Final    % Basophils 03/04/2025 2  % Final    % Immature  "Granulocytes 03/04/2025 0  % Final    NRBCs per 100 WBC 03/04/2025 0  <1 /100 Final    Absolute Neutrophils 03/04/2025 2.4  1.6 - 8.3 10e3/uL Final    Absolute Lymphocytes 03/04/2025 1.8  0.8 - 5.3 10e3/uL Final    Absolute Monocytes 03/04/2025 0.5  0.0 - 1.3 10e3/uL Final    Absolute Eosinophils 03/04/2025 0.5  0.0 - 0.7 10e3/uL Final    Absolute Basophils 03/04/2025 0.1  0.0 - 0.2 10e3/uL Final    Absolute Immature Granulocytes 03/04/2025 0.0  <=0.4 10e3/uL Final    Absolute NRBCs 03/04/2025 0.0  10e3/uL Final    Quantiferon Nil Tube 03/04/2025 0.01  IU/mL Final    Quantiferon TB1 Tube 03/04/2025 0.02  IU/mL Final    Quantiferon TB2 Tube 03/04/2025 0.03   Final    Quantiferon Mitogen 03/04/2025 8.70  IU/mL Final    Quantiferon-TB Gold Plus 03/04/2025 Negative  Negative Final    No interferon gamma response to M.tuberculosis antigens was detected. Infection with M.tuberculosis is unlikely, however a single negative result does not exclude infection. In patients at high risk for infection, a second test should be considered in accordance with the 2017 ATS/IDSA/CDC Clinical Pract  ice Guidelines for Diagnosis of Tuberculosis in Adults and Children     TB1 Ag minus Nil Value 03/04/2025 0.01  IU/mL Final    TB2 Ag minus Nil Value 03/04/2025 0.02  IU/mL Final    Mitogen minus Nil Result 03/04/2025 8.69  IU/mL Final    Nil Result 03/04/2025 0.01  IU/mL Final           Objective    Ht 1.6 m (5' 3\")   Wt 118.8 kg (262 lb)   BMI 46.41 kg/m    Vitals - Patient Reported  Pain Score: Moderate Pain (5)  Pain Loc: Low Back (RA pain in Joints too)          PHYSICAL EXAM:  GENERAL: alert and no distress  EYES: Eyes grossly normal to inspection.  No discharge or erythema, or obvious scleral/conjunctival abnormalities.  RESP: No audible wheeze, cough, or visible cyanosis.    SKIN: Visible skin clear. No significant rash, abnormal pigmentation or lesions.  NEURO: Cranial nerves grossly intact.  Mentation and speech appropriate for " age.  PSYCH: Appropriate affect, tone, and pace of words        Sincerely,    Amber Manley, PA-C      31 minutes spent by me on the date of the encounter doing chart review, history and exam, documentation and further activities per the note    The longitudinal plan of care for the diagnosis(es)/condition(s) as documented were addressed during this visit. Due to the added complexity in care, I will continue to support Sharon in the subsequent management and with ongoing continuity of care.

## 2025-04-29 NOTE — LETTER
2025       RE: Sharon Fung  [address] [address]     Dear Colleague,    Thank you for referring your patient, Sharon Fung, to the [facility] [place] [facility]. Please see a copy of my visit note below.    Virtual Visit Details    Type of service:  Video Visit   Video Start Time:  11:30AM  Video End Time: 11:55AM    Originating Location (pt. Location): Home    Distant Location (provider location):  Off-site  Platform used for Video Visit: [facility] Medical Weight Management Note     Sharon Fung  MRN:  [id number]  :  1955  JENIFER:  2025    Dear Reynaldo Saavedra MD,    I had the pleasure of seeing your patient Sharon Fung. She is a 69 year old female who I am continuing to see for treatment of obesity related to:        2024     8:11 AM   --   I have the following health issues associated with obesity Pre-Diabetes    High Blood Pressure    High Cholesterol    Sleep Apnea    Osteoarthritis (joint disease)   I have the following symptoms associated with obesity Knee Pain    Back Pain    Fatigue       Assessment & Plan  Problem List Items Addressed This Visit       Class 3 severe obesity with serious comorbidity and body mass index (BMI) of 45.0 to 49.9 in adult, unspecified obesity type (H)    Relevant Medications    tirzepatide-Weight Management (ZEPBOUND) 2.5 MG/0.5ML prefilled pen    tirzepatide-Weight Management (ZEPBOUND) 5 MG/0.5ML prefilled pen (Start on 2025)    topiramate (TOPAMAX) 25 MG tablet    Other Relevant Orders    Basic metabolic panel    Obstructive sleep apnea    Relevant Medications    tirzepatide-Weight Management (ZEPBOUND) 2.5 MG/0.5ML prefilled pen    tirzepatide-Weight Management (ZEPBOUND) 5 MG/0.5ML prefilled pen (Start on 2025)      Start Zepbound 2.5mg once weekly for 4 weeks, then increase to 5.0mg once weekly for  history of sleep apnea.   Increase Topiramate 100mg at night   Protein goal - 20mg a meal   BMP ordered   Amber Perkins in 3 month. Mychart in 6 weeks       INTERVAL HISTORY:  S/p lap band at [place] with Dr. Edmonds in 11/2010.   Starting weight - 220lb  Low weight - 200lb  Band removed in 2015 due to band infection with Dr. Mims. Since then has seen a gradual weight regain plus.   Reestablished care on 11/8/2024. GLP-1 not covered by insurance - medicare. No hx of Type II diabetes.   Topiramate started       Clothes are fitting looser. People are noticing weight change. So, even though she is not seeing much change on the scale she is feeling better.     Anti-obesity medication history    Current:   Topiramate 75mg - taking at night. Not having any side effects. Felt like it was helpful at first - hunger better controlled. However over the past 2 months feels like it is not as helpful.       Recent diet changes: eating 2xday. Minimal snacks. At times will eat out of habit. No longer snacking in the middle of the night. Drinking 64oz daily. Also drinking coffee and diet pop (only 1 can daily) - has been decreasing.   Breakfast - toast   Lunch - protein shake   Dinner - chicken + vegetable, burger + salad     Recent exercise/activity changes: still having a lot of back pain. PT does not seem to be helping much. Has a recumbent bike at home, and is trying to bike more.     Recent stressors: no concerns     Recent sleep changes: BRETT, AHI 11. Uses CPAP nightly.     CURRENT WEIGHT:   262 lbs 0 oz    Initial Weight (lbs): 260 lbs  Last Visits Weight: 117.9 kg (260 lb)  Cumulative weight loss (lbs): -2  Weight Loss Percentage: -0.77%    Wt Readings from Last 5 Encounters:   04/29/25 118.8 kg (262 lb)   03/10/25 118.8 kg (262 lb)   12/02/24 118.7 kg (261 lb 9.6 oz)   11/14/24 117.9 kg (260 lb)   11/08/24 117.9 kg (260 lb)             4/25/2025     3:23 PM   Changes and Difficulties   I have made the following changes to my  diet since my last visit: Eating more protein, less sweets drinking more watet   With regards to my diet, I am still struggling with: Over eating   I have made the following changes to my activity/exercise since my last visit: Really not much   With regards to my activity/exercise, I am still struggling with: Doing exercises         MEDICATIONS:   Current Outpatient Medications   Medication Sig Dispense Refill     tirzepatide-Weight Management (ZEPBOUND) 2.5 MG/0.5ML prefilled pen Inject 0.5 mLs (2.5 mg) subcutaneously every 7 days. For 4 weeks 2 mL 0     [START ON 5/29/2025] tirzepatide-Weight Management (ZEPBOUND) 5 MG/0.5ML prefilled pen Inject 0.5 mLs (5 mg) subcutaneously every 7 days. After completing 4 weeks of 2.5mg dose 2 mL 2     topiramate (TOPAMAX) 25 MG tablet Take 4 tablets (100 mg) by mouth at bedtime. 120 tablet 4     acetaminophen (TYLENOL) 500 MG tablet Take 500-1,000 mg by mouth every 6 hours as needed for mild pain.       citalopram (CELEXA) 20 MG tablet TAKE ONE TABLET BY MOUTH ONCE DAILY 90 tablet 0     fexofenadine (ALLEGRA) 180 MG tablet Take 1 tablet (180 mg) by mouth daily 90 tablet 2     gabapentin (NEURONTIN) 100 MG capsule TAKE ONE CAPSULE BY MOUTH EVERY MORNING AND TAKE FIVE CAPSULES BY MOUTH EVERY NIGHT AT BEDTIME 540 capsule 2     hydroxychloroquine (PLAQUENIL) 200 MG tablet Take 1 tablet (200 mg) by mouth 2 times daily. 180 tablet 2     hydrOXYzine cha (VISTARIL) 25 MG capsule Take 1 capsule (25 mg) by mouth 3 times daily as needed for anxiety 30 capsule 1     ibuprofen 200 MG capsule Take 800 mg by mouth daily as needed for inflammatory pain. Twice weekly average       leflunomide (ARAVA) 20 MG tablet Take 1 tablet (20 mg) by mouth daily. 90 tablet 2     losartan-hydrochlorothiazide (HYZAAR) 100-25 MG tablet TAKE ONE TABLET BY MOUTH ONCE DAILY 90 tablet 0     ORDER FOR DME Use your CPAP device as directed by your provider.       Sarilumab 200 MG/1.14ML SOAJ Inject 1.14 mLs (200 mg)  subcutaneously every 14 days. Hold for signs of infection and seek medical attention. 2.28 mL 3     sulfaSALAzine (AZULFIDINE) 500 MG tablet Take 3 tablets (1,500 mg) by mouth 2 times daily. 540 tablet 2     vitamin D3 (CHOLECALCIFEROL) 50 mcg (2000 units) tablet Take 1 tablet (50 mcg) by mouth daily 90 tablet 3           4/25/2025     3:23 PM   Weight Loss Medication History Reviewed With Patient   Which weight loss medications are you currently taking on a regular basis? Topamax (topiramate)   Are you having any side effects from the weight loss medication that we have prescribed you? No       Lab on 03/04/2025   Component Date Value Ref Range Status     Creatinine 03/04/2025 0.82  0.51 - 0.95 mg/dL Final     GFR Estimate 03/04/2025 78  >60 mL/min/1.73m2 Final    eGFR calculated using 2021 CKD-EPI equation.     CRP Inflammation 03/04/2025 <3.00  <5.00 mg/L Final     Erythrocyte Sedimentation Rate 03/04/2025 7  0 - 30 mm/hr Final     Protein Total 03/04/2025 6.9  6.4 - 8.3 g/dL Final     Albumin 03/04/2025 4.3  3.5 - 5.2 g/dL Final     Bilirubin Total 03/04/2025 0.3  <=1.2 mg/dL Final     Alkaline Phosphatase 03/04/2025 89  40 - 150 U/L Final     AST 03/04/2025 27  0 - 45 U/L Final     ALT 03/04/2025 25  0 - 50 U/L Final     Bilirubin Direct 03/04/2025 0.13  0.00 - 0.30 mg/dL Final     Cholesterol 03/04/2025 233 (H)  <200 mg/dL Final     Triglycerides 03/04/2025 287 (H)  <150 mg/dL Final     Direct Measure HDL 03/04/2025 52  >=50 mg/dL Final     LDL Cholesterol Calculated 03/04/2025 124 (H)  <100 mg/dL Final     Non HDL Cholesterol 03/04/2025 181 (H)  <130 mg/dL Final     Patient Fasting > 8hrs? 03/04/2025 Yes   Final     WBC Count 03/04/2025 5.2  4.0 - 11.0 10e3/uL Final     RBC Count 03/04/2025 4.27  3.80 - 5.20 10e6/uL Final     Hemoglobin 03/04/2025 12.8  11.7 - 15.7 g/dL Final     Hematocrit 03/04/2025 40.1  35.0 - 47.0 % Final     MCV 03/04/2025 94  78 - 100 fL Final     MCH 03/04/2025 30.0  26.5 - 33.0 pg  "Final     MCHC 03/04/2025 31.9  31.5 - 36.5 g/dL Final     RDW 03/04/2025 13.5  10.0 - 15.0 % Final     Platelet Count 03/04/2025 218  150 - 450 10e3/uL Final     % Neutrophils 03/04/2025 46  % Final     % Lymphocytes 03/04/2025 34  % Final     % Monocytes 03/04/2025 9  % Final     % Eosinophils 03/04/2025 9  % Final     % Basophils 03/04/2025 2  % Final     % Immature Granulocytes 03/04/2025 0  % Final     NRBCs per 100 WBC 03/04/2025 0  <1 /100 Final     Absolute Neutrophils 03/04/2025 2.4  1.6 - 8.3 10e3/uL Final     Absolute Lymphocytes 03/04/2025 1.8  0.8 - 5.3 10e3/uL Final     Absolute Monocytes 03/04/2025 0.5  0.0 - 1.3 10e3/uL Final     Absolute Eosinophils 03/04/2025 0.5  0.0 - 0.7 10e3/uL Final     Absolute Basophils 03/04/2025 0.1  0.0 - 0.2 10e3/uL Final     Absolute Immature Granulocytes 03/04/2025 0.0  <=0.4 10e3/uL Final     Absolute NRBCs 03/04/2025 0.0  10e3/uL Final     Quantiferon Nil Tube 03/04/2025 0.01  IU/mL Final     Quantiferon TB1 Tube 03/04/2025 0.02  IU/mL Final     Quantiferon TB2 Tube 03/04/2025 0.03   Final     Quantiferon Mitogen 03/04/2025 8.70  IU/mL Final     Quantiferon-TB Gold Plus 03/04/2025 Negative  Negative Final    No interferon gamma response to M.tuberculosis antigens was detected. Infection with M.tuberculosis is unlikely, however a single negative result does not exclude infection. In patients at high risk for infection, a second test should be considered in accordance with the 2017 ATS/IDSA/CDC Clinical Pract  ice Guidelines for Diagnosis of Tuberculosis in Adults and Children      TB1 Ag minus Nil Value 03/04/2025 0.01  IU/mL Final     TB2 Ag minus Nil Value 03/04/2025 0.02  IU/mL Final     Mitogen minus Nil Result 03/04/2025 8.69  IU/mL Final     Nil Result 03/04/2025 0.01  IU/mL Final           Objective   Ht 1.6 m (5' 3\")   Wt 118.8 kg (262 lb)   BMI 46.41 kg/m    Vitals - Patient Reported  Pain Score: Moderate Pain (5)  Pain Loc: Low Back (RA pain in Joints " too)          PHYSICAL EXAM:  GENERAL: alert and no distress  EYES: Eyes grossly normal to inspection.  No discharge or erythema, or obvious scleral/conjunctival abnormalities.  RESP: No audible wheeze, cough, or visible cyanosis.    SKIN: Visible skin clear. No significant rash, abnormal pigmentation or lesions.  NEURO: Cranial nerves grossly intact.  Mentation and speech appropriate for age.  PSYCH: Appropriate affect, tone, and pace of words        Sincerely,    Amber Manley PA-C      31 minutes spent by me on the date of the encounter doing chart review, history and exam, documentation and further activities per the note    The longitudinal plan of care for the diagnosis(es)/condition(s) as documented were addressed during this visit. Due to the added complexity in care, I will continue to support Sharon in the subsequent management and with ongoing continuity of care.      Again, thank you for allowing me to participate in the care of your patient.      Sincerely,    Amber Manley PA-C

## 2025-04-29 NOTE — TELEPHONE ENCOUNTER
PA Initiation    Medication: ZEPBOUND 2.5 MG/0.5ML SC SOAJ  Insurance Company: [employer] - Phone [phone] Fax [phone]  Pharmacy Filling the Rx: [facility] - [place], [place] - [address]  Filling Pharmacy Phone:    Filling Pharmacy Fax:    Start Date: 5/1/2025       [id number]

## 2025-04-29 NOTE — NURSING NOTE
Current patient location: [address] [address]    Is the patient currently in the state of [place]? YES    Visit mode: VIDEO    If the visit is dropped, the patient can be reconnected by:VIDEO VISIT: Text to cell phone:   Telephone Information:   Mobile [phone]       Will anyone else be joining the visit? NO  (If patient encounters technical issues they should call [phone]    Are changes needed to the allergy or medication list? No    Are refills needed on medications prescribed by this physician? Discuss with provider    Rooming Documentation:  Questionnaire(s) completed    Reason for visit: RECHECK    Pt states 5/10 RA pain chronic today.    Bill HYMANF

## 2025-04-30 ENCOUNTER — LAB (OUTPATIENT)
Dept: LAB | Facility: CLINIC | Age: 70
End: 2025-04-30
Payer: COMMERCIAL

## 2025-04-30 DIAGNOSIS — E66.813 CLASS 3 SEVERE OBESITY WITH SERIOUS COMORBIDITY AND BODY MASS INDEX (BMI) OF 45.0 TO 49.9 IN ADULT, UNSPECIFIED OBESITY TYPE (H): ICD-10-CM

## 2025-04-30 LAB
ANION GAP SERPL CALCULATED.3IONS-SCNC: 15 MMOL/L (ref 7–15)
BUN SERPL-MCNC: 15.8 MG/DL (ref 8–23)
CALCIUM SERPL-MCNC: 9.6 MG/DL (ref 8.8–10.4)
CHLORIDE SERPL-SCNC: 101 MMOL/L (ref 98–107)
CREAT SERPL-MCNC: 0.82 MG/DL (ref 0.51–0.95)
EGFRCR SERPLBLD CKD-EPI 2021: 78 ML/MIN/1.73M2
GLUCOSE SERPL-MCNC: 86 MG/DL (ref 70–99)
HCO3 SERPL-SCNC: 23 MMOL/L (ref 22–29)
POTASSIUM SERPL-SCNC: 3.6 MMOL/L (ref 3.4–5.3)
SODIUM SERPL-SCNC: 139 MMOL/L (ref 135–145)

## 2025-04-30 PROCEDURE — 36415 COLL VENOUS BLD VENIPUNCTURE: CPT

## 2025-04-30 PROCEDURE — 80048 BASIC METABOLIC PNL TOTAL CA: CPT

## 2025-04-30 NOTE — PATIENT INSTRUCTIONS
Visit Plan:     Start Zepbound 2.5mg once weekly for 4 weeks, then increase to 5.0mg once weekly for history of sleep apnea.   Increase Topiramate 100mg at night   Protein goal - 20mg a meal   BMP ordered   Amber Perkins in 3 month. Myceleuteriot in 6 weeks

## 2025-05-02 NOTE — TELEPHONE ENCOUNTER
PRIOR AUTHORIZATION DENIED    Medication: ZEPBOUND 2.5 MG/0.5ML SC SOAJ  Insurance Company: [employer] - Phone [phone] Fax [phone]  Denial Date: 5/1/2025  Denial Reason(s): See denial letter   Appeal Information: See denial letter

## 2025-05-03 ENCOUNTER — HEALTH MAINTENANCE LETTER (OUTPATIENT)
Age: 70
End: 2025-05-03

## 2025-05-07 ENCOUNTER — ANCILLARY PROCEDURE (OUTPATIENT)
Dept: MAMMOGRAPHY | Facility: CLINIC | Age: 70
End: 2025-05-07
Attending: FAMILY MEDICINE
Payer: COMMERCIAL

## 2025-05-07 DIAGNOSIS — Z12.31 VISIT FOR SCREENING MAMMOGRAM: ICD-10-CM

## 2025-05-07 PROCEDURE — 77063 BREAST TOMOSYNTHESIS BI: CPT | Mod: TC | Performed by: RADIOLOGY

## 2025-05-07 PROCEDURE — 77067 SCR MAMMO BI INCL CAD: CPT | Mod: TC | Performed by: RADIOLOGY

## 2025-05-18 ASSESSMENT — PATIENT HEALTH QUESTIONNAIRE - PHQ9
SUM OF ALL RESPONSES TO PHQ QUESTIONS 1-9: 3
10. IF YOU CHECKED OFF ANY PROBLEMS, HOW DIFFICULT HAVE THESE PROBLEMS MADE IT FOR YOU TO DO YOUR WORK, TAKE CARE OF THINGS AT HOME, OR GET ALONG WITH OTHER PEOPLE: NOT DIFFICULT AT ALL
SUM OF ALL RESPONSES TO PHQ QUESTIONS 1-9: 3

## 2025-05-19 ENCOUNTER — ANCILLARY PROCEDURE (OUTPATIENT)
Dept: GENERAL RADIOLOGY | Facility: CLINIC | Age: 70
End: 2025-05-19
Attending: FAMILY MEDICINE
Payer: COMMERCIAL

## 2025-05-19 ENCOUNTER — OFFICE VISIT (OUTPATIENT)
Dept: FAMILY MEDICINE | Facility: CLINIC | Age: 70
End: 2025-05-19
Payer: COMMERCIAL

## 2025-05-19 VITALS
OXYGEN SATURATION: 95 % | WEIGHT: 264.5 LBS | HEART RATE: 86 BPM | BODY MASS INDEX: 46.85 KG/M2 | SYSTOLIC BLOOD PRESSURE: 112 MMHG | TEMPERATURE: 97.9 F | DIASTOLIC BLOOD PRESSURE: 64 MMHG | RESPIRATION RATE: 22 BRPM

## 2025-05-19 DIAGNOSIS — G47.33 OBSTRUCTIVE SLEEP APNEA: ICD-10-CM

## 2025-05-19 DIAGNOSIS — R06.09 DOE (DYSPNEA ON EXERTION): Primary | ICD-10-CM

## 2025-05-19 DIAGNOSIS — J44.9 CHRONIC OBSTRUCTIVE PULMONARY DISEASE, UNSPECIFIED COPD TYPE (H): ICD-10-CM

## 2025-05-19 DIAGNOSIS — R06.09 DOE (DYSPNEA ON EXERTION): ICD-10-CM

## 2025-05-19 DIAGNOSIS — J30.81 ALLERGIC RHINITIS DUE TO ANIMAL HAIR AND DANDER: ICD-10-CM

## 2025-05-19 DIAGNOSIS — E66.813 CLASS 3 SEVERE OBESITY WITH SERIOUS COMORBIDITY AND BODY MASS INDEX (BMI) OF 45.0 TO 49.9 IN ADULT, UNSPECIFIED OBESITY TYPE (H): ICD-10-CM

## 2025-05-19 PROCEDURE — 71046 X-RAY EXAM CHEST 2 VIEWS: CPT | Mod: TC | Performed by: INTERNAL MEDICINE

## 2025-05-19 RX ORDER — FLUTICASONE PROPIONATE AND SALMETEROL 250; 50 UG/1; UG/1
1 POWDER RESPIRATORY (INHALATION) EVERY 12 HOURS
Qty: 60 EACH | Refills: 2 | Status: SHIPPED | OUTPATIENT
Start: 2025-05-19

## 2025-05-19 ASSESSMENT — PAIN SCALES - GENERAL: PAINLEVEL_OUTOF10: NO PAIN (0)

## 2025-05-19 ASSESSMENT — ENCOUNTER SYMPTOMS: SHORTNESS OF BREATH: 1

## 2025-05-19 NOTE — PROGRESS NOTES
"  Assessment & Plan       ICD-10-CM    1. HAYS (dyspnea on exertion)  R06.09 XR Chest 2 Views     fluticasone-salmeterol (ADVAIR) 250-50 MCG/ACT inhaler      2. Chronic obstructive pulmonary disease, unspecified COPD type (H)  J44.9 fluticasone-salmeterol (ADVAIR) 250-50 MCG/ACT inhaler      3. Allergic rhinitis due to animal hair and dander  J30.81       4. Obstructive sleep apnea  G47.33 Adult Sleep Eval & Management  Referral      5. Class 3 severe obesity with serious comorbidity and body mass index (BMI) of 45.0 to 49.9 in adult, unspecified obesity type (H)  E66.813     Z68.42         She has had some increased dyspnea on exertion recently which likely represents an exacerbation of underlying COPD  We will check a chest x-ray today to rule out any new or different lung conditions which might contribute to this  I suggested we treat this with an Advair discus for a few weeks or so  Try to control allergies as well    We discussed her BRETT and use of CPAP and potential insurance coverage for the Mounjaro/Zepbound medication and I will refer her back to sleep medicine for further evaluation of that and potentially a repeat sleep study    If new, worsening or persistent symptoms, the patient is to call or return for a recheck.    The longitudinal plan of care for the diagnosis(es)/condition(s) as documented were addressed during this visit. Due to the added complexity in care, I will continue to support Sharon in the subsequent management and with ongoing continuity of care.        BMI  Estimated body mass index is 46.85 kg/m  as calculated from the following:    Height as of 4/29/25: 1.6 m (5' 3\").    Weight as of this encounter: 120 kg (264 lb 8 oz).   Weight management plan: Patient referred to endocrine and/or weight management specialty        Subjective   Sharon is a 69 year old, presenting for the following health issues:  Shortness of Breath (Started on Friday and has gotten worse over the last few " days)      5/19/2025     2:58 PM   Additional Questions   Roomed by Celena   Accompanied by self         5/19/2025     2:58 PM   Patient Reported Additional Medications   Patient reports taking the following new medications none     Shortness of Breath    History of Present Illness       Reason for visit:  Increased shortness of breath   She is taking medications regularly.        Shortness of breath for 3 days.    She has had some dyspnea on exertion in the last few days.  Her breathing seems fine at rest, but she gets short of breath more readily than usual if she tries to walk or do other physical activity.  She did have pulmonary function test done in March 2018 which showed some mild COPD.  She has never been a smoker.  She is not using any breathing medications currently.  She does have allergies this time of year.  She also has obstructive sleep apnea.  She uses CPAP for that.  It has been 10 years or so since she had a sleep study.  She was felt to have mild BRETT at the time and apparently if she had moderate BRETT she would qualify for use of a GLP-1 agonist to treat her obesity, but currently that medication is not covered.  She continues to struggle with her weight.    Patient Active Problem List   Diagnosis    AR (allergic rhinitis)    GERD (gastroesophageal reflux disease)    Class 3 severe obesity with serious comorbidity and body mass index (BMI) of 45.0 to 49.9 in adult, unspecified obesity type (H)    Status post bariatric surgery    High risk medication use    Obstructive sleep apnea    Anterior basement membrane dystrophy    Seropositive rheumatoid arthritis (H)    LORNA positive    Carpal tunnel syndrome of right wrist    Headache    Chronic obstructive pulmonary disease, unspecified COPD type (H)    Immunosuppression    Fibromyalgia    Pain in joint, ankle and foot, right    Hypertension goal BP (blood pressure) < 140/90    Chronic back pain, unspecified back location, unspecified back pain  laterality    Hyperlipidemia LDL goal <100    Mild recurrent major depression    Nuclear sclerosis of both eyes     Current Outpatient Medications   Medication Sig Dispense Refill    acetaminophen (TYLENOL) 500 MG tablet Take 500-1,000 mg by mouth every 6 hours as needed for mild pain.      citalopram (CELEXA) 20 MG tablet TAKE ONE TABLET BY MOUTH ONCE DAILY 90 tablet 0    fexofenadine (ALLEGRA) 180 MG tablet Take 1 tablet (180 mg) by mouth daily 90 tablet 2       2    gabapentin (NEURONTIN) 100 MG capsule TAKE ONE CAPSULE BY MOUTH EVERY MORNING AND TAKE FIVE CAPSULES BY MOUTH EVERY NIGHT AT BEDTIME 540 capsule 2    hydroxychloroquine (PLAQUENIL) 200 MG tablet Take 1 tablet (200 mg) by mouth 2 times daily. 180 tablet 2    hydrOXYzine cha (VISTARIL) 25 MG capsule Take 1 capsule (25 mg) by mouth 3 times daily as needed for anxiety 30 capsule 1    ibuprofen 200 MG capsule Take 800 mg by mouth daily as needed for inflammatory pain. Twice weekly average      leflunomide (ARAVA) 20 MG tablet Take 1 tablet (20 mg) by mouth daily. 90 tablet 2    losartan-hydrochlorothiazide (HYZAAR) 100-25 MG tablet TAKE ONE TABLET BY MOUTH ONCE DAILY 90 tablet 0    ORDER FOR DME Use your CPAP device as directed by your provider.      Sarilumab 200 MG/1.14ML SOAJ Inject 1.14 mLs (200 mg) subcutaneously every 14 days. Hold for signs of infection and seek medical attention. 2.28 mL 3    sulfaSALAzine (AZULFIDINE) 500 MG tablet Take 3 tablets (1,500 mg) by mouth 2 times daily. 540 tablet 2    tirzepatide-Weight Management (ZEPBOUND) 2.5 MG/0.5ML prefilled pen Inject 0.5 mLs (2.5 mg) subcutaneously every 7 days. For 4 weeks 2 mL 0    topiramate (TOPAMAX) 25 MG tablet Take 4 tablets (100 mg) by mouth at bedtime. 120 tablet 4    vitamin D3 (CHOLECALCIFEROL) 50 mcg (2000 units) tablet Take 1 tablet (50 mcg) by mouth daily 90 tablet 3    [START ON 5/29/2025] tirzepatide-Weight Management (ZEPBOUND) 5 MG/0.5ML prefilled pen Inject 0.5 mLs (5 mg)  subcutaneously every 7 days. After completing 4 weeks of 2.5mg dose 2 mL 2     No current facility-administered medications for this visit.     Facility-Administered Medications Ordered in Other Visits   Medication Dose Route Frequency Provider Last Rate Last Admin    sodium chloride bacteriostatic 0.9 % flush 12 mL  12 mL Intravenous Once Nely Matthews MD                 Review of Systems  Noncontributory except as above.      Objective    /64 (BP Location: Left arm, Patient Position: Sitting, Cuff Size: Adult Large)   Pulse 86   Temp 97.9  F (36.6  C) (Oral)   Resp 22   Wt 120 kg (264 lb 8 oz)   SpO2 95%   BMI 46.85 kg/m    Body mass index is 46.85 kg/m .  Physical Exam   GENERAL: alert and no distress  RESP: lungs clear to auscultation - no rales, rhonchi or wheezes  CV: regular rate and rhythm, normal S1 S2, no S3 or S4, no murmur, click or rub    Her spirometry results from March 2018 were reviewed.        Signed Electronically by: Reynaldo Saavedra MD

## 2025-05-20 ENCOUNTER — OFFICE VISIT (OUTPATIENT)
Dept: SLEEP MEDICINE | Facility: CLINIC | Age: 70
End: 2025-05-20
Attending: FAMILY MEDICINE
Payer: COMMERCIAL

## 2025-05-20 ENCOUNTER — PATIENT OUTREACH (OUTPATIENT)
Dept: CARE COORDINATION | Facility: CLINIC | Age: 70
End: 2025-05-20
Payer: COMMERCIAL

## 2025-05-20 ENCOUNTER — RESULTS FOLLOW-UP (OUTPATIENT)
Dept: FAMILY MEDICINE | Facility: CLINIC | Age: 70
End: 2025-05-20

## 2025-05-20 VITALS
WEIGHT: 261.4 LBS | SYSTOLIC BLOOD PRESSURE: 141 MMHG | RESPIRATION RATE: 20 BRPM | OXYGEN SATURATION: 93 % | BODY MASS INDEX: 46.32 KG/M2 | DIASTOLIC BLOOD PRESSURE: 79 MMHG | HEIGHT: 63 IN | HEART RATE: 89 BPM

## 2025-05-20 DIAGNOSIS — G47.33 OBSTRUCTIVE SLEEP APNEA: Primary | ICD-10-CM

## 2025-05-20 DIAGNOSIS — G47.10 HYPERSOMNIA: ICD-10-CM

## 2025-05-20 PROCEDURE — 3077F SYST BP >= 140 MM HG: CPT | Performed by: INTERNAL MEDICINE

## 2025-05-20 PROCEDURE — 3078F DIAST BP <80 MM HG: CPT | Performed by: INTERNAL MEDICINE

## 2025-05-20 PROCEDURE — 1125F AMNT PAIN NOTED PAIN PRSNT: CPT | Performed by: INTERNAL MEDICINE

## 2025-05-20 PROCEDURE — G2211 COMPLEX E/M VISIT ADD ON: HCPCS | Performed by: INTERNAL MEDICINE

## 2025-05-20 PROCEDURE — 99214 OFFICE O/P EST MOD 30 MIN: CPT | Performed by: INTERNAL MEDICINE

## 2025-05-20 ASSESSMENT — SLEEP AND FATIGUE QUESTIONNAIRES
HOW LIKELY ARE YOU TO NOD OFF OR FALL ASLEEP WHILE SITTING INACTIVE IN A PUBLIC PLACE: SLIGHT CHANCE OF DOZING
HOW LIKELY ARE YOU TO NOD OFF OR FALL ASLEEP WHILE SITTING QUIETLY AFTER LUNCH WITHOUT ALCOHOL: SLIGHT CHANCE OF DOZING
HOW LIKELY ARE YOU TO NOD OFF OR FALL ASLEEP WHILE SITTING AND READING: MODERATE CHANCE OF DOZING
HOW LIKELY ARE YOU TO NOD OFF OR FALL ASLEEP IN A CAR, WHILE STOPPED FOR A FEW MINUTES IN TRAFFIC: WOULD NEVER DOZE
HOW LIKELY ARE YOU TO NOD OFF OR FALL ASLEEP WHILE LYING DOWN TO REST IN THE AFTERNOON WHEN CIRCUMSTANCES PERMIT: MODERATE CHANCE OF DOZING
HOW LIKELY ARE YOU TO NOD OFF OR FALL ASLEEP WHILE SITTING AND TALKING TO SOMEONE: WOULD NEVER DOZE
HOW LIKELY ARE YOU TO NOD OFF OR FALL ASLEEP WHEN YOU ARE A PASSENGER IN A CAR FOR AN HOUR WITHOUT A BREAK: MODERATE CHANCE OF DOZING
HOW LIKELY ARE YOU TO NOD OFF OR FALL ASLEEP WHILE WATCHING TV: SLIGHT CHANCE OF DOZING

## 2025-05-20 NOTE — PROGRESS NOTES
Additional 15 minutes on the date of service was spent performing the following:    -Preparing to see the patient  -Obtaining and/or reviewing separately obtained history   -Ordering medications, tests, or procedures   -Documenting clinical information in the electronic or other health record     Thank you for the opportunity to participate in the care of Sharon Fung.     She is a 69 year old y/o female patient who comes to the sleep medicine clinic for follow up.  The patient was diagnosed with obstructive sleep apnea on 03/27/2015 (AHI = 10).  The patient states that despite adequate hours of usage on her CPAP machine she would continue to feel tired upon awakening.  This has been going on for more than 3 months.     Assessment and Plan:  In summary Sharon Fung is a 69 year old year old female who is here for follow up.    1. Obstructive sleep apnea (Primary)/Hypersomnia  I congratulated the patient on her excellent CPAP usage.  However due to the fact that she continues to have daytime symptoms despite adequate hours of usage, I offered her the option of getting an in-lab titration study starting at a CPAP pressure of 14 cmH2O.  Patient expressed understanding and agreed.  - Adult Sleep Eval & Management  Referral  - Comprehensive Sleep Study; Future          Lab reviewed: Discussed with patient.    Sleep-Wake Cycle:    TIME IN BED:    1) Work/School Days:    Do you work or go to school? Yes   What time do you usually get into bed? 9pm   About how long does it take you to fall asleep? 2 hours   How often do you have trouble falling asleep? 5   How often do you wake up during the night? 2-3   Do you work days/evenings/nights/rotating shifts? Days   What wakes you up at night? Pain    Use the bathroom   How often do you have trouble falling back to sleep? 2   About how long does it take to fall back to sleep? 1-2   What do you usually do if you have trouble getting back to sleep? Listen to music    What time do you usually get out of bed to start your day? 730   Do you use an alarm? No   2) Weekends/Non-work Days/All Other Days    What time do you usually get into bed? 9pm   About how long does it take you to fall asleep? 1-2 hours   What time do you usually get out of bed to start your day? 730   Do you use an alarm? No   SLEEP NEED    On average, about how much sleep do you think you get? 7 hours   About how much sleep do you think you need? 8hours   SLEEP POSITION    Which sleep positions do you prefer? Back    Head Elevated   Do you do any of the following activities in bed? Watch TV    Use phone, computer, or tablet   How often do you take a nap on purpose? 2   About how long are your naps? 30 minutes   Do you feel better after naps? No   How often do you doze off unintentionally? None   Have you ever had a driving accident or near-miss due to sleepiness/drowsiness? No       MIGUEL:  MIGUEL Total Score: 14  Total score - [place]: 9 (5/20/2025  2:00 PM)    Failed to redirect to the Timeline version of the [employer] SmartLink.   Patient Active Problem List   Diagnosis    AR (allergic rhinitis)    GERD (gastroesophageal reflux disease)    Class 3 severe obesity with serious comorbidity and body mass index (BMI) of 45.0 to 49.9 in adult, unspecified obesity type (H)    Status post bariatric surgery    High risk medication use    Obstructive sleep apnea    Anterior basement membrane dystrophy    Seropositive rheumatoid arthritis (H)    LORNA positive    Carpal tunnel syndrome of right wrist    Headache    Chronic obstructive pulmonary disease, unspecified COPD type (H)    Immunosuppression    Fibromyalgia    Pain in joint, ankle and foot, right    Hypertension goal BP (blood pressure) < 140/90    Chronic back pain, unspecified back location, unspecified back pain laterality    Hyperlipidemia LDL goal <100    Mild recurrent major depression    Nuclear sclerosis of both eyes       Past Medical History:   Diagnosis Date     AR (allergic rhinitis)  as teen    Arthritis 12/14/2015    Chronic obstructive pulmonary disease, unspecified COPD type (H) 03/27/2018    Depressive disorder 3 years ago    GERD (gastroesophageal reflux disease) 04/2007    Headache 07/11/2017    Hypertension goal BP (blood pressure) < 140/90 12/20/2021    Mild major depression 3 years ago    Morbid obesity (H) teens    BRETT (obstructive sleep apnea)     uses CPAP    RA (rheumatoid arthritis) (H) 4 years    Reduced vision 3 years       Past Surgical History:   Procedure Laterality Date    ARTHRODESIS FOOT Right 6/30/2021    Procedure: Right 1st metatarsophalangeal joint fusion;  Surgeon: Jesus Wolf MD;  Location: [facility]    ARTHRODESIS TOE(S) Right 12/27/2021    Procedure: right revision great toe fusion;  Surgeon: Ryan Gee MD;  Location: [facility] OR    BLEPHAROPLASTY BILATERAL Bilateral 8/5/2016    Procedure: BLEPHAROPLASTY BILATERAL;  Surgeon: Cortez Robin MD;  Location:  [facility]    BREAST BIOPSY, RT/LT  1-94    Benign    COLONOSCOPY      COLONOSCOPY WITH CO2 INSUFFLATION N/A 3/30/2017    Procedure: COLONOSCOPY WITH CO2 INSUFFLATION;  Surgeon: Issa Weeks MD;  Location: [facility]    ESOPHAGOSCOPY, GASTROSCOPY, DUODENOSCOPY (EGD), COMBINED N/A 10/29/2015    Procedure: COMBINED ESOPHAGOSCOPY, GASTROSCOPY, DUODENOSCOPY (EGD);  Surgeon: Shaq Mims MD;  Location: [facility]    LAPAROSCOPIC CHOLECYSTECTOMY N/A 6/13/2023    Procedure: CHOLECYSTECTOMY, LAPAROSCOPIC;  Surgeon: Reynaldo Segura MD;  Location: [facility]    LAPAROSCOPIC GASTRIC ADJUSTABLE BANDING  11/09/10    Lap band procedure    LAPAROSCOPIC REMOVAL GASTRIC ADJUSTABLE BAND N/A 10/31/2015    Procedure: LAPAROSCOPIC REMOVAL GASTRIC ADJUSTABLE BAND;  Surgeon: Shaq Mims MD;  Location: [facility]    PHACOEMULSIFICATION WITH STANDARD INTRAOCULAR LENS IMPLANT Right 11/7/2024    Procedure: RIGHT EYE PHACOEMULSIFICATION, CATARACT, WITH STANDARD INTRAOCULAR LENS IMPLANT  INSERTION;  Surgeon: Sia Herman MD;  Location: [facility] OR    PHACOEMULSIFICATION WITH STANDARD INTRAOCULAR LENS IMPLANT Left 11/14/2024    Procedure: LEFT EYE PHACOEMULSIFICATION, CATARACT, WITH STANDARD INTRAOCULAR LENS IMPLANT INSERTION;  Surgeon: Sia Herman MD;  Location: [facility] OR    LA HAND/FINGER SURGERY UNLISTED  2016    LA STOMACH SURGERY PROCEDURE UNLISTED  11/2015    RELEASE CARPAL TUNNEL Left 4/27/2017    Procedure: RELEASE CARPAL TUNNEL;  Left Open Carpal Tunnel Release;  Surgeon: Ciara Middleton MD;  [facility]: [facility]    RELEASE CARPAL TUNNEL Right 6/15/2017    Procedure: RELEASE CARPAL TUNNEL;  Right Carpal Tunnel Release Open;  Surgeon: Ciara Middleton MD;  Location: [facility]    REPAIR PTOSIS      TUBAL LIGATION  1988    [facility] APPENDECTOMY  1977       Current Outpatient Medications   Medication Sig Dispense Refill    acetaminophen (TYLENOL) 500 MG tablet Take 500-1,000 mg by mouth every 6 hours as needed for mild pain.      citalopram (CELEXA) 20 MG tablet TAKE ONE TABLET BY MOUTH ONCE DAILY 90 tablet 0    fexofenadine (ALLEGRA) 180 MG tablet Take 1 tablet (180 mg) by mouth daily 90 tablet 2    fluticasone-salmeterol (ADVAIR) 250-50 MCG/ACT inhaler Inhale 1 puff into the lungs every 12 hours. 60 each 2    gabapentin (NEURONTIN) 100 MG capsule TAKE ONE CAPSULE BY MOUTH EVERY MORNING AND TAKE FIVE CAPSULES BY MOUTH EVERY NIGHT AT BEDTIME 540 capsule 2    hydroxychloroquine (PLAQUENIL) 200 MG tablet Take 1 tablet (200 mg) by mouth 2 times daily. 180 tablet 2    hydrOXYzine cha (VISTARIL) 25 MG capsule Take 1 capsule (25 mg) by mouth 3 times daily as needed for anxiety 30 capsule 1    ibuprofen 200 MG capsule Take 800 mg by mouth daily as needed for inflammatory pain. Twice weekly average      leflunomide (ARAVA) 20 MG tablet Take 1 tablet (20 mg) by mouth daily. 90 tablet 2    losartan-hydrochlorothiazide (HYZAAR) 100-25 MG tablet TAKE ONE TABLET BY MOUTH ONCE DAILY 90 tablet 0     "ORDER FOR DME Use your CPAP device as directed by your provider.      Sarilumab 200 MG/1.14ML SOAJ Inject 1.14 mLs (200 mg) subcutaneously every 14 days. Hold for signs of infection and seek medical attention. 2.28 mL 3    sulfaSALAzine (AZULFIDINE) 500 MG tablet Take 3 tablets (1,500 mg) by mouth 2 times daily. 540 tablet 2    topiramate (TOPAMAX) 25 MG tablet Take 4 tablets (100 mg) by mouth at bedtime. 120 tablet 4    vitamin D3 (CHOLECALCIFEROL) 50 mcg (2000 units) tablet Take 1 tablet (50 mcg) by mouth daily 90 tablet 3       No Known Allergies    Physical Exam:  BP (!) 141/79   Pulse 89   Resp 20   Ht 1.6 m (5' 3\")   Wt 118.6 kg (261 lb 6.4 oz)   SpO2 93%   BMI 46.30 kg/m    BMI:Body mass index is 46.3 kg/m .   GEN: NAD, morbidly obese  Head: Normocephalic.  EYES: EOMI  Psych: normal mood, normal affect    Labs/Studies:    Lab Results   Component Value Date    GLC 86 04/30/2025    GLC 92 11/01/2024     Lab Results   Component Value Date    HGB 12.8 03/04/2025    HGB 12.0 11/01/2024     Lab Results   Component Value Date    BUN 15.8 04/30/2025    BUN 12.1 11/01/2024    CR 0.82 04/30/2025    CR 0.82 03/04/2025     Lab Results   Component Value Date    AST 27 03/04/2025    AST 29 11/01/2024    ALT 25 03/04/2025    ALT 19 11/01/2024    ALKPHOS 89 03/04/2025    ALKPHOS 91 11/01/2024    BILITOTAL 0.3 03/04/2025    BILITOTAL 0.5 11/01/2024    BILICONJ 0.0 07/14/2014       Recent Labs   Lab Test 04/30/25  1554 03/04/25  0721 11/01/24  1041     --  139   POTASSIUM 3.6  --  3.9   CHLORIDE 101  --  103   CO2 23  --  30*   ANIONGAP 15  --  6*   GLC 86  --  92   BUN 15.8  --  12.1   CR 0.82 0.82 0.71   ISH 9.6  --  9.4     I reviewed the efficacy and compliance report from her device. Data summarized on the HPI and the PAP compliance flow sheet.     Patient was strongly advised in AVS to avoid driving, operating any heavy machinery or other hazardous situations while drowsy or sleepy. Patient was counseled on " the importance of driving while alert, to pull over if drowsy, or nap before getting into the vehicle if sleepy.     Patient verbalized understanding of these issues, agrees with the plan and all questions were answered today. Patient was given an opportuntity to voice any other symptoms or concerns not listed above. Patient did not have any other symptoms or concerns.      Nick Luong DO  Board Certified in Internal Medicine and Sleep Medicine    (Note created with Dragon voice recognition and unintended spelling errors and word substitutions may occur)     Audio and visual devices were used for this virtual clinic visit with permission from patient.

## 2025-05-20 NOTE — NURSING NOTE
PSG and follow-up appointment with provider were both scheduled. PSG hand-out given to patient at clinic visit.  Citlaly Martinez, MARY

## 2025-05-20 NOTE — PATIENT INSTRUCTIONS
Patient education: What is a sleep study?     What is a sleep study? -- A sleep study is a test that measures how well you sleep and checks for sleep problems. For some sleep studies, you stay overnight in a sleep lab at a hospital or sleep center.     What happens during a sleep study? -- Before you go to sleep, a technician attaches small, sticky patches called  electrodes  to your head, chest, and legs. He or she will also place a small tube beneath your nose and might wrap 1 or 2 belts around your chest.   Each of these items has wires that connect to monitors. The monitors record your movement, brain activity, breathing, and other body functions while you sleep.  If you have a history of trouble falling asleep, your doctor might prescribe a medicine to help you fall asleep in the lab. If you have never taken the medicine before, your doctor might ask you take it on a night before your sleep study to see how it affects you.   Why might my doctor order a sleep study? -- Your doctor will order a sleep study if he or she thinks you have sleep apnea or a different condition that makes you:   ?Have sudden jerking leg movements while you sleep, called  periodic limb movements.    ?Feel very sleepy during the day and fall asleep all of a sudden, called  narcolepsy.    ?Have trouble falling asleep or staying asleep over a long period of time, called  chronic insomnia.    ?Do odd things while you sleep, such as walking.  How should I prepare for a sleep study? -- On the day of your sleep study, you should:   ?Avoid alcohol   ?Avoid drinking coffee, tea, sodas, and other drinks that have caffeine in the afternoon and evening   ?Take all of your regular medicines     The cost of care estimate line is [phone] . They are able to give the patient an estimate of the charges and also an estimate of their insurance coverage/patient responsibility.   After your sleep study is performed, please call us at [phone] or  [phone]  to schedule for a follow up to review the results of the sleep study.    Please bring one tab of low dose melatonin 3 mg or less to the night of the study.    Melatonin intake is completely voluntary.    You may take own melatonin after arrival to sleep center. Do not drive or operate machinery after intake of melatonin.     Please make every effort you can to sleep on your back with one pillow behind your head.    Please also call your insurance company next week to see if your sleep study is approved and find out your co-pay.    Please do not drive drowsy under any circumstances.  If you find yourself in a situation in which you are driving and feeling sleepy, please pull over right away in a safe place and take a quick nap before getting back on the road.

## 2025-05-20 NOTE — RESULT ENCOUNTER NOTE
Sharon,  The radiologist and I have had a chance to look at your chest x-rays and they look nice and normal.  No issues there with pneumothorax or pneumonia or anything like that.    Reynaldo Saavedra MD

## 2025-05-20 NOTE — NURSING NOTE
"Chief Complaint   Patient presents with    Sleep Problem     Patient presents to clinic to establish care for sleep apnea and CPAP. Wondering if she needs a new sleep study for possible Zepbound. Waking up with headaches.       Initial BP (!) 141/79   Pulse 89   Resp 20   Ht 1.6 m (5' 3\")   Wt 118.6 kg (261 lb 6.4 oz)   SpO2 93%   BMI 46.30 kg/m   Estimated body mass index is 46.3 kg/m  as calculated from the following:    Height as of this encounter: 1.6 m (5' 3\").    Weight as of this encounter: 118.6 kg (261 lb 6.4 oz).    Medication Reconciliation: complete  ESS: 9  Neck circumference: 18.50 inches / 47 centimeters.  DME: BHUMI Martinez CMA      "

## 2025-06-04 ENCOUNTER — OFFICE VISIT (OUTPATIENT)
Dept: FAMILY MEDICINE | Facility: CLINIC | Age: 70
End: 2025-06-04
Payer: COMMERCIAL

## 2025-06-04 VITALS
DIASTOLIC BLOOD PRESSURE: 73 MMHG | BODY MASS INDEX: 45.89 KG/M2 | OXYGEN SATURATION: 94 % | TEMPERATURE: 98.5 F | HEART RATE: 84 BPM | SYSTOLIC BLOOD PRESSURE: 118 MMHG | WEIGHT: 259 LBS | RESPIRATION RATE: 12 BRPM | HEIGHT: 63 IN

## 2025-06-04 DIAGNOSIS — J44.9 CHRONIC OBSTRUCTIVE PULMONARY DISEASE, UNSPECIFIED COPD TYPE (H): ICD-10-CM

## 2025-06-04 DIAGNOSIS — B96.89 BV (BACTERIAL VAGINOSIS): ICD-10-CM

## 2025-06-04 DIAGNOSIS — N76.0 BV (BACTERIAL VAGINOSIS): ICD-10-CM

## 2025-06-04 DIAGNOSIS — N94.9 VAGINAL DISCOMFORT: Primary | ICD-10-CM

## 2025-06-04 PROCEDURE — 99214 OFFICE O/P EST MOD 30 MIN: CPT | Performed by: FAMILY MEDICINE

## 2025-06-04 PROCEDURE — G2211 COMPLEX E/M VISIT ADD ON: HCPCS | Performed by: FAMILY MEDICINE

## 2025-06-04 PROCEDURE — 1125F AMNT PAIN NOTED PAIN PRSNT: CPT | Performed by: FAMILY MEDICINE

## 2025-06-04 PROCEDURE — 3074F SYST BP LT 130 MM HG: CPT | Performed by: FAMILY MEDICINE

## 2025-06-04 PROCEDURE — 3078F DIAST BP <80 MM HG: CPT | Performed by: FAMILY MEDICINE

## 2025-06-04 RX ORDER — METRONIDAZOLE 500 MG/1
500 TABLET ORAL 2 TIMES DAILY
Qty: 14 TABLET | Refills: 0 | Status: SHIPPED | OUTPATIENT
Start: 2025-06-04

## 2025-06-04 ASSESSMENT — PAIN SCALES - GENERAL: PAINLEVEL_OUTOF10: MODERATE PAIN (6)

## 2025-06-04 NOTE — PROGRESS NOTES
Assessment & Plan       ICD-10-CM    1. Vaginal discomfort  N94.9       2. BV (bacterial vaginosis)  N76.0 metroNIDAZOLE (FLAGYL) 500 MG tablet    B96.89       3. Chronic obstructive pulmonary disease, unspecified COPD type (H)  J44.9         We will go ahead and treat her empirically for BV with the metronidazole that she had last year and that she responded to so well  I asked her to return for further evaluation if her symptoms do not readily clear up this time like they did last time    With regard to her breathing, she will continue with the Advair for now  The air quality is starting to get better, so hopefully that will help  If her breathing is still not improved over the next week, that she could call or email and you might prescribe Trelegy Ellipta in place of the Advair      The longitudinal plan of care for the diagnosis(es)/condition(s) as documented were addressed during this visit. Due to the added complexity in care, I will continue to support Sharon in the subsequent management and with ongoing continuity of care.            Mukul Herrera is a 70 year old, presenting for the following health issues:  Rectal Problem      6/4/2025    12:09 PM   Additional Questions   Roomed by cam   Accompanied by none     History of Present Illness       Reason for visit:  Vaginal/rectal irritation    She eats 2-3 servings of fruits and vegetables daily.She consumes 2 sweetened beverage(s) daily.She exercises with enough effort to increase her heart rate 9 or less minutes per day.  She exercises with enough effort to increase her heart rate 3 or less days per week.   She is taking medications regularly.      She has been having some irritation and discomfort in the vaginal/rectal area for the last week or so.  It hurts to sit and hurts to wait for self.  She feels like this is the same thing she had early last year.  She was seen by a provider for this and had a wet prep done that showed no yeast, but did show  bacterial medicines.  She was treated with metronidazole and her symptoms readily improved within a day.  She is hoping she could be treated again in the same way.  She is not having any vaginal discharge.  She is not sexually active.    She does note that her breathing has not been very good lately.  She has been using Advair discus, but she still gets short of breath with activity.  Air quality has been especially bad this last week with [place] wildfires, however, so that it is probably playing a role.    Patient Active Problem List   Diagnosis    AR (allergic rhinitis)    GERD (gastroesophageal reflux disease)    Class 3 severe obesity with serious comorbidity and body mass index (BMI) of 45.0 to 49.9 in adult, unspecified obesity type (H)    Status post bariatric surgery    High risk medication use    Obstructive sleep apnea    Anterior basement membrane dystrophy    Seropositive rheumatoid arthritis (H)    LORNA positive    Carpal tunnel syndrome of right wrist    Headache    Chronic obstructive pulmonary disease, unspecified COPD type (H)    Immunosuppression    Fibromyalgia    Pain in joint, ankle and foot, right    Hypertension goal BP (blood pressure) < 140/90    Chronic back pain, unspecified back location, unspecified back pain laterality    Hyperlipidemia LDL goal <100    Mild recurrent major depression    Nuclear sclerosis of both eyes     Current Outpatient Medications   Medication Sig Dispense Refill    acetaminophen (TYLENOL) 500 MG tablet Take 500-1,000 mg by mouth every 6 hours as needed for mild pain.      citalopram (CELEXA) 20 MG tablet TAKE ONE TABLET BY MOUTH ONCE DAILY 90 tablet 0    fexofenadine (ALLEGRA) 180 MG tablet Take 1 tablet (180 mg) by mouth daily 90 tablet 2    fluticasone-salmeterol (ADVAIR) 250-50 MCG/ACT inhaler Inhale 1 puff into the lungs every 12 hours. 60 each 2    gabapentin (NEURONTIN) 100 MG capsule TAKE ONE CAPSULE BY MOUTH EVERY MORNING AND TAKE FIVE CAPSULES BY MOUTH  "EVERY NIGHT AT BEDTIME 540 capsule 2    hydroxychloroquine (PLAQUENIL) 200 MG tablet Take 1 tablet (200 mg) by mouth 2 times daily. 180 tablet 2    hydrOXYzine cha (VISTARIL) 25 MG capsule Take 1 capsule (25 mg) by mouth 3 times daily as needed for anxiety 30 capsule 1    ibuprofen 200 MG capsule Take 800 mg by mouth daily as needed for inflammatory pain. Twice weekly average      leflunomide (ARAVA) 20 MG tablet Take 1 tablet (20 mg) by mouth daily. 90 tablet 2    losartan-hydrochlorothiazide (HYZAAR) 100-25 MG tablet TAKE ONE TABLET BY MOUTH ONCE DAILY 90 tablet 0       0    ORDER FOR DME Use your CPAP device as directed by your provider.      Sarilumab 200 MG/1.14ML SOAJ Inject 1.14 mLs (200 mg) subcutaneously every 14 days. Hold for signs of infection and seek medical attention. 2.28 mL 3    sulfaSALAzine (AZULFIDINE) 500 MG tablet Take 3 tablets (1,500 mg) by mouth 2 times daily. 540 tablet 2    topiramate (TOPAMAX) 25 MG tablet Take 4 tablets (100 mg) by mouth at bedtime. 120 tablet 4    vitamin D3 (CHOLECALCIFEROL) 50 mcg (2000 units) tablet Take 1 tablet (50 mcg) by mouth daily 90 tablet 3     No current facility-administered medications for this visit.     Facility-Administered Medications Ordered in Other Visits   Medication Dose Route Frequency Provider Last Rate Last Admin    sodium chloride bacteriostatic 0.9 % flush 12 mL  12 mL Intravenous Once Nely Matthews MD               Review of Systems  Mainly significant for the above.      Objective    /73 (BP Location: Left arm, Patient Position: Chair, Cuff Size: Adult Large)   Pulse 84   Temp 98.5  F (36.9  C) (Temporal)   Resp 12   Ht 1.6 m (5' 3\")   Wt 117.5 kg (259 lb)   SpO2 94%   BMI 45.88 kg/m    Body mass index is 45.88 kg/m .  Physical Exam   GENERAL: alert and no distress  RESP: lungs clear to auscultation - no rales, rhonchi or wheezes    We discussed doing a pelvic and/or rectal exam and possibly a wet prep, but she would prefer " just to receive the treatment she had last year.    Office visit and test results from February 2024 were reviewed when she came in with similar symptoms.        Signed Electronically by: Reynaldo Saavedra MD

## 2025-06-05 ENCOUNTER — LAB (OUTPATIENT)
Dept: LAB | Facility: CLINIC | Age: 70
End: 2025-06-05
Payer: COMMERCIAL

## 2025-06-05 DIAGNOSIS — Z79.899 HIGH RISK MEDICATION USE: ICD-10-CM

## 2025-06-05 DIAGNOSIS — M05.9 SEROPOSITIVE RHEUMATOID ARTHRITIS (H): ICD-10-CM

## 2025-06-05 LAB
ALBUMIN SERPL BCG-MCNC: 4.3 G/DL (ref 3.5–5.2)
ALP SERPL-CCNC: 92 U/L (ref 40–150)
ALT SERPL W P-5'-P-CCNC: 33 U/L (ref 0–50)
AST SERPL W P-5'-P-CCNC: 38 U/L (ref 0–45)
BASOPHILS # BLD AUTO: 0.1 10E3/UL (ref 0–0.2)
BASOPHILS NFR BLD AUTO: 1 %
BILIRUB SERPL-MCNC: 0.5 MG/DL
BILIRUBIN DIRECT (ROCHE PRO & PURE): 0.24 MG/DL (ref 0–0.45)
CREAT SERPL-MCNC: 0.76 MG/DL (ref 0.51–0.95)
CRP SERPL-MCNC: <3 MG/L
EGFRCR SERPLBLD CKD-EPI 2021: 85 ML/MIN/1.73M2
EOSINOPHIL # BLD AUTO: 0.6 10E3/UL (ref 0–0.7)
EOSINOPHIL NFR BLD AUTO: 10 %
ERYTHROCYTE [DISTWIDTH] IN BLOOD BY AUTOMATED COUNT: 13 % (ref 10–15)
ERYTHROCYTE [SEDIMENTATION RATE] IN BLOOD BY WESTERGREN METHOD: 5 MM/HR (ref 0–30)
GGT SERPL-CCNC: 32 U/L (ref 5–36)
HCT VFR BLD AUTO: 41.2 % (ref 35–47)
HGB BLD-MCNC: 13.6 G/DL (ref 11.7–15.7)
IMM GRANULOCYTES # BLD: 0 10E3/UL
IMM GRANULOCYTES NFR BLD: 0 %
LYMPHOCYTES # BLD AUTO: 1.7 10E3/UL (ref 0.8–5.3)
LYMPHOCYTES NFR BLD AUTO: 30 %
MCH RBC QN AUTO: 30.3 PG (ref 26.5–33)
MCHC RBC AUTO-ENTMCNC: 33 G/DL (ref 31.5–36.5)
MCV RBC AUTO: 92 FL (ref 78–100)
MONOCYTES # BLD AUTO: 0.5 10E3/UL (ref 0–1.3)
MONOCYTES NFR BLD AUTO: 9 %
NEUTROPHILS # BLD AUTO: 2.8 10E3/UL (ref 1.6–8.3)
NEUTROPHILS NFR BLD AUTO: 50 %
PLATELET # BLD AUTO: 205 10E3/UL (ref 150–450)
PROT SERPL-MCNC: 6.9 G/DL (ref 6.4–8.3)
RBC # BLD AUTO: 4.49 10E6/UL (ref 3.8–5.2)
WBC # BLD AUTO: 5.7 10E3/UL (ref 4–11)

## 2025-06-08 ENCOUNTER — MYC MEDICAL ADVICE (OUTPATIENT)
Dept: RHEUMATOLOGY | Facility: CLINIC | Age: 70
End: 2025-06-08
Payer: COMMERCIAL

## 2025-06-09 ENCOUNTER — E-VISIT (OUTPATIENT)
Dept: URGENT CARE | Facility: CLINIC | Age: 70
End: 2025-06-09
Payer: COMMERCIAL

## 2025-06-09 DIAGNOSIS — U07.1 INFECTION DUE TO COVID-19 VIRUS VARIANT OF CONCERN: Primary | ICD-10-CM

## 2025-06-09 NOTE — PATIENT INSTRUCTIONS
Deasaravanan Herrera,    Thank you for submitting an evisit. I am sorry you are not feeling well. Based on your responses and your symptoms, you are eligible for treatment of your COVID-19 symptoms with Paxlovid. This is a medication that you will take twice daily for 5 days. To learn more information about Paxlovid, please visit: [email or web address]     If you take Paxlovid with other medicines, it could make you sick. This means that you need to stop taking some of your normal medicines while you're taking Paxlovid. Please carefully follow these directions so that you can take Paxlovid safely. You are taking:    Salmeterol (Serevent or Advair) for asthma and lung diseases that make it hard for you to breathe.  Stop taking salmeterol and start taking Paxlovid the next day.  Do not take salmeterol while you're taking Paxlovid.  Start taking salmeterol 3 days after you finish Paxlovid.     If you are not improving, having difficulty breathing, difficulty drinking or staying hydrated, or experiencing new or worsening symptoms, please call [phone] to speak to a triage nurse about your symptoms.     Paxlovid is no longer free from the government. There are financial assistance programs available. You can learn more at https://paxlovid.[email or web address] or 1-430.608.5591, press 2 for patient options. Please look into this before you go to the pharmacy to  your medicine.    Your Paxlovid prescription was sent to the pharmacy you requested. If you have difficulty getting the medication at this pharmacy, the following [facility] carry Paxlovid. If you need to transfer your Paxlovid prescription from your selected pharmacy, please call your preferred [facility] below and they can assist you in transferring your prescription.     [facility]: [phone] (-F 8a-6p, Sat/Sun 8a-4p)  [facility]: [phone] (M-F 9a-5p)  [place]: [phone] (-F 8a-6p, Sat 9a-12:30p)  [place]:  [phone] (M-F 8:30a-6p, Sat/Sun 8:30a-12:30p)  [place]: [phone] (M-F 7a-7p)  [place]: [phone] (M-Th 8a-5:30p, Fri 8a-5p)  [place]: [phone] (M-F 8a-5p)  [place]:  [phone] (M-F 830a-6p; Sat/Sun 9a-1p)  [facility]): [phone] (M-F 8a-7p, Sat/Sun 8a-5p)  [place]: [phone] (M-F 9a-7p, Sat/Sun 9a-1p)   [place]: [phone] (M-F 8a-8:30p, Sat 9a-5p, Sun 9a-4p)  [place]: [phone] (M-F 8a-5p, Sat 9a-12p)  [place]: [phone]  (M-F 9a-5p, Sat/Sun 8a-4p)  [place]: [phone] (M-F 8a-5p)  [phone] (M-F 8am-9p, Sat/Sun 9a-5p)    Please call [phone] if you have other questions.     Alisa Turpin MD      Coronavirus (COVID-19): Care Instructions  What is COVID-19?  COVID-19 is a disease caused by a type of coronavirus. This illness was first found in 2019 and has since spread worldwide (pandemic). Symptoms can range from mild, such as fever and body aches, to severe, including trouble breathing. COVID-19 can be deadly.  Coronaviruses are a large group of viruses. Some types cause the common cold. Others cause more serious illnesses like Middle East respiratory syndrome (MERS) and severe acute respiratory syndrome (SARS).  Follow-up care is a key part of your treatment and safety. Be sure to make and go to all appointments, and call your doctor if you are having problems. It's also a good idea to know your test results and keep a list of the medicines you take.  How can you self-isolate when you have COVID-19?  If you have COVID-19, there are things you can do to help avoid spreading the virus to others.  Stay home, and avoid contact with other people.  Limit contact with people in your home. If possible, stay in a separate bedroom and use a separate bathroom.  Wear a high-quality mask when you are around other people.  Improve airflow. If you have to spend time indoors with others, open windows and doors. Or you can use a fan  "to blow air away from people and out a window.  Avoid contact with pets and other animals.  Cover your mouth and nose with a tissue when you cough or sneeze. Then throw it in the trash right away.  Wash your hands often, especially after you cough or sneeze. Use soap and water, and scrub for at least 20 seconds. If soap and water aren't available, use an alcohol-based hand .  Don't share personal household items. These include bedding, towels, cups and glasses, and eating utensils.  Wash laundry in the warmest water allowed for the fabric type, and dry it completely. It's okay to wash other people's laundry with yours.  Clean and disinfect your home. Use household  and disinfectant wipes or sprays.  Go to the CDC website at cdc.gov if you have questions.  When can you end self-isolation for COVID-19?  If you know or think that you have the virus, you may need to self-isolate. When you can be around other people you live with and leave home depends on whether you have symptoms.  If you tested positive but had no symptoms, wear a mask for at least 5 days.  If you have symptoms, you need to wait until your symptoms are getting better and you haven't had a fever for 24 hours while not taking medicines to lower the fever. Once you leave isolation, wear a mask for at least 5 more days when you are around other people.  If you were very sick, were in the hospital for COVID, or have a weakened immune system, talk to your doctor about how long you should isolate and wear a mask. It might be longer than 5 days.  Call your doctor or seek care if you have questions about your symptoms or when to end isolation.  Check the CDC website at cdc.gov for the most current information.  Where can you learn more?  Go to https://www.healthwise.net/patiented  Enter C007 in the search box to learn more about \"Coronavirus (COVID-19): Care Instructions.\"  Current as of: February 28, 2025  Content Version: 14.4    1801-3550 " [employer].   Care instructions adapted under license by your healthcare professional. If you have questions about a medical condition or this instruction, always ask your healthcare professional. [employer] disclaims any warranty or liability for your use of this information.

## 2025-06-11 ENCOUNTER — RESULTS FOLLOW-UP (OUTPATIENT)
Dept: RHEUMATOLOGY | Facility: CLINIC | Age: 70
End: 2025-06-11

## 2025-07-08 DIAGNOSIS — I10 HYPERTENSION GOAL BP (BLOOD PRESSURE) < 140/90: ICD-10-CM

## 2025-07-08 DIAGNOSIS — F33.0 MILD RECURRENT MAJOR DEPRESSION: ICD-10-CM

## 2025-07-08 DIAGNOSIS — G89.29 CHRONIC LEFT-SIDED LOW BACK PAIN WITHOUT SCIATICA: ICD-10-CM

## 2025-07-08 DIAGNOSIS — M54.50 CHRONIC LEFT-SIDED LOW BACK PAIN WITHOUT SCIATICA: ICD-10-CM

## 2025-07-08 RX ORDER — CITALOPRAM HYDROBROMIDE 20 MG/1
20 TABLET ORAL DAILY
Qty: 90 TABLET | Refills: 0 | Status: SHIPPED | OUTPATIENT
Start: 2025-07-08

## 2025-07-08 RX ORDER — GABAPENTIN 100 MG/1
CAPSULE ORAL
Qty: 540 CAPSULE | Refills: 5 | Status: SHIPPED | OUTPATIENT
Start: 2025-07-08

## 2025-07-08 RX ORDER — LOSARTAN POTASSIUM AND HYDROCHLOROTHIAZIDE 25; 100 MG/1; MG/1
1 TABLET ORAL DAILY
Qty: 90 TABLET | Refills: 0 | Status: SHIPPED | OUTPATIENT
Start: 2025-07-08

## 2025-07-21 ENCOUNTER — TELEPHONE (OUTPATIENT)
Dept: RHEUMATOLOGY | Facility: CLINIC | Age: 70
End: 2025-07-21
Payer: COMMERCIAL

## 2025-07-21 DIAGNOSIS — M05.9 SEROPOSITIVE RHEUMATOID ARTHRITIS (H): ICD-10-CM

## 2025-07-21 NOTE — TELEPHONE ENCOUNTER
Medication:   Kevzara 200mg/1.14ml  Last written on:   03/10/2025  Quantity:   2.28ml    Refills:   3    Last office visit:   07/18/2025  Next office visit:   12/01/2025  Last labs:   06/05/2025  Next lab:

## 2025-07-21 NOTE — TELEPHONE ENCOUNTER
After chart review and based on prior discussion with MD it was noted that this is appropriate for a refill.    BABATUNDE BarlowN RN Specialty Triage 7/21/2025 2:17 PM

## 2025-07-21 NOTE — TELEPHONE ENCOUNTER
PA Initiation    Medication: KEVZARA 200 MG/1.14ML SC SOAJ  Insurance Company: [employer] [place] - Phone [phone] Fax [phone]  Pharmacy Filling the Rx: [facility] - [place], [place] - [facility] [address]  Filling Pharmacy Phone: [phone]  Filling Pharmacy Fax: [phone]  Start Date: 7/21/2025  RIVER (Key: [id number])        Thank You,     Feliberto Linares Roseann  [facility] Specialty  [email or web address]  [email or web address]  Phone: [phone]  Fax: [phone]

## 2025-07-23 NOTE — TELEPHONE ENCOUNTER
Prior Authorization Approval    Medication: KEVZARA 200 MG/1.14ML SC SOAJ  Authorization Effective Date: 4/23/2025  Authorization Expiration Date: 7/22/2026  Approved Dose/Quantity: 2.28 ML per 28-day supply  Reference #: RIVER (Key: [id number])   Insurance Company: [facility] - Phone [phone] Fax [phone]  Expected CoPay: $    CoPay Card Available: No    Financial Assistance Needed: N/A - Pt above income limit for FPAP  Which Pharmacy is filling the prescription: [facility] [place] [place] [facility] [address]  Pharmacy Notified: Yes - [place] APPROVAL*CL KEVZARA 200 MG/1.14ML SC SOAJ, IS APPROVED BY [facility] [place] PART D, EFFECTIVE 04/23/2025 THROUGH 07/22/2026, PA#: [id number]  Patient Notified: renewal          Thank You,     Dot Linares [facility]  [facility] Specialty  [email or web address]  [email or web address]  Phone: [phone]  Fax: [phone]

## 2025-07-29 ENCOUNTER — VIRTUAL VISIT (OUTPATIENT)
Dept: ENDOCRINOLOGY | Facility: CLINIC | Age: 70
End: 2025-07-29
Payer: COMMERCIAL

## 2025-07-29 VITALS — BODY MASS INDEX: 45.88 KG/M2 | HEART RATE: 70 BPM | HEIGHT: 63 IN | OXYGEN SATURATION: 93 %

## 2025-07-29 DIAGNOSIS — G47.33 OBSTRUCTIVE SLEEP APNEA: Primary | ICD-10-CM

## 2025-07-29 DIAGNOSIS — E66.813 CLASS 3 SEVERE OBESITY WITH SERIOUS COMORBIDITY AND BODY MASS INDEX (BMI) OF 45.0 TO 49.9 IN ADULT, UNSPECIFIED OBESITY TYPE (H): ICD-10-CM

## 2025-07-29 RX ORDER — TOPIRAMATE 25 MG/1
75 TABLET, FILM COATED ORAL AT BEDTIME
Qty: 270 TABLET | Refills: 1 | Status: SHIPPED | OUTPATIENT
Start: 2025-07-29

## 2025-07-29 ASSESSMENT — PAIN SCALES - GENERAL: PAINLEVEL_OUTOF10: MODERATE PAIN (5)

## 2025-07-29 NOTE — NURSING NOTE
Current patient location: [address] [address]    Is the patient currently in the state of [place]? YES    Visit mode: VIDEO    If the visit is dropped, the patient can be reconnected by:VIDEO VISIT: Text to cell phone:   Telephone Information:   Mobile [phone]    and VIDEO VISIT: Send to e-mail at: [email or web address]    Will anyone else be joining the visit? NO  (If patient encounters technical issues they should call [phone]    Are changes needed to the allergy or medication list? No    Patient denies any changes since echeck-in completion and states all information entered during echeck-in remains accurate.    Are refills needed on medications prescribed by this physician? YES    Rooming Documentation:  Questionnaire(s) completed    Reason for visit: RECHSALIMA Durant MA VVF

## 2025-07-29 NOTE — PROGRESS NOTES
Virtual Visit Details    Type of service:  Video Visit   Video Start Time: 10:30AM  Video End Time:10:55AM    Originating Location (pt. Location): Home    Distant Location (provider location):  Off-site  Platform used for Video Visit: [facility] Medical Weight Management Note     Sharon Fung  MRN:  [id number]  :  1955  JENIFER:  2025    Dear Reynaldo Saavedra MD,    I had the pleasure of seeing your patient Sharon Fung. She is a 70 year old female who I am continuing to see for treatment of obesity related to:        2024     8:11 AM   --   I have the following health issues associated with obesity Pre-Diabetes    High Blood Pressure    High Cholesterol    Sleep Apnea    Osteoarthritis (joint disease)   I have the following symptoms associated with obesity Knee Pain    Back Pain    Fatigue       Assessment & Plan   Problem List Items Addressed This Visit       Class 3 severe obesity with serious comorbidity and body mass index (BMI) of 45.0 to 49.9 in adult, unspecified obesity type (H)    Last seen 2025 and increase topiramate.  Sent in [employer] at that time for history of sleep apnea, however has mild.  Insurance denied as needed moderate to severe sleep apnea.  She has seen weight gain and increased and sleep apnea symptoms over the past couple years and will be getting a repeat sleep study in September.  Discussed that if it is moderate to severe that we could appeal the [facility] denial.    Increase the topiramate to 100 mg, she did not see any difference.  Decrease down to 75 mg and she feels like this is a good dose.  However is very well controlled, to the point where she has to be very intentional with her eating.  Discussed importance of eating consistently throughout the day, she will work on eating a snack at lunch and dinner.  Will continue on this dose at this time.    Alternative if sleep study continues to show mild sleep apnea, is metformin.  No history of kidney or  liver disease.GFR 78.           Relevant Medications    topiramate (TOPAMAX) 25 MG tablet    Obstructive sleep apnea - Primary      Continue Topiramate 75mg at night. Refills sent.   Reach out once sleep study completed. If shows moderate to severe sleep apnea, can appeal zepbound. If mild sleep apnea can consider starting metformin.   Food - work on consistently eating 3x a day - especially dinner   Water goal - 4 water bottles daily   Amber Perkins PA-C in 3-4 months         INTERVAL HISTORY:  S/p lap band at [place] with Dr. Edmonds in 11/2010.   Starting weight - 220lb  Low weight - 200lb  Band removed in 2015 due to band infection with Dr. Mims. Since then has seen a gradual weight regain plus.   Reestablished care on 11/8/2024. GLP-1 not covered by insurance - medicare. No hx of Type II diabetes.   Topiramate started.     Last seen 4/29/25 - increased topiramate, [facility].        Had a tough couple of months. Covid in June leading to increase in COPD symptoms, family stress. July has been better so far.     Feels like starting weight is not accurate. Thinks it was closer to 267lb. Feels like she is losing weight slowly. Clothes are fitting looser. Less back pain. She is happy with her results, even though slow.     Anti-obesity medication history    Current:   Topiramate 75mg - unsure if dose increase to 100mg was helpful, so went back down to 75mg. Is happy at this dose. No side effects. Would like to stay here today.     Zepbound - not covered by insurance due to mild BRETT. Will be getting sleep study done in September.     Recent diet changes: No longer having any cravings or eating at the night. No hunger during the day. Eating 1-2 meals a day. Is hard to remember to eat at times. Typically just eats breakfast, and then not hungry the rest of the day. Drinking 48oz of water  B - yogurt + granola, boiled egg, string cheese  L - skips   D - eats out with  daughter, salad or chicken breast.   S - ice cream at times.     Recent exercise/activity changes: very limited with COPD. Tries to walk, but hard with SOB. Has a recumbent bike, goal is 10min 2xday for 3-4xweek.     Recent stressors: increase stress over the past month.     Recent sleep changes: will be getting sleep study in September.         CURRENT WEIGHT:   0 lbs 0 oz - does not have a current weight, but believes it is around 257lb.     Initial Weight (lbs): 260 lbs             Wt Readings from Last 5 Encounters:   06/04/25 117.5 kg (259 lb)   05/20/25 118.6 kg (261 lb 6.4 oz)   05/19/25 120 kg (264 lb 8 oz)   04/29/25 118.8 kg (262 lb)   03/10/25 118.8 kg (262 lb)             7/27/2025    10:49 AM   Changes and Difficulties   I have made the following changes to my diet since my last visit: Eating 5 small meals   With regards to my diet, I am still struggling with: Eating the right foods   I have made the following changes to my activity/exercise since my last visit: None   With regards to my activity/exercise, I am still struggling with: Finding the energy         MEDICATIONS:   Current Outpatient Medications   Medication Sig Dispense Refill    topiramate (TOPAMAX) 25 MG tablet Take 3 tablets (75 mg) by mouth at bedtime. 270 tablet 1    acetaminophen (TYLENOL) 500 MG tablet Take 500-1,000 mg by mouth every 6 hours as needed for mild pain.      citalopram (CELEXA) 20 MG tablet TAKE ONE TABLET BY MOUTH ONCE DAILY 90 tablet 0    fexofenadine (ALLEGRA) 180 MG tablet Take 1 tablet (180 mg) by mouth daily 90 tablet 2    fluticasone-salmeterol (ADVAIR) 250-50 MCG/ACT inhaler Inhale 1 puff into the lungs every 12 hours. 60 each 2    gabapentin (NEURONTIN) 100 MG capsule TAKE ONE CAPSULE BY MOUTH EVERY MORNING AND TAKE FIVE CAPSULES BY MOUTH AT BEDTIME 540 capsule 5    hydroxychloroquine (PLAQUENIL) 200 MG tablet Take 1 tablet (200 mg) by mouth 2 times daily. 180 tablet 2    hydrOXYzine cha (VISTARIL) 25 MG capsule  Take 1 capsule (25 mg) by mouth 3 times daily as needed for anxiety 30 capsule 1    ibuprofen 200 MG capsule Take 800 mg by mouth daily as needed for inflammatory pain. Twice weekly average      leflunomide (ARAVA) 20 MG tablet Take 1 tablet (20 mg) by mouth daily. 90 tablet 2    losartan-hydrochlorothiazide (HYZAAR) 100-25 MG tablet TAKE ONE TABLET BY MOUTH ONCE DAILY 90 tablet 0    metroNIDAZOLE (FLAGYL) 500 MG tablet Take 1 tablet (500 mg) by mouth 2 times daily. 14 tablet 0    ORDER FOR DME Use your CPAP device as directed by your provider.      Sarilumab 200 MG/1.14ML SOAJ Inject 1.14 mLs (200 mg) subcutaneously every 14 days. Hold for signs of infection and seek medical attention. 2.28 mL 3    sulfaSALAzine (AZULFIDINE) 500 MG tablet Take 3 tablets (1,500 mg) by mouth 2 times daily. 540 tablet 2    vitamin D3 (CHOLECALCIFEROL) 50 mcg (2000 units) tablet Take 1 tablet (50 mcg) by mouth daily 90 tablet 3           7/27/2025    10:49 AM   Weight Loss Medication History Reviewed With Patient   Which weight loss medications are you currently taking on a regular basis? Topamax (topiramate)   Are you having any side effects from the weight loss medication that we have prescribed you? No       Lab on 06/05/2025   Component Date Value Ref Range Status    Creatinine 06/05/2025 0.76  0.51 - 0.95 mg/dL Final    GFR Estimate 06/05/2025 85  >60 mL/min/1.73m2 Final    eGFR calculated using 2021 CKD-EPI equation.    Erythrocyte Sedimentation Rate 06/05/2025 5  0 - 30 mm/hr Final    CRP Inflammation 06/05/2025 <3.00  <5.00 mg/L Final    Protein Total 06/05/2025 6.9  6.4 - 8.3 g/dL Final    Albumin 06/05/2025 4.3  3.5 - 5.2 g/dL Final    Bilirubin Total 06/05/2025 0.5  <=1.2 mg/dL Final    Alkaline Phosphatase 06/05/2025 92  40 - 150 U/L Final    AST 06/05/2025 38  0 - 45 U/L Final    ALT 06/05/2025 33  0 - 50 U/L Final    Bilirubin Direct 06/05/2025 0.24  0.00 - 0.45 mg/dL Final    As of 5/14/25, reference ranges and trending  "lines may vary depending on the testing location.    WBC Count 06/05/2025 5.7  4.0 - 11.0 10e3/uL Final    RBC Count 06/05/2025 4.49  3.80 - 5.20 10e6/uL Final    Hemoglobin 06/05/2025 13.6  11.7 - 15.7 g/dL Final    Hematocrit 06/05/2025 41.2  35.0 - 47.0 % Final    MCV 06/05/2025 92  78 - 100 fL Final    MCH 06/05/2025 30.3  26.5 - 33.0 pg Final    MCHC 06/05/2025 33.0  31.5 - 36.5 g/dL Final    RDW 06/05/2025 13.0  10.0 - 15.0 % Final    Platelet Count 06/05/2025 205  150 - 450 10e3/uL Final    % Neutrophils 06/05/2025 50  % Final    % Lymphocytes 06/05/2025 30  % Final    % Monocytes 06/05/2025 9  % Final    % Eosinophils 06/05/2025 10  % Final    % Basophils 06/05/2025 1  % Final    % Immature Granulocytes 06/05/2025 0  % Final    Absolute Neutrophils 06/05/2025 2.8  1.6 - 8.3 10e3/uL Final    Absolute Lymphocytes 06/05/2025 1.7  0.8 - 5.3 10e3/uL Final    Absolute Monocytes 06/05/2025 0.5  0.0 - 1.3 10e3/uL Final    Absolute Eosinophils 06/05/2025 0.6  0.0 - 0.7 10e3/uL Final    Absolute Basophils 06/05/2025 0.1  0.0 - 0.2 10e3/uL Final    Absolute Immature Granulocytes 06/05/2025 0.0  <=0.4 10e3/uL Final    GGT 06/05/2025 32  5 - 36 U/L Final           Objective    Pulse 70   Ht 1.6 m (5' 3\")   SpO2 93%   BMI 45.88 kg/m    Vitals - Patient Reported  Pain Score: Moderate Pain (5)  Pain Loc: Other - see comment (RA)      Vitals:  No vitals were obtained today due to virtual visit.      PHYSICAL EXAM:  GENERAL: alert and no distress  EYES: Eyes grossly normal to inspection.  No discharge or erythema, or obvious scleral/conjunctival abnormalities.  RESP: No audible wheeze, cough, or visible cyanosis.    SKIN: Visible skin clear. No significant rash, abnormal pigmentation or lesions.  NEURO: Cranial nerves grossly intact.  Mentation and speech appropriate for age.  PSYCH: Appropriate affect, tone, and pace of words        Sincerely,    Amber Manley PA-C      36 minutes spent by me on the date of the " encounter doing chart review, history and exam, documentation and further activities per the note    The longitudinal plan of care for the diagnosis(es)/condition(s) as documented were addressed during this visit. Due to the added complexity in care, I will continue to support Sharon in the subsequent management and with ongoing continuity of care.

## 2025-07-30 NOTE — PATIENT INSTRUCTIONS
Visit Plan:     Continue Topiramate 75mg at night. Refills sent.   Reach out once sleep study completed. If shows moderate to severe sleep apnea, can appeal zepbound. If mild sleep apnea can consider starting metformin.   Food - work on consistently eating 3x a day - especially dinner   Water goal - 4 water bottles daily   Amber Perkins PA-C in 3-4 months

## 2025-07-30 NOTE — ASSESSMENT & PLAN NOTE
Last seen 4/29/2025 and increase topiramate.  Sent in Zep[employer] at that time for history of sleep apnea, however has mild.  Insurance denied as needed moderate to severe sleep apnea.  She has seen weight gain and increased and sleep apnea symptoms over the past couple years and will be getting a repeat sleep study in September.  Discussed that if it is moderate to severe that [facility] could [facility].    Increase the topiramate to 100 mg, she did not see any difference.  Decrease down to 75 mg and she feels like this is a good dose.  However is very well controlled, to the point where she has to be very intentional with her eating.  Discussed importance of eating consistently throughout the day, she will work on eating a snack at lunch and dinner.  Will continue on this dose at this time.    Alternative if sleep study continues to show mild sleep apnea, is metformin.  No history of kidney or liver disease.GFR 78.

## 2025-08-13 ENCOUNTER — PATIENT OUTREACH (OUTPATIENT)
Dept: CARE COORDINATION | Facility: CLINIC | Age: 70
End: 2025-08-13
Payer: COMMERCIAL

## 2025-08-27 ENCOUNTER — PATIENT OUTREACH (OUTPATIENT)
Dept: CARE COORDINATION | Facility: CLINIC | Age: 70
End: 2025-08-27
Payer: COMMERCIAL

## (undated) DEVICE — LINEN TOWEL PACK X5 5464

## (undated) DEVICE — GOWN XLG DISP 9545

## (undated) DEVICE — Device

## (undated) DEVICE — GLOVE PROTEXIS W/NEU-THERA 7.5  2D73TE75

## (undated) DEVICE — SU VICRYL 0 TIE 54" J608H

## (undated) DEVICE — DRSG ADAPTIC 3X8" 6113

## (undated) DEVICE — LINEN TOWEL PACK X30 5481

## (undated) DEVICE — PREP CHLORAPREP 26ML TINTED ORANGE  260815

## (undated) DEVICE — SUCTION MANIFOLD NEPTUNE 2 SYS 1 PORT 702-025-000

## (undated) DEVICE — EYE PACK CUSTOM ANTERIOR 30DEG TIP CENTURION PPK6682-04

## (undated) DEVICE — PACK HAND CUSTOM ASC

## (undated) DEVICE — IMM LEG ELEVATOR 79-90191

## (undated) DEVICE — SOL NACL 0.9% INJ 1000ML BAG 2B1324X

## (undated) DEVICE — DRSG ABDOMINAL 07 1/2X8" 7197D

## (undated) DEVICE — BNDG COBAN 2"X5YDS CO-FLEX UNSTERILE ASSRTD CLRS LF 5200CP

## (undated) DEVICE — DECANTER VIAL 2006S

## (undated) DEVICE — LINEN TOWEL PACK X6 WHITE 5487

## (undated) DEVICE — BNDG KLING 2" 2231

## (undated) DEVICE — DEVICE SUTURE PASSER 14GA WECK EFX EFXSP2

## (undated) DEVICE — BLADE KNIFE SURG 10 371110

## (undated) DEVICE — PACK EXTREMITY SOP15EXFSD

## (undated) DEVICE — TOURNIQUET SGL BLADDER 34"X4" PURPLE 5921-034-135

## (undated) DEVICE — SU VICRYL 2-0 CT-2 27" UND J269H

## (undated) DEVICE — PREP CHLORAPREP 26ML TINTED HI-LITE ORANGE 930815

## (undated) DEVICE — DRAPE COVER C-ARM SEAMLESS SNAP-KAP 03-KP26 LATEX FREE

## (undated) DEVICE — ENDO TROCAR SLEEVE KII ADV FIXATION 05X100MM CFS02

## (undated) DEVICE — DRILL BIT ARTHREX 2.5MM AR-8943-42

## (undated) DEVICE — GLOVE BIOGEL PI MICRO SZ 7.5 48575

## (undated) DEVICE — DRAPE C-ARM OEC MINI VIEW 6800   00-901917-01

## (undated) DEVICE — SOL WATER IRRIG 1000ML BOTTLE 2F7114

## (undated) DEVICE — GLOVE PROTEXIS BLUE W/NEU-THERA 8.0  2D73EB80

## (undated) DEVICE — SOL WATER IRRIG 500ML BOTTLE 2F7113

## (undated) DEVICE — MANIFOLD NEPTUNE 4 PORT 700-20

## (undated) DEVICE — COVER CAMERA IN-LIGHT DISP LT-C02

## (undated) DEVICE — TAPE MICROPORE 2"X1.5YD 1530S-2

## (undated) DEVICE — STRAP UNIVERSAL POSITIONING 2-PIECE 4X47X76" 91-287

## (undated) DEVICE — LINEN GOWN XLG 5407

## (undated) DEVICE — SOL WATER IRRIG 1000ML BOTTLE 07139-09

## (undated) DEVICE — ENDO POUCH UNIV RETRIEVAL SYSTEM INZII 10MM CD001

## (undated) DEVICE — ENDO TROCAR FIRST ENTRY KII FIOS ADV FIX 05X100MM CFF03

## (undated) DEVICE — EYE TIP IRRIGATION & ASPIRATION POLYMER 35D BENT 8065751511

## (undated) DEVICE — EYE CANN IRR 25GA HYDRODISSECTING 585037

## (undated) DEVICE — ENDO TROCAR BLUNT TIP KII BALLOON 12X130MM C0R50

## (undated) DEVICE — GLOVE PROTEXIS MICRO 6.5  2D73PM65

## (undated) DEVICE — ESU HOLDER LAP INST DISP PURPLE LONG 330MM H-PRO-330

## (undated) DEVICE — CAST PADDING 4" COTTON WEBRIL STERILE 9084S

## (undated) DEVICE — ESU PENCIL W/HOLSTER E2350H

## (undated) DEVICE — GLOVE PROTEXIS W/NEU-THERA 8.0  2D73TE80

## (undated) DEVICE — SUCTION MANIFOLD NEPTUNE 2 SYS 4 PORT 0702-020-000

## (undated) DEVICE — STRAP KNEE/BODY 31143004

## (undated) DEVICE — TOURNIQUET CUFF 30" REPRO BLUE 60-7070-105

## (undated) DEVICE — BNDG ELASTIC 4"X5YDS UNSTERILE 6611-40

## (undated) DEVICE — LINEN ORTHO PACK 5446

## (undated) DEVICE — CATARACT BLADE PACK 2.4MM 58001897

## (undated) DEVICE — ANTIFOG SOLUTION W/FOAM PAD 31142527

## (undated) DEVICE — BLADE SAW SAGITTAL STRK 34.5X11.5X0.64MM 2108-148-000S8

## (undated) DEVICE — PACK LOWER EXTREMITY RIVERSIDE SOP32LEFSX

## (undated) DEVICE — TUBING SMOKE EVAC PNEUMOCLEAR HIGH FLOW 0620050250

## (undated) DEVICE — GOWN XXLG REINFORCED 9071EL

## (undated) DEVICE — IMM LIMB ELEVATOR DC40-0203

## (undated) DEVICE — GLOVE BIOGEL PI ULTRATOUCH G SZ 7.5 42175

## (undated) DEVICE — LIGHT HANDLE X1 31140133

## (undated) DEVICE — CAST PADDING 4" UNSTERILE 9044

## (undated) DEVICE — GLOVE PROTEXIS POWDER FREE 6.5 ORTHOPEDIC 2D73ET65

## (undated) DEVICE — SU ETHILON 4-0 PS-2 18" BLACK 1667H

## (undated) DEVICE — SOL NACL 0.9% IRRIG 1000ML BOTTLE 2F7124

## (undated) DEVICE — DRSG GAUZE 4X8" NON21842

## (undated) DEVICE — SU VICRYL 0 UR-6 27" J603H

## (undated) DEVICE — DRAIN JACKSON PRATT 07MM FLAT 3/4 PERF

## (undated) DEVICE — TOURNIQUET SGL BLADDER 18"X4" RED 5921-218-135

## (undated) DEVICE — EYE SHIELD PLASTIC

## (undated) DEVICE — DRILL BIT ARTHREX LPS CAN 3.5MM AR-4160-35

## (undated) DEVICE — GLOVE PROTEXIS MICRO 8.5  2D73PM85

## (undated) DEVICE — PACK CATARACT CUSTOM ASC SEY15CPUMC

## (undated) DEVICE — GUIDE WIRE

## (undated) DEVICE — BLADE KNIFE SURG 15 371115

## (undated) DEVICE — DRILL BIT QUICK CONNECT ZIM 2.5X110MM

## (undated) DEVICE — CLIP APPLIER ENDO 5MM M/L LIGAMAX EL5ML

## (undated) DEVICE — ESU GROUND PAD ADULT W/CORD E7507

## (undated) DEVICE — SU ETHILON 3-0 PS-1 18" 1663H

## (undated) DEVICE — ESU HOLSTER PLASTIC DISP E2400

## (undated) DEVICE — DRAPE STERI TOWEL LG 1010

## (undated) DEVICE — SU ETHILON 3-0 FS-1 18" 669H

## (undated) DEVICE — PREP DURAPREP 26ML APL 8630

## (undated) DEVICE — GLOVE PROTEXIS W/NEU-THERA 8.5  2D73TE85

## (undated) DEVICE — DRAPE SHEET REV FOLD 3/4 9349

## (undated) DEVICE — SUCTION IRR STRYKERFLOW II W/TIP 250-070-520

## (undated) DEVICE — NDL INSUFFLATION 13GA 150MM C2202

## (undated) DEVICE — SU DERMABOND ADVANCED .7ML DNX12

## (undated) DEVICE — ENDO TROCAR FIRST ENTRY KII FIOS Z-THRD 05X150MM CTF01

## (undated) DEVICE — GLOVE PROTEXIS BLUE W/NEU-THERA 7.0  2D73EB70

## (undated) DEVICE — DRSG GAUZE 4X4" 3033

## (undated) DEVICE — SU MONOCRYL 4-0 PS-2 27" UND Y426H

## (undated) RX ORDER — BUPIVACAINE HYDROCHLORIDE AND EPINEPHRINE 2.5; 5 MG/ML; UG/ML
INJECTION, SOLUTION EPIDURAL; INFILTRATION; INTRACAUDAL; PERINEURAL
Status: DISPENSED
Start: 2023-06-13

## (undated) RX ORDER — ACETAMINOPHEN 325 MG/1
TABLET ORAL
Status: DISPENSED
Start: 2024-11-07

## (undated) RX ORDER — PROPOFOL 10 MG/ML
INJECTION, EMULSION INTRAVENOUS
Status: DISPENSED
Start: 2023-06-13

## (undated) RX ORDER — ONDANSETRON 2 MG/ML
INJECTION INTRAMUSCULAR; INTRAVENOUS
Status: DISPENSED
Start: 2023-06-13

## (undated) RX ORDER — HYDROMORPHONE HYDROCHLORIDE 1 MG/ML
INJECTION, SOLUTION INTRAMUSCULAR; INTRAVENOUS; SUBCUTANEOUS
Status: DISPENSED
Start: 2023-06-13

## (undated) RX ORDER — FENTANYL CITRATE 50 UG/ML
INJECTION, SOLUTION INTRAMUSCULAR; INTRAVENOUS
Status: DISPENSED
Start: 2021-06-30

## (undated) RX ORDER — ONDANSETRON 2 MG/ML
INJECTION INTRAMUSCULAR; INTRAVENOUS
Status: DISPENSED
Start: 2024-11-07

## (undated) RX ORDER — FENTANYL CITRATE 50 UG/ML
INJECTION, SOLUTION INTRAMUSCULAR; INTRAVENOUS
Status: DISPENSED
Start: 2024-11-14

## (undated) RX ORDER — LIDOCAINE HYDROCHLORIDE 10 MG/ML
INJECTION, SOLUTION EPIDURAL; INFILTRATION; INTRACAUDAL; PERINEURAL
Status: DISPENSED
Start: 2017-07-13

## (undated) RX ORDER — FENTANYL CITRATE 50 UG/ML
INJECTION, SOLUTION INTRAMUSCULAR; INTRAVENOUS
Status: DISPENSED
Start: 2023-06-13

## (undated) RX ORDER — ALBUTEROL SULFATE 0.83 MG/ML
SOLUTION RESPIRATORY (INHALATION)
Status: DISPENSED
Start: 2018-11-28

## (undated) RX ORDER — ONDANSETRON 2 MG/ML
INJECTION INTRAMUSCULAR; INTRAVENOUS
Status: DISPENSED
Start: 2021-12-27

## (undated) RX ORDER — ENOXAPARIN SODIUM 100 MG/ML
INJECTION SUBCUTANEOUS
Status: DISPENSED
Start: 2023-06-13

## (undated) RX ORDER — INDOCYANINE GREEN AND WATER 25 MG
KIT INJECTION
Status: DISPENSED
Start: 2023-06-13

## (undated) RX ORDER — ONDANSETRON 2 MG/ML
INJECTION INTRAMUSCULAR; INTRAVENOUS
Status: DISPENSED
Start: 2024-11-14

## (undated) RX ORDER — LIDOCAINE HYDROCHLORIDE 10 MG/ML
INJECTION, SOLUTION EPIDURAL; INFILTRATION; INTRACAUDAL; PERINEURAL
Status: DISPENSED
Start: 2017-07-14

## (undated) RX ORDER — LIDOCAINE HYDROCHLORIDE 10 MG/ML
INJECTION, SOLUTION INFILTRATION; PERINEURAL
Status: DISPENSED
Start: 2017-04-06

## (undated) RX ORDER — FENTANYL CITRATE 50 UG/ML
INJECTION, SOLUTION INTRAMUSCULAR; INTRAVENOUS
Status: DISPENSED
Start: 2021-12-27

## (undated) RX ORDER — LABETALOL HYDROCHLORIDE 5 MG/ML
INJECTION, SOLUTION INTRAVENOUS
Status: DISPENSED
Start: 2023-06-13

## (undated) RX ORDER — FENTANYL CITRATE-0.9 % NACL/PF 10 MCG/ML
PLASTIC BAG, INJECTION (ML) INTRAVENOUS
Status: DISPENSED
Start: 2023-06-13

## (undated) RX ORDER — ACETAMINOPHEN 325 MG/1
TABLET ORAL
Status: DISPENSED
Start: 2021-06-30

## (undated) RX ORDER — TRIAMCINOLONE ACETONIDE 40 MG/ML
INJECTION, SUSPENSION INTRA-ARTICULAR; INTRAMUSCULAR
Status: DISPENSED
Start: 2017-04-06

## (undated) RX ORDER — CEFAZOLIN SODIUM IN 0.9 % NACL 3 G/100 ML
INTRAVENOUS SOLUTION, PIGGYBACK (ML) INTRAVENOUS
Status: DISPENSED
Start: 2021-06-30

## (undated) RX ORDER — CEFAZOLIN SODIUM/WATER 3 G/30 ML
SYRINGE (ML) INTRAVENOUS
Status: DISPENSED
Start: 2023-06-13

## (undated) RX ORDER — CEFAZOLIN SODIUM IN 0.9 % NACL 3 G/100 ML
INTRAVENOUS SOLUTION, PIGGYBACK (ML) INTRAVENOUS
Status: DISPENSED
Start: 2021-12-27

## (undated) RX ORDER — CEFAZOLIN SODIUM 1 G/3ML
INJECTION, POWDER, FOR SOLUTION INTRAMUSCULAR; INTRAVENOUS
Status: DISPENSED
Start: 2021-06-30

## (undated) RX ORDER — ACETAMINOPHEN 325 MG/1
TABLET ORAL
Status: DISPENSED
Start: 2024-11-14

## (undated) RX ORDER — BUPIVACAINE HYDROCHLORIDE 5 MG/ML
INJECTION, SOLUTION EPIDURAL; INTRACAUDAL
Status: DISPENSED
Start: 2017-06-15